# Patient Record
Sex: FEMALE | Race: WHITE | NOT HISPANIC OR LATINO | Employment: OTHER | URBAN - METROPOLITAN AREA
[De-identification: names, ages, dates, MRNs, and addresses within clinical notes are randomized per-mention and may not be internally consistent; named-entity substitution may affect disease eponyms.]

---

## 2017-10-16 RX ORDER — LISINOPRIL 40 MG/1
40 TABLET ORAL EVERY EVENING
COMMUNITY
End: 2020-05-27 | Stop reason: HOSPADM

## 2017-10-16 RX ORDER — GLIPIZIDE 5 MG/1
5 TABLET ORAL EVERY EVENING
COMMUNITY
End: 2020-09-10 | Stop reason: HOSPADM

## 2017-10-16 RX ORDER — PRAVASTATIN SODIUM 20 MG
20 TABLET ORAL EVERY EVENING
COMMUNITY
End: 2020-09-10 | Stop reason: HOSPADM

## 2017-10-16 NOTE — PRE-PROCEDURE INSTRUCTIONS
Pre-Surgery Instructions:   Medication Instructions    glipiZIDE (GLUCOTROL) 5 mg tablet Instructed patient per Anesthesia Guidelines   lisinopril (ZESTRIL) 40 mg tablet Instructed patient per Anesthesia Guidelines   metFORMIN (GLUCOPHAGE) 500 mg tablet Instructed patient per Anesthesia Guidelines   pravastatin (PRAVACHOL) 20 mg tablet Instructed patient per Anesthesia Guidelines  Pre op instructions reviewed with pt via phone  No meds a m of surgery  Pt to take FBS a m of surgery and bring in results   daughter Ashley Schuster, to provide contact # upon arrival  My Surgical Experience    The following information was developed to assist you to prepare for your operation  What do I need to do before coming to the hospital?   Arrange for a responsible person to drive you to and from the hospital    Arrange care for your children at home  Children are not allowed in the recovery areas of the hospital   Plan to wear clothing that is easy to put on and take off  If you are having shoulder surgery, wear a shirt that buttons or zippers in the front  Bathing  o Shower the evening before and the morning of your surgery with an antibacterial soap  Please refer to the Pre Op Showering Instructions for Surgery Patients Sheet   o Remove nail polish and all body piercing jewelry  o Do not shave any body part for at least 24 hours before surgery-this includes face, arms, legs and upper body  Food  o Nothing to eat or drink after midnight the night before your surgery   This includes candy and chewing gum  o Exception: If your surgery is after 12:00pm (noon), you may have clear liquids such as 7-Up®, ginger ale, apple or cranberry juice, Jell-O®, water, or clear broth until 8:00 am  o Do not drink milk or juice with pulp on the morning before surgery  o Do not drink alcohol 24 hours before surgery  Medicine  o Follow instructions you received from your surgeon about which medicines you may take on the day of surgery  o If instructed to take medicine on the morning of surgery, take pills with just a small sip of water  Call your prescribing doctor for specific information on what to do if you take insulin    What should I bring to the hospital?    Bring:  Lu Chaves or a walker, if you have them, for foot or knee surgery   A list of the daily medicines, vitamins, minerals, herbals and nutritional supplements you take  Include the dosages of medicines and the time you take them each day   Glasses, dentures or hearing aids   Minimal clothing; you will be wearing hospital sleepwear   Photo ID; required to verify your identity   If you have a Living Will or Power of , bring a copy of the documents   If you have an ostomy, bring an extra pouch and any supplies you use    Do not bring   Medicines or inhalers   Money, valuables or jewelry    What other information should I know about the day of surgery?  Notify your surgeons if you develop a cold, sore throat, cough, fever, rash or any other illness   Report to the Ambulatory Surgical/Same Day Surgery Unit   You will be instructed to stop at Registration only if you have not been pre-registered   Inform your  fi they do not stay that they will be asked by the staff to leave a phone number where they can be reached   Be available to be reached before surgery  In the event the operating room schedule changes, you may be asked to come in earlier or later than expected    *It is important to tell your doctor and others involved in your health care if you are taking or have been taking any non-prescription drugs, vitamins, minerals, herbals or other nutritional supplements   Any of these may interact with some food or medicines and cause a reaction

## 2017-10-23 ENCOUNTER — ANESTHESIA (OUTPATIENT)
Dept: PERIOP | Facility: AMBULARY SURGERY CENTER | Age: 72
End: 2017-10-23
Payer: MEDICARE

## 2017-10-23 ENCOUNTER — ANESTHESIA EVENT (OUTPATIENT)
Dept: PERIOP | Facility: AMBULARY SURGERY CENTER | Age: 72
End: 2017-10-23
Payer: MEDICARE

## 2017-10-23 ENCOUNTER — HOSPITAL ENCOUNTER (OUTPATIENT)
Facility: AMBULARY SURGERY CENTER | Age: 72
Setting detail: OUTPATIENT SURGERY
Discharge: HOME | End: 2017-10-23
Attending: OPHTHALMOLOGY | Admitting: OPHTHALMOLOGY
Payer: MEDICARE

## 2017-10-23 VITALS
OXYGEN SATURATION: 98 % | HEIGHT: 66 IN | DIASTOLIC BLOOD PRESSURE: 71 MMHG | TEMPERATURE: 97.8 F | HEART RATE: 81 BPM | WEIGHT: 180 LBS | SYSTOLIC BLOOD PRESSURE: 175 MMHG | RESPIRATION RATE: 20 BRPM | BODY MASS INDEX: 28.93 KG/M2

## 2017-10-23 LAB — GLUCOSE SERPL-MCNC: 240 MG/DL (ref 65–140)

## 2017-10-23 PROCEDURE — 82948 REAGENT STRIP/BLOOD GLUCOSE: CPT

## 2017-10-23 PROCEDURE — V2632 POST CHMBR INTRAOCULAR LENS: HCPCS | Performed by: OPHTHALMOLOGY

## 2017-10-23 DEVICE — IOL SN60WF 23.0: Type: IMPLANTABLE DEVICE | Site: EYE | Status: FUNCTIONAL

## 2017-10-23 RX ORDER — LIDOCAINE HYDROCHLORIDE 10 MG/ML
INJECTION, SOLUTION EPIDURAL; INFILTRATION; INTRACAUDAL; PERINEURAL AS NEEDED
Status: DISCONTINUED | OUTPATIENT
Start: 2017-10-23 | End: 2017-10-23 | Stop reason: HOSPADM

## 2017-10-23 RX ORDER — TETRACAINE HYDROCHLORIDE 5 MG/ML
SOLUTION OPHTHALMIC AS NEEDED
Status: DISCONTINUED | OUTPATIENT
Start: 2017-10-23 | End: 2017-10-23 | Stop reason: HOSPADM

## 2017-10-23 RX ORDER — CYCLOPENTOLATE HYDROCHLORIDE 10 MG/ML
1 SOLUTION/ DROPS OPHTHALMIC
Status: COMPLETED | OUTPATIENT
Start: 2017-10-23 | End: 2017-10-23

## 2017-10-23 RX ORDER — PHENYLEPHRINE HCL 2.5 %
1 DROPS OPHTHALMIC (EYE)
Status: COMPLETED | OUTPATIENT
Start: 2017-10-23 | End: 2017-10-23

## 2017-10-23 RX ORDER — LIDOCAINE HYDROCHLORIDE 20 MG/ML
1 JELLY TOPICAL
Status: COMPLETED | OUTPATIENT
Start: 2017-10-23 | End: 2017-10-23

## 2017-10-23 RX ORDER — GATIFLOXACIN 5 MG/ML
SOLUTION/ DROPS OPHTHALMIC AS NEEDED
Status: DISCONTINUED | OUTPATIENT
Start: 2017-10-23 | End: 2017-10-23 | Stop reason: HOSPADM

## 2017-10-23 RX ORDER — MIDAZOLAM HYDROCHLORIDE 1 MG/ML
INJECTION INTRAMUSCULAR; INTRAVENOUS AS NEEDED
Status: DISCONTINUED | OUTPATIENT
Start: 2017-10-23 | End: 2017-10-23 | Stop reason: SURG

## 2017-10-23 RX ORDER — TETRACAINE HYDROCHLORIDE 5 MG/ML
1 SOLUTION OPHTHALMIC ONCE
Status: COMPLETED | OUTPATIENT
Start: 2017-10-23 | End: 2017-10-23

## 2017-10-23 RX ORDER — GATIFLOXACIN 5 MG/ML
1 SOLUTION/ DROPS OPHTHALMIC 2 TIMES DAILY
Qty: 3 ML | Refills: 0
Start: 2017-10-23 | End: 2017-12-11 | Stop reason: HOSPADM

## 2017-10-23 RX ORDER — KETOROLAC TROMETHAMINE 5 MG/ML
1 SOLUTION OPHTHALMIC 4 TIMES DAILY
Qty: 5 ML | Refills: 0
Start: 2017-10-23 | End: 2017-12-11 | Stop reason: HOSPADM

## 2017-10-23 RX ORDER — KETOROLAC TROMETHAMINE 5 MG/ML
1 SOLUTION OPHTHALMIC
Status: COMPLETED | OUTPATIENT
Start: 2017-10-23 | End: 2017-10-23

## 2017-10-23 RX ADMIN — MIDAZOLAM HYDROCHLORIDE 1 MG: 1 INJECTION, SOLUTION INTRAMUSCULAR; INTRAVENOUS at 07:52

## 2017-10-23 RX ADMIN — MIDAZOLAM HYDROCHLORIDE 1 MG: 1 INJECTION, SOLUTION INTRAMUSCULAR; INTRAVENOUS at 07:54

## 2017-10-23 RX ADMIN — CYCLOPENTOLATE HYDROCHLORIDE 1 DROP: 10 SOLUTION/ DROPS OPHTHALMIC at 07:31

## 2017-10-23 RX ADMIN — KETOROLAC TROMETHAMINE 1 DROP: 5 SOLUTION OPHTHALMIC at 06:51

## 2017-10-23 RX ADMIN — CYCLOPENTOLATE HYDROCHLORIDE 1 DROP: 10 SOLUTION/ DROPS OPHTHALMIC at 06:51

## 2017-10-23 RX ADMIN — PHENYLEPHRINE HYDROCHLORIDE 1 DROP: 25 SOLUTION/ DROPS OPHTHALMIC at 06:52

## 2017-10-23 RX ADMIN — KETOROLAC TROMETHAMINE 1 DROP: 5 SOLUTION OPHTHALMIC at 07:18

## 2017-10-23 RX ADMIN — LIDOCAINE HYDROCHLORIDE 1 APPLICATION: 20 JELLY TOPICAL at 07:17

## 2017-10-23 RX ADMIN — LIDOCAINE HYDROCHLORIDE 1 APPLICATION: 20 JELLY TOPICAL at 07:32

## 2017-10-23 RX ADMIN — TETRACAINE HYDROCHLORIDE 1 DROP: 5 SOLUTION OPHTHALMIC at 06:52

## 2017-10-23 RX ADMIN — LIDOCAINE HYDROCHLORIDE 1 APPLICATION: 20 JELLY TOPICAL at 06:52

## 2017-10-23 RX ADMIN — CYCLOPENTOLATE HYDROCHLORIDE 1 DROP: 10 SOLUTION/ DROPS OPHTHALMIC at 07:02

## 2017-10-23 RX ADMIN — PHENYLEPHRINE HYDROCHLORIDE 1 DROP: 25 SOLUTION/ DROPS OPHTHALMIC at 07:03

## 2017-10-23 RX ADMIN — KETOROLAC TROMETHAMINE 1 DROP: 5 SOLUTION OPHTHALMIC at 07:03

## 2017-10-23 RX ADMIN — LIDOCAINE HYDROCHLORIDE 1 APPLICATION: 20 JELLY TOPICAL at 07:03

## 2017-10-23 RX ADMIN — KETOROLAC TROMETHAMINE 1 DROP: 5 SOLUTION OPHTHALMIC at 07:31

## 2017-10-23 RX ADMIN — PHENYLEPHRINE HYDROCHLORIDE 1 DROP: 25 SOLUTION/ DROPS OPHTHALMIC at 07:17

## 2017-10-23 RX ADMIN — CYCLOPENTOLATE HYDROCHLORIDE 1 DROP: 10 SOLUTION/ DROPS OPHTHALMIC at 07:17

## 2017-10-23 RX ADMIN — PHENYLEPHRINE HYDROCHLORIDE 1 DROP: 25 SOLUTION/ DROPS OPHTHALMIC at 07:32

## 2017-10-23 NOTE — DISCHARGE INSTRUCTIONS
Dr Pablo Luu Cataract Instructions    Activity:     1  No Driving until instructed   2  Keep shield on until seen tomorrow except when administering drops   3  No heavy lifting   4  No water in eye     Diet:     1  Resume normal diet    Normal Symptoms:     1  Mild Headache   2  Scratchy or picky feeling around eye    Call the office if:     1  You have any questions or concerns   2  If eye pain is not relieved by extra strength tylenol    Office phone number:  [phone]      Next appointment:     1  See Dr Pablo Luu at his office tomorrow as scheduled   __1100am________________________________________________________   2  Bring blue eye kit with you and eyedrops to the office    A new set of comprehensive instructions will be given and reviewed with you during your office visit tomorrow

## 2017-10-23 NOTE — ANESTHESIA PREPROCEDURE EVALUATION
Review of Systems/Medical History      No history of anesthetic complications     Cardiovascular  Hyperlipidemia, Hypertension , No angina , No RUIZ,    Pulmonary  Negative pulmonary ROS ,        GI/Hepatic            Endo/Other  Diabetes ,      GYN       Hematology   Musculoskeletal       Neurology   Psychology           Physical Exam    Airway    Mallampati score: III  TM Distance: >3 FB  Neck ROM: full     Dental   upper dentures and lower dentures,     Cardiovascular      Pulmonary  Breath sounds clear to auscultation,     Other Findings        Anesthesia Plan  ASA Score- 3       Anesthesia Type- IV sedation with anesthesia with ASA Monitors  Additional Monitors:   Airway Plan:           Induction- intravenous  Informed Consent- Anesthetic plan and risks discussed with patient

## 2017-10-23 NOTE — OP NOTE
OPERATIVE REPORT    PATIENT NAME: Cammie Portillo    :  1945  MRN: [id number]  Pt Location: [facility] OR ROOM 01    Surgery Date: 10/23/2017    Surgeon(s) and Role:     * Neftali Moy MD - Primary    Posterior subcapsular polar age-related cataract of right eye [H25 041]    Post-Op Diagnosis Codes:     * Posterior subcapsular polar age-related cataract of right eye [H25 041]    Procedure(s):  EXTRACTION EXTRACAPSULAR CATARACT PHACO INTRAOCULAR LENS (IOL)    Anesthesia Type:   IV Sedation with Anesthesia    Operative Indications:  Posterior subcapsular polar age-related cataract of right eye [H25 041]  Decreased vision to 20/100  With problems reading  Pt requested cataract sx the right eye    Procedure and Technique:    Procedure Details     The patient was brought in the OR in stable condition and placed on the operative table  The left eye was prepped and draped in the usual sterile manner  Attention was directed to the left eye where a lid speculum was placed  A 2 4 mm clear corneal incision was made temperally  1/2 cc of 1% MPF Lidocaine was irrigated into the anterior chamber followed by viscoat  The side port incision was placed superiorly  The capsularrhexis was made and the nucleus was hydrodissected with BSS  The nucleus was then removed with the phaco handpiece followed by removal of the cortical material with the I/A handpiece  The capsular bag was then filled with Provisc  The IOL was folded and placed in to the capsular bag and centered well  The remaining Provisc was removed from the eye with the I/A  The wounds were hydrated with BSS and found to be water tight  The lid speculum was removed and 2 drops of Gatifloxicin were placed over the cornea  A protective eye shield was taped over the eye and the patient went to PACU in stable condition  I will see the patient in the office tomorrow and the expected post op period is a few weeks         Complications: None        Disposition: PACU   Condition: Stable    SIGNATURE: Shannan Azevedo MD  DATE: October 23, 2017  TIME: 8:15 AM

## 2017-12-05 NOTE — PRE-PROCEDURE INSTRUCTIONS
Pre-Surgery Instructions:   Medication Instructions    gatifloxacin (ZYMAXID) 0 5 % Instructed patient per Anesthesia Guidelines   glipiZIDE (GLUCOTROL) 5 mg tablet Instructed patient per Anesthesia Guidelines   ketorolac (ACULAR) 0 5 % ophthalmic solution Instructed patient per Anesthesia Guidelines   lisinopril (ZESTRIL) 40 mg tablet Instructed patient per Anesthesia Guidelines   metFORMIN (GLUCOPHAGE) 500 mg tablet Instructed patient per Anesthesia Guidelines   pravastatin (PRAVACHOL) 20 mg tablet Instructed patient per Anesthesia Guidelines  Pre op instructions reviewed with pt via phone  No meds jessi gong of surgery  FBS on arrival as pt does not own a glucometer,  carlo Russell, to provide cell # upon arrival  My Surgical Experience    The following information was developed to assist you to prepare for your operation  What do I need to do before coming to the hospital?   Arrange for a responsible person to drive you to and from the hospital    Arrange care for your children at home  Children are not allowed in the recovery areas of the hospital   Plan to wear clothing that is easy to put on and take off  If you are having shoulder surgery, wear a shirt that buttons or zippers in the front  Bathing  o Shower the evening before and the morning of your surgery with an antibacterial soap  Please refer to the Pre Op Showering Instructions for Surgery Patients Sheet   o Remove nail polish and all body piercing jewelry  o Do not shave any body part for at least 24 hours before surgery-this includes face, arms, legs and upper body  Food  o Nothing to eat or drink after midnight the night before your surgery   This includes candy and chewing gum  o Exception: If your surgery is after 12:00pm (noon), you may have clear liquids such as 7-Up®, ginger ale, apple or cranberry juice, Jell-O®, water, or clear broth until 8:00 am  o Do not drink milk or juice with pulp on the morning before surgery  o Do not drink alcohol 24 hours before surgery  Medicine  o Follow instructions you received from your surgeon about which medicines you may take on the day of surgery  o If instructed to take medicine on the morning of surgery, take pills with just a small sip of water  Call your prescribing doctor for specific information on what to do if you take insulin    What should I bring to the hospital?    Bring:  Cho Tolleson or a walker, if you have them, for foot or knee surgery   A list of the daily medicines, vitamins, minerals, herbals and nutritional supplements you take  Include the dosages of medicines and the time you take them each day   Glasses, dentures or hearing aids   Minimal clothing; you will be wearing hospital sleepwear   Photo ID; required to verify your identity   If you have a Living Will or Power of , bring a copy of the documents   If you have an ostomy, bring an extra pouch and any supplies you use    Do not bring   Medicines or inhalers   Money, valuables or jewelry    What other information should I know about the day of surgery?  Notify your surgeons if you develop a cold, sore throat, cough, fever, rash or any other illness   Report to the Ambulatory Surgical/Same Day Surgery Unit   You will be instructed to stop at Registration only if you have not been pre-registered   Inform your  fi they do not stay that they will be asked by the staff to leave a phone number where they can be reached   Be available to be reached before surgery  In the event the operating room schedule changes, you may be asked to come in earlier or later than expected    *It is important to tell your doctor and others involved in your health care if you are taking or have been taking any non-prescription drugs, vitamins, minerals, herbals or other nutritional supplements   Any of these may interact with some food or medicines and cause a reaction

## 2017-12-11 ENCOUNTER — HOSPITAL ENCOUNTER (OUTPATIENT)
Facility: AMBULARY SURGERY CENTER | Age: 72
Setting detail: OUTPATIENT SURGERY
Discharge: HOME | End: 2017-12-11
Attending: OPHTHALMOLOGY | Admitting: OPHTHALMOLOGY
Payer: MEDICARE

## 2017-12-11 ENCOUNTER — ANESTHESIA (OUTPATIENT)
Dept: PERIOP | Facility: AMBULARY SURGERY CENTER | Age: 72
End: 2017-12-11
Payer: MEDICARE

## 2017-12-11 ENCOUNTER — ANESTHESIA EVENT (OUTPATIENT)
Dept: PERIOP | Facility: AMBULARY SURGERY CENTER | Age: 72
End: 2017-12-11
Payer: MEDICARE

## 2017-12-11 VITALS
RESPIRATION RATE: 18 BRPM | DIASTOLIC BLOOD PRESSURE: 79 MMHG | OXYGEN SATURATION: 99 % | TEMPERATURE: 98.2 F | HEART RATE: 80 BPM | SYSTOLIC BLOOD PRESSURE: 164 MMHG

## 2017-12-11 LAB — GLUCOSE SERPL-MCNC: 193 MG/DL (ref 65–140)

## 2017-12-11 PROCEDURE — 82948 REAGENT STRIP/BLOOD GLUCOSE: CPT

## 2017-12-11 PROCEDURE — V2632 POST CHMBR INTRAOCULAR LENS: HCPCS | Performed by: OPHTHALMOLOGY

## 2017-12-11 DEVICE — IOL SN60WF 22.5: Type: IMPLANTABLE DEVICE | Site: EYE | Status: FUNCTIONAL

## 2017-12-11 RX ORDER — PHENYLEPHRINE HCL 2.5 %
1 DROPS OPHTHALMIC (EYE)
Status: COMPLETED | OUTPATIENT
Start: 2017-12-11 | End: 2017-12-11

## 2017-12-11 RX ORDER — MIDAZOLAM HYDROCHLORIDE 1 MG/ML
INJECTION INTRAMUSCULAR; INTRAVENOUS AS NEEDED
Status: DISCONTINUED | OUTPATIENT
Start: 2017-12-11 | End: 2017-12-11 | Stop reason: SURG

## 2017-12-11 RX ORDER — GATIFLOXACIN 5 MG/ML
1 SOLUTION/ DROPS OPHTHALMIC 2 TIMES DAILY
Qty: 3 ML | Refills: 0
Start: 2017-12-11 | End: 2018-09-05

## 2017-12-11 RX ORDER — LIDOCAINE HYDROCHLORIDE 10 MG/ML
INJECTION, SOLUTION EPIDURAL; INFILTRATION; INTRACAUDAL; PERINEURAL AS NEEDED
Status: DISCONTINUED | OUTPATIENT
Start: 2017-12-11 | End: 2017-12-11 | Stop reason: HOSPADM

## 2017-12-11 RX ORDER — LIDOCAINE HYDROCHLORIDE 20 MG/ML
1 JELLY TOPICAL
Status: COMPLETED | OUTPATIENT
Start: 2017-12-11 | End: 2017-12-11

## 2017-12-11 RX ORDER — GATIFLOXACIN 5 MG/ML
SOLUTION/ DROPS OPHTHALMIC AS NEEDED
Status: DISCONTINUED | OUTPATIENT
Start: 2017-12-11 | End: 2017-12-11 | Stop reason: HOSPADM

## 2017-12-11 RX ORDER — TETRACAINE HYDROCHLORIDE 5 MG/ML
1 SOLUTION OPHTHALMIC ONCE
Status: COMPLETED | OUTPATIENT
Start: 2017-12-11 | End: 2017-12-11

## 2017-12-11 RX ORDER — KETOROLAC TROMETHAMINE 5 MG/ML
1 SOLUTION OPHTHALMIC 4 TIMES DAILY
Qty: 5 ML | Refills: 0
Start: 2017-12-11 | End: 2018-09-05

## 2017-12-11 RX ORDER — TETRACAINE HYDROCHLORIDE 5 MG/ML
SOLUTION OPHTHALMIC AS NEEDED
Status: DISCONTINUED | OUTPATIENT
Start: 2017-12-11 | End: 2017-12-11 | Stop reason: HOSPADM

## 2017-12-11 RX ORDER — KETOROLAC TROMETHAMINE 5 MG/ML
1 SOLUTION OPHTHALMIC
Status: COMPLETED | OUTPATIENT
Start: 2017-12-11 | End: 2017-12-11

## 2017-12-11 RX ORDER — CYCLOPENTOLATE HYDROCHLORIDE 10 MG/ML
1 SOLUTION/ DROPS OPHTHALMIC
Status: COMPLETED | OUTPATIENT
Start: 2017-12-11 | End: 2017-12-11

## 2017-12-11 RX ADMIN — TETRACAINE HYDROCHLORIDE 1 DROP: 5 SOLUTION OPHTHALMIC at 06:25

## 2017-12-11 RX ADMIN — KETOROLAC TROMETHAMINE 1 DROP: 5 SOLUTION OPHTHALMIC at 06:40

## 2017-12-11 RX ADMIN — CYCLOPENTOLATE HYDROCHLORIDE 1 DROP: 10 SOLUTION/ DROPS OPHTHALMIC at 06:40

## 2017-12-11 RX ADMIN — LIDOCAINE HYDROCHLORIDE 1 APPLICATION: 20 JELLY TOPICAL at 07:03

## 2017-12-11 RX ADMIN — LIDOCAINE HYDROCHLORIDE 1 APPLICATION: 20 JELLY TOPICAL at 06:40

## 2017-12-11 RX ADMIN — MIDAZOLAM HYDROCHLORIDE 0.5 MG: 1 INJECTION, SOLUTION INTRAMUSCULAR; INTRAVENOUS at 07:09

## 2017-12-11 RX ADMIN — LIDOCAINE HYDROCHLORIDE 1 APPLICATION: 20 JELLY TOPICAL at 06:25

## 2017-12-11 RX ADMIN — KETOROLAC TROMETHAMINE 1 DROP: 5 SOLUTION OPHTHALMIC at 06:52

## 2017-12-11 RX ADMIN — CYCLOPENTOLATE HYDROCHLORIDE 1 DROP: 10 SOLUTION/ DROPS OPHTHALMIC at 06:52

## 2017-12-11 RX ADMIN — PHENYLEPHRINE HYDROCHLORIDE 1 DROP: 25 SOLUTION/ DROPS OPHTHALMIC at 06:52

## 2017-12-11 RX ADMIN — KETOROLAC TROMETHAMINE 1 DROP: 5 SOLUTION OPHTHALMIC at 07:03

## 2017-12-11 RX ADMIN — PHENYLEPHRINE HYDROCHLORIDE 1 DROP: 25 SOLUTION/ DROPS OPHTHALMIC at 07:03

## 2017-12-11 RX ADMIN — PHENYLEPHRINE HYDROCHLORIDE 1 DROP: 25 SOLUTION/ DROPS OPHTHALMIC at 06:40

## 2017-12-11 RX ADMIN — MIDAZOLAM HYDROCHLORIDE 0.5 MG: 1 INJECTION, SOLUTION INTRAMUSCULAR; INTRAVENOUS at 07:11

## 2017-12-11 RX ADMIN — CYCLOPENTOLATE HYDROCHLORIDE 1 DROP: 10 SOLUTION/ DROPS OPHTHALMIC at 07:02

## 2017-12-11 RX ADMIN — LIDOCAINE HYDROCHLORIDE 1 APPLICATION: 20 JELLY TOPICAL at 06:54

## 2017-12-11 RX ADMIN — CYCLOPENTOLATE HYDROCHLORIDE 1 DROP: 10 SOLUTION/ DROPS OPHTHALMIC at 06:25

## 2017-12-11 RX ADMIN — PHENYLEPHRINE HYDROCHLORIDE 1 DROP: 25 SOLUTION/ DROPS OPHTHALMIC at 06:25

## 2017-12-11 RX ADMIN — MIDAZOLAM HYDROCHLORIDE 1 MG: 1 INJECTION, SOLUTION INTRAMUSCULAR; INTRAVENOUS at 07:08

## 2017-12-11 RX ADMIN — KETOROLAC TROMETHAMINE 1 DROP: 5 SOLUTION OPHTHALMIC at 06:25

## 2017-12-11 NOTE — OP NOTE
OPERATIVE REPORT    PATIENT NAME: Lavone Councilman    :  1945  MRN: [id number]  Pt Location: [facility] OR ROOM 01    Surgery Date: 2017    Surgeon(s) and Role:     * Jamal Mcdaniel MD - Primary    Age-related nuclear cataract of left eye [H25 12]    Post-Op Diagnosis Codes:     * Age-related nuclear cataract of left eye [H25 12]    Procedure(s):  EXTRACTION EXTRACAPSULAR CATARACT PHACO INTRAOCULAR LENS (IOL)    Anesthesia Type:   IV Sedation with Anesthesia    Operative Indications:  Age-related nuclear cataract of left eye [H25 12]  Decreased vision to 20/70  With problems reading  Pt requested cataract sx the left eye    Procedure and Technique:    Procedure Details     The patient was brought in the OR in stable condition and placed on the operative table  The left eye was prepped and draped in the usual sterile manner  Attention was directed to the left eye where a lid speculum was placed  A 2 4 mm clear corneal incision was made temperally  1/2 cc of 1% MPF Lidocaine was irrigated into the anterior chamber followed by viscoat  The side port incision was placed superiorly  The capsularrhexis was made and the nucleus was hydrodissected with BSS  The nucleus was then removed with the phaco handpiece followed by removal of the cortical material with the I/A handpiece  The capsular bag was then filled with Provisc  The IOL was folded and placed in to the capsular bag and centered well  The remaining Provisc was removed from the eye with the I/A  The wounds were hydrated with BSS and found to be water tight  The lid speculum was removed and 2 drops of Gatifloxicin were placed over the cornea  A protective eye shield was taped over the eye and the patient went to PACU in stable condition  I will see the patient in the office tomorrow and the expected post op period is a few weeks         Complications: None        Disposition: PACU   Condition: Stable    SIGNATURE: Jamal Mcdaniel MD  DATE:  2017  TIME: 7:23 AM

## 2017-12-11 NOTE — ANESTHESIA PREPROCEDURE EVALUATION
Review of Systems/Medical History  Patient summary reviewed  Chart reviewed      Cardiovascular  Hyperlipidemia, Hypertension ,    Pulmonary       GI/Hepatic            Endo/Other  Diabetes type 2 ,      GYN       Hematology   Musculoskeletal       Neurology   Psychology           Physical Exam    Airway    Mallampati score: II  TM Distance: >3 FB  Neck ROM: full     Dental       Cardiovascular  Rhythm: regular, Rate: normal,     Pulmonary  Breath sounds clear to auscultation,     Other Findings        Anesthesia Plan  ASA Score- 2       Anesthesia Type- IV sedation with anesthesia with ASA Monitors  Additional Monitors:   Airway Plan:           Induction- intravenous  Informed Consent- Anesthetic plan and risks discussed with patient

## 2017-12-11 NOTE — ANESTHESIA POSTPROCEDURE EVALUATION
Post-Op Assessment Note      CV Status:  Stable    Mental Status:  Alert and awake    Hydration Status:  Euvolemic    PONV Controlled:  Controlled    Airway Patency:  Patent    Post Op Vitals Reviewed: Yes          Staff: Anesthesiologist           /79 (12/11/17 0726)    Temp 98 2 °F (36 8 °C) (12/11/17 0726)    Pulse 80 (12/11/17 0726)   Resp 18 (12/11/17 0726)    SpO2 99 % (12/11/17 0726)

## 2017-12-11 NOTE — DISCHARGE INSTRUCTIONS
Dr Uri Ott Cataract Instructions    Activity:     1  No Driving until instructed   2  Keep shield on until seen tomorrow except when administering drops   3  No heavy lifting   4  No water in eye     Diet:     1  Resume normal diet    Normal Symptoms:     1  Mild Headache   2  Scratchy or picky feeling around eye    Call the office if:     1  You have any questions or concerns   2  If eye pain is not relieved by extra strength tylenol    Office phone number:  [phone]      Next appointment:     1  See Dr Uri Ott at his office tomorrow as scheduled   ___930am_______________________________________________________   2  Bring blue eye kit with you and eyedrops to the office    A new set of comprehensive instructions will be given and reviewed with you during your office visit tomorrow

## 2018-08-31 ENCOUNTER — APPOINTMENT (EMERGENCY)
Dept: CT IMAGING | Facility: HOSPITAL | Age: 73
End: 2018-08-31
Payer: MEDICARE

## 2018-08-31 ENCOUNTER — APPOINTMENT (EMERGENCY)
Dept: RADIOLOGY | Facility: HOSPITAL | Age: 73
End: 2018-08-31
Payer: MEDICARE

## 2018-08-31 ENCOUNTER — HOSPITAL ENCOUNTER (EMERGENCY)
Facility: HOSPITAL | Age: 73
Discharge: HOME | End: 2018-08-31
Attending: EMERGENCY MEDICINE | Admitting: EMERGENCY MEDICINE
Payer: MEDICARE

## 2018-08-31 VITALS
RESPIRATION RATE: 18 BRPM | DIASTOLIC BLOOD PRESSURE: 74 MMHG | TEMPERATURE: 97.8 F | HEART RATE: 100 BPM | OXYGEN SATURATION: 100 % | SYSTOLIC BLOOD PRESSURE: 160 MMHG

## 2018-08-31 DIAGNOSIS — S42.301A CLOSED RIGHT HUMERAL FRACTURE: Primary | ICD-10-CM

## 2018-08-31 DIAGNOSIS — S00.83XA FOREHEAD CONTUSION: ICD-10-CM

## 2018-08-31 PROCEDURE — 70450 CT HEAD/BRAIN W/O DYE: CPT

## 2018-08-31 PROCEDURE — 73030 X-RAY EXAM OF SHOULDER: CPT

## 2018-08-31 PROCEDURE — 73060 X-RAY EXAM OF HUMERUS: CPT

## 2018-08-31 PROCEDURE — 99284 EMERGENCY DEPT VISIT MOD MDM: CPT

## 2018-08-31 RX ORDER — OXYCODONE HYDROCHLORIDE 5 MG/1
5 TABLET ORAL ONCE
Status: COMPLETED | OUTPATIENT
Start: 2018-08-31 | End: 2018-08-31

## 2018-08-31 RX ORDER — OXYCODONE AND ACETAMINOPHEN 7.5; 325 MG/1; MG/1
1 TABLET ORAL EVERY 6 HOURS PRN
Qty: 30 TABLET | Refills: 0 | Status: SHIPPED | OUTPATIENT
Start: 2018-08-31 | End: 2018-09-13 | Stop reason: HOSPADM

## 2018-08-31 RX ADMIN — OXYCODONE HYDROCHLORIDE 5 MG: 5 TABLET ORAL at 19:08

## 2018-08-31 RX ADMIN — OXYCODONE HYDROCHLORIDE 5 MG: 5 TABLET ORAL at 20:41

## 2018-09-01 NOTE — DISCHARGE INSTRUCTIONS
You sling during the day  Apply ice to the arm for 15 min intervals over the next few days to minimize discomfort and swelling  You may take oxycodone/acetaminophen 7 5 mg/325 mg every 6 hr for discomfort  Contact the orthopedics office on Tuesday to arrange for an appointment later in the week for further evaluation/treatment  Return to the emergency department if your pain is not adequately controlled or if you developed numbness, tingling or weakness of the lower portion of the arm or for any other worsening that concerns you  Arm Fracture in Adults   WHAT YOU NEED TO KNOW:   An arm fracture is a crack or break in one or more of the bones in your arm  An arm fracture may be caused by a fall onto an outstretched hand  It may also be caused by trauma from a car accident or a sports injury  Osteoporosis (brittle bones) can increase your risk for a fracture  DISCHARGE INSTRUCTIONS:   Seek care immediately if:   · The pain in your injured arm does not get better or gets worse, even after you rest and take medicine  · Your injured arm, hand, or fingers feel numb  · Your arm is swollen, red, and feels warm  · Your skin over the arm fracture is swollen, cold, or pale  · You cannot move your arm, hand, or fingers  Contact your healthcare provider if:   · You have a fever  · Your brace or splint becomes wet, damaged, comes off  · You have questions or concerns about your injury, treatment, or care  Medicines:   · NSAIDs , such as ibuprofen, help decrease swelling and pain  This medicine is available with or without a doctor's order  NSAIDs can cause stomach bleeding or kidney problems in certain people  If you take blood thinner medicine, always ask your healthcare provider if NSAIDs are safe for you  Always read the medicine label and follow directions  · Acetaminophen  decreases pain  It is available without a doctor's order  Ask how much to take and how often to take it   Follow directions  Acetaminophen can cause liver damage if not taken correctly  · Prescription pain medicine  may be given  Ask how to take this medicine safely  · Take your medicine as directed  Contact your healthcare provider if you think your medicine is not helping or if you have side effects  Tell him or her if you are allergic to any medicine  Keep a list of the medicines, vitamins, and herbs you take  Include the amounts, and when and why you take them  Bring the list or the pill bottles to follow-up visits  Carry your medicine list with you in case of an emergency  Follow up with your healthcare provider within 1 week: You may need to see a bone specialist within 3 to 4 days if you need surgery or further treatment for your arm fracture  Write down your questions so you remember to ask them during your visits  Rest:  You should rest your arm as much as possible  Ask your healthcare provider when you can put pressure or weight on your arm  Also ask when you can return to sports or vigorous exercises  Ice:  Apply ice on your arm for 15 to 20 minutes every hour or as directed  Use an ice pack, or put crushed ice in a plastic bag  Cover it with a towel  Ice helps prevent tissue damage and decreases swelling and pain  Elevate:  Elevate your arm above the level of your heart as often as you can  This will help decrease swelling and pain  Prop your arm on pillows or blankets to keep it elevated comfortably  Care for your cast or splint:  Ask your healthcare provider when it is okay to bathe  Do not get your cast or splint wet  Before you take a bath or shower, cover your cast or splint with a plastic bag  Tape the bag to your skin to help keep water out  Hold your arm away from the water in case the bag leaks  · Check the skin around your cast or splint each day for any redness or open skin  · Do not use a sharp or pointed object to scratch your skin under the cast or splint    Physical therapy:  A physical therapist teaches you exercises to help improve movement and strength, and to decrease pain  © 2017 [employer] Information is for End User's use only and may not be sold, redistributed or otherwise used for commercial purposes  All illustrations and images included in CareNotes® are the copyrighted property of A [employer]  or [employer]aneeth [employer]  The above information is an  only  It is not intended as medical advice for individual conditions or treatments  Talk to your doctor, nurse or pharmacist before following any medical regimen to see if it is safe and effective for you

## 2018-09-01 NOTE — ED NOTES
Discharge instruction give to patient and family  Follow up care reviewed and medication/prescriptions reviewed  Patient able to ambulate out with family at side        Sadia Watts RN  08/31/18 [id number]

## 2018-09-05 ENCOUNTER — OFFICE VISIT (OUTPATIENT)
Dept: OBGYN CLINIC | Facility: CLINIC | Age: 73
End: 2018-09-05
Payer: MEDICARE

## 2018-09-05 ENCOUNTER — LAB (OUTPATIENT)
Dept: LAB | Facility: CLINIC | Age: 73
End: 2018-09-05
Payer: MEDICARE

## 2018-09-05 VITALS
HEART RATE: 111 BPM | DIASTOLIC BLOOD PRESSURE: 63 MMHG | BODY MASS INDEX: 26.21 KG/M2 | SYSTOLIC BLOOD PRESSURE: 153 MMHG | WEIGHT: 167 LBS | HEIGHT: 67 IN

## 2018-09-05 DIAGNOSIS — S42.291A CLOSED 4-PART FRACTURE OF PROXIMAL END OF RIGHT HUMERUS, INITIAL ENCOUNTER: ICD-10-CM

## 2018-09-05 DIAGNOSIS — S42.291A CLOSED 4-PART FRACTURE OF PROXIMAL END OF RIGHT HUMERUS, INITIAL ENCOUNTER: Primary | ICD-10-CM

## 2018-09-05 LAB
ABO GROUP BLD: NORMAL
ANION GAP SERPL CALCULATED.3IONS-SCNC: 6 MMOL/L (ref 4–13)
APTT PPP: 32 SECONDS (ref 24–36)
ATRIAL RATE: 94 BPM
BACTERIA UR QL AUTO: ABNORMAL /HPF
BILIRUB UR QL STRIP: NEGATIVE
BLD GP AB SCN SERPL QL: NEGATIVE
BUN SERPL-MCNC: 25 MG/DL (ref 5–25)
CALCIUM SERPL-MCNC: 9.6 MG/DL (ref 8.3–10.1)
CHLORIDE SERPL-SCNC: 99 MMOL/L (ref 100–108)
CLARITY UR: CLEAR
CO2 SERPL-SCNC: 30 MMOL/L (ref 21–32)
COLOR UR: YELLOW
CREAT SERPL-MCNC: 1.33 MG/DL (ref 0.6–1.3)
ERYTHROCYTE [DISTWIDTH] IN BLOOD BY AUTOMATED COUNT: 12.8 % (ref 11.6–15.1)
EST. AVERAGE GLUCOSE BLD GHB EST-MCNC: 237 MG/DL
GFR SERPL CREATININE-BSD FRML MDRD: 39 ML/MIN/1.73SQ M
GLUCOSE SERPL-MCNC: 500 MG/DL (ref 65–140)
GLUCOSE UR STRIP-MCNC: ABNORMAL MG/DL
HBA1C MFR BLD: 9.9 % (ref 4.2–6.3)
HCT VFR BLD AUTO: 30.9 % (ref 34.8–46.1)
HGB BLD-MCNC: 9.9 G/DL (ref 11.5–15.4)
HGB UR QL STRIP.AUTO: ABNORMAL
INR PPP: 1.1 (ref 0.86–1.17)
KETONES UR STRIP-MCNC: NEGATIVE MG/DL
LEUKOCYTE ESTERASE UR QL STRIP: ABNORMAL
MCH RBC QN AUTO: 29.2 PG (ref 26.8–34.3)
MCHC RBC AUTO-ENTMCNC: 32 G/DL (ref 31.4–37.4)
MCV RBC AUTO: 91 FL (ref 82–98)
NITRITE UR QL STRIP: NEGATIVE
NON-SQ EPI CELLS URNS QL MICRO: ABNORMAL /HPF
P AXIS: 103 DEGREES
PH UR STRIP.AUTO: 5.5 [PH] (ref 4.5–8)
PLATELET # BLD AUTO: 312 THOUSANDS/UL (ref 149–390)
PMV BLD AUTO: 11.8 FL (ref 8.9–12.7)
POTASSIUM SERPL-SCNC: 5.6 MMOL/L (ref 3.5–5.3)
PR INTERVAL: 142 MS
PROT UR STRIP-MCNC: ABNORMAL MG/DL
PROTHROMBIN TIME: 13.9 SECONDS (ref 11.8–14.2)
QRS AXIS: -4 DEGREES
QRSD INTERVAL: 70 MS
QT INTERVAL: 336 MS
QTC INTERVAL: 420 MS
RBC # BLD AUTO: 3.39 MILLION/UL (ref 3.81–5.12)
RBC #/AREA URNS AUTO: ABNORMAL /HPF
RH BLD: POSITIVE
SODIUM SERPL-SCNC: 135 MMOL/L (ref 136–145)
SP GR UR STRIP.AUTO: 1.01 (ref 1–1.03)
SPECIMEN EXPIRATION DATE: NORMAL
T WAVE AXIS: 34 DEGREES
UROBILINOGEN UR QL STRIP.AUTO: 0.2 E.U./DL
VENTRICULAR RATE: 94 BPM
WBC # BLD AUTO: 9.24 THOUSAND/UL (ref 4.31–10.16)
WBC #/AREA URNS AUTO: ABNORMAL /HPF

## 2018-09-05 PROCEDURE — 86900 BLOOD TYPING SEROLOGIC ABO: CPT

## 2018-09-05 PROCEDURE — 85610 PROTHROMBIN TIME: CPT

## 2018-09-05 PROCEDURE — 93010 ELECTROCARDIOGRAM REPORT: CPT | Performed by: INTERNAL MEDICINE

## 2018-09-05 PROCEDURE — 93005 ELECTROCARDIOGRAM TRACING: CPT

## 2018-09-05 PROCEDURE — 81001 URINALYSIS AUTO W/SCOPE: CPT | Performed by: ORTHOPAEDIC SURGERY

## 2018-09-05 PROCEDURE — 86901 BLOOD TYPING SEROLOGIC RH(D): CPT

## 2018-09-05 PROCEDURE — 86850 RBC ANTIBODY SCREEN: CPT

## 2018-09-05 PROCEDURE — 80048 BASIC METABOLIC PNL TOTAL CA: CPT

## 2018-09-05 PROCEDURE — 85027 COMPLETE CBC AUTOMATED: CPT

## 2018-09-05 PROCEDURE — 99204 OFFICE O/P NEW MOD 45 MIN: CPT | Performed by: ORTHOPAEDIC SURGERY

## 2018-09-05 PROCEDURE — 36415 COLL VENOUS BLD VENIPUNCTURE: CPT

## 2018-09-05 PROCEDURE — 85730 THROMBOPLASTIN TIME PARTIAL: CPT

## 2018-09-05 PROCEDURE — 87086 URINE CULTURE/COLONY COUNT: CPT | Performed by: ORTHOPAEDIC SURGERY

## 2018-09-05 PROCEDURE — 86920 COMPATIBILITY TEST SPIN: CPT

## 2018-09-05 PROCEDURE — 83036 HEMOGLOBIN GLYCOSYLATED A1C: CPT

## 2018-09-05 RX ORDER — CHLORHEXIDINE GLUCONATE 4 G/100ML
SOLUTION TOPICAL DAILY PRN
Status: CANCELLED | OUTPATIENT
Start: 2018-09-05

## 2018-09-05 RX ORDER — ACETAMINOPHEN 325 MG/1
650 TABLET ORAL EVERY 6 HOURS PRN
COMMUNITY
End: 2020-05-27 | Stop reason: HOSPADM

## 2018-09-05 NOTE — PRE-PROCEDURE INSTRUCTIONS
Pre-Surgery Instructions:   Medication Instructions    glipiZIDE (GLUCOTROL) 5 mg tablet Patient was instructed by Physician and understands   lisinopril (ZESTRIL) 40 mg tablet Instructed patient per Anesthesia Guidelines   metFORMIN (GLUCOPHAGE) 500 mg tablet Patient was instructed by Physician and understands   pravastatin (PRAVACHOL) 20 mg tablet Instructed patient per Anesthesia Guidelines  Pre op and bathing instructions reviewed   Pt will get hibiclens

## 2018-09-05 NOTE — PROGRESS NOTES
Assessment/Plan:  1  Closed 4-part fracture of proximal end of right humerus, initial encounter  Case request operating room: ARTHROPLASTY SHOULDER REVERSE    Basic metabolic panel    APTT    CBC and Platelet    Type and screen    UA w Reflex to Microscopic w Reflex to Culture    Protime-INR    HEMOGLOBIN A1C W/ EAG ESTIMATION    Ambulatory referral to Internal Medicine    EKG 12 lead    Case request operating room: ARTHROPLASTY SHOULDER REVERSE       Scribe Attestation    I,:   Kristopher Tilley am acting as a scribe while in the presence of the attending physician :        I,:   Aline Woodall MD personally performed the services described in this documentation    as scribed in my presence :            Plan:  Discussed surgical intervention with patient  NURA CARO  Preoperative clearance   Informed consent obtained  Risk and benefits explained and all questions answered   Schedule for Right Shoulder Reverse total shoulder arthroplasty  Follow up post operative  Subjective:   Vivian Serrano is a 75 y o  female who presents in the office with right shoulder and arm pain after a fall x 6 days  She complains of diffuse and lateral shoulder pain that radiates into her arm  Pain is worsened with use of arm, relieving pain factor is use of sling and no movement of arm  Pain also relieved by rest  Denies any elbow and wrist pain, denies numbness and tingling  Review of Systems   Constitutional: Negative  HENT: Negative  Eyes: Negative  Respiratory: Negative  Cardiovascular: Negative  Gastrointestinal: Negative  Endocrine: Negative  Genitourinary: Negative  Musculoskeletal: Positive for arthralgias  Skin: Negative  Allergic/Immunologic: Negative  Neurological: Negative  Hematological: Negative  Psychiatric/Behavioral: Negative            Past Medical History:   Diagnosis Date    Diabetes mellitus ([facility] [address] )     type 2    Hyperlipidemia     Hypertension        Past Surgical History: Procedure Laterality Date    BREAST SURGERY Left     lumpectomy benign    CATARACT EXTRACTION W/ INTRAOCULAR LENS IMPLANT Left 12/11/2017    Procedure: EXTRACTION EXTRACAPSULAR CATARACT PHACO INTRAOCULAR LENS (IOL); Surgeon: Tahira Dickey MD;  Location: [facility] MAIN OR;  Service: Ophthalmology     [address] Right 10/23/2017    Procedure: EXTRACTION EXTRACAPSULAR CATARACT PHACO INTRAOCULAR LENS (IOL); Surgeon: Tahira Dickey MD;  Location: [facility] MAIN OR;  Service: Ophthalmology       Family History   Problem Relation Age of Onset    Diabetes Mother     Stroke Father     Arthritis Brother     Diabetes Daughter     No Known Problems Brother        Social History     Occupational History    Not on file       Social History Main Topics    Smoking status: Never Smoker    Smokeless tobacco: Never Used    Alcohol use Yes      Comment: socially    Drug use: No    Sexual activity: Not on file         Current Outpatient Prescriptions:     gatifloxacin (ZYMAXID) 0 5 %, Administer 1 drop into the left eye 2 (two) times a day, Disp: 3 mL, Rfl: 0    glipiZIDE (GLUCOTROL) 5 mg tablet, Take 5 mg by mouth every evening, Disp: , Rfl:     ketorolac (ACULAR) 0 5 % ophthalmic solution, Administer 1 drop into the left eye 4 (four) times a day, Disp: 5 mL, Rfl: 0    lisinopril (ZESTRIL) 40 mg tablet, Take 40 mg by mouth every evening, Disp: , Rfl:     metFORMIN (GLUCOPHAGE) 500 mg tablet, Take 500 mg by mouth every evening, Disp: , Rfl:     oxyCODONE-acetaminophen (PERCOCET) 7 5-325 MG per tablet, Take 1 tablet by mouth every 6 (six) hours as needed for moderate pain or severe pain Max Daily Amount: 4 tablets, Disp: 30 tablet, Rfl: 0    pravastatin (PRAVACHOL) 20 mg tablet, Take 20 mg by mouth every evening, Disp: , Rfl:     No Known Allergies    Objective:  Vitals:    09/05/18 1053   BP: 153/63   Pulse: (!) 111       Right Shoulder Exam     Other   Erythema: absent  Sensation: normal (intact)  Pulse: present    Comments:  Ecchymosis present of right upper upper arm extending into right hand and fingers  Edema present right upper arm extending into right hand and fingers  Pain with rom , decreased rom due to severity of fracture and displacement  Normal elbow and wrist rom  Motor intact , ain, pin, median , radial , ulner  Sensation intact to light touch axillary median, radial,  ulner distrabutions            Physical Exam   Constitutional: She is oriented to person, place, and time  She appears well-developed and well-nourished  HENT:   Head: Normocephalic and atraumatic  Eyes: No scleral icterus  Neck: Neck supple  Cardiovascular: Normal heart sounds and intact distal pulses  Pulmonary/Chest: Effort normal and breath sounds normal    Abdominal: She exhibits no distension  Neurological: She is alert and oriented to person, place, and time  Skin: Skin is warm and dry  Psychiatric: She has a normal mood and affect  I have personally reviewed pertinent films in PACS and my interpretation is as follows:  X-rays right shoulder 8/31/18:  Displaced comminuted humeral head and neck fracture of the right shoulder

## 2018-09-06 ENCOUNTER — ANESTHESIA EVENT (OUTPATIENT)
Dept: PERIOP | Facility: HOSPITAL | Age: 73
DRG: 483 | End: 2018-09-06
Payer: MEDICARE

## 2018-09-06 LAB — BACTERIA UR CULT: NORMAL

## 2018-09-06 NOTE — ED PROVIDER NOTES
History  Chief Complaint   Patient presents with    Shoulder Injury     Pt reports slipping and falling onto right shoulder at [address] today  78-year-old female presents to the emergency department for evaluation following fall  She explains that at [phone] her foot became stuck and she fell down onto her right side  She had immediate discomfort in the right shoulder though was able to get herself and drive herself home using her left arm  She did not experience loss of consciousness  She does not specifically recall impacting her head though she and those around her are now aware that she has significant bruising to the right forehead region  As time progressed the right shoulder/upper arm became more uncomfortable prompting her to seek medical care  She has not been able to use the arm  She is right-hand dominant  She does not notice any paresthesias or weakness in the right hand/wrist   She notes that she is able to move the elbow well  She denies any prior history right upper arm injury  Medical history significant for hypertension, diabetes and elevated cholesterol  She is not on any anticoagulant medications  She has not taken anything for discomfort  Prior to Admission Medications   Prescriptions Last Dose Informant Patient Reported?  Taking?   glipiZIDE (GLUCOTROL) 5 mg tablet Unknown at Unknown time  Yes No   Sig: Take 5 mg by mouth every evening   lisinopril (ZESTRIL) 40 mg tablet Unknown at Unknown time  Yes No   Sig: Take 40 mg by mouth every evening   metFORMIN (GLUCOPHAGE) 500 mg tablet Unknown at Unknown time  Yes No   Sig: Take 500 mg by mouth every evening   pravastatin (PRAVACHOL) 20 mg tablet Unknown at Unknown time  Yes No   Sig: Take 20 mg by mouth every evening      Facility-Administered Medications: None       Past Medical History:   Diagnosis Date    Diabetes mellitus ([facility] [address] )     type 2    Hyperlipidemia     Hypertension        Past Surgical History:   Procedure Laterality Date    BREAST SURGERY Left     lumpectomy benign    CATARACT EXTRACTION Bilateral 2017    CATARACT EXTRACTION W/ INTRAOCULAR LENS IMPLANT Left 12/11/2017    Procedure: EXTRACTION EXTRACAPSULAR CATARACT PHACO INTRAOCULAR LENS (IOL); Surgeon: Dereck Abdi MD;  Location: [facility] OR;  Service: Ophthalmology     [address] Right 10/23/2017    Procedure: EXTRACTION EXTRACAPSULAR CATARACT PHACO INTRAOCULAR LENS (IOL); Surgeon: Dereck Abdi MD;  Location: [facility] OR;  Service: Ophthalmology       Family History   Problem Relation Age of Onset    Diabetes Mother     Stroke Father     Arthritis Brother     Diabetes Daughter     No Known Problems Brother      I have reviewed and agree with the history as documented  Social History   Substance Use Topics    Smoking status: Never Smoker    Smokeless tobacco: Never Used    Alcohol use No      Comment: quit 8/17        Review of Systems   Constitutional: Negative for fever  Eyes: Negative for visual disturbance  Gastrointestinal: Negative for nausea and vomiting  Neurological: Negative for light-headedness  All other systems reviewed and are negative  Physical Exam  Physical Exam   Constitutional: She is oriented to person, place, and time  She appears well-developed  HENT:   Head: Normocephalic  Deep ecchymosis with moderate swelling over the right supraorbital region  Eyes: Conjunctivae and EOM are normal  Pupils are equal, round, and reactive to light  Neck: Normal range of motion  Neck supple  Cardiovascular: Normal rate and regular rhythm  Pulmonary/Chest: Effort normal and breath sounds normal    Abdominal: Soft  There is no tenderness  Musculoskeletal:   Patient with full range of motion of the left upper extremity and bilateral lower extremities  She is able to range the right elbow, wrist and hand well  She is unable to range the right shoulder    Patient does have moderate to severe swelling of the right shoulder/upper arm and developing deep ecchymosis anteriorly  Neurological: She is alert and oriented to person, place, and time  Clear speech  No facial asymmetry/ deficit appreciated  Pupils briskly reactive to light  EOMI  No nystagmus  Strong eye closure & symmetric brow raise  Ability to insufflate cheeks, protrude tongue midline & range this laterally  Symmetric/ intact sensation in the upper, mid & lower portions of the face  5/5 strength on head turn,L bicep & tricep movement against resistancel  5/5 strength on b/l hand , pincer grasp, finger abduction, dorsi & volar flexion, hip flexion, dorsi & plantar flexion  Symmetric grossly intact sensation in the UE & LE  Steady gait  No dysmetria with use of the left upper extremity   Skin: Skin is warm and dry  Psychiatric: She has a normal mood and affect  Her behavior is normal    Nursing note and vitals reviewed  Vital Signs  ED Triage Vitals   Temperature Pulse Respirations Blood Pressure SpO2   08/31/18 1731 08/31/18 1729 08/31/18 1729 08/31/18 1729 08/31/18 1729   97 8 °F (36 6 °C) 100 18 160/74 100 %      Temp Source Heart Rate Source Patient Position - Orthostatic VS BP Location FiO2 (%)   08/31/18 1731 08/31/18 1729 08/31/18 1729 08/31/18 1729 --   Oral Monitor Sitting Left arm       Pain Score       08/31/18 1729       Worst Possible Pain           Vitals:    08/31/18 1729   BP: 160/74   Pulse: 100   Patient Position - Orthostatic VS: Sitting       Visual Acuity      ED Medications  Medications   oxyCODONE (ROXICODONE) IR tablet 5 mg (5 mg Oral Given 8/31/18 1908)   oxyCODONE (ROXICODONE) IR tablet 5 mg (5 mg Oral Given 8/31/18 2041)       Diagnostic Studies  Results Reviewed     None                 XR humerus RIGHT   Final Result by Santo Borjas MD (08/31 2017)      Markedly displaced acute comminuted fracture of the proximal right humerus              Workstation performed: [id number] XR shoulder 2+ views RIGHT   Final Result by Charli Almonte MD (08/31 2016)      Markedly displaced acute comminuted fracture of the proximal right humerus  Workstation performed: [id number]         CT head without contrast   Final Result by Jose Rollins MD (08/31 1918)         1  No acute intracranial hemorrhage, mass effect or extra-axial collection  2   Right frontal orbital scalp hematoma  No acute calvarial fracture  Workstation performed: [id number]                    Procedures  Procedures       Phone Contacts  ED Phone Contact    ED Course  ED Course as of Sep 05 2338   Fri Aug 31, 2018   1856 CT head being ordered given development of ecchymosis and swelling  Call is being placed to Orthopedics to review study results and discussed disposition  Patient did take a g of acetaminophen prior to coming to the emergency department  Offered IV verses oral analgesic here  She preferred to try oral analgesic  Oxycodone has been ordered  [address] [employer] text message sent to Dr Lyle Gallardo re: pt  care    2004 I did reach Dr Hinds  She will  review the images and recontact me  MDM  Number of Diagnoses or Management Options  Closed right humeral fracture:   Diagnosis management comments: Images reviewed by Dr Lyle Gallardo  Fracture is closed  Patient is neurovascularly intact distal to the site  She will be discharged with a sling and is to follow up with orthopedics  Precautions provided regarding needs to return-uncontrolled pain, new numbness, weakness or paresthesias  Patient and family demonstrate understanding of this plan  They are aware surgical correction will be required and that they will have the opportunity to discuss all concerns regarding this at the office consultation      CritCare Time    Disposition  Final diagnoses:   Closed right humeral fracture   Forehead contusion     Time reflects when diagnosis was documented in both MDM as applicable and the Disposition within this note     Time User Action Codes Description Comment    8/31/2018  8:25 PM Arcadio FAIR Add [S42 301A] Closed right humeral fracture     9/5/2018 11:38 PM Arcadio FAIR Add [S00 83XA] Forehead contusion       ED Disposition     ED Disposition Condition Comment    Discharge  Cammie Portillo discharge to home/self care      Condition at discharge: Good        Follow-up Information     Follow up With Specialties Details Why Contact Info Additional [facility] [place] [facility] Call On Tuesday September 4th to schedule a follow-up appointment for that week [address] [facility] [id number]  [facility] [place] [facility] Emergency Medicine  As needed, If symptoms worsen [address] [address]  [phone] [facility], [address], [place], [place], [address]          Discharge Medication List as of 8/31/2018  8:43 PM      START taking these medications    Details   oxyCODONE-acetaminophen (PERCOCET) 7 5-325 MG per tablet Take 1 tablet by mouth every 6 (six) hours as needed for moderate pain or severe pain Max Daily Amount: 4 tablets, Starting Fri 8/31/2018, Print         CONTINUE these medications which have NOT CHANGED    Details   glipiZIDE (GLUCOTROL) 5 mg tablet Take 5 mg by mouth every evening, Historical Med      lisinopril (ZESTRIL) 40 mg tablet Take 40 mg by mouth every evening, Historical Med      metFORMIN (GLUCOPHAGE) 500 mg tablet Take 500 mg by mouth every evening, Historical Med      pravastatin (PRAVACHOL) 20 mg tablet Take 20 mg by mouth every evening, Historical Med      gatifloxacin (ZYMAXID) 0 5 % Administer 1 drop into the left eye 2 (two) times a day, Starting Mon 12/11/2017, No Print      ketorolac (ACULAR) 0 5 % ophthalmic solution Administer 1 drop into the left eye 4 (four) times a day, Starting Mon 12/11/2017, No Print           No discharge procedures on file      ED Provider  Electronically Signed by           Miguel Grayson MD  09/05/18 6184

## 2018-09-10 ENCOUNTER — HOSPITAL ENCOUNTER (INPATIENT)
Facility: HOSPITAL | Age: 73
LOS: 3 days | Discharge: HOME HEALTH | DRG: 483 | End: 2018-09-13
Attending: ORTHOPAEDIC SURGERY | Admitting: ORTHOPAEDIC SURGERY
Payer: MEDICARE

## 2018-09-10 ENCOUNTER — ANESTHESIA (OUTPATIENT)
Dept: PERIOP | Facility: HOSPITAL | Age: 73
DRG: 483 | End: 2018-09-10
Payer: MEDICARE

## 2018-09-10 ENCOUNTER — APPOINTMENT (INPATIENT)
Dept: RADIOLOGY | Facility: HOSPITAL | Age: 73
DRG: 483 | End: 2018-09-10
Payer: MEDICARE

## 2018-09-10 DIAGNOSIS — E11.65 UNCONTROLLED TYPE 2 DIABETES MELLITUS WITH CHRONIC KIDNEY DISEASE, UNSPECIFIED CKD STAGE, UNSPECIFIED WHETHER LONG TERM INSULIN USE: ICD-10-CM

## 2018-09-10 DIAGNOSIS — S42.291D CLOSED 4-PART FRACTURE OF PROXIMAL END OF RIGHT HUMERUS WITH ROUTINE HEALING, SUBSEQUENT ENCOUNTER: Primary | ICD-10-CM

## 2018-09-10 DIAGNOSIS — E11.22 UNCONTROLLED TYPE 2 DIABETES MELLITUS WITH CHRONIC KIDNEY DISEASE, UNSPECIFIED CKD STAGE, UNSPECIFIED WHETHER LONG TERM INSULIN USE: ICD-10-CM

## 2018-09-10 DIAGNOSIS — N18.9 CHRONIC KIDNEY DISEASE, UNSPECIFIED CKD STAGE: ICD-10-CM

## 2018-09-10 PROBLEM — E78.5 HYPERLIPIDEMIA: Status: ACTIVE | Noted: 2018-09-10

## 2018-09-10 PROBLEM — N18.30 CHRONIC KIDNEY DISEASE, STAGE 3 (HCC): Status: ACTIVE | Noted: 2018-09-10

## 2018-09-10 PROBLEM — I10 HYPERTENSION: Status: ACTIVE | Noted: 2018-09-10

## 2018-09-10 LAB
ANION GAP SERPL CALCULATED.3IONS-SCNC: 7 MMOL/L (ref 4–13)
BASOPHILS # BLD AUTO: 0.05 THOUSANDS/ΜL (ref 0–0.1)
BASOPHILS NFR BLD AUTO: 0 % (ref 0–1)
BUN SERPL-MCNC: 22 MG/DL (ref 5–25)
CALCIUM SERPL-MCNC: 8.5 MG/DL (ref 8.3–10.1)
CHLORIDE SERPL-SCNC: 103 MMOL/L (ref 100–108)
CO2 SERPL-SCNC: 27 MMOL/L (ref 21–32)
CREAT SERPL-MCNC: 0.99 MG/DL (ref 0.6–1.3)
EOSINOPHIL # BLD AUTO: 0.04 THOUSAND/ΜL (ref 0–0.61)
EOSINOPHIL NFR BLD AUTO: 0 % (ref 0–6)
ERYTHROCYTE [DISTWIDTH] IN BLOOD BY AUTOMATED COUNT: 13.3 % (ref 11.6–15.1)
GFR SERPL CREATININE-BSD FRML MDRD: 56 ML/MIN/1.73SQ M
GLUCOSE SERPL-MCNC: 231 MG/DL (ref 65–140)
GLUCOSE SERPL-MCNC: 268 MG/DL (ref 65–140)
GLUCOSE SERPL-MCNC: 270 MG/DL (ref 65–140)
GLUCOSE SERPL-MCNC: 296 MG/DL (ref 65–140)
GLUCOSE SERPL-MCNC: 304 MG/DL (ref 65–140)
HCT VFR BLD AUTO: 29.5 % (ref 34.8–46.1)
HGB BLD-MCNC: 9.2 G/DL (ref 11.5–15.4)
IMM GRANULOCYTES # BLD AUTO: 0.07 THOUSAND/UL (ref 0–0.2)
IMM GRANULOCYTES NFR BLD AUTO: 1 % (ref 0–2)
LYMPHOCYTES # BLD AUTO: 1.14 THOUSANDS/ΜL (ref 0.6–4.47)
LYMPHOCYTES NFR BLD AUTO: 8 % (ref 14–44)
MCH RBC QN AUTO: 30 PG (ref 26.8–34.3)
MCHC RBC AUTO-ENTMCNC: 31.2 G/DL (ref 31.4–37.4)
MCV RBC AUTO: 96 FL (ref 82–98)
MONOCYTES # BLD AUTO: 0.77 THOUSAND/ΜL (ref 0.17–1.22)
MONOCYTES NFR BLD AUTO: 6 % (ref 4–12)
NEUTROPHILS # BLD AUTO: 11.56 THOUSANDS/ΜL (ref 1.85–7.62)
NEUTS SEG NFR BLD AUTO: 85 % (ref 43–75)
NRBC BLD AUTO-RTO: 0 /100 WBCS
PLATELET # BLD AUTO: 282 THOUSANDS/UL (ref 149–390)
PMV BLD AUTO: 11.4 FL (ref 8.9–12.7)
POTASSIUM SERPL-SCNC: 4.4 MMOL/L (ref 3.5–5.3)
RBC # BLD AUTO: 3.07 MILLION/UL (ref 3.81–5.12)
SODIUM SERPL-SCNC: 137 MMOL/L (ref 136–145)
WBC # BLD AUTO: 13.63 THOUSAND/UL (ref 4.31–10.16)

## 2018-09-10 PROCEDURE — C1713 ANCHOR/SCREW BN/BN,TIS/BN: HCPCS | Performed by: ORTHOPAEDIC SURGERY

## 2018-09-10 PROCEDURE — C1776 JOINT DEVICE (IMPLANTABLE): HCPCS | Performed by: ORTHOPAEDIC SURGERY

## 2018-09-10 PROCEDURE — 83036 HEMOGLOBIN GLYCOSYLATED A1C: CPT | Performed by: INTERNAL MEDICINE

## 2018-09-10 PROCEDURE — 99221 1ST HOSP IP/OBS SF/LOW 40: CPT | Performed by: INTERNAL MEDICINE

## 2018-09-10 PROCEDURE — 85025 COMPLETE CBC W/AUTO DIFF WBC: CPT | Performed by: INTERNAL MEDICINE

## 2018-09-10 PROCEDURE — 23472 RECONSTRUCT SHOULDER JOINT: CPT | Performed by: PHYSICIAN ASSISTANT

## 2018-09-10 PROCEDURE — 23472 RECONSTRUCT SHOULDER JOINT: CPT | Performed by: ORTHOPAEDIC SURGERY

## 2018-09-10 PROCEDURE — 0RRJ00Z REPLACEMENT OF RIGHT SHOULDER JOINT WITH REVERSE BALL AND SOCKET SYNTHETIC SUBSTITUTE, OPEN APPROACH: ICD-10-PCS | Performed by: ORTHOPAEDIC SURGERY

## 2018-09-10 PROCEDURE — 80048 BASIC METABOLIC PNL TOTAL CA: CPT | Performed by: INTERNAL MEDICINE

## 2018-09-10 PROCEDURE — 73020 X-RAY EXAM OF SHOULDER: CPT

## 2018-09-10 PROCEDURE — 82948 REAGENT STRIP/BLOOD GLUCOSE: CPT

## 2018-09-10 DEVICE — IMPLANTABLE DEVICE: Type: IMPLANTABLE DEVICE | Site: SHOULDER | Status: FUNCTIONAL

## 2018-09-10 DEVICE — INVERSE/REVERSE SCREW SYSTEM, 4.5-33
Type: IMPLANTABLE DEVICE | Site: SHOULDER | Status: FUNCTIONAL
Brand: INVERSE/REVERSE

## 2018-09-10 DEVICE — SMARTSET HIGH PERFORMANCE MV MEDIUM VISCOSITY BONE CEMENT 40G
Type: IMPLANTABLE DEVICE | Site: SHOULDER | Status: FUNCTIONAL
Brand: SMARTSET

## 2018-09-10 DEVICE — INVERSE/REVERSE SCREW SYSTEM, 4.5-30
Type: IMPLANTABLE DEVICE | Site: SHOULDER | Status: FUNCTIONAL
Brand: INVERSE/REVERSE

## 2018-09-10 DEVICE — BASEPLATE GLENOID TM REVERSE 15MM SHOULDER: Type: IMPLANTABLE DEVICE | Site: SHOULDER | Status: FUNCTIONAL

## 2018-09-10 DEVICE — GLENOSHPERE 36MM TM REVERSE: Type: IMPLANTABLE DEVICE | Site: SHOULDER | Status: FUNCTIONAL

## 2018-09-10 RX ORDER — GLIPIZIDE 5 MG/1
5 TABLET ORAL EVERY EVENING
Status: DISCONTINUED | OUTPATIENT
Start: 2018-09-10 | End: 2018-09-11

## 2018-09-10 RX ORDER — OXYCODONE HYDROCHLORIDE 5 MG/1
5 TABLET ORAL EVERY 4 HOURS PRN
Status: DISCONTINUED | OUTPATIENT
Start: 2018-09-10 | End: 2018-09-13 | Stop reason: HOSPADM

## 2018-09-10 RX ORDER — ACETAMINOPHEN 325 MG/1
650 TABLET ORAL EVERY 4 HOURS PRN
Status: DISCONTINUED | OUTPATIENT
Start: 2018-09-10 | End: 2018-09-13 | Stop reason: HOSPADM

## 2018-09-10 RX ORDER — LISINOPRIL 20 MG/1
40 TABLET ORAL EVERY EVENING
Status: DISCONTINUED | OUTPATIENT
Start: 2018-09-10 | End: 2018-09-10

## 2018-09-10 RX ORDER — DOCUSATE SODIUM 100 MG/1
100 CAPSULE, LIQUID FILLED ORAL 2 TIMES DAILY
Status: DISCONTINUED | OUTPATIENT
Start: 2018-09-10 | End: 2018-09-13 | Stop reason: HOSPADM

## 2018-09-10 RX ORDER — MAGNESIUM HYDROXIDE 1200 MG/15ML
LIQUID ORAL AS NEEDED
Status: DISCONTINUED | OUTPATIENT
Start: 2018-09-10 | End: 2018-09-10 | Stop reason: HOSPADM

## 2018-09-10 RX ORDER — GLYCOPYRROLATE 0.2 MG/ML
INJECTION INTRAMUSCULAR; INTRAVENOUS AS NEEDED
Status: DISCONTINUED | OUTPATIENT
Start: 2018-09-10 | End: 2018-09-10 | Stop reason: SURG

## 2018-09-10 RX ORDER — FENTANYL CITRATE 50 UG/ML
INJECTION, SOLUTION INTRAMUSCULAR; INTRAVENOUS AS NEEDED
Status: DISCONTINUED | OUTPATIENT
Start: 2018-09-10 | End: 2018-09-10 | Stop reason: SURG

## 2018-09-10 RX ORDER — SODIUM CHLORIDE, SODIUM LACTATE, POTASSIUM CHLORIDE, CALCIUM CHLORIDE 600; 310; 30; 20 MG/100ML; MG/100ML; MG/100ML; MG/100ML
100 INJECTION, SOLUTION INTRAVENOUS CONTINUOUS
Status: DISCONTINUED | OUTPATIENT
Start: 2018-09-10 | End: 2018-09-11

## 2018-09-10 RX ORDER — PRAVASTATIN SODIUM 20 MG
20 TABLET ORAL EVERY EVENING
Status: DISCONTINUED | OUTPATIENT
Start: 2018-09-10 | End: 2018-09-13 | Stop reason: HOSPADM

## 2018-09-10 RX ORDER — LIDOCAINE HYDROCHLORIDE 10 MG/ML
0.5 INJECTION, SOLUTION EPIDURAL; INFILTRATION; INTRACAUDAL; PERINEURAL ONCE AS NEEDED
Status: DISCONTINUED | OUTPATIENT
Start: 2018-09-10 | End: 2018-09-10 | Stop reason: HOSPADM

## 2018-09-10 RX ORDER — METOCLOPRAMIDE HYDROCHLORIDE 5 MG/ML
INJECTION INTRAMUSCULAR; INTRAVENOUS AS NEEDED
Status: DISCONTINUED | OUTPATIENT
Start: 2018-09-10 | End: 2018-09-10 | Stop reason: SURG

## 2018-09-10 RX ORDER — ROCURONIUM BROMIDE 10 MG/ML
INJECTION, SOLUTION INTRAVENOUS AS NEEDED
Status: DISCONTINUED | OUTPATIENT
Start: 2018-09-10 | End: 2018-09-10 | Stop reason: SURG

## 2018-09-10 RX ORDER — SODIUM CHLORIDE 9 MG/ML
INJECTION, SOLUTION INTRAVENOUS CONTINUOUS PRN
Status: DISCONTINUED | OUTPATIENT
Start: 2018-09-10 | End: 2018-09-10 | Stop reason: SURG

## 2018-09-10 RX ORDER — CHLORHEXIDINE GLUCONATE 4 G/100ML
SOLUTION TOPICAL DAILY PRN
Status: DISCONTINUED | OUTPATIENT
Start: 2018-09-10 | End: 2018-09-10 | Stop reason: HOSPADM

## 2018-09-10 RX ORDER — OXYCODONE HYDROCHLORIDE 10 MG/1
10 TABLET ORAL EVERY 4 HOURS PRN
Status: DISCONTINUED | OUTPATIENT
Start: 2018-09-10 | End: 2018-09-13 | Stop reason: HOSPADM

## 2018-09-10 RX ORDER — ONDANSETRON 2 MG/ML
INJECTION INTRAMUSCULAR; INTRAVENOUS AS NEEDED
Status: DISCONTINUED | OUTPATIENT
Start: 2018-09-10 | End: 2018-09-10 | Stop reason: SURG

## 2018-09-10 RX ORDER — ONDANSETRON 2 MG/ML
4 INJECTION INTRAMUSCULAR; INTRAVENOUS EVERY 6 HOURS PRN
Status: DISCONTINUED | OUTPATIENT
Start: 2018-09-10 | End: 2018-09-13 | Stop reason: HOSPADM

## 2018-09-10 RX ORDER — ONDANSETRON 2 MG/ML
4 INJECTION INTRAMUSCULAR; INTRAVENOUS ONCE AS NEEDED
Status: DISCONTINUED | OUTPATIENT
Start: 2018-09-10 | End: 2018-09-10 | Stop reason: HOSPADM

## 2018-09-10 RX ORDER — LIDOCAINE HYDROCHLORIDE 10 MG/ML
INJECTION, SOLUTION INFILTRATION; PERINEURAL AS NEEDED
Status: DISCONTINUED | OUTPATIENT
Start: 2018-09-10 | End: 2018-09-10 | Stop reason: SURG

## 2018-09-10 RX ORDER — PROPOFOL 10 MG/ML
INJECTION, EMULSION INTRAVENOUS AS NEEDED
Status: DISCONTINUED | OUTPATIENT
Start: 2018-09-10 | End: 2018-09-10 | Stop reason: SURG

## 2018-09-10 RX ORDER — MORPHINE SULFATE 2 MG/ML
2 INJECTION, SOLUTION INTRAMUSCULAR; INTRAVENOUS EVERY 4 HOURS PRN
Status: DISCONTINUED | OUTPATIENT
Start: 2018-09-10 | End: 2018-09-13 | Stop reason: HOSPADM

## 2018-09-10 RX ORDER — SODIUM CHLORIDE, SODIUM LACTATE, POTASSIUM CHLORIDE, CALCIUM CHLORIDE 600; 310; 30; 20 MG/100ML; MG/100ML; MG/100ML; MG/100ML
50 INJECTION, SOLUTION INTRAVENOUS CONTINUOUS
Status: DISCONTINUED | OUTPATIENT
Start: 2018-09-10 | End: 2018-09-10

## 2018-09-10 RX ORDER — FENTANYL CITRATE/PF 50 MCG/ML
25 SYRINGE (ML) INJECTION
Status: DISCONTINUED | OUTPATIENT
Start: 2018-09-10 | End: 2018-09-10 | Stop reason: HOSPADM

## 2018-09-10 RX ORDER — EPHEDRINE SULFATE 50 MG/ML
INJECTION, SOLUTION INTRAVENOUS AS NEEDED
Status: DISCONTINUED | OUTPATIENT
Start: 2018-09-10 | End: 2018-09-10 | Stop reason: SURG

## 2018-09-10 RX ADMIN — LISINOPRIL 40 MG: 20 TABLET ORAL at 17:10

## 2018-09-10 RX ADMIN — LIDOCAINE HYDROCHLORIDE 50 MG: 10 INJECTION, SOLUTION INFILTRATION; PERINEURAL at 13:27

## 2018-09-10 RX ADMIN — INSULIN LISPRO 4 UNITS: 100 INJECTION, SOLUTION INTRAVENOUS; SUBCUTANEOUS at 17:11

## 2018-09-10 RX ADMIN — ONDANSETRON 4 MG: 2 INJECTION INTRAMUSCULAR; INTRAVENOUS at 15:22

## 2018-09-10 RX ADMIN — FENTANYL CITRATE 100 MCG: 50 INJECTION INTRAMUSCULAR; INTRAVENOUS at 13:27

## 2018-09-10 RX ADMIN — INSULIN HUMAN 5 UNITS: 100 INJECTION, SOLUTION PARENTERAL at 16:08

## 2018-09-10 RX ADMIN — GLIPIZIDE 5 MG: 5 TABLET ORAL at 17:10

## 2018-09-10 RX ADMIN — EPHEDRINE SULFATE 5 MG: 50 INJECTION, SOLUTION INTRAMUSCULAR; INTRAVENOUS; SUBCUTANEOUS at 14:12

## 2018-09-10 RX ADMIN — DOCUSATE SODIUM 100 MG: 100 CAPSULE, LIQUID FILLED ORAL at 17:10

## 2018-09-10 RX ADMIN — PRAVASTATIN SODIUM 20 MG: 20 TABLET ORAL at 17:10

## 2018-09-10 RX ADMIN — SODIUM CHLORIDE: 0.9 INJECTION, SOLUTION INTRAVENOUS at 13:20

## 2018-09-10 RX ADMIN — NEOSTIGMINE METHYLSULFATE 3 MG: 1 INJECTION, SOLUTION INTRAMUSCULAR; INTRAVENOUS; SUBCUTANEOUS at 15:27

## 2018-09-10 RX ADMIN — SODIUM CHLORIDE, SODIUM LACTATE, POTASSIUM CHLORIDE, AND CALCIUM CHLORIDE 100 ML/HR: .6; .31; .03; .02 INJECTION, SOLUTION INTRAVENOUS at 16:46

## 2018-09-10 RX ADMIN — ROCURONIUM BROMIDE 50 MG: 10 INJECTION INTRAVENOUS at 13:27

## 2018-09-10 RX ADMIN — INSULIN LISPRO 3 UNITS: 100 INJECTION, SOLUTION INTRAVENOUS; SUBCUTANEOUS at 22:30

## 2018-09-10 RX ADMIN — METFORMIN HYDROCHLORIDE 500 MG: 500 TABLET, FILM COATED ORAL at 17:10

## 2018-09-10 RX ADMIN — CEFAZOLIN SODIUM 1000 MG: 1 SOLUTION INTRAVENOUS at 21:31

## 2018-09-10 RX ADMIN — SODIUM CHLORIDE: 0.9 INJECTION, SOLUTION INTRAVENOUS at 12:26

## 2018-09-10 RX ADMIN — PROPOFOL 150 MG: 10 INJECTION, EMULSION INTRAVENOUS at 13:27

## 2018-09-10 RX ADMIN — METOCLOPRAMIDE HYDROCHLORIDE 10 MG: 5 INJECTION INTRAMUSCULAR; INTRAVENOUS at 13:48

## 2018-09-10 RX ADMIN — PROPOFOL 50 MG: 10 INJECTION, EMULSION INTRAVENOUS at 13:29

## 2018-09-10 RX ADMIN — GLYCOPYRROLATE 0.4 MG: 0.2 INJECTION, SOLUTION INTRAMUSCULAR; INTRAVENOUS at 15:27

## 2018-09-10 RX ADMIN — PHENYLEPHRINE HYDROCHLORIDE 50 MCG/MIN: 10 INJECTION INTRAVENOUS at 14:44

## 2018-09-10 RX ADMIN — CEFAZOLIN SODIUM 2000 MG: 2 SOLUTION INTRAVENOUS at 13:35

## 2018-09-10 NOTE — OP NOTE
OPERATIVE REPORT  PATIENT NAME: Nessa Mayers    :  1945  MRN: [id number]  Pt Location: AN OR ROOM 01    SURGERY DATE: 9/10/2018    Surgeon(s) and Role:     * Rolly Singh MD - Primary     * Sebastian Hobbs PA-C - Assisting    Pre-Op Diagnosis Codes:     * Closed 4-part fracture of proximal end of right humerus [S42 291A]    Post-Op Diagnosis Codes:     * Closed 4-part fracture of proximal end of right humerus [S42 291A]    Procedure(s) (LRB):  RIGHT REVERSE TOTAL SHOULDER ARTHROPLASTY   [ADRI TRABECULAR METAL REVERSE SHOULDER SYSTEM]    Specimen(s):  * No specimens in log *    Estimated Blood Loss:   200 mL    Drains:  None    Anesthesia Type:   General w/ Interscalene Block    Complications:   None    Procedure and Technique:  The patient was identified in the preoperative holding area, and the surgical site was marked by the surgeon  Regional anesthesia was administered by the anesthesiologist in the holding area  The patient was transported to the operating room and placed in the supine position on the OR table  General anesthesia was administered, and the patient was intubated  The right upper extremity was prepped and draped in the usual sterile fashion  Prophylactic antibiotics were given within 1 hour of incision  Following a surgical timeout, a 10 cm incision was made anteriorly for a deltopectoral approach  Deep dissection was carried out bluntly to the deltopectoral interval  The cephalic vein was identified and retracted medially  The subacromial and subdeltoid spaces were bluntly dissected  The clavipectoral fascia was incised along its lateral border exposing the fracture site  The inferior capsule was released while protecting the axillary nerve  The glenoid labrum was excised, and the glenohumeral ligaments were released circumferentially from the glenoid rim  The guide pin for the glenoid base plate was inserted, and the glenoid was reamed   The central peg hole was reamed and the guidepin was removed  The glenoid baseplate with 15 mm post length was malleted in place  Screws were placed inferiorly and superiorly in the baseplate, and the locking caps were applied  The 36 mm glenosphere was then inserted and impacted with a mallet  The glenosphere was confirmed to be secure manually  Attention was turned to the humeral component  The humerus was reamed manually to a size 12  The trial humeral component with 130 mm stem length was inserted, and a trial liner was inserted  The shoulder was reduced and demonstrated optimum soft tissue tension and stability with a 6 mm trial liner  The trial components were removed, and the shoulder was irrigated with sterile saline solution using the pulse   The Soniya Trabecular Metal humeral stem 10 mm diameter with 130 mm stem length was cemented in place and maintained in 10° retroversion until the cement hardened  The 6 mm trial liner was again placed and confirmed to be satisfactory  The trial component was removed, and apolyethylene liner was then inserted  Final assessment of soft tissue tension and stability remained satisfactory  The shoulder was irrigated extensively with sterile saline solution using the pulse   The greater and lesser tuberosities were repaired to the prosthesis using #2 FiberWire suture  The deltopectoral interval was reapposed with 0 Vicryl suture  The subcuticular layer was closed with simple buried 2-0 Vicryl suture, and the skin was closed with staples  The wound was dressed with a sterile Mepilex dressing  Anesthesia was reversed, and the patient was extubated  The assistance of an advance practitioner was necessary for sterile draping, retraction of soft tissues, protection of critical neurovascular structures, and positioning of the operative extremity during the procedure  A qualified resident physician was not available      Patient Disposition:  PACU  and extubated and stable    SIGNATURE: Rolly Singh, MD  DATE: September 10, 2018  TIME: 3:31 PM

## 2018-09-10 NOTE — PLAN OF CARE
MUSCULOSKELETAL - ADULT     Maintain or return mobility to safest level of function Progressing     Maintain proper alignment of affected body part Progressing        PAIN - ADULT     Verbalizes/displays adequate comfort level or baseline comfort level Progressing        Potential for Falls     Patient will remain free of falls Progressing        SKIN/TISSUE INTEGRITY - ADULT     Skin integrity remains intact Progressing     Incision(s), wounds(s) or drain site(s) healing without S/S of infection Progressing     Oral mucous membranes remain intact Progressing

## 2018-09-10 NOTE — ANESTHESIA PREPROCEDURE EVALUATION
Review of Systems/Medical History  Patient summary reviewed  Chart reviewed  No history of anesthetic complications     Cardiovascular  Hyperlipidemia, Hypertension controlled,    Pulmonary  Negative pulmonary ROS        GI/Hepatic  Negative GI/hepatic ROS          Negative  ROS        Endo/Other  Diabetes poorly controlled type 2 ,      GYN  Negative gynecology ROS          Hematology  Negative hematology ROS      Musculoskeletal    Comment: Right proximal humerus fracture      Neurology  Negative neurology ROS      Psychology   Negative psychology ROS              Physical Exam    Airway    Mallampati score: II  TM Distance: >3 FB  Neck ROM: full     Dental   No notable dental hx     Cardiovascular  Rhythm: regular, Rate: normal, Cardiovascular exam normal    Pulmonary  Pulmonary exam normal Breath sounds clear to auscultation,     Other Findings        Anesthesia Plan  ASA Score- 2     Anesthesia Type- general and regional with ASA Monitors  Additional Monitors:   Airway Plan: ETT  Comment: Right interscalene block  Plan Factors-    Induction- intravenous  Postoperative Plan- Plan for postoperative opioid use  Informed Consent- Anesthetic plan and risks discussed with patient  I personally reviewed this patient with the CRNA  Discussed and agreed on the Anesthesia Plan with the CRNA  Shalini North Recent labs personally reviewed:  Lab Results   Component Value Date    WBC 9 24 09/05/2018    HGB 9 9 (L) 09/05/2018     09/05/2018     Lab Results   Component Value Date     (L) 09/05/2018    K 5 6 (H) 09/05/2018    BUN 25 09/05/2018    CREATININE 1 33 (H) 09/05/2018     Lab Results   Component Value Date    PTT 32 09/05/2018      Lab Results   Component Value Date    INR 1 10 09/05/2018       Blood type B    Lab Results   Component Value Date    HGBA1C 9 9 (H) 09/05/2018       I, Hannah Lozoya MD, have personally seen and evaluated the patient prior to anesthetic care    I have reviewed the pre-anesthetic record, and other medical records if appropriate to the anesthetic care  If a CRNA is involved in the case, I have reviewed the CRNA assessment, if present, and agree  Risks/benefits and alternatives discussed with patient including possible PONV, sore throat, and possibility of rare anesthetic and surgical emergencies

## 2018-09-10 NOTE — ASSESSMENT & PLAN NOTE
Creatinine elevated at 1 3 on 09/05 with no baseline available  Check BMP today  Hold ACE-inhibitor for now  Avoid nephrotoxin medication  Gentle IV hydration

## 2018-09-10 NOTE — ANESTHESIA POSTPROCEDURE EVALUATION
Post-Op Assessment Note      CV Status:  Stable    Mental Status:  Alert    Hydration Status:  Stable    PONV Controlled:  None    Airway Patency:  Patent        Staff: CRNA           /58 (09/10/18 1542)    Temp 98 8 °F (37 1 °C) (09/10/18 1542)    Pulse 82 (09/10/18 1542)   Resp 17 (09/10/18 1542)    SpO2 100 % (09/10/18 1542)

## 2018-09-10 NOTE — H&P (VIEW-ONLY)
Assessment/Plan:  1  Closed 4-part fracture of proximal end of right humerus, initial encounter  Case request operating room: ARTHROPLASTY SHOULDER REVERSE    Basic metabolic panel    APTT    CBC and Platelet    Type and screen    UA w Reflex to Microscopic w Reflex to Culture    Protime-INR    HEMOGLOBIN A1C W/ EAG ESTIMATION    Ambulatory referral to Internal Medicine    EKG 12 lead    Case request operating room: ARTHROPLASTY SHOULDER REVERSE       Scribe Attestation    I,:   Marylu Juan am acting as a scribe while in the presence of the attending physician :        I,:   Arturo Michaels MD personally performed the services described in this documentation    as scribed in my presence :            Plan:  Discussed surgical intervention with patient  NURA CARO  Preoperative clearance   Informed consent obtained  Risk and benefits explained and all questions answered   Schedule for Right Shoulder Reverse total shoulder arthroplasty  Follow up post operative  Subjective:   Kasia Jean is a 75 y o  female who presents in the office with right shoulder and arm pain after a fall x 6 days  She complains of diffuse and lateral shoulder pain that radiates into her arm  Pain is worsened with use of arm, relieving pain factor is use of sling and no movement of arm  Pain also relieved by rest  Denies any elbow and wrist pain, denies numbness and tingling  Review of Systems   Constitutional: Negative  HENT: Negative  Eyes: Negative  Respiratory: Negative  Cardiovascular: Negative  Gastrointestinal: Negative  Endocrine: Negative  Genitourinary: Negative  Musculoskeletal: Positive for arthralgias  Skin: Negative  Allergic/Immunologic: Negative  Neurological: Negative  Hematological: Negative  Psychiatric/Behavioral: Negative            Past Medical History:   Diagnosis Date    Diabetes mellitus ([facility] [address] )     type 2    Hyperlipidemia     Hypertension        Past Surgical History: Procedure Laterality Date    BREAST SURGERY Left     lumpectomy benign    CATARACT EXTRACTION W/ INTRAOCULAR LENS IMPLANT Left 12/11/2017    Procedure: EXTRACTION EXTRACAPSULAR CATARACT PHACO INTRAOCULAR LENS (IOL); Surgeon: Mali Lynch MD;  Location: [facility] MAIN OR;  Service: Ophthalmology     [address] Right 10/23/2017    Procedure: EXTRACTION EXTRACAPSULAR CATARACT PHACO INTRAOCULAR LENS (IOL); Surgeon: Mali Lynch MD;  Location: [facility] MAIN OR;  Service: Ophthalmology       Family History   Problem Relation Age of Onset    Diabetes Mother     Stroke Father     Arthritis Brother     Diabetes Daughter     No Known Problems Brother        Social History     Occupational History    Not on file       Social History Main Topics    Smoking status: Never Smoker    Smokeless tobacco: Never Used    Alcohol use Yes      Comment: socially    Drug use: No    Sexual activity: Not on file         Current Outpatient Prescriptions:     gatifloxacin (ZYMAXID) 0 5 %, Administer 1 drop into the left eye 2 (two) times a day, Disp: 3 mL, Rfl: 0    glipiZIDE (GLUCOTROL) 5 mg tablet, Take 5 mg by mouth every evening, Disp: , Rfl:     ketorolac (ACULAR) 0 5 % ophthalmic solution, Administer 1 drop into the left eye 4 (four) times a day, Disp: 5 mL, Rfl: 0    lisinopril (ZESTRIL) 40 mg tablet, Take 40 mg by mouth every evening, Disp: , Rfl:     metFORMIN (GLUCOPHAGE) 500 mg tablet, Take 500 mg by mouth every evening, Disp: , Rfl:     oxyCODONE-acetaminophen (PERCOCET) 7 5-325 MG per tablet, Take 1 tablet by mouth every 6 (six) hours as needed for moderate pain or severe pain Max Daily Amount: 4 tablets, Disp: 30 tablet, Rfl: 0    pravastatin (PRAVACHOL) 20 mg tablet, Take 20 mg by mouth every evening, Disp: , Rfl:     No Known Allergies    Objective:  Vitals:    09/05/18 1053   BP: 153/63   Pulse: (!) 111       Right Shoulder Exam     Other   Erythema: absent  Sensation: normal (intact)  Pulse: present    Comments:  Ecchymosis present of right upper upper arm extending into right hand and fingers  Edema present right upper arm extending into right hand and fingers  Pain with rom , decreased rom due to severity of fracture and displacement  Normal elbow and wrist rom  Motor intact , ain, pin, median , radial , ulner  Sensation intact to light touch axillary median, radial,  ulner distrabutions            Physical Exam   Constitutional: She is oriented to person, place, and time  She appears well-developed and well-nourished  HENT:   Head: Normocephalic and atraumatic  Eyes: No scleral icterus  Neck: Neck supple  Cardiovascular: Normal heart sounds and intact distal pulses  Pulmonary/Chest: Effort normal and breath sounds normal    Abdominal: She exhibits no distension  Neurological: She is alert and oriented to person, place, and time  Skin: Skin is warm and dry  Psychiatric: She has a normal mood and affect  I have personally reviewed pertinent films in PACS and my interpretation is as follows:  X-rays right shoulder 8/31/18:  Displaced comminuted humeral head and neck fracture of the right shoulder

## 2018-09-10 NOTE — ASSESSMENT & PLAN NOTE
Lab Results   Component Value Date    HGBA1C 9 9 (H) 09/05/2018       Recent Labs      09/10/18   1224  09/10/18   1551  09/10/18   1701   POCGLU  270*  296*  304*       Blood Sugar Average: Last 72 hrs:  (P) 290   Patient has elevated hemoglobin A1c and uncontrolled blood sugars  Does not check her blood sugar at home and had been taking metformin sporadically  Has not seen on an endocrinologist or primary care physician recently  Continue with serial blood glucose monitoring and cover with sliding scale insulin coverage and insulin with meals

## 2018-09-10 NOTE — ASSESSMENT & PLAN NOTE
Status post right reverse total shoulder arthroplasty for closed fracture of proximal end of right humerus  Postoperative care per Orthopedics

## 2018-09-10 NOTE — ANESTHESIA PROCEDURE NOTES
Peripheral Block    Patient location during procedure: holding area  Start time: 9/10/2018 12:41 PM  Reason for block: at surgeon's request and post-op pain management  Staffing  Anesthesiologist: Ana LANGLEY [address]  Performed: anesthesiologist   Preanesthetic Checklist  Completed: patient identified, site marked, surgical consent, pre-op evaluation, timeout performed, IV checked, risks and benefits discussed and monitors and equipment checked  Peripheral Block  Patient position: supine  Prep: ChloraPrep  Patient monitoring: continuous pulse ox and frequent blood pressure checks  Block type: interscalene  Laterality: right  Injection technique: single-shot  Procedures: ultrasound guided  Ultrasound permanent image saved  Local infiltration: ropivacaine  Infiltration strength: 0 5 %  Dose: 30 mL  Needle  Needle type: Stimuplex   Needle localization: ultrasound guidance  Test dose: negative  Assessment  Injection assessment: incremental injection, local visualized surrounding nerve on ultrasound, negative aspiration for CSF, negative aspiration for heme and no paresthesia on injection  Paresthesia pain: none  Heart rate change: no  Slow fractionated injection: yes  Post-procedure:  site cleaned  patient tolerated the procedure well with no immediate complications

## 2018-09-10 NOTE — TREATMENT PLAN
Paged regarding consult for CKD  She is s/p right shoulder arthroplasty  Would hold lisinopril  Allow -150s in light of CKD history and intraoperative hypotension with SBP down to 80s today  Have discontinued ACEI in anticipation of potential RORY post op and to avoid further hypotension  Needs labs this evening as well as tomorrow am   Will see in official consultation in the morning

## 2018-09-10 NOTE — CONSULTS
Consult- Juicetim Lopezromeo 1945, 75 y o  female MRN: [id number]    Unit/Bed#: -01 Encounter: [id number]    Primary Care Provider: No primary care provider on file  Date and time admitted to hospital: 9/10/2018 11:42 AM      Inpatient consult to Internal Medicine  Consult performed by: Fabiana Rangel ordered by: Stefany Man          Type 2 diabetes mellitus with hyperglycemia [facility])   Assessment & Plan    Lab Results   Component Value Date    HGBA1C 9 9 (H) 09/05/2018       Recent Labs      09/10/18   1224  09/10/18   1551  09/10/18   1701   POCGLU  270*  296*  304*       Blood Sugar Average: Last 72 hrs:  (P) 290   Patient has elevated hemoglobin A1c and uncontrolled blood sugars  Does not check her blood sugar at home and had been taking metformin sporadically  Has not seen on an endocrinologist or primary care physician recently  Continue with serial blood glucose monitoring and cover with sliding scale insulin coverage and insulin with meals  Chronic kidney disease, stage 3   Assessment & Plan    Creatinine elevated at 1 3 on 09/05 with no baseline available  Check BMP today  Hold ACE-inhibitor for now  Avoid nephrotoxin medication  Gentle IV hydration  Hyperlipidemia   Assessment & Plan    Continue with statin therapy        Hypertension   Assessment & Plan    Blood pressure well controlled  Continue with current outpatient medications  * Closed 4-part fracture of proximal end of right humerus   Assessment & Plan    Status post right reverse total shoulder arthroplasty for closed fracture of proximal end of right humerus  Postoperative care per Orthopedics  VTE Prophylaxis: Heparin  / sequential compression device     Recommendations for Discharge:  · To follow    Counseling / Coordination of Care Time: 30 minutes  Greater than 50% of total time spent on patient counseling and coordination of care  Collaboration of Care:  Were Recommendations Directly Discussed with Primary Treatment Team? - No     History of Present Illness:    Stephany Garcia is a 75 y o  female who is originally admitted to the orthopedic service due to fall and right humeral fracture  We are consulted for medical management of  Diabetes  Patient has been a type 2 diabetic for many years and takes metformin at home  She however does not follow up with the primary care physician usually gets her prescriptions filled at urgent care  She also has history of hypertension and hyperlipidemia  She had blood work done few days back and was found to have elevated creatinine and uncontrolled blood sugar  Today postoperatively her blood sugar levels were elevated at 300  Patient denies any chest pain shortness of breath lightheadedness or dizziness  Denies any urinary complaints  Denies any fever or chills  Review of Systems:    Review of Systems   Constitutional: Positive for activity change  HENT: Negative  Eyes: Negative  Respiratory: Negative  Cardiovascular: Negative  Gastrointestinal: Negative  Endocrine: Negative  Genitourinary: Negative  Musculoskeletal: Negative  Skin: Negative  Hematological: Negative  Psychiatric/Behavioral: Negative  Past Medical and Surgical History:     Past Medical History:   Diagnosis Date    Diabetes mellitus ([address] )     type 2    Hyperlipidemia     Hypertension        Past Surgical History:   Procedure Laterality Date    BREAST SURGERY Left     lumpectomy benign    CATARACT EXTRACTION Bilateral 2017    CATARACT EXTRACTION W/ INTRAOCULAR LENS IMPLANT Left 12/11/2017    Procedure: EXTRACTION EXTRACAPSULAR CATARACT PHACO INTRAOCULAR LENS (IOL); Surgeon: Lupe Coleman MD;  Location: [facility] OR;  Service: Ophthalmology    [place] [address] [place] [address] Right 10/23/2017    Procedure: EXTRACTION EXTRACAPSULAR CATARACT PHACO INTRAOCULAR LENS (IOL);   Surgeon: Lupe Coleman MD;  Location: [facility] OR; Service: Ophthalmology       Meds/Allergies:    all medications and allergies reviewed    Allergies: No Known Allergies    Social History:     Marital Status: Single    Substance Use History:   History   Alcohol Use No     Comment: quit 8/17     History   Smoking Status    Never Smoker   Smokeless Tobacco    Never Used     History   Drug Use No       Family History:    non-contributory    Physical Exam:     Vitals:   Blood Pressure: 143/64 (09/10/18 1715)  Pulse: 91 (09/10/18 1715)  Temperature: 98 °F (36 7 °C) (09/10/18 1715)  Temp Source: Oral (09/10/18 1715)  Respirations: 18 (09/10/18 1715)  Height: 5' 7" (170 2 cm) (09/10/18 1208)  Weight - Scale: 75 8 kg (167 lb) (09/10/18 1208)  SpO2: 100 % (09/10/18 1715)    Physical Exam   Constitutional: She is oriented to person, place, and time  No distress  HENT:   Head: Normocephalic and atraumatic  Right precious orbital ecchymosis   Eyes: Conjunctivae are normal  Pupils are equal, round, and reactive to light  Neck: Normal range of motion  Neck supple  Cardiovascular: Normal rate and regular rhythm  Pulmonary/Chest: Effort normal and breath sounds normal    Abdominal: Soft  Bowel sounds are normal    Musculoskeletal: She exhibits no edema  Right shoulder dressing in place   Neurological: She is alert and oriented to person, place, and time  Skin: Skin is warm and dry  She is not diaphoretic  Psychiatric: She has a normal mood and affect  Additional Data:     Lab Results: I have personally reviewed pertinent reports          Results from last 7 days  Lab Units 09/10/18  1741   WBC Thousand/uL 13 63*   HEMOGLOBIN g/dL 9 2*   HEMATOCRIT % 29 5*   PLATELETS Thousands/uL 282   NEUTROS PCT % 85*   LYMPHS PCT % 8*   MONOS PCT % 6   EOS PCT % 0       Results from last 7 days  Lab Units 09/10/18  1741   SODIUM mmol/L 137   POTASSIUM mmol/L 4 4   CHLORIDE mmol/L 103   CO2 mmol/L 27   BUN mg/dL 22   CREATININE mg/dL 0 99   CALCIUM mg/dL 8 5       Results from last 7 days  Lab Units 09/05/18  1203   INR  1 10         Lab Results   Component Value Date/Time    HGBA1C 9 9 (H) 09/05/2018 12:03 PM       Results from last 7 days  Lab Units 09/10/18  1701 09/10/18  1551 09/10/18  1224   POC GLUCOSE mg/dl 304* 296* 270*       Imaging: I have personally reviewed pertinent reports  XR shoulder 1 vw right    (Results Pending)       EKG, Pathology, and Other Studies Reviewed on Admission:   · EKG: REviewed    ** Please Note: This note has been constructed using a voice recognition system   **

## 2018-09-11 LAB
ANION GAP SERPL CALCULATED.3IONS-SCNC: 11 MMOL/L (ref 4–13)
BACTERIA UR QL AUTO: ABNORMAL /HPF
BILIRUB UR QL STRIP: NEGATIVE
BUN SERPL-MCNC: 18 MG/DL (ref 5–25)
CALCIUM SERPL-MCNC: 8.4 MG/DL (ref 8.3–10.1)
CHLORIDE SERPL-SCNC: 106 MMOL/L (ref 100–108)
CLARITY UR: CLEAR
CO2 SERPL-SCNC: 24 MMOL/L (ref 21–32)
COLOR UR: YELLOW
CREAT SERPL-MCNC: 0.93 MG/DL (ref 0.6–1.3)
CREAT UR-MCNC: 167 MG/DL
ERYTHROCYTE [DISTWIDTH] IN BLOOD BY AUTOMATED COUNT: 13.6 % (ref 11.6–15.1)
EST. AVERAGE GLUCOSE BLD GHB EST-MCNC: 232 MG/DL
GFR SERPL CREATININE-BSD FRML MDRD: 61 ML/MIN/1.73SQ M
GLUCOSE SERPL-MCNC: 173 MG/DL (ref 65–140)
GLUCOSE SERPL-MCNC: 194 MG/DL (ref 65–140)
GLUCOSE SERPL-MCNC: 205 MG/DL (ref 65–140)
GLUCOSE SERPL-MCNC: 223 MG/DL (ref 65–140)
GLUCOSE SERPL-MCNC: 267 MG/DL (ref 65–140)
GLUCOSE UR STRIP-MCNC: ABNORMAL MG/DL
HBA1C MFR BLD: 9.7 % (ref 4.2–6.3)
HCT VFR BLD AUTO: 25.7 % (ref 34.8–46.1)
HGB BLD-MCNC: 8.1 G/DL (ref 11.5–15.4)
HGB UR QL STRIP.AUTO: ABNORMAL
KETONES UR STRIP-MCNC: NEGATIVE MG/DL
LEUKOCYTE ESTERASE UR QL STRIP: ABNORMAL
MCH RBC QN AUTO: 29.3 PG (ref 26.8–34.3)
MCHC RBC AUTO-ENTMCNC: 31.5 G/DL (ref 31.4–37.4)
MCV RBC AUTO: 93 FL (ref 82–98)
NITRITE UR QL STRIP: NEGATIVE
NON-SQ EPI CELLS URNS QL MICRO: ABNORMAL /HPF
OTHER STN SPEC: ABNORMAL
PH UR STRIP.AUTO: 5.5 [PH] (ref 4.5–8)
PLATELET # BLD AUTO: 280 THOUSANDS/UL (ref 149–390)
PMV BLD AUTO: 11.2 FL (ref 8.9–12.7)
POTASSIUM SERPL-SCNC: 4.5 MMOL/L (ref 3.5–5.3)
PROT UR STRIP-MCNC: ABNORMAL MG/DL
PROT UR-MCNC: 74 MG/DL
PROT/CREAT UR: 0.44 MG/G{CREAT} (ref 0–0.1)
RBC # BLD AUTO: 2.76 MILLION/UL (ref 3.81–5.12)
RBC #/AREA URNS AUTO: ABNORMAL /HPF
SODIUM SERPL-SCNC: 141 MMOL/L (ref 136–145)
SP GR UR STRIP.AUTO: >=1.03 (ref 1–1.03)
UROBILINOGEN UR QL STRIP.AUTO: 0.2 E.U./DL
WBC # BLD AUTO: 10.51 THOUSAND/UL (ref 4.31–10.16)
WBC #/AREA URNS AUTO: ABNORMAL /HPF

## 2018-09-11 PROCEDURE — 99024 POSTOP FOLLOW-UP VISIT: CPT | Performed by: ORTHOPAEDIC SURGERY

## 2018-09-11 PROCEDURE — 84156 ASSAY OF PROTEIN URINE: CPT | Performed by: NURSE PRACTITIONER

## 2018-09-11 PROCEDURE — G8979 MOBILITY GOAL STATUS: HCPCS

## 2018-09-11 PROCEDURE — 82570 ASSAY OF URINE CREATININE: CPT | Performed by: NURSE PRACTITIONER

## 2018-09-11 PROCEDURE — 80048 BASIC METABOLIC PNL TOTAL CA: CPT | Performed by: ORTHOPAEDIC SURGERY

## 2018-09-11 PROCEDURE — 99222 1ST HOSP IP/OBS MODERATE 55: CPT | Performed by: INTERNAL MEDICINE

## 2018-09-11 PROCEDURE — G8987 SELF CARE CURRENT STATUS: HCPCS

## 2018-09-11 PROCEDURE — 81001 URINALYSIS AUTO W/SCOPE: CPT | Performed by: NURSE PRACTITIONER

## 2018-09-11 PROCEDURE — G8978 MOBILITY CURRENT STATUS: HCPCS

## 2018-09-11 PROCEDURE — 97163 PT EVAL HIGH COMPLEX 45 MIN: CPT

## 2018-09-11 PROCEDURE — 97110 THERAPEUTIC EXERCISES: CPT

## 2018-09-11 PROCEDURE — G8988 SELF CARE GOAL STATUS: HCPCS

## 2018-09-11 PROCEDURE — 85027 COMPLETE CBC AUTOMATED: CPT | Performed by: ORTHOPAEDIC SURGERY

## 2018-09-11 PROCEDURE — 97167 OT EVAL HIGH COMPLEX 60 MIN: CPT

## 2018-09-11 PROCEDURE — 99232 SBSQ HOSP IP/OBS MODERATE 35: CPT | Performed by: PHYSICIAN ASSISTANT

## 2018-09-11 PROCEDURE — 82948 REAGENT STRIP/BLOOD GLUCOSE: CPT

## 2018-09-11 RX ORDER — INSULIN GLARGINE 100 [IU]/ML
8 INJECTION, SOLUTION SUBCUTANEOUS
Status: DISCONTINUED | OUTPATIENT
Start: 2018-09-11 | End: 2018-09-13 | Stop reason: HOSPADM

## 2018-09-11 RX ADMIN — INSULIN LISPRO 5 UNITS: 100 INJECTION, SOLUTION INTRAVENOUS; SUBCUTANEOUS at 13:27

## 2018-09-11 RX ADMIN — INSULIN LISPRO 2 UNITS: 100 INJECTION, SOLUTION INTRAVENOUS; SUBCUTANEOUS at 17:13

## 2018-09-11 RX ADMIN — OXYCODONE HYDROCHLORIDE 5 MG: 5 TABLET ORAL at 10:13

## 2018-09-11 RX ADMIN — SODIUM CHLORIDE, SODIUM LACTATE, POTASSIUM CHLORIDE, AND CALCIUM CHLORIDE 100 ML/HR: .6; .31; .03; .02 INJECTION, SOLUTION INTRAVENOUS at 02:30

## 2018-09-11 RX ADMIN — DOCUSATE SODIUM 100 MG: 100 CAPSULE, LIQUID FILLED ORAL at 10:12

## 2018-09-11 RX ADMIN — OXYCODONE HYDROCHLORIDE 10 MG: 10 TABLET ORAL at 19:58

## 2018-09-11 RX ADMIN — INSULIN LISPRO 3 UNITS: 100 INJECTION, SOLUTION INTRAVENOUS; SUBCUTANEOUS at 13:27

## 2018-09-11 RX ADMIN — INSULIN LISPRO 1 UNITS: 100 INJECTION, SOLUTION INTRAVENOUS; SUBCUTANEOUS at 22:30

## 2018-09-11 RX ADMIN — DOCUSATE SODIUM 100 MG: 100 CAPSULE, LIQUID FILLED ORAL at 17:13

## 2018-09-11 RX ADMIN — INSULIN GLARGINE 8 UNITS: 100 INJECTION, SOLUTION SUBCUTANEOUS at 22:57

## 2018-09-11 RX ADMIN — MORPHINE SULFATE 2 MG: 2 INJECTION, SOLUTION INTRAMUSCULAR; INTRAVENOUS at 03:11

## 2018-09-11 RX ADMIN — OXYCODONE HYDROCHLORIDE 10 MG: 10 TABLET ORAL at 00:01

## 2018-09-11 RX ADMIN — INSULIN LISPRO 5 UNITS: 100 INJECTION, SOLUTION INTRAVENOUS; SUBCUTANEOUS at 17:13

## 2018-09-11 RX ADMIN — INSULIN LISPRO 2 UNITS: 100 INJECTION, SOLUTION INTRAVENOUS; SUBCUTANEOUS at 10:07

## 2018-09-11 RX ADMIN — PRAVASTATIN SODIUM 20 MG: 20 TABLET ORAL at 17:13

## 2018-09-11 RX ADMIN — CEFAZOLIN SODIUM 1000 MG: 1 SOLUTION INTRAVENOUS at 05:37

## 2018-09-11 NOTE — PLAN OF CARE
Problem: OCCUPATIONAL THERAPY ADULT  Goal: Performs self-care activities at highest level of function for planned discharge setting  See evaluation for individualized goals  Treatment Interventions: ADL retraining, Functional transfer training, UE strengthening/ROM, Patient/family training, Equipment evaluation/education, Compensatory technique education, Continued evaluation, Activityengagement  Equipment Recommended: Tub seat with back, Other (comment) (has commode)       See flowsheet documentation for full assessment, interventions and recommendations  Limitation: Decreased ADL status, Decreased UE ROM, Decreased UE strength, Decreased endurance, Decreased fine motor control, Decreased self-care trans, Decreased high-level ADLs     Assessment: Pt is a 65yo female admitted to [facility] on 9/10/2018  Pt presents w/ closed 4 part fracture of proximal end of right humerus and signficant PMH impacting her occupational performance including DM, HTN, breast sx  Pt presents s/p R reverse total shoulder and NWB R UE in sling  Pt reports living w/ daughter and 2 great grandsons in 3rd floor elevator access apt PTA  Pt reports I w/ ADL/ IADL PTA and no AD for functional mobility  Pt reports having w/c, walker (daughter using), and cane at home  Upon evaluation, pt required min A to complete bed mobility, functional transfers, bed to chair, and functional mobility  Pt demonstrated limited recall recent events  Pt required max A to complete UBD  Pt able to [employer] w/ S after set- up  Pt presents w/ decreased activity tolerance, decreased standing tolerance, decreased standing balance, decreased dynamic sitting balance, increased pain, decreased memory / recall, limited insight into deficits, decresaed R UE ROM / strength impacting her I w/ dressing, bathing, oral hygiene, functional transfers, functional mobility, food prep / clean up, clothing mgmt, and activity engagement   Pt would benefit from OT while in acute care to address deficits  From an OT perspective, pt would benefit from post acute rehab pending progress in acute care to max I w/ ADL performance   Will continue to follow     OT Discharge Recommendation: Other (Comment) (to post acute rehab)  OT - OK to Discharge:  (to post acute rehab at this time)

## 2018-09-11 NOTE — CONSULTS
Inpatient Consultation - Nephrology   Homar Chamberlain 75 y o  female MRN: [id number]  Unit/Bed#: -01 Encounter: [id number]    ASSESSMENT and PLAN:  1  Elevated creatinine:  Questionable CKD in the setting of longstanding, poorly controlled diabetes mellitus, hypertension  · Recent outpatient labs show a creatinine of 1 33  · On admission creatinine 0 99  Patient underwent surgery-right shoulder arthroplasty  She received 1 dose of lisinopril 40 mg  Today creatinine 0 93  · Will need to trend at steady state to determine CKD  · Urinalysis:  Trace protein, specific gravity 1 010, trace blood, 0-1 RBCs, 10-20 WBCs, occasional bacteria  · Plan:  Discontinue IV fluids, check BMP in the a m , hold lisinopril today  2  Right humeral fracture:  Status post day 1 right shoulder arthroplasty  3  Hypertension:  Blood pressure is acceptable at this time  4  Diabetes mellitus:  Poorly controlled- Hemoglobin A1c 9 7  Placed on carbohydrate controlled diet  5  Anemia:  Hemoglobin slightly lower postoperatively    SUMMARY OF RECOMMENDATIONS:  · Check BMP in the a m  · Discontinue IV fluids  · Avoid nephrotoxic agents  · Hold lisinopril today  · Check urine protein creatinine ratio  · Follow-up with primary care strongly encouraged    HISTORY OF PRESENT ILLNESS:  Requesting Physician: Renato Lizama MD  Reason for Consult: CKD    Homar Chamberlain is a 75 y o  female with a past medical history of type 2 diabetes for many years on metformin, hypertension and hyperlipidemia who was admitted to [facility] by Orthopedic surgery for treatment due to recent fall and right humeral fracture  Recent outpatient labs on 09/05/2018 revealed a creatinine of 1 33 and a potassium of 5 6  At that time blood sugar was 500  On admission creatinine 0 99 and the day following admission creatinine is 0 93  A renal consultation is requested today for assistance in the management of CKD  Patient was seen and examined    She is fairly comfortable at rest  She denies any recent change in health  She was injured by falling, chasing after her great grandson  She denies blood in urine, and she denies foam in urine  She has a daughter who was a diabetic and is on dialysis  She does not know the reason for her daughter's renal failure  She rarely checks her blood sugar  She goes to [facility] care for her prescriptions for medications  She has not had any blood work for comparison to recent findings  PAST MEDICAL HISTORY:  Past Medical History:   Diagnosis Date    Diabetes mellitus ([address] )     type 2    Hyperlipidemia     Hypertension        PAST SURGICAL HISTORY:  Past Surgical History:   Procedure Laterality Date    BREAST SURGERY Left     lumpectomy benign    CATARACT EXTRACTION Bilateral 2017    CATARACT EXTRACTION W/ INTRAOCULAR LENS IMPLANT Left 12/11/2017    Procedure: EXTRACTION EXTRACAPSULAR CATARACT PHACO INTRAOCULAR LENS (IOL); Surgeon: Conchita Norris MD;  Location: [facility] MAIN OR;  Service: Ophthalmology    ND [address] [place] [address] Right 10/23/2017    Procedure: EXTRACTION EXTRACAPSULAR CATARACT PHACO INTRAOCULAR LENS (IOL);   Surgeon: Conchita Norris MD;  Location: [facility] MAIN OR;  Service: Ophthalmology    ND ELENI SHOULDER ARTHRPLSTY HUMERAL&GLENOID COMPNT Right 9/10/2018    Procedure: RIGHT REVERSE TOTAL SHOULDER ARTHROPLASTY;  Surgeon: Renato Lizama MD;  Location:  Main OR;  Service: Orthopedics       ALLERGIES:  No Known Allergies    SOCIAL HISTORY:  History   Alcohol Use No     Comment: quit 8/17     History   Drug Use No     History   Smoking Status    Never Smoker   Smokeless Tobacco    Never Used       FAMILY HISTORY:  Family History   Problem Relation Age of Onset    Diabetes Mother     Stroke Father     Arthritis Brother     Diabetes Daughter     No Known Problems Brother        MEDICATIONS:    Current Facility-Administered Medications:     acetaminophen (TYLENOL) tablet 650 mg, 650 mg, Oral, Q4H PRN, Renato Lizama MD    docusate sodium (COLACE) capsule 100 mg, 100 mg, Oral, BID, Rafia Ariza MD, 100 mg at 09/10/18 1710    glipiZIDE (GLUCOTROL) tablet 5 mg, 5 mg, Oral, QPM, Rafia Ariza MD, 5 mg at 09/10/18 1710    insulin lispro (HumaLOG) 100 units/mL subcutaneous injection 1-6 Units, 1-6 Units, Subcutaneous, TID AC, 4 Units at 09/10/18 1711 **AND** Fingerstick Glucose (POCT), , , TID AC, Rafia Ariza MD    insulin lispro (HumaLOG) 100 units/mL subcutaneous injection 1-6 Units, 1-6 Units, Subcutaneous, HS, Rafia Ariza MD, 3 Units at 09/10/18 2230    lactated ringers infusion, 100 mL/hr, Intravenous, Continuous, Rafia Ariza MD, Last Rate: 100 mL/hr at 09/11/18 0230, 100 mL/hr at 09/11/18 0230    morphine injection 2 mg, 2 mg, Intravenous, Q4H PRN, Rafia Ariza MD, 2 mg at 09/11/18 0311    ondansetron (ZOFRAN) injection 4 mg, 4 mg, Intravenous, Q6H PRN, Rafia Ariza MD    oxyCODONE (ROXICODONE) immediate release tablet 10 mg, 10 mg, Oral, Q4H PRN, Rafia Ariza MD, 10 mg at 09/11/18 0001    oxyCODONE (ROXICODONE) IR tablet 5 mg, 5 mg, Oral, Q4H PRN, Rafia Ariza MD    pravastatin (PRAVACHOL) tablet 20 mg, 20 mg, Oral, QPM, Rafia Ariza MD, 20 mg at 09/10/18 1710    REVIEW OF SYSTEMS:  Constitutional: Negative for fatigue, anorexia, fever, chills, diaphoresis  Eyes: Negative for visual disturbance  Respiratory: Negative for cough, shortness of breath and wheezing  Cardiovascular: Negative for chest pain, palpitations and leg swelling  Gastrointestinal: Negative for abdominal pain, constipation, diarrhea, nausea and vomiting  Genitourinary: No dysuria, hematuria  Endocrine: Negative for polyuria  Musculoskeletal:  Shoulder pain, right arm pain due to recent injury/surgery  Skin: Negative for rash  Neurological: Negative for focal weakness, headaches, dizziness  Hematological: Negative for easy bruising or bleeding  Psychiatric/Behavioral: Negative for confusion and sleep disturbance     All the systems were reviewed and were negative except as documented on the HPI  PHYSICAL EXAM:  Current Weight: Weight - Scale: 75 8 kg (167 lb)  First Weight: Weight - Scale: 75 8 kg (167 lb)  Vitals:    09/10/18 1645 09/10/18 1715 09/10/18 2217 09/11/18 0700   BP: 161/70 143/64 140/63 142/63   BP Location: Left arm Left arm Left arm Left arm   Pulse: 91 91 102 89   Resp: 18 18 18 16   Temp: 98 1 °F (36 7 °C) 98 °F (36 7 °C) 98 7 °F (37 1 °C) 98 6 °F (37 °C)   TempSrc: Oral Oral Oral Oral   SpO2: 100% 100% 97% 90%   Weight:       Height:           Intake/Output Summary (Last 24 hours) at 09/11/18 0758  Last data filed at 09/10/18 1543   Gross per 24 hour   Intake             1700 ml   Output              200 ml   Net             1500 ml     Physical Exam   Constitutional: She is oriented to person, place, and time  She appears well-developed and well-nourished  No distress  HENT:   Head: Normocephalic  Mouth/Throat: Oropharynx is clear and moist    Right periorbital ecchymosis   Eyes: Conjunctivae are normal  Right eye exhibits no discharge  Left eye exhibits no discharge  No scleral icterus  Neck: Neck supple  No JVD present  Cardiovascular: Normal rate and regular rhythm  Exam reveals no gallop and no friction rub  No murmur heard  Pulmonary/Chest: Effort normal and breath sounds normal  No respiratory distress  She has no wheezes  She has no rales  Abdominal: Soft  Bowel sounds are normal  She exhibits no distension and no mass  There is no tenderness  There is no rebound and no guarding  Musculoskeletal: She exhibits no edema  Neurological: She is alert and oriented to person, place, and time  Skin: Skin is warm and dry  No rash noted  She is not diaphoretic  No erythema  No pallor  Psychiatric: She has a normal mood and affect   Her behavior is normal  Judgment and thought content normal        Invasive Devices:      Lab Results:     Results from last 7 days  Lab Units 09/11/18  5384 09/10/18  1741 09/05/18  1203   WBC Thousand/uL 10 51* 13 63* 9 24   HEMOGLOBIN g/dL 8 1* 9 2* 9 9*   HEMATOCRIT % 25 7* 29 5* 30 9*   PLATELETS Thousands/uL 280 282 312   SODIUM mmol/L 141 137 135*   POTASSIUM mmol/L 4 5 4 4 5 6*   CHLORIDE mmol/L 106 103 99*   CO2 mmol/L 24 27 30   BUN mg/dL 18 22 25   CREATININE mg/dL 0 93 0 99 1 33*   CALCIUM mg/dL 8 4 8 5 9 6     Other Studies:

## 2018-09-11 NOTE — PLAN OF CARE
Problem: DISCHARGE PLANNING - CARE MANAGEMENT  Goal: Discharge to post-acute care or home with appropriate resources  INTERVENTIONS:  - Conduct assessment to determine patient/family and health care team treatment goals, and need for post-acute services based on payer coverage, community resources, and patient preferences, and barriers to discharge  - Address psychosocial, clinical, and financial barriers to discharge as identified in assessment in conjunction with the patient/family and health care team  - Arrange appropriate level of post-acute services according to patients   needs and preference and payer coverage in collaboration with the physician and health care team  - Communicate with and update the patient/family, physician, and health care team regarding progress on the discharge plan  - Arrange appropriate transportation to post-acute venues  Outcome: Progressing  LOS 1  Patient is not a readmission or a bundle  CM spoke with patient at bedside  Patient lives in a 3rd floor apartment with her daughter and 2 great-grandchildren in [place]  Patient's apartment has an elevator  Patient is independent at home with no assistive devices  Patient denies history of VNA and inpatient rehab  Patient uses [address]  Patient states she has prescription insurance and has been able to afford her medications  Patient denies alcohol/drug abuse history and mental illness history  Patient does not have an advance directive/living will  Patient declines information at this time  Patient's emergency contact is her son Raquel De Paz  His number is   Patient is retired  Patient currently drives, but stated she's not supposed to be driving  Her son Royann Canavan will transport her home at time of discharge  CM discussed with patient re:DCP  CM will f/u with patient  Patient falling asleep during conversation  CM contact information given to patient at bedside  CM name and role reviewed  Discharge Checklist reviewed and CM will continue to monitor for progress toward discharge goals in nursing and provider rounds  CM reviewed discharge planning process including the following: identifying caregivers at home, preference for d/c planning needs, [facility] Meds to Bed program, availability of treatment team to discuss questions or concerns patient and/or family may have regarding diagnosis, plan of care, old or new medications and discharge planning   CM will continue to follow for care coordination and update assessment as necessary

## 2018-09-11 NOTE — DISCHARGE INSTRUCTIONS
Care after your surgery:  · Ice and elevate the surgical site 3-4 times a day for 15 minutes   · Keep the bandages dry at all times  Remove 3 days after surgery and resume showering if the incision is dry  · Apply a clean bandage daily after showering until the staples are removed  · Non weight bearing right upper extremity in sling at all times  · Do not drive until cleared by the surgeon  Call the office at [phone] if you have any of the following:  · Excessive redness or drainage at the surgical site  · Fever above 101 5° F   · Pain unrelieved by medication  · Any numbness, tingling, or excessive swelling of the operative extremity  Follow-Up Appointment:  · 10-14 days after surgery with Dr Callejas Sep

## 2018-09-11 NOTE — PHYSICAL THERAPY NOTE
PHYSICAL THERAPY EVALUATION  NAME:  Vinita Chahal  DATE: 09/11/18    AGE:   75 y o  Mrn:   [id number]  ADMIT DX:  Closed 4-part fracture of proximal end of right humerus, initial encounter Ricardo [place]    Past Medical History:   Diagnosis Date    Diabetes mellitus [address] [address] )     type 2    Hyperlipidemia     Hypertension      Length Of Stay: 1  Performed at least 2 patient identifiers during session: Name and Birthday    PHYSICAL THERAPY EVALUATION :    09/11/18 1051   Note Type   Note type Eval only   Pain Assessment   Pain Assessment 0-10   Pain Score 7   Pain Type Surgical pain   Pain Location Shoulder   Pain Orientation Right   Effect of Pain on Daily Activities limits speed and indep of mobility, limits AROM   Patient's Stated Pain Goal No pain   Hospital Pain Intervention(s) Medication (See MAR); Repositioned; Ambulation/increased activity; Emotional support  (RN Medicated pt prior to therpay)   Home Living   Type of [address]  (3rd floor; no [facility] building )   Home Equipment (none used prior to admit; daughter has WC and cane)   Prior Function   Level of [place] Independent with ADLs and functional mobility   Lives With Daughter  (8 and  y/o)   ADL Assistance Independent   IADLs ("I'm not supposed to drive" but I do)   Falls in the last 6 months 1 to 4   Restrictions/Precautions   Weight Bearing Precautions Per Order Yes   RUE Weight Bearing Per Order NWB   Braces or Orthoses Sling   Other Precautions Bed Alarm; Chair Alarm; Fall Risk;Pain;WBS   General   Family/Caregiver Present No   Cognition   Overall Cognitive Status Impaired   Arousal/Participation Cooperative   Orientation Level Oriented to person;Oriented to situation   Memory Decreased recall of recent events  (Pt was not able to report what happeneds around fall events)   Following Commands Follows one step commands with increased time or repetition   RUE Assessment   RUE Assessment X   RUE Overall AROM   R Shoulder Flexion limited   R Shoulder ABduction limited   R Shoulder ADduction limited   R Elbow Flexion WFL   R Elbow Extension limited   R Elbow Supination WFL   R Elbow Pronation limited   R Wrist Flexion limited   R Wrist Extension limited   R Mass Grasp limited   R Finger Flexion limited   R Finger Extension limited   R Finger ABduction limited   RUE Strength   RUE Overall Strength Deficits;Due to precautions   LUE Strength   LUE Overall Strength Within Functional Limits - able to perform ADL tasks with strength   Strength RLE   R Hip Flexion 4/5   R Knee Extension 4/5   R Ankle Dorsiflexion 4/5   Strength LLE   L Hip Flexion 4/5   L Knee Extension 4/5   L Ankle Dorsiflexion 4/5   Coordination   Movements are Fluid and Coordinated 1   Sensation (vision and hearing)   Light Touch   RLE Light Touch Grossly intact   LLE Light Touch Grossly intact   Bed Mobility   Supine to Sit 4  Minimal assistance   Additional items Increased time required;Assist x 1;LE management;HOB elevated   Sit to Supine 4  Minimal assistance   Additional items Assist x 1;Verbal cues; Increased time required;HOB elevated   Transfers   Sit to Stand 4  Minimal assistance   Additional items Assist x 1; Increased time required;Verbal cues   Stand to Sit 5  Supervision   Additional items Assist x 1; Increased time required;Verbal cues   Ambulation/Elevation   Gait pattern Inconsistent gordo;Poor UE support; Shuffling  (reaching for UE support)   Gait Assistance 4  Minimal assist   Additional items Assist x 1;Verbal cues   Assistive Device None  (for first trial 2'; cane for 100'gait trial)   Distance 2'+100'   Stair Management Assistance Not tested   Balance   Static Sitting Good   Static Standing Fair -   Ambulatory Poor +   Endurance Deficit   Endurance Deficit Yes   Endurance Deficit Description limited amb distance   Activity Tolerance   Activity Tolerance Patient limited by pain; Patient limited by fatigue   Medical Staff Made Aware spoke to Orjennifer Anderson from case management, and Aicha MARVIN   Nurse Made Aware spoke to Chapincito ESCOBAR RN   Assessment   Prognosis Fair   Problem List Decreased strength;Decreased range of motion;Decreased endurance; Impaired balance   Barriers to Discharge Decreased caregiver support; Inaccessible home environment  (daughter uses a walker, young children in school)   Goals   Patient Goals to be able to get home, and get out to the grocery store  STG Expiration Date 09/18/18   Treatment Day 1   Plan   Treatment/Interventions Functional transfer training;LE strengthening/ROM; Therapeutic exercise;Cognitive reorientation;Patient/family training;Equipment eval/education; Endurance training;Bed mobility;Gait training;Spoke to nursing;Spoke to case management;OT;Spoke to advanced practitioner   PT Frequency 5x/wk   Recommendation   Recommendation Post acute IP rehab;Home with family support  (depending on mobility progression &additional A upon DC)   Equipment Recommended (trial cane)   Barthel Index   Feeding 5   Bathing 0   Grooming Score 0   Dressing Score 5   Bladder Score 10   Bowels Score 10   Toilet Use Score 5   Transfers (Bed/Chair) Score 10   Mobility (Level Surface) Score 0   Stairs Score 0   Barthel Index Score 45   Pt requires PT /OT co-eval due to signficant assistance with mobility and cognitive-behavioral impairments  (Please find full objective findings from PT assessment regarding body systems outlined above)  Assessment: Pt is a 75 y o  female seen for PT evaluation s/p admit to [facility] on 9/10/2018 w/ Closed 4-part fracture of proximal end of right humerus  Pt had RIGHT REVERSE TOTAL SHOULDER ARTHROPLASTY on 9/10/2018, is NWB in sling  Order placed for PT  Prior to admission, pt was independent w/ all functional mobility w/ out device, lived in one floor environment and lived with daughter (who uses walker and goes to HD [id number]), and grandchildren 8 and 10 y/o  Sherre Soulier  Upon evaluation: Pt requires min assistance for bed mobilty, intermittent min assistance for transfers and min assistance for ambulation with out device and with cane     Pt's clinical presentation is currently unstable/unpredictable given the functional mobility deficits above, especially weakness, decreased ROM, decreased endurance, gait deviations, pain and decreased functional mobility tolerance, coupled with fall risks including hx of falls, impaired balance, decreased safety awareness and limited UE WB, and combined with medical complications of hypertension , abnormal H&H, abnormal WBCs and multiple readmissions  Pt to benefit from continued skilled PT tx while in hospital and upon DC to address deficits as defined above and maximize level of functional mobility  From PT/mobility standpoint, recommendation at time of d/c would be inpatient rehab vs  Home PT and with cane pending progress in order to maximize pt's functional independence and consistency w/ mobility in order to facilitate return to PLOF  Recommend trial with cane next 1-2 sessions, ther ex next 1-2 sessions, OT consult and case management consult  Goals: Pt will: Perform bed mobility tasks to Supervision to increase Indep in prior living environment and demonstrate compliance with WB limitations  Perform transfers to modified I to increase Indep in prior living environment and demonstrate compliance with WB limitations  Perform ambulation with least restrictive device for >100' to  Supervision  to decrease risk for falls and increase Indep in prior living environment and outside environment  Perform standing tolerance for 5 min with unilateral UE Support improve ability to perform upright tasks at home; indep with UE therex to increase ROM/Strength RUE  The following objective measures were performed on IE: Barthel Index 45/100  Comorbidities affecting pt's physical performance at time of assessment include: DM, HTN and breast lumpectomy L   Personal factors affecting pt at time of IE include: unable to perform caregiver support/tasks, limited home support, inability to perform IADLs, inability to perform ADLs, recent fall(s) and hx of non compliance with driving  PHYSICAL THERAPY TREATMENT NOTE  Time In: 1120  Time Out:11:31  Total Time: 11 min  S:  Pt agreeable to participate in therex   O:    Exercises    Side/Reps/sets   RUE pendulums 15 reps x4 directions in standing   RUE elbow flexion/extension 15 reps AAROM in standing   RUE wrist flexion/extension 15 reps AAROM in sitting   RUE elbow pronation/supination 15 reps AAROM in sitting   RUE finger rom 15 reps AAROM in sitting   A:  Pt requires 25-50% physical assistance to perform exercises, and requires 25% verbal instruction or tactile feedback to perform exercises with proper form and technique     P:  Recommend addition of therapeutic exercises to current functional mobility program      Marshall Zamudio, PT, DPT

## 2018-09-11 NOTE — CASE MANAGEMENT
Initial Clinical Review    Age/Sex: 75 y o  female    Surgery Date: 9/10/2018     Procedure: RIGHT REVERSE TOTAL SHOULDER ARTHROPLASTY   [ADRI TRABECULAR METAL REVERSE SHOULDER SYSTEM]    Anesthesia: General w/ Interscalene Block    Admission Orders: Date/Time/Statement: 9/10/18 @ 1312     Orders Placed This Encounter   Procedures    Inpatient Admission     Standing Status:   Standing     Number of Occurrences:   1     Order Specific Question:   Admitting Physician     Answer:   Linda Joe [[id number]]     Order Specific Question:   Level of Care     Answer:   Med Surg [[id number]]     Order Specific Question:   Estimated length of stay     Answer:   Inpatient Only Surgery       Vital Signs: /63 (BP Location: Left arm)   Pulse 89   Temp 98 6 °F (37 °C) (Oral)   Resp 16   Ht 5' 7" (1 702 m)   Wt 75 8 kg (167 lb 1 7 oz)   SpO2 90%   BMI 26 17 kg/m²     Diet:        Diet Orders            Start     Ordered    09/11/18 0946  Diet Nir/CHO Controlled; Consistent Carbohydrate Diet Level 2 (5 carb servings/75 grams CHO/meal)  Diet effective now     Question Answer Comment   Diet Type Nir/CHO Controlled    Nir/CHO Controlled Consistent Carbohydrate Diet Level 2 (5 carb servings/75 grams CHO/meal)    RD to adjust diet per protocol? Yes        09/11/18 0945    09/10/18 1935  Room Service  Once     Question:  Type of Service  Answer:  Room Service - Appropriate with Assistance    09/10/18 1934          Mobility: non weight bearing RUE  PT/OT    DVT Prophylaxis: scds    Pain Control: morphine 2 mg iv - used x 1  Oxycodone 10 mg - used x 1;  Oxycodone 5 mg - used x 1  Pain Medications             acetaminophen (TYLENOL) 325 mg tablet Take 650 mg by mouth every 6 (six) hours as needed for mild pain    oxyCODONE-acetaminophen (PERCOCET) 7 5-325 MG per tablet Take 1 tablet by mouth every 6 (six) hours as needed for moderate pain or severe pain Max Daily Amount: 4 tablets          OTHER ORDERS:  lantus sq at bedtime      Po medication:  Pravastatin  Colace  Fingerstick glucose qid with coverage  Neuro vascular checks q 4h  Consult nephrology; internal medicine

## 2018-09-11 NOTE — SOCIAL WORK
CM spoke with patient's son Ramo Sanabriah  Patient's son updated on plan of care  PT/OT evaluated patient at bedside  [facility] is concerned how his mother will be able to function at home  CM will continue to f/u with patient and family

## 2018-09-11 NOTE — OCCUPATIONAL THERAPY NOTE
[address] Evaluation     Patient Name: Gardenia Young  KIOPQ'T Date: 9/11/2018  Problem List  Patient Active Problem List   Diagnosis    Closed 4-part fracture of proximal end of right humerus    Type 2 diabetes mellitus with hyperglycemia ([address] )    Hypertension    Hyperlipidemia    Chronic kidney disease, stage 3     Past Medical History  Past Medical History:   Diagnosis Date    Diabetes mellitus ([address] )     type 2    Hyperlipidemia     Hypertension      Past Surgical History  Past Surgical History:   Procedure Laterality Date    BREAST SURGERY Left     lumpectomy benign    CATARACT EXTRACTION Bilateral 2017    CATARACT EXTRACTION W/ INTRAOCULAR LENS IMPLANT Left 12/11/2017    Procedure: EXTRACTION EXTRACAPSULAR CATARACT PHACO INTRAOCULAR LENS (IOL); Surgeon: Jason Anderson MD;  Location: [facility] MAIN OR;  Service: Ophthalmology     [address] Right 10/23/2017    Procedure: EXTRACTION EXTRACAPSULAR CATARACT PHACO INTRAOCULAR LENS (IOL); Surgeon: Jason Anderson MD;  Location: [facility] MAIN OR;  Service: Ophthalmology    ME ELENI SHOULDER ARTHRPLSTY HUMERAL&GLENOID COMPNT Right 9/10/2018    Procedure: RIGHT REVERSE TOTAL SHOULDER ARTHROPLASTY;  Surgeon: Natasha Webb MD;  Location: [facility] Main OR;  Service: Orthopedics         09/11/18 1111   Note Type   Note type Eval only   Restrictions/Precautions   Weight Bearing Precautions Per Order Yes   RUE Weight Bearing Per Order NWB   Braces or Orthoses Sling   Other Precautions Bed Alarm; Chair Alarm; Fall Risk;Pain   Pain Assessment   Pain Assessment 0-10   Pain Score 7   Pain Type Surgical pain;Acute pain   Pain Location Arm; Shoulder   Pain Orientation Right   Effect of Pain on Daily Activities limits activity tolerance during ADL performance   Patient's Stated Pain Goal No pain   Hospital Pain Intervention(s) Repositioned; Ambulation/increased activity; Emotional support  (RNJulieta medicated pt prior to OT eval)   Response to Interventions tolerated   Home Living   Type of Home Apartment; Other (Comment)  (3rd floor elevator access)   Home Layout One level;Elevator  ([address] building)   Bathroom Shower/Tub Tub/shower unit   Bathroom Toilet Standard   Bathroom Equipment Commode   Bathroom Accessibility Accessible   Home Equipment Walker;Cane;Wheelchair-manual;Other (Comment)  (pt's daughter uses AD)   Additional Comments Pt reports living w/ her daughter and 5,5 yo great grandsons in 2rd floor elevator access apt   Prior Function   Level of [place] Independent with ADLs and functional mobility   Lives With Daughter; Other (Comment)  (10,4 yo great grandsons)   Receives Help From Other (Comment)  (son and daughter have been A since fall)   ADL Assistance Independent   IADLs Independent  ("I am not supposed to drive but I do")   Falls in the last 6 months 1 to 4  (pt does not recall this fall)   Vocational Retired   Comments Pt reports I w/ ADL PTA  Pt reports I w/ IADL, and added that she does drive but that she is not supposed to  Pt reports sleeping in recliner chair  Pt reports that she was walking in [place] after 9yo great grandson and next thing she knew she was on the ground   Lifestyle   Autonomy Pt reports I w/ ADL/ IADL PTA and no AD for functional mobility   Reciprocal Relationships Pt reports supportive / involved family  Pt added that her son and daughter (as able) has been assisting w/ ADL since fall   Service to Others Pt reports retired and worked in [address] Pt reports enjoying spending time w/ family  Subjective   Subjective "My shoulder hurts"   ADL   Eating Assistance 5  Supervision/Setup  (seated at EOB to manage beverage)   Eating Deficit Setup;Supervision/safety   Grooming Assistance 4  Minimal Assistance   Grooming Deficit Setup;Supervision/safety; Increased time to complete;Verbal cueing   UB Bathing Assistance Unable to assess   LB Bathing Assistance Unable to assess   UB Dressing Assistance 2  Maximal Assistance   UB Dressing Deficit Setup; Fasteners; Thread RUE;Verbal cueing;Supervision/safety; Increased time to complete;Pull around back   LB Dressing Assistance 2  Maximal Assistance   LB Dressing Deficit Don/doff R sock; Don/doff L sock; Setup;Verbal cueing; Increased time to complete;Supervision/safety   Toileting Assistance  Unable to assess  (pt reports just went to bathroom)   Bed Mobility   Supine to Sit 4  Minimal assistance   Additional items Assist x 1; Increased time required;Verbal cues;HOB elevated; Bedrails   Sit to Supine Unable to assess   Additional Comments Pt OOB engaging in functional mobility w/ PT, Mirtha post eval   Transfers   Sit to Stand 4  Minimal assistance   Additional items Assist x 1; Armrests; Increased time required;Verbal cues   Stand to Sit 4  Minimal assistance  (progressed to S w/ additional trials)   Additional items Assist x 1; Increased time required;Armrests; Verbal cues   Stand pivot 4  Minimal assistance  (min HHA bed to chair)   Additional items Assist x 1; Increased time required;Verbal cues   Functional Mobility   Functional Mobility 4  Minimal assistance   Additional Comments min HHA vs SPC; increased time and unsteady   Additional items SPC   Balance   Static Sitting Fair +   Static Standing Fair -   Ambulatory Poor +   Activity Tolerance   Activity Tolerance Patient limited by pain; Patient limited by fatigue   Medical Staff Made Aware spoke to PT, [facility] and Amanda WARREN   Nurse Made Aware spoke to RN, [facility]   RUE Assessment   RUE Assessment X  (AROM grasp WFL; NT shoudler due to precautions)   RUE Overall AROM   R Mass Grasp limited end range flex/ext   RUE Strength   RUE Overall Strength Deficits;Due to pain;Due to precautions   Edema   RUE Edema Non-pitting   LUE Assessment   LUE Assessment X  (limited end range shoulder; able to complete ADL)   LUE Strength   LUE Overall Strength Within Functional Limits - able to perform ADL tasks with strength   Hand Function   Gross Motor Coordination Impaired  (due to R UE precautions, deficits)   Fine Motor Coordination Impaired  (R hand dominance, due to R UE precautions, deficits)   Sensation   Light Touch Not tested   Sharp/Dull Not tested   Vision - Complex Assessment   Acuity Able to read clock/calendar on wall without difficulty   Cognition   Overall Cognitive Status Impaired   Arousal/Participation Lethargic;Arousable; Cooperative   Attention Attends with cues to redirect   Orientation Level Oriented to person;Oriented to place;Oriented to situation;Disoriented to time   Memory Decreased recall of recent events  (unable to recall events/ details of fall)   Following Commands Follows one step commands with increased time or repetition   Comments Identified pt by full name and birthdate  Pt able to participate in conversation  Limited recall details recent events  Assessment   Limitation Decreased ADL status; Decreased UE ROM; Decreased UE strength;Decreased endurance;Decreased fine motor control;Decreased self-care trans;Decreased high-level ADLs   Assessment Pt is a 65yo female [facility] on 9/10/2018  Pt presents w/ closed 4 part fracture of proximal end of right humerus and signficant PMH impacting her occupational performance including DM, HTN, breast sx  Pt presents s/p R reverse total shoulder and NWB R UE in sling  Pt reports living w/ daughter and 2 great grandsons in 3rd floor elevator access apt PTA  Pt reports I w/ ADL/ IADL PTA and no AD for functional mobility  Pt reports having w/c, walker (daughter using), and cane at home  Upon evaluation, pt required min A to complete bed mobility, functional transfers, bed to chair, and functional mobility  Pt demonstrated limited recall recent events  Pt required max A to complete UBD  Pt able to [employer] w/ S after set- up   Pt presents w/ decreased activity tolerance, decreased standing tolerance, decreased standing balance, decreased dynamic sitting balance, increased pain, decreased memory / recall, limited insight into deficits, decresaed R UE ROM / strength impacting her I w/ dressing, bathing, oral hygiene, functional transfers, functional mobility, food prep / clean up, clothing mgmt, and activity engagement  Pt would benefit from OT while in acute care to address deficits  From an OT perspective, pt would benefit from post acute rehab pending progress in acute care to max I w/ ADL performance  Will continue to follow   Goals   Patient Goals Pt stated that she wants to be able to get home and get to the grocery store   Plan   Treatment Interventions ADL retraining;Functional transfer training;UE strengthening/ROM; Patient/family training;Equipment evaluation/education; Compensatory technique education;Continued evaluation; Activityengagement   Goal Expiration Date 09/18/18   OT Frequency 3-5x/wk   Recommendation   OT Discharge Recommendation Other (Comment)  (to post acute rehab)   Equipment Recommended Tub seat with back; Other (comment)  (has commode)   OT - OK to Discharge (to post acute rehab at this time)   Barthel Index   Feeding 5   Bathing 0   Grooming Score 0   Dressing Score 5   Bladder Score 10   Bowels Score 10   Toilet Use Score 5   Transfers (Bed/Chair) Score 10   Mobility (Level Surface) Score 0   Stairs Score 0   Barthel Index Score 45   Modified [place] Scale   Modified [place] Scale 4   Pt goals to be met in 7 days:  1  Pt will complete bed mobility supine <> sit w/ mod I to max I w/ ADL performance  2  Pt will complete functional transfers using least restrictive device to bed, chair, and toilet w/ mod I  3  Pt will complete functional tub transfer using DME as needed w/ S using least restrictive device as needed  4  Pt will complete UBD w/ min A x1 to max I w/ ADL performance  5  Pt will demonstrate good attention and verbalize understanding of safety precautions and activity restrictions to max I w/ ADL performance  6   Pt will consistently demonstrate understanding of safety precautions during ADL performance  7  Pt will demonstrate good attention and verbalize understanding of R UE AROM at elbow and hand to max I w/ ADL performance  8   Pt will demonstrate good attention and participation in continued evaluation to assess for DME needs to max I w/ ADL performance    Jeni Mendez, OTR/L

## 2018-09-11 NOTE — PLAN OF CARE
Problem: PHYSICAL THERAPY ADULT  Goal: Performs mobility at highest level of function for planned discharge setting  See evaluation for individualized goals  Treatment/Interventions: Functional transfer training, LE strengthening/ROM, Therapeutic exercise, Cognitive reorientation, Patient/family training, Equipment eval/education, Endurance training, Bed mobility, Gait training, Spoke to nursing, Spoke to case management, OT, Spoke to advanced practitioner  Equipment Recommended:  (trial cane)       See flowsheet documentation for full assessment, interventions and recommendations  Prognosis: Fair  Problem List: Decreased strength, Decreased range of motion, Decreased endurance, Impaired balance  Assessment: Pt is a 75 y o  female seen for PT evaluation s/p admit to [facility] on 9/10/2018 w/ Closed 4-part fracture of proximal end of right humerus  Pt had RIGHT REVERSE TOTAL SHOULDER ARTHROPLASTY on 9/10/2018, is NWB in sling  Order placed for PT  Prior to admission, pt was independent w/ all functional mobility w/ out device, lived in one floor environment and lived with daughter (who uses walker and goes to HD [id number]), and grandchildren 8 and 8 y/o  Олег Nunez Upon evaluation: Pt requires min assistance for bed mobilty, intermittent min assistance for transfers and min assistance for ambulation with out device and with cane     Pt's clinical presentation is currently unstable/unpredictable given the functional mobility deficits above, especially weakness, decreased ROM, decreased endurance, gait deviations, pain and decreased functional mobility tolerance, coupled with fall risks including hx of falls, impaired balance, decreased safety awareness and limited UE WB, and combined with medical complications of hypertension , abnormal H&H, abnormal WBCs and multiple readmissions    Pt to benefit from continued skilled PT tx while in hospital and upon DC to address deficits as defined above and maximize level of functional mobility  From PT/mobility standpoint, recommendation at time of d/c would be inpatient rehab vs  Home PT and with cane pending progress in order to maximize pt's functional independence and consistency w/ mobility in order to facilitate return to PLOF  Recommend trial with cane next 1-2 sessions, ther ex next 1-2 sessions, OT consult and case management consult  Barriers to Discharge: Decreased caregiver support, Inaccessible home environment (daughter uses a walker, young children in school)     Recommendation: Post acute IP rehab, Home with family support (depending on mobility progression &additional A upon DC)          See flowsheet documentation for full assessment

## 2018-09-11 NOTE — PLAN OF CARE
Problem: PHYSICAL THERAPY ADULT  Goal: Performs mobility at highest level of function for planned discharge setting  See evaluation for individualized goals  Treatment/Interventions: Functional transfer training, LE strengthening/ROM, Therapeutic exercise, Cognitive reorientation, Patient/family training, Equipment eval/education, Endurance training, Bed mobility, Gait training, Spoke to nursing, Spoke to case management, OT, Spoke to advanced practitioner  Equipment Recommended:  (trial cane)       See flowsheet documentation for full assessment, interventions and recommendations  Outcome: Progressing  Prognosis: Fair  Problem List: Decreased strength, Decreased range of motion, Decreased endurance, Impaired balance  Assessment: Pt requires 25-50% physical assistance to perform exercises, and requires 25% verbal instruction or tactile feedback to perform exercises with proper form and technique  Barriers to Discharge: Decreased caregiver support, Inaccessible home environment (daughter uses a walker, young children in school)     Recommendation: Post acute IP rehab, Home with family support (depending on mobility progression &additional A upon DC)          See flowsheet documentation for full assessment

## 2018-09-11 NOTE — ASSESSMENT & PLAN NOTE
Lab Results   Component Value Date    HGBA1C 9 7 (H) 09/10/2018       Recent Labs      09/10/18   1701  09/10/18   2219  09/11/18   0716  09/11/18   1102   POCGLU  304*  231*  205*  267*       Blood Sugar Average: Last 72 hrs:  (P) 151 [id number]   Patient has elevated hemoglobin A1c and uncontrolled blood sugars  Does not check her blood sugar at home and had been taking metformin sporadically  Has not seen on an endocrinologist or primary care physician recently  Continue with serial blood glucose monitoring and cover with sliding scale insulin coverage and insulin with meals    Hold oral meds while inpatient  Will add lantus and mealtime insulin while in hospital  Restart oral meds at discharge  Discussed importance of compliance

## 2018-09-11 NOTE — PROGRESS NOTES
Orthopedics   Evelio Velazquez 75 y o  female MRN: [id number]  Unit/Bed#: -01      Subjective:  75 y  o female post operative day 1 right reverse total shoulder arthroplasty following fall and closed part right proximal humerus fracture  Patient reports she has had some pain but has been controlled with pain medications  Denies any numbness or tingling in the right upper extremity    Denies fevers or chills    Labs:    0  Lab Value Date/Time   HCT 25 7 (L) 09/11/2018 0403   HCT 29 5 (L) 09/10/2018 1741   HCT 30 9 (L) 09/05/2018 1203   HGB 8 1 (L) 09/11/2018 0403   HGB 9 2 (L) 09/10/2018 1741   HGB 9 9 (L) 09/05/2018 1203   INR 1 10 09/05/2018 1203   WBC 10 51 (H) 09/11/2018 0403   WBC 13 63 (H) 09/10/2018 1741   WBC 9 24 09/05/2018 1203       Meds:    Current Facility-Administered Medications:     acetaminophen (TYLENOL) tablet 650 mg, 650 mg, Oral, Q4H PRN, Kimberlee Franz MD    docusate sodium (COLACE) capsule 100 mg, 100 mg, Oral, BID, Kimberlee Franz MD, 100 mg at 09/10/18 1710    glipiZIDE (GLUCOTROL) tablet 5 mg, 5 mg, Oral, QPM, Kimberlee Franz MD, 5 mg at 09/10/18 1710    insulin lispro (HumaLOG) 100 units/mL subcutaneous injection 1-6 Units, 1-6 Units, Subcutaneous, TID AC, 4 Units at 09/10/18 1711 **AND** Fingerstick Glucose (POCT), , , TID AC, Kimberlee Franz MD    insulin lispro (HumaLOG) 100 units/mL subcutaneous injection 1-6 Units, 1-6 Units, Subcutaneous, HS, Kimberlee Franz MD, 3 Units at 09/10/18 2230    lactated ringers infusion, 100 mL/hr, Intravenous, Continuous, Kimberlee Franz MD, Last Rate: 100 mL/hr at 09/11/18 0230, 100 mL/hr at 09/11/18 0230    morphine injection 2 mg, 2 mg, Intravenous, Q4H PRN, Kimberlee Franz MD, 2 mg at 09/11/18 0311    ondansetron (ZOFRAN) injection 4 mg, 4 mg, Intravenous, Q6H PRN, Kimberlee Franz MD    oxyCODONE (ROXICODONE) immediate release tablet 10 mg, 10 mg, Oral, Q4H PRN, Kimberlee Franz MD, 10 mg at 09/11/18 0001    oxyCODONE (ROXICODONE) IR tablet 5 mg, 5 mg, Oral, Q4H PRN, Kimberlee Franz MD    pravastatin (PRAVACHOL) tablet 20 mg, 20 mg, Oral, QPM, Kimberlee Franz MD, 20 mg at 09/10/18 1710    Blood Culture:   No results found for: BLOODCX    Wound Culture:   No results found for: WOUNDCULT    Ins and Outs:  I/O last 24 hours: In: 1700 [I V :1700]  Out: 200 [Blood:200]          Physical:  Vitals:    09/10/18 2217   BP: 140/63   Pulse: 102   Resp: 18   Temp: 98 7 °F (37 1 °C)   SpO2: 97%     right upper extremity  · Mepilex ressings clean dry intact - mild surrounding eccymosis no active drainage of wound  · Sensation intact to axillary, musculocutaneous, radial, ulnar, median nerves  · Motor intact to axillary, musculocutaneous, radial, ulnar, median nerves  · 2+ Radial pulse    _*_*_*_*_*_*_*_*_*_*_*_*_*_*_*_*_*_*_*_*_*_*_*_*_*_*_*_*_*_*_*_*_*_*_*_*_*_*_*_*_*    Assessment: 75 y  o female post operative day 1 right reverse total shoulder arthroplasty for the closed 4 part proximal humerus fracture   Doing well    Plan:  · Nonweight Bearing right upper extremity  · Up and out of bed  · Maintaining sling at all times  · DVT prophylaxis as ordered  · Ice and analgesics as needed  · Will continue to assess for acute blood loss anemia      Marielena Teran PA-C

## 2018-09-11 NOTE — SOCIAL WORK
LOS 1  Patient is not a readmission or a bundle  CM spoke with patient at bedside  Patient lives in a 3rd floor apartment with her daughter and 2 great-grandchildren in [place]  Patient's apartment has an elevator  Patient is independent at home with no assistive devices  Patient denies history of VNA and inpatient rehab  Patient uses [address]  Patient states she has prescription insurance and has been able to afford her medications  Patient denies alcohol/drug abuse history and mental illness history  Patient does not have an advance directive/living will  Patient declines information at this time  Patient's emergency contact is her son Amrik Sosa  His number is   Patient is retired  Patient currently drives, but stated she's not supposed to be driving  Her son Ebonie Moura will transport her home at time of discharge  CM discussed with patient re:DCP  CM will f/u with patient  Patient falling asleep during conversation  CM contact information given to patient at bedside  CM name and role reviewed  Discharge Checklist reviewed and CM will continue to monitor for progress toward discharge goals in nursing and provider rounds  CM reviewed discharge planning process including the following: identifying caregivers at home, preference for d/c planning needs, [facility] Meds to Bed program, availability of treatment team to discuss questions or concerns patient and/or family may have regarding diagnosis, plan of care, old or new medications and discharge planning   CM will continue to follow for care coordination and update assessment as necessary

## 2018-09-11 NOTE — PROGRESS NOTES
Progress Note - Orthopedics   Chula Faulkner 75 y o  female MRN: [id number]  Unit/Bed#: -01 Encounter: [id number]      Assessment & Plan:  Right reverse total shoulder arthroplasty for proximal humerus fracture 9/10/18  1  Pain control  2  Sling at all times, may remove briefly for hygiene, pendulum exercises, and elbow ROM  3  ABLA secondary to proximal humerus fracture and surgery with Hgb 8 1 today from 9 9 on 9/5/18  Possible component of chronic anemia  Currently asymptomatic  Will monitor serial CBC and vital signs for now  Discussed possible need for blood transfusion with patient  4  Discharge planning  Subjective: Patient reports ongoing pain in her right shoulder not worse than before surgery  No numbness or tingling  She was up walking around with physical therapy earlier this morning without significant difficulty  Objective:    Vitals:    09/11/18 0700   BP: 142/63   Pulse: 89   Resp: 16   Temp: 98 6 °F (37 °C)   SpO2: 90%       Physical Exam:  Right upper extremity:     Dressing is clean, dry, and intact  No excessive soft tissue swelling or tenderness to palpation  2+ radial pulse  Sensation intact to light touch axillary, muscular cutaneous, median, ulnar, and radial nerve distribution  Strength is limited by pain with no gross motor deficits  I have personally reviewed pertinent lab results    Lab Results   Component Value Date     09/11/2018    K 4 5 09/11/2018     09/11/2018    CO2 24 09/11/2018    BUN 18 09/11/2018    CREATININE 0 93 09/11/2018     Lab Results   Component Value Date    WBC 10 51 (H) 09/11/2018    HGB 8 1 (L) 09/11/2018     09/11/2018

## 2018-09-11 NOTE — PROGRESS NOTES
Progress Note - Gilbert Mini 1945, 75 y o  female MRN: [id number]    Unit/Bed#: -01 Encounter: [id number]    Primary Care Provider: No primary care provider on file  Date and time admitted to hospital: 9/10/2018 11:42 AM        * Closed 4-part fracture of proximal end of right humerus   Assessment & Plan    Status post right reverse total shoulder arthroplasty for closed fracture of proximal end of right humerus  Postoperative care per Orthopedics  Type 2 diabetes mellitus with hyperglycemia [facility])   Assessment & Plan    Lab Results   Component Value Date    HGBA1C 9 7 (H) 09/10/2018       Recent Labs      09/10/18   1701  09/10/18   2219  09/11/18   0716  09/11/18   1102   POCGLU  304*  231*  205*  267*       Blood Sugar Average: Last 72 hrs:  (P) 340 [id number]   Patient has elevated hemoglobin A1c and uncontrolled blood sugars  Does not check her blood sugar at home and had been taking metformin sporadically  Has not seen on an endocrinologist or primary care physician recently  Continue with serial blood glucose monitoring and cover with sliding scale insulin coverage and insulin with meals  Hold oral meds while inpatient  Will add lantus and mealtime insulin while in hospital  Restart oral meds at discharge  Discussed importance of compliance        Hypertension   Assessment & Plan    Blood pressure well controlled  Continue with current outpatient medications  Hyperlipidemia   Assessment & Plan    Continue with statin therapy        Chronic kidney disease, stage 3   Assessment & Plan    Nephrology following  Labs improved today          VTE Pharmacologic Prophylaxis:   Pharmacologic: Per orthopedics  Mechanical VTE Prophylaxis in Place: No    Patient Centered Rounds: I have performed bedside rounds with nursing staff today      Discussions with Specialists or Other Care Team Provider:     Education and Discussions with Family / Patient: patient    Time Spent for Care: 30 minutes  More than 50% of total time spent on counseling and coordination of care as described above  Current Length of Stay: 1 day(s)    Current Patient Status: Inpatient       Discharge Plan: not stable for discharge today    Code Status: No Order      Subjective:   Shoulder is sore, but pain is controlled  Denies nausea  Eating without difficulty    Objective:     Vitals:   Temp (24hrs), Av 2 °F (36 8 °C), Min:96 9 °F (36 1 °C), Max:98 8 °F (37 1 °C)    HR:  [] 89  Resp:  [16-20] 16  BP: (129-189)/(58-73) 142/63  SpO2:  [90 %-100 %] 90 %  Body mass index is 26 17 kg/m²  Input and Output Summary (last 24 hours): Intake/Output Summary (Last 24 hours) at 18 1200  Last data filed at 18 1013   Gross per 24 hour   Intake             1820 ml   Output              200 ml   Net             1620 ml       Physical Exam:     Physical Exam   Constitutional: She is oriented to person, place, and time  She appears well-developed and well-nourished  No distress  HENT:   Head: Normocephalic and atraumatic  Cardiovascular: Normal rate and regular rhythm  Exam reveals no friction rub  No murmur heard  Pulmonary/Chest: Effort normal and breath sounds normal  No respiratory distress  She has no wheezes  Abdominal: Soft  Bowel sounds are normal  She exhibits no distension  There is no tenderness  There is no rebound and no guarding  Musculoskeletal: She exhibits deformity (right shoulder in sling)  She exhibits no edema  Neurological: She is alert and oriented to person, place, and time  No cranial nerve deficit  Skin: Skin is warm and dry  No rash noted  Psychiatric: She has a normal mood and affect  Nursing note and vitals reviewed          Additional Data:     Labs:      Results from last 7 days  Lab Units 18  0403 09/10/18  1741   WBC Thousand/uL 10 51* 13 63*   HEMOGLOBIN g/dL 8 1* 9 2*   HEMATOCRIT % 25 7* 29 5*   PLATELETS Thousands/uL 280 282   NEUTROS PCT %  -- 85*   LYMPHS PCT %  --  8*   MONOS PCT %  --  6   EOS PCT %  --  0       Results from last 7 days  Lab Units 09/11/18  0403   SODIUM mmol/L 141   POTASSIUM mmol/L 4 5   CHLORIDE mmol/L 106   CO2 mmol/L 24   BUN mg/dL 18   CREATININE mg/dL 0 93   CALCIUM mg/dL 8 4       Results from last 7 days  Lab Units 09/05/18  1203   INR  1 10       Results from last 7 days  Lab Units 09/11/18  1102 09/11/18  0716 09/10/18  2219 09/10/18  1701 09/10/18  1551 09/10/18  1224   POC GLUCOSE mg/dl 267* 205* 231* 304* 296* 270*       Results from last 7 days  Lab Units 09/10/18  1741 09/05/18  1203   HEMOGLOBIN A1C % 9 7* 9 9*         * I Have Reviewed All Lab Data Listed Above  * Additional Pertinent Lab Tests Reviewed: All Labs Within Last 24 Hours Reviewed    Imaging:    Imaging Reports Reviewed Today Include: none  Imaging Personally Reviewed by Myself Includes:  none    Recent Cultures (last 7 days):       Results from last 7 days  Lab Units 09/05/18  1241   URINE CULTURE  20,000-29,000 cfu/ml        Last 24 Hours Medication List:     Current Facility-Administered Medications:  acetaminophen 650 mg Oral Q4H PRN Natasha Webb MD   docusate sodium 100 mg Oral BID Natasha Webb MD   glipiZIDE 5 mg Oral QPM Natasha Webb MD   insulin lispro 1-6 Units Subcutaneous TID [facility] Natasha Webb MD   insulin lispro 1-6 Units Subcutaneous HS Natasha Webb MD   morphine injection 2 mg Intravenous Q4H PRN Natasha Webb MD   ondansetron 4 mg Intravenous Q6H PRN Natasha Webb MD   oxyCODONE 10 mg Oral Q4H PRN Natasha Webb MD   oxyCODONE 5 mg Oral Q4H PRN Natasha Webb MD   pravastatin 20 mg Oral QPM Natasha Webb MD        Today, Patient Was Seen By: Marylee Berkeley, PA-C    ** Please Note: Dictation voice to text software may have been used in the creation of this document   **

## 2018-09-12 PROBLEM — D62 ACUTE BLOOD LOSS AS CAUSE OF POSTOPERATIVE ANEMIA: Status: ACTIVE | Noted: 2018-09-12

## 2018-09-12 LAB
ANION GAP SERPL CALCULATED.3IONS-SCNC: 9 MMOL/L (ref 4–13)
BUN SERPL-MCNC: 13 MG/DL (ref 5–25)
CALCIUM SERPL-MCNC: 8.2 MG/DL (ref 8.3–10.1)
CHLORIDE SERPL-SCNC: 103 MMOL/L (ref 100–108)
CO2 SERPL-SCNC: 26 MMOL/L (ref 21–32)
CREAT SERPL-MCNC: 0.86 MG/DL (ref 0.6–1.3)
ERYTHROCYTE [DISTWIDTH] IN BLOOD BY AUTOMATED COUNT: 13.3 % (ref 11.6–15.1)
GFR SERPL CREATININE-BSD FRML MDRD: 67 ML/MIN/1.73SQ M
GLUCOSE SERPL-MCNC: 164 MG/DL (ref 65–140)
GLUCOSE SERPL-MCNC: 184 MG/DL (ref 65–140)
GLUCOSE SERPL-MCNC: 191 MG/DL (ref 65–140)
GLUCOSE SERPL-MCNC: 193 MG/DL (ref 65–140)
GLUCOSE SERPL-MCNC: 233 MG/DL (ref 65–140)
GLUCOSE SERPL-MCNC: 252 MG/DL (ref 65–140)
HCT VFR BLD AUTO: 23.6 % (ref 34.8–46.1)
HGB BLD-MCNC: 7.7 G/DL (ref 11.5–15.4)
MCH RBC QN AUTO: 30.2 PG (ref 26.8–34.3)
MCHC RBC AUTO-ENTMCNC: 32.6 G/DL (ref 31.4–37.4)
MCV RBC AUTO: 93 FL (ref 82–98)
PLATELET # BLD AUTO: 271 THOUSANDS/UL (ref 149–390)
PMV BLD AUTO: 11.3 FL (ref 8.9–12.7)
POTASSIUM SERPL-SCNC: 4.5 MMOL/L (ref 3.5–5.3)
RBC # BLD AUTO: 2.55 MILLION/UL (ref 3.81–5.12)
SODIUM SERPL-SCNC: 138 MMOL/L (ref 136–145)
WBC # BLD AUTO: 10.75 THOUSAND/UL (ref 4.31–10.16)

## 2018-09-12 PROCEDURE — P9040 RBC LEUKOREDUCED IRRADIATED: HCPCS

## 2018-09-12 PROCEDURE — 97110 THERAPEUTIC EXERCISES: CPT

## 2018-09-12 PROCEDURE — 99232 SBSQ HOSP IP/OBS MODERATE 35: CPT | Performed by: PHYSICIAN ASSISTANT

## 2018-09-12 PROCEDURE — 99024 POSTOP FOLLOW-UP VISIT: CPT | Performed by: ORTHOPAEDIC SURGERY

## 2018-09-12 PROCEDURE — 85027 COMPLETE CBC AUTOMATED: CPT | Performed by: NURSE PRACTITIONER

## 2018-09-12 PROCEDURE — P9021 RED BLOOD CELLS UNIT: HCPCS

## 2018-09-12 PROCEDURE — 82948 REAGENT STRIP/BLOOD GLUCOSE: CPT

## 2018-09-12 PROCEDURE — 97116 GAIT TRAINING THERAPY: CPT

## 2018-09-12 PROCEDURE — 80048 BASIC METABOLIC PNL TOTAL CA: CPT | Performed by: NURSE PRACTITIONER

## 2018-09-12 PROCEDURE — 30233N1 TRANSFUSION OF NONAUTOLOGOUS RED BLOOD CELLS INTO PERIPHERAL VEIN, PERCUTANEOUS APPROACH: ICD-10-PCS | Performed by: ORTHOPAEDIC SURGERY

## 2018-09-12 RX ORDER — OXYCODONE HYDROCHLORIDE 5 MG/1
TABLET ORAL
Qty: 40 TABLET | Refills: 0 | Status: SHIPPED | OUTPATIENT
Start: 2018-09-12 | End: 2018-10-30

## 2018-09-12 RX ADMIN — INSULIN LISPRO 3 UNITS: 100 INJECTION, SOLUTION INTRAVENOUS; SUBCUTANEOUS at 08:40

## 2018-09-12 RX ADMIN — INSULIN LISPRO 5 UNITS: 100 INJECTION, SOLUTION INTRAVENOUS; SUBCUTANEOUS at 11:35

## 2018-09-12 RX ADMIN — INSULIN LISPRO 5 UNITS: 100 INJECTION, SOLUTION INTRAVENOUS; SUBCUTANEOUS at 18:47

## 2018-09-12 RX ADMIN — INSULIN LISPRO 2 UNITS: 100 INJECTION, SOLUTION INTRAVENOUS; SUBCUTANEOUS at 18:47

## 2018-09-12 RX ADMIN — INSULIN LISPRO 1 UNITS: 100 INJECTION, SOLUTION INTRAVENOUS; SUBCUTANEOUS at 21:31

## 2018-09-12 RX ADMIN — OXYCODONE HYDROCHLORIDE 5 MG: 5 TABLET ORAL at 16:23

## 2018-09-12 RX ADMIN — ACETAMINOPHEN 650 MG: 325 TABLET, FILM COATED ORAL at 12:10

## 2018-09-12 RX ADMIN — INSULIN LISPRO 5 UNITS: 100 INJECTION, SOLUTION INTRAVENOUS; SUBCUTANEOUS at 08:40

## 2018-09-12 RX ADMIN — DOCUSATE SODIUM 100 MG: 100 CAPSULE, LIQUID FILLED ORAL at 08:39

## 2018-09-12 RX ADMIN — DOCUSATE SODIUM 100 MG: 100 CAPSULE, LIQUID FILLED ORAL at 18:46

## 2018-09-12 RX ADMIN — PRAVASTATIN SODIUM 20 MG: 20 TABLET ORAL at 18:46

## 2018-09-12 RX ADMIN — INSULIN GLARGINE 8 UNITS: 100 INJECTION, SOLUTION SUBCUTANEOUS at 21:31

## 2018-09-12 RX ADMIN — OXYCODONE HYDROCHLORIDE 5 MG: 5 TABLET ORAL at 08:39

## 2018-09-12 RX ADMIN — INSULIN LISPRO 3 UNITS: 100 INJECTION, SOLUTION INTRAVENOUS; SUBCUTANEOUS at 11:36

## 2018-09-12 NOTE — TREATMENT PLAN
Nephrology    Renal function is stable creatinine is 0 8 today  Blood pressure is actually quite stable currently off all medications  There is nothing further to add from a renal standpoint  I will not label her chronic kidney disease as there is no evidence to suggest that as she only had 1 creatinine of 1 3 prior to surgery  Her creatinine has now been but less than 1 postop  She may have some age-related decline of GFR and possibly a mild degree of Chronic Kidney Disease given her history of diabetes and hypertension but I would not label her chronic kidney disease stage 3  She does not cry a nephrology follow-up  Nothing further to add from a renal standpoint  We will sign off  Please feel free to call for any questions or concerns  If her blood pressure starts to rise her lisinopril can be restarted no objections

## 2018-09-12 NOTE — ASSESSMENT & PLAN NOTE
Lab Results   Component Value Date    HGBA1C 9 7 (H) 09/10/2018       Recent Labs      09/11/18   1102  09/11/18   1600  09/11/18 2053  09/12/18   0743   POCGLU  267*  223*  173*  252*       Blood Sugar Average: Last 72 hrs:  (P) 246 [id number]   Patient has elevated hemoglobin A1c and uncontrolled blood sugars  Does not check her blood sugar at home and had been taking metformin sporadically  Has not seen on an endocrinologist or primary care physician recently  Continue with serial blood glucose monitoring and cover with sliding scale insulin coverage and insulin with meals    Hold oral meds while inpatient  Will add lantus and mealtime insulin while in hospital  Restart oral meds at discharge  Discussed importance of compliance

## 2018-09-12 NOTE — PROGRESS NOTES
Progress Note - Cammie Portillo 1945, 75 y o  female MRN: [id number]    Unit/Bed#: -01 Encounter: [id number]    Primary Care Provider: No primary care provider on file  Date and time admitted to hospital: 9/10/2018 11:42 AM        * Closed 4-part fracture of proximal end of right humerus   Assessment & Plan    Status post right reverse total shoulder arthroplasty for closed fracture of proximal end of right humerus  Postoperative care per Orthopedics  Type 2 diabetes mellitus with hyperglycemia [facility])   Assessment & Plan    Lab Results   Component Value Date    HGBA1C 9 7 (H) 09/10/2018       Recent Labs      09/11/18   1102  09/11/18   1600  09/11/18   2053  09/12/18   0743   POCGLU  267*  223*  173*  252*       Blood Sugar Average: Last 72 hrs:  (P) 246 [id number]   Patient has elevated hemoglobin A1c and uncontrolled blood sugars  Does not check her blood sugar at home and had been taking metformin sporadically  Has not seen on an endocrinologist or primary care physician recently  Continue with serial blood glucose monitoring and cover with sliding scale insulin coverage and insulin with meals  Hold oral meds while inpatient  Will add lantus and mealtime insulin while in hospital  Restart oral meds at discharge  Discussed importance of compliance        Hypertension   Assessment & Plan    Blood pressure well controlled  Continue with current outpatient medications  Hyperlipidemia   Assessment & Plan    Continue with statin therapy        Chronic kidney disease, stage 3   Assessment & Plan    Nephrology following  Labs improved today        Acute blood loss as cause of postoperative anemia   Assessment & Plan    · Blood transfusion ordered by orthopedics          VTE Pharmacologic Prophylaxis:   Pharmacologic: Per primary service  Mechanical VTE Prophylaxis in Place: No    Patient Centered Rounds: I have performed bedside rounds with nursing staff today      Discussions with Specialists or Other Care Team Provider: case management    Education and Discussions with Family / Patient: patient    Time Spent for Care: 30 minutes  More than 50% of total time spent on counseling and coordination of care as described above  Current Length of Stay: 2 day(s)    Current Patient Status: Inpatient     Discharge Plan: Patient medically stable for discharge from our standpoint at any time    Code Status: No Order      Subjective:   Shoulder is sore otherwise no complaints    Objective:     Vitals:   Temp (24hrs), Av 6 °F (37 °C), Min:98 2 °F (36 8 °C), Max:99 1 °F (37 3 °C)    HR:  [103-104] 103  Resp:  [18] 18  BP: (132-140)/(60-62) 132/60  SpO2:  [90 %-93 %] 90 %  Body mass index is 26 17 kg/m²  Input and Output Summary (last 24 hours): Intake/Output Summary (Last 24 hours) at 18 0919  Last data filed at 18 1300   Gross per 24 hour   Intake              240 ml   Output                0 ml   Net              240 ml       Physical Exam:     Physical Exam   Constitutional: She is oriented to person, place, and time  She appears well-developed and well-nourished  No distress  HENT:   Head: Normocephalic and atraumatic  Cardiovascular: Normal rate and regular rhythm  Exam reveals no friction rub  No murmur heard  Pulmonary/Chest: Effort normal and breath sounds normal  No respiratory distress  She has no wheezes  Abdominal: Soft  Bowel sounds are normal  She exhibits no distension  There is no tenderness  There is no rebound and no guarding  Musculoskeletal: She exhibits deformity (right shoulder in sling)  She exhibits no edema  Neurological: She is alert and oriented to person, place, and time  No cranial nerve deficit  Skin: Skin is warm and dry  No rash noted  Psychiatric: She has a normal mood and affect  Nursing note and vitals reviewed        Additional Data:     Labs:      Results from last 7 days  Lab Units 18  0426  09/10/18  1741   WBC Thousand/uL 10 75*  < > 13 63*   HEMOGLOBIN g/dL 7 7*  < > 9 2*   HEMATOCRIT % 23 6*  < > 29 5*   PLATELETS Thousands/uL 271  < > 282   NEUTROS PCT %  --   --  85*   LYMPHS PCT %  --   --  8*   MONOS PCT %  --   --  6   EOS PCT %  --   --  0   < > = values in this interval not displayed  Results from last 7 days  Lab Units 09/12/18  0426   SODIUM mmol/L 138   POTASSIUM mmol/L 4 5   CHLORIDE mmol/L 103   CO2 mmol/L 26   BUN mg/dL 13   CREATININE mg/dL 0 86   CALCIUM mg/dL 8 2*       Results from last 7 days  Lab Units 09/05/18  1203   INR  1 10       Results from last 7 days  Lab Units 09/12/18  0743 09/11/18  2053 09/11/18  1600 09/11/18  1102 09/11/18  0716 09/10/18  2219 09/10/18  1701 09/10/18  1551 09/10/18  1224   POC GLUCOSE mg/dl 252* 173* 223* 267* 205* 231* 304* 296* 270*       Results from last 7 days  Lab Units 09/10/18  1741 09/05/18  1203   HEMOGLOBIN A1C % 9 7* 9 9*         * I Have Reviewed All Lab Data Listed Above  * Additional Pertinent Lab Tests Reviewed:  All Labs Within Last 24 Hours Reviewed    Imaging:    Imaging Reports Reviewed Today Include: none  Imaging Personally Reviewed by Myself Includes:  none    Recent Cultures (last 7 days):       Results from last 7 days  Lab Units 09/05/18  1241   URINE CULTURE  20,000-29,000 cfu/ml        Last 24 Hours Medication List:     Current Facility-Administered Medications:  acetaminophen 650 mg Oral Q4H PRN Laquita Bar MD   docusate sodium 100 mg Oral BID Laquita Bar MD   insulin glargine 8 Units Subcutaneous HS Madhu Collins PA-C   insulin lispro 1-6 Units Subcutaneous TID [facility] Laquita Bar MD   insulin lispro 1-6 Units Subcutaneous HS Laquita Bar MD   insulin lispro 5 Units Subcutaneous TID With Meals Madhu Collins PA-C   morphine injection 2 mg Intravenous Q4H PRN Laquita Bar MD   ondansetron 4 mg Intravenous Q6H PRN Laquita aBr MD   oxyCODONE 10 mg Oral Q4H PRN Laquita Bar MD   oxyCODONE 5 mg Oral Q4H PRN Laquita Bar MD pravastatin 20 mg Oral QPM Scot Hale MD        Today, Patient Was Seen By: Eliz Gamboa PA-C    ** Please Note: Dictation voice to text software may have been used in the creation of this document   **

## 2018-09-12 NOTE — OCCUPATIONAL THERAPY NOTE
Occupational Therapy Cancel Note        Patient Name: Cammie Portillo  FCIIY'N Date: 9/12/2018    Chart review completed  Pt currently getting blood, and just finished working w/ PTA, Obdulia  PTA, Khalif reports fatigue and pain  Will continue to follow pt to complete tx session as appropriate and schedule allows      Marcus Hill OTR/L

## 2018-09-12 NOTE — PLAN OF CARE
DISCHARGE PLANNING - CARE MANAGEMENT     Discharge to post-acute care or home with appropriate resources Progressing        MUSCULOSKELETAL - ADULT     Maintain or return mobility to safest level of function Progressing     Maintain proper alignment of affected body part Progressing        PAIN - ADULT     Verbalizes/displays adequate comfort level or baseline comfort level Progressing        Potential for Falls     Patient will remain free of falls Progressing        SKIN/TISSUE INTEGRITY - ADULT     Skin integrity remains intact Progressing     Incision(s), wounds(s) or drain site(s) healing without S/S of infection Progressing     Oral mucous membranes remain intact Progressing

## 2018-09-12 NOTE — PLAN OF CARE
Problem: PHYSICAL THERAPY ADULT  Goal: Performs mobility at highest level of function for planned discharge setting  See evaluation for individualized goals  Treatment/Interventions: Functional transfer training, LE strengthening/ROM, Therapeutic exercise, Cognitive reorientation, Patient/family training, Equipment eval/education, Endurance training, Bed mobility, Gait training, Spoke to nursing, Spoke to case management, OT, Spoke to advanced practitioner  Equipment Recommended:  (trial cane)       See flowsheet documentation for full assessment, interventions and recommendations  Outcome: Progressing  Prognosis: Fair  Problem List: Decreased strength, Decreased range of motion, Decreased endurance, Impaired balance  Assessment: Pt was found supine in bed with nursing present  Pt needed min Ax1 for all bed transfers with extra time needed  Pt was compliant with her NWB precaution the entire session and when was questioned at end, demonstrated an understanding of her precautions  Pt was able to ambulate into the hallway with minimal unsteadiness noted with a shuffling and step to gait pattern  Pt was instructed in proper gait sequencing technique with the cane which she had a hard time understanding  Pt was able to ambulate into the hallway and after a short 30" break ambulated back to the bedside chair  Pt was able to perform 5' of static standing using her cane  All TE was performed standing as charted and sling was re adjusted for proper fit  Fatigue noted to end, with increased pain due to moving the arm around  Pt had call bell in reach, all needs met, SIDs on, and was left with nursing  Pt would benefit from continued PT in order to promote safe and functional mobility     Barriers to Discharge: Decreased caregiver support, Inaccessible home environment (daughter uses a walker, young children in school)     Recommendation: Post acute IP rehab, Home with family support (depending on mobility progression& additional A upon DC)          See flowsheet documentation for full assessment

## 2018-09-12 NOTE — PROGRESS NOTES
Orthopedics   Nessa Mayers 75 y o  female MRN: [id number]  Unit/Bed#: -01      Subjective:  Patient seen and evaluated  States right shoulder pain is well controlled  No numbness or tingling  No fevers or chills      Labs:    0  Lab Value Date/Time   HCT 23 6 (L) 09/12/2018 0426   HCT 25 7 (L) 09/11/2018 0403   HCT 29 5 (L) 09/10/2018 1741   HGB 7 7 (L) 09/12/2018 0426   HGB 8 1 (L) 09/11/2018 0403   HGB 9 2 (L) 09/10/2018 1741   INR 1 10 09/05/2018 1203   WBC 10 75 (H) 09/12/2018 0426   WBC 10 51 (H) 09/11/2018 0403   WBC 13 63 (H) 09/10/2018 1741       Meds:    Current Facility-Administered Medications:     acetaminophen (TYLENOL) tablet 650 mg, 650 mg, Oral, Q4H PRN, Rolly Singh MD    docusate sodium (COLACE) capsule 100 mg, 100 mg, Oral, BID, Rolly Singh MD, 100 mg at 09/12/18 0839    insulin glargine (LANTUS) subcutaneous injection 8 Units 0 08 mL, 8 Units, Subcutaneous, HS, Aicha Cotton PA-C, 8 Units at 09/11/18 2257    insulin lispro (HumaLOG) 100 units/mL subcutaneous injection 1-6 Units, 1-6 Units, Subcutaneous, TID AC, 3 Units at 09/12/18 0840 **AND** Fingerstick Glucose (POCT), , , TID AC, Rolly Singh MD    insulin lispro (HumaLOG) 100 units/mL subcutaneous injection 1-6 Units, 1-6 Units, Subcutaneous, HS, Rolly Singh MD, 1 Units at 09/11/18 2230    insulin lispro (HumaLOG) 100 units/mL subcutaneous injection 5 Units, 5 Units, Subcutaneous, TID With Meals, Graham Busch PA-C, 5 Units at 09/12/18 0840    morphine injection 2 mg, 2 mg, Intravenous, Q4H PRN, Rolly Singh MD, 2 mg at 09/11/18 0311    ondansetron (ZOFRAN) injection 4 mg, 4 mg, Intravenous, Q6H PRN, Rolly Singh MD    oxyCODONE (ROXICODONE) immediate release tablet 10 mg, 10 mg, Oral, Q4H PRN, Rolly Singh MD, 10 mg at 09/11/18 1958    oxyCODONE (ROXICODONE) IR tablet 5 mg, 5 mg, Oral, Q4H PRN, Rolly Singh MD, 5 mg at 09/12/18 0839    pravastatin (PRAVACHOL) tablet 20 mg, 20 mg, Oral, QPM, Rolly Singh MD, 20 mg at 09/11/18 1713    Physical exam:  Vitals:    09/12/18 0647   BP: 132/60   Pulse: 103   Resp: 18   Temp: 98 2 °F (36 8 °C)   SpO2: 90%     Right shoulder:  Dressing C/D/I  No drainage  Ecchymosis noted the arm and distally into the hand and digits  Associated swelling in the hand and digits well  Wrist and digital range of motion intact  Sensation intact axillary, median, ulnar and radial nerves  2+ radial pulse     _*_*_*_*_*_*_*_*_*_*_*_*_*_*_*_*_*_*_*_*_*_*_*_*_*_*_*_*_*_*_*_*_*_*_*_*_*_*_*_*_*    Assessment: 75 y  o female POD 2 s/p right reverse total shoulder arthroplasty for proximal humerus fracture    Plan:  · Pain Control  · NWB RUE in sling at all times  · PT/OT  May remove briefly for pendulum exercises  · ABLA, hgb 7 7 this am  Will transfuse 2 units PRBC    · Appreciate SLIM input and management of Diabetes  · D/C planning    Rocky Smith PA-C

## 2018-09-12 NOTE — PROGRESS NOTES
Called [facility]isa Sandoval [facility] regarding patient temp increase to 100 3 with blood administration  Patient denies SOB and itching  Per Zack Rey, not concerned with transfusion reaction due to patient being asymptomatic  Will continue to monitor

## 2018-09-13 VITALS
HEIGHT: 67 IN | OXYGEN SATURATION: 94 % | WEIGHT: 167.11 LBS | DIASTOLIC BLOOD PRESSURE: 66 MMHG | HEART RATE: 92 BPM | RESPIRATION RATE: 18 BRPM | BODY MASS INDEX: 26.23 KG/M2 | TEMPERATURE: 97.9 F | SYSTOLIC BLOOD PRESSURE: 153 MMHG

## 2018-09-13 PROBLEM — S00.03XA SCALP HEMATOMA: Status: ACTIVE | Noted: 2018-09-13

## 2018-09-13 LAB
ABO GROUP BLD BPU: NORMAL
ABO GROUP BLD BPU: NORMAL
ANION GAP SERPL CALCULATED.3IONS-SCNC: 8 MMOL/L (ref 4–13)
BPU ID: NORMAL
BPU ID: NORMAL
BUN SERPL-MCNC: 14 MG/DL (ref 5–25)
CALCIUM SERPL-MCNC: 8.3 MG/DL (ref 8.3–10.1)
CHLORIDE SERPL-SCNC: 99 MMOL/L (ref 100–108)
CO2 SERPL-SCNC: 26 MMOL/L (ref 21–32)
CREAT SERPL-MCNC: 0.81 MG/DL (ref 0.6–1.3)
CROSSMATCH: NORMAL
CROSSMATCH: NORMAL
ERYTHROCYTE [DISTWIDTH] IN BLOOD BY AUTOMATED COUNT: 13.3 % (ref 11.6–15.1)
GFR SERPL CREATININE-BSD FRML MDRD: 72 ML/MIN/1.73SQ M
GLUCOSE SERPL-MCNC: 206 MG/DL (ref 65–140)
GLUCOSE SERPL-MCNC: 216 MG/DL (ref 65–140)
GLUCOSE SERPL-MCNC: 239 MG/DL (ref 65–140)
HCT VFR BLD AUTO: 32.9 % (ref 34.8–46.1)
HGB BLD-MCNC: 10.8 G/DL (ref 11.5–15.4)
MCH RBC QN AUTO: 29.8 PG (ref 26.8–34.3)
MCHC RBC AUTO-ENTMCNC: 32.8 G/DL (ref 31.4–37.4)
MCV RBC AUTO: 91 FL (ref 82–98)
PLATELET # BLD AUTO: 276 THOUSANDS/UL (ref 149–390)
PMV BLD AUTO: 10.4 FL (ref 8.9–12.7)
POTASSIUM SERPL-SCNC: 4.6 MMOL/L (ref 3.5–5.3)
RBC # BLD AUTO: 3.63 MILLION/UL (ref 3.81–5.12)
SODIUM SERPL-SCNC: 133 MMOL/L (ref 136–145)
UNIT DISPENSE STATUS: NORMAL
UNIT DISPENSE STATUS: NORMAL
UNIT PRODUCT CODE: NORMAL
UNIT PRODUCT CODE: NORMAL
UNIT RH: NORMAL
UNIT RH: NORMAL
WBC # BLD AUTO: 11.46 THOUSAND/UL (ref 4.31–10.16)

## 2018-09-13 PROCEDURE — 85027 COMPLETE CBC AUTOMATED: CPT | Performed by: PHYSICIAN ASSISTANT

## 2018-09-13 PROCEDURE — 97110 THERAPEUTIC EXERCISES: CPT

## 2018-09-13 PROCEDURE — 99024 POSTOP FOLLOW-UP VISIT: CPT | Performed by: PHYSICIAN ASSISTANT

## 2018-09-13 PROCEDURE — 80048 BASIC METABOLIC PNL TOTAL CA: CPT | Performed by: PHYSICIAN ASSISTANT

## 2018-09-13 PROCEDURE — 82948 REAGENT STRIP/BLOOD GLUCOSE: CPT

## 2018-09-13 PROCEDURE — 99232 SBSQ HOSP IP/OBS MODERATE 35: CPT | Performed by: PHYSICIAN ASSISTANT

## 2018-09-13 PROCEDURE — 97535 SELF CARE MNGMENT TRAINING: CPT

## 2018-09-13 PROCEDURE — 97116 GAIT TRAINING THERAPY: CPT

## 2018-09-13 RX ADMIN — INSULIN LISPRO 3 UNITS: 100 INJECTION, SOLUTION INTRAVENOUS; SUBCUTANEOUS at 12:01

## 2018-09-13 RX ADMIN — OXYCODONE HYDROCHLORIDE 5 MG: 5 TABLET ORAL at 01:55

## 2018-09-13 RX ADMIN — INSULIN LISPRO 2 UNITS: 100 INJECTION, SOLUTION INTRAVENOUS; SUBCUTANEOUS at 08:40

## 2018-09-13 RX ADMIN — OXYCODONE HYDROCHLORIDE 5 MG: 5 TABLET ORAL at 08:42

## 2018-09-13 RX ADMIN — INSULIN LISPRO 5 UNITS: 100 INJECTION, SOLUTION INTRAVENOUS; SUBCUTANEOUS at 12:01

## 2018-09-13 RX ADMIN — INSULIN LISPRO 5 UNITS: 100 INJECTION, SOLUTION INTRAVENOUS; SUBCUTANEOUS at 08:40

## 2018-09-13 RX ADMIN — DOCUSATE SODIUM 100 MG: 100 CAPSULE, LIQUID FILLED ORAL at 08:40

## 2018-09-13 NOTE — PHYSICAL THERAPY NOTE
PHYSICAL THERAPY NOTE    Patient Name: Stephany Garcia  GMWOU'O Date: 18 1322   Pain Assessment   Pain Assessment 0-10   Pain Score 4   Pain Type Acute pain   Pain Location Arm; Shoulder   Restrictions/Precautions   Weight Bearing Precautions Per Order Yes   RUE Weight Bearing Per Order NWB   Braces or Orthoses Sling   Other Precautions Bed Alarm; Chair Alarm; Fall Risk;Pain;WBS   General   Family/Caregiver Present No   Subjective   Subjective Patient seated EOB with PCA present and is agreeable to therapy session  Patient identifers obtained from name &   Bed Mobility   Supine to Sit Unable to assess   Transfers   Sit to Stand 4  Minimal assistance   Additional items Assist x 1; Armrests; Increased time required;Verbal cues   Stand to Sit 4  Minimal assistance   Additional items Assist x 1; Armrests; Increased time required;Verbal cues   Ambulation/Elevation   Gait pattern Inconsistent gordo;Poor UE support; Shuffling; Step to   Gait Assistance 4  Minimal assist   Additional items Assist x 1;Verbal cues   Assistive Device [facility]   Distance 100' x1, 15' x2   Balance   Static Sitting Fair +   Static Standing Fair -   Ambulatory Poor +   Endurance Deficit   Endurance Deficit Yes   Endurance Deficit Description limited by pain   Activity Tolerance   Activity Tolerance Patient limited by fatigue;Patient limited by pain   Medical Staff Made Aware Spoke to Krystal France   Nurse Made Aware Spoke to Jamia Nation [place]   Eric   UE Exercise Standing;20 reps;AAROM; Right  (fingers, wrist, forearm, elbow)   Pendulum Standing;15 reps   Assessment   Prognosis Fair   Problem List Decreased strength;Decreased range of motion;Decreased endurance; Impaired balance   Assessment Patient demonstrated ability to ambulate increasedgait distancw with SPC and min a x1   Patient requires increased amounts of time and verbal instruction for cane and stepping sequence  Lateral LOB x 1 when transitioning through door way with ability to self correct  Participated in R UE AAROM exercise program with good understanding and no complaints on increase of pain  Continue to focus on R UE AAROM exercise program and gait progression with emphasis on use of SPC as appropriate  Barriers to Discharge Decreased caregiver support; Inaccessible home environment  (daughter uses a walker, young children in school)   Goals   Patient Goals to get out of hospital and go home   STG Expiration Date 09/18/18   Treatment Day 3   Plan   Treatment/Interventions Functional transfer training;LE strengthening/ROM; Therapeutic exercise;Cognitive reorientation;Patient/family training;Equipment eval/education; Endurance training;Bed mobility;Gait training;Spoke to nursing;Spoke to case management   PT Frequency 5x/wk   Recommendation   Recommendation Post acute IP rehab;Home with family support  (depending on mobility progression& additional A upon DC)   Equipment Recommended Aravind Cardenas PTA

## 2018-09-13 NOTE — ASSESSMENT & PLAN NOTE
· Status post right reverse total shoulder arthroplasty for closed fracture of proximal end of right humerus, POD 3  Postoperative care per Orthopedics  · Pain controlled on oral medications  · Right upper extremity remains with considerable swelling / bruising  Neurovascular status intact  · RUE in sling  · PT / OT consulted  Patient wishes to go home instead of rehab  [address] for RN / PT

## 2018-09-13 NOTE — PLAN OF CARE
Problem: DISCHARGE PLANNING - CARE MANAGEMENT  Goal: Discharge to post-acute care or home with appropriate resources  INTERVENTIONS:  - Conduct assessment to determine patient/family and health care team treatment goals, and need for post-acute services based on payer coverage, community resources, and patient preferences, and barriers to discharge  - Address psychosocial, clinical, and financial barriers to discharge as identified in assessment in conjunction with the patient/family and health care team  - Arrange appropriate level of post-acute services according to patients   needs and preference and payer coverage in collaboration with the physician and health care team  - Communicate with and update the patient/family, physician, and health care team regarding progress on the discharge plan  - Arrange appropriate transportation to post-acute venues   Outcome: Completed Date Met: 09/13/18  CM spoke at bedside with patient  Patient educated that PT/OT are recommending inpatient rehab  CM discussed type of care inpatient rehab would provide to patient  Patient inquired about PT at home  VNA services are also available if the decision is to go home  CM called son Sterling Sharp in the room  CM spoke with carlo Sharp and patient  Per son, it is up to the patient what she wants to do  Patient wants to go home and have [facility]  CM reviewed with patient IMM at bedside  Copy of IMM given to patient  Patient unable to sign but verbally demonstrated she understood the IMM  CM reviewed with patient the bundle program  Copy of form given to patient  All questions/concerns answered at bedside  Patient given an infolink card  Patient does not have a PCP  Per son, she goes to [address] [place] [address]  Patient educated on the importance of calling and setting up a PCP appointment  CM updated nursing and ortho with plan of care  Patient's son will transport at time of discharge

## 2018-09-13 NOTE — ASSESSMENT & PLAN NOTE
· Blood pressure well controlled with -150's  Continue with current outpatient medications  · On discharge, resume Lisinopril and will need follow up with primary care

## 2018-09-13 NOTE — PROGRESS NOTES
Orthopedics   Kasia Jean 75 y o  female MRN: [id number]  Unit/Bed#: -01      Subjective:  Pt seen this am  States right shoulder pain is well controlled  No numbness/tingling  No fever/chills      Labs:    0  Lab Value Date/Time   HCT 23 6 (L) 09/12/2018 0426   HCT 25 7 (L) 09/11/2018 0403   HCT 29 5 (L) 09/10/2018 1741   HGB 7 7 (L) 09/12/2018 0426   HGB 8 1 (L) 09/11/2018 0403   HGB 9 2 (L) 09/10/2018 1741   INR 1 10 09/05/2018 1203   WBC 10 75 (H) 09/12/2018 0426   WBC 10 51 (H) 09/11/2018 0403   WBC 13 63 (H) 09/10/2018 1741       Meds:    Current Facility-Administered Medications:     acetaminophen (TYLENOL) tablet 650 mg, 650 mg, Oral, Q4H PRN, Arturo Michaels MD, 650 mg at 09/12/18 1210    docusate sodium (COLACE) capsule 100 mg, 100 mg, Oral, BID, Arturo Michaels MD, 100 mg at 09/12/18 1846    insulin glargine (LANTUS) subcutaneous injection 8 Units 0 08 mL, 8 Units, Subcutaneous, HS, Aicha Cotton PA-C, 8 Units at 09/12/18 2131    insulin lispro (HumaLOG) 100 units/mL subcutaneous injection 1-6 Units, 1-6 Units, Subcutaneous, TID AC, 2 Units at 09/12/18 1847 **AND** Fingerstick Glucose (POCT), , , TID AC, Arturo Michaels MD    insulin lispro (HumaLOG) 100 units/mL subcutaneous injection 1-6 Units, 1-6 Units, Subcutaneous, HS, Arturo Michaels MD, 1 Units at 09/12/18 2131    insulin lispro (HumaLOG) 100 units/mL subcutaneous injection 5 Units, 5 Units, Subcutaneous, TID With Meals, Neel Duenas PA-C, 5 Units at 09/12/18 1847    morphine injection 2 mg, 2 mg, Intravenous, Q4H PRN, Arturo Michaels MD, 2 mg at 09/11/18 0311    ondansetron (ZOFRAN) injection 4 mg, 4 mg, Intravenous, Q6H PRN, Arturo Michaels MD    oxyCODONE (ROXICODONE) immediate release tablet 10 mg, 10 mg, Oral, Q4H PRN, Arturo Michaels MD, 10 mg at 09/11/18 1958    oxyCODONE (ROXICODONE) IR tablet 5 mg, 5 mg, Oral, Q4H PRN, Arturo Michaels MD, 5 mg at 09/13/18 0155    pravastatin (PRAVACHOL) tablet 20 mg, 20 mg, Oral, QPM, Arturo Michaels MD, 20 mg at 09/12/18 1846    Physical exam:  Vitals:    09/12/18 2209   BP: 142/67   Pulse: 99   Resp: 16   Temp: 98 3 °F (36 8 °C)   SpO2: 99%     Right Shoulder:  · Dressing C/D/I  · Ecchymosis noted distally along RUE  · Elbow, wrist, digital ROM intact  · Sensation intact axillary, median, ulnar, and radial nerves  · 2+ radial pulse     _*_*_*_*_*_*_*_*_*_*_*_*_*_*_*_*_*_*_*_*_*_*_*_*_*_*_*_*_*_*_*_*_*_*_*_*_*_*_*_*_*    Assessment: 75 y  o female POD 3 s/p Right Reverse Total Shoulder Arthroplasty for Proximal Humerus Fracture    Plan:  · Pain Control  · NWB RUE in sling at all times  · PT/OT  May remove sling for shoulder pendulum exercises  · ABLA, received 2 units PRBC yesterday  CBC pending this am   · D/C planning       No Cabral PA-C

## 2018-09-13 NOTE — ASSESSMENT & PLAN NOTE
Lab Results   Component Value Date    HGBA1C 9 7 (H) 09/10/2018       Recent Labs      09/12/18   1820  09/12/18   2112  09/13/18   0729  09/13/18   1116   POCGLU  193*  184*  216*  239*       Blood Sugar Average: Last 72 hrs:  (P) 230   · Discussed with patient importance of primary care follow up and adherence with oral hypoglycemic regimen  · Requested Case Management to provide information for PCP follow up  · Should be discharged home on Metformin 500 mgBID (takes daily due to diarrhea with higher frequency) and Glipizide 5 mg daily with appropriate follow up care  · Instructed for her to take metformin  BID if able  She is reluctant given GI upset but discussed taking with meals, trial for a few days to assess response    · Insurance does not appear to coverage metformin XR

## 2018-09-13 NOTE — PROGRESS NOTES
[facility]  Progress Note - Nessa Mayers 1945, 75 y o  female MRN: [id number]  Unit/Bed#: -01 Encounter: [id number]  Primary Care Provider: No primary care provider on file  Date and time admitted to hospital: 9/10/2018 11:42 AM    * Closed 4-part fracture of proximal end of right humerus   Assessment & Plan    · Status post right reverse total shoulder arthroplasty for closed fracture of proximal end of right humerus, POD 3  Postoperative care per Orthopedics  · Pain controlled on oral medications  · Right upper extremity remains with considerable swelling / bruising  Neurovascular status intact  · RUE in sling  · PT / OT consulted  Patient wishes to go home instead of rehab  [address] for RN / PT  Type 2 diabetes mellitus with hyperglycemia [facility])   Assessment & Plan    Lab Results   Component Value Date    HGBA1C 9 7 (H) 09/10/2018       Recent Labs      09/12/18   1820  09/12/18   2112  09/13/18   0729  09/13/18   1116   POCGLU  193*  184*  216*  239*       Blood Sugar Average: Last 72 hrs:  (P) 230   · Discussed with patient importance of primary care follow up and adherence with oral hypoglycemic regimen  · Requested Case Management to provide information for PCP follow up  · Should be discharged home on Metformin 500 mgBID (takes daily due to diarrhea with higher frequency) and Glipizide 5 mg daily with appropriate follow up care  · Instructed for her to take metformin  BID if able  She is reluctant given GI upset but discussed taking with meals, trial for a few days to assess response  · Insurance does not appear to coverage metformin XR          Acute blood loss as cause of postoperative anemia   Assessment & Plan    · Post-transfusion H&H 10/32  · Should have repeat CBC as an outpatient in 4 days with Home Health  Hypertension   Assessment & Plan    · Blood pressure well controlled with -150's    Continue with current outpatient medications  · On discharge, resume Lisinopril and will need follow up with primary care  Hyperlipidemia   Assessment & Plan    · Continue with statin therapy upon discharge as per home regimen  Chronic kidney disease, stage 3   Assessment & Plan    · Cleared by nephrology, CKD stage III ruled out  · BUN / Creatinine stable at 14 / 0 81  VTE Pharmacologic Prophylaxis:   Pharmacologic: Pharmacologic VTE Prophylaxis contraindicated due to Post-op anemia/ortho surgery  Mechanical VTE Prophylaxis in Place: Yes    Patient Centered Rounds: I have performed bedside rounds with nursing staff today  Discussions with Specialists or Other Care Team Provider: Bedside RN / Case Management    Education and Discussions with Family / Patient: Patient     Time Spent for Care: 20 minutes  More than 50% of total time spent on counseling and coordination of care as described above  Current Length of Stay: 3 day(s)    Current Patient Status: Inpatient       Discharge Plan: Home with Home Health as per primary service  No medical contraindication to discharge    Code Status: No Order      Subjective:   Patient has continued pain in her right upper extremity  This pain is controlled adequately by oral pain medication  Reports no BM in one week  No nausea, vomiting, abdominal distention  + flatus  Denies shortness of breath  Objective:     Vitals:   Temp (24hrs), Av 6 °F (37 °C), Min:97 9 °F (36 6 °C), Max:99 6 °F (37 6 °C)    HR:  [] 92  Resp:  [16-18] 18  BP: (124-153)/(58-67) 153/66  SpO2:  [94 %-99 %] 94 %  Body mass index is 26 17 kg/m²  Input and Output Summary (last 24 hours): Intake/Output Summary (Last 24 hours) at 18 1152  Last data filed at 18 0727   Gross per 24 hour   Intake              350 ml   Output              750 ml   Net             -400 ml       Physical Exam:     Physical Exam   Constitutional: She is oriented to person, place, and time   She appears well-developed and well-nourished  No distress  HENT:   Head: Normocephalic  Right eye ecchymoses   Eyes: No scleral icterus  Neck: Normal range of motion  Cardiovascular: Normal rate, regular rhythm, normal heart sounds and intact distal pulses  No murmur heard  Pulmonary/Chest: Effort normal and breath sounds normal  No respiratory distress  She has no wheezes  She has no rales  Abdominal: Soft  Bowel sounds are normal  She exhibits no distension  There is no tenderness  There is no rebound and no guarding    + BS  No distention  nontender   Musculoskeletal: She exhibits edema and tenderness  RUE in sling with considerable swelling through the extremity in to the hand and ecchymosis extending to right breast   Pulses and sensation intact  Neurological: She is alert and oriented to person, place, and time  Skin: Skin is warm and dry  She is not diaphoretic  Psychiatric: She has a normal mood and affect  Her behavior is normal    Nursing note and vitals reviewed  Additional Data:     Labs:      Results from last 7 days  Lab Units 09/13/18  0824  09/10/18  1741   WBC Thousand/uL 11 46*  < > 13 63*   HEMOGLOBIN g/dL 10 8*  < > 9 2*   HEMATOCRIT % 32 9*  < > 29 5*   PLATELETS Thousands/uL 276  < > 282   NEUTROS PCT %  --   --  85*   LYMPHS PCT %  --   --  8*   MONOS PCT %  --   --  6   EOS PCT %  --   --  0   < > = values in this interval not displayed      Results from last 7 days  Lab Units 09/13/18  0824   SODIUM mmol/L 133*   POTASSIUM mmol/L 4 6   CHLORIDE mmol/L 99*   CO2 mmol/L 26   BUN mg/dL 14   CREATININE mg/dL 0 81   CALCIUM mg/dL 8 3           Results from last 7 days  Lab Units 09/13/18  1116 09/13/18  0729 09/12/18  2112 09/12/18  1820 09/12/18  1700 09/12/18  1113 09/12/18  0743 09/11/18  2053 09/11/18  1600 09/11/18  1102 09/11/18  0716 09/10/18  2219   POC GLUCOSE mg/dl 239* 216* 184* 193* 164* 233* 252* 173* 223* 267* 205* 231*       Results from last 7 days  Lab Units 09/10/18  1741   HEMOGLOBIN A1C % 9 7*         * I Have Reviewed All Lab Data Listed Above  * Additional Pertinent Lab Tests Reviewed: All Labs Within Last 24 Hours Reviewed    Imaging:    Imaging Reports Reviewed Today Include: CT, xray of shoulder, right shoulder  Imaging Personally Reviewed by Myself Includes:  none    Recent Cultures (last 7 days):           Last 24 Hours Medication List:     Current Facility-Administered Medications:  acetaminophen 650 mg Oral Q4H PRN Scot Hale MD   docusate sodium 100 mg Oral BID Scot Hale MD   insulin glargine 8 Units Subcutaneous HS Eliz Gamboa PA-C   insulin lispro 1-6 Units Subcutaneous TID AC Scot Hale MD   insulin lispro 1-6 Units Subcutaneous HS Scot Hale MD   insulin lispro 5 Units Subcutaneous TID With Meals Eliz Gamboa PA-C   morphine injection 2 mg Intravenous Q4H PRN Scot Hale MD   ondansetron 4 mg Intravenous Q6H PRN Scot Hale MD   oxyCODONE 10 mg Oral Q4H PRN Scot Hale MD   oxyCODONE 5 mg Oral Q4H PRN Scot Hale MD   pravastatin 20 mg Oral QPM Scot Hale MD        Today, Patient Was Seen By: López Espinosa PA-C    ** Please Note: Dictation voice to text software may have been used in the creation of this document   **

## 2018-09-13 NOTE — SOCIAL WORK
CM spoke at bedside with patient  Patient educated that PT/OT are recommending inpatient rehab  CM discussed type of care inpatient rehab would provide to patient  Patient inquired about PT at home  VNA services are also available if the decision is to go home  CM called son Ramo Mata in the room  CM spoke with son Ramo Mata and patient  Per son, it is up to the patient what she wants to do  Patient wants to go home and have [facility]  CM reviewed with patient IMM at bedside  Copy of IMM given to patient  Patient unable to sign but verbally demonstrated she understood the IMM  CM reviewed with patient the bundle program  Copy of form given to patient  All questions/concerns answered at bedside  Patient given an infolink card  Patient does not have a PCP  Per son, she goes to [address] [place] [address]  Patient educated on the importance of calling and setting up a PCP appointment  CM updated nursing and ortho with plan of care  Patient's son will transport at time of discharge

## 2018-09-13 NOTE — PLAN OF CARE
Problem: OCCUPATIONAL THERAPY ADULT  Goal: Performs self-care activities at highest level of function for planned discharge setting  See evaluation for individualized goals  Treatment Interventions: ADL retraining, Functional transfer training, UE strengthening/ROM, Patient/family training, Equipment evaluation/education, Compensatory technique education, Continued evaluation, Activityengagement  Equipment Recommended: Tub seat with back, Other (comment) (has commode)       See flowsheet documentation for full assessment, interventions and recommendations  Outcome: Progressing  Limitation: Decreased ADL status, Decreased UE ROM, Decreased UE strength, Decreased endurance, Decreased fine motor control, Decreased self-care trans, Decreased high-level ADLs     Assessment: Pt seen for OT tx session this AM  Pt continues to require min - mod A to engage in functional mobility / transfers using cane and cues for hand placement, tech  Pt required mod A to complete personal hygiene and clothing mgmt during toileting  Pt required max A to adjust / don L UE sling while seated in chair and demonstrated limited recall of precautions, and tech to don / doff sling  Continue to recommend post acute rehab when medically stable for discharge from acute care to return to baseline level of I and max I and safety w/ ADL performance   Will continue to follow     OT Discharge Recommendation: Other (Comment) (to post acute rehab)  OT - OK to Discharge:  (to post acute rehab)

## 2018-09-13 NOTE — PLAN OF CARE
Problem: PHYSICAL THERAPY ADULT  Goal: Performs mobility at highest level of function for planned discharge setting  See evaluation for individualized goals  Treatment/Interventions: Functional transfer training, LE strengthening/ROM, Therapeutic exercise, Cognitive reorientation, Patient/family training, Equipment eval/education, Endurance training, Bed mobility, Gait training, Spoke to nursing, Spoke to case management, OT, Spoke to advanced practitioner  Equipment Recommended:  (trial cane)       See flowsheet documentation for full assessment, interventions and recommendations  Outcome: Progressing  Prognosis: Fair  Problem List: Decreased strength, Decreased range of motion, Decreased endurance, Impaired balance  Assessment: Patient demonstrated ability to ambulate increasedgait distancw with SPC and min a x1  Patient requires increased amounts of time and verbal instruction for cane and stepping sequence  Lateral LOB x 1 when transitioning through door way with ability to self correct  Participated in R UE AAROM exercise program with good understanding and no complaints on increase of pain  Continue to focus on R UE AAROM exercise program and gait progression with emphasis on use of SPC as appropriate  Barriers to Discharge: Decreased caregiver support, Inaccessible home environment (daughter uses a walker, young children in school)     Recommendation: Post acute IP rehab, Home with family support (depending on mobility progression& additional A upon DC)          See flowsheet documentation for full assessment

## 2018-09-13 NOTE — OCCUPATIONAL THERAPY NOTE
OccupationalTherapy Progress Note     Patient Name: Taryn WHITE Date: 9/13/2018  Problem List  Patient Active Problem List   Diagnosis    Closed 4-part fracture of proximal end of right humerus    Type 2 diabetes mellitus with hyperglycemia ([facility] [address] )    Hypertension    Hyperlipidemia    Chronic kidney disease, stage 3    Acute blood loss as cause of postoperative anemia         09/13/18 1037   Restrictions/Precautions   Weight Bearing Precautions Per Order Yes   RUE Weight Bearing Per Order NWB   Braces or Orthoses Sling   Other Precautions Bed Alarm; Chair Alarm; Fall Risk;Pain;Limb alert   General   Response to Previous Treatment Patient with no complaints from previous session   Pain Assessment   Pain Assessment 0-10   Pain Score 5   Pain Type Acute pain   Pain Location Arm; Shoulder   Pain Orientation Right   Effect of Pain on Daily Activities limits activity tolerance and I w/ ADL   Patient's Stated Pain Goal No pain   Hospital Pain Intervention(s) Repositioned; Ambulation/increased activity; Emotional support   Response to Interventions tolerated; resting comfortably in bed   ADL   Eating Assistance 6  Modified independent   Eating Deficit Setup   Eating Comments pt able to feed herself breakfast after set up (opening, containers, placed w/ in reach)   Grooming Assistance 4  Minimal Assistance   Grooming Deficit Steadying;Setup;Supervision/safety; Increased time to complete;Verbal cueing   Grooming Comments standing at sink to wash L hand after toileting   UB Bathing Assistance Unable to assess   UB Bathing Comments unkepmt appearance   LB Bathing Assistance Unable to assess   UB Dressing Assistance 2  Maximal Assistance   UB Dressing Deficit Supervision/safety;Verbal cueing; Increased time to complete   UB Dressing Comments required max A to adjust / don L UE sling seated in chair   Toileting Assistance  3  Moderate Assistance   Toileting Deficit Perineal hygiene;Clothing management up;Clothing management down;Supervison/safety;Verbal cueing;Setup   Toileting Comments mod A to complete toileting for personal hygiene and clothing mgmt  pt demonstrated difficulty mananging clothing and hygiene using L UE  pt reports R hand dominance   Bed Mobility   Supine to Sit Unable to assess  (pt seated OOB in chair finishing breakfast upon arrival)   Sit to Supine 3  Moderate assistance   Additional items Assist x 1;HOB elevated; Increased time required;LE management;Verbal cues; Bedrails   Additional Comments pt required total A x2 using pad to adjust position towards HOB  Transfers   Sit to Stand 4  Minimal assistance   Additional items Assist x 1;Bedrails; Increased time required;Verbal cues  (cues for hand placement, tech)   Stand to Sit 4  Minimal assistance   Additional items Assist x 1; Increased time required;Verbal cues   Toilet transfer 3  Moderate assistance  (mod A sit to stand from toilet; min A stand to sit)   Functional Mobility   Functional Mobility 4  Minimal assistance   Additional Comments min A and increased time using cane chair to / from bathroom   Additional items [facility]   Toilet Transfers   Toilet Transfer From [employer] Type To and from   Toilet Transfer to [employer] Transfer Technique Ambulating   Toilet Transfers Moderate assistance;Verbal cues  (mod sit to stand; min A stand to sit, + time)   Cognition   Overall Cognitive Status Impaired   Arousal/Participation Alert; Cooperative   Attention Attends with cues to redirect   Orientation Level Oriented to person;Oriented to place   Memory Decreased recall of recent events;Decreased recall of precautions   Following Commands Follows multistep commands with increased time or repetition   Comments Identified pt by full name and birthdate  Pt benefits from cues to reinforce hand placement during functional transfers  Limited insight into deficits     Activity Tolerance   Activity Tolerance Patient limited by fatigue;Patient limited by pain   Medical Staff Made Aware spoke to RNRukhsana and Hayley WARREN   Assessment   Assessment Pt seen for OT tx session this AM  Pt continues to require min - mod A to engage in functional mobility / transfers using cane and cues for hand placement, tech  Pt required mod A to complete personal hygiene and clothing mgmt during toileting  Pt required max A to adjust / don L UE sling while seated in chair and demonstrated limited recall of precautions, and tech to don / doff sling  Continue to recommend post acute rehab when medically stable for discharge from acute care to return to baseline level of I and max I and safety w/ ADL performance  Will continue to follow   Plan   Treatment Interventions ADL retraining;Functional transfer training;UE strengthening/ROM; Patient/family training;Equipment evaluation/education; Activityengagement; Compensatory technique education   Goal Expiration Date 09/18/18   Treatment Day 1   OT Frequency 3-5x/wk   Recommendation   OT Discharge Recommendation Other (Comment)  (to post acute rehab)   Equipment Recommended Bedside commode;Tub seat with back   OT - OK to Discharge (to post acute rehab)   Barthel Index   Feeding 5   Bathing 0   Grooming Score 0   Dressing Score 0   Bladder Score 10   Bowels Score 10   Toilet Use Score 5   Transfers (Bed/Chair) Score 10   Mobility (Level Surface) Score 0   Stairs Score 0   Barthel Index Score 40   Modified Forrest Scale   Modified [place] Scale 4   Jeni Mendez, OTR/L

## 2018-09-13 NOTE — ASSESSMENT & PLAN NOTE
· CT: Right frontal orbital scalp hematoma  No acute calvarial fracture  · Mild ecchymoes on exam  · Supportive care

## 2018-09-14 NOTE — DISCHARGE SUMMARY
Discharge Summary - Orthopedics   Nessa Mayers 75 y o  female MRN: [id number]  Unit/Bed#:     Attending Physician: Dr Neil Loza    Admitting diagnosis: Closed 4-part fracture of proximal end of right humerus, initial encounter Arther Book    Discharge diagnosis: Closed 4-part fracture of proximal end of right humerus, initial encounter Arther Book    Date of admission: 9/10/2018    Date of discharge: 9/13/2018    Procedure: Right reverse total shoulder arthroplasty for proximal humerus fracture 9/10/18    Hospital course:   76-year-old female who sustained a right proximal humerus fracture and was seen in the outpatient setting by Dr Neil Loza  Surgery was recommended consisting of a reverse total shoulder arthroplasty  She underwent a right reverse total shoulder arthroplasty by Dr Neil Loza on 9/10/18  After the procedure she was admitted to the orthopedic service  She was transferred to the floor in stable condition  On the floor she received adequate pain control, PT/OT, and medical co-management by [facility]  Postoperative day two she was noted to have a hemoglobin 7 7 and was clinically symptomatic  She was transfused with 2 units of packed red blood cells  The remainder of her hospital course was unremarkable and her hemoglobin improved  She remained hemodynamically stable throughout the remainder of her hospital course  She was discharged home in stable condition on 9/13/18  Discharge Instructions:   · Nonweightbearing right upper extremity in sling at all times  · May remove sling briefly throughout the day to perform shoulder pendulum exercises  · Keep incision clean and dry  Daily dressing changes until incision is dry  · Physical therapy  · Follow-up as scheduled, otherwise call for appt  Discharge Medications: For the complete list of discharge medications, please refer to the patient's medication reconciliation

## 2018-09-17 ENCOUNTER — TELEPHONE (OUTPATIENT)
Dept: OBGYN CLINIC | Facility: HOSPITAL | Age: 73
End: 2018-09-17

## 2018-09-17 NOTE — TELEPHONE ENCOUNTER
Patient sees Dr Alexys Echols, [facility] ([facility]) Visiting Nurses   [phone]    Jennifer Lopez is visiting the patient this afternoon & needs protocol for therapy  Patient had shoulder sx on 9/10/18  Please advise

## 2018-09-17 NOTE — TELEPHONE ENCOUNTER
I printed the protocol and gave it to na  Can you call and get a fax number to fax the protocol to the nurse for PT? Thanks!

## 2018-09-25 ENCOUNTER — OFFICE VISIT (OUTPATIENT)
Dept: OBGYN CLINIC | Facility: CLINIC | Age: 73
End: 2018-09-25

## 2018-09-25 VITALS — BODY MASS INDEX: 26.21 KG/M2 | HEIGHT: 67 IN | WEIGHT: 167 LBS

## 2018-09-25 DIAGNOSIS — Z96.611 STATUS POST REVERSE ARTHROPLASTY OF RIGHT SHOULDER: Primary | ICD-10-CM

## 2018-09-25 PROBLEM — S42.291A CLOSED 4-PART FRACTURE OF PROXIMAL END OF RIGHT HUMERUS: Status: RESOLVED | Noted: 2018-09-05 | Resolved: 2018-09-25

## 2018-09-25 PROCEDURE — 99024 POSTOP FOLLOW-UP VISIT: CPT | Performed by: ORTHOPAEDIC SURGERY

## 2018-09-25 NOTE — PROGRESS NOTES
Assessment/Plan:  1  Status post reverse arthroplasty of right shoulder         Scribe Attestation    I,:   Jasmin Ko PA-C am acting as a scribe while in the presence of the attending physician :        I,:   Regla Li MD personally performed the services described in this documentation    as scribed in my presence :            Plan:  Continue NWB RUE  PT with PROM and pendulums right shoulder 2-3xs per week, start AAROM right shoulder 10/8/18 and AROM 10/22/18  Continue elbow/wrist ROM and  exercises as tolerated  Staples removed today, patient instructed she is able to shower and get incision with steri-strips wet  Follow up in 4 weeks with x-rays right shoulder  Subjective:   Fede Overton is a 75 y o  female who presents for post-op follow-up for R reverse total shoulder arthroplasty after 4 part fracture of R proximal humerus  Pt states she's doing well, her pain is well controlled using OTC Tylenol  She reports she hasn't done PROM, or pendulum swings of her R shoulder since leaving the hospital  She states home PT usually massages her RUE to reduce swelling during their home visits  Patient denies numbness and tingling in RUE  Denies fever, chills  Review of Systems   Constitutional: Negative  HENT: Negative  Eyes: Negative  Respiratory: Negative  Cardiovascular: Negative  Gastrointestinal: Negative  Endocrine: Negative  Genitourinary: Negative  Musculoskeletal: Positive for arthralgias  Skin: Negative  Allergic/Immunologic: Negative  Neurological: Negative  Hematological: Negative  Psychiatric/Behavioral: Negative            Past Medical History:   Diagnosis Date    Diabetes mellitus ([address] )     type 2    Hyperlipidemia     Hypertension        Past Surgical History:   Procedure Laterality Date    BREAST SURGERY Left     lumpectomy benign    CATARACT EXTRACTION Bilateral 2017    CATARACT EXTRACTION W/ INTRAOCULAR LENS IMPLANT Left 12/11/2017 Procedure: EXTRACTION EXTRACAPSULAR CATARACT PHACO INTRAOCULAR LENS (IOL); Surgeon: Dereck Abdi MD;  Location: [facility];  Service: Ophthalmology     [address] Right 10/23/2017    Procedure: EXTRACTION EXTRACAPSULAR CATARACT PHACO INTRAOCULAR LENS (IOL); Surgeon: Dereck Abdi MD;  Location: [facility] OR;  Service: Ophthalmology    NH ELENI SHOULDER ARTHRPLSTY HUMERAL&GLENOID COMPNT Right 9/10/2018    Procedure: RIGHT REVERSE TOTAL SHOULDER ARTHROPLASTY;  Surgeon: Nicholas Lucas MD;  [facility];  Service: Orthopedics       Family History   Problem Relation Age of Onset    Diabetes Mother     Stroke Father     Arthritis Brother     Diabetes Daughter     No Known Problems Brother        Social History     Occupational History    Not on file  Social History Main Topics    Smoking status: Never Smoker    Smokeless tobacco: Never Used    Alcohol use No      Comment: quit 8/17    Drug use: No    Sexual activity: Not on file         Current Outpatient Prescriptions:     acetaminophen (TYLENOL) 325 mg tablet, Take 650 mg by mouth every 6 (six) hours as needed for mild pain, Disp: , Rfl:     glipiZIDE (GLUCOTROL) 5 mg tablet, Take 5 mg by mouth every evening, Disp: , Rfl:     lisinopril (ZESTRIL) 40 mg tablet, Take 40 mg by mouth every evening, Disp: , Rfl:     metFORMIN (GLUCOPHAGE) 500 mg tablet, Take 1 tablet (500 mg total) by mouth 2 (two) times a day with meals, Disp: , Rfl: 0    oxyCODONE (ROXICODONE) 5 mg immediate release tablet, 1-2 tabs po q4 hrs prn pain, Disp: 40 tablet, Rfl: 0    pravastatin (PRAVACHOL) 20 mg tablet, Take 20 mg by mouth every evening, Disp: , Rfl:     No Known Allergies    Objective:  Vitals:       Right Shoulder Exam     Other   Erythema: absent  Sensation: normal (intact)  Pulse: present    Comments:  Incision is clean, dry, intact with steri-strips applied     Ecchymosis present of right upper upper arm extending into right hand and fingers  Edema present right upper arm extending into right hand and fingers  Decreased ROM in right shoulder  Decreased ROM in elbow due to stiffness  Unable to straighten elbow  Motor intact , ain, pin, median , radial , ulner  Sensation intact to light touch axillary median, radial,  ulner distrabutions            Physical Exam   Constitutional: She is oriented to person, place, and time  She appears well-developed and well-nourished  HENT:   Head: Normocephalic and atraumatic  Eyes: No scleral icterus  Neck: Neck supple  Cardiovascular: Normal heart sounds and intact distal pulses  Pulmonary/Chest: Effort normal and breath sounds normal    Abdominal: She exhibits no distension  Neurological: She is alert and oriented to person, place, and time  Skin: Skin is warm and dry  Psychiatric: She has a normal mood and affect

## 2018-09-28 ENCOUNTER — TELEPHONE (OUTPATIENT)
Dept: OBGYN CLINIC | Facility: CLINIC | Age: 73
End: 2018-09-28

## 2018-10-04 ENCOUNTER — TELEPHONE (OUTPATIENT)
Dept: OBGYN CLINIC | Facility: CLINIC | Age: 73
End: 2018-10-04

## 2018-10-16 ENCOUNTER — TELEPHONE (OUTPATIENT)
Dept: OBGYN CLINIC | Facility: HOSPITAL | Age: 73
End: 2018-10-16

## 2018-10-16 NOTE — TELEPHONE ENCOUNTER
I called and spoke to Nicky Luz and told her per Isaac Ramirez that using the Maxsorb would be fine

## 2018-10-16 NOTE — TELEPHONE ENCOUNTER
Rhoda Reyez  [phone]  Fax [phone]    Patient has developed small ulcer on coccyx   She has no family doctor and Ni Mora would like an permission  for Maxsorb 3x weekly, cover with adhesive foam dressing  Please advise for her

## 2018-10-30 ENCOUNTER — APPOINTMENT (OUTPATIENT)
Dept: RADIOLOGY | Facility: CLINIC | Age: 73
End: 2018-10-30
Payer: MEDICARE

## 2018-10-30 ENCOUNTER — OFFICE VISIT (OUTPATIENT)
Dept: OBGYN CLINIC | Facility: CLINIC | Age: 73
End: 2018-10-30

## 2018-10-30 VITALS
HEART RATE: 93 BPM | WEIGHT: 167 LBS | HEIGHT: 67 IN | DIASTOLIC BLOOD PRESSURE: 92 MMHG | SYSTOLIC BLOOD PRESSURE: 174 MMHG | BODY MASS INDEX: 26.21 KG/M2

## 2018-10-30 DIAGNOSIS — Z96.611 STATUS POST REVERSE ARTHROPLASTY OF RIGHT SHOULDER: ICD-10-CM

## 2018-10-30 DIAGNOSIS — S42.291D CLOSED 4-PART FRACTURE OF PROXIMAL END OF RIGHT HUMERUS WITH ROUTINE HEALING, SUBSEQUENT ENCOUNTER: ICD-10-CM

## 2018-10-30 DIAGNOSIS — S42.291D CLOSED 4-PART FRACTURE OF PROXIMAL END OF RIGHT HUMERUS WITH ROUTINE HEALING, SUBSEQUENT ENCOUNTER: Primary | ICD-10-CM

## 2018-10-30 PROBLEM — S42.293D: Status: ACTIVE | Noted: 2018-09-05

## 2018-10-30 PROCEDURE — 99024 POSTOP FOLLOW-UP VISIT: CPT | Performed by: ORTHOPAEDIC SURGERY

## 2018-10-30 PROCEDURE — 73030 X-RAY EXAM OF SHOULDER: CPT

## 2018-10-30 NOTE — PROGRESS NOTES
Patient Name:  Doug Zuniga  MRN:  [id number]    Assessment     1  Closed 4-part fracture of proximal end of right humerus with routine healing, subsequent encounter  XR shoulder 2+ vw right   2  Status post reverse arthroplasty of right shoulder         Plan     1  Start outpatient physical therapy  2  Wean from sling as tolerated  3  May gradually resume normal activity  Avoid painful activity and heavy lifting  Return in about 6 weeks (around 12/11/2018)  Subjective     Post-op evaluation after right reverse total shoulder arthroplasty on 9/10/18 for a proximal humerus fracture  Patient reports ongoing mild pain in her shoulder  No numbness or tingling  She wears a sling which helps somewhat  Recently, she has started weaning from the sling  She has been doing home physical therapy  Objective     BP (!) 174/92   Pulse 93   Ht 5' 7" (1 702 m)   Wt 75 8 kg (167 lb)   BMI 26 16 kg/m²     Right shoulder:  Incision is healed without erythema  Minimal tenderness to palpation anteriorly  Passive range of motion is forward flexion 80°, external rotation-abduction 20°, and internal rotation-abduction 5°  No gross instability  Sensation intact throughout the right upper extremity  2+ radial pulse  Data Review     I have personally reviewed pertinent films in PACS, and my interpretation follows  X-rays right shoulder 10/30/18:  Reverse total shoulder arthroplasty in satisfactory alignment with healing proximal humerus fracture

## 2018-11-06 ENCOUNTER — EVALUATION (OUTPATIENT)
Dept: PHYSICAL THERAPY | Facility: REHABILITATION | Age: 73
End: 2018-11-06
Payer: MEDICARE

## 2018-11-06 DIAGNOSIS — S42.291D CLOSED 4-PART FRACTURE OF PROXIMAL END OF RIGHT HUMERUS WITH ROUTINE HEALING, SUBSEQUENT ENCOUNTER: ICD-10-CM

## 2018-11-06 DIAGNOSIS — Z96.611 STATUS POST REVERSE ARTHROPLASTY OF RIGHT SHOULDER: Primary | ICD-10-CM

## 2018-11-06 PROCEDURE — 97110 THERAPEUTIC EXERCISES: CPT | Performed by: PHYSICAL THERAPIST

## 2018-11-06 PROCEDURE — G8991 OTHER PT/OT GOAL STATUS: HCPCS | Performed by: PHYSICAL THERAPIST

## 2018-11-06 PROCEDURE — 97162 PT EVAL MOD COMPLEX 30 MIN: CPT | Performed by: PHYSICAL THERAPIST

## 2018-11-06 PROCEDURE — G8990 OTHER PT/OT CURRENT STATUS: HCPCS | Performed by: PHYSICAL THERAPIST

## 2018-11-06 NOTE — PROGRESS NOTES
PT Evaluation     Today's date: 2018  Patient name: Roopa Baker  : 1945  MRN: [id number]  Referring provider: Ivana Nicole MD  Dx:   Encounter Diagnosis     ICD-10-CM    1  Closed 4-part fracture of proximal end of right humerus with routine healing, subsequent encounter S42 291D Ambulatory referral to Physical Therapy   2  Status post reverse arthroplasty of right shoulder Z96 [id number] Ambulatory referral to Physical Therapy       Start Time: 1003  Stop Time: 1052  Total time in clinic (min): 49 minutes    Assessment  Impairments: abnormal or restricted ROM, abnormal movement, activity intolerance, impaired physical strength, lacks appropriate home exercise program and pain with function    Symptom irritability: moderate  Assessment details: Patient is a 75year old female presenting to physical therapy 8 weeks s/p right shoulder reverse total shoulder arthroplasty secondary to traumatic fracture of right humerus on 2018  Patient is currently presenting with decreased right shoulder PROM in all planes, inability to perform AROM, and decreased right shoulder strength  These deficits are limitign her ability to reach her right arm overhead, wash hear, don/doff jacket independently, cook, and shower without pain in right shoulder  Patient will benefit from physical therapy in order to address the deficits contributing to functional limitations and facilitate return to prior level of functioning  Patient was provided a home exercise program and demonstrated an understanding of exercises  Patient was advised to stop performing home exercise program if symptoms increase or new complaints developed  Verbal understanding demonstrated regarding home exercise program instructions  Patient was educated on and agreeable to plan of care       Understanding of Dx/Px/POC: good   Prognosis: fair    Goals  Short term goals:  1) Patient will demonstrate decrease pain by 50% in right shoulder in 4 weeks  2) Patient will demonstrate improved right shoulder PROM by >15 degrees in all planes in 4 weeks  3) Patient will demonstrate ability to perform right shoulder active elevation >90 degrees  in 4 weeks  Long term goals:  1) Patient will demonstrate ability to cook with right shoulder in 8 weeks  2) Patient will demonstrate ability to wash left side of body with right UE in shower 8 weeks  3) Patient will demonstrate ability to reach hair without right UE in 8 weeks  4) Patient will demonstrate ability to don/doff jacket independently 8 weeks  5) Patient will demonstrate independence with HEP 8 weeks        Plan  Patient would benefit from: skilled physical therapy  Referral necessary: No  Planned modality interventions: cryotherapy, electrical stimulation/Russian stimulation and thermotherapy: hydrocollator packs  Planned therapy interventions: home exercise program, graded motor, graded exercise, graded activity, functional ROM exercises, flexibility, massage, manual therapy, patient education, strengthening, stretching, therapeutic activities, therapeutic exercise and therapeutic training  Frequency: 2x week  Duration in weeks: 4  Treatment plan discussed with: family        Subjective Evaluation    History of Present Illness  Date of surgery: 9/10/2018  Mechanism of injury: Patient reports that she fell at [employer] on 8/31/2018 after catching her foot on something  She states that she fell on right side and had pain immediately  She reports that after driving home she was taken to the emergency room at [address] [place] [address]  Xrays were taken which revealed a fracture of right humerus She states that then she was referred to Dr Maureen Rushing  Surgical intervention was recommended  She underwent a right shoulder reverse total shoulder arthroplasty on 9/10/2018 at [address] [place] [address] by Dr Maureen Rushing  Patient states that surgery was followed by an acute care stay without complication   She was discharged home therapy where she participated in elbow ROM and massage for effusion  Currently she denies numbness or tingling into UE  Patient reports that she has not injured the right shoulder prior to this onset  Patient reports that her goals for physical therapy are to able to return cooking, reaching across body to wash herself, reaching overhead  Pain  Current pain ratin  At best pain ratin  At worst pain ratin  Location: P1) Right superior shoulder- intermittent, varying, deep "achy"    Social Support  Lives in: apartment (third floor- takes elevator )  Lives with: Lives daughter and two great children     Employment status: not working (Retired)  Exercise history: Patient reports that she has not been able to complete cooking  Patient states that she has been able to modify cleaning activities in order to complete with her left hand  Patient reports that she does not have any hobbies or recreational activities             Objective     Static Posture     Comments  Increased upper thoracic kyphosis, rounded shoulders, transition zone C5     Observations     Additional Observation Details  Right anterior shoulder incision- closed, minimal scabbing- no drainage    Active Range of Motion   Left Shoulder   Flexion: 132 degrees   Extension: 56 degrees   Abduction: 151 degrees   External rotation BTH: T4   Internal rotation BTB: T10     Right Elbow   Extension: -10 degrees     Passive Range of Motion   Left Shoulder   Flexion: 151 degrees   Abduction: 148 degrees   External rotation 45°: 61 degrees   Internal rotation 45°: 42 degrees     Right Shoulder   Flexion: 45 degrees   Abduction: 51 (scaption) degrees   External rotation 0°: 11 degrees   Internal rotation 0°: 18 degrees     Strength/Myotome Testing     Left Shoulder     Planes of Motion   Flexion: 4   Abduction: 4+   External rotation at 0°: 4   Internal rotation at 0°: 4+       Flowsheet Rows      Most Recent Value   PT/OT G-Codes   Current Score  35   Projected Score 61   FOTO information reviewed  Yes   Assessment Type  Evaluation   G code set  Other PT/OT Primary   Other PT Primary Current Status ()  CL   Other PT Primary Goal Status ()  CJ          Precautions: 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 (protocol in chart), HTN, DM type II    Daily Treatment Diary     Manual  11/6            Right shoulder PROM TK 8 min                                                                    Exercise Diary  11/6            Pendulums 20 CW/CCW            Table slides flexion/scaption 15x ec            Supine ER with cane (Towel under elbow) 15 x 3 sec                                                                                                                                                                                                                                             Modalities              Cold Pack PRN

## 2018-11-13 ENCOUNTER — OFFICE VISIT (OUTPATIENT)
Dept: PHYSICAL THERAPY | Facility: REHABILITATION | Age: 73
End: 2018-11-13
Payer: MEDICARE

## 2018-11-13 DIAGNOSIS — S42.291D CLOSED 4-PART FRACTURE OF PROXIMAL END OF RIGHT HUMERUS WITH ROUTINE HEALING, SUBSEQUENT ENCOUNTER: Primary | ICD-10-CM

## 2018-11-13 DIAGNOSIS — Z96.611 STATUS POST REVERSE ARTHROPLASTY OF RIGHT SHOULDER: ICD-10-CM

## 2018-11-13 PROCEDURE — 97140 MANUAL THERAPY 1/> REGIONS: CPT | Performed by: PHYSICAL THERAPIST

## 2018-11-13 PROCEDURE — 97110 THERAPEUTIC EXERCISES: CPT | Performed by: PHYSICAL THERAPIST

## 2018-11-13 NOTE — PROGRESS NOTES
Daily Note     Today's date: 2018  Patient name: Alok Alegre  : 1945  MRN: [id number]  Referring provider: Riley Eddy MD  Dx:   Encounter Diagnosis     ICD-10-CM    1  Closed 4-part fracture of proximal end of right humerus with routine healing, subsequent encounter S42 291D    2  Status post reverse arthroplasty of right shoulder [id number] [id number]        Start Time: 930  Stop Time: 1020  Total time in clinic (min): 50 minutes     Subjective: Damien Molina reports "arm is sore" upon arrival to therapy today  she verbalizes compliance with HEP, denying any pain or problems at this time  She verbalizes compliance with sling when in community, verbalizing she does not wear sling at home  Objective: See treatment diary below  Precautions: 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 (protocol in chart), HTN, DM type II    Daily Treatment Diary     Manual             Right shoulder PROM TK 8 min 15' total           Shoulder distraction  Done            Inferior mobs  Done                                          Exercise Diary             Pendulums 20 CW/CCW 20 cw/ccw           Table slides flexion/scaption 15x ec :05x20 ea           Supine ER with cane (Towel under elbow) 15 x 3 sec :[id number]           Scapular retraction  2x10           Elbow extension bolster  1# :30x3            Pulleys   NV                                                                                                                                                                                                     Modalities              Cold Pack PRN                                             Assessment: Tolerated treatment fair  Patient demonstrated fatigue post treatment, exhibited good technique with therapeutic exercises and would benefit from continued PT  Patient demonstrating significant tightness all planes with manuals performed   Patient reporting relief with distraction and oscillation, improvements in ROM following mobilizations today  Minimal tightness in R elbow extension, prolonged elbow extension performed as part of flowsheet, patient instructed to perform at home  Plan: Continue per plan of care  Progress treatment as tolerated

## 2018-11-15 ENCOUNTER — APPOINTMENT (OUTPATIENT)
Dept: PHYSICAL THERAPY | Facility: REHABILITATION | Age: 73
End: 2018-11-15
Payer: MEDICARE

## 2018-11-20 ENCOUNTER — OFFICE VISIT (OUTPATIENT)
Dept: PHYSICAL THERAPY | Facility: REHABILITATION | Age: 73
End: 2018-11-20
Payer: MEDICARE

## 2018-11-20 DIAGNOSIS — Z96.611 STATUS POST REVERSE ARTHROPLASTY OF RIGHT SHOULDER: ICD-10-CM

## 2018-11-20 DIAGNOSIS — S42.291D CLOSED 4-PART FRACTURE OF PROXIMAL END OF RIGHT HUMERUS WITH ROUTINE HEALING, SUBSEQUENT ENCOUNTER: Primary | ICD-10-CM

## 2018-11-20 PROCEDURE — 97110 THERAPEUTIC EXERCISES: CPT | Performed by: PHYSICAL THERAPIST

## 2018-11-20 PROCEDURE — 97140 MANUAL THERAPY 1/> REGIONS: CPT | Performed by: PHYSICAL THERAPIST

## 2018-11-20 NOTE — PROGRESS NOTES
Daily Note     Today's date: 2018  Patient name: Roopa Baker  : 1945  MRN: [id number]  Referring provider: Ivana Nicole MD  Dx:   Encounter Diagnosis     ICD-10-CM    1  Closed 4-part fracture of proximal end of right humerus with routine healing, subsequent encounter S42 291D    2  Status post reverse arthroplasty of right shoulder [id number] [id number]        Start Time: 1015  Stop Time: 1100  Total time in clinic (min): 45 minutes    Subjective: Zi Joseph reports that her shoulder feels sore and stiff, notes wearing her sling in public, "makes me feel better with other people "       Objective: See treatment diary below      Assessment: Tolerated treatment well  Patient demonstrated fatigue post treatment, exhibited good technique with therapeutic exercises and would benefit from continued PT  Significant restrictions as well as swelling noted around pt's incision sight  Improved range of motion after end of manuals  Plan: Continue per plan of care          Precautions: 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 (protocol in chart), HTN, DM type II    Daily Treatment Diary     Manual            Right shoulder PROM TK 8 min 15' total 15'          Shoulder distraction  Done  done          Inferior mobs  Done  done          STM R shoulder   done                           Exercise Diary            Pendulums 20 CW/CCW 20 cw/ccw np          Table slides flexion/scaption 15x ec :05x20 ea 20x5"          Supine ER with cane (Towel under elbow) 15 x 3 sec :[id number] 20x5"          Scapular retraction  2x10 20x5"          Elbow extension bolster  1# :30x3  np          Pulleys   NV 2' flx x2          Bicep curls   2# 3x10                       Low row   3x10 PTB                                                                                                                                                             Modalities              Cold Pack PRN

## 2018-11-27 ENCOUNTER — APPOINTMENT (OUTPATIENT)
Dept: PHYSICAL THERAPY | Facility: REHABILITATION | Age: 73
End: 2018-11-27
Payer: MEDICARE

## 2018-11-29 ENCOUNTER — APPOINTMENT (OUTPATIENT)
Dept: PHYSICAL THERAPY | Facility: REHABILITATION | Age: 73
End: 2018-11-29
Payer: MEDICARE

## 2018-12-04 ENCOUNTER — OFFICE VISIT (OUTPATIENT)
Dept: PHYSICAL THERAPY | Facility: REHABILITATION | Age: 73
End: 2018-12-04
Payer: MEDICARE

## 2018-12-04 DIAGNOSIS — S42.291D CLOSED 4-PART FRACTURE OF PROXIMAL END OF RIGHT HUMERUS WITH ROUTINE HEALING, SUBSEQUENT ENCOUNTER: Primary | ICD-10-CM

## 2018-12-04 DIAGNOSIS — Z96.611 STATUS POST REVERSE ARTHROPLASTY OF RIGHT SHOULDER: ICD-10-CM

## 2018-12-04 PROCEDURE — 97140 MANUAL THERAPY 1/> REGIONS: CPT | Performed by: PHYSICAL THERAPIST

## 2018-12-04 PROCEDURE — 97110 THERAPEUTIC EXERCISES: CPT | Performed by: PHYSICAL THERAPIST

## 2018-12-04 NOTE — PROGRESS NOTES
PT Re-Evaluation     Today's date: 2018  Patient name: Mary Morley  : 1945  MRN: [id number]  Referring provider: Susi Kaur MD  Dx:   Encounter Diagnosis     ICD-10-CM    1  Closed 4-part fracture of proximal end of right humerus with routine healing, subsequent encounter S42 291D    2  Status post reverse arthroplasty of right shoulder Z96 611        Start Time: 1100  Stop Time: 1205  Total time in clinic (min): 65 minutes    Assessment  Assessment details: Mary Morley is a 75y o  year old female who presents to IE with:   Closed 4-part fracture of proximal end of right humerus with routine healing, subsequent encounter  (primary encounter diagnosis)  Status post reverse arthroplasty of right shoulder    Jorge L Carroll has made the following improvements since beginning PT: decreased pain and increased ROM   Jorge L Carroll continues to present with the impairments as listed above  Patient would continue to benefit from skilled PT to address these deficits and to maximize function       Impairments: abnormal or restricted ROM, abnormal movement, activity intolerance, impaired physical strength, lacks appropriate home exercise program and pain with function    Symptom irritability: moderateUnderstanding of Dx/Px/POC: good   Prognosis: fair    Goals  Short term goals:  1) Patient will demonstrate decrease pain by 50% in right shoulder in 4 weeks- Progressing  2) Patient will demonstrate improved right shoulder PROM by >15 degrees in all planes in 4 weeks- Progressing  3) Patient will demonstrate ability to perform right shoulder active elevation >90 degrees  in 4 weeks- Progressing   Long term goals:  1) Patient will demonstrate ability to cook with right shoulder in 8 weeks- Progressing   2) Patient will demonstrate ability to wash left side of body with right UE in shower 8 weeks  3) Patient will demonstrate ability to reach hair without right UE in 8 weeks- Progressing  4) Patient will demonstrate ability to don/doff jacket independently 8 weeks- Progressing   5) Patient will demonstrate independence with HEP 8 weeks        Plan  Patient would benefit from: skilled physical therapy  Referral necessary: No  Planned modality interventions: cryotherapy, electrical stimulation/Russian stimulation and thermotherapy: hydrocollator packs  Planned therapy interventions: home exercise program, graded motor, graded exercise, graded activity, functional ROM exercises, flexibility, massage, manual therapy, patient education, strengthening, stretching, therapeutic activities, therapeutic exercise and therapeutic training  Frequency: 2x week  Duration in weeks: 4  Treatment plan discussed with: family        Subjective Evaluation    History of Present Illness  Date of surgery: 9/10/2018  Mechanism of injury: Jefferson Goodman has been seen for total of 4 visits for OP PT following R reverse TSA  Patient's global rating of change is " A little bit better (2) " Patient only seen 4 visits since IE on 18 due to patient being sick and issues with transportation  Patient reports "just achy and sore" in regards to R shoulder  Patient continues to demonstrate significant tightness in R UE at this time  Patient would continue to benefit from PT to address ROM, strength, and ADL's  Pain  Current pain ratin  At best pain ratin  At worst pain ratin  Location: P1) Right superior shoulder- intermittent, varying, deep "achy"    Social Support  Lives in: apartment (third floor- takes elevator )  Lives with: Lives daughter and two great children     Employment status: not working (Retired)  Exercise history: Patient reports that she has not been able to complete cooking  Patient states that she has been able to modify cleaning activities in order to complete with her left hand  Patient reports that she does not have any hobbies or recreational activities             Objective     Static Posture     Comments  Increased upper thoracic kyphosis, rounded shoulders, transition zone C5     Observations     Additional Observation Details  Right anterior shoulder incision- closed, minimal scabbing- no drainage    Active Range of Motion   Left Shoulder   Flexion: 132 degrees   Extension: 56 degrees   Abduction: 151 degrees   External rotation BTH: T4   Internal rotation BTB: T10     Right Elbow   Extension: -10 degrees     Passive Range of Motion   Left Shoulder   Flexion: 151 degrees   Abduction: 148 degrees   External rotation 45°: 61 degrees   Internal rotation 45°: 42 degrees     Right Shoulder   Flexion: 80 degrees   Abduction: 80 (scaption) degrees   External rotation 0°: 20 degrees   Internal rotation 0°: 40 degrees     Strength/Myotome Testing     Left Shoulder     Planes of Motion   Flexion: 4   Abduction: 4+   External rotation at 0°: 4   Internal rotation at 0°: 4+     Additional Strength Details  No formal MMT performed at this time secondary to inability to perform AROM secondary to ROM and R shoulder tightness           Precautions: 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 (protocol in chart), HTN, DM type II    Daily Treatment Diary     Manual  11/6 11/13 11/20 12/4          Right shoulder PROM TK 8 min 15' total 15' 20'         Shoulder distraction  Done  done Done          Inferior mobs  Done  done Done          STM R shoulder   done Done                           Exercise Diary  11/6 11/13 11/20 12/4         Pendulums 20 CW/CCW 20 cw/ccw np          Table slides flexion/scaption 15x ec :05x20 ea 20x5" 20x5"          Supine ER with cane (Towel under elbow) 15 x 3 sec :[id number] 20x5" 20x5"          Scapular retraction  2x10 20x5" 20x5"          Elbow extension bolster  1# :30x3  np          Pulleys   NV 2' flx x2 3'x 2         Bicep curls   2# 3x10 2# 3x10                      Low row   3x10 PTB 3x10 PTB         ER wall stretch    :10x10 Modalities              Cold Pack PRN

## 2018-12-06 ENCOUNTER — OFFICE VISIT (OUTPATIENT)
Dept: PHYSICAL THERAPY | Facility: REHABILITATION | Age: 73
End: 2018-12-06
Payer: MEDICARE

## 2018-12-06 DIAGNOSIS — Z96.611 STATUS POST REVERSE ARTHROPLASTY OF RIGHT SHOULDER: ICD-10-CM

## 2018-12-06 DIAGNOSIS — S42.291D CLOSED 4-PART FRACTURE OF PROXIMAL END OF RIGHT HUMERUS WITH ROUTINE HEALING, SUBSEQUENT ENCOUNTER: Primary | ICD-10-CM

## 2018-12-06 PROCEDURE — 97140 MANUAL THERAPY 1/> REGIONS: CPT | Performed by: PHYSICAL THERAPIST

## 2018-12-06 PROCEDURE — 97110 THERAPEUTIC EXERCISES: CPT | Performed by: PHYSICAL THERAPIST

## 2018-12-06 NOTE — PROGRESS NOTES
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 (protocol in chart), HTN, DM type II    Daily Treatment Diary     Manual          Right shoulder PROM TK 8 min 15' total 15' 20' 20'        Shoulder distraction  Done  done Done  Done         Inferior mobs  Done  done Done  Done         STM/ TrP R UE   done Done  Done         Mike taping elbow extension     Done             Exercise Diary          Pendulums 20 CW/CCW 20 cw/ccw np          Table slides flexion/scaption 15x ec :05x20 ea 20x5" 20x5"  20x5"        Supine ER with cane (Towel under elbow) 15 x 3 sec :[id number] 20x5" 20x5"  20x5"        Scapular retraction  2x10 20x5" 20x5"  20x5"         Elbow extension bolster  1# :30x3  np          Pulleys   NV 2' flx x2 3'x 2 3'x2        Bicep curls   2# 3x10 2# 3x10 3# 2x10                     Low row   3x10 PTB 3x10 PTB 3x10 OTB        ER wall stretch    :10x10 :10x10        TB tricep extensions     OTB 2x10                                                                                                                                 Modalities              Cold Pack PRN                                           Assessment: Tolerated treatment fair   Patient demonstrated fatigue post treatment, exhibited good technique with therapeutic exercises and would benefit from continued PT  Patient demonstrating minimal improvements with PROM today, continues to be restricted in all planes of motion at this time  Trialed Mckeon taping today for elbow extension for tactile cueing today secondary to patient persistently having R elbow in flexion rather than extension when walking  Plan: Continue per plan of care  Progress treatment as tolerated

## 2018-12-11 ENCOUNTER — OFFICE VISIT (OUTPATIENT)
Dept: OBGYN CLINIC | Facility: CLINIC | Age: 73
End: 2018-12-11
Payer: MEDICARE

## 2018-12-11 ENCOUNTER — APPOINTMENT (OUTPATIENT)
Dept: RADIOLOGY | Facility: CLINIC | Age: 73
End: 2018-12-11
Payer: MEDICARE

## 2018-12-11 VITALS
DIASTOLIC BLOOD PRESSURE: 73 MMHG | BODY MASS INDEX: 26.16 KG/M2 | SYSTOLIC BLOOD PRESSURE: 181 MMHG | HEART RATE: 88 BPM | HEIGHT: 67 IN

## 2018-12-11 DIAGNOSIS — Z96.611 STATUS POST REVERSE ARTHROPLASTY OF RIGHT SHOULDER: Primary | ICD-10-CM

## 2018-12-11 DIAGNOSIS — Z96.611 STATUS POST REVERSE ARTHROPLASTY OF RIGHT SHOULDER: ICD-10-CM

## 2018-12-11 PROCEDURE — 99213 OFFICE O/P EST LOW 20 MIN: CPT | Performed by: ORTHOPAEDIC SURGERY

## 2018-12-11 PROCEDURE — 73030 X-RAY EXAM OF SHOULDER: CPT

## 2018-12-11 NOTE — PROGRESS NOTES
Patient Name:  Charly Deleon  MRN:  [id number]    Assessment     1  Status post reverse arthroplasty of right shoulder  XR shoulder 2+ vw right       Plan     Right reverse total shoulder arthroplasty for proximal humerus fracture 9/10/18  1  Continue physical therapy with a focus on stretching  2  Gradually return to normal activities as tolerated  3  Follow-up in six weeks for repeat evaluation  No x-rays needed unless clinically indicated  Subjective     76-year-old female returns to the office today status post Right reverse total shoulder arthroplasty for proximal humerus fracture 9/10/18  Today she notes overall improvement with regards to her pain  She still notes persistent stiffness which is improving with therapy  She participates in therapy 2 times per week  She notes weakness  She denies numbness and tingling  No fevers or chills  She states she is able to perform all activities of daily living at home without difficulty  Objective     BP (!) 181/73   Pulse 88   Ht 5' 7" (1 702 m)   BMI 26 16 kg/m²     Right shoulder:   No gross deformity  Skin intact  Well-healed incision  No tenderness to palpation proximal humerus  Passive range of motion includes 90° of forward flexion, 40° of external rotation-abduction, 10° of internal rotation-abduction  Sensation intact axillary, median, ulnar and radial nerves  2+ radial pulse  Appropriate mood and affect    Data Review     I have personally reviewed pertinent films in PACS, and my interpretation follows  X-rays performed today of the right shoulder reveals a stable intact prosthesis in satisfactory alignment        Scribe Attestation    I,:   Brynn Michaels PA-C am acting as a scribe while in the presence of the attending physician :        I,:   Les Quevedo MD personally performed the services described in this documentation    as scribed in my presence :

## 2018-12-13 ENCOUNTER — APPOINTMENT (OUTPATIENT)
Dept: PHYSICAL THERAPY | Facility: REHABILITATION | Age: 73
End: 2018-12-13
Payer: MEDICARE

## 2018-12-18 ENCOUNTER — OFFICE VISIT (OUTPATIENT)
Dept: PHYSICAL THERAPY | Facility: REHABILITATION | Age: 73
End: 2018-12-18
Payer: MEDICARE

## 2018-12-18 DIAGNOSIS — S42.291D CLOSED 4-PART FRACTURE OF PROXIMAL END OF RIGHT HUMERUS WITH ROUTINE HEALING, SUBSEQUENT ENCOUNTER: Primary | ICD-10-CM

## 2018-12-18 DIAGNOSIS — Z96.611 STATUS POST REVERSE ARTHROPLASTY OF RIGHT SHOULDER: ICD-10-CM

## 2018-12-18 PROCEDURE — 97140 MANUAL THERAPY 1/> REGIONS: CPT | Performed by: PHYSICAL THERAPIST

## 2018-12-18 PROCEDURE — 97110 THERAPEUTIC EXERCISES: CPT | Performed by: PHYSICAL THERAPIST

## 2018-12-18 PROCEDURE — G8991 OTHER PT/OT GOAL STATUS: HCPCS | Performed by: PHYSICAL THERAPIST

## 2018-12-18 PROCEDURE — G8990 OTHER PT/OT CURRENT STATUS: HCPCS | Performed by: PHYSICAL THERAPIST

## 2018-12-18 NOTE — PROGRESS NOTES
Daily Note     Today's date: 2018  Patient name: Gely Reynaga  : 1945  MRN: [id number]  Referring provider: Sonja Dudley MD  Dx:   Encounter Diagnosis     ICD-10-CM    1  Closed 4-part fracture of proximal end of right humerus with routine healing, subsequent encounter S42 291D    2  Status post reverse arthroplasty of right shoulder Z96 611        Start Time: 1100  Stop Time: 1152  Total time in clinic (min): 52 minutes    Subjective: Mable Davis reports moderate pain in R UE upon arrival to therapy today   She reports "been trying to use my arm more at home " patient had follow-up with MD 1 week ago, with him saying "to keep coming to therapy "       Objective: See treatment diary below  Precautions: 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 (protocol in chart), HTN, DM type II    Daily Treatment Diary     Manual         Right shoulder PROM TK 8 min 15' total 15' 20' 20' 15'        Shoulder distraction  Done  done Done  Done  Done        Inferior mobs  Done  done Done  Done         STM/ TrP R UE   done Done  Done         Mike taping elbow extension     Done             Exercise Diary         Pendulums 20 CW/CCW 20 cw/ccw np          Table slides flexion/scaption 15x ec :05x20 ea 20x5" 20x5"  20x5" 20x5"        Supine ER with cane (Towel under elbow) 15 x 3 sec :[id number] 20x5" 20x5"  20x5" 20x5"        Scapular retraction  2x10 20x5" 20x5"  20x5"  20x5"        Elbow extension bolster  1# :30x3  np          Pulleys   NV 2' flx x2 3'x 2 3'x2 3'x2       Bicep curls   2# 3x10 2# 3x10 3# 2x10 3# 3x10                    Low row   3x10 PTB 3x10 PTB 3x10 OTB 3x10 OTB       ER wall stretch    :10x10 :10x10 :10x10       TB tricep extensions     OTB 2x10 OTB 3x10                                                                                                                                Modalities              Cold Pack PRN Assessment: Tolerated treatment fair  Patient demonstrated fatigue post treatment, exhibited good technique with therapeutic exercises and would benefit from continued PT  Patient demonstrating minimal improvements in ROM with manuals performed today  Evident improvements observed with pulleys for ROM  Patient requesting to cancel appointment later in the week secondary to "just so busy with the holidays " PT educating patient on importance of consistency with rehab, and instructing patient to continue with HEP until next visit on 12/27/18  Plan: Continue per plan of care  Progress treatment as tolerated

## 2018-12-20 ENCOUNTER — APPOINTMENT (OUTPATIENT)
Dept: PHYSICAL THERAPY | Facility: REHABILITATION | Age: 73
End: 2018-12-20
Payer: MEDICARE

## 2018-12-27 ENCOUNTER — APPOINTMENT (OUTPATIENT)
Dept: PHYSICAL THERAPY | Facility: REHABILITATION | Age: 73
End: 2018-12-27
Payer: MEDICARE

## 2019-01-03 ENCOUNTER — OFFICE VISIT (OUTPATIENT)
Dept: PHYSICAL THERAPY | Facility: REHABILITATION | Age: 74
End: 2019-01-03
Payer: MEDICARE

## 2019-01-03 DIAGNOSIS — Z96.611 STATUS POST REVERSE ARTHROPLASTY OF RIGHT SHOULDER: ICD-10-CM

## 2019-01-03 DIAGNOSIS — S42.291D CLOSED 4-PART FRACTURE OF PROXIMAL END OF RIGHT HUMERUS WITH ROUTINE HEALING, SUBSEQUENT ENCOUNTER: Primary | ICD-10-CM

## 2019-01-03 PROCEDURE — 97110 THERAPEUTIC EXERCISES: CPT | Performed by: PHYSICAL THERAPIST

## 2019-01-03 PROCEDURE — 97140 MANUAL THERAPY 1/> REGIONS: CPT | Performed by: PHYSICAL THERAPIST

## 2019-01-03 NOTE — PROGRESS NOTES
Daily Note     Today's date: 1/3/2019  Patient name: Neftaly Mohan  : 1945  MRN: [id number]  Referring provider: Maine Mukherjee MD  Dx:   Encounter Diagnosis     ICD-10-CM    1  Closed 4-part fracture of proximal end of right humerus with routine healing, subsequent encounter S42 291D    2  Status post reverse arthroplasty of right shoulder Z96 611        Start Time: 0900  Stop Time: 0945  Total time in clinic (min): 45 minutes    Subjective: Julissa Maurice reports moderate soreness in R shoulder upon arrival to therapy today  She reports "just don't feel myself," denying any constitutional symptoms, indicating grief over death of granddaughter         Objective: See treatment diary below  Precautions: 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 (protocol in chart), HTN, DM type II    Daily Treatment Diary     Manual  11/6 11/13 11/20 12/4  12/6 12/18 1/3      Right shoulder PROM TK 8 min 15' total 15' 20' 20' 15'  15' total      Shoulder distraction  Done  done Done  Done  Done  Done       Inferior mobs  Done  done Done  Done         STM/ TrP R UE   done Done  Done         Mike taping elbow extension     Done             Exercise Diary  11/6 11/13 11/20 12/4 12/6 12/18 1/3      Pendulums 20 CW/CCW 20 cw/ccw np          Table slides flexion/scaption 15x ec :05x20 ea 20x5" 20x5"  20x5" 20x5"  20x5"       Supine ER with cane (Towel under elbow) 15 x 3 sec :[id number] 20x5" 20x5"  20x5" 20x5"  20x5" &  flex      Scapular retraction  2x10 20x5" 20x5"  20x5"  20x5"        Elbow extension bolster  1# :30x3  np          Pulleys   NV 2' flx x2 3'x 2 3'x2 3'x2 3'x2      Bicep curls   2# 3x10 2# 3x10 3# 2x10 3# 3x10 3# 3x10                   Low row   3x10 PTB 3x10 PTB 3x10 OTB 3x10 OTB 3x10 OTB      ER wall stretch    :10x10 :10x10 :10x10 :10x10      TB tricep extensions     OTB 2x10 OTB 3x10 Modalities              Cold Pack PRN                                           Assessment: Tolerated treatment fair  Patient demonstrated fatigue post treatment and would benefit from continued PT  Patient continues to demonstrate significant tightness in R shoulder at this time, improvements following manuals though still tight  Exercises limited today secondary to patient seeming distracted and repeatedly noting "don't feel like myself "     Plan: Continue per plan of care  Progress note during next visit  Progress treatment as tolerated

## 2019-01-08 ENCOUNTER — APPOINTMENT (OUTPATIENT)
Dept: PHYSICAL THERAPY | Facility: REHABILITATION | Age: 74
End: 2019-01-08
Payer: MEDICARE

## 2019-01-10 ENCOUNTER — APPOINTMENT (OUTPATIENT)
Dept: PHYSICAL THERAPY | Facility: REHABILITATION | Age: 74
End: 2019-01-10
Payer: MEDICARE

## 2019-01-15 ENCOUNTER — OFFICE VISIT (OUTPATIENT)
Dept: PHYSICAL THERAPY | Facility: REHABILITATION | Age: 74
End: 2019-01-15
Payer: MEDICARE

## 2019-01-15 DIAGNOSIS — S42.291D CLOSED 4-PART FRACTURE OF PROXIMAL END OF RIGHT HUMERUS WITH ROUTINE HEALING, SUBSEQUENT ENCOUNTER: Primary | ICD-10-CM

## 2019-01-15 DIAGNOSIS — Z96.611 STATUS POST REVERSE ARTHROPLASTY OF RIGHT SHOULDER: ICD-10-CM

## 2019-01-15 PROCEDURE — 97140 MANUAL THERAPY 1/> REGIONS: CPT | Performed by: PHYSICAL THERAPIST

## 2019-01-15 PROCEDURE — 97110 THERAPEUTIC EXERCISES: CPT | Performed by: PHYSICAL THERAPIST

## 2019-01-15 NOTE — PROGRESS NOTES
PT Re-Evaluation     Today's date: 1/15/2019  Patient name: John Villarreal  : 1945  MRN: [id number]  Referring provider: Tracee Hernandez MD  Dx:   Encounter Diagnosis     ICD-10-CM    1  Closed 4-part fracture of proximal end of right humerus with routine healing, subsequent encounter S42 291D    2  Status post reverse arthroplasty of right shoulder Z96 611        Start Time: 1400  Stop Time: 1500  Total time in clinic (min): 60 minutes    Assessment  Assessment details: John Villarreal is a 75y o  year old female who presents to IE with:   Closed 4-part fracture of proximal end of right humerus with routine healing, subsequent encounter  (primary encounter diagnosis)  Status post reverse arthroplasty of right shoulder    Liam Malik has made the following improvements since beginning PT: decreased pain and increased ROM   Liam Malik continues to present with the impairments as listed above  Patient would continue to benefit from skilled PT to address these deficits and to maximize function       Impairments: abnormal or restricted ROM, abnormal movement, activity intolerance, impaired physical strength, lacks appropriate home exercise program and pain with function    Symptom irritability: moderateBarriers to therapy: Patient relies on rides from daughter, diabetes, chronic kidney disease  Understanding of Dx/Px/POC: good   Prognosis: fair    Goals  Short term goals:  1) Patient will demonstrate decrease pain by 50% in right shoulder in 4 weeks- Progressing  2) Patient will demonstrate improved right shoulder PROM by >15 degrees in all planes in 4 weeks- Progressing  3) Patient will demonstrate ability to perform right shoulder active elevation >90 degrees  in 4 weeks- Progressing   Long term goals:  1) Patient will demonstrate ability to cook with right shoulder in 8 weeks- Progressing   2) Patient will demonstrate ability to wash left side of body with right UE in shower 8 weeks  3) Patient will demonstrate ability to reach hair without right UE in 8 weeks- Progressing  4) Patient will demonstrate ability to don/doff jacket independently 8 weeks- Progressing   5) Patient will demonstrate independence with HEP 8 weeks- Progressing         Plan  Patient would benefit from: skilled physical therapy  Referral necessary: No  Planned modality interventions: cryotherapy, electrical stimulation/Russian stimulation and thermotherapy: hydrocollator packs  Planned therapy interventions: home exercise program, graded motor, graded exercise, graded activity, functional ROM exercises, flexibility, massage, manual therapy, patient education, strengthening, stretching, therapeutic activities, therapeutic exercise and therapeutic training  Frequency: 2x week  Duration in visits: 10  Duration in weeks: 5  Plan of Care beginning date: 1/15/2019  Plan of Care expiration date: 2019  Treatment plan discussed with: family        Subjective Evaluation    History of Present Illness  Date of surgery: 9/10/2018  Mechanism of injury: Vicky Dennis has been seen for total of 8 visits for OP PT following R reverse TSA  Vicky Dennis rates Global Rating of Change as Somewhat better (3) since beginning OP PT   Patient inconsistent with attendance for PT, Patient only seen 8 visits since IE on 18 due to patient being sick and issues with transportation  Patient reporting "frustrated, I just want it to be better" with PT stressing importance of regular attendance and consistency with PT  Patient continues to demonstrate difficulty with reaching, lifting, donning and doffing clothing  Patient continues to demonstrate significant tightness in R UE at this time  Patient would continue to benefit from PT to address ROM, strength, and ADL's     Pain  Current pain ratin  At best pain ratin  At worst pain ratin  Location: P1) Right superior shoulder- intermittent, varying, deep "achy"    Social Support  Lives in: apartment (third floor- takes elevator )  Lives with: Lives daughter and two great children     Employment status: not working (Retired)  Exercise history: Patient reports that she has not been able to complete cooking  Patient states that she has been able to modify cleaning activities in order to complete with her left hand  Patient reports that she does not have any hobbies or recreational activities             Objective     Static Posture     Comments  Increased upper thoracic kyphosis, rounded shoulders, transition zone C5     Observations     Additional Observation Details  Right anterior shoulder incision- closed, minimal scabbing- no drainage    Active Range of Motion   Left Shoulder   Flexion: 132 degrees   Extension: 56 degrees   Abduction: 151 degrees   External rotation BTH: T4   Internal rotation BTB: T10     Right Shoulder   Flexion: 60 degrees   Abduction: 60 degrees     Right Elbow   Extension: -10 degrees     Passive Range of Motion   Left Shoulder   Flexion: 151 degrees   Abduction: 148 degrees   External rotation 45°: 61 degrees   Internal rotation 45°: 42 degrees     Right Shoulder   Flexion: 110 degrees   Abduction: 90 (scaption) degrees   External rotation 45°: 45 degrees   Internal rotation 45°: 45 degrees     Strength/Myotome Testing     Left Shoulder     Planes of Motion   Flexion: 4   Abduction: 4+   External rotation at 0°: 4   Internal rotation at 0°: 4+     Right Shoulder     Planes of Motion   Flexion: 2   Abduction: 2   External rotation at 0°: 2   Internal rotation at 0°: 2+     Additional Strength Details  No formal MMT performed at this time secondary to inability to perform AROM secondary to ROM and R shoulder tightness           Precautions: 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 (protocol in chart), HTN, DM type II    Daily Treatment Diary     Manual  11/6 11/13 11/20 12/4  12/6 12/18 1/3 1/15     Right shoulder PROM TK 8 min 15' total 15' 20' 20' 15'  15' total 15' total      Shoulder distraction  Done  done Done  Done Done  Done  Done      Inferior mobs  Done  done Done  Done         STM/ TrP R UE   done Done  Done         Mike taping elbow extension     Done             Exercise Diary  11/6 11/13 11/20 12/4 12/6 12/18 1/3 1/15     Pendulums 20 CW/CCW 20 cw/ccw np          Table slides flexion/scaption 15x ec :05x20 ea 20x5" 20x5"  20x5" 20x5"  20x5"  20x5''     Supine ER with cane (Towel under elbow) 15 x 3 sec :[id number] 20x5" 20x5"  20x5" 20x5"  20x5" &  flex 20x5'' & flex     Scapular retraction  2x10 20x5" 20x5"  20x5"  20x5"   20x5''     Elbow extension bolster  1# :30x3  np          Pulleys   NV 2' flx x2 3'x 2 3'x2 3'x2 3'x2 3'x2     Bicep curls   2# 3x10 2# 3x10 3# 2x10 3# 3x10 3# 3x10                   Low row   3x10 PTB 3x10 PTB 3x10 OTB 3x10 OTB 3x10 OTB 3x10 OTB     ER wall stretch    :10x10 :10x10 :10x10 :10x10 :10x10     TB tricep extensions     OTB 2x10 OTB 3x10                                                                                                                                Modalities              Cold Pack PRN

## 2019-01-17 ENCOUNTER — OFFICE VISIT (OUTPATIENT)
Dept: PHYSICAL THERAPY | Facility: REHABILITATION | Age: 74
End: 2019-01-17
Payer: MEDICARE

## 2019-01-17 DIAGNOSIS — Z96.611 STATUS POST REVERSE ARTHROPLASTY OF RIGHT SHOULDER: ICD-10-CM

## 2019-01-17 DIAGNOSIS — S42.291D CLOSED 4-PART FRACTURE OF PROXIMAL END OF RIGHT HUMERUS WITH ROUTINE HEALING, SUBSEQUENT ENCOUNTER: Primary | ICD-10-CM

## 2019-01-17 PROCEDURE — 97110 THERAPEUTIC EXERCISES: CPT | Performed by: PHYSICAL THERAPIST

## 2019-01-17 PROCEDURE — 97140 MANUAL THERAPY 1/> REGIONS: CPT | Performed by: PHYSICAL THERAPIST

## 2019-01-17 NOTE — PROGRESS NOTES
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 (protocol in chart), HTN, DM type II    Daily Treatment Diary     Manual  11/6 11/13 11/20 12/4  12/6 12/18 1/3 1/15 1/17    Right shoulder PROM TK 8 min 15' total 15' 20' 20' 15'  15' total 15' total  15' total     Shoulder distraction  Done  done Done  Done  Done  Done  Done  Done     Inferior mobs  Done  done Done  Done         STM/ TrP R UE   done Done  Done     Done     Mike taping elbow extension     Done             Exercise Diary  11/6 11/13 11/20 12/4 12/6 12/18 1/3 1/15 1/17    Pendulums 20 CW/CCW 20 cw/ccw np          Table slides flexion/scaption 15x ec :05x20 ea 20x5" 20x5"  20x5" 20x5"  20x5"  20x5'' 20x5"     Supine ER with cane (Towel under elbow) 15 x 3 sec :[id number] 20x5" 20x5"  20x5" 20x5"  20x5" &  flex 20x5'' & flex 20x5" & flex    Scapular retraction  2x10 20x5" 20x5"  20x5"  20x5"   20x5'' 30x5"     Elbow extension bolster  1# :30x3  np          Pulleys   NV 2' flx x2 3'x 2 3'x2 3'x2 3'x2 3'x2 3'x2    Bicep curls   2# 3x10 2# 3x10 3# 2x10 3# 3x10 3# 3x10                   Low row   3x10 PTB 3x10 PTB 3x10 OTB 3x10 OTB 3x10 OTB 3x10 OTB 3x10 OTB    ER wall stretch    :10x10 :10x10 :10x10 :10x 10 :10x10 :10x10    TB tricep extensions     OTB 2x10 OTB 3x10       Isometrics flex/ext/abd         :05x10 ea Assessment: Tolerated treatment fair  Patient demonstrated fatigue post treatment, exhibited good technique with therapeutic exercises and would benefit from continued PT  Patient still demonstrating tightness in R UE at this time  Trialed isometrics today with fair tolerance, patient reporting soreness following therapy today  Tightness in pec palpated, improved following manuals  Plan: Continue per plan of care  Progress treatment as tolerated

## 2019-01-22 ENCOUNTER — OFFICE VISIT (OUTPATIENT)
Dept: PHYSICAL THERAPY | Facility: REHABILITATION | Age: 74
End: 2019-01-22
Payer: MEDICARE

## 2019-01-22 DIAGNOSIS — S42.291D CLOSED 4-PART FRACTURE OF PROXIMAL END OF RIGHT HUMERUS WITH ROUTINE HEALING, SUBSEQUENT ENCOUNTER: Primary | ICD-10-CM

## 2019-01-22 DIAGNOSIS — Z96.611 STATUS POST REVERSE ARTHROPLASTY OF RIGHT SHOULDER: ICD-10-CM

## 2019-01-22 PROCEDURE — 97140 MANUAL THERAPY 1/> REGIONS: CPT | Performed by: PHYSICAL THERAPIST

## 2019-01-22 PROCEDURE — 97110 THERAPEUTIC EXERCISES: CPT | Performed by: PHYSICAL THERAPIST

## 2019-01-22 NOTE — PROGRESS NOTES
Daily Note     Today's date: 2019  Patient name: Veronica Maza  : 1945  MRN: [id number]  Referring provider: Pablo Chauhan MD  Dx:   Encounter Diagnosis     ICD-10-CM    1  Closed 4-part fracture of proximal end of right humerus with routine healing, subsequent encounter S42 291D    2  Status post reverse arthroplasty of right shoulder Z96 611        Start Time: 1430  Stop Time: 1533  Total time in clinic (min): 63 minutes    Subjective: Oliva Herndon reports elevated pain levels in R shoulder upon arrival to therapy today  She reports increased soreness following previous therapy session as well         Objective: See treatment diary below  Precautions: 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 (protocol in chart), HTN, DM type II    Daily Treatment Diary     Manual  11/6 11/13 11/20 12/4  12/6 12/18 1/3 1/15 1/17 1/22   Right shoulder PROM TK 8 min 15' total 15' 20' 20' 15'  15' total 15' total  15' total  15' total    Shoulder distraction  Done  done Done  Done  Done  Done  Done  Done  Done    Inferior mobs  Done  done Done  Done         STM/ TrP R UE   done Done  Done     Done  Done    Mike taping elbow extension     Done             Exercise Diary  11/6 11/13 11/20 12/4 12/6 12/18 1/3 1/15 1/17 1/22   Pendulums 20 CW/CCW 20 cw/ccw np          Table slides flexion/scaption 15x ec :05x20 ea 20x5" 20x5"  20x5" 20x5"  20x5"  20x5'' 20x5"  20x5'   Supine ER with cane (Towel under elbow) 15 x 3 sec :[id number] 20x5" 20x5"  20x5" 20x5"  20x5" &  flex 20x5'' & flex 20x5" & flex 20x5"    Scapular retraction  2x10 20x5" 20x5"  20x5"  20x5"   20x5'' 30x5"  30x5"    Elbow extension bolster  1# :30x3  np          Pulleys   NV 2' flx x2 3'x 2 3'x2 3'x2 3'x2 3'x2 3'x2 3'x2   Bicep curls   2# 3x10 2# 3x10 3# 2x10 3# 3x10 3# 3x10      TB rows          OTB 3x10   Low row   3x10 PTB 3x10 PTB 3x10 OTB 3x10 OTB 3x10 OTB 3x10 OTB 3x10 OTB    ER wall stretch    :10x10 :10x10 :10x10 :10x 10 :10x10 :10x10    TB tricep extensions     OTB 2x10 OTB 3x10       Isometrics flex/ext/abd         :05x10 ea :05x10 ea                                                                                 MHP                       8'        Assessment: Tolerated treatment fair  Patient demonstrated fatigue post treatment, exhibited good technique with therapeutic exercises and would benefit from continued PT  No new exercises added secondary to increased soreness reported by patient  PT emphasizing to patient importance of consistency for PT sessions and HEP with verbalized understanding  Patient continues to demonstrate tightness in all planes of motion  Plan: Continue per plan of care  Progress treatment as tolerated

## 2019-01-24 ENCOUNTER — OFFICE VISIT (OUTPATIENT)
Dept: PHYSICAL THERAPY | Facility: REHABILITATION | Age: 74
End: 2019-01-24
Payer: MEDICARE

## 2019-01-24 DIAGNOSIS — S42.291D CLOSED 4-PART FRACTURE OF PROXIMAL END OF RIGHT HUMERUS WITH ROUTINE HEALING, SUBSEQUENT ENCOUNTER: ICD-10-CM

## 2019-01-24 DIAGNOSIS — Z96.611 STATUS POST REVERSE ARTHROPLASTY OF RIGHT SHOULDER: Primary | ICD-10-CM

## 2019-01-24 PROCEDURE — 97110 THERAPEUTIC EXERCISES: CPT | Performed by: PHYSICAL THERAPIST

## 2019-01-24 PROCEDURE — 97530 THERAPEUTIC ACTIVITIES: CPT | Performed by: PHYSICAL THERAPIST

## 2019-01-24 PROCEDURE — 97140 MANUAL THERAPY 1/> REGIONS: CPT | Performed by: PHYSICAL THERAPIST

## 2019-01-24 NOTE — PROGRESS NOTES
Daily Note     Today's date: 2019  Patient name: Tamika Torres  : 1945  MRN: [id number]  Referring provider: Danilo Morton MD  Dx:   Encounter Diagnosis     ICD-10-CM    1  Status post reverse arthroplasty of right shoulder Z96 611    2  Closed 4-part fracture of proximal end of right humerus with routine healing, subsequent encounter S42 291D        Start Time: 1400  Stop Time: 1505  Total time in clinic (min): 65 minutes    Subjective: [employer] reports that her shoulder feels stiff  Objective: See treatment diary below      Assessment: Tolerated treatment well  Patient demonstrated fatigue post treatment and would benefit from continued PT  Required cuing throughout for proper form during stretches and strengthening, 75% carry over  Plan: Continue per plan of care          Precautions: 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 (protocol in chart), HTN, DM type II    Daily Treatment Diary     Manual  1/24 11/13 11/20 12/4  12/6 12/18 1/3 1/15 1/17 1/22   Right shoulder PROM 15' 15' total 15' 20' 20' 15'  15' total 15' total  15' total  15' total    Shoulder distraction  Done  done Done  Done  Done  Done  Done  Done  Done    Inferior mobs  Done  done Done  Done         STM/ TrP R UE   done Done  Done     Done  Done    Lateral Distraction done            Mike taping elbow extension     Done             Exercise Diary  1/24 11/13 11/20 12/4 12/6 12/18 1/3 1/15 1/17 1/22   Pendulums  20 cw/ccw np          Table slides flexion/scaption* 20x5" :05x20 ea 20x5" 20x5"  20x5" 20x5"  20x5"  20x5'' 20x5"  20x5'   Supine ER with cane (Towel under elbow)* 20x5" :[id number] 20x5" 20x5"  20x5" 20x5"  20x5" &  flex 20x5'' & flex 20x5" & flex 20x5"    Scapular retraction  2x10 20x5" 20x5"  20x5"  20x5"   20x5'' 30x5"  30x5"    Elbow extension bolster  1# :30x3  np          Pulleys  3' x2 NV 2' flx x2 3'x 2 3'x2 3'x2 3'x2 3'x2 3'x2 3'x2   Bicep curls 3# 3x10  2# 3x10 2# 3x10 3# 2x10 3# 3x10 3# 3x10 TB rows          OTB 3x10   Low row* 3x10 OTB  3x10 PTB 3x10 PTB 3x10 OTB 3x10 OTB 3x10 OTB 3x10 OTB 3x10 OTB    ER wall stretch 10x10"   :10x10 :10x10 :10x10 :10x 10 :10x10 :10x10    TB tricep extensions     OTB 2x10 OTB 3x10       Isometrics flex/ext/abd abd only 10x5"        :05x10 ea :05x10 ea                                                                                 MHP                       8'    *=on HEP

## 2019-01-29 ENCOUNTER — APPOINTMENT (OUTPATIENT)
Dept: PHYSICAL THERAPY | Facility: REHABILITATION | Age: 74
End: 2019-01-29
Payer: MEDICARE

## 2019-02-05 ENCOUNTER — OFFICE VISIT (OUTPATIENT)
Dept: OBGYN CLINIC | Facility: CLINIC | Age: 74
End: 2019-02-05
Payer: MEDICARE

## 2019-02-05 VITALS
WEIGHT: 164 LBS | DIASTOLIC BLOOD PRESSURE: 78 MMHG | HEART RATE: 90 BPM | SYSTOLIC BLOOD PRESSURE: 152 MMHG | BODY MASS INDEX: 25.74 KG/M2 | HEIGHT: 67 IN

## 2019-02-05 DIAGNOSIS — Z96.611 STATUS POST REVERSE ARTHROPLASTY OF RIGHT SHOULDER: Primary | ICD-10-CM

## 2019-02-05 PROCEDURE — 99213 OFFICE O/P EST LOW 20 MIN: CPT | Performed by: ORTHOPAEDIC SURGERY

## 2019-02-05 NOTE — PROGRESS NOTES
Patient Name:  Romi Mendoza  MRN:  [id number]    Assessment & Plan     Right reverse total shoulder arthroplasty for proximal humerus fracture 9/10/18  1  Continue home exercises for stretching  2  Activities as tolerated  3  Follow-up in 2-3 months for repeat evaluation  Subjective     59-year-old female returns to the office today status post Right reverse total shoulder arthroplasty for proximal humerus fracture 9/10/18  Today she denies any pain  She does note persistent stiffness which is improving  She has been performing home exercises  She has been unable to attend physical therapy due to family issues  She denies any numbness and tingling  No fevers or chills  She states she is able to perform most activities of daily living without difficulty  She takes Tylenol on occasion  General ROS:  Negative for fever or chills  Neurological ROS:  Negative for numbness or tingling  Objective     /78   Pulse 90   Ht 5' 7" (1 702 m)   Wt 74 4 kg (164 lb)   BMI 25 69 kg/m²      Right shoulder:  No gross deformity  No tenderness to palpation  Passive range of motion includes 90° of forward flexion, 50° of external rotation-abduction, 0° of internal rotation-abduction  Negative empty can test   Negative speed's test   Sensation intact axillary, median, ulnar and radial nerves  2+ radial pulse  Appropriate mood and affect      Scribe Attestation    I,:   Ludmila Marrero PA-C am acting as a scribe while in the presence of the attending physician :        I,:   Lucio Munson MD personally performed the services described in this documentation    as scribed in my presence :

## 2019-02-27 NOTE — PROGRESS NOTES
PT Discharge    Today's date: 2019  Patient name: Fleming Cogan  : 1945  MRN: [id number]  Referring provider: Estefania Egan MD  Dx:   Encounter Diagnosis     ICD-10-CM    1  Status post reverse arthroplasty of right shoulder Z96 611    2  Closed 4-part fracture of proximal end of right humerus with routine healing, subsequent encounter S42 291D        Start Time: 1400  Stop Time: 1505  Total time in clinic (min): 65 minutes    Assessment  Assessment details: Aarti Reed seen for OP PT for 11 visits with most recent visit on 19  she has not returned to PT at this time  Patient given HEP throughout OP PT and is encouraged to contact PT with any questions or concerns in the future  No updated measurements taken, measurements in objective portion of discharge summary from RE performed on 1/15/19  Subjective Evaluation    History of Present Illness  Date of surgery: 9/10/2018  Mechanism of injury: Aarti Reed seen for OP PT for 11 visits with most recent visit on 19  she has not returned to PT at this time  Patient given HEP throughout OP PT and is encouraged to contact PT with any questions or concerns in the future  No updated measurements taken, measurements in objective portion of discharge summary from RE performed on 1/15/19  Patient self-discharged secondary to issues with transportation  Patient inconsistent with OP PT throughout course of care, poor compliance with attending PT sessions and HEP  Patient seen total of 11 visits   Pain  Current pain ratin  At best pain ratin  At worst pain ratin  Location: P1) Right superior shoulder- intermittent, varying, deep "achy"    Social Support  Lives in: apartment (third floor- takes elevator )  Lives with: Lives daughter and two great children     Employment status: not working (Retired)  Exercise history: Patient reports that she has not been able to complete cooking   Patient states that she has been able to modify cleaning activities in order to complete with her left hand  Patient reports that she does not have any hobbies or recreational activities             Objective     Static Posture     Comments  Increased upper thoracic kyphosis, rounded shoulders, transition zone C5     Observations     Additional Observation Details  Right anterior shoulder incision- closed, minimal scabbing- no drainage    Active Range of Motion   Left Shoulder   Flexion: 132 degrees   Extension: 56 degrees   Abduction: 151 degrees   External rotation BTH: T4   Internal rotation BTB: T10     Right Shoulder   Flexion: 60 degrees   Abduction: 60 degrees     Right Elbow   Extension: -10 degrees     Passive Range of Motion   Left Shoulder   Flexion: 151 degrees   Abduction: 148 degrees   External rotation 45°: 61 degrees   Internal rotation 45°: 42 degrees     Right Shoulder   Flexion: 110 degrees   Abduction: 90 (scaption) degrees   External rotation 45°: 45 degrees   Internal rotation 45°: 45 degrees     Strength/Myotome Testing     Left Shoulder     Planes of Motion   Flexion: 4   Abduction: 4+   External rotation at 0°: 4   Internal rotation at 0°: 4+     Right Shoulder     Planes of Motion   Flexion: 2   Abduction: 2   External rotation at 0°: 2   Internal rotation at 0°: 2+     Additional Strength Details  No formal MMT performed at this time secondary to inability to perform AROM secondary to ROM and R shoulder tightness

## 2019-06-04 ENCOUNTER — OFFICE VISIT (OUTPATIENT)
Dept: VASCULAR SURGERY | Facility: CLINIC | Age: 74
End: 2019-06-04
Payer: MEDICARE

## 2019-06-04 VITALS
TEMPERATURE: 98.2 F | BODY MASS INDEX: 25.27 KG/M2 | WEIGHT: 161 LBS | SYSTOLIC BLOOD PRESSURE: 146 MMHG | DIASTOLIC BLOOD PRESSURE: 86 MMHG | HEIGHT: 67 IN | HEART RATE: 76 BPM

## 2019-06-04 DIAGNOSIS — I87.2 CHRONIC VENOUS INSUFFICIENCY: ICD-10-CM

## 2019-06-04 DIAGNOSIS — I83.893 SYMPTOMATIC VARICOSE VEINS OF BOTH LOWER EXTREMITIES: Primary | ICD-10-CM

## 2019-06-04 PROCEDURE — 99203 OFFICE O/P NEW LOW 30 MIN: CPT | Performed by: NURSE PRACTITIONER

## 2020-01-01 ENCOUNTER — PATIENT OUTREACH (OUTPATIENT)
Dept: CASE MANAGEMENT | Facility: OTHER | Age: 75
End: 2020-01-01

## 2020-01-01 ENCOUNTER — EPISODE CHANGES (OUTPATIENT)
Dept: CASE MANAGEMENT | Facility: OTHER | Age: 75
End: 2020-01-01

## 2020-01-01 ENCOUNTER — OFFICE VISIT (OUTPATIENT)
Dept: WOUND CARE | Facility: HOSPITAL | Age: 75
End: 2020-01-01
Payer: MEDICARE

## 2020-01-01 ENCOUNTER — OFFICE VISIT (OUTPATIENT)
Dept: CARDIOLOGY CLINIC | Facility: CLINIC | Age: 75
End: 2020-01-01
Payer: MEDICARE

## 2020-01-01 ENCOUNTER — TELEPHONE (OUTPATIENT)
Dept: WOUND CARE | Facility: HOSPITAL | Age: 75
End: 2020-01-01

## 2020-01-01 ENCOUNTER — TELEMEDICINE (OUTPATIENT)
Dept: NEPHROLOGY | Facility: HOSPITAL | Age: 75
End: 2020-01-01
Payer: MEDICARE

## 2020-01-01 VITALS
TEMPERATURE: 98.2 F | DIASTOLIC BLOOD PRESSURE: 70 MMHG | RESPIRATION RATE: 18 BRPM | SYSTOLIC BLOOD PRESSURE: 118 MMHG | HEART RATE: 70 BPM

## 2020-01-01 VITALS
DIASTOLIC BLOOD PRESSURE: 62 MMHG | RESPIRATION RATE: 16 BRPM | TEMPERATURE: 97.1 F | SYSTOLIC BLOOD PRESSURE: 120 MMHG | HEART RATE: 80 BPM

## 2020-01-01 VITALS
SYSTOLIC BLOOD PRESSURE: 124 MMHG | TEMPERATURE: 98.6 F | DIASTOLIC BLOOD PRESSURE: 72 MMHG | RESPIRATION RATE: 18 BRPM | HEART RATE: 68 BPM

## 2020-01-01 VITALS
SYSTOLIC BLOOD PRESSURE: 138 MMHG | RESPIRATION RATE: 16 BRPM | TEMPERATURE: 97.1 F | DIASTOLIC BLOOD PRESSURE: 60 MMHG | HEART RATE: 72 BPM

## 2020-01-01 VITALS
HEART RATE: 80 BPM | DIASTOLIC BLOOD PRESSURE: 64 MMHG | SYSTOLIC BLOOD PRESSURE: 142 MMHG | RESPIRATION RATE: 18 BRPM | TEMPERATURE: 97.6 F

## 2020-01-01 VITALS
TEMPERATURE: 97.3 F | HEART RATE: 72 BPM | SYSTOLIC BLOOD PRESSURE: 132 MMHG | RESPIRATION RATE: 18 BRPM | DIASTOLIC BLOOD PRESSURE: 78 MMHG

## 2020-01-01 VITALS
RESPIRATION RATE: 18 BRPM | HEART RATE: 72 BPM | TEMPERATURE: 97.7 F | SYSTOLIC BLOOD PRESSURE: 110 MMHG | DIASTOLIC BLOOD PRESSURE: 58 MMHG

## 2020-01-01 VITALS
HEART RATE: 80 BPM | SYSTOLIC BLOOD PRESSURE: 130 MMHG | RESPIRATION RATE: 18 BRPM | TEMPERATURE: 98.1 F | DIASTOLIC BLOOD PRESSURE: 82 MMHG

## 2020-01-01 VITALS
DIASTOLIC BLOOD PRESSURE: 64 MMHG | SYSTOLIC BLOOD PRESSURE: 116 MMHG | TEMPERATURE: 97.6 F | RESPIRATION RATE: 18 BRPM | HEART RATE: 69 BPM

## 2020-01-01 VITALS — HEART RATE: 76 BPM | DIASTOLIC BLOOD PRESSURE: 62 MMHG | TEMPERATURE: 97.3 F | SYSTOLIC BLOOD PRESSURE: 108 MMHG

## 2020-01-01 DIAGNOSIS — E11.65 TYPE 2 DIABETES MELLITUS WITH HYPERGLYCEMIA, WITH LONG-TERM CURRENT USE OF INSULIN (HCC): ICD-10-CM

## 2020-01-01 DIAGNOSIS — I50.32 CHRONIC DIASTOLIC HEART FAILURE (HCC): Primary | ICD-10-CM

## 2020-01-01 DIAGNOSIS — R21 RASH: ICD-10-CM

## 2020-01-01 DIAGNOSIS — E55.9 VITAMIN D DEFICIENCY: ICD-10-CM

## 2020-01-01 DIAGNOSIS — Z79.4 TYPE 2 DIABETES MELLITUS WITH HYPERGLYCEMIA, WITH LONG-TERM CURRENT USE OF INSULIN (HCC): ICD-10-CM

## 2020-01-01 DIAGNOSIS — L89.154 PRESSURE ULCER OF SACRAL REGION, STAGE 4 (HCC): Primary | ICD-10-CM

## 2020-01-01 DIAGNOSIS — I50.32 CHRONIC DIASTOLIC HEART FAILURE (HCC): ICD-10-CM

## 2020-01-01 DIAGNOSIS — D64.9 ANEMIA: ICD-10-CM

## 2020-01-01 DIAGNOSIS — I87.2 CHRONIC VENOUS INSUFFICIENCY: ICD-10-CM

## 2020-01-01 DIAGNOSIS — N18.30 ACUTE RENAL FAILURE SUPERIMPOSED ON STAGE 3 CHRONIC KIDNEY DISEASE (HCC): Primary | ICD-10-CM

## 2020-01-01 DIAGNOSIS — I65.22 STENOSIS OF LEFT CAROTID ARTERY: ICD-10-CM

## 2020-01-01 DIAGNOSIS — I10 ESSENTIAL HYPERTENSION: ICD-10-CM

## 2020-01-01 DIAGNOSIS — E78.5 HYPERLIPIDEMIA, UNSPECIFIED HYPERLIPIDEMIA TYPE: ICD-10-CM

## 2020-01-01 DIAGNOSIS — I48.0 PAROXYSMAL ATRIAL FIBRILLATION (HCC): ICD-10-CM

## 2020-01-01 DIAGNOSIS — N17.9 ACUTE RENAL FAILURE SUPERIMPOSED ON STAGE 3 CHRONIC KIDNEY DISEASE (HCC): Primary | ICD-10-CM

## 2020-01-01 DIAGNOSIS — I10 HYPERTENSION, UNSPECIFIED TYPE: ICD-10-CM

## 2020-01-01 DIAGNOSIS — N18.30 ACUTE RENAL FAILURE SUPERIMPOSED ON STAGE 3 CHRONIC KIDNEY DISEASE, UNSPECIFIED ACUTE RENAL FAILURE TYPE, UNSPECIFIED WHETHER STAGE 3A OR 3B CKD (HCC): ICD-10-CM

## 2020-01-01 DIAGNOSIS — N17.9 ACUTE RENAL FAILURE SUPERIMPOSED ON STAGE 3 CHRONIC KIDNEY DISEASE, UNSPECIFIED ACUTE RENAL FAILURE TYPE, UNSPECIFIED WHETHER STAGE 3A OR 3B CKD (HCC): ICD-10-CM

## 2020-01-01 PROCEDURE — 11045 DBRDMT SUBQ TISS EACH ADDL: CPT | Performed by: FAMILY MEDICINE

## 2020-01-01 PROCEDURE — 11042 DBRDMT SUBQ TIS 1ST 20SQCM/<: CPT | Performed by: SURGERY

## 2020-01-01 PROCEDURE — 99213 OFFICE O/P EST LOW 20 MIN: CPT | Performed by: FAMILY MEDICINE

## 2020-01-01 PROCEDURE — 11047 DBRDMT BONE EACH ADDL: CPT | Performed by: FAMILY MEDICINE

## 2020-01-01 PROCEDURE — 11042 DBRDMT SUBQ TIS 1ST 20SQCM/<: CPT | Performed by: FAMILY MEDICINE

## 2020-01-01 PROCEDURE — 11045 DBRDMT SUBQ TISS EACH ADDL: CPT | Performed by: SURGERY

## 2020-01-01 PROCEDURE — 11044 DBRDMT BONE 1ST 20 SQ CM/<: CPT | Performed by: FAMILY MEDICINE

## 2020-01-01 PROCEDURE — 99202 OFFICE O/P NEW SF 15 MIN: CPT | Performed by: FAMILY MEDICINE

## 2020-01-01 PROCEDURE — 99214 OFFICE O/P EST MOD 30 MIN: CPT | Performed by: INTERNAL MEDICINE

## 2020-01-01 PROCEDURE — 99214 OFFICE O/P EST MOD 30 MIN: CPT | Performed by: NURSE PRACTITIONER

## 2020-01-01 PROCEDURE — 99212 OFFICE O/P EST SF 10 MIN: CPT | Performed by: FAMILY MEDICINE

## 2020-01-01 PROCEDURE — 99213 OFFICE O/P EST LOW 20 MIN: CPT | Performed by: SURGERY

## 2020-01-01 RX ORDER — LIDOCAINE HYDROCHLORIDE 40 MG/ML
5 SOLUTION TOPICAL ONCE
Status: COMPLETED | OUTPATIENT
Start: 2020-01-01 | End: 2020-01-01

## 2020-01-01 RX ORDER — OXYCODONE HYDROCHLORIDE 5 MG/1
5 CAPSULE ORAL EVERY 4 HOURS PRN
COMMUNITY
End: 2021-01-01 | Stop reason: HOSPADM

## 2020-01-01 RX ORDER — BISACODYL 10 MG
10 SUPPOSITORY, RECTAL RECTAL AS NEEDED
COMMUNITY

## 2020-01-01 RX ORDER — LEVOFLOXACIN 500 MG/1
500 TABLET, FILM COATED ORAL EVERY 24 HOURS
Status: ON HOLD | COMMUNITY
End: 2021-01-01 | Stop reason: ALTCHOICE

## 2020-01-01 RX ORDER — LIDOCAINE HYDROCHLORIDE 40 MG/ML
1 SOLUTION TOPICAL ONCE
Status: COMPLETED | OUTPATIENT
Start: 2020-01-01 | End: 2020-01-01

## 2020-01-01 RX ORDER — MINERAL OIL 100 G/100G
1 OIL RECTAL ONCE
COMMUNITY
End: 2021-01-01 | Stop reason: HOSPADM

## 2020-01-01 RX ADMIN — LIDOCAINE HYDROCHLORIDE 1 ML: 40 SOLUTION TOPICAL at 09:27

## 2020-01-01 RX ADMIN — LIDOCAINE HYDROCHLORIDE 5 ML: 40 SOLUTION TOPICAL at 11:13

## 2020-01-01 RX ADMIN — LIDOCAINE HYDROCHLORIDE 5 ML: 40 SOLUTION TOPICAL at 10:38

## 2020-01-01 RX ADMIN — LIDOCAINE HYDROCHLORIDE 1 ML: 40 SOLUTION TOPICAL at 11:09

## 2020-01-01 RX ADMIN — LIDOCAINE HYDROCHLORIDE 1 ML: 40 SOLUTION TOPICAL at 08:49

## 2020-05-18 ENCOUNTER — TELEMEDICINE (OUTPATIENT)
Dept: FAMILY MEDICINE CLINIC | Facility: CLINIC | Age: 75
End: 2020-05-18
Payer: MEDICARE

## 2020-05-18 VITALS — HEIGHT: 67 IN | WEIGHT: 180 LBS | BODY MASS INDEX: 28.25 KG/M2

## 2020-05-18 DIAGNOSIS — M62.838 MUSCLE SPASM OF RIGHT LEG: Primary | ICD-10-CM

## 2020-05-18 DIAGNOSIS — M79.604 LEG PAIN, RIGHT: ICD-10-CM

## 2020-05-18 PROCEDURE — 99443 PR PHYS/QHP TELEPHONE EVALUATION 21-30 MIN: CPT | Performed by: NURSE PRACTITIONER

## 2020-05-18 RX ORDER — IBUPROFEN 600 MG/1
600 TABLET ORAL EVERY 6 HOURS PRN
Qty: 30 TABLET | Refills: 0 | Status: SHIPPED | OUTPATIENT
Start: 2020-05-18 | End: 2020-05-27 | Stop reason: HOSPADM

## 2020-05-18 RX ORDER — CYCLOBENZAPRINE HCL 5 MG
5 TABLET ORAL 3 TIMES DAILY PRN
Qty: 5 TABLET | Refills: 0 | Status: SHIPPED | OUTPATIENT
Start: 2020-05-18 | End: 2020-05-27 | Stop reason: HOSPADM

## 2020-05-20 ENCOUNTER — APPOINTMENT (EMERGENCY)
Dept: RADIOLOGY | Facility: HOSPITAL | Age: 75
DRG: 480 | End: 2020-05-20
Payer: MEDICARE

## 2020-05-20 ENCOUNTER — HOSPITAL ENCOUNTER (INPATIENT)
Facility: HOSPITAL | Age: 75
LOS: 7 days | Discharge: NURSING, REHAB OR LONG-TERM CARE | DRG: 480 | End: 2020-05-27
Attending: EMERGENCY MEDICINE | Admitting: HOSPITALIST
Payer: MEDICARE

## 2020-05-20 DIAGNOSIS — S72.001A CLOSED FRACTURE OF RIGHT HIP, INITIAL ENCOUNTER (HCC): ICD-10-CM

## 2020-05-20 DIAGNOSIS — N28.9 RENAL INSUFFICIENCY: ICD-10-CM

## 2020-05-20 DIAGNOSIS — R73.9 HYPERGLYCEMIA: ICD-10-CM
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
## 2020-05-20 DIAGNOSIS — I10 HYPERTENSION: ICD-10-CM

## 2020-05-20 PROBLEM — S72.141A CLOSED COMMINUTED INTERTROCHANTERIC FRACTURE OF PROXIMAL FEMUR, RIGHT, INITIAL ENCOUNTER (HCC): Status: ACTIVE | Noted: 2020-05-20

## 2020-05-20 LAB
ANION GAP SERPL CALCULATED.3IONS-SCNC: 8 MMOL/L (ref 4–13)
APTT PPP: 30 SECONDS (ref 23–37)
ATRIAL RATE: 340 BPM
BASOPHILS # BLD AUTO: 0.07 THOUSANDS/ΜL (ref 0–0.1)
BASOPHILS NFR BLD AUTO: 1 % (ref 0–1)
BUN SERPL-MCNC: 31 MG/DL (ref 5–25)
CALCIUM SERPL-MCNC: 9 MG/DL (ref 8.3–10.1)
CHLORIDE SERPL-SCNC: 101 MMOL/L (ref 100–108)
CO2 SERPL-SCNC: 28 MMOL/L (ref 21–32)
CREAT SERPL-MCNC: 1.28 MG/DL (ref 0.6–1.3)
EOSINOPHIL # BLD AUTO: 0.1 THOUSAND/ΜL (ref 0–0.61)
EOSINOPHIL NFR BLD AUTO: 1 % (ref 0–6)
ERYTHROCYTE [DISTWIDTH] IN BLOOD BY AUTOMATED COUNT: 12.2 % (ref 11.6–15.1)
EST. AVERAGE GLUCOSE BLD GHB EST-MCNC: 217 MG/DL
GFR SERPL CREATININE-BSD FRML MDRD: 41 ML/MIN/1.73SQ M
GLUCOSE SERPL-MCNC: 278 MG/DL (ref 65–140)
GLUCOSE SERPL-MCNC: 289 MG/DL (ref 65–140)
HBA1C MFR BLD: 9.2 %
HCT VFR BLD AUTO: 37.1 % (ref 34.8–46.1)
HGB BLD-MCNC: 11.8 G/DL (ref 11.5–15.4)
IMM GRANULOCYTES # BLD AUTO: 0.12 THOUSAND/UL (ref 0–0.2)
IMM GRANULOCYTES NFR BLD AUTO: 1 % (ref 0–2)
INR PPP: 1.07 (ref 0.84–1.19)
LYMPHOCYTES # BLD AUTO: 1.08 THOUSANDS/ΜL (ref 0.6–4.47)
LYMPHOCYTES NFR BLD AUTO: 8 % (ref 14–44)
MCH RBC QN AUTO: 29.5 PG (ref 26.8–34.3)
MCHC RBC AUTO-ENTMCNC: 31.8 G/DL (ref 31.4–37.4)
MCV RBC AUTO: 93 FL (ref 82–98)
MONOCYTES # BLD AUTO: 0.66 THOUSAND/ΜL (ref 0.17–1.22)
MONOCYTES NFR BLD AUTO: 5 % (ref 4–12)
NEUTROPHILS # BLD AUTO: 12.11 THOUSANDS/ΜL (ref 1.85–7.62)
NEUTS SEG NFR BLD AUTO: 84 % (ref 43–75)
NRBC BLD AUTO-RTO: 0 /100 WBCS
PLATELET # BLD AUTO: 212 THOUSANDS/UL (ref 149–390)
PMV BLD AUTO: 11 FL (ref 8.9–12.7)
POTASSIUM SERPL-SCNC: 4.2 MMOL/L (ref 3.5–5.3)
PROTHROMBIN TIME: 13.3 SECONDS (ref 11.6–14.5)
QRS AXIS: 44 DEGREES
QRSD INTERVAL: 80 MS
QT INTERVAL: 328 MS
QTC INTERVAL: 401 MS
RBC # BLD AUTO: 4 MILLION/UL (ref 3.81–5.12)
SARS-COV-2 RNA RESP QL NAA+PROBE: NEGATIVE
SODIUM SERPL-SCNC: 137 MMOL/L (ref 136–145)
T WAVE AXIS: 59 DEGREES
VENTRICULAR RATE: 90 BPM
WBC # BLD AUTO: 14.14 THOUSAND/UL (ref 4.31–10.16)

## 2020-05-20 PROCEDURE — 72170 X-RAY EXAM OF PELVIS: CPT

## 2020-05-20 PROCEDURE — 71045 X-RAY EXAM CHEST 1 VIEW: CPT

## 2020-05-20 PROCEDURE — 85025 COMPLETE CBC W/AUTO DIFF WBC: CPT | Performed by: EMERGENCY MEDICINE

## 2020-05-20 PROCEDURE — 36415 COLL VENOUS BLD VENIPUNCTURE: CPT | Performed by: EMERGENCY MEDICINE

## 2020-05-20 PROCEDURE — 85730 THROMBOPLASTIN TIME PARTIAL: CPT | Performed by: EMERGENCY MEDICINE

## 2020-05-20 PROCEDURE — 99285 EMERGENCY DEPT VISIT HI MDM: CPT | Performed by: EMERGENCY MEDICINE

## 2020-05-20 PROCEDURE — 93010 ELECTROCARDIOGRAM REPORT: CPT | Performed by: INTERNAL MEDICINE

## 2020-05-20 PROCEDURE — 80048 BASIC METABOLIC PNL TOTAL CA: CPT | Performed by: EMERGENCY MEDICINE

## 2020-05-20 PROCEDURE — 93005 ELECTROCARDIOGRAM TRACING: CPT

## 2020-05-20 PROCEDURE — 96374 THER/PROPH/DIAG INJ IV PUSH: CPT

## 2020-05-20 PROCEDURE — 82948 REAGENT STRIP/BLOOD GLUCOSE: CPT

## 2020-05-20 PROCEDURE — 99223 1ST HOSP IP/OBS HIGH 75: CPT | Performed by: HOSPITALIST

## 2020-05-20 PROCEDURE — 72100 X-RAY EXAM L-S SPINE 2/3 VWS: CPT

## 2020-05-20 PROCEDURE — 85610 PROTHROMBIN TIME: CPT | Performed by: EMERGENCY MEDICINE

## 2020-05-20 PROCEDURE — 87635 SARS-COV-2 COVID-19 AMP PRB: CPT | Performed by: EMERGENCY MEDICINE

## 2020-05-20 PROCEDURE — 83036 HEMOGLOBIN GLYCOSYLATED A1C: CPT | Performed by: PHYSICIAN ASSISTANT

## 2020-05-20 PROCEDURE — 99285 EMERGENCY DEPT VISIT HI MDM: CPT

## 2020-05-20 PROCEDURE — 73552 X-RAY EXAM OF FEMUR 2/>: CPT

## 2020-05-20 RX ORDER — LIDOCAINE 50 MG/G
1 PATCH TOPICAL DAILY
Status: COMPLETED | OUTPATIENT
Start: 2020-05-21 | End: 2020-05-21

## 2020-05-20 RX ORDER — SENNOSIDES 8.6 MG
1 TABLET ORAL DAILY
Status: DISCONTINUED | OUTPATIENT
Start: 2020-05-21 | End: 2020-05-27

## 2020-05-20 RX ORDER — CALCIUM CARBONATE 200(500)MG
1000 TABLET,CHEWABLE ORAL DAILY PRN
Status: DISCONTINUED | OUTPATIENT
Start: 2020-05-20 | End: 2020-05-27 | Stop reason: HOSPADM

## 2020-05-20 RX ORDER — ACETAMINOPHEN 325 MG/1
975 TABLET ORAL EVERY 8 HOURS SCHEDULED
Status: DISCONTINUED | OUTPATIENT
Start: 2020-05-20 | End: 2020-05-27 | Stop reason: HOSPADM

## 2020-05-20 RX ORDER — DOCUSATE SODIUM 100 MG/1
100 CAPSULE, LIQUID FILLED ORAL 2 TIMES DAILY
Status: DISCONTINUED | OUTPATIENT
Start: 2020-05-20 | End: 2020-05-27 | Stop reason: HOSPADM

## 2020-05-20 RX ORDER — SODIUM CHLORIDE, SODIUM LACTATE, POTASSIUM CHLORIDE, CALCIUM CHLORIDE 600; 310; 30; 20 MG/100ML; MG/100ML; MG/100ML; MG/100ML
50 INJECTION, SOLUTION INTRAVENOUS CONTINUOUS
Status: DISCONTINUED | OUTPATIENT
Start: 2020-05-20 | End: 2020-05-24

## 2020-05-20 RX ORDER — PRAVASTATIN SODIUM 20 MG
20 TABLET ORAL EVERY EVENING
Status: DISCONTINUED | OUTPATIENT
Start: 2020-05-20 | End: 2020-05-27 | Stop reason: HOSPADM

## 2020-05-20 RX ORDER — HYDROMORPHONE HCL/PF 1 MG/ML
0.2 SYRINGE (ML) INJECTION EVERY 4 HOURS PRN
Status: DISCONTINUED | OUTPATIENT
Start: 2020-05-20 | End: 2020-05-27 | Stop reason: HOSPADM

## 2020-05-20 RX ORDER — HYDROMORPHONE HCL/PF 1 MG/ML
0.5 SYRINGE (ML) INJECTION ONCE
Status: COMPLETED | OUTPATIENT
Start: 2020-05-20 | End: 2020-05-20

## 2020-05-20 RX ORDER — OXYCODONE HYDROCHLORIDE 5 MG/1
2.5 TABLET ORAL EVERY 4 HOURS PRN
Status: DISCONTINUED | OUTPATIENT
Start: 2020-05-20 | End: 2020-05-23

## 2020-05-20 RX ORDER — OXYCODONE HYDROCHLORIDE 5 MG/1
5 TABLET ORAL EVERY 4 HOURS PRN
Status: DISCONTINUED | OUTPATIENT
Start: 2020-05-20 | End: 2020-05-23

## 2020-05-20 RX ORDER — ONDANSETRON 2 MG/ML
4 INJECTION INTRAMUSCULAR; INTRAVENOUS EVERY 6 HOURS PRN
Status: DISCONTINUED | OUTPATIENT
Start: 2020-05-20 | End: 2020-05-27 | Stop reason: HOSPADM

## 2020-05-20 RX ORDER — LISINOPRIL 20 MG/1
40 TABLET ORAL EVERY EVENING
Status: DISCONTINUED | OUTPATIENT
Start: 2020-05-20 | End: 2020-05-22

## 2020-05-20 RX ORDER — OXYCODONE HYDROCHLORIDE AND ACETAMINOPHEN 5; 325 MG/1; MG/1
1 TABLET ORAL ONCE
Status: COMPLETED | OUTPATIENT
Start: 2020-05-20 | End: 2020-05-20

## 2020-05-20 RX ADMIN — OXYCODONE HYDROCHLORIDE AND ACETAMINOPHEN 1 TABLET: 5; 325 TABLET ORAL at 12:15

## 2020-05-20 RX ADMIN — PRAVASTATIN SODIUM 20 MG: 20 TABLET ORAL at 17:35

## 2020-05-20 RX ADMIN — SODIUM CHLORIDE, SODIUM LACTATE, POTASSIUM CHLORIDE, AND CALCIUM CHLORIDE 75 ML/HR: .6; .31; .03; .02 INJECTION, SOLUTION INTRAVENOUS at 17:31

## 2020-05-20 RX ADMIN — DOCUSATE SODIUM 100 MG: 100 CAPSULE, LIQUID FILLED ORAL at 17:35

## 2020-05-20 RX ADMIN — ACETAMINOPHEN 975 MG: 325 TABLET, FILM COATED ORAL at 17:36

## 2020-05-20 RX ADMIN — LISINOPRIL 40 MG: 20 TABLET ORAL at 17:35

## 2020-05-20 RX ADMIN — INSULIN LISPRO 4 UNITS: 100 INJECTION, SOLUTION INTRAVENOUS; SUBCUTANEOUS at 20:02

## 2020-05-20 RX ADMIN — HYDROMORPHONE HYDROCHLORIDE 0.5 MG: 1 INJECTION, SOLUTION INTRAMUSCULAR; INTRAVENOUS; SUBCUTANEOUS at 13:52

## 2020-05-20 RX ADMIN — OXYCODONE HYDROCHLORIDE 5 MG: 5 TABLET ORAL at 21:11

## 2020-05-21 ENCOUNTER — APPOINTMENT (INPATIENT)
Dept: RADIOLOGY | Facility: HOSPITAL | Age: 75
DRG: 480 | End: 2020-05-21
Payer: MEDICARE

## 2020-05-21 ENCOUNTER — ANESTHESIA EVENT (INPATIENT)
Dept: PERIOP | Facility: HOSPITAL | Age: 75
DRG: 480 | End: 2020-05-21
Payer: MEDICARE

## 2020-05-21 ENCOUNTER — ANESTHESIA (INPATIENT)
Dept: PERIOP | Facility: HOSPITAL | Age: 75
DRG: 480 | End: 2020-05-21
Payer: MEDICARE

## 2020-05-21 LAB
ABO GROUP BLD: NORMAL
ANION GAP SERPL CALCULATED.3IONS-SCNC: 7 MMOL/L (ref 4–13)
BLD GP AB SCN SERPL QL: NEGATIVE
BUN SERPL-MCNC: 27 MG/DL (ref 5–25)
CALCIUM SERPL-MCNC: 8.6 MG/DL (ref 8.3–10.1)
CHLORIDE SERPL-SCNC: 104 MMOL/L (ref 100–108)
CO2 SERPL-SCNC: 28 MMOL/L (ref 21–32)
CREAT SERPL-MCNC: 1.2 MG/DL (ref 0.6–1.3)
ERYTHROCYTE [DISTWIDTH] IN BLOOD BY AUTOMATED COUNT: 12.5 % (ref 11.6–15.1)
GFR SERPL CREATININE-BSD FRML MDRD: 44 ML/MIN/1.73SQ M
GLUCOSE SERPL-MCNC: 158 MG/DL (ref 65–140)
GLUCOSE SERPL-MCNC: 200 MG/DL (ref 65–140)
GLUCOSE SERPL-MCNC: 200 MG/DL (ref 65–140)
GLUCOSE SERPL-MCNC: 210 MG/DL (ref 65–140)
GLUCOSE SERPL-MCNC: 230 MG/DL (ref 65–140)
GLUCOSE SERPL-MCNC: 264 MG/DL (ref 65–140)
GLUCOSE SERPL-MCNC: 320 MG/DL (ref 65–140)
HCT VFR BLD AUTO: 31.9 % (ref 34.8–46.1)
HGB BLD-MCNC: 10.1 G/DL (ref 11.5–15.4)
MCH RBC QN AUTO: 29.2 PG (ref 26.8–34.3)
MCHC RBC AUTO-ENTMCNC: 31.7 G/DL (ref 31.4–37.4)
MCV RBC AUTO: 92 FL (ref 82–98)
PLATELET # BLD AUTO: 204 THOUSANDS/UL (ref 149–390)
PMV BLD AUTO: 11.3 FL (ref 8.9–12.7)
POTASSIUM SERPL-SCNC: 4.3 MMOL/L (ref 3.5–5.3)
RBC # BLD AUTO: 3.46 MILLION/UL (ref 3.81–5.12)
RH BLD: POSITIVE
SODIUM SERPL-SCNC: 139 MMOL/L (ref 136–145)
SPECIMEN EXPIRATION DATE: NORMAL
WBC # BLD AUTO: 9.7 THOUSAND/UL (ref 4.31–10.16)

## 2020-05-21 PROCEDURE — 73502 X-RAY EXAM HIP UNI 2-3 VIEWS: CPT

## 2020-05-21 PROCEDURE — 27245 TREAT THIGH FRACTURE: CPT | Performed by: ORTHOPAEDIC SURGERY

## 2020-05-21 PROCEDURE — 99223 1ST HOSP IP/OBS HIGH 75: CPT | Performed by: ORTHOPAEDIC SURGERY

## 2020-05-21 PROCEDURE — C1769 GUIDE WIRE: HCPCS | Performed by: ORTHOPAEDIC SURGERY

## 2020-05-21 PROCEDURE — 99232 SBSQ HOSP IP/OBS MODERATE 35: CPT | Performed by: PHYSICIAN ASSISTANT

## 2020-05-21 PROCEDURE — 80048 BASIC METABOLIC PNL TOTAL CA: CPT | Performed by: PHYSICIAN ASSISTANT

## 2020-05-21 PROCEDURE — C1713 ANCHOR/SCREW BN/BN,TIS/BN: HCPCS | Performed by: ORTHOPAEDIC SURGERY

## 2020-05-21 PROCEDURE — 85027 COMPLETE CBC AUTOMATED: CPT | Performed by: PHYSICIAN ASSISTANT

## 2020-05-21 PROCEDURE — 86923 COMPATIBILITY TEST ELECTRIC: CPT

## 2020-05-21 PROCEDURE — G9197 ORDER FOR CEPH: HCPCS | Performed by: ORTHOPAEDIC SURGERY

## 2020-05-21 PROCEDURE — 86900 BLOOD TYPING SEROLOGIC ABO: CPT | Performed by: PHYSICIAN ASSISTANT

## 2020-05-21 PROCEDURE — 82948 REAGENT STRIP/BLOOD GLUCOSE: CPT

## 2020-05-21 PROCEDURE — 86850 RBC ANTIBODY SCREEN: CPT | Performed by: PHYSICIAN ASSISTANT

## 2020-05-21 PROCEDURE — 86901 BLOOD TYPING SEROLOGIC RH(D): CPT | Performed by: PHYSICIAN ASSISTANT

## 2020-05-21 PROCEDURE — 0QS636Z REPOSITION RIGHT UPPER FEMUR WITH INTRAMEDULLARY INTERNAL FIXATION DEVICE, PERCUTANEOUS APPROACH: ICD-10-PCS | Performed by: ORTHOPAEDIC SURGERY

## 2020-05-21 DEVICE — 11.0MM TI HELICAL BLADE 95MM-STERILE: Type: IMPLANTABLE DEVICE | Site: HIP | Status: FUNCTIONAL

## 2020-05-21 DEVICE — 5.0MM TI LOCKING SCREW W/T25 STARDRIVE 38MM F/IM NAIL-STER: Type: IMPLANTABLE DEVICE | Site: HIP | Status: FUNCTIONAL

## 2020-05-21 DEVICE — 11MM/130 DEG TI CANN TROCH FIXATION NAIL 170MM-STERILE: Type: IMPLANTABLE DEVICE | Site: TROCHANTER | Status: FUNCTIONAL

## 2020-05-21 RX ORDER — SUCCINYLCHOLINE/SOD CL,ISO/PF 100 MG/5ML
SYRINGE (ML) INTRAVENOUS AS NEEDED
Status: DISCONTINUED | OUTPATIENT
Start: 2020-05-21 | End: 2020-05-21 | Stop reason: SURG

## 2020-05-21 RX ORDER — DEXAMETHASONE SODIUM PHOSPHATE 10 MG/ML
INJECTION, SOLUTION INTRAMUSCULAR; INTRAVENOUS AS NEEDED
Status: DISCONTINUED | OUTPATIENT
Start: 2020-05-21 | End: 2020-05-21

## 2020-05-21 RX ORDER — FENTANYL CITRATE 50 UG/ML
INJECTION, SOLUTION INTRAMUSCULAR; INTRAVENOUS AS NEEDED
Status: DISCONTINUED | OUTPATIENT
Start: 2020-05-21 | End: 2020-05-21 | Stop reason: SURG

## 2020-05-21 RX ORDER — CEFAZOLIN SODIUM 1 G/50ML
1000 SOLUTION INTRAVENOUS
Status: COMPLETED | OUTPATIENT
Start: 2020-05-22 | End: 2020-05-21

## 2020-05-21 RX ORDER — FENTANYL CITRATE/PF 50 MCG/ML
50 SYRINGE (ML) INJECTION
Status: DISCONTINUED | OUTPATIENT
Start: 2020-05-21 | End: 2020-05-21 | Stop reason: HOSPADM

## 2020-05-21 RX ORDER — CEFAZOLIN SODIUM 1 G/50ML
1000 SOLUTION INTRAVENOUS EVERY 8 HOURS
Status: COMPLETED | OUTPATIENT
Start: 2020-05-21 | End: 2020-05-22

## 2020-05-21 RX ORDER — HYDROMORPHONE HCL/PF 1 MG/ML
0.2 SYRINGE (ML) INJECTION
Status: DISCONTINUED | OUTPATIENT
Start: 2020-05-21 | End: 2020-05-21 | Stop reason: HOSPADM

## 2020-05-21 RX ORDER — PROPOFOL 10 MG/ML
INJECTION, EMULSION INTRAVENOUS AS NEEDED
Status: DISCONTINUED | OUTPATIENT
Start: 2020-05-21 | End: 2020-05-21 | Stop reason: SURG

## 2020-05-21 RX ORDER — LIDOCAINE HYDROCHLORIDE 10 MG/ML
INJECTION, SOLUTION EPIDURAL; INFILTRATION; INTRACAUDAL; PERINEURAL AS NEEDED
Status: DISCONTINUED | OUTPATIENT
Start: 2020-05-21 | End: 2020-05-21 | Stop reason: SURG

## 2020-05-21 RX ORDER — MAGNESIUM HYDROXIDE 1200 MG/15ML
LIQUID ORAL AS NEEDED
Status: DISCONTINUED | OUTPATIENT
Start: 2020-05-21 | End: 2020-05-21 | Stop reason: HOSPADM

## 2020-05-21 RX ORDER — ONDANSETRON 2 MG/ML
4 INJECTION INTRAMUSCULAR; INTRAVENOUS ONCE AS NEEDED
Status: DISCONTINUED | OUTPATIENT
Start: 2020-05-21 | End: 2020-05-21 | Stop reason: HOSPADM

## 2020-05-21 RX ORDER — DEXAMETHASONE SODIUM PHOSPHATE 10 MG/ML
INJECTION, SOLUTION INTRAMUSCULAR; INTRAVENOUS AS NEEDED
Status: DISCONTINUED | OUTPATIENT
Start: 2020-05-21 | End: 2020-05-21 | Stop reason: SURG

## 2020-05-21 RX ORDER — ONDANSETRON 2 MG/ML
INJECTION INTRAMUSCULAR; INTRAVENOUS AS NEEDED
Status: DISCONTINUED | OUTPATIENT
Start: 2020-05-21 | End: 2020-05-21 | Stop reason: SURG

## 2020-05-21 RX ADMIN — PROPOFOL 50 MG: 10 INJECTION, EMULSION INTRAVENOUS at 15:11

## 2020-05-21 RX ADMIN — ACETAMINOPHEN 975 MG: 325 TABLET, FILM COATED ORAL at 00:59

## 2020-05-21 RX ADMIN — FENTANYL CITRATE 50 MCG: 50 INJECTION INTRAMUSCULAR; INTRAVENOUS at 15:11

## 2020-05-21 RX ADMIN — PRAVASTATIN SODIUM 20 MG: 20 TABLET ORAL at 17:55

## 2020-05-21 RX ADMIN — LIDOCAINE HYDROCHLORIDE 50 MG: 10 INJECTION, SOLUTION EPIDURAL; INFILTRATION; INTRACAUDAL; PERINEURAL at 14:54

## 2020-05-21 RX ADMIN — SODIUM CHLORIDE, SODIUM LACTATE, POTASSIUM CHLORIDE, AND CALCIUM CHLORIDE: .6; .31; .03; .02 INJECTION, SOLUTION INTRAVENOUS at 15:32

## 2020-05-21 RX ADMIN — LISINOPRIL 40 MG: 20 TABLET ORAL at 17:55

## 2020-05-21 RX ADMIN — ONDANSETRON 4 MG: 2 INJECTION INTRAMUSCULAR; INTRAVENOUS at 15:00

## 2020-05-21 RX ADMIN — FENTANYL CITRATE 50 MCG: 50 INJECTION INTRAMUSCULAR; INTRAVENOUS at 16:10

## 2020-05-21 RX ADMIN — FENTANYL CITRATE 50 MCG: 50 INJECTION INTRAMUSCULAR; INTRAVENOUS at 14:54

## 2020-05-21 RX ADMIN — SODIUM CHLORIDE, SODIUM LACTATE, POTASSIUM CHLORIDE, AND CALCIUM CHLORIDE 75 ML/HR: .6; .31; .03; .02 INJECTION, SOLUTION INTRAVENOUS at 06:30

## 2020-05-21 RX ADMIN — INSULIN LISPRO 2 UNITS: 100 INJECTION, SOLUTION INTRAVENOUS; SUBCUTANEOUS at 06:26

## 2020-05-21 RX ADMIN — PHENYLEPHRINE HYDROCHLORIDE 200 MCG: 10 INJECTION INTRAVENOUS at 15:11

## 2020-05-21 RX ADMIN — ACETAMINOPHEN 975 MG: 325 TABLET, FILM COATED ORAL at 08:35

## 2020-05-21 RX ADMIN — INSULIN LISPRO 1 UNITS: 100 INJECTION, SOLUTION INTRAVENOUS; SUBCUTANEOUS at 12:07

## 2020-05-21 RX ADMIN — Medication 100 MG: at 14:54

## 2020-05-21 RX ADMIN — ACETAMINOPHEN 975 MG: 325 TABLET, FILM COATED ORAL at 17:55

## 2020-05-21 RX ADMIN — CEFAZOLIN SODIUM 1000 MG: 1 SOLUTION INTRAVENOUS at 14:57

## 2020-05-21 RX ADMIN — FENTANYL CITRATE 50 MCG: 50 INJECTION INTRAMUSCULAR; INTRAVENOUS at 16:05

## 2020-05-21 RX ADMIN — DEXAMETHASONE SODIUM PHOSPHATE 4 MG: 10 INJECTION, SOLUTION INTRAMUSCULAR; INTRAVENOUS at 15:00

## 2020-05-21 RX ADMIN — SODIUM CHLORIDE, SODIUM LACTATE, POTASSIUM CHLORIDE, AND CALCIUM CHLORIDE 75 ML/HR: .6; .31; .03; .02 INJECTION, SOLUTION INTRAVENOUS at 22:41

## 2020-05-21 RX ADMIN — INSULIN LISPRO 3 UNITS: 100 INJECTION, SOLUTION INTRAVENOUS; SUBCUTANEOUS at 17:55

## 2020-05-21 RX ADMIN — INSULIN LISPRO 3 UNITS: 100 INJECTION, SOLUTION INTRAVENOUS; SUBCUTANEOUS at 00:59

## 2020-05-21 RX ADMIN — PROPOFOL 150 MG: 10 INJECTION, EMULSION INTRAVENOUS at 14:54

## 2020-05-21 RX ADMIN — LIDOCAINE 1 PATCH: 50 PATCH TOPICAL at 08:35

## 2020-05-22 ENCOUNTER — APPOINTMENT (INPATIENT)
Dept: RADIOLOGY | Facility: HOSPITAL | Age: 75
DRG: 480 | End: 2020-05-22
Payer: MEDICARE

## 2020-05-22 PROBLEM — N17.9 ACUTE RENAL FAILURE SUPERIMPOSED ON STAGE 3 CHRONIC KIDNEY DISEASE (HCC): Status: ACTIVE | Noted: 2018-09-10

## 2020-05-22 PROBLEM — R65.10 SIRS (SYSTEMIC INFLAMMATORY RESPONSE SYNDROME) (HCC): Status: ACTIVE | Noted: 2020-05-22

## 2020-05-22 PROBLEM — J96.01 ACUTE RESPIRATORY FAILURE WITH HYPOXIA (HCC): Status: ACTIVE | Noted: 2020-05-22

## 2020-05-22 LAB
ANION GAP SERPL CALCULATED.3IONS-SCNC: 6 MMOL/L (ref 4–13)
BUN SERPL-MCNC: 29 MG/DL (ref 5–25)
CALCIUM SERPL-MCNC: 8.1 MG/DL (ref 8.3–10.1)
CHLORIDE SERPL-SCNC: 100 MMOL/L (ref 100–108)
CO2 SERPL-SCNC: 28 MMOL/L (ref 21–32)
CREAT SERPL-MCNC: 1.67 MG/DL (ref 0.6–1.3)
ERYTHROCYTE [DISTWIDTH] IN BLOOD BY AUTOMATED COUNT: 12.4 % (ref 11.6–15.1)
GFR SERPL CREATININE-BSD FRML MDRD: 30 ML/MIN/1.73SQ M
GLUCOSE SERPL-MCNC: 246 MG/DL (ref 65–140)
GLUCOSE SERPL-MCNC: 247 MG/DL (ref 65–140)
GLUCOSE SERPL-MCNC: 262 MG/DL (ref 65–140)
GLUCOSE SERPL-MCNC: 270 MG/DL (ref 65–140)
GLUCOSE SERPL-MCNC: 292 MG/DL (ref 65–140)
GLUCOSE SERPL-MCNC: 311 MG/DL (ref 65–140)
GLUCOSE SERPL-MCNC: 313 MG/DL (ref 65–140)
HCT VFR BLD AUTO: 26.8 % (ref 34.8–46.1)
HGB BLD-MCNC: 8.4 G/DL (ref 11.5–15.4)
MCH RBC QN AUTO: 29.7 PG (ref 26.8–34.3)
MCHC RBC AUTO-ENTMCNC: 31.3 G/DL (ref 31.4–37.4)
MCV RBC AUTO: 95 FL (ref 82–98)
PLATELET # BLD AUTO: 220 THOUSANDS/UL (ref 149–390)
PMV BLD AUTO: 11.4 FL (ref 8.9–12.7)
POTASSIUM SERPL-SCNC: 4.6 MMOL/L (ref 3.5–5.3)
RBC # BLD AUTO: 2.83 MILLION/UL (ref 3.81–5.12)
SODIUM SERPL-SCNC: 134 MMOL/L (ref 136–145)
WBC # BLD AUTO: 13.12 THOUSAND/UL (ref 4.31–10.16)

## 2020-05-22 PROCEDURE — 99232 SBSQ HOSP IP/OBS MODERATE 35: CPT | Performed by: PHYSICIAN ASSISTANT

## 2020-05-22 PROCEDURE — 85027 COMPLETE CBC AUTOMATED: CPT | Performed by: PHYSICIAN ASSISTANT

## 2020-05-22 PROCEDURE — 80048 BASIC METABOLIC PNL TOTAL CA: CPT | Performed by: PHYSICIAN ASSISTANT

## 2020-05-22 PROCEDURE — 73110 X-RAY EXAM OF WRIST: CPT

## 2020-05-22 PROCEDURE — 97110 THERAPEUTIC EXERCISES: CPT

## 2020-05-22 PROCEDURE — 97163 PT EVAL HIGH COMPLEX 45 MIN: CPT

## 2020-05-22 PROCEDURE — 97760 ORTHOTIC MGMT&TRAING 1ST ENC: CPT

## 2020-05-22 PROCEDURE — 25600 CLTX DST RDL FX/EPHYS SEP WO: CPT | Performed by: ORTHOPAEDIC SURGERY

## 2020-05-22 PROCEDURE — 2W3CX1Z IMMOBILIZATION OF RIGHT LOWER ARM USING SPLINT: ICD-10-PCS | Performed by: ORTHOPAEDIC SURGERY

## 2020-05-22 PROCEDURE — 99231 SBSQ HOSP IP/OBS SF/LOW 25: CPT | Performed by: ORTHOPAEDIC SURGERY

## 2020-05-22 PROCEDURE — 97168 OT RE-EVAL EST PLAN CARE: CPT

## 2020-05-22 PROCEDURE — 82948 REAGENT STRIP/BLOOD GLUCOSE: CPT

## 2020-05-22 PROCEDURE — 97535 SELF CARE MNGMENT TRAINING: CPT

## 2020-05-22 PROCEDURE — 97167 OT EVAL HIGH COMPLEX 60 MIN: CPT

## 2020-05-22 PROCEDURE — 71045 X-RAY EXAM CHEST 1 VIEW: CPT

## 2020-05-22 PROCEDURE — 97164 PT RE-EVAL EST PLAN CARE: CPT

## 2020-05-22 RX ADMIN — CEFAZOLIN SODIUM 1000 MG: 1 SOLUTION INTRAVENOUS at 06:13

## 2020-05-22 RX ADMIN — OXYCODONE HYDROCHLORIDE 5 MG: 5 TABLET ORAL at 11:15

## 2020-05-22 RX ADMIN — ACETAMINOPHEN 975 MG: 325 TABLET, FILM COATED ORAL at 00:30

## 2020-05-22 RX ADMIN — ACETAMINOPHEN 975 MG: 325 TABLET, FILM COATED ORAL at 08:40

## 2020-05-22 RX ADMIN — PRAVASTATIN SODIUM 20 MG: 20 TABLET ORAL at 18:36

## 2020-05-22 RX ADMIN — HYDROMORPHONE HYDROCHLORIDE 0.2 MG: 1 INJECTION, SOLUTION INTRAMUSCULAR; INTRAVENOUS; SUBCUTANEOUS at 18:37

## 2020-05-22 RX ADMIN — OXYCODONE HYDROCHLORIDE 5 MG: 5 TABLET ORAL at 15:33

## 2020-05-22 RX ADMIN — DOCUSATE SODIUM 100 MG: 100 CAPSULE, LIQUID FILLED ORAL at 08:40

## 2020-05-22 RX ADMIN — STANDARDIZED SENNA CONCENTRATE 8.6 MG: 8.6 TABLET ORAL at 08:40

## 2020-05-22 RX ADMIN — SODIUM CHLORIDE, SODIUM LACTATE, POTASSIUM CHLORIDE, AND CALCIUM CHLORIDE 75 ML/HR: .6; .31; .03; .02 INJECTION, SOLUTION INTRAVENOUS at 13:49

## 2020-05-22 RX ADMIN — ENOXAPARIN SODIUM 40 MG: 40 INJECTION SUBCUTANEOUS at 08:40

## 2020-05-22 RX ADMIN — DOCUSATE SODIUM 100 MG: 100 CAPSULE, LIQUID FILLED ORAL at 18:36

## 2020-05-22 RX ADMIN — CEFAZOLIN SODIUM 1000 MG: 1 SOLUTION INTRAVENOUS at 00:25

## 2020-05-22 RX ADMIN — OXYCODONE HYDROCHLORIDE 5 MG: 5 TABLET ORAL at 19:54

## 2020-05-22 RX ADMIN — ACETAMINOPHEN 975 MG: 325 TABLET, FILM COATED ORAL at 15:32

## 2020-05-23 PROBLEM — J96.01 ACUTE RESPIRATORY FAILURE WITH HYPOXIA (HCC): Status: RESOLVED | Noted: 2020-05-22 | Resolved: 2020-05-23

## 2020-05-23 PROBLEM — R65.10 SIRS (SYSTEMIC INFLAMMATORY RESPONSE SYNDROME) (HCC): Status: RESOLVED | Noted: 2020-05-22 | Resolved: 2020-05-23

## 2020-05-23 LAB
ANION GAP SERPL CALCULATED.3IONS-SCNC: 8 MMOL/L (ref 4–13)
BASOPHILS # BLD AUTO: 0.05 THOUSANDS/ΜL (ref 0–0.1)
BASOPHILS NFR BLD AUTO: 1 % (ref 0–1)
BUN SERPL-MCNC: 36 MG/DL (ref 5–25)
CALCIUM SERPL-MCNC: 8.1 MG/DL (ref 8.3–10.1)
CHLORIDE SERPL-SCNC: 100 MMOL/L (ref 100–108)
CO2 SERPL-SCNC: 26 MMOL/L (ref 21–32)
CREAT SERPL-MCNC: 1.73 MG/DL (ref 0.6–1.3)
EOSINOPHIL # BLD AUTO: 0.24 THOUSAND/ΜL (ref 0–0.61)
EOSINOPHIL NFR BLD AUTO: 3 % (ref 0–6)
ERYTHROCYTE [DISTWIDTH] IN BLOOD BY AUTOMATED COUNT: 12.6 % (ref 11.6–15.1)
GFR SERPL CREATININE-BSD FRML MDRD: 28 ML/MIN/1.73SQ M
GLUCOSE SERPL-MCNC: 221 MG/DL (ref 65–140)
GLUCOSE SERPL-MCNC: 273 MG/DL (ref 65–140)
GLUCOSE SERPL-MCNC: 278 MG/DL (ref 65–140)
GLUCOSE SERPL-MCNC: 286 MG/DL (ref 65–140)
GLUCOSE SERPL-MCNC: 329 MG/DL (ref 65–140)
HCT VFR BLD AUTO: 23.6 % (ref 34.8–46.1)
HGB BLD-MCNC: 7.4 G/DL (ref 11.5–15.4)
IMM GRANULOCYTES # BLD AUTO: 0.08 THOUSAND/UL (ref 0–0.2)
IMM GRANULOCYTES NFR BLD AUTO: 1 % (ref 0–2)
LYMPHOCYTES # BLD AUTO: 1.61 THOUSANDS/ΜL (ref 0.6–4.47)
LYMPHOCYTES NFR BLD AUTO: 18 % (ref 14–44)
MCH RBC QN AUTO: 29.5 PG (ref 26.8–34.3)
MCHC RBC AUTO-ENTMCNC: 31.4 G/DL (ref 31.4–37.4)
MCV RBC AUTO: 94 FL (ref 82–98)
MONOCYTES # BLD AUTO: 0.72 THOUSAND/ΜL (ref 0.17–1.22)
MONOCYTES NFR BLD AUTO: 8 % (ref 4–12)
NEUTROPHILS # BLD AUTO: 6.37 THOUSANDS/ΜL (ref 1.85–7.62)
NEUTS SEG NFR BLD AUTO: 69 % (ref 43–75)
NRBC BLD AUTO-RTO: 0 /100 WBCS
PLATELET # BLD AUTO: 188 THOUSANDS/UL (ref 149–390)
PMV BLD AUTO: 11.6 FL (ref 8.9–12.7)
POTASSIUM SERPL-SCNC: 4.5 MMOL/L (ref 3.5–5.3)
RBC # BLD AUTO: 2.51 MILLION/UL (ref 3.81–5.12)
SODIUM SERPL-SCNC: 134 MMOL/L (ref 136–145)
WBC # BLD AUTO: 9.07 THOUSAND/UL (ref 4.31–10.16)

## 2020-05-23 PROCEDURE — 85025 COMPLETE CBC W/AUTO DIFF WBC: CPT | Performed by: PHYSICIAN ASSISTANT

## 2020-05-23 PROCEDURE — 99024 POSTOP FOLLOW-UP VISIT: CPT | Performed by: PHYSICIAN ASSISTANT

## 2020-05-23 PROCEDURE — P9016 RBC LEUKOCYTES REDUCED: HCPCS

## 2020-05-23 PROCEDURE — 82948 REAGENT STRIP/BLOOD GLUCOSE: CPT

## 2020-05-23 PROCEDURE — 80048 BASIC METABOLIC PNL TOTAL CA: CPT | Performed by: PHYSICIAN ASSISTANT

## 2020-05-23 PROCEDURE — 99232 SBSQ HOSP IP/OBS MODERATE 35: CPT | Performed by: PHYSICIAN ASSISTANT

## 2020-05-23 RX ORDER — OXYCODONE HYDROCHLORIDE 10 MG/1
10 TABLET ORAL EVERY 4 HOURS PRN
Status: DISCONTINUED | OUTPATIENT
Start: 2020-05-23 | End: 2020-05-27 | Stop reason: HOSPADM

## 2020-05-23 RX ORDER — OXYCODONE HYDROCHLORIDE 5 MG/1
5 TABLET ORAL EVERY 4 HOURS PRN
Status: DISCONTINUED | OUTPATIENT
Start: 2020-05-23 | End: 2020-05-27 | Stop reason: HOSPADM

## 2020-05-23 RX ADMIN — OXYCODONE HYDROCHLORIDE 5 MG: 5 TABLET ORAL at 07:56

## 2020-05-23 RX ADMIN — STANDARDIZED SENNA CONCENTRATE 8.6 MG: 8.6 TABLET ORAL at 08:00

## 2020-05-23 RX ADMIN — PRAVASTATIN SODIUM 20 MG: 20 TABLET ORAL at 17:30

## 2020-05-23 RX ADMIN — HYDROMORPHONE HYDROCHLORIDE 0.2 MG: 1 INJECTION, SOLUTION INTRAMUSCULAR; INTRAVENOUS; SUBCUTANEOUS at 20:38

## 2020-05-23 RX ADMIN — SODIUM CHLORIDE, SODIUM LACTATE, POTASSIUM CHLORIDE, AND CALCIUM CHLORIDE 50 ML/HR: .6; .31; .03; .02 INJECTION, SOLUTION INTRAVENOUS at 17:30

## 2020-05-23 RX ADMIN — OXYCODONE HYDROCHLORIDE 10 MG: 10 TABLET ORAL at 23:19

## 2020-05-23 RX ADMIN — DOCUSATE SODIUM 100 MG: 100 CAPSULE, LIQUID FILLED ORAL at 17:30

## 2020-05-23 RX ADMIN — ACETAMINOPHEN 975 MG: 325 TABLET, FILM COATED ORAL at 10:17

## 2020-05-23 RX ADMIN — OXYCODONE HYDROCHLORIDE 10 MG: 10 TABLET ORAL at 17:39

## 2020-05-23 RX ADMIN — OXYCODONE HYDROCHLORIDE 5 MG: 5 TABLET ORAL at 00:23

## 2020-05-23 RX ADMIN — ACETAMINOPHEN 975 MG: 325 TABLET, FILM COATED ORAL at 17:30

## 2020-05-23 RX ADMIN — DOCUSATE SODIUM 100 MG: 100 CAPSULE, LIQUID FILLED ORAL at 08:00

## 2020-05-23 RX ADMIN — ENOXAPARIN SODIUM 40 MG: 40 INJECTION SUBCUTANEOUS at 08:00

## 2020-05-24 ENCOUNTER — APPOINTMENT (INPATIENT)
Dept: RADIOLOGY | Facility: HOSPITAL | Age: 75
DRG: 480 | End: 2020-05-24
Payer: MEDICARE

## 2020-05-24 LAB
ABO GROUP BLD BPU: NORMAL
ANION GAP SERPL CALCULATED.3IONS-SCNC: 7 MMOL/L (ref 4–13)
BPU ID: NORMAL
BUN SERPL-MCNC: 35 MG/DL (ref 5–25)
CALCIUM SERPL-MCNC: 8.3 MG/DL (ref 8.3–10.1)
CHLORIDE SERPL-SCNC: 101 MMOL/L (ref 100–108)
CO2 SERPL-SCNC: 27 MMOL/L (ref 21–32)
CREAT SERPL-MCNC: 1.57 MG/DL (ref 0.6–1.3)
CROSSMATCH: NORMAL
ERYTHROCYTE [DISTWIDTH] IN BLOOD BY AUTOMATED COUNT: 12.9 % (ref 11.6–15.1)
GFR SERPL CREATININE-BSD FRML MDRD: 32 ML/MIN/1.73SQ M
GLUCOSE SERPL-MCNC: 160 MG/DL (ref 65–140)
GLUCOSE SERPL-MCNC: 174 MG/DL (ref 65–140)
GLUCOSE SERPL-MCNC: 196 MG/DL (ref 65–140)
GLUCOSE SERPL-MCNC: 223 MG/DL (ref 65–140)
GLUCOSE SERPL-MCNC: 228 MG/DL (ref 65–140)
HCT VFR BLD AUTO: 26.9 % (ref 34.8–46.1)
HGB BLD-MCNC: 8.4 G/DL (ref 11.5–15.4)
MCH RBC QN AUTO: 29.5 PG (ref 26.8–34.3)
MCHC RBC AUTO-ENTMCNC: 31.2 G/DL (ref 31.4–37.4)
MCV RBC AUTO: 94 FL (ref 82–98)
PLATELET # BLD AUTO: 235 THOUSANDS/UL (ref 149–390)
PMV BLD AUTO: 11.3 FL (ref 8.9–12.7)
POTASSIUM SERPL-SCNC: 4.8 MMOL/L (ref 3.5–5.3)
RBC # BLD AUTO: 2.85 MILLION/UL (ref 3.81–5.12)
SODIUM SERPL-SCNC: 135 MMOL/L (ref 136–145)
UNIT DISPENSE STATUS: NORMAL
UNIT PRODUCT CODE: NORMAL
UNIT RH: NORMAL
WBC # BLD AUTO: 10.58 THOUSAND/UL (ref 4.31–10.16)

## 2020-05-24 PROCEDURE — 30233N1 TRANSFUSION OF NONAUTOLOGOUS RED BLOOD CELLS INTO PERIPHERAL VEIN, PERCUTANEOUS APPROACH: ICD-10-PCS | Performed by: INTERNAL MEDICINE

## 2020-05-24 PROCEDURE — 99024 POSTOP FOLLOW-UP VISIT: CPT | Performed by: PHYSICIAN ASSISTANT

## 2020-05-24 PROCEDURE — 99232 SBSQ HOSP IP/OBS MODERATE 35: CPT | Performed by: PHYSICIAN ASSISTANT

## 2020-05-24 PROCEDURE — 85027 COMPLETE CBC AUTOMATED: CPT | Performed by: PHYSICIAN ASSISTANT

## 2020-05-24 PROCEDURE — 80048 BASIC METABOLIC PNL TOTAL CA: CPT | Performed by: PHYSICIAN ASSISTANT

## 2020-05-24 PROCEDURE — 71045 X-RAY EXAM CHEST 1 VIEW: CPT

## 2020-05-24 PROCEDURE — 82948 REAGENT STRIP/BLOOD GLUCOSE: CPT

## 2020-05-24 RX ADMIN — OXYCODONE HYDROCHLORIDE 10 MG: 10 TABLET ORAL at 04:47

## 2020-05-24 RX ADMIN — ENOXAPARIN SODIUM 40 MG: 40 INJECTION SUBCUTANEOUS at 09:02

## 2020-05-24 RX ADMIN — OXYCODONE HYDROCHLORIDE 5 MG: 5 TABLET ORAL at 12:20

## 2020-05-24 RX ADMIN — ACETAMINOPHEN 975 MG: 325 TABLET, FILM COATED ORAL at 17:01

## 2020-05-24 RX ADMIN — ACETAMINOPHEN 975 MG: 325 TABLET, FILM COATED ORAL at 09:03

## 2020-05-24 RX ADMIN — DOCUSATE SODIUM 100 MG: 100 CAPSULE, LIQUID FILLED ORAL at 17:01

## 2020-05-24 RX ADMIN — OXYCODONE HYDROCHLORIDE 5 MG: 5 TABLET ORAL at 17:04

## 2020-05-24 RX ADMIN — DOCUSATE SODIUM 100 MG: 100 CAPSULE, LIQUID FILLED ORAL at 09:04

## 2020-05-24 RX ADMIN — PRAVASTATIN SODIUM 20 MG: 20 TABLET ORAL at 17:01

## 2020-05-25 LAB
ANION GAP SERPL CALCULATED.3IONS-SCNC: 7 MMOL/L (ref 4–13)
BUN SERPL-MCNC: 30 MG/DL (ref 5–25)
CALCIUM SERPL-MCNC: 7.9 MG/DL (ref 8.3–10.1)
CHLORIDE SERPL-SCNC: 102 MMOL/L (ref 100–108)
CO2 SERPL-SCNC: 28 MMOL/L (ref 21–32)
CREAT SERPL-MCNC: 1.36 MG/DL (ref 0.6–1.3)
ERYTHROCYTE [DISTWIDTH] IN BLOOD BY AUTOMATED COUNT: 12.8 % (ref 11.6–15.1)
GFR SERPL CREATININE-BSD FRML MDRD: 38 ML/MIN/1.73SQ M
GLUCOSE SERPL-MCNC: 194 MG/DL (ref 65–140)
GLUCOSE SERPL-MCNC: 195 MG/DL (ref 65–140)
GLUCOSE SERPL-MCNC: 205 MG/DL (ref 65–140)
GLUCOSE SERPL-MCNC: 209 MG/DL (ref 65–140)
GLUCOSE SERPL-MCNC: 210 MG/DL (ref 65–140)
HCT VFR BLD AUTO: 25.2 % (ref 34.8–46.1)
HGB BLD-MCNC: 7.9 G/DL (ref 11.5–15.4)
MCH RBC QN AUTO: 29.8 PG (ref 26.8–34.3)
MCHC RBC AUTO-ENTMCNC: 31.3 G/DL (ref 31.4–37.4)
MCV RBC AUTO: 95 FL (ref 82–98)
PLATELET # BLD AUTO: 213 THOUSANDS/UL (ref 149–390)
PMV BLD AUTO: 10.6 FL (ref 8.9–12.7)
POTASSIUM SERPL-SCNC: 4.4 MMOL/L (ref 3.5–5.3)
RBC # BLD AUTO: 2.65 MILLION/UL (ref 3.81–5.12)
SODIUM SERPL-SCNC: 137 MMOL/L (ref 136–145)
WBC # BLD AUTO: 8.2 THOUSAND/UL (ref 4.31–10.16)

## 2020-05-25 PROCEDURE — 80048 BASIC METABOLIC PNL TOTAL CA: CPT | Performed by: PHYSICIAN ASSISTANT

## 2020-05-25 PROCEDURE — 99232 SBSQ HOSP IP/OBS MODERATE 35: CPT | Performed by: PHYSICIAN ASSISTANT

## 2020-05-25 PROCEDURE — 99024 POSTOP FOLLOW-UP VISIT: CPT | Performed by: PHYSICIAN ASSISTANT

## 2020-05-25 PROCEDURE — 82948 REAGENT STRIP/BLOOD GLUCOSE: CPT

## 2020-05-25 PROCEDURE — 97530 THERAPEUTIC ACTIVITIES: CPT

## 2020-05-25 PROCEDURE — 97110 THERAPEUTIC EXERCISES: CPT

## 2020-05-25 PROCEDURE — 85027 COMPLETE CBC AUTOMATED: CPT | Performed by: PHYSICIAN ASSISTANT

## 2020-05-25 PROCEDURE — 97116 GAIT TRAINING THERAPY: CPT

## 2020-05-25 RX ADMIN — DOCUSATE SODIUM 100 MG: 100 CAPSULE, LIQUID FILLED ORAL at 17:03

## 2020-05-25 RX ADMIN — STANDARDIZED SENNA CONCENTRATE 8.6 MG: 8.6 TABLET ORAL at 08:21

## 2020-05-25 RX ADMIN — OXYCODONE HYDROCHLORIDE 10 MG: 10 TABLET ORAL at 08:21

## 2020-05-25 RX ADMIN — DOCUSATE SODIUM 100 MG: 100 CAPSULE, LIQUID FILLED ORAL at 08:21

## 2020-05-25 RX ADMIN — PRAVASTATIN SODIUM 20 MG: 20 TABLET ORAL at 17:03

## 2020-05-25 RX ADMIN — ENOXAPARIN SODIUM 40 MG: 40 INJECTION SUBCUTANEOUS at 08:20

## 2020-05-25 RX ADMIN — OXYCODONE HYDROCHLORIDE 10 MG: 10 TABLET ORAL at 21:28

## 2020-05-25 RX ADMIN — IRON SUCROSE 300 MG: 20 INJECTION, SOLUTION INTRAVENOUS at 14:57

## 2020-05-25 RX ADMIN — ACETAMINOPHEN 975 MG: 325 TABLET, FILM COATED ORAL at 17:03

## 2020-05-25 RX ADMIN — ACETAMINOPHEN 975 MG: 325 TABLET, FILM COATED ORAL at 08:21

## 2020-05-25 RX ADMIN — OXYCODONE HYDROCHLORIDE 10 MG: 10 TABLET ORAL at 12:57

## 2020-05-25 RX ADMIN — ACETAMINOPHEN 975 MG: 325 TABLET, FILM COATED ORAL at 01:26

## 2020-05-26 PROBLEM — K59.00 CONSTIPATION: Status: ACTIVE | Noted: 2020-05-26

## 2020-05-26 LAB
GLUCOSE SERPL-MCNC: 165 MG/DL (ref 65–140)
GLUCOSE SERPL-MCNC: 186 MG/DL (ref 65–140)
GLUCOSE SERPL-MCNC: 226 MG/DL (ref 65–140)
GLUCOSE SERPL-MCNC: 255 MG/DL (ref 65–140)
HCT VFR BLD AUTO: 27.5 % (ref 34.8–46.1)
HGB BLD-MCNC: 8.6 G/DL (ref 11.5–15.4)
SARS-COV-2 RNA RESP QL NAA+PROBE: NEGATIVE

## 2020-05-26 PROCEDURE — 87635 SARS-COV-2 COVID-19 AMP PRB: CPT | Performed by: PHYSICIAN ASSISTANT

## 2020-05-26 PROCEDURE — 99232 SBSQ HOSP IP/OBS MODERATE 35: CPT | Performed by: PHYSICIAN ASSISTANT

## 2020-05-26 PROCEDURE — 97530 THERAPEUTIC ACTIVITIES: CPT

## 2020-05-26 PROCEDURE — 85018 HEMOGLOBIN: CPT | Performed by: PHYSICIAN ASSISTANT

## 2020-05-26 PROCEDURE — 82948 REAGENT STRIP/BLOOD GLUCOSE: CPT

## 2020-05-26 PROCEDURE — 97110 THERAPEUTIC EXERCISES: CPT

## 2020-05-26 PROCEDURE — 85014 HEMATOCRIT: CPT | Performed by: PHYSICIAN ASSISTANT

## 2020-05-26 PROCEDURE — 99024 POSTOP FOLLOW-UP VISIT: CPT | Performed by: PHYSICIAN ASSISTANT

## 2020-05-26 RX ORDER — POLYETHYLENE GLYCOL 3350 17 G/17G
17 POWDER, FOR SOLUTION ORAL DAILY
Status: DISCONTINUED | OUTPATIENT
Start: 2020-05-26 | End: 2020-05-27 | Stop reason: HOSPADM

## 2020-05-26 RX ADMIN — ENOXAPARIN SODIUM 40 MG: 40 INJECTION SUBCUTANEOUS at 08:46

## 2020-05-26 RX ADMIN — POLYETHYLENE GLYCOL 3350 17 G: 17 POWDER, FOR SOLUTION ORAL at 12:25

## 2020-05-26 RX ADMIN — ACETAMINOPHEN 975 MG: 325 TABLET, FILM COATED ORAL at 17:40

## 2020-05-26 RX ADMIN — ACETAMINOPHEN 975 MG: 325 TABLET, FILM COATED ORAL at 02:15

## 2020-05-26 RX ADMIN — STANDARDIZED SENNA CONCENTRATE 8.6 MG: 8.6 TABLET ORAL at 08:46

## 2020-05-26 RX ADMIN — ACETAMINOPHEN 975 MG: 325 TABLET, FILM COATED ORAL at 08:46

## 2020-05-26 RX ADMIN — DOCUSATE SODIUM 100 MG: 100 CAPSULE, LIQUID FILLED ORAL at 17:40

## 2020-05-26 RX ADMIN — OXYCODONE HYDROCHLORIDE 10 MG: 10 TABLET ORAL at 06:40

## 2020-05-26 RX ADMIN — PRAVASTATIN SODIUM 20 MG: 20 TABLET ORAL at 17:40

## 2020-05-26 RX ADMIN — DOCUSATE SODIUM 100 MG: 100 CAPSULE, LIQUID FILLED ORAL at 08:46

## 2020-05-27 VITALS
SYSTOLIC BLOOD PRESSURE: 168 MMHG | OXYGEN SATURATION: 94 % | WEIGHT: 170 LBS | TEMPERATURE: 98.2 F | DIASTOLIC BLOOD PRESSURE: 74 MMHG | HEIGHT: 66 IN | HEART RATE: 90 BPM | BODY MASS INDEX: 27.32 KG/M2 | RESPIRATION RATE: 18 BRPM

## 2020-05-27 PROBLEM — S52.501A DISTAL RADIUS FRACTURE, RIGHT: Status: ACTIVE | Noted: 2020-05-27

## 2020-05-27 LAB
ANION GAP SERPL CALCULATED.3IONS-SCNC: 7 MMOL/L (ref 4–13)
BUN SERPL-MCNC: 28 MG/DL (ref 5–25)
CALCIUM SERPL-MCNC: 8.3 MG/DL (ref 8.3–10.1)
CHLORIDE SERPL-SCNC: 103 MMOL/L (ref 100–108)
CO2 SERPL-SCNC: 29 MMOL/L (ref 21–32)
CREAT SERPL-MCNC: 1.23 MG/DL (ref 0.6–1.3)
ERYTHROCYTE [DISTWIDTH] IN BLOOD BY AUTOMATED COUNT: 13.6 % (ref 11.6–15.1)
GFR SERPL CREATININE-BSD FRML MDRD: 43 ML/MIN/1.73SQ M
GLUCOSE SERPL-MCNC: 190 MG/DL (ref 65–140)
GLUCOSE SERPL-MCNC: 194 MG/DL (ref 65–140)
GLUCOSE SERPL-MCNC: 337 MG/DL (ref 65–140)
HCT VFR BLD AUTO: 27.9 % (ref 34.8–46.1)
HGB BLD-MCNC: 8.7 G/DL (ref 11.5–15.4)
MCH RBC QN AUTO: 30.2 PG (ref 26.8–34.3)
MCHC RBC AUTO-ENTMCNC: 31.2 G/DL (ref 31.4–37.4)
MCV RBC AUTO: 97 FL (ref 82–98)
PLATELET # BLD AUTO: 273 THOUSANDS/UL (ref 149–390)
PMV BLD AUTO: 10.8 FL (ref 8.9–12.7)
POTASSIUM SERPL-SCNC: 4.4 MMOL/L (ref 3.5–5.3)
RBC # BLD AUTO: 2.88 MILLION/UL (ref 3.81–5.12)
SODIUM SERPL-SCNC: 139 MMOL/L (ref 136–145)
WBC # BLD AUTO: 8.85 THOUSAND/UL (ref 4.31–10.16)

## 2020-05-27 PROCEDURE — 99239 HOSP IP/OBS DSCHRG MGMT >30: CPT | Performed by: PHYSICIAN ASSISTANT

## 2020-05-27 PROCEDURE — 85027 COMPLETE CBC AUTOMATED: CPT | Performed by: PHYSICIAN ASSISTANT

## 2020-05-27 PROCEDURE — 82948 REAGENT STRIP/BLOOD GLUCOSE: CPT

## 2020-05-27 PROCEDURE — 99024 POSTOP FOLLOW-UP VISIT: CPT | Performed by: PHYSICIAN ASSISTANT

## 2020-05-27 PROCEDURE — 80048 BASIC METABOLIC PNL TOTAL CA: CPT | Performed by: PHYSICIAN ASSISTANT

## 2020-05-27 RX ORDER — SODIUM PHOSPHATE, DIBASIC AND SODIUM PHOSPHATE, MONOBASIC 7; 19 G/133ML; G/133ML
1 ENEMA RECTAL ONCE
Status: COMPLETED | OUTPATIENT
Start: 2020-05-27 | End: 2020-05-27

## 2020-05-27 RX ORDER — POLYETHYLENE GLYCOL 3350 17 G/17G
17 POWDER, FOR SOLUTION ORAL DAILY
Qty: 14 EACH | Refills: 0 | Status: SHIPPED | OUTPATIENT
Start: 2020-05-27

## 2020-05-27 RX ORDER — SENNOSIDES 8.6 MG
2 TABLET ORAL 2 TIMES DAILY
Status: DISCONTINUED | OUTPATIENT
Start: 2020-05-27 | End: 2020-05-27 | Stop reason: HOSPADM

## 2020-05-27 RX ORDER — ACETAMINOPHEN 325 MG/1
975 TABLET ORAL EVERY 8 HOURS SCHEDULED
Qty: 30 TABLET | Refills: 0
Start: 2020-05-27 | End: 2020-09-10 | Stop reason: HOSPADM

## 2020-05-27 RX ORDER — AMLODIPINE BESYLATE 5 MG/1
5 TABLET ORAL DAILY
Refills: 0
Start: 2020-05-27 | End: 2020-09-10 | Stop reason: HOSPADM

## 2020-05-27 RX ORDER — OXYCODONE HYDROCHLORIDE 5 MG/1
5 TABLET ORAL EVERY 4 HOURS PRN
Qty: 30 TABLET | Refills: 0
Start: 2020-05-27 | End: 2020-06-06

## 2020-05-27 RX ORDER — SENNA AND DOCUSATE SODIUM 50; 8.6 MG/1; MG/1
2 TABLET, FILM COATED ORAL 2 TIMES DAILY
Qty: 7 TABLET | Refills: 0
Start: 2020-05-27 | End: 2020-09-10 | Stop reason: HOSPADM

## 2020-05-27 RX ORDER — OXYCODONE HYDROCHLORIDE 10 MG/1
10 TABLET ORAL EVERY 4 HOURS PRN
Qty: 20 TABLET | Refills: 0 | Status: SHIPPED | OUTPATIENT
Start: 2020-05-27 | End: 2020-06-06

## 2020-05-27 RX ADMIN — ENOXAPARIN SODIUM 40 MG: 40 INJECTION SUBCUTANEOUS at 09:09

## 2020-05-27 RX ADMIN — ACETAMINOPHEN 975 MG: 325 TABLET, FILM COATED ORAL at 01:10

## 2020-05-27 RX ADMIN — SENNOSIDES 17.2 MG: 8.6 TABLET, FILM COATED ORAL at 09:09

## 2020-05-27 RX ADMIN — POLYETHYLENE GLYCOL 3350 17 G: 17 POWDER, FOR SOLUTION ORAL at 09:09

## 2020-05-27 RX ADMIN — OXYCODONE HYDROCHLORIDE 10 MG: 10 TABLET ORAL at 12:17

## 2020-05-27 RX ADMIN — DOCUSATE SODIUM 100 MG: 100 CAPSULE, LIQUID FILLED ORAL at 09:10

## 2020-05-27 RX ADMIN — ACETAMINOPHEN 975 MG: 325 TABLET, FILM COATED ORAL at 09:10

## 2020-05-27 RX ADMIN — SODIUM PHOSPHATE, DIBASIC AND SODIUM PHOSPHATE, MONOBASIC 1 ENEMA: 7; 19 ENEMA RECTAL at 09:10

## 2020-05-29 ENCOUNTER — TELEPHONE (OUTPATIENT)
Dept: FAMILY MEDICINE CLINIC | Facility: CLINIC | Age: 75
End: 2020-05-29

## 2020-05-29 ENCOUNTER — TRANSITIONAL CARE MANAGEMENT (OUTPATIENT)
Dept: FAMILY MEDICINE CLINIC | Facility: CLINIC | Age: 75
End: 2020-05-29

## 2020-06-01 ENCOUNTER — NURSING HOME VISIT (OUTPATIENT)
Dept: FAMILY MEDICINE CLINIC | Facility: CLINIC | Age: 75
End: 2020-06-01
Payer: MEDICARE

## 2020-06-01 VITALS
RESPIRATION RATE: 18 BRPM | DIASTOLIC BLOOD PRESSURE: 89 MMHG | HEART RATE: 91 BPM | SYSTOLIC BLOOD PRESSURE: 151 MMHG | WEIGHT: 178.4 LBS | TEMPERATURE: 97.8 F | BODY MASS INDEX: 28.79 KG/M2 | OXYGEN SATURATION: 98 %

## 2020-06-01 DIAGNOSIS — D62 ACUTE BLOOD LOSS AS CAUSE OF POSTOPERATIVE ANEMIA: ICD-10-CM

## 2020-06-01 DIAGNOSIS — I10 ESSENTIAL HYPERTENSION: ICD-10-CM

## 2020-06-01 DIAGNOSIS — E11.65 TYPE 2 DIABETES MELLITUS WITH HYPERGLYCEMIA, WITHOUT LONG-TERM CURRENT USE OF INSULIN (HCC): Primary | ICD-10-CM

## 2020-06-01 DIAGNOSIS — S52.501D CLOSED FRACTURE OF DISTAL END OF RIGHT RADIUS WITH ROUTINE HEALING, UNSPECIFIED FRACTURE MORPHOLOGY, SUBSEQUENT ENCOUNTER: ICD-10-CM

## 2020-06-01 DIAGNOSIS — J96.01 ACUTE RESPIRATORY FAILURE WITH HYPOXIA (HCC): ICD-10-CM

## 2020-06-01 DIAGNOSIS — E78.5 HYPERLIPIDEMIA, UNSPECIFIED HYPERLIPIDEMIA TYPE: ICD-10-CM

## 2020-06-01 DIAGNOSIS — N18.3 ACUTE RENAL FAILURE SUPERIMPOSED ON STAGE 3 CHRONIC KIDNEY DISEASE, UNSPECIFIED ACUTE RENAL FAILURE TYPE: ICD-10-CM

## 2020-06-01 DIAGNOSIS — N17.9 ACUTE RENAL FAILURE SUPERIMPOSED ON STAGE 3 CHRONIC KIDNEY DISEASE, UNSPECIFIED ACUTE RENAL FAILURE TYPE: ICD-10-CM

## 2020-06-01 DIAGNOSIS — S72.141A CLOSED COMMINUTED INTERTROCHANTERIC FRACTURE OF PROXIMAL FEMUR, RIGHT, INITIAL ENCOUNTER (HCC): ICD-10-CM

## 2020-06-01 PROCEDURE — 99304 1ST NF CARE SF/LOW MDM 25: CPT | Performed by: INTERNAL MEDICINE

## 2020-06-04 ENCOUNTER — NURSING HOME VISIT (OUTPATIENT)
Dept: FAMILY MEDICINE CLINIC | Facility: CLINIC | Age: 75
End: 2020-06-04
Payer: MEDICARE

## 2020-06-04 DIAGNOSIS — E78.5 HYPERLIPIDEMIA, UNSPECIFIED HYPERLIPIDEMIA TYPE: ICD-10-CM

## 2020-06-04 DIAGNOSIS — S52.501D CLOSED FRACTURE OF DISTAL END OF RIGHT RADIUS WITH ROUTINE HEALING, UNSPECIFIED FRACTURE MORPHOLOGY, SUBSEQUENT ENCOUNTER: ICD-10-CM

## 2020-06-04 DIAGNOSIS — I10 ESSENTIAL HYPERTENSION: ICD-10-CM

## 2020-06-04 DIAGNOSIS — E11.65 TYPE 2 DIABETES MELLITUS WITH HYPERGLYCEMIA, WITHOUT LONG-TERM CURRENT USE OF INSULIN (HCC): Primary | ICD-10-CM

## 2020-06-04 DIAGNOSIS — D62 ACUTE BLOOD LOSS AS CAUSE OF POSTOPERATIVE ANEMIA: ICD-10-CM

## 2020-06-04 PROCEDURE — 99308 SBSQ NF CARE LOW MDM 20: CPT | Performed by: NURSE PRACTITIONER

## 2020-06-08 ENCOUNTER — NURSING HOME VISIT (OUTPATIENT)
Dept: FAMILY MEDICINE CLINIC | Facility: CLINIC | Age: 75
End: 2020-06-08
Payer: MEDICARE

## 2020-06-08 DIAGNOSIS — E11.65 TYPE 2 DIABETES MELLITUS WITH HYPERGLYCEMIA, WITHOUT LONG-TERM CURRENT USE OF INSULIN (HCC): Primary | ICD-10-CM

## 2020-06-08 DIAGNOSIS — D62 ACUTE BLOOD LOSS AS CAUSE OF POSTOPERATIVE ANEMIA: ICD-10-CM

## 2020-06-08 DIAGNOSIS — N17.9 ACUTE RENAL FAILURE SUPERIMPOSED ON STAGE 3 CHRONIC KIDNEY DISEASE, UNSPECIFIED ACUTE RENAL FAILURE TYPE: ICD-10-CM

## 2020-06-08 DIAGNOSIS — I10 ESSENTIAL HYPERTENSION: ICD-10-CM

## 2020-06-08 DIAGNOSIS — N18.3 ACUTE RENAL FAILURE SUPERIMPOSED ON STAGE 3 CHRONIC KIDNEY DISEASE, UNSPECIFIED ACUTE RENAL FAILURE TYPE: ICD-10-CM

## 2020-06-08 DIAGNOSIS — J96.01 ACUTE RESPIRATORY FAILURE WITH HYPOXIA (HCC): ICD-10-CM

## 2020-06-08 DIAGNOSIS — S52.501D CLOSED FRACTURE OF DISTAL END OF RIGHT RADIUS WITH ROUTINE HEALING, UNSPECIFIED FRACTURE MORPHOLOGY, SUBSEQUENT ENCOUNTER: ICD-10-CM

## 2020-06-08 DIAGNOSIS — S72.141A CLOSED COMMINUTED INTERTROCHANTERIC FRACTURE OF PROXIMAL FEMUR, RIGHT, INITIAL ENCOUNTER (HCC): ICD-10-CM

## 2020-06-08 DIAGNOSIS — E78.5 HYPERLIPIDEMIA, UNSPECIFIED HYPERLIPIDEMIA TYPE: ICD-10-CM

## 2020-06-08 PROBLEM — R19.7 DIARRHEA: Status: ACTIVE | Noted: 2020-06-08

## 2020-06-08 PROCEDURE — 99309 SBSQ NF CARE MODERATE MDM 30: CPT | Performed by: INTERNAL MEDICINE

## 2020-06-10 ENCOUNTER — OFFICE VISIT (OUTPATIENT)
Dept: OBGYN CLINIC | Facility: CLINIC | Age: 75
End: 2020-06-10

## 2020-06-10 ENCOUNTER — APPOINTMENT (OUTPATIENT)
Dept: RADIOLOGY | Facility: AMBULARY SURGERY CENTER | Age: 75
End: 2020-06-10
Attending: ORTHOPAEDIC SURGERY
Payer: COMMERCIAL

## 2020-06-10 VITALS — HEIGHT: 66 IN | BODY MASS INDEX: 28.79 KG/M2

## 2020-06-10 DIAGNOSIS — S72.001A CLOSED FRACTURE OF RIGHT HIP, INITIAL ENCOUNTER (HCC): ICD-10-CM

## 2020-06-10 DIAGNOSIS — S72.001A CLOSED FRACTURE OF RIGHT HIP, INITIAL ENCOUNTER (HCC): Primary | ICD-10-CM

## 2020-06-10 PROCEDURE — 73502 X-RAY EXAM HIP UNI 2-3 VIEWS: CPT

## 2020-06-10 PROCEDURE — 99024 POSTOP FOLLOW-UP VISIT: CPT | Performed by: ORTHOPAEDIC SURGERY

## 2020-06-11 VITALS — DIASTOLIC BLOOD PRESSURE: 75 MMHG | SYSTOLIC BLOOD PRESSURE: 142 MMHG | TEMPERATURE: 97.2 F | HEART RATE: 97 BPM

## 2020-06-16 ENCOUNTER — NURSING HOME VISIT (OUTPATIENT)
Dept: FAMILY MEDICINE CLINIC | Facility: CLINIC | Age: 75
End: 2020-06-16
Payer: MEDICARE

## 2020-06-16 VITALS
WEIGHT: 170.2 LBS | DIASTOLIC BLOOD PRESSURE: 70 MMHG | HEART RATE: 80 BPM | SYSTOLIC BLOOD PRESSURE: 148 MMHG | TEMPERATURE: 100.4 F | BODY MASS INDEX: 27.47 KG/M2 | OXYGEN SATURATION: 98 %

## 2020-06-16 DIAGNOSIS — D62 ACUTE BLOOD LOSS AS CAUSE OF POSTOPERATIVE ANEMIA: ICD-10-CM

## 2020-06-16 DIAGNOSIS — B02.9 HERPES ZOSTER WITHOUT COMPLICATION: ICD-10-CM

## 2020-06-16 DIAGNOSIS — I10 ESSENTIAL HYPERTENSION: ICD-10-CM

## 2020-06-16 DIAGNOSIS — S72.141A CLOSED COMMINUTED INTERTROCHANTERIC FRACTURE OF PROXIMAL FEMUR, RIGHT, INITIAL ENCOUNTER (HCC): ICD-10-CM

## 2020-06-16 DIAGNOSIS — R50.9 LOW GRADE FEVER: ICD-10-CM

## 2020-06-16 DIAGNOSIS — N17.9 ACUTE RENAL FAILURE SUPERIMPOSED ON STAGE 3 CHRONIC KIDNEY DISEASE, UNSPECIFIED ACUTE RENAL FAILURE TYPE: ICD-10-CM

## 2020-06-16 DIAGNOSIS — N18.3 ACUTE RENAL FAILURE SUPERIMPOSED ON STAGE 3 CHRONIC KIDNEY DISEASE, UNSPECIFIED ACUTE RENAL FAILURE TYPE: ICD-10-CM

## 2020-06-16 DIAGNOSIS — J96.01 ACUTE RESPIRATORY FAILURE WITH HYPOXIA (HCC): Primary | ICD-10-CM

## 2020-06-16 DIAGNOSIS — E78.5 HYPERLIPIDEMIA, UNSPECIFIED HYPERLIPIDEMIA TYPE: ICD-10-CM

## 2020-06-16 PROCEDURE — 99309 SBSQ NF CARE MODERATE MDM 30: CPT | Performed by: INTERNAL MEDICINE

## 2020-06-17 PROBLEM — R50.9 LOW GRADE FEVER: Status: ACTIVE | Noted: 2020-06-17

## 2020-06-22 ENCOUNTER — NURSING HOME VISIT (OUTPATIENT)
Dept: FAMILY MEDICINE CLINIC | Facility: CLINIC | Age: 75
End: 2020-06-22
Payer: MEDICARE

## 2020-06-22 VITALS
RESPIRATION RATE: 18 BRPM | BODY MASS INDEX: 26.76 KG/M2 | HEART RATE: 89 BPM | TEMPERATURE: 98.6 F | DIASTOLIC BLOOD PRESSURE: 73 MMHG | SYSTOLIC BLOOD PRESSURE: 128 MMHG | OXYGEN SATURATION: 98 % | WEIGHT: 165.8 LBS

## 2020-06-22 DIAGNOSIS — E11.65 TYPE 2 DIABETES MELLITUS WITH HYPERGLYCEMIA, WITHOUT LONG-TERM CURRENT USE OF INSULIN (HCC): ICD-10-CM

## 2020-06-22 DIAGNOSIS — B02.9 HERPES ZOSTER WITHOUT COMPLICATION: ICD-10-CM

## 2020-06-22 DIAGNOSIS — J96.01 ACUTE RESPIRATORY FAILURE WITH HYPOXIA (HCC): ICD-10-CM

## 2020-06-22 DIAGNOSIS — N17.9 ACUTE RENAL FAILURE SUPERIMPOSED ON STAGE 3 CHRONIC KIDNEY DISEASE, UNSPECIFIED ACUTE RENAL FAILURE TYPE: ICD-10-CM

## 2020-06-22 DIAGNOSIS — N18.3 ACUTE RENAL FAILURE SUPERIMPOSED ON STAGE 3 CHRONIC KIDNEY DISEASE, UNSPECIFIED ACUTE RENAL FAILURE TYPE: ICD-10-CM

## 2020-06-22 DIAGNOSIS — E78.5 HYPERLIPIDEMIA, UNSPECIFIED HYPERLIPIDEMIA TYPE: ICD-10-CM

## 2020-06-22 DIAGNOSIS — D62 ACUTE BLOOD LOSS AS CAUSE OF POSTOPERATIVE ANEMIA: Primary | ICD-10-CM

## 2020-06-22 DIAGNOSIS — I10 ESSENTIAL HYPERTENSION: ICD-10-CM

## 2020-06-22 DIAGNOSIS — S72.141A CLOSED COMMINUTED INTERTROCHANTERIC FRACTURE OF PROXIMAL FEMUR, RIGHT, INITIAL ENCOUNTER (HCC): ICD-10-CM

## 2020-06-22 PROCEDURE — 99309 SBSQ NF CARE MODERATE MDM 30: CPT | Performed by: NURSE PRACTITIONER

## 2020-06-26 ENCOUNTER — NURSING HOME VISIT (OUTPATIENT)
Dept: FAMILY MEDICINE CLINIC | Facility: CLINIC | Age: 75
End: 2020-06-26
Payer: MEDICARE

## 2020-06-26 VITALS
WEIGHT: 165.6 LBS | RESPIRATION RATE: 18 BRPM | TEMPERATURE: 97.7 F | HEART RATE: 99 BPM | SYSTOLIC BLOOD PRESSURE: 144 MMHG | BODY MASS INDEX: 26.73 KG/M2 | OXYGEN SATURATION: 98 % | DIASTOLIC BLOOD PRESSURE: 67 MMHG

## 2020-06-26 DIAGNOSIS — B02.9 HERPES ZOSTER WITHOUT COMPLICATION: Primary | ICD-10-CM

## 2020-06-26 DIAGNOSIS — N18.3 ACUTE RENAL FAILURE SUPERIMPOSED ON STAGE 3 CHRONIC KIDNEY DISEASE, UNSPECIFIED ACUTE RENAL FAILURE TYPE: ICD-10-CM

## 2020-06-26 DIAGNOSIS — N17.9 ACUTE RENAL FAILURE SUPERIMPOSED ON STAGE 3 CHRONIC KIDNEY DISEASE, UNSPECIFIED ACUTE RENAL FAILURE TYPE: ICD-10-CM

## 2020-06-26 DIAGNOSIS — S72.141A CLOSED COMMINUTED INTERTROCHANTERIC FRACTURE OF PROXIMAL FEMUR, RIGHT, INITIAL ENCOUNTER (HCC): ICD-10-CM

## 2020-06-26 DIAGNOSIS — I10 ESSENTIAL HYPERTENSION: ICD-10-CM

## 2020-06-26 DIAGNOSIS — E78.5 HYPERLIPIDEMIA, UNSPECIFIED HYPERLIPIDEMIA TYPE: ICD-10-CM

## 2020-06-26 DIAGNOSIS — E11.65 TYPE 2 DIABETES MELLITUS WITH HYPERGLYCEMIA, WITHOUT LONG-TERM CURRENT USE OF INSULIN (HCC): ICD-10-CM

## 2020-06-26 PROCEDURE — 99308 SBSQ NF CARE LOW MDM 20: CPT | Performed by: INTERNAL MEDICINE

## 2020-07-09 ENCOUNTER — TELEPHONE (OUTPATIENT)
Dept: OTHER | Facility: OTHER | Age: 75
End: 2020-07-09

## 2020-07-09 NOTE — TELEPHONE ENCOUNTER
Patient was given a prescription cream for the shingles on her hands from Dr Darrell Mak when she was in the nursing home  She was sent home without it and would like to know if there is a way to get it to her  She cannot drive and she has no one to go pick it up for her but she is in a lot of pain       Callback # [phone]

## 2020-07-13 ENCOUNTER — TELEPHONE (OUTPATIENT)
Dept: OBGYN CLINIC | Facility: MEDICAL CENTER | Age: 75
End: 2020-07-13

## 2020-07-13 NOTE — TELEPHONE ENCOUNTER
Apologies  It is in the hand written consult, along with RLE WBS   Confirmed with physical therapist  Thank you

## 2020-07-13 NOTE — TELEPHONE ENCOUNTER
Patient sees Dr Teresa Ochoa [place] physical therapist at [facility] is calling in requesting any information on this patient to weight bearing status for her wrist   She is with her now, if she can receive a call back relating this      CB # [phone]

## 2020-07-14 ENCOUNTER — TELEPHONE (OUTPATIENT)
Dept: OBGYN CLINIC | Facility: HOSPITAL | Age: 75
End: 2020-07-14

## 2020-07-14 NOTE — TELEPHONE ENCOUNTER
PO dR tripp- sx 05 21  Re: rescheduling PO appt  # [phone]      Patient called stating she does not have transport for today's PO appt  Patient was rescheduled for 07 21       Patient declined any appt for this week due to not having transport

## 2020-07-20 ENCOUNTER — OFFICE VISIT (OUTPATIENT)
Dept: FAMILY MEDICINE CLINIC | Facility: CLINIC | Age: 75
End: 2020-07-20
Payer: MEDICARE

## 2020-07-20 VITALS
HEIGHT: 66 IN | WEIGHT: 160 LBS | SYSTOLIC BLOOD PRESSURE: 110 MMHG | DIASTOLIC BLOOD PRESSURE: 70 MMHG | TEMPERATURE: 98.4 F | BODY MASS INDEX: 25.71 KG/M2 | RESPIRATION RATE: 14 BRPM | OXYGEN SATURATION: 97 % | HEART RATE: 74 BPM

## 2020-07-20 DIAGNOSIS — Z86.39 HISTORY OF VITAMIN D DEFICIENCY: ICD-10-CM

## 2020-07-20 DIAGNOSIS — L89.154 DECUBITUS ULCER OF SACRAL REGION, STAGE 4 (HCC): ICD-10-CM

## 2020-07-20 DIAGNOSIS — Z12.11 COLON CANCER SCREENING: ICD-10-CM

## 2020-07-20 DIAGNOSIS — Z23 NEED FOR VACCINATION AGAINST STREPTOCOCCUS PNEUMONIAE USING PNEUMOCOCCAL CONJUGATE VACCINE 13: ICD-10-CM

## 2020-07-20 DIAGNOSIS — Z23 NEED FOR TDAP VACCINATION: ICD-10-CM

## 2020-07-20 DIAGNOSIS — L03.113 CELLULITIS OF RIGHT ELBOW: ICD-10-CM

## 2020-07-20 DIAGNOSIS — R60.0 BILATERAL LOWER EXTREMITY EDEMA: Primary | ICD-10-CM

## 2020-07-20 DIAGNOSIS — E11.65 TYPE 2 DIABETES MELLITUS WITH HYPERGLYCEMIA, WITHOUT LONG-TERM CURRENT USE OF INSULIN (HCC): ICD-10-CM

## 2020-07-20 PROCEDURE — G0009 ADMIN PNEUMOCOCCAL VACCINE: HCPCS | Performed by: NURSE PRACTITIONER

## 2020-07-20 PROCEDURE — 1160F RVW MEDS BY RX/DR IN RCRD: CPT | Performed by: NURSE PRACTITIONER

## 2020-07-20 PROCEDURE — 90715 TDAP VACCINE 7 YRS/> IM: CPT | Performed by: NURSE PRACTITIONER

## 2020-07-20 PROCEDURE — 3074F SYST BP LT 130 MM HG: CPT | Performed by: NURSE PRACTITIONER

## 2020-07-20 PROCEDURE — 90471 IMMUNIZATION ADMIN: CPT | Performed by: NURSE PRACTITIONER

## 2020-07-20 PROCEDURE — 90670 PCV13 VACCINE IM: CPT | Performed by: NURSE PRACTITIONER

## 2020-07-20 PROCEDURE — 3008F BODY MASS INDEX DOCD: CPT | Performed by: NURSE PRACTITIONER

## 2020-07-20 PROCEDURE — 99203 OFFICE O/P NEW LOW 30 MIN: CPT | Performed by: NURSE PRACTITIONER

## 2020-07-20 PROCEDURE — 3046F HEMOGLOBIN A1C LEVEL >9.0%: CPT | Performed by: NURSE PRACTITIONER

## 2020-07-20 PROCEDURE — 1036F TOBACCO NON-USER: CPT | Performed by: NURSE PRACTITIONER

## 2020-07-20 PROCEDURE — 3078F DIAST BP <80 MM HG: CPT | Performed by: NURSE PRACTITIONER

## 2020-07-20 PROCEDURE — 1111F DSCHRG MED/CURRENT MED MERGE: CPT | Performed by: NURSE PRACTITIONER

## 2020-07-20 PROCEDURE — 4040F PNEUMOC VAC/ADMIN/RCVD: CPT | Performed by: NURSE PRACTITIONER

## 2020-07-20 PROCEDURE — 3066F NEPHROPATHY DOC TX: CPT | Performed by: NURSE PRACTITIONER

## 2020-07-20 RX ORDER — FUROSEMIDE 20 MG/1
20 TABLET ORAL DAILY
Qty: 5 TABLET | Refills: 0 | Status: SHIPPED | OUTPATIENT
Start: 2020-07-20 | End: 2020-09-10 | Stop reason: HOSPADM

## 2020-07-20 RX ORDER — GABAPENTIN 100 MG/1
100 CAPSULE ORAL 3 TIMES DAILY
COMMUNITY

## 2020-07-20 RX ORDER — AMOXICILLIN AND CLAVULANATE POTASSIUM 875; 125 MG/1; MG/1
1 TABLET, FILM COATED ORAL EVERY 12 HOURS SCHEDULED
Qty: 20 TABLET | Refills: 0 | Status: SHIPPED | OUTPATIENT
Start: 2020-07-20 | End: 2020-07-30

## 2020-07-20 RX ORDER — FERROUS SULFATE 325(65) MG
325 TABLET ORAL
COMMUNITY
Start: 2020-07-07

## 2020-07-20 NOTE — PATIENT INSTRUCTIONS
Wound Infection   WHAT YOU NEED TO KNOW:   What is a wound infection? A wound infection occurs when bacteria enters a break in the skin  The infection may involve just the skin, or affect deeper tissues or organs close to the wound  What increases my risk for a wound infection? Anything that decreases your body's ability to heal wounds may put you at risk for a wound infection  This includes any of the following:  · Age older than 72    · Smoking or being overweight    · Medical conditions that weaken the immune system such as diabetes, HIV, or cancer    · Medicines that cause a weak immune system such as steroids    · Radiation, chemotherapy, or poor nutrition    · Foreign objects in the wound such as glass or metal    · Decreased blood flow to the wound caused by high blood pressure, or blocked or narrowed blood vessels  What are the signs and symptoms of a wound infection? Your symptoms may start a few days after you get the wound, or may not occur for a month or two after the wound happens:  · Fever    · Warm, red, painful, or swollen skin near the wound    · Blood or pus coming from the wound     · A foul odor coming from the wound  How is a wound infection diagnosed? Your healthcare provider will ask about your medical history and examine you  He will ask how and when you were wounded  You may have any of the following tests:  · Blood tests  may be done to check for infection  · X-ray or CT  may be done to look for infection in deep tissues or a foreign object in your wound  You may be given contrast liquid to help the pictures show up better  Tell the healthcare provider if you have ever had an allergic reaction to contrast liquid  · A wound culture  is a sample of fluid or tissue that taken from the wound  It is sent to a lab and tested for the germ that is causing the infection  How is a wound infection treated?   Treatment will depend on how severe the wound is, its location, and whether other areas are affected  It may also depend on your health and the length of time you have had the wound  Ask your healthcare provider about these and other treatments you may need:  · Medicine  will be given to treat the infection and decrease pain and swelling  · Wound care  may be done to clean your wound and help it heal  A wound vacuum may also be placed over your wound to help it heal      · Hyperbaric oxygen therapy  (HBO) may be used to get more oxygen to your tissues to help them heal  The pressurized oxygen is given as you sit in a pressure chamber  · Surgery  may be needed to clean the wound or remove infected or dead tissue  Surgery may also be needed to remove a foreign object  How can I help my wound heal?   · Care for your wound as directed  Keep your wound clean and dry  You may need to cover your wound when you bathe so it does not get wet  Clean your wound as directed with soap and water or wound   Put on new, clean bandages as directed  Change your bandages when they get wet or dirty  · Eat a variety of healthy foods  Examples include fruits, vegetables, whole-grain breads, low-fat dairy products, beans, lean meats, and fish  Healthy foods may help you heal faster  You may also need to take vitamins and minerals  Ask if you need to be on a special diet  · Manage other health conditions  Follow your healthcare provider's directions to manage health conditions that can cause slow wound healing  Examples include high blood pressure and diabetes  · Do not smoke  Nicotine and other chemicals in cigarettes and cigars can cause slow wound healing  Ask your healthcare provider for information if you currently smoke and need help to quit  E-cigarettes or smokeless tobacco still contain nicotine  Talk to your healthcare provider before you use these products  When should I seek immediate care? · You feel short of breath       · Your heart is beating faster than usual      · You feel confused  · Blood soaks through your bandages  · Your wound comes apart or feels like it is ripping  · You have severe pain  · You see red streaks coming from the infected area  When should I contact my healthcare provider? · You have a fever or chills  · You have more pain, redness, or swelling near your wound  · Your symptoms do not improve  · The skin around your wound feels numb  · You have questions or concerns about your condition or care  CARE AGREEMENT:   You have the right to help plan your care  Learn about your health condition and how it may be treated  Discuss treatment options with your caregivers to decide what care you want to receive  You always have the right to refuse treatment  The above information is an  only  It is not intended as medical advice for individual conditions or treatments  Talk to your doctor, nurse or pharmacist before following any medical regimen to see if it is safe and effective for you  © 2017 [facility] Information is for End User's use only and may not be sold, redistributed or otherwise used for commercial purposes  All illustrations and images included in CareNotes® are the copyrighted property of [employer]  or Praneeth Marsh  Chronic Wound Care   WHAT YOU NEED TO KNOW:   What is a chronic wound? A wound is an injury that causes a break in the skin  There may also be damage to nearby tissues  Chronic wounds are wounds that do not heal completely in 6 weeks  Examples of wounds that can become chronic are deep ulcers (open sores), large burns, and infected cuts  What leads to a chronic wound? Conditions that slow or stop the healing process may lead to a chronic wound  These may include any of the following:  · Poor blood supply or low oxygen  can be caused by low blood pressure, or blocked or narrowed blood vessels  This is more likely if you smoke or have heart or blood vessel disease  Blood, heart, kidney, and lung disease can also decrease the oxygen supply to tissues  · An infection  occurs when bacteria get into your wound  Objects in the wound, such as glass or metal, may bring bacteria into your wound  Dead tissue in your wound may give bacteria a place to grow  Diseases such as diabetes can also increase your risk for infection  · A weak immune system  may lead to a chronic wound  Radiation treatments, poor nutrition, and certain medicines, such as steroids, weaken the immune system  Diseases, such as cancer and diabetes, can weaken your immune system and make it hard to fight an infection  · Tissue swelling  may happen with traumatic injuries  It can also happen with conditions that cause decreased blood flow to the area, such as heart failure or blood vessel problems  The swelling increases pressure that decreases blood flow to the area  What are the signs and symptoms of a chronic wound? · Milky, yellow, green, or brown pus in the wound     · Bleeding, swelling, or pain in the affected area    · Trouble moving the affected area    · Wound has become larger or deeper    · Dark or black skin around the wound that is warm to the touch    · Fever  How is a chronic wound diagnosed? Your healthcare provider will ask about your injury  He will ask about your health, the medicines you take, and any past surgeries  He will examine the injury and the area around it  He will check to see how deep the wound is and look for signs of infection  You may need any of the following:  · Blood tests  are done to see if you have an infection and to find its cause  · A wound culture  is a test of fluid or tissue used to find the cause of your infection  · An x-ray  is a picture of your bones and tissues in the wound area  You may need to have an x-ray if the wound is near a joint or bone   Healthcare providers look for broken bones, or foreign objects such as glass or metal   How is a chronic wound treated? Your treatment depends on where your wound is located and how severe it is  If a medical problem such as diabetes is delaying wound healing, it is important to treat this problem  Healthcare providers may change your treatment over time if your wound still does not heal  You may need any of the following:  · NSAIDs  help decrease swelling and pain or fever  This medicine is available with or without a doctor's order  NSAIDs can cause stomach bleeding or kidney problems in certain people  If you take blood thinner medicine, always ask your healthcare provider if NSAIDs are safe for you  Always read the medicine label and follow directions  · Acetaminophen  decreases pain and fever  It is available without a doctor's order  Ask how much to take and how often to take it  Follow directions  Acetaminophen can cause liver damage if not taken correctly  · Antibiotics  may be given to prevent or treat an infection caused by bacteria  · Wound care:      ¨ Cleansing  is done by flushing the wound with sterile water  Healthcare providers may use a large syringe with a needle or catheter (tube) tip  They may also use a liquid that kills germs  ¨ Debridement  is done to remove anything from the wound that can delay healing and lead to infection  This includes dead tissue, and objects such as small rocks and dirt  Your healthcare provider may cut out the damaged areas in or around the wound  He may also drain the wound to clean out pus  Moist bandages may be placed inside the wound, or bandages that contain enzymes may be used  Hydrotherapy (whirlpool treatment) uses water to clean wounds  It may be used to clean and debride burn wounds  ¨ Wound dressings  are used to protect the wound from further injury and infection  They also maintain moisture in the wound area to promote and speed healing  An elastic bandage may be wrapped around the wound area to put light pressure on it   The light pressure helps to decrease swelling in tissues around the wound area  Dressings may be in the form of bandages, gauze, films, gels, or foams  They may contain substances to help you heal faster  Skin taken from another part of your body may be used to close a large wound  Healthcare providers may instead use artificial skin that contains cells needed to repair damaged tissues  ¨ Negative pressure wound therapy (NPWT)  may also be done  This therapy is also called wound vacuum, or wound vac therapy  A vacuum device uses suction to remove fluid and waste from your wound and pull the edges closer together  NPWT may also increase blood flow and new tissue growth in the wound  · Hyperbaric oxygen therapy (HBO)  is used to get more oxygen into your body  The oxygen is given under pressure inside of a tube-like chamber called a hyperbaric or pressure chamber  You may need to have this therapy more than once  How can I care for my wound? · Do not stop using the bandage on your wound unless your healthcare provider says it is okay  Keep the bandage clean and dry  · Clean the wound as often as directed by your healthcare provider  · Wash your hands before and after you take care of your wound  · Your wound may need to be packed with gauze each time you change the bandages  Write down how many pieces of gauze are placed inside your wound  Be sure the same number comes out each time you replace the packing  What can I do to help my wound heal?   · Eat healthy foods and drink liquids as directed  Healthy foods give your body the nutrients it needs to heal your wound  Liquids prevent dehydration that can decrease the blood supply to your wound  Healthy foods include fruits, vegetables, grains (breads and cereals), dairy, and protein foods  Protein foods include meat, fish, nuts, and soy products   Protein, calories, vitamin C, and zinc help wounds heal  Ask your healthcare provider for more information about the foods you should eat to improve healing  · Do not smoke  If you smoke, it is never too late to quit  Smoking delays wound healing  Smoking also increases your risk for infection after surgery  Ask your healthcare provider for information if you need help quitting  · Prevent pressure damage  If you have a chronic wound, you may be at risk for pressure damage to your wound and other places on your body  Pressure sores can develop when blood flow to an area is blocked  For example, you sit or lie in the same position without moving and put pressure on your legs  ¨ Prevent pressure sores by changing your position every 15 minutes while you are sitting  Prop your legs on pillows to lift your heels while you are lying down  ¨ Check your skin daily for signs of pressure sores  Common signs are swelling, open sores, blisters, a rash, or changes in color or temperature  Tell your healthcare provider if you have any of these signs  When should I contact my healthcare provider? · You have a fever  · You have increased or new pain, swelling, redness, or bleeding in or around your wound  · You have pus or a foul odor coming from your wound  · Your skin itches or has a rash  · You have questions or concerns about your condition or care  When should I seek immediate care or call 911? · You have muscle or joint pain, body aches, or sweating, with a fever  · You have a headache with diarrhea, nausea or vomiting, or a sore throat  · You are confused, or feel dizzy or faint when you stand up  · You have trouble breathing or sudden chest pain  · You see blood in the NPWT tubing or container, or on your bandages  CARE AGREEMENT:   You have the right to help plan your care  Learn about your health condition and how it may be treated  Discuss treatment options with your caregivers to decide what care you want to receive  You always have the right to refuse treatment   The above information is an  only  It is not intended as medical advice for individual conditions or treatments  Talk to your doctor, nurse or pharmacist before following any medical regimen to see if it is safe and effective for you  © 2016 [address] is for End User's use only and may not be sold, redistributed or otherwise used for commercial purposes  All illustrations and images included in CareNotes® are the copyrighted property of A [employer] Inc  or Praneeth Marsh  [facility]   WHAT YOU NEED TO KNOW:   What is edema? Edema is swelling throughout your body  Edema is usually a sign that you are retaining fluid  The swelling may be caused by heart failure or kidney, thyroid, or liver disease  It may also be caused by medicines such as antidepressants, blood pressure medicines, or hormones  Sudden swelling around the lips or face may be a sign of a severe allergic reaction  Swelling of an arm or leg may be caused by blockage of your veins  What other signs and symptoms may occur with edema? · Discomfort or tenderness in the swollen areas    · Tight and shiny skin over the swollen areas    · Weight gain  How is edema diagnosed? Your healthcare provider will ask about your symptoms and any other symptoms you have  He may also ask about any medical conditions you have  Your healthcare provider will examine your skin over the swollen areas  He may gently push on the swollen area for a short time to see if this leaves a dimple  He may also order tests to find the cause of your edema  How is edema treated and managed? Treatment for edema depends on the cause  Depending on your medical condition, you may be given medicine to help get rid of extra body fluid  Your healthcare provider may suggest that you do any of the following to help manage edema:  · Elevate  your arms or legs as directed  Raise them above the level of your heart as often as you can   This will help decrease swelling and pain  Prop them on pillows or blankets to keep them elevated comfortably  · Wear pressure stockings as directed  The stockings are tight and put pressure on your legs  This helps to keep fluid from collecting in your legs or ankles  · Limit your salt intake  Salt causes your body to hold water  Ask about any other changes to your diet  · Stay active  Do not stand or sit for long periods of time  Ask your healthcare provider about the best exercise plan for you  · Keep your skin moist  using lotion, cream, or ointment  Ask your healthcare provider what to use and how often to use it  When should I contact my healthcare provider? · The swollen area feels cold and is pale or blue in color  · The swollen area feels warm, painful, and is red in color  · You have increased swelling or swelling in other parts of your body  · You have questions or concerns about your condition or care  When should I seek immediate care? · You have shortness of breath at rest, especially when you lie down  · You cough up pink, foamy sputum  · You have chest pain  · Your heartbeat is fast or uneven  CARE AGREEMENT:   You have the right to help plan your care  Learn about your health condition and how it may be treated  Discuss treatment options with your caregivers to decide what care you want to receive  You always have the right to refuse treatment  The above information is an  only  It is not intended as medical advice for individual conditions or treatments  Talk to your doctor, nurse or pharmacist before following any medical regimen to see if it is safe and effective for you  © 2017 [address] Information is for End User's use only and may not be sold, redistributed or otherwise used for commercial purposes  All illustrations and images included in CareNotes® are the copyrighted property of [employer]  or Praneeth Salma    Place skin ulcer patient instructions here  How to Prevent Pressure Ulcers   WHAT YOU NEED TO KNOW:   A pressure ulcer is an injury to the skin or tissue over a bony area  A pressure ulcer is also called a pressure sore, bedsore, or decubitus ulcer  Pressure ulcers can form over any bony area but are most common on the back, buttocks, hips, and heels  DISCHARGE INSTRUCTIONS:   Contact your healthcare provider if:   · You have a fever  · You see red or purple skin over a bony area  · You see a blister or open sore over a bony area  · Your skin feels warm, spongy, or tight  · You have new pain, or pain that is getting worse  · You have questions or concerns about your condition or care  Help prevent a pressure ulcer:  Ask your healthcare provider for more information on any of the following:  · Check your skin several times each day  Check for red skin, or other color changes, over bony areas  Use a mirror if you have trouble seeing certain areas, or ask another person to look  · Change wet bedding and clothes right away  The wet material may rub against your skin and cause damage  · Change your position often  Change your position every 2 hours if you are in a bed all day  Change your position every hour if you are in a wheelchair all day  Set an alarm to help remind you when it is time to turn  Keep a written turning schedule to help you remember to turn  If you are helping a person move in bed, lift him  Do not slide him  Keep the head of the bed as low as possible  This may help prevent damage to the skin from sliding down in bed  · Protect the skin over bony areas  Use pillows or foam wedges to keep bony areas from touching, and to relieve pressure  For example, put a pillow or foam wedge between your knees to keep them from pressing on one another  Place a pillow or foam wedge under you to keep your hip raised when you lie on your side  Do not rest directly on your hipbone   Put a foam pad or pillow under your legs from calf to ankle when you lie on your back  The pad or pillow should raise your heels so that they are not touching the bed  · Use medical equipment and pads  A draw sheet or large pad under you may help others move you up in bed  An overhead trapeze can help you change positions in bed  Mattresses and overlays made to provide more cushioning may help decrease the risk of pressure ulcers  Examples include a foam mattress pad and air or water mattresses  Ask about equipment that may be right for you and how you use it  · Keep your skin clean, dry, and moisturized  Use mild soap and warm water to clean your skin  Do not rub or scrub when you wash  Do not use products that contain alcohol, because they can dry out your skin  Gently pat your skin dry  Do not rub your skin with a towel  Apply lotion or a moisturizer on your skin often  · Eat a variety of healthy foods  Healthy foods include fruits, vegetables, whole-grain breads, and fish  Foods that are high in protein may help your pressure ulcer heal  This includes lean meats, beans, milk, yogurt, and cheese  Nutrition shakes may also give you extra calories and protein if you have trouble eating or are underweight  Follow up with your healthcare provider as directed:  Write down your questions so you remember to ask them during your visits  © 2017 [address] Information is for End User's use only and may not be sold, redistributed or otherwise used for commercial purposes  All illustrations and images included in CareNotes® are the copyrighted property of A D A M [employer] Inc  or Praneeth Marsh  The above information is an  only  It is not intended as medical advice for individual conditions or treatments  Talk to your doctor, nurse or pharmacist before following any medical regimen to see if it is safe and effective for you    Cellulitis   WHAT YOU NEED TO KNOW:   Cellulitis is a skin infection caused by bacteria  Cellulitis may go away on its own or you may need treatment  Your healthcare provider may draw a [place] around the outside edges of your cellulitis  If your cellulitis spreads, your healthcare provider will see it outside of the [place]  WHILE YOU ARE HERE:   Informed consent  is a legal document that explains the tests, treatments, or procedures that you may need  Informed consent means you understand what will be done and can make decisions about what you want  You give your permission when you sign the consent form  You can have someone sign this form for you if you are not able to sign it  You have the right to understand your medical care in words you know  Before you sign the consent form, understand the risks and benefits of what will be done  Make sure all your questions are answered  An IV  is a small tube placed in your vein that is used to give you medicine or liquids  Vital signs:  Caregivers will check your blood pressure, heart rate, breathing rate, and temperature  They will also ask about your pain  These vital signs give caregivers information about your current health  Antibiotics  treat the bacterial infection  Tests:   · Blood tests  may show if your infection is getting better or worse  · An x-ray, ultrasound, CT, or MRI  may show if the infection has spread  You may be given contrast liquid to help the infection show up better in the pictures  Tell the healthcare provider if you have ever had an allergic reaction to contrast liquid  Do not enter the MRI room with anything metal  Metal can cause serious injury  Tell the healthcare provider if you have any metal in or on your body  Treatment:   · Abscess drainage  may be needed to help clean out the infection  · Debridement  is a procedure used to cut away damaged, dead, or infected tissue to help the wound heal   RISKS:   If your cellulitis is severe, you may be hospitalized   Your skin may swell and separate from the tissue and bone beneath it  You may have severe swelling in your arms or legs  Your lymph system may become damaged and increase your risk for more cellulitis infections  Your skin and nearby tissues may die and start to peel  Your infection may spread to your bone, blood, kidneys, and heart  This can be life-threatening  CARE AGREEMENT:   You have the right to help plan your care  Learn about your health condition and how it may be treated  Discuss treatment options with your caregivers to decide what care you want to receive  You always have the right to refuse treatment  © 2017 [facility] [facility] Information is for End User's use only and may not be sold, redistributed or otherwise used for commercial purposes  All illustrations and images included in CareNotes® are the copyrighted property of [employer]  or Praneeth Marsh  The above information is an  only  It is not intended as medical advice for individual conditions or treatments  Talk to your doctor, nurse or pharmacist before following any medical regimen to see if it is safe and effective for you

## 2020-07-20 NOTE — ASSESSMENT & PLAN NOTE
Patient started on antibiotics at this time and referred to Wound Management for further evaluation and treatment  Wound cleansed with peroxide dried and a non adherent dressing applied to the elbow

## 2020-07-20 NOTE — PROGRESS NOTES
Right Foot/Ankle   Right Foot Inspection  Skin Exam: skin normal, skin intact, dry skin, callus, abnormal color and callus no warmth, no erythema, no maceration, no pre-ulcer and no ulcer                          Toe Exam: ROM and strength within normal limits and swelling        Left Foot/Ankle  Left Foot Inspection  Skin Exam: skin normal, skin intact, dry skin, abnormal color and callusno warmth, no erythema, no maceration, no pre-ulcer and no ulcer                         Toe Exam: ROM and strength within normal limits and swelling

## 2020-07-20 NOTE — PROGRESS NOTES
BMI Counseling: Body mass index is 25 82 kg/m²  The BMI is above normal  Nutrition recommendations include decreasing portion sizes, encouraging healthy choices of fruits and vegetables, decreasing fast food intake, consuming healthier snacks, limiting drinks that contain sugar, moderation in carbohydrate intake, increasing intake of lean protein, reducing intake of saturated and trans fat and reducing intake of cholesterol  Exercise recommendations include vigorous physical activity 75 minutes/week, exercising 3-5 times per week, obtaining a gym membership and strength training exercises  No pharmacotherapy was ordered  Depression Screening and Follow-up Plan: Clincally patient does not have depression  No treatment is required  Falls Plan of Care: balance, strength, and gait training instructions were provided and referral to physical therapy  not completed because patient not ambulatory, bed ridden, immobile, confined to chair, or wheelchair bound  Recommended assistive device to help with gait and balance  Assessed feet and footwear  Vitamin D supplementation was recommended  Assessed visual acuity  Cognitive screening performed  Home safety education provided  Assessment/Plan:         Problem List Items Addressed This Visit        Endocrine    Type 2 diabetes mellitus with hyperglycemia ([facility] [address] )    Relevant Orders    Diabetic foot exam    Lipid panel    UA w Reflex to Microscopic w Reflex to Culture -Lab Collect    HEMOGLOBIN A1C W/ EAG ESTIMATION       Musculoskeletal and Integument    Decubitus ulcer of sacral region, stage 4 (HCC)     Half dollar size stage IV decubitus noted to sacrum  Area cleansed with saline  Patient started on antibiotics referred to wound management at this time for possible debridement and further wound management consultation           Relevant Medications    amoxicillin-clavulanate (Augmentin) 875-125 mg per tablet    Other Relevant Orders    Ambulatory referral to Wound Care    CBC and differential       Other    Colon cancer screening     Patient ordered for a gastroenterology consult for colonoscopy  Relevant Orders    Ambulatory referral for colonoscopy    Need for vaccination against Streptococcus pneumoniae using pneumococcal conjugate vaccine 13    Relevant Orders    PNEUMOCOCCAL CONJUGATE VACCINE 13-VALENT GREATER THAN 6 MONTHS (Completed)    Cellulitis of right elbow     Patient started on antibiotics at this time and referred to Wound Management for further evaluation and treatment  Wound cleansed with peroxide dried and a non adherent dressing applied to the elbow  Relevant Medications    amoxicillin-clavulanate (Augmentin) 875-125 mg per tablet    Bilateral lower extremity edema - Primary     Patient instructed to elevate her legs, start on Lasix 20 mg p o  Daily x5 days  She has also been instructed to decrease her sodium intake as well as with monitor fluid intake  Relevant Medications    furosemide (LASIX) 20 mg tablet    Other Relevant Orders    Comprehensive metabolic panel    History of vitamin D deficiency    Relevant Orders    Vitamin D 25 hydroxy            Subjective:      Patient ID: Gely Reynaga is a 67 y o  female  Patient is a 25-year-old female present for evaluation of right elbow pain secondary to cellulitis  She requires follow-up for injury to her right hand and right hip s/p fall  Her son also reports that she has a sacral decubitus  The following portions of the patient's history were reviewed and updated as appropriate:   She has a past medical history of Acute renal failure (ARF) ([address] ), Acute respiratory failure with hypoxia ([address] ), Chronic kidney disease, Diabetes mellitus ([address] ), Hyperlipidemia, and Hypertension  ,  does not have any pertinent problems on file  ,   has a past surgical history that includes Breast surgery (Left); pr xcapsl ctrc rmvl insj io lens prosth w/o ecp (Right, 10/23/2017);  Cataract extraction w/ intraocular lens implant (Left, 12/11/2017); Cataract extraction (Bilateral, 2017); pr say shoulder arthrplsty humeral&glenoid compnt (Right, 9/10/2018); and pr open rx femur fx+intramed sergio (Right, 5/21/2020)  ,  family history includes Arthritis in her brother; Diabetes in her daughter and mother; No Known Problems in her brother; Stroke in her father; Varicose Veins in her mother  ,   reports that she has never smoked  She has never used smokeless tobacco  She reports that she does not drink alcohol or use drugs  ,  has No Known Allergies     Current Outpatient Medications   Medication Sig Dispense Refill    amLODIPine (NORVASC) 5 mg tablet Take 1 tablet (5 mg total) by mouth daily  0    enoxaparin (LOVENOX) 40 mg/0 4 mL Inject 0 4 mL (40 mg total) under the skin daily 28 Syringe 0    glipiZIDE (GLUCOTROL) 5 mg tablet Take 5 mg by mouth every evening      metFORMIN (GLUCOPHAGE) 500 mg tablet Take 1 tablet (500 mg total) by mouth 2 (two) times a day with meals  0    pravastatin (PRAVACHOL) 20 mg tablet Take 20 mg by mouth every evening      acetaminophen (TYLENOL) 325 mg tablet Take 3 tablets (975 mg total) by mouth every 8 (eight) hours (Patient not taking: Reported on 7/20/2020) 30 tablet 0    amoxicillin-clavulanate (Augmentin) 875-125 mg per tablet Take 1 tablet by mouth every 12 (twelve) hours for 10 days 20 tablet 0    ferrous sulfate 325 (65 Fe) mg tablet TAKE 1 TABLET BY MOUTH ONCE A DAY FOR SUPPLEMENT      furosemide (LASIX) 20 mg tablet Take 1 tablet (20 mg total) by mouth daily for 5 days 5 tablet 0    gabapentin (NEURONTIN) 100 mg capsule Take 100 mg by mouth 3 (three) times a day      polyethylene glycol (MIRALAX) 17 g packet Take 17 g by mouth daily (Patient not taking: Reported on 7/20/2020) 14 each 0    senna-docusate sodium (SENOKOT-S) 8 6-50 mg per tablet Take 2 tablets by mouth 2 (two) times a day (Patient not taking: Reported on 7/20/2020) 7 tablet 0     No current facility-administered medications for this visit  Review of Systems   Constitutional: Positive for activity change  Negative for appetite change, chills, diaphoresis, fatigue, fever and unexpected weight change  HENT: Negative for congestion, dental problem, drooling, ear discharge, ear pain, facial swelling, hearing loss, mouth sores, nosebleeds, postnasal drip, rhinorrhea, sinus pain, sneezing and sore throat  Eyes: Negative for pain, discharge, redness and visual disturbance  Respiratory: Negative for cough, choking, chest tightness, shortness of breath and wheezing  Cardiovascular: Positive for leg swelling  Negative for chest pain and palpitations  Patient reports swelling of the feet bilaterally when she sits up for long periods of time in her wheelchair  Gastrointestinal: Positive for rectal pain  Negative for abdominal pain, anal bleeding, blood in stool, constipation, diarrhea, nausea and vomiting  Genitourinary: Positive for dysuria and frequency  Negative for decreased urine volume, difficulty urinating, enuresis, hematuria and urgency  Musculoskeletal: Negative for arthralgias  Right elbow pain   Skin: Negative  Allergic/Immunologic: Negative for environmental allergies and food allergies  Neurological: Negative for dizziness, tremors, weakness, light-headedness and numbness  Psychiatric/Behavioral: The patient is nervous/anxious  Objective:  Vitals:    07/20/20 1536   BP: 110/70   BP Location: Left arm   Patient Position: Sitting   Cuff Size: Standard   Pulse: 74   Resp: 14   Temp: 98 4 °F (36 9 °C)   TempSrc: Oral   SpO2: 97%   Weight: 72 6 kg (160 lb)   Height: 5' 6" (1 676 m)     Body mass index is 25 82 kg/m²  Physical Exam   Constitutional: She is oriented to person, place, and time  She appears well-developed and well-nourished  HENT:   Head: Normocephalic and atraumatic  Eyes: Pupils are equal, round, and reactive to light     Neck: Normal range of motion  Cardiovascular: Normal rate and regular rhythm  No murmur heard  Pulmonary/Chest: Effort normal and breath sounds normal    Abdominal: Soft  Bowel sounds are normal    Musculoskeletal: Normal range of motion  She exhibits edema  Generalized lower extremity weaknesss 4/5  Plus two pitting edema to lower extremities  Neurological: She is alert and oriented to person, place, and time  Skin: Skin is warm  Capillary refill takes less than 2 seconds  There is erythema  Right elbow 14cm x 7cm redness, swelling and tenderness with palpation  Scab to right elbow 1 5cm x 4 5cm  Wound cleansed with peroxide and dried  Nonadherent dressing with paper tape applied to wound site  Psychiatric: She has a normal mood and affect  Nursing note and vitals reviewed  Stage IV sacral decubitus half dollar in size noted with 0 5 cm depth block in as scar tissue in the center with yellow purulent drainage noted progressively to the epidermis  Mild foul smell noted  Wound cleansed with normal saline  Bandage applied to the site  No fever no temperature no chills noted

## 2020-07-20 NOTE — ASSESSMENT & PLAN NOTE
Patient instructed to elevate her legs, start on Lasix 20 mg p o  Daily x5 days  She has also been instructed to decrease her sodium intake as well as with monitor fluid intake

## 2020-07-20 NOTE — ASSESSMENT & PLAN NOTE
Half dollar size stage IV decubitus noted to sacrum  Area cleansed with saline  Patient started on antibiotics referred to wound management at this time for possible debridement and further wound management consultation

## 2020-07-22 ENCOUNTER — TELEPHONE (OUTPATIENT)
Dept: FAMILY MEDICINE CLINIC | Facility: CLINIC | Age: 75
End: 2020-07-22

## 2020-07-22 NOTE — TELEPHONE ENCOUNTER
Called [facility] wound care to schedule appt for pt left 2 messages  Theyu finally called back and got pt scheduled on 07/29    [facility]

## 2020-07-29 ENCOUNTER — APPOINTMENT (OUTPATIENT)
Dept: WOUND CARE | Facility: HOSPITAL | Age: 75
End: 2020-07-29
Payer: MEDICARE

## 2020-07-29 PROCEDURE — 99213 OFFICE O/P EST LOW 20 MIN: CPT

## 2020-07-29 PROCEDURE — 97597 DBRDMT OPN WND 1ST 20 CM/<: CPT

## 2020-07-29 PROCEDURE — 11045 DBRDMT SUBQ TISS EACH ADDL: CPT

## 2020-07-29 PROCEDURE — 11042 DBRDMT SUBQ TIS 1ST 20SQCM/<: CPT

## 2020-08-05 ENCOUNTER — APPOINTMENT (OUTPATIENT)
Dept: WOUND CARE | Facility: HOSPITAL | Age: 75
End: 2020-08-05
Payer: MEDICARE

## 2020-08-05 PROCEDURE — 11042 DBRDMT SUBQ TIS 1ST 20SQCM/<: CPT

## 2020-08-05 PROCEDURE — 97597 DBRDMT OPN WND 1ST 20 CM/<: CPT

## 2020-08-19 ENCOUNTER — HOSPITAL ENCOUNTER (OUTPATIENT)
Dept: RADIOLOGY | Facility: HOSPITAL | Age: 75
Discharge: HOME | End: 2020-08-19
Attending: PREVENTIVE MEDICINE
Payer: MEDICARE

## 2020-08-19 ENCOUNTER — OFFICE VISIT (OUTPATIENT)
Dept: WOUND CARE | Facility: HOSPITAL | Age: 75
End: 2020-08-19
Payer: MEDICARE

## 2020-08-19 ENCOUNTER — TRANSCRIBE ORDERS (OUTPATIENT)
Dept: RADIOLOGY | Facility: HOSPITAL | Age: 75
End: 2020-08-19

## 2020-08-19 VITALS
HEIGHT: 66 IN | DIASTOLIC BLOOD PRESSURE: 86 MMHG | TEMPERATURE: 97.6 F | SYSTOLIC BLOOD PRESSURE: 154 MMHG | BODY MASS INDEX: 24.11 KG/M2 | HEART RATE: 74 BPM | WEIGHT: 150 LBS | RESPIRATION RATE: 18 BRPM

## 2020-08-19 DIAGNOSIS — L89.154 PRESSURE ULCER OF SACRAL REGION, STAGE 4 (HCC): ICD-10-CM

## 2020-08-19 DIAGNOSIS — IMO0002 DIABETES MELLITUS TYPE 2 WITH COMPLICATIONS, UNCONTROLLED: ICD-10-CM

## 2020-08-19 DIAGNOSIS — L89.154 PRESSURE ULCER OF SACRAL REGION, STAGE 4 (HCC): Primary | ICD-10-CM

## 2020-08-19 DIAGNOSIS — R26.9 GAIT ABNORMALITY: ICD-10-CM

## 2020-08-19 PROCEDURE — 11045 DBRDMT SUBQ TISS EACH ADDL: CPT | Performed by: PREVENTIVE MEDICINE

## 2020-08-19 PROCEDURE — 72220 X-RAY EXAM SACRUM TAILBONE: CPT

## 2020-08-19 PROCEDURE — 11042 DBRDMT SUBQ TIS 1ST 20SQCM/<: CPT | Performed by: PREVENTIVE MEDICINE

## 2020-08-19 PROCEDURE — 99212 OFFICE O/P EST SF 10 MIN: CPT | Performed by: PREVENTIVE MEDICINE

## 2020-08-19 RX ORDER — LIDOCAINE HYDROCHLORIDE 40 MG/ML
5 SOLUTION TOPICAL ONCE
Status: COMPLETED | OUTPATIENT
Start: 2020-08-19 | End: 2020-08-19

## 2020-08-19 RX ADMIN — LIDOCAINE HYDROCHLORIDE 5 ML: 40 SOLUTION TOPICAL at 09:43

## 2020-08-19 NOTE — PATIENT INSTRUCTIONS
Orders Placed This Encounter   Procedures    Wound cleansing and dressings     Wash your hands with soap and water  Remove old dressing, discard into plastic bag and place in trash  Cleanse the wound with normal saline prior to applying a clean dressing  Do not use tissue or cotton balls  Do not scrub the wound  Pat dry using gauze  Shower yes   Soak with Dakins solution for 5 minutes  Apply mesalt to the sacrum wound  Cover with gauze and ABD   Secure with tape   Change dressing every other day        X-ray of sacrum ordered today     Standing Status:   Future     Standing Expiration Date:   8/19/2021    Wound off loading     Off-loading Instructions:    Keep weight and pressure off wound at all times  Turn and reposition frequently  Continue Physical Therapy for mobility     Standing Status:   Future     Standing Expiration Date:   8/19/2021    Wound home care     Continue home care for wound care 3 times a week     Standing Status:   Future     Standing Expiration Date:   8/19/2021     Pressure Ulcer   WHAT YOU NEED TO KNOW:   A pressure ulcer is an injury to the skin or tissue over a bony area  A pressure ulcer is also called a pressure sore, bedsore, or decubitus ulcer  Pressure ulcers can form over any bony area but are most common on the back, buttocks, hips, and heels  DISCHARGE INSTRUCTIONS:   Contact your healthcare provider if:   · You have a fever  · You have green or yellow drainage or a bad smell coming from your pressure ulcer  · An area of your skin is very red and firm  · You have new pain, or pain that is getting worse  · You have questions or concerns about your condition or care  Medicines:   · Medicines  may be given to decrease pain or to help treat or prevent a bacterial infection  Ask how to take these medicines safely  · Take your medicine as directed  Contact your healthcare provider if you think your medicine is not helping or if you have side effects   Tell him or her if you are allergic to any medicine  Keep a list of the medicines, vitamins, and herbs you take  Include the amounts, and when and why you take them  Bring the list or the pill bottles to follow-up visits  Carry your medicine list with you in case of an emergency  Change your position often:  Change your position every 2 hours if you are in a bed all day  Change your position every hour if you are in a wheelchair all day  Set an alarm to help remind you when it is time to turn  Keep a written turning schedule to help you remember to turn  Care for your skin:   · Clean and cover your wound  You may need to keep a bandage over your wound to protect the skin from more damage  You may also need to clean your wound with saline  Ask your healthcare provider for more information about when and how to change your bandages  · Keep your skin clean, dry, and moisturized  Use mild soap and warm water to clean your skin  Do not rub or scrub when you wash  Do not use products that contain alcohol, because they can dry out your skin  Gently pat your skin dry  Do not rub your skin with a towel  Apply lotion or a moisturizer on your skin often  · Protect the skin over bony areas  Use pillows or foam wedges to keep bony areas from touching, and to relieve pressure  For example, put a pillow or foam wedge between your knees to keep them from pressing on one another  Place a pillow or foam wedge under you to keep your hip raised when you lie on your side  Do not rest directly on your hipbone  Put a foam pad or pillow under your legs from calf to ankle when you lie on your back  The pad or pillow should raise your heels so that they are not touching the bed  · Use medical equipment and pads  A draw sheet or large pad under you may help others move you up in bed  An overhead trapeze can help you change positions in bed   Mattresses and overlays made to provide more cushioning may help decrease the risk of pressure ulcers  Examples include a foam mattress pad and air or water mattresses  Ask about equipment that may be right for you and how you use it  Eat a variety of healthy foods:  Healthy foods include fruits, vegetables, whole-grain breads, and fish  Foods that are high in protein may help your pressure ulcer heal  This includes lean meats, beans, milk, yogurt, and cheese  Nutrition shakes may also give you extra calories and protein if you have trouble eating or are underweight  Do not smoke:  Nicotine can damage your skin and slow wound healing  Do not use e-cigarettes or smokeless tobacco in place of cigarettes or to help you quit  They still contain nicotine  Ask your healthcare provider for information if you currently smoke and need help to quit  Follow up with your healthcare provider as directed: You may need to return often for wound checks  Write down your questions so you remember to ask them during your visits  © 2017 [employer]  Information is for End User's use only and may not be sold, redistributed or otherwise used for commercial purposes  All illustrations and images included in CareNotes® are the copyrighted property of [employer]  or [employer]  The above information is an  only  It is not intended as medical advice for individual conditions or treatments  Talk to your doctor, nurse or pharmacist before following any medical regimen to see if it is safe and effective for you  Orders Placed This Encounter   Procedures    Wound cleansing and dressings     Wash your hands with soap and water  Remove old dressing, discard into plastic bag and place in trash  Cleanse the wound with normal saline prior to applying a clean dressing  Do not use tissue or cotton balls  Do not scrub the wound  Pat dry using gauze  Shower yes   Soak with Dakins solution for 5 minutes  Apply mesalt to the sacrum wound    Cover with gauze and ABD   Secure with tape   Change dressing every other day        X-ray of sacrum ordered today     Standing Status:   Future     Standing Expiration Date:   8/19/2021    Wound off loading     Off-loading Instructions:    Keep weight and pressure off wound at all times  Turn and reposition frequently  Continue Physical Therapy for mobility     Standing Status:   Future     Standing Expiration Date:   8/19/2021    Wound home care     Continue home care for wound care 3 times a week     Standing Status:   Future     Standing Expiration Date:   8/19/2021    Debridement     This order was created via procedure documentation    XR sacrum and coccyx     Nonhealing stage 4 sacral ulcer  R/o osteomyelitis     Standing Status:   Future     Number of Occurrences:   1     Standing Expiration Date:   8/19/2024     Scheduling Instructions:      Bring along any outside films relating to this procedure

## 2020-08-19 NOTE — PROGRESS NOTES
Patient ID: Charly Deleon is a 67 y o  female Date of Birth 1945     Chief Complaint  Chief Complaint   Patient presents with    Follow Up Wound Care Visit     to elbow and sacrum,  dressing intact       Allergies  Patient has no known allergies  Assessment:    No problem-specific Assessment & Plan notes found for this encounter  Diagnoses and all orders for this visit:    Pressure ulcer of sacral region, stage 4 (HCC)  -     Wound cleansing and dressings; Future  -     Wound off loading; Future  -     Wound home care; Future  -     lidocaine (XYLOCAINE) 4 % topical solution 5 mL  -     XR sacrum and coccyx; Future    Diabetes mellitus type 2 with complications, uncontrolled (HCC)  -     lidocaine (XYLOCAINE) 4 % topical solution 5 mL    Gait abnormality    Other orders  -     Debridement              Debridement   Consent obtained? written  Consent given by: patient  Risks discussed? procedural risks discussed  Time out called at 8/19/2020 9:59 AM  Immediately prior to the procedure a time out was called  Performed by: physician  Debridement type: surgical  Level of debridement: subcutaneous tissue  Pain control: lidocaine 4%  Pre-debridement measurements  Length (cm): 5 5  Width (cm): 4  Depth (cm): 2 3  Surface Area (cm^2): 22  Volume (cm^3): 50 6    Post-debridement measurements  Length (cm): 5 5  Width (cm): 4  Depth (cm): 2 5  Percent debrided: 100%  Surface Area (cm^2): 22  Area debrided (cm^2): 22  Volume (cm^3): 55  Tissue and other material debrided: subcutaneous tissue  Devitalized tissue debrided: necrotic debris and slough  Instrument(s) utilized: forceps and blade  Bleeding: medium  Hemostasis obtained with: pressure  Procedural pain (0-10): 3  Post-procedural pain: 0   Response to treatment: procedure was tolerated well          Plan:   elbow is healed  Sacral ulcer probes to bone and increased in size  Check xray to r/o osteo  Needs to offload and increase protein intake   Needs to keep BG's controlled  Pressure Ulcer 08/19/20 Sacrum (Active)       Subjective:        F/u pressure ulcer R elbow and sacrum  Still limited mobility  In recliner most of the day  Poor diet, still high in sugar and low protein  No fever  Does have some pain sacral area      The following portions of the patient's history were reviewed and updated as appropriate:   She  has a past medical history of Acute renal failure (ARF) ([address] ), Acute respiratory failure with hypoxia ([address] ), Chronic kidney disease, Diabetes mellitus ([address] ), Hyperlipidemia, and Hypertension    She   Patient Active Problem List    Diagnosis Date Noted    Colon cancer screening 07/20/2020    Need for vaccination against Streptococcus pneumoniae using pneumococcal conjugate vaccine 13 07/20/2020    Decubitus ulcer of sacral region, stage 4 ([address] ) 07/20/2020    Cellulitis of right elbow 07/20/2020    Bilateral lower extremity edema 07/20/2020    History of vitamin D deficiency 07/20/2020    Low grade fever 06/17/2020    Herpes zoster without complication 18/33/3072    Diarrhea 06/08/2020    Distal radius fracture, right 05/27/2020    Constipation 05/26/2020    Acute respiratory failure with hypoxia ([address] ) 05/22/2020    Closed comminuted intertrochanteric fracture of proximal femur, right, initial encounter ([facility] ) 05/20/2020    Symptomatic varicose veins of both lower extremities 06/04/2019    Chronic venous insufficiency 06/04/2019    Status post reverse arthroplasty of right shoulder 09/25/2018    Scalp hematoma 09/13/2018    Acute blood loss as cause of postoperative anemia 09/12/2018    Type 2 diabetes mellitus with hyperglycemia ([address] ) 09/10/2018    Hypertension 09/10/2018    Hyperlipidemia 09/10/2018    Acute renal failure superimposed on stage 3 chronic kidney disease ([address] ) 09/10/2018    Closed 4-part fracture of proximal humerus with routine healing 09/05/2018     She  has a past surgical history that includes Breast surgery (Left); pr xcapsl ctrc rmvl insj io lens prosth w/o ecp (Right, 10/23/2017); Cataract extraction w/ intraocular lens implant (Left, 12/11/2017); Cataract extraction (Bilateral, 2017); pr say shoulder arthrplsty humeral&glenoid compnt (Right, 9/10/2018); and pr open rx femur fx+intramed sergio (Right, 5/21/2020)  Her family history includes Arthritis in her brother; Diabetes in her daughter and mother; No Known Problems in her brother; Stroke in her father; Varicose Veins in her mother  She  reports that she has never smoked  She has never used smokeless tobacco  She reports that she does not drink alcohol or use drugs  Current Outpatient Medications   Medication Sig Dispense Refill    acetaminophen (TYLENOL) 325 mg tablet Take 3 tablets (975 mg total) by mouth every 8 (eight) hours 30 tablet 0    amLODIPine (NORVASC) 5 mg tablet Take 1 tablet (5 mg total) by mouth daily  0    enoxaparin (LOVENOX) 40 mg/0 4 mL Inject 0 4 mL (40 mg total) under the skin daily 28 Syringe 0    ferrous sulfate 325 (65 Fe) mg tablet TAKE 1 TABLET BY MOUTH ONCE A DAY FOR SUPPLEMENT      gabapentin (NEURONTIN) 100 mg capsule Take 100 mg by mouth 3 (three) times a day      glipiZIDE (GLUCOTROL) 5 mg tablet Take 5 mg by mouth every evening      metFORMIN (GLUCOPHAGE) 500 mg tablet Take 1 tablet (500 mg total) by mouth 2 (two) times a day with meals  0    polyethylene glycol (MIRALAX) 17 g packet Take 17 g by mouth daily 14 each 0    pravastatin (PRAVACHOL) 20 mg tablet Take 20 mg by mouth every evening      senna-docusate sodium (SENOKOT-S) 8 6-50 mg per tablet Take 2 tablets by mouth 2 (two) times a day 7 tablet 0    furosemide (LASIX) 20 mg tablet Take 1 tablet (20 mg total) by mouth daily for 5 days 5 tablet 0     No current facility-administered medications for this visit       Review of Systems   Constitutional: Positive for appetite change  Respiratory: Negative  Cardiovascular: Negative      Skin: Positive for wound  Allergic/Immunologic: Negative  Psychiatric/Behavioral: Negative  Objective:       Pressure Ulcer 08/19/20 Sacrum (Active)   Pressure Injury Stage Unstageable 08/19/20 0919   Wound Description Beefy red;Granulation tissue;Slough (Bone exposed) 08/19/20 0919   Cristal-wound Assessment Excoriated 08/19/20 0919   Wound Length (cm) 5 5 cm 08/19/20 0919   Wound Width (cm) 4 cm 08/19/20 0919   Wound Depth (cm) 2 3 cm 08/19/20 0919   Wound Surface Area (cm^2) 22 cm^2 08/19/20 0919   Calculated Wound Volume (cm^3) 50 6 cm^3 08/19/20 0919   Drainage Amount Large 08/19/20 0919   Drainage Description Serosanguineous 08/19/20 0919   Wound Image Images linked 08/19/20 0919       /86 (BP Location: Left arm, Patient Position: Sitting)   Pulse 74   Temp 97 6 °F (36 4 °C) (Tympanic)   Resp 18   Ht 5' 6" (1 676 m)   Wt 68 kg (150 lb)   BMI 24 21 kg/m²     Physical Exam  Constitutional:       General: She is not in acute distress  Appearance: She is ill-appearing  Cardiovascular:      Rate and Rhythm: Normal rate  Pulmonary:      Effort: Pulmonary effort is normal    Skin:     Capillary Refill: Capillary refill takes less than 2 seconds  Neurological:      General: No focal deficit present  Mental Status: She is alert and oriented to person, place, and time  Psychiatric:         Mood and Affect: Mood normal          Behavior: Behavior normal          Thought Content: Thought content normal          Judgment: Judgment normal            Wound Instructions:  Orders Placed This Encounter   Procedures    Wound cleansing and dressings     Wash your hands with soap and water  Remove old dressing, discard into plastic bag and place in trash  Cleanse the wound with normal saline prior to applying a clean dressing  Do not use tissue or cotton balls  Do not scrub the wound  Pat dry using gauze  Shower yes   Soak with Dakins solution for 5 minutes  Apply mesalt to the sacrum wound    Cover with gauze and ABD   Secure with tape   Change dressing every other day        X-ray of sacrum ordered today     Standing Status:   Future     Standing Expiration Date:   8/19/2021    Wound off loading     Off-loading Instructions:    Keep weight and pressure off wound at all times  Turn and reposition frequently  Continue Physical Therapy for mobility     Standing Status:   Future     Standing Expiration Date:   8/19/2021    Wound home care     Continue home care for wound care 3 times a week     Standing Status:   Future     Standing Expiration Date:   8/19/2021    Debridement     This order was created via procedure documentation    XR sacrum and coccyx     Nonhealing stage 4 sacral ulcer  R/o osteomyelitis     Standing Status:   Future     Standing Expiration Date:   8/19/2024     Scheduling Instructions:      Bring along any outside films relating to this procedure

## 2020-08-25 ENCOUNTER — APPOINTMENT (EMERGENCY)
Dept: CT IMAGING | Facility: HOSPITAL | Age: 75
End: 2020-08-25
Payer: MEDICARE

## 2020-08-25 ENCOUNTER — HOSPITAL ENCOUNTER (INPATIENT)
Facility: HOSPITAL | Age: 75
LOS: 16 days | Discharge: NURSING, REHAB OR LONG-TERM CARE | DRG: 853 | End: 2020-09-10
Attending: INTERNAL MEDICINE | Admitting: EMERGENCY MEDICINE
Payer: MEDICARE

## 2020-08-25 ENCOUNTER — HOSPITAL ENCOUNTER (EMERGENCY)
Facility: HOSPITAL | Age: 75
End: 2020-08-25
Attending: EMERGENCY MEDICINE | Admitting: EMERGENCY MEDICINE
Payer: MEDICARE

## 2020-08-25 VITALS
SYSTOLIC BLOOD PRESSURE: 98 MMHG | TEMPERATURE: 98.4 F | DIASTOLIC BLOOD PRESSURE: 50 MMHG | WEIGHT: 161.38 LBS | OXYGEN SATURATION: 94 % | HEART RATE: 98 BPM | RESPIRATION RATE: 16 BRPM | BODY MASS INDEX: 26.05 KG/M2

## 2020-08-25 DIAGNOSIS — I48.0 PAROXYSMAL ATRIAL FIBRILLATION (HCC): ICD-10-CM

## 2020-08-25 DIAGNOSIS — L89.154 DECUBITUS ULCER OF SACRAL REGION, STAGE 4 (HCC): ICD-10-CM

## 2020-08-25 DIAGNOSIS — R29.90 STROKE-LIKE SYMPTOMS: ICD-10-CM

## 2020-08-25 DIAGNOSIS — I63.9 CEREBROVASCULAR ACCIDENT (CVA), UNSPECIFIED MECHANISM (HCC): ICD-10-CM

## 2020-08-25 DIAGNOSIS — B95.2 BACTEREMIA DUE TO ENTEROCOCCUS: ICD-10-CM

## 2020-08-25 DIAGNOSIS — I10 ESSENTIAL HYPERTENSION: ICD-10-CM

## 2020-08-25 DIAGNOSIS — R65.21: Primary | ICD-10-CM

## 2020-08-25 DIAGNOSIS — E11.65 TYPE 2 DIABETES MELLITUS WITH HYPERGLYCEMIA, WITHOUT LONG-TERM CURRENT USE OF INSULIN (HCC): ICD-10-CM

## 2020-08-25 DIAGNOSIS — I48.91 ATRIAL FIBRILLATION (HCC): Primary | ICD-10-CM

## 2020-08-25 DIAGNOSIS — I65.29 CAROTID STENOSIS: ICD-10-CM

## 2020-08-25 DIAGNOSIS — R78.81 BACTEREMIA DUE TO ENTEROCOCCUS: ICD-10-CM

## 2020-08-25 DIAGNOSIS — M86.9 OSTEOMYELITIS (HCC): ICD-10-CM

## 2020-08-25 DIAGNOSIS — A41.89: Primary | ICD-10-CM

## 2020-08-25 DIAGNOSIS — E78.5 HYPERLIPIDEMIA, UNSPECIFIED HYPERLIPIDEMIA TYPE: ICD-10-CM

## 2020-08-25 DIAGNOSIS — I63.532 CEREBROVASCULAR ACCIDENT (CVA) DUE TO STENOSIS OF LEFT POSTERIOR CEREBRAL ARTERY (HCC): ICD-10-CM

## 2020-08-25 PROBLEM — E87.1 HYPONATREMIA: Status: ACTIVE | Noted: 2020-08-25

## 2020-08-25 PROBLEM — N17.9 AKI (ACUTE KIDNEY INJURY) (HCC): Status: ACTIVE | Noted: 2020-08-25

## 2020-08-25 PROBLEM — L89.159 PRESSURE INJURY OF SKIN OF SACRAL REGION: Status: ACTIVE | Noted: 2020-07-20

## 2020-08-25 LAB
ANION GAP SERPL CALCULATED.3IONS-SCNC: 11 MMOL/L (ref 4–13)
APTT PPP: 33 SECONDS (ref 23–37)
ATRIAL RATE: 106 BPM
BUN SERPL-MCNC: 36 MG/DL (ref 5–25)
CALCIUM SERPL-MCNC: 8.2 MG/DL (ref 8.3–10.1)
CHLORIDE SERPL-SCNC: 95 MMOL/L (ref 100–108)
CO2 SERPL-SCNC: 23 MMOL/L (ref 21–32)
CREAT SERPL-MCNC: 2.59 MG/DL (ref 0.6–1.3)
ERYTHROCYTE [DISTWIDTH] IN BLOOD BY AUTOMATED COUNT: 14.2 % (ref 11.6–15.1)
GFR SERPL CREATININE-BSD FRML MDRD: 17 ML/MIN/1.73SQ M
GLUCOSE SERPL-MCNC: 234 MG/DL (ref 65–140)
GLUCOSE SERPL-MCNC: 321 MG/DL (ref 65–140)
GLUCOSE SERPL-MCNC: 352 MG/DL (ref 65–140)
HCT VFR BLD AUTO: 32.4 % (ref 34.8–46.1)
HGB BLD-MCNC: 10.3 G/DL (ref 11.5–15.4)
INR PPP: 1.23 (ref 0.84–1.19)
MCH RBC QN AUTO: 28.6 PG (ref 26.8–34.3)
MCHC RBC AUTO-ENTMCNC: 31.8 G/DL (ref 31.4–37.4)
MCV RBC AUTO: 90 FL (ref 82–98)
P AXIS: 78 DEGREES
PLATELET # BLD AUTO: 309 THOUSANDS/UL (ref 149–390)
PMV BLD AUTO: 11 FL (ref 8.9–12.7)
POTASSIUM SERPL-SCNC: 4.1 MMOL/L (ref 3.5–5.3)
PR INTERVAL: 134 MS
PROTHROMBIN TIME: 15.6 SECONDS (ref 11.6–14.5)
QRS AXIS: 3 DEGREES
QRSD INTERVAL: 72 MS
QT INTERVAL: 304 MS
QTC INTERVAL: 403 MS
RBC # BLD AUTO: 3.6 MILLION/UL (ref 3.81–5.12)
SARS-COV-2 RNA RESP QL NAA+PROBE: NEGATIVE
SODIUM SERPL-SCNC: 129 MMOL/L (ref 136–145)
T WAVE AXIS: 20 DEGREES
TROPONIN I SERPL-MCNC: <0.02 NG/ML
VENTRICULAR RATE: 106 BPM
WBC # BLD AUTO: 16.27 THOUSAND/UL (ref 4.31–10.16)

## 2020-08-25 PROCEDURE — 84484 ASSAY OF TROPONIN QUANT: CPT | Performed by: EMERGENCY MEDICINE

## 2020-08-25 PROCEDURE — 99283 EMERGENCY DEPT VISIT LOW MDM: CPT | Performed by: EMERGENCY MEDICINE

## 2020-08-25 PROCEDURE — 99291 CRITICAL CARE FIRST HOUR: CPT

## 2020-08-25 PROCEDURE — 87635 SARS-COV-2 COVID-19 AMP PRB: CPT | Performed by: EMERGENCY MEDICINE

## 2020-08-25 PROCEDURE — 36415 COLL VENOUS BLD VENIPUNCTURE: CPT | Performed by: EMERGENCY MEDICINE

## 2020-08-25 PROCEDURE — 85730 THROMBOPLASTIN TIME PARTIAL: CPT | Performed by: EMERGENCY MEDICINE

## 2020-08-25 PROCEDURE — 85610 PROTHROMBIN TIME: CPT | Performed by: EMERGENCY MEDICINE

## 2020-08-25 PROCEDURE — 85027 COMPLETE CBC AUTOMATED: CPT | Performed by: EMERGENCY MEDICINE

## 2020-08-25 PROCEDURE — 99223 1ST HOSP IP/OBS HIGH 75: CPT | Performed by: INTERNAL MEDICINE

## 2020-08-25 PROCEDURE — 82948 REAGENT STRIP/BLOOD GLUCOSE: CPT

## 2020-08-25 PROCEDURE — 93010 ELECTROCARDIOGRAM REPORT: CPT | Performed by: INTERNAL MEDICINE

## 2020-08-25 PROCEDURE — 70496 CT ANGIOGRAPHY HEAD: CPT

## 2020-08-25 PROCEDURE — 96360 HYDRATION IV INFUSION INIT: CPT

## 2020-08-25 PROCEDURE — 70498 CT ANGIOGRAPHY NECK: CPT

## 2020-08-25 PROCEDURE — 80048 BASIC METABOLIC PNL TOTAL CA: CPT | Performed by: EMERGENCY MEDICINE

## 2020-08-25 PROCEDURE — 93005 ELECTROCARDIOGRAM TRACING: CPT

## 2020-08-25 PROCEDURE — 99291 CRITICAL CARE FIRST HOUR: CPT | Performed by: EMERGENCY MEDICINE

## 2020-08-25 PROCEDURE — G1004 CDSM NDSC: HCPCS

## 2020-08-25 PROCEDURE — 96365 THER/PROPH/DIAG IV INF INIT: CPT

## 2020-08-25 PROCEDURE — 99285 EMERGENCY DEPT VISIT HI MDM: CPT | Performed by: PSYCHIATRY & NEUROLOGY

## 2020-08-25 RX ORDER — SODIUM CHLORIDE 9 MG/ML
75 INJECTION, SOLUTION INTRAVENOUS ONCE
Status: COMPLETED | OUTPATIENT
Start: 2020-08-25 | End: 2020-08-25

## 2020-08-25 RX ORDER — SODIUM CHLORIDE 9 MG/ML
75 INJECTION, SOLUTION INTRAVENOUS ONCE
Status: DISCONTINUED | OUTPATIENT
Start: 2020-08-25 | End: 2020-08-25

## 2020-08-25 RX ORDER — MIDAZOLAM HYDROCHLORIDE 1 MG/ML
1 INJECTION INTRAMUSCULAR; INTRAVENOUS ONCE
Status: COMPLETED | OUTPATIENT
Start: 2020-08-25 | End: 2020-08-25

## 2020-08-25 RX ORDER — ATORVASTATIN CALCIUM 40 MG/1
40 TABLET, FILM COATED ORAL EVERY EVENING
Status: DISCONTINUED | OUTPATIENT
Start: 2020-08-25 | End: 2020-08-27

## 2020-08-25 RX ORDER — GABAPENTIN 100 MG/1
100 CAPSULE ORAL 3 TIMES DAILY
Status: DISCONTINUED | OUTPATIENT
Start: 2020-08-25 | End: 2020-09-10 | Stop reason: HOSPADM

## 2020-08-25 RX ORDER — HYDRALAZINE HYDROCHLORIDE 20 MG/ML
10 INJECTION INTRAMUSCULAR; INTRAVENOUS EVERY 4 HOURS PRN
Status: DISCONTINUED | OUTPATIENT
Start: 2020-08-25 | End: 2020-09-10 | Stop reason: HOSPADM

## 2020-08-25 RX ORDER — AMOXICILLIN 250 MG
2 CAPSULE ORAL 2 TIMES DAILY
Status: DISCONTINUED | OUTPATIENT
Start: 2020-08-26 | End: 2020-08-27

## 2020-08-25 RX ORDER — POLYETHYLENE GLYCOL 3350 17 G/17G
17 POWDER, FOR SOLUTION ORAL DAILY
Status: DISCONTINUED | OUTPATIENT
Start: 2020-08-26 | End: 2020-08-27

## 2020-08-25 RX ORDER — ALBUMIN, HUMAN INJ 5% 5 %
12.5 SOLUTION INTRAVENOUS ONCE
Status: COMPLETED | OUTPATIENT
Start: 2020-08-25 | End: 2020-08-25

## 2020-08-25 RX ORDER — SODIUM CHLORIDE 9 MG/ML
100 INJECTION, SOLUTION INTRAVENOUS CONTINUOUS
Status: DISCONTINUED | OUTPATIENT
Start: 2020-08-25 | End: 2020-08-26

## 2020-08-25 RX ADMIN — ALTEPLASE 59.3 MG: KIT at 13:49

## 2020-08-25 RX ADMIN — ALBUMIN (HUMAN) 12.5 G: 12.5 INJECTION, SOLUTION INTRAVENOUS at 17:13

## 2020-08-25 RX ADMIN — IOHEXOL 90 ML: 350 INJECTION, SOLUTION INTRAVENOUS at 13:35

## 2020-08-25 RX ADMIN — HYDRALAZINE HYDROCHLORIDE 10 MG: 20 INJECTION INTRAMUSCULAR; INTRAVENOUS at 23:13

## 2020-08-25 RX ADMIN — SODIUM CHLORIDE 75 ML/HR: 0.9 INJECTION, SOLUTION INTRAVENOUS at 14:15

## 2020-08-25 RX ADMIN — SODIUM CHLORIDE 100 ML/HR: 0.9 INJECTION, SOLUTION INTRAVENOUS at 20:58

## 2020-08-25 NOTE — ED NOTES
Family asking about dentures; [facility] ED contacted but they report that they did not pull any dentures out or have seen any  They believe patient arrived without them   Family updated      Paola Hinton RN  08/25/20 1640

## 2020-08-25 NOTE — CONSULTS
Consultation - Stroke   Roopa Baker 67 y o  female MRN: [id number]  Unit/Bed#: ED 09 Encounter: [id number]      Assessment/Plan   Roopa Baker is a 67 y o  female with HTN, HLD, DM type 2, CKD, recent right hip surgery s/p fall (May 2020) who presents [facility] ED on 08/25/2020 as a stroke alert with aphasia who presented to [facility] ED on 08/25/2020 with aphasia, initial NIHSS of 6  CT head unremarkable for acute intracranial abnormalities  CTA head and neck revealed severe left PCA stenosis  1  Acute left hemispheric infarct, s/p IV tPA  Etiology likely cardioembolic in the setting of new onset AFIb s/p cardioversion  Unclear if severe left PCA stenosis is causing patients symptomology  Other considerations included distal left MCA infarct not evident on CT head imaging  Plan to transfer to [facility] for CT perfusion  2  New onset Afib, s/p cardioversion prior to presentation to the ED  Would not recommend AP/AC at this time as patient received IV tPA on 08/25/2020    TPA Decision: TPA given this admission  After a discussion of risks and benefits reviewing inclusion and exclusion criteria the decision was made to proceed with thrombolytic therapy  Verbal consent was obtained from  patient and her son acting as a surrogate decision maker      Plan:   -Transfer for CT perfusion   -Keep blood pressures elevated; Strict BP control <180/105  -Keep flat in supine position  -S/p IV tPA at 1347 on 08/25/2020  -Plan for repeat CTH 24h post tPA  -Hold aspirin, anticoagulation, and DVT ppx 24h post tPA  -Recommend MRI brain when able   -Recommend Echo  -Recommend: TSH, Hemoglobin A1c, Fasting lipid panel  -Okay to start on statin therapy   -Telemetry  -PT/OT/ST  -Frequent neuro checks  -Stat CTH for acute change  -Continue to monitor notify with changes    Imaging/Labs:   -CT head 08/25/2020:  · No acute intracranial abnormalities noted; stable mild microangiopathic changes noted  -CTA head and neck 08/25/2020:  · Approximately 65-70% stenosis of the left internal carotid artery bulb  · Mild intracranial atherosclerotic disease including mild focal narrowing of the right supraclinoid internal carotid artery   · Posterior cerebral artery disease is noted, mild on the right and severe on the left  · M1 segments and middle cerebral artery branches appear patent     -Hyponatremic on presentation with a sodium of 129  -Creatine elevated on presentation 2 59  -BUN elevated on presentation 36  -Hyperglycemic on presentation with a serum glucose of 321  -Leukocytosis noted on presentation with a WBC of 16 27  -Labs WNL:  Troponin    History of Present Illness     Reason for Consult / Principal Problem: Aphasia   Hx and PE limited by: Patient is aphasic   Patient last known well: Reportedly 1 hour prior to arrival to ED (approximately 12 PM on 08/25/2020)  Stroke alert called: [phone]   Neurology time of arrival: 1309  HPI: Tawana Lam is a 67 y o   female with HTN, HLD, DM type 2, CKD, recent right hip surgery s/p fall (May 2020) who presents [facility] ED on 08/25/2020 as a stroke alert with aphasia  Per discussion ED staff and EMS, patient was in new onset Afib with RVR, and was also noted to be hypotensive at the time  Patient was cardioverted and soon after became aphasia  Patient presented to [facility] ED on 08/25/2020 hypotensive with a BP of 96/51  CT head was unremarkable for acute intracranial abnormalities  CTA head and neck revealed severe left PCA stenosis  Initial NIHSS of 6 for confusion, aphasia, dysarthria, RUE drift, and BLE drift  Decision was made to administer IV tPA given patient's significant deficits  Case discussed with and reviewed by both attending neurologist and neurosurgery  Neurosurgery felt given the patient low NIHSS score, they would not intervene at this time  Decision was made to transfer for CT perfusion and closer monitoring   Upon reassessment (30 minutes after initial exam, 15 minutes after IV tPA administration) patient showed improvement in aphasia and dysarthria  Repeat NIHSS was 3 for mild aphasia and BLE drift  Case discussed with ICU at [facility], Dr Torrie Owen, who was accepting transferred    Inpatient consult to Neurology  Consult performed by: Padmini Lux PA-C  Consult ordered by: Nicholas Meza DO        Review of Systems  12 point ROS limited to acuity of condition  Historical Information   Past Medical History:   Diagnosis Date    Acute [facility] failure ([facility]F) ([facility] )     Acute respiratory failure with hypoxia ([facility] )     Chronic kidney disease     Diabetes mellitus ([facility] )     type 2    Hyperlipidemia     Hypertension      Past Surgical History:   Procedure Laterality Date    BREAST SURGERY Left     lumpectomy benign    CATARACT EXTRACTION Bilateral 2017    CATARACT EXTRACTION W/ INTRAOCULAR LENS IMPLANT Left 12/11/2017    Procedure: EXTRACTION EXTRACAPSULAR CATARACT PHACO INTRAOCULAR LENS (IOL); Surgeon: Kristin Elmore MD;  Location: [facility] MAIN OR;  Service: Ophthalmology    MN OPEN RX FEMUR FX+INTRAMED RAYMOND Right 5/21/2020    Procedure: INSERTION OF SHORT TROCHANTERIC FEMORAL NAIL;  Surgeon: Neno Fitch MD;  [facility] Main [facility];  Service: Orthopedics    [address] HUMERAL&GLENOID COMPNT Right 9/10/2018    Procedure: RIGHT REVERSE TOTAL SHOULDER ARTHROPLASTY;  Surgeon: Neno Fitch MD;  [facility];  Service: Orthopedics    MN XCAPSL CTRC RMVL INSJ IO LENS PROSTH W/O ECP Right 10/23/2017    Procedure: EXTRACTION EXTRACAPSULAR CATARACT PHACO INTRAOCULAR LENS (IOL);   Surgeon: Kristin Elmore MD;  Location: [facility] MAIN OR;  Service: Ophthalmology     Social History   Social History     Substance and Sexual Activity   Alcohol Use No    Comment: quit 8/17     Social History     Substance and Sexual Activity   Drug Use No     E-Cigarette/Vaping    E-Cigarette Use Never User      E-Cigarette/Vaping Substances    Nicotine No     THC No  CBD No     Flavoring No     Other No     Unknown No      Social History     Tobacco Use   Smoking Status Never Smoker   Smokeless Tobacco Never Used     Family History:   Family History   Problem Relation Age of Onset    Diabetes Mother     Varicose Veins Mother     Stroke Father     Arthritis Brother     Diabetes Daughter     No Known Problems Brother        Review of previous medical records was completed  Meds/Allergies   all current active meds have been reviewed, current meds:   Current Facility-Administered Medications   Medication Dose Route Frequency    alteplase (ACTIVASE) infusion 59 3 mg  0 81 mg/kg Intravenous Once   , PTA meds:   Prior to Admission Medications   Prescriptions Last Dose Informant Patient Reported?  Taking?   acetaminophen (TYLENOL) 325 mg tablet   No No   Sig: Take 3 tablets (975 mg total) by mouth every 8 (eight) hours   amLODIPine (NORVASC) 5 mg tablet   No No   Sig: Take 1 tablet (5 mg total) by mouth daily   enoxaparin (LOVENOX) 40 mg/0 4 mL   No No   Sig: Inject 0 4 mL (40 mg total) under the skin daily   ferrous sulfate 325 (65 Fe) mg tablet   Yes No   Sig: TAKE 1 TABLET BY MOUTH ONCE A DAY FOR SUPPLEMENT   furosemide (LASIX) 20 mg tablet   No No   Sig: Take 1 tablet (20 mg total) by mouth daily for 5 days   gabapentin (NEURONTIN) 100 mg capsule   Yes No   Sig: Take 100 mg by mouth 3 (three) times a day   glipiZIDE (GLUCOTROL) 5 mg tablet   Yes No   Sig: Take 5 mg by mouth every evening   metFORMIN (GLUCOPHAGE) 500 mg tablet   No No   Sig: Take 1 tablet (500 mg total) by mouth 2 (two) times a day with meals   polyethylene glycol (MIRALAX) 17 g packet   No No   Sig: Take 17 g by mouth daily   pravastatin (PRAVACHOL) 20 mg tablet   Yes No   Sig: Take 20 mg by mouth every evening   senna-docusate sodium (SENOKOT-S) 8 6-50 mg per tablet   No No   Sig: Take 2 tablets by mouth 2 (two) times a day      Facility-Administered Medications: None    and     No Known Allergies    Objective   Vitals:Blood pressure (!) 91/44, pulse 103, temperature 98 4 °F (36 9 °C), temperature source Oral, resp  rate 16, weight 73 2 kg (161 lb 6 oz), SpO2 97 %  ,Body mass index is 26 05 kg/m²  No intake or output data in the 24 hours ending 08/25/20 1350    Invasive Devices: Invasive Devices     Peripheral Intravenous Line            Peripheral IV 08/25/20 Left Antecubital less than 1 day    Peripheral IV 08/25/20 Left Forearm less than 1 day                Physical Exam  Vitals signs and nursing note reviewed  Constitutional:       General: She is not in acute distress  Appearance: Normal appearance  She is normal weight  She is not ill-appearing, toxic-appearing or diaphoretic  HENT:      Head: Normocephalic and atraumatic  Eyes:      General: No scleral icterus  Right eye: No discharge  Left eye: No discharge  Extraocular Movements: Extraocular movements intact  Conjunctiva/sclera: Conjunctivae normal    Neck:      Musculoskeletal: Normal range of motion and neck supple  Cardiovascular:      Comments: Tachycardia, normal rhythm on telemetry   Pulmonary:      Effort: Pulmonary effort is normal  No respiratory distress  Skin:     General: Skin is warm and dry  Coloration: Skin is not jaundiced or pale  Findings: No bruising, erythema, lesion or rash  Neurological:      Mental Status: She is alert  Psychiatric:         Mood and Affect: Mood normal        Neurologic Exam     Mental Status   Initial assessment:   Patient is alert, oriented to person  Incorrectly states she is in [place]  Able to follow some commands such as close eyes, difficulty following other commands such as hold up 2 fingers  States all objects on the NIHSS booklet are "table"  Mild dysarthria noted, mild-moderate aphasia  Tracks examiner and maintains eye contact     Reassessment:  Patient is alert, oriented to person   Correctly states she is in the hospital  Able to follow simple commands such as close eyes and hold up 2 fingers  difficulty with complex multistep commands  No dysarthria noted, mild aphasia  Cranial Nerves     CN VIII   Hearing: intact  Primary gaze noted, no gaze preference or deviation  No significant visual acuity deficits noted   No gross facial asymmetry noted      Motor Exam   Muscle bulk: normal  Overall muscle tone: normalInitial assessment:  Drift noted in right upper extremity and bilateral lower extremities, does not hit the bed    Reassessment:  No drift in right upper extremity  Right upper extremity  and triceps strength 4/5, otherwise 5/5 throughout  Drift noted in bilateral lower extremities  Right hip flexion strength 4/5  Left hip flexion strength 4+/5     Sensory Exam   Difficult to assess sensory exam due to aphasia     Gait, Coordination, and Reflexes     Tremor   Resting tremor: absent    Reflexes   Right plantar: equivocal  Left plantar: equivocal  Difficulty with coordination testing due to aphasia  Bilateral upper extremity biceps and brachioradialis reflexes 2+  Left patellar reflex diminished  Right patellar reflex 2+  Bilateral Achilles reflexes diminished  No involuntary movements noted     NIHSS:  1a Level of Consciousness: 0 = Alert   1b  LOC Questions: 1 = Answers one correctly   1c  LOC Commands: 0 = Obeys both correctly   2  Best Gaze: 0 = Normal   3  Visual: 0 = No visual field loss   4  Facial Palsy: 0=Normal symmetric movement   5a  Motor Right Arm: 1=Drift, limb holds 90 (or 45) degrees but drifts down before full 10 seconds: does not hit bed   5b  Motor Left Arm: 0=No drift, limb holds 90 (or 45) degrees for full 10 seconds   6a  Motor Right Le=Drift, limb holds 90 (or 45) degrees but drifts down before full 10 seconds: does not hit bed   6b  Motor Left Le=Drift, limb holds 90 (or 45) degrees but drifts down before full 10 seconds: does not hit bed   7  Limb Ataxia:  0=Absent   8   Sensory: 0=Normal; no sensory loss   9  Best Language:  1=Mild to moderate aphasia; some obvious loss of fluency or facility of comprehension without significant limitation on ideas expressed or form of expression  10  Dysarthria: 1=Mild to moderate, patient slurs at least some words and at worst, can be understood with some difficulty   11  Extinction and Inattention (formerly Neglect): 0=No abnormality   Total Score: 6     Time NIHSS was completed: 1340    Modified Forrest Score:  Unable to determine currently, will gather additional data    Lab Results: I have personally reviewed pertinent reports    Recent Results (from the past 24 hour(s))   Basic metabolic panel    Collection Time: 08/25/20  1:15 PM   Result Value Ref Range    Sodium 129 (L) 136 - 145 mmol/L    Potassium 4 1 3 5 - 5 3 mmol/L    Chloride 95 (L) 100 - 108 mmol/L    CO2 23 21 - 32 mmol/L    ANION GAP 11 4 - 13 mmol/L    BUN 36 (H) 5 - 25 mg/dL    Creatinine 2 59 (H) 0 60 - 1 30 mg/dL    Glucose 321 (H) 65 - 140 mg/dL    Calcium 8 2 (L) 8 3 - 10 1 mg/dL    eGFR 17 ml/min/1 73sq m   CBC and Platelet    Collection Time: 08/25/20  1:15 PM   Result Value Ref Range    WBC 16 27 (H) 4 31 - 10 16 Thousand/uL    RBC 3 60 (L) 3 81 - 5 12 Million/uL    Hemoglobin 10 3 (L) 11 5 - 15 4 g/dL    Hematocrit 32 4 (L) 34 8 - 46 1 %    MCV 90 82 - 98 fL    MCH 28 6 26 8 - 34 3 pg    MCHC 31 8 31 4 - 37 4 g/dL    RDW 14 2 11 6 - 15 1 %    Platelets 732 273 - 956 Thousands/uL    MPV 11 0 8 9 - 12 7 fL   Protime-INR    Collection Time: 08/25/20  1:15 PM   Result Value Ref Range    Protime 15 6 (H) 11 6 - 14 5 seconds    INR 1 23 (H) 0 84 - 1 19   APTT    Collection Time: 08/25/20  1:15 PM   Result Value Ref Range    PTT 33 23 - 37 seconds   Troponin I    Collection Time: 08/25/20  1:15 PM   Result Value Ref Range    Troponin I <0 02 <=0 04 ng/mL   Fingerstick Glucose (POCT)    Collection Time: 08/25/20  1:15 PM   Result Value Ref Range    POC Glucose 352 (H) 65 - 140 mg/dl   ]  Imaging Studies: I have personally reviewed pertinent reports and I have personally reviewed pertinent films in PACS  EKG, Pathology, and Other Studies: I have personally reviewed pertinent reports  VTE Prophylaxis: Reason for no pharmacologic prophylaxis s/p IV tPA    Counseling / Coordination of Care  Total time spent today 60 minutes critical care time  Greater than 50% of total time was spent with the patient and/or family counseling and/or coordination of care  A description of the counseling/coordination of care:  Patient was seen and evaluated  Discussed with attending  Chart reviewed thoroughly including laboratory and imaging studies    Plan of care discussed with patient and primary team

## 2020-08-25 NOTE — PROGRESS NOTES
Next steps in in transfer as a private evaluation for critical care admission  Patient was reported to have called EMS secondary to weakness and not feeling well  Patient was noted to have new onset atrial fibrillation with RVR with hypotension in route  She was emergently cardioverted by EMS and then noted to have a right facial droop  Initial exam in the emergency department with right upper and lower extremity weakness as well as aphasia  Patient arrived to [facility] for which stroke alert was initiated  Initial NIH was 6  NIH then improved to 3  Patient had a CT and CTA which showed left PCA occlusion  Patient's neurologic symptoms were improving  Neurology discussed with endovascular for which no intervention is to be performed at this time  Patient evaluated in the emergency department at [facility] after transfer and noted to have right upper extremity weakness with decreased range of motion as she had history of shoulder surgery and recent zoster outbreak  She was also noted to have right lower extremity weakness compared to the left foot recently had hip surgery 3 months ago for which she has not undergone rehabilitation  Patient is able to answer questions appropriately  She is not having significant difficulty with understanding conversation, occasionally has word-finding difficulty  Unable to fully assess visual field as patient's does not participate in exam appropriately  Laboratory data preliminary reviewed patient does have hyponatremia and acute kidney injury  Patient's son at bedside reports that she is not eating or drinking nor she ambulatory  He states she has been progressively more weak  Plan to admit to medicine service  Critical care medicine will be available if patient has clinical decline

## 2020-08-25 NOTE — ED PROVIDER NOTES
History  Chief Complaint   Patient presents with    Weakness - Generalized     Pt c/o generalized weakness x several days  New onset afib  Pt hypotensive PTA  Pt was cardioverted PTA  Pt now confused  Patient is a 74-year-old female with a history of diabetes, hypertension and hyperlipidemia who presents with tachycardia and hypotension  EMS was called to patient's residence for generalized weakness  EMS found her to be in rapid AFib with heart rates up to 180  She also had hypotension which worsened during transport  Her blood pressure went as low as 66/33  They called for a command physician and they agreed with cardioversion for unstable atrial fibrillation  She was given 2 mg of Versed followed by synchronized cardioversion at 50 joules  She did convert to normal sinus rhythm and her blood pressure did improve  However EMS noted onset of slurred speech after the cardioversion  Patient presents with dysarthria and aphasia  She is able to move all other extremities and has no complaints at this time  She is not on anticoagulation  She had a right hip surgery in May of 2020  History is otherwise limited due to aphasia  History provided by:  Patient  History limited by: aphasia  Neurologic Problem   Location:  Speech difficulty  Duration:  1 hour  Timing:  Constant  Progression:  Unchanged  Chronicity:  New  Associated symptoms: no cough and no shortness of breath        Prior to Admission Medications   Prescriptions Last Dose Informant Patient Reported?  Taking?   acetaminophen (TYLENOL) 325 mg tablet   No No   Sig: Take 3 tablets (975 mg total) by mouth every 8 (eight) hours   amLODIPine (NORVASC) 5 mg tablet   No No   Sig: Take 1 tablet (5 mg total) by mouth daily   enoxaparin (LOVENOX) 40 mg/0 4 mL   No No   Sig: Inject 0 4 mL (40 mg total) under the skin daily   ferrous sulfate 325 (65 Fe) mg tablet   Yes No   Sig: TAKE 1 TABLET BY MOUTH ONCE A DAY FOR SUPPLEMENT   furosemide (LASIX) 20 mg tablet   No No   Sig: Take 1 tablet (20 mg total) by mouth daily for 5 days   gabapentin (NEURONTIN) 100 mg capsule   Yes No   Sig: Take 100 mg by mouth 3 (three) times a day   glipiZIDE (GLUCOTROL) 5 mg tablet   Yes No   Sig: Take 5 mg by mouth every evening   metFORMIN (GLUCOPHAGE) 500 mg tablet   No No   Sig: Take 1 tablet (500 mg total) by mouth 2 (two) times a day with meals   polyethylene glycol (MIRALAX) 17 g packet   No No   Sig: Take 17 g by mouth daily   pravastatin (PRAVACHOL) 20 mg tablet   Yes No   Sig: Take 20 mg by mouth every evening   senna-docusate sodium (SENOKOT-S) 8 6-50 mg per tablet   No No   Sig: Take 2 tablets by mouth 2 (two) times a day      Facility-Administered Medications: None       Past Medical History:   Diagnosis Date    Acute renal failure (ARF) (HCC)     Acute respiratory failure with hypoxia (HCC)     Chronic kidney disease     Diabetes mellitus ([address] )     type 2    Hyperlipidemia     Hypertension        Past Surgical History:   Procedure Laterality Date    BREAST SURGERY Left     lumpectomy benign    CATARACT EXTRACTION Bilateral 2017    CATARACT EXTRACTION W/ INTRAOCULAR LENS IMPLANT Left 12/11/2017    Procedure: EXTRACTION EXTRACAPSULAR CATARACT PHACO INTRAOCULAR LENS (IOL); Surgeon: Navya Mccauley MD;  Location: [facility] MAIN OR;  Service: Ophthalmology    ID OPEN RX FEMUR FX+INTRAMED RAYMOND Right 5/21/2020    Procedure: INSERTION OF SHORT TROCHANTERIC FEMORAL NAIL;  Surgeon: Cara Long MD;  [facility] Main [facility];  Service: Orthopedics    [address] HUMERAL&GLENOID COMPNT Right 9/10/2018    Procedure: RIGHT REVERSE TOTAL SHOULDER ARTHROPLASTY;  Surgeon: Cara Long MD;  [facility];  Service: Orthopedics    ID XCAPSL CTRC RMVL INSJ IO LENS PROSTH W/O ECP Right 10/23/2017    Procedure: EXTRACTION EXTRACAPSULAR CATARACT PHACO INTRAOCULAR LENS (IOL);   Surgeon: Navya Mccauley MD;  Location: [facility] MAIN OR;  Service: Ophthalmology       Family History   Problem Relation Age of Onset    Diabetes Mother     Varicose Veins Mother     Stroke Father     Arthritis Brother     Diabetes Daughter     No Known Problems Brother      I have reviewed and agree with the history as documented  E-Cigarette/Vaping    E-Cigarette Use Never User      E-Cigarette/Vaping Substances    Nicotine No     THC No     CBD No     Flavoring No     Other No     Unknown No      Social History     Tobacco Use    Smoking status: Never Smoker    Smokeless tobacco: Never Used   Substance Use Topics    Alcohol use: No     Comment: quit 8/17    Drug use: No       Review of Systems   Unable to perform ROS: Other (Aphasia)   Respiratory: Negative for cough, chest tightness and shortness of breath  Physical Exam  Physical Exam  Vitals signs and nursing note reviewed  Constitutional:       General: She is not in acute distress  Appearance: She is well-developed  HENT:      Head: Normocephalic and atraumatic  Eyes:      Pupils: Pupils are equal, round, and reactive to light  Neck:      Musculoskeletal: Normal range of motion and neck supple  Cardiovascular:      Rate and Rhythm: Regular rhythm  Tachycardia present  Pulses: Normal pulses  Heart sounds: Normal heart sounds  Pulmonary:      Effort: Pulmonary effort is normal  No respiratory distress  Breath sounds: Normal breath sounds  Abdominal:      General: There is no distension  Palpations: Abdomen is soft  Abdomen is not rigid  Tenderness: There is no abdominal tenderness  There is no guarding or rebound  Musculoskeletal: Normal range of motion  General: No tenderness  Lymphadenopathy:      Cervical: No cervical adenopathy  Skin:     General: Skin is warm and dry  Capillary Refill: Capillary refill takes less than 2 seconds  Neurological:      Mental Status: She is alert  Cranial Nerves: No cranial nerve deficit  Sensory: No sensory deficit  Motor: Motor function is intact  Comments: Patient has dysarthria and expressive aphasia, which limits neurologic exam   However she has grossly normal motor function and no clear visual field deficits  Cerebellar exam is also unremarkable  Vital Signs  ED Triage Vitals   Temperature Pulse Respirations Blood Pressure SpO2   08/25/20 1345 08/25/20 1306 08/25/20 1306 08/25/20 1306 08/25/20 1306   98 4 °F (36 9 °C) 104 16 96/51 98 %      Temp Source Heart Rate Source Patient Position - Orthostatic VS BP Location FiO2 (%)   08/25/20 1345 08/25/20 1306 08/25/20 1306 08/25/20 1306 --   Oral Monitor Lying Right arm       Pain Score       --                  Vitals:    08/25/20 1520 08/25/20 1520 08/25/20 1524 08/25/20 1529   BP: 98/50 98/50     Pulse: 90  98 98   Patient Position - Orthostatic VS: Lying            Visual Acuity      ED Medications  Medications   midazolam (FOR EMS ONLY) (VERSED) 2 mg/2 mL injection 2 mg (0 mg Does not apply Given to EMS 8/25/20 1320)   iohexol (OMNIPAQUE) 350 MG/ML injection (MULTI-DOSE) 90 mL (90 mL Intravenous Given 8/25/20 1335)   alteplase (ACTIVASE) bolus 6 6 mg (6 6 mg Intravenous Given 8/25/20 1347)     Followed by   alteplase (ACTIVASE) infusion 59 3 mg (0 mg Intravenous Stopped 8/25/20 1450)     Followed by   sodium chloride 0 9 % infusion (75 mL/hr Intravenous New Bag 8/25/20 1415)       Diagnostic Studies  Results Reviewed     Procedure Component Value Units Date/Time    Novel Coronavirus [facility] [[id number]]  (Normal) Collected:  08/25/20 1404    Lab Status:  Final result Specimen:  Nares from Nose Updated:  08/25/20 1504     SARS-CoV-2 Negative    Narrative: The specimen collection materials, transport medium, and/or testing methodology utilized in the production of these test results have been proven to be reliable in a limited validation with an abbreviated program under the Emergency Utilization Authorization provided by the FDA    Testing reported as "Presumptive positive" will be confirmed with secondary testing with a reference laboratory to ensure result accuracy  Clinical caution and judgement should be used with the interpretation of these results with consideration of the clinical impression and other laboratory testing  Testing reported as "Positive" or "Negative" has been proven to be accurate according to standard laboratory validation requirements  All testing is performed with control materials showing appropriate reactivity at standard intervals        Troponin I [[id number]]  (Normal) Collected:  08/25/20 1315    Lab Status:  Final result Specimen:  Blood from Arm, Left Updated:  08/25/20 1353     Troponin I <0 02 ng/mL     Basic metabolic panel [[id number]]  (Abnormal) Collected:  08/25/20 1315    Lab Status:  Final result Specimen:  Blood from Arm, Left Updated:  08/25/20 1346     Sodium 129 mmol/L      Potassium 4 1 mmol/L      Chloride 95 mmol/L      CO2 23 mmol/L      ANION GAP 11 mmol/L      BUN 36 mg/dL      Creatinine 2 59 mg/dL      Glucose 321 mg/dL      Calcium 8 2 mg/dL      eGFR 17 ml/min/1 73sq m     Narrative:       [place] guidelines for Chronic Kidney Disease (CKD):     Stage 1 with normal or high GFR (GFR > 90 mL/min/1 73 square meters)    Stage 2 Mild CKD (GFR = 60-89 mL/min/1 73 square meters)    Stage 3A Moderate CKD (GFR = 45-59 mL/min/1 73 square meters)    Stage 3B Moderate CKD (GFR = 30-44 mL/min/1 73 square meters)    Stage 4 Severe CKD (GFR = 15-29 mL/min/1 73 square meters)    Stage 5 End Stage CKD (GFR <15 mL/min/1 73 square meters)  Note: GFR calculation is accurate only with a steady state creatinine    Protime-INR [[id number]]  (Abnormal) Collected:  08/25/20 1315    Lab Status:  Final result Specimen:  Blood from Arm, Left Updated:  08/25/20 1345     Protime 15 6 seconds      INR 1 23    APTT [[id number]]  (Normal) Collected:  08/25/20 1315    Lab Status:  Final result Specimen:  Blood from Arm, Left Updated:  08/25/20 1345     PTT 33 seconds     Fingerstick Glucose (POCT) [[id number]]  (Abnormal) Collected:  08/25/20 1315    Lab Status:  Final result Updated:  08/25/20 1343     POC Glucose 352 mg/dl     CBC and Platelet [[id number]]  (Abnormal) Collected:  08/25/20 1315    Lab Status:  Final result Specimen:  Blood from Arm, Left Updated:  08/25/20 1331     WBC 16 27 Thousand/uL      RBC 3 60 Million/uL      Hemoglobin 10 3 g/dL      Hematocrit 32 4 %      MCV 90 fL      MCH 28 6 pg      MCHC 31 8 g/dL      RDW 14 2 %      Platelets 981 Thousands/uL      MPV 11 0 fL                  CTA stroke alert (head/neck)   Final Result by Amber Portillo DO (08/25 1349)      Approximately 65-70% stenosis of the left internal carotid artery bulb  Mild intracranial atherosclerotic disease including mild focal narrowing of the right supraclinoid internal carotid artery  Posterior cerebral artery disease is noted, mild on the right and severe left  The M1 segments and middle cerebral artery branches appear patent  Findings were directly discussed with neurology attending on 8/25/2020 1:42 PM                      Workstation performed: [id number]         CT stroke alert brain   Final Result by Arturo Ordaz DO (08/25 1350)      No acute intracranial abnormality  Stable mild microangiopathic changes  Findings were directly discussed with neurology attending on 8/25/2020 1:27 PM       Workstation performed: [id number]                    Procedures  ECG 12 Lead Documentation Only    Date/Time: 8/25/2020 2:35 PM  Performed by: Filippo River DO  Authorized by: Filippo River DO     ECG reviewed by me, the ED Provider: yes    Patient location:  ED  Previous ECG:     Previous ECG:  Compared to current    Similarity:  Changes noted    Comparison to cardiac monitor: Yes    Comments:      Sinus tachycardia rate of 106 beats per minute  Normal intervals  Normal axis    Poor R-wave progression  Nonspecific ST T wave abnormalities  Occasional PACs  PACs new from previous EKG from 05/20/2020  CriticalCare Time  Performed by: Shakila Mendieta DO  Authorized by: Shakila Mendieta DO     Critical care provider statement:     Critical care start time:  8/25/2020 1:25 PM    Critical care end time:  8/25/2020 2:00 PM    Critical care time was exclusive of:  Separately billable procedures and treating other patients    Critical care was necessary to treat or prevent imminent or life-threatening deterioration of the following conditions:  CNS failure or compromise and shock    Critical care was time spent personally by me on the following activities:  Obtaining history from patient or surrogate, development of treatment plan with patient or surrogate, discussions with consultants, evaluation of patient's response to treatment, examination of patient, ordering and review of laboratory studies, ordering and review of radiographic studies, re-evaluation of patient's condition and review of old charts             ED Course  ED Course as of Aug 26 1156   Tue Aug 25, 2020   1346 Stroke alert was called as patient has expressive aphasia  I spoke with patient's son, Bettina Armstrong, who agrees with tPA indicated  He states that she had a right hip surgery in May 2020  Otherwise no recent trauma or bleeding  She is not on anticoagulation  Patient is being evaluated by Neurology and Dr Aurora Aguillon is recommending tPA  I have placed the order at neurology's request       1416 Updated son on patient's transfer                      Stroke Assessment     Row Name 08/25/20 1310             NIH Stroke Scale    Interval  Baseline      Level of Consciousness (1a )  0      LOC Questions (1b )  1      LOC Commands (1c )  1      Best Gaze (2 )  0      Visual (3 )  0      Facial Palsy (4 )  0      Motor Arm, Left (5a )  0      Motor Arm, Right (5b )  1      Motor Leg, Left (6a )  0      Motor Leg, Right (6b ) 1      Limb Ataxia (7 )  0      Sensory (8 )  0      Best Language (9 )  1      Dysarthria (10 )  1      Extinction and Inattention (11 ) (Formerly Neglect)  0      Total  6          First Filed Value   TPA Decision  TPA given this admission  After a discussion of risks and benefits reviewing inclusion and exclusion criteria the decision was made to proceed with thrombolytic therapy  Verbal consent was obtained from   TPA consent options  acting as a surrogate decision maker (comment name of person)  [Son (Giselle Schneider)]                                  MDM  Number of Diagnoses or Management Options  Atrial fibrillation [facility]): new and requires workup  Cerebrovascular accident (CVA), unspecified mechanism [facility]): new and requires workup  Diagnosis management comments: Patient presents after synchronized cardioversion for unstable atrial fibrillation  Patient was found to have a heart rate up to 180 and a blood pressure of 66/33  Given hemodynamic instability, it was necessary to cardiovert and this did result in normal sinus rhythm an increase and in blood pressure  However patient developed dysarthria and aphasia concerning for acute embolic CVA  A stroke alert was called immediately upon her arrival and my examination  Neurology examined patient at bedside and reviewed imaging  Neurology recommended tPA and I obtained verbal consent from her son  Neurology discussed case with Interventional Radiology and they decided that transfer to our [facility] was indicated  Patient's condition did improve prior to transfer  She was transported in stable condition         Amount and/or Complexity of Data Reviewed  Clinical lab tests: ordered and reviewed  Tests in the radiology section of CPT®: ordered and reviewed  Tests in the medicine section of CPT®: ordered and reviewed  Review and summarize past medical records: yes  Discuss the patient with other providers: yes  Independent visualization of images, tracings, or specimens: yes    Risk of Complications, Morbidity, and/or Mortality  Presenting problems: high  Diagnostic procedures: high  Management options: high    Patient Progress  Patient progress: stable        Disposition  Final diagnoses:   Atrial fibrillation ([facility] )   Cerebrovascular accident (CVA), unspecified mechanism ([facility] )     Time reflects when diagnosis was documented in both MDM as applicable and the Disposition within this note     Time User Action Codes Description Comment    8/25/2020  2:10 PM Deysi Woodard Add [I48 91] Atrial fibrillation ([facility] )     8/25/2020  2:10 PM Deysi Woodard Add [I63 9] Cerebrovascular accident (CVA), unspecified mechanism [facility])       ED Disposition     ED Disposition Condition Date/Time Comment    Transfer to Another Facility-In Network  Tue Aug 25, 2020  2:10 PM Marcela Beard should be transferred out to [facility]          MD Documentation      Most Recent Value   Patient Condition  The patient has been stabilized such that within reasonable medical probability, no material deterioration of the patient condition or the condition of the unborn child(mehreen) is likely to result from the transfer   Reason for Transfer  Level of Care needed not available at this facility   Benefits of Transfer  Specialized equipment and/or services available at the receiving facility (Include comment)________________________   Risks of Transfer  Potential for delay in receiving treatment, Loss of IV, Increased discomfort during transfer, Possible worsening of condition or death during transfer, Potential deterioration of medical condition   Accepting Physician  Dr Ophelia Kaplan Name, Narda Jolley MD  [facility]   Provider Certification  General risk, such as traffic hazards, adverse weather conditions, rough terrain or turbulence, possible failure of equipment (including vehicle or aircraft), or consequences of actions of persons outside the control of the transport personnel      RN Documentation      Most [address] Name, [address] [facility]      Follow-up Information    None         Discharge Medication List as of 8/25/2020  3:50 PM      CONTINUE these medications which have NOT CHANGED    Details   amLODIPine (NORVASC) 5 mg tablet Take 1 tablet (5 mg total) by mouth daily, Starting Wed 5/27/2020, No Print      enoxaparin (LOVENOX) 40 mg/0 4 mL Inject 0 4 mL (40 mg total) under the skin daily, Starting Wed 5/27/2020, No Print      ferrous sulfate 325 (65 Fe) mg tablet TAKE 1 TABLET BY MOUTH ONCE A DAY FOR SUPPLEMENT, Historical Med      gabapentin (NEURONTIN) 100 mg capsule Take 100 mg by mouth 3 (three) times a day, Historical Med      glipiZIDE (GLUCOTROL) 5 mg tablet Take 5 mg by mouth every evening, Historical Med      metFORMIN (GLUCOPHAGE) 500 mg tablet Take 1 tablet (500 mg total) by mouth 2 (two) times a day with meals, Starting Thu 9/13/2018, No Print      polyethylene glycol (MIRALAX) 17 g packet Take 17 g by mouth daily, Starting Wed 5/27/2020, Normal      pravastatin (PRAVACHOL) 20 mg tablet Take 20 mg by mouth every evening, Historical Med      senna-docusate sodium (SENOKOT-S) 8 6-50 mg per tablet Take 2 tablets by mouth 2 (two) times a day, Starting Wed 5/27/2020, No Print      acetaminophen (TYLENOL) 325 mg tablet Take 3 tablets (975 mg total) by mouth every 8 (eight) hours, Starting Wed 5/27/2020, No Print      furosemide (LASIX) 20 mg tablet Take 1 tablet (20 mg total) by mouth daily for 5 days, Starting Mon 7/20/2020, Until Sat 7/25/2020, Normal           No discharge procedures on file      PDMP Review     None          ED Provider  Electronically Signed by           Felecia Loya DO  08/26/20 5626

## 2020-08-25 NOTE — ED NOTES
TPA finished with negative complications  Pt denies any headache, blurry vision, or new symptoms        Tariq Eddy RN  08/25/20 9573

## 2020-08-25 NOTE — ED NOTES
Transportation information for patient  [facility] going to [place] [facility] at [address]  08/25/20 8330

## 2020-08-25 NOTE — EMTALA/ACUTE CARE TRANSFER
[address] [place] [address]  Dept: [phone]      EMTALA TRANSFER CONSENT    NAME Doug Zuniga                                         1945                              MRN [id number]    I have been informed of my rights regarding examination, treatment, and transfer   by Dr Yadira Wynn DO    Benefits: Specialized equipment and/or services available at the receiving facility (Include comment)________________________    Risks: Potential for delay in receiving treatment, Loss of IV, Increased discomfort during transfer, Possible worsening of condition or death during transfer, Potential deterioration of medical condition      Consent for Transfer:  I acknowledge that my medical condition has been evaluated and explained to me by the emergency department physician or other qualified medical person and/or my attending physician, who has recommended that I be transferred to the service of  Accepting Physician: Dr Shabana Theodore at [address], [address] : [facility]  The above potential benefits of such transfer, the potential risks associated with such transfer, and the probable risks of not being transferred have been explained to me, and I fully understand them  The doctor has explained that, in my case, the benefits of transfer outweigh the risks  I agree to be transferred  I authorize the performance of emergency medical procedures and treatments upon me in both transit and upon arrival at the receiving facility  Additionally, I authorize the release of any and all medical records to the receiving facility and request they be transported with me, if possible  I understand that the safest mode of transportation during a medical emergency is an ambulance and that the Hospital advocates the use of this mode of transport   Risks of traveling to the receiving facility by car, including absence of medical control, life sustaining equipment, such as oxygen, and medical personnel has been explained to me and I fully understand them  (ASIF CORRECT BOX BELOW)  [  ]  I consent to the stated transfer and to be transported by ambulance/helicopter  [  ]  I consent to the stated transfer, but refuse transportation by ambulance and accept full responsibility for my transportation by car  I understand the risks of non-ambulance transfers and I exonerate the Hospital and its staff from any deterioration in my condition that results from this refusal     X___________________________________________    DATE  20  TIME________  Signature of patient or legally responsible individual signing on patient behalf           RELATIONSHIP TO PATIENT_________________________          Provider Certification    NAME Amrik Barreto                                         1945                              MRN [id number]    A medical screening exam was performed on the above named patient  Based on the examination:    Condition Necessitating Transfer The primary encounter diagnosis was Atrial fibrillation ([address] )  A diagnosis of Cerebrovascular accident (CVA), unspecified mechanism ([address] ) was also pertinent to this visit      Patient Condition: The patient has been stabilized such that within reasonable medical probability, no material deterioration of the patient condition or the condition of the unborn child(mehreen) is likely to result from the transfer    Reason for Transfer: Level of Care needed not available at this facility    Transfer Requirements: [address]   · Space available and qualified personnel available for treatment as acknowledged by    · Agreed to accept transfer and to provide appropriate medical treatment as acknowledged by       Dr Hershal Baumgarten  · Appropriate medical records of the examination and treatment of the patient are provided at the time of transfer   [address] _______  · Transfer will be performed by qualified personnel from    and appropriate transfer equipment as required, including the use of necessary and appropriate life support measures  Provider Certification: I have examined the patient and explained the following risks and benefits of being transferred/refusing transfer to the patient/family:  General risk, such as traffic hazards, adverse weather conditions, rough terrain or turbulence, possible failure of equipment (including vehicle or aircraft), or consequences of actions of persons outside the control of the transport personnel      Based on these reasonable risks and benefits to the patient and/or the unborn child(mehreen), and based upon the information available at the time of the patients examination, I certify that the medical benefits reasonably to be expected from the provision of appropriate medical treatments at another medical facility outweigh the increasing risks, if any, to the individuals medical condition, and in the case of labor to the unborn child, from effecting the transfer      X____________________________________________ DATE 08/25/20        TIME_______      ORIGINAL - SEND TO MEDICAL RECORDS   COPY - SEND WITH PATIENT DURING TRANSFER

## 2020-08-26 ENCOUNTER — APPOINTMENT (INPATIENT)
Dept: RADIOLOGY | Facility: HOSPITAL | Age: 75
DRG: 853 | End: 2020-08-26
Payer: MEDICARE

## 2020-08-26 ENCOUNTER — ANESTHESIA EVENT (INPATIENT)
Dept: PERIOP | Facility: HOSPITAL | Age: 75
DRG: 853 | End: 2020-08-26
Payer: MEDICARE

## 2020-08-26 ENCOUNTER — APPOINTMENT (INPATIENT)
Dept: NON INVASIVE DIAGNOSTICS | Facility: HOSPITAL | Age: 75
DRG: 853 | End: 2020-08-26
Payer: MEDICARE

## 2020-08-26 ENCOUNTER — ANESTHESIA (INPATIENT)
Dept: PERIOP | Facility: HOSPITAL | Age: 75
DRG: 853 | End: 2020-08-26
Payer: MEDICARE

## 2020-08-26 PROBLEM — I48.91 ATRIAL FIBRILLATION (HCC): Status: ACTIVE | Noted: 2020-08-26

## 2020-08-26 LAB
ABO GROUP BLD: NORMAL
ALBUMIN SERPL BCP-MCNC: 2.1 G/DL (ref 3.5–5)
ALP SERPL-CCNC: 232 U/L (ref 46–116)
ALT SERPL W P-5'-P-CCNC: 11 U/L (ref 12–78)
ANION GAP SERPL CALCULATED.3IONS-SCNC: 11 MMOL/L (ref 4–13)
APTT PPP: 34 SECONDS (ref 23–37)
AST SERPL W P-5'-P-CCNC: 27 U/L (ref 5–45)
BACTERIA UR QL AUTO: ABNORMAL /HPF
BASOPHILS # BLD MANUAL: 0 THOUSAND/UL (ref 0–0.1)
BASOPHILS NFR MAR MANUAL: 0 % (ref 0–1)
BILIRUB SERPL-MCNC: 0.82 MG/DL (ref 0.2–1)
BILIRUB UR QL STRIP: NEGATIVE
BLD GP AB SCN SERPL QL: NEGATIVE
BUN SERPL-MCNC: 34 MG/DL (ref 5–25)
CALCIUM SERPL-MCNC: 7.4 MG/DL (ref 8.3–10.1)
CHLORIDE SERPL-SCNC: 106 MMOL/L (ref 100–108)
CHOLEST SERPL-MCNC: 77 MG/DL (ref 50–200)
CLARITY UR: ABNORMAL
CO2 SERPL-SCNC: 21 MMOL/L (ref 21–32)
COLOR UR: YELLOW
CREAT SERPL-MCNC: 2.32 MG/DL (ref 0.6–1.3)
EOSINOPHIL # BLD MANUAL: 0 THOUSAND/UL (ref 0–0.4)
EOSINOPHIL NFR BLD MANUAL: 0 % (ref 0–6)
ERYTHROCYTE [DISTWIDTH] IN BLOOD BY AUTOMATED COUNT: 14.5 % (ref 11.6–15.1)
EST. AVERAGE GLUCOSE BLD GHB EST-MCNC: 237 MG/DL
GFR SERPL CREATININE-BSD FRML MDRD: 20 ML/MIN/1.73SQ M
GLUCOSE SERPL-MCNC: 119 MG/DL (ref 65–140)
GLUCOSE SERPL-MCNC: 153 MG/DL (ref 65–140)
GLUCOSE SERPL-MCNC: 165 MG/DL (ref 65–140)
GLUCOSE SERPL-MCNC: 184 MG/DL (ref 65–140)
GLUCOSE SERPL-MCNC: 186 MG/DL (ref 65–140)
GLUCOSE SERPL-MCNC: 224 MG/DL (ref 65–140)
GLUCOSE UR STRIP-MCNC: NEGATIVE MG/DL
HBA1C MFR BLD: 9.9 %
HCT VFR BLD AUTO: 24.6 % (ref 34.8–46.1)
HDLC SERPL-MCNC: 10 MG/DL
HGB BLD-MCNC: 7.9 G/DL (ref 11.5–15.4)
HGB UR QL STRIP.AUTO: ABNORMAL
HYALINE CASTS #/AREA URNS LPF: ABNORMAL /LPF
INR PPP: 1.38 (ref 0.84–1.19)
KETONES UR STRIP-MCNC: ABNORMAL MG/DL
LACTATE SERPL-SCNC: 1.6 MMOL/L (ref 0.5–2)
LDLC SERPL CALC-MCNC: 23 MG/DL (ref 0–100)
LEUKOCYTE ESTERASE UR QL STRIP: ABNORMAL
LYMPHOCYTES # BLD AUTO: 0.14 THOUSAND/UL (ref 0.6–4.47)
LYMPHOCYTES # BLD AUTO: 1 % (ref 14–44)
MAGNESIUM SERPL-MCNC: 1.2 MG/DL (ref 1.6–2.6)
MCH RBC QN AUTO: 28.6 PG (ref 26.8–34.3)
MCHC RBC AUTO-ENTMCNC: 32.1 G/DL (ref 31.4–37.4)
MCV RBC AUTO: 89 FL (ref 82–98)
MONOCYTES # BLD AUTO: 0 THOUSAND/UL (ref 0–1.22)
MONOCYTES NFR BLD: 0 % (ref 4–12)
NEUTROPHILS # BLD MANUAL: 13.1 THOUSAND/UL (ref 1.85–7.62)
NEUTS BAND NFR BLD MANUAL: 1 % (ref 0–8)
NEUTS SEG NFR BLD AUTO: 96 % (ref 43–75)
NITRITE UR QL STRIP: NEGATIVE
NON-SQ EPI CELLS URNS QL MICRO: ABNORMAL /HPF
NRBC BLD AUTO-RTO: 0 /100 WBCS
PH UR STRIP.AUTO: 5 [PH]
PHOSPHATE SERPL-MCNC: 2.8 MG/DL (ref 2.3–4.1)
PLATELET # BLD AUTO: 225 THOUSANDS/UL (ref 149–390)
PLATELET BLD QL SMEAR: ADEQUATE
PMV BLD AUTO: 11.2 FL (ref 8.9–12.7)
POTASSIUM SERPL-SCNC: 3.4 MMOL/L (ref 3.5–5.3)
PROCALCITONIN SERPL-MCNC: 6.35 NG/ML
PROT SERPL-MCNC: 5.4 G/DL (ref 6.4–8.2)
PROT UR STRIP-MCNC: ABNORMAL MG/DL
PROTHROMBIN TIME: 17 SECONDS (ref 11.6–14.5)
RBC # BLD AUTO: 2.76 MILLION/UL (ref 3.81–5.12)
RBC #/AREA URNS AUTO: ABNORMAL /HPF
RH BLD: POSITIVE
SODIUM SERPL-SCNC: 138 MMOL/L (ref 136–145)
SP GR UR STRIP.AUTO: 1.03 (ref 1–1.03)
SPECIMEN EXPIRATION DATE: NORMAL
TRIGL SERPL-MCNC: 220 MG/DL
UROBILINOGEN UR QL STRIP.AUTO: 0.2 E.U./DL
VARIANT LYMPHS # BLD AUTO: 2 %
WBC # BLD AUTO: 13.5 THOUSAND/UL (ref 4.31–10.16)
WBC #/AREA URNS AUTO: ABNORMAL /HPF

## 2020-08-26 PROCEDURE — 87086 URINE CULTURE/COLONY COUNT: CPT | Performed by: EMERGENCY MEDICINE

## 2020-08-26 PROCEDURE — 36415 COLL VENOUS BLD VENIPUNCTURE: CPT | Performed by: INTERNAL MEDICINE

## 2020-08-26 PROCEDURE — 87181 SC STD AGAR DILUTION PER AGT: CPT | Performed by: EMERGENCY MEDICINE

## 2020-08-26 PROCEDURE — 87077 CULTURE AEROBIC IDENTIFY: CPT | Performed by: SURGERY

## 2020-08-26 PROCEDURE — 99223 1ST HOSP IP/OBS HIGH 75: CPT

## 2020-08-26 PROCEDURE — 87040 BLOOD CULTURE FOR BACTERIA: CPT | Performed by: EMERGENCY MEDICINE

## 2020-08-26 PROCEDURE — 87077 CULTURE AEROBIC IDENTIFY: CPT | Performed by: EMERGENCY MEDICINE

## 2020-08-26 PROCEDURE — 99222 1ST HOSP IP/OBS MODERATE 55: CPT | Performed by: SURGERY

## 2020-08-26 PROCEDURE — NC001 PR NO CHARGE: Performed by: SURGERY

## 2020-08-26 PROCEDURE — 99291 CRITICAL CARE FIRST HOUR: CPT | Performed by: EMERGENCY MEDICINE

## 2020-08-26 PROCEDURE — 87186 SC STD MICRODIL/AGAR DIL: CPT | Performed by: INTERNAL MEDICINE

## 2020-08-26 PROCEDURE — 83036 HEMOGLOBIN GLYCOSYLATED A1C: CPT | Performed by: INTERNAL MEDICINE

## 2020-08-26 PROCEDURE — 70551 MRI BRAIN STEM W/O DYE: CPT

## 2020-08-26 PROCEDURE — 86850 RBC ANTIBODY SCREEN: CPT | Performed by: FAMILY MEDICINE

## 2020-08-26 PROCEDURE — 87070 CULTURE OTHR SPECIMN AEROBIC: CPT | Performed by: INTERNAL MEDICINE

## 2020-08-26 PROCEDURE — 83605 ASSAY OF LACTIC ACID: CPT | Performed by: EMERGENCY MEDICINE

## 2020-08-26 PROCEDURE — 85610 PROTHROMBIN TIME: CPT | Performed by: INTERNAL MEDICINE

## 2020-08-26 PROCEDURE — 97605 NEG PRS WND THER DME<=50SQCM: CPT | Performed by: SURGERY

## 2020-08-26 PROCEDURE — 99232 SBSQ HOSP IP/OBS MODERATE 35: CPT | Performed by: PSYCHIATRY & NEUROLOGY

## 2020-08-26 PROCEDURE — 82948 REAGENT STRIP/BLOOD GLUCOSE: CPT

## 2020-08-26 PROCEDURE — 87185 SC STD ENZYME DETCJ PER NZM: CPT | Performed by: SURGERY

## 2020-08-26 PROCEDURE — 86920 COMPATIBILITY TEST SPIN: CPT

## 2020-08-26 PROCEDURE — 87186 SC STD MICRODIL/AGAR DIL: CPT | Performed by: EMERGENCY MEDICINE

## 2020-08-26 PROCEDURE — 87186 SC STD MICRODIL/AGAR DIL: CPT | Performed by: SURGERY

## 2020-08-26 PROCEDURE — 87205 SMEAR GRAM STAIN: CPT | Performed by: INTERNAL MEDICINE

## 2020-08-26 PROCEDURE — 80061 LIPID PANEL: CPT | Performed by: INTERNAL MEDICINE

## 2020-08-26 PROCEDURE — 84100 ASSAY OF PHOSPHORUS: CPT | Performed by: INTERNAL MEDICINE

## 2020-08-26 PROCEDURE — 86900 BLOOD TYPING SEROLOGIC ABO: CPT | Performed by: FAMILY MEDICINE

## 2020-08-26 PROCEDURE — 87076 CULTURE ANAEROBE IDENT EACH: CPT | Performed by: INTERNAL MEDICINE

## 2020-08-26 PROCEDURE — 86901 BLOOD TYPING SEROLOGIC RH(D): CPT | Performed by: FAMILY MEDICINE

## 2020-08-26 PROCEDURE — 11046 DBRDMT MUSC&/FSCA EA ADDL: CPT | Performed by: SURGERY

## 2020-08-26 PROCEDURE — 87076 CULTURE ANAEROBE IDENT EACH: CPT | Performed by: EMERGENCY MEDICINE

## 2020-08-26 PROCEDURE — 83735 ASSAY OF MAGNESIUM: CPT | Performed by: INTERNAL MEDICINE

## 2020-08-26 PROCEDURE — 87070 CULTURE OTHR SPECIMN AEROBIC: CPT | Performed by: SURGERY

## 2020-08-26 PROCEDURE — 11043 DBRDMT MUSC&/FSCA 1ST 20/<: CPT | Performed by: SURGERY

## 2020-08-26 PROCEDURE — 87075 CULTR BACTERIA EXCEPT BLOOD: CPT | Performed by: SURGERY

## 2020-08-26 PROCEDURE — 87185 SC STD ENZYME DETCJ PER NZM: CPT | Performed by: INTERNAL MEDICINE

## 2020-08-26 PROCEDURE — 74176 CT ABD & PELVIS W/O CONTRAST: CPT

## 2020-08-26 PROCEDURE — 87077 CULTURE AEROBIC IDENTIFY: CPT | Performed by: INTERNAL MEDICINE

## 2020-08-26 PROCEDURE — 85027 COMPLETE CBC AUTOMATED: CPT | Performed by: INTERNAL MEDICINE

## 2020-08-26 PROCEDURE — 87076 CULTURE ANAEROBE IDENT EACH: CPT | Performed by: SURGERY

## 2020-08-26 PROCEDURE — 85730 THROMBOPLASTIN TIME PARTIAL: CPT | Performed by: INTERNAL MEDICINE

## 2020-08-26 PROCEDURE — 87205 SMEAR GRAM STAIN: CPT | Performed by: SURGERY

## 2020-08-26 PROCEDURE — 80053 COMPREHEN METABOLIC PANEL: CPT | Performed by: INTERNAL MEDICINE

## 2020-08-26 PROCEDURE — 81001 URINALYSIS AUTO W/SCOPE: CPT | Performed by: EMERGENCY MEDICINE

## 2020-08-26 PROCEDURE — 84145 PROCALCITONIN (PCT): CPT | Performed by: EMERGENCY MEDICINE

## 2020-08-26 PROCEDURE — 85007 BL SMEAR W/DIFF WBC COUNT: CPT | Performed by: INTERNAL MEDICINE

## 2020-08-26 PROCEDURE — G1004 CDSM NDSC: HCPCS

## 2020-08-26 PROCEDURE — 87075 CULTR BACTERIA EXCEPT BLOOD: CPT | Performed by: INTERNAL MEDICINE

## 2020-08-26 PROCEDURE — 0JB70ZZ EXCISION OF BACK SUBCUTANEOUS TISSUE AND FASCIA, OPEN APPROACH: ICD-10-PCS | Performed by: SURGERY

## 2020-08-26 PROCEDURE — 87147 CULTURE TYPE IMMUNOLOGIC: CPT | Performed by: EMERGENCY MEDICINE

## 2020-08-26 PROCEDURE — 70450 CT HEAD/BRAIN W/O DYE: CPT

## 2020-08-26 PROCEDURE — 71250 CT THORAX DX C-: CPT

## 2020-08-26 PROCEDURE — 87185 SC STD ENZYME DETCJ PER NZM: CPT | Performed by: EMERGENCY MEDICINE

## 2020-08-26 RX ORDER — VANCOMYCIN HYDROCHLORIDE 1 G/200ML
15 INJECTION, SOLUTION INTRAVENOUS DAILY PRN
Status: DISCONTINUED | OUTPATIENT
Start: 2020-08-27 | End: 2020-08-28

## 2020-08-26 RX ORDER — SODIUM CHLORIDE, SODIUM GLUCONATE, SODIUM ACETATE, POTASSIUM CHLORIDE, MAGNESIUM CHLORIDE, SODIUM PHOSPHATE, DIBASIC, AND POTASSIUM PHOSPHATE .53; .5; .37; .037; .03; .012; .00082 G/100ML; G/100ML; G/100ML; G/100ML; G/100ML; G/100ML; G/100ML
75 INJECTION, SOLUTION INTRAVENOUS CONTINUOUS
Status: DISCONTINUED | OUTPATIENT
Start: 2020-08-26 | End: 2020-08-27

## 2020-08-26 RX ORDER — SODIUM CHLORIDE, SODIUM GLUCONATE, SODIUM ACETATE, POTASSIUM CHLORIDE, MAGNESIUM CHLORIDE, SODIUM PHOSPHATE, DIBASIC, AND POTASSIUM PHOSPHATE .53; .5; .37; .037; .03; .012; .00082 G/100ML; G/100ML; G/100ML; G/100ML; G/100ML; G/100ML; G/100ML
250 INJECTION, SOLUTION INTRAVENOUS ONCE
Status: DISCONTINUED | OUTPATIENT
Start: 2020-08-26 | End: 2020-08-26

## 2020-08-26 RX ORDER — HYDROMORPHONE HCL/PF 1 MG/ML
0.5 SYRINGE (ML) INJECTION
Status: DISCONTINUED | OUTPATIENT
Start: 2020-08-26 | End: 2020-08-26 | Stop reason: HOSPADM

## 2020-08-26 RX ORDER — MAGNESIUM HYDROXIDE 1200 MG/15ML
LIQUID ORAL AS NEEDED
Status: DISCONTINUED | OUTPATIENT
Start: 2020-08-26 | End: 2020-08-26 | Stop reason: HOSPADM

## 2020-08-26 RX ORDER — FENTANYL CITRATE 50 UG/ML
INJECTION, SOLUTION INTRAMUSCULAR; INTRAVENOUS AS NEEDED
Status: DISCONTINUED | OUTPATIENT
Start: 2020-08-26 | End: 2020-08-26

## 2020-08-26 RX ORDER — PROPOFOL 10 MG/ML
INJECTION, EMULSION INTRAVENOUS AS NEEDED
Status: DISCONTINUED | OUTPATIENT
Start: 2020-08-26 | End: 2020-08-26

## 2020-08-26 RX ORDER — SODIUM CHLORIDE, SODIUM GLUCONATE, SODIUM ACETATE, POTASSIUM CHLORIDE, MAGNESIUM CHLORIDE, SODIUM PHOSPHATE, DIBASIC, AND POTASSIUM PHOSPHATE .53; .5; .37; .037; .03; .012; .00082 G/100ML; G/100ML; G/100ML; G/100ML; G/100ML; G/100ML; G/100ML
1000 INJECTION, SOLUTION INTRAVENOUS ONCE
Status: COMPLETED | OUTPATIENT
Start: 2020-08-26 | End: 2020-08-26

## 2020-08-26 RX ORDER — MAGNESIUM SULFATE HEPTAHYDRATE 40 MG/ML
2 INJECTION, SOLUTION INTRAVENOUS ONCE
Status: COMPLETED | OUTPATIENT
Start: 2020-08-26 | End: 2020-08-26

## 2020-08-26 RX ORDER — ROCURONIUM BROMIDE 10 MG/ML
INJECTION, SOLUTION INTRAVENOUS AS NEEDED
Status: DISCONTINUED | OUTPATIENT
Start: 2020-08-26 | End: 2020-08-26

## 2020-08-26 RX ORDER — SODIUM CHLORIDE, SODIUM LACTATE, POTASSIUM CHLORIDE, CALCIUM CHLORIDE 600; 310; 30; 20 MG/100ML; MG/100ML; MG/100ML; MG/100ML
INJECTION, SOLUTION INTRAVENOUS CONTINUOUS PRN
Status: DISCONTINUED | OUTPATIENT
Start: 2020-08-26 | End: 2020-08-26

## 2020-08-26 RX ORDER — ONDANSETRON 2 MG/ML
4 INJECTION INTRAMUSCULAR; INTRAVENOUS ONCE AS NEEDED
Status: DISCONTINUED | OUTPATIENT
Start: 2020-08-26 | End: 2020-08-26 | Stop reason: HOSPADM

## 2020-08-26 RX ORDER — ALBUTEROL SULFATE 2.5 MG/3ML
2.5 SOLUTION RESPIRATORY (INHALATION) ONCE AS NEEDED
Status: DISCONTINUED | OUTPATIENT
Start: 2020-08-26 | End: 2020-08-26 | Stop reason: HOSPADM

## 2020-08-26 RX ORDER — ONDANSETRON 2 MG/ML
INJECTION INTRAMUSCULAR; INTRAVENOUS AS NEEDED
Status: DISCONTINUED | OUTPATIENT
Start: 2020-08-26 | End: 2020-08-26

## 2020-08-26 RX ORDER — POTASSIUM CHLORIDE 14.9 MG/ML
20 INJECTION INTRAVENOUS ONCE
Status: COMPLETED | OUTPATIENT
Start: 2020-08-26 | End: 2020-08-26

## 2020-08-26 RX ORDER — CHLORHEXIDINE GLUCONATE 0.12 MG/ML
15 RINSE ORAL EVERY 12 HOURS SCHEDULED
Status: DISCONTINUED | OUTPATIENT
Start: 2020-08-26 | End: 2020-08-28

## 2020-08-26 RX ORDER — ALBUMIN, HUMAN INJ 5% 5 %
12.5 SOLUTION INTRAVENOUS ONCE
Status: DISCONTINUED | OUTPATIENT
Start: 2020-08-26 | End: 2020-08-26

## 2020-08-26 RX ORDER — FENTANYL CITRATE/PF 50 MCG/ML
50 SYRINGE (ML) INJECTION
Status: DISCONTINUED | OUTPATIENT
Start: 2020-08-26 | End: 2020-08-26 | Stop reason: HOSPADM

## 2020-08-26 RX ORDER — DEXAMETHASONE SODIUM PHOSPHATE 10 MG/ML
INJECTION, SOLUTION INTRAMUSCULAR; INTRAVENOUS AS NEEDED
Status: DISCONTINUED | OUTPATIENT
Start: 2020-08-26 | End: 2020-08-26

## 2020-08-26 RX ORDER — ALBUMIN, HUMAN INJ 5% 5 %
25 SOLUTION INTRAVENOUS ONCE
Status: COMPLETED | OUTPATIENT
Start: 2020-08-26 | End: 2020-08-26

## 2020-08-26 RX ADMIN — INSULIN LISPRO 1 UNITS: 100 INJECTION, SOLUTION INTRAVENOUS; SUBCUTANEOUS at 19:07

## 2020-08-26 RX ADMIN — PROPOFOL 100 MG: 10 INJECTION, EMULSION INTRAVENOUS at 13:29

## 2020-08-26 RX ADMIN — CEFEPIME HYDROCHLORIDE 1000 MG: 1 INJECTION, POWDER, FOR SOLUTION INTRAMUSCULAR; INTRAVENOUS at 05:57

## 2020-08-26 RX ADMIN — PHENYLEPHRINE HYDROCHLORIDE 100 MCG: 10 INJECTION INTRAVENOUS at 14:20

## 2020-08-26 RX ADMIN — PHENYLEPHRINE HYDROCHLORIDE 400 MCG: 10 INJECTION INTRAVENOUS at 13:36

## 2020-08-26 RX ADMIN — FENTANYL CITRATE 100 MCG: 50 INJECTION, SOLUTION INTRAMUSCULAR; INTRAVENOUS at 13:29

## 2020-08-26 RX ADMIN — PHENYLEPHRINE HYDROCHLORIDE 200 MCG: 10 INJECTION INTRAVENOUS at 14:12

## 2020-08-26 RX ADMIN — VANCOMYCIN HYDROCHLORIDE 750 MG: 750 INJECTION, SOLUTION INTRAVENOUS at 10:50

## 2020-08-26 RX ADMIN — NOREPINEPHRINE BITARTRATE 5 MCG/MIN: 1 INJECTION INTRAVENOUS at 05:48

## 2020-08-26 RX ADMIN — LIDOCAINE HYDROCHLORIDE 100 MG: 20 INJECTION, SOLUTION INTRAVENOUS at 13:29

## 2020-08-26 RX ADMIN — SODIUM CHLORIDE, SODIUM LACTATE, POTASSIUM CHLORIDE, AND CALCIUM CHLORIDE: .6; .31; .03; .02 INJECTION, SOLUTION INTRAVENOUS at 14:28

## 2020-08-26 RX ADMIN — ONDANSETRON 4 MG: 2 INJECTION INTRAMUSCULAR; INTRAVENOUS at 14:25

## 2020-08-26 RX ADMIN — SODIUM CHLORIDE: 0.9 INJECTION, SOLUTION INTRAVENOUS at 13:22

## 2020-08-26 RX ADMIN — VANCOMYCIN HYDROCHLORIDE 750 MG: 750 INJECTION, SOLUTION INTRAVENOUS at 08:12

## 2020-08-26 RX ADMIN — INSULIN LISPRO 4 UNITS: 100 INJECTION, SOLUTION INTRAVENOUS; SUBCUTANEOUS at 01:11

## 2020-08-26 RX ADMIN — PHENYLEPHRINE HYDROCHLORIDE 400 MCG: 10 INJECTION INTRAVENOUS at 13:47

## 2020-08-26 RX ADMIN — CHLORHEXIDINE GLUCONATE 0.12% ORAL RINSE 15 ML: 1.2 LIQUID ORAL at 21:32

## 2020-08-26 RX ADMIN — SODIUM CHLORIDE, SODIUM GLUCONATE, SODIUM ACETATE, POTASSIUM CHLORIDE, MAGNESIUM CHLORIDE, SODIUM PHOSPHATE, DIBASIC, AND POTASSIUM PHOSPHATE 1000 ML: .53; .5; .37; .037; .03; .012; .00082 INJECTION, SOLUTION INTRAVENOUS at 06:51

## 2020-08-26 RX ADMIN — ALBUMIN (HUMAN) 25 G: 12.5 INJECTION, SOLUTION INTRAVENOUS at 03:35

## 2020-08-26 RX ADMIN — SUGAMMADEX 300 MG: 100 INJECTION, SOLUTION INTRAVENOUS at 14:29

## 2020-08-26 RX ADMIN — INSULIN LISPRO 4 UNITS: 100 INJECTION, SOLUTION INTRAVENOUS; SUBCUTANEOUS at 15:15

## 2020-08-26 RX ADMIN — POTASSIUM CHLORIDE 20 MEQ: 14.9 INJECTION, SOLUTION INTRAVENOUS at 08:17

## 2020-08-26 RX ADMIN — ROCURONIUM BROMIDE 70 MG: 10 INJECTION, SOLUTION INTRAVENOUS at 13:29

## 2020-08-26 RX ADMIN — SODIUM CHLORIDE, SODIUM GLUCONATE, SODIUM ACETATE, POTASSIUM CHLORIDE, MAGNESIUM CHLORIDE, SODIUM PHOSPHATE, DIBASIC, AND POTASSIUM PHOSPHATE 75 ML/HR: .53; .5; .37; .037; .03; .012; .00082 INJECTION, SOLUTION INTRAVENOUS at 18:54

## 2020-08-26 RX ADMIN — INSULIN LISPRO 1 UNITS: 100 INJECTION, SOLUTION INTRAVENOUS; SUBCUTANEOUS at 23:01

## 2020-08-26 RX ADMIN — SODIUM CHLORIDE 500 ML: 0.9 INJECTION, SOLUTION INTRAVENOUS at 03:36

## 2020-08-26 RX ADMIN — PHENYLEPHRINE HYDROCHLORIDE 400 MCG: 10 INJECTION INTRAVENOUS at 13:29

## 2020-08-26 RX ADMIN — MAGNESIUM SULFATE HEPTAHYDRATE 2 G: 40 INJECTION, SOLUTION INTRAVENOUS at 08:16

## 2020-08-26 RX ADMIN — CHLORHEXIDINE GLUCONATE 0.12% ORAL RINSE 15 ML: 1.2 LIQUID ORAL at 08:17

## 2020-08-26 RX ADMIN — DEXAMETHASONE SODIUM PHOSPHATE 10 MG: 10 INJECTION, SOLUTION INTRAMUSCULAR; INTRAVENOUS at 14:25

## 2020-08-26 NOTE — SPEECH THERAPY NOTE
Speech-Language Pathology Bedside Swallow Evaluation      Patient Name: Gely Reynaga    BFTLR'Q Date: 8/26/2020     Problem List  Principal Problem:    CVA (cerebral vascular accident) [facility])  Active Problems:    Type 2 diabetes mellitus with hyperglycemia ([facility] )    Hypertension    Hyperlipidemia    Decubitus ulcer of sacral region, stage 4 ([facility] )    RORY (acute kidney injury) ([facility] )    Hyponatremia      Past Medical History  Past Medical History:   Diagnosis Date    Acute renal failure (ARF) ([facility] )     Acute respiratory failure with hypoxia ([facility] )     Chronic kidney disease     Diabetes mellitus ([facility] )     type 2    Hyperlipidemia     Hypertension          Current Medical Status  Gely Reynaga is a 67 y o  female history of diabetes, hypertension, hyperlipidemia who initially presented on 08/25 with tachycardia, hypotension, EMS called to patient's residence for generalized weakness and patient found to be in AFib with RVR, rates up to 180, initial blood pressure 66/33, cardioverted in the field for unstable atrial fibrillation  Following cardioversion patient with new onset slurred speech, remained dysarthric and aphasic  Patient otherwise with nonfocal neuro examination when evaluated at [address] [place] [address] ED  Patient stroke alerted, tPA administered at 1:00 p m  Initial NIH score 6 which improved to 3, patient transferred to [facility] for critical care admission however evaluated and initially deemed appropriate for step-down level 2  Patient noted to have large purulent sacral decubitus ulcer  Following return to ED from MRI at 2:55 a m  Patient less responsive, hypotensive to 74/38, bolused with 500 cc normal saline and 500 cc 5% albumin without significant improvement, admitted to critical care service for further management      SLP orders for Swallowing and Cognitive/Communication Evaluations received and planned for this am   I spoke with RN ~9:30 who suggested I defer evaluation to later day (pt with medical issues)  This pm, pt in OR for debridement of necrotic sacral ulcer  Current Precautions:  Fall and Delirium    Allergies:  No known food allergies    Past medical history:  Please see H&P for details    Special Studies:  CT of head of this am: No evidence of acute intracranial hemorrhage  No significant interval change  If clinically appropriate, MRI with diffusion-weighted imaging could be performed for further evaluation, if there are no contraindications  Social/Education/Vocational Hx:  Pt reported to live w/ daughter and 15and 5year old great grandchildren in 3rd floor elevator access apt (reported during hospital stay in May 2020  Son also reported to be local and supportive      Swallow Information: On regular diet at baseline, now NPO for OR    Summary: Pt in OR at this time at time of documentation     Plan: Recommend Speech/Language and Swallowing Evaluation in am

## 2020-08-26 NOTE — H&P
History and Physical - Critical Care  Lizeth Benavides 67 y o  female MRN: [id number]  Unit/Bed#: ED 03 Encounter: [id number]     Reason for Admission / Chief Complaint: AMS, hypotension     History of Present Illness:  Lizeth Benavides is a 67 y o  female history of diabetes, hypertension, hyperlipidemia who initially presented on 08/25 with tachycardia, hypotension, EMS called to patient's residence for generalized weakness and patient found to be in AFib with RVR, rates up to 180, initial blood pressure 66/33, cardioverted in the field for unstable atrial fibrillation  Following cardioversion patient with new onset slurred speech, remained dysarthric and aphasic  Patient otherwise with nonfocal neuro examination when evaluated at [facility] ED  Patient stroke alerted, tPA administered at 1:00 p m  Initial NIH score 6 which improved to 3, patient transferred to [facility] for critical care admission however evaluated and initially deemed appropriate for step-down level 2  Patient noted to have large purulent sacral decubitus ulcer  Following return to ED from MRI at 2:55 a m  Patient less responsive, hypotensive to 74/38, bolused with 500 cc normal saline and 500 cc 5% albumin without significant improvement, admitted to critical care service for further management  History obtained from chart review  Past Medical History:  Past Medical History:   Diagnosis Date    Acute renal failure (ARF) ([address] )     Acute respiratory failure with hypoxia ([address] )     Chronic kidney disease     Diabetes mellitus ([address] )     type 2    Hyperlipidemia     Hypertension         Past Surgical History:  Past Surgical History:   Procedure Laterality Date    BREAST SURGERY Left     lumpectomy benign    CATARACT EXTRACTION Bilateral 2017    CATARACT EXTRACTION W/ INTRAOCULAR LENS IMPLANT Left 12/11/2017    Procedure: EXTRACTION EXTRACAPSULAR CATARACT PHACO INTRAOCULAR LENS (IOL);   Surgeon: Lisandro Butler MD; [facility];  Service: Ophthalmology    KS OPEN RX FEMUR FX+INTRAMED RAYMOND Right 5/21/2020    Procedure: INSERTION OF SHORT TROCHANTERIC FEMORAL NAIL;  Surgeon: Saintclair Erie, MD;  Location: [facility] Main OR;  Service: Orthopedics    [address] HUMERAL&GLENOID COMPNT Right 9/10/2018    Procedure: RIGHT REVERSE TOTAL SHOULDER ARTHROPLASTY;  Surgeon: Saintclair Erie, MD;  Location: [facility] Main OR;  Service: Orthopedics    KS XCAPSL CTRC RMVL INSJ IO LENS PROSTH W/O ECP Right 10/23/2017    Procedure: EXTRACTION EXTRACAPSULAR CATARACT PHACO INTRAOCULAR LENS (IOL);   Surgeon: Lucero Haney MD;  Location: [facility] MAIN OR;  Service: Ophthalmology        Past Family History:  Family History   Problem Relation Age of Onset    Diabetes Mother     Varicose Veins Mother     Stroke Father     Arthritis Brother     Diabetes Daughter     No Known Problems Brother         Social History:  E-Cigarette/Vaping    E-Cigarette Use Never User      E-Cigarette/Vaping Substances    Nicotine No     THC No     CBD No     Flavoring No     Other No     Unknown No      Social History     Tobacco Use   Smoking Status Never Smoker   Smokeless Tobacco Never Used     Social History     Substance and Sexual Activity   Alcohol Use No    Comment: quit 8/17     Social History     Substance and Sexual Activity   Drug Use No     Marital Status: Single  Exercise History: N/A     Medications:  Current Facility-Administered Medications   Medication Dose Route Frequency    atorvastatin (LIPITOR) tablet 40 mg  40 mg Oral QPM    cefepime (MAXIPIME) 1,000 mg in dextrose 5 % 50 mL IVPB  1,000 mg Intravenous Once    gabapentin (NEURONTIN) capsule 100 mg  100 mg Oral TID    hydrALAZINE (APRESOLINE) injection 10 mg  10 mg Intravenous Q4H PRN    insulin lispro (HumaLOG) 100 units/mL subcutaneous injection 1-6 Units  1-6 Units Subcutaneous Q6H [facility]    insulin lispro (HumaLOG) 100 units/mL subcutaneous injection 2-12 Units  2-12 Units Subcutaneous HS    norepinephrine (LEVOPHED) 4 mg (STANDARD CONCENTRATION) IV in sodium chloride 0 9% 250 mL  1-30 mcg/min Intravenous Titrated    polyethylene glycol (MIRALAX) packet 17 g  17 g Oral Daily    senna-docusate sodium (SENOKOT S) 8 6-50 mg per tablet 2 tablet  2 tablet Oral BID    sodium chloride 0 9 % infusion  100 mL/hr Intravenous Continuous    vancomycin (VANCOCIN) IVPB (premix) 1,000 mg 200 mL  15 mg/kg Intravenous Q12H     Home medications:  Prior to Admission medications    Medication Sig Start Date End Date Taking?  Authorizing Provider   amLODIPine (NORVASC) 5 mg tablet Take 1 tablet (5 mg total) by mouth daily 5/27/20  Yes Denise Causey PA-C   enoxaparin (LOVENOX) 40 mg/0 4 mL Inject 0 4 mL (40 mg total) under the skin daily 5/27/20  Yes Denise Causey PA-C   ferrous sulfate 325 (65 Fe) mg tablet TAKE 1 TABLET BY MOUTH ONCE A DAY FOR SUPPLEMENT 7/7/20  Yes Historical Provider, MD   gabapentin (NEURONTIN) 100 mg capsule Take 100 mg by mouth 3 (three) times a day   Yes Historical Provider, MD   glipiZIDE (GLUCOTROL) 5 mg tablet Take 5 mg by mouth every evening   Yes Historical Provider, MD   metFORMIN (GLUCOPHAGE) 500 mg tablet Take 1 tablet (500 mg total) by mouth 2 (two) times a day with meals 9/13/18  Yes Peyman Scott PA-C   polyethylene glycol (MIRALAX) 17 g packet Take 17 g by mouth daily 5/27/20  Yes Denise Causey PA-C   pravastatin (PRAVACHOL) 20 mg tablet Take 20 mg by mouth every evening   Yes Historical Provider, MD   senna-docusate sodium (SENOKOT-S) 8 6-50 mg per tablet Take 2 tablets by mouth 2 (two) times a day 5/27/20  Yes Denise Causey PA-C   acetaminophen (TYLENOL) 325 mg tablet Take 3 tablets (975 mg total) by mouth every 8 (eight) hours 5/27/20   Denise Causey PA-C   furosemide (LASIX) 20 mg tablet Take 1 tablet (20 mg total) by mouth daily for 5 days 7/20/20 7/25/20  Lum Piggs, CRNP     Allergies:  No Known Allergies     ROS:   Review of Systems Unable to perform ROS: Mental status change        Vitals:  Vitals:    20 0430 20 0445 20 0500 20 0515   BP: (!) 81/44 (!) 75/38 (!) 76/39 (!) 70/47   Pulse: (!) 110 (!) 108 (!) 112 (!) 108   Resp: 16 16 16 16   Temp:   (!) 100 9 °F (38 3 °C)    TempSrc:       SpO2: 93% 93% 96% 98%   Weight:       Height:         Temperature:   Temp (24hrs), Av 2 °F (37 3 °C), Min:98 3 °F (36 8 °C), Max:100 9 °F (38 3 °C)    Current: Temperature: (!) 100 9 °F (38 3 °C)     Weights:   IBW: 59 3 kg  Body mass index is 26 05 kg/m²  Hemodynamic Monitoring:  PAP:       Non-Invasive/Invasive Ventilation Settings:  Respiratory    Lab Data (Last 4 hours)    None         O2/Vent Data (Last 4 hours)    None              No results found for: PHART, TNL7LLY, PO2ART, LWD8NTE, C0KGCEYA, BEART, SOURCE  SpO2: SpO2: 98 %     Physical Exam:  Physical Exam  Vitals signs reviewed  Constitutional:       General: She is not in acute distress  Appearance: She is well-developed  She is not diaphoretic  HENT:      Head: Normocephalic and atraumatic  Right Ear: External ear normal       Left Ear: External ear normal       Nose: Nose normal       Mouth/Throat:      Pharynx: No oropharyngeal exudate  Eyes:      Pupils: Pupils are equal, round, and reactive to light  Neck:      Musculoskeletal: Normal range of motion  Cardiovascular:      Rate and Rhythm: Normal rate  Rhythm irregularly irregular  Heart sounds: Normal heart sounds  No murmur  No friction rub  No gallop  Comments: Afib rate of 110  Pulmonary:      Effort: Pulmonary effort is normal  No respiratory distress  Breath sounds: Normal breath sounds  No wheezing  Chest:      Chest wall: No tenderness  Abdominal:      General: Bowel sounds are normal  There is no distension  Palpations: Abdomen is soft  There is no mass  Tenderness: There is no abdominal tenderness  There is no guarding        Comments: Obese, non-tender, non-distended    Genitourinary:     Comments: Large tracking sacral decub ulcer with richelle purulence   Musculoskeletal: Normal range of motion  General: No deformity  Lymphadenopathy:      Cervical: No cervical adenopathy  Skin:     General: Skin is cool and dry  Capillary Refill: Capillary refill takes more than 3 seconds  Neurological:      Mental Status: She is alert and oriented to person, place, and time  Comments: Right sided facial droop, right hemineglect in RUE, sleepy but arousable, does not follow commands, responding inappropriately           Labs:  Results from last 7 days   Lab Units 08/26/20  0429 08/25/20  1315   WBC Thousand/uL 13 50* 16 27*   HEMOGLOBIN g/dL 7 9* 10 3*   HEMATOCRIT % 24 6* 32 4*   PLATELETS Thousands/uL 225 309   MONO PCT % 0*  --       Results from last 7 days   Lab Units 08/26/20  0429 08/25/20  1315   SODIUM mmol/L 138 129*   POTASSIUM mmol/L 3 4* 4 1   CHLORIDE mmol/L 106 95*   CO2 mmol/L 21 23   BUN mg/dL 34* 36*   CREATININE mg/dL 2 32* 2 59*   CALCIUM mg/dL 7 4* 8 2*   ALK PHOS U/L 232*  --    ALT U/L 11*  --    AST U/L 27  --      Results from last 7 days   Lab Units 08/26/20  0429   MAGNESIUM mg/dL 1 2*     Results from last 7 days   Lab Units 08/26/20  0429   PHOSPHORUS mg/dL 2 8      Results from last 7 days   Lab Units 08/26/20  0431 08/25/20  1315   INR  1 38* 1 23*   PTT seconds 34 33         0   Lab Value Date/Time    TROPONINI <0 02 08/25/2020 1315        Imaging: Ct Stroke Alert Brain    Result Date: 8/25/2020  Impression: No acute intracranial abnormality  Stable mild microangiopathic changes  Findings were directly discussed with neurology attending on 8/25/2020 1:27 PM  Workstation performed: [id number]     Cta Stroke Alert (head/neck)    Result Date: 8/25/2020  Impression: Approximately 65-70% stenosis of the left internal carotid artery bulb   Mild intracranial atherosclerotic disease including mild focal narrowing of the right supraclinoid internal carotid artery  Posterior cerebral artery disease is noted, mild on the right and severe left  The M1 segments and middle cerebral artery branches appear patent  Findings were directly discussed with neurology attending on 8/25/2020 1:42 PM  Workstation performed: [id number]    I have personally reviewed pertinent reports  EKG: afib rate of 110 This was personally reviewed by myself  Micro:  Lab Results   Component Value Date    URINECX 20,000-29,000 cfu/ml  09/05/2018       Assessment: 75 F hypotensive, altered following stroke-like symptoms, tPA administration, large sacral decubitus ulcer noted        Plan:                  Neuro:   Acute encephalopathy-patient had initially had improvement of right sided weakness and facial droop, now more prominent, may refect hypoperfusion in setting of hypotension/shock vs true new insult, possibility of post tPA intracranial hemorrhage, will recheck CT head, fluid resuscitate and start pressors, continue to evaluate mentation    Stroke-like symptoms-CT/CTA unremarkable, received tPA on 8/25 at 1300, f/u 24 hour CT, stroke pathway with Q1 neuro checks, atorvastatin, aspirin, f/u MRI performed in ED    Sedation-patient currently not requiring sedation    Analgesia-patient not requiring analgesia, hold sedating medications in setting of encephalopathy    Delirium precautions-CAM-ICU, maintain sleep/wake                 CV:   Hypotension-concern for septic shock, will resuscitate with 30 cc/kg IVF bolus, start levophed    Atrial fibrillation-current rate 110, believe likely just compensatory in setting of shock, hold AC in setting of recent tPA administration                  Lung:   Hypoxia-requiring 2L NC in ED, may represent fluid overload  COVID19 swab negative   F/u CT chest                  GI:   No active issues                 FEN:   Fluid-30 cc/kg fluid bolus, maintenance at 125 isolyte  Electrolytes-trend lytes and replete as necessary  Nutrition-NPO given depressed mental status                 :   Luis pus from trujillo, concern for fistula from sacral decub, f/u CT, may require urology consult                 ID:   F/u blood cultures, procalcitonin, UA, likely requires wound cultures from sacral decubitus ulcer, concern that this is source for septic shock, surgical consultation for debridement, broad spectrum abx with vanc/cefepime                 Heme:   No active issues  Hold AC given recent tPA  SCD for DVT ppx                 Endo:   NIDDM-SSI at algorithm 3, escalate as necessary                 Msk/Skin:   Sacral decubitus ulcer-f/u CT, surgical consultation as above                 Disposition: ICU     VTE Pharmacologic Prophylaxis: Reason for no pharmacologic prophylaxis recent tPA  VTE Mechanical Prophylaxis: sequential compression device     Invasive lines and devices: Invasive Devices     Peripheral Intravenous Line            Peripheral IV 08/25/20 Left Forearm 1 day    Peripheral IV 08/25/20 Left Antecubital less than 1 day    Peripheral IV 08/26/20 Right Antecubital less than 1 day    Peripheral IV 08/26/20 Right Wrist less than 1 day          Drain            Urethral Catheter Temperature probe 16 Fr  less than 1 day                 Code Status: Level 1 - Full Code  POA:    POLST:       Given critical illness, patient length of stay will require greater than two midnights  Counseling / Coordination of Care       Portions of the record may have been created with voice recognition software  Occasional wrong word or "sound a like" substitutions may have occurred due to the inherent limitations of voice recognition software  Read the chart carefully and recognize, using context, where substitutions have occurred          Kit Degroot MD

## 2020-08-26 NOTE — PROGRESS NOTES
Progress Note - Neurology   Cole Montemayor 67 y o  female MRN: [id number]  Unit/Bed#: ICU 09 Encounter: [id number]      Assessment/Plan   Assessment:  50-year-old female with hypertension, dyslipidemia, DM2, CKD, recent right hip surgery in May 2020, presented as a stroke alert at [facility] on 08/25/2020  Aphasia, dysarthria, right-sided weakness:  -patient is status post IV tPA  -MRI brain demonstrated no acute changes  Did demonstrate mild chronic small vessel cerebrovascular ischemic disease and age-related atrophy  -CTA head/neck demonstrated left PCA stenosis and 65-70% stenosis of the left ICA bulb  -follow-up CT brain this morning demonstrated no acute changes and specifically no hemorrhage   -patient continues to have symbolic language difficulty with object naming naming difficulty and inability to repeat accurately  Still slightly dysarthric and with right upper extremity greater than left upper extremity weakness, and bilateral lower extremity weakness  -persistent neurologic symptoms likely related to hypoperfusion in the setting of septic shock and multiple metabolic derangements  Patient requires pressors to maintain adequate blood pressures, with blood pressure documented as low as 71/32 overnight  Septic shock:  -patient has sacral ulcer with purulent discharge as possible source   -as noted above, significantly hypotensive requiring pressors  -on vancomycin and cefepime   -management as per Critical Care  New onset AFib with RVR:  -patient emergently cardioverted by EMS as heart rate was up to 180 and blood pressure down to 66/33   -patient appears to be antiplatelet/anticoagulant naive prior to this hospitalization  DM2:  -A1c 9 9  Plan:  1  Continued supportive care/treatment of sepsis as per Critical Care  2  Stat CT brain for acute change in neurologic status  3  Will defer to Critical Care regarding antiplatelet/anticoagulation    Would recommend at least antiplatelet given the left ICA and PCA stenosis  Continue statin  4  Follow-up with vascular surgery regarding left ICA stenosis  Discussed with Dr Dexter Bamberger  Subjective:   Patient states she is thirsty, but offers no other complaints  Disoriented with unclear baseline  Patient states she has no history of stroke or TIA but appears to be an unreliable historian at this time  ROS:  Unable to assess as patient is an unreliable historian  Vitals: Blood pressure 134/60, pulse 92, temperature 99 3 °F (37 4 °C), resp  rate 13, height 5' 6" (1 676 m), weight 66 5 kg (146 lb 9 7 oz), SpO2 98 %  ,Body mass index is 23 66 kg/m²  Physical Exam:   General:  Patient appears to be in no acute distress  HEENT:  Head normocephalic  Eyes anicteric  Heart:  Rhythm regular  Lungs:  Normal effort  Nonlabored breathing  Skin:  Multiple small pinpoint areas of redness over right chest   Neurologic:  Patient is sleeping upon approach but easily alerts to voice  Oriented to person but states she is at home, that it is January 2010  Unable to state the current president  Difficulty with naming, with paraphasic errors  Unable to repeat accurately  In general conversation, however, appears to have fluent language, though slightly dysarthric  PERRLA  Evidence of past cataract surgeries bilaterally  EOMs intact  Face appears symmetric  Slow, but accurate with finger-to-nose maneuvers bilaterally  Right upper extremity 3+/5 throughout  Left upper extremity 4+/5 throughout  Did not flex either hip flexor but able to dorsiflex and plantar flex both lower extremities, 4-/5  Did not appreciate light touch in lower extremities  Did appreciate light touch symmetrically bilateral face and upper extremities  Reflexes 2+ bilateral upper extremities, trace right knee, 2+ left knee, absent bilateral ankles  Toe responses were downgoing bilaterally      Lab, Imaging and other studies:   CBC:   Results from last 7 days   Lab Units 08/26/20  0429 08/25/20  1315   WBC Thousand/uL 13 50* 16 27*   RBC Million/uL 2 76* 3 60*   HEMOGLOBIN g/dL 7 9* 10 3*   HEMATOCRIT % 24 6* 32 4*   MCV fL 89 90   PLATELETS Thousands/uL 225 309   , BMP/CMP:   Results from last 7 days   Lab Units 08/26/20  0429 08/25/20  1315   SODIUM mmol/L 138 129*   POTASSIUM mmol/L 3 4* 4 1   CHLORIDE mmol/L 106 95*   CO2 mmol/L 21 23   BUN mg/dL 34* 36*   CREATININE mg/dL 2 32* 2 59*   CALCIUM mg/dL 7 4* 8 2*   AST U/L 27  --    ALT U/L 11*  --    ALK PHOS U/L 232*  --    EGFR ml/min/1 73sq m 20 17   , HgBA1C:   Results from last 7 days   Lab Units 08/26/20  0429   HEMOGLOBIN A1C % 9 9*   , Lipid Profile:   Results from last 7 days   Lab Units 08/26/20  0429   HDL mg/dL 10*   LDL CALC mg/dL 23   TRIGLYCERIDES mg/dL 220*     VTE Prophylaxis: Sequential compression device (Venodyne)     Counseling / Coordination of Care  Total Critical Care time spent 25 minutes excluding procedures, teaching and family updates

## 2020-08-26 NOTE — PLAN OF CARE
Problem: Nutrition/Hydration-ADULT  Goal: Nutrient/Hydration intake appropriate for improving, restoring or maintaining nutritional needs  Description: Monitor and assess patient's nutrition/hydration status for malnutrition  Collaborate with interdisciplinary team and initiate plan and interventions as ordered  Monitor patient's weight and dietary intake as ordered or per policy  Utilize nutrition screening tool and intervene as necessary  Determine patient's food preferences and provide high-protein, high-caloric foods as appropriate       INTERVENTIONS:  - Monitor oral intake, urinary output, labs, and treatment plans  - Assess nutrition and hydration status and recommend course of action  - Evaluate amount of meals eaten  - Assist patient with eating if necessary   - Allow adequate time for meals  - Recommend/ encourage appropriate diets, oral nutritional supplements, and vitamin/mineral supplements  - Order, calculate, and assess calorie counts as needed  - Recommend, monitor, and adjust tube feedings and TPN/PPN based on assessed needs  - Assess need for intravenous fluids  - Provide specific nutrition/hydration education as appropriate  - Include patient/family/caregiver in decisions related to nutrition  Outcome: NPO

## 2020-08-26 NOTE — ASSESSMENT & PLAN NOTE
- Chronic stage IV sacral pressure wound, now necrotic and infected appearing with exposed sacral bone at wound base consistent with underlying osteomyelitis  - Recent outpatient sacral coccygeal x-rays also demonstrate evidence for osteomyelitis  - Suspect this necrotic sacral wound is source of her sepsis  Await blood culture results and urine culture results  Plan to obtain additional wound culture in the OR   - Recommend continuing broad-spectrum IV antibiotics per medical Critical Care service  - Recommended operative intervention for debridement, washout and dressing application today  Patient and her son are agreeable  - Postoperatively, will need continued local wound care  - Analgesia as needed per primary service  - Recommend frequent position changes, every 2 hour repositioning per Nursing   - Recommend adequate nutrition and an inpatient nutrition consultation   - Anticipate likely need for eventual plastic surgery consultation for bone coverage  Prior to plastic surgery intervention, patient needs clean wound base, adequate nutrition, and improve mobility  No need for plastic surgery involvement at this time

## 2020-08-26 NOTE — ASSESSMENT & PLAN NOTE
Patient noted to have a KI on admission; serum creatinine 2 59 (baseline-0 80-1 20)  Family reports poor oral intake recently; consider pre renal etiology  Gentle IV S overnight; re-evaluate on morning labs and consult Nephrology as needed

## 2020-08-26 NOTE — ASSESSMENT & PLAN NOTE
- Concern for acute CVA on admission due to slurred speech and right-sided neglect per medical records with improved symptoms today  - No evidence for acute ischemic stroke or hemorrhagic stroke noted on multiple CT scans and or MRI  - Continue medical management per primary service

## 2020-08-26 NOTE — H&P
H&P- Veronica Maza 1945, 67 y o  female MRN: [id number]    Unit/Bed#: ED 03 Encounter: [id number]    Primary Care Provider: TJ Reddy   Date and time admitted to hospital: 8/25/2020  4:06 PM        Hyponatremia  Assessment & Plan  Hyponatremia on admission labs; sodium noted to be 129  Likely secondary to poor p o  intake as noted by family historian  Will challenge with gentle hydration over; monitor morning labs    RORY (acute kidney injury) [facility])  Assessment & Plan  Patient noted to have a KI on admission; serum creatinine 2 59 (baseline-0 80-1 20)  Family reports poor oral intake recently; consider pre renal etiology  Gentle IV S overnight; re-evaluate on morning labs and consult Nephrology as needed    Pressure injury of skin of sacral region  Assessment & Plan  Wound care consult  Patient reports ulcer developed status post recent hip fracture    Hyperlipidemia  Assessment & Plan  Previously on pravastatin; will start high-intensity statin with Lipitor 40    Hypertension  Assessment & Plan  Permissive hypertension on admission; status post tPA; maintain SBP < 185; DBP < 105  Restart antihypertensive medications as per Neurology    Type 2 diabetes mellitus with hyperglycemia ([facility] [address] )  Assessment & Plan    Lab Results   Component Value Date    HGBA1C 9 2 (H) 05/20/2020     SSI  Blood Glucose checks TIDWM and QHS (Q6H for NPO patients)  Hold oral medications  Blood Glucose goal while inpatient is 140-180  Reduce basal insulin by 25-50% while inpatient    * CVA (cerebral vascular accident) [facility])  Assessment & Plan  - pt presents with to [facility] with right-sided facial droop and right-sided weakness after being transported by EMS for [facility] and noted to have atrial fibrillation enroute  - CT head in ED shows posterior cerebral artery disease is noted, mild on the right and severe left     - NIH - score on admission; - 6; now approx - 3  - permissive hypertension; maintain SBP less than 185; DBP less than 105  - hold ASA at this time high-dose statin (Lipitor 40mg qd)  - check MRI brain vs MRI/MRA head/neck  - CTA shows bilateral stenosis of carotids; less than 75%  - check lipid panel  - consider Echo with bubble  -bedside swallow; formal consult to speech for definitive swallow study tomorrow      VTE Prophylaxis: Pharmacologic VTE Prophylaxis contraindicated due to Recent tPA use  / sequential compression device   Code Status:  Full  POLST: POLST form is not discussed and not completed at this time  Discussion with family: Care plan discussed with patient who voiced understanding and agrees with recommendations  Anticipated Length of Stay:  Patient will be admitted on an inpatient basis with an anticipated length of stay of  greater than 2 midnights  Justification for Hospital Stay:  CVA    Total Time for Visit, including Counseling / Coordination of Care: 45 minutes  Greater than 50% of this total time spent on direct patient counseling and coordination of care  Chief Complaint:   Dysarthria and weakness    History of Present Illness:    Amrik Barreto is a 67 y o  female with past medical history of type 2 diabetes, hypertension, hyperlipidemia, CKD 3, and pressure ulcer status post hip fracture is transferred from [address] [place] [address] to [address] [place] [address] for treatment and evaluation of CVA  Patient was being transported by EMS for hypotension and weakness when she was noted to be in AFib with RVR and profoundly hypotensive  Will personnel requested provider assistance and performed field cardioversion with patient resuming normal sinus rhythm, however subsequently noted to have right-sided dysarthria and aphasia  Stroke alert called at transferring facility; patient given tPA with NIH score transitioning from 6 to 3 on admission to [facility] ED    Patient was initially transferred for critical care; however after assessment, stable for step-down level 2 with Neurology consult  Patient denies any acute symptoms; other than pain in her decubitus ulcer site  Denies any visual changes, headaches, chest pain, palpitations, nausea/vomiting, speech changes, fever/chills, diarrhea/constipation  Only reports fatigue and sacral pain  Will be admitted for further workup and treatment of CVA  Review of Systems:    Review of Systems   Constitutional: Positive for activity change  Negative for appetite change, chills and fever  HENT: Negative for congestion, ear discharge, mouth sores and trouble swallowing  Eyes: Negative for photophobia, discharge and itching  Respiratory: Negative for apnea, cough, choking and shortness of breath  Cardiovascular: Negative for chest pain, palpitations and leg swelling  Gastrointestinal: Negative for abdominal distention, constipation, diarrhea, nausea and vomiting  Endocrine: Negative  Genitourinary: Negative for difficulty urinating, dysuria, flank pain and hematuria  Musculoskeletal: Positive for arthralgias, back pain and gait problem  Negative for myalgias  Neurological: Negative for dizziness, syncope, facial asymmetry, weakness, light-headedness and headaches  Psychiatric/Behavioral: Negative for agitation, behavioral problems, confusion and suicidal ideas  The patient is not nervous/anxious  Past Medical and Surgical History:     Past Medical History:   Diagnosis Date    Acute renal failure (ARF) ([address] )     Acute respiratory failure with hypoxia ([address] )     Chronic kidney disease     Diabetes mellitus ([address] )     type 2    Hyperlipidemia     Hypertension        Past Surgical History:   Procedure Laterality Date    BREAST SURGERY Left     lumpectomy benign    CATARACT EXTRACTION Bilateral 2017    CATARACT EXTRACTION W/ INTRAOCULAR LENS IMPLANT Left 12/11/2017    Procedure: EXTRACTION EXTRACAPSULAR CATARACT PHACO INTRAOCULAR LENS (IOL);   Surgeon: Blaze Harris MD;  Location: [facility] MAIN OR;  Service: Ophthalmology    CT OPEN RX FEMUR FX+INTRAMED RAYMOND Right 5/21/2020    Procedure: INSERTION OF SHORT TROCHANTERIC FEMORAL NAIL;  Surgeon: Apolinar Escobar MD;  [facility] Main [facility];  Service: Orthopedics    CT ELENI SHOULDER [address] HUMERAL&GLENOID COMPNT Right 9/10/2018    Procedure: RIGHT REVERSE TOTAL SHOULDER ARTHROPLASTY;  Surgeon: Apolinar Escobar MD;  Location: [facility] Main [facility];  Service: Orthopedics    CT XCAPSL CTRC RMVL INSJ IO LENS PROSTH W/O ECP Right 10/23/2017    Procedure: EXTRACTION EXTRACAPSULAR CATARACT PHACO INTRAOCULAR LENS (IOL); Surgeon: Jorge Scott MD;  [facility] MAIN OR;  Service: Ophthalmology       Meds/Allergies:    Prior to Admission medications    Medication Sig Start Date End Date Taking?  Authorizing Provider   amLODIPine (NORVASC) 5 mg tablet Take 1 tablet (5 mg total) by mouth daily 5/27/20  Yes Denise Causey PA-C   enoxaparin (LOVENOX) 40 mg/0 4 mL Inject 0 4 mL (40 mg total) under the skin daily 5/27/20  Yes Denise Causey PA-C   ferrous sulfate 325 (65 Fe) mg tablet TAKE 1 TABLET BY MOUTH ONCE A DAY FOR SUPPLEMENT 7/7/20  Yes Historical Provider, MD   gabapentin (NEURONTIN) 100 mg capsule Take 100 mg by mouth 3 (three) times a day   Yes Historical Provider, MD   glipiZIDE (GLUCOTROL) 5 mg tablet Take 5 mg by mouth every evening   Yes Historical Provider, MD   metFORMIN (GLUCOPHAGE) 500 mg tablet Take 1 tablet (500 mg total) by mouth 2 (two) times a day with meals 9/13/18  Yes Venice Carmichael PA-C   polyethylene glycol (MIRALAX) 17 g packet Take 17 g by mouth daily 5/27/20  Yes Denise Causey PA-C   pravastatin (PRAVACHOL) 20 mg tablet Take 20 mg by mouth every evening   Yes Historical Provider, MD   senna-docusate sodium (SENOKOT-S) 8 6-50 mg per tablet Take 2 tablets by mouth 2 (two) times a day 5/27/20  Yes Denise Causey PA-C   acetaminophen (TYLENOL) 325 mg tablet Take 3 tablets (975 mg total) by mouth every 8 (eight) hours 5/27/20   Denise Causey PA-C   furosemide (LASIX) 20 mg tablet Take 1 tablet (20 mg total) by mouth daily for 5 days 7/20/20 7/25/20  TJ Silva     I have reviewed home medications with patient personally  Allergies: No Known Allergies    Social History:     Marital Status: Single   Occupation:   Patient Pre-hospital Living Situation:  Lives at home with family  Patient Pre-hospital Level of Mobility:  Limited secondary to hip fracture  Patient Pre-hospital Diet Restrictions:  Diabetic  Substance Use History:   Social History     Substance and Sexual Activity   Alcohol Use No    Comment: quit 8/17     Social History     Tobacco Use   Smoking Status Never Smoker   Smokeless Tobacco Never Used     Social History     Substance and Sexual Activity   Drug Use No       Family History:    Family History   Problem Relation Age of Onset    Diabetes Mother     Varicose Veins Mother     Stroke Father     Arthritis Brother     Diabetes Daughter     No Known Problems Brother        Physical Exam:     Vitals:   Blood Pressure: 96/51 (08/25/20 1930)  Pulse: 82 (08/25/20 1930)  Temperature: 98 3 °F (36 8 °C) (08/25/20 1611)  Temp Source: Oral (08/25/20 1611)  Respirations: 16 (08/25/20 1930)  Height: 5' 6" (167 6 cm) (08/25/20 1611)  Weight - Scale: 73 2 kg (161 lb 6 oz) (08/25/20 1611)  SpO2: 97 % (08/25/20 1930)    Physical Exam  Vitals signs and nursing note reviewed  Constitutional:       Appearance: She is obese  HENT:      Head: Normocephalic and atraumatic  Right Ear: External ear normal       Left Ear: External ear normal       Nose: Nose normal       Mouth/Throat:      Mouth: Mucous membranes are moist    Eyes:      Extraocular Movements: Extraocular movements intact  Conjunctiva/sclera: Conjunctivae normal       Pupils: Pupils are equal, round, and reactive to light  Neck:      Musculoskeletal: Normal range of motion and neck supple  Cardiovascular:      Rate and Rhythm: Normal rate and regular rhythm        Heart sounds: Normal heart sounds  Pulmonary:      Effort: Pulmonary effort is normal       Breath sounds: Normal breath sounds  Abdominal:      Palpations: Abdomen is soft  Musculoskeletal: Normal range of motion  Skin:     Comments: Decubitus ulcer, rash on right shoulder, abrasions on right elbow   Neurological:      Mental Status: She is alert  Motor: Weakness present  Gait: Gait abnormal       Comments: CN 2 through 12 grossly intact; 4/5  strength on right; 5/5  strength on left hand; 5/5 bilateral foot extension; 4/5 bilateral leg extension   Psychiatric:         Mood and Affect: Mood normal          Behavior: Behavior normal          Judgment: Judgment normal            Additional Data:     Lab Results: I have personally reviewed pertinent reports  Results from last 7 days   Lab Units 08/25/20  1315   WBC Thousand/uL 16 27*   HEMOGLOBIN g/dL 10 3*   HEMATOCRIT % 32 4*   PLATELETS Thousands/uL 309     Results from last 7 days   Lab Units 08/25/20  1315   SODIUM mmol/L 129*   POTASSIUM mmol/L 4 1   CHLORIDE mmol/L 95*   CO2 mmol/L 23   BUN mg/dL 36*   CREATININE mg/dL 2 59*   ANION GAP mmol/L 11   CALCIUM mg/dL 8 2*   GLUCOSE RANDOM mg/dL 321*     Results from last 7 days   Lab Units 08/25/20  1315   INR  1 23*     Results from last 7 days   Lab Units 08/25/20  1315   POC GLUCOSE mg/dl 352*               Imaging: I have personally reviewed pertinent reports  No orders to display       EKG, Pathology, and Other Studies Reviewed on Admission:   EKG: Sinus tachycardia with Premature supraventricular complexes  Septal infarct , age undetermined  Nonspecific ST and T wave abnormality  Abnormal ECG  When compared with ECG of 20-MAY-2020 15:00,  Nonspecific T wave abnormality now evident in Inferior leads  · Confirmed by Ananda Iqbal ([id number]) on 8/25/2020 2:29:44 PM    [employer] / [facility] Records Reviewed: Yes     ** Please Note: This note has been constructed using a voice recognition system  **

## 2020-08-26 NOTE — ANESTHESIA POSTPROCEDURE EVALUATION
Post-Op Assessment Note    CV Status:  Stable  Pain Score: 0    Pain management: adequate     Mental Status:  Alert   Hydration Status:  Stable   PONV Controlled:  None   Airway Patency:  Patent      Post Op Vitals Reviewed: Yes      Staff: Anesthesiologist, CRNA         No complications documented      /65 (08/26/20 1456)    Temp 98 °F (36 7 °C) (08/26/20 1456)    Pulse 99 (08/26/20 1456)   Resp 15 (08/26/20 1456)    SpO2 99 % (08/26/20 1456)

## 2020-08-26 NOTE — ASSESSMENT & PLAN NOTE
Permissive hypertension on admission; status post tPA; maintain SBP < 185; DBP < 105  Restart antihypertensive medications as per Neurology

## 2020-08-26 NOTE — OCCUPATIONAL THERAPY NOTE
OT CANCEL NOTE    OT orders received  Chart reviewed  Spoke w/ RN who reported pt is not medically appropriate for skilled OT services @ this time  Will hold initial OT evaluation  Will continue to follow pt on caseload and see pt when medically stable and as clinically appropriate      Jaswant Mojica MS, OTR/L

## 2020-08-26 NOTE — ASSESSMENT & PLAN NOTE
Lab Results   Component Value Date    HGBA1C 9 2 (H) 05/20/2020     SSI  Blood Glucose checks TIDWM and QHS (Q6H for NPO patients)  Hold oral medications  Blood Glucose goal while inpatient is 140-180  Reduce basal insulin by 25-50% while inpatient

## 2020-08-26 NOTE — ED NOTES
Changed pts wound dressing  Dressing saturated with wound discharge  Pts started to slur speech and mumble  Pt also thought she was at home when previously told nurse she was in hospital  SLIM doc paged at this time        Isaiah Malloy RN  08/26/20 0592

## 2020-08-26 NOTE — ANESTHESIA PROCEDURE NOTES
Arterial Line Insertion  Performed by: Marvel Armenta CRNA  Authorized by: Tal Nichole DO   Consent given by: patient  Required items: required blood products, implants, devices, and special equipment available  Indications: hemodynamic monitoring  Orientation:  Right  Location: radial artery  Procedure Details:  Needle gauge: 20  Seldinger technique: Seldinger technique used  Number of attempts: 1    Post-procedure:  Post-procedure: chlorhexidine patch applied  Waveform: good waveform  Post-procedure CNS: normal  Patient tolerance: Patient tolerated the procedure well with no immediate complications

## 2020-08-26 NOTE — ASSESSMENT & PLAN NOTE
- pt presents with to [facility] with right-sided facial droop and right-sided weakness after being transported by EMS for [facility] and noted to have atrial fibrillation enroute  - CT head in ED shows posterior cerebral artery disease is noted, mild on the right and severe left     - NIH - score on admission; - 6; now approx - 3  - permissive hypertension; maintain SBP less than 185; DBP less than 105  - hold ASA at this time high-dose statin (Lipitor 40mg qd)  - check MRI brain vs MRI/MRA head/neck  - CTA shows bilateral stenosis of carotids; less than 75%  - check lipid panel  - consider Echo with bubble  -bedside swallow; formal consult to speech for definitive swallow study tomorrow

## 2020-08-26 NOTE — PROGRESS NOTES
Vancomycin IV Pharmacy-to-Dose Consultation    Alok Alegre is a 67 y o  female who is currently receiving Vancomycin IV with management by the Pharmacy Consult service  Relevant clinical data and objective / subjective history reviewed  Vancomycin Assessment:  Indication: Decubitus ulcer of sacral region, stage 4, concern for osteomyelitis  Status: critically ill, leukocytosis, tmax 100 9  Micro:   8/26 Urine culture: in process  8/26 Blood cultures x 2: in process  8/25 SARS-COV-2: negative  Procalcitonin: 6 35  Renal Function: RORY, Scr 2 32 (down from 2 59, baseline 1 2)  Potential Nephrotoxicity Factors (select ALL that apply):  Medications: vasopressors, IV contrast  Patient-Factors: elderly, hypotension, renal dysfunction  Days of Therapy: 1  Current Dose: 1500 mg IV x 1 (750 mg (8/26 at 0812) + 750 mg (8/26 at 1050))  Goal Trough: 15-20 (appropriate for most indications)   Goal AUC(24h): 400-600  Last Level: n/a    Vancomycin Plan:  New Dosing: change to 1000 mg (15 mg/kg) IV daily PRN when random level is less than 20   Next Level: Random 8/27 at 0400  Renal Function Monitoring: Daily BMP and [address] will continue to follow closely for s/sx of nephrotoxicity, infusion reactions and appropriateness of therapy  BMP and CBC will be ordered per protocol  We will continue to follow the patients culture results and clinical progress daily       Elizabeth Nation, PharmD, [address] Clinical Pharmacist  [phone]

## 2020-08-26 NOTE — PROGRESS NOTES
Consent obtained from son to proceed with debridement  Anaesthesia unable to reach son  Will proceed with case as we need to obtain source control to effectively treat her sepsis

## 2020-08-26 NOTE — ANESTHESIA PREPROCEDURE EVALUATION
Procedure:  EXCISIONAL DEBRIDEMENT OF SACRAL PRESSURE WOUND, WASHOUT; POSSIBLE vac DRESSING PLACEMENT (N/A Coccyx)    Relevant Problems   CARDIO   (+) Atrial fibrillation (HCC) (Developed CVA symptoms after being [facility] was in AFib with RVR an hypotensive)   (+) Hyperlipidemia   (+) Hypertension      /RENAL   (+) RORY (acute kidney injury) ([facility] [address] )   (+) Acute renal failure superimposed on stage 3 chronic kidney disease (HCC)      HEMATOLOGY   (+) Acute blood loss as cause of postoperative anemia      NEURO/PSYCH   (+) CVA (cerebral vascular accident) ([facility] [address] ) (PCA stroke, right side weakness at baseline)   (+) History of vitamin D deficiency      PULMONARY   (+) Acute respiratory failure with hypoxia (HCC)        Physical Exam    Airway    Mallampati score: II  TM Distance: >3 FB  Neck ROM: full     Dental   upper dentures and lower dentures,     Cardiovascular  Rhythm: irregular, Rate: normal,     Pulmonary  Breath sounds clear to auscultation,     Other Findings        Anesthesia Plan  ASA Score- 4 Emergent    Anesthesia Type- general with ASA Monitors  Additional Monitors: arterial line  Airway Plan: ETT  Comment: Unable to get in touch woth patient's son but left a message  I called the patient's daughter, however she was not home  Will keep MAP > 70mmHg  Plan Factors-Exercise tolerance (METS): <4 METS  Chart reviewed  EKG reviewed  Imaging results reviewed  Existing labs reviewed  Patient summary reviewed  Induction- intravenous  Postoperative Plan- Plan for postoperative opioid use  Planned trial extubation    Informed Consent- Plan/risks discussed with: Emergency surgery  I personally reviewed this patient with the CRNA  Discussed and agreed on the Anesthesia Plan with the CRNA  Coty Solis

## 2020-08-26 NOTE — PLAN OF CARE
Problem: Potential for Falls  Goal: Patient will remain free of falls  Description: INTERVENTIONS:  - Assess patient frequently for physical needs  -  Identify cognitive and physical deficits and behaviors that affect risk of falls    -  [place] fall precautions as indicated by assessment   - Educate patient/family on patient safety including physical limitations  - Instruct patient to call for assistance with activity based on assessment  - Modify environment to reduce risk of injury  - Consider OT/PT consult to assist with strengthening/mobility  Outcome: Progressing     Problem: Prexisting or High Potential for Compromised Skin Integrity  Goal: Skin integrity is maintained or improved  Description: INTERVENTIONS:  - Identify patients at risk for skin breakdown  - Assess and monitor skin integrity  - Assess and monitor nutrition and hydration status  - Monitor labs   - Assess for incontinence   - Turn and reposition patient  - Assist with mobility/ambulation  - Relieve pressure over bony prominences  - Avoid friction and shearing  - Provide appropriate hygiene as needed including keeping skin clean and dry  - Evaluate need for skin moisturizer/barrier cream  - Collaborate with interdisciplinary team   - Patient/family teaching  - Consider wound care consult   Outcome: Progressing     Problem: PAIN - ADULT  Goal: Verbalizes/displays adequate comfort level or baseline comfort level  Description: Interventions:  - Encourage patient to monitor pain and request assistance  - Assess pain using appropriate pain scale  - Administer analgesics based on type and severity of pain and evaluate response  - Implement non-pharmacological measures as appropriate and evaluate response  - Consider cultural and social influences on pain and pain management  - Notify physician/advanced practitioner if interventions unsuccessful or patient reports new pain  Outcome: Progressing     Problem: INFECTION - ADULT  Goal: Absence or prevention of progression during hospitalization  Description: INTERVENTIONS:  - Assess and monitor for signs and symptoms of infection  - Monitor lab/diagnostic results  - Monitor all insertion sites, i e  indwelling lines, tubes, and drains  - Monitor endotracheal if appropriate and nasal secretions for changes in amount and color  - [place] appropriate cooling/warming therapies per order  - Administer medications as ordered  - Instruct and encourage patient and family to use good hand hygiene technique  - Identify and instruct in appropriate isolation precautions for identified infection/condition  Outcome: Progressing  Goal: Absence of fever/infection during neutropenic period  Description: INTERVENTIONS:  - Monitor WBC    Outcome: Progressing     Problem: SAFETY ADULT  Goal: Patient will remain free of falls  Description: INTERVENTIONS:  - Assess patient frequently for physical needs  -  Identify cognitive and physical deficits and behaviors that affect risk of falls    -  [place] fall precautions as indicated by assessment   - Educate patient/family on patient safety including physical limitations  - Instruct patient to call for assistance with activity based on assessment  - Modify environment to reduce risk of injury  - Consider OT/PT consult to assist with strengthening/mobility  Outcome: Progressing  Goal: Maintain or return to baseline ADL function  Description: INTERVENTIONS:  -  Assess patient's ability to carry out ADLs; assess patient's baseline for ADL function and identify physical deficits which impact ability to perform ADLs (bathing, care of mouth/teeth, toileting, grooming, dressing, etc )  - Assess/evaluate cause of self-care deficits   - Assess range of motion  - Assess patient's mobility; develop plan if impaired  - Assess patient's need for assistive devices and provide as appropriate  - Encourage maximum independence but intervene and supervise when necessary  - Involve family in performance of ADLs  - Assess for home care needs following discharge   - Consider OT consult to assist with ADL evaluation and planning for discharge  - Provide patient education as appropriate  Outcome: Progressing  Goal: Maintain or return mobility status to optimal level  Description: INTERVENTIONS:  - Assess patient's baseline mobility status (ambulation, transfers, stairs, etc )    - Identify cognitive and physical deficits and behaviors that affect mobility  - Identify mobility aids required to assist with transfers and/or ambulation (gait belt, sit-to-stand, lift, walker, cane, etc )  - [place] fall precautions as indicated by assessment  - Record patient progress and toleration of activity level on Mobility SBAR; progress patient to next Phase/Stage  - Instruct patient to call for assistance with activity based on assessment  - Consider rehabilitation consult to assist with strengthening/weightbearing, etc   Outcome: Progressing     Problem: DISCHARGE PLANNING  Goal: Discharge to home or other facility with appropriate resources  Description: INTERVENTIONS:  - Identify barriers to discharge w/patient and caregiver  - Arrange for needed discharge resources and transportation as appropriate  - Identify discharge learning needs (meds, wound care, etc )  - Arrange for interpretive services to assist at discharge as needed  - Refer to Case Management Department for coordinating discharge planning if the patient needs post-hospital services based on physician/advanced practitioner order or complex needs related to functional status, cognitive ability, or social support system  Outcome: Progressing     Problem: Knowledge Deficit  Goal: Patient/family/caregiver demonstrates understanding of disease process, treatment plan, medications, and discharge instructions  Description: Complete learning assessment and assess knowledge base    Interventions:  - Provide teaching at level of understanding  - Provide teaching via preferred learning methods  Outcome: Progressing     Problem: Neurological Deficit  Goal: Neurological status is stable or improving  Description: Interventions:  - Monitor and assess patient's level of consciousness, motor function, sensory function, and level of assistance needed for ADLs  - Monitor and report changes from baseline  Collaborate with interdisciplinary team to initiate plan and implement interventions as ordered  - Provide and maintain a safe environment  - Consider seizure precautions  - Consider fall precautions  - Consider aspiration precautions  - Consider bleeding precautions  Outcome: Progressing     Problem: Activity Intolerance/Impaired Mobility  Goal: Mobility/activity is maintained at optimum level for patient  Description: Interventions:  - Assess and monitor patient  barriers to mobility and need for assistive/adaptive devices  - Assess patient's emotional response to limitations  - Collaborate with interdisciplinary team and initiate plans and interventions as ordered  - Encourage independent activity per ability   - Maintain proper body alignment  - Perform active/passive rom as tolerated/ordered  - Plan activities to conserve energy   - Turn patient as appropriate  Outcome: Progressing     Problem: Communication Impairment  Goal: Ability to express needs and understand communication  Description: Assess patient's communication skills and ability to understand information  Patient will demonstrate use of effective communication techniques, alternative methods of communication and understanding even if not able to speak  - Encourage communication and provide alternate methods of communication as needed  - Collaborate with case management/ for discharge needs  - Include patient/family/caregiver in decisions related to communication    Outcome: Progressing     Problem: Communication Impairment  Goal: Ability to express needs and understand communication  Description: Assess patient's communication skills and ability to understand information  Patient will demonstrate use of effective communication techniques, alternative methods of communication and understanding even if not able to speak  - Encourage communication and provide alternate methods of communication as needed  - Collaborate with case management/ for discharge needs  - Include patient/family/caregiver in decisions related to communication  Outcome: Progressing     Problem: Potential for Aspiration  Goal: Non-ventilated patient's risk of aspiration is minimized  Description: Assess and monitor vital signs, respiratory status, and labs (WBC)  Monitor for signs of aspiration (tachypnea, cough, rales, wheezing, cyanosis, fever)  - Assess and monitor patient's ability to swallow  - Place patient up in chair to eat if possible  - HOB up at 90 degrees to eat if unable to get patient up into chair   - Supervise patient during oral intake  - Instruct patient/ family to take small bites  - Instruct patient/ family to take small single sips when taking liquids  - Follow patient-specific strategies generated by speech pathologist   Outcome: Progressing  Goal: Ventilated patient's risk of aspiration is minimized  Description: Assess and monitor vital signs, respiratory status, airway cuff pressure, and labs (WBC)  Monitor for signs of aspiration (tachypnea, cough, rales, wheezing, cyanosis, fever)  - Elevate head of bed 30 degrees if patient has tube feeding   - Monitor tube feeding  Outcome: Progressing     Problem: Nutrition  Goal: Nutrition/Hydration status is improving  Description: Monitor and assess patient's nutrition/hydration status for malnutrition (ex- brittle hair, bruises, dry skin, pale skin and conjunctiva, muscle wasting, smooth red tongue, and disorientation)  Collaborate with interdisciplinary team and initiate plan and interventions as ordered  Monitor patient's weight and dietary intake as ordered or per policy  Utilize nutrition screening tool and intervene per policy  Determine patient's food preferences and provide high-protein, high-caloric foods as appropriate  - Assist patient with eating   - Allow adequate time for meals   - Encourage patient to take dietary supplement as ordered  - Collaborate with clinical nutritionist   - Include patient/family/caregiver in decisions related to nutrition  Outcome: Progressing     Problem: Nutrition/Hydration-ADULT  Goal: Nutrient/Hydration intake appropriate for improving, restoring or maintaining nutritional needs  Description: Monitor and assess patient's nutrition/hydration status for malnutrition  Collaborate with interdisciplinary team and initiate plan and interventions as ordered  Monitor patient's weight and dietary intake as ordered or per policy  Utilize nutrition screening tool and intervene as necessary  Determine patient's food preferences and provide high-protein, high-caloric foods as appropriate       INTERVENTIONS:  - Monitor oral intake, urinary output, labs, and treatment plans  - Assess nutrition and hydration status and recommend course of action  - Evaluate amount of meals eaten  - Assist patient with eating if necessary   - Allow adequate time for meals  - Recommend/ encourage appropriate diets, oral nutritional supplements, and vitamin/mineral supplements  - Order, calculate, and assess calorie counts as needed  - Recommend, monitor, and adjust tube feedings and TPN/PPN based on assessed needs  - Assess need for intravenous fluids  - Provide specific nutrition/hydration education as appropriate  - Include patient/family/caregiver in decisions related to nutrition  Outcome: Progressing     Problem: NEUROSENSORY - ADULT  Goal: Achieves stable or improved neurological status  Description: INTERVENTIONS  - Monitor and report changes in neurological status  - Monitor vital signs such as temperature, blood pressure, glucose, and any other labs ordered   - Initiate measures to prevent increased intracranial pressure  - Monitor for seizure activity and implement precautions if appropriate      Outcome: Progressing  Goal: Achieves maximal functionality and self care  Description: INTERVENTIONS  - Monitor swallowing and airway patency with patient fatigue and changes in neurological status  - Encourage and assist patient to increase activity and self care     - Encourage visually impaired, hearing impaired and aphasic patients to use assistive/communication devices  Outcome: Progressing     Problem: CARDIOVASCULAR - ADULT  Goal: Maintains optimal cardiac output and hemodynamic stability  Description: INTERVENTIONS:  - Monitor I/O, vital signs and rhythm  - Monitor for S/S and trends of decreased cardiac output  - Administer and titrate ordered vasoactive medications to optimize hemodynamic stability  - Assess quality of pulses, skin color and temperature  - Assess for signs of decreased coronary artery perfusion  - Instruct patient to report change in severity of symptoms  Outcome: Progressing     Problem: RESPIRATORY - ADULT  Goal: Achieves optimal ventilation and oxygenation  Description: INTERVENTIONS:  - Assess for changes in respiratory status  - Assess for changes in mentation and behavior  - Position to facilitate oxygenation and minimize respiratory effort  - Oxygen administered by appropriate delivery if ordered  - Initiate smoking cessation education as indicated  - Encourage broncho-pulmonary hygiene including cough, deep breathe, Incentive Spirometry  - Assess the need for suctioning and aspirate as needed  - Assess and instruct to report SOB or any respiratory difficulty  - Respiratory Therapy support as indicated  Outcome: Progressing     Problem: GASTROINTESTINAL - ADULT  Goal: Minimal or absence of nausea and/or vomiting  Description: INTERVENTIONS:  - Administer IV fluids if ordered to ensure adequate hydration  - Maintain NPO status until nausea and vomiting are resolved  - Nasogastric tube if ordered  - Administer ordered antiemetic medications as needed  - Provide nonpharmacologic comfort measures as appropriate  - Advance diet as tolerated, if ordered  - Consider nutrition services referral to assist patient with adequate nutrition and appropriate food choices  Outcome: Progressing  Goal: Maintains adequate nutritional intake  Description: INTERVENTIONS:  - Monitor percentage of each meal consumed  - Identify factors contributing to decreased intake, treat as appropriate  - Assist with meals as needed  - Monitor I&O, weight, and lab values if indicated  - Obtain nutrition services referral as needed  Outcome: Progressing     Problem: GENITOURINARY - ADULT  Goal: Maintains or returns to baseline urinary function  Description: INTERVENTIONS:  - Assess urinary function  - Encourage oral fluids to ensure adequate hydration if ordered  - Administer IV fluids as ordered to ensure adequate hydration  - Administer ordered medications as needed  - Offer frequent toileting  - Follow urinary retention protocol if ordered  Outcome: Progressing     Problem: METABOLIC, FLUID AND ELECTROLYTES - ADULT  Goal: Electrolytes maintained within normal limits  Description: INTERVENTIONS:  - Monitor labs and assess patient for signs and symptoms of electrolyte imbalances  - Administer electrolyte replacement as ordered  - Monitor response to electrolyte replacements, including repeat lab results as appropriate  - Instruct patient on fluid and nutrition as appropriate  Outcome: Progressing     Problem: SKIN/TISSUE INTEGRITY - ADULT  Goal: Skin integrity remains intact  Description: INTERVENTIONS  - Identify patients at risk for skin breakdown  - Assess and monitor skin integrity  - Assess and monitor nutrition and hydration status  - Monitor labs (i e  albumin)  - Assess for incontinence   - Turn and reposition patient  - Assist with mobility/ambulation  - Relieve pressure over bony prominences  - Avoid friction and shearing  - Provide appropriate hygiene as needed including keeping skin clean and dry  - Evaluate need for skin moisturizer/barrier cream  - Collaborate with interdisciplinary team (i e  Nutrition, Rehabilitation, etc )   - Patient/family teaching  Outcome: Progressing     Problem: HEMATOLOGIC - ADULT  Goal: Maintains hematologic stability  Description: INTERVENTIONS  - Assess for signs and symptoms of bleeding or hemorrhage  - Monitor labs  - Administer supportive blood products/factors as ordered and appropriate  Outcome: Progressing     Problem: MUSCULOSKELETAL - ADULT  Goal: Maintain or return mobility to safest level of function  Description: INTERVENTIONS:  - Assess patient's ability to carry out ADLs; assess patient's baseline for ADL function and identify physical deficits which impact ability to perform ADLs (bathing, care of mouth/teeth, toileting, grooming, dressing, etc )  - Assess/evaluate cause of self-care deficits   - Assess range of motion  - Assess patient's mobility  - Assess patient's need for assistive devices and provide as appropriate  - Encourage maximum independence but intervene and supervise when necessary  - Involve family in performance of ADLs  - Assess for home care needs following discharge   - Consider OT consult to assist with ADL evaluation and planning for discharge  - Provide patient education as appropriate  Outcome: Progressing     Problem: COPING  Goal: Pt/Family able to verbalize concerns and demonstrate effective coping strategies  Description: INTERVENTIONS:  - Assist patient/family to identify coping skills, available support systems and cultural and spiritual values  - Provide emotional support, including active listening and acknowledgement of concerns of patient and caregivers  - Reduce environmental stimuli, as able  - Provide patient education  - Assess for spiritual pain/suffering and initiate spiritual care, including notification of Pastoral Care or анна based community as needed  - Assess effectiveness of coping strategies  Outcome: Progressing  Goal: Will report anxiety at manageable levels  Description: INTERVENTIONS:  - Administer medication as ordered  - Teach and encourage coping skills  - Provide emotional support  - Assess patient/family for anxiety and ability to cope  Outcome: Progressing

## 2020-08-26 NOTE — PROGRESS NOTES
Attending note- Brandi [age] y o  female MRN: [id number]  Unit/Bed#: ED 03 Encounter: [id number]      Patient transferred yesterday secondary to having acute stroke symptoms after emergent cardioversion for atrial fibrillation with RVR and hypotension  Patient received tPA  She did have a left PCA occlusion which was reviewed by endovascular and decision was made to continue with medical management  Patient was transferred to [facility] as she may require emergent endovascular intervention overnight if neurologic exam declines  Patient presented with right-sided weakness as well as aphasia  Patient was initially evaluated in the emergency department and NIH was calculated to be 2, patient did not require critical care admission at that time and was admitted to medicine service  Overnight patient became hypotensive while getting MRI  She appeared to be meeting criteria for sepsis  Antibiotics initiated, IV fluids administered and patient received empiric antibiotics  Likely etiology related to sacral decubitus ulcer        Vitals:    08/26/20 0900 08/26/20 1000 08/26/20 1100 08/26/20 1200   BP: (!) 80/39 110/57 134/60 144/75   BP Location:       Pulse: 96 104 92 98   Resp: (!) 10 19 13 19   Temp: 99 3 °F (37 4 °C) 99 °F (37 2 °C) 99 3 °F (37 4 °C) 99 3 °F (37 4 °C)   TempSrc:       SpO2: 99% 98% 98% 99%   Weight:       Height:         GEN: NAD, awake and alert  HEENT: EOMI, PERRLA, MMM  CV: tachycardia, no  Murmur  Resp:  CTA, no R/R/W  GI: soft,NT/ND  : urinary catheter in place  Neuro: KATHLEEN, limited rom rle unable to lift off bed, rue drift limited rom, 5/5 lue/lle  Skin: pale, large sacral decub visible bone, candidal groin rash, right shoulder zoster rash healing    Septic shock  Hypotension multifactorial - infection and hypovolemic  Left PCA stroke s/p tpa  New onset afib s/p pre-hospital cardioversion with development acute stroke symptoms  Left carotid stensis  Large sacral decub stage 4  RORY on CKD  Hyponatremia- improved  Dehydration  Metabolic acidosis  Anemia  DM2 hba1c 9 9- glycemic control with insulin protocol  HLD- statin  HTN history  Severe protein calorie malnutrition  Hypomagnesemia- replete and recheck  Hypokalemia- replete and replace    NE 3mcg  Cefepime   Vancomycin    IO +2 6    MRI completed no acute stroke identified  CT 24hrs no acute changes  Type and cross with 2 units on hold for OR    Surgery consult    Update:  Continue with neurologic checks q 1 hour  Patient to go to OR for debridement of sacral decubitus as well as culture of wound for antibiotic regimen planning  Pending finding related to possible osteomyelitis patient will likely require Infectious Disease consultation  Patient's recent stroke status post tPA with findings of right-sided weakness as well as worsening aphasia with lower blood pressure communicated to anesthesia  Regarding patient's neurologic exam, patient did have a history of right shoulder surgery with decreased range of motion as well as right hip surgery for which she had not had rehabilitation due to pandemic  Patient's weakness may be secondary to orthopedic injuries cannot completely relate weakness to stroke  MRI completed in did not identify any acute stroke  Continue to monitor neurologic exam     Maintain on vasopressors to achieve mean arterial pressure greater than 65  Reassess patient's status postoperative intervention        Critical care time 37 minutes

## 2020-08-26 NOTE — ASSESSMENT & PLAN NOTE
Lab Results   Component Value Date    HGBA1C 9 9 (H) 08/26/2020       Recent Labs     08/25/20  1315 08/25/20  2305 08/26/20  0308   POCGLU 352* 234* 153*       Blood Sugar Average: Last 72 hrs:  (P) 193 5     - Continue current insulin regimen per primary service  - Blood sugar control improved over the last 24 hours  Suspect hyperglycemia at least in part due to her underlying infection, but does have poor glycemic control at baseline with a hemoglobin A1c of 9 9 this admission increased from a hemoglobin A1c of 9 2 in May of 2020

## 2020-08-26 NOTE — OP NOTE
OPERATIVE REPORT  PATIENT NAME: Neftaly Mohan    :  1945  MRN: [id number]  Pt Location: BE OR ROOM 06    SURGERY DATE: 2020    Surgeon(s) and Role:     * Darinel Lucio MD - Primary     * Teo Toledo MD - Assisting    Preop Diagnosis:  Decubitus ulcer of sacral region, stage 4 ([address] ) [L89 154]    Post-Op Diagnosis Codes:     * Decubitus ulcer of sacral region, stage 4 ([address] ) [L89 154]    Procedure(s) (LRB):  EXCISIONAL DEBRIDEMENT OF SACRAL PRESSURE WOUND, WASHOUT; (N/A)    Specimen(s):  ID Type Source Tests Collected by Time Destination   A :  Wound Sacrum ANAEROBIC CULTURE AND GRAM STAIN, WOUND CULTURE Teo Toledo MD 2020 1353        Estimated Blood Loss:   Minimal    Drains:  Urethral Catheter Temperature probe 16 Fr  (Active)   Reasons to continue Urinary Catheter  Accurate I&O assessment in critically ill patients (48 hr  max) 20 0800   Site Assessment Clean;Skin intact 20 1200   Collection Container Standard drainage bag 20 1200   Securement Method Securing device (Describe) 20 1200   Output (mL) 15 mL 20 1200   Number of days: 0       Anesthesia Type:   General    Operative Indications:  Decubitus ulcer of sacral region, stage 4 ([address] ) [L89 154]    Operative Findings:  · Stage IV sacral decubitus ulcer  Necrotic skin/soft tissue  No purulence appreciated  Visible sacrum at base of wound  · Packed with radiolabeled Kerlix soaked in Betadine  · Post debridement measurements 5 5x5 0x1 5cm  Post-debridement image:            Complications:   None    Procedure and Technique:  After informed consent was obtained by the patient's son via telephone, she was brought to the operating room where she was identified verbally in the hospital supplied armband   General anesthesia was induced and she was intubated on her hospital stretcher and transferred to the operating room table in the prone position, taking care to adequately pad all pressure points, minimize disturbance of her endotracheal tube, and minimize stress to her joints during positioning throughout the case  The sacral region was prepped and draped in usual sterile fashion with Betadine  A brief time-out was performed confirming the correct patient, procedure, site and with all parties in agreement we proceeded  The wound was inspected visually, demonstrating obvious necrotic tissue with significant malodor and no spontaneous purulent drainage appreciated  Wound cultures were probed into a deep track and sent off for analysis  A # 15 blade was used to incise the necrotic margins of the periwound back to healthy bleeding tissue  Microvascular bleeding from the wound edge was controlled with electrocautery  A combination of sharp excisional debridement with scalpel/ Metzenbaum scissors was used to debride the base of the wound back to expose bone (sacrum) and healthy bleeding tissue  The wound was copiously irrigated with the Pulsavac and sterile saline and inspected for hemostasis which was achieved using a combination of electrocautery, direct pressure, and suture ligature using 2 0 Vicryl  The wound was packed with a Betadine-soaked, radial labeled Kerlix gauze  In an effort to protect the skin of the periwound, DuoDerm dressing was placed around the surrounding tissue and covered with 4 x 4 gauze and heavy drainage pad  The patient was returned to her hospital bed in the supine position, general anesthesia was reversed  The patient was extubated and returned to PACU in stable condition  At the conclusion of the case, all sponge, needle, instrument counts were correct and RF wanding was negative  Dr James Chowdhury was present for the entire procedure and supervised all critical portions of the case      Patient Disposition:  PACU  and extubated and stable    SIGNATURE: Sheng Doran MD  DATE: August 26, 2020  TIME: 2:56 PM

## 2020-08-26 NOTE — ASSESSMENT & PLAN NOTE
Hyponatremia on admission labs; sodium noted to be 129  Likely secondary to poor p o  intake as noted by family historian  Will challenge with gentle hydration over; monitor morning labs

## 2020-08-26 NOTE — QUICK NOTE
Contacted by nursing regarding hypotension and increased confusion in patient shortly after returning from MRI at 0255H  Per nursing, patient somewhat less responsive than before and was now confused as to location, believing she was at home  Evaluated patient very shortly after notification, arousable to voice however requiring frequent redirection, when asked of location she initially against to list address but after some time corrects appropriately to location  She was noted to be hypotensive to SBP 74/38   500 cc bolus of normal saline and total 500 cc 5% albumin were provided with only transient improvement in blood pressure  Arranged for repeat CMP/CBC to be drawn  Case subsequently discussed with ICU attending on-call, will place patient on peripheral Marko for now, and patient is to be transferred to ICU level of care given persistent hypotension particularly in setting of recent tPA administration  ADDENDUM: further discussed with ICU team, concern for septic shock possibly in setting of infected unstageable sacral wound    To start cefepime and vancomycin, and start Levophed instead of Marko; CT A/P ordered by ICU team and further cares to be assumed by their team       Attempted to contact son/daughter via telephone to update but unable to reach either, [facility]

## 2020-08-26 NOTE — CONSULTS
Consult- Cole Montemayor 1945, 67 y o  female MRN: [id number]    Unit/Bed#: ICU 09 Encounter: [id number]    Primary Care Provider: TJ Clement   Date and time admitted to hospital: 8/25/2020  4:06 PM      Inpatient consult to Acute Care Surgery  Consult performed by: Andrea Christensen PA-C  Consult ordered by: Benigno Lopez MD          Decubitus ulcer of sacral region, stage 4 [facility])  Assessment & Plan  - Chronic stage IV sacral pressure wound, now necrotic and infected appearing with exposed sacral bone at wound base consistent with underlying osteomyelitis  - Recent outpatient sacral coccygeal x-rays also demonstrate evidence for osteomyelitis  - Suspect this necrotic sacral wound is source of her sepsis  Await blood culture results and urine culture results  Plan to obtain additional wound culture in the OR   - Recommend continuing broad-spectrum IV antibiotics per medical Critical Care service  - Recommended operative intervention for debridement, washout and dressing application today  Patient and her son are agreeable  - Postoperatively, will need continued local wound care  - Analgesia as needed per primary service  - Recommend frequent position changes, every 2 hour repositioning per Nursing   - Recommend adequate nutrition and an inpatient nutrition consultation   - Anticipate likely need for eventual plastic surgery consultation for bone coverage  Prior to plastic surgery intervention, patient needs clean wound base, adequate nutrition, and improve mobility  No need for plastic surgery involvement at this time  * CVA (cerebral vascular accident) [facility])  Assessment & Plan  - Concern for acute CVA on admission due to slurred speech and right-sided neglect per medical records with improved symptoms today  - No evidence for acute ischemic stroke or hemorrhagic stroke noted on multiple CT scans and or MRI  - Continue medical management per primary service      RORY (acute kidney injury) [facility])  Assessment & Plan  - Acute kidney injury, present on admission  Likely related to dehydration and sepsis  - Continue management and resuscitation per primary service  Atrial fibrillation [facility])  Assessment & Plan  - Atrial fibrillation with recent history of RVR as of yesterday requiring cardioversion   - Medical management per primary service  - Recommend holding off on anticoagulation pending operative intervention  Type 2 diabetes mellitus with hyperglycemia [facility])  Assessment & Plan  Lab Results   Component Value Date    HGBA1C 9 9 (H) 08/26/2020       Recent Labs     08/25/20  1315 08/25/20  2305 08/26/20  0308   POCGLU 352* 234* 153*       Blood Sugar Average: Last 72 hrs:  (P) 193 5     - Continue current insulin regimen per primary service  - Blood sugar control improved over the last 24 hours  Suspect hyperglycemia at least in part due to her underlying infection, but does have poor glycemic control at baseline with a hemoglobin A1c of 9 9 this admission increased from a hemoglobin A1c of 9 2 in May of 2020  Acute Care Surgery  Consultation      History of Present Illness   HPI:  Charly Deleon is a 67 y o  female who presented yesterday with weakness  Prehospital, she was found to be in atrial fibrillation with RVR and hypotension requiring cardioversion  Per medical records, the patient developed new onset slurred speech and developed some right-sided weakness following cardioversion  Upon arrival to the hospital, she was made a stroke alert and tPA was administered with noted improvement in her symptoms  The patient was initially managed at [facility] before being transferred to [facility]  In addition to the above-noted issues, the patient has a chronic stage IV sacral pressure wound that was noted to be newly necrotic this admission  Currently, she offered no complaints and denies having pain    The nursing staff reported no significant issues this morning  The nursing staff did note that she had some residual right-sided weakness, but that it was minimal   The nursing staff also reported some confusion  Review of Systems   Unable to perform ROS: Mental status change       Historical Information   Past Medical History:   Diagnosis Date    [facility] failure [facility]F) [facility]     Acute respiratory failure with hypoxia [facility] )     Chronic kidney disease     Diabetes mellitus ([facility] )     type 2    Hyperlipidemia     Hypertension      Past Surgical History:   Procedure Laterality Date    BREAST SURGERY Left     lumpectomy benign    CATARACT EXTRACTION Bilateral 2017    CATARACT EXTRACTION W/ INTRAOCULAR LENS IMPLANT Left 12/11/2017    Procedure: EXTRACTION EXTRACAPSULAR CATARACT PHACO INTRAOCULAR LENS (IOL); Surgeon: Blaze Harris MD;  Location: [facility] MAIN OR;  Service: Ophthalmology    VT OPEN RX FEMUR FX+INTRAMED RAYMOND Right 5/21/2020    Procedure: INSERTION OF SHORT TROCHANTERIC FEMORAL NAIL;  Surgeon: Angelica Armstrong MD;  [facility] Main [facility];  Service: Orthopedics    [address] HUMERAL&GLENOID COMPNT Right 9/10/2018    Procedure: RIGHT REVERSE TOTAL SHOULDER ARTHROPLASTY;  Surgeon: Angelica Armstrong MD;  [facility];  Service: Orthopedics    VT XCAPSL CTRC RMVL INSJ IO LENS PROSTH W/O ECP Right 10/23/2017    Procedure: EXTRACTION EXTRACAPSULAR CATARACT PHACO INTRAOCULAR LENS (IOL);   Surgeon: Blaze Harris MD;  Location: [facility] MAIN OR;  Service: Ophthalmology     Social History   Social History     Substance and Sexual Activity   Alcohol Use No    Comment: quit 8/17     Social History     Substance and Sexual Activity   Drug Use No     Social History     Tobacco Use   Smoking Status Never Smoker   Smokeless Tobacco Never Used     Family History   Problem Relation Age of Onset    Diabetes Mother     Varicose Veins Mother     Stroke Father     Arthritis Brother     Diabetes Daughter     No Known Problems Brother        Meds/Allergies   all current active meds have been reviewed and current meds:   Current Facility-Administered Medications   Medication Dose Route Frequency    atorvastatin (LIPITOR) tablet 40 mg  40 mg Oral QPM    chlorhexidine (PERIDEX) 0 12 % oral rinse 15 mL  15 mL Swish & Spit Q12H [facility]    gabapentin (NEURONTIN) capsule 100 mg  100 mg Oral TID    hydrALAZINE (APRESOLINE) injection 10 mg  10 mg Intravenous Q4H PRN    insulin lispro (HumaLOG) 100 units/mL subcutaneous injection 1-6 Units  1-6 Units Subcutaneous Q6H [facility]    insulin lispro (HumaLOG) 100 units/mL subcutaneous injection 2-12 Units  2-12 Units Subcutaneous HS    multi-electrolyte (ISOLYTE-S PH 7 4) bolus 250 mL  250 mL Intravenous Once    norepinephrine (LEVOPHED) 4 mg (STANDARD CONCENTRATION) IV in sodium chloride 0 9% 250 mL  1-30 mcg/min Intravenous Titrated    polyethylene glycol (MIRALAX) packet 17 g  17 g Oral Daily    senna-docusate sodium (SENOKOT S) 8 6-50 mg per tablet 2 tablet  2 tablet Oral BID    [START ON 8/27/2020] vancomycin (VANCOCIN) IVPB (premix) 1,000 mg 200 mL  15 mg/kg Intravenous Daily PRN    vancomycin (VANCOCIN) IVPB (premix) 750 mg 150 mL  10 mg/kg Intravenous Once     No Known Allergies    Objective   First Vitals:   Blood Pressure: 99/52 (08/25/20 1611)  Pulse: 96 (08/25/20 1611)  Temperature: 98 3 °F (36 8 °C) (08/25/20 1611)  Temp Source: Oral (08/25/20 1611)  Respirations: 16 (08/25/20 1611)  Height: 5' 6" (167 6 cm) (08/25/20 1611)  Weight - Scale: 73 2 kg (161 lb 6 oz) (08/25/20 1611)  SpO2: 97 % (08/25/20 1611)    Current Vitals:   Blood Pressure: 134/60 (08/26/20 1100)  Pulse: 92 (08/26/20 1100)  Temperature: 99 3 °F (37 4 °C) (08/26/20 1100)  Temp Source: Bladder (08/26/20 0626)  Respirations: 13 (08/26/20 1100)  Height: 5' 6" (167 6 cm) (08/26/20 0626)  Weight - Scale: 66 5 kg (146 lb 9 7 oz) (08/26/20 0626)  SpO2: 98 % (08/26/20 1100)      Intake/Output Summary (Last 24 hours) at 8/26/2020 1131  Last data filed at 8/26/2020 1000  Gross per 24 hour   Intake 3223 88 ml   Output 605 ml   Net 2618 88 ml       Invasive Devices     Peripheral Intravenous Line            Peripheral IV 08/25/20 Left Antecubital less than 1 day    Peripheral IV 08/26/20 Right Antecubital less than 1 day    Peripheral IV 08/26/20 Right Wrist less than 1 day          Drain            Urethral Catheter Temperature probe 16 Fr  less than 1 day                Physical Exam  Vitals signs and nursing note reviewed  Constitutional:       General: She is awake  She is not in acute distress  Appearance: She is ill-appearing  She is not diaphoretic  Interventions: She is not intubated  HENT:      Head: Normocephalic and atraumatic  Right Ear: External ear normal       Left Ear: External ear normal       Nose: Nose normal       Mouth/Throat:      Mouth: Mucous membranes are moist       Pharynx: Oropharynx is clear  Neck:      Musculoskeletal: Neck supple  Cardiovascular:      Rate and Rhythm: Normal rate  Rhythm irregularly irregular  Pulses: Normal pulses  Radial pulses are 2+ on the right side and 2+ on the left side  Dorsalis pedis pulses are 2+ on the right side and 2+ on the left side  Heart sounds: No murmur  No friction rub  No gallop  Pulmonary:      Effort: Pulmonary effort is normal  No tachypnea, bradypnea, accessory muscle usage or respiratory distress  She is not intubated  Breath sounds: Normal breath sounds  No stridor  No decreased breath sounds, wheezing, rhonchi or rales  Abdominal:      General: Bowel sounds are normal  There is no distension  Palpations: Abdomen is soft  Tenderness: There is no abdominal tenderness  There is no guarding or rebound  Genitourinary:     Comments: Urinary catheter in place  Musculoskeletal:      Right lower leg: No edema  Left lower leg: No edema  Skin:     General: Skin is warm and dry        Coloration: Skin is not jaundiced or pale  Findings: Erythema (Circumferential erythema surrounding the sacral wound) and rash (Rash in the bilateral inguinal folds, groin and perineum) present  Neurological:      Mental Status: She is alert  Motor: Weakness (Minimal right upper extremity weakness) present  Psychiatric:         Mood and Affect: Mood normal          Behavior: Behavior is cooperative  Lab Results:   I have personally reviewed pertinent lab results  , CBC:   Lab Results   Component Value Date    WBC 13 50 (H) 08/26/2020    HGB 7 9 (L) 08/26/2020    HCT 24 6 (L) 08/26/2020    MCV 89 08/26/2020     08/26/2020    MCH 28 6 08/26/2020    MCHC 32 1 08/26/2020    RDW 14 5 08/26/2020    MPV 11 2 08/26/2020    NRBC 0 08/26/2020   , CMP:   Lab Results   Component Value Date    SODIUM 138 08/26/2020    K 3 4 (L) 08/26/2020     08/26/2020    CO2 21 08/26/2020    BUN 34 (H) 08/26/2020    CREATININE 2 32 (H) 08/26/2020    CALCIUM 7 4 (L) 08/26/2020    AST 27 08/26/2020    ALT 11 (L) 08/26/2020    ALKPHOS 232 (H) 08/26/2020    EGFR 20 08/26/2020   , Coagulation:   Lab Results   Component Value Date    INR 1 38 (H) 08/26/2020   , Urinalysis:   Lab Results   Component Value Date    COLORU Yellow 08/26/2020    CLARITYU Turbid 08/26/2020    SPECGRAV 1 027 08/26/2020    PHUR 5 0 08/26/2020    LEUKOCYTESUR Large (A) 08/26/2020    NITRITE Negative 08/26/2020    GLUCOSEU Negative 08/26/2020    KETONESU Trace (A) 08/26/2020    BILIRUBINUR Negative 08/26/2020    BLOODU Small (A) 08/26/2020     Imaging: I have personally reviewed pertinent reports  EKG, Pathology, and Other Studies: I have personally reviewed pertinent reports  and I have personally reviewed pertinent films in PACS    Counseling / Coordination of Care  Total floor / unit time spent today 40 minutes  Greater than 50% of total time was spent with the patient and / or family counseling and / or coordination of care      Dixie Kathleen Cece Perez PA-C  8/26/2020 11:31 AM

## 2020-08-26 NOTE — WOUND OSTOMY CARE
Consult Note - Wound   Kevon Johnson 67 y o  female MRN: [id number]  Unit/Bed#: OR POOL Encounter: [id number]      History and Present Illness:  Patient is seen for wound assessment  Patient examined in bed with primary nurse present  Patient is continent and has an urinary catheter in place  Patient had been seen outpatient for her sacral wound  Patient is to go to OR for debridment of sacral wound  Surgery is managing sacral wound  Assessment Findings:   1  MASD bilateral breasts, pannus, and groin    2  Stage 4 pressure injury on sacrum    Heels intact    See flowsheets for details          Skin care plans:  1-Hydraguard to heels BID and PRN  2-Elevate heels to offload pressure  3-Ehob cushion in chair when out of bed  4-Moisturize skin daily with skin nourishing cream   5-Turn/reposition q2h or when medically stable for pressure re-distribution on skin  6-Cleanse under breasts and pannus with CHG  Dust with nystatin powder BID  7-Cleanse groin with soap and water  Dust with nystatin powder BID  8-Low air loss mattress       Call or tigertext with any questions  Wound Care will continue to follow    Pressure Ulcer 08/19/20 Sacrum (Active)       Pressure Ulcer 08/26/20 Sacrum Mid unstageable wound with exposed bone and necrotic tissue with surrounding nonblanchable erythema (Active)   Pressure Injury Stage U 08/26/20 0800   Wound Description Black;Edema; Non-blanchable erythema;Slough;Fragile 08/26/20 1200   Cristal-wound Assessment Erythema;Fragile; Swelling 08/26/20 1200   Shape round 08/26/20 0800   Drainage Amount Moderate 08/26/20 1200   Drainage Description [employer]; Foul smelling;Serosanguineous 08/26/20 1200   Treatment Offload 08/26/20 0800   Wound Image   08/26/20 1233       Wound 08/26/20 Abdomen Anterior;Mid;Lower (Active)   Wound Image   08/26/20 1233   Wound Description Fragile 08/26/20 1233   Cristal-wound Assessment Erythema; Excoriated 08/26/20 1233   Drainage Amount Scant 08/26/20 1200       Wound 08/26/20 Breast Lateral;Lower;Right (Active)   Wound Image   08/26/20 1233   Wound Description Edema;Fragile;Drainage 08/26/20 1233   Cristal-wound Assessment Erythema; Excoriated 08/26/20 1233   Drainage Amount Scant 08/26/20 1233   Drainage Description Serous 08/26/20 1233       Wound 08/26/20 Breast Lateral;Left;Lower (Active)   Wound Image   08/26/20 1233   Wound Description Clean;Drainage;Fragile 08/26/20 1233   Cristal-wound Assessment Clean;Erythema 08/26/20 1233   Drainage Amount Scant 08/26/20 1233   Drainage Description Serous 08/26/20 1233   Non-staged Wound Description Partial thickness 08/26/20 1233

## 2020-08-26 NOTE — CONSULTS
PHYSICAL MEDICINE AND REHABILITATION CONSULT NOTE  Veronica Maza 67 y o  female MRN: [id number]  Unit/Bed#: ICU 09 Encounter: [id number]    Requested by (Physician/Service): Kevin Fair MD  Reason for Consultation:  Assessment of rehabilitation needs    Assessment:  Rehabilitation Diagnosis:    Debility     Recommendations:  Rehabilitation Plan:   Continue PT/OT while on acute care   The patient may require sub-acute inpatient rehabilitation however will continue to follow functional and medically progress  She currently requires likely VAC placement this week  Medical Co-morbidities Plan:  · Septic shock   · Gram positive bacteremia  · Acute encephalopathy   · Stage IV sacral decubitus pressure ulcer s/p debridement and washout  · Diabetes Mellitus type 2  · Anemia   · Atrial fibrillation   · Chronic constipation   · DVT ppx:  SCD    Thank you for this consultation  Do not hesitate to contact service with further questions  TJ Conde  PM&R    History of Present Illness:  Veronica Maza is a 67 y o  female with a PMH of diabetes, HTN, HLD, right femur fracture 5/2020 s/p IMN who presented to the [facility] on [facility]/25[facility]20 with tachycardia, hypotension and generalized weakness  She was found to be in afib with RVR and was cardioverted in the field  She was a stroke alert at [facility] and was given tPA  She was also noted to have a large purulent sacral ulcer  She became hypotensive and less responsive in the ER  She was admitted to critical care  MRI showed mild chronic microvascular ischemic disease but no acute abnormality  There was concern for septic shock due to chronic stage IV sacral pressure wound  Recent sacral coccygeal x-rays as no outpt demonstrated evidence for osteomyelitis  Surgery was consulted and are recommending debridement and washout which she underwent on 8/26/20    She ill need repeat debridement, washout with likely VAC placement on 8/28/20  Blood cultures were positive for gram positive cocci in pairs and chains  She is currently on vacomycin and cefepime as well as flagyl  Follow up CT of the head showed no acute intracranial abnormalities  PM&R are consulted for rehabilitation recommendations  The patient was seen in the ICU  She denies any pain, SOB, chest pain, numbness or tingling  She does not remember fracturing her femur in May of 2020  Review of Systems: 10 point ROS negative except for what is noted in HPI    Function:  Prior level of function and living situation: The patient reports that she was independent prior to admission  She lives with her daughter and 2 great grandchildren who are 5 and 15 in a 3rd floor apartment with elevator access  Per patient her daughter does work and she is alone at times during the day  Current level of function:  Physical Therapy:  Maximal assist for bed mobility, moderate assist x 2 for transfers  Occupational Therapy:  Supervision for eating, minimal assist for grooming, moderate assist for UB bathing/dressing, maximal assist for LB bathing/dressing or toileting  Physical Exam:  /60   Pulse 92   Temp 99 3 °F (37 4 °C)   Resp 13   Ht 5' 6" (1 676 m)   Wt 66 5 kg (146 lb 9 7 oz)   SpO2 98%   BMI 23 66 kg/m²        Intake/Output Summary (Last 24 hours) at 8/26/2020 1232  Last data filed at 8/26/2020 1000  Gross per 24 hour   Intake 3223 88 ml   Output 605 ml   Net 2618 88 ml       Body mass index is 23 66 kg/m²  Physical Exam  HENT:      Head: Normocephalic and atraumatic  Ears:      Comments: [place]  Eyes:      Extraocular Movements: Extraocular movements intact  Cardiovascular:      Rate and Rhythm: Normal rate  Pulmonary:      Effort: Pulmonary effort is normal    Musculoskeletal:      Comments: RLE: HF 3+/5 otherwise 3/5 throughout, patient states RLE weakness is chronic    Skin:     Coloration: Skin is pale     Neurological: Mental Status: She is alert and oriented to person, place, and time  Psychiatric:         Mood and Affect: Mood normal         Social History:    Social History     Socioeconomic History    Marital status: Single     Spouse name: None    Number of children: 2    Years of education: None    Highest education level: None   Occupational History    None   Social Needs    Financial resource strain: Not hard at all   Alfred-Russ insecurity     Worry: Never true     Inability: Never true    Transportation needs     Medical: No     Non-medical: No   Tobacco Use    Smoking status: Never Smoker    Smokeless tobacco: Never Used   Substance and Sexual Activity    Alcohol use: No     Comment: quit     Drug use: No    Sexual activity: Not Currently   Lifestyle    Physical activity     Days per week: 3 days     Minutes per session: 20 min    Stress:  Only a little   Relationships    Social connections     Talks on phone: Twice a week     Gets together: Once a week     Attends Gnosticism service: Never     Active member of club or organization: No     Attends meetings of clubs or organizations: Never     Relationship status: Never     Intimate partner violence     Fear of current or ex partner: No     Emotionally abused: No     Physically abused: No     Forced sexual activity: No   Other Topics Concern    None   Social History Narrative    · Most recent tobacco use screenin2019      · Do you currently or have you served in [employer]:   No      · Were you activated, into active duty, as a member of the [employer] or as a Reservist:   No     As per [employer]         Family History:    Family History   Problem Relation Age of Onset    Diabetes Mother     Varicose Veins Mother     Stroke Father     Arthritis Brother     Diabetes Daughter     No Known Problems Brother          Medications:     Current Facility-Administered Medications:     atorvastatin (LIPITOR) tablet 40 mg, 40 mg, Oral, QPM, Stacy Zamudio MD, Stopped at 08/25/20 2309    cefepime (MAXIPIME) 1,000 mg in dextrose 5 % 50 mL IVPB, 1,000 mg, Intravenous, Q12H, Erin Gustafson DO    chlorhexidine (PERIDEX) 0 12 % oral rinse 15 mL, 15 mL, Swish & Rajinder, Q12H [facility], Hemant Nina MD, 15 mL at 08/26/20 2289    gabapentin (NEURONTIN) capsule 100 mg, 100 mg, Oral, TID, Stacy Zamudio MD, 100 mg at 08/26/20 0817    hydrALAZINE (APRESOLINE) injection 10 mg, 10 mg, Intravenous, Q4H PRN, Stacy Zamudio MD, 10 mg at 08/25/20 2313    insulin lispro (HumaLOG) 100 units/mL subcutaneous injection 1-6 Units, 1-6 Units, Subcutaneous, Q6H [facility], Stopped at 08/26/20 0112 **AND** Fingerstick Glucose (POCT), , , Q6H, Stacy Zamudio MD    insulin lispro (HumaLOG) 100 units/mL subcutaneous injection 2-12 Units, 2-12 Units, Subcutaneous, HS, Stacy Zamudio MD, 4 Units at 08/26/20 0111    metroNIDAZOLE (FLAGYL) IVPB (premix) 500 mg 100 mL, 500 mg, Intravenous, Q8H, David Amin MD    multi-electrolyte (ISOLYTE-S PH 7 4) bolus 250 mL, 250 mL, Intravenous, Once, Hemant Nina MD    norepinephrine (LEVOPHED) 4 mg (STANDARD CONCENTRATION) IV in sodium chloride 0 9% 250 mL, 1-30 mcg/min, Intravenous, Titrated, Skylar Lion DO, Last Rate: 11 3 mL/hr at 08/26/20 1202, 3 mcg/min at 08/26/20 1202    polyethylene glycol (MIRALAX) packet 17 g, 17 g, Oral, Daily, Stacy Zamudio MD, 17 g at 08/26/20 0817    senna-docusate sodium (SENOKOT S) 8 6-50 mg per tablet 2 tablet, 2 tablet, Oral, BID, Stacy Zamudio MD, 2 tablet at 08/26/20 0817    [START ON 8/27/2020] vancomycin (VANCOCIN) IVPB (premix) 1,000 mg 200 mL, 15 mg/kg, Intravenous, Daily PRN, Arvin Gomez MD    Past Medical History:     Past Medical History:   Diagnosis Date    [facility] failure (ARF) ([facility] )     Acute respiratory failure with hypoxia ([facility] )     Chronic kidney disease     Diabetes mellitus ([facility] )     type 2    Hyperlipidemia     Hypertension         Past Surgical History:     Past Surgical History:   Procedure Laterality Date    BREAST SURGERY Left     lumpectomy benign    CATARACT EXTRACTION Bilateral 2017    CATARACT EXTRACTION W/ INTRAOCULAR LENS IMPLANT Left 12/11/2017    Procedure: EXTRACTION EXTRACAPSULAR CATARACT PHACO INTRAOCULAR LENS (IOL); Surgeon: Leila Carpio MD;  Location: [facility] MAIN OR;  Service: Ophthalmology    IA OPEN RX FEMUR FX+INTRAMED RAYMOND Right 5/21/2020    Procedure: INSERTION OF SHORT TROCHANTERIC FEMORAL NAIL;  Surgeon: Jie Ortiz MD;  [facility];  Service: Orthopedics    [address] HUMERAL&GLENOID COMPNT Right 9/10/2018    Procedure: RIGHT REVERSE TOTAL SHOULDER ARTHROPLASTY;  Surgeon: Jie Ortiz MD;  [facility];  Service: Orthopedics    IA XCAPSL CTRC RMVL INSJ IO LENS PROSTH W/O ECP Right 10/23/2017    Procedure: EXTRACTION EXTRACAPSULAR CATARACT PHACO INTRAOCULAR LENS (IOL); Surgeon: Leila Carpio MD;  Location: [facility] MAIN OR;  Service: Ophthalmology         Allergies:     No Known Allergies        LABORATORY RESULTS:      Lab Results   Component Value Date    HGB 7 9 (L) 08/26/2020    HCT 24 6 (L) 08/26/2020    WBC 13 50 (H) 08/26/2020     Lab Results   Component Value Date    BUN 34 (H) 08/26/2020    K 3 4 (L) 08/26/2020     08/26/2020    CREATININE 2 32 (H) 08/26/2020     Lab Results   Component Value Date    PROTIME 17 0 (H) 08/26/2020    INR 1 38 (H) 08/26/2020        DIAGNOSTIC STUDIES: Reviewed  Ct Head Without Contrast    Result Date: 8/26/2020  Impression: No evidence of acute intracranial hemorrhage  No significant interval change  If clinically appropriate, MRI with diffusion-weighted imaging could be performed for further evaluation, if there are no contraindications   Workstation performed: [id number]     Mri Brain Wo Contrast    Result Date: 8/26/2020  Impression: Mild chronic microvascular ischemic disease No acute ischemic disease Age-related atrophy Consistent with CT/CTA  Workstation performed: [id number]     Ct Stroke Alert Brain    Result Date: 8/25/2020  Impression: No acute intracranial abnormality  Stable mild microangiopathic changes  Findings were directly discussed with neurology attending on 8/25/2020 1:27 PM  Workstation performed: [id number]     Cta Stroke Alert (head/neck)    Result Date: 8/25/2020  Impression: Approximately 65-70% stenosis of the left internal carotid artery bulb  Mild intracranial atherosclerotic disease including mild focal narrowing of the right supraclinoid internal carotid artery  Posterior cerebral artery disease is noted, mild on the right and severe left  The M1 segments and middle cerebral artery branches appear patent   Findings were directly discussed with neurology attending on 8/25/2020 1:42 PM  Workstation performed: [id number]

## 2020-08-26 NOTE — ASSESSMENT & PLAN NOTE
- Acute kidney injury, present on admission  Likely related to dehydration and sepsis  - Continue management and resuscitation per primary service

## 2020-08-26 NOTE — ED NOTES
Unstagable pressure ulcer on buttocks  Justina Corbett discharge coming from wound        Dmitriy Kendrick RN  08/26/20 3087

## 2020-08-26 NOTE — ED NOTES
Spoke with [facility] attending about pts decline in mental status        Reymundo Mcpherson RN  08/26/20 2872

## 2020-08-27 ENCOUNTER — ANESTHESIA EVENT (INPATIENT)
Dept: PERIOP | Facility: HOSPITAL | Age: 75
DRG: 853 | End: 2020-08-27
Payer: MEDICARE

## 2020-08-27 ENCOUNTER — APPOINTMENT (INPATIENT)
Dept: NON INVASIVE DIAGNOSTICS | Facility: HOSPITAL | Age: 75
DRG: 853 | End: 2020-08-27
Payer: MEDICARE

## 2020-08-27 PROBLEM — R65.21 SEPTIC SHOCK DUE TO GRAM-POSITIVE BACTERIA (HCC): Status: ACTIVE | Noted: 2020-08-27

## 2020-08-27 PROBLEM — A41.89 SEPTIC SHOCK DUE TO GRAM-POSITIVE BACTERIA (HCC): Status: ACTIVE | Noted: 2020-08-27

## 2020-08-27 LAB
ANION GAP SERPL CALCULATED.3IONS-SCNC: 8 MMOL/L (ref 4–13)
BUN SERPL-MCNC: 35 MG/DL (ref 5–25)
CALCIUM SERPL-MCNC: 7.6 MG/DL (ref 8.3–10.1)
CHLORIDE SERPL-SCNC: 107 MMOL/L (ref 100–108)
CO2 SERPL-SCNC: 20 MMOL/L (ref 21–32)
CREAT SERPL-MCNC: 1.8 MG/DL (ref 0.6–1.3)
ERYTHROCYTE [DISTWIDTH] IN BLOOD BY AUTOMATED COUNT: 14.7 % (ref 11.6–15.1)
GFR SERPL CREATININE-BSD FRML MDRD: 27 ML/MIN/1.73SQ M
GLUCOSE SERPL-MCNC: 193 MG/DL (ref 65–140)
GLUCOSE SERPL-MCNC: 202 MG/DL (ref 65–140)
GLUCOSE SERPL-MCNC: 207 MG/DL (ref 65–140)
GLUCOSE SERPL-MCNC: 208 MG/DL (ref 65–140)
GLUCOSE SERPL-MCNC: 247 MG/DL (ref 65–140)
GLUCOSE SERPL-MCNC: 251 MG/DL (ref 65–140)
HCT VFR BLD AUTO: 25 % (ref 34.8–46.1)
HGB BLD-MCNC: 8.1 G/DL (ref 11.5–15.4)
MAGNESIUM SERPL-MCNC: 2.3 MG/DL (ref 1.6–2.6)
MCH RBC QN AUTO: 29 PG (ref 26.8–34.3)
MCHC RBC AUTO-ENTMCNC: 32.4 G/DL (ref 31.4–37.4)
MCV RBC AUTO: 90 FL (ref 82–98)
NRBC BLD AUTO-RTO: 0 /100 WBCS
PHOSPHATE SERPL-MCNC: 4 MG/DL (ref 2.3–4.1)
PLATELET # BLD AUTO: 226 THOUSANDS/UL (ref 149–390)
PMV BLD AUTO: 10.9 FL (ref 8.9–12.7)
POTASSIUM SERPL-SCNC: 4.6 MMOL/L (ref 3.5–5.3)
PROCALCITONIN SERPL-MCNC: 6.1 NG/ML
RBC # BLD AUTO: 2.79 MILLION/UL (ref 3.81–5.12)
SODIUM SERPL-SCNC: 135 MMOL/L (ref 136–145)
VANCOMYCIN SERPL-MCNC: 15.8 UG/ML
WBC # BLD AUTO: 15 THOUSAND/UL (ref 4.31–10.16)

## 2020-08-27 PROCEDURE — 84145 PROCALCITONIN (PCT): CPT | Performed by: EMERGENCY MEDICINE

## 2020-08-27 PROCEDURE — 80202 ASSAY OF VANCOMYCIN: CPT | Performed by: EMERGENCY MEDICINE

## 2020-08-27 PROCEDURE — 97163 PT EVAL HIGH COMPLEX 45 MIN: CPT

## 2020-08-27 PROCEDURE — 99223 1ST HOSP IP/OBS HIGH 75: CPT | Performed by: INTERNAL MEDICINE

## 2020-08-27 PROCEDURE — 83735 ASSAY OF MAGNESIUM: CPT | Performed by: EMERGENCY MEDICINE

## 2020-08-27 PROCEDURE — 93306 TTE W/DOPPLER COMPLETE: CPT | Performed by: INTERNAL MEDICINE

## 2020-08-27 PROCEDURE — 82948 REAGENT STRIP/BLOOD GLUCOSE: CPT

## 2020-08-27 PROCEDURE — 85027 COMPLETE CBC AUTOMATED: CPT | Performed by: EMERGENCY MEDICINE

## 2020-08-27 PROCEDURE — C9113 INJ PANTOPRAZOLE SODIUM, VIA: HCPCS | Performed by: INTERNAL MEDICINE

## 2020-08-27 PROCEDURE — 99233 SBSQ HOSP IP/OBS HIGH 50: CPT | Performed by: EMERGENCY MEDICINE

## 2020-08-27 PROCEDURE — 97167 OT EVAL HIGH COMPLEX 60 MIN: CPT

## 2020-08-27 PROCEDURE — 87081 CULTURE SCREEN ONLY: CPT | Performed by: INTERNAL MEDICINE

## 2020-08-27 PROCEDURE — 92610 EVALUATE SWALLOWING FUNCTION: CPT

## 2020-08-27 PROCEDURE — 80048 BASIC METABOLIC PNL TOTAL CA: CPT | Performed by: EMERGENCY MEDICINE

## 2020-08-27 PROCEDURE — 99232 SBSQ HOSP IP/OBS MODERATE 35: CPT | Performed by: SURGERY

## 2020-08-27 PROCEDURE — 93306 TTE W/DOPPLER COMPLETE: CPT

## 2020-08-27 PROCEDURE — NC001 PR NO CHARGE: Performed by: SURGERY

## 2020-08-27 PROCEDURE — 84100 ASSAY OF PHOSPHORUS: CPT | Performed by: EMERGENCY MEDICINE

## 2020-08-27 RX ORDER — SODIUM CHLORIDE, SODIUM GLUCONATE, SODIUM ACETATE, POTASSIUM CHLORIDE, MAGNESIUM CHLORIDE, SODIUM PHOSPHATE, DIBASIC, AND POTASSIUM PHOSPHATE .53; .5; .37; .037; .03; .012; .00082 G/100ML; G/100ML; G/100ML; G/100ML; G/100ML; G/100ML; G/100ML
500 INJECTION, SOLUTION INTRAVENOUS ONCE
Status: COMPLETED | OUTPATIENT
Start: 2020-08-27 | End: 2020-08-27

## 2020-08-27 RX ORDER — SODIUM CHLORIDE, SODIUM GLUCONATE, SODIUM ACETATE, POTASSIUM CHLORIDE, MAGNESIUM CHLORIDE, SODIUM PHOSPHATE, DIBASIC, AND POTASSIUM PHOSPHATE .53; .5; .37; .037; .03; .012; .00082 G/100ML; G/100ML; G/100ML; G/100ML; G/100ML; G/100ML; G/100ML
75 INJECTION, SOLUTION INTRAVENOUS CONTINUOUS
Status: DISCONTINUED | OUTPATIENT
Start: 2020-08-27 | End: 2020-08-28

## 2020-08-27 RX ORDER — DOCUSATE SODIUM 100 MG/1
100 CAPSULE, LIQUID FILLED ORAL 2 TIMES DAILY
Status: DISCONTINUED | OUTPATIENT
Start: 2020-08-27 | End: 2020-08-31

## 2020-08-27 RX ORDER — VANCOMYCIN HYDROCHLORIDE 1 G/200ML
15 INJECTION, SOLUTION INTRAVENOUS ONCE
Status: COMPLETED | OUTPATIENT
Start: 2020-08-27 | End: 2020-08-27

## 2020-08-27 RX ORDER — PANTOPRAZOLE SODIUM 40 MG/1
40 INJECTION, POWDER, FOR SOLUTION INTRAVENOUS
Status: DISCONTINUED | OUTPATIENT
Start: 2020-08-27 | End: 2020-08-28

## 2020-08-27 RX ORDER — ATORVASTATIN CALCIUM 40 MG/1
40 TABLET, FILM COATED ORAL
Status: DISCONTINUED | OUTPATIENT
Start: 2020-08-27 | End: 2020-09-10 | Stop reason: HOSPADM

## 2020-08-27 RX ORDER — VANCOMYCIN HYDROCHLORIDE 1 G/200ML
15 INJECTION, SOLUTION INTRAVENOUS EVERY 24 HOURS
Status: DISCONTINUED | OUTPATIENT
Start: 2020-08-27 | End: 2020-08-27

## 2020-08-27 RX ORDER — ASPIRIN 81 MG/1
81 TABLET ORAL DAILY
Status: DISCONTINUED | OUTPATIENT
Start: 2020-08-27 | End: 2020-08-27

## 2020-08-27 RX ORDER — NYSTATIN 100000 [USP'U]/G
POWDER TOPICAL 2 TIMES DAILY
Status: DISCONTINUED | OUTPATIENT
Start: 2020-08-27 | End: 2020-09-10 | Stop reason: HOSPADM

## 2020-08-27 RX ORDER — ASPIRIN 81 MG/1
81 TABLET, CHEWABLE ORAL DAILY
Status: DISCONTINUED | OUTPATIENT
Start: 2020-08-27 | End: 2020-08-30

## 2020-08-27 RX ADMIN — CEFEPIME HYDROCHLORIDE 1000 MG: 1 INJECTION, POWDER, FOR SOLUTION INTRAMUSCULAR; INTRAVENOUS at 05:44

## 2020-08-27 RX ADMIN — INSULIN LISPRO 3 UNITS: 100 INJECTION, SOLUTION INTRAVENOUS; SUBCUTANEOUS at 23:37

## 2020-08-27 RX ADMIN — SODIUM CHLORIDE, SODIUM GLUCONATE, SODIUM ACETATE, POTASSIUM CHLORIDE, MAGNESIUM CHLORIDE, SODIUM PHOSPHATE, DIBASIC, AND POTASSIUM PHOSPHATE 500 ML: .53; .5; .37; .037; .03; .012; .00082 INJECTION, SOLUTION INTRAVENOUS at 01:02

## 2020-08-27 RX ADMIN — ASPIRIN 81 MG CHEWABLE TABLET 81 MG: 81 TABLET CHEWABLE at 15:48

## 2020-08-27 RX ADMIN — NYSTATIN: 100000 POWDER TOPICAL at 18:02

## 2020-08-27 RX ADMIN — INSULIN LISPRO 2 UNITS: 100 INJECTION, SOLUTION INTRAVENOUS; SUBCUTANEOUS at 18:02

## 2020-08-27 RX ADMIN — INSULIN LISPRO 2 UNITS: 100 INJECTION, SOLUTION INTRAVENOUS; SUBCUTANEOUS at 12:49

## 2020-08-27 RX ADMIN — SODIUM CHLORIDE, SODIUM GLUCONATE, SODIUM ACETATE, POTASSIUM CHLORIDE, MAGNESIUM CHLORIDE, SODIUM PHOSPHATE, DIBASIC, AND POTASSIUM PHOSPHATE 75 ML/HR: .53; .5; .37; .037; .03; .012; .00082 INJECTION, SOLUTION INTRAVENOUS at 10:42

## 2020-08-27 RX ADMIN — CHLORHEXIDINE GLUCONATE 0.12% ORAL RINSE 15 ML: 1.2 LIQUID ORAL at 10:40

## 2020-08-27 RX ADMIN — GABAPENTIN 100 MG: 100 CAPSULE ORAL at 21:57

## 2020-08-27 RX ADMIN — METRONIDAZOLE 500 MG: 500 INJECTION, SOLUTION INTRAVENOUS at 15:48

## 2020-08-27 RX ADMIN — SODIUM CHLORIDE, SODIUM LACTATE, POTASSIUM CHLORIDE, AND CALCIUM CHLORIDE 500 ML: .6; .31; .03; .02 INJECTION, SOLUTION INTRAVENOUS at 07:19

## 2020-08-27 RX ADMIN — ATORVASTATIN CALCIUM 40 MG: 40 TABLET, FILM COATED ORAL at 15:48

## 2020-08-27 RX ADMIN — INSULIN LISPRO 2 UNITS: 100 INJECTION, SOLUTION INTRAVENOUS; SUBCUTANEOUS at 05:52

## 2020-08-27 RX ADMIN — GABAPENTIN 100 MG: 100 CAPSULE ORAL at 15:48

## 2020-08-27 RX ADMIN — INSULIN LISPRO 6 UNITS: 100 INJECTION, SOLUTION INTRAVENOUS; SUBCUTANEOUS at 21:57

## 2020-08-27 RX ADMIN — SODIUM CHLORIDE, SODIUM GLUCONATE, SODIUM ACETATE, POTASSIUM CHLORIDE, MAGNESIUM CHLORIDE, SODIUM PHOSPHATE, DIBASIC, AND POTASSIUM PHOSPHATE 75 ML/HR: .53; .5; .37; .037; .03; .012; .00082 INJECTION, SOLUTION INTRAVENOUS at 03:10

## 2020-08-27 RX ADMIN — VANCOMYCIN HYDROCHLORIDE 1000 MG: 1 INJECTION, SOLUTION INTRAVENOUS at 10:32

## 2020-08-27 RX ADMIN — CHLORHEXIDINE GLUCONATE 0.12% ORAL RINSE 15 ML: 1.2 LIQUID ORAL at 21:57

## 2020-08-27 RX ADMIN — NYSTATIN: 100000 POWDER TOPICAL at 10:42

## 2020-08-27 RX ADMIN — PANTOPRAZOLE SODIUM 40 MG: 40 INJECTION, POWDER, FOR SOLUTION INTRAVENOUS at 10:39

## 2020-08-27 RX ADMIN — CEFEPIME HYDROCHLORIDE 1000 MG: 1 INJECTION, POWDER, FOR SOLUTION INTRAMUSCULAR; INTRAVENOUS at 17:23

## 2020-08-27 RX ADMIN — METRONIDAZOLE 500 MG: 500 INJECTION, SOLUTION INTRAVENOUS at 23:36

## 2020-08-27 NOTE — PLAN OF CARE
Problem: PHYSICAL THERAPY ADULT  Goal: Performs mobility at highest level of function for planned discharge setting  See evaluation for individualized goals  Description: Treatment/Interventions: Functional transfer training, LE strengthening/ROM, Therapeutic exercise, Endurance training, Gait training, Bed mobility, Equipment eval/education          See flowsheet documentation for full assessment, interventions and recommendations  Note: Prognosis: Fair  Problem List: Decreased strength, Decreased endurance, Impaired balance, Decreased mobility, Decreased cognition, Decreased safety awareness  Assessment: Pt is a 68 yo female admitted to [place] [id number] on 8/26/2030 s/p stroke alert on 8/25 at [facility] given  Pt had surgery on 8/26 for excisional debridement of sacral pressure wound and washout  Dx: severe sepsis, infected stage IV sacral ulcer, atrial fibrillation, RORY on CKD  Two patient identifiers were used to confirm  Pt lives with her daughter and her kids in a 1 story apartment with elevator access  Pt required A for ADL's but was I for mobility  Per patient but patient is a poor historian   Will need clarification from CM  Pt's impairments include reduced mobility, limited endurance, high risk of falling, confusion, reduced activity, sacral sore  These impairments limit the ability of the patient to perform mobility without increased assistance, return to PLOF and participate in everyday life activities  Pt would benefit from continued skilled therapy while in the hospital to improve overall mobility and work towards a safe d/c  Recommend discharge to rehab  At the end of the session the patient was left in supine position with call bell and phone within reach  Wedges were placed to off load sacrum while supine in bed  Per old PT notes patient is WBAT on R LE and R UE           PT Discharge Recommendation: Post-Acute Rehabilitation Services     PT - OK to Discharge: Yes    See flowsheet documentation for full assessment

## 2020-08-27 NOTE — PLAN OF CARE
Problem: OCCUPATIONAL THERAPY ADULT  Goal: Performs self-care activities at highest level of function for planned discharge setting  See evaluation for individualized goals  Description: Treatment Interventions: ADL retraining, Functional transfer training, UE strengthening/ROM, Endurance training, Cognitive reorientation, Patient/family training, Equipment evaluation/education, Compensatory technique education, Continued evaluation, Activityengagement, Energy conservation          See flowsheet documentation for full assessment, interventions and recommendations  Note: Limitation: Decreased ADL status, Decreased UE strength, Decreased UE ROM, Decreased Safe judgement during ADL, Decreased cognition, Decreased endurance, Visual deficit, Decreased self-care trans, Decreased high-level ADLs  Prognosis: Fair  Assessment: Pt is a 68 y/o female seen for OT eval s/p adm to hospitals w/ generalized weakness, Afib and hypotension  Pt cardioaverted in field then after developed new onset of slurred speech, dysarthria and aphasia  Pt is s/p tPA and [facility]  Pt is dx'd w/ L PCA stroke and large sacral decubitus ulcer  Pt is s/p "EXCISIONAL DEBRIDEMENT OF SACRAL PRESSURE WOUND, WASHOUT; (N/A)" performed on 8/26  Pt  has a past medical history of Acute renal failure (ARF) ([facility] ), Acute respiratory failure with hypoxia ([facility] ), Chronic kidney disease, Diabetes mellitus ([facility] ), Hyperlipidemia, and Hypertension  Pt with active OT orders and up with assistance  orders  Pt reports living w/ dght and 2 great grandsons in apt w/ elevator access  Pt required assist w/ ADLS and IADLS, does not drive, & required use of DME PTA including BSC (pt is an overall poor historian)  Pt is currently demonstrating the following occupational deficits: Mod A UB ADLS, Max A LB ADLS, Max A x2 bed mobility, Min A EOB sitting and Mod A x2 STS transfers, Max A x1 for functional mobility w/ HHA, +VC for initiation/sequencing   These deficits that are impacting pt's baseline areas of occupation are a result of the following impairments: pain, endurance, activity tolerance, functional mobility, forward functional reach, balance, trunk control, functional standing tolerance, unsupportive home environment, decreased I w/ ADLS/IADLS, strength, ROM, visual deficits, cognitive impairments, decreased safety awareness, decreased insight into deficits and coordination deficits  The following Occupational Performance Areas to address include: eating, grooming, bathing/shower, toilet hygiene, dressing, medication management, health maintenance, functional mobility, clothing management and household maintenance  Pt scored overall 30/100 on the Barthel Index  Based on the aforementioned OT evaluation, functional performance deficits, and assessments, pt has been identified as a high complexity evaluation  Recommend STR upon D/C   Pt to continue to benefit from acute immediate OT services to address the following goals 3-5x/week to  w/in 10-14 days:     OT Discharge Recommendation: Post-Acute Rehabilitation Services  OT - OK to Discharge: Yes(when medically stable)     Tye Ragsdale MS, OTR/L

## 2020-08-27 NOTE — CONSULTS
Consultation - Infectious Disease   Barrett Arrieta 67 y o  female MRN: [id number]  Unit/Bed#: ICU 09 Encounter: [id number]      IMPRESSION & RECOMMENDATIONS:     1  Septic shock with gram positive bacteremia likely 2/2 infected stage 4 sacral pressure ulcer with suspected osteomyelitis  · Presented with stage 4 sacral pressure ulcer with purulent appearance  · Micro results:  · COVID19 PCR negative  · BCx: Preliminary 2/2 sets positive for gram + cocci in pairs and chains  · UCx: preliminary positive for gram negative enteric rods  · WCx: polymicrobial with 3+ gram + cocci in pairs, 2+ gram + rods, 1+ gram - rods  · Pending: anaerobic BCx and WCx, MRSA screen  · Shock has resolved --> off pressers, on Isolyte 75 ml/h  · On empiric AB coverage with cefepime 1 gm IV Q12H and vancomycin IV  · WBC count trend: 16 - 13 5 - 15 (today)  No fever for the past 24 hours  · Hx of DM with HbA1c of 9 9 on 8/26/20  · S/p surgical debridement on 8/26/20  Necrotic tissue was debrided and tissue sent for culture  Wound was described as malodorous but without purulent discharge  Wound pictures before and after debridement were reviewed  Plan:  · Updated blood culture result shows enterococcus  Most likely pathogens include E  Faecalis and E  Feacium originating from GI tract  · Continue empiric coverage with cefepime and vancomycin  Adjust as appropriate based on final culture result and specificities  Add Flagyl for anaerobic coverage until cultures return negative  2  Stage 4 sacral pressure ulcer with suspected osteomyelitis  · Present on admission  Most likely source for shock and gram + bacteremia  · S/p debridement on 8/26  Plan:  · See management above  · Local wound management per surgery  Plan for repeat OR tomorrow 8/28 for further debridement and possible wound VAC placement       3  CVA  · Developed right facial droop, right sided weakness, aphasia and dysarthria after cardioversion en route to the hospital for hypotension 2/2 new onset Afib with RVR  · Initial NIHSS on presentation to ED was 6 which improved to 3 after tPA  · Had mental status change overnight on 8/26/20 most likely in setting of hypotension with cerebrohypoperfusion in setting of septic shock  · Imaging:  · MRI brain demonstrated no acute changes  Did demonstrate mild chronic small vessel cerebrovascular ischemic disease and age-related atrophy  · CTA head/neck demonstrated left PCA stenosis and 65-70% stenosis of the left ICA bulb  · Follow-up CT brain after acute mental status change demonstrated no acute changes and specifically no hemorrhage  Plan:  · Management per primary team  Neurology on board  4  New onset atrial fibrillation  · S/p cardioversion for hypotension  · Currently in sinus rhythm  Plan:  · Management per primary team     5  RORY - improving  · BL 1 2 - 1 7  · Presented with creatinine of 2 59  Improved with fluid resuscitation to 1 80 this morning  · Most likely etiology is prerenal in setting of hypotension and dehydration 2/2 poor oral intake  Plan:  · Management as per primary team        Have discussed the above management plan in detail with the primary service    Extensive review of the medical records in epic including review of the notes, radiographs, and laboratory results     HISTORY OF PRESENT ILLNESS:  Reason for Consult: Septic shock with gram positive bacteremia 2/2 infected sacral pressure ulcer with suspected osteomyelitis  HPI: Son Kessler is a 67y o  year old female with past medical history significant for diabetes mellitus, CKD, HTN, HLD, and recent hip fracture  In May 2020 with s/p IM nailing who presented to the ED by EMS on 8/25/20 for chief complaint of weakness  On arrival EMS found patient to have Afib with RVR and secondary hypotension   Cardioversion was performed en route to the hospital  After cardioversion patient developed right facial droop, right sided weakness, aphasia and dysarthria  Patient received tPA for suspected embolic stroke  NIHSS subsequently improved from 6 to 3 with improvement in motor strength and aphasia and resolution of dysarthria  Patient was initially admitted to the medical floor but developed acute mental status change over night on 8/26 and was found to be in septic shock most likely 2/2 infected stage 4 sacral pressure ulcer and was subsequently transferred to the ICU  Patient underwent surgical wound debridement on 8/26/20  Patient initially required pressure support, but blood pressure is currently stable off pressors  ID service is consulted for positive blood cultures for gram positive cocci in pairs and chains  Empiric antibiotic coverage was started with cefepime and vancomycin  REVIEW OF SYSTEMS:  A complete review of systems is negative other than that noted in the HPI  PAST MEDICAL HISTORY:  Past Medical History:   Diagnosis Date    [facility]F) [facility]     Acute respiratory failure with hypoxia (HCC)     Chronic kidney disease     Diabetes mellitus ([facility] [facility] )     type 2    Hyperlipidemia     Hypertension      Past Surgical History:   Procedure Laterality Date    BREAST SURGERY Left     lumpectomy benign    CATARACT EXTRACTION Bilateral 2017    CATARACT EXTRACTION W/ INTRAOCULAR LENS IMPLANT Left 12/11/2017    Procedure: EXTRACTION EXTRACAPSULAR CATARACT PHACO INTRAOCULAR LENS (IOL);   Surgeon: Dinesh Suazo MD;  Location: [facility] MAIN OR;  Service: Ophthalmology    [place] OPEN RX FEMUR FX+INTRAMED RAYMOND Right 5/21/2020    Procedure: INSERTION OF SHORT TROCHANTERIC FEMORAL NAIL;  Surgeon: Caroline Moses MD;  [facility] Main [facility];  Service: Orthopedics    [address] HUMERAL&GLENOID COMPNT Right 9/10/2018    Procedure: RIGHT REVERSE TOTAL SHOULDER ARTHROPLASTY;  Surgeon: Caroline Moses MD;  [facility];  Service: Orthopedics    ID XCAPSL CTRC RMVL INSJ IO LENS PROSTH W/O ECP Right 10/23/2017 Procedure: EXTRACTION EXTRACAPSULAR CATARACT PHACO INTRAOCULAR LENS (IOL); Surgeon: Dinesh Suazo MD;  [facility] MAIN OR;  Service: Ophthalmology       FAMILY HISTORY:  Non-contributory    SOCIAL HISTORY:  Social History   Social History     Substance and Sexual Activity   Alcohol Use No    Comment: quit      Social History     Substance and Sexual Activity   Drug Use No     Social History     Tobacco Use   Smoking Status Never Smoker   Smokeless Tobacco Never Used       ALLERGIES:  No Known Allergies    MEDICATIONS:  All current active medications have been reviewed  PHYSICAL EXAM:  Temp:  [96 1 °F (35 6 °C)-99 3 °F (37 4 °C)] 97 9 °F (36 6 °C)  HR:  [] 92  Resp:  [8-] 17  BP: ()/(41-75) 91/41  SpO2:  [92 %-100 %] 100 %  Temp (24hrs), Av 1 °F (36 2 °C), Min:96 1 °F (35 6 °C), Max:99 3 °F (37 4 °C)  Current: Temperature: 97 9 °F (36 6 °C)     General: Sleeping, but easily arousable  Appears comfortable, no acute distress  HEENT: Unremarkable  Oropharynx without erythema, ulcers, or exudates  No lymphadenopathy  CV: RRR, nl S1S2, no murmurs/rubs/gallops appreciated  Resp: clear to auscultation b/l  Abdomen: soft, bowel sounds heard, nontender  Skin: Sacral ulcer with clean dry dressing in place, no surrounding erythema  Pictures from before and after debridement reviewed  Mild redness and scab over right elbow without signs of infection  Per review of picture in chart it has improved compared to before  Intertrigo under both breasts (R>L) and in both groins          Intake/Output Summary (Last 24 hours) at 2020 1118  Last data filed at 2020 1041  Gross per 24 hour   Intake 2967 ml   Output 375 ml   Net 2592 ml       General Appearance:  Appearing well, nontoxic, and in no distress   Head:  Normocephalic, without obvious abnormality, atraumatic   Eyes:  Conjunctiva pink and sclera anicteric, both eyes   Nose: Nares normal, mucosa normal, no drainage   Throat: Oropharynx moist without lesions   Neck: Supple, symmetrical, no adenopathy, no tenderness/mass/nodules   Back:   Symmetric, no curvature, ROM normal, no CVA tenderness   Lungs:   Clear to auscultation bilaterally, respirations unlabored   Chest Wall:  No tenderness or deformity   Heart:  RRR; no murmur, rub or gallop   Abdomen:   Soft, non-tender, non-distended, positive bowel sounds    Extremities: No cyanosis, clubbing or edema   Skin: No rashes or lesions  No draining wounds noted  Lymph nodes: Cervical, supraclavicular nodes normal   Neurologic: Alert and oriented times 3, extremity strength 5/5 and symmetric       LABS, IMAGING, & OTHER STUDIES:  Lab Results:  I have personally reviewed pertinent labs    Results from last 7 days   Lab Units 08/27/20  0454 08/26/20  0429 08/25/20  1315   WBC Thousand/uL 15 00* 13 50* 16 27*   HEMOGLOBIN g/dL 8 1* 7 9* 10 3*   PLATELETS Thousands/uL 226 225 309     Results from last 7 days   Lab Units 08/27/20  0454 08/26/20  0429 08/25/20  1315   SODIUM mmol/L 135* 138 129*   POTASSIUM mmol/L 4 6 3 4* 4 1   CHLORIDE mmol/L 107 106 95*   CO2 mmol/L 20* 21 23   BUN mg/dL 35* 34* 36*   CREATININE mg/dL 1 80* 2 32* 2 59*   EGFR ml/min/1 73sq m 27 20 17   CALCIUM mg/dL 7 6* 7 4* 8 2*   AST U/L  --  27  --    ALT U/L  --  11*  --    ALK PHOS U/L  --  232*  --      Results from last 7 days   Lab Units 08/26/20  1353 08/26/20  1225 08/26/20  0554 08/26/20  0536   GRAM STAIN RESULT  No polys seen*  2+ Gram positive cocci in pairs* 1+ Polys*  3+ Gram positive cocci in pairs*  2+ Gram positive rods*  1+ Gram negative rods*  --  Gram positive cocci in pairs and chains*  Gram positive cocci in pairs and chains*   URINE CULTURE   --   --  50,000-59,000 cfu/ml Gram Negative Jethro Enteric Like*  --      Results from last 7 days   Lab Units 08/27/20  0454 08/26/20  0536   PROCALCITONIN ng/ml 6 10* 6 35*                   Imaging Studies:   I have personally reviewed pertinent imaging study reports and images in PACS  Other Studies:   I have personally reviewed pertinent reports

## 2020-08-27 NOTE — PHYSICAL THERAPY NOTE
Physical Therapy Evaluation Note     Patient Name: Son Kessler    Today's Date: 8/27/2020     Problem List  Principal Problem:    CVA (cerebral vascular accident) [facility])  Active Problems:    Type 2 diabetes mellitus with hyperglycemia ([facility] [address] )    Hypertension    Hyperlipidemia    Decubitus ulcer of sacral region, stage 4 ([facility] [address] )    RORY (acute kidney injury) ([facility] [address] )    Hyponatremia    Atrial fibrillation ([facility] [address] )    Septic shock due to gram-positive bacteria [facility])       Past Medical History  Past Medical History:   Diagnosis Date    Acute renal failure (ARF) ([facility] [facility] )     Acute respiratory failure with hypoxia (HCC)     Chronic kidney disease     Diabetes mellitus ([facility] [address] [facility] )     type 2    Hyperlipidemia     Hypertension         Past Surgical History  Past Surgical History:   Procedure Laterality Date    BREAST SURGERY Left     lumpectomy benign    CATARACT EXTRACTION Bilateral 2017    CATARACT EXTRACTION W/ INTRAOCULAR LENS IMPLANT Left 12/11/2017    Procedure: EXTRACTION EXTRACAPSULAR CATARACT PHACO INTRAOCULAR LENS (IOL); Surgeon: Leila Carpio MD;  Location: [facility] MAIN OR;  Service: Ophthalmology    [place] OPEN RX FEMUR FX+INTRAMED RAYMOND Right 5/21/2020    Procedure: INSERTION OF SHORT TROCHANTERIC FEMORAL NAIL;  Surgeon: Jie Ortiz MD;  [facility];  Service: Orthopedics    [address] HUMERAL&GLENOID COMPNT Right 9/10/2018    Procedure: RIGHT REVERSE TOTAL SHOULDER ARTHROPLASTY;  Surgeon: Jie Ortiz MD;  [facility];  Service: Orthopedics    [place] XCAPSL CTRC RMVL INSJ IO LENS PROSTH W/O ECP Right 10/23/2017    Procedure: EXTRACTION EXTRACAPSULAR CATARACT PHACO INTRAOCULAR LENS (IOL);   Surgeon: Leila Carpio MD;  Location: [facility] MAIN OR;  Service: Ophthalmology    WOUND DEBRIDEMENT N/A 8/26/2020    Procedure: EXCISIONAL DEBRIDEMENT OF SACRAL PRESSURE WOUND, WASHOUT;;  Surgeon: Francois Castro MD;  [facility] MAIN [facility];  Service: General      08/27/20 4138   Note Type   Note type Eval only   Pain Assessment   Pain Assessment Tool Pain Assessment not indicated - pt denies pain   Pain Score No Pain   Home Living   Type of [address]   Additional Comments Pt lives in an apartment 3rd floor with her daughter and 2 great grandsons  Pt requires A for ADL's and mobility  Pt is I for mobility  Pt is retired  Pt has elevator access into apartment  Will need clarification from CM due to the patient being a poor historian  Prior Function   Level of [place] Needs assistance with ADLs and functional mobility   Lives With Daughter;Family   Receives Help From Family   ADL Assistance Needs assistance   IADLs Needs assistance   Falls in the last 6 months 1 to 4   Vocational Retired   Comments pt is questionable historian   Restrictions/Precautions   [place] Bearing Precautions Per Order Yes   RUE Weight Bearing Per Order WBAT  (per old PT notes )   RLE Weight Bearing Per Order WBAT  (per old PT notes )   Other Precautions Cognitive; Chair Alarm; Bed Alarm;Multiple lines;Telemetry; Fall Risk;Pain   General   Family/Caregiver Present No   Cognition   Overall Cognitive Status Impaired   Arousal/Participation Alert   Attention Attends with cues to redirect   Orientation Level Oriented to person;Disoriented to place; Disoriented to time;Disoriented to situation   RLE Assessment   RLE Assessment X   Strength RLE   R Hip Flexion 2+/5   R Knee Extension 3/5   R Ankle Dorsiflexion 3/5   LLE Assessment   LLE Assessment WNL   Coordination   Sensation WFL   Light Touch   RLE Light Touch Grossly intact   LLE Light Touch Grossly intact   Bed Mobility   Supine to Sit 2  Maximal assistance   Additional items Assist x 2   Sit to Supine 2  Maximal assistance   Additional items Assist x 2   Transfers   Sit to Stand 3  Moderate assistance   Additional items Assist x 2   Stand to Sit 3  Moderate assistance   Additional items Assist x 2 Ambulation/Elevation   Gait pattern Excessively slow; Step to; Foward flexed; Shuffling   Gait Assistance 2  Maximal assist   Additional items Assist x 2   Assistive Device Other (Comment)  (HHA)   Distance 1 ft towards [facility]   Balance   Static Sitting Poor +   Dynamic Sitting Poor   Static Standing Poor   Dynamic Standing Poor -   Ambulatory Poor -   Endurance Deficit   Endurance Deficit Yes   Activity Tolerance   Activity Tolerance Patient limited by fatigue   Medical Staff Made Aware OT   Nurse Made Aware nurse approved therapy session   Assessment   Prognosis Fair   Problem List Decreased strength;Decreased endurance; Impaired balance;Decreased mobility; Decreased cognition;Decreased safety awareness   Assessment Pt is a 68 yo female admitted to [place] [id number] on 8/26/2030 s/p stroke alert on 8/25 at [facility]  TPA given  Pt had surgery on 8/26 for excisional debridement of sacral pressure wound and washout  Dx: severe sepsis, infected stage IV sacral ulcer, atrial fibrillation, RORY on CKD  Two patient identifiers were used to confirm  Pt lives with her daughter and her kids in a 1 story apartment with elevator access  Pt required A for ADL's but was I for mobility  Per patient but patient is a poor historian   Will need clarification from CM  Pt's impairments include reduced mobility, limited endurance, high risk of falling, confusion, reduced activity, sacral sore  These impairments limit the ability of the patient to perform mobility without increased assistance, return to PLOF and participate in everyday life activities  Pt would benefit from continued skilled therapy while in the hospital to improve overall mobility and work towards a safe d/c  Recommend discharge to rehab  At the end of the session the patient was left in supine position with call bell and phone within reach  Wedges were placed to off load sacrum while supine in bed  Per old PT notes patient is WBAT on R LE and R UE      Goals   STG Expiration Date 09/10/20   Short Term Goal #1 STG 1: Pt will perform transfers at a min A level to return to baseline of function  STG 2: Pt will ambulate 150ft with RW at a min A level to reduce the level of assistance needed upon d/c home  STG 3: Pt will perform bed mobility at a min A to safety return to PLOF  PT Treatment Day 0   Plan   Treatment/Interventions Functional transfer training;LE strengthening/ROM; Therapeutic exercise; Endurance training;Gait training;Bed mobility; Equipment eval/education   PT Frequency Other (Comment)  (3-5xwk)   Recommendation   PT Discharge Recommendation Post-Acute Rehabilitation Services   PT - OK to Discharge Yes   Additional Comments if to rehab   Modified Forrest Scale   Modified Forrest Scale 4   Barthel Index   Feeding 10   Bathing 0   Grooming Score 0   Dressing Score 5   Bladder Score 0   Bowels Score 5   Toilet Use Score 5   Transfers (Bed/Chair) Score 5   Mobility (Level Surface) Score 0   Stairs Score 0   Barthel Index Score 30   Cleo Chaparro, Pt, DPT

## 2020-08-27 NOTE — CASE MANAGEMENT
Pt confused  Son, Emeli Hatch, at bedside  CM introduced self/role with dcp  Pt resides with her daughter, Ary Garcia, and Debra's two grandsons (15 & 9) in a 3rd floor apt with elevator access  Pt independent with ADLs and ambulation PTA  Pt owns a RW  Does not drive  Emeli Hatch or Ary Garcia provide transportation  Current with [facility]  Hx of STR @ [facility]  No hx of MH or drug/alcohol abuse  Pharmacy: [facility]  No POA or LW  CM discussed anticipate need for STR  Ky agreeable to this  CM to provide list     VM received from [address] with [facility]  As per Stefan Walsh their agency is recommending SNF at d/c - do not feel home is a safe d/c plan  CM reviewed d/c planning process including the following: identifying help at home, patient preference for d/c planning needs, Discharge Lounge, [facility] Meds to Bed program, availability of treatment team to discuss questions or concerns patient and/or family may have regarding understanding medications and recognizing signs and symptoms once discharged  CM also encouraged patient to follow up with all recommended appointments after discharge  Patient advised of importance for patient and family to participate in managing patients medical well being

## 2020-08-27 NOTE — OCCUPATIONAL THERAPY NOTE
Occupational Therapy Evaluation     Patient Name: Marc Aguirre  Today's Date: 8/27/2020  Problem List  Principal Problem:    CVA (cerebral vascular accident) [facility])  Active Problems:    Type 2 diabetes mellitus with hyperglycemia ([facility] [address] )    Hypertension    Hyperlipidemia    Decubitus ulcer of sacral region, stage 4 ([facility] [address] )    RORY (acute kidney injury) ([facility] [address] )    Hyponatremia    Atrial fibrillation ([facility] [address] )    Past Medical History  Past Medical History:   Diagnosis Date    Acute renal failure (ARF) ([facility] [address] )     Acute respiratory failure with hypoxia (HCC)     Chronic kidney disease     Diabetes mellitus ([facility] [address] )     type 2    Hyperlipidemia     Hypertension      Past Surgical History  Past Surgical History:   Procedure Laterality Date    BREAST SURGERY Left     lumpectomy benign    CATARACT EXTRACTION Bilateral 2017    CATARACT EXTRACTION W/ INTRAOCULAR LENS IMPLANT Left 12/11/2017    Procedure: EXTRACTION EXTRACAPSULAR CATARACT PHACO INTRAOCULAR LENS (IOL); Surgeon: Thais Salinas MD;  Location: [facility] MAIN OR;  Service: Ophthalmology    GA OPEN RX FEMUR FX+INTRAMED RAYMOND Right 5/21/2020    Procedure: INSERTION OF SHORT TROCHANTERIC FEMORAL NAIL;  Surgeon: Felecia Briceño MD;  [facility] Main [facility];  Service: Orthopedics    [address] HUMERAL&GLENOID COMPNT Right 9/10/2018    Procedure: RIGHT REVERSE TOTAL SHOULDER ARTHROPLASTY;  Surgeon: Felecia Briceño MD;  [facility];  Service: Orthopedics    GA XCAPSL CTRC RMVL INSJ IO LENS PROSTH W/O ECP Right 10/23/2017    Procedure: EXTRACTION EXTRACAPSULAR CATARACT PHACO INTRAOCULAR LENS (IOL);   Surgeon: Thais Salinas MD;  Location: [facility] MAIN OR;  Service: Ophthalmology    WOUND DEBRIDEMENT N/A 8/26/2020    Procedure: EXCISIONAL DEBRIDEMENT OF SACRAL PRESSURE WOUND, WASHOUT;;  Surgeon: Samuel Jean Baptiste MD;  Location: [facility] MAIN [facility];  Service: General             08/27/20 0923   Note Type   Note type Eval/Treat   Restrictions/Precautions   Weight Bearing Precautions Per Order Yes   RLE Weight Bearing Per Order WBAT  (from prev  admission (hx recent hip fracture))   LLE Weight Bearing Per Order WBAT  (from recent admission (recent wrist fracture))   Other Precautions Cognitive; Bed Alarm;Multiple lines;Telemetry; Fall Risk;Pain;Visual impairment  (pressure ulcer on buttocks)   Pain Assessment   Pain Assessment Tool Pain Assessment not indicated - pt denies pain   Pain Score No Pain   Home Living   Type of Home Apartment   Home Layout One level;Performs ADLs on one level; Able to live on main level with bedroom/bathroom; Elevator   Bathroom Shower/Tub Walk-in shower  (but sponge bathes on toilet only)   [address] bars in shower;Commode   Home Equipment   (pt doesn't remember using any DME PTA)   Additional Comments Pt is a questionable historian; reports living in 3rd floor apt w/ elevator access, all 1 floor inside   Prior Function   Level of [place] Needs assistance with IADLs; Needs assistance with ADLs and functional mobility   Lives With Daughter; Other (Comment)  (2 great grandsons)   Receives Help From Family   ADL Assistance Needs assistance   IADLs Needs assistance   Falls in the last 6 months 1 to 4   Vocational Retired   Comments Pt is a questionable historian; reports sometimes needs assist w/ ADLS and IADLS (pt reports she does help clean/cook)   Pt is I w/ transfers and functional mobility PTA   Lifestyle   Autonomy Assist ADLS/IADLS, I w/ transfers and functional mobility PTA   Reciprocal Relationships Pt lives w/ dght and 2 great grandsons   Service to Others Pt is retired   Intrinsic Gratification Enjoys TV   Psychosocial   Psychosocial (WDL) WDL   Subjective   Subjective "I feel like sh*t"   ADL   Eating Assistance 5  Supervision/Setup   Grooming Assistance 4  Minimal Assistance   UB Bathing Assistance 3  Moderate [address] 2  Maximal [address] 3  Moderate Assistance   LB Dressing Assistance 2  Maximal Assistance   Toileting Assistance  2  Maximal Assistance   Functional Assistance 2  Maximal Assistance   Functional Deficit Steadying;Verbal cueing;Supervision/safety; Increased time to complete  (AX2)   Bed Mobility   Supine to Sit 2  Maximal assistance   Additional items Assist x 2;HOB elevated; Increased time required;Verbal cues;LE management   Sit to Supine 2  Maximal assistance   Additional items Assist x 2; Increased time required;Verbal cues;LE management   Additional Comments Pt went from supine<>sit w/ Max A x2 for UB support and LE management, HOB elevated for assist  Pt sat EOB w/ Min A for sitting balance/trunk control   Transfers   Sit to Stand 3  Moderate assistance   Additional items Assist x 2; Increased time required;Verbal cues   Stand to Sit 3  Moderate assistance   Additional items Assist x 2; Increased time required;Verbal cues   Additional Comments Pt performed STS transfer from EOB w/ Mod A x2 for moderate force production into standing  HHA used  +VC for safety  Functional Mobility   Functional Mobility 2  Maximal assistance   Additional Comments Pt attempted 1 lateral side steps @ EOB w/ Max Ax2  HHA used  Pt had difficulty advancing B/L LE's   VC needed for safety, initiation and sequencing of tasks   Additional items Hand hold assistance   Balance   Static Sitting Poor +   Dynamic Sitting Poor   Static Standing Poor   Dynamic Standing Poor   Ambulatory Poor   Activity Tolerance   Activity Tolerance Patient limited by fatigue   Medical Staff Made Aware PT   Nurse Made Aware yes   RUE Assessment   RUE Assessment X  (decreased AROM of shoulder; distal WFL)   LUE Assessment   LUE Assessment WFL  (grossly 4/5)   Hand Function   Gross Motor Coordination Impaired  (impaired shoulder flexion RUE)   Fine Motor Coordination Functional   Sensation   Light Touch No apparent deficits   Vision-Basic Assessment   Current Vision Wears glasses all the time Cognition   Overall Cognitive Status Impaired   Arousal/Participation Responsive; Cooperative   Attention Attends with cues to redirect   Orientation Level Oriented to person;Disoriented to place; Disoriented to time;Disoriented to situation   Memory Decreased recall of precautions;Decreased recall of recent events;Decreased short term memory   Following Commands Follows one step commands with increased time or repetition   Comments Pt is pleasant and cooperative; has decreased insight into deficits and safety awareness  Is lethargic and needs re-direction to task   Assessment   Limitation Decreased ADL status; Decreased UE strength;Decreased UE ROM; Decreased Safe judgement during ADL;Decreased cognition;Decreased endurance;Visual deficit; Decreased self-care trans;Decreased high-level ADLs   Prognosis Fair   Assessment Pt is a 68 y/o female seen for OT eval s/p adm to [facility] w/ generalized weakness, Afib and hypotension  Pt cardioaverted in field then after developed new onset of slurred speech, dysarthria and aphasia  Pt is s/p tPA and [facility]  Pt is dx'd w/ L PCA stroke and large sacral decubitus ulcer  Pt is s/p "EXCISIONAL DEBRIDEMENT OF SACRAL PRESSURE WOUND, WASHOUT; (N/A)" performed on 8/26  Pt  has a past medical history of Acute renal failure (ARF) ([facility] [address] ), Acute respiratory failure with hypoxia ([address] ), Chronic kidney disease, Diabetes mellitus ([facility] [address] ), Hyperlipidemia, and Hypertension  Pt with active OT orders and up with assistance  orders  Pt reports living w/ dght and 2 great grandsons in apt w/ elevator access  Pt required assist w/ ADLS and IADLS, does not drive, & required use of DME PTA including BSC (pt is an overall poor historian)  Pt is currently demonstrating the following occupational deficits: Mod A UB ADLS, Max A LB ADLS, Max A x2 bed mobility, Min A EOB sitting and Mod A x2 STS transfers, Max A x1 for functional mobility w/ HHA, +VC for initiation/sequencing   These deficits that are impacting pt's baseline areas of occupation are a result of the following impairments: pain, endurance, activity tolerance, functional mobility, forward functional reach, balance, trunk control, functional standing tolerance, unsupportive home environment, decreased I w/ ADLS/IADLS, strength, ROM, visual deficits, cognitive impairments, decreased safety awareness, decreased insight into deficits and coordination deficits  The following Occupational Performance Areas to address include: eating, grooming, bathing/shower, toilet hygiene, dressing, medication management, health maintenance, functional mobility, clothing management and household maintenance  Pt scored overall 30/100 on the Barthel Index  Based on the aforementioned OT evaluation, functional performance deficits, and assessments, pt has been identified as a high complexity evaluation  Recommend STR upon D/C  Pt to continue to benefit from acute immediate OT services to address the following goals 3-5x/week to  w/in 10-14 days:   Goals   Patient Goals to get home to her family   LTG Time Frame 10-   Long Term Goal #1 see below listed goals   Plan   Treatment Interventions ADL retraining;Functional transfer training;UE strengthening/ROM; Endurance training;Cognitive reorientation;Patient/family training;Equipment evaluation/education; Compensatory technique education;Continued evaluation; Activityengagement; Energy conservation   Goal Expiration Date 09/10/20   OT Frequency 3-5x/wk   Recommendation   OT Discharge Recommendation Post-Acute Rehabilitation Services   OT - OK to Discharge Yes  (when medically stable)   Barthel Index   Feeding 10   Bathing 0   Grooming Score 0   Dressing Score 5   Bladder Score 0   Bowels Score 5   Toilet Use Score 5   Transfers (Bed/Chair) Score 5   Mobility (Level Surface) Score 0   Stairs Score 0   Barthel Index Score 30        GOALS    1) Pt will complete rolling left/right in bed with Min assist, as prerequisite for further engagement in [facility]   2) Pt will complete supine to sit transfer with Min A using B/L UEs to initiate bed mobility   3) Pt will tolerate sitting at EOB 20 minutes with S assist and stable vital signs, as prerequisite for further engagement in ADLS   4) Pt will complete grooming task with S assist and increased time to increase independence in functional tasks  5) Pt will increase B/L UE ROM 1/2 MMT to increase functional UB use during ADLS   6) Pt will complete UB ADLS with S and use of AD/DME as needed to increase independence in functional tasks  7) Pt will complete LB ADLS with Min A and use of AD/DME as needed to increase independence in functional tasks  8) Pt will complete sit to stand transfer / sit pivot transfer / stand pivot transfer with Min assist on/off all ADL surfaces   9) Pt will follow 100% simple one step verbal commands and be A/Ox4 consistently t/o use of external environmental cues to increase awareness for functional tasks  10) Pt will demonstrate 100% carryover of E C  techniques t/o fx'l I/ADL/ leisure tasks w/o cues s/p skilled education  11) Pt will improve functional mobility to Mod A w/ use of DME as needed to increase engagement in meaningful activities    Sy Gonzalez MS, OTR/L

## 2020-08-27 NOTE — PROGRESS NOTES
Daily Progress Note - Critical Care   Phong Bill 67 y o  female MRN: [id number]  Unit/Bed#: ICU 09 Encounter: [id number]        ----------------------------------------------------------------------------------------  HPI/24hr events:   Patient has been admitted to [facility] in the last 24 hours for close monitoring is status post tPA administration on August 25th 2020 after patient found having aphasia, dysarthria and right-sided weakness  CT head negative  CT angiography neck and brain showed left internal carotid artery stenosis 65-70%  Brain MRI showed no acute intracranial abnormalities  Follow-up CT head status post 24 hours tPA showed no acute intracranial abnormalities  Patient also has diagnosis of septic shock requiring pressors, currently off pressors  Blood pressure still remained in the soft side  Source of infection decubitus pressure ulcer stage IV in the sacral region  Blood culture positive for gram-positive cocci in pair and chains  Currently cover with vancomycin and cefepime  Although patient remains confused her mentation has improved and there is no evidence of focal neurological deficits on today's exam     ---------------------------------------------------------------------------------------  SUBJECTIVE  Patient has been assessed at bedside, she was arousable and cooperative  Patient was oriented in place and person  Patient was not oriented in time  Able to follow commands  Denies any chest pain or shortness of breath  Denied neurological deficit  Denies back pain  As per overnight nursing staff patient has remained confused  No fever recorded overnight  During examination patient was coughing, it sounds like a wet cough  Review of Systems   Constitutional: Negative for chills and fever  HENT: Negative for ear pain, rhinorrhea and sore throat  Eyes: Negative for photophobia, pain and visual disturbance  Respiratory: Positive for cough   Negative for apnea, chest tightness and shortness of breath  Cardiovascular: Negative for chest pain, palpitations and leg swelling  Gastrointestinal: Negative for abdominal distention, abdominal pain, diarrhea, nausea and vomiting  Genitourinary: Negative for difficulty urinating, dysuria and flank pain  Musculoskeletal: Positive for arthralgias (Right hip, right shoulder)  Negative for back pain  Skin: Positive for rash (Intertriginous area) and wound  Neurological: Negative for dizziness, tremors, facial asymmetry, speech difficulty, weakness, light-headedness, numbness and headaches  Psychiatric/Behavioral: Positive for confusion  Negative for agitation  The patient is not nervous/anxious         ---------------------------------------------------------------------------------------  Assessment and Plan:    Neuro:    Right-sided weakness, aphasia, dysarthria episode  -Status post tPA on August 25, 2020  -Stroke workup including CT head, CT 8 neck and brain, MRI of the brain showed no -acute intracranial abnormalities other than left internal carotid stenosis 65-70%  -unclear whether these episodes of neurological deficits could be secondary to cerebral hypoperfusion in the setting sepsis, atrial fibrillation and left internal carotid stenosis  -on assessment today patient was still confused, other than mild weakness in the right side there was no evidence of neurological focal deficits    (Mild weakness in the right side could be musculoskeletal in nature, history of right hip fracture and right shoulder surgery)    Plan  -continue clinical monitoring  -neurology on board, recommendations not to pursue further neurologic workup  -patient may need vascular surgery evaluation as an outpatient for left internal carotid artery stenosis  -will discuss date starting patient on antiplatelets    Acute Encephalopathy  -In the settings of septic shock and Gram Positive Bacteremia  -Oriented only to person and place not in time  -Stroke work up negative for acute intracranial abnormality   -No focal neuro deficits  -No significant electrolyte imbalance    Plan  -Continue treatment Treatment for Septic shock/Bacteremia  -Monitor mental status  -Monitor electrolytes daily  CV:   Paroxysmal atrial fibrillation  -Patient had an episode of paroxysmal atrial fibrillation witnessed by EMS, it could be secondary to ongoing sepsis  -status cardioversion on August 25, 2020  -electrocardiography records and continue monitor records showed no evidence of further atrial fibrillation, EKG showed atrial tachycardia with PACs  -[id number] score-6 point  Patient may benefit from anticoagulation  Plan  -will discuss with to start patient on anticoagulation or antiplatelets      Pulm:  Wet cough  -patient has been complaining of with cough  -has remained afebrile  -there is concern from aspiration in the setting of altered mentation  -on physical exam there was rhonchi some bilateral lung bases that could be secondary to poor mobilization    Plan  -consult speech therapy for swallowing evaluation  -physical therapy to mobilize patient noted bed  -incentive spirometry      GI:   Chronic constipation    Plan  -once patient is safe to resume p o  will resume MiraLax and Senokot      :   Acute on chronic kidney disease  -RORY/on CKD likely prerenal in the setting of kidney hypoperfusion due to septic shock  -creatinine baseline around 1 6, on admission 2 6, today's creatinine has improved to 1 8    Plan  -continue IV hydration with Isolyte 75 cc/hour  -avoid nephrotoxin medication  -Trend renal function a m   Labs      F/E/N:   -continue Isolyte at 75 cc/hour  -no significant electrolyte derangement:  Na 135, K 4 6, Mag 2 3, phos 4  -NPO, will assess swallowing function today    Heme/Onc:     Normochromic anemia  -hemoglobin 8 1 grams/deciliter this morning, MCV 90  -could be secondary to chronic kidney disease  -hemoglobin has remained stable since admission, no evidence blood loss    Plan  -continue monitoring hemoglobin/hematocrit on a m  Labs  -patient may need anemia workup as an outpatient        Endo:   Diabetes mellitus type 2  -out patient antihyperglycemic medication glipizide and metformin  -hemoglobin A1c 9 point night on August 26, 2020  fasting glucose this morning 193    Plan  -patient currently on sliding scale insulin coverage  -pending swallowing evaluation, patient may need further antihyperglycemic adjustment wants speech therapist provide recommendation for diet        ID:     Septic shock with gram-positive bacteremia  -patient required pressors after adequate IV fluid resuscitation at 30 milliliter/kilos as per section protocol  -currently off of pressors, blood pressure remained in the soft side with systolic in the 04T and map around 60  -WBC count 15, has remained afebrile  -preliminary 2/2 sets of blood culture positive for g positive cocci in pairs and chains  -source of infection likely stage IV decubitus pressure ulcer in the sacral region    Plan  -continue hemodynamic monitoring  -Given  500 mL of lactated Ringer bolus this morning  -continue Isolyte 75 cc/hour  -patient is currently on vancomycin and cefepime, antibiotics can be deescalated once final results of blood culture available      MSK/Skin:     Decubitus pressure ulcer the sacral region stage IV  -status post surgical debridement on August 26, 2020  - wound culture growing polymicrobial:  G positive cocci in pair, gram negative rods gram positive rods    Plan  -general surgery on board, planning today patient back to the OR on Friday August 28, 2020 for debridement and possible wound VAC placement  -continue antibiotic management with vancomycin and cefepime for now, once final culture available we may narrow antibiotic therapy  -continue local wound care      Disposition: Continue Critical Care   Code Status: Level 1 - Full Code  ---------------------------------------------------------------------------------------  ICU CORE MEASURES    Prophylaxis   VTE Pharmacologic Prophylaxis:   VTE Mechanical Prophylaxis: sequential compression device  Stress Ulcer Prophylaxis:  Pantoprazole 40 mg daily    ABCDE Protocol (if indicated)  Plan to perform spontaneous awakening trial today? Not applicable  Plan to perform spontaneous breathing trial today? Not applicable  Obvious barriers to extubation? Not applicable  CAM-ICU:     Invasive Devices Review  Invasive Devices     Peripheral Intravenous Line            Peripheral IV 20 Left Antecubital 1 day    Peripheral IV 20 Right Antecubital 1 day    Peripheral IV 20 Right Wrist 1 day          Arterial Line            Arterial Line 20 Right Radial less than 1 day          Drain            Urethral Catheter Temperature probe 16 Fr  1 day              Can any invasive devices be discontinued today? Not applicable  ---------------------------------------------------------------------------------------  OBJECTIVE    Vitals   Vitals:    20 0525 20 0540 20 0554 20 0600   BP:       BP Location:       Pulse:   96    Resp:   18    Temp: 97 5 °F (36 4 °C) 97 5 °F (36 4 °C) 97 5 °F (36 4 °C)    TempSrc:   Bladder    SpO2:   95%    Weight:    76 1 kg (167 lb 12 3 oz)   Height:         Temp (24hrs), Av 2 °F (36 2 °C), Min:96 1 °F (35 6 °C), Max:99 3 °F (37 4 °C)  Current: Temperature: 97 5 °F (36 4 °C)  HR: 57  BP:  93/37  RR: 22  SpO2: 94    Respiratory:  SpO2: SpO2: 95 %  Nasal Cannula O2 Flow Rate (L/min): 1 L/min    Invasive/non-invasive ventilation settings   Respiratory    Lab Data (Last 4 hours)    None         O2/Vent Data (Last 4 hours)    None                Physical Exam  HENT:      Head: Normocephalic and atraumatic  Eyes:      Extraocular Movements: Extraocular movements intact  Pupils: Pupils are equal, round, and reactive to light     Neck: Musculoskeletal: Normal range of motion and neck supple  No neck rigidity or muscular tenderness  Cardiovascular:      Rate and Rhythm: Normal rate and regular rhythm  Pulses: Normal pulses  Heart sounds: No murmur  No friction rub  No gallop  Pulmonary:      Effort: Pulmonary effort is normal  No respiratory distress  Breath sounds: Rhonchi (Audible rhonchis on bilateral lung bases) present  No wheezing or rales  Abdominal:      General: Bowel sounds are normal  There is no distension  Palpations: Abdomen is soft  Tenderness: There is no abdominal tenderness  There is no guarding  Musculoskeletal:         General: Tenderness (Tenderness to palpation on the right shoulder and right hip) present  No swelling or deformity  Right lower leg: No edema  Left lower leg: No edema  Comments: Decreased passive range of movement of right hip and right shoulder   Skin:     General: Skin is warm  Capillary Refill: Capillary refill takes less than 2 seconds  Findings: Rash (Intertriginous areas) present  Neurological:      Mental Status: She is alert  She is confused  GCS: GCS eye subscore is 4  GCS verbal subscore is 4  GCS motor subscore is 6  Cranial Nerves: No dysarthria or facial asymmetry  Sensory: Sensation is intact  Motor: Weakness (4/5 on the right upper and lower extremities  ) present  No tremor or atrophy        Coordination: Finger-Nose-Finger Test normal          Laboratory and Diagnostics:  Results from last 7 days   Lab Units 08/27/20 0454 08/26/20 0429 08/25/20  1315   WBC Thousand/uL 15 00* 13 50* 16 27*   HEMOGLOBIN g/dL 8 1* 7 9* 10 3*   HEMATOCRIT % 25 0* 24 6* 32 4*   PLATELETS Thousands/uL 226 225 309   BANDS PCT %  --  1  --    MONO PCT %  --  0*  --      Results from last 7 days   Lab Units 08/27/20 0454 08/26/20 0429 08/25/20  1315   SODIUM mmol/L 135* 138 129*   POTASSIUM mmol/L 4 6 3 4* 4 1   CHLORIDE mmol/L 107 106 95*   CO2 mmol/L 20* 21 23   ANION GAP mmol/L 8 11 11   BUN mg/dL 35* 34* 36*   CREATININE mg/dL 1 80* 2 32* 2 59*   CALCIUM mg/dL 7 6* 7 4* 8 2*   GLUCOSE RANDOM mg/dL 193* 119 321*   ALT U/L  --  11*  --    AST U/L  --  27  --    ALK PHOS U/L  --  232*  --    ALBUMIN g/dL  --  2 1*  --    TOTAL BILIRUBIN mg/dL  --  0 82  --      Results from last 7 days   Lab Units 08/27/20  0454 08/26/20  0429   MAGNESIUM mg/dL 2 3 1 2*   PHOSPHORUS mg/dL 4 0 2 8      Results from last 7 days   Lab Units 08/26/20  0431 08/25/20  1315   INR  1 38* 1 23*   PTT seconds 34 33      Results from last 7 days   Lab Units 08/25/20  1315   TROPONIN I ng/mL <0 02     Results from last 7 days   Lab Units 08/26/20  0536   LACTIC ACID mmol/L 1 6     ABG:    VBG:    Results from last 7 days   Lab Units 08/27/20  0454 08/26/20  0536   PROCALCITONIN ng/ml 6 10* 6 35*       Micro  Results from last 7 days   Lab Units 08/26/20  1353 08/26/20  1225 08/26/20  0536   GRAM STAIN RESULT  No polys seen*  2+ Gram positive cocci in pairs* 1+ Polys*  3+ Gram positive cocci in pairs*  2+ Gram positive rods*  1+ Gram negative rods* Gram positive cocci in pairs and chains*  Gram positive cocci in pairs and chains*       EKG:   Imaging:  I have personally reviewed pertinent reports  Intake and Output  I/O       08/25 0701 - 08/26 0700 08/26 0701 - 08/27 0700 08/27 0701 - 08/28 0700    I V  (mL/kg) 958 6 (14 4) 3332 3 (43 8)     IV Piggyback 1050 350     Total Intake(mL/kg) 2008 6 (30 2) 3682 3 (48 4)     Urine (mL/kg/hr) 550 430 (0 2)     Total Output 550 430     Net +1458 6 +3252 3                UOP: 430 ml/hr in the last 24 hours    Height and Weights   Height: 5' 6" (167 6 cm)  IBW: 59 3 kg  Body mass index is 27 08 kg/m²    Weight (last 2 days)     Date/Time   Weight    08/27/20 0600   76 1 (167 77)    08/26/20 0626   66 5 (146 61)    08/25/20 1611   73 2 (161 38)                Nutrition       Diet Orders   (From admission, onward) Start     Ordered    08/26/20 0548  Diet NPO  Diet effective now     Question Answer Comment   Diet Type NPO    RD to adjust diet per protocol? Yes        08/26/20 0548                    Active Medications  Scheduled Meds:  Current Facility-Administered Medications   Medication Dose Route Frequency Provider Last Rate    atorvastatin  40 mg Oral QPM Lashae Martin MD      cefepime  1,000 mg Intravenous Q12H Lashae Martin MD Stopped (08/27/20 0601)    chlorhexidine  15 mL Swish & Spit Q12H [address] Lashae Martin MD      gabapentin  100 mg Oral TID Arnulfo Martin MD      hydrALAZINE  10 mg Intravenous Q4H PRN Lashae Martin MD      insulin lispro  1-6 Units Subcutaneous Q6H [address] Lashae Martin MD      insulin lispro  2-12 Units Subcutaneous HS Lashae Martin MD      lactated ringers  500 mL Intravenous Once Marysol Mullen  mL (08/27/20 0719)    multi-electrolyte  75 mL/hr Intravenous Continuous Lashae Martin MD 75 mL/hr (08/27/20 0310)    norepinephrine  1-30 mcg/min Intravenous Titrated Lashae Martin MD Stopped (08/26/20 2000)    polyethylene glycol  17 g Oral Daily Lashae Martin MD      senna-docusate sodium  2 tablet Oral BID Lashae Martin MD      vancomycin  15 mg/kg Intravenous Daily PRN Lashae Martin MD       Continuous Infusions:  multi-electrolyte, 75 mL/hr, Last Rate: 75 mL/hr (08/27/20 0310)  norepinephrine, 1-30 mcg/min, Last Rate: Stopped (08/26/20 2000)      PRN Meds:   hydrALAZINE, 10 mg, Q4H PRN  vancomycin, 15 mg/kg, Daily PRN        Allergies   No Known Allergies  ---------------------------------------------------------------------------------------  Advance Directive and Living Will:      Power of :    POLST:    ---------------------------------------------------------------------------------------  Care Time Delivered:   Spent 45 minutes including records reviewed, physical exam, documentation and recommendations      Marysol Mullen, MD      Portions of the record may have been created with voice recognition software  Occasional wrong word or "sound a like" substitutions may have occurred due to the inherent limitations of voice recognition software    Read the chart carefully and recognize, using context, where substitutions have occurred

## 2020-08-27 NOTE — PLAN OF CARE
Problem: Potential for Falls  Goal: Patient will remain free of falls  Description: INTERVENTIONS:  - Assess patient frequently for physical needs  -  Identify cognitive and physical deficits and behaviors that affect risk of falls    -  [place] fall precautions as indicated by assessment   - Educate patient/family on patient safety including physical limitations  - Instruct patient to call for assistance with activity based on assessment  - Modify environment to reduce risk of injury  - Consider OT/PT consult to assist with strengthening/mobility  Outcome: Progressing     Problem: Prexisting or High Potential for Compromised Skin Integrity  Goal: Skin integrity is maintained or improved  Description: INTERVENTIONS:  - Identify patients at risk for skin breakdown  - Assess and monitor skin integrity  - Assess and monitor nutrition and hydration status  - Monitor labs   - Assess for incontinence   - Turn and reposition patient  - Assist with mobility/ambulation  - Relieve pressure over bony prominences  - Avoid friction and shearing  - Provide appropriate hygiene as needed including keeping skin clean and dry  - Evaluate need for skin moisturizer/barrier cream  - Collaborate with interdisciplinary team   - Patient/family teaching  - Consider wound care consult   Outcome: Progressing     Problem: PAIN - ADULT  Goal: Verbalizes/displays adequate comfort level or baseline comfort level  Description: Interventions:  - Encourage patient to monitor pain and request assistance  - Assess pain using appropriate pain scale  - Administer analgesics based on type and severity of pain and evaluate response  - Implement non-pharmacological measures as appropriate and evaluate response  - Consider cultural and social influences on pain and pain management  - Notify physician/advanced practitioner if interventions unsuccessful or patient reports new pain  Outcome: Progressing     Problem: INFECTION - ADULT  Goal: Absence or prevention of progression during hospitalization  Description: INTERVENTIONS:  - Assess and monitor for signs and symptoms of infection  - Monitor lab/diagnostic results  - Monitor all insertion sites, i e  indwelling lines, tubes, and drains  - Monitor endotracheal if appropriate and nasal secretions for changes in amount and color  - [place] appropriate cooling/warming therapies per order  - Administer medications as ordered  - Instruct and encourage patient and family to use good hand hygiene technique  - Identify and instruct in appropriate isolation precautions for identified infection/condition  Outcome: Progressing     Problem: SAFETY ADULT  Goal: Patient will remain free of falls  Description: INTERVENTIONS:  - Assess patient frequently for physical needs  -  Identify cognitive and physical deficits and behaviors that affect risk of falls    -  [place] fall precautions as indicated by assessment   - Educate patient/family on patient safety including physical limitations  - Instruct patient to call for assistance with activity based on assessment  - Modify environment to reduce risk of injury  - Consider OT/PT consult to assist with strengthening/mobility  Outcome: Progressing  Goal: Maintain or return mobility status to optimal level  Description: INTERVENTIONS:  - Assess patient's baseline mobility status (ambulation, transfers, stairs, etc )    - Identify cognitive and physical deficits and behaviors that affect mobility  - Identify mobility aids required to assist with transfers and/or ambulation (gait belt, sit-to-stand, lift, walker, cane, etc )  - [place] fall precautions as indicated by assessment  - Record patient progress and toleration of activity level on Mobility SBAR; progress patient to next Phase/Stage  - Instruct patient to call for assistance with activity based on assessment  - Consider rehabilitation consult to assist with strengthening/weightbearing, etc   Outcome: Progressing Problem: Knowledge Deficit  Goal: Patient/family/caregiver demonstrates understanding of disease process, treatment plan, medications, and discharge instructions  Description: Complete learning assessment and assess knowledge base  Interventions:  - Provide teaching at level of understanding  - Provide teaching via preferred learning methods  Outcome: Progressing     Problem: Neurological Deficit  Goal: Neurological status is stable or improving  Description: Interventions:  - Monitor and assess patient's level of consciousness, motor function, sensory function, and level of assistance needed for ADLs  - Monitor and report changes from baseline  Collaborate with interdisciplinary team to initiate plan and implement interventions as ordered  - Provide and maintain a safe environment  - Consider seizure precautions  - Consider fall precautions  - Consider aspiration precautions  - Consider bleeding precautions  Outcome: Progressing     Problem: Communication Impairment  Goal: Ability to express needs and understand communication  Description: Assess patient's communication skills and ability to understand information  Patient will demonstrate use of effective communication techniques, alternative methods of communication and understanding even if not able to speak  - Encourage communication and provide alternate methods of communication as needed  - Collaborate with case management/ for discharge needs  - Include patient/family/caregiver in decisions related to communication  Outcome: Progressing     Problem: Potential for Aspiration  Goal: Non-ventilated patient's risk of aspiration is minimized  Description: Assess and monitor vital signs, respiratory status, and labs (WBC)  Monitor for signs of aspiration (tachypnea, cough, rales, wheezing, cyanosis, fever)  - Assess and monitor patient's ability to swallow  - Place patient up in chair to eat if possible    - HOB up at 80 degrees to eat if unable to get patient up into chair   - Supervise patient during oral intake  - Instruct patient/ family to take small bites  - Instruct patient/ family to take small single sips when taking liquids    - Follow patient-specific strategies generated by speech pathologist   Outcome: Progressing     Problem: NEUROSENSORY - ADULT  Goal: Achieves stable or improved neurological status  Description: INTERVENTIONS  - Monitor and report changes in neurological status  - Monitor vital signs such as temperature, blood pressure, glucose, and any other labs ordered   - Initiate measures to prevent increased intracranial pressure  - Monitor for seizure activity and implement precautions if appropriate      Outcome: Progressing     Problem: CARDIOVASCULAR - ADULT  Goal: Maintains optimal cardiac output and hemodynamic stability  Description: INTERVENTIONS:  - Monitor I/O, vital signs and rhythm  - Monitor for S/S and trends of decreased cardiac output  - Administer and titrate ordered vasoactive medications to optimize hemodynamic stability  - Assess quality of pulses, skin color and temperature  - Assess for signs of decreased coronary artery perfusion  - Instruct patient to report change in severity of symptoms  Outcome: Progressing     Problem: RESPIRATORY - ADULT  Goal: Achieves optimal ventilation and oxygenation  Description: INTERVENTIONS:  - Assess for changes in respiratory status  - Assess for changes in mentation and behavior  - Position to facilitate oxygenation and minimize respiratory effort  - Oxygen administered by appropriate delivery if ordered  - Initiate smoking cessation education as indicated  - Encourage broncho-pulmonary hygiene including cough, deep breathe, Incentive Spirometry  - Assess the need for suctioning and aspirate as needed  - Assess and instruct to report SOB or any respiratory difficulty  - Respiratory Therapy support as indicated  Outcome: Progressing     Problem: GASTROINTESTINAL - ADULT  Goal: Minimal or absence of nausea and/or vomiting  Description: INTERVENTIONS:  - Administer IV fluids if ordered to ensure adequate hydration  - Maintain NPO status until nausea and vomiting are resolved  - Nasogastric tube if ordered  - Administer ordered antiemetic medications as needed  - Provide nonpharmacologic comfort measures as appropriate  - Advance diet as tolerated, if ordered  - Consider nutrition services referral to assist patient with adequate nutrition and appropriate food choices  Outcome: Progressing     Problem: GENITOURINARY - ADULT  Goal: Maintains or returns to baseline urinary function  Description: INTERVENTIONS:  - Assess urinary function  - Encourage oral fluids to ensure adequate hydration if ordered  - Administer IV fluids as ordered to ensure adequate hydration  - Administer ordered medications as needed  - Offer frequent toileting  - Follow urinary retention protocol if ordered  Outcome: Progressing     Problem: METABOLIC, FLUID AND ELECTROLYTES - ADULT  Goal: Electrolytes maintained within normal limits  Description: INTERVENTIONS:  - Monitor labs and assess patient for signs and symptoms of electrolyte imbalances  - Administer electrolyte replacement as ordered  - Monitor response to electrolyte replacements, including repeat lab results as appropriate  - Instruct patient on fluid and nutrition as appropriate  Outcome: Progressing     Problem: SKIN/TISSUE INTEGRITY - ADULT  Goal: Skin integrity remains intact  Description: INTERVENTIONS  - Identify patients at risk for skin breakdown  - Assess and monitor skin integrity  - Assess and monitor nutrition and hydration status  - Monitor labs (i e  albumin)  - Assess for incontinence   - Turn and reposition patient  - Assist with mobility/ambulation  - Relieve pressure over bony prominences  - Avoid friction and shearing  - Provide appropriate hygiene as needed including keeping skin clean and dry  - Evaluate need for skin moisturizer/barrier cream  - Collaborate with interdisciplinary team (i e  Nutrition, Rehabilitation, etc )   - Patient/family teaching  Outcome: Progressing     Problem: HEMATOLOGIC - ADULT  Goal: Maintains hematologic stability  Description: INTERVENTIONS  - Assess for signs and symptoms of bleeding or hemorrhage  - Monitor labs  - Administer supportive blood products/factors as ordered and appropriate  Outcome: Progressing     Problem: MUSCULOSKELETAL - ADULT  Goal: Maintain or return mobility to safest level of function  Description: INTERVENTIONS:  - Assess patient's ability to carry out ADLs; assess patient's baseline for ADL function and identify physical deficits which impact ability to perform ADLs (bathing, care of mouth/teeth, toileting, grooming, dressing, etc )  - Assess/evaluate cause of self-care deficits   - Assess range of motion  - Assess patient's mobility  - Assess patient's need for assistive devices and provide as appropriate  - Encourage maximum independence but intervene and supervise when necessary  - Involve family in performance of ADLs  - Assess for home care needs following discharge   - Consider OT consult to assist with ADL evaluation and planning for discharge  - Provide patient education as appropriate  Outcome: Progressing     Problem: COPING  Goal: Pt/Family able to verbalize concerns and demonstrate effective coping strategies  Description: INTERVENTIONS:  - Assist patient/family to identify coping skills, available support systems and cultural and spiritual values  - Provide emotional support, including active listening and acknowledgement of concerns of patient and caregivers  - Reduce environmental stimuli, as able  - Provide patient education  - Assess for spiritual pain/suffering and initiate spiritual care, including notification of Pastoral Care or анна based community as needed  - Assess effectiveness of coping strategies  Outcome: Progressing  Goal: Will report anxiety at manageable levels  Description: INTERVENTIONS:  - Administer medication as ordered  - Teach and encourage coping skills  - Provide emotional support  - Assess patient/family for anxiety and ability to cope  Outcome: Progressing     Problem: Nutrition  Goal: Nutrition/Hydration status is improving  Description: Monitor and assess patient's nutrition/hydration status for malnutrition (ex- brittle hair, bruises, dry skin, pale skin and conjunctiva, muscle wasting, smooth red tongue, and disorientation)  Collaborate with interdisciplinary team and initiate plan and interventions as ordered  Monitor patient's weight and dietary intake as ordered or per policy  Utilize nutrition screening tool and intervene per policy  Determine patient's food preferences and provide high-protein, high-caloric foods as appropriate  - Assist patient with eating   - Allow adequate time for meals   - Encourage patient to take dietary supplement as ordered  - Collaborate with clinical nutritionist   - Include patient/family/caregiver in decisions related to nutrition    Outcome: Not Progressing  Note: PT remains NPO, speech eval in the AM

## 2020-08-27 NOTE — PROGRESS NOTES
Vancomycin IV Pharmacy-to-Dose Consultation    Mary Morley is a 67 y o  female who is currently receiving Vancomycin IV with management by the Pharmacy Consult service  Relevant clinical data and objective / subjective history reviewed  Vancomycin Assessment:  Indication: Bacteremia, decubitus ulcer of sacral region stage 4, concern for osteomyelitis  Status: critically ill, leukocytosis, ID consulted  Micro:   8/27 MRSA culture: in process  8/26 Anaerobic culture: in process  8/26 Wound culture: 3+ GNR; 2+ GPC pairs  8/26 Wound culture: 2+ GNR; 3+ GPC pairs, 2+ GPR, 1+ GNR  8/26 Anaerobic culture: in process  8/26 Urine culture: 50,000-59,000 cfu/mL Escherichia coli  8/26 Blood cultures x 2: 2/2 GPC in cris and chains, 1 identified as Enterococcus species  8/25 SARS-COV-2: negative  Procalcitonin: 6 35>6 1  Renal Function: RORY improving, Scr 1 80 (peaked at 2 59, baseline 1 2); UOP poor 0 2 mL/kg/hr  Potential Nephrotoxicity Factors (select ALL that apply):  Medications: none present  Patient-Factors: elderly, renal dysfunction  Days of Therapy: 2  Current Dose: Vancomycin 1500 mg IV x 1 (750 mg (8/26 at 0812) + 750 mg (8/26 at 1050))  Goal Trough: 15-20 (appropriate for most indications)   Goal AUC(24h): 400-600  Last Level: 15 8 (8/27 at 0454) collected ~20 hours after load    Vancomycin Plan:  Dosing: continue 1000 mg (15 mg/kg) IV daily PRN when random level is less than 20 (next dose: given 8/27 at 1032)  Next Level: Random 8/28 at 0400  Renal Function Monitoring: Daily BMP and [address] will continue to follow closely for s/sx of nephrotoxicity, infusion reactions and appropriateness of therapy  BMP and CBC will be ordered per protocol  We will continue to follow the patients culture results and clinical progress daily       Caty Benjamin, PharmD, [address] Clinical Pharmacist  [phone]

## 2020-08-27 NOTE — SPEECH THERAPY NOTE
Speech-Language Pathology Bedside Swallow Evaluation      Patient Name: Catherine Barreto    VNZLE'B Date: 8/27/2020     Problem List  Principal Problem:    CVA (cerebral vascular accident) [facility])  Active Problems:    Type 2 diabetes mellitus with hyperglycemia ([facility] )    Hypertension    Hyperlipidemia    Decubitus ulcer of sacral region, stage 4 ([facility] )    RORY (acute kidney injury) ([facility] )    Hyponatremia    Atrial fibrillation [facility])      Past Medical History  Past Medical History:   Diagnosis Date    Acute renal failure (ARF) ([facility] )     Acute respiratory failure with hypoxia (HCC)     Chronic kidney disease     Diabetes mellitus ([facility] )     type 2    Hyperlipidemia     Hypertension        Past Surgical History  Past Surgical History:   Procedure Laterality Date    BREAST SURGERY Left     lumpectomy benign    CATARACT EXTRACTION Bilateral 2017    CATARACT EXTRACTION W/ INTRAOCULAR LENS IMPLANT Left 12/11/2017    Procedure: EXTRACTION EXTRACAPSULAR CATARACT PHACO INTRAOCULAR LENS (IOL); Surgeon: Navya Mccauley MD;  Location: [facility] MAIN OR;  Service: Ophthalmology    [place] OPEN RX FEMUR FX+INTRAMED RAYMOND Right 5/21/2020    Procedure: INSERTION OF SHORT TROCHANTERIC FEMORAL NAIL;  Surgeon: Cara Long MD;  [facility] Main [facility];  Service: Orthopedics    [address] HUMERAL&GLENOID COMPNT Right 9/10/2018    Procedure: RIGHT REVERSE TOTAL SHOULDER ARTHROPLASTY;  Surgeon: Cara Long MD;  [facility];  Service: Orthopedics    [place] XCAPSL CTRC RMVL INSJ IO LENS PROSTH W/O ECP Right 10/23/2017    Procedure: EXTRACTION EXTRACAPSULAR CATARACT PHACO INTRAOCULAR LENS (IOL);   Surgeon: Navya Mccauley MD;  Location: [facility] MAIN OR;  Service: Ophthalmology    WOUND DEBRIDEMENT N/A 8/26/2020    Procedure: EXCISIONAL DEBRIDEMENT OF SACRAL PRESSURE WOUND, WASHOUT;;  Surgeon: Ivis Carballo MD;  [facility] MAIN [facility];  Service: General       Summary   Pt presented with functional appearing oral and pharyngeal stage swallowing skills with puree, soft well cut food and thin liquid  Recommended Diet: soft/level 3 diet and thin liquids   Recommended Form of Meds: as desired and tolerated   Aspiration precautions and swallowing strategies: only feed when fully alert and slow rate of feeding        Current Medical Status  Lynn Saldaña a 67 y  o  female history of diabetes, hypertension, hyperlipidemia who initially presented on 08/25 with tachycardia, hypotension, EMS called to patient's residence for generalized weakness and patient found to be in AFib with RVR, rates up to 180, initial blood pressure 66/33, cardioverted in the field for unstable atrial fibrillation   Following cardioversion patient with new onset slurred speech, remained dysarthric and aphasic   Patient otherwise with nonfocal neuro examination when evaluated at [address] [place] [address] ED   Patient stroke alerted, tPA administered at 1:00 p m  Initial NIH score 6 which improved to 3, patient transferred to [facility] for critical care admission however evaluated and initially deemed appropriate for step-down level 2  Patient noted to have large purulent sacral decubitus ulcer   Following return to ED from MRI at 2:55 am, pt less responsive, hypotensive to 74/38, bolused with 500 cc normal saline and 500 cc 5% albumin without significant improvement, admitted to critical care service for further management  DX:   1  Septic shock with gram positive bacteremia likely 2/2 infected stage 4 sacral pressure ulcer with suspected osteomyelitis  2  Stage 4 sacral pressure ulcer with suspected osteomyelitis  3  CVA  · Developed right facial droop, right sided weakness, aphasia and dysarthria after cardioversion en route to the hospital for hypotension 2/2 new onset Afib with RVR  · Initial NIHSS on presentation to ED was 6 which improved to 3 after tPA     · Had mental status change overnight on 8/26/20 most likely in setting of hypotension with cerebrohypoperfusion in setting of septic shock  · Imaging:  ? MRI brain demonstrated no acute changes     SLP orders for Swallowing and Cognitive/Communication Evaluations received  Swallowing Evaluation completed bedside this pm       Current Precautions:  Fall and Delirium     Allergies:  No known food allergies     Past medical history:  Please see H&P for details     Special Studies:  CT of head of 8/26 No evidence of acute intracranial hemorrhage   No significant interval change   If clinically appropriate  MRI: Mild chronic microvascular ischemic disease  No acute ischemic disease  Age-related atrophy  CT of chest/abd/pelvis:    -Soft tissue ulcer overlying the inferior sacrum and coccyx with associated gas production about the right sacrospinous ligament extending into the soft tissues in the perirectal region in keeping with necrotizing fasciitis   -Small bilateral pleural effusions   -Persistent renal cortical contrast enhancement in keeping with ATN      Social/Education/Vocational Hx:  Pt reported to live w/ daughter and 15and 5year old great grandchildren in 3rd floor elevator access apt (reported during hospital stay in May 2020)  Son also reported to be local and supportive  Swallow Information   Current Diet: NPO   Baseline Diet: regular diet and thin liquids with pt selecting and preparing soft food      Baseline Assessment   Behavior/Cognition: alert  Speech/Language Status: able to follow commands and able to engage in v simple conversation but with s/s impaired memory  Patient Positioning: upright in bed  Pain Status/Interventions/Response to Interventions:  No report of or nonverbal indications of pain         Swallow Mechanism Exam  Facial: now face is grossly symmetric  Labial: WFL  Lingual: WFL  Velum: symmetrical  Mandible: adequate ROM  Dentition: edentulous and dentures not available  Vocal quality:clear/adequate   Respiratory Status: on RA       Consistencies Assessed and Performance   Consistencies/Materials administered included pure, toast and thin liquid    Oral Stage: mild  Mastication was appropriately prolonged but adequate with the materials administered today  Bolus formation and transfer were functional with no significant oral residue noted  No overt s/s reduced oral control  Pharyngeal Stage: WFL suspected  Swallow Mechanics:  Swallowing initiation appeared prompt  Laryngeal rise was palpated and judged to be within functional limits  No coughing, throat clearing, change in vocal quality or respiratory status noted today  Esophageal Concerns: no reported s/s    Summary and Recommendations (see above)    Results Reviewed with: patient and RN     Treatment Recommended: f/u re: swallowing x1 to ensure safe intake of least restrictive diet  Can also complete cognitive/language evaluation as indicated (plan to speak with family re: baseline status)

## 2020-08-27 NOTE — PROGRESS NOTES
Post- OP Note - General Surgery   Marcela Beard 67 y o  female MRN: [id number]  Unit/Bed#: ICU 09 Encounter: [id number]    Assessment:  67 F w/ CVA s/p excisional debridement of sacral pressure wound and washout    No pressor requirements    Plan:  Primary per Critical Care team  OR Friday for repeat debridement, washout, possible vac placement      Subjective/Objective     Subjective:   NAEO  Per nursing: ABD not saturated or in need of dressing change  MAPs dip low when patient sleeps (58) but return to normal when patient awakened  Temperature was dipping, bear hugger applied  Q2 position switches  Objective:    Blood pressure (!) 91/41, pulse 78, temperature (!) 96 1 °F (35 6 °C), resp  rate (!) 9, height 5' 6" (1 676 m), weight 66 5 kg (146 lb 9 7 oz), SpO2 99 %  ,Body mass index is 23 66 kg/m²  Intake/Output Summary (Last 24 hours) at 8/27/2020 0418  Last data filed at 8/27/2020 0201  Gross per 24 hour   Intake 4460 88 ml   Output 925 ml   Net 3535 88 ml       Invasive Devices     Peripheral Intravenous Line            Peripheral IV 08/25/20 Left Antecubital 1 day    Peripheral IV 08/26/20 Right Antecubital less than 1 day    Peripheral IV 08/26/20 Right Wrist less than 1 day          Arterial Line            Arterial Line 08/26/20 Right Radial less than 1 day          Drain            Urethral Catheter Temperature probe 16 Fr  less than 1 day                Physical Exam:   Gen:   NAD  HEENT: MMM  CV: RRR  Lungs: Normal respiratory effort 2L  Abd: s, nt, nd  Buttocks: ABD intact, gauze with serosanguinous drainage  Skin: warm/ dry  Neuro:  AxO x3      Results from last 7 days   Lab Units 08/26/20  0429 08/25/20  1315   WBC Thousand/uL 13 50* 16 27*   HEMOGLOBIN g/dL 7 9* 10 3*   HEMATOCRIT % 24 6* 32 4*   PLATELETS Thousands/uL 225 309     Results from last 7 days   Lab Units 08/26/20  0429 08/25/20  1315   POTASSIUM mmol/L 3 4* 4 1   CHLORIDE mmol/L 106 95*   CO2 mmol/L 21 23   BUN mg/dL 34* 36* CREATININE mg/dL 2 32* 2 59*   CALCIUM mg/dL 7 4* 8 2*     Results from last 7 days   Lab Units 08/26/20  0431 08/25/20  1315   INR  1 38* 1 23*   PTT seconds 34 33

## 2020-08-27 NOTE — PROGRESS NOTES
Progress Note - General Surgery   Neftaly Mohan 67 y o  female MRN: [id number]  Unit/Bed#: ICU 09 Encounter: [id number]    Assessment:  67 F w/ CVA s/p excisional debridement of sacral pressure wound and washout (8/26)    Plan:  · Diet as tolerated, NPO pMN  · Repeat debridement/washout w/ likely VAC placement tomorrow (8/28)  · Currently on Vanc/Cefepime - would continue anaerobic coverage w/ Flagyl  Wound cultures pending, trend WBC/monitor fever curve  · Primary per Critical Care team    Subjective/Objective     Subjective:   No acute events overnight  Off pressors  Patient doing well this AM  Dressing intact  Pain well controlled  Objective:    Blood pressure (!) 91/41, pulse 78, temperature 97 5 °F (36 4 °C), temperature source Bladder, resp  rate (!) 9, height 5' 6" (1 676 m), weight 66 5 kg (146 lb 9 7 oz), SpO2 99 %  ,Body mass index is 23 66 kg/m²  Intake/Output Summary (Last 24 hours) at 8/27/2020 0615  Last data filed at 8/27/2020 0544  Gross per 24 hour   Intake 4460 88 ml   Output 980 ml   Net 3480 88 ml       Invasive Devices     Peripheral Intravenous Line            Peripheral IV 08/25/20 Left Antecubital 1 day    Peripheral IV 08/26/20 Right Antecubital 1 day    Peripheral IV 08/26/20 Right Wrist 1 day          Arterial Line            Arterial Line 08/26/20 Right Radial less than 1 day          Drain            Urethral Catheter Temperature probe 16 Fr  1 day                Physical Exam:   GEN: NAD  HEENT: MMM  CV: warm/well perfused  Lung: normal effort  Ab: Soft, NT/ND  Skin: Sacral packing in place  No surrounding erythema  Overlying dressing c/d/i  Neuro:  A+Ox3, answering questions appropriately and following commands      Results from last 7 days   Lab Units 08/27/20  0454 08/26/20  0429 08/25/20  1315   WBC Thousand/uL 15 00* 13 50* 16 27*   HEMOGLOBIN g/dL 8 1* 7 9* 10 3*   HEMATOCRIT % 25 0* 24 6* 32 4*   PLATELETS Thousands/uL 226 225 309     Results from last 7 days   Lab Units 08/27/20  0454 08/26/20  0429 08/25/20  1315   POTASSIUM mmol/L 4 6 3 4* 4 1   CHLORIDE mmol/L 107 106 95*   CO2 mmol/L 20* 21 23   BUN mg/dL 35* 34* 36*   CREATININE mg/dL 1 80* 2 32* 2 59*   CALCIUM mg/dL 7 6* 7 4* 8 2*     Results from last 7 days   Lab Units 08/26/20  0431 08/25/20  1315   INR  1 38* 1 23*   PTT seconds 34 33

## 2020-08-28 ENCOUNTER — ANESTHESIA (INPATIENT)
Dept: PERIOP | Facility: HOSPITAL | Age: 75
DRG: 853 | End: 2020-08-28
Payer: MEDICARE

## 2020-08-28 PROBLEM — I48.0 PAROXYSMAL ATRIAL FIBRILLATION (HCC): Status: ACTIVE | Noted: 2020-08-26

## 2020-08-28 PROBLEM — B95.2 BACTEREMIA DUE TO ENTEROCOCCUS: Status: ACTIVE | Noted: 2020-08-28

## 2020-08-28 PROBLEM — D64.9 ANEMIA: Status: ACTIVE | Noted: 2020-08-28

## 2020-08-28 PROBLEM — R78.81 BACTEREMIA DUE TO ENTEROCOCCUS: Status: ACTIVE | Noted: 2020-08-28

## 2020-08-28 LAB
ABO GROUP BLD BPU: NORMAL
ABO GROUP BLD BPU: NORMAL
ANION GAP SERPL CALCULATED.3IONS-SCNC: 8 MMOL/L (ref 4–13)
BACTERIA SPEC ANAEROBE CULT: ABNORMAL
BACTERIA UR CULT: ABNORMAL
BACTERIA UR CULT: ABNORMAL
BASOPHILS # BLD AUTO: 0.02 THOUSANDS/ΜL (ref 0–0.1)
BASOPHILS NFR BLD AUTO: 0 % (ref 0–1)
BPU ID: NORMAL
BPU ID: NORMAL
BUN SERPL-MCNC: 37 MG/DL (ref 5–25)
CALCIUM SERPL-MCNC: 7.8 MG/DL (ref 8.3–10.1)
CHLORIDE SERPL-SCNC: 108 MMOL/L (ref 100–108)
CO2 SERPL-SCNC: 22 MMOL/L (ref 21–32)
CREAT SERPL-MCNC: 1.96 MG/DL (ref 0.6–1.3)
CROSSMATCH: NORMAL
CROSSMATCH: NORMAL
EOSINOPHIL # BLD AUTO: 0.01 THOUSAND/ΜL (ref 0–0.61)
EOSINOPHIL NFR BLD AUTO: 0 % (ref 0–6)
ERYTHROCYTE [DISTWIDTH] IN BLOOD BY AUTOMATED COUNT: 15 % (ref 11.6–15.1)
GFR SERPL CREATININE-BSD FRML MDRD: 24 ML/MIN/1.73SQ M
GLUCOSE SERPL-MCNC: 160 MG/DL (ref 65–140)
GLUCOSE SERPL-MCNC: 163 MG/DL (ref 65–140)
GLUCOSE SERPL-MCNC: 170 MG/DL (ref 65–140)
GLUCOSE SERPL-MCNC: 199 MG/DL (ref 65–140)
GLUCOSE SERPL-MCNC: 261 MG/DL (ref 65–140)
HCT VFR BLD AUTO: 24.4 % (ref 34.8–46.1)
HGB BLD-MCNC: 7.6 G/DL (ref 11.5–15.4)
IMM GRANULOCYTES # BLD AUTO: 0.25 THOUSAND/UL (ref 0–0.2)
IMM GRANULOCYTES NFR BLD AUTO: 2 % (ref 0–2)
LYMPHOCYTES # BLD AUTO: 1.04 THOUSANDS/ΜL (ref 0.6–4.47)
LYMPHOCYTES NFR BLD AUTO: 7 % (ref 14–44)
MAGNESIUM SERPL-MCNC: 2.3 MG/DL (ref 1.6–2.6)
MCH RBC QN AUTO: 28.5 PG (ref 26.8–34.3)
MCHC RBC AUTO-ENTMCNC: 31.1 G/DL (ref 31.4–37.4)
MCV RBC AUTO: 91 FL (ref 82–98)
MONOCYTES # BLD AUTO: 0.41 THOUSAND/ΜL (ref 0.17–1.22)
MONOCYTES NFR BLD AUTO: 3 % (ref 4–12)
MRSA NOSE QL CULT: NORMAL
NEUTROPHILS # BLD AUTO: 12.44 THOUSANDS/ΜL (ref 1.85–7.62)
NEUTS SEG NFR BLD AUTO: 88 % (ref 43–75)
NRBC BLD AUTO-RTO: 0 /100 WBCS
PHOSPHATE SERPL-MCNC: 3.1 MG/DL (ref 2.3–4.1)
PLATELET # BLD AUTO: 208 THOUSANDS/UL (ref 149–390)
PMV BLD AUTO: 11.5 FL (ref 8.9–12.7)
POTASSIUM SERPL-SCNC: 4.3 MMOL/L (ref 3.5–5.3)
RBC # BLD AUTO: 2.67 MILLION/UL (ref 3.81–5.12)
SODIUM SERPL-SCNC: 138 MMOL/L (ref 136–145)
UNIT DISPENSE STATUS: NORMAL
UNIT DISPENSE STATUS: NORMAL
UNIT PRODUCT CODE: NORMAL
UNIT PRODUCT CODE: NORMAL
UNIT RH: NORMAL
UNIT RH: NORMAL
VANCOMYCIN SERPL-MCNC: 23.5 UG/ML
WBC # BLD AUTO: 14.17 THOUSAND/UL (ref 4.31–10.16)

## 2020-08-28 PROCEDURE — 82948 REAGENT STRIP/BLOOD GLUCOSE: CPT

## 2020-08-28 PROCEDURE — 87040 BLOOD CULTURE FOR BACTERIA: CPT | Performed by: PHYSICIAN ASSISTANT

## 2020-08-28 PROCEDURE — P9016 RBC LEUKOCYTES REDUCED: HCPCS

## 2020-08-28 PROCEDURE — 99233 SBSQ HOSP IP/OBS HIGH 50: CPT | Performed by: INTERNAL MEDICINE

## 2020-08-28 PROCEDURE — 84100 ASSAY OF PHOSPHORUS: CPT | Performed by: FAMILY MEDICINE

## 2020-08-28 PROCEDURE — 83735 ASSAY OF MAGNESIUM: CPT | Performed by: FAMILY MEDICINE

## 2020-08-28 PROCEDURE — 11046 DBRDMT MUSC&/FSCA EA ADDL: CPT | Performed by: SURGERY

## 2020-08-28 PROCEDURE — 0QB10ZZ EXCISION OF SACRUM, OPEN APPROACH: ICD-10-PCS | Performed by: SURGERY

## 2020-08-28 PROCEDURE — NC001 PR NO CHARGE: Performed by: SURGERY

## 2020-08-28 PROCEDURE — 80202 ASSAY OF VANCOMYCIN: CPT | Performed by: INTERNAL MEDICINE

## 2020-08-28 PROCEDURE — 80048 BASIC METABOLIC PNL TOTAL CA: CPT | Performed by: INTERNAL MEDICINE

## 2020-08-28 PROCEDURE — NC001 PR NO CHARGE: Performed by: NURSE PRACTITIONER

## 2020-08-28 PROCEDURE — 99232 SBSQ HOSP IP/OBS MODERATE 35: CPT | Performed by: SURGERY

## 2020-08-28 PROCEDURE — 11043 DBRDMT MUSC&/FSCA 1ST 20/<: CPT | Performed by: SURGERY

## 2020-08-28 PROCEDURE — 87040 BLOOD CULTURE FOR BACTERIA: CPT | Performed by: INTERNAL MEDICINE

## 2020-08-28 PROCEDURE — 85025 COMPLETE CBC W/AUTO DIFF WBC: CPT | Performed by: INTERNAL MEDICINE

## 2020-08-28 RX ORDER — SODIUM CHLORIDE 9 MG/ML
75 INJECTION, SOLUTION INTRAVENOUS CONTINUOUS
Status: DISCONTINUED | OUTPATIENT
Start: 2020-08-28 | End: 2020-08-28

## 2020-08-28 RX ORDER — FENTANYL CITRATE/PF 50 MCG/ML
25 SYRINGE (ML) INJECTION
Status: DISCONTINUED | OUTPATIENT
Start: 2020-08-28 | End: 2020-08-28 | Stop reason: HOSPADM

## 2020-08-28 RX ORDER — HEPARIN SODIUM 5000 [USP'U]/ML
5000 INJECTION, SOLUTION INTRAVENOUS; SUBCUTANEOUS EVERY 8 HOURS SCHEDULED
Status: DISCONTINUED | OUTPATIENT
Start: 2020-08-28 | End: 2020-08-30

## 2020-08-28 RX ORDER — SODIUM CHLORIDE, SODIUM GLUCONATE, SODIUM ACETATE, POTASSIUM CHLORIDE, MAGNESIUM CHLORIDE, SODIUM PHOSPHATE, DIBASIC, AND POTASSIUM PHOSPHATE .53; .5; .37; .037; .03; .012; .00082 G/100ML; G/100ML; G/100ML; G/100ML; G/100ML; G/100ML; G/100ML
500 INJECTION, SOLUTION INTRAVENOUS ONCE
Status: COMPLETED | OUTPATIENT
Start: 2020-08-28 | End: 2020-08-28

## 2020-08-28 RX ORDER — ALBUTEROL SULFATE 2.5 MG/3ML
2.5 SOLUTION RESPIRATORY (INHALATION) ONCE AS NEEDED
Status: DISCONTINUED | OUTPATIENT
Start: 2020-08-28 | End: 2020-08-28 | Stop reason: HOSPADM

## 2020-08-28 RX ORDER — FUROSEMIDE 10 MG/ML
40 INJECTION INTRAMUSCULAR; INTRAVENOUS ONCE
Status: COMPLETED | OUTPATIENT
Start: 2020-08-28 | End: 2020-08-28

## 2020-08-28 RX ORDER — INSULIN GLARGINE 100 [IU]/ML
10 INJECTION, SOLUTION SUBCUTANEOUS
Status: DISCONTINUED | OUTPATIENT
Start: 2020-08-28 | End: 2020-08-30

## 2020-08-28 RX ORDER — ONDANSETRON 2 MG/ML
4 INJECTION INTRAMUSCULAR; INTRAVENOUS ONCE AS NEEDED
Status: DISCONTINUED | OUTPATIENT
Start: 2020-08-28 | End: 2020-08-28 | Stop reason: HOSPADM

## 2020-08-28 RX ORDER — MAGNESIUM HYDROXIDE 1200 MG/15ML
LIQUID ORAL AS NEEDED
Status: DISCONTINUED | OUTPATIENT
Start: 2020-08-28 | End: 2020-08-28 | Stop reason: HOSPADM

## 2020-08-28 RX ORDER — ONDANSETRON 2 MG/ML
INJECTION INTRAMUSCULAR; INTRAVENOUS AS NEEDED
Status: DISCONTINUED | OUTPATIENT
Start: 2020-08-28 | End: 2020-08-28

## 2020-08-28 RX ORDER — EPHEDRINE SULFATE 50 MG/ML
INJECTION INTRAVENOUS AS NEEDED
Status: DISCONTINUED | OUTPATIENT
Start: 2020-08-28 | End: 2020-08-28

## 2020-08-28 RX ORDER — ALBUMIN, HUMAN INJ 5% 5 %
12.5 SOLUTION INTRAVENOUS ONCE AS NEEDED
Status: DISCONTINUED | OUTPATIENT
Start: 2020-08-28 | End: 2020-08-28 | Stop reason: HOSPADM

## 2020-08-28 RX ORDER — LIDOCAINE HYDROCHLORIDE 10 MG/ML
INJECTION, SOLUTION EPIDURAL; INFILTRATION; INTRACAUDAL; PERINEURAL AS NEEDED
Status: DISCONTINUED | OUTPATIENT
Start: 2020-08-28 | End: 2020-08-28

## 2020-08-28 RX ORDER — SODIUM CHLORIDE, SODIUM GLUCONATE, SODIUM ACETATE, POTASSIUM CHLORIDE, MAGNESIUM CHLORIDE, SODIUM PHOSPHATE, DIBASIC, AND POTASSIUM PHOSPHATE .53; .5; .37; .037; .03; .012; .00082 G/100ML; G/100ML; G/100ML; G/100ML; G/100ML; G/100ML; G/100ML
INJECTION, SOLUTION INTRAVENOUS AS NEEDED
Status: DISCONTINUED | OUTPATIENT
Start: 2020-08-28 | End: 2020-08-28

## 2020-08-28 RX ORDER — INSULIN GLARGINE 100 [IU]/ML
10 INJECTION, SOLUTION SUBCUTANEOUS
Status: DISCONTINUED | OUTPATIENT
Start: 2020-08-28 | End: 2020-08-28

## 2020-08-28 RX ORDER — ROCURONIUM BROMIDE 10 MG/ML
INJECTION, SOLUTION INTRAVENOUS AS NEEDED
Status: DISCONTINUED | OUTPATIENT
Start: 2020-08-28 | End: 2020-08-28

## 2020-08-28 RX ORDER — FENTANYL CITRATE 50 UG/ML
INJECTION, SOLUTION INTRAMUSCULAR; INTRAVENOUS AS NEEDED
Status: DISCONTINUED | OUTPATIENT
Start: 2020-08-28 | End: 2020-08-28

## 2020-08-28 RX ORDER — SODIUM CHLORIDE 9 MG/ML
INJECTION, SOLUTION INTRAVENOUS CONTINUOUS PRN
Status: DISCONTINUED | OUTPATIENT
Start: 2020-08-28 | End: 2020-08-28

## 2020-08-28 RX ORDER — DEXAMETHASONE SODIUM PHOSPHATE 10 MG/ML
INJECTION, SOLUTION INTRAMUSCULAR; INTRAVENOUS AS NEEDED
Status: DISCONTINUED | OUTPATIENT
Start: 2020-08-28 | End: 2020-08-28

## 2020-08-28 RX ORDER — PROPOFOL 10 MG/ML
INJECTION, EMULSION INTRAVENOUS AS NEEDED
Status: DISCONTINUED | OUTPATIENT
Start: 2020-08-28 | End: 2020-08-28

## 2020-08-28 RX ADMIN — INSULIN GLARGINE 10 UNITS: 100 INJECTION, SOLUTION SUBCUTANEOUS at 20:43

## 2020-08-28 RX ADMIN — SODIUM CHLORIDE 75 ML/HR: 0.9 INJECTION, SOLUTION INTRAVENOUS at 12:02

## 2020-08-28 RX ADMIN — LIDOCAINE HYDROCHLORIDE 50 MG: 10 INJECTION, SOLUTION EPIDURAL; INFILTRATION; INTRACAUDAL; PERINEURAL at 09:03

## 2020-08-28 RX ADMIN — SODIUM CHLORIDE, SODIUM GLUCONATE, SODIUM ACETATE, POTASSIUM CHLORIDE, MAGNESIUM CHLORIDE, SODIUM PHOSPHATE, DIBASIC, AND POTASSIUM PHOSPHATE 75 ML/HR: .53; .5; .37; .037; .03; .012; .00082 INJECTION, SOLUTION INTRAVENOUS at 10:42

## 2020-08-28 RX ADMIN — INSULIN LISPRO 4 UNITS: 100 INJECTION, SOLUTION INTRAVENOUS; SUBCUTANEOUS at 13:25

## 2020-08-28 RX ADMIN — DEXAMETHASONE SODIUM PHOSPHATE 5 MG: 10 INJECTION, SOLUTION INTRAMUSCULAR; INTRAVENOUS at 09:36

## 2020-08-28 RX ADMIN — FENTANYL CITRATE 100 MCG: 50 INJECTION, SOLUTION INTRAMUSCULAR; INTRAVENOUS at 09:03

## 2020-08-28 RX ADMIN — PROPOFOL 100 MG: 10 INJECTION, EMULSION INTRAVENOUS at 09:03

## 2020-08-28 RX ADMIN — INSULIN LISPRO 4 UNITS: 100 INJECTION, SOLUTION INTRAVENOUS; SUBCUTANEOUS at 05:43

## 2020-08-28 RX ADMIN — SODIUM CHLORIDE, SODIUM GLUCONATE, SODIUM ACETATE, POTASSIUM CHLORIDE, MAGNESIUM CHLORIDE, SODIUM PHOSPHATE, DIBASIC, AND POTASSIUM PHOSPHATE 500 ML: .53; .5; .37; .037; .03; .012; .00082 INJECTION, SOLUTION INTRAVENOUS at 03:51

## 2020-08-28 RX ADMIN — HEPARIN SODIUM 5000 UNITS: 5000 INJECTION INTRAVENOUS; SUBCUTANEOUS at 21:37

## 2020-08-28 RX ADMIN — DOCUSATE SODIUM 100 MG: 100 CAPSULE, LIQUID FILLED ORAL at 17:40

## 2020-08-28 RX ADMIN — ATORVASTATIN CALCIUM 40 MG: 40 TABLET, FILM COATED ORAL at 17:39

## 2020-08-28 RX ADMIN — GABAPENTIN 100 MG: 100 CAPSULE ORAL at 20:43

## 2020-08-28 RX ADMIN — FUROSEMIDE 40 MG: 10 INJECTION, SOLUTION INTRAMUSCULAR; INTRAVENOUS at 14:44

## 2020-08-28 RX ADMIN — ALBUMIN (HUMAN) 12.5 G: 12.5 INJECTION, SOLUTION INTRAVENOUS at 10:20

## 2020-08-28 RX ADMIN — SODIUM CHLORIDE: 9 INJECTION, SOLUTION INTRAVENOUS at 09:00

## 2020-08-28 RX ADMIN — EPHEDRINE SULFATE 10 MG: 50 INJECTION, SOLUTION INTRAVENOUS at 09:23

## 2020-08-28 RX ADMIN — GABAPENTIN 100 MG: 100 CAPSULE ORAL at 17:39

## 2020-08-28 RX ADMIN — CEFEPIME HYDROCHLORIDE 1000 MG: 1 INJECTION, POWDER, FOR SOLUTION INTRAMUSCULAR; INTRAVENOUS at 05:43

## 2020-08-28 RX ADMIN — METRONIDAZOLE 500 MG: 500 INJECTION, SOLUTION INTRAVENOUS at 06:35

## 2020-08-28 RX ADMIN — SUGAMMADEX 100 MG: 100 INJECTION, SOLUTION INTRAVENOUS at 09:53

## 2020-08-28 RX ADMIN — PIPERACILLIN AND TAZOBACTAM 2.25 G: 2; .25 INJECTION, POWDER, FOR SOLUTION INTRAVENOUS at 15:47

## 2020-08-28 RX ADMIN — SODIUM CHLORIDE, SODIUM GLUCONATE, SODIUM ACETATE, POTASSIUM CHLORIDE, MAGNESIUM CHLORIDE, SODIUM PHOSPHATE, DIBASIC, AND POTASSIUM PHOSPHATE 400 ML: .53; .5; .37; .037; .03; .012; .00082 INJECTION, SOLUTION INTRAVENOUS at 10:10

## 2020-08-28 RX ADMIN — SODIUM CHLORIDE 8 MCG/MIN: 0.9 INJECTION, SOLUTION INTRAVENOUS at 09:22

## 2020-08-28 RX ADMIN — HEPARIN SODIUM 5000 UNITS: 5000 INJECTION INTRAVENOUS; SUBCUTANEOUS at 14:44

## 2020-08-28 RX ADMIN — ROCURONIUM BROMIDE 40 MG: 10 INJECTION, SOLUTION INTRAVENOUS at 09:03

## 2020-08-28 RX ADMIN — EPHEDRINE SULFATE 5 MG: 50 INJECTION, SOLUTION INTRAVENOUS at 09:13

## 2020-08-28 RX ADMIN — PIPERACILLIN AND TAZOBACTAM 2.25 G: 2; .25 INJECTION, POWDER, FOR SOLUTION INTRAVENOUS at 20:42

## 2020-08-28 RX ADMIN — INSULIN LISPRO 12 UNITS: 100 INJECTION, SOLUTION INTRAVENOUS; SUBCUTANEOUS at 17:44

## 2020-08-28 RX ADMIN — ONDANSETRON 4 MG: 2 INJECTION INTRAMUSCULAR; INTRAVENOUS at 09:36

## 2020-08-28 NOTE — ASSESSMENT & PLAN NOTE
· S/p cardioversion prior to admission  · No additional episodes noted  · If has additional episodes will require anticoagulation

## 2020-08-28 NOTE — ASSESSMENT & PLAN NOTE
· To OR today with general surgery for debridement/washout and possible vac placement  · Discuss need for diverting colostomy to allow for wound healing   · Concern for possible osteomyelitis - will need long term antibiotic therapy   · ID following, appreciate recommendations

## 2020-08-28 NOTE — OCCUPATIONAL THERAPY NOTE
Occupational Therapy Cancellation Note    Pt chart reviewed - Per EMR, Pt currently off of unit @ OR for "BUTTOCKS DEBRIDEMENT WOUND AND DRESSING CHANGE ([address]) & BUTTOCKS APPLICATION VAC DRESSING"  Will continue to follow for OT eval post-procedure with updated WBS and activity orders as medically available/ able      Anjana Washington, OTS

## 2020-08-28 NOTE — ASSESSMENT & PLAN NOTE
Lab Results   Component Value Date    HGBA1C 9 9 (H) 08/26/2020       Recent Labs     08/27/20  1149 08/27/20  1730 08/27/20  2116 08/27/20  2334   POCGLU 208* 207* 251* 247*       Blood Sugar Average: Last 72 hrs:  (P) 102 [id number]     · Increase SSI to algorithm 5 with q6h accuchecks  · May require insulin infusion if remains hyperglycemic

## 2020-08-28 NOTE — ANESTHESIA POSTPROCEDURE EVALUATION
Post-Op Assessment Note    CV Status:  Stable  Pain Score: 0    Pain management: adequate     Mental Status:  Awake and somnolent   Hydration Status:  Stable   PONV Controlled:  None   Airway Patency:  Patent      Post Op Vitals Reviewed: Yes      Staff: CRNA   Comments: Patient in PACU, answering questions appropriately  No c/o pain or nausea  Airway patent, VSS  No complications documented      BP (!) 113/43 (08/28/20 1007)    Temp (!) 97 °F (36 1 °C) (08/28/20 1007)    Pulse 98 (08/28/20 1007)   Resp   13   SpO2   97% on FM

## 2020-08-28 NOTE — ASSESSMENT & PLAN NOTE
· Admission Cr 2 59  · Improved with IVFs  · Continue isolyte at 75 ml/hr while NPO  · Strict I/Os   · Trend renal function   · Maintain trujillo

## 2020-08-28 NOTE — PROGRESS NOTES
Post- OP Note - General Surgery   Lizeth Benavides 67 y o  female MRN: [id number]  Unit/Bed#: [facility] 515-01 Encounter: [id number]    Assessment:  67 F w/ CVA w/ Stage IV sacral ulcer  8/26 excisional debridement of sacral pressure wound and washout   8/28 Debridement tand washout of sacral decubitus ulcer    VSS, Afebrile  UOP: 0 3 cc/kg/hr    Plan:  VAC not placed- Will do daily dressing changes w/ kerlix   Cefepime/Flagyl  Primary per critical care team    Subjective/Objective     Subjective:   Patient alert and oriented  No complaints of pain or nausea  No chest pains or shortness of breath  Dressing has not needed to be changed since OR  Objective:    Blood pressure 98/57, pulse 92, temperature (!) 97 3 °F (36 3 °C), temperature source Oral, resp  rate (!) 10, height 5' 6" (1 676 m), weight 79 2 kg (174 lb 9 7 oz), SpO2 100 %  ,Body mass index is 28 18 kg/m²  Intake/Output Summary (Last 24 hours) at 8/28/2020 1237  Last data filed at 8/28/2020 1101  Gross per 24 hour   Intake 2970 ml   Output 580 ml   Net 2390 ml       Invasive Devices     Peripheral Intravenous Line            Peripheral IV 08/26/20 Right Wrist 2 days          Arterial Line            Arterial Line 08/26/20 Right Radial 1 day          Drain            Urethral Catheter Temperature probe 16 Fr  2 days                Physical Exam:   Gen:  NAD  HEENT: mucous membranes moist  CV: well perfused  Lungs: Normal respiratory effort on 2L  Abd: soft, nt/nd, incisions  Buttocks: Kerlix with ABD over sacral ulcer  Clean, dry, intact  No drainage noted  Skin: warm/ dry  Extremities: no peripheral edema, no cyanosis  Neuro:  AxO x3        Results from last 7 days   Lab Units 08/28/20  0508 08/27/20  0454 08/26/20  0429   WBC Thousand/uL 14 17* 15 00* 13 50*   HEMOGLOBIN g/dL 7 6* 8 1* 7 9*   HEMATOCRIT % 24 4* 25 0* 24 6*   PLATELETS Thousands/uL 208 226 225     Results from last 7 days   Lab Units 08/28/20  0508 08/27/20  0454 08/26/20  0429 POTASSIUM mmol/L 4 3 4 6 3 4*   CHLORIDE mmol/L 108 107 106   CO2 mmol/L 22 20* 21   BUN mg/dL 37* 35* 34*   CREATININE mg/dL 1 96* 1 80* 2 32*   CALCIUM mg/dL 7 8* 7 6* 7 4*     Results from last 7 days   Lab Units 08/26/20  0431 08/25/20  1315   INR  1 38* 1 23*   PTT seconds 34 33

## 2020-08-28 NOTE — ASSESSMENT & PLAN NOTE
Lab Results   Component Value Date    HGBA1C 9 9 (H) 08/26/2020       Recent Labs     08/27/20  2334 08/28/20  0508 08/28/20  1022 08/28/20  1136   POCGLU 247* 160* 170* 199*       Blood Sugar Average: Last 72 hrs:  (P) 199 [id number]     · Increase SSI to algorithm 5 with q6h accuchecks  · May require insulin infusion if remains hyperglycemic

## 2020-08-28 NOTE — PHYSICAL THERAPY NOTE
Physical Therapy Cancellation Note        Pt chart reviewed  Pt currently off floor at OR for "BUTTOCKS DEBRIDEMENT WOUND AND DRESSING CHANGE [facility]) & BUTTOCKS APPLICATION VAC DRESSING"  PT to continue to follow and see pt post procedure as appropriate and able      Orie Gowers, PT, DPT

## 2020-08-28 NOTE — PROGRESS NOTES
Progress Note - Alok Alegre 1945, 67 y o  female MRN: [id number]    Unit/Bed#: [facility] 515-01 Encounter: [id number]    Primary Care Provider: TJ Aguilera   Date and time admitted to hospital: 8/25/2020  4:06 PM        * Septic shock due to gram-positive bacteria (HCC)  Assessment & Plan  · Goal MAP > 65  · Remains off vasopressors    Decubitus ulcer of sacral region, stage 4 (HCC)  Assessment & Plan  · To OR today with general surgery for debridement/washout and possible vac placement  · Discuss need for diverting colostomy to allow for wound healing   · Concern for possible osteomyelitis - will need long term antibiotic therapy     CVA (cerebral vascular accident) [facility])  Assessment & Plan  · S/p tPA  · Neurology following  · Symptoms seem more related to cerebral hypoperfusion in the setting of hypotension given no acute findings on MRI  · Continue ASA/atorvastatin given ICA stenosis  · PT/OT    RORY (acute kidney injury) ([facility] [address] )  Assessment & Plan  · Admission Cr 2 59  · Improved with IVFs  · Continue isolyte at 75 ml/hr while NPO  · Strict I/Os   · Trend renal function   · Maintain trujillo    Bacteremia due to Enterococcus  Assessment & Plan  · Currently on vancomycin/cefepime/flagyl  · ID following and managing antibiotics  · Awaiting sensitivities, hopeful de-escalation of antibiotics today  · Repeat blood cultures today    Paroxysmal atrial fibrillation (HCC)  Assessment & Plan  · S/p cardioversion prior to admission  · No additional episodes noted  · If has additional episodes will require anticoagulation     Type 2 diabetes mellitus with hyperglycemia [facility])  Assessment & Plan  Lab Results   Component Value Date    HGBA1C 9 9 (H) 08/26/2020       Recent Labs     08/27/20  1149 08/27/20  1730 08/27/20  2116 08/27/20  2334   POCGLU 208* 207* 251* 247*       Blood Sugar Average: Last 72 hrs:  (P) 283 [id number]     · Increase SSI to algorithm 5 with q6h accuchecks  · May require insulin infusion if remains hyperglycemic     Anemia  Assessment & Plan  · No active signs of bleeding  · Continue to trend      ----------------------------------------------------------------------------------------  HPI/24hr events: Continues to have low urine output and low blood pressure  Received isolyte 500 ml without significant improvement  Disposition: Return to SD level 1 post-op, consider downgrading further if blood pressure improved  Code Status: Level 1 - Full Code  ---------------------------------------------------------------------------------------  SUBJECTIVE  "I;m good"    Review of Systems   Respiratory: Negative for cough and shortness of breath  Cardiovascular: Negative for chest pain and palpitations  Gastrointestinal: Negative for abdominal pain and nausea  Neurological: Negative for light-headedness and headaches  Review of systems was reviewed and negative unless stated above in HPI/24-hour events   ---------------------------------------------------------------------------------------  OBJECTIVE    Vitals   Vitals:    20 2200 20 0100 20 0319   BP:   (!) 92/46    BP Location:       Pulse: 102 96 100 94   Resp: (!) 29 22 12 17   Temp: 98 3 °F (36 8 °C)   99 °F (37 2 °C)   TempSrc: Oral      SpO2: 97% 94% 95% 95%   Weight:       Height:         Temp (24hrs), Av 9 °F (36 6 °C), Min:97 2 °F (36 2 °C), Max:99 °F (37 2 °C)  Current: Temperature: 99 °F (37 2 °C)  Arterial Line BP: 106/42  Arterial Line MAP (mmHg): 64 mmHg    Respiratory:  SpO2: SpO2: 95 %, SpO2 Device: Room air    Physical Exam  Constitutional:       General: She is not in acute distress  Cardiovascular:      Rate and Rhythm: Normal rate and regular rhythm  Pulses:           Radial pulses are 2+ on the right side and 2+ on the left side  Dorsalis pedis pulses are 2+ on the right side and 2+ on the left side  Heart sounds: No murmur  No friction rub     Pulmonary: Effort: No respiratory distress  Breath sounds: No wheezing, rhonchi or rales  Comments: Decreased breath sounds at bases bilaterally  Abdominal:      General: Bowel sounds are decreased  There is no distension  Palpations: Abdomen is soft  Tenderness: There is no abdominal tenderness  Genitourinary:     Comments: + trujillo  Musculoskeletal:      Right lower le+ Pitting Edema present  Left lower le+ Pitting Edema present  Skin:     General: Skin is warm and dry  Capillary Refill: Capillary refill takes less than 2 seconds  Neurological:      Mental Status: She is alert        Comments: Strength 4/5 RUE  Strength 5/5 left side and RLE  Oriented to person, place, situation but confused to time          Laboratory and Diagnostics:  Results from last 7 days   Lab Units 20  0454 20  0429 20  1315   WBC Thousand/uL 15 00* 13 50* 16 27*   HEMOGLOBIN g/dL 8 1* 7 9* 10 3*   HEMATOCRIT % 25 0* 24 6* 32 4*   PLATELETS Thousands/uL 226 225 309   BANDS PCT %  --  1  --    MONO PCT %  --  0*  --      Results from last 7 days   Lab Units 20  0454 20  0429 20  1315   SODIUM mmol/L 135* 138 129*   POTASSIUM mmol/L 4 6 3 4* 4 1   CHLORIDE mmol/L 107 106 95*   CO2 mmol/L 20* 21 23   ANION GAP mmol/L 8 11 11   BUN mg/dL 35* 34* 36*   CREATININE mg/dL 1 80* 2 32* 2 59*   CALCIUM mg/dL 7 6* 7 4* 8 2*   GLUCOSE RANDOM mg/dL 193* 119 321*   ALT U/L  --  11*  --    AST U/L  --  27  --    ALK PHOS U/L  --  232*  --    ALBUMIN g/dL  --  2 1*  --    TOTAL BILIRUBIN mg/dL  --  0 82  --      Results from last 7 days   Lab Units 20  0454 20  0429   MAGNESIUM mg/dL 2 3 1 2*   PHOSPHORUS mg/dL 4 0 2 8        Micro  Results from last 7 days   Lab Units 20  1353 20  1225 20  0554 20  0536   BLOOD CULTURE   --   --   --  Enterococcus faecalis*   GRAM STAIN RESULT  No polys seen*  2+ Gram positive cocci in pairs* 1+ Polys*  3+ Gram positive cocci in pairs*  2+ Gram positive rods*  1+ Gram negative rods*  --  Gram positive cocci in pairs and chains*  Gram positive cocci in pairs and chains*   URINE CULTURE   --   --  50,000-59,000 cfu/ml Escherichia coli*  --    WOUND CULTURE  3+ Growth of Gram Negative Jethro*  3+ Growth of Gram Negative Jethro* 2+ Growth of Gram Negative Jethro*  --   --        Imaging: I have personally reviewed pertinent reports  and I have personally reviewed pertinent films in PACS    Intake and Output  I/O       08/26 0701 - 08/27 0700 08/27 0701 - 08/28 0700    P  O   120    I V  (mL/kg) 3332 3 (43 8) 1597 5 (21)    IV Piggyback 350 950    Total Intake(mL/kg) 3682 3 (48 4) 2667 5 (35 1)    Urine (mL/kg/hr) 430 (0 2) 500 (0 3)    Total Output 430 500    Net +3252 3 +2167 5                Height and Weights   Height: 5' 6" (167 6 cm)  IBW: 59 3 kg  Body mass index is 27 08 kg/m²  Weight (last 2 days)     Date/Time   Weight    08/27/20 0600   76 1 (167 77)    08/26/20 0626   66 5 (146 61)                Nutrition       Diet Orders   (From admission, onward)             Start     Ordered    08/28/20 0001  Diet NPO  Diet effective midnight     Question Answer Comment   Diet Type NPO    RD to adjust diet per protocol?  Yes        08/27/20 1451                  Active Medications  Scheduled Meds:  Current Facility-Administered Medications   Medication Dose Route Frequency Provider Last Rate    aspirin  81 mg Oral Daily Ministerio Villagran MD      atorvastatin  40 mg Oral Daily With David Leyva MD      cefepime  1,000 mg Intravenous Q12H Brigitte Martin MD Stopped (08/27/20 1801)    docusate sodium  100 mg Oral BID Ministerio Villagran MD      gabapentin  100 mg Oral TID Brigitte Martin MD      hydrALAZINE  10 mg Intravenous Q4H PRN Brigitte Martin MD      insulin lispro  4-20 Units Subcutaneous Q6H [facility] Alex Montiel PA-C      metroNIDAZOLE  500 mg Intravenous Q8H [facility] Anais Conti [facility] Stopped (08/28/20 0006)    multi-electrolyte  75 mL/hr Intravenous Continuous Annamarie Rebeca, DO 75 mL/hr (08/27/20 1042)    norepinephrine  1-30 mcg/min Intravenous Titrated Lashae Martin MD Stopped (08/26/20 2000)    nystatin   Topical BID Ricardo Mosley MD      vancomycin  15 mg/kg Intravenous Daily PRN Lashae Martin MD       Continuous Infusions:  multi-electrolyte, 75 mL/hr, Last Rate: 75 mL/hr (08/27/20 1042)  norepinephrine, 1-30 mcg/min, Last Rate: Stopped (08/26/20 2000)      PRN Meds:   hydrALAZINE, 10 mg, Q4H PRN  vancomycin, 15 mg/kg, Daily PRN        Invasive Devices Review  Invasive Devices     Peripheral Intravenous Line            Peripheral IV 08/25/20 Left Antecubital 2 days    Peripheral IV 08/26/20 Right Wrist 1 day          Arterial Line            Arterial Line 08/26/20 Right Radial 1 day          Drain            Urethral Catheter Temperature probe 16 Fr  1 day                Rationale for remaining devices: Jimenez catheter given stage IV sacral wound  D/C arterial line  ---------------------------------------------------------------------------------------  Advance Directive and Living Will:      Power of :    POLST:    ---------------------------------------------------------------------------------------  Care Time Delivered:   No Critical Care time spent       Analy Mcneil PA-C      Portions of the record may have been created with voice recognition software  Occasional wrong word or "sound a like" substitutions may have occurred due to the inherent limitations of voice recognition software    Read the chart carefully and recognize, using context, where substitutions have occurred

## 2020-08-28 NOTE — ANESTHESIA PREPROCEDURE EVALUATION
Procedure:  BUTTOCKS DEBRIDEMENT WOUND AND DRESSING CHANGE ([address]) (N/A Buttocks)  BUTTOCKS APPLICATION VAC DRESSING (N/A Buttocks)    Relevant Problems   CARDIO  Septioc shock secondary to gram positive bacteremia likely due to Stage IV sacral decub   (+) Hyperlipidemia   (+) Hypertension   (+) Paroxysmal atrial fibrillation (HCC) (Developed CVA symptoms after [facility] was in AFib with RVR an hypotensive)      /RENAL   (+) RORY (acute kidney injury) ([address] )   (+) Acute renal failure superimposed on stage 3 chronic kidney disease (HCC) (Cr= 1 96)      HEMATOLOGY   (+) Acute blood loss as cause of postoperative anemia   (+) Anemia      NEURO/PSYCH   (+) CVA (cerebral vascular accident) ([address] ) (PCA stroke, right side weakness at baseline)   (+) History of vitamin D deficiency      PULMONARY   (+) Acute respiratory failure with hypoxia ([facility] [address] )      TTE SUMMARY 8/2020     LEFT VENTRICLE:  Systolic function was normal  Ejection fraction was estimated to be 65 %  There were no regional wall motion abnormalities  Wall thickness was mildly increased  The changes were consistent with concentric remodeling (increased wall thickness with normal wall mass)      RIGHT VENTRICLE:  The size was normal   Systolic function was normal      LEFT ATRIUM:  The atrium was mildly to moderately dilated      MITRAL VALVE:  There was mild regurgitation      TRICUSPID VALVE:  There was mild regurgitation  Estimated peak PA pressure was 45 mmHg  The findings suggest mild pulmonary hypertension  Physical Exam    Airway    Mallampati score: II  TM Distance: >3 FB  Neck ROM: full     Dental   upper dentures and lower dentures,     Cardiovascular  Rhythm: irregular, Rate: normal,     Pulmonary  Breath sounds clear to auscultation,     Other Findings        Anesthesia Plan  ASA Score- 4     Anesthesia Type- general with ASA Monitors  Additional Monitors: arterial line      Airway Plan: ETT     Comment: Unable to get in touch woth patient's son but left a message  I called the patient's daughter, however she was not home  Will keep MAP > 70mmHg  Plan Factors-Exercise tolerance (METS): <4 METS  Chart reviewed  EKG reviewed  Imaging results reviewed  Existing labs reviewed  Patient summary reviewed  Induction- intravenous  Postoperative Plan- Plan for postoperative opioid use  Planned trial extubation    Informed Consent-   I personally reviewed this patient with the CRNA  Discussed and agreed on the Anesthesia Plan with the CRNA  Dara Soulier

## 2020-08-28 NOTE — PROGRESS NOTES
Urine output noted to be low at 20ml since 6 am  PT was in the OR from 8 until 11am  CC medicine aware  Will be up to assess patient

## 2020-08-28 NOTE — ASSESSMENT & PLAN NOTE
· Currently on vancomycin/cefepime/flagyl  · ID following and managing antibiotics  · Awaiting sensitivities, hopeful de-escalation of antibiotics today  · Repeat blood cultures today

## 2020-08-28 NOTE — PROGRESS NOTES
Progress Note - Infectious Disease   Neftaly Mohan 67 y o  female MRN: [id number]  Unit/Bed#: [facility] 515-01 Encounter: [id number]      Impression/Plan:  1  Septic shock with gram positive bacteremia likely 2/2 infected stage 4 sacral pressure ulcer with suspected osteomyelitis and concern for necrotizing fasciitis  · Presented with stage 4 sacral pressure ulcer with purulent appearance  · Micro results:  ? COVID19 PCR negative  ? BCx:   ? Set 1: Enterococcus faecalis  ? Set 2: Alpha Hemolytic Streptococcus, coagulase negative Staphylococcus  ? UCx: preliminary positive for E  Coli and lactobacillus species  ? WCx: polymicrobial with E  Coli, Proteus mirabilis, streptococcus, and enterococcus species  ? Pending: anaerobic BCx and WCx, MRSA screen  · Imaging:  ? CT abdomen/pelvis 8/26: Soft tissue ulcer overlying the inferior sacrum and coccyx with associated gas production about the right sacrospinous ligament extending into the soft tissues in the perirectal region in keeping with necrotizing fasciitis  · Shock has resolved --> off pressers, on Isolyte 75 ml/h  · On empiric AB coverage with cefepime 1 gm IV Q12H and vancomycin IV  · WBC count trend: 16 - 13 5 - 15 - 14 (today)  No fever for the past 48 hours  · Hx of DM with HbA1c of 9 9 on 8/26/20  · S/p surgical debridement on 8/26/20  Necrotic tissue was debrided and tissue sent for culture  Wound was described as malodorous but without purulent discharge  Wound pictures before and after debridement were reviewed  Plan:  · Can switch to Zosyn 3 375 gm IV Q6H; DC vancomycin, flagyl and cefepime  · Shock has resolved and BP has been stable off pressors  Scheduled per primary team to downgrade out of ICU today      2  Stage 4 sacral pressure ulcer with suspected osteomyelitis  · Present on admission  Most likely source for shock and gram + bacteremia  · S/p debridement on 8/26  Plan:  · See management above  · Local wound management per surgery  Underwent 2nd OR today  for further debridement  Wound VAC was not placed  · Hb down trending and is scheduled to receive pRBC transfusion after OR per primary team       3  CVA  · Developed right facial droop, right sided weakness, aphasia and dysarthria after cardioversion en route to the hospital for hypotension 2/2 new onset Afib with RVR  · Initial NIHSS on presentation to ED was 6 which improved to 3 after tPA  · Had mental status change overnight on 20 most likely in setting of hypotension with cerebrohypoperfusion in setting of septic shock  · Imaging:  ? MRI brain demonstrated no acute changes   Did demonstrate mild chronic small vessel cerebrovascular ischemic disease and age-related atrophy  ? CTA head/neck demonstrated left PCA stenosis and 65-70% stenosis of the left ICA bulb  ? Follow-up CT brain after acute mental status change demonstrated no acute changes and specifically no hemorrhage  Plan:  · Management per primary team  Neurology on board       4  New onset atrial fibrillation  · S/p cardioversion for hypotension  · Currently in sinus rhythm  Plan:  · Management per primary team      5  RORY - improving  · BL 1 2 - 1 7  · Presented with creatinine of 2 59  Improved with fluid resuscitation  Trend; 2 59 - 1 80 - 1 96    · Most likely etiology is prerenal in setting of hypotension and dehydration 2/2 poor oral intake  Plan:  · Management as per primary team      Antibiotics:  · Cefepime 1 gm IV Q12H  · Vancomycin IV, dosing per pharmacy  · Metronidazole 500 mg IV Q8H    Subjective:  Patient has no fever, chills, sweats; no nausea, vomiting, diarrhea; no cough, shortness of breath; no pain  No new symptoms      Objective:  Vitals:  Temp:  [96 1 °F (35 6 °C)-99 °F (37 2 °C)] 96 1 °F (35 6 °C)  HR:  [] 95  Resp:  [10-30] 12  BP: ()/(43-57) 118/45  SpO2:  [93 %-100 %] 100 %  Temp (24hrs), Av 9 °F (36 6 °C), Min:96 1 °F (35 6 °C), Max:99 °F (37 2 °C)  Current: Temperature: (!) 96 1 °F (35 6 °C)    Physical Exam:   General Appearance:  Alert, interactive, nontoxic, no acute distress  Throat: Oropharynx moist without lesions  Lungs:   Clear to auscultation bilaterally; no wheezes, rhonchi or rales; respirations unlabored   Heart:  RRR; no murmur, rub or gallop   Abdomen:   Soft, non-tender, non-distended, positive bowel sounds  Extremities: No clubbing, cyanosis or edema   Skin: No new rashes or lesions  Sacral ulcer with clean dry dressing in place  Mild erythema and scab over right elbow without signs of infection  Intertrigo under both breasts and in both groins unchanged or mildly improved  Labs, Imaging, & Other studies:   All pertinent labs and imaging studies were personally reviewed  Results from last 7 days   Lab Units 08/28/20  0508 08/27/20  0454 08/26/20  0429   WBC Thousand/uL 14 17* 15 00* 13 50*   HEMOGLOBIN g/dL 7 6* 8 1* 7 9*   PLATELETS Thousands/uL 208 226 225     Results from last 7 days   Lab Units 08/28/20  0508 08/27/20  0454 08/26/20  0429   SODIUM mmol/L 138 135* 138   POTASSIUM mmol/L 4 3 4 6 3 4*   CHLORIDE mmol/L 108 107 106   CO2 mmol/L 22 20* 21   BUN mg/dL 37* 35* 34*   CREATININE mg/dL 1 96* 1 80* 2 32*   EGFR ml/min/1 73sq m 24 27 20   CALCIUM mg/dL 7 8* 7 6* 7 4*   AST U/L  --   --  27   ALT U/L  --   --  11*   ALK PHOS U/L  --   --  232*     Results from last 7 days   Lab Units 08/28/20  0542 08/28/20  0536 08/26/20  1353 08/26/20  1225 08/26/20  0554 08/26/20  0536   BLOOD CULTURE  Received in Microbiology Lab  Culture in Progress  Received in Microbiology Lab  Culture in Progress    --   --   --  Enterococcus faecalis*  Alpha Hemolytic Streptococcus NOT Enterococcus*  Staphylococcus coagulase negative*   GRAM STAIN RESULT   --   --  No polys seen*  2+ Gram positive cocci in pairs* 1+ Polys*  3+ Gram positive cocci in pairs*  2+ Gram positive rods*  1+ Gram negative rods*  --  Gram positive cocci in pairs and chains* Gram positive cocci in pairs and chains*   URINE CULTURE   --   --   --   --  50,000-59,000 cfu/ml Escherichia coli*  --    WOUND CULTURE   --   --  3+ Growth of Escherichia coli*  3+ Growth of Proteus mirabilis*  3+ Growth of Streptococcus species*  3+ Growth of  2+ Growth of Escherichia coli*  2+ Growth of Proteus mirabilis*  3+ Growth of Enterococcus species*  3+ Growth of   --   --      Results from last 7 days   Lab Units 08/27/20  0454 08/26/20  0536   PROCALCITONIN ng/ml 6 10* 6 35*

## 2020-08-28 NOTE — PROGRESS NOTES
Transfer Note - ICU/Stepdown Transfer to [facility]/MS tele   Doug Zuniga 67 y o  female MRN: [id number]  [address] [facility]   Unit/Bed#: [facility] 515-81 Encounter: [id number]    Code Status: Level 1 - Full Code    Reason for ICU/Stepdown admission: septic shock, CVA s/p TPA    Active problems:     * Septic shock due to gram-positive bacteria ([place] [address] )  Assessment & Plan  · Goal MAP > 65  · Remains off vasopressors  · Shock resolved    Decubitus ulcer of sacral region, stage 4 ([place] [address] )  Assessment & Plan  · To OR today with general surgery for debridement/washout and possible vac placement  · Discuss need for diverting colostomy to allow for wound healing   · Concern for possible osteomyelitis - will need long term antibiotic therapy   · ID following, appreciate recommendations    CVA (cerebral vascular accident) ([place] [address] )  Assessment & Plan  · S/p tPA  · Neurology following  · Symptoms seem more related to cerebral hypoperfusion in the setting of hypotension given no acute findings on MRI  · Continue ASA/atorvastatin given ICA stenosis  · PT/OT    RORY (acute kidney injury) ([place] [address] )  Assessment & Plan  · Admission Cr 2 59  · Improved with IVFs  · Urine output decreased overnight, patient given 1u PRBC and 40mg IV lasix  · Keep trujillo and monitor response  · Strict I/Os   · Trend renal function     Bacteremia due to Enterococcus  Assessment & Plan  · Currently on vancomycin/cefepime/flagyl  · ID following and managing antibiotics  · Awaiting sensitivities, hopeful de-escalation of antibiotics today  · Repeat blood cultures today    Paroxysmal atrial fibrillation (HCC)  Assessment & Plan  · S/p cardioversion prior to admission  · No additional episodes noted  · If has additional episodes will require anticoagulation     Type 2 diabetes mellitus with hyperglycemia [facility])  Assessment & Plan  Lab Results   Component Value Date    HGBA1C 9 9 (H) 08/26/2020       Recent Labs     08/27/20  7013 08/28/20  9874 08/28/20  1022 08/28/20  1136   POCGLU 247* 160* 170* 199*       Blood Sugar Average: Last 72 hrs:  (P) 199 [id number]     · Increase SSI to algorithm 5 with q6h accuchecks  · May require insulin infusion if remains hyperglycemic     Anemia  Assessment & Plan  · No active signs of bleeding  · Continue to trend        Consultants:   · ID  · General Surgery    History of Present Illness/Summary of clinical course:     HPI per Larry Lima MD " Antonoi Morse is a 67 y o  female history of diabetes, hypertension, hyperlipidemia who initially presented on 08/25 with tachycardia, hypotension, EMS called to patient's residence for generalized weakness and patient found to be in AFib with RVR, rates up to 180, initial blood pressure 66/33, cardioverted in the field for unstable atrial fibrillation  Following cardioversion patient with new onset slurred speech, remained dysarthric and aphasic  Patient otherwise with nonfocal neuro examination when evaluated at [address] [place] [address] ED  Patient stroke alerted, tPA administered at 1:00 p m  Initial NIH score 6 which improved to 3, patient transferred to [facility] for critical care admission however evaluated and initially deemed appropriate for step-down level 2  Patient noted to have large purulent sacral decubitus ulcer  Following return to ED from MRI at 2:55 a m  Patient less responsive, hypotensive to 74/38, bolused with 500 cc normal saline and 500 cc 5% albumin without significant improvement, admitted to critical care service for further management "    Patient admitted to the ICU  Initially required levophed  Blood cultures growing strep constellatus, enterococcus  Wound cultures growing multiple bacteria  ID consulted for antibiotic management  General surgery consulted for surgical debridement of sacral wound  To the OR for on 8/26 and again today for excisional debridement and washout  Hemodynamically stable post operatively   Given 1u PRBC and 40mg IV lasix for low urine output with improvement  Patient awake and alert, denies current complaint  Please refer to today's progress note for further clinical details  Recent or scheduled procedures:   8/26: OR for surgical debridement of sacral decub  8/28: repeat debridement    Outstanding/pending diagnostics: repeat blood cultures       Mobilization Plan: PT/OT    Nutrition Plan: cardiac diet    Discharge Plan: Patient should be ready for discharge to home vs rehab when medically cleared         Spoke with Dr Ryan Escoto regarding transfer @ 1600  Patient accepted to their service      TJ Pak

## 2020-08-28 NOTE — OP NOTE
OPERATIVE REPORT  PATIENT NAME: Son Kessler    :  1945  MRN: [id number]  Pt Location: BE OR ROOM 09    SURGERY DATE: 2020    Surgeon(s) and Role:     * Twin Mccann MD - Primary     * Babak Novoa PA-C - Assisting    Preop Diagnosis:  Decubitus ulcer of sacral region, stage 4 ([address] ) [L89 154]    Post-Op Diagnosis Codes:     * Decubitus ulcer of sacral region, stage 4 ([address] ) [L89 154]    Procedure(s) (LRB):  BUTTOCKS DEBRIDEMENT WOUND AND DRESSING CHANGE ([address] OUT) (N/A)    Specimen(s):  * No specimens in log *    Estimated Blood Loss:   Minimal    Drains:  Urethral Catheter Temperature probe 16 Fr  (Active)   Reasons to continue Urinary Catheter  Accurate I&O assessment in critically ill patients (48 hr  max) 20 0000   Site Assessment Skin intact 20 1007   Collection Container Standard drainage bag 20 1007   Securement Method Securing device (Describe) 20 1007   Output (mL) 45 mL 20 0600   Number of days: 2       Anesthesia Type:   General    Operative Indications:  Decubitus ulcer of sacral region, stage 4 (HCC) [L89 154]  Stage 4 sacral decubitus ulcer    Operative Findings:  Stage 4 decubitus ulcer with exposed Coccyx    Complications:   None    Procedure and Technique:  Patient was identified by armband and verbal communication and brought back to the operating room  He was intubated by general anesthesia  All elements of timeout were completed  Pt was placed in left lateral decubitus position  He had richelle necrosis of the deep tissues of his sacrum  The decubitus ulcer was then excisionally debrided with the bovie and scissors down to muscle and bone(sacrum and coccyx)  Debridement continued in a circumferential manner until only viable bleeding tissue remained  The area was then copiously irrigated with warm normal saline and direct pressure and bovie cautery was used for hemostasis  The final dimensions of the wound were 5 6 x 5 3 x 1 7 cm   The wound was then packed with one Kerlix sterile gauze    I was present for the entire procedure    Patient Disposition:  PACU     SIGNATURE: Megan Carreon MD  DATE: August 28, 2020  TIME: 11:55 AM

## 2020-08-28 NOTE — ASSESSMENT & PLAN NOTE
· Admission Cr 2 59  · Improved with IVFs  · Urine output decreased overnight, patient given 1u PRBC and 40mg IV lasix  · Keep trujillo and monitor response  · Strict I/Os   · Trend renal function

## 2020-08-28 NOTE — ASSESSMENT & PLAN NOTE
· S/p tPA  · Neurology following  · Symptoms seem more related to cerebral hypoperfusion in the setting of hypotension given no acute findings on MRI  · Continue ASA/atorvastatin given ICA stenosis  · PT/OT

## 2020-08-28 NOTE — PROGRESS NOTES
Progress Note - General Surgery   Antonio Morse 67 y o  female MRN: [id number]  Unit/Bed#: OR POOL Encounter: [id number]    Assessment:  67 F w/ CVA s/p excisional debridement of sacral pressure wound and washout (8/26)    Plan:  · NPO  · Repeat debridement/washout w/ likely VAC placement   · Currently on Vanc/Cefepime/Flagyl - narrow pending sensitivities  · Primary per Critical Care team    Subjective/Objective     Subjective: Doing well this AM  Some soft BPs overnight treated with 500cc bolus  Objective:    Blood pressure (!) 86/46, pulse 90, temperature 97 9 °F (36 6 °C), resp  rate 14, height 5' 6" (1 676 m), weight 79 2 kg (174 lb 9 7 oz), SpO2 94 %  ,Body mass index is 28 18 kg/m²  Intake/Output Summary (Last 24 hours) at 8/28/2020 0912  Last data filed at 8/28/2020 0800  Gross per 24 hour   Intake 3317 5 ml   Output 555 ml   Net 2762 5 ml       Invasive Devices     Peripheral Intravenous Line            Peripheral IV 08/25/20 Left Antecubital 2 days    Peripheral IV 08/26/20 Right Wrist 2 days          Arterial Line            Arterial Line 08/26/20 Right Radial 1 day          Drain            Urethral Catheter Temperature probe 16 Fr  2 days                Physical Exam:   GEN: NAD  HEENT: MMM  CV: warm/well perfused  Lung: normal effort  Ab: Soft, NT/ND  Neuro:  A+Ox3, motor and sensation grossly intact      Results from last 7 days   Lab Units 08/28/20  0508 08/27/20  0454 08/26/20  0429   WBC Thousand/uL 14 17* 15 00* 13 50*   HEMOGLOBIN g/dL 7 6* 8 1* 7 9*   HEMATOCRIT % 24 4* 25 0* 24 6*   PLATELETS Thousands/uL 208 226 225     Results from last 7 days   Lab Units 08/28/20  0508 08/27/20  0454 08/26/20  0429   POTASSIUM mmol/L 4 3 4 6 3 4*   CHLORIDE mmol/L 108 107 106   CO2 mmol/L 22 20* 21   BUN mg/dL 37* 35* 34*   CREATININE mg/dL 1 96* 1 80* 2 32*   CALCIUM mg/dL 7 8* 7 6* 7 4*     Results from last 7 days   Lab Units 08/26/20  0431 08/25/20  1315   INR  1 38* 1 23*   PTT seconds 34 33

## 2020-08-28 NOTE — ASSESSMENT & PLAN NOTE
· To OR today with general surgery for debridement/washout and possible vac placement  · Discuss need for diverting colostomy to allow for wound healing   · Concern for possible osteomyelitis - will need long term antibiotic therapy

## 2020-08-28 NOTE — CONSULTS
Vancomycin IV Pharmacy-to-Dose Consultation    Vancomycin has been discontinued  Pharmacy will sign off  Please contact or re-consult with questions      Lashon Tomlin, PharmD, BCIDP

## 2020-08-29 PROBLEM — E87.1 HYPONATREMIA: Status: RESOLVED | Noted: 2020-08-25 | Resolved: 2020-08-29

## 2020-08-29 PROBLEM — R29.90 STROKE-LIKE SYMPTOMS: Status: ACTIVE | Noted: 2020-08-25

## 2020-08-29 LAB
ABO GROUP BLD BPU: NORMAL
ANION GAP SERPL CALCULATED.3IONS-SCNC: 9 MMOL/L (ref 4–13)
BACTERIA BLD CULT: ABNORMAL
BACTERIA BLD CULT: ABNORMAL
BACTERIA WND AEROBE CULT: ABNORMAL
BPU ID: NORMAL
BUN SERPL-MCNC: 47 MG/DL (ref 5–25)
CALCIUM SERPL-MCNC: 8 MG/DL (ref 8.3–10.1)
CHLORIDE SERPL-SCNC: 108 MMOL/L (ref 100–108)
CO2 SERPL-SCNC: 20 MMOL/L (ref 21–32)
CREAT SERPL-MCNC: 2.07 MG/DL (ref 0.6–1.3)
CROSSMATCH: NORMAL
ERYTHROCYTE [DISTWIDTH] IN BLOOD BY AUTOMATED COUNT: 15.3 % (ref 11.6–15.1)
GFR SERPL CREATININE-BSD FRML MDRD: 23 ML/MIN/1.73SQ M
GLUCOSE SERPL-MCNC: 215 MG/DL (ref 65–140)
GLUCOSE SERPL-MCNC: 215 MG/DL (ref 65–140)
GLUCOSE SERPL-MCNC: 222 MG/DL (ref 65–140)
GLUCOSE SERPL-MCNC: 238 MG/DL (ref 65–140)
GLUCOSE SERPL-MCNC: 250 MG/DL (ref 65–140)
GLUCOSE SERPL-MCNC: 274 MG/DL (ref 65–140)
GLUCOSE SERPL-MCNC: 292 MG/DL (ref 65–140)
GRAM STN SPEC: ABNORMAL
HCT VFR BLD AUTO: 26.8 % (ref 34.8–46.1)
HGB BLD-MCNC: 8.5 G/DL (ref 11.5–15.4)
MAGNESIUM SERPL-MCNC: 2.5 MG/DL (ref 1.6–2.6)
MCH RBC QN AUTO: 29 PG (ref 26.8–34.3)
MCHC RBC AUTO-ENTMCNC: 31.7 G/DL (ref 31.4–37.4)
MCV RBC AUTO: 92 FL (ref 82–98)
PLATELET # BLD AUTO: 210 THOUSANDS/UL (ref 149–390)
PMV BLD AUTO: 11.7 FL (ref 8.9–12.7)
POTASSIUM SERPL-SCNC: 4.7 MMOL/L (ref 3.5–5.3)
RBC # BLD AUTO: 2.93 MILLION/UL (ref 3.81–5.12)
SODIUM SERPL-SCNC: 137 MMOL/L (ref 136–145)
UNIT DISPENSE STATUS: NORMAL
UNIT PRODUCT CODE: NORMAL
UNIT RH: NORMAL
WBC # BLD AUTO: 11.56 THOUSAND/UL (ref 4.31–10.16)

## 2020-08-29 PROCEDURE — 82948 REAGENT STRIP/BLOOD GLUCOSE: CPT

## 2020-08-29 PROCEDURE — 83735 ASSAY OF MAGNESIUM: CPT | Performed by: NURSE PRACTITIONER

## 2020-08-29 PROCEDURE — 85027 COMPLETE CBC AUTOMATED: CPT | Performed by: NURSE PRACTITIONER

## 2020-08-29 PROCEDURE — 99232 SBSQ HOSP IP/OBS MODERATE 35: CPT | Performed by: SURGERY

## 2020-08-29 PROCEDURE — 99232 SBSQ HOSP IP/OBS MODERATE 35: CPT | Performed by: GENERAL PRACTICE

## 2020-08-29 PROCEDURE — 80048 BASIC METABOLIC PNL TOTAL CA: CPT | Performed by: NURSE PRACTITIONER

## 2020-08-29 RX ORDER — ACETAMINOPHEN 325 MG/1
650 TABLET ORAL EVERY 6 HOURS PRN
Status: DISCONTINUED | OUTPATIENT
Start: 2020-08-29 | End: 2020-09-10 | Stop reason: HOSPADM

## 2020-08-29 RX ORDER — SENNOSIDES 8.6 MG
1 TABLET ORAL
Status: DISCONTINUED | OUTPATIENT
Start: 2020-08-29 | End: 2020-08-31

## 2020-08-29 RX ADMIN — ATORVASTATIN CALCIUM 40 MG: 40 TABLET, FILM COATED ORAL at 17:02

## 2020-08-29 RX ADMIN — GABAPENTIN 100 MG: 100 CAPSULE ORAL at 08:48

## 2020-08-29 RX ADMIN — PIPERACILLIN AND TAZOBACTAM 2.25 G: 2; .25 INJECTION, POWDER, FOR SOLUTION INTRAVENOUS at 02:58

## 2020-08-29 RX ADMIN — DOCUSATE SODIUM 100 MG: 100 CAPSULE, LIQUID FILLED ORAL at 17:02

## 2020-08-29 RX ADMIN — INSULIN LISPRO 2 UNITS: 100 INJECTION, SOLUTION INTRAVENOUS; SUBCUTANEOUS at 17:04

## 2020-08-29 RX ADMIN — HEPARIN SODIUM 5000 UNITS: 5000 INJECTION INTRAVENOUS; SUBCUTANEOUS at 14:25

## 2020-08-29 RX ADMIN — NYSTATIN: 100000 POWDER TOPICAL at 17:03

## 2020-08-29 RX ADMIN — GABAPENTIN 100 MG: 100 CAPSULE ORAL at 17:02

## 2020-08-29 RX ADMIN — GABAPENTIN 100 MG: 100 CAPSULE ORAL at 21:27

## 2020-08-29 RX ADMIN — HEPARIN SODIUM 5000 UNITS: 5000 INJECTION INTRAVENOUS; SUBCUTANEOUS at 21:27

## 2020-08-29 RX ADMIN — ASPIRIN 81 MG CHEWABLE TABLET 81 MG: 81 TABLET CHEWABLE at 08:48

## 2020-08-29 RX ADMIN — HEPARIN SODIUM 5000 UNITS: 5000 INJECTION INTRAVENOUS; SUBCUTANEOUS at 05:30

## 2020-08-29 RX ADMIN — DOCUSATE SODIUM 100 MG: 100 CAPSULE, LIQUID FILLED ORAL at 08:48

## 2020-08-29 RX ADMIN — INSULIN LISPRO 12 UNITS: 100 INJECTION, SOLUTION INTRAVENOUS; SUBCUTANEOUS at 01:05

## 2020-08-29 RX ADMIN — NYSTATIN: 100000 POWDER TOPICAL at 08:54

## 2020-08-29 RX ADMIN — PIPERACILLIN AND TAZOBACTAM 2.25 G: 2; .25 INJECTION, POWDER, FOR SOLUTION INTRAVENOUS at 08:53

## 2020-08-29 RX ADMIN — INSULIN GLARGINE 10 UNITS: 100 INJECTION, SOLUTION SUBCUTANEOUS at 21:30

## 2020-08-29 RX ADMIN — INSULIN LISPRO 2 UNITS: 100 INJECTION, SOLUTION INTRAVENOUS; SUBCUTANEOUS at 21:30

## 2020-08-29 RX ADMIN — SENNOSIDES 8.6 MG: 8.6 TABLET, FILM COATED ORAL at 21:30

## 2020-08-29 RX ADMIN — PIPERACILLIN AND TAZOBACTAM 2.25 G: 2; .25 INJECTION, POWDER, FOR SOLUTION INTRAVENOUS at 20:40

## 2020-08-29 RX ADMIN — PIPERACILLIN AND TAZOBACTAM 2.25 G: 2; .25 INJECTION, POWDER, FOR SOLUTION INTRAVENOUS at 14:25

## 2020-08-29 NOTE — QUICK NOTE
Dressing change performed today with wet to dry kerlix w/ saline  Covered with an ABD  Patient tolerated procedure well  POD 1 from second washout and debridement on Friday 8/29

## 2020-08-29 NOTE — ASSESSMENT & PLAN NOTE
Estimated Creatinine Clearance: 24 8 mL/min (A) (by C-G formula based on SCr of 2 07 mg/dL (H))    POA  Patient noted to have RORY on admission; serum creatinine 2 59 (baseline 1 2-1 3)  2/2 shock  Cr appears stable

## 2020-08-29 NOTE — ASSESSMENT & PLAN NOTE
Previously on pravastatin; will start high-intensity statin with Lipitor 40  Can d/c on home statin as LDL 23

## 2020-08-29 NOTE — PROGRESS NOTES
Progress Note - General Surgery   Tawana Lam 67 y o  female MRN: [id number]  Unit/Bed#: [facility] 504-01 Encounter: [id number]    Assessment:  67 F w/ CVA w/ Stage IV sacral ulcer  8/26 excisional debridement of sacral pressure wound and washout   8/28 Debridement tand washout of sacral decubitus ulcer    VSS, Afebrile  UOP: 510 cc    Plan:  Primary per medicine  CCD2  Daily dressing change with kerlix    Subjective/Objective     Subjective:   No acute events overnight  Patient resting comfortably  Per nursing no dressing changes needed  Objective:    Blood pressure 118/62, pulse 92, temperature 97 7 °F (36 5 °C), resp  rate 18, height 5' 6" (1 676 m), weight 79 2 kg (174 lb 9 7 oz), SpO2 95 %  ,Body mass index is 28 18 kg/m²  Intake/Output Summary (Last 24 hours) at 8/29/2020 0502  Last data filed at 8/28/2020 2100  Gross per 24 hour   Intake 1885 75 ml   Output 430 ml   Net 1455 75 ml       Invasive Devices     Peripheral Intravenous Line            Peripheral IV 08/28/20 Distal;Dorsal (posterior); Right Forearm less than 1 day          Drain            Urethral Catheter Temperature probe 16 Fr  2 days                         Physical Exam:   Gen:  NAD  HEENT: mucous membranes moist  CV: well perfused  Lungs: Normal respiratory effort on 2L  Abd: soft, nt/nd, incisions  Buttocks: Kerlix with ABD over sacral ulcer  Clean, dry, intact  No drainage noted  Skin: warm/ dry  Extremities: no peripheral edema, no cyanosis  Neuro:  AxO x3           Results from last 7 days   Lab Units 08/28/20  0508 08/27/20  0454 08/26/20  0429   WBC Thousand/uL 14 17* 15 00* 13 50*   HEMOGLOBIN g/dL 7 6* 8 1* 7 9*   HEMATOCRIT % 24 4* 25 0* 24 6*   PLATELETS Thousands/uL 208 226 225     Results from last 7 days   Lab Units 08/28/20  0508 08/27/20  0454 08/26/20  0429   POTASSIUM mmol/L 4 3 4 6 3 4*   CHLORIDE mmol/L 108 107 106   CO2 mmol/L 22 20* 21   BUN mg/dL 37* 35* 34*   CREATININE mg/dL 1 96* 1 80* 2 32*   CALCIUM mg/dL 7 8* 7  6* 7 4*     Results from last 7 days   Lab Units 08/26/20  0431 08/25/20  1315   INR  1 38* 1 23*   PTT seconds 34 33

## 2020-08-29 NOTE — ASSESSMENT & PLAN NOTE
- pt presents with to [facility] with right-sided facial droop and right-sided weakness after being transported by EMS for [facility] and noted to have atrial fibrillation enroute  - CT head in ED shows posterior cerebral artery disease is noted, mild on the right and severe left     - NIH - score on admission; - 6  -c/w ASA and statin  - MRI brain no acute findings  - CTA shows bilateral stenosis of carotids; less than 75%  LDL 23  Echo shows mild pulm htn  Neuro signed off  C/w tele - pt having runs of ATach and if pt goes into Afib will need [facility]

## 2020-08-29 NOTE — PLAN OF CARE
Problem: Potential for Falls  Goal: Patient will remain free of falls  Description: INTERVENTIONS:  - Assess patient frequently for physical needs  -  Identify cognitive and physical deficits and behaviors that affect risk of falls    -  [place] fall precautions as indicated by assessment   - Educate patient/family on patient safety including physical limitations  - Instruct patient to call for assistance with activity based on assessment  - Modify environment to reduce risk of injury  - Consider OT/PT consult to assist with strengthening/mobility  Outcome: Progressing     Problem: Prexisting or High Potential for Compromised Skin Integrity  Goal: Skin integrity is maintained or improved  Description: INTERVENTIONS:  - Identify patients at risk for skin breakdown  - Assess and monitor skin integrity  - Assess and monitor nutrition and hydration status  - Monitor labs   - Assess for incontinence   - Turn and reposition patient  - Assist with mobility/ambulation  - Relieve pressure over bony prominences  - Avoid friction and shearing  - Provide appropriate hygiene as needed including keeping skin clean and dry  - Evaluate need for skin moisturizer/barrier cream  - Collaborate with interdisciplinary team   - Patient/family teaching  - Consider wound care consult   Outcome: Progressing     Problem: PAIN - ADULT  Goal: Verbalizes/displays adequate comfort level or baseline comfort level  Description: Interventions:  - Encourage patient to monitor pain and request assistance  - Assess pain using appropriate pain scale  - Administer analgesics based on type and severity of pain and evaluate response  - Implement non-pharmacological measures as appropriate and evaluate response  - Consider cultural and social influences on pain and pain management  - Notify physician/advanced practitioner if interventions unsuccessful or patient reports new pain  Outcome: Progressing     Problem: INFECTION - ADULT  Goal: Absence or prevention of progression during hospitalization  Description: INTERVENTIONS:  - Assess and monitor for signs and symptoms of infection  - Monitor lab/diagnostic results  - Monitor all insertion sites, i e  indwelling lines, tubes, and drains  - Monitor endotracheal if appropriate and nasal secretions for changes in amount and color  - [place] appropriate cooling/warming therapies per order  - Administer medications as ordered  - Instruct and encourage patient and family to use good hand hygiene technique  - Identify and instruct in appropriate isolation precautions for identified infection/condition  Outcome: Progressing  Goal: Absence of fever/infection during neutropenic period  Description: INTERVENTIONS:  - Monitor WBC    Outcome: Progressing     Problem: SAFETY ADULT  Goal: Patient will remain free of falls  Description: INTERVENTIONS:  - Assess patient frequently for physical needs  -  Identify cognitive and physical deficits and behaviors that affect risk of falls    -  [place] fall precautions as indicated by assessment   - Educate patient/family on patient safety including physical limitations  - Instruct patient to call for assistance with activity based on assessment  - Modify environment to reduce risk of injury  - Consider OT/PT consult to assist with strengthening/mobility  Outcome: Progressing  Goal: Maintain or return to baseline ADL function  Description: INTERVENTIONS:  -  Assess patient's ability to carry out ADLs; assess patient's baseline for ADL function and identify physical deficits which impact ability to perform ADLs (bathing, care of mouth/teeth, toileting, grooming, dressing, etc )  - Assess/evaluate cause of self-care deficits   - Assess range of motion  - Assess patient's mobility; develop plan if impaired  - Assess patient's need for assistive devices and provide as appropriate  - Encourage maximum independence but intervene and supervise when necessary  - Involve family in performance of ADLs  - Assess for home care needs following discharge   - Consider OT consult to assist with ADL evaluation and planning for discharge  - Provide patient education as appropriate  Outcome: Progressing  Goal: Maintain or return mobility status to optimal level  Description: INTERVENTIONS:  - Assess patient's baseline mobility status (ambulation, transfers, stairs, etc )    - Identify cognitive and physical deficits and behaviors that affect mobility  - Identify mobility aids required to assist with transfers and/or ambulation (gait belt, sit-to-stand, lift, walker, cane, etc )  - [place] fall precautions as indicated by assessment  - Record patient progress and toleration of activity level on Mobility SBAR; progress patient to next Phase/Stage  - Instruct patient to call for assistance with activity based on assessment  - Consider rehabilitation consult to assist with strengthening/weightbearing, etc   Outcome: Progressing     Problem: DISCHARGE PLANNING  Goal: Discharge to home or other facility with appropriate resources  Description: INTERVENTIONS:  - Identify barriers to discharge w/patient and caregiver  - Arrange for needed discharge resources and transportation as appropriate  - Identify discharge learning needs (meds, wound care, etc )  - Arrange for interpretive services to assist at discharge as needed  - Refer to Case Management Department for coordinating discharge planning if the patient needs post-hospital services based on physician/advanced practitioner order or complex needs related to functional status, cognitive ability, or social support system  Outcome: Progressing     Problem: Knowledge Deficit  Goal: Patient/family/caregiver demonstrates understanding of disease process, treatment plan, medications, and discharge instructions  Description: Complete learning assessment and assess knowledge base    Interventions:  - Provide teaching at level of understanding  - Provide teaching via preferred learning methods  Outcome: Progressing     Problem: Neurological Deficit  Goal: Neurological status is stable or improving  Description: Interventions:  - Monitor and assess patient's level of consciousness, motor function, sensory function, and level of assistance needed for ADLs  - Monitor and report changes from baseline  Collaborate with interdisciplinary team to initiate plan and implement interventions as ordered  - Provide and maintain a safe environment  - Consider seizure precautions  - Consider fall precautions  - Consider aspiration precautions  - Consider bleeding precautions  Outcome: Progressing     Problem: Activity Intolerance/Impaired Mobility  Goal: Mobility/activity is maintained at optimum level for patient  Description: Interventions:  - Assess and monitor patient  barriers to mobility and need for assistive/adaptive devices  - Assess patient's emotional response to limitations  - Collaborate with interdisciplinary team and initiate plans and interventions as ordered  - Encourage independent activity per ability   - Maintain proper body alignment  - Perform active/passive rom as tolerated/ordered  - Plan activities to conserve energy   - Turn patient as appropriate  Outcome: Progressing     Problem: Communication Impairment  Goal: Ability to express needs and understand communication  Description: Assess patient's communication skills and ability to understand information  Patient will demonstrate use of effective communication techniques, alternative methods of communication and understanding even if not able to speak  - Encourage communication and provide alternate methods of communication as needed  - Collaborate with case management/ for discharge needs  - Include patient/family/caregiver in decisions related to communication    Outcome: Progressing     Problem: Potential for Aspiration  Goal: Non-ventilated patient's risk of aspiration is minimized  Description: Assess and monitor vital signs, respiratory status, and labs (WBC)  Monitor for signs of aspiration (tachypnea, cough, rales, wheezing, cyanosis, fever)  - Assess and monitor patient's ability to swallow  - Place patient up in chair to eat if possible  - HOB up at 90 degrees to eat if unable to get patient up into chair   - Supervise patient during oral intake  - Instruct patient/ family to take small bites  - Instruct patient/ family to take small single sips when taking liquids  - Follow patient-specific strategies generated by speech pathologist   Outcome: Progressing  Goal: Ventilated patient's risk of aspiration is minimized  Description: Assess and monitor vital signs, respiratory status, airway cuff pressure, and labs (WBC)  Monitor for signs of aspiration (tachypnea, cough, rales, wheezing, cyanosis, fever)  - Elevate head of bed 30 degrees if patient has tube feeding   - Monitor tube feeding  Outcome: Progressing     Problem: Nutrition  Goal: Nutrition/Hydration status is improving  Description: Monitor and assess patient's nutrition/hydration status for malnutrition (ex- brittle hair, bruises, dry skin, pale skin and conjunctiva, muscle wasting, smooth red tongue, and disorientation)  Collaborate with interdisciplinary team and initiate plan and interventions as ordered  Monitor patient's weight and dietary intake as ordered or per policy  Utilize nutrition screening tool and intervene per policy  Determine patient's food preferences and provide high-protein, high-caloric foods as appropriate  - Assist patient with eating   - Allow adequate time for meals   - Encourage patient to take dietary supplement as ordered  - Collaborate with clinical nutritionist   - Include patient/family/caregiver in decisions related to nutrition    Outcome: Progressing     Problem: Nutrition/Hydration-ADULT  Goal: Nutrient/Hydration intake appropriate for improving, restoring or maintaining nutritional needs  Description: Monitor and assess patient's nutrition/hydration status for malnutrition  Collaborate with interdisciplinary team and initiate plan and interventions as ordered  Monitor patient's weight and dietary intake as ordered or per policy  Utilize nutrition screening tool and intervene as necessary  Determine patient's food preferences and provide high-protein, high-caloric foods as appropriate  INTERVENTIONS:  - Monitor oral intake, urinary output, labs, and treatment plans  - Assess nutrition and hydration status and recommend course of action  - Evaluate amount of meals eaten  - Assist patient with eating if necessary   - Allow adequate time for meals  - Recommend/ encourage appropriate diets, oral nutritional supplements, and vitamin/mineral supplements  - Order, calculate, and assess calorie counts as needed  - Recommend, monitor, and adjust tube feedings and TPN/PPN based on assessed needs  - Assess need for intravenous fluids  - Provide specific nutrition/hydration education as appropriate  - Include patient/family/caregiver in decisions related to nutrition  Outcome: Progressing     Problem: NEUROSENSORY - ADULT  Goal: Achieves stable or improved neurological status  Description: INTERVENTIONS  - Monitor and report changes in neurological status  - Monitor vital signs such as temperature, blood pressure, glucose, and any other labs ordered   - Initiate measures to prevent increased intracranial pressure  - Monitor for seizure activity and implement precautions if appropriate      Outcome: Progressing  Goal: Achieves maximal functionality and self care  Description: INTERVENTIONS  - Monitor swallowing and airway patency with patient fatigue and changes in neurological status  - Encourage and assist patient to increase activity and self care     - Encourage visually impaired, hearing impaired and aphasic patients to use assistive/communication devices  Outcome: Progressing     Problem: CARDIOVASCULAR - ADULT  Goal: Maintains optimal cardiac output and hemodynamic stability  Description: INTERVENTIONS:  - Monitor I/O, vital signs and rhythm  - Monitor for S/S and trends of decreased cardiac output  - Administer and titrate ordered vasoactive medications to optimize hemodynamic stability  - Assess quality of pulses, skin color and temperature  - Assess for signs of decreased coronary artery perfusion  - Instruct patient to report change in severity of symptoms  Outcome: Progressing     Problem: RESPIRATORY - ADULT  Goal: Achieves optimal ventilation and oxygenation  Description: INTERVENTIONS:  - Assess for changes in respiratory status  - Assess for changes in mentation and behavior  - Position to facilitate oxygenation and minimize respiratory effort  - Oxygen administered by appropriate delivery if ordered  - Initiate smoking cessation education as indicated  - Encourage broncho-pulmonary hygiene including cough, deep breathe, Incentive Spirometry  - Assess the need for suctioning and aspirate as needed  - Assess and instruct to report SOB or any respiratory difficulty  - Respiratory Therapy support as indicated  Outcome: Progressing     Problem: GASTROINTESTINAL - ADULT  Goal: Minimal or absence of nausea and/or vomiting  Description: INTERVENTIONS:  - Administer IV fluids if ordered to ensure adequate hydration  - Maintain NPO status until nausea and vomiting are resolved  - Nasogastric tube if ordered  - Administer ordered antiemetic medications as needed  - Provide nonpharmacologic comfort measures as appropriate  - Advance diet as tolerated, if ordered  - Consider nutrition services referral to assist patient with adequate nutrition and appropriate food choices  Outcome: Progressing  Goal: Maintains adequate nutritional intake  Description: INTERVENTIONS:  - Monitor percentage of each meal consumed  - Identify factors contributing to decreased intake, treat as appropriate  - Assist with meals as needed  - Monitor I&O, weight, and lab values if indicated  - Obtain nutrition services referral as needed  Outcome: Progressing     Problem: GENITOURINARY - ADULT  Goal: Maintains or returns to baseline urinary function  Description: INTERVENTIONS:  - Assess urinary function  - Encourage oral fluids to ensure adequate hydration if ordered  - Administer IV fluids as ordered to ensure adequate hydration  - Administer ordered medications as needed  - Offer frequent toileting  - Follow urinary retention protocol if ordered  Outcome: Progressing     Problem: METABOLIC, FLUID AND ELECTROLYTES - ADULT  Goal: Electrolytes maintained within normal limits  Description: INTERVENTIONS:  - Monitor labs and assess patient for signs and symptoms of electrolyte imbalances  - Administer electrolyte replacement as ordered  - Monitor response to electrolyte replacements, including repeat lab results as appropriate  - Instruct patient on fluid and nutrition as appropriate  Outcome: Progressing     Problem: SKIN/TISSUE INTEGRITY - ADULT  Goal: Skin integrity remains intact  Description: INTERVENTIONS  - Identify patients at risk for skin breakdown  - Assess and monitor skin integrity  - Assess and monitor nutrition and hydration status  - Monitor labs (i e  albumin)  - Assess for incontinence   - Turn and reposition patient  - Assist with mobility/ambulation  - Relieve pressure over bony prominences  - Avoid friction and shearing  - Provide appropriate hygiene as needed including keeping skin clean and dry  - Evaluate need for skin moisturizer/barrier cream  - Collaborate with interdisciplinary team (i e  Nutrition, Rehabilitation, etc )   - Patient/family teaching  Outcome: Progressing     Problem: HEMATOLOGIC - ADULT  Goal: Maintains hematologic stability  Description: INTERVENTIONS  - Assess for signs and symptoms of bleeding or hemorrhage  - Monitor labs  - Administer supportive blood products/factors as ordered and appropriate  Outcome: Progressing     Problem: MUSCULOSKELETAL - ADULT  Goal: Maintain or return mobility to safest level of function  Description: INTERVENTIONS:  - Assess patient's ability to carry out ADLs; assess patient's baseline for ADL function and identify physical deficits which impact ability to perform ADLs (bathing, care of mouth/teeth, toileting, grooming, dressing, etc )  - Assess/evaluate cause of self-care deficits   - Assess range of motion  - Assess patient's mobility  - Assess patient's need for assistive devices and provide as appropriate  - Encourage maximum independence but intervene and supervise when necessary  - Involve family in performance of ADLs  - Assess for home care needs following discharge   - Consider OT consult to assist with ADL evaluation and planning for discharge  - Provide patient education as appropriate  Outcome: Progressing     Problem: COPING  Goal: Pt/Family able to verbalize concerns and demonstrate effective coping strategies  Description: INTERVENTIONS:  - Assist patient/family to identify coping skills, available support systems and cultural and spiritual values  - Provide emotional support, including active listening and acknowledgement of concerns of patient and caregivers  - Reduce environmental stimuli, as able  - Provide patient education  - Assess for spiritual pain/suffering and initiate spiritual care, including notification of Pastoral Care or [place] based community as needed  - Assess effectiveness of coping strategies  Outcome: Progressing  Goal: Will report anxiety at manageable levels  Description: INTERVENTIONS:  - Administer medication as ordered  - Teach and encourage coping skills  - Provide emotional support  - Assess patient/family for anxiety and ability to cope  Outcome: Progressing

## 2020-08-29 NOTE — ASSESSMENT & PLAN NOTE
Lab Results   Component Value Date    HGBA1C 9 9 (H) 08/26/2020     SSI  Blood Glucose checks TIDWM and QHS (Q6H for NPO patients)  Hold oral medications  Lantus started inpt  PT should take insulin at d/c

## 2020-08-29 NOTE — PROGRESS NOTES
Progress Note - Marlene Snare 1945, 67 y o  female MRN: [id number]    Unit/Bed#: [facility] 504-01 Encounter: [id number]    Primary Care Provider: TJ Green   Date and time admitted to hospital: 8/25/2020  4:06 PM        * Septic shock due to gram-positive bacteria [facility])  Assessment & Plan  Pt now off pressors  ID appreciated  C/w Zosyn  B Cx from 8/28 neg  2/2 infected stage 4 DQ ulcer    Anemia  Assessment & Plan  S/p 1U PRBC in ICU  Appears stable at this time    Bacteremia due to Enterococcus  Assessment & Plan  B Cx pos for Enterococcus and Strep Constellatus  Zosyn  IS appreciated    Paroxysmal atrial fibrillation ([facility] [address] )  Assessment & Plan  S/p DCCV in route  Possibly 2/2 shock  Keep on tele in setting of CVA    RORY (acute kidney injury) ([facility] [address] )  Assessment & Plan  Estimated Creatinine Clearance: 24 8 mL/min (A) (by C-G formula based on SCr of 2 07 mg/dL (H))  POA  Patient noted to have RORY on admission; serum creatinine 2 59 (baseline 1 2-1 3)  2/2 shock  Cr appears stable    Stroke-like symptoms  Assessment & Plan  - pt presents with to [facility] with right-sided facial droop and right-sided weakness after being transported by EMS for [facility] and noted to have atrial fibrillation enroute  - CT head in ED shows posterior cerebral artery disease is noted, mild on the right and severe left     - NIH - score on admission; - 6  -c/w ASA and statin  - MRI brain no acute findings  - CTA shows bilateral stenosis of carotids; less than 75%  LDL 23  Echo shows mild pulm htn  Neuro signed off  C/w tele - pt having runs of ATach and if pt goes into Afib will need AC    Decubitus ulcer of sacral region, stage 4 [facility])  Assessment & Plan  POA  Wound care and piper surg appreciated  Patient reports ulcer developed status post recent hip fracture  8/26 excisional debridement of sacral pressure wound and washout   8/28 Debridement tand washout of sacral decubitus ulcer  C/w ronnie    Hyperlipidemia  Assessment & Plan  Previously on pravastatin; will start high-intensity statin with Lipitor 40  Can d/c on home statin as LDL 23    Hypertension  Assessment & Plan  BP stable off home meds  Pt needed pressors on admission    Type 2 diabetes mellitus with hyperglycemia (HCC)  Assessment & Plan    Lab Results   Component Value Date    HGBA1C 9 9 (H) 2020     SSI  Blood Glucose checks TIDWM and QHS (Q6H for NPO patients)  Hold oral medications  Lantus started inpt  PT should take insulin at d/c    VTE Pharmacologic Prophylaxis:   Pharmacologic: Heparin  Mechanical VTE Prophylaxis in Place: Yes    Patient Centered Rounds: I have performed bedside rounds with nursing staff today  Discussions with Specialists or Other Care Team Provider: n    Education and Discussions with Family / Patient: D/w pt  Pt declined call to family    Time Spent for Care: 30 minutes  More than 50% of total time spent on counseling and coordination of care as described above  Current Length of Stay: 4 day(s)    Current Patient Status: Inpatient   Certification Statement: The patient will continue to require additional inpatient hospital stay due to need for IV Zosyn    Discharge Plan: Rehab when cleared from ID standpoint    Code Status: Level 1 - Full Code      Subjective:   No acute complaints    Objective:     Vitals:   Temp (24hrs), Av 6 °F (36 4 °C), Min:96 4 °F (35 8 °C), Max:98 8 °F (37 1 °C)    Temp:  [96 4 °F (35 8 °C)-98 8 °F (37 1 °C)] 98 8 °F (37 1 °C)  HR:  [] 90  Resp:  [11-20] 14  BP: (113-118)/(55-69) 113/66  SpO2:  [88 %-99 %] 98 %  Body mass index is 28 79 kg/m²  Input and Output Summary (last 24 hours): Intake/Output Summary (Last 24 hours) at 2020 1415  Last data filed at 2020 1343  Gross per 24 hour   Intake 450 ml   Output 960 ml   Net -510 ml       Physical Exam:     Physical Exam  HENT:      Head: Normocephalic and atraumatic        Nose: Nose normal  Mouth/Throat:      Mouth: Mucous membranes are moist    Eyes:      Extraocular Movements: Extraocular movements intact  Conjunctiva/sclera: Conjunctivae normal    Neck:      Musculoskeletal: Normal range of motion and neck supple  Cardiovascular:      Rate and Rhythm: Normal rate and regular rhythm  Pulmonary:      Effort: Pulmonary effort is normal       Breath sounds: Normal breath sounds  No wheezing or rales  Abdominal:      General: Bowel sounds are normal  There is no distension  Palpations: Abdomen is soft  Tenderness: There is no abdominal tenderness  Musculoskeletal: Normal range of motion  Right lower leg: No edema  Left lower leg: No edema  Skin:     General: Skin is warm and dry  Neurological:      Mental Status: She is alert and oriented to person, place, and time  Mental status is at baseline  Additional Data:     Labs:    Results from last 7 days   Lab Units 08/29/20  0511 08/28/20  0508   WBC Thousand/uL 11 56* 14 17*   HEMOGLOBIN g/dL 8 5* 7 6*   HEMATOCRIT % 26 8* 24 4*   PLATELETS Thousands/uL 210 208   NEUTROS PCT %  --  88*   LYMPHS PCT %  --  7*   MONOS PCT %  --  3*   EOS PCT %  --  0     Results from last 7 days   Lab Units 08/29/20  0511  08/26/20  0429   POTASSIUM mmol/L 4 7   < > 3 4*   CHLORIDE mmol/L 108   < > 106   CO2 mmol/L 20*   < > 21   BUN mg/dL 47*   < > 34*   CREATININE mg/dL 2 07*   < > 2 32*   CALCIUM mg/dL 8 0*   < > 7 4*   ALK PHOS U/L  --   --  232*   ALT U/L  --   --  11*   AST U/L  --   --  27    < > = values in this interval not displayed  Results from last 7 days   Lab Units 08/26/20  0431   INR  1 38*       * I Have Reviewed All Lab Data Listed Above  * Additional Pertinent Lab Tests Reviewed:  [address] Admission Reviewed        Recent Cultures (last 7 days):     Results from last 7 days   Lab Units 08/28/20  0542 08/28/20  0536 08/26/20  1353 08/26/20  1225 08/26/20  0554 08/26/20  0536   BLOOD CULTURE  No Growth at 24 hrs  No Growth at 24 hrs   --   --   --  Enterococcus faecalis*  Streptococcus constellatus*  Streptococcus constellatus*  Staphylococcus coagulase negative*   GRAM STAIN RESULT   --   --  No polys seen*  2+ Gram positive cocci in pairs* 1+ Polys*  3+ Gram positive cocci in pairs*  2+ Gram positive rods*  1+ Gram negative rods*  --  Gram positive cocci in pairs and chains*  Gram positive cocci in pairs and chains*   URINE CULTURE   --   --   --   --  50,000-59,000 cfu/ml Escherichia coli*  >100,000 cfu/ml Lactobacillus species*  --    WOUND CULTURE   --   --  3+ Growth of Escherichia coli*  3+ Growth of Proteus mirabilis*  3+ Growth of Enterococcus faecalis*  3+ Growth of  2+ Growth of Escherichia coli*  2+ Growth of Proteus mirabilis*  3+ Growth of Enterococcus species*  3+ Growth of   --   --        Last 24 Hours Medication List:   Current Facility-Administered Medications   Medication Dose Route Frequency Provider Last Rate    acetaminophen  650 mg Oral Q6H PRN Gabby Cr DO      aspirin  81 mg Oral Daily Elmo Maradiaga PA-C      atorvastatin  40 mg Oral Daily With BEATRIZ Rubi      docusate sodium  100 mg Oral BID Chirag Dc PA-C      gabapentin  100 mg Oral TID Chirag Dc PA-C      heparin (porcine)  5,000 Units Subcutaneous [facility] Elmo Maradiaga PA-C      hydrALAZINE  10 mg Intravenous Q4H PRN Chirag Dc PA-C      insulin glargine  10 Units Subcutaneous HS Hemant Villalba DO      insulin lispro  1-6 Units Subcutaneous 4x Daily (AC & HS) Omkar Trujillo PA-C      nystatin   Topical BID Chirag Dc PA-C      piperacillin-tazobactam  2 25 g Intravenous Q6H TJ Cox Stopped (08/29/20 0930)    senna  1 tablet Oral HS Gabby Cr DO          Today, Patient Was Seen By: Gabby Cr DO    ** Please Note: Dictation voice to text software may have been used in the creation of this document   **

## 2020-08-29 NOTE — ASSESSMENT & PLAN NOTE
POA  Wound care and piper surg appreciated  Patient reports ulcer developed status post recent hip fracture  8/26 excisional debridement of sacral pressure wound and washout   8/28 Debridement tand washout of sacral decubitus ulcer  C/w trujillo

## 2020-08-30 PROBLEM — I65.22 STENOSIS OF LEFT CAROTID ARTERY: Status: ACTIVE | Noted: 2020-08-30

## 2020-08-30 LAB
ANION GAP SERPL CALCULATED.3IONS-SCNC: 8 MMOL/L (ref 4–13)
APTT PPP: 169 SECONDS (ref 23–37)
APTT PPP: 34 SECONDS (ref 23–37)
BUN SERPL-MCNC: 49 MG/DL (ref 5–25)
CALCIUM SERPL-MCNC: 8.5 MG/DL (ref 8.3–10.1)
CHLORIDE SERPL-SCNC: 109 MMOL/L (ref 100–108)
CO2 SERPL-SCNC: 22 MMOL/L (ref 21–32)
CREAT SERPL-MCNC: 1.89 MG/DL (ref 0.6–1.3)
ERYTHROCYTE [DISTWIDTH] IN BLOOD BY AUTOMATED COUNT: 15.2 % (ref 11.6–15.1)
GFR SERPL CREATININE-BSD FRML MDRD: 25 ML/MIN/1.73SQ M
GLUCOSE SERPL-MCNC: 121 MG/DL (ref 65–140)
GLUCOSE SERPL-MCNC: 129 MG/DL (ref 65–140)
GLUCOSE SERPL-MCNC: 177 MG/DL (ref 65–140)
GLUCOSE SERPL-MCNC: 178 MG/DL (ref 65–140)
GLUCOSE SERPL-MCNC: 181 MG/DL (ref 65–140)
HCT VFR BLD AUTO: 30.6 % (ref 34.8–46.1)
HGB BLD-MCNC: 9.4 G/DL (ref 11.5–15.4)
INR PPP: 1.11 (ref 0.84–1.19)
MCH RBC QN AUTO: 29 PG (ref 26.8–34.3)
MCHC RBC AUTO-ENTMCNC: 30.7 G/DL (ref 31.4–37.4)
MCV RBC AUTO: 94 FL (ref 82–98)
PLATELET # BLD AUTO: 215 THOUSANDS/UL (ref 149–390)
PMV BLD AUTO: 11.5 FL (ref 8.9–12.7)
POTASSIUM SERPL-SCNC: 4.3 MMOL/L (ref 3.5–5.3)
PROTHROMBIN TIME: 14.3 SECONDS (ref 11.6–14.5)
RBC # BLD AUTO: 3.24 MILLION/UL (ref 3.81–5.12)
SODIUM SERPL-SCNC: 139 MMOL/L (ref 136–145)
WBC # BLD AUTO: 12.51 THOUSAND/UL (ref 4.31–10.16)

## 2020-08-30 PROCEDURE — 93005 ELECTROCARDIOGRAM TRACING: CPT

## 2020-08-30 PROCEDURE — 99233 SBSQ HOSP IP/OBS HIGH 50: CPT | Performed by: GENERAL PRACTICE

## 2020-08-30 PROCEDURE — 85610 PROTHROMBIN TIME: CPT | Performed by: GENERAL PRACTICE

## 2020-08-30 PROCEDURE — 85730 THROMBOPLASTIN TIME PARTIAL: CPT | Performed by: GENERAL PRACTICE

## 2020-08-30 PROCEDURE — 80048 BASIC METABOLIC PNL TOTAL CA: CPT | Performed by: GENERAL PRACTICE

## 2020-08-30 PROCEDURE — 82948 REAGENT STRIP/BLOOD GLUCOSE: CPT

## 2020-08-30 PROCEDURE — 99222 1ST HOSP IP/OBS MODERATE 55: CPT | Performed by: INTERNAL MEDICINE

## 2020-08-30 PROCEDURE — 99223 1ST HOSP IP/OBS HIGH 75: CPT | Performed by: SURGERY

## 2020-08-30 PROCEDURE — 85027 COMPLETE CBC AUTOMATED: CPT | Performed by: GENERAL PRACTICE

## 2020-08-30 RX ORDER — ASPIRIN 81 MG/1
81 TABLET ORAL DAILY
Status: DISCONTINUED | OUTPATIENT
Start: 2020-08-31 | End: 2020-09-10 | Stop reason: HOSPADM

## 2020-08-30 RX ORDER — HEPARIN SODIUM 10000 [USP'U]/100ML
3-20 INJECTION, SOLUTION INTRAVENOUS
Status: DISCONTINUED | OUTPATIENT
Start: 2020-08-30 | End: 2020-09-03

## 2020-08-30 RX ORDER — METOPROLOL TARTRATE 50 MG/1
50 TABLET, FILM COATED ORAL EVERY 12 HOURS SCHEDULED
Status: DISCONTINUED | OUTPATIENT
Start: 2020-08-30 | End: 2020-09-02

## 2020-08-30 RX ORDER — INSULIN GLARGINE 100 [IU]/ML
12 INJECTION, SOLUTION SUBCUTANEOUS
Status: DISCONTINUED | OUTPATIENT
Start: 2020-08-30 | End: 2020-09-02

## 2020-08-30 RX ORDER — BUMETANIDE 0.25 MG/ML
1 INJECTION, SOLUTION INTRAMUSCULAR; INTRAVENOUS ONCE
Status: COMPLETED | OUTPATIENT
Start: 2020-08-30 | End: 2020-08-30

## 2020-08-30 RX ADMIN — PIPERACILLIN AND TAZOBACTAM 2.25 G: 2; .25 INJECTION, POWDER, FOR SOLUTION INTRAVENOUS at 21:35

## 2020-08-30 RX ADMIN — GABAPENTIN 100 MG: 100 CAPSULE ORAL at 08:39

## 2020-08-30 RX ADMIN — NYSTATIN: 100000 POWDER TOPICAL at 17:21

## 2020-08-30 RX ADMIN — PIPERACILLIN AND TAZOBACTAM 2.25 G: 2; .25 INJECTION, POWDER, FOR SOLUTION INTRAVENOUS at 08:45

## 2020-08-30 RX ADMIN — INSULIN GLARGINE 12 UNITS: 100 INJECTION, SOLUTION SUBCUTANEOUS at 21:34

## 2020-08-30 RX ADMIN — INSULIN LISPRO 1 UNITS: 100 INJECTION, SOLUTION INTRAVENOUS; SUBCUTANEOUS at 21:35

## 2020-08-30 RX ADMIN — METOPROLOL TARTRATE 50 MG: 50 TABLET, FILM COATED ORAL at 21:34

## 2020-08-30 RX ADMIN — GABAPENTIN 100 MG: 100 CAPSULE ORAL at 17:20

## 2020-08-30 RX ADMIN — PIPERACILLIN AND TAZOBACTAM 2.25 G: 2; .25 INJECTION, POWDER, FOR SOLUTION INTRAVENOUS at 01:54

## 2020-08-30 RX ADMIN — HEPARIN SODIUM 12 UNITS/KG/HR: 10000 INJECTION, SOLUTION INTRAVENOUS at 11:28

## 2020-08-30 RX ADMIN — METOPROLOL TARTRATE 25 MG: 25 TABLET, FILM COATED ORAL at 11:25

## 2020-08-30 RX ADMIN — BUMETANIDE 1 MG: 0.25 INJECTION INTRAMUSCULAR; INTRAVENOUS at 14:16

## 2020-08-30 RX ADMIN — ATORVASTATIN CALCIUM 40 MG: 40 TABLET, FILM COATED ORAL at 17:20

## 2020-08-30 RX ADMIN — NYSTATIN: 100000 POWDER TOPICAL at 08:43

## 2020-08-30 RX ADMIN — HEPARIN SODIUM 5000 UNITS: 5000 INJECTION INTRAVENOUS; SUBCUTANEOUS at 05:12

## 2020-08-30 RX ADMIN — DOCUSATE SODIUM 100 MG: 100 CAPSULE, LIQUID FILLED ORAL at 08:39

## 2020-08-30 RX ADMIN — INSULIN LISPRO 1 UNITS: 100 INJECTION, SOLUTION INTRAVENOUS; SUBCUTANEOUS at 08:43

## 2020-08-30 RX ADMIN — PIPERACILLIN AND TAZOBACTAM 2.25 G: 2; .25 INJECTION, POWDER, FOR SOLUTION INTRAVENOUS at 14:16

## 2020-08-30 RX ADMIN — ASPIRIN 81 MG CHEWABLE TABLET 81 MG: 81 TABLET CHEWABLE at 08:39

## 2020-08-30 RX ADMIN — GABAPENTIN 100 MG: 100 CAPSULE ORAL at 21:34

## 2020-08-30 NOTE — PLAN OF CARE
Problem: Potential for Falls  Goal: Patient will remain free of falls  Description: INTERVENTIONS:  - Assess patient frequently for physical needs  -  Identify cognitive and physical deficits and behaviors that affect risk of falls    -  [place] fall precautions as indicated by assessment   - Educate patient/family on patient safety including physical limitations  - Instruct patient to call for assistance with activity based on assessment  - Modify environment to reduce risk of injury  - Consider OT/PT consult to assist with strengthening/mobility  Outcome: Not Progressing     Problem: Prexisting or High Potential for Compromised Skin Integrity  Goal: Skin integrity is maintained or improved  Description: INTERVENTIONS:  - Identify patients at risk for skin breakdown  - Assess and monitor skin integrity  - Assess and monitor nutrition and hydration status  - Monitor labs   - Assess for incontinence   - Turn and reposition patient  - Assist with mobility/ambulation  - Relieve pressure over bony prominences  - Avoid friction and shearing  - Provide appropriate hygiene as needed including keeping skin clean and dry  - Evaluate need for skin moisturizer/barrier cream  - Collaborate with interdisciplinary team   - Patient/family teaching  - Consider wound care consult   Outcome: Not Progressing     Problem: PAIN - ADULT  Goal: Verbalizes/displays adequate comfort level or baseline comfort level  Description: Interventions:  - Encourage patient to monitor pain and request assistance  - Assess pain using appropriate pain scale  - Administer analgesics based on type and severity of pain and evaluate response  - Implement non-pharmacological measures as appropriate and evaluate response  - Consider cultural and social influences on pain and pain management  - Notify physician/advanced practitioner if interventions unsuccessful or patient reports new pain  Outcome: Not Progressing     Problem: INFECTION - ADULT  Goal: Absence or prevention of progression during hospitalization  Description: INTERVENTIONS:  - Assess and monitor for signs and symptoms of infection  - Monitor lab/diagnostic results  - Monitor all insertion sites, i e  indwelling lines, tubes, and drains  - Monitor endotracheal if appropriate and nasal secretions for changes in amount and color  - [place] appropriate cooling/warming therapies per order  - Administer medications as ordered  - Instruct and encourage patient and family to use good hand hygiene technique  - Identify and instruct in appropriate isolation precautions for identified infection/condition  Outcome: Not Progressing  Goal: Absence of fever/infection during neutropenic period  Description: INTERVENTIONS:  - Monitor WBC    Outcome: Not Progressing     Problem: SAFETY ADULT  Goal: Patient will remain free of falls  Description: INTERVENTIONS:  - Assess patient frequently for physical needs  -  Identify cognitive and physical deficits and behaviors that affect risk of falls    -  [place] fall precautions as indicated by assessment   - Educate patient/family on patient safety including physical limitations  - Instruct patient to call for assistance with activity based on assessment  - Modify environment to reduce risk of injury  - Consider OT/PT consult to assist with strengthening/mobility  Outcome: Not Progressing  Goal: Maintain or return to baseline ADL function  Description: INTERVENTIONS:  -  Assess patient's ability to carry out ADLs; assess patient's baseline for ADL function and identify physical deficits which impact ability to perform ADLs (bathing, care of mouth/teeth, toileting, grooming, dressing, etc )  - Assess/evaluate cause of self-care deficits   - Assess range of motion  - Assess patient's mobility; develop plan if impaired  - Assess patient's need for assistive devices and provide as appropriate  - Encourage maximum independence but intervene and supervise when necessary  - Involve family in performance of ADLs  - Assess for home care needs following discharge   - Consider OT consult to assist with ADL evaluation and planning for discharge  - Provide patient education as appropriate  Outcome: Not Progressing  Goal: Maintain or return mobility status to optimal level  Description: INTERVENTIONS:  - Assess patient's baseline mobility status (ambulation, transfers, stairs, etc )    - Identify cognitive and physical deficits and behaviors that affect mobility  - Identify mobility aids required to assist with transfers and/or ambulation (gait belt, sit-to-stand, lift, walker, cane, etc )  - [place] fall precautions as indicated by assessment  - Record patient progress and toleration of activity level on Mobility SBAR; progress patient to next Phase/Stage  - Instruct patient to call for assistance with activity based on assessment  - Consider rehabilitation consult to assist with strengthening/weightbearing, etc   Outcome: Not Progressing     Problem: DISCHARGE PLANNING  Goal: Discharge to home or other facility with appropriate resources  Description: INTERVENTIONS:  - Identify barriers to discharge w/patient and caregiver  - Arrange for needed discharge resources and transportation as appropriate  - Identify discharge learning needs (meds, wound care, etc )  - Arrange for interpretive services to assist at discharge as needed  - Refer to Case Management Department for coordinating discharge planning if the patient needs post-hospital services based on physician/advanced practitioner order or complex needs related to functional status, cognitive ability, or social support system  Outcome: Not Progressing     Problem: Knowledge Deficit  Goal: Patient/family/caregiver demonstrates understanding of disease process, treatment plan, medications, and discharge instructions  Description: Complete learning assessment and assess knowledge base    Interventions:  - Provide teaching at level of understanding  - Provide teaching via preferred learning methods  Outcome: Not Progressing     Problem: Neurological Deficit  Goal: Neurological status is stable or improving  Description: Interventions:  - Monitor and assess patient's level of consciousness, motor function, sensory function, and level of assistance needed for ADLs  - Monitor and report changes from baseline  Collaborate with interdisciplinary team to initiate plan and implement interventions as ordered  - Provide and maintain a safe environment  - Consider seizure precautions  - Consider fall precautions  - Consider aspiration precautions  - Consider bleeding precautions  Outcome: Not Progressing     Problem: Activity Intolerance/Impaired Mobility  Goal: Mobility/activity is maintained at optimum level for patient  Description: Interventions:  - Assess and monitor patient  barriers to mobility and need for assistive/adaptive devices  - Assess patient's emotional response to limitations  - Collaborate with interdisciplinary team and initiate plans and interventions as ordered  - Encourage independent activity per ability   - Maintain proper body alignment  - Perform active/passive rom as tolerated/ordered  - Plan activities to conserve energy   - Turn patient as appropriate  Outcome: Not Progressing     Problem: Communication Impairment  Goal: Ability to express needs and understand communication  Description: Assess patient's communication skills and ability to understand information  Patient will demonstrate use of effective communication techniques, alternative methods of communication and understanding even if not able to speak  - Encourage communication and provide alternate methods of communication as needed  - Collaborate with case management/ for discharge needs  - Include patient/family/caregiver in decisions related to communication    Outcome: Not Progressing     Problem: Potential for Aspiration  Goal: Non-ventilated patient's risk of aspiration is minimized  Description: Assess and monitor vital signs, respiratory status, and labs (WBC)  Monitor for signs of aspiration (tachypnea, cough, rales, wheezing, cyanosis, fever)  - Assess and monitor patient's ability to swallow  - Place patient up in chair to eat if possible  - HOB up at 90 degrees to eat if unable to get patient up into chair   - Supervise patient during oral intake  - Instruct patient/ family to take small bites  - Instruct patient/ family to take small single sips when taking liquids  - Follow patient-specific strategies generated by speech pathologist   Outcome: Not Progressing  Goal: Ventilated patient's risk of aspiration is minimized  Description: Assess and monitor vital signs, respiratory status, airway cuff pressure, and labs (WBC)  Monitor for signs of aspiration (tachypnea, cough, rales, wheezing, cyanosis, fever)  - Elevate head of bed 30 degrees if patient has tube feeding   - Monitor tube feeding  Outcome: Not Progressing     Problem: Nutrition  Goal: Nutrition/Hydration status is improving  Description: Monitor and assess patient's nutrition/hydration status for malnutrition (ex- brittle hair, bruises, dry skin, pale skin and conjunctiva, muscle wasting, smooth red tongue, and disorientation)  Collaborate with interdisciplinary team and initiate plan and interventions as ordered  Monitor patient's weight and dietary intake as ordered or per policy  Utilize nutrition screening tool and intervene per policy  Determine patient's food preferences and provide high-protein, high-caloric foods as appropriate  - Assist patient with eating   - Allow adequate time for meals   - Encourage patient to take dietary supplement as ordered  - Collaborate with clinical nutritionist   - Include patient/family/caregiver in decisions related to nutrition    Outcome: Not Progressing     Problem: Nutrition/Hydration-ADULT  Goal: Nutrient/Hydration intake appropriate for improving, restoring or maintaining nutritional needs  Description: Monitor and assess patient's nutrition/hydration status for malnutrition  Collaborate with interdisciplinary team and initiate plan and interventions as ordered  Monitor patient's weight and dietary intake as ordered or per policy  Utilize nutrition screening tool and intervene as necessary  Determine patient's food preferences and provide high-protein, high-caloric foods as appropriate  INTERVENTIONS:  - Monitor oral intake, urinary output, labs, and treatment plans  - Assess nutrition and hydration status and recommend course of action  - Evaluate amount of meals eaten  - Assist patient with eating if necessary   - Allow adequate time for meals  - Recommend/ encourage appropriate diets, oral nutritional supplements, and vitamin/mineral supplements  - Order, calculate, and assess calorie counts as needed  - Recommend, monitor, and adjust tube feedings and TPN/PPN based on assessed needs  - Assess need for intravenous fluids  - Provide specific nutrition/hydration education as appropriate  - Include patient/family/caregiver in decisions related to nutrition  Outcome: Not Progressing     Problem: NEUROSENSORY - ADULT  Goal: Achieves stable or improved neurological status  Description: INTERVENTIONS  - Monitor and report changes in neurological status  - Monitor vital signs such as temperature, blood pressure, glucose, and any other labs ordered   - Initiate measures to prevent increased intracranial pressure  - Monitor for seizure activity and implement precautions if appropriate      Outcome: Not Progressing  Goal: Achieves maximal functionality and self care  Description: INTERVENTIONS  - Monitor swallowing and airway patency with patient fatigue and changes in neurological status  - Encourage and assist patient to increase activity and self care     - Encourage visually impaired, hearing impaired and aphasic patients to use assistive/communication devices  Outcome: Not Progressing     Problem: CARDIOVASCULAR - ADULT  Goal: Maintains optimal cardiac output and hemodynamic stability  Description: INTERVENTIONS:  - Monitor I/O, vital signs and rhythm  - Monitor for S/S and trends of decreased cardiac output  - Administer and titrate ordered vasoactive medications to optimize hemodynamic stability  - Assess quality of pulses, skin color and temperature  - Assess for signs of decreased coronary artery perfusion  - Instruct patient to report change in severity of symptoms  Outcome: Not Progressing     Problem: RESPIRATORY - ADULT  Goal: Achieves optimal ventilation and oxygenation  Description: INTERVENTIONS:  - Assess for changes in respiratory status  - Assess for changes in mentation and behavior  - Position to facilitate oxygenation and minimize respiratory effort  - Oxygen administered by appropriate delivery if ordered  - Initiate smoking cessation education as indicated  - Encourage broncho-pulmonary hygiene including cough, deep breathe, Incentive Spirometry  - Assess the need for suctioning and aspirate as needed  - Assess and instruct to report SOB or any respiratory difficulty  - Respiratory Therapy support as indicated  Outcome: Not Progressing     Problem: GASTROINTESTINAL - ADULT  Goal: Minimal or absence of nausea and/or vomiting  Description: INTERVENTIONS:  - Administer IV fluids if ordered to ensure adequate hydration  - Maintain NPO status until nausea and vomiting are resolved  - Nasogastric tube if ordered  - Administer ordered antiemetic medications as needed  - Provide nonpharmacologic comfort measures as appropriate  - Advance diet as tolerated, if ordered  - Consider nutrition services referral to assist patient with adequate nutrition and appropriate food choices  Outcome: Not Progressing  Goal: Maintains adequate nutritional intake  Description: INTERVENTIONS:  - Monitor percentage of each meal consumed  - Identify factors contributing to decreased intake, treat as appropriate  - Assist with meals as needed  - Monitor I&O, weight, and lab values if indicated  - Obtain nutrition services referral as needed  Outcome: Not Progressing     Problem: GENITOURINARY - ADULT  Goal: Maintains or returns to baseline urinary function  Description: INTERVENTIONS:  - Assess urinary function  - Encourage oral fluids to ensure adequate hydration if ordered  - Administer IV fluids as ordered to ensure adequate hydration  - Administer ordered medications as needed  - Offer frequent toileting  - Follow urinary retention protocol if ordered  Outcome: Not Progressing     Problem: METABOLIC, FLUID AND ELECTROLYTES - ADULT  Goal: Electrolytes maintained within normal limits  Description: INTERVENTIONS:  - Monitor labs and assess patient for signs and symptoms of electrolyte imbalances  - Administer electrolyte replacement as ordered  - Monitor response to electrolyte replacements, including repeat lab results as appropriate  - Instruct patient on fluid and nutrition as appropriate  Outcome: Not Progressing     Problem: SKIN/TISSUE INTEGRITY - ADULT  Goal: Skin integrity remains intact  Description: INTERVENTIONS  - Identify patients at risk for skin breakdown  - Assess and monitor skin integrity  - Assess and monitor nutrition and hydration status  - Monitor labs (i e  albumin)  - Assess for incontinence   - Turn and reposition patient  - Assist with mobility/ambulation  - Relieve pressure over bony prominences  - Avoid friction and shearing  - Provide appropriate hygiene as needed including keeping skin clean and dry  - Evaluate need for skin moisturizer/barrier cream  - Collaborate with interdisciplinary team (i e  Nutrition, Rehabilitation, etc )   - Patient/family teaching  Outcome: Not Progressing     Problem: HEMATOLOGIC - ADULT  Goal: Maintains hematologic stability  Description: INTERVENTIONS  - Assess for signs and symptoms of bleeding or hemorrhage  - Monitor labs  - Administer supportive blood products/factors as ordered and appropriate  Outcome: Not Progressing     Problem: MUSCULOSKELETAL - ADULT  Goal: Maintain or return mobility to safest level of function  Description: INTERVENTIONS:  - Assess patient's ability to carry out ADLs; assess patient's baseline for ADL function and identify physical deficits which impact ability to perform ADLs (bathing, care of mouth/teeth, toileting, grooming, dressing, etc )  - Assess/evaluate cause of self-care deficits   - Assess range of motion  - Assess patient's mobility  - Assess patient's need for assistive devices and provide as appropriate  - Encourage maximum independence but intervene and supervise when necessary  - Involve family in performance of ADLs  - Assess for home care needs following discharge   - Consider OT consult to assist with ADL evaluation and planning for discharge  - Provide patient education as appropriate  Outcome: Not Progressing     Problem: COPING  Goal: Pt/Family able to verbalize concerns and demonstrate effective coping strategies  Description: INTERVENTIONS:  - Assist patient/family to identify coping skills, available support systems and cultural and spiritual values  - Provide emotional support, including active listening and acknowledgement of concerns of patient and caregivers  - Reduce environmental stimuli, as able  - Provide patient education  - Assess for spiritual pain/suffering and initiate spiritual care, including notification of Pastoral Care or [place] based community as needed  - Assess effectiveness of coping strategies  Outcome: Not Progressing  Goal: Will report anxiety at manageable levels  Description: INTERVENTIONS:  - Administer medication as ordered  - Teach and encourage coping skills  - Provide emotional support  - Assess patient/family for anxiety and ability to cope  Outcome: Not Progressing

## 2020-08-30 NOTE — ASSESSMENT & PLAN NOTE
S/p DCCV in route to [facility] pt in Afib w/ moderate VR  Cardio appreciated  Heparin gtt  Lopressor for rate control  F/u AM TSH

## 2020-08-30 NOTE — ASSESSMENT & PLAN NOTE
Vascular appreciated  ASA and statin  F/u carotid dopplers - based on results vascular will decide on intervention IP vs OP

## 2020-08-30 NOTE — CONSULTS
Consultation - Cardiology Team One  Jack Cotto 67 y o  female MRN: [id number]  Unit/Bed#: [facility] 504-01 Encounter: [id number]    Inpatient consult to Cardiology  Consult performed by: TJ Tavarez  Consult ordered by: Zaida Jimenez DO      Physician Requesting Consult: Zaida Jimenez DO  Reason for Consult / Principal Problem:     Assessment/ Plan    1  Paroxysmal atrial fibrillation   New diagnosis this admission   Echocardiogram 2020 showed EF 65%, no regional wall motion abnormalities, LA mildly to moderately dilated, mild MR, mild TR and mild pulmonary hypertension   Check TSH in am  Will titrate metoprolol to 50 mg PO BID   Continue heparin gtt for now, will need [address] prior to discharge     2  Stroke like symptoms s/p tPA  MRI of brain showed no acute changes  Neurology signed off     3  Carotid stenosis   CTA of head and neck showed approximately 65-70% stenosis of the left internal carotid artery bulb  Followed by vascular surgery  On aspirin and statin     4  Septic shock with gram positive bacteremia likely 2/2 infected stage IV sacral pressure ulcer with suspected osteomyelitis   On IV antibiotics, followed by ID     5  Volume overload in the setting of acute diastolic heart failure and iatrogenic volume overload-  Will give bumex 1 mg IV x 1 dose now   BMP in am   Monitor I/Os  Daily weights   Start 2 gram Na diet and 1800 ml fluid restriction     6  RORY- creatinine improvin 89 today   Will monitor with diuresis   Baseline 1 2-1 7    History of Present Illness   HPI: Jack Cotto is a 67y o  year old female who has a history of type II diabetes, hypertension, hyperlipidemia, CKD stage III and hip fracture with pressure ulcer  She does not follow with a cardiologist              She presented to [facility] 2020 with complaint of weakness via EMS   Patient was found to be in atrial fibrillation with RVR and hypotensive by EMS on arrival  She was cardioverted in route to hospital  After cardioversion, patient developed R sided facial droop, right sided weakness, aphasia and dysarthria  Out of hospital stroke alert was called and patient received tPA on arrival to ED  MRI of brain showed no acute changes During admission patient developed acute mental status change over night 8/26/2020 and was transferred to ICU  She was found to have septic shock with gram positive bacteremia likely in the setting of stage IV sacral ulcer with suspected OM  Cardiology consulted for paroxysmal atrial fibrillation  Patient reports no history of known atrial fibrillation  She has no complaint of palpitations, chest pain or SOB  She is resting comfortable in bed  Telemetry shows atrial fibrillation HR 100s  Echocardiogram 8/27/2020 showed EF 65%, no regional wall motion abnormalities, LA mildly to moderately dilated, mild MR, mild TR and mild pulmonary hypertension  EKG reviewed personally:  Atrial fibrillation  Ventricular rate 106 bpm  QRSD 72 ms  QT interval 304 ms  QTc interval 403 ms     Telemetry reviewed personally:   Atrial fibrillation HR 100s    Review of Systems   Constitution: Positive for malaise/fatigue  Negative for chills and fever  Cardiovascular: Positive for leg swelling  Negative for chest pain, orthopnea and palpitations  Respiratory: Negative for cough, shortness of breath, sputum production and wheezing  Musculoskeletal: Negative for falls  Gastrointestinal: Negative for bloating, nausea and vomiting  Neurological: Negative for dizziness and light-headedness  Psychiatric/Behavioral: Negative for altered mental status  All other systems reviewed and are negative      Historical Information   Past Medical History:   Diagnosis Date    [facility] failure (ARF) [facility]     Acute respiratory failure with hypoxia (HCC)     Chronic kidney disease     Diabetes mellitus ([facility] [facility] )     type 2    Hyperlipidemia     Hypertension      Past Surgical History:   Procedure Laterality Date  BREAST SURGERY Left     lumpectomy benign    CATARACT EXTRACTION Bilateral 2017    CATARACT EXTRACTION W/ INTRAOCULAR LENS IMPLANT Left 12/11/2017    Procedure: EXTRACTION EXTRACAPSULAR CATARACT PHACO INTRAOCULAR LENS (IOL); Surgeon: Ajith Abreu MD;  Location: [facility] MAIN OR;  Service: Ophthalmology    VA OPEN RX FEMUR FX+INTRAMED RAYMOND Right 5/21/2020    Procedure: INSERTION OF SHORT TROCHANTERIC FEMORAL NAIL;  Surgeon: Diamond Vela MD;  Location: [facility] Main OR;  Service: Orthopedics    [address] HUMERAL&GLENOID COMPNT Right 9/10/2018    Procedure: RIGHT REVERSE TOTAL SHOULDER ARTHROPLASTY;  Surgeon: Diamond Vela MD;  Location: [facility] Main OR;  Service: Orthopedics    VA XCAPSL CTRC RMVL INSJ IO LENS PROSTH W/O ECP Right 10/23/2017    Procedure: EXTRACTION EXTRACAPSULAR CATARACT PHACO INTRAOCULAR LENS (IOL);   Surgeon: Ajith Abreu MD;  Location: [facility] MAIN OR;  Service: Ophthalmology    WOUND DEBRIDEMENT N/A 8/26/2020    Procedure: EXCISIONAL DEBRIDEMENT OF SACRAL PRESSURE WOUND, WASHOUT;;  Surgeon: Scar Mak MD;  Location: [facility] MAIN OR;  Service: General     Social History     Substance and Sexual Activity   Alcohol Use No    Comment: quit 8/17     Social History     Substance and Sexual Activity   Drug Use No     Social History     Tobacco Use   Smoking Status Never Smoker   Smokeless Tobacco Never Used     Family History:   Family History   Problem Relation Age of Onset    Diabetes Mother     Varicose Veins Mother     Stroke Father     Arthritis Brother     Diabetes Daughter     No Known Problems Brother        Meds/Allergies   all current active meds have been reviewed and current meds:   Current Facility-Administered Medications   Medication Dose Route Frequency    acetaminophen (TYLENOL) tablet 650 mg  650 mg Oral Q6H PRN    [START ON 8/31/2020] aspirin (ECOTRIN LOW STRENGTH) EC tablet 81 mg  81 mg Oral Daily    atorvastatin (LIPITOR) tablet 40 mg  40 mg Oral Daily With Dinner    docusate sodium (COLACE) capsule 100 mg  100 mg Oral BID    gabapentin (NEURONTIN) capsule 100 mg  100 mg Oral TID    heparin (porcine) 25,000 units in 0 45% NaCl 250 mL infusion (premix)  3-20 Units/kg/hr (Order-Specific) Intravenous Titrated    hydrALAZINE (APRESOLINE) injection 10 mg  10 mg Intravenous Q4H PRN    insulin glargine (LANTUS) subcutaneous injection 12 Units 0 12 mL  12 Units Subcutaneous HS    insulin lispro (HumaLOG) 100 units/mL subcutaneous injection 1-6 Units  1-6 Units Subcutaneous 4x Daily (AC & HS)    metoprolol tartrate (LOPRESSOR) tablet 25 mg  25 mg Oral Q12H MARILYN    nystatin (MYCOSTATIN) powder   Topical BID    piperacillin-tazobactam (ZOSYN) 2 25 g in sodium chloride 0 9 % 50 mL IVPB  2 25 g Intravenous Q6H    senna (SENOKOT) tablet 8 6 mg  1 tablet Oral HS     heparin (porcine), 3-20 Units/kg/hr (Order-Specific), Last Rate: 12 Units/kg/hr (08/30/20 1128)        No Known Allergies    Objective   Vitals: Blood pressure 137/91, pulse (!) 109, temperature (!) 97 3 °F (36 3 °C), resp  rate 16, height 5' 6" (1 676 m), weight 84 2 kg (185 lb 10 oz), SpO2 99 %  ,     Body mass index is 29 96 kg/m²  ,     Systolic (74XUD), ZOD:850 , Min:123 , UIU:207     Diastolic (20XFG), IAB:23, Min:77, Max:91        Intake/Output Summary (Last 24 hours) at 8/30/2020 1140  Last data filed at 8/30/2020 0848  Gross per 24 hour   Intake 1383 33 ml   Output 1525 ml   Net -141 67 ml     Weight (last 2 days)     Date/Time   Weight    08/30/20 0533   84 2 (185 63)    08/29/20 0531   80 9 (178 35)    08/28/20 0600   79 2 (174 6)            Invasive Devices     Peripheral Intravenous Line            Peripheral IV 08/28/20 Distal;Dorsal (posterior); Right Forearm 1 day          Drain            Urethral Catheter Temperature probe 16 Fr  4 days              Physical Exam  Constitutional:       Appearance: She is obese  HENT:      Head: Normocephalic        Mouth/Throat:      Mouth: Mucous membranes are moist    Neck:      Musculoskeletal: Neck supple  Comments: + JVD   Cardiovascular:      Rate and Rhythm: Tachycardia present  Rhythm irregular  Pulses: Normal pulses  Heart sounds: No murmur  Pulmonary:      Effort: Pulmonary effort is normal  No respiratory distress  Breath sounds: Normal breath sounds  Abdominal:      General: Bowel sounds are normal  There is distension  Palpations: Abdomen is soft  Musculoskeletal:         General: Swelling present  Comments: Bilateral LE + 1-2 edema   R sided weakness    Skin:     General: Skin is warm  Coloration: Skin is not jaundiced  Neurological:      General: No focal deficit present  Mental Status: She is alert and oriented to person, place, and time     Psychiatric:         Mood and Affect: Mood normal            LABORATORY RESULTS:  Results from last 7 days   Lab Units 08/25/20  1315   TROPONIN I ng/mL <0 02     CBC with diff:   Results from last 7 days   Lab Units 08/30/20  0513 08/29/20  0511 08/28/20  0508 08/27/20  0454 08/26/20  0429 08/25/20  1315   WBC Thousand/uL 12 51* 11 56* 14 17* 15 00* 13 50* 16 27*   HEMOGLOBIN g/dL 9 4* 8 5* 7 6* 8 1* 7 9* 10 3*   HEMATOCRIT % 30 6* 26 8* 24 4* 25 0* 24 6* 32 4*   MCV fL 94 92 91 90 89 90   PLATELETS Thousands/uL 215 210 208 226 225 309   MCH pg 29 0 29 0 28 5 29 0 28 6 28 6   MCHC g/dL 30 7* 31 7 31 1* 32 4 32 1 31 8   RDW % 15 2* 15 3* 15 0 14 7 14 5 14 2   MPV fL 11 5 11 7 11 5 10 9 11 2 11 0   NRBC AUTO /100 WBCs  --   --  0 0 0  --        CMP:  Results from last 7 days   Lab Units 08/30/20  0513 08/29/20  0511 08/28/20  0508 08/27/20  0454 08/26/20  0429 08/25/20  1315   POTASSIUM mmol/L 4 3 4 7 4 3 4 6 3 4* 4 1   CHLORIDE mmol/L 109* 108 108 107 106 95*   CO2 mmol/L 22 20* 22 20* 21 23   BUN mg/dL 49* 47* 37* 35* 34* 36*   CREATININE mg/dL 1 89* 2 07* 1 96* 1 80* 2 32* 2 59*   CALCIUM mg/dL 8 5 8 0* 7 8* 7 6* 7 4* 8 2*   AST U/L  --   --   --   --  27  --    ALT U/L --   --   --   --  11*  --    ALK PHOS U/L  --   --   --   --  232*  --    EGFR ml/min/1 73sq m 25 23 24 27 20 17       BMP:  Results from last 7 days   Lab Units 20  0513 20  0511 20  0508 20  0454 20  0429 20  1315   POTASSIUM mmol/L 4 3 4 7 4 3 4 6 3 4* 4 1   CHLORIDE mmol/L 109* 108 108 107 106 95*   CO2 mmol/L 22 20* 22 20* 21 23   BUN mg/dL 49* 47* 37* 35* 34* 36*   CREATININE mg/dL 1 89* 2 07* 1 96* 1 80* 2 32* 2 59*   CALCIUM mg/dL 8 5 8 0* 7 8* 7 6* 7 4* 8 2*          No results found for: NTBNP         Results from last 7 days   Lab Units 20  0511 20  0508 20  0454 20  0429   MAGNESIUM mg/dL 2 5 2 3 2 3 1 2*          Results from last 7 days   Lab Units 20  0429   HEMOGLOBIN A1C % 9 9*              Results from last 7 days   Lab Units 20  0431 20  1315   INR  1 38* 1 23*     Lipid Profile:   No results found for: CHOL  Lab Results   Component Value Date    HDL 10 (L) 2020     Lab Results   Component Value Date    LDLCALC 23 2020     Lab Results   Component Value Date    TRIG 220 (H) 2020         Cardiac testing:   Results for orders placed during the hospital encounter of 20   Echo complete with contrast if indicated    Narrative [facility] [address]  [facility] [address] [facility]  [phone]    Transthoracic Echocardiogram  2D, M-mode, Doppler, and Color Doppler    Study date:  27-Aug-2020    Patient: Pilar Schultz  MR number: [id number]  Account number: [de-identified]  : 1945  Age: 67 years  Gender: Female  Status: Inpatient  Location: Bedside  Height: 66 in  Weight: 145 6 lb  BP: 104/ 59 mmHg    Indications: Atrial fibrillation and CVA  Diagnoses: I48 0 - Atrial fibrillation, I63 9 - Cerebral infarction, unspecified    Sonographer:  Bradley Moreno RDCS  Referring Physician:  Jessica Gore MD  Group:  [id number]  Cardiology Fellow:  Janey Hendricks Ivette Wagoner MD  Interpreting Physician:  Nikky Barba MD    SUMMARY    LEFT VENTRICLE:  Systolic function was normal  Ejection fraction was estimated to be 65 %  There were no regional wall motion abnormalities  Wall thickness was mildly increased  The changes were consistent with concentric remodeling (increased wall thickness with normal wall mass)  RIGHT VENTRICLE:  The size was normal   Systolic function was normal     LEFT ATRIUM:  The atrium was mildly to moderately dilated  MITRAL VALVE:  There was mild regurgitation  TRICUSPID VALVE:  There was mild regurgitation  Estimated peak PA pressure was 45 mmHg  The findings suggest mild pulmonary hypertension  HISTORY: PRIOR HISTORY: Hypertension  RORY  Hyperlipidemia  CKD3  PROCEDURE: The procedure was performed at the bedside  This was a routine study  The transthoracic approach was used  The study included complete 2D imaging, M-mode, complete spectral Doppler, and color Doppler  The heart rate was 98 bpm,  at the start of the study  Image quality was adequate  LEFT VENTRICLE: Size was normal  Systolic function was normal  Ejection fraction was estimated to be 65 %  There were no regional wall motion abnormalities  Wall thickness was mildly increased  The changes were consistent with concentric  remodeling (increased wall thickness with normal wall mass)  DOPPLER: Transmitral flow pattern: atrial fibrillation  Left ventricular diastolic function parameters were abnormal     RIGHT VENTRICLE: The size was normal  Systolic function was normal  Wall thickness was normal     LEFT ATRIUM: The atrium was mildly to moderately dilated  RIGHT ATRIUM: Size was normal     MITRAL VALVE: There was minimal calcification, with mild chordal involvement  There was normal leaflet separation  DOPPLER: The transmitral velocity was within the normal range  There was no evidence for stenosis  There was mild  regurgitation      AORTIC VALVE: The valve was trileaflet  Leaflets exhibited normal thickness and normal cuspal separation  DOPPLER: Transaortic velocity was within the normal range  There was no evidence for stenosis  There was no significant  regurgitation  TRICUSPID VALVE: The valve structure was normal  There was normal leaflet separation  DOPPLER: The transtricuspid velocity was within the normal range  There was no evidence for stenosis  There was mild regurgitation  Estimated peak PA  pressure was 45 mmHg  The findings suggest mild pulmonary hypertension  PULMONIC VALVE: DOPPLER: The transpulmonic velocity was within the normal range  There was mild regurgitation  PERICARDIUM: There was no pericardial effusion  AORTA: The root exhibited normal size  SYSTEMIC VEINS: IVC: The inferior vena cava was normal in size and course  Respirophasic changes were normal     SYSTEM MEASUREMENT TABLES    2D  %FS: 34 49 %  Ao Diam: 2 93 cm  EDV(Teich): 98 78 ml  EF(Teich): 63 62 %  ESV(Teich): 35 94 ml  IVSd: 1 01 cm  LA Area: 26 09 cm2  LA Diam: 4 43 cm  LVEDV MOD A4C: 58 4 ml  LVEF MOD A4C: 57 72 %  LVESV MOD A4C: 24 69 ml  LVIDd: 4 63 cm  LVIDs: 3 03 cm  LVLd A4C: 6 86 cm  LVLs A4C: 5 44 cm  LVPWd: 1 05 cm  RA Area: 11 9 cm2  RVIDd: 2 79 cm  SV MOD A4C: 33 71 ml  SV(Teich): 62 84 ml    CW  TR Vmax: 3 08 m/s  TR maxP 06 mmHg    MM  TAPSE: 1 88 cm    PW  E': 0 05 m/s  E/E': 17 99  MV A Roberto: 0 34 m/s  MV Dec [place]: 6 55 m/s2  MV DecT: 130 89 ms  MV E Roberto: 0 86 m/s  MV E/A Ratio: 2 5  MV PHT: 37 96 ms  MVA By PHT: 5 8 cm2    [employer] Accredited Echocardiography Laboratory    Prepared and electronically signed by    Kat Carreon MD  Signed 27-Aug-2020 09:53:07       Imaging:   Ct Chest Abdomen Pelvis Wo Contrast    Result Date: 2020  Narrative: CT CHEST, ABDOMEN AND PELVIS WITHOUT IV CONTRAST INDICATION:   hypotension, hypoxia, large sacral decubitus ulcer  COMPARISON:  None   TECHNIQUE: CT examination of the chest, abdomen and pelvis was performed without intravenous contrast   Axial, sagittal, and coronal 2D reformatted images were created from the source data and submitted for interpretation  Radiation dose length product (DLP) for this visit:  876 mGy-cm   This examination, like all CT scans performed in the [facility], was performed utilizing techniques to minimize radiation dose exposure, including the use of iterative reconstruction and automated exposure control  Enteric contrast was administered  FINDINGS: CHEST LUNGS:  Lungs are clear  There is no tracheal or endobronchial lesion  PLEURA:  Small bilateral pleural effusions are present  HEART/GREAT VESSELS:  Unremarkable for patient's age  MEDIASTINUM AND TYLOR:  Unremarkable  CHEST WALL AND LOWER NECK:   Unremarkable  ABDOMEN LIVER/BILIARY TREE:  Unremarkable  GALLBLADDER:  No calcified gallstones  No pericholecystic inflammatory change  SPLEEN:  Unremarkable  PANCREAS:  Unremarkable  ADRENAL GLANDS:  Unremarkable  KIDNEYS/URETERS:  Residual renal cortical enhancement is seen suggesting ATN  STOMACH AND BOWEL:  Unremarkable  APPENDIX:  No findings to suggest appendicitis  ABDOMINOPELVIC CAVITY:  No ascites  No pneumoperitoneum  No lymphadenopathy  VESSELS:  Unremarkable for patient's age  PELVIS REPRODUCTIVE ORGANS:  Unremarkable for patient's age  URINARY BLADDER:  Jimenez catheter is present  ABDOMINAL WALL/INGUINAL REGIONS:  Decubitus ulcer overlies the inferior sacrum and coccyx with associated soft tissue gas present within the right sacrospinous ligament in keeping with necrotizing fasciitis  This gas also extends into the perirectal soft tissues  OSSEOUS STRUCTURES:  No acute fracture or destructive osseous lesion  Impression: Soft tissue ulcer overlying the inferior sacrum and coccyx with associated gas production about the right sacrospinous ligament extending into the soft tissues in the perirectal region in keeping with necrotizing fasciitis  Small bilateral pleural effusions  Persistent renal cortical contrast enhancement in keeping with ATN  I personally discussed this study with Cornelio Pierce on 8/26/2020 at 2:13 PM  Workstation performed: [id number]     Xr Sacrum And Coccyx    Result Date: 8/19/2020  Narrative: SACRUM AND COCCYX INDICATION:   L89 154: Pressure ulcer of sacral region, stage 4  COMPARISON:  None VIEWS:  XR SACRUM AND COCCYX  FINDINGS: Posterior soft tissue ulcer extending to distal sacrum with loss of dorsal cortex and distal sacral lucency  Body habitus, technique and osteopenia limit evaluation  There is no evidence of an acute fracture  Sacral arcuate lines are maintained  Degenerative right SI joint joint  Right hip ORIF noted  Pubic symphysis is maintained  Degenerative lower lumbar spine  Impression: Distal sacral osteomyelitis suspected  The study was marked in [facility] for immediate notification  Workstation performed: [id number]     Ct Head Without Contrast    Result Date: 8/26/2020  Narrative: CT BRAIN - WITHOUT CONTRAST INDICATION:   worsening mental status, right sided facial droop post tPA  The patient tested negative for COVID-19 on May 20, 2020, May 26, 2020 and again on August 25, 2020  COMPARISON:  Noncontrast CT of the brain dated August 25, 2020 at 1:19 PM  TECHNIQUE:  CT examination of the brain was performed  In addition to axial images, sagittal and coronal 2D reformatted images were created and submitted for interpretation  Radiation dose length product (DLP) for this visit:  907 mGy-cm   This examination, like all CT scans performed in the [facility], was performed utilizing techniques to minimize radiation dose exposure, including the use of iterative reconstruction and automated exposure control  IMAGE QUALITY:  Diagnostic   FINDINGS: PARENCHYMA: Decreased attenuation is noted in periventricular and subcortical white matter demonstrating an appearance that is statistically most likely to represent multiple moderate microangiopathic change; this appearance is similar when compared to most recent prior examination  No definite CT signs of acute infarction; MRI is more sensitive  No intracranial mass, mass effect or midline shift  No acute parenchymal hemorrhage  VENTRICLES AND EXTRA-AXIAL SPACES:  Ventricles and extra-axial CSF spaces are mildly prominent commensurate with the degree of age-appropriate volume loss, similar to the prior study  No hydrocephalus  No acute extra-axial hemorrhage  VISUALIZED ORBITS AND PARANASAL SINUSES:  Unremarkable  CALVARIUM AND EXTRACRANIAL SOFT TISSUES:  Normal      Impression: No evidence of acute intracranial hemorrhage  No significant interval change  If clinically appropriate, MRI with diffusion-weighted imaging could be performed for further evaluation, if there are no contraindications  Workstation performed: [id number]     Mri Brain Wo Contrast    Result Date: 8/26/2020  Narrative: MRI BRAIN WITHOUT CONTRAST INDICATION: cva  COMPARISON:   8/25/2020 CT/CTA TECHNIQUE:  Sagittal T1, axial T2, axial FLAIR, axial T1, axial [place] and axial diffusion imaging  IMAGE QUALITY:  Diagnostic  Mild degree of patient motion degrades the exam FINDINGS: BRAIN PARENCHYMA: Mild  chronic microvascular ischemic changes are present within the subcortical and deep white matter of the frontal and parietal lobes bilaterally  Consistent with CT/CTA  No acute ischemic disease is identified  Age-appropriate cerebral atrophy is present  There is no discrete mass, mass effect or midline shift  No hemorrhage  Brainstem and cerebellum demonstrate normal signal   Diffusion imaging is unremarkable  VENTRICLES: The ventricles are normal in size and contour  SELLA AND PITUITARY GLAND:  Normal  ORBITS:  Normal  PARANASAL SINUSES:  The paranasal sinuses are clear  VASCULATURE:  Evaluation of the major intracranial vasculature demonstrates appropriate flow voids   CALVARIUM AND SKULL BASE: Normal  EXTRACRANIAL SOFT TISSUES: Normal      Impression: Mild chronic microvascular ischemic disease No acute ischemic disease Age-related atrophy Consistent with CT/CTA  Workstation performed: [id number]     Ct Stroke Alert Brain    Result Date: 8/25/2020  Narrative: CT BRAIN - STROKE ALERT PROTOCOL INDICATION:   Neuro deficit, acute, stroke suspected  COMPARISON:  8/31/2018  TECHNIQUE:  CT examination of the brain was performed  In addition to axial images, coronal reformatted images were created and submitted for interpretation  Radiation dose length product (DLP) for this visit:  867 mGy-cm   This examination, like all CT scans performed in the [facility], was performed utilizing techniques to minimize radiation dose exposure, including the use of iterative reconstruction and automated exposure control  IMAGE QUALITY:  Diagnostic  FINDINGS:  PARENCHYMA:  Decreased attenuation is noted in the supratentorial white matter demonstrating an appearance most consistent with mild microangiopathic change  No intracranial mass, mass effect or midline shift  No acute intracranial hemorrhage  No CT signs of acute infarction  No change  Probable small old lacunar infarct within the anterior left thalamus  Mild vascular calcification of the intracranial vasculature  VENTRICLES AND EXTRA-AXIAL SPACES:  Normal for patient's age  VISUALIZED ORBITS AND PARANASAL SINUSES:  Unremarkable  CALVARIUM AND EXTRACRANIAL SOFT TISSUES:   Normal      Impression: No acute intracranial abnormality  Stable mild microangiopathic changes  Findings were directly discussed with neurology attending on 8/25/2020 1:27 PM  Workstation performed: [id number]     Cta Stroke Alert (head/neck)    Result Date: 8/25/2020  Narrative: CTA NECK AND BRAIN WITH CONTRAST INDICATION: Neuro deficit, acute, stroke suspected   Dysarthria  COMPARISON:   None   TECHNIQUE:   Post contrast imaging was performed after administration of iodinated contrast through the neck and brain  Post contrast axial 0 625 mm images timed to opacify the arterial system  3D rendering was performed on an independent workstation  MIP reconstructions performed  Coronal reconstructions were performed of the noncontrast portion of the brain  Radiation dose length product (DLP) for this visit:  849 mGy-cm   This examination, like all CT scans performed in the [facility], was performed utilizing techniques to minimize radiation dose exposure, including the use of iterative reconstruction and automated exposure control  IV Contrast:  90 mL of iohexol (OMNIPAQUE)  IMAGE QUALITY:   Diagnostic FINDINGS: CERVICAL VASCULATURE AORTIC ARCH AND GREAT VESSELS:  Normal aortic arch and great vessel origins  Normal visualized subclavian vessels  RIGHT VERTEBRAL ARTERY CERVICAL SEGMENT:  Normal origin  The vessel is normal in caliber throughout the neck  LEFT VERTEBRAL ARTERY CERVICAL SEGMENT:  Normal origin  The vessel is normal in caliber throughout the neck  RIGHT EXTRACRANIAL CAROTID SEGMENT:  Mild atherosclerotic disease of the right common carotid artery and its bifurcation extending into the proximal cervical internal carotid artery without focal stenosis or irregularity LEFT EXTRACRANIAL CAROTID SEGMENT:  Calcified and noncalcified atherosclerotic disease of the distal common carotid artery with approximately 40% stenosis  Atherosclerotic calcification extends into the proximal cervical internal carotid artery where there is smooth stenosis  The vessel measures approximately 1 5 mm at the point of maximal stenosis  Comparing this to the mid cervical internal carotid artery which measures approximately 4 5 mm, this corresponds to a stenosis of approximately 65-70%  NASCET criteria was used to determine the degree of internal carotid artery diameter stenosis   INTRACRANIAL VASCULATURE INTERNAL CAROTID ARTERIES:  Mild atherosclerotic disease of the intracranial internal carotid arteries  There is mild focal stenosis identified involving the supraclinoid internal carotid artery on the right  ANTERIOR CIRCULATION:  Hypoplastic right A1 segment  Normal left A1 segment  Normal anterior communicating artery  The A2 and A3 segments are normal  MIDDLE CEREBRAL ARTERY CIRCULATION:  M1 segment and middle cerebral artery branches demonstrate normal enhancement bilaterally  DISTAL VERTEBRAL ARTERIES:  Normal distal right vertebral artery and posterior inferior cerebellar artery origin  The left V4 segment demonstrates moderate stenosis at the level of the foramen magnum, proximal to the posterior inferior cerebellar artery  origin  BASILAR ARTERY:  Basilar artery is normal in caliber  Normal superior cerebellar arteries  POSTERIOR CEREBRAL ARTERIES: The right P1 segment and posterior cerebral artery is patent  The more distal posterior cerebral artery within the paramesencephalic cistern demonstrates moderate atherosclerotic disease and narrowing  The left posterior cerebral artery demonstrates patency but severe stenosis throughout its course  Normal posterior communicating arteries  DURAL VENOUS SINUSES:  Normal  NON VASCULAR ANATOMY BONY STRUCTURES:  Osteomalacia of the maxilla and mandible  Mild mid cervical degenerative change, C5-6 and C6-7  SOFT TISSUES OF THE NECK:  Unremarkable  VISUALIZED CHEST:  No clinically significant abnormality identified in the visualized portion of the thorax  Impression: Approximately 65-70% stenosis of the left internal carotid artery bulb  Mild intracranial atherosclerotic disease including mild focal narrowing of the right supraclinoid internal carotid artery  Posterior cerebral artery disease is noted, mild on the right and severe left  The M1 segments and middle cerebral artery branches appear patent   Findings were directly discussed with neurology attending on 8/25/2020 1:42 PM  Workstation performed: [id number]     Thank you for allowing us to participate in this patient's care  Counseling / Coordination of Care  Total floor / unit time spent today 45 minutes  Greater than 50% of total time was spent with the patient and / or family counseling and / or coordination of care  A description of the counseling / coordination of care: Review of history, current assessment, development of a plan  Code Status: Level 1 - Full Code    ** Please Note: Dragon 360 Dictation voice to text software may have been used in the creation of this document   **

## 2020-08-30 NOTE — CONSULTS
Consultation - Vascular Surgery   Hattie Lester 67 y o  female MRN: [id number]  Unit/Bed#: [facility] 504-01 Encounter: [id number]    Assessment/Plan     Assessment:  Pt is a 75y o  F presented with new onset afib and stroke s/p TPA for right sided weakness, found to have 65-70% L JUSTINA    Plan: Will order carotid duplex U/S, pending results will discuss inpatient L CEA when medically optimized  ASA/lipitor  Heparin gtt with transition to oral anticoagulation for afib  Primary per SLIM    History of Present Illness     HPI:  Hattie Lester is a 67 y o  female who presents with pmh HTN, HLD, DM, CKD, hx L lumpectomy, s/p sacral ulcer detriment by general surgery who presented with dysphagia, right facial droop, right sided weakness and underwent TPA administration, she was in septic shock from GP bacteremia form enterococcus  She also required cardioversion for afib with RVR  She has had some improvement in her symptoms over the hospital course, but still remains weak on her right side  8/25 CTA H/N: L 65-70% JUSTINA  Inpatient consult to Vascular Surgery     Date/Time 8/30/2020 11:38 AM     Performed by  Cait Guillen MD     Authorized by Bard Travis DO              Review of Systems   Constitutional: Negative for activity change and appetite change  HENT: Negative  Eyes: Negative  Respiratory: Negative for chest tightness and shortness of breath  Cardiovascular: Negative for chest pain and leg swelling  Gastrointestinal: Negative  Endocrine: Negative  Genitourinary: Negative  Musculoskeletal: Negative  Skin: Negative  Neurological: Positive for weakness  Negative for facial asymmetry  Hematological: Negative  Psychiatric/Behavioral: Negative          Historical Information   Past Medical History:   Diagnosis Date    [facility]     Acute respiratory failure with hypoxia (HCC)     Chronic kidney disease     Diabetes mellitus ([facility] )     type 2    Hyperlipidemia  Hypertension      Past Surgical History:   Procedure Laterality Date    BREAST SURGERY Left     lumpectomy benign    CATARACT EXTRACTION Bilateral 2017    CATARACT EXTRACTION W/ INTRAOCULAR LENS IMPLANT Left 12/11/2017    Procedure: EXTRACTION EXTRACAPSULAR CATARACT PHACO INTRAOCULAR LENS (IOL); Surgeon: Ajith Abreu MD;  Location: [facility] MAIN OR;  Service: Ophthalmology    OH OPEN RX FEMUR FX+INTRAMED RAYMOND Right 5/21/2020    Procedure: INSERTION OF SHORT TROCHANTERIC FEMORAL NAIL;  Surgeon: Diamond Vela MD;  [facility] Main [facility];  Service: Orthopedics    [address] HUMERAL&GLENOID COMPNT Right 9/10/2018    Procedure: RIGHT REVERSE TOTAL SHOULDER ARTHROPLASTY;  Surgeon: Diamond Vela MD;  [facility] Main [facility];  Service: Orthopedics    OH XCAPSL CTRC RMVL INSJ IO LENS PROSTH W/O ECP Right 10/23/2017    Procedure: EXTRACTION EXTRACAPSULAR CATARACT PHACO INTRAOCULAR LENS (IOL);   Surgeon: Ajith Abreu MD;  Location: [facility] MAIN OR;  Service: Ophthalmology    WOUND DEBRIDEMENT N/A 8/26/2020    Procedure: EXCISIONAL DEBRIDEMENT OF SACRAL PRESSURE WOUND, WASHOUT;;  Surgeon: Scar Mak MD;  Location: [facility] MAIN OR;  Service: General     Social History   Social History     Substance and Sexual Activity   Alcohol Use No    Comment: quit 8/17     Social History     Substance and Sexual Activity   Drug Use No     E-Cigarette/Vaping    E-Cigarette Use Never User      E-Cigarette/Vaping Substances    Nicotine No     THC No     CBD No     Flavoring No     Other No     Unknown No      Social History     Tobacco Use   Smoking Status Never Smoker   Smokeless Tobacco Never Used     Family History: non-contributory    Meds/Allergies   all current active meds have been reviewed  No Known Allergies    Objective   First Vitals:   Blood Pressure: 99/52 (08/25/20 1611)  Pulse: 96 (08/25/20 1611)  Temperature: 98 3 °F (36 8 °C) (08/25/20 1611)  Temp Source: Oral (08/25/20 1611)  Respirations: 16 (08/25/20 1611)  Height: 5' 6" (167 6 cm) (08/25/20 1611)  Weight - Scale: 73 2 kg (161 lb 6 oz) (08/25/20 1611)  SpO2: 97 % (08/25/20 1611)    Current Vitals:   Blood Pressure: 137/91 (08/30/20 1031)  Pulse: (!) 109 (08/30/20 1031)  Temperature: (!) 97 3 °F (36 3 °C) (08/30/20 1031)  Temp Source: Oral (08/30/20 0300)  Respirations: 16 (08/30/20 1031)  Height: 5' 6" (167 6 cm) (08/26/20 0626)  Weight - Scale: 84 2 kg (185 lb 10 oz) (08/30/20 0533)  SpO2: 99 % (08/30/20 1031)      Intake/Output Summary (Last 24 hours) at 8/30/2020 1138  Last data filed at 8/30/2020 0848  Gross per 24 hour   Intake 1383 33 ml   Output 1525 ml   Net -141 67 ml       Invasive Devices     Peripheral Intravenous Line            Peripheral IV 08/28/20 Distal;Dorsal (posterior); Right Forearm 1 day          Drain            Urethral Catheter Temperature probe 16 Fr  4 days                Physical Exam  Constitutional:       Appearance: Normal appearance  HENT:      Head: Normocephalic and atraumatic  Nose: Nose normal       Mouth/Throat:      Mouth: Mucous membranes are dry  Eyes:      Pupils: Pupils are equal, round, and reactive to light  Neck:      Musculoskeletal: Normal range of motion  Cardiovascular:      Rate and Rhythm: Normal rate  Rhythm irregular  Pulmonary:      Effort: Pulmonary effort is normal  No respiratory distress  Abdominal:      General: Abdomen is flat  There is no distension  Palpations: Abdomen is soft  Musculoskeletal: Normal range of motion  Right lower leg: Edema present  Left lower leg: Edema present  Comments: Right upper and lower extremity weakness   Skin:     General: Skin is warm and dry  Capillary Refill: Capillary refill takes less than 2 seconds  Neurological:      Mental Status: She is alert and oriented to person, place, and time     Psychiatric:         Mood and Affect: Mood normal          Behavior: Behavior normal          Lab Results:   I have personally reviewed pertinent lab results  , CBC:   Lab Results   Component Value Date    WBC 12 51 (H) 08/30/2020    HGB 9 4 (L) 08/30/2020    HCT 30 6 (L) 08/30/2020    MCV 94 08/30/2020     08/30/2020    MCH 29 0 08/30/2020    MCHC 30 7 (L) 08/30/2020    RDW 15 2 (H) 08/30/2020    MPV 11 5 08/30/2020   , CMP:   Lab Results   Component Value Date    SODIUM 139 08/30/2020    K 4 3 08/30/2020     (H) 08/30/2020    CO2 22 08/30/2020    BUN 49 (H) 08/30/2020    CREATININE 1 89 (H) 08/30/2020    CALCIUM 8 5 08/30/2020    EGFR 25 08/30/2020     Imaging: I have personally reviewed pertinent reports  EKG, Pathology, and Other Studies: I have personally reviewed pertinent reports

## 2020-08-30 NOTE — ASSESSMENT & PLAN NOTE
- pt presents with to [facility] with right-sided facial droop and right-sided weakness after being transported by EMS for [facility] and noted to have atrial fibrillation enroute  - CT head in ED shows posterior cerebral artery disease is noted, mild on the right and severe left  - NIH - score on admission; - 6  -c/w ASA and statin  - MRI brain no acute findings  - CTA shows bilateral stenosis of carotids; less than 75%  LDL 23  Echo shows mild pulm htn  Neuro signed off  Pt now in Afib - will place on heparin gtt    Neuro recs BASA and AC in setting of Afib and carotid stenosis

## 2020-08-30 NOTE — PROGRESS NOTES
Progress Note - Shearon Book 1945, 67 y o  female MRN: [id number]    Unit/Bed#: [facility] 504-01 Encounter: [id number]    Primary Care Provider: TJ Hardin   Date and time admitted to hospital: 8/25/2020  4:06 PM        * Septic shock due to gram-positive bacteria [facility])  Assessment & Plan  Pt now off pressors  ID appreciated  C/w Zosyn  B Cx from 8/28 neg  2/2 infected stage 4 DQ ulcer  Defer to ID when PICC can be placed as pt needs heparin gtt    Stenosis of left carotid artery  Assessment & Plan  Vascular appreciated  ASA and statin  F/u carotid dopplers - based on results vascular will decide on intervention IP vs OP    Anemia  Assessment & Plan  S/p 1U PRBC in ICU  Appears stable at this time    Bacteremia due to Enterococcus  Assessment & Plan  B Cx pos for Enterococcus and Strep Constellatus  Zosyn  ID appreciated    Paroxysmal atrial fibrillation (HCC)  Assessment & Plan  S/p DCCV in route to [facility] pt in Afib w/ moderate VR  Cardio appreciated  Heparin gtt  Lopressor for rate control  F/u AM TSH    RORY (acute kidney injury) ([facility] )  Assessment & Plan  Estimated Creatinine Clearance: 27 7 mL/min (A) (by C-G formula based on SCr of 1 89 mg/dL (H))  POA  Patient noted to have RORY on admission; serum creatinine 2 59 (baseline 1 2-1 3)  2/2 shock  Watch Cr w/ diuresis    Stroke-like symptoms  Assessment & Plan  - pt presents with to [facility] with right-sided facial droop and right-sided weakness after being transported by EMS for [facility] and noted to have atrial fibrillation enroute  - CT head in ED shows posterior cerebral artery disease is noted, mild on the right and severe left  - NIH - score on admission; - 6  -c/w ASA and statin  - MRI brain no acute findings  - CTA shows bilateral stenosis of carotids; less than 75%  LDL 23  Echo shows mild pulm htn  Neuro signed off  Pt now in Afib - will place on heparin gtt    Neuro recs BASA and AC in setting of Afib and carotid stenosis    Decubitus ulcer of sacral region, stage 4 [facility])  Assessment & Plan  POA  Wound care and piper surg appreciated  Patient reports ulcer developed status post recent hip fracture   excisional debridement of sacral pressure wound and washout    Debridement and washout of sacral decubitus ulcer  C/w trujillo  ID recs divesrsion colostomy to help w/ healing    Hyperlipidemia  Assessment & Plan  Previously on pravastatin; will start high-intensity statin with Lipitor 40 in setting of carotid stenosis  LDL 23    Hypertension  Assessment & Plan  BP stable off home meds  Pt needed pressors on admission  Now on BB for Afib    Type 2 diabetes mellitus with hyperglycemia ([facility] )  Assessment & Plan    Lab Results   Component Value Date    HGBA1C 9 9 (H) 2020     SSI  Blood Glucose checks TIDWM and QHS (Q6H for NPO patients)  Hold oral medications  Today increase Lantus to 12U qhs  PT should take insulin at d/c    VTE Pharmacologic Prophylaxis:   Pharmacologic: Heparin Drip  Mechanical VTE Prophylaxis in Place: Yes    Patient Centered Rounds: I have performed bedside rounds with nursing staff today  Discussions with Specialists or Other Care Team Provider: vascular and cardio and neuro    Education and Discussions with Family / Patient: pt and son    Time Spent for Care: 30 minutes  More than 50% of total time spent on counseling and coordination of care as described above  Current Length of Stay: 5 day(s)    Current Patient Status: Inpatient   Certification Statement: The patient will continue to require additional inpatient hospital stay due to above issues    Discharge Plan: pending above issues    Code Status: Level 1 - Full Code      Subjective:   No acute complaints  Noted to be in Afib      Objective:     Vitals:   Temp (24hrs), Av 5 °F (36 4 °C), Min:97 3 °F (36 3 °C), Max:97 8 °F (36 6 °C)    Temp:  [97 3 °F (36 3 °C)-97 8 °F (36 6 °C)] 97 3 °F (36 3 °C)  HR:  [] 105  Resp:  [16-17] 16  BP: (123-141)/(83-91) 137/91  SpO2:  [97 %-99 %] 99 %  Body mass index is 29 96 kg/m²  Input and Output Summary (last 24 hours): Intake/Output Summary (Last 24 hours) at 8/30/2020 1508  Last data filed at 8/30/2020 1201  Gross per 24 hour   Intake 1983 33 ml   Output 1275 ml   Net 708 33 ml       Physical Exam:     Physical Exam  HENT:      Head: Normocephalic and atraumatic  Nose: Nose normal       Mouth/Throat:      Mouth: Mucous membranes are moist    Eyes:      Extraocular Movements: Extraocular movements intact  Conjunctiva/sclera: Conjunctivae normal    Neck:      Musculoskeletal: Normal range of motion and neck supple  Cardiovascular:      Rate and Rhythm: Normal rate and regular rhythm  Pulmonary:      Effort: Pulmonary effort is normal       Breath sounds: Normal breath sounds  No wheezing or rales  Abdominal:      General: Bowel sounds are normal  There is no distension  Palpations: Abdomen is soft  Tenderness: There is no abdominal tenderness  Musculoskeletal: Normal range of motion  Right lower leg: No edema  Left lower leg: No edema  Skin:     General: Skin is warm and dry  Neurological:      Mental Status: She is alert and oriented to person, place, and time  Mental status is at baseline  Additional Data:     Labs:    Results from last 7 days   Lab Units 08/30/20  0513  08/28/20  0508   WBC Thousand/uL 12 51*   < > 14 17*   HEMOGLOBIN g/dL 9 4*   < > 7 6*   HEMATOCRIT % 30 6*   < > 24 4*   PLATELETS Thousands/uL 215   < > 208   NEUTROS PCT %  --   --  88*   LYMPHS PCT %  --   --  7*   MONOS PCT %  --   --  3*   EOS PCT %  --   --  0    < > = values in this interval not displayed       Results from last 7 days   Lab Units 08/30/20  0513  08/26/20  0429   POTASSIUM mmol/L 4 3   < > 3 4*   CHLORIDE mmol/L 109*   < > 106   CO2 mmol/L 22   < > 21   BUN mg/dL 49*   < > 34*   CREATININE mg/dL 1 89*   < > 2 32*   CALCIUM mg/dL 8 5   < > 7 4*   ALK PHOS U/L  --   --  232*   ALT U/L  --   --  11*   AST U/L  --   --  27    < > = values in this interval not displayed  Results from last 7 days   Lab Units 08/30/20  1201   INR  1 11       * I Have Reviewed All Lab Data Listed Above  * Additional Pertinent Lab Tests Reviewed: Romana Arias Admission Reviewed        Recent Cultures (last 7 days):     Results from last 7 days   Lab Units 08/28/20  0542 08/28/20  0536 08/26/20  1353 08/26/20  1225 08/26/20  0554 08/26/20  0536   BLOOD CULTURE  No Growth at 48 hrs   No Growth at 48 hrs   --   --   --  Enterococcus faecalis*  Streptococcus constellatus*  Streptococcus constellatus*  Staphylococcus coagulase negative*   GRAM STAIN RESULT   --   --  No polys seen*  2+ Gram positive cocci in pairs* 1+ Polys*  3+ Gram positive cocci in pairs*  2+ Gram positive rods*  1+ Gram negative rods*  --  Gram positive cocci in pairs and chains*  Gram positive cocci in pairs and chains*   URINE CULTURE   --   --   --   --  50,000-59,000 cfu/ml Escherichia coli*  >100,000 cfu/ml Lactobacillus species*  --    WOUND CULTURE   --   --  3+ Growth of Escherichia coli*  3+ Growth of Proteus mirabilis*  3+ Growth of Enterococcus faecalis*  3+ Growth of  2+ Growth of Escherichia coli*  2+ Growth of Proteus mirabilis*  3+ Growth of Enterococcus species*  3+ Growth of   --   --        Last 24 Hours Medication List:   Current Facility-Administered Medications   Medication Dose Route Frequency Provider Last Rate    acetaminophen  650 mg Oral Q6H PRN Meghna Evans DO      [START ON 8/31/2020] aspirin  81 mg Oral Daily Meghna Evans DO      atorvastatin  40 mg Oral Daily With BEATRIZ Rubi      docusate sodium  100 mg Oral BID Mesfin Jovel PA-C      gabapentin  100 mg Oral TID Mesfin Jovel PA-C      heparin (porcine)  3-20 Units/kg/hr (Order-Specific) Intravenous Titrated Meghna Evans DO 16 Units/kg/hr (08/30/20 1413)    hydrALAZINE  10 mg Intravenous Q4H PRN Winsome Rose PA-C      insulin glargine  12 Units Subcutaneous HS Cara Myers DO      insulin lispro  1-6 Units Subcutaneous 4x Daily (AC & HS) Geovanni Dawkins PA-C      metoprolol tartrate  25 mg Oral Once TJ Castellano      metoprolol tartrate  50 mg Oral Q12H [address] Nicko Anisha, TJ      nystatin   Topical BID Winsome Rose PA-C      piperacillin-tazobactam  2 25 g Intravenous Q6H TJ Claros 2 25 g (08/30/20 1416)    senna  1 tablet Oral HS Cara Myers DO          Today, Patient Was Seen By: Cara Myers DO    ** Please Note: Dictation voice to text software may have been used in the creation of this document   **

## 2020-08-30 NOTE — ASSESSMENT & PLAN NOTE
Pt now off pressors  ID appreciated  C/w Keanu VIZCARRA Cx from 8/28 neg  2/2 infected stage 4 DQ ulcer  Defer to ID when PICC can be placed as pt needs heparin gtt

## 2020-08-30 NOTE — ASSESSMENT & PLAN NOTE
Estimated Creatinine Clearance: 27 7 mL/min (A) (by C-G formula based on SCr of 1 89 mg/dL (H))    POA  Patient noted to have RORY on admission; serum creatinine 2 59 (baseline 1 2-1 3)  2/2 shock  Watch Cr w/ diuresis

## 2020-08-30 NOTE — ASSESSMENT & PLAN NOTE
POA  Wound care and piper surg appreciated  Patient reports ulcer developed status post recent hip fracture  8/26 excisional debridement of sacral pressure wound and washout   8/28 Debridement and washout of sacral decubitus ulcer  C/w trujillo  ID recs divesrsion colostomy to help w/ healing

## 2020-08-30 NOTE — ASSESSMENT & PLAN NOTE
Previously on pravastatin; will start high-intensity statin with Lipitor 40 in setting of carotid stenosis  LDL 23

## 2020-08-30 NOTE — ASSESSMENT & PLAN NOTE
Lab Results   Component Value Date    HGBA1C 9 9 (H) 08/26/2020     SSI  Blood Glucose checks TIDWM and QHS (Q6H for NPO patients)  Hold oral medications  Today increase Lantus to 12U qhs  PT should take insulin at d/c

## 2020-08-31 ENCOUNTER — APPOINTMENT (INPATIENT)
Dept: NON INVASIVE DIAGNOSTICS | Facility: HOSPITAL | Age: 75
DRG: 853 | End: 2020-08-31
Payer: MEDICARE

## 2020-08-31 PROBLEM — I50.32 CHRONIC DIASTOLIC HEART FAILURE (HCC): Status: ACTIVE | Noted: 2020-08-31

## 2020-08-31 PROBLEM — E87.70 VOLUME OVERLOAD: Status: ACTIVE | Noted: 2020-08-31

## 2020-08-31 LAB
ANION GAP SERPL CALCULATED.3IONS-SCNC: 4 MMOL/L (ref 4–13)
APTT PPP: 193 SECONDS (ref 23–37)
APTT PPP: 52 SECONDS (ref 23–37)
APTT PPP: 98 SECONDS (ref 23–37)
ATRIAL RATE: 125 BPM
BUN SERPL-MCNC: 49 MG/DL (ref 5–25)
CALCIUM SERPL-MCNC: 7.9 MG/DL (ref 8.3–10.1)
CHLORIDE SERPL-SCNC: 111 MMOL/L (ref 100–108)
CO2 SERPL-SCNC: 26 MMOL/L (ref 21–32)
CREAT SERPL-MCNC: 1.48 MG/DL (ref 0.6–1.3)
ERYTHROCYTE [DISTWIDTH] IN BLOOD BY AUTOMATED COUNT: 15 % (ref 11.6–15.1)
GFR SERPL CREATININE-BSD FRML MDRD: 34 ML/MIN/1.73SQ M
GLUCOSE SERPL-MCNC: 118 MG/DL (ref 65–140)
GLUCOSE SERPL-MCNC: 144 MG/DL (ref 65–140)
GLUCOSE SERPL-MCNC: 169 MG/DL (ref 65–140)
GLUCOSE SERPL-MCNC: 180 MG/DL (ref 65–140)
GLUCOSE SERPL-MCNC: 214 MG/DL (ref 65–140)
GLUCOSE SERPL-MCNC: 96 MG/DL (ref 65–140)
HCT VFR BLD AUTO: 28.6 % (ref 34.8–46.1)
HGB BLD-MCNC: 8.9 G/DL (ref 11.5–15.4)
MCH RBC QN AUTO: 28.8 PG (ref 26.8–34.3)
MCHC RBC AUTO-ENTMCNC: 31.1 G/DL (ref 31.4–37.4)
MCV RBC AUTO: 93 FL (ref 82–98)
PLATELET # BLD AUTO: 188 THOUSANDS/UL (ref 149–390)
PMV BLD AUTO: 11.2 FL (ref 8.9–12.7)
POTASSIUM SERPL-SCNC: 4.1 MMOL/L (ref 3.5–5.3)
QRS AXIS: 38 DEGREES
QRSD INTERVAL: 80 MS
QT INTERVAL: 340 MS
QTC INTERVAL: 413 MS
RBC # BLD AUTO: 3.09 MILLION/UL (ref 3.81–5.12)
SODIUM SERPL-SCNC: 141 MMOL/L (ref 136–145)
T WAVE AXIS: 28 DEGREES
TSH SERPL DL<=0.05 MIU/L-ACNC: 1.38 UIU/ML (ref 0.36–3.74)
VENTRICULAR RATE: 89 BPM
WBC # BLD AUTO: 14.03 THOUSAND/UL (ref 4.31–10.16)

## 2020-08-31 PROCEDURE — 93880 EXTRACRANIAL BILAT STUDY: CPT | Performed by: SURGERY

## 2020-08-31 PROCEDURE — 82948 REAGENT STRIP/BLOOD GLUCOSE: CPT

## 2020-08-31 PROCEDURE — 93880 EXTRACRANIAL BILAT STUDY: CPT

## 2020-08-31 PROCEDURE — 99232 SBSQ HOSP IP/OBS MODERATE 35: CPT | Performed by: INTERNAL MEDICINE

## 2020-08-31 PROCEDURE — 80048 BASIC METABOLIC PNL TOTAL CA: CPT | Performed by: GENERAL PRACTICE

## 2020-08-31 PROCEDURE — 92526 ORAL FUNCTION THERAPY: CPT

## 2020-08-31 PROCEDURE — 84443 ASSAY THYROID STIM HORMONE: CPT | Performed by: NURSE PRACTITIONER

## 2020-08-31 PROCEDURE — 99232 SBSQ HOSP IP/OBS MODERATE 35: CPT | Performed by: SURGERY

## 2020-08-31 PROCEDURE — 85730 THROMBOPLASTIN TIME PARTIAL: CPT | Performed by: GENERAL PRACTICE

## 2020-08-31 PROCEDURE — 99231 SBSQ HOSP IP/OBS SF/LOW 25: CPT | Performed by: SURGERY

## 2020-08-31 PROCEDURE — 93010 ELECTROCARDIOGRAM REPORT: CPT | Performed by: INTERNAL MEDICINE

## 2020-08-31 PROCEDURE — 85027 COMPLETE CBC AUTOMATED: CPT | Performed by: GENERAL PRACTICE

## 2020-08-31 PROCEDURE — 99232 SBSQ HOSP IP/OBS MODERATE 35: CPT | Performed by: GENERAL PRACTICE

## 2020-08-31 RX ADMIN — GABAPENTIN 100 MG: 100 CAPSULE ORAL at 20:16

## 2020-08-31 RX ADMIN — INSULIN LISPRO 2 UNITS: 100 INJECTION, SOLUTION INTRAVENOUS; SUBCUTANEOUS at 22:04

## 2020-08-31 RX ADMIN — HEPARIN SODIUM 10 UNITS/KG/HR: 10000 INJECTION, SOLUTION INTRAVENOUS at 11:38

## 2020-08-31 RX ADMIN — METOPROLOL TARTRATE 50 MG: 50 TABLET, FILM COATED ORAL at 20:16

## 2020-08-31 RX ADMIN — METOPROLOL TARTRATE 50 MG: 50 TABLET, FILM COATED ORAL at 08:00

## 2020-08-31 RX ADMIN — PIPERACILLIN AND TAZOBACTAM 2.25 G: 2; .25 INJECTION, POWDER, FOR SOLUTION INTRAVENOUS at 14:14

## 2020-08-31 RX ADMIN — PIPERACILLIN AND TAZOBACTAM 2.25 G: 2; .25 INJECTION, POWDER, FOR SOLUTION INTRAVENOUS at 19:52

## 2020-08-31 RX ADMIN — INSULIN LISPRO 1 UNITS: 100 INJECTION, SOLUTION INTRAVENOUS; SUBCUTANEOUS at 17:03

## 2020-08-31 RX ADMIN — NYSTATIN: 100000 POWDER TOPICAL at 17:03

## 2020-08-31 RX ADMIN — PIPERACILLIN AND TAZOBACTAM 2.25 G: 2; .25 INJECTION, POWDER, FOR SOLUTION INTRAVENOUS at 02:41

## 2020-08-31 RX ADMIN — INSULIN GLARGINE 12 UNITS: 100 INJECTION, SOLUTION SUBCUTANEOUS at 22:04

## 2020-08-31 RX ADMIN — GABAPENTIN 100 MG: 100 CAPSULE ORAL at 17:03

## 2020-08-31 RX ADMIN — HEPARIN SODIUM 12 UNITS/KG/HR: 10000 INJECTION, SOLUTION INTRAVENOUS at 14:11

## 2020-08-31 RX ADMIN — ASPIRIN 81 MG: 81 TABLET, COATED ORAL at 08:01

## 2020-08-31 RX ADMIN — ATORVASTATIN CALCIUM 40 MG: 40 TABLET, FILM COATED ORAL at 17:03

## 2020-08-31 RX ADMIN — PIPERACILLIN AND TAZOBACTAM 2.25 G: 2; .25 INJECTION, POWDER, FOR SOLUTION INTRAVENOUS at 07:58

## 2020-08-31 RX ADMIN — NYSTATIN: 100000 POWDER TOPICAL at 08:03

## 2020-08-31 RX ADMIN — GABAPENTIN 100 MG: 100 CAPSULE ORAL at 08:00

## 2020-08-31 NOTE — ASSESSMENT & PLAN NOTE
Vascular appreciated  ASA and statin  Carotid dopplers completed - based on results vascular will decide on intervention IP vs OP

## 2020-08-31 NOTE — PROGRESS NOTES
Progress Note - Shearon Book 1945, 67 y o  female MRN: [id number]    Unit/Bed#: [facility] 504-01 Encounter: [id number]    Primary Care Provider: TJ Hardin   Date and time admitted to hospital: 8/25/2020  4:06 PM        * Septic shock due to gram-positive bacteria [facility])  Assessment & Plan  Pt now off pressors  ID appreciated  C/w Zosyn  B Cx from 8/28 neg  2/2 infected stage 4 DQ ulcer  Cleared from ID standpoint for PICC as B Cx neg for 72h  At this time have good IV access    Chronic diastolic heart failure [facility])  Assessment & Plan  8/30 IV Bumex given as pt overloaded from resuscitation and PRBC with excellent UOP    Stenosis of left carotid artery  Assessment & Plan  Vascular appreciated  ASA and statin  Carotid dopplers completed - based on results vascular will decide on intervention IP vs OP    Anemia  Assessment & Plan  S/p 1U PRBC in ICU  Appears stable at this time    Bacteremia due to Enterococcus  Assessment & Plan  B Cx pos for Enterococcus and Strep Constellatus  Zosyn  ID appreciated    Paroxysmal atrial fibrillation (HCC)  Assessment & Plan  S/p DCCV in route to [facility]  8/30 pt in Afib w/ moderate VR  Cardio appreciated  Heparin gtt  Lopressor for rate control  TSH WNL    RORY (acute kidney injury) ([facility] [address] )  Assessment & Plan  Estimated Creatinine Clearance: 34 8 mL/min (A) (by C-G formula based on SCr of 1 48 mg/dL (H))  POA  Patient noted to have RORY on admission; serum creatinine 2 59 (baseline 1 2-1 3)  2/2 shock  Appears to be resolving    Stroke-like symptoms  Assessment & Plan  - pt presents with to [facility] with right-sided facial droop and right-sided weakness after being transported by EMS for [facility] and noted to have atrial fibrillation enroute  - CT head in ED shows posterior cerebral artery disease is noted, mild on the right and severe left     - NIH - score on admission; - 6  -c/w ASA and statin  - MRI brain no acute findings  - CTA shows bilateral stenosis of carotids; less than 75%  LDL 23  Echo shows mild pulm htn  Neuro signed off  Pt now in Afib - will place on heparin gtt  Neuro recs BASA and AC in setting of Afib and carotid stenosis    Decubitus ulcer of sacral region, stage 4 [facility])  Assessment & Plan  POA  Wound care and piper surg appreciated  Patient reports ulcer developed status post recent hip fracture  8/26 excisional debridement of sacral pressure wound and washout   8/28 Debridement and washout of sacral decubitus ulcer  C/w trujillo  ID recs divesrsion colostomy to help w/ healing  Hyperlipidemia  Assessment & Plan  Previously on pravastatin; will start high-intensity statin with Lipitor 40 in setting of carotid stenosis  LDL 23    Hypertension  Assessment & Plan  BP stable off home meds  Pt needed pressors on admission  Now on BB for Afib    Type 2 diabetes mellitus with hyperglycemia (HCC)  Assessment & Plan    Lab Results   Component Value Date    HGBA1C 9 9 (H) 08/26/2020     SSI  Blood Glucose checks TIDWM and QHS (Q6H for NPO patients)  Hold oral medications  C/w Lantus 12U qhs  PT should take insulin at d/c    VTE Pharmacologic Prophylaxis:   Pharmacologic: Heparin Drip  Mechanical VTE Prophylaxis in Place: Yes    Patient Centered Rounds: I have performed bedside rounds with nursing staff today  Discussions with Specialists or Other Care Team Provider: gen surg    Education and Discussions with Family / Patient: Pt   Left VM w/ son    Time Spent for Care: 30 minutes  More than 50% of total time spent on counseling and coordination of care as described above      Current Length of Stay: 6 day(s)    Current Patient Status: Inpatient   Certification Statement: The patient will continue to require additional inpatient hospital stay due to need for IV Zosyn and definitive plan from gen surg and ID    Discharge Plan: will need plans from gen surg, ID, and vascular    Code Status: Level 1 - Full Code      Subjective:   No acute complaints    Objective:     Vitals:   Temp (24hrs), Av 9 °F (36 6 °C), Min:97 6 °F (36 4 °C), Max:98 4 °F (36 9 °C)    Temp:  [97 6 °F (36 4 °C)-98 4 °F (36 9 °C)] 97 9 °F (36 6 °C)  HR:  [] 83  Resp:  [15-18] 16  BP: (108-145)/(57-82) 145/62  SpO2:  [97 %-100 %] 99 %  Body mass index is 29 kg/m²  Input and Output Summary (last 24 hours): Intake/Output Summary (Last 24 hours) at 2020 1600  Last data filed at 2020 1414  Gross per 24 hour   Intake 780 ml   Output 3325 ml   Net -2545 ml       Physical Exam:     Physical Exam  HENT:      Head: Normocephalic and atraumatic  Nose: Nose normal       Mouth/Throat:      Mouth: Mucous membranes are moist    Eyes:      Extraocular Movements: Extraocular movements intact  Conjunctiva/sclera: Conjunctivae normal    Neck:      Musculoskeletal: Normal range of motion and neck supple  Cardiovascular:      Rate and Rhythm: Normal rate and regular rhythm  Pulmonary:      Effort: Pulmonary effort is normal       Breath sounds: Normal breath sounds  No wheezing or rales  Abdominal:      General: Bowel sounds are normal  There is no distension  Palpations: Abdomen is soft  Tenderness: There is no abdominal tenderness  Musculoskeletal: Normal range of motion  Right lower leg: No edema  Left lower leg: No edema  Skin:     General: Skin is warm and dry  Neurological:      Mental Status: She is alert and oriented to person, place, and time  Mental status is at baseline  Additional Data:     Labs:    Results from last 7 days   Lab Units 20  0508  20  0508   WBC Thousand/uL 14 03*   < > 14 17*   HEMOGLOBIN g/dL 8 9*   < > 7 6*   HEMATOCRIT % 28 6*   < > 24 4*   PLATELETS Thousands/uL 188   < > 208   NEUTROS PCT %  --   --  88*   LYMPHS PCT %  --   --  7*   MONOS PCT %  --   --  3*   EOS PCT %  --   --  0    < > = values in this interval not displayed       Results from last 7 days   Lab Units 08/31/20  0508  08/26/20  0429   POTASSIUM mmol/L 4 1   < > 3 4*   CHLORIDE mmol/L 111*   < > 106   CO2 mmol/L 26   < > 21   BUN mg/dL 49*   < > 34*   CREATININE mg/dL 1 48*   < > 2 32*   CALCIUM mg/dL 7 9*   < > 7 4*   ALK PHOS U/L  --   --  232*   ALT U/L  --   --  11*   AST U/L  --   --  27    < > = values in this interval not displayed  Results from last 7 days   Lab Units 08/30/20  1201   INR  1 11       * I Have Reviewed All Lab Data Listed Above  * Additional Pertinent Lab Tests Reviewed: [address] Admission Reviewed        Recent Cultures (last 7 days):     Results from last 7 days   Lab Units 08/28/20  0542 08/28/20  0536 08/26/20  1353 08/26/20  1225 08/26/20  0554 08/26/20  0536   BLOOD CULTURE  No Growth at 72 hrs   No Growth at 72 hrs   --   --   --  Streptococcus constellatus*  Staphylococcus coagulase negative*  Bacteroides fragilis*  Enterococcus faecalis*  Streptococcus constellatus*   GRAM STAIN RESULT   --   --  No polys seen*  2+ Gram positive cocci in pairs* 1+ Polys*  3+ Gram positive cocci in pairs*  2+ Gram positive rods*  1+ Gram negative rods*  --  Gram positive cocci in pairs and chains*  Gram negative rods*  Gram positive cocci in pairs and chains*   URINE CULTURE   --   --   --   --  50,000-59,000 cfu/ml Escherichia coli*  >100,000 cfu/ml Lactobacillus species*  --    WOUND CULTURE   --   --  3+ Growth of Escherichia coli*  3+ Growth of Proteus mirabilis*  3+ Growth of Enterococcus faecalis*  3+ Growth of  2+ Growth of Escherichia coli*  2+ Growth of Proteus mirabilis*  3+ Growth of Enterococcus species*  3+ Growth of   --   --        Last 24 Hours Medication List:   Current Facility-Administered Medications   Medication Dose Route Frequency Provider Last Rate    acetaminophen  650 mg Oral Q6H PRN Cara Myers DO      aspirin  81 mg Oral Daily Cara Myers DO      atorvastatin  40 mg Oral Daily With BEATRIZ Rubi      gabapentin 100 mg Oral TID Andrea Christensen PA-C      heparin (porcine)  3-20 Units/kg/hr (Order-Specific) Intravenous Titrated Estephania Garcia DO 12 Units/kg/hr (08/31/20 1411)    hydrALAZINE  10 mg Intravenous Q4H PRN Andrea Christensen PA-C      insulin glargine  12 Units Subcutaneous HS Estephania Garcia DO      insulin lispro  1-6 Units Subcutaneous 4x Daily (AC & HS) Windy Johnston PA-C      metoprolol tartrate  25 mg Oral Once Amauri LobTJ      metoprolol tartrate  50 mg Oral Q12H [address] Amauri LobJESSICANP      nystatin   Topical BID Andrea Christensen PA-C      piperacillin-tazobactam  2 25 g Intravenous Q6H TJ Khan 2 25 g (08/31/20 1414)        Today, Patient Was Seen By: Estephania Garcia DO    ** Please Note: Dictation voice to text software may have been used in the creation of this document   **

## 2020-08-31 NOTE — ASSESSMENT & PLAN NOTE
Lab Results   Component Value Date    HGBA1C 9 9 (H) 08/26/2020     SSI  Blood Glucose checks TIDWM and QHS (Q6H for NPO patients)  Hold oral medications  C/w Lantus 12U qhs  PT should take insulin at d/c

## 2020-08-31 NOTE — PROGRESS NOTES
Progress Note - Cardiology   Roopa Baker 67 y o  female MRN: [id number]  Unit/Bed#: [facility] 504-01 Encounter: [id number]    Assessment and Plan:   67 y o  female with past medical history of diabetes, HTN, and hyperlipidemia who originally presented on 8/25 due to generalized weakness  She was found to be in unstable atrial fibrillation and was cardioverted by EMS  She then developed dysarthria and aphasia and received tPA for possible stroke  Followup MRI was negative for any acute ischemic changes  CTA showed L ICA 65-70% stenosis  Upon admission she was also found to be septic secondary to enterococcus bacteremia likely secondary to sacral decubitus ulcer  Patient has received multiple debridements of sacral wound and is on IV antiobiotics  Most recent blood cultures on 8/28 were negative  Patient was in sinus rhythm for a few days but has been in atrial fibrillation since 8/29  She has not had any chest pain or palpitations  She has had no lightheadedness or shortness of breath  1  Paroxysmal Atrial Fibrillation   -New diagnosis of atrial fibrillation this admission   -Echo on 8/27 showed no regional wall abnormalities, mild pulmonary hypertension, mildly to moderately dilated left atrium, mild MR, and mild TR  -TSH normal at 1 38  -Currently on metoprolol tartrate 50 mg BID  -HR range  today   Plan:   -Continue metoprolol tartrate 50 mg BID   -Goal HR    -Continue heparin drip for anticoagulation, will need to transition to oral anticoagulation prior to discharge       2  Volume Overload  -Patient had signs of volume overload secondary to extensive fluid resuscitation since admission   -Patient received 1 dose 1mg bumex yesterday and responded well with increased urine output  -Patient still has some RLE pitting edema   -Patient had RORY on admission, Cr improved to 1 48 today (baseline 1 2)   Plan:   -Given continued lower extremity edema and volume overload, repeat dose of bumex for further diuresis    3  Stroke like symptoms s/p tPA   -Patient developed stroke like symptoms after cardioversion for unstable a  fib  -MRI showed no acute ischemic disease   -Patient has some residual R sided weakness and L eyelid droop  -Neurology was following and recommends no further intervention at this time    4  Carotid Stenosis  -CTA showed L ICA 65-70% stenosis   -Carotid ultrasound showed >70% stenosis of the ICA with irregular and heterogeneous plaque    -Vascular surgery will follow as outpatient for possible carotid intervention      Subjective/Objective     Subjective: Patient is feeling well this morning  She is alert and oriented x3  She reports no chest pain or palpitations  She has no shortness of breath or lightheadedness  Objective:     Vitals: /62   Pulse 83   Temp 97 9 °F (36 6 °C) (Oral)   Resp 16   Ht 5' 6" (1 676 m)   Wt 81 5 kg (179 lb 10 8 oz)   SpO2 99%   BMI 29 00 kg/m²   Vitals:    08/30/20 0533 08/31/20 0552   Weight: 84 2 kg (185 lb 10 oz) 81 5 kg (179 lb 10 8 oz)     Orthostatic Blood Pressures      Most Recent Value   Blood Pressure  145/62 filed at 08/31/2020 1500   Patient Position - Orthostatic VS  Lying filed at 08/30/2020 2300            Intake/Output Summary (Last 24 hours) at 8/31/2020 1620  Last data filed at 8/31/2020 1414  Gross per 24 hour   Intake 780 ml   Output 3325 ml   Net -2545 ml       Invasive Devices     Peripheral Intravenous Line            Peripheral IV 08/28/20 Distal;Dorsal (posterior); Right Forearm 3 days    Peripheral IV 08/30/20 Distal;Left;Upper;Ventral (anterior) Arm 1 day          Drain            Urethral Catheter Temperature probe 16 Fr  5 days                Review of Systems:   Physical Exam:Physical Exam  Constitutional:       General: She is not in acute distress  Appearance: She is ill-appearing  HENT:      Head: Normocephalic and atraumatic        Mouth/Throat:      Mouth: Mucous membranes are moist       Pharynx: Oropharynx is clear  No oropharyngeal exudate  Eyes:      Extraocular Movements: Extraocular movements intact  Pupils: Pupils are equal, round, and reactive to light  Cardiovascular:      Rate and Rhythm: Normal rate  Rhythm irregular  Pulses: Normal pulses  Pulmonary:      Effort: Pulmonary effort is normal  No respiratory distress  Breath sounds: Normal breath sounds  Abdominal:      Palpations: Abdomen is soft  Tenderness: There is no abdominal tenderness  Musculoskeletal:      Right lower leg: Edema present  Comments: R sided weakness   Skin:     General: Skin is warm and dry  Neurological:      Mental Status: She is alert and oriented to person, place, and time  Comments: L eyelid droop   Psychiatric:         Mood and Affect: Mood normal          Behavior: Behavior normal          Lab Results:   CBC with diff:   Results from last 7 days   Lab Units 08/31/20  0508   WBC Thousand/uL 14 03*   RBC Million/uL 3 09*   HEMOGLOBIN g/dL 8 9*   HEMATOCRIT % 28 6*   MCV fL 93   MCH pg 28 8   MCHC g/dL 31 1*   RDW % 15 0   MPV fL 11 2   PLATELETS Thousands/uL 188     CMP:   Results from last 7 days   Lab Units 08/31/20  0508  08/26/20  0429   SODIUM mmol/L 141   < > 138   POTASSIUM mmol/L 4 1   < > 3 4*   CHLORIDE mmol/L 111*   < > 106   CO2 mmol/L 26   < > 21   BUN mg/dL 49*   < > 34*   CREATININE mg/dL 1 48*   < > 2 32*   CALCIUM mg/dL 7 9*   < > 7 4*   AST U/L  --   --  27   ALT U/L  --   --  11*   ALK PHOS U/L  --   --  232*   EGFR ml/min/1 73sq m 34   < > 20    < > = values in this interval not displayed  Coags:   Results from last 7 days   Lab Units 08/31/20  1334  08/30/20  1201   PTT seconds 52*   < > 34   INR   --   --  1 11    < > = values in this interval not displayed  TSH:   Results from last 7 days   Lab Units 08/31/20  0508   TSH 3RD GENERATON uIU/mL 1 380     Imaging: I have personally reviewed pertinent reports      EKG: Most recent EKG showed atrial fibrillation  VTE Pharmacologic Prophylaxis: Heparin  VTE Mechanical Prophylaxis: sequential compression device    Mary Paniagua MS4

## 2020-08-31 NOTE — ASSESSMENT & PLAN NOTE
- pt presents with to [facility] with right-sided facial droop and right-sided weakness after being transported by EMS for [facility] and noted to have atrial fibrillation enroute  - CT head in ED shows posterior cerebral artery disease is noted, mild on the right and severe left  - NIH - score on admission; - 6  -c/w ASA and statin  - MRI brain no acute findings  - CTA shows bilateral stenosis of carotids; less than 75%  LDL 23  Echo shows mild pulm htn  Neuro signed off  Pt now in Afib - will place on heparin gtt    Neuro recs BASA and AC in setting of Afib and carotid stenosis

## 2020-08-31 NOTE — ASSESSMENT & PLAN NOTE
Pt now off pressors  ID appreciated  C/w Zosyn  B Cx from 8/28 neg  2/2 infected stage 4 DQ ulcer  Cleared from ID standpoint for PICC as B Cx neg for 72h    At this time have good IV access

## 2020-08-31 NOTE — PROGRESS NOTES
Progress Note - Vascular Surgery   Lizette Alvarado 67 y o  female MRN: [id number]  Unit/Bed#: [facility] 504-01 Encounter: [id number]    Assessment:  Pt is a 75y o  F presented with new onset afib and stroke s/p TPA for right sided weakness, found to have 65-70% L JUSTINA    Plan:  F/u carotid duplex to determine vascular surgery plan  Heparin gtt  Cardiology following  Primary per SLIM    Subjective/Objective   Chief Complaint:     Subjective: DIVINA  Continues with right sided weakness  Objective:     Blood pressure 108/71, pulse 66, temperature 98 4 °F (36 9 °C), temperature source Oral, resp  rate 18, height 5' 6" (1 676 m), weight 84 2 kg (185 lb 10 oz), SpO2 98 %  ,Body mass index is 29 96 kg/m²  Intake/Output Summary (Last 24 hours) at 8/31/2020 0414  Last data filed at 8/30/2020 1741  Gross per 24 hour   Intake 1440 ml   Output 2100 ml   Net -660 ml       Invasive Devices     Peripheral Intravenous Line            Peripheral IV 08/28/20 Distal;Dorsal (posterior); Right Forearm 2 days    Peripheral IV 08/30/20 Distal;Left;Upper;Ventral (anterior) Arm less than 1 day          Drain            Urethral Catheter Temperature probe 16 Fr  4 days                Physical Exam: NAD  Atraumatic/normocephalic  AAOX3  Normal mood and affect  Normal respiratory effort  Soft, non tender, ND  Skin warm/dry  Cap refil <2 sec  Right sided upper and lower extremity weakness    Lab, Imaging and other studies:  I have personally reviewed pertinent lab results    , CBC:   Lab Results   Component Value Date    WBC 12 51 (H) 08/30/2020    HGB 9 4 (L) 08/30/2020    HCT 30 6 (L) 08/30/2020    MCV 94 08/30/2020     08/30/2020    MCH 29 0 08/30/2020    MCHC 30 7 (L) 08/30/2020    RDW 15 2 (H) 08/30/2020    MPV 11 5 08/30/2020   , CMP:   Lab Results   Component Value Date    SODIUM 139 08/30/2020    K 4 3 08/30/2020     (H) 08/30/2020    CO2 22 08/30/2020    BUN 49 (H) 08/30/2020    CREATININE 1 89 (H) 08/30/2020    CALCIUM 8 5 08/30/2020    EGFR 25 08/30/2020     VTE Pharmacologic Prophylaxis: Heparin  VTE Mechanical Prophylaxis: sequential compression device

## 2020-08-31 NOTE — PROGRESS NOTES
Daily dressing change performed  WTD with kerlix and saline and ABD  Patient tolerated well with no complications or pain  Continue daily dressing changes

## 2020-08-31 NOTE — ASSESSMENT & PLAN NOTE
S/p DCCV in route to [facility]  8/30 pt in Afib w/ moderate VR  Cardio appreciated  Heparin gtt  Lopressor for rate control  TSH WNL

## 2020-08-31 NOTE — SPEECH THERAPY NOTE
SLP Progress Note    Patient Name: Antonio Morse  Today's Date: 8/31/2020     Problem List  Principal Problem:    Septic shock due to gram-positive bacteria ([facility] [address] )  Active Problems:    Type 2 diabetes mellitus with hyperglycemia ([facility] [address] )    Hypertension    Hyperlipidemia    Decubitus ulcer of sacral region, stage 4 (HCC)    Stroke-like symptoms    RORY (acute kidney injury) ([facility] [address] )    Paroxysmal atrial fibrillation (HCC)    Bacteremia due to Enterococcus    Anemia    Stenosis of left carotid artery      Subjective:  "I have trouble remembering my address" (came up with it after 10 min delay)  Objective:  Pt seen for f/u to evaluation to ensure safe and effective oral intake (MRI r/o CVA, r/o decline in MS)  Pt now has her upper/lower dentures available  She was assessed with with chicken, soup with noodles, jello, thin liquid  Oral processing appeared adequate  Swallow initiation time appeared prompt  Laryngeal rise was good by palpation  No coughing, throat clearing, change on vocal quality, BS or RR noted c intake today  Assessment:  Pt presented c no significant s/s oral or pharyngeal dysphagia with items administered today  No additional f/u appears necessary at this time  Plan/Recommendations:  OK to d/c the restriction of Level 3 dysphagia (OK for regular textured food, continue CCD2, 2gm NA and fluid restriction)  No additional recommendations or f/u indicated at this time

## 2020-08-31 NOTE — ASSESSMENT & PLAN NOTE
Estimated Creatinine Clearance: 34 8 mL/min (A) (by C-G formula based on SCr of 1 48 mg/dL (H))    POA  Patient noted to have RORY on admission; serum creatinine 2 59 (baseline 1 2-1 3)  2/2 shock  Appears to be resolving

## 2020-09-01 LAB
ANION GAP SERPL CALCULATED.3IONS-SCNC: 5 MMOL/L (ref 4–13)
APTT PPP: 36 SECONDS (ref 23–37)
APTT PPP: 71 SECONDS (ref 23–37)
APTT PPP: 82 SECONDS (ref 23–37)
BACTERIA BLD CULT: ABNORMAL
BUN SERPL-MCNC: 47 MG/DL (ref 5–25)
CA-I BLD-SCNC: 1.12 MMOL/L (ref 1.12–1.32)
CALCIUM SERPL-MCNC: 8.1 MG/DL (ref 8.3–10.1)
CHLORIDE SERPL-SCNC: 109 MMOL/L (ref 100–108)
CO2 SERPL-SCNC: 27 MMOL/L (ref 21–32)
CREAT SERPL-MCNC: 1.16 MG/DL (ref 0.6–1.3)
GFR SERPL CREATININE-BSD FRML MDRD: 46 ML/MIN/1.73SQ M
GLUCOSE SERPL-MCNC: 167 MG/DL (ref 65–140)
GLUCOSE SERPL-MCNC: 171 MG/DL (ref 65–140)
GLUCOSE SERPL-MCNC: 195 MG/DL (ref 65–140)
GLUCOSE SERPL-MCNC: 200 MG/DL (ref 65–140)
GLUCOSE SERPL-MCNC: 200 MG/DL (ref 65–140)
GLUCOSE SERPL-MCNC: 286 MG/DL (ref 65–140)
GRAM STN SPEC: ABNORMAL
GRAM STN SPEC: ABNORMAL
POTASSIUM SERPL-SCNC: 4.2 MMOL/L (ref 3.5–5.3)
SODIUM SERPL-SCNC: 141 MMOL/L (ref 136–145)

## 2020-09-01 PROCEDURE — 85730 THROMBOPLASTIN TIME PARTIAL: CPT | Performed by: HOSPITALIST

## 2020-09-01 PROCEDURE — NC001 PR NO CHARGE: Performed by: INTERNAL MEDICINE

## 2020-09-01 PROCEDURE — 85730 THROMBOPLASTIN TIME PARTIAL: CPT | Performed by: GENERAL PRACTICE

## 2020-09-01 PROCEDURE — 82948 REAGENT STRIP/BLOOD GLUCOSE: CPT

## 2020-09-01 PROCEDURE — 82330 ASSAY OF CALCIUM: CPT | Performed by: GENERAL PRACTICE

## 2020-09-01 PROCEDURE — 80048 BASIC METABOLIC PNL TOTAL CA: CPT | Performed by: GENERAL PRACTICE

## 2020-09-01 PROCEDURE — 97530 THERAPEUTIC ACTIVITIES: CPT

## 2020-09-01 PROCEDURE — 99232 SBSQ HOSP IP/OBS MODERATE 35: CPT | Performed by: HOSPITALIST

## 2020-09-01 PROCEDURE — 99232 SBSQ HOSP IP/OBS MODERATE 35: CPT | Performed by: INTERNAL MEDICINE

## 2020-09-01 PROCEDURE — 97535 SELF CARE MNGMENT TRAINING: CPT

## 2020-09-01 RX ORDER — WARFARIN SODIUM 5 MG/1
5 TABLET ORAL
Status: DISCONTINUED | OUTPATIENT
Start: 2020-09-01 | End: 2020-09-03

## 2020-09-01 RX ORDER — BUMETANIDE 0.25 MG/ML
1 INJECTION, SOLUTION INTRAMUSCULAR; INTRAVENOUS ONCE
Status: DISCONTINUED | OUTPATIENT
Start: 2020-09-01 | End: 2020-09-01

## 2020-09-01 RX ORDER — FUROSEMIDE 10 MG/ML
40 INJECTION INTRAMUSCULAR; INTRAVENOUS ONCE
Status: COMPLETED | OUTPATIENT
Start: 2020-09-01 | End: 2020-09-01

## 2020-09-01 RX ORDER — OXYCODONE HYDROCHLORIDE 5 MG/1
5 TABLET ORAL ONCE
Status: COMPLETED | OUTPATIENT
Start: 2020-09-01 | End: 2020-09-01

## 2020-09-01 RX ADMIN — PIPERACILLIN AND TAZOBACTAM 2.25 G: 2; .25 INJECTION, POWDER, FOR SOLUTION INTRAVENOUS at 07:59

## 2020-09-01 RX ADMIN — WARFARIN SODIUM 5 MG: 5 TABLET ORAL at 17:15

## 2020-09-01 RX ADMIN — PIPERACILLIN AND TAZOBACTAM 2.25 G: 2; .25 INJECTION, POWDER, FOR SOLUTION INTRAVENOUS at 14:35

## 2020-09-01 RX ADMIN — INSULIN LISPRO 1 UNITS: 100 INJECTION, SOLUTION INTRAVENOUS; SUBCUTANEOUS at 08:03

## 2020-09-01 RX ADMIN — INSULIN GLARGINE 12 UNITS: 100 INJECTION, SOLUTION SUBCUTANEOUS at 21:17

## 2020-09-01 RX ADMIN — FUROSEMIDE 40 MG: 10 INJECTION, SOLUTION INTRAMUSCULAR; INTRAVENOUS at 16:01

## 2020-09-01 RX ADMIN — GABAPENTIN 100 MG: 100 CAPSULE ORAL at 16:01

## 2020-09-01 RX ADMIN — NYSTATIN: 100000 POWDER TOPICAL at 08:06

## 2020-09-01 RX ADMIN — METOPROLOL TARTRATE 50 MG: 50 TABLET, FILM COATED ORAL at 21:12

## 2020-09-01 RX ADMIN — PIPERACILLIN AND TAZOBACTAM 2.25 G: 2; .25 INJECTION, POWDER, FOR SOLUTION INTRAVENOUS at 01:36

## 2020-09-01 RX ADMIN — HEPARIN SODIUM 8 UNITS/KG/HR: 10000 INJECTION, SOLUTION INTRAVENOUS at 13:58

## 2020-09-01 RX ADMIN — ATORVASTATIN CALCIUM 40 MG: 40 TABLET, FILM COATED ORAL at 16:01

## 2020-09-01 RX ADMIN — GABAPENTIN 100 MG: 100 CAPSULE ORAL at 21:11

## 2020-09-01 RX ADMIN — PIPERACILLIN AND TAZOBACTAM 2.25 G: 2; .25 INJECTION, POWDER, FOR SOLUTION INTRAVENOUS at 21:00

## 2020-09-01 RX ADMIN — GABAPENTIN 100 MG: 100 CAPSULE ORAL at 08:06

## 2020-09-01 RX ADMIN — INSULIN LISPRO 2 UNITS: 100 INJECTION, SOLUTION INTRAVENOUS; SUBCUTANEOUS at 16:13

## 2020-09-01 RX ADMIN — ASPIRIN 81 MG: 81 TABLET, COATED ORAL at 08:06

## 2020-09-01 RX ADMIN — OXYCODONE HYDROCHLORIDE 5 MG: 5 TABLET ORAL at 13:58

## 2020-09-01 RX ADMIN — INSULIN LISPRO 2 UNITS: 100 INJECTION, SOLUTION INTRAVENOUS; SUBCUTANEOUS at 12:47

## 2020-09-01 RX ADMIN — METOPROLOL TARTRATE 50 MG: 50 TABLET, FILM COATED ORAL at 08:06

## 2020-09-01 RX ADMIN — INSULIN LISPRO 4 UNITS: 100 INJECTION, SOLUTION INTRAVENOUS; SUBCUTANEOUS at 21:17

## 2020-09-01 RX ADMIN — NYSTATIN: 100000 POWDER TOPICAL at 17:16

## 2020-09-01 NOTE — ASSESSMENT & PLAN NOTE
8/30 IV Bumex given as pt overloaded from resuscitation and PRBC with excellent UOP  reepat Bumex today per cardio

## 2020-09-01 NOTE — ASSESSMENT & PLAN NOTE
Estimated Creatinine Clearance: 44 4 mL/min (by C-G formula based on SCr of 1 16 mg/dL)    POA  Patient noted to have RORY on admission; serum creatinine 2 59 (baseline 1 2-1 3)  2/2 shock  Appears to be resolved

## 2020-09-01 NOTE — ASSESSMENT & PLAN NOTE
- pt presents with to [facility] with right-sided facial droop and right-sided weakness after being transported by EMS for [facility] and noted to have atrial fibrillation enroute  - CT head in ED shows posterior cerebral artery disease is noted, mild on the right and severe left  - NIH - score on admission; - 6  -c/w ASA and statin  - MRI brain no acute findings  - CTA shows bilateral stenosis of carotids; less than 75%  LDL 23  Echo shows mild pulm htn  Neuro signed off  Pt now in Afib - placed on heparin gtt    Neuro recs BASA and AC in setting of Afib and carotid stenosis

## 2020-09-01 NOTE — OCCUPATIONAL THERAPY NOTE
Occupational Therapy Treatment Note      Marc Aguirre    9/1/2020    Principal Problem:    Septic shock due to gram-positive bacteria ([facility] [facility] )  Active Problems:    Type 2 diabetes mellitus with hyperglycemia (HCC)    Hypertension    Hyperlipidemia    Decubitus ulcer of sacral region, stage 4 (HCC)    Stroke-like symptoms    RORY (acute kidney injury) ([facility] )    Paroxysmal atrial fibrillation (HCC)    Bacteremia due to Enterococcus    Anemia    Stenosis of left carotid artery    Chronic diastolic heart failure ([facility] )      Past Medical History:   Diagnosis Date    Acute renal failure (ARF) ([facility] )     Acute respiratory failure with hypoxia (HCC)     Chronic kidney disease     Diabetes mellitus ([facility] [facility] )     type 2    Hyperlipidemia     Hypertension        Past Surgical History:   Procedure Laterality Date    BREAST SURGERY Left     lumpectomy benign    CATARACT EXTRACTION Bilateral 2017    CATARACT EXTRACTION W/ INTRAOCULAR LENS IMPLANT Left 12/11/2017    Procedure: EXTRACTION EXTRACAPSULAR CATARACT PHACO INTRAOCULAR LENS (IOL); Surgeon: Thais Slainas MD;  Location: [facility] MAIN OR;  Service: Ophthalmology    MS OPEN RX FEMUR FX+INTRAMED RAMYOND Right 5/21/2020    Procedure: INSERTION OF SHORT TROCHANTERIC FEMORAL NAIL;  Surgeon: Felecia Briceño MD;  Location: [facility] Main OR;  Service: Orthopedics    [address] HUMERAL&GLENOID COMPNT Right 9/10/2018    Procedure: RIGHT REVERSE TOTAL SHOULDER ARTHROPLASTY;  Surgeon: Felecia Briceño MD;  [facility]: [facility] Main OR;  Service: Orthopedics    MS XCAPSL CTRC RMVL INSJ IO LENS PROSTH W/O ECP Right 10/23/2017    Procedure: EXTRACTION EXTRACAPSULAR CATARACT PHACO INTRAOCULAR LENS (IOL);   Surgeon: Thais Salinas MD;  Location: [facility] MAIN OR;  Service: Ophthalmology    WOUND DEBRIDEMENT N/A 8/26/2020    Procedure: EXCISIONAL DEBRIDEMENT OF SACRAL PRESSURE WOUND, WASHOUT;;  Surgeon: Samuel Jean Baptiste MD;  Location:  MAIN OR;  Service: General    WOUND DEBRIDEMENT N/A 8/28/2020 Procedure: BUTTOCKS DEBRIDEMENT WOUND AND DRESSING CHANGE [facility] OUT); Surgeon: Trung Escalona MD;  Location: [facility] MAIN OR;  Service: General        09/01/20 1202   Restrictions/Precautions   Weight Bearing Precautions Per Order Yes   RUE Weight Bearing Per Order WBAT   LUE Weight Bearing Per Order WBAT   RLE Weight Bearing Per Order WBAT   LLE Weight Bearing Per Order WBAT   Other Precautions Cognitive; Chair Alarm; Bed Alarm;Pain; Fall Risk;Multiple lines;Telemetry;O2   Lifestyle   Autonomy Assist ADLS/IADLS, I w/ transfers and functional mobility PTA   Reciprocal Relationships Pt lives w/ dght and 2 great grandsons   Service to Others Pt is retired   Intrinsic Gratification Enjoys TV   Pain Assessment   Pain Assessment Tool Pain Assessment not indicated - pt denies pain   Pain Score No Pain   ADL   LB Dressing Assistance 1  Total Assistance   LB Dressing Deficit Setup;Verbal cueing;Supervision/safety; Increased time to complete; Don/doff R sock; Don/doff L sock   LB Dressing Comments Donning B/L socks while supine in bed  Toileting Assistance  1  Total Assistance   Toileting Deficit Setup;Steadying;Supervison/safety;Verbal cueing; Increased time to complete;Use of bedpan/urinal setup; Clothing management up;Clothing management down;Perineal hygiene   Toileting Comments Upon STS at EOB, Pt grossly incontinent of bowel while she was in semi-stance position  Pt unaware that she was having BM  OT placed bed pan on EOB as Pt sat down to continue BM  Pt again participated in STS as therapist proivded total A for hygiene and CM  Pt required Max Ax2 HHA to maintain static stance posture during toileting hygiene  Functional Standing Tolerance   Time ~3-4 min 1st standing trial; 2 min 2nd standing trial   Activity Toileting/BM at EOB    Bed Mobility   Supine to Sit 2  Maximal assistance   Additional items Assist x 2;HOB elevated; Increased time required;Verbal cues;LE management; Bedrails   Sit to Supine Unable to assess Additional Comments Pt laying supine in bed upon OT arrival  Pt seated OOB in chair w/ chair alarm activated and all needs in reach s/p OT session  Pt w/ EHOB cushion placed on recliner chair  Transfers   Sit to Stand 2  Maximal assistance   Additional items Assist x 2; Increased time required;Verbal cues   Stand to Sit 2  Maximal assistance   Additional items Assist x 2; Increased time required;Verbal cues   Sit pivot 2  Maximal assistance   Additional items Assist x 3;Verbal cues; Increased time required  (EOB>drop arm recliner chair)   Additional Comments Pt participated in x2 standing trials for toileting  Pt unable to achieve full upright posture in stance despite VC/TC  Pt transfers w/ HHAx2 during STS w/ B/L knee blocking  Pt participated in sit pivot EOB>drop arm recliner w/ HHAx2 anterio-laterally and w/ A of 3rd posteriorly  Pt required A of 4th for line management and safety  Pt required VC and TC for all safety, sequencing, hand placement  Cognition   Overall Cognitive Status Impaired   Arousal/Participation Responsive; Alert; Cooperative   Attention Attends with cues to redirect   Orientation Level Oriented to person;Oriented to place;Oriented to time;Disoriented to situation  ("I don't know why I'm here")   Memory Decreased short term memory;Decreased recall of recent events;Decreased recall of precautions   Following Commands Follows one step commands with increased time or repetition   Comments Pt is pleasant and cooperative to participate in therapy this day  Pt t/o session stated "I don't know what I'm doing" despite therapists educating Pt multiple times  Pt required VC and TC for all safety, sequencing, and redirection to task  Pt required increased time for processing and frequent repeat of commands for increased follow through  Pt expressed anxiety w/ transfers and movement, stating multiple times "don't let me fall "   Activity Tolerance   Activity Tolerance Patient limited by pain; Patient limited by fatigue;Treatment limited secondary to medical complications (Comment)  (cog deficits)   Medical Staff Made Aware Mirela Waller; PT Conception [place]; SPT ORLÉANS; RN cleared Pt for OT session   Assessment   Assessment Patient participated in Skilled OT session this date with interventions consisting of ADL re training with the use of correct body mechnaics, Energy Conservation techniques, deep breathing technique, safety awareness and fall prevention techniques, increase dynamic sit/ stand balance during functional activity , increase postural control, increase trunk control and increase OOB/ sitting tolerance   Patient agreeable to OT treatment session, upon arrival patient was found supine in bed  Pt participated in bed mobility, sitting EOB tolerance, STS, standing tolerance, sit pivot transfers, LB dressing, and toileting  Please refer to chart for functional levels  Patient requiring frequent re direction, verbal cues for safety, verbal cues for correct technique, verbal cues for pacing thru activity steps, cognitive assistance to anticipate next step, one step directives and frequent rest periods  Patient continues to be functioning below baseline level, occupational performance remains limited secondary to factors listed above and increased risk for falls and injury  From OT standpoint, recommendation at time of d/c would be Short Term Rehab  Patient to benefit from continued Occupational Therapy treatment while in the hospital to address deficits as defined above and maximize level of functional independence with ADLs and functional mobility  Plan   Treatment Interventions ADL retraining;Functional transfer training;UE strengthening/ROM; Endurance training;Cognitive reorientation;Patient/family training;Equipment evaluation/education; Fine motor coordination activities; Compensatory technique education; Activityengagement; Energy conservation   Goal Expiration Date 09/10/20   OT Treatment Day 1   OT Frequency 3-5x/wk   Recommendation   OT Discharge Recommendation Post-Acute Rehabilitation Services   OT - OK to Discharge Yes  (when medically cleared)   Barthel Index   Feeding 5   Bathing 0   Grooming Score 0   Dressing Score 0   Bladder Score 0   Bowels Score 0   Toilet Use Score 0   Transfers (Bed/Chair) Score 5   Mobility (Level Surface) Score 0   Stairs Score 0   Barthel Index Score 10   Modified [place] Scale   Modified [place] Scale 4           Jose Ramon Raphael MS, OTR/L

## 2020-09-01 NOTE — QUICK NOTE
VAC change      VAC location: sacrum    Patient was positively identified via hospital bracelet, with RN present  A time-out was performed to confirm patient, procedure, location, and all parties were in agreement  One krillex was removed from the wound  Drains: N/A  Unit Type     V A C ulta       Black foam- # applied     2   White foam- # applied     0   Cycle     Continuous       Target Pressure (mmHg)     125       Dressing Status     Clean;Dry; Intact           Wound description and size: 7 x 5 x 5 cm

## 2020-09-01 NOTE — ASSESSMENT & PLAN NOTE
POA  Wound care and piper surg appreciated  Patient reports ulcer developed status post recent hip fracture  8/26 excisional debridement of sacral pressure wound and washout   8/28 Debridement and washout of sacral decubitus ulcer  C/w trujillo  ID recs divesrsion colostomy to help w/ healing    D/w surgery - colostomy is not indicated as wound is distant from rectum and has not been but getting contaminated by stool  Local wound care by general surgery

## 2020-09-01 NOTE — PHYSICAL THERAPY NOTE
PHYSICAL THERAPY TREATMENT NOTE    Patient Name: Romi Mcclendonfucci  MTBLX'M Date: 9/1/2020 09/01/20 1203   Pain Assessment   Pain Assessment Tool 0-10   Pain Score No Pain   Restrictions/Precautions   Weight Bearing Precautions Per Order Yes   RLE Weight Bearing Per Order WBAT   LLE Weight Bearing Per Order WBAT   Other Precautions Cognitive; Chair Alarm; Bed Alarm;O2;Multiple lines; Fall Risk;Telemetry  (2L O2 NC)   General   Chart Reviewed Yes   Additional Pertinent History Pt s/p washout and debridment of buttock on both 8/28 and 8/29   Response to Previous Treatment Patient with no complaints from previous session  Family/Caregiver Present No   Cognition   Overall Cognitive Status Impaired   Arousal/Participation Alert; Cooperative   Attention Attends with cues to redirect   Orientation Level Oriented to person;Oriented to time;Oriented to place; Disoriented to situation   Memory Decreased recall of precautions;Decreased recall of recent events;Decreased short term memory   Following Commands Follows one step commands with increased time or repetition   Comments Pt pleasant and willing to work w/ therapy, but presents as confused  Pt w/ decreased insight into deficits, requires cues for safety  Pt expressed anxiety related to mobility and falls, PT provided encouragement and education on importance of mobility  Subjective   Subjective "I'm nervous I'm going to fall if I get seated up"   Bed Mobility   Supine to Sit 2  Maximal assistance   Additional items Bedrails;HOB elevated; Increased time required;LE management;Verbal cues; Assist x 2   Sit to Supine Unable to assess   Additional Comments Pt supine in bed upon initial PT arrival  Pt left sitting up in chair w/ chair alrm on and all needs w/in reach  Transfers   Sit to Stand 2  Maximal assistance   Additional items Assist x 2; Increased time required;Verbal cues   Stand to Sit 2  Maximal assistance   Additional items Assist x 2; Increased time required;Verbal cues   Sit pivot 2  Maximal assistance   Additional items Assist x 3; Increased time required;Verbal cues  (additional 4th assist for lines and safety )   Additional Comments Pt completed 1st STS (~3min) and 2nd STS (~2min) at Max-A x2, able to clear bottom but was unable to acheive full upright posture  B/L HHA used for both STSs and sit pivot transfer  Balance   Static Sitting Poor +   Dynamic Sitting Poor +   Static Standing Poor   Dynamic Standing Poor -   Ambulatory Poor -   Endurance Deficit   Endurance Deficit Yes   Endurance Deficit Description weakness   Activity Tolerance   Activity Tolerance Patient limited by fatigue   Medical Staff Made Aware PT Almaz De Guzman RN   Nurse Made Aware RN cleared pt for PT treatment session   Assessment   Prognosis Fair   Problem List Decreased strength;Decreased range of motion;Decreased endurance; Impaired balance;Decreased mobility; Decreased cognition;Decreased safety awareness;Decreased skin integrity   Assessment Pt seen by PT on 09/01/20 for PT treatment session w/ a focus on functional transfers  Pt making fair progress towards goals this session  Pt completed multiple STS transfers and a sit pivot transfer but required Max-A x3 in order to do so 2/2 decreased strength, balance and postural control  Pt w/ difficulty maintaining prolonged upright stance 2/2 decreased muscular endurance  Pt left sitting up in chair w/ chair alarm on and all needs in reach at end of therapy session  Pt would benefit from skilled PT in order to address impairments and functional limitations  PT to follow pt 3-5x /week, and would recommend rehab pending medical clearance  Barriers to Discharge Inaccessible home environment;Decreased caregiver support   Goals   Patient Goals To get stronger and heal    STG Expiration Date 09/10/20   Plan   Treatment/Interventions Functional transfer training;LE strengthening/ROM; Therapeutic exercise; Endurance training;Cognitive reorientation;Patient/family training;Equipment eval/education;Gait training;Bed mobility;OT;Spoke to case management   Progress Slow progress, decreased activity tolerance   PT Frequency Other (Comment)  (3-5x /week)   Recommendation   PT Discharge Recommendation Post-Acute Rehabilitation Services   PT - OK to Discharge Yes  (pending medical clearance)         Precious Landeros, SPT

## 2020-09-01 NOTE — PROGRESS NOTES
Progress Note - Jack Cotto 1945, 67 y o  female MRN: [id number]    Unit/Bed#: [facility] 504-01 Encounter: [id number]    Primary Care Provider: TJ Garcia   Date and time admitted to hospital: 8/25/2020  4:06 PM        Chronic diastolic heart failure [facility])  Assessment & Plan  8/30 IV Bumex given as pt overloaded from resuscitation and PRBC with excellent UOP  reepat Bumex today per cardio    Stenosis of left carotid artery  Assessment & Plan  Vascular appreciated  ASA and statin  Carotid dopplers completed - LICA > 07%, VASQUEZ < 15%  Vascular recommends OP f/u sicne the etiology for her symptoms on presentation was most likely cardio embolic    Anemia  Assessment & Plan  S/p 1U PRBC in ICU  Appears stable at this time    Bacteremia due to Enterococcus  Assessment & Plan  B Cx pos for Enterococcus and Strep Constellatus  Zosyn x 14 days  ID appreciated    Paroxysmal atrial fibrillation ([facility] [address] )  Assessment & Plan  S/p DCCV [facility]  8/30 pt in Afib w/ moderate VR  Cardio appreciated  Heparin gtt - will need to switch to coumadin, start 5 mg today  NOACs costly  Lopressor for rate control  TSH WNL    RORY (acute kidney injury) ([facility] [address] )  Assessment & Plan  Estimated Creatinine Clearance: 44 4 mL/min (by C-G formula based on SCr of 1 16 mg/dL)  POA  Patient noted to have RORY on admission; serum creatinine 2 59 (baseline 1 2-1 3)  2/2 shock  Appears to be resolved    Stroke-like symptoms  Assessment & Plan  - pt presents with to [facility] with right-sided facial droop and right-sided weakness after being transported by EMS for [facility] and noted to have atrial fibrillation enroute  - CT head in ED shows posterior cerebral artery disease is noted, mild on the right and severe left     - NIH - score on admission; - 6  -c/w ASA and statin  - MRI brain no acute findings  - CTA shows bilateral stenosis of carotids; less than 75%  LDL 23  Echo shows mild pulm htn  Neuro signed off  Pt now in Afib - placed on heparin gtt  Neuro recs BASA and AC in setting of Afib and carotid stenosis    Decubitus ulcer of sacral region, stage 4 [facility])  Assessment & Plan  POA  Wound care and piper surg appreciated  Patient reports ulcer developed status post recent hip fracture  8/26 excisional debridement of sacral pressure wound and washout   8/28 Debridement and washout of sacral decubitus ulcer  C/w trujillo  ID recs divesrsion colostomy to help w/ healing    D/w surgery - colostomy is not indicated as wound is distant from rectum and has not been but getting contaminated by stool  Local wound care by general surgery    Hypertension  Assessment & Plan  Pt needed pressors on admission  Now on BB for Afib    Type 2 diabetes mellitus with hyperglycemia ([facility] [address] )  Assessment & Plan    Lab Results   Component Value Date    HGBA1C 9 9 (H) 08/26/2020     SSI  Blood Glucose checks TIDWM and QHS (Q6H for NPO patients)  Hold oral medications  C/w Lantus 12U qhs  PT should take insulin at d/c    * Septic shock due to gram-positive bacteria ([address] )  Assessment & Plan  Pt now off pressors  ID appreciated  C/w Zosyn for total 14 days  B Cx from 8/28 neg  2/2 infected stage 4 DQ ulcer  Cleared from ID standpoint for PICC as B Cx neg for4 days      [facility] Internal Medicine Progress Note  Patient: Veronica Maza 67 y o  female   MRN: [id number]  PCP: TJ Reddy  Unit/Bed#: [facility] 504-01 Encounter: [id number]  Date Of Visit: 09/01/20    Assessment:    Principal Problem:    Septic shock due to gram-positive bacteria ([address] )  Active Problems:    Type 2 diabetes mellitus with hyperglycemia ([address] )    Hypertension    Hyperlipidemia    Decubitus ulcer of sacral region, stage 4 (HCC)    Stroke-like symptoms    RORY (acute kidney injury) ([address] )    Paroxysmal atrial fibrillation ([address] )    Bacteremia due to Enterococcus    Anemia    Stenosis of left carotid artery    Chronic diastolic heart failure ([address] )      Plan:       VTE Pharmacologic Prophylaxis: Pharmacologic: Heparin Drip  Mechanical VTE Prophylaxis in Place: Yes    Patient Centered Rounds: I have performed bedside rounds with nursing staff today  Discussions with Specialists or Other Care Team Provider:     Education and Discussions with Family / Patient: patient, left VM for son    Time Spent for Care: 30 minutes  More than 50% of total time spent on counseling and coordination of care as described above  Current Length of Stay: 7 day(s)    Current Patient Status: Inpatient   Certification Statement: The patient will continue to require additional inpatient hospital stay due to not ready    Discharge Plan / Estimated Discharge Date: rehab    Code Status: Level 1 - Full Code      Subjective:   Feels ok, appetite is low    Objective:     Vitals:   Temp (24hrs), Av 9 °F (36 6 °C), Min:97 4 °F (36 3 °C), Max:98 6 °F (37 °C)    Temp:  [97 4 °F (36 3 °C)-98 6 °F (37 °C)] 97 9 °F (36 6 °C)  HR:  [] 91  Resp:  [16] 16  BP: (114-135)/(57-70) 135/70  SpO2:  [94 %-100 %] 98 %  Body mass index is 29 kg/m²  Input and Output Summary (last 24 hours): Intake/Output Summary (Last 24 hours) at 2020 1536  Last data filed at 2020 1317  Gross per 24 hour   Intake 512 4 ml   Output 976 ml   Net -463 6 ml       Physical Exam:     Physical Exam  Vitals signs reviewed  Eyes:      Conjunctiva/sclera: Conjunctivae normal    Cardiovascular:      Rate and Rhythm: Normal rate and regular rhythm  Heart sounds: Normal heart sounds  No murmur  No gallop  Pulmonary:      Effort: Pulmonary effort is normal  No respiratory distress  Breath sounds: Normal breath sounds  No wheezing  Abdominal:      General: There is no distension  Palpations: Abdomen is soft  Tenderness: There is no abdominal tenderness  Neurological:      Mental Status: She is alert           Additional Data:     Labs:    Results from last 7 days   Lab Units 20  0508  20  0508   WBC Thousand/uL 14 03*   < > 14 17*   HEMOGLOBIN g/dL 8 9*   < > 7 6*   HEMATOCRIT % 28 6*   < > 24 4*   PLATELETS Thousands/uL 188   < > 208   NEUTROS PCT %  --   --  88*   LYMPHS PCT %  --   --  7*   MONOS PCT %  --   --  3*   EOS PCT %  --   --  0    < > = values in this interval not displayed  Results from last 7 days   Lab Units 09/01/20  0504  08/26/20  0429   POTASSIUM mmol/L 4 2   < > 3 4*   CHLORIDE mmol/L 109*   < > 106   CO2 mmol/L 27   < > 21   BUN mg/dL 47*   < > 34*   CREATININE mg/dL 1 16   < > 2 32*   CALCIUM mg/dL 8 1*   < > 7 4*   ALK PHOS U/L  --   --  232*   ALT U/L  --   --  11*   AST U/L  --   --  27    < > = values in this interval not displayed  Results from last 7 days   Lab Units 08/30/20  1201   INR  1 11       * I Have Reviewed All Lab Data Listed Above  * Additional Pertinent Lab Tests Reviewed: All Labs Within Last 24 Hours Reviewed    Imaging:    Imaging Reports Reviewed Today Include:   Imaging Personally Reviewed by Myself Includes:      Recent Cultures (last 7 days):     Results from last 7 days   Lab Units 08/28/20  0542 08/28/20  0536 08/26/20  1353 08/26/20  1225 08/26/20  0554 08/26/20  0536   BLOOD CULTURE  No Growth After 4 Days  No Growth After 4 Days    --   --   --  Streptococcus constellatus*  Staphylococcus coagulase negative*  Bacteroides fragilis*  Enterococcus faecalis*  Streptococcus constellatus*   GRAM STAIN RESULT   --   --  No polys seen*  2+ Gram positive cocci in pairs* 1+ Polys*  3+ Gram positive cocci in pairs*  2+ Gram positive rods*  1+ Gram negative rods*  --  Gram positive cocci in pairs and chains*  Gram negative rods*  Gram positive cocci in pairs and chains*   URINE CULTURE   --   --   --   --  50,000-59,000 cfu/ml Escherichia coli*  >100,000 cfu/ml Lactobacillus species*  --    WOUND CULTURE   --   --  3+ Growth of Escherichia coli*  3+ Growth of Proteus mirabilis*  3+ Growth of Enterococcus faecalis*  3+ Growth of  2+ Growth of Escherichia coli* 2+ Growth of Proteus mirabilis*  3+ Growth of Enterococcus species*  3+ Growth of   --   --        Last 24 Hours Medication List:   Current Facility-Administered Medications   Medication Dose Route Frequency Provider Last Rate    acetaminophen  650 mg Oral Q6H PRN Humberto Nipper, DO      aspirin  81 mg Oral Daily Humberto Nipper, DO      atorvastatin  40 mg Oral Daily With BEATRIZ Rubi      furosemide  40 mg Intravenous Once Liyah Hong MD      gabapentin  100 mg Oral TID Gino Amaya PA-C      heparin (porcine)  3-20 Units/kg/hr (Order-Specific) Intravenous Titrated Humberto Nipper, DO 8 Units/kg/hr (09/01/20 1358)    hydrALAZINE  10 mg Intravenous Q4H PRN Gino Amaya PA-C      insulin glargine  12 Units Subcutaneous HS Humberto Nipper, DO      insulin lispro  1-6 Units Subcutaneous 4x Daily (AC & HS) Omar Arias PA-C      metoprolol tartrate  25 mg Oral Once Renny Pellet, CRNP      metoprolol tartrate  50 mg Oral Q12H [address] Renny Pellet, CRNP      nystatin   Topical BID Gino Amaya PA-C      piperacillin-tazobactam  2 25 g Intravenous Q6H TJ Abdi 2 25 g (09/01/20 1435)    warfarin  5 mg Oral Daily (warfarin) Ebonie Flowers MD          Today, Patient Was Seen By: Ebonie Flowers MD    ** Please Note: This note has been constructed using a voice recognition system   **

## 2020-09-01 NOTE — ASSESSMENT & PLAN NOTE
Pt now off pressors  ID appreciated  C/w Zosyn for total 14 days  B Cx from 8/28 neg  2/2 infected stage 4 DQ ulcer  Cleared from ID standpoint for PICC as B Cx neg for4 days

## 2020-09-01 NOTE — CASE MANAGEMENT
[address] to check cost of Eliquis and cost is $[phone]  Informed SLIM and patient will be started on Coumadin

## 2020-09-01 NOTE — PLAN OF CARE
Problem: PHYSICAL THERAPY ADULT  Goal: Performs mobility at highest level of function for planned discharge setting  See evaluation for individualized goals  Description: Treatment/Interventions: Functional transfer training, LE strengthening/ROM, Therapeutic exercise, Endurance training, Gait training, Bed mobility, Equipment eval/education          See flowsheet documentation for full assessment, interventions and recommendations  Outcome: Progressing  Note: Prognosis: Fair  Problem List: Decreased strength, Decreased range of motion, Decreased endurance, Impaired balance, Decreased mobility, Decreased cognition, Decreased safety awareness, Decreased skin integrity  Assessment: Pt seen by PT on 09/01/20 for PT treatment session w/ a focus on functional transfers  Pt making fair progress towards goals this session  Pt completed multiple STS transfers and a sit pivot transfer but required Max-A x3 in order to do so 2/2 decreased strength, balance and postural control  Pt w/ difficulty maintaining prolonged upright stance 2/2 decreased muscular endurance  Pt left sitting up in chair w/ chair alarm on and all needs in reach at end of therapy session  Pt would benefit from skilled PT in order to address impairments and functional limitations  PT to follow pt 3-5x /week, and would recommend rehab pending medical clearance  PT Discharge Recommendation: Post-Acute Rehabilitation Services     PT - OK to Discharge: Yes(pending medical clearance)    See flowsheet documentation for full assessment

## 2020-09-01 NOTE — PROGRESS NOTES
Progress Note - Cardiology   Son Kessler 67 y o  female MRN: [id number]  Unit/Bed#: [facility] 504-01 Encounter: [id number]    Assessment/Plan:  67 y o  female with past medical history of diabetes, HTN, and hyperlipidemia who originally presented on 8/25 due to generalized weakness  She was found to be in unstable atrial fibrillation and was cardioverted by EMS  She then developed dysarthria and aphasia and received tPA for possible stroke  Followup MRI was negative for any acute ischemic changes  Carotid US showed >70% stenosis of the L ICA  Upon admission she was also found to be septic secondary to enterococcus bacteremia likely secondary to sacral decubitus ulcer  Patient has received multiple debridements of sacral wound and is on IV antiobiotics  Most recent blood cultures on 8/28 were negative  Patient was in sinus rhythm for a few days but has been in atrial fibrillation since 8/29  She has not had any chest pain or palpitations  She has had no lightheadedness or shortness of breath  HR has been improved, but she still has episodes of tachycardia into the 130s  1  Paroxysmal Atrial Fibrillation   -New diagnosis this admission   -Echo on 8/27 showed no regional wall abnormalities, mild pulmonary hypertension, mildly to moderately dilated left atrium, mild MR, and mild TR  -TSH WNL   -Today is day 2 of metoprolol tartrate 50 mg BID   -Patient had HR into the 130s overnight, but overall HR is improved over past 24 hours  -Patient has not chest pain or other cardiac symptoms   Plan:   -Continue metoprolol tartrate 50 mg BID   -Goal HR , continue telemetry monitoring  -Continue heparin for anticoagulation, will need to transition to oral anticoagulation before discharge     2   Volume Overload  -Patient had signs of volume overload secondary to extensive fluid resuscitation since admission   -Patient received 1 dose Bumex 8/30 and responded well with increased urine output   -Patient had decreased urine output yesterday but also had decreased PO fluid intake   -Patient continues to have lower extremity edema   -Patient Cr is improved to 1 16 which is at baseline   Plan:   -Given continue lower extremity edema and decreased urine output, will discuss with attending repeating dose of Bumex for further diuresis      3  Stroke-like Symptoms s/p tPA  -Patient developed stroke like symptoms after cardioversion for unstable a  fib  -MRI showed no acute ischemic disease   -Patient has some residual R sided weakness   -Neurology was following and recommends no further intervention at this time    4  Carotid Stenosis   -CTA showed L ICA 65-70% stenosis   -Carotid ultrasound showed >70% stenosis of the L ICA with irregular and heterogenous plaque   -Vascular Surgery will follow as outpatient for possible carotid intervention   Plan:   -Continue aspirin, statin, and anticoagulation     Subjective/Objective     Subjective: Patient is feeling well this morning  She has no chest pain or shortness of breath  Per nursing, she had HR into the 130s last night but has otherwise been stable  Patient reports no palpitations  She does report decreased appetite and PO fluid intake  Objective:    Vitals: /57   Pulse 90   Temp 98 °F (36 7 °C) (Oral)   Resp 16   Ht 5' 6" (1 676 m)   Wt 81 5 kg (179 lb 10 8 oz)   SpO2 96%   BMI 29 00 kg/m²   Vitals:    08/30/20 0533 08/31/20 0552   Weight: 84 2 kg (185 lb 10 oz) 81 5 kg (179 lb 10 8 oz)     Orthostatic Blood Pressures      Most Recent Value   Blood Pressure  114/57 filed at 08/31/2020 2312   Patient Position - Orthostatic VS  Lying filed at 08/31/2020 1900            Intake/Output Summary (Last 24 hours) at 9/1/2020 0741  Last data filed at 9/1/2020 7081  Gross per 24 hour   Intake 432 4 ml   Output 1250 ml   Net -817 6 ml       Invasive Devices     Peripheral Intravenous Line            Peripheral IV 08/28/20 Distal;Dorsal (posterior); Right Forearm 3 days    Peripheral IV 08/30/20 Distal;Left;Upper;Ventral (anterior) Arm 1 day          Drain            Urethral Catheter Temperature probe 16 Fr  6 days                  Physical Exam  Constitutional:       General: She is not in acute distress  Appearance: She is ill-appearing  HENT:      Head: Normocephalic and atraumatic  Mouth/Throat:      Mouth: Mucous membranes are moist       Pharynx: Oropharynx is clear  Eyes:      Extraocular Movements: Extraocular movements intact  Pupils: Pupils are equal, round, and reactive to light  Cardiovascular:      Rate and Rhythm: Normal rate  Rhythm irregular  Pulmonary:      Effort: Pulmonary effort is normal       Breath sounds: Normal breath sounds  Abdominal:      General: Bowel sounds are normal  There is no distension  Tenderness: There is no abdominal tenderness  Musculoskeletal:      Comments: Mild R sided weakness   Skin:     General: Skin is warm and dry  Neurological:      Mental Status: She is alert and oriented to person, place, and time  Comments: L eyelid droop   Psychiatric:         Mood and Affect: Mood normal          Behavior: Behavior normal           Lab Results:   CMP:   Results from last 7 days   Lab Units 09/01/20  0504  08/26/20  0429   SODIUM mmol/L 141   < > 138   POTASSIUM mmol/L 4 2   < > 3 4*   CHLORIDE mmol/L 109*   < > 106   CO2 mmol/L 27   < > 21   BUN mg/dL 47*   < > 34*   CREATININE mg/dL 1 16   < > 2 32*   CALCIUM mg/dL 8 1*   < > 7 4*   AST U/L  --   --  27   ALT U/L  --   --  11*   ALK PHOS U/L  --   --  232*   EGFR ml/min/1 73sq m 46   < > 20    < > = values in this interval not displayed  Coags:   Results from last 7 days   Lab Units 09/01/20  0504  08/30/20  1201   PTT seconds 82*   < > 34   INR   --   --  1 11    < > = values in this interval not displayed  TSH:   Results from last 7 days   Lab Units 08/31/20  0508   TSH 3RD GENERATON uIU/mL 1 380     Imaging: I have personally reviewed pertinent reports  VTE Pharmacologic Prophylaxis: Heparin  VTE Mechanical Prophylaxis: sequential compression device      Sruthi Mcgrath, MS-4

## 2020-09-01 NOTE — PLAN OF CARE
Problem: OCCUPATIONAL THERAPY ADULT  Goal: Performs self-care activities at highest level of function for planned discharge setting  See evaluation for individualized goals  Description: Treatment Interventions: ADL retraining, Functional transfer training, UE strengthening/ROM, Endurance training, Cognitive reorientation, Patient/family training, Equipment evaluation/education, Fine motor coordination activities, Compensatory technique education, Activityengagement, Energy conservation          See flowsheet documentation for full assessment, interventions and recommendations  Outcome: Progressing  Note: Limitation: Decreased ADL status, Decreased UE strength, Decreased UE ROM, Decreased Safe judgement during ADL, Decreased cognition, Decreased endurance, Visual deficit, Decreased self-care trans, Decreased high-level ADLs  Prognosis: Fair  Assessment: (P) Patient participated in Skilled OT session this date with interventions consisting of ADL re training with the use of correct body mechnaics, Energy Conservation techniques, deep breathing technique, safety awareness and fall prevention techniques, increase dynamic sit/ stand balance during functional activity , increase postural control, increase trunk control and increase OOB/ sitting tolerance   Patient agreeable to OT treatment session, upon arrival patient was found supine in bed  Pt participated in bed mobility, sitting EOB tolerance, STS, standing tolerance, sit pivot transfers, LB dressing, and toileting  Please refer to chart for functional levels  Patient requiring frequent re direction, verbal cues for safety, verbal cues for correct technique, verbal cues for pacing thru activity steps, cognitive assistance to anticipate next step, one step directives and frequent rest periods   Patient continues to be functioning below baseline level, occupational performance remains limited secondary to factors listed above and increased risk for falls and injury  From OT standpoint, recommendation at time of d/c would be Short Term Rehab  Patient to benefit from continued Occupational Therapy treatment while in the hospital to address deficits as defined above and maximize level of functional independence with ADLs and functional mobility        OT Discharge Recommendation: Post-Acute Rehabilitation Services  OT - OK to Discharge: Yes(when medically cleared)

## 2020-09-01 NOTE — ASSESSMENT & PLAN NOTE
Vascular appreciated  ASA and statin  Carotid dopplers completed - LICA > 10%, VASQUEZ < 62%  Vascular recommends OP f/u sicne the etiology for her symptoms on presentation was most likely cardio embolic

## 2020-09-01 NOTE — PROGRESS NOTES
Progress Note - Infectious Disease   Bang Parry 67 y o  female MRN: [id number]  Unit/Bed#: [facility] 504-01 Encounter: [id number]      Impression/Plan:  1  Septic shock with gram positive bacteremia likely 2/2 infected stage 4 sacral pressure ulcer with suspected osteomyelitis and concern for necrotizing fasciitis  · Presented with stage 4 sacral pressure ulcer with purulent appearance    · Micro results:  ? COVID19 PCR negative  ? BCx 8/26:   ? Set 1: Enterococcus faecalis, strep constellatus  ? Set 2: Strep constellatus    ? BCx 8/28: Negative 48h    ? UCx: E  Coli (asymptomatic)  ? WCx: polymicrobial with E  Coli, Proteus mirabilis, enterococcus faecalis     ? Anaerobic WCx: 3+ Bacteroides fragilis  ? MRSA screen negative  · Imaging:  ? CT abdomen/pelvis 8/26: Soft tissue ulcer overlying the inferior sacrum and coccyx with associated gas production about the right sacrospinous ligament extending into the soft tissues in the perirectal region in keeping with necrotizing fasciitis  ? TTE negative for vegetations  · Shock has resolved --> off pressers, out of ICU  · WBC count trend: 16 - 13 5 - 15 - 14 - 11 - 12 - 14 (today)  No fever >72 hours    · Hx of DM with HbA1c of 9 9 on 8/26/20  · S/p surgical debridement on 8/26/20  Necrotic tissue was debrided and tissue sent for culture  Wound was described as malodorous but without purulent discharge  Wound pictures before and after debridement were reviewed    Plan:  · Continue Zosyn 2 25 gm IV Q6H, renally dosed, for a total treatment duration of 14 days for bacteremia  · Recommend diversion colostomy to help with wound healing  · Will require PICC placement for long duration of IV antibiotics for osteomyelitis and gram + bacteremia and currently on heparin drip for anticoagulation for Afib  No objection to PICC line placement now that repeat blood cultures have been negative for 72 hours  · Continue to monitor fever curve and WBC count trend   WBC has been uptrending past two days, but no other signs of worsening infection at this time       2  Stage 4 sacral pressure ulcer with suspected osteomyelitis  · Present on admission  Most likely source for shock and gram + bacteremia  · S/p debridement on 8/26  Plan:  · See management above  · Local wound management per surgery  Underwent 2nd OR today 8/28 for further debridement  · Recommend diversion colostomy to help with wound healing        3  CVA  · Developed right facial droop, right sided weakness, aphasia and dysarthria after cardioversion en route to the hospital for hypotension 2/2 new onset Afib with RVR  · Initial NIHSS on presentation to ED was 6 which improved to 3 after tPA  · Had mental status change overnight on 8/26/20 most likely in setting of hypotension with cerebrohypoperfusion in setting of septic shock     · Imaging:  ? MRI brain demonstrated no acute changes   Did demonstrate mild chronic small vessel cerebrovascular ischemic disease and age-related atrophy  ? CTA head/neck demonstrated left PCA stenosis and 65-70% stenosis of the left ICA bulb  ? Follow-up CT brain after acute mental status change demonstrated no acute changes and specifically no hemorrhage  Plan:  · Management per primary team  Neurology on board       4  New onset atrial fibrillation  · S/p cardioversion for hypotension  · Converted back to Afib after a few days of SR  Started on heparin drip by primary team    Plan:  · Management per primary team  Cardiology on board       5  RORY - improving  · BL 1 2 - 1 7  · Presented with creatinine of 2 59  Improved with fluid resuscitation  Trend; 2 59 - 1 80 - 1 96 - 2 07 - 1 89 - 1 48    · Most likely etiology is prerenal in setting of hypotension and dehydration 2/2 poor oral intake  Plan:  · Management as per primary team       6  Asymptomatic E  Coli bacteruria  · Likely asymptomatic but anyway on coverage with Zosyn for above       Antibiotics:  Zosyn 2 25 gm IV Q6H    Subjective:  Patient has no fever, chills, sweats; no nausea, vomiting, diarrhea; no cough, shortness of breath; no pain  No new symptoms  Objective:  Vitals:  Temp:  [97 6 °F (36 4 °C)-98 6 °F (37 °C)] 98 6 °F (37 °C)  HR:  [] 114  Resp:  [16-18] 16  BP: (114-145)/(57-67) 120/67  SpO2:  [94 %-100 %] 100 %  Temp (24hrs), Av °F (36 7 °C), Min:97 6 °F (36 4 °C), Max:98 6 °F (37 °C)  Current: Temperature: 98 6 °F (37 °C)    Physical Exam:   General Appearance:  Alert, interactive, nontoxic, no acute distress  Throat: Oropharynx moist without lesions  Lungs:   Clear to auscultation bilaterally; no wheezes, rhonchi or rales; respirations unlabored   Heart:  RRR; no murmur, rub or gallop   Abdomen:   Soft, non-tender, non-distended, positive bowel sounds  Extremities: No clubbing, cyanosis or edema   Skin: No new rashes or lesions  No draining wounds noted  Clean dry dressing in place over sacral ulcer, no signs of infection surrounding the dressing  Areas with intertrigo are healing  Labs, Imaging, & Other studies:   All pertinent labs and imaging studies were personally reviewed  Results from last 7 days   Lab Units 20  0508 20  0513 20  0511   WBC Thousand/uL 14 03* 12 51* 11 56*   HEMOGLOBIN g/dL 8 9* 9 4* 8 5*   PLATELETS Thousands/uL 188 215 210     Results from last 7 days   Lab Units 20  0504 20  0508 20  0513  20  0429   SODIUM mmol/L 141 141 139   < > 138   POTASSIUM mmol/L 4 2 4 1 4 3   < > 3 4*   CHLORIDE mmol/L 109* 111* 109*   < > 106   CO2 mmol/L 27 26 22   < > 21   BUN mg/dL 47* 49* 49*   < > 34*   CREATININE mg/dL 1 16 1 48* 1 89*   < > 2 32*   EGFR ml/min/1 73sq m 46 34 25   < > 20   CALCIUM mg/dL 8 1* 7 9* 8 5   < > 7 4*   AST U/L  --   --   --   --  27   ALT U/L  --   --   --   --  11*   ALK PHOS U/L  --   --   --   --  232*    < > = values in this interval not displayed       Results from last 7 days   Lab Units 08/28/20  0542 08/28/20  0536 08/27/20  1214 08/26/20  1353 08/26/20  1225 08/26/20  0554 08/26/20  0536   BLOOD CULTURE  No Growth at 72 hrs   No Growth at 72 hrs   --   --   --   --  Streptococcus constellatus*  Staphylococcus coagulase negative*  Bacteroides fragilis*  Enterococcus faecalis*  Streptococcus constellatus*   GRAM STAIN RESULT   --   --   --  No polys seen*  2+ Gram positive cocci in pairs* 1+ Polys*  3+ Gram positive cocci in pairs*  2+ Gram positive rods*  1+ Gram negative rods*  --  Gram positive cocci in pairs and chains*  Gram negative rods*  Gram positive cocci in pairs and chains*   URINE CULTURE   --   --   --   --   --  50,000-59,000 cfu/ml Escherichia coli*  >100,000 cfu/ml Lactobacillus species*  --    WOUND CULTURE   --   --   --  3+ Growth of Escherichia coli*  3+ Growth of Proteus mirabilis*  3+ Growth of Enterococcus faecalis*  3+ Growth of  2+ Growth of Escherichia coli*  2+ Growth of Proteus mirabilis*  3+ Growth of Enterococcus species*  3+ Growth of   --   --    MRSA CULTURE ONLY   --   --  No Methicillin Resistant Staphlyococcus aureus (MRSA) isolated  --   --   --   --      Results from last 7 days   Lab Units 08/27/20  0454 08/26/20  0536   PROCALCITONIN ng/ml 6 10* 6 35*

## 2020-09-01 NOTE — ASSESSMENT & PLAN NOTE
S/p DCCV in route to [facility]  8/30 pt in Afib w/ moderate VR  Cardio appreciated  Heparin gtt - will need to switch to coumadin, start 5 mg today   NOACs costly  Lopressor for rate control  TSH WNL

## 2020-09-02 LAB
ANION GAP SERPL CALCULATED.3IONS-SCNC: 5 MMOL/L (ref 4–13)
APTT PPP: 89 SECONDS (ref 23–37)
BACTERIA BLD CULT: NORMAL
BACTERIA BLD CULT: NORMAL
BASOPHILS # BLD MANUAL: 0.12 THOUSAND/UL (ref 0–0.1)
BASOPHILS NFR MAR MANUAL: 1 % (ref 0–1)
BUN SERPL-MCNC: 47 MG/DL (ref 5–25)
CALCIUM SERPL-MCNC: 8.1 MG/DL (ref 8.3–10.1)
CHLORIDE SERPL-SCNC: 107 MMOL/L (ref 100–108)
CO2 SERPL-SCNC: 29 MMOL/L (ref 21–32)
CREAT SERPL-MCNC: 1.01 MG/DL (ref 0.6–1.3)
EOSINOPHIL # BLD MANUAL: 0.12 THOUSAND/UL (ref 0–0.4)
EOSINOPHIL NFR BLD MANUAL: 1 % (ref 0–6)
ERYTHROCYTE [DISTWIDTH] IN BLOOD BY AUTOMATED COUNT: 14.6 % (ref 11.6–15.1)
GFR SERPL CREATININE-BSD FRML MDRD: 54 ML/MIN/1.73SQ M
GLUCOSE SERPL-MCNC: 207 MG/DL (ref 65–140)
GLUCOSE SERPL-MCNC: 215 MG/DL (ref 65–140)
GLUCOSE SERPL-MCNC: 216 MG/DL (ref 65–140)
GLUCOSE SERPL-MCNC: 231 MG/DL (ref 65–140)
GLUCOSE SERPL-MCNC: 261 MG/DL (ref 65–140)
HCT VFR BLD AUTO: 29.9 % (ref 34.8–46.1)
HGB BLD-MCNC: 9.2 G/DL (ref 11.5–15.4)
INR PPP: 1.4 (ref 0.84–1.19)
LYMPHOCYTES # BLD AUTO: 1.33 THOUSAND/UL (ref 0.6–4.47)
LYMPHOCYTES # BLD AUTO: 11 % (ref 14–44)
MCH RBC QN AUTO: 29.2 PG (ref 26.8–34.3)
MCHC RBC AUTO-ENTMCNC: 30.8 G/DL (ref 31.4–37.4)
MCV RBC AUTO: 95 FL (ref 82–98)
METAMYELOCYTES NFR BLD MANUAL: 1 % (ref 0–1)
MONOCYTES # BLD AUTO: 0.24 THOUSAND/UL (ref 0–1.22)
MONOCYTES NFR BLD: 2 % (ref 4–12)
NEUTROPHILS # BLD MANUAL: 10.16 THOUSAND/UL (ref 1.85–7.62)
NEUTS BAND NFR BLD MANUAL: 1 % (ref 0–8)
NEUTS SEG NFR BLD AUTO: 83 % (ref 43–75)
NRBC BLD AUTO-RTO: 0 /100 WBCS
PLATELET # BLD AUTO: 202 THOUSANDS/UL (ref 149–390)
PLATELET BLD QL SMEAR: ADEQUATE
PMV BLD AUTO: 11.3 FL (ref 8.9–12.7)
POTASSIUM SERPL-SCNC: 4.1 MMOL/L (ref 3.5–5.3)
PROTHROMBIN TIME: 17.1 SECONDS (ref 11.6–14.5)
RBC # BLD AUTO: 3.15 MILLION/UL (ref 3.81–5.12)
RBC MORPH BLD: NORMAL
SODIUM SERPL-SCNC: 141 MMOL/L (ref 136–145)
WBC # BLD AUTO: 12.09 THOUSAND/UL (ref 4.31–10.16)

## 2020-09-02 PROCEDURE — 82948 REAGENT STRIP/BLOOD GLUCOSE: CPT

## 2020-09-02 PROCEDURE — 85007 BL SMEAR W/DIFF WBC COUNT: CPT | Performed by: HOSPITALIST

## 2020-09-02 PROCEDURE — 85610 PROTHROMBIN TIME: CPT | Performed by: HOSPITALIST

## 2020-09-02 PROCEDURE — 99232 SBSQ HOSP IP/OBS MODERATE 35: CPT | Performed by: HOSPITALIST

## 2020-09-02 PROCEDURE — 99232 SBSQ HOSP IP/OBS MODERATE 35: CPT | Performed by: INTERNAL MEDICINE

## 2020-09-02 PROCEDURE — 99231 SBSQ HOSP IP/OBS SF/LOW 25: CPT | Performed by: INTERNAL MEDICINE

## 2020-09-02 PROCEDURE — 85730 THROMBOPLASTIN TIME PARTIAL: CPT | Performed by: HOSPITALIST

## 2020-09-02 PROCEDURE — 85027 COMPLETE CBC AUTOMATED: CPT | Performed by: HOSPITALIST

## 2020-09-02 PROCEDURE — 80048 BASIC METABOLIC PNL TOTAL CA: CPT | Performed by: HOSPITALIST

## 2020-09-02 RX ORDER — INSULIN GLARGINE 100 [IU]/ML
15 INJECTION, SOLUTION SUBCUTANEOUS
Status: DISCONTINUED | OUTPATIENT
Start: 2020-09-02 | End: 2020-09-02

## 2020-09-02 RX ORDER — TORSEMIDE 20 MG/1
20 TABLET ORAL DAILY
Status: DISCONTINUED | OUTPATIENT
Start: 2020-09-03 | End: 2020-09-10 | Stop reason: HOSPADM

## 2020-09-02 RX ORDER — INSULIN GLARGINE 100 [IU]/ML
14 INJECTION, SOLUTION SUBCUTANEOUS
Status: DISCONTINUED | OUTPATIENT
Start: 2020-09-02 | End: 2020-09-10 | Stop reason: HOSPADM

## 2020-09-02 RX ADMIN — INSULIN LISPRO 3 UNITS: 100 INJECTION, SOLUTION INTRAVENOUS; SUBCUTANEOUS at 10:52

## 2020-09-02 RX ADMIN — INSULIN GLARGINE 14 UNITS: 100 INJECTION, SOLUTION SUBCUTANEOUS at 21:53

## 2020-09-02 RX ADMIN — METOPROLOL TARTRATE 75 MG: 50 TABLET, FILM COATED ORAL at 21:52

## 2020-09-02 RX ADMIN — METOPROLOL TARTRATE 50 MG: 50 TABLET, FILM COATED ORAL at 08:09

## 2020-09-02 RX ADMIN — ASPIRIN 81 MG: 81 TABLET, COATED ORAL at 08:10

## 2020-09-02 RX ADMIN — INSULIN LISPRO 3 UNITS: 100 INJECTION, SOLUTION INTRAVENOUS; SUBCUTANEOUS at 17:04

## 2020-09-02 RX ADMIN — PIPERACILLIN AND TAZOBACTAM 2.25 G: 2; .25 INJECTION, POWDER, FOR SOLUTION INTRAVENOUS at 08:17

## 2020-09-02 RX ADMIN — WARFARIN SODIUM 5 MG: 5 TABLET ORAL at 16:55

## 2020-09-02 RX ADMIN — INSULIN LISPRO 3 UNITS: 100 INJECTION, SOLUTION INTRAVENOUS; SUBCUTANEOUS at 17:03

## 2020-09-02 RX ADMIN — GABAPENTIN 100 MG: 100 CAPSULE ORAL at 21:53

## 2020-09-02 RX ADMIN — PIPERACILLIN AND TAZOBACTAM 2.25 G: 2; .25 INJECTION, POWDER, FOR SOLUTION INTRAVENOUS at 02:39

## 2020-09-02 RX ADMIN — PIPERACILLIN AND TAZOBACTAM 2.25 G: 2; .25 INJECTION, POWDER, FOR SOLUTION INTRAVENOUS at 13:34

## 2020-09-02 RX ADMIN — GABAPENTIN 100 MG: 100 CAPSULE ORAL at 08:09

## 2020-09-02 RX ADMIN — ATORVASTATIN CALCIUM 40 MG: 40 TABLET, FILM COATED ORAL at 16:55

## 2020-09-02 RX ADMIN — INSULIN LISPRO 3 UNITS: 100 INJECTION, SOLUTION INTRAVENOUS; SUBCUTANEOUS at 11:34

## 2020-09-02 RX ADMIN — NYSTATIN: 100000 POWDER TOPICAL at 17:03

## 2020-09-02 RX ADMIN — INSULIN LISPRO 2 UNITS: 100 INJECTION, SOLUTION INTRAVENOUS; SUBCUTANEOUS at 21:53

## 2020-09-02 RX ADMIN — HEPARIN SODIUM 10 UNITS/KG/HR: 10000 INJECTION, SOLUTION INTRAVENOUS at 11:34

## 2020-09-02 RX ADMIN — PIPERACILLIN AND TAZOBACTAM 2.25 G: 2; .25 INJECTION, POWDER, FOR SOLUTION INTRAVENOUS at 20:47

## 2020-09-02 RX ADMIN — GABAPENTIN 100 MG: 100 CAPSULE ORAL at 16:55

## 2020-09-02 RX ADMIN — INSULIN LISPRO 2 UNITS: 100 INJECTION, SOLUTION INTRAVENOUS; SUBCUTANEOUS at 08:08

## 2020-09-02 NOTE — ASSESSMENT & PLAN NOTE
8/30 IV Bumex given as pt overloaded from resuscitation and PRBC with excellent UOP  Repeat IV lasix per cardio  Now switched to PO torsemide 20 mg from tomorrow (was on Lasix 20 mg at home)

## 2020-09-02 NOTE — ASSESSMENT & PLAN NOTE
Estimated Creatinine Clearance: 51 5 mL/min (by C-G formula based on SCr of 1 01 mg/dL)    POA  Patient noted to have RORY on admission; serum creatinine 2 59 (baseline 1 2-1 3)  2/2 shock  Appears to be resolved

## 2020-09-02 NOTE — ASSESSMENT & PLAN NOTE
Pt now off pressors  ID appreciated  C/w Zosyn for total 14 days, D - 7  B Cx from 8/28 neg  2/2 infected stage 4 DQ ulcer  Cleared from ID standpoint for PICC as B Cx neg for 5 days

## 2020-09-02 NOTE — PROGRESS NOTES
Progress Note - Infectious Disease   Marlene Snare 67 y o  female MRN: [id number]  Unit/Bed#: [facility] 504-01 Encounter: [id number]      Impression/Plan:  1  Septic shock with gram positive bacteremia likely 2/2 infected stage 4 sacral pressure ulcer with suspected osteomyelitis and concern for necrotizing fasciitis  · Presented with stage 4 sacral pressure ulcer with purulent appearance    · Micro results:  ? COVID19 PCR negative  ? BCx 8/26:   ? Set 1: Enterococcus faecalis, strep constellatus  ? Set 2: Strep constellatus    ? BCx 8/28: Negative 48h    ? UCx: E  Coli (asymptomatic)  ? WCx: polymicrobial with E  Coli, Proteus mirabilis, enterococcus faecalis     ? Anaerobic WCx: 3+ Bacteroides fragilis  ? MRSA screen negative  · Imaging:  ? CT abdomen/pelvis 8/26: Soft tissue ulcer overlying the inferior sacrum and coccyx with associated gas production about the right sacrospinous ligament extending into the soft tissues in the perirectal region in keeping with necrotizing fasciitis  ? TTE negative for vegetations  · Shock has resolved --> off pressers, out of ICU  · WBC count trend: 16 - 13 5 - 15 - 14 - 11 - 12 - 14 (today)  No fever since 8/25    · Hx of DM with HbA1c of 9 9 on 8/26/20  · S/p surgical debridement on 8/26/20  Necrotic tissue was debrided and tissue sent for culture  Wound was described as malodorous but without purulent discharge  Wound pictures before and after debridement were reviewed    Plan:  · Continue Zosyn 2 25 gm IV Q6H for 6 more days through September 8, for a total treatment duration of 14 days for bacteremia  · Recommend diversion colostomy to help with wound healing  · Will require PICC placement for long duration of IV antibiotics for osteomyelitis and gram + bacteremia and currently on heparin drip for anticoagulation for Afib  No objection to PICC line placement now that repeat blood cultures have been negative for 72 hours      · Continue to monitor fever curve and WBC count trend  WBC has been uptrending past two days, but no other signs of worsening infection at this time       2  Stage 4 sacral pressure ulcer with suspected osteomyelitis  · Present on admission  Most likely source for shock and gram + bacteremia  · S/p debridement on 8/26  Plan:  · See management above  · Local wound management per surgery  Underwent 2nd OR today 8/28 for further debridement  · To help with wound healing would benefit from optimization of wound off loading, nutrition, blood sugar control and diversion colostomy to help with wound healing        3  CVA  · Developed right facial droop, right sided weakness, aphasia and dysarthria after cardioversion en route to the hospital for hypotension 2/2 new onset Afib with RVR  · Initial NIHSS on presentation to ED was 6 which improved to 3 after tPA  · Had mental status change overnight on 8/26/20 most likely in setting of hypotension with cerebrohypoperfusion in setting of septic shock     · Imaging:  ? MRI brain demonstrated no acute changes   Did demonstrate mild chronic small vessel cerebrovascular ischemic disease and age-related atrophy  ? CTA head/neck demonstrated left PCA stenosis and 65-70% stenosis of the left ICA bulb  ? Follow-up CT brain after acute mental status change demonstrated no acute changes and specifically no hemorrhage  Plan:  · Management per primary team  Neurology on board       4  New onset atrial fibrillation  · S/p cardioversion for hypotension  · Converted back to Afib after a few days of SR  Started on heparin drip by primary team    Plan:  · Management per primary team  Cardiology on board       5  RORY - improving  · BL 1 2 - 1 7  · Presented with creatinine of 2 59  Improved with fluid resuscitation  Trend; 2 59 - 1 80 - 1 96 - 2 07 - 1 89 - 1 48    · Most likely etiology is prerenal in setting of hypotension and dehydration 2/2 poor oral intake  Plan:  · Management as per primary team       6   Asymptomatic E  Coli bacteruria  · Likely asymptomatic but anyway on coverage with Zosyn for above  Antibiotics:  Zosyn 2 25 gm IV Q6H (day #6; day #8 of total effective antibiotic treatment)    Subjective:  Patient has no fever, chills, sweats; no nausea, vomiting, diarrhea; no cough, shortness of breath; no pain  No new symptoms  Objective:  Vitals:  Temp:  [97 3 °F (36 3 °C)-98 1 °F (36 7 °C)] 97 8 °F (36 6 °C)  HR:  [] 97  Resp:  [16-18] 17  BP: (117-141)/(58-92) 131/60  SpO2:  [96 %-98 %] 97 %  Temp (24hrs), Av 7 °F (36 5 °C), Min:97 3 °F (36 3 °C), Max:98 1 °F (36 7 °C)  Current: Temperature: 97 8 °F (36 6 °C)    Physical Exam:   General Appearance:  Alert, interactive, nontoxic, no acute distress  Throat: Oropharynx moist without lesions  Lungs:   Clear to auscultation bilaterally; no wheezes, rhonchi or rales; respirations unlabored   Heart:  RRR; no murmur, rub or gallop   Abdomen:   Soft, non-tender, non-distended, positive bowel sounds  Extremities: No clubbing, cyanosis or edema   Skin: No new rashes or lesions  No draining wounds noted  Clean dry dressing in place over sacral ulcer, no signs of infection surrounding the dressing  Areas with intertrigo are healing          Labs, Imaging, & Other studies:   All pertinent labs and imaging studies were personally reviewed  Results from last 7 days   Lab Units 20  0518 20  0508 20  0513   WBC Thousand/uL 12 09* 14 03* 12 51*   HEMOGLOBIN g/dL 9 2* 8 9* 9 4*   PLATELETS Thousands/uL 202 188 215     Results from last 7 days   Lab Units 20  0518 20  0504 20  0508   SODIUM mmol/L 141 141 141   POTASSIUM mmol/L 4 1 4 2 4 1   CHLORIDE mmol/L 107 109* 111*   CO2 mmol/L 29 27 26   BUN mg/dL 47* 47* 49*   CREATININE mg/dL 1 01 1 16 1 48*   EGFR ml/min/1 73sq m 54 46 34   CALCIUM mg/dL 8 1* 8 1* 7 9*     Results from last 7 days   Lab Units 20  0542 20  0536 20  1214 20  1353 20  1225   BLOOD CULTURE  No Growth After 4 Days  No Growth After 4 Days    --   --   --    GRAM STAIN RESULT   --   --   --  No polys seen*  2+ Gram positive cocci in pairs* 1+ Polys*  3+ Gram positive cocci in pairs*  2+ Gram positive rods*  1+ Gram negative rods*   WOUND CULTURE   --   --   --  3+ Growth of Escherichia coli*  3+ Growth of Proteus mirabilis*  3+ Growth of Enterococcus faecalis*  3+ Growth of  2+ Growth of Escherichia coli*  2+ Growth of Proteus mirabilis*  3+ Growth of Enterococcus species*  3+ Growth of    MRSA CULTURE ONLY   --   --  No Methicillin Resistant Staphlyococcus aureus (MRSA) isolated  --   --      Results from last 7 days   Lab Units 08/27/20  0454   PROCALCITONIN ng/ml 6 10*

## 2020-09-02 NOTE — ASSESSMENT & PLAN NOTE
POA  Wound care and piper surg appreciated  Patient reports ulcer developed status post recent hip fracture  8/26 excisional debridement of sacral pressure wound and washout   8/28 Debridement and washout of sacral decubitus ulcer  C/w trujillo  ID recs divesrsion colostomy to help w/ healing    D/w surgery - colostomy is not indicated as wound is distant from rectum and has not been but getting contaminated by stool  Wound VAC placed on 9/1

## 2020-09-02 NOTE — ASSESSMENT & PLAN NOTE
S/p DCCV in route to [facility]  8/30 pt in Afib w/ moderate VR  Cardio appreciated  Heparin gtt - will need to switch to coumadin, started 5 mg on 09/01   NOACs costly  Lopressor for rate control  TSH WNL

## 2020-09-02 NOTE — CASE MANAGEMENT
Met with patient to discuss recommendation for snf rehab and she asks CM to contact her son, Wong Erwin, who is on way to [place]  He agrees to need for rehab and does not want referrals to [facility]  A post acute care recommendation was made by your care team for STR  Discussed Freedom of Choice with both patient and POA  List of facilities given to both patient and POA via in person  both patient and POA aware the list is custom filtered for them by zip code location and that [facility]'s post acute providers are designated  Left snf list in room for son to review and he will provide CM with choices

## 2020-09-02 NOTE — PROGRESS NOTES
Progress Note - Cardiology   Barrett Arrieta 67 y o  female MRN: [id number]  Unit/Bed#: [facility] 504-01 Encounter: [id number]    Assessment/Plan:  67 y o  female with past medical history of diabetes, HTN, and hyperlipidemia who originally presented on 8/25 due to generalized weakness  She was found to be in unstable atrial fibrillation and was cardioverted by EMS  She then developed dysarthria and aphasia and received tPA for possible stroke  Followup MRI was negative for any acute ischemic changes  Carotid US showed >70% stenosis of the L ICA   Upon admission she was also found to be septic secondary to enterococcus bacteremia likely secondary to sacral decubitus ulcer  Patient has received multiple debridements of sacral wound and is on IV antiobiotics  Most recent blood cultures on 8/28 were negative  Patient was in sinus rhythm for a few days but has been in atrial fibrillation since 8/29  She has not had any chest pain or palpitations  She has had no lightheadedness or shortness of breath  HR has been improved with an average rate of 94      1  Paroxysmal Atrial Fibrillation   -New diagnosis this admission   -Echo on 8/27 showed no regional wall abnormalities, mild pulmonary hypertension, mildly to moderately dilated left atrium, mild MR, and mild TR  -TSH WNL   -Average HR 94 over the past 24 hours   -Patient transitioned to oral warfarin yesterday  -Patient has not had any cardiac symptoms   Plan:   -Continue metoprolol tartrate 50 mg BID   -Goal HR , continue telemetry monitoring   -Continue oral warfarin for anticoagulation     2  Volume Overload  -Patient has signs of volume overload secondary to extensive fluid resuscitation since admission   -Patient received 1 dose IV lasix yesterday and responded well with increased urine output   -Patient continues to have mild lower extremity edema   Plan:   -Would recommend starting Torsemide 20 mg PO daily for continued diuresis     3   Stroke-like Symptoms s/p tPA -Patient developed stroke like symptoms after cardioversion for unstable a  fib  -MRI showed no acute ischemic disease   -Patient has some residual R sided weakness   -Neurology was following and recommends no further intervention at this time     4  Carotid Stenosis   -Carotid US showed >70% stenosis of the L ICA   -Vascular surgery will followup as outpatient   Plan:   -Continue aspirin, statin, and anticoagulation with warfarin     Subjective/Objective     Subjective: Patient is feeling well this morning  She has no chest pain, palpitations, or shortness of breath  She still reports decreased appetite, but is otherwise feeling well  Average HR has been 94 over the past 24 hours  Objective:    Vitals: /60   Pulse 97   Temp 97 8 °F (36 6 °C) (Oral)   Resp 17   Ht 5' 6" (1 676 m)   Wt 83 1 kg (183 lb 3 2 oz)   SpO2 97%   BMI 29 57 kg/m²   Vitals:    08/31/20 0552 09/02/20 0600   Weight: 81 5 kg (179 lb 10 8 oz) 83 1 kg (183 lb 3 2 oz)     Orthostatic Blood Pressures      Most Recent Value   Blood Pressure  131/60 filed at 09/02/2020 0700   Patient Position - Orthostatic VS  Lying filed at 09/01/2020 2300            Intake/Output Summary (Last 24 hours) at 9/2/2020 3508  Last data filed at 9/2/2020 9375  Gross per 24 hour   Intake 730 11 ml   Output 2375 ml   Net -1644 89 ml       Invasive Devices     Peripheral Intravenous Line            Peripheral IV 08/30/20 Distal;Left;Upper;Ventral (anterior) Arm 2 days    Peripheral IV 09/01/20 Left;Ventral (anterior) Forearm less than 1 day          Drain            Urethral Catheter Temperature probe 16 Fr  7 days    Negative Pressure Wound Therapy (V A C ) Sacrum less than 1 day                  Physical Exam  Constitutional:       Appearance: She is not ill-appearing  HENT:      Head: Normocephalic and atraumatic  Mouth/Throat:      Mouth: Mucous membranes are moist       Pharynx: Oropharynx is clear     Eyes:      Extraocular Movements: Extraocular movements intact  Cardiovascular:      Rate and Rhythm: Normal rate  Rhythm irregular  Heart sounds: No murmur  Pulmonary:      Effort: Pulmonary effort is normal       Breath sounds: Normal breath sounds  Abdominal:      Palpations: Abdomen is soft  Tenderness: There is no abdominal tenderness  Musculoskeletal:      Right lower leg: Edema present  Left lower leg: Edema present  Comments: Mild R sided weakness   Skin:     General: Skin is warm and dry  Neurological:      General: No focal deficit present  Mental Status: She is alert and oriented to person, place, and time  Lab Results:   CBC with diff:   Results from last 7 days   Lab Units 09/02/20  0518   WBC Thousand/uL 12 09*   RBC Million/uL 3 15*   HEMOGLOBIN g/dL 9 2*   HEMATOCRIT % 29 9*   MCV fL 95   MCH pg 29 2   MCHC g/dL 30 8*   RDW % 14 6   MPV fL 11 3   PLATELETS Thousands/uL 202     CMP:   Results from last 7 days   Lab Units 09/02/20  0518   SODIUM mmol/L 141   POTASSIUM mmol/L 4 1   CHLORIDE mmol/L 107   CO2 mmol/L 29   BUN mg/dL 47*   CREATININE mg/dL 1 01   CALCIUM mg/dL 8 1*   EGFR ml/min/1 73sq m 54     Coags:   Results from last 7 days   Lab Units 09/02/20  0518   PTT seconds 89*   INR  1 40*     TSH:   Results from last 7 days   Lab Units 08/31/20  0508   TSH 3RD GENERATON uIU/mL 1 380     Imaging: I have personally reviewed pertinent reports      EKG: Atrial Fibrillation       VTE Pharmacologic Prophylaxis: Warfarin (Coumadin)  VTE Mechanical Prophylaxis: sequential compression device      Magalis Lopez, MS-4

## 2020-09-02 NOTE — ASSESSMENT & PLAN NOTE
B Cx pos for Enterococcus and Strep Constellatus  Zosyn x 14 days  ID appreciated  PICC consent obtained and PICC team consulted for placement

## 2020-09-02 NOTE — ASSESSMENT & PLAN NOTE
- pt presents with to [facility] with right-sided facial droop and right-sided weakness after being transported by EMS for [facility] and noted to have atrial fibrillation enroute  - CT head in ED shows posterior cerebral artery disease is noted, mild on the right and severe left  - NIH - score on admission; - 6  -c/w ASA and statin  - MRI brain no acute findings  - CTA shows bilateral stenosis of carotids; less than 75%  LDL 23  Echo shows mild pulm htn  Neuro signed off  Pt now in Afib - placed on heparin gtt    Neuro recs BASA and AC in setting of Afib and carotid stenosis

## 2020-09-02 NOTE — PROGRESS NOTES
Progress Note - Amrik Barreto 1945, 67 y o  female MRN: [id number]    Unit/Bed#: [facility] 504-01 Encounter: [id number]    Primary Care Provider: TJ Woods   Date and time admitted to hospital: 8/25/2020  4:06 PM        Chronic diastolic heart failure [facility])  Assessment & Plan  8/30 IV Bumex given as pt overloaded from resuscitation and PRBC with excellent UOP  Repeat IV lasix per cardio  Now switched to PO torsemide 20 mg from tomorrow (was on Lasix 20 mg at home)    Stenosis of left carotid artery  Assessment & Plan  Vascular appreciated  ASA and statin  Carotid dopplers completed - LICA > 58%, VASQUEZ < 24%  Vascular recommends OP f/u sicne the etiology for her symptoms on presentation was most likely cardio embolic    Anemia  Assessment & Plan  S/p 1U PRBC in ICU  Appears stable at this time    Bacteremia due to Enterococcus  Assessment & Plan  B Cx pos for Enterococcus and Strep Constellatus  Zosyn x 14 days  ID appreciated  PICC consent obtained and PICC team consulted for placement    Paroxysmal atrial fibrillation ([facility] [address] )  Assessment & Plan  S/p DCCV in route to [facility]  8/30 pt in Afib w/ moderate VR  Cardio appreciated  Heparin gtt - will need to switch to coumadin, started 5 mg on 09/01  NOACs costly  Lopressor for rate control  TSH WNL    RORY (acute kidney injury) ([facility] [address] )  Assessment & Plan  Estimated Creatinine Clearance: 51 5 mL/min (by C-G formula based on SCr of 1 01 mg/dL)  POA  Patient noted to have RORY on admission; serum creatinine 2 59 (baseline 1 2-1 3)  2/2 shock  Appears to be resolved    Stroke-like symptoms  Assessment & Plan  - pt presents with to [facility] with right-sided facial droop and right-sided weakness after being transported by EMS for [facility] and noted to have atrial fibrillation enroute  - CT head in ED shows posterior cerebral artery disease is noted, mild on the right and severe left     - NIH - score on admission; - 6  -c/w ASA and statin  - MRI brain no acute findings  - CTA shows bilateral stenosis of carotids; less than 75%  LDL 23  Echo shows mild pulm htn  Neuro signed off  Pt now in Afib - placed on heparin gtt  Neuro recs BASA and AC in setting of Afib and carotid stenosis    Decubitus ulcer of sacral region, stage 4 [facility])  Assessment & Plan  POA  Wound care and piper surg appreciated  Patient reports ulcer developed status post recent hip fracture  8/26 excisional debridement of sacral pressure wound and washout   8/28 Debridement and washout of sacral decubitus ulcer  C/w trujillo  ID recs divesrsion colostomy to help w/ healing    D/w surgery - colostomy is not indicated as wound is distant from rectum and has not been but getting contaminated by stool  Wound VAC placed on 9/1    Hypertension  Assessment & Plan  Pt needed pressors on admission  Now on BB for Afib    Type 2 diabetes mellitus with hyperglycemia (HCC)  Assessment & Plan    Lab Results   Component Value Date    HGBA1C 9 9 (H) 08/26/2020     SSI  Blood Glucose checks TIDWM and QHS (Q6H for NPO patients)  Hold oral medications  Increase Lantus to 14 units, add Humalog 3 units with meals  PT should take insulin at d/c    * Septic shock due to gram-positive bacteria (HCC)  Assessment & Plan  Pt now off pressors  ID appreciated  C/w Zosyn for total 14 days, D - 7  B Cx from 8/28 neg  2/2 infected stage 4 DQ ulcer  Cleared from ID standpoint for PICC as B Cx neg for 5 days        [facility] Internal Medicine Progress Note  Patient: Alok Alegre 67 y o  female   MRN: [id number]  PCP: TJ Aguilera  Unit/Bed#: [facility] 504-01 Encounter: [id number]  Date Of Visit: 09/02/20    Assessment:    Principal Problem:    Septic shock due to gram-positive bacteria ([facility] [address] )  Active Problems:    Type 2 diabetes mellitus with hyperglycemia ([facility] [address] )    Hypertension    Hyperlipidemia    Decubitus ulcer of sacral region, stage 4 (HCC)    Stroke-like symptoms    RORY (acute kidney injury) ([facility] [address] )    Paroxysmal atrial fibrillation ([facility] )    Bacteremia due to Enterococcus    Anemia    Stenosis of left carotid artery    Chronic diastolic heart failure ([facility] )      Plan:         VTE Pharmacologic Prophylaxis:   Pharmacologic: Heparin Drip  Mechanical VTE Prophylaxis in Place: Yes    Patient Centered Rounds: I have performed bedside rounds with nursing staff today  Discussions with Specialists or Other Care Team Provider:     Education and Discussions with Family / Patient:  Patient, son    Time Spent for Care: 30 minutes  More than 50% of total time spent on counseling and coordination of care as described above  Current Length of Stay: 8 day(s)    Current Patient Status: Inpatient   Certification Statement: The patient will continue to require additional inpatient hospital stay due to INR > 2, PICC line    Discharge Plan / Estimated Discharge Date: rehab    Code Status: Level 1 - Full Code      Subjective:   Feels ok, tired, no CP or SOB    Objective:     Vitals:   Temp (24hrs), Av °F (36 7 °C), Min:97 3 °F (36 3 °C), Max:98 9 °F (37 2 °C)    Temp:  [97 3 °F (36 3 °C)-98 9 °F (37 2 °C)] 98 9 °F (37 2 °C)  HR:  [] 89  Resp:  [16-18] 18  BP: (117-141)/(58-92) 133/61  SpO2:  [96 %-98 %] 97 %  Body mass index is 29 57 kg/m²  Input and Output Summary (last 24 hours): Intake/Output Summary (Last 24 hours) at 2020 1603  Last data filed at 2020 1426  Gross per 24 hour   Intake 707 52 ml   Output 2400 ml   Net -1692 48 ml       Physical Exam:     Physical Exam  Vitals signs reviewed  HENT:      Head: Normocephalic and atraumatic  Eyes:      Conjunctiva/sclera: Conjunctivae normal    Cardiovascular:      Rate and Rhythm: Normal rate  Rhythm irregular  Heart sounds: No murmur  Pulmonary:      Effort: Pulmonary effort is normal  No respiratory distress  Breath sounds: Normal breath sounds  No wheezing  Abdominal:      General: There is no distension  Palpations: Abdomen is soft  Tenderness:  There is no abdominal tenderness  Neurological:      Mental Status: She is alert  Comments: Gets confused to the year           Additional Data:     Labs:    Results from last 7 days   Lab Units 09/02/20  0518  08/28/20  0508   WBC Thousand/uL 12 09*   < > 14 17*   HEMOGLOBIN g/dL 9 2*   < > 7 6*   HEMATOCRIT % 29 9*   < > 24 4*   PLATELETS Thousands/uL 202   < > 208   NEUTROS PCT %  --   --  88*   LYMPHS PCT %  --   --  7*   LYMPHO PCT % 11*  --   --    MONOS PCT %  --   --  3*   MONO PCT % 2*  --   --    EOS PCT % 1  --  0    < > = values in this interval not displayed  Results from last 7 days   Lab Units 09/02/20  0518   POTASSIUM mmol/L 4 1   CHLORIDE mmol/L 107   CO2 mmol/L 29   BUN mg/dL 47*   CREATININE mg/dL 1 01   CALCIUM mg/dL 8 1*     Results from last 7 days   Lab Units 09/02/20  0518   INR  1 40*       * I Have Reviewed All Lab Data Listed Above  * Additional Pertinent Lab Tests Reviewed: All Labs Within Last 24 Hours Reviewed    Imaging:    Imaging Reports Reviewed Today Include:   Imaging Personally Reviewed by Myself Includes:     Recent Cultures (last 7 days):     Results from last 7 days   Lab Units 08/28/20  0542 08/28/20  0536   BLOOD CULTURE  No Growth After 5 Days  No Growth After 5 Days         Last 24 Hours Medication List:   Current Facility-Administered Medications   Medication Dose Route Frequency Provider Last Rate    acetaminophen  650 mg Oral Q6H PRN Eligha Muniz, DO      aspirin  81 mg Oral Daily Eligha Muniz, DO      atorvastatin  40 mg Oral Daily With BEATRIZ Rubi      gabapentin  100 mg Oral TID Selina Tilley PA-C      heparin (porcine)  3-20 Units/kg/hr (Order-Specific) Intravenous Titrated Eligha Muniz, DO 10 Units/kg/hr (09/02/20 1134)    hydrALAZINE  10 mg Intravenous Q4H PRN Selina Tilley PA-C      insulin glargine  14 Units Subcutaneous HS Delana Aase, MD      insulin lispro  1-6 Units Subcutaneous 4x Daily (AC & HS) Phyllis Odonnell PA-C  insulin lispro  3 Units Subcutaneous TID With Meals Robert Pena MD      metoprolol tartrate  25 mg Oral Once Dewey TJ Winchetser      metoprolol tartrate  75 mg Oral Q12H [address] Jeferson Cleaning MD      nystatin   Topical BID Mesfin Jovel PA-C      piperacillin-tazobactam  2 25 g Intravenous Q6H TJ Laws 2 25 g (09/02/20 1334)    [START ON 9/3/2020] torsemide  20 mg Oral Daily Jeferson Cleaning MD      warfarin  5 mg Oral Daily (warfarin) Robert Pena MD          Today, Patient Was Seen By: Robert Pena MD    ** Please Note: This note has been constructed using a voice recognition system   **

## 2020-09-02 NOTE — ASSESSMENT & PLAN NOTE
Vascular appreciated  ASA and statin  Carotid dopplers completed - LICA > 41%, VASQUEZ < 44%  Vascular recommends OP f/u sicne the etiology for her symptoms on presentation was most likely cardio embolic

## 2020-09-02 NOTE — ASSESSMENT & PLAN NOTE
Lab Results   Component Value Date    HGBA1C 9 9 (H) 08/26/2020     SSI  Blood Glucose checks TIDWM and QHS (Q6H for NPO patients)  Hold oral medications  Increase Lantus to 14 units, add Humalog 3 units with meals  PT should take insulin at d/c

## 2020-09-02 NOTE — CASE MANAGEMENT
Met with patient and her son, Dequan Lynn, to review snf list  Son provides choice of country [place] and [place] referral was made  He will discuss with family tonight and provide more choices tomorrow  Son, Dequan Lynn, lives in [place], [place] and may consider snf in [place]  Son signed letter stating his request for snf in [place] so that patient can be closer to him

## 2020-09-03 ENCOUNTER — APPOINTMENT (INPATIENT)
Dept: RADIOLOGY | Facility: HOSPITAL | Age: 75
DRG: 853 | End: 2020-09-03
Attending: HOSPITALIST
Payer: MEDICARE

## 2020-09-03 LAB
ANION GAP SERPL CALCULATED.3IONS-SCNC: 4 MMOL/L (ref 4–13)
APTT PPP: 72 SECONDS (ref 23–37)
BUN SERPL-MCNC: 45 MG/DL (ref 5–25)
CALCIUM SERPL-MCNC: 7.8 MG/DL (ref 8.3–10.1)
CHLORIDE SERPL-SCNC: 108 MMOL/L (ref 100–108)
CO2 SERPL-SCNC: 29 MMOL/L (ref 21–32)
CREAT SERPL-MCNC: 1.06 MG/DL (ref 0.6–1.3)
GFR SERPL CREATININE-BSD FRML MDRD: 51 ML/MIN/1.73SQ M
GLUCOSE SERPL-MCNC: 164 MG/DL (ref 65–140)
GLUCOSE SERPL-MCNC: 176 MG/DL (ref 65–140)
GLUCOSE SERPL-MCNC: 245 MG/DL (ref 65–140)
GLUCOSE SERPL-MCNC: 258 MG/DL (ref 65–140)
GLUCOSE SERPL-MCNC: 282 MG/DL (ref 65–140)
INR PPP: 2.28 (ref 0.84–1.19)
POTASSIUM SERPL-SCNC: 4.2 MMOL/L (ref 3.5–5.3)
PROTHROMBIN TIME: 25 SECONDS (ref 11.6–14.5)
SODIUM SERPL-SCNC: 141 MMOL/L (ref 136–145)

## 2020-09-03 PROCEDURE — 02HV33Z INSERTION OF INFUSION DEVICE INTO SUPERIOR VENA CAVA, PERCUTANEOUS APPROACH: ICD-10-PCS | Performed by: HOSPITALIST

## 2020-09-03 PROCEDURE — 99232 SBSQ HOSP IP/OBS MODERATE 35: CPT | Performed by: HOSPITALIST

## 2020-09-03 PROCEDURE — C1751 CATH, INF, PER/CENT/MIDLINE: HCPCS

## 2020-09-03 PROCEDURE — 36569 INSJ PICC 5 YR+ W/O IMAGING: CPT

## 2020-09-03 PROCEDURE — 80048 BASIC METABOLIC PNL TOTAL CA: CPT | Performed by: HOSPITALIST

## 2020-09-03 PROCEDURE — 99232 SBSQ HOSP IP/OBS MODERATE 35: CPT | Performed by: INTERNAL MEDICINE

## 2020-09-03 PROCEDURE — 85610 PROTHROMBIN TIME: CPT | Performed by: HOSPITALIST

## 2020-09-03 PROCEDURE — 99233 SBSQ HOSP IP/OBS HIGH 50: CPT | Performed by: INTERNAL MEDICINE

## 2020-09-03 PROCEDURE — 71045 X-RAY EXAM CHEST 1 VIEW: CPT

## 2020-09-03 PROCEDURE — 85730 THROMBOPLASTIN TIME PARTIAL: CPT | Performed by: HOSPITALIST

## 2020-09-03 PROCEDURE — 82948 REAGENT STRIP/BLOOD GLUCOSE: CPT

## 2020-09-03 RX ORDER — WARFARIN SODIUM 1 MG/1
2 TABLET ORAL
Status: DISCONTINUED | OUTPATIENT
Start: 2020-09-03 | End: 2020-09-04

## 2020-09-03 RX ORDER — WARFARIN SODIUM 2.5 MG/1
2.5 TABLET ORAL
Status: DISCONTINUED | OUTPATIENT
Start: 2020-09-03 | End: 2020-09-03

## 2020-09-03 RX ADMIN — METOPROLOL TARTRATE 75 MG: 50 TABLET, FILM COATED ORAL at 10:00

## 2020-09-03 RX ADMIN — PIPERACILLIN AND TAZOBACTAM 2.25 G: 2; .25 INJECTION, POWDER, FOR SOLUTION INTRAVENOUS at 03:00

## 2020-09-03 RX ADMIN — ATORVASTATIN CALCIUM 40 MG: 40 TABLET, FILM COATED ORAL at 18:02

## 2020-09-03 RX ADMIN — INSULIN LISPRO 6 UNITS: 100 INJECTION, SOLUTION INTRAVENOUS; SUBCUTANEOUS at 11:49

## 2020-09-03 RX ADMIN — INSULIN LISPRO 3 UNITS: 100 INJECTION, SOLUTION INTRAVENOUS; SUBCUTANEOUS at 08:09

## 2020-09-03 RX ADMIN — INSULIN LISPRO 3 UNITS: 100 INJECTION, SOLUTION INTRAVENOUS; SUBCUTANEOUS at 18:03

## 2020-09-03 RX ADMIN — PIPERACILLIN AND TAZOBACTAM 2.25 G: 2; .25 INJECTION, POWDER, FOR SOLUTION INTRAVENOUS at 08:10

## 2020-09-03 RX ADMIN — INSULIN GLARGINE 14 UNITS: 100 INJECTION, SOLUTION SUBCUTANEOUS at 22:03

## 2020-09-03 RX ADMIN — ASPIRIN 81 MG: 81 TABLET, COATED ORAL at 10:00

## 2020-09-03 RX ADMIN — INSULIN LISPRO 1 UNITS: 100 INJECTION, SOLUTION INTRAVENOUS; SUBCUTANEOUS at 08:09

## 2020-09-03 RX ADMIN — GABAPENTIN 100 MG: 100 CAPSULE ORAL at 10:00

## 2020-09-03 RX ADMIN — GABAPENTIN 100 MG: 100 CAPSULE ORAL at 22:02

## 2020-09-03 RX ADMIN — NYSTATIN 1 APPLICATION: 100000 POWDER TOPICAL at 18:03

## 2020-09-03 RX ADMIN — INSULIN LISPRO 4 UNITS: 100 INJECTION, SOLUTION INTRAVENOUS; SUBCUTANEOUS at 22:03

## 2020-09-03 RX ADMIN — INSULIN LISPRO 3 UNITS: 100 INJECTION, SOLUTION INTRAVENOUS; SUBCUTANEOUS at 11:49

## 2020-09-03 RX ADMIN — WARFARIN SODIUM 2 MG: 1 TABLET ORAL at 18:03

## 2020-09-03 RX ADMIN — PIPERACILLIN AND TAZOBACTAM 2.25 G: 2; .25 INJECTION, POWDER, FOR SOLUTION INTRAVENOUS at 14:05

## 2020-09-03 RX ADMIN — NYSTATIN 1 APPLICATION: 100000 POWDER TOPICAL at 10:01

## 2020-09-03 RX ADMIN — PIPERACILLIN AND TAZOBACTAM 2.25 G: 2; .25 INJECTION, POWDER, FOR SOLUTION INTRAVENOUS at 20:20

## 2020-09-03 RX ADMIN — TORSEMIDE 20 MG: 20 TABLET ORAL at 10:00

## 2020-09-03 RX ADMIN — METOPROLOL TARTRATE 75 MG: 50 TABLET, FILM COATED ORAL at 22:01

## 2020-09-03 RX ADMIN — GABAPENTIN 100 MG: 100 CAPSULE ORAL at 18:03

## 2020-09-03 NOTE — PROGRESS NOTES
Progress Note - Cardiology   Brittney Horton 67 y o  female MRN: [id number]  Unit/Bed#: [facility] 504-01 Encounter: [id number]    Assessment/Plan:  67 y o  female with past medical history of diabetes, HTN, and hyperlipidemia who originally presented on 8/25 due to generalized weakness  She was found to be in unstable atrial fibrillation and was cardioverted by EMS  She then developed dysarthria and aphasia and received tPA for possible stroke  Followup MRI was negative for any acute ischemic changes  Carotid US showed >70% stenosis of the L ICA    Upon admission she was also found to be septic secondary to enterococcus bacteremia likely secondary to sacral decubitus ulcer  Patient has received multiple debridements of sacral wound and is on IV antiobiotics  Patient was in sinus rhythm for a few days but has been in atrial fibrillation since 8/29  She continues to be in a  Fib  HR has been improving and she has less tachycardia  Average HR has been 96 over the past 24 hours  She is asymptomatic and has no complaints at this time  1  Paroxysmal Atrial Fibrillation   -New diagnosis this admission   -Echo on 8/27 showed no regional wall abnormalities, mild pulmonary hypertension, mildly to moderately dilated left atrium, mild MR, and mild TR  -TSH WNL  -Average HR 96 over the past 24 hours, but vital sign records show less tachycardia since increasing dose of metoprolol to 75 mg last night  -Patient has been asymptomatic   -Oral anticoagulation started 9/1, INR 2 28  Plan:   -Increased metoprolol tartrate to 75 mg BID yesterday, monitor rate to evaluate response to increased dose   -Goal average HR , continue telemetry monitoring   -Continue oral warfarin for anticoagulation, monitor INR    2   Volume Overload  -Patient had signs of volume overload secondary to extensive fluid resuscitation since admission   -Patient has received Bumex and IV lasix for diuresis with good response   -Patient continues to have some trace lower extremity edema and is net positive for this admission   Plan:   -Start Torsemide 20 mg PO daily for continued diuresis, this is her home dose of diuretics    3  Stroke-like Symptoms s/p tPA   -Patient developed stroke like symptoms after cardioversion for unstable a  fib  -MRI showed no acute ischemic disease   -Patient has some residual R sided weakness   -Neurology was following and recommends no further intervention at this time    4  Carotid Stenosis   -Carotid US showed >70% stenosis of the L ICA   -Vascular surgery will followup as outpatient   Plan:   -Continue aspirin, statin, and anticoagulation with warfarin     Subjective/Objective     Subjective: Patient is feeling well this morning  She has had a better appetite and increased PO intake  Her HR is slightly improved and she is less tachycardic  She has no chest pain or shortness of breath  She has no palpitations  She has no other complaints at this time       Objective:    Vitals: /72 (BP Location: Right arm)   Pulse 88   Temp 98 2 °F (36 8 °C) (Axillary)   Resp 17   Ht 5' 6" (1 676 m)   Wt 82 5 kg (181 lb 14 1 oz)   SpO2 99%   BMI 29 36 kg/m²   Vitals:    09/02/20 0600 09/03/20 0547   Weight: 83 1 kg (183 lb 3 2 oz) 82 5 kg (181 lb 14 1 oz)     Orthostatic Blood Pressures      Most Recent Value   Blood Pressure  129/72 filed at 09/03/2020 7386   Patient Position - Orthostatic VS  Lying filed at 09/03/2020 4642            Intake/Output Summary (Last 24 hours) at 9/3/2020 0914  Last data filed at 9/3/2020 7202  Gross per 24 hour   Intake 550 08 ml   Output 925 ml   Net -374 92 ml       Invasive Devices     Peripheral Intravenous Line            Peripheral IV 09/01/20 Left;Ventral (anterior) Forearm 1 day    Peripheral IV 09/03/20 Right;Ventral (anterior) Forearm less than 1 day          Drain            Urethral Catheter Temperature probe 16 Fr  8 days    Negative Pressure Wound Therapy (V A C ) Sacrum 1 day                  Physical Exam  Constitutional:       General: She is not in acute distress  Appearance: She is obese  HENT:      Head: Normocephalic and atraumatic  Mouth/Throat:      Mouth: Mucous membranes are moist       Pharynx: Oropharynx is clear  Eyes:      Extraocular Movements: Extraocular movements intact  Pupils: Pupils are equal, round, and reactive to light  Cardiovascular:      Rate and Rhythm: Normal rate  Rhythm irregular  Pulses: Normal pulses  Heart sounds: No murmur  Pulmonary:      Effort: Pulmonary effort is normal       Breath sounds: Normal breath sounds  Abdominal:      Palpations: Abdomen is soft  Tenderness: There is no abdominal tenderness  Musculoskeletal:      Comments: Trace bilateral lower extremity edema    Skin:     General: Skin is warm and dry  Neurological:      Mental Status: She is alert and oriented to person, place, and time  Comments: Slight R sided weakness          Lab Results:   CBC with diff:   Results from last 7 days   Lab Units 09/02/20  0518   WBC Thousand/uL 12 09*   RBC Million/uL 3 15*   HEMOGLOBIN g/dL 9 2*   HEMATOCRIT % 29 9*   MCV fL 95   MCH pg 29 2   MCHC g/dL 30 8*   RDW % 14 6   MPV fL 11 3   PLATELETS Thousands/uL 202     CMP:   Results from last 7 days   Lab Units 09/03/20  0511   SODIUM mmol/L 141   POTASSIUM mmol/L 4 2   CHLORIDE mmol/L 108   CO2 mmol/L 29   BUN mg/dL 45*   CREATININE mg/dL 1 06   CALCIUM mg/dL 7 8*   EGFR ml/min/1 73sq m 51     Coags:   Results from last 7 days   Lab Units 09/03/20  0511   PTT seconds 72*   INR  2 28*     TSH:   Results from last 7 days   Lab Units 08/31/20  0508   TSH 3RD GENERATON uIU/mL 1 380     Imaging: I have personally reviewed pertinent reports      EKG: Atrial Fibrillation       VTE Pharmacologic Prophylaxis: Warfarin (Coumadin)  VTE Mechanical Prophylaxis: sequential compression device      Tom Garcia, MS-4

## 2020-09-03 NOTE — PROGRESS NOTES
Progress Note - Dori Rosenbaum 1945, 67 y o  female MRN: [id number]    Unit/Bed#: [facility] 504-01 Encounter: [id number]    Primary Care Provider: TJ Durham   Date and time admitted to hospital: 8/25/2020  4:06 PM        Chronic diastolic heart failure [facility])  Assessment & Plan  8/30 IV Bumex & IV lasix given as pt overloaded from resuscitation and PRBC with excellent UOP  Now switched to PO torsemide 20 mg from today (was on Lasix 20 mg at home)    Stenosis of left carotid artery  Assessment & Plan  Vascular appreciated  ASA and statin  Carotid dopplers completed - LICA > 30%, VASQUEZ < 92%  Vascular recommends OP f/u sicne the etiology for her symptoms on presentation was most likely cardio embolic    Anemia  Assessment & Plan  S/p 1U PRBC in ICU  Appears stable at this time    Bacteremia due to Enterococcus  Assessment & Plan  B Cx pos for Enterococcus and Strep Constellatus  Zosyn x 14 days  ID appreciated  PICC consent obtained and PICC placed    Paroxysmal atrial fibrillation ([facility] [address] )  Assessment & Plan  S/p DCCV in route to [facility]  8/30 pt in Afib w/ moderate VR  Cardio appreciated  Heparin gtt - switched to coumadin  INR 2 2  stop drip, decrease coumadin to 2 mg  Lopressor for rate control - increased to 75 BID  TSH WNL    RORY (acute kidney injury) ([facility] [address] )  Assessment & Plan  Estimated Creatinine Clearance: 48 9 mL/min (by C-G formula based on SCr of 1 06 mg/dL)  POA  Patient noted to have RORY on admission; serum creatinine 2 59 (baseline 1 2-1 3)  2/2 shock  Appears to be resolved    Stroke-like symptoms  Assessment & Plan  - pt presents with to [facility] with right-sided facial droop and right-sided weakness after being transported by EMS for [facility] and noted to have atrial fibrillation enroute  - CT head in ED shows posterior cerebral artery disease is noted, mild on the right and severe left     - NIH - score on admission; - 6  -c/w ASA and statin  - MRI brain no acute findings  - CTA shows bilateral stenosis of carotids; less than 75%  LDL 23  Echo shows mild pulm htn  Neuro signed off  Pt now in Afib - placed on heparin gtt  Neuro recs BASA and AC in setting of Afib and carotid stenosis    Decubitus ulcer of sacral region, stage 4 [facility])  Assessment & Plan  POA  Wound care and piper surg appreciated  Patient reports ulcer developed status post recent hip fracture  8/26 excisional debridement of sacral pressure wound and washout   8/28 Debridement and washout of sacral decubitus ulcer  C/w trujillo  ID recs divesrsion colostomy to help w/ healing    D/w surgery - colostomy is not indicated as wound is distant from rectum and has not been but getting contaminated by stool  Wound VAC placed on 9/1    Hypertension  Assessment & Plan  Pt needed pressors on admission  Now on BB for Afib    Type 2 diabetes mellitus with hyperglycemia (HCC)  Assessment & Plan    Lab Results   Component Value Date    HGBA1C 9 9 (H) 08/26/2020     SSI  Blood Glucose checks TIDWM and QHS (Q6H for NPO patients)  Hold oral medications  Increase Lantus to 14 units, increase Humalog 5 units with meals  PT should take insulin at d/c    * Septic shock due to gram-positive bacteria (HCC)  Assessment & Plan  Pt now off pressors  ID appreciated  C/w Zosyn for total 14 days, D - 7  B Cx from 8/28 neg  2/2 infected stage 4 DQ ulcer  Cleared from ID standpoint for PICC as B Cx neg for 5 days        [facility] Internal Medicine Progress Note  Patient: Romi Mendoza 67 y o  female   MRN: [id number]  PCP: TJ Escobar  Unit/Bed#: [facility] 504-01 Encounter: [id number]  Date Of Visit: 09/03/20    Assessment:    Principal Problem:    Septic shock due to gram-positive bacteria ([facility] [address] )  Active Problems:    Type 2 diabetes mellitus with hyperglycemia ([facility] [address] )    Hypertension    Hyperlipidemia    Decubitus ulcer of sacral region, stage 4 (HCC)    Stroke-like symptoms    RORY (acute kidney injury) ([facility] [address] )    Paroxysmal atrial fibrillation (HCC)    Bacteremia due to Enterococcus    Anemia    Stenosis of left carotid artery    Chronic diastolic heart failure (HCC)      Plan:         VTE Pharmacologic Prophylaxis:   Pharmacologic: Warfarin (Coumadin)  Mechanical VTE Prophylaxis in Place: Yes    Patient Centered Rounds: I have performed bedside rounds with nursing staff today  Discussions with Specialists or Other Care Team Provider:     Education and Discussions with Family / Patient: patient, brother    Time Spent for Care: 30 minutes  More than 50% of total time spent on counseling and coordination of care as described above  Current Length of Stay: 9 day(s)    Current Patient Status: Inpatient   Certification Statement: The patient will continue to require additional inpatient hospital stay due to monitor with PO diuretic    Discharge Plan / Estimated Discharge Date:     Code Status: Level 1 - Full Code      Subjective:   Feels ok, denies CP or SOB or abdo pain    Objective:     Vitals:   Temp (24hrs), Av 5 °F (36 9 °C), Min:98 2 °F (36 8 °C), Max:98 7 °F (37 1 °C)    Temp:  [98 2 °F (36 8 °C)-98 7 °F (37 1 °C)] 98 4 °F (36 9 °C)  HR:  [82-95] 95  Resp:  [17-18] 18  BP: (125-136)/(58-77) 128/58  SpO2:  [92 %-100 %] 98 %  Body mass index is 29 36 kg/m²  Input and Output Summary (last 24 hours): Intake/Output Summary (Last 24 hours) at 9/3/2020 1739  Last data filed at 9/3/2020 1435  Gross per 24 hour   Intake 883 87 ml   Output 1975 ml   Net -1091 13 ml       Physical Exam:     Physical Exam  Vitals signs reviewed  HENT:      Head: Normocephalic and atraumatic  Cardiovascular:      Rate and Rhythm: Normal rate and regular rhythm  Heart sounds: Normal heart sounds  No murmur  No gallop  Pulmonary:      Effort: Pulmonary effort is normal  No respiratory distress  Breath sounds: Normal breath sounds  No wheezing  Abdominal:      General: There is no distension  Palpations: Abdomen is soft  Tenderness:  There is no abdominal tenderness  Neurological:      Mental Status: She is alert  Additional Data:     Labs:    Results from last 7 days   Lab Units 09/02/20  0518  08/28/20  0508   WBC Thousand/uL 12 09*   < > 14 17*   HEMOGLOBIN g/dL 9 2*   < > 7 6*   HEMATOCRIT % 29 9*   < > 24 4*   PLATELETS Thousands/uL 202   < > 208   NEUTROS PCT %  --   --  88*   LYMPHS PCT %  --   --  7*   LYMPHO PCT % 11*  --   --    MONOS PCT %  --   --  3*   MONO PCT % 2*  --   --    EOS PCT % 1  --  0    < > = values in this interval not displayed  Results from last 7 days   Lab Units 09/03/20  0511   POTASSIUM mmol/L 4 2   CHLORIDE mmol/L 108   CO2 mmol/L 29   BUN mg/dL 45*   CREATININE mg/dL 1 06   CALCIUM mg/dL 7 8*     Results from last 7 days   Lab Units 09/03/20  0511   INR  2 28*       * I Have Reviewed All Lab Data Listed Above  * Additional Pertinent Lab Tests Reviewed: All Labs Within Last 24 Hours Reviewed    Imaging:    Imaging Reports Reviewed Today Include:   Imaging Personally Reviewed by Myself Includes:      Recent Cultures (last 7 days):     Results from last 7 days   Lab Units 08/28/20  0542 08/28/20  0536   BLOOD CULTURE  No Growth After 5 Days  No Growth After 5 Days         Last 24 Hours Medication List:   Current Facility-Administered Medications   Medication Dose Route Frequency Provider Last Rate    acetaminophen  650 mg Oral Q6H PRN Estephania Hint, DO      aspirin  81 mg Oral Daily Estephania Garcia DO      atorvastatin  40 mg Oral Daily With MirBEATRIZ zuñiga      gabapentin  100 mg Oral TID Andrea Christensen PA-C      hydrALAZINE  10 mg Intravenous Q4H PRN Andrea Christensen PA-C      insulin glargine  14 Units Subcutaneous HS Abdi Reinoso MD      insulin lispro  1-6 Units Subcutaneous 4x Daily (AC & HS) Windy Johnston PA-C      [START ON 9/4/2020] insulin lispro  5 Units Subcutaneous TID With Meals Abdi Reinoso MD      metoprolol tartrate  25 mg Oral Once TJ Medina      metoprolol tartrate 75 mg Oral Q12H [address] Christen Polanco MD      nystatin   Topical BID Pati Fisher PA-C      piperacillin-tazobactam  2 25 g Intravenous Q6H TJ Benson Stopped (09/03/20 8525)    torsemide  20 mg Oral Daily Christen Polanco MD      warfarin  2 mg Oral Daily (warfarin) Robin Baltazar MD          Today, Patient Was Seen By: Robin Baltazar MD    ** Please Note: This note has been constructed using a voice recognition system   **

## 2020-09-03 NOTE — CASE MANAGEMENT
Danni Liu is reviewing referral but questions if patient has secondary insurance since it appears she used some of her Medicare time at prior snf stay  Called son, Azul Meraz, who is unsure if patient has coinsurance to [facility] but will check patient's wallet tonight for insurance cards  Explained need for him to review the list for dual eligible facilities that accept both [place] and [place]  Asked son if blanket referrals may be made to all local facilities accepting [place] but he declines  He will call CM with snf choices tomorrow

## 2020-09-03 NOTE — ASSESSMENT & PLAN NOTE
- pt presents with to [facility] with right-sided facial droop and right-sided weakness after being transported by EMS for [facility] and noted to have atrial fibrillation enroute  - CT head in ED shows posterior cerebral artery disease is noted, mild on the right and severe left  - NIH - score on admission; - 6  -c/w ASA and statin  - MRI brain no acute findings  - CTA shows bilateral stenosis of carotids; less than 75%  LDL 23  Echo shows mild pulm htn  Neuro signed off  Pt now in Afib - placed on heparin gtt    Neuro recs BASA and AC in setting of Afib and carotid stenosis

## 2020-09-03 NOTE — ASSESSMENT & PLAN NOTE
Vascular appreciated  ASA and statin  Carotid dopplers completed - LICA > 49%, VASQUEZ < 22%  Vascular recommends OP f/u sicne the etiology for her symptoms on presentation was most likely cardio embolic

## 2020-09-03 NOTE — PLAN OF CARE
Problem: Potential for Falls  Goal: Patient will remain free of falls  Description: INTERVENTIONS:  - Assess patient frequently for physical needs  -  Identify cognitive and physical deficits and behaviors that affect risk of falls    -  [place] fall precautions as indicated by assessment   - Educate patient/family on patient safety including physical limitations  - Instruct patient to call for assistance with activity based on assessment  - Modify environment to reduce risk of injury  - Consider OT/PT consult to assist with strengthening/mobility  Outcome: Progressing     Problem: Prexisting or High Potential for Compromised Skin Integrity  Goal: Skin integrity is maintained or improved  Description: INTERVENTIONS:  - Identify patients at risk for skin breakdown  - Assess and monitor skin integrity  - Assess and monitor nutrition and hydration status  - Monitor labs   - Assess for incontinence   - Turn and reposition patient  - Assist with mobility/ambulation  - Relieve pressure over bony prominences  - Avoid friction and shearing  - Provide appropriate hygiene as needed including keeping skin clean and dry  - Evaluate need for skin moisturizer/barrier cream  - Collaborate with interdisciplinary team   - Patient/family teaching  - Consider wound care consult   Outcome: Progressing     Problem: PAIN - ADULT  Goal: Verbalizes/displays adequate comfort level or baseline comfort level  Description: Interventions:  - Encourage patient to monitor pain and request assistance  - Assess pain using appropriate pain scale  - Administer analgesics based on type and severity of pain and evaluate response  - Implement non-pharmacological measures as appropriate and evaluate response  - Consider cultural and social influences on pain and pain management  - Notify physician/advanced practitioner if interventions unsuccessful or patient reports new pain  Outcome: Progressing     Problem: INFECTION - ADULT  Goal: Absence or prevention of progression during hospitalization  Description: INTERVENTIONS:  - Assess and monitor for signs and symptoms of infection  - Monitor lab/diagnostic results  - Monitor all insertion sites, i e  indwelling lines, tubes, and drains  - Monitor endotracheal if appropriate and nasal secretions for changes in amount and color  - [place] appropriate cooling/warming therapies per order  - Administer medications as ordered  - Instruct and encourage patient and family to use good hand hygiene technique  - Identify and instruct in appropriate isolation precautions for identified infection/condition  Outcome: Progressing  Goal: Absence of fever/infection during neutropenic period  Description: INTERVENTIONS:  - Monitor WBC    Outcome: Progressing     Problem: SAFETY ADULT  Goal: Patient will remain free of falls  Description: INTERVENTIONS:  - Assess patient frequently for physical needs  -  Identify cognitive and physical deficits and behaviors that affect risk of falls    -  [place] fall precautions as indicated by assessment   - Educate patient/family on patient safety including physical limitations  - Instruct patient to call for assistance with activity based on assessment  - Modify environment to reduce risk of injury  - Consider OT/PT consult to assist with strengthening/mobility  Outcome: Progressing  Goal: Maintain or return to baseline ADL function  Description: INTERVENTIONS:  -  Assess patient's ability to carry out ADLs; assess patient's baseline for ADL function and identify physical deficits which impact ability to perform ADLs (bathing, care of mouth/teeth, toileting, grooming, dressing, etc )  - Assess/evaluate cause of self-care deficits   - Assess range of motion  - Assess patient's mobility; develop plan if impaired  - Assess patient's need for assistive devices and provide as appropriate  - Encourage maximum independence but intervene and supervise when necessary  - Involve family in performance of ADLs  - Assess for home care needs following discharge   - Consider OT consult to assist with ADL evaluation and planning for discharge  - Provide patient education as appropriate  Outcome: Progressing  Goal: Maintain or return mobility status to optimal level  Description: INTERVENTIONS:  - Assess patient's baseline mobility status (ambulation, transfers, stairs, etc )    - Identify cognitive and physical deficits and behaviors that affect mobility  - Identify mobility aids required to assist with transfers and/or ambulation (gait belt, sit-to-stand, lift, walker, cane, etc )  - [place] fall precautions as indicated by assessment  - Record patient progress and toleration of activity level on Mobility SBAR; progress patient to next Phase/Stage  - Instruct patient to call for assistance with activity based on assessment  - Consider rehabilitation consult to assist with strengthening/weightbearing, etc   Outcome: Progressing     Problem: DISCHARGE PLANNING  Goal: Discharge to home or other facility with appropriate resources  Description: INTERVENTIONS:  - Identify barriers to discharge w/patient and caregiver  - Arrange for needed discharge resources and transportation as appropriate  - Identify discharge learning needs (meds, wound care, etc )  - Arrange for interpretive services to assist at discharge as needed  - Refer to Case Management Department for coordinating discharge planning if the patient needs post-hospital services based on physician/advanced practitioner order or complex needs related to functional status, cognitive ability, or social support system  Outcome: Progressing     Problem: Knowledge Deficit  Goal: Patient/family/caregiver demonstrates understanding of disease process, treatment plan, medications, and discharge instructions  Description: Complete learning assessment and assess knowledge base    Interventions:  - Provide teaching at level of understanding  - Provide teaching via preferred learning methods  Outcome: Progressing     Problem: Neurological Deficit  Goal: Neurological status is stable or improving  Description: Interventions:  - Monitor and assess patient's level of consciousness, motor function, sensory function, and level of assistance needed for ADLs  - Monitor and report changes from baseline  Collaborate with interdisciplinary team to initiate plan and implement interventions as ordered  - Provide and maintain a safe environment  - Consider seizure precautions  - Consider fall precautions  - Consider aspiration precautions  - Consider bleeding precautions  Outcome: Progressing     Problem: Activity Intolerance/Impaired Mobility  Goal: Mobility/activity is maintained at optimum level for patient  Description: Interventions:  - Assess and monitor patient  barriers to mobility and need for assistive/adaptive devices  - Assess patient's emotional response to limitations  - Collaborate with interdisciplinary team and initiate plans and interventions as ordered  - Encourage independent activity per ability   - Maintain proper body alignment  - Perform active/passive rom as tolerated/ordered  - Plan activities to conserve energy   - Turn patient as appropriate  Outcome: Progressing     Problem: Communication Impairment  Goal: Ability to express needs and understand communication  Description: Assess patient's communication skills and ability to understand information  Patient will demonstrate use of effective communication techniques, alternative methods of communication and understanding even if not able to speak  - Encourage communication and provide alternate methods of communication as needed  - Collaborate with case management/ for discharge needs  - Include patient/family/caregiver in decisions related to communication    Outcome: Progressing     Problem: Potential for Aspiration  Goal: Non-ventilated patient's risk of aspiration is minimized  Description: Assess and monitor vital signs, respiratory status, and labs (WBC)  Monitor for signs of aspiration (tachypnea, cough, rales, wheezing, cyanosis, fever)  - Assess and monitor patient's ability to swallow  - Place patient up in chair to eat if possible  - HOB up at 90 degrees to eat if unable to get patient up into chair   - Supervise patient during oral intake  - Instruct patient/ family to take small bites  - Instruct patient/ family to take small single sips when taking liquids  - Follow patient-specific strategies generated by speech pathologist   Outcome: Progressing  Goal: Ventilated patient's risk of aspiration is minimized  Description: Assess and monitor vital signs, respiratory status, airway cuff pressure, and labs (WBC)  Monitor for signs of aspiration (tachypnea, cough, rales, wheezing, cyanosis, fever)  - Elevate head of bed 30 degrees if patient has tube feeding   - Monitor tube feeding  Outcome: Progressing     Problem: Nutrition  Goal: Nutrition/Hydration status is improving  Description: Monitor and assess patient's nutrition/hydration status for malnutrition (ex- brittle hair, bruises, dry skin, pale skin and conjunctiva, muscle wasting, smooth red tongue, and disorientation)  Collaborate with interdisciplinary team and initiate plan and interventions as ordered  Monitor patient's weight and dietary intake as ordered or per policy  Utilize nutrition screening tool and intervene per policy  Determine patient's food preferences and provide high-protein, high-caloric foods as appropriate  - Assist patient with eating   - Allow adequate time for meals   - Encourage patient to take dietary supplement as ordered  - Collaborate with clinical nutritionist   - Include patient/family/caregiver in decisions related to nutrition    Outcome: Progressing     Problem: Nutrition/Hydration-ADULT  Goal: Nutrient/Hydration intake appropriate for improving, restoring or maintaining nutritional needs  Description: Monitor and assess patient's nutrition/hydration status for malnutrition  Collaborate with interdisciplinary team and initiate plan and interventions as ordered  Monitor patient's weight and dietary intake as ordered or per policy  Utilize nutrition screening tool and intervene as necessary  Determine patient's food preferences and provide high-protein, high-caloric foods as appropriate  INTERVENTIONS:  - Monitor oral intake, urinary output, labs, and treatment plans  - Assess nutrition and hydration status and recommend course of action  - Evaluate amount of meals eaten  - Assist patient with eating if necessary   - Allow adequate time for meals  - Recommend/ encourage appropriate diets, oral nutritional supplements, and vitamin/mineral supplements  - Order, calculate, and assess calorie counts as needed  - Recommend, monitor, and adjust tube feedings and TPN/PPN based on assessed needs  - Assess need for intravenous fluids  - Provide specific nutrition/hydration education as appropriate  - Include patient/family/caregiver in decisions related to nutrition  Outcome: Progressing     Problem: NEUROSENSORY - ADULT  Goal: Achieves stable or improved neurological status  Description: INTERVENTIONS  - Monitor and report changes in neurological status  - Monitor vital signs such as temperature, blood pressure, glucose, and any other labs ordered   - Initiate measures to prevent increased intracranial pressure  - Monitor for seizure activity and implement precautions if appropriate      Outcome: Progressing  Goal: Achieves maximal functionality and self care  Description: INTERVENTIONS  - Monitor swallowing and airway patency with patient fatigue and changes in neurological status  - Encourage and assist patient to increase activity and self care     - Encourage visually impaired, hearing impaired and aphasic patients to use assistive/communication devices  Outcome: Progressing     Problem: CARDIOVASCULAR - ADULT  Goal: Maintains optimal cardiac output and hemodynamic stability  Description: INTERVENTIONS:  - Monitor I/O, vital signs and rhythm  - Monitor for S/S and trends of decreased cardiac output  - Administer and titrate ordered vasoactive medications to optimize hemodynamic stability  - Assess quality of pulses, skin color and temperature  - Assess for signs of decreased coronary artery perfusion  - Instruct patient to report change in severity of symptoms  Outcome: Progressing     Problem: RESPIRATORY - ADULT  Goal: Achieves optimal ventilation and oxygenation  Description: INTERVENTIONS:  - Assess for changes in respiratory status  - Assess for changes in mentation and behavior  - Position to facilitate oxygenation and minimize respiratory effort  - Oxygen administered by appropriate delivery if ordered  - Initiate smoking cessation education as indicated  - Encourage broncho-pulmonary hygiene including cough, deep breathe, Incentive Spirometry  - Assess the need for suctioning and aspirate as needed  - Assess and instruct to report SOB or any respiratory difficulty  - Respiratory Therapy support as indicated  Outcome: Progressing     Problem: GASTROINTESTINAL - ADULT  Goal: Minimal or absence of nausea and/or vomiting  Description: INTERVENTIONS:  - Administer IV fluids if ordered to ensure adequate hydration  - Maintain NPO status until nausea and vomiting are resolved  - Nasogastric tube if ordered  - Administer ordered antiemetic medications as needed  - Provide nonpharmacologic comfort measures as appropriate  - Advance diet as tolerated, if ordered  - Consider nutrition services referral to assist patient with adequate nutrition and appropriate food choices  Outcome: Progressing  Goal: Maintains adequate nutritional intake  Description: INTERVENTIONS:  - Monitor percentage of each meal consumed  - Identify factors contributing to decreased intake, treat as appropriate  - Assist with meals as needed  - Monitor I&O, weight, and lab values if indicated  - Obtain nutrition services referral as needed  Outcome: Progressing     Problem: GENITOURINARY - ADULT  Goal: Maintains or returns to baseline urinary function  Description: INTERVENTIONS:  - Assess urinary function  - Encourage oral fluids to ensure adequate hydration if ordered  - Administer IV fluids as ordered to ensure adequate hydration  - Administer ordered medications as needed  - Offer frequent toileting  - Follow urinary retention protocol if ordered  Outcome: Progressing     Problem: METABOLIC, FLUID AND ELECTROLYTES - ADULT  Goal: Electrolytes maintained within normal limits  Description: INTERVENTIONS:  - Monitor labs and assess patient for signs and symptoms of electrolyte imbalances  - Administer electrolyte replacement as ordered  - Monitor response to electrolyte replacements, including repeat lab results as appropriate  - Instruct patient on fluid and nutrition as appropriate  Outcome: Progressing     Problem: SKIN/TISSUE INTEGRITY - ADULT  Goal: Skin integrity remains intact  Description: INTERVENTIONS  - Identify patients at risk for skin breakdown  - Assess and monitor skin integrity  - Assess and monitor nutrition and hydration status  - Monitor labs (i e  albumin)  - Assess for incontinence   - Turn and reposition patient  - Assist with mobility/ambulation  - Relieve pressure over bony prominences  - Avoid friction and shearing  - Provide appropriate hygiene as needed including keeping skin clean and dry  - Evaluate need for skin moisturizer/barrier cream  - Collaborate with interdisciplinary team (i e  Nutrition, Rehabilitation, etc )   - Patient/family teaching  Outcome: Progressing     Problem: HEMATOLOGIC - ADULT  Goal: Maintains hematologic stability  Description: INTERVENTIONS  - Assess for signs and symptoms of bleeding or hemorrhage  - Monitor labs  - Administer supportive blood products/factors as ordered and appropriate  Outcome: Progressing     Problem: MUSCULOSKELETAL - ADULT  Goal: Maintain or return mobility to safest level of function  Description: INTERVENTIONS:  - Assess patient's ability to carry out ADLs; assess patient's baseline for ADL function and identify physical deficits which impact ability to perform ADLs (bathing, care of mouth/teeth, toileting, grooming, dressing, etc )  - Assess/evaluate cause of self-care deficits   - Assess range of motion  - Assess patient's mobility  - Assess patient's need for assistive devices and provide as appropriate  - Encourage maximum independence but intervene and supervise when necessary  - Involve family in performance of ADLs  - Assess for home care needs following discharge   - Consider OT consult to assist with ADL evaluation and planning for discharge  - Provide patient education as appropriate  Outcome: Progressing     Problem: COPING  Goal: Pt/Family able to verbalize concerns and demonstrate effective coping strategies  Description: INTERVENTIONS:  - Assist patient/family to identify coping skills, available support systems and cultural and spiritual values  - Provide emotional support, including active listening and acknowledgement of concerns of patient and caregivers  - Reduce environmental stimuli, as able  - Provide patient education  - Assess for spiritual pain/suffering and initiate spiritual care, including notification of Pastoral Care or анна based community as needed  - Assess effectiveness of coping strategies  Outcome: Progressing  Goal: Will report anxiety at manageable levels  Description: INTERVENTIONS:  - Administer medication as ordered  - Teach and encourage coping skills  - Provide emotional support  - Assess patient/family for anxiety and ability to cope  Outcome: Progressing

## 2020-09-03 NOTE — QUICK NOTE
QUICK NOTE - Deterioration Index  Marlene Gallardo 67 y o  female MRN: [id number]  Unit/Bed#: [facility] 504-01 Encounter: [id number]    Date Paged: 20  Time Paged: 0550  Room #: 130  Arrival Time: 0610  Deterioration index score at time of page: 42  %  Need to escalate level of care: no     PROBLEMS resulting in high DI score:    Age 67- not modifiable   Supplemental O2 by NC   GCS 14   A-fib    PLAN:     Score triggered for inappropriate pulse ox charting, when updated DI score 42%    Please call [phone] with any questions  Vitals:   Vitals:    20 2152 20 2333 20 0250 20 0547   BP: 136/64 129/67 125/77    BP Location:  Left arm Right arm    Pulse: 82 87 87    Resp:  17 18    Temp:  98 7 °F (37 1 °C) 98 4 °F (36 9 °C)    TempSrc:  Oral Axillary    SpO2:  92% 98%    Weight:    82 5 kg (181 lb 14 1 oz)   Height:           Respiratory:  SpO2: SpO2: 98 %, SpO2 Activity: SpO2 Activity: At Rest, SpO2 Device: O2 Device: Nasal cannula  Nasal Cannula O2 Flow Rate (L/min): 2 L/min    Temperature: Temp (24hrs), Av 5 °F (36 9 °C), Min:97 8 °F (36 6 °C), Max:98 9 °F (37 2 °C)  Current: Temperature: 98 4 °F (36 9 °C)    Labs:   Results from last 7 days   Lab Units 20  0508 20  0513  20  0508   WBC Thousand/uL 12 09* 14 03* 12 51*   < > 14 17*   HEMOGLOBIN g/dL 9 2* 8 9* 9 4*   < > 7 6*   HEMATOCRIT % 29 9* 28 6* 30 6*   < > 24 4*   PLATELETS Thousands/uL 202 188 215   < > 208   NEUTROS PCT %  --   --   --   --  88*   MONOS PCT %  --   --   --   --  3*   MONO PCT % 2*  --   --   --   --     < > = values in this interval not displayed       Results from last 7 days   Lab Units 20  0504 20  0508   SODIUM mmol/L 141 141 141   POTASSIUM mmol/L 4 1 4 2 4 1   CHLORIDE mmol/L 107 109* 111*   CO2 mmol/L 29 27 26   BUN mg/dL 47* 47* 49*   CREATININE mg/dL 1 01 1 16 1 48*   CALCIUM mg/dL 8 1* 8 1* 7 9*     Results from last 7 days   Lab Units 08/29/20  0511 08/28/20  0508   MAGNESIUM mg/dL 2 5 2 3                   Code Status: Level 1 - Full Code

## 2020-09-03 NOTE — ASSESSMENT & PLAN NOTE
8/30 IV Bumex & IV lasix given as pt overloaded from resuscitation and PRBC with excellent UOP  Now switched to PO torsemide 20 mg from today (was on Lasix 20 mg at home)

## 2020-09-03 NOTE — ASSESSMENT & PLAN NOTE
S/p DCCV in route to [facility]  8/30 pt in Afib w/ moderate VR  Cardio appreciated  Heparin gtt - switched to coumadin  INR 2 2  stop drip, decrease coumadin to 2 mg  Lopressor for rate control - increased to 75 BID  TSH WNL

## 2020-09-03 NOTE — PROGRESS NOTES
Progress Note - Infectious Disease   Lizette Alvarado 67 y o  female MRN: [id number]  Unit/Bed#: [facility] 504-01 Encounter: [id number]      Impression/Plan:  1  Septic shock with gram positive bacteremia likely 2/2 infected stage 4 sacral pressure ulcer with suspected osteomyelitis and concern for necrotizing fasciitis  · Presented with stage 4 sacral pressure ulcer with purulent appearance    · Micro results:  ? COVID19 PCR negative  ? BCx 8/26:   ? Set 1: Enterococcus faecalis, strep constellatus  ? Set 2: Strep constellatus    ? BCx 8/28: Negative 48h    ? UCx: E  Coli (asymptomatic)  ? WCx: polymicrobial with E  Coli, Proteus mirabilis, enterococcus faecalis     ? Anaerobic WCx: 3+ Bacteroides fragilis  ? MRSA screen negative  · Imaging:  ? CT abdomen/pelvis 8/26: Soft tissue ulcer overlying the inferior sacrum and coccyx with associated gas production about the right sacrospinous ligament extending into the soft tissues in the perirectal region in keeping with necrotizing fasciitis  ? TTE negative for vegetations  · Shock has resolved --> off pressers, out of ICU  · WBC count trend: 16 - 13 5 - 15 - 14 - 11 - 12 - 14 (today)  No fever since 8/25    · Hx of DM with HbA1c of 9 9 on 8/26/20  · S/p surgical debridement on 8/26/20  Necrotic tissue was debrided and tissue sent for culture  Wound was described as malodorous but without purulent discharge  Wound pictures before and after debridement were reviewed    Plan:  · Continue Zosyn 2 25 gm IV Q6H for 5 more days through September 8, for a total treatment duration of 14 days for bacteremia  · As per surgery recommendation diversion colostomy to help with wound healing not indicated due to location away from rectum making contamination of the wound with stools less likely  Patient currently has wound VAC system in place  · Underwent PICC placement on 9/3   · Continue to monitor fever curve and WBC count trend  WBC down trending on 9/2 at 12 from 14 on 8/31       2  Stage 4 sacral pressure ulcer with suspected osteomyelitis  · Present on admission  Most likely source for shock and gram + bacteremia  · S/p debridement on 8/26  Plan:  · See management above  · Local wound management per surgery  Underwent 2nd OR today 8/28 for further debridement  · To help with wound healing would benefit from optimization of wound off loading, nutrition, and blood sugar control to help with wound healing  Would likely benefit from wound flap placement in the future to cover exposed bone       3  CVA  · Developed right facial droop, right sided weakness, aphasia and dysarthria after cardioversion en route to the hospital for hypotension 2/2 new onset Afib with RVR  · Initial NIHSS on presentation to ED was 6 which improved to 3 after tPA  · Had mental status change overnight on 8/26/20 most likely in setting of hypotension with cerebrohypoperfusion in setting of septic shock     · Imaging:  ? MRI brain demonstrated no acute changes   Did demonstrate mild chronic small vessel cerebrovascular ischemic disease and age-related atrophy  ? CTA head/neck demonstrated left PCA stenosis and 65-70% stenosis of the left ICA bulb  ? Follow-up CT brain after acute mental status change demonstrated no acute changes and specifically no hemorrhage  Plan:  · Management per primary team  Neurology on board       4  New onset atrial fibrillation  · S/p cardioversion for hypotension  · Converted back to Afib after a few days of SR  Started on heparin drip by primary team    Plan:  · Management per primary team  Cardiology on board       5  RORY - improving  · BL 1 2 - 1 7  · Presented with creatinine of 2 59  Improved with fluid resuscitation  Trend; 2 59 - 1 80 - 1 96 - 2 07 - 1 89 - 1 48    · Most likely etiology is prerenal in setting of hypotension and dehydration 2/2 poor oral intake  Plan:  · Management as per primary team       6  Asymptomatic E   Coli bacteruria  · Likely asymptomatic but anyway on coverage with Zosyn for above  Antibiotics:  Zosyn 2 25 gm IV Q6H (day #6; day #8 of total effective antibiotic treatment)    Subjective:  Patient has no fever, chills, sweats; no nausea, vomiting, diarrhea; no cough, shortness of breath; no pain  No new symptoms  Objective:  Vitals:  Temp:  [98 2 °F (36 8 °C)-98 9 °F (37 2 °C)] 98 2 °F (36 8 °C)  HR:  [82-89] 88  Resp:  [17-18] 17  BP: (125-138)/(60-77) 129/72  SpO2:  [92 %-100 %] 99 %  Temp (24hrs), Av 6 °F (37 °C), Min:98 2 °F (36 8 °C), Max:98 9 °F (37 2 °C)  Current: Temperature: 98 2 °F (36 8 °C)    Physical Exam:   General Appearance:  Alert, interactive, nontoxic, no acute distress  Throat: Oropharynx moist without lesions  Lungs:   Clear to auscultation bilaterally; no wheezes, rhonchi or rales; respirations unlabored   Heart:  RRR; no murmur, rub or gallop   Abdomen:   Soft, non-tender, non-distended, positive bowel sounds  Extremities: No clubbing, cyanosis or edema   Skin: No new rashes or lesions  No draining wounds noted  Clean dry dressing in place over sacral ulcer, no signs of infection surrounding the dressing  Areas with intertrigo improved  Labs, Imaging, & Other studies:   All pertinent labs and imaging studies were personally reviewed  Results from last 7 days   Lab Units 20  0518 20  0508 20  0513   WBC Thousand/uL 12 09* 14 03* 12 51*   HEMOGLOBIN g/dL 9 2* 8 9* 9 4*   PLATELETS Thousands/uL 202 188 215     Results from last 7 days   Lab Units 20  0511 20  0518 20  0504   SODIUM mmol/L 141 141 141   POTASSIUM mmol/L 4 2 4 1 4 2   CHLORIDE mmol/L 108 107 109*   CO2 mmol/L 29 29 27   BUN mg/dL 45* 47* 47*   CREATININE mg/dL 1 06 1 01 1 16   EGFR ml/min/1 73sq m 51 54 46   CALCIUM mg/dL 7 8* 8 1* 8 1*     Results from last 7 days   Lab Units 20  0542 20  0536 20  1214   BLOOD CULTURE  No Growth After 5 Days  No Growth After 5 Days    --    MRSA CULTURE ONLY --   --  No Methicillin Resistant Staphlyococcus aureus (MRSA) isolated

## 2020-09-03 NOTE — ASSESSMENT & PLAN NOTE
Lab Results   Component Value Date    HGBA1C 9 9 (H) 08/26/2020     SSI  Blood Glucose checks TIDWM and QHS (Q6H for NPO patients)  Hold oral medications  Increase Lantus to 14 units, increase Humalog 5 units with meals  PT should take insulin at d/c

## 2020-09-03 NOTE — ASSESSMENT & PLAN NOTE
B Cx pos for Enterococcus and Strep Constellatus  Zosyn x 14 days  ID appreciated  PICC consent obtained and PICC placed

## 2020-09-03 NOTE — ASSESSMENT & PLAN NOTE
Estimated Creatinine Clearance: 48 9 mL/min (by C-G formula based on SCr of 1 06 mg/dL)    POA  Patient noted to have RORY on admission; serum creatinine 2 59 (baseline 1 2-1 3)  2/2 shock  Appears to be resolved

## 2020-09-03 NOTE — PROCEDURES
Insert PICC line    Date/Time: 9/3/2020 9:14 AM  Performed by: Shan Guadalupe RN  Authorized by: Christine Mortensen MD     Patient location:  Bedside  Other Assisting Provider: Yes (comment) Sage JOE)    Consent:     Consent obtained:  Written (Edvinsimone Espinoza obtained consent )    Consent given by: pts son     Procedural risks discussed: by MD Quinn protocol:     Procedure explained and questions answered to patient or proxy's satisfaction: yes      Relevant documents present and verified: yes      Test results available and properly labeled: yes      Radiology Images displayed and confirmed  If images not available, report reviewed: yes      Required blood products, implants, devices, and special equipment available: yes      Site/side marked: yes      Immediately prior to procedure, a time out was called: yes      Patient identity confirmed:  Verbally with patient and arm band  Pre-procedure details:     Hand hygiene: Hand hygiene performed prior to insertion      Sterile barrier technique: All elements of maximal sterile technique followed      Skin preparation:  ChloraPrep    Skin preparation agent: Skin preparation agent completely dried prior to procedure    Indications:     PICC line indications: long term antibiotics    Sedation:     Sedation type: Other (comment)  Anesthesia (see MAR for exact dosages):      Anesthesia method:  Local infiltration    Local anesthetic:  Lidocaine 1% w/o epi  Procedure details:     Location:  Cephalic    Vessel type: vein      Laterality:  Left    Site selection rationale:  Right sided weakness, Basilic vein too small for a 5 Fr PICC    Approach: percutaneous technique used      Patient position:  Flat    Procedural supplies:  Double lumen    Catheter size:  5 Fr    Landmarks identified: yes      Ultrasound guidance: yes      Sterile ultrasound techniques: Sterile gel and sterile probe covers were used      Number of attempts:  1    Successful placement: yes      Vessel of catheter tip end:  Chest Xray needed to confirm placement    Total catheter length (cm):  44    Catheter out on skin (cm):  2    Max flow rate:  999ml/hr     Arm circumference:  29  Post-procedure details:     Post-procedure:  Dressing applied and securement device placed (surgi foam and ace wrap applied )    Assessment:  Blood return through all ports and placement verification pending x-ray result    Post-procedure complications: none      Patient tolerance of procedure:   Tolerated well, no immediate complications  Comments:      Lot # [id number] exp 8-31-21

## 2020-09-04 LAB
ANION GAP SERPL CALCULATED.3IONS-SCNC: 2 MMOL/L (ref 4–13)
BUN SERPL-MCNC: 42 MG/DL (ref 5–25)
CALCIUM SERPL-MCNC: 8.2 MG/DL (ref 8.3–10.1)
CHLORIDE SERPL-SCNC: 107 MMOL/L (ref 100–108)
CO2 SERPL-SCNC: 32 MMOL/L (ref 21–32)
CREAT SERPL-MCNC: 0.97 MG/DL (ref 0.6–1.3)
GFR SERPL CREATININE-BSD FRML MDRD: 57 ML/MIN/1.73SQ M
GLUCOSE SERPL-MCNC: 171 MG/DL (ref 65–140)
GLUCOSE SERPL-MCNC: 172 MG/DL (ref 65–140)
GLUCOSE SERPL-MCNC: 216 MG/DL (ref 65–140)
GLUCOSE SERPL-MCNC: 221 MG/DL (ref 65–140)
GLUCOSE SERPL-MCNC: 280 MG/DL (ref 65–140)
INR PPP: 3.09 (ref 0.84–1.19)
POTASSIUM SERPL-SCNC: 4 MMOL/L (ref 3.5–5.3)
PROTHROMBIN TIME: 31.6 SECONDS (ref 11.6–14.5)
SODIUM SERPL-SCNC: 141 MMOL/L (ref 136–145)

## 2020-09-04 PROCEDURE — 82948 REAGENT STRIP/BLOOD GLUCOSE: CPT

## 2020-09-04 PROCEDURE — 99233 SBSQ HOSP IP/OBS HIGH 50: CPT | Performed by: INTERNAL MEDICINE

## 2020-09-04 PROCEDURE — 99232 SBSQ HOSP IP/OBS MODERATE 35: CPT | Performed by: HOSPITALIST

## 2020-09-04 PROCEDURE — 97110 THERAPEUTIC EXERCISES: CPT

## 2020-09-04 PROCEDURE — 85610 PROTHROMBIN TIME: CPT | Performed by: HOSPITALIST

## 2020-09-04 PROCEDURE — 80048 BASIC METABOLIC PNL TOTAL CA: CPT | Performed by: HOSPITALIST

## 2020-09-04 PROCEDURE — 97530 THERAPEUTIC ACTIVITIES: CPT

## 2020-09-04 PROCEDURE — 99232 SBSQ HOSP IP/OBS MODERATE 35: CPT | Performed by: INTERNAL MEDICINE

## 2020-09-04 RX ADMIN — INSULIN GLARGINE 14 UNITS: 100 INJECTION, SOLUTION SUBCUTANEOUS at 21:55

## 2020-09-04 RX ADMIN — PIPERACILLIN AND TAZOBACTAM 2.25 G: 2; .25 INJECTION, POWDER, FOR SOLUTION INTRAVENOUS at 01:59

## 2020-09-04 RX ADMIN — INSULIN LISPRO 1 UNITS: 100 INJECTION, SOLUTION INTRAVENOUS; SUBCUTANEOUS at 08:45

## 2020-09-04 RX ADMIN — ATORVASTATIN CALCIUM 40 MG: 40 TABLET, FILM COATED ORAL at 17:41

## 2020-09-04 RX ADMIN — METOPROLOL TARTRATE 75 MG: 50 TABLET, FILM COATED ORAL at 08:44

## 2020-09-04 RX ADMIN — PIPERACILLIN AND TAZOBACTAM 2.25 G: 2; .25 INJECTION, POWDER, FOR SOLUTION INTRAVENOUS at 13:39

## 2020-09-04 RX ADMIN — PIPERACILLIN AND TAZOBACTAM 2.25 G: 2; .25 INJECTION, POWDER, FOR SOLUTION INTRAVENOUS at 08:55

## 2020-09-04 RX ADMIN — NYSTATIN 1 APPLICATION: 100000 POWDER TOPICAL at 08:47

## 2020-09-04 RX ADMIN — NYSTATIN 1 APPLICATION: 100000 POWDER TOPICAL at 17:41

## 2020-09-04 RX ADMIN — TORSEMIDE 20 MG: 20 TABLET ORAL at 08:44

## 2020-09-04 RX ADMIN — INSULIN LISPRO 2 UNITS: 100 INJECTION, SOLUTION INTRAVENOUS; SUBCUTANEOUS at 12:13

## 2020-09-04 RX ADMIN — INSULIN LISPRO 2 UNITS: 100 INJECTION, SOLUTION INTRAVENOUS; SUBCUTANEOUS at 17:42

## 2020-09-04 RX ADMIN — GABAPENTIN 100 MG: 100 CAPSULE ORAL at 08:43

## 2020-09-04 RX ADMIN — ASPIRIN 81 MG: 81 TABLET, COATED ORAL at 08:43

## 2020-09-04 RX ADMIN — INSULIN LISPRO 4 UNITS: 100 INJECTION, SOLUTION INTRAVENOUS; SUBCUTANEOUS at 21:57

## 2020-09-04 RX ADMIN — GABAPENTIN 100 MG: 100 CAPSULE ORAL at 17:41

## 2020-09-04 RX ADMIN — PIPERACILLIN AND TAZOBACTAM 2.25 G: 2; .25 INJECTION, POWDER, FOR SOLUTION INTRAVENOUS at 20:10

## 2020-09-04 RX ADMIN — METOPROLOL TARTRATE 75 MG: 50 TABLET, FILM COATED ORAL at 21:56

## 2020-09-04 RX ADMIN — GABAPENTIN 100 MG: 100 CAPSULE ORAL at 21:55

## 2020-09-04 NOTE — PROGRESS NOTES
Progress Note - Cardiology   Kevon Johnson 67 y o  female MRN: [id number]  Unit/Bed#: [facility] 504-01 Encounter: [id number]    Assessment/Plan:  67 y o  female with past medical history of diabetes, HTN, and hyperlipidemia who originally presented on 8/25 due to generalized weakness  She was found to be in unstable atrial fibrillation and was cardioverted by EMS  She then developed dysarthria and aphasia and received tPA for possible stroke  Followup MRI was negative for any acute ischemic changes  Carotid US showed >70% stenosis of the L ICA    Upon admission she was also found to be septic secondary to enterococcus bacteremia likely secondary to sacral decubitus ulcer  Patient has received multiple debridements of sacral wound and is on IV antiobiotics  Patient was in sinus rhythm for a few days but has been in atrial fibrillation since 8/29  She continues to be in a  Fib  HR has been improving and she has less tachycardia  Average HR has been 86 over the past 24 hours  She has had good urine output with PO torsemide  She is asymptomatic and has no complaints at this time  1  Atrial Fibrillation   -New diagnosis this admission   -Echo on 8/27 showed no regional wall abnormalities, mild pulmonary hypertension, mildly to moderately dilated left atrium, mild MR, and mild TR  -TSH WNL   -Increased Metoprolol to 75 mg BID   -Average HR 86 over the past 24 hours, improved from previous  -Patient continues to be asymptomatic   -Warfarin decreased to 2 mg daily, INR 3 09   Plan:   -Continue metoprolol tartrate 75 mg BID for rate control   -Goal average HR , continue to monitor   -Continue oral warfarin for anticoagulation, monitor INR    2   Volume Overload  -Patient had signs of volume overload secondary to extensive fluid resuscitation since admission   -Patient was started on Torsemide 20 mg PO daily with good response in urine output   -Patient continues to have some trace lower extremity edema and is net positive for this admission   Plan:   -Continue Torsemide 20 mg PO daily for continued diuresis  3  Stroke-like Symptoms s/p tPA   -Patient developed stroke like symptoms after cardioversion for unstable a  fib  -MRI showed no acute ischemic disease   -Patient has some residual R sided weakness   -Neurology was following and recommends no further intervention at this time     4  Carotid Stenosis   -Carotid US showed >70% stenosis of the L ICA   -Vascular surgery will followup as outpatient   Plan:   -Continue aspirin, statin, and anticoagulation with warfarin       Subjective/Objective     Subjective:   Patient is feeling well today  She has no chest pain, shortness of breath, or palpitations  She has no lightheadedness  She has decreased appetite  She is otherwise asymptomatic  Objective:    Vitals: /63   Pulse 72   Temp 98 °F (36 7 °C)   Resp 18   Ht 5' 6" (1 676 m)   Wt 83 kg (182 lb 15 7 oz)   SpO2 93%   BMI 29 53 kg/m²   Vitals:    09/03/20 0547 09/04/20 0600   Weight: 82 5 kg (181 lb 14 1 oz) 83 kg (182 lb 15 7 oz)     Orthostatic Blood Pressures      Most Recent Value   Blood Pressure  116/63 filed at 09/04/2020 9480   Patient Position - Orthostatic VS  Lying filed at 09/03/2020 1900            Intake/Output Summary (Last 24 hours) at 9/4/2020 0910  Last data filed at 9/4/2020 7219  Gross per 24 hour   Intake 897 87 ml   Output 3245 ml   Net -2347 13 ml       Invasive Devices     Peripherally Inserted Central Catheter Line            PICC Line 11/58/73 Left Cephalic less than 1 day          Peripheral Intravenous Line            Peripheral IV 09/01/20 Left;Ventral (anterior) Forearm 2 days    Peripheral IV 09/03/20 Right;Ventral (anterior) Forearm 1 day          Drain            Urethral Catheter Temperature probe 16 Fr  9 days    Negative Pressure Wound Therapy (V A C ) Sacrum 2 days                  Physical Exam  Constitutional:       Appearance: She is obese  She is ill-appearing     HENT: Head: Normocephalic and atraumatic  Mouth/Throat:      Mouth: Mucous membranes are moist       Pharynx: Oropharynx is clear  Eyes:      Conjunctiva/sclera: Conjunctivae normal       Pupils: Pupils are equal, round, and reactive to light  Cardiovascular:      Rate and Rhythm: Tachycardia present  Rhythm irregular  Heart sounds: No murmur  Pulmonary:      Effort: Pulmonary effort is normal       Breath sounds: Normal breath sounds  Abdominal:      Palpations: Abdomen is soft  Tenderness: There is no abdominal tenderness  Musculoskeletal:      Right lower leg: Edema present  Left lower leg: Edema present  Comments: Mild bilateral edema   Skin:     General: Skin is warm and dry  Neurological:      General: No focal deficit present  Mental Status: She is alert and oriented to person, place, and time  Comments: Mild residual R sided weakness          Lab Results:   CBC with diff:   Results from last 7 days   Lab Units 09/02/20  0518   WBC Thousand/uL 12 09*   RBC Million/uL 3 15*   HEMOGLOBIN g/dL 9 2*   HEMATOCRIT % 29 9*   MCV fL 95   MCH pg 29 2   MCHC g/dL 30 8*   RDW % 14 6   MPV fL 11 3   PLATELETS Thousands/uL 202     CMP:   Results from last 7 days   Lab Units 09/04/20  0559   SODIUM mmol/L 141   POTASSIUM mmol/L 4 0   CHLORIDE mmol/L 107   CO2 mmol/L 32   BUN mg/dL 42*   CREATININE mg/dL 0 97   CALCIUM mg/dL 8 2*   EGFR ml/min/1 73sq m 57     Coags:   Results from last 7 days   Lab Units 09/04/20  0559 09/03/20  0511   PTT seconds  --  72*   INR  3 09* 2 28*     Imaging: I have personally reviewed pertinent reports      EKG: Atrial Fibrillation       VTE Pharmacologic Prophylaxis: Warfarin (Coumadin)  VTE Mechanical Prophylaxis: sequential compression device      Aguilar Hennessy, MS-4

## 2020-09-04 NOTE — QUICK NOTE
General Surgery  Gely Reynaga 67 y o  female MRN: [id number]  Unit/Bed#: [facility] 504-01 Encounter: [id number]    V  A C   Procedure Note    Date: 9/4/20  Time: 1300    Location of wound: Sacrum    Sponges removed:  2 Black Sponges  0 White Sponges    Dimensions of wound: 6 cm x 5 cm x 2 5 cm    Description of wound:           Sponges placed:  2 Black Sponges  0 White Sponges    VAC settings:  125 mmHg  Continuous    Pt tolerated procedure well  VAC sticker placed to wound dressing, per protocol              Chitra Cheng MD  9/4/2020

## 2020-09-04 NOTE — PROGRESS NOTES
Progress Note - Infectious Disease   Jack Cotto 67 y o  female MRN: [id number]  Unit/Bed#: [facility] 504-01 Encounter: [id number]      Impression/Plan:  1  Septic shock with gram positive bacteremia likely 2/2 infected stage 4 sacral pressure ulcer with suspected osteomyelitis and concern for necrotizing fasciitis  · Presented with stage 4 sacral pressure ulcer with purulent appearance    · Micro results:  ? COVID19 PCR negative  ? BCx 8/26:   ? Set 1: Enterococcus faecalis, strep constellatus  ? Set 2: Strep constellatus    ? BCx 8/28: Negative 48h    ? UCx: E  Coli (asymptomatic)  ? WCx: polymicrobial with E  Coli, Proteus mirabilis, enterococcus faecalis     ? Anaerobic WCx: 3+ Bacteroides fragilis  ? MRSA screen negative  · Imaging:  ? CT abdomen/pelvis 8/26: Soft tissue ulcer overlying the inferior sacrum and coccyx with associated gas production about the right sacrospinous ligament extending into the soft tissues in the perirectal region in keeping with necrotizing fasciitis  ? TTE negative for vegetations  · Shock has resolved --> off pressers, out of ICU  · WBC count trend: 16 - 13 5 - 15 - 14 - 11 - 12 - 14 - 12  No fever since 8/25    · Hx of DM with HbA1c of 9 9 on 8/26/20  · S/p surgical debridement on 8/26/20  Necrotic tissue was debrided and tissue sent for culture  Wound was described as malodorous but without purulent discharge  Wound pictures before and after debridement were reviewed    Plan:  · Continue Zosyn 2 25 gm IV Q6H for 5 more days through September 8, for a total treatment duration of 14 days for bacteremia through 9/9  · As per surgery recommendation diversion colostomy to help with wound healing not indicated due to location away from rectum making contamination of the wound with stools less likely  Patient currently has wound VAC system in place  · Underwent PICC placement on 9/3   · Continue to monitor fever curve and WBC count trend  WBC down trending on 9/2 at 12 from 14 on 8/31     2  Stage 4 sacral pressure ulcer with suspected osteomyelitis  · Present on admission  Most likely source for shock and gram + bacteremia  · S/p debridement on 8/26  Plan:  · See management above  · Local wound management per surgery  Underwent 2nd OR today 8/28 for further debridement  · To help with wound healing would benefit from optimization of wound off loading, nutrition, and blood sugar control to help with wound healing  Would likely benefit from wound flap placement in the future to cover exposed bone       3  CVA  · Developed right facial droop, right sided weakness, aphasia and dysarthria after cardioversion en route to the hospital for hypotension 2/2 new onset Afib with RVR  · Initial NIHSS on presentation to ED was 6 which improved to 3 after tPA  · Had mental status change overnight on 8/26/20 most likely in setting of hypotension with cerebrohypoperfusion in setting of septic shock     · Imaging:  ? MRI brain demonstrated no acute changes   Did demonstrate mild chronic small vessel cerebrovascular ischemic disease and age-related atrophy  ? CTA head/neck demonstrated left PCA stenosis and 65-70% stenosis of the left ICA bulb  ? Follow-up CT brain after acute mental status change demonstrated no acute changes and specifically no hemorrhage  Plan:  · Management per primary team  Neurology on board       4  New onset atrial fibrillation  · S/p cardioversion for hypotension  · Converted back to Afib after a few days of SR  Started on heparin drip by primary team    Plan:  · Management per primary team  Cardiology on board       5  RORY - improving  · BL 1 2 - 1 7  · Presented with creatinine of 2 59  Improved with fluid resuscitation  Trend; 2 59 - 1 80 - 1 96 - 2 07 - 1 89 - 1 48    · Most likely etiology is prerenal in setting of hypotension and dehydration 2/2 poor oral intake  Plan:  · Management as per primary team       6  Asymptomatic E   Coli bacteruria  · Likely asymptomatic but anyway on coverage with Zosyn for above  Antibiotics:  Zosyn 2 25 gm IV Q6H (day #7; day #9 of total effective antibiotic treatment)    Subjective:  Patient has no fever, chills, sweats; no nausea, vomiting, diarrhea; no cough, shortness of breath; no pain  No new symptoms  Objective:  Vitals:  Temp:  [98 °F (36 7 °C)-98 4 °F (36 9 °C)] 98 °F (36 7 °C)  HR:  [72-98] 72  Resp:  [18] 18  BP: (116-136)/(58-95) 116/63  SpO2:  [91 %-98 %] 93 %  Temp (24hrs), Av 2 °F (36 8 °C), Min:98 °F (36 7 °C), Max:98 4 °F (36 9 °C)  Current: Temperature: 98 °F (36 7 °C)    Physical Exam:   General Appearance:  Alert, interactive, nontoxic, no acute distress  Throat: Oropharynx moist without lesions  Lungs:   Clear to auscultation bilaterally; no wheezes, rhonchi or rales; respirations unlabored   Heart:  RRR; no murmur, rub or gallop   Abdomen:   Soft, non-tender, non-distended, positive bowel sounds  Extremities: No clubbing, cyanosis or edema   Skin: No new rashes or lesions  No draining wounds noted  Clean dry dressing in place over sacral ulcer, no signs of infection surrounding the dressing  Areas with intertrigo improved          Labs, Imaging, & Other studies:   All pertinent labs and imaging studies were personally reviewed  Results from last 7 days   Lab Units 20  0518 20  0508 20  0513   WBC Thousand/uL 12 09* 14 03* 12 51*   HEMOGLOBIN g/dL 9 2* 8 9* 9 4*   PLATELETS Thousands/uL 202 188 215     Results from last 7 days   Lab Units 20  0559 20  0511 20  0518   SODIUM mmol/L 141 141 141   POTASSIUM mmol/L 4 0 4 2 4 1   CHLORIDE mmol/L 107 108 107   CO2 mmol/L 32 29 29   BUN mg/dL 42* 45* 47*   CREATININE mg/dL 0 97 1 06 1 01   EGFR ml/min/1 73sq m 57 51 54   CALCIUM mg/dL 8 2* 7 8* 8 1*

## 2020-09-04 NOTE — ASSESSMENT & PLAN NOTE
Estimated Creatinine Clearance: 53 6 mL/min (by C-G formula based on SCr of 0 97 mg/dL)    POA  Patient noted to have RORY on admission; serum creatinine 2 59 (baseline 1 2-1 3)  2/2 shock  Appears to be resolved

## 2020-09-04 NOTE — PLAN OF CARE
Problem: Potential for Falls  Goal: Patient will remain free of falls  Description: INTERVENTIONS:  - Assess patient frequently for physical needs  -  Identify cognitive and physical deficits and behaviors that affect risk of falls    -  [place] fall precautions as indicated by assessment   - Educate patient/family on patient safety including physical limitations  - Instruct patient to call for assistance with activity based on assessment  - Modify environment to reduce risk of injury  - Consider OT/PT consult to assist with strengthening/mobility  Outcome: Progressing     Problem: Prexisting or High Potential for Compromised Skin Integrity  Goal: Skin integrity is maintained or improved  Description: INTERVENTIONS:  - Identify patients at risk for skin breakdown  - Assess and monitor skin integrity  - Assess and monitor nutrition and hydration status  - Monitor labs   - Assess for incontinence   - Turn and reposition patient  - Assist with mobility/ambulation  - Relieve pressure over bony prominences  - Avoid friction and shearing  - Provide appropriate hygiene as needed including keeping skin clean and dry  - Evaluate need for skin moisturizer/barrier cream  - Collaborate with interdisciplinary team   - Patient/family teaching  - Consider wound care consult   Outcome: Progressing     Problem: PAIN - ADULT  Goal: Verbalizes/displays adequate comfort level or baseline comfort level  Description: Interventions:  - Encourage patient to monitor pain and request assistance  - Assess pain using appropriate pain scale  - Administer analgesics based on type and severity of pain and evaluate response  - Implement non-pharmacological measures as appropriate and evaluate response  - Consider cultural and social influences on pain and pain management  - Notify physician/advanced practitioner if interventions unsuccessful or patient reports new pain  Outcome: Progressing     Problem: INFECTION - ADULT  Goal: Absence or prevention of progression during hospitalization  Description: INTERVENTIONS:  - Assess and monitor for signs and symptoms of infection  - Monitor lab/diagnostic results  - Monitor all insertion sites, i e  indwelling lines, tubes, and drains  - Monitor endotracheal if appropriate and nasal secretions for changes in amount and color  - [place] appropriate cooling/warming therapies per order  - Administer medications as ordered  - Instruct and encourage patient and family to use good hand hygiene technique  - Identify and instruct in appropriate isolation precautions for identified infection/condition  Outcome: Progressing  Goal: Absence of fever/infection during neutropenic period  Description: INTERVENTIONS:  - Monitor WBC    Outcome: Progressing     Problem: SAFETY ADULT  Goal: Patient will remain free of falls  Description: INTERVENTIONS:  - Assess patient frequently for physical needs  -  Identify cognitive and physical deficits and behaviors that affect risk of falls    -  [place] fall precautions as indicated by assessment   - Educate patient/family on patient safety including physical limitations  - Instruct patient to call for assistance with activity based on assessment  - Modify environment to reduce risk of injury  - Consider OT/PT consult to assist with strengthening/mobility  Outcome: Progressing  Goal: Maintain or return to baseline ADL function  Description: INTERVENTIONS:  -  Assess patient's ability to carry out ADLs; assess patient's baseline for ADL function and identify physical deficits which impact ability to perform ADLs (bathing, care of mouth/teeth, toileting, grooming, dressing, etc )  - Assess/evaluate cause of self-care deficits   - Assess range of motion  - Assess patient's mobility; develop plan if impaired  - Assess patient's need for assistive devices and provide as appropriate  - Encourage maximum independence but intervene and supervise when necessary  - Involve family in performance of ADLs  - Assess for home care needs following discharge   - Consider OT consult to assist with ADL evaluation and planning for discharge  - Provide patient education as appropriate  Outcome: Progressing  Goal: Maintain or return mobility status to optimal level  Description: INTERVENTIONS:  - Assess patient's baseline mobility status (ambulation, transfers, stairs, etc )    - Identify cognitive and physical deficits and behaviors that affect mobility  - Identify mobility aids required to assist with transfers and/or ambulation (gait belt, sit-to-stand, lift, walker, cane, etc )  - [place] fall precautions as indicated by assessment  - Record patient progress and toleration of activity level on Mobility SBAR; progress patient to next Phase/Stage  - Instruct patient to call for assistance with activity based on assessment  - Consider rehabilitation consult to assist with strengthening/weightbearing, etc   Outcome: Progressing     Problem: DISCHARGE PLANNING  Goal: Discharge to home or other facility with appropriate resources  Description: INTERVENTIONS:  - Identify barriers to discharge w/patient and caregiver  - Arrange for needed discharge resources and transportation as appropriate  - Identify discharge learning needs (meds, wound care, etc )  - Arrange for interpretive services to assist at discharge as needed  - Refer to Case Management Department for coordinating discharge planning if the patient needs post-hospital services based on physician/advanced practitioner order or complex needs related to functional status, cognitive ability, or social support system  Outcome: Progressing     Problem: Knowledge Deficit  Goal: Patient/family/caregiver demonstrates understanding of disease process, treatment plan, medications, and discharge instructions  Description: Complete learning assessment and assess knowledge base    Interventions:  - Provide teaching at level of understanding  - Provide teaching via preferred learning methods  Outcome: Progressing     Problem: Neurological Deficit  Goal: Neurological status is stable or improving  Description: Interventions:  - Monitor and assess patient's level of consciousness, motor function, sensory function, and level of assistance needed for ADLs  - Monitor and report changes from baseline  Collaborate with interdisciplinary team to initiate plan and implement interventions as ordered  - Provide and maintain a safe environment  - Consider seizure precautions  - Consider fall precautions  - Consider aspiration precautions  - Consider bleeding precautions  Outcome: Progressing     Problem: Activity Intolerance/Impaired Mobility  Goal: Mobility/activity is maintained at optimum level for patient  Description: Interventions:  - Assess and monitor patient  barriers to mobility and need for assistive/adaptive devices  - Assess patient's emotional response to limitations  - Collaborate with interdisciplinary team and initiate plans and interventions as ordered  - Encourage independent activity per ability   - Maintain proper body alignment  - Perform active/passive rom as tolerated/ordered  - Plan activities to conserve energy   - Turn patient as appropriate  Outcome: Progressing     Problem: Communication Impairment  Goal: Ability to express needs and understand communication  Description: Assess patient's communication skills and ability to understand information  Patient will demonstrate use of effective communication techniques, alternative methods of communication and understanding even if not able to speak  - Encourage communication and provide alternate methods of communication as needed  - Collaborate with case management/ for discharge needs  - Include patient/family/caregiver in decisions related to communication    Outcome: Progressing     Problem: Potential for Aspiration  Goal: Non-ventilated patient's risk of aspiration is minimized  Description: Assess and monitor vital signs, respiratory status, and labs (WBC)  Monitor for signs of aspiration (tachypnea, cough, rales, wheezing, cyanosis, fever)  - Assess and monitor patient's ability to swallow  - Place patient up in chair to eat if possible  - HOB up at 90 degrees to eat if unable to get patient up into chair   - Supervise patient during oral intake  - Instruct patient/ family to take small bites  - Instruct patient/ family to take small single sips when taking liquids  - Follow patient-specific strategies generated by speech pathologist   Outcome: Progressing  Goal: Ventilated patient's risk of aspiration is minimized  Description: Assess and monitor vital signs, respiratory status, airway cuff pressure, and labs (WBC)  Monitor for signs of aspiration (tachypnea, cough, rales, wheezing, cyanosis, fever)  - Elevate head of bed 30 degrees if patient has tube feeding   - Monitor tube feeding  Outcome: Progressing     Problem: Nutrition  Goal: Nutrition/Hydration status is improving  Description: Monitor and assess patient's nutrition/hydration status for malnutrition (ex- brittle hair, bruises, dry skin, pale skin and conjunctiva, muscle wasting, smooth red tongue, and disorientation)  Collaborate with interdisciplinary team and initiate plan and interventions as ordered  Monitor patient's weight and dietary intake as ordered or per policy  Utilize nutrition screening tool and intervene per policy  Determine patient's food preferences and provide high-protein, high-caloric foods as appropriate  - Assist patient with eating   - Allow adequate time for meals   - Encourage patient to take dietary supplement as ordered  - Collaborate with clinical nutritionist   - Include patient/family/caregiver in decisions related to nutrition    Outcome: Progressing     Problem: Nutrition/Hydration-ADULT  Goal: Nutrient/Hydration intake appropriate for improving, restoring or maintaining nutritional needs  Description: Monitor and assess patient's nutrition/hydration status for malnutrition  Collaborate with interdisciplinary team and initiate plan and interventions as ordered  Monitor patient's weight and dietary intake as ordered or per policy  Utilize nutrition screening tool and intervene as necessary  Determine patient's food preferences and provide high-protein, high-caloric foods as appropriate  INTERVENTIONS:  - Monitor oral intake, urinary output, labs, and treatment plans  - Assess nutrition and hydration status and recommend course of action  - Evaluate amount of meals eaten  - Assist patient with eating if necessary   - Allow adequate time for meals  - Recommend/ encourage appropriate diets, oral nutritional supplements, and vitamin/mineral supplements  - Order, calculate, and assess calorie counts as needed  - Recommend, monitor, and adjust tube feedings and TPN/PPN based on assessed needs  - Assess need for intravenous fluids  - Provide specific nutrition/hydration education as appropriate  - Include patient/family/caregiver in decisions related to nutrition  Outcome: Progressing     Problem: NEUROSENSORY - ADULT  Goal: Achieves stable or improved neurological status  Description: INTERVENTIONS  - Monitor and report changes in neurological status  - Monitor vital signs such as temperature, blood pressure, glucose, and any other labs ordered   - Initiate measures to prevent increased intracranial pressure  - Monitor for seizure activity and implement precautions if appropriate      Outcome: Progressing  Goal: Achieves maximal functionality and self care  Description: INTERVENTIONS  - Monitor swallowing and airway patency with patient fatigue and changes in neurological status  - Encourage and assist patient to increase activity and self care     - Encourage visually impaired, hearing impaired and aphasic patients to use assistive/communication devices  Outcome: Progressing     Problem: CARDIOVASCULAR - ADULT  Goal: Maintains optimal cardiac output and hemodynamic stability  Description: INTERVENTIONS:  - Monitor I/O, vital signs and rhythm  - Monitor for S/S and trends of decreased cardiac output  - Administer and titrate ordered vasoactive medications to optimize hemodynamic stability  - Assess quality of pulses, skin color and temperature  - Assess for signs of decreased coronary artery perfusion  - Instruct patient to report change in severity of symptoms  Outcome: Progressing     Problem: RESPIRATORY - ADULT  Goal: Achieves optimal ventilation and oxygenation  Description: INTERVENTIONS:  - Assess for changes in respiratory status  - Assess for changes in mentation and behavior  - Position to facilitate oxygenation and minimize respiratory effort  - Oxygen administered by appropriate delivery if ordered  - Initiate smoking cessation education as indicated  - Encourage broncho-pulmonary hygiene including cough, deep breathe, Incentive Spirometry  - Assess the need for suctioning and aspirate as needed  - Assess and instruct to report SOB or any respiratory difficulty  - Respiratory Therapy support as indicated  Outcome: Progressing     Problem: GASTROINTESTINAL - ADULT  Goal: Minimal or absence of nausea and/or vomiting  Description: INTERVENTIONS:  - Administer IV fluids if ordered to ensure adequate hydration  - Maintain NPO status until nausea and vomiting are resolved  - Nasogastric tube if ordered  - Administer ordered antiemetic medications as needed  - Provide nonpharmacologic comfort measures as appropriate  - Advance diet as tolerated, if ordered  - Consider nutrition services referral to assist patient with adequate nutrition and appropriate food choices  Outcome: Progressing  Goal: Maintains adequate nutritional intake  Description: INTERVENTIONS:  - Monitor percentage of each meal consumed  - Identify factors contributing to decreased intake, treat as appropriate  - Assist with meals as needed  - Monitor I&O, weight, and lab values if indicated  - Obtain nutrition services referral as needed  Outcome: Progressing     Problem: GENITOURINARY - ADULT  Goal: Maintains or returns to baseline urinary function  Description: INTERVENTIONS:  - Assess urinary function  - Encourage oral fluids to ensure adequate hydration if ordered  - Administer IV fluids as ordered to ensure adequate hydration  - Administer ordered medications as needed  - Offer frequent toileting  - Follow urinary retention protocol if ordered  Outcome: Progressing     Problem: METABOLIC, FLUID AND ELECTROLYTES - ADULT  Goal: Electrolytes maintained within normal limits  Description: INTERVENTIONS:  - Monitor labs and assess patient for signs and symptoms of electrolyte imbalances  - Administer electrolyte replacement as ordered  - Monitor response to electrolyte replacements, including repeat lab results as appropriate  - Instruct patient on fluid and nutrition as appropriate  Outcome: Progressing     Problem: SKIN/TISSUE INTEGRITY - ADULT  Goal: Skin integrity remains intact  Description: INTERVENTIONS  - Identify patients at risk for skin breakdown  - Assess and monitor skin integrity  - Assess and monitor nutrition and hydration status  - Monitor labs (i e  albumin)  - Assess for incontinence   - Turn and reposition patient  - Assist with mobility/ambulation  - Relieve pressure over bony prominences  - Avoid friction and shearing  - Provide appropriate hygiene as needed including keeping skin clean and dry  - Evaluate need for skin moisturizer/barrier cream  - Collaborate with interdisciplinary team (i e  Nutrition, Rehabilitation, etc )   - Patient/family teaching  Outcome: Progressing     Problem: HEMATOLOGIC - ADULT  Goal: Maintains hematologic stability  Description: INTERVENTIONS  - Assess for signs and symptoms of bleeding or hemorrhage  - Monitor labs  - Administer supportive blood products/factors as ordered and appropriate  Outcome: Progressing     Problem: MUSCULOSKELETAL - ADULT  Goal: Maintain or return mobility to safest level of function  Description: INTERVENTIONS:  - Assess patient's ability to carry out ADLs; assess patient's baseline for ADL function and identify physical deficits which impact ability to perform ADLs (bathing, care of mouth/teeth, toileting, grooming, dressing, etc )  - Assess/evaluate cause of self-care deficits   - Assess range of motion  - Assess patient's mobility  - Assess patient's need for assistive devices and provide as appropriate  - Encourage maximum independence but intervene and supervise when necessary  - Involve family in performance of ADLs  - Assess for home care needs following discharge   - Consider OT consult to assist with ADL evaluation and planning for discharge  - Provide patient education as appropriate  Outcome: Progressing     Problem: COPING  Goal: Pt/Family able to verbalize concerns and demonstrate effective coping strategies  Description: INTERVENTIONS:  - Assist patient/family to identify coping skills, available support systems and cultural and spiritual values  - Provide emotional support, including active listening and acknowledgement of concerns of patient and caregivers  - Reduce environmental stimuli, as able  - Provide patient education  - Assess for spiritual pain/suffering and initiate spiritual care, including notification of Pastoral Care or [place] based community as needed  - Assess effectiveness of coping strategies  Outcome: Progressing  Goal: Will report anxiety at manageable levels  Description: INTERVENTIONS:  - Administer medication as ordered  - Teach and encourage coping skills  - Provide emotional support  - Assess patient/family for anxiety and ability to cope  Outcome: Progressing

## 2020-09-04 NOTE — PLAN OF CARE
Problem: PHYSICAL THERAPY ADULT  Goal: Performs mobility at highest level of function for planned discharge setting  See evaluation for individualized goals  Description: Treatment/Interventions: Functional transfer training, LE strengthening/ROM, Therapeutic exercise, Endurance training, Gait training, Bed mobility, Equipment eval/education          See flowsheet documentation for full assessment, interventions and recommendations  Outcome: Progressing  Note: Prognosis: Fair  Problem List: Decreased strength, Decreased range of motion, Decreased endurance, Impaired balance, Decreased mobility, Decreased cognition, Decreased coordination, Decreased safety awareness, Pain, Decreased skin integrity  Assessment: Pt seen by PT on 09/04/20 for PT treatment session with a focus on functional transfers and LE therex  Pt making fair progress toward goals this session  Pt completed a sit-pivot transfer, as well as x3 STS transfers  Pt noted to have improved stance through LE, and reduced buckling in WB 2/2 improved LE strength  Despite improvements Pt still has reduced strength levels and required Max-A x2 in order to complete transfers  Pt also participated in LE therex this session, requiring moderate VC and TC from therapist and prolonged rest breaks between sets 2/2 reduced muscular endurance  Pt left sitting in chair with chair alarm on and all needs in reach at end of therapy session  Pt would benefit from skilled PT in order to address impairments and functional limitations  PT to continue to follow pt 3-5x /week, and would recommend rehab pending medical clearance  Barriers to Discharge: Inaccessible home environment, Decreased caregiver support     PT Discharge Recommendation: [id number]     PT - OK to Discharge: Yes(pending medical clearance)    See flowsheet documentation for full assessment

## 2020-09-04 NOTE — PHYSICAL THERAPY NOTE
PHYSICAL THERAPY TREATMENT NOTE    Patient Name: Angel DUNBAR Date: 9/4/2020 09/04/20 1018   Pain Assessment   Pain Assessment Tool FLACC   Pain Rating: FLACC (Rest) - Face 1   Pain Rating: FLACC (Rest) - Legs 0   Pain Rating: FLACC (Rest) - Activity 0   Pain Rating: FLACC (Rest) - Cry 0   Pain Rating: FLACC (Rest) - Consolability 0   Score: FLACC (Rest) 1   Pain Rating: FLACC (Activity) - Face 1   Pain Rating: FLACC (Activity) - Legs 0   Pain Rating: FLACC (Activity) - Activity 0   Pain Rating: FLACC (Activity) - Cry 0   Pain Rating: FLACC (Activity) - Consolability 0   Score: FLACC (Activity) 1   Restrictions/Precautions   Weight Bearing Precautions Per Order Yes   RUE Weight Bearing Per Order WBAT   LUE Weight Bearing Per Order WBAT   RLE Weight Bearing Per Order WBAT   LLE Weight Bearing Per Order WBAT   Other Precautions Cognitive; Chair Alarm; Bed Alarm;Multiple lines;Telemetry; Fall Risk  (Wound Vac)   General   Chart Reviewed Yes   Response to Previous Treatment Patient with no complaints from previous session  Family/Caregiver Present No   Cognition   Overall Cognitive Status Impaired   Arousal/Participation Alert; Cooperative   Attention Attends with cues to redirect   Orientation Level Oriented to person;Oriented to place;Oriented to time;Disoriented to situation   Memory Decreased short term memory;Decreased recall of recent events;Decreased recall of precautions   Following Commands Follows one step commands with increased time or repetition   Comments Pt drowsy and confused, but willing to work with therapy  PT provided education on purpose of therapy and session  Pt w/ decreased insight into deficits, requires cues for safety  Pt expressed fear related to mobility and falls  PT provided encouragement and reassurance  Subjective   Subjective I dont know why I'm here, but my bottom hurts     Bed Mobility   Supine to Sit 2  Maximal assistance   Additional items Assist x 2;HOB elevated; Bedrails; Increased time required;Verbal cues;LE management   Sit to Supine Unable to assess   Additional Comments Pt supine in bed upon initial PT arrival  Pt left sitting up in chair w/ chair alarm on and all needs in reach s/p PT session  Transfers   Sit to Stand 2  Maximal assistance   Additional items Assist x 2; Increased time required;Verbal cues  (b/l HHA)   Stand to Sit 2  Maximal assistance   Additional items Assist x 2; Increased time required;Verbal cues  (b/l HHA)   Additional Comments Pt required VC and TC for hand placement and safety  First two STS transfers completed at a Max-A x2 ~30secs  Third STS transfer completed at a Max-A x2 ~15secs  No AD used during session  Balance   Static Sitting Fair   Dynamic Sitting Fair   Static Standing Poor +   Dynamic Standing Poor   Endurance Deficit   Endurance Deficit Yes   Endurance Deficit Description fatigue, weakness   Activity Tolerance   Activity Tolerance Patient limited by fatigue   Medical Staff Made Aware PT Zara Fernandez RN   Nurse Made Aware RN cleared pt for PT session   Exercises   Quad Sets AROM; Bilateral;Sitting;10 reps  (2 sets)   Heelslides AAROM; Sitting;Bilateral;10 reps  (2 sets)   Ankle Pumps 10 reps; Sitting;Bilateral;AROM  (2 sets)   Assessment   Prognosis Fair   Problem List Decreased strength;Decreased range of motion;Decreased endurance; Impaired balance;Decreased mobility; Decreased cognition;Decreased coordination;Decreased safety awareness;Pain;Decreased skin integrity   Assessment Pt seen by PT on 09/04/20 for PT treatment session with a focus on functional transfers and LE therex  Pt making fair progress toward goals this session  Pt completed a sit-pivot transfer, as well as x3 STS transfers  Pt noted to have improved stance through LE, and reduced buckling in WB 2/2 improved LE strength  Despite improvements Pt still has reduced strength levels and required Max-A x2 in order to complete transfers   Pt also participated in LE therex this session, requiring moderate VC and TC from therapist and prolonged rest breaks between sets 2/2 reduced muscular endurance  Pt left sitting in chair with chair alarm on and all needs in reach at end of therapy session  Pt would benefit from skilled PT in order to address impairments and functional limitations  PT to continue to follow pt 3-5x /week, and would recommend rehab pending medical clearance  Goals   Patient Goals To heal wound on bottom and keep strengthening   STG Expiration Date 09/10/20   Plan   Treatment/Interventions Functional transfer training;LE strengthening/ROM; Therapeutic exercise; Endurance training;Cognitive reorientation;Patient/family training;Equipment eval/education; Bed mobility;Spoke to nursing;OT   Progress Slow progress, decreased activity tolerance   PT Frequency Other (Comment)  (3-5x /week)   Recommendation   PT Discharge Recommendation Post-Acute Rehabilitation Services   PT - OK to Discharge Yes  (pending medical clearance)     Precious Landeros, SPT

## 2020-09-04 NOTE — ASSESSMENT & PLAN NOTE
Vascular appreciated  ASA and statin  Carotid dopplers completed - LICA > 42%, VASQUEZ < 65%  Vascular recommends OP f/u sicne the etiology for her symptoms on presentation was most likely cardio embolic

## 2020-09-04 NOTE — ASSESSMENT & PLAN NOTE
S/p DCCV in route to [facility]  8/30 pt in Afib w/ moderate VR  Cardio appreciated  Heparin gtt - switched to coumadin  INR 2 2  stop drip, decrease coumadin to 2 mg  Lopressor for rate control - increased to 75 BID  TSH WNL

## 2020-09-04 NOTE — CASE MANAGEMENT
Call from son who reports no secondary insurance  [facility] referrals made to new snf choices of [facility]

## 2020-09-04 NOTE — ASSESSMENT & PLAN NOTE
- pt presents with to [facility] with right-sided facial droop and right-sided weakness after being transported by EMS for [facility] and noted to have atrial fibrillation enroute  - CT head in ED shows posterior cerebral artery disease is noted, mild on the right and severe left  - NIH - score on admission; - 6  -c/w ASA and statin  - MRI brain no acute findings  - CTA shows bilateral stenosis of carotids; less than 75%  LDL 23  Echo shows mild pulm htn  Neuro signed off  Pt now in Afib - placed on heparin gtt    Neuro recs BASA and AC in setting of Afib and carotid stenosis

## 2020-09-05 LAB
ANION GAP SERPL CALCULATED.3IONS-SCNC: 3 MMOL/L (ref 4–13)
BASOPHILS # BLD AUTO: 0.08 THOUSANDS/ΜL (ref 0–0.1)
BASOPHILS NFR BLD AUTO: 1 % (ref 0–1)
BUN SERPL-MCNC: 44 MG/DL (ref 5–25)
CALCIUM SERPL-MCNC: 7.8 MG/DL (ref 8.3–10.1)
CHLORIDE SERPL-SCNC: 106 MMOL/L (ref 100–108)
CO2 SERPL-SCNC: 32 MMOL/L (ref 21–32)
CREAT SERPL-MCNC: 1.01 MG/DL (ref 0.6–1.3)
EOSINOPHIL # BLD AUTO: 0.11 THOUSAND/ΜL (ref 0–0.61)
EOSINOPHIL NFR BLD AUTO: 1 % (ref 0–6)
ERYTHROCYTE [DISTWIDTH] IN BLOOD BY AUTOMATED COUNT: 14.9 % (ref 11.6–15.1)
GFR SERPL CREATININE-BSD FRML MDRD: 54 ML/MIN/1.73SQ M
GLUCOSE SERPL-MCNC: 151 MG/DL (ref 65–140)
GLUCOSE SERPL-MCNC: 158 MG/DL (ref 65–140)
GLUCOSE SERPL-MCNC: 171 MG/DL (ref 65–140)
GLUCOSE SERPL-MCNC: 194 MG/DL (ref 65–140)
GLUCOSE SERPL-MCNC: 205 MG/DL (ref 65–140)
HCT VFR BLD AUTO: 25.7 % (ref 34.8–46.1)
HGB BLD-MCNC: 7.8 G/DL (ref 11.5–15.4)
IMM GRANULOCYTES # BLD AUTO: 0.11 THOUSAND/UL (ref 0–0.2)
IMM GRANULOCYTES NFR BLD AUTO: 1 % (ref 0–2)
INR PPP: 3.03 (ref 0.84–1.19)
LYMPHOCYTES # BLD AUTO: 1.66 THOUSANDS/ΜL (ref 0.6–4.47)
LYMPHOCYTES NFR BLD AUTO: 17 % (ref 14–44)
MCH RBC QN AUTO: 28.4 PG (ref 26.8–34.3)
MCHC RBC AUTO-ENTMCNC: 30.4 G/DL (ref 31.4–37.4)
MCV RBC AUTO: 94 FL (ref 82–98)
MONOCYTES # BLD AUTO: 0.71 THOUSAND/ΜL (ref 0.17–1.22)
MONOCYTES NFR BLD AUTO: 7 % (ref 4–12)
NEUTROPHILS # BLD AUTO: 6.91 THOUSANDS/ΜL (ref 1.85–7.62)
NEUTS SEG NFR BLD AUTO: 73 % (ref 43–75)
NRBC BLD AUTO-RTO: 0 /100 WBCS
PLATELET # BLD AUTO: 182 THOUSANDS/UL (ref 149–390)
PMV BLD AUTO: 11.4 FL (ref 8.9–12.7)
POTASSIUM SERPL-SCNC: 3.9 MMOL/L (ref 3.5–5.3)
PROTHROMBIN TIME: 31.1 SECONDS (ref 11.6–14.5)
RBC # BLD AUTO: 2.75 MILLION/UL (ref 3.81–5.12)
SODIUM SERPL-SCNC: 141 MMOL/L (ref 136–145)
WBC # BLD AUTO: 9.58 THOUSAND/UL (ref 4.31–10.16)

## 2020-09-05 PROCEDURE — 99233 SBSQ HOSP IP/OBS HIGH 50: CPT | Performed by: INTERNAL MEDICINE

## 2020-09-05 PROCEDURE — 80048 BASIC METABOLIC PNL TOTAL CA: CPT | Performed by: HOSPITALIST

## 2020-09-05 PROCEDURE — 82948 REAGENT STRIP/BLOOD GLUCOSE: CPT

## 2020-09-05 PROCEDURE — 85025 COMPLETE CBC W/AUTO DIFF WBC: CPT | Performed by: HOSPITALIST

## 2020-09-05 PROCEDURE — 85610 PROTHROMBIN TIME: CPT | Performed by: HOSPITALIST

## 2020-09-05 PROCEDURE — 99232 SBSQ HOSP IP/OBS MODERATE 35: CPT | Performed by: HOSPITALIST

## 2020-09-05 RX ORDER — WARFARIN SODIUM 2.5 MG/1
2.5 TABLET ORAL
Status: COMPLETED | OUTPATIENT
Start: 2020-09-05 | End: 2020-09-05

## 2020-09-05 RX ADMIN — TORSEMIDE 20 MG: 20 TABLET ORAL at 08:42

## 2020-09-05 RX ADMIN — GABAPENTIN 100 MG: 100 CAPSULE ORAL at 08:42

## 2020-09-05 RX ADMIN — INSULIN LISPRO 1 UNITS: 100 INJECTION, SOLUTION INTRAVENOUS; SUBCUTANEOUS at 21:30

## 2020-09-05 RX ADMIN — NYSTATIN: 100000 POWDER TOPICAL at 08:43

## 2020-09-05 RX ADMIN — PIPERACILLIN AND TAZOBACTAM 2.25 G: 2; .25 INJECTION, POWDER, FOR SOLUTION INTRAVENOUS at 19:07

## 2020-09-05 RX ADMIN — WARFARIN SODIUM 2.5 MG: 2.5 TABLET ORAL at 17:14

## 2020-09-05 RX ADMIN — METOPROLOL TARTRATE 75 MG: 50 TABLET, FILM COATED ORAL at 08:41

## 2020-09-05 RX ADMIN — PIPERACILLIN AND TAZOBACTAM 2.25 G: 2; .25 INJECTION, POWDER, FOR SOLUTION INTRAVENOUS at 14:04

## 2020-09-05 RX ADMIN — INSULIN LISPRO 1 UNITS: 100 INJECTION, SOLUTION INTRAVENOUS; SUBCUTANEOUS at 08:41

## 2020-09-05 RX ADMIN — ATORVASTATIN CALCIUM 40 MG: 40 TABLET, FILM COATED ORAL at 16:46

## 2020-09-05 RX ADMIN — INSULIN LISPRO 2 UNITS: 100 INJECTION, SOLUTION INTRAVENOUS; SUBCUTANEOUS at 16:47

## 2020-09-05 RX ADMIN — PIPERACILLIN AND TAZOBACTAM 2.25 G: 2; .25 INJECTION, POWDER, FOR SOLUTION INTRAVENOUS at 02:02

## 2020-09-05 RX ADMIN — ASPIRIN 81 MG: 81 TABLET, COATED ORAL at 08:41

## 2020-09-05 RX ADMIN — NYSTATIN: 100000 POWDER TOPICAL at 17:14

## 2020-09-05 RX ADMIN — PIPERACILLIN AND TAZOBACTAM 2.25 G: 2; .25 INJECTION, POWDER, FOR SOLUTION INTRAVENOUS at 08:43

## 2020-09-05 RX ADMIN — GABAPENTIN 100 MG: 100 CAPSULE ORAL at 16:46

## 2020-09-05 RX ADMIN — GABAPENTIN 100 MG: 100 CAPSULE ORAL at 21:33

## 2020-09-05 RX ADMIN — INSULIN LISPRO 2 UNITS: 100 INJECTION, SOLUTION INTRAVENOUS; SUBCUTANEOUS at 13:03

## 2020-09-05 RX ADMIN — INSULIN GLARGINE 14 UNITS: 100 INJECTION, SOLUTION SUBCUTANEOUS at 21:31

## 2020-09-05 NOTE — PROGRESS NOTES
Progress Note - Infectious Disease   Romi Mendoza 67 y o  female MRN: [id number]  Unit/Bed#: [facility] 504-01 Encounter: [id number]      Impression/Recommendations:  1  Septic shock, secondary to infected sacral ulcer and secondary bacteremia  Patient is clinically much improved  She is now hemodynamically stable  Temperature is down  WBC has normalized  Antibiotic plan as in below  Monitor temperature/WBC  Monitor hemodynamics  2  Polymicrobial bacteremia, with alpha strep, Enterococcus and Bacteroides in blood cultures  Source is most likely sacral ulcer  Patient is clinically much improved  Bacteremia has cleared  2D echo without vegetation  Continue IV Zosyn  Treat x 14 days total, through      3  Chronic sacral osteomyelitis  At present, long-term IV antibiotic is futile, as long as bone is exposed   Down the line, if patient is able to off load, she may be a candidate for flap closure   At the time of flap closure, long-term IV antibiotic may be indicated  No antibiotic needed for this for now  Consider long-term IV antibiotic at time of flap closure, if patient is a candidate  4  Large sacral ulcer, decubitus in etiology  Patient will need offloading to help ulcer healed  Diverting colostomy would be helpful in preventing stool soiling of sacral ulcer  Local ulcer care  Recommend frequent offloading  Discussed with patient in detail regarding the above plan  Antibiotics:  Zosyn  Antibiotic # 10     Subjective:  Patient feels well  Sacral pain mild uncontrolled  Temperature stays down  No chills  She is tolerating antibiotic well  No nausea, vomiting or diarrhea      Objective:  Vitals:  Temp:  [97 8 °F (36 6 °C)-99 2 °F (37 3 °C)] 99 2 °F (37 3 °C)  HR:  [] 80  Resp:  [17-18] 18  BP: (112-122)/(57-71) 112/61  SpO2:  [91 %-96 %] 96 %  Temp (24hrs), Av 6 °F (37 °C), Min:97 8 °F (36 6 °C), Max:99 2 °F (37 3 °C)  Current: Temperature: 99 2 °F (37 3 °C)    Physical Exam:     General: Awake, alert, cooperative, no distress  Neck:  Supple  No mass  No lymphadenopathy  Lungs: Expansion symmetric, no rales, no wheezing, respirations unlabored  Heart:  Regular rate and rhythm, S1 and S2 normal, no murmur  Abdomen: Soft, nondistended, non-tender, bowel sounds active all four quadrants, no masses, no organomegaly  Extremities: Stable mild leg edema  No erythema/warmth  No ulcer  Nontender to palpation  Sacrum: Wound with dressing in place  No erythema/warmth beyond dressing  Stable mild tenderness  Skin:  No rash  Neuro: Moves all extremities  Invasive Devices     Peripherally Inserted Central Catheter Line            PICC Line 05/69/41 Left Cephalic 2 days          Peripheral Intravenous Line            Peripheral IV 09/03/20 Right;Ventral (anterior) Forearm 2 days          Drain            Urethral Catheter Temperature probe 16 Fr  10 days    Negative Pressure Wound Therapy (V A C ) Sacrum 4 days                Labs studies:   I have personally reviewed pertinent labs  Results from last 7 days   Lab Units 09/05/20  0544 09/04/20  0559 09/03/20  0511   POTASSIUM mmol/L 3 9 4 0 4 2   CHLORIDE mmol/L 106 107 108   CO2 mmol/L 32 32 29   BUN mg/dL 44* 42* 45*   CREATININE mg/dL 1 01 0 97 1 06   EGFR ml/min/1 73sq m 54 57 51   CALCIUM mg/dL 7 8* 8 2* 7 8*     Results from last 7 days   Lab Units 09/05/20  0940 09/02/20  0518 08/31/20  0508   WBC Thousand/uL 9 58 12 09* 14 03*   HEMOGLOBIN g/dL 7 8* 9 2* 8 9*   PLATELETS Thousands/uL 182 202 188           Imaging Studies:   I have personally reviewed pertinent imaging study reports and images in PACS  EKG, Pathology, and Other Studies:   I have personally reviewed pertinent reports

## 2020-09-05 NOTE — PLAN OF CARE
Problem: Potential for Falls  Goal: Patient will remain free of falls  Description: INTERVENTIONS:  - Assess patient frequently for physical needs  -  Identify cognitive and physical deficits and behaviors that affect risk of falls    -  [place] fall precautions as indicated by assessment   - Educate patient/family on patient safety including physical limitations  - Instruct patient to call for assistance with activity based on assessment  - Modify environment to reduce risk of injury  - Consider OT/PT consult to assist with strengthening/mobility  Outcome: Progressing     Problem: Prexisting or High Potential for Compromised Skin Integrity  Goal: Skin integrity is maintained or improved  Description: INTERVENTIONS:  - Identify patients at risk for skin breakdown  - Assess and monitor skin integrity  - Assess and monitor nutrition and hydration status  - Monitor labs   - Assess for incontinence   - Turn and reposition patient  - Assist with mobility/ambulation  - Relieve pressure over bony prominences  - Avoid friction and shearing  - Provide appropriate hygiene as needed including keeping skin clean and dry  - Evaluate need for skin moisturizer/barrier cream  - Collaborate with interdisciplinary team   - Patient/family teaching  - Consider wound care consult   Outcome: Progressing     Problem: PAIN - ADULT  Goal: Verbalizes/displays adequate comfort level or baseline comfort level  Description: Interventions:  - Encourage patient to monitor pain and request assistance  - Assess pain using appropriate pain scale  - Administer analgesics based on type and severity of pain and evaluate response  - Implement non-pharmacological measures as appropriate and evaluate response  - Consider cultural and social influences on pain and pain management  - Notify physician/advanced practitioner if interventions unsuccessful or patient reports new pain  Outcome: Progressing     Problem: INFECTION - ADULT  Goal: Absence or prevention of progression during hospitalization  Description: INTERVENTIONS:  - Assess and monitor for signs and symptoms of infection  - Monitor lab/diagnostic results  - Monitor all insertion sites, i e  indwelling lines, tubes, and drains  - Monitor endotracheal if appropriate and nasal secretions for changes in amount and color  - [place] appropriate cooling/warming therapies per order  - Administer medications as ordered  - Instruct and encourage patient and family to use good hand hygiene technique  - Identify and instruct in appropriate isolation precautions for identified infection/condition  Outcome: Progressing  Goal: Absence of fever/infection during neutropenic period  Description: INTERVENTIONS:  - Monitor WBC    Outcome: Progressing     Problem: SAFETY ADULT  Goal: Patient will remain free of falls  Description: INTERVENTIONS:  - Assess patient frequently for physical needs  -  Identify cognitive and physical deficits and behaviors that affect risk of falls    -  [place] fall precautions as indicated by assessment   - Educate patient/family on patient safety including physical limitations  - Instruct patient to call for assistance with activity based on assessment  - Modify environment to reduce risk of injury  - Consider OT/PT consult to assist with strengthening/mobility  Outcome: Progressing  Goal: Maintain or return to baseline ADL function  Description: INTERVENTIONS:  -  Assess patient's ability to carry out ADLs; assess patient's baseline for ADL function and identify physical deficits which impact ability to perform ADLs (bathing, care of mouth/teeth, toileting, grooming, dressing, etc )  - Assess/evaluate cause of self-care deficits   - Assess range of motion  - Assess patient's mobility; develop plan if impaired  - Assess patient's need for assistive devices and provide as appropriate  - Encourage maximum independence but intervene and supervise when necessary  - Involve family in performance of ADLs  - Assess for home care needs following discharge   - Consider OT consult to assist with ADL evaluation and planning for discharge  - Provide patient education as appropriate  Outcome: Progressing  Goal: Maintain or return mobility status to optimal level  Description: INTERVENTIONS:  - Assess patient's baseline mobility status (ambulation, transfers, stairs, etc )    - Identify cognitive and physical deficits and behaviors that affect mobility  - Identify mobility aids required to assist with transfers and/or ambulation (gait belt, sit-to-stand, lift, walker, cane, etc )  - [place] fall precautions as indicated by assessment  - Record patient progress and toleration of activity level on Mobility SBAR; progress patient to next Phase/Stage  - Instruct patient to call for assistance with activity based on assessment  - Consider rehabilitation consult to assist with strengthening/weightbearing, etc   Outcome: Progressing     Problem: DISCHARGE PLANNING  Goal: Discharge to home or other facility with appropriate resources  Description: INTERVENTIONS:  - Identify barriers to discharge w/patient and caregiver  - Arrange for needed discharge resources and transportation as appropriate  - Identify discharge learning needs (meds, wound care, etc )  - Arrange for interpretive services to assist at discharge as needed  - Refer to Case Management Department for coordinating discharge planning if the patient needs post-hospital services based on physician/advanced practitioner order or complex needs related to functional status, cognitive ability, or social support system  Outcome: Progressing     Problem: Knowledge Deficit  Goal: Patient/family/caregiver demonstrates understanding of disease process, treatment plan, medications, and discharge instructions  Description: Complete learning assessment and assess knowledge base    Interventions:  - Provide teaching at level of understanding  - Provide teaching via preferred learning methods  Outcome: Progressing     Problem: Neurological Deficit  Goal: Neurological status is stable or improving  Description: Interventions:  - Monitor and assess patient's level of consciousness, motor function, sensory function, and level of assistance needed for ADLs  - Monitor and report changes from baseline  Collaborate with interdisciplinary team to initiate plan and implement interventions as ordered  - Provide and maintain a safe environment  - Consider seizure precautions  - Consider fall precautions  - Consider aspiration precautions  - Consider bleeding precautions  Outcome: Progressing     Problem: Activity Intolerance/Impaired Mobility  Goal: Mobility/activity is maintained at optimum level for patient  Description: Interventions:  - Assess and monitor patient  barriers to mobility and need for assistive/adaptive devices  - Assess patient's emotional response to limitations  - Collaborate with interdisciplinary team and initiate plans and interventions as ordered  - Encourage independent activity per ability   - Maintain proper body alignment  - Perform active/passive rom as tolerated/ordered  - Plan activities to conserve energy   - Turn patient as appropriate  Outcome: Progressing     Problem: Communication Impairment  Goal: Ability to express needs and understand communication  Description: Assess patient's communication skills and ability to understand information  Patient will demonstrate use of effective communication techniques, alternative methods of communication and understanding even if not able to speak  - Encourage communication and provide alternate methods of communication as needed  - Collaborate with case management/ for discharge needs  - Include patient/family/caregiver in decisions related to communication    Outcome: Progressing     Problem: Potential for Aspiration  Goal: Non-ventilated patient's risk of aspiration is minimized  Description: Assess and monitor vital signs, respiratory status, and labs (WBC)  Monitor for signs of aspiration (tachypnea, cough, rales, wheezing, cyanosis, fever)  - Assess and monitor patient's ability to swallow  - Place patient up in chair to eat if possible  - HOB up at 90 degrees to eat if unable to get patient up into chair   - Supervise patient during oral intake  - Instruct patient/ family to take small bites  - Instruct patient/ family to take small single sips when taking liquids  - Follow patient-specific strategies generated by speech pathologist   Outcome: Progressing  Goal: Ventilated patient's risk of aspiration is minimized  Description: Assess and monitor vital signs, respiratory status, airway cuff pressure, and labs (WBC)  Monitor for signs of aspiration (tachypnea, cough, rales, wheezing, cyanosis, fever)  - Elevate head of bed 30 degrees if patient has tube feeding   - Monitor tube feeding  Outcome: Progressing     Problem: Nutrition  Goal: Nutrition/Hydration status is improving  Description: Monitor and assess patient's nutrition/hydration status for malnutrition (ex- brittle hair, bruises, dry skin, pale skin and conjunctiva, muscle wasting, smooth red tongue, and disorientation)  Collaborate with interdisciplinary team and initiate plan and interventions as ordered  Monitor patient's weight and dietary intake as ordered or per policy  Utilize nutrition screening tool and intervene per policy  Determine patient's food preferences and provide high-protein, high-caloric foods as appropriate  - Assist patient with eating   - Allow adequate time for meals   - Encourage patient to take dietary supplement as ordered  - Collaborate with clinical nutritionist   - Include patient/family/caregiver in decisions related to nutrition    Outcome: Progressing     Problem: Nutrition/Hydration-ADULT  Goal: Nutrient/Hydration intake appropriate for improving, restoring or maintaining nutritional needs  Description: Monitor and assess patient's nutrition/hydration status for malnutrition  Collaborate with interdisciplinary team and initiate plan and interventions as ordered  Monitor patient's weight and dietary intake as ordered or per policy  Utilize nutrition screening tool and intervene as necessary  Determine patient's food preferences and provide high-protein, high-caloric foods as appropriate  INTERVENTIONS:  - Monitor oral intake, urinary output, labs, and treatment plans  - Assess nutrition and hydration status and recommend course of action  - Evaluate amount of meals eaten  - Assist patient with eating if necessary   - Allow adequate time for meals  - Recommend/ encourage appropriate diets, oral nutritional supplements, and vitamin/mineral supplements  - Order, calculate, and assess calorie counts as needed  - Recommend, monitor, and adjust tube feedings and TPN/PPN based on assessed needs  - Assess need for intravenous fluids  - Provide specific nutrition/hydration education as appropriate  - Include patient/family/caregiver in decisions related to nutrition  Outcome: Progressing     Problem: NEUROSENSORY - ADULT  Goal: Achieves stable or improved neurological status  Description: INTERVENTIONS  - Monitor and report changes in neurological status  - Monitor vital signs such as temperature, blood pressure, glucose, and any other labs ordered   - Initiate measures to prevent increased intracranial pressure  - Monitor for seizure activity and implement precautions if appropriate      Outcome: Progressing  Goal: Achieves maximal functionality and self care  Description: INTERVENTIONS  - Monitor swallowing and airway patency with patient fatigue and changes in neurological status  - Encourage and assist patient to increase activity and self care     - Encourage visually impaired, hearing impaired and aphasic patients to use assistive/communication devices  Outcome: Progressing     Problem: CARDIOVASCULAR - ADULT  Goal: Maintains optimal cardiac output and hemodynamic stability  Description: INTERVENTIONS:  - Monitor I/O, vital signs and rhythm  - Monitor for S/S and trends of decreased cardiac output  - Administer and titrate ordered vasoactive medications to optimize hemodynamic stability  - Assess quality of pulses, skin color and temperature  - Assess for signs of decreased coronary artery perfusion  - Instruct patient to report change in severity of symptoms  Outcome: Progressing     Problem: RESPIRATORY - ADULT  Goal: Achieves optimal ventilation and oxygenation  Description: INTERVENTIONS:  - Assess for changes in respiratory status  - Assess for changes in mentation and behavior  - Position to facilitate oxygenation and minimize respiratory effort  - Oxygen administered by appropriate delivery if ordered  - Initiate smoking cessation education as indicated  - Encourage broncho-pulmonary hygiene including cough, deep breathe, Incentive Spirometry  - Assess the need for suctioning and aspirate as needed  - Assess and instruct to report SOB or any respiratory difficulty  - Respiratory Therapy support as indicated  Outcome: Progressing     Problem: GASTROINTESTINAL - ADULT  Goal: Minimal or absence of nausea and/or vomiting  Description: INTERVENTIONS:  - Administer IV fluids if ordered to ensure adequate hydration  - Maintain NPO status until nausea and vomiting are resolved  - Nasogastric tube if ordered  - Administer ordered antiemetic medications as needed  - Provide nonpharmacologic comfort measures as appropriate  - Advance diet as tolerated, if ordered  - Consider nutrition services referral to assist patient with adequate nutrition and appropriate food choices  Outcome: Progressing  Goal: Maintains adequate nutritional intake  Description: INTERVENTIONS:  - Monitor percentage of each meal consumed  - Identify factors contributing to decreased intake, treat as appropriate  - Assist with meals as needed  - Monitor I&O, weight, and lab values if indicated  - Obtain nutrition services referral as needed  Outcome: Progressing     Problem: GENITOURINARY - ADULT  Goal: Maintains or returns to baseline urinary function  Description: INTERVENTIONS:  - Assess urinary function  - Encourage oral fluids to ensure adequate hydration if ordered  - Administer IV fluids as ordered to ensure adequate hydration  - Administer ordered medications as needed  - Offer frequent toileting  - Follow urinary retention protocol if ordered  Outcome: Progressing     Problem: METABOLIC, FLUID AND ELECTROLYTES - ADULT  Goal: Electrolytes maintained within normal limits  Description: INTERVENTIONS:  - Monitor labs and assess patient for signs and symptoms of electrolyte imbalances  - Administer electrolyte replacement as ordered  - Monitor response to electrolyte replacements, including repeat lab results as appropriate  - Instruct patient on fluid and nutrition as appropriate  Outcome: Progressing     Problem: SKIN/TISSUE INTEGRITY - ADULT  Goal: Skin integrity remains intact  Description: INTERVENTIONS  - Identify patients at risk for skin breakdown  - Assess and monitor skin integrity  - Assess and monitor nutrition and hydration status  - Monitor labs (i e  albumin)  - Assess for incontinence   - Turn and reposition patient  - Assist with mobility/ambulation  - Relieve pressure over bony prominences  - Avoid friction and shearing  - Provide appropriate hygiene as needed including keeping skin clean and dry  - Evaluate need for skin moisturizer/barrier cream  - Collaborate with interdisciplinary team (i e  Nutrition, Rehabilitation, etc )   - Patient/family teaching  Outcome: Progressing     Problem: HEMATOLOGIC - ADULT  Goal: Maintains hematologic stability  Description: INTERVENTIONS  - Assess for signs and symptoms of bleeding or hemorrhage  - Monitor labs  - Administer supportive blood products/factors as ordered and appropriate  Outcome: Progressing     Problem: MUSCULOSKELETAL - ADULT  Goal: Maintain or return mobility to safest level of function  Description: INTERVENTIONS:  - Assess patient's ability to carry out ADLs; assess patient's baseline for ADL function and identify physical deficits which impact ability to perform ADLs (bathing, care of mouth/teeth, toileting, grooming, dressing, etc )  - Assess/evaluate cause of self-care deficits   - Assess range of motion  - Assess patient's mobility  - Assess patient's need for assistive devices and provide as appropriate  - Encourage maximum independence but intervene and supervise when necessary  - Involve family in performance of ADLs  - Assess for home care needs following discharge   - Consider OT consult to assist with ADL evaluation and planning for discharge  - Provide patient education as appropriate  Outcome: Progressing     Problem: COPING  Goal: Pt/Family able to verbalize concerns and demonstrate effective coping strategies  Description: INTERVENTIONS:  - Assist patient/family to identify coping skills, available support systems and cultural and spiritual values  - Provide emotional support, including active listening and acknowledgement of concerns of patient and caregivers  - Reduce environmental stimuli, as able  - Provide patient education  - Assess for spiritual pain/suffering and initiate spiritual care, including notification of Pastoral Care or [place] based community as needed  - Assess effectiveness of coping strategies  Outcome: Progressing  Goal: Will report anxiety at manageable levels  Description: INTERVENTIONS:  - Administer medication as ordered  - Teach and encourage coping skills  - Provide emotional support  - Assess patient/family for anxiety and ability to cope  Outcome: Progressing

## 2020-09-05 NOTE — PROGRESS NOTES
Progress Note - Jes Fey 1945, 67 y o  female MRN: [id number]    Unit/Bed#: [facility] 504-01 Encounter: [id number]    Primary Care Provider: TJ Arriola   Date and time admitted to hospital: 8/25/2020  4:06 PM        Chronic diastolic heart failure [facility])  Assessment & Plan  8/30 IV Bumex & IV lasix given as pt overloaded from resuscitation and PRBC with excellent UOP  Now switched to PO torsemide 20 mg (was on Lasix 20 mg at home)    Stenosis of left carotid artery  Assessment & Plan  Vascular appreciated  ASA and statin  Carotid dopplers completed - LICA > 52%, VASQUEZ < 65%  Vascular recommends OP f/u sicne the etiology for her symptoms on presentation was most likely cardio embolic    Anemia  Assessment & Plan  S/p 1U PRBC in ICU  Appears stable at this time    Bacteremia due to Enterococcus  Assessment & Plan  B Cx pos for Enterococcus and Strep Constellatus  Zosyn x 14 days  ID appreciated  PICC consent obtained and PICC placed    Paroxysmal atrial fibrillation ([facility] [address] )  Assessment & Plan  S/p DCCV in route to [facility]  8/30 pt in Afib w/ moderate VR  Cardio appreciated  Heparin gtt - switched to coumadin  INR 3 0 today, held coumadin yesterday, give 2 5 mg today  Lopressor for rate control - increased to 75 BID  TSH WNL    RORY (acute kidney injury) ([facility] [address] )  Assessment & Plan  Estimated Creatinine Clearance: 51 4 mL/min (by C-G formula based on SCr of 1 01 mg/dL)  POA  Patient noted to have RORY on admission; serum creatinine 2 59 (baseline 1 2-1 3)  2/2 shock  Appears to be resolved    Stroke-like symptoms  Assessment & Plan  - pt presents with to [facility] with right-sided facial droop and right-sided weakness after being transported by EMS for [facility] and noted to have atrial fibrillation enroute  - CT head in ED shows posterior cerebral artery disease is noted, mild on the right and severe left     - NIH - score on admission; - 6  -c/w ASA and statin  - MRI brain no acute findings  - CTA shows bilateral stenosis of carotids; less than 75%  LDL 23  Echo shows mild pulm htn  Neuro signed off  Pt now in Afib  Neuro recs BASA and AC in setting of Afib and carotid stenosis    Decubitus ulcer of sacral region, stage 4 [facility])  Assessment & Plan  POA  Wound care and piper surg appreciated  Patient reports ulcer developed status post recent hip fracture  8/26 excisional debridement of sacral pressure wound and washout   8/28 Debridement and washout of sacral decubitus ulcer  C/w trujillo  ID recs divesrsion colostomy to help w/ healing    D/w surgery - colostomy is not indicated as wound is distant from rectum and has not been but getting contaminated by stool  Wound VAC placed on 9/1    Hypertension  Assessment & Plan  Pt needed pressors on admission  Now on BB for Afib    Type 2 diabetes mellitus with hyperglycemia (HCC)  Assessment & Plan    Lab Results   Component Value Date    HGBA1C 9 9 (H) 08/26/2020     SSI  Blood Glucose checks TIDWM and QHS  Hold oral medications  continue Lantus 14 units, Humalog 5 units with meals  PT should take insulin at d/c    * Septic shock due to gram-positive bacteria ([facility] [address] )  Assessment & Plan  Pt now off pressors  ID appreciated  C/w Zosyn for total 14 days, through 9/9  B Cx from 8/28 neg  2/2 infected stage 4 DQ ulcer  Cleared from ID standpoint for PICC as B Cx neg for 5 days        [facility] Internal Medicine Progress Note  Patient: Amrik Barreto 67 y o  female   MRN: [id number]  PCP: TJ Woods  Unit/Bed#: [facility] 504-01 Encounter: [id number]  Date Of Visit: 09/05/20    Assessment:    Principal Problem:    Septic shock due to gram-positive bacteria ([address] )  Active Problems:    Type 2 diabetes mellitus with hyperglycemia ([address] )    Hypertension    Hyperlipidemia    Decubitus ulcer of sacral region, stage 4 (HCC)    Stroke-like symptoms    RORY (acute kidney injury) ([address] )    Paroxysmal atrial fibrillation ([address] )    Bacteremia due to Enterococcus    Anemia    Stenosis of left carotid artery    Chronic diastolic heart failure (HCC)      Plan:       VTE Pharmacologic Prophylaxis:   Pharmacologic: Warfarin (Coumadin)  Mechanical VTE Prophylaxis in Place: Yes    Patient Centered Rounds: I have performed bedside rounds with nursing staff today  Discussions with Specialists or Other Care Team Provider:     Education and Discussions with Family / Patient: patient, left VM for son    Time Spent for Care: 30 minutes  More than 50% of total time spent on counseling and coordination of care as described above  Current Length of Stay: 11 day(s)    Current Patient Status: Inpatient   Certification Statement: The patient will continue to require additional inpatient hospital stay due to pending placement    Discharge Plan / Estimated Discharge Date:     Code Status: Level 1 - Full Code      Subjective:   Feels ok, no major complains    Objective:     Vitals:   Temp (24hrs), Av 6 °F (37 °C), Min:97 8 °F (36 6 °C), Max:99 2 °F (37 3 °C)    Temp:  [97 8 °F (36 6 °C)-99 2 °F (37 3 °C)] 99 2 °F (37 3 °C)  HR:  [] 80  Resp:  [17-18] 18  BP: (112-122)/(57-71) 112/61  SpO2:  [91 %-96 %] 96 %  Body mass index is 29 44 kg/m²  Input and Output Summary (last 24 hours): Intake/Output Summary (Last 24 hours) at 2020 1514  Last data filed at 2020 1425  Gross per 24 hour   Intake 1140 ml   Output 2045 ml   Net -905 ml       Physical Exam:     Physical Exam  Vitals signs reviewed  HENT:      Head: Normocephalic and atraumatic  Cardiovascular:      Rate and Rhythm: Normal rate and regular rhythm  Heart sounds: Normal heart sounds  No murmur  No gallop  Pulmonary:      Effort: Pulmonary effort is normal  No respiratory distress  Breath sounds: Normal breath sounds  No wheezing  Abdominal:      General: There is no distension  Palpations: Abdomen is soft  Tenderness: There is no abdominal tenderness  Neurological:      Mental Status: She is alert  Additional Data:     Labs:    Results from last 7 days   Lab Units 09/05/20  0940   WBC Thousand/uL 9 58   HEMOGLOBIN g/dL 7 8*   HEMATOCRIT % 25 7*   PLATELETS Thousands/uL 182   NEUTROS PCT % 73   LYMPHS PCT % 17   MONOS PCT % 7   EOS PCT % 1     Results from last 7 days   Lab Units 09/05/20  0544   POTASSIUM mmol/L 3 9   CHLORIDE mmol/L 106   CO2 mmol/L 32   BUN mg/dL 44*   CREATININE mg/dL 1 01   CALCIUM mg/dL 7 8*     Results from last 7 days   Lab Units 09/05/20  0544   INR  3 03*       * I Have Reviewed All Lab Data Listed Above  * Additional Pertinent Lab Tests Reviewed:  All Labs Within Last 24 Hours Reviewed    Imaging:    Imaging Reports Reviewed Today Include:   Imaging Personally Reviewed by Myself Includes:      Recent Cultures (last 7 days):           Last 24 Hours Medication List:   Current Facility-Administered Medications   Medication Dose Route Frequency Provider Last Rate    acetaminophen  650 mg Oral Q6H PRN Jassi Chavez DO      aspirin  81 mg Oral Daily Jassi Chavez DO      atorvastatin  40 mg Oral Daily With MirantBEATRIZ      gabapentin  100 mg Oral TID Israel Kc PA-C      hydrALAZINE  10 mg Intravenous Q4H PRN Israel Kc PA-C      insulin glargine  14 Units Subcutaneous HS Kartik Qureshi MD      insulin lispro  1-6 Units Subcutaneous 4x Daily (AC & HS) Abril Troncoso PA-C      insulin lispro  7 Units Subcutaneous TID With Meals Kartik Qureshi MD      metoprolol tartrate  25 mg Oral Once TJ Templeton      metoprolol tartrate  75 mg Oral Q12H [address] Saeid Robb MD      nystatin   Topical BID Israel Kc PA-C      piperacillin-tazobactam  2 25 g Intravenous Q6H Burleigh Claude, CRNP 2 25 g (09/05/20 1404)    torsemide  20 mg Oral Daily Saeid Robb MD      warfarin  2 5 mg Oral Once (warfarin) Kartik Qureshi MD          Today, Patient Was Seen By: Kartik Qureshi MD    ** Please Note: This note has been constructed using a voice recognition system   **

## 2020-09-05 NOTE — ASSESSMENT & PLAN NOTE
8/30 IV Bumex & IV lasix given as pt overloaded from resuscitation and PRBC with excellent UOP  Now switched to PO torsemide 20 mg (was on Lasix 20 mg at home)

## 2020-09-05 NOTE — ASSESSMENT & PLAN NOTE
Estimated Creatinine Clearance: 51 4 mL/min (by C-G formula based on SCr of 1 01 mg/dL)    POA  Patient noted to have RORY on admission; serum creatinine 2 59 (baseline 1 2-1 3)  2/2 shock  Appears to be resolved

## 2020-09-05 NOTE — ASSESSMENT & PLAN NOTE
Pt now off pressors  ID appreciated  C/w Zosyn for total 14 days, through 9/9  B Cx from 8/28 neg  2/2 infected stage 4 DQ ulcer  Cleared from ID standpoint for PICC as B Cx neg for 5 days

## 2020-09-05 NOTE — ASSESSMENT & PLAN NOTE
- pt presents with to [facility] with right-sided facial droop and right-sided weakness after being transported by EMS for [facility] and noted to have atrial fibrillation enroute  - CT head in ED shows posterior cerebral artery disease is noted, mild on the right and severe left  - NIH - score on admission; - 6  -c/w ASA and statin  - MRI brain no acute findings  - CTA shows bilateral stenosis of carotids; less than 75%  LDL 23  Echo shows mild pulm htn  Neuro signed off  Pt now in Afib    Neuro recs BASA and AC in setting of Afib and carotid stenosis

## 2020-09-05 NOTE — ASSESSMENT & PLAN NOTE
S/p DCCV in route to [facility]  8/30 pt in Afib w/ moderate VR  Cardio appreciated  Heparin gtt - switched to coumadin  INR 3 0 today, held coumadin yesterday, give 2 5 mg today  Lopressor for rate control - increased to 75 BID  TSH WNL

## 2020-09-05 NOTE — ASSESSMENT & PLAN NOTE
Lab Results   Component Value Date    HGBA1C 9 9 (H) 08/26/2020     SSI  Blood Glucose checks TIDWM and QHS  Hold oral medications  continue Lantus 14 units, Humalog 5 units with meals  PT should take insulin at d/c

## 2020-09-05 NOTE — ASSESSMENT & PLAN NOTE
Vascular appreciated  ASA and statin  Carotid dopplers completed - LICA > 44%, VASQUEZ < 67%  Vascular recommends OP f/u sicne the etiology for her symptoms on presentation was most likely cardio embolic

## 2020-09-06 LAB
GLUCOSE SERPL-MCNC: 171 MG/DL (ref 65–140)
GLUCOSE SERPL-MCNC: 175 MG/DL (ref 65–140)
GLUCOSE SERPL-MCNC: 177 MG/DL (ref 65–140)
GLUCOSE SERPL-MCNC: 183 MG/DL (ref 65–140)
INR PPP: 3.26 (ref 0.84–1.19)
PROTHROMBIN TIME: 33 SECONDS (ref 11.6–14.5)

## 2020-09-06 PROCEDURE — 85610 PROTHROMBIN TIME: CPT | Performed by: HOSPITALIST

## 2020-09-06 PROCEDURE — 99233 SBSQ HOSP IP/OBS HIGH 50: CPT | Performed by: INTERNAL MEDICINE

## 2020-09-06 PROCEDURE — 82948 REAGENT STRIP/BLOOD GLUCOSE: CPT

## 2020-09-06 PROCEDURE — 99232 SBSQ HOSP IP/OBS MODERATE 35: CPT | Performed by: HOSPITALIST

## 2020-09-06 RX ADMIN — ASPIRIN 81 MG: 81 TABLET, COATED ORAL at 08:27

## 2020-09-06 RX ADMIN — INSULIN LISPRO 1 UNITS: 100 INJECTION, SOLUTION INTRAVENOUS; SUBCUTANEOUS at 21:12

## 2020-09-06 RX ADMIN — NYSTATIN: 100000 POWDER TOPICAL at 08:29

## 2020-09-06 RX ADMIN — INSULIN LISPRO 1 UNITS: 100 INJECTION, SOLUTION INTRAVENOUS; SUBCUTANEOUS at 08:28

## 2020-09-06 RX ADMIN — TORSEMIDE 20 MG: 20 TABLET ORAL at 08:26

## 2020-09-06 RX ADMIN — INSULIN LISPRO 1 UNITS: 100 INJECTION, SOLUTION INTRAVENOUS; SUBCUTANEOUS at 17:13

## 2020-09-06 RX ADMIN — GABAPENTIN 100 MG: 100 CAPSULE ORAL at 08:26

## 2020-09-06 RX ADMIN — ACETAMINOPHEN 650 MG: 325 TABLET, FILM COATED ORAL at 17:15

## 2020-09-06 RX ADMIN — PIPERACILLIN AND TAZOBACTAM 2.25 G: 2; .25 INJECTION, POWDER, FOR SOLUTION INTRAVENOUS at 11:32

## 2020-09-06 RX ADMIN — GABAPENTIN 100 MG: 100 CAPSULE ORAL at 17:12

## 2020-09-06 RX ADMIN — PIPERACILLIN AND TAZOBACTAM 2.25 G: 2; .25 INJECTION, POWDER, FOR SOLUTION INTRAVENOUS at 22:39

## 2020-09-06 RX ADMIN — GABAPENTIN 100 MG: 100 CAPSULE ORAL at 21:09

## 2020-09-06 RX ADMIN — NYSTATIN: 100000 POWDER TOPICAL at 17:20

## 2020-09-06 RX ADMIN — PIPERACILLIN AND TAZOBACTAM 2.25 G: 2; .25 INJECTION, POWDER, FOR SOLUTION INTRAVENOUS at 17:13

## 2020-09-06 RX ADMIN — INSULIN LISPRO 1 UNITS: 100 INJECTION, SOLUTION INTRAVENOUS; SUBCUTANEOUS at 11:33

## 2020-09-06 RX ADMIN — PIPERACILLIN AND TAZOBACTAM 2.25 G: 2; .25 INJECTION, POWDER, FOR SOLUTION INTRAVENOUS at 05:46

## 2020-09-06 RX ADMIN — METOPROLOL TARTRATE 75 MG: 50 TABLET, FILM COATED ORAL at 21:06

## 2020-09-06 RX ADMIN — INSULIN GLARGINE 14 UNITS: 100 INJECTION, SOLUTION SUBCUTANEOUS at 21:09

## 2020-09-06 RX ADMIN — METOPROLOL TARTRATE 75 MG: 50 TABLET, FILM COATED ORAL at 08:26

## 2020-09-06 RX ADMIN — ATORVASTATIN CALCIUM 40 MG: 40 TABLET, FILM COATED ORAL at 17:12

## 2020-09-06 NOTE — ASSESSMENT & PLAN NOTE
- pt presents with to [facility] with right-sided facial droop and right-sided weakness after being transported by EMS for [facility] and noted to have atrial fibrillation enroute  - CT head in ED shows posterior cerebral artery disease is noted, mild on the right and severe left  - NIH - score on admission; - 6  -c/w ASA and statin  - MRI brain no acute findings  - CTA shows bilateral stenosis of carotids; less than 75%  LDL 23  Echo shows mild pulm htn  Neuro signed off  Pt now in Afib    Neuro recs BASA and AC in setting of Afib and carotid stenosis

## 2020-09-06 NOTE — ASSESSMENT & PLAN NOTE
S/p DCCV in route to [facility]  8/30 pt in Afib w/ moderate VR  Cardio appreciated  Heparin gtt - switched to coumadin  INR 3 0 today, held coumadin yesterday, gave 2 5 mg yesterday   Hold today  Lopressor for rate control - increased to 75 BID  TSH WNL

## 2020-09-06 NOTE — PROGRESS NOTES
Progress Note - Infectious Disease   John Villarreal 67 y o  female MRN: [id number]  Unit/Bed#: [facility] 504-01 Encounter: [id number]      Impression/Recommendations:  1  Septic shock, secondary to infected sacral ulcer and secondary bacteremia  Patient is clinically much improved  She is now hemodynamically stable  Temperature is down  WBC has normalized  Antibiotic plan as in below  Monitor temperature/WBC  Monitor hemodynamics      2  Polymicrobial bacteremia, with alpha strep, Enterococcus and Bacteroides in blood cultures  Source is most likely sacral ulcer  Patient is clinically much improved  Bacteremia has cleared  2D echo without vegetation  Continue IV Zosyn  Treat x 14 days total, through       3  Chronic sacral osteomyelitis  At present, long-term IV antibiotic is futile, as long as bone is exposed   Down the line, if patient is able to off load, she may be a candidate for flap closure   At the time of flap closure, long-term IV antibiotic may be indicated  No antibiotic needed for this for now  Consider long-term IV antibiotic at time of flap closure, if patient is a candidate      4  Large sacral ulcer, decubitus in etiology  Patient will need offloading to help ulcer healed  Diverting colostomy would be helpful in preventing stool soiling of sacral ulcer  Local ulcer care  Recommend frequent offloading      Discussed with patient in detail regarding the above plan  Discharge plan per primary service  Okay for discharge from ID viewpoint      Antibiotics:  Zosyn  Antibiotic # 11      Subjective:  Patient feels well  Sacral pain mild and controlled  Temperature stays down  No chills  She is tolerating antibiotic well  No nausea, vomiting or diarrhea      Objective:  Vitals:  Temp:  [98 9 °F (37 2 °C)-99 7 °F (37 6 °C)] 98 9 °F (37 2 °C)  HR:  [] 89  Resp:  [17-18] 17  BP: (100-135)/(61-69) 135/67  SpO2:  [96 %-97 %] 97 %  Temp (24hrs), Av 3 °F (37 4 °C), Min:98 9 °F (37 2 °C), Max:99 7 °F (37 6 °C)  Current: Temperature: 98 9 °F (37 2 °C)    Physical Exam:     General: Awake, alert, cooperative, no distress  Neck:  Supple  No mass  No lymphadenopathy  Lungs: Expansion symmetric, no rales, no wheezing, respirations unlabored  Heart:  Regular rate and rhythm, S1 and S2 normal, no murmur  Abdomen: Soft, nondistended, non-tender, bowel sounds active all four quadrants, no masses, no organomegaly  Extremities: Trace leg edema  No erythema/warmth  No ulcer  Nontender to palpation  Skin:  No rash  Neuro: Moves all extremities  Invasive Devices     Peripherally Inserted Central Catheter Line            PICC Line 32/91/23 Left Cephalic 3 days          Peripheral Intravenous Line            Peripheral IV 09/03/20 Right;Ventral (anterior) Forearm 3 days          Drain            Urethral Catheter Temperature probe 16 Fr  11 days    Negative Pressure Wound Therapy (V A C ) Sacrum 4 days                Labs studies:   I have personally reviewed pertinent labs  Results from last 7 days   Lab Units 09/05/20  0544 09/04/20  0559 09/03/20  0511   POTASSIUM mmol/L 3 9 4 0 4 2   CHLORIDE mmol/L 106 107 108   CO2 mmol/L 32 32 29   BUN mg/dL 44* 42* 45*   CREATININE mg/dL 1 01 0 97 1 06   EGFR ml/min/1 73sq m 54 57 51   CALCIUM mg/dL 7 8* 8 2* 7 8*     Results from last 7 days   Lab Units 09/05/20  0940 09/02/20  0518 08/31/20  0508   WBC Thousand/uL 9 58 12 09* 14 03*   HEMOGLOBIN g/dL 7 8* 9 2* 8 9*   PLATELETS Thousands/uL 182 202 188           Imaging Studies:   I have personally reviewed pertinent imaging study reports and images in PACS  EKG, Pathology, and Other Studies:   I have personally reviewed pertinent reports

## 2020-09-06 NOTE — ASSESSMENT & PLAN NOTE
Vascular appreciated  ASA and statin  Carotid dopplers completed - LICA > 30%, VASQUEZ < 81%  Vascular recommends OP f/u sicne the etiology for her symptoms on presentation was most likely cardio embolic

## 2020-09-06 NOTE — PROGRESS NOTES
Progress Note - Kevon Johnson 1945, 67 y o  female MRN: [id number]    Unit/Bed#: [facility] 504-01 Encounter: [id number]    Primary Care Provider: TJ Pierce   Date and time admitted to hospital: 8/25/2020  4:06 PM        Stenosis of left carotid artery  Assessment & Plan  Vascular appreciated  ASA and statin  Carotid dopplers completed - LICA > 58%, VASQUEZ < 30%  Vascular recommends OP f/u sicne the etiology for her symptoms on presentation was most likely cardio embolic    Anemia  Assessment & Plan  S/p 1U PRBC in ICU  Appears stable at this time    Bacteremia due to Enterococcus  Assessment & Plan  B Cx pos for Enterococcus and Strep Constellatus  Zosyn x 14 days  ID appreciated  PICC consent obtained and PICC placed    Paroxysmal atrial fibrillation ([facility] [address] )  Assessment & Plan  S/p DCCV in route to [facility]  8/30 pt in Afib w/ moderate VR  Cardio appreciated  Heparin gtt - switched to coumadin  INR 3 0 today, held coumadin yesterday, gave 2 5 mg yesterday  Hold today  Lopressor for rate control - increased to 75 BID  TSH WNL    RORY (acute kidney injury) ([facility] [address] )  Assessment & Plan  Estimated Creatinine Clearance: 51 5 mL/min (by C-G formula based on SCr of 1 01 mg/dL)  POA  Patient noted to have RORY on admission; serum creatinine 2 59 (baseline 1 2-1 3)  2/2 shock  Appears to be resolved    Stroke-like symptoms  Assessment & Plan  - pt presents with to [facility] with right-sided facial droop and right-sided weakness after [facility] and noted to have atrial fibrillation enroute  - CT head in ED shows posterior cerebral artery disease is noted, mild on the right and severe left  - NIH - score on admission; - 6  -c/w ASA and statin  - MRI brain no acute findings  - CTA shows bilateral stenosis of carotids; less than 75%  LDL 23  Echo shows mild pulm htn  Neuro signed off  Pt now in Afib    Neuro recs BASA and AC in setting of Afib and carotid stenosis    Decubitus ulcer of sacral region, stage 4 [facility])  Assessment & Plan  POA  Wound care and piper surg appreciated  Patient reports ulcer developed status post recent hip fracture  8/26 excisional debridement of sacral pressure wound and washout   8/28 Debridement and washout of sacral decubitus ulcer  C/w trujillo  ID recs divesrsion colostomy to help w/ healing    D/w surgery - colostomy is not indicated as wound is distant from rectum and has not been but getting contaminated by stool  Wound VAC placed on 9/1    Hyperlipidemia  Assessment & Plan  Previously on pravastatin; will start high-intensity statin with Lipitor 40 in setting of carotid stenosis  LDL 23    Hypertension  Assessment & Plan  Pt needed pressors on admission  Now on BB for Afib    Type 2 diabetes mellitus with hyperglycemia (HCC)  Assessment & Plan    Lab Results   Component Value Date    HGBA1C 9 9 (H) 08/26/2020     SSI  Blood Glucose checks TIDWM and QHS  Hold oral medications  continue Lantus 14 units, Humalog 5 units with meals  PT should take insulin at d/c    * Septic shock due to gram-positive bacteria ([facility] [address] )  Assessment & Plan  Pt now off pressors  ID appreciated  C/w Zosyn for total 14 days, through 9/9  B Cx from 8/28 neg  2/2 infected stage 4 DQ ulcer  Cleared from ID standpoint for PICC as B Cx neg for 5 days        [facility] Internal Medicine Progress Note  Patient: Berta Gill 67 y o  female   MRN: [id number]  PCP: TJ Whatley  Unit/Bed#: [facility] 504-01 Encounter: [id number]  Date Of Visit: 09/06/20    Assessment:    Principal Problem:    Septic shock due to gram-positive bacteria ([facility] [address] )  Active Problems:    Type 2 diabetes mellitus with hyperglycemia ([facility] [address] )    Hypertension    Hyperlipidemia    Decubitus ulcer of sacral region, stage 4 (HCC)    Stroke-like symptoms    RORY (acute kidney injury) ([facility] [address] )    Paroxysmal atrial fibrillation (HCC)    Bacteremia due to Enterococcus    Anemia    Stenosis of left carotid artery    Chronic diastolic heart failure [facility])      Plan:       VTE Pharmacologic Prophylaxis:   Pharmacologic: Warfarin (Coumadin)  Mechanical VTE Prophylaxis in Place: Yes    Patient Centered Rounds: I have performed bedside rounds with nursing staff today  Discussions with Specialists or Other Care Team Provider:     Education and Discussions with Family / Patient: patient    Time Spent for Care: 30 minutes  More than 50% of total time spent on counseling and coordination of care as described above  Current Length of Stay: 12 day(s)    Current Patient Status: Inpatient   Certification Statement: The patient will continue to require additional inpatient hospital stay due to rehab placement    Discharge Plan / Estimated Discharge Date:     Code Status: Level 1 - Full Code      Subjective:   Feels ok, no major complains    Objective:     Vitals:   Temp (24hrs), Av 3 °F (37 4 °C), Min:98 9 °F (37 2 °C), Max:99 7 °F (37 6 °C)    Temp:  [98 9 °F (37 2 °C)-99 7 °F (37 6 °C)] 98 9 °F (37 2 °C)  HR:  [] 89  Resp:  [17-18] 17  BP: (100-135)/(67-69) 135/67  SpO2:  [96 %-97 %] 97 %  Body mass index is 29 53 kg/m²  Input and Output Summary (last 24 hours): Intake/Output Summary (Last 24 hours) at 2020 1454  Last data filed at 2020 1230  Gross per 24 hour   Intake 580 ml   Output 1950 ml   Net -1370 ml       Physical Exam:     Physical Exam  Vitals signs reviewed  HENT:      Head: Normocephalic and atraumatic  Eyes:      Conjunctiva/sclera: Conjunctivae normal    Cardiovascular:      Rate and Rhythm: Normal rate and regular rhythm  Heart sounds: Normal heart sounds  No murmur  No gallop  Pulmonary:      Effort: Pulmonary effort is normal  No respiratory distress  Breath sounds: Normal breath sounds  No wheezing  Abdominal:      General: There is no distension  Palpations: Abdomen is soft  Tenderness: There is no abdominal tenderness     Neurological:      Mental Status: She is alert and oriented to person, place, and time  Additional Data:     Labs:    Results from last 7 days   Lab Units 09/05/20  0940   WBC Thousand/uL 9 58   HEMOGLOBIN g/dL 7 8*   HEMATOCRIT % 25 7*   PLATELETS Thousands/uL 182   NEUTROS PCT % 73   LYMPHS PCT % 17   MONOS PCT % 7   EOS PCT % 1     Results from last 7 days   Lab Units 09/05/20  0544   POTASSIUM mmol/L 3 9   CHLORIDE mmol/L 106   CO2 mmol/L 32   BUN mg/dL 44*   CREATININE mg/dL 1 01   CALCIUM mg/dL 7 8*     Results from last 7 days   Lab Units 09/06/20  0940   INR  3 26*       * I Have Reviewed All Lab Data Listed Above  * Additional Pertinent Lab Tests Reviewed: All Labs Within Last 24 Hours Reviewed    Imaging:    Imaging Reports Reviewed Today Include:   Imaging Personally Reviewed by Myself Includes:      Recent Cultures (last 7 days):           Last 24 Hours Medication List:   Current Facility-Administered Medications   Medication Dose Route Frequency Provider Last Rate    acetaminophen  650 mg Oral Q6H PRN Helen Cowing, DO      aspirin  81 mg Oral Daily Helen Cowing, DO      atorvastatin  40 mg Oral Daily With MirBEATRIZ zuñiga      gabapentin  100 mg Oral TID Michael Kenney PA-C      hydrALAZINE  10 mg Intravenous Q4H PRN Michael Kenney PA-C      insulin glargine  14 Units Subcutaneous HS Kyra Rae MD      insulin lispro  1-6 Units Subcutaneous 4x Daily (AC & HS) Kristopher Davenport PA-C      insulin lispro  7 Units Subcutaneous TID With Meals Kyra Rae MD      metoprolol tartrate  25 mg Oral Once Brenad Signs, CRNP      metoprolol tartrate  75 mg Oral Q12H [address] Sonia Castellon MD      nystatin   Topical BID Michael Kenney PA-C      piperacillin-tazobactam  2 25 g Intravenous Q6H TJ Kirkpatrick 2 25 g (09/06/20 1132)    torsemide  20 mg Oral Daily Sonia Castellon MD          Today, Patient Was Seen By: Kyra Rae MD    ** Please Note: This note has been constructed using a voice recognition system  **

## 2020-09-06 NOTE — PLAN OF CARE
Problem: Activity Intolerance/Impaired Mobility  Goal: Mobility/activity is maintained at optimum level for patient  Description: Interventions:  - Assess and monitor patient  barriers to mobility and need for assistive/adaptive devices  - Assess patient's emotional response to limitations  - Collaborate with interdisciplinary team and initiate plans and interventions as ordered  - Encourage independent activity per ability   - Maintain proper body alignment  - Perform active/passive rom as tolerated/ordered  - Plan activities to conserve energy   - Turn patient as appropriate  Outcome: Progressing     Problem: Communication Impairment  Goal: Ability to express needs and understand communication  Description: Assess patient's communication skills and ability to understand information  Patient will demonstrate use of effective communication techniques, alternative methods of communication and understanding even if not able to speak  - Encourage communication and provide alternate methods of communication as needed  - Collaborate with case management/ for discharge needs  - Include patient/family/caregiver in decisions related to communication    Outcome: Progressing

## 2020-09-07 LAB
BASOPHILS # BLD AUTO: 0.1 THOUSANDS/ΜL (ref 0–0.1)
BASOPHILS NFR BLD AUTO: 1 % (ref 0–1)
EOSINOPHIL # BLD AUTO: 0.18 THOUSAND/ΜL (ref 0–0.61)
EOSINOPHIL NFR BLD AUTO: 1 % (ref 0–6)
ERYTHROCYTE [DISTWIDTH] IN BLOOD BY AUTOMATED COUNT: 15.3 % (ref 11.6–15.1)
GLUCOSE SERPL-MCNC: 109 MG/DL (ref 65–140)
GLUCOSE SERPL-MCNC: 143 MG/DL (ref 65–140)
GLUCOSE SERPL-MCNC: 145 MG/DL (ref 65–140)
GLUCOSE SERPL-MCNC: 168 MG/DL (ref 65–140)
HCT VFR BLD AUTO: 27.6 % (ref 34.8–46.1)
HGB BLD-MCNC: 8.2 G/DL (ref 11.5–15.4)
IMM GRANULOCYTES # BLD AUTO: 0.11 THOUSAND/UL (ref 0–0.2)
IMM GRANULOCYTES NFR BLD AUTO: 1 % (ref 0–2)
INR PPP: 3.34 (ref 0.84–1.19)
LYMPHOCYTES # BLD AUTO: 2.48 THOUSANDS/ΜL (ref 0.6–4.47)
LYMPHOCYTES NFR BLD AUTO: 14 % (ref 14–44)
MCH RBC QN AUTO: 28.7 PG (ref 26.8–34.3)
MCHC RBC AUTO-ENTMCNC: 29.7 G/DL (ref 31.4–37.4)
MCV RBC AUTO: 97 FL (ref 82–98)
MONOCYTES # BLD AUTO: 0.93 THOUSAND/ΜL (ref 0.17–1.22)
MONOCYTES NFR BLD AUTO: 5 % (ref 4–12)
NEUTROPHILS # BLD AUTO: 13.54 THOUSANDS/ΜL (ref 1.85–7.62)
NEUTS SEG NFR BLD AUTO: 78 % (ref 43–75)
NRBC BLD AUTO-RTO: 0 /100 WBCS
PLATELET # BLD AUTO: 236 THOUSANDS/UL (ref 149–390)
PMV BLD AUTO: 11.5 FL (ref 8.9–12.7)
PROCALCITONIN SERPL-MCNC: 0.09 NG/ML
PROTHROMBIN TIME: 33.6 SECONDS (ref 11.6–14.5)
RBC # BLD AUTO: 2.86 MILLION/UL (ref 3.81–5.12)
WBC # BLD AUTO: 17.34 THOUSAND/UL (ref 4.31–10.16)

## 2020-09-07 PROCEDURE — 82948 REAGENT STRIP/BLOOD GLUCOSE: CPT

## 2020-09-07 PROCEDURE — 99232 SBSQ HOSP IP/OBS MODERATE 35: CPT | Performed by: HOSPITALIST

## 2020-09-07 PROCEDURE — 85025 COMPLETE CBC W/AUTO DIFF WBC: CPT | Performed by: HOSPITALIST

## 2020-09-07 PROCEDURE — 99233 SBSQ HOSP IP/OBS HIGH 50: CPT | Performed by: INTERNAL MEDICINE

## 2020-09-07 PROCEDURE — 84145 PROCALCITONIN (PCT): CPT | Performed by: HOSPITALIST

## 2020-09-07 PROCEDURE — 85610 PROTHROMBIN TIME: CPT | Performed by: HOSPITALIST

## 2020-09-07 RX ADMIN — PIPERACILLIN AND TAZOBACTAM 2.25 G: 2; .25 INJECTION, POWDER, FOR SOLUTION INTRAVENOUS at 22:31

## 2020-09-07 RX ADMIN — NYSTATIN: 100000 POWDER TOPICAL at 08:58

## 2020-09-07 RX ADMIN — NYSTATIN: 100000 POWDER TOPICAL at 16:19

## 2020-09-07 RX ADMIN — INSULIN LISPRO 1 UNITS: 100 INJECTION, SOLUTION INTRAVENOUS; SUBCUTANEOUS at 11:30

## 2020-09-07 RX ADMIN — INSULIN GLARGINE 14 UNITS: 100 INJECTION, SOLUTION SUBCUTANEOUS at 21:05

## 2020-09-07 RX ADMIN — PIPERACILLIN AND TAZOBACTAM 2.25 G: 2; .25 INJECTION, POWDER, FOR SOLUTION INTRAVENOUS at 16:30

## 2020-09-07 RX ADMIN — ATORVASTATIN CALCIUM 40 MG: 40 TABLET, FILM COATED ORAL at 16:18

## 2020-09-07 RX ADMIN — ASPIRIN 81 MG: 81 TABLET, COATED ORAL at 08:56

## 2020-09-07 RX ADMIN — TORSEMIDE 20 MG: 20 TABLET ORAL at 08:56

## 2020-09-07 RX ADMIN — PIPERACILLIN AND TAZOBACTAM 2.25 G: 2; .25 INJECTION, POWDER, FOR SOLUTION INTRAVENOUS at 11:31

## 2020-09-07 RX ADMIN — GABAPENTIN 100 MG: 100 CAPSULE ORAL at 16:18

## 2020-09-07 RX ADMIN — PIPERACILLIN AND TAZOBACTAM 2.25 G: 2; .25 INJECTION, POWDER, FOR SOLUTION INTRAVENOUS at 04:41

## 2020-09-07 RX ADMIN — METOPROLOL TARTRATE 75 MG: 50 TABLET, FILM COATED ORAL at 08:56

## 2020-09-07 RX ADMIN — GABAPENTIN 100 MG: 100 CAPSULE ORAL at 08:56

## 2020-09-07 RX ADMIN — GABAPENTIN 100 MG: 100 CAPSULE ORAL at 20:54

## 2020-09-07 RX ADMIN — METOPROLOL TARTRATE 75 MG: 50 TABLET, FILM COATED ORAL at 20:54

## 2020-09-07 NOTE — ASSESSMENT & PLAN NOTE
Vascular appreciated  ASA and statin  Carotid dopplers completed - LICA > 51%, VASQUEZ < 60%  Vascular recommends OP f/u sicne the etiology for her symptoms on presentation was most likely cardio embolic

## 2020-09-07 NOTE — ASSESSMENT & PLAN NOTE
- pt presents with to [facility] with right-sided facial droop and right-sided weakness after being transported by EMS for [facility] and noted to have atrial fibrillation enroute  - CT head in ED shows posterior cerebral artery disease is noted, mild on the right and severe left  - NIH - score on admission; - 6  -c/w ASA and statin  - MRI brain no acute findings  - CTA shows bilateral stenosis of carotids; less than 75%  LDL 23  Echo shows mild pulm htn  Neuro signed off  Pt now in Afib    Neuro recs BASA and AC in setting of Afib and carotid stenosis

## 2020-09-07 NOTE — PROGRESS NOTES
Progress Note - Infectious Disease   Flori Brian 67 y o  female MRN: [id number]  Unit/Bed#: [facility] 504-01 Encounter: [id number]      Impression/Plan:    1  Septic shock, POA:   secondary to infected sacral ulcer and secondary bacteremia   Patient is clinically much improved, shock resolved  WBC now increased despite initial improvement  Patient offers no complaints today, remains afebrile and hemodynamically stable  Antibiotic plan as in below  Repeat CBC with differential in a m  Monitor hemodynamics and trend temperature curve closely  If fever recurs, or WBC count continues to rise, repeat blood culture     2  Polymicrobial bacteremia:  Strep consider lettuce, Enterococcus faecalis and Bacteroides in blood cultures   Source is most likely sacral ulcer   Patient is clinically much improved   Bacteremia has cleared   2D echo without vegetation  Continue IV Zosyn  Treat x 14 days total, through 9/9      3  Chronic sacral osteomyelitis:  No role for long-term IV antibiotics in setting of exposed bone   Down the line, if patient is able to off load, she may be a candidate for flap closure   At the time of flap closure, long-term IV antibiotic may be indicated  No antibiotic needed for this indication at this time  Consider long-term IV antibiotic at time of flap closure, if patient is a candidate      4  Large sacral decubitus ulcer:   Patient will need offloading to help ulcer healed   Diverting colostomy would be helpful in preventing stool soiling of sacral ulcer  Local wound Care  Recommend frequent offloading  5  Leukocytosis:  WBC has initially decreased, but now up to 17,000  Patient afebrile, procalcitonin has normalized, offers no complaints, remains hemodynamically stable  Case discussed with nursing staff, 1 formed bowel movement today  No evidence of diarrhea  - continue antibiotics as above  - repeat CBC with differential in a m   - repeat procalcitonin in a m      Discussed with patient in detail regarding the above plan  Discharge plan per primary service         Antibiotics:  Zosyn  Antibiotic # 14   POD 12    Subjective:  Patient has no fever, chills, sweats; no nausea, vomiting, diarrhea; no cough, shortness of breath; no pain  No new symptoms  Objective:  Vitals:  Temp:  [98 9 °F (37 2 °C)-99 5 °F (37 5 °C)] 98 9 °F (37 2 °C)  HR:  [] 103  Resp:  [18-20] 18  BP: ()/(53-62) 123/53  SpO2:  [93 %-96 %] 96 %  Temp (24hrs), Av 2 °F (37 3 °C), Min:98 9 °F (37 2 °C), Max:99 5 °F (37 5 °C)  Current: Temperature: 98 9 °F (37 2 °C)    Physical Exam:   General Appearance:  Alert, interactive, nontoxic, no acute distress  Throat: Oropharynx moist without lesions  Lungs:   Clear to auscultation bilaterally; no wheezes, rhonchi or rales; respirations unlabored   Heart:  RRR; no murmur, rub or gallop   Abdomen:   Soft, non-tender, non-distended, positive bowel sounds  Extremities: No clubbing, cyanosis or edema   Skin: No new rashes or lesions    Sacral decubitus ulcer as noted above       Labs, Imaging, & Other studies:   All pertinent labs and imaging studies were personally reviewed  Results from last 7 days   Lab Units 20  0439 20  0940 20  0518   WBC Thousand/uL 17 34* 9 58 12 09*   HEMOGLOBIN g/dL 8 2* 7 8* 9 2*   PLATELETS Thousands/uL 236 182 202     Results from last 7 days   Lab Units 20  0544 20  0559 20  0511   SODIUM mmol/L 141 141 141   POTASSIUM mmol/L 3 9 4 0 4 2   CHLORIDE mmol/L 106 107 108   CO2 mmol/L 32 32 29   BUN mg/dL 44* 42* 45*   CREATININE mg/dL 1 01 0 97 1 06   EGFR ml/min/1 73sq m 54 57 51   CALCIUM mg/dL 7 8* 8 2* 7 8*         Results from last 7 days   Lab Units 20  0909   PROCALCITONIN ng/ml 0 09

## 2020-09-07 NOTE — ASSESSMENT & PLAN NOTE
Estimated Creatinine Clearance: 50 9 mL/min (by C-G formula based on SCr of 1 01 mg/dL)    POA  Patient noted to have RORY on admission; serum creatinine 2 59 (baseline 1 2-1 3)  2/2 shock  Appears to be resolved

## 2020-09-07 NOTE — QUICK NOTE
VAC change      VAC location: Sacrum    Drains: Only wound vac  Unit Type     V A C ulta       Black foam- # applied     2   White foam- # applied     0   Cycle     Continuous       Target Pressure (mmHg)     125       Dressing Status     Clean;Dry; Intact           Wound description and size: 5 x 4 x 2 5 cm    2 black foam sponges removed, verified by RN who was at bedside for entire procedure

## 2020-09-07 NOTE — PROGRESS NOTES
Progress Note - Marc Aguirre 1945, 67 y o  female MRN: [id number]    Unit/Bed#: [facility] 504-01 Encounter: [id number]    Primary Care Provider: TJ Perera   Date and time admitted to hospital: 8/25/2020  4:06 PM        Chronic diastolic heart failure [facility])  Assessment & Plan  8/30 IV Bumex & IV lasix given as pt overloaded from resuscitation and PRBC with excellent UOP  Now switched to PO torsemide 20 mg (was on Lasix 20 mg at home)    Stenosis of left carotid artery  Assessment & Plan  Vascular appreciated  ASA and statin  Carotid dopplers completed - LICA > 19%, VASQUEZ < 01%  Vascular recommends OP f/u sicne the etiology for her symptoms on presentation was most likely cardio embolic    Anemia  Assessment & Plan  S/p 1U PRBC in ICU  Appears stable at this time    Bacteremia due to Enterococcus  Assessment & Plan  B Cx pos for Enterococcus and Strep Constellatus  Zosyn x 14 days  ID appreciated  PICC consent obtained and PICC placed    Paroxysmal atrial fibrillation ([facility] [address] )  Assessment & Plan  S/p DCCV in route to [facility]  8/30 pt in Afib w/ moderate VR  Cardio appreciated  Heparin gtt - switched to coumadin  gave 2 5 mg on 9/5  INR > 3, continue to hold today  Lopressor for rate control - increased to 75 BID  TSH WNL    RORY (acute kidney injury) ([facility] [address] )  Assessment & Plan  Estimated Creatinine Clearance: 50 9 mL/min (by C-G formula based on SCr of 1 01 mg/dL)  POA  Patient noted to have RORY on admission; serum creatinine 2 59 (baseline 1 2-1 3)  2/2 shock  Appears to be resolved    Stroke-like symptoms  Assessment & Plan  - pt presents with to [facility] with right-sided facial droop and right-sided weakness after being transported by EMS for [facility] and noted to have atrial fibrillation enroute  - CT head in ED shows posterior cerebral artery disease is noted, mild on the right and severe left     - NIH - score on admission; - 6  -c/w ASA and statin  - MRI brain no acute findings  - CTA shows bilateral stenosis of carotids; less than 75%  LDL 23  Echo shows mild pulm htn  Neuro signed off  Pt now in Afib  Neuro recs BASA and AC in setting of Afib and carotid stenosis    Decubitus ulcer of sacral region, stage 4 [facility])  Assessment & Plan  POA  Wound care and piper surg appreciated  Patient reports ulcer developed status post recent hip fracture  8/26 excisional debridement of sacral pressure wound and washout   8/28 Debridement and washout of sacral decubitus ulcer  C/w trujillo  ID recs divesrsion colostomy to help w/ healing  D/w surgery - colostomy is not indicated as wound is distant from rectum and has not been but getting contaminated by stool  Wound VAC placed on 9/1    Hypertension  Assessment & Plan  Pt needed pressors on admission  Now on BB for Afib    Type 2 diabetes mellitus with hyperglycemia (HCC)  Assessment & Plan    Lab Results   Component Value Date    HGBA1C 9 9 (H) 08/26/2020     SSI  Blood Glucose checks TIDWM and QHS  Hold oral medications  continue Lantus 14 units, Humalog 5 units with meals  PT should take insulin at d/c    * Septic shock due to gram-positive bacteria ([facility] [address] )  Assessment & Plan  Pt now off pressors  ID appreciated  C/w Zosyn for total 14 days, through 9/9  B Cx from 8/28 neg  2/2 infected stage 4 DQ ulcer  Cleared from ID standpoint for PICC as B Cx neg for 5 days  WBC up today - no clinical changes   Will monitor CBC        [facility] Internal Medicine Progress Note  Patient: Marcela Beard 67 y o  female   MRN: [id number]  PCP: TJ Wyatt  Unit/Bed#: [facility] 504-01 Encounter: [id number]  Date Of Visit: 09/07/20    Assessment:    Principal Problem:    Septic shock due to gram-positive bacteria ([address] )  Active Problems:    Type 2 diabetes mellitus with hyperglycemia ([address] )    Hypertension    Hyperlipidemia    Decubitus ulcer of sacral region, stage 4 (HCC)    Stroke-like symptoms    RORY (acute kidney injury) ([address] )    Paroxysmal atrial fibrillation ([address] ) Bacteremia due to Enterococcus    Anemia    Stenosis of left carotid artery    Chronic diastolic heart failure (HCC)      Plan:       VTE Pharmacologic Prophylaxis:   Pharmacologic: Warfarin (Coumadin)  Mechanical VTE Prophylaxis in Place: Yes    Patient Centered Rounds: I have performed bedside rounds with nursing staff today  Discussions with Specialists or Other Care Team Provider: ID    Education and Discussions with Family / Patient: patient    Time Spent for Care: 30 minutes  More than 50% of total time spent on counseling and coordination of care as described above  Current Length of Stay: 13 day(s)    Current Patient Status: Inpatient   Certification Statement: The patient will continue to require additional inpatient hospital stay due to Pending placement, CBC in am    Discharge Plan / Estimated Discharge Date: if WBC better tomorrow she is stable for DC    Code Status: Level 1 - Full Code      Subjective:   Feels ok, no major complains    Objective:     Vitals:   Temp (24hrs), Av 2 °F (37 3 °C), Min:98 9 °F (37 2 °C), Max:99 5 °F (37 5 °C)    Temp:  [98 9 °F (37 2 °C)-99 5 °F (37 5 °C)] 98 9 °F (37 2 °C)  HR:  [] 103  Resp:  [18-20] 18  BP: ()/(53-62) 123/53  SpO2:  [93 %-96 %] 96 %  Body mass index is 28 88 kg/m²  Input and Output Summary (last 24 hours): Intake/Output Summary (Last 24 hours) at 2020 1410  Last data filed at 2020 1246  Gross per 24 hour   Intake 1060 ml   Output 2600 ml   Net -1540 ml       Physical Exam:     Physical Exam  Vitals signs reviewed  HENT:      Head: Normocephalic and atraumatic  Cardiovascular:      Rate and Rhythm: Normal rate and regular rhythm  Heart sounds: Normal heart sounds  No murmur  No gallop  Pulmonary:      Effort: Pulmonary effort is normal  No respiratory distress  Breath sounds: Normal breath sounds  No wheezing  Abdominal:      General: There is no distension  Palpations: Abdomen is soft  Tenderness: There is no abdominal tenderness  Neurological:      Mental Status: She is alert and oriented to person, place, and time  Additional Data:     Labs:    Results from last 7 days   Lab Units 09/07/20  0439   WBC Thousand/uL 17 34*   HEMOGLOBIN g/dL 8 2*   HEMATOCRIT % 27 6*   PLATELETS Thousands/uL 236   NEUTROS PCT % 78*   LYMPHS PCT % 14   MONOS PCT % 5   EOS PCT % 1     Results from last 7 days   Lab Units 09/05/20  0544   POTASSIUM mmol/L 3 9   CHLORIDE mmol/L 106   CO2 mmol/L 32   BUN mg/dL 44*   CREATININE mg/dL 1 01   CALCIUM mg/dL 7 8*     Results from last 7 days   Lab Units 09/07/20  0439   INR  3 34*       * I Have Reviewed All Lab Data Listed Above  * Additional Pertinent Lab Tests Reviewed:  All Labs Within Last 24 Hours Reviewed    Imaging:    Imaging Reports Reviewed Today Include:   Imaging Personally Reviewed by Myself Includes:      Recent Cultures (last 7 days):           Last 24 Hours Medication List:   Current Facility-Administered Medications   Medication Dose Route Frequency Provider Last Rate    acetaminophen  650 mg Oral Q6H PRN Luisana Pena DO      aspirin  81 mg Oral Daily Luisana Pena DO      atorvastatin  40 mg Oral Daily With [employer]BEATRIZ      gabapentin  100 mg Oral TID Zoila Pour, PA-HERBIE      hydrALAZINE  10 mg Intravenous Q4H PRN Zoila PourBEATRIZ      insulin glargine  14 Units Subcutaneous HS Kayla Cr MD      insulin lispro  1-6 Units Subcutaneous 4x Daily (AC & HS) Kermit DriversBEATRIZ      insulin lispro  7 Units Subcutaneous TID With Meals Kayla Cr MD      metoprolol tartrate  25 mg Oral Once TJ Castellanos      metoprolol tartrate  75 mg Oral Q12H [facility] Nat Perkins MD      nystatin   Topical BID Zoila PourBEATRIZ      piperacillin-tazobactam  2 25 g Intravenous Q6H TJ Davila 2 25 g (09/07/20 1131)    torsemide  20 mg Oral Daily Nat Perkins MD          Today, Patient Was Seen By: Eva Wesley MD    ** Please Note: This note has been constructed using a voice recognition system   **

## 2020-09-07 NOTE — PLAN OF CARE
Problem: Potential for Falls  Goal: Patient will remain free of falls  Description: INTERVENTIONS:  - Assess patient frequently for physical needs  -  Identify cognitive and physical deficits and behaviors that affect risk of falls    -  Center fall precautions as indicated by assessment   - Educate patient/family on patient safety including physical limitations  - Instruct patient to call for assistance with activity based on assessment  - Modify environment to reduce risk of injury  - Consider OT/PT consult to assist with strengthening/mobility  Outcome: Progressing     Problem: Prexisting or High Potential for Compromised Skin Integrity  Goal: Skin integrity is maintained or improved  Description: INTERVENTIONS:  - Identify patients at risk for skin breakdown  - Assess and monitor skin integrity  - Assess and monitor nutrition and hydration status  - Monitor labs   - Assess for incontinence   - Turn and reposition patient  - Assist with mobility/ambulation  - Relieve pressure over bony prominences  - Avoid friction and shearing  - Provide appropriate hygiene as needed including keeping skin clean and dry  - Evaluate need for skin moisturizer/barrier cream  - Collaborate with interdisciplinary team   - Patient/family teaching  - Consider wound care consult   Outcome: Progressing     Problem: PAIN - ADULT  Goal: Verbalizes/displays adequate comfort level or baseline comfort level  Description: Interventions:  - Encourage patient to monitor pain and request assistance  - Assess pain using appropriate pain scale  - Administer analgesics based on type and severity of pain and evaluate response  - Implement non-pharmacological measures as appropriate and evaluate response  - Consider cultural and social influences on pain and pain management  - Notify physician/advanced practitioner if interventions unsuccessful or patient reports new pain  Outcome: Progressing     Problem: INFECTION - ADULT  Goal: Absence or prevention of progression during hospitalization  Description: INTERVENTIONS:  - Assess and monitor for signs and symptoms of infection  - Monitor lab/diagnostic results  - Monitor all insertion sites, i e  indwelling lines, tubes, and drains  - Monitor endotracheal if appropriate and nasal secretions for changes in amount and color  - [place] appropriate cooling/warming therapies per order  - Administer medications as ordered  - Instruct and encourage patient and family to use good hand hygiene technique  - Identify and instruct in appropriate isolation precautions for identified infection/condition  Outcome: Progressing  Goal: Absence of fever/infection during neutropenic period  Description: INTERVENTIONS:  - Monitor WBC    Outcome: Progressing     Problem: SAFETY ADULT  Goal: Patient will remain free of falls  Description: INTERVENTIONS:  - Assess patient frequently for physical needs  -  Identify cognitive and physical deficits and behaviors that affect risk of falls    -  [place] fall precautions as indicated by assessment   - Educate patient/family on patient safety including physical limitations  - Instruct patient to call for assistance with activity based on assessment  - Modify environment to reduce risk of injury  - Consider OT/PT consult to assist with strengthening/mobility  Outcome: Progressing  Goal: Maintain or return to baseline ADL function  Description: INTERVENTIONS:  -  Assess patient's ability to carry out ADLs; assess patient's baseline for ADL function and identify physical deficits which impact ability to perform ADLs (bathing, care of mouth/teeth, toileting, grooming, dressing, etc )  - Assess/evaluate cause of self-care deficits   - Assess range of motion  - Assess patient's mobility; develop plan if impaired  - Assess patient's need for assistive devices and provide as appropriate  - Encourage maximum independence but intervene and supervise when necessary  - Involve family in performance of ADLs  - Assess for home care needs following discharge   - Consider OT consult to assist with ADL evaluation and planning for discharge  - Provide patient education as appropriate  Outcome: Progressing  Goal: Maintain or return mobility status to optimal level  Description: INTERVENTIONS:  - Assess patient's baseline mobility status (ambulation, transfers, stairs, etc )    - Identify cognitive and physical deficits and behaviors that affect mobility  - Identify mobility aids required to assist with transfers and/or ambulation (gait belt, sit-to-stand, lift, walker, cane, etc )  - [place] fall precautions as indicated by assessment  - Record patient progress and toleration of activity level on Mobility SBAR; progress patient to next Phase/Stage  - Instruct patient to call for assistance with activity based on assessment  - Consider rehabilitation consult to assist with strengthening/weightbearing, etc   Outcome: Progressing     Problem: DISCHARGE PLANNING  Goal: Discharge to home or other facility with appropriate resources  Description: INTERVENTIONS:  - Identify barriers to discharge w/patient and caregiver  - Arrange for needed discharge resources and transportation as appropriate  - Identify discharge learning needs (meds, wound care, etc )  - Arrange for interpretive services to assist at discharge as needed  - Refer to Case Management Department for coordinating discharge planning if the patient needs post-hospital services based on physician/advanced practitioner order or complex needs related to functional status, cognitive ability, or social support system  Outcome: Progressing     Problem: Knowledge Deficit  Goal: Patient/family/caregiver demonstrates understanding of disease process, treatment plan, medications, and discharge instructions  Description: Complete learning assessment and assess knowledge base    Interventions:  - Provide teaching at level of understanding  - Provide teaching via preferred learning methods  Outcome: Progressing     Problem: Neurological Deficit  Goal: Neurological status is stable or improving  Description: Interventions:  - Monitor and assess patient's level of consciousness, motor function, sensory function, and level of assistance needed for ADLs  - Monitor and report changes from baseline  Collaborate with interdisciplinary team to initiate plan and implement interventions as ordered  - Provide and maintain a safe environment  - Consider seizure precautions  - Consider fall precautions  - Consider aspiration precautions  - Consider bleeding precautions  Outcome: Progressing     Problem: Activity Intolerance/Impaired Mobility  Goal: Mobility/activity is maintained at optimum level for patient  Description: Interventions:  - Assess and monitor patient  barriers to mobility and need for assistive/adaptive devices  - Assess patient's emotional response to limitations  - Collaborate with interdisciplinary team and initiate plans and interventions as ordered  - Encourage independent activity per ability   - Maintain proper body alignment  - Perform active/passive rom as tolerated/ordered  - Plan activities to conserve energy   - Turn patient as appropriate  Outcome: Progressing     Problem: Communication Impairment  Goal: Ability to express needs and understand communication  Description: Assess patient's communication skills and ability to understand information  Patient will demonstrate use of effective communication techniques, alternative methods of communication and understanding even if not able to speak  - Encourage communication and provide alternate methods of communication as needed  - Collaborate with case management/ for discharge needs  - Include patient/family/caregiver in decisions related to communication    Outcome: Progressing     Problem: Potential for Aspiration  Goal: Non-ventilated patient's risk of aspiration is minimized  Description: Assess and monitor vital signs, respiratory status, and labs (WBC)  Monitor for signs of aspiration (tachypnea, cough, rales, wheezing, cyanosis, fever)  - Assess and monitor patient's ability to swallow  - Place patient up in chair to eat if possible  - HOB up at 90 degrees to eat if unable to get patient up into chair   - Supervise patient during oral intake  - Instruct patient/ family to take small bites  - Instruct patient/ family to take small single sips when taking liquids  - Follow patient-specific strategies generated by speech pathologist   Outcome: Progressing  Goal: Ventilated patient's risk of aspiration is minimized  Description: Assess and monitor vital signs, respiratory status, airway cuff pressure, and labs (WBC)  Monitor for signs of aspiration (tachypnea, cough, rales, wheezing, cyanosis, fever)  - Elevate head of bed 30 degrees if patient has tube feeding   - Monitor tube feeding  Outcome: Progressing     Problem: Nutrition  Goal: Nutrition/Hydration status is improving  Description: Monitor and assess patient's nutrition/hydration status for malnutrition (ex- brittle hair, bruises, dry skin, pale skin and conjunctiva, muscle wasting, smooth red tongue, and disorientation)  Collaborate with interdisciplinary team and initiate plan and interventions as ordered  Monitor patient's weight and dietary intake as ordered or per policy  Utilize nutrition screening tool and intervene per policy  Determine patient's food preferences and provide high-protein, high-caloric foods as appropriate  - Assist patient with eating   - Allow adequate time for meals   - Encourage patient to take dietary supplement as ordered  - Collaborate with clinical nutritionist   - Include patient/family/caregiver in decisions related to nutrition    Outcome: Progressing     Problem: Nutrition/Hydration-ADULT  Goal: Nutrient/Hydration intake appropriate for improving, restoring or maintaining nutritional needs  Description: Monitor and assess patient's nutrition/hydration status for malnutrition  Collaborate with interdisciplinary team and initiate plan and interventions as ordered  Monitor patient's weight and dietary intake as ordered or per policy  Utilize nutrition screening tool and intervene as necessary  Determine patient's food preferences and provide high-protein, high-caloric foods as appropriate  INTERVENTIONS:  - Monitor oral intake, urinary output, labs, and treatment plans  - Assess nutrition and hydration status and recommend course of action  - Evaluate amount of meals eaten  - Assist patient with eating if necessary   - Allow adequate time for meals  - Recommend/ encourage appropriate diets, oral nutritional supplements, and vitamin/mineral supplements  - Order, calculate, and assess calorie counts as needed  - Recommend, monitor, and adjust tube feedings and TPN/PPN based on assessed needs  - Assess need for intravenous fluids  - Provide specific nutrition/hydration education as appropriate  - Include patient/family/caregiver in decisions related to nutrition  Outcome: Progressing     Problem: NEUROSENSORY - ADULT  Goal: Achieves stable or improved neurological status  Description: INTERVENTIONS  - Monitor and report changes in neurological status  - Monitor vital signs such as temperature, blood pressure, glucose, and any other labs ordered   - Initiate measures to prevent increased intracranial pressure  - Monitor for seizure activity and implement precautions if appropriate      Outcome: Progressing  Goal: Achieves maximal functionality and self care  Description: INTERVENTIONS  - Monitor swallowing and airway patency with patient fatigue and changes in neurological status  - Encourage and assist patient to increase activity and self care     - Encourage visually impaired, hearing impaired and aphasic patients to use assistive/communication devices  Outcome: Progressing     Problem: CARDIOVASCULAR - ADULT  Goal: Maintains optimal cardiac output and hemodynamic stability  Description: INTERVENTIONS:  - Monitor I/O, vital signs and rhythm  - Monitor for S/S and trends of decreased cardiac output  - Administer and titrate ordered vasoactive medications to optimize hemodynamic stability  - Assess quality of pulses, skin color and temperature  - Assess for signs of decreased coronary artery perfusion  - Instruct patient to report change in severity of symptoms  Outcome: Progressing     Problem: RESPIRATORY - ADULT  Goal: Achieves optimal ventilation and oxygenation  Description: INTERVENTIONS:  - Assess for changes in respiratory status  - Assess for changes in mentation and behavior  - Position to facilitate oxygenation and minimize respiratory effort  - Oxygen administered by appropriate delivery if ordered  - Initiate smoking cessation education as indicated  - Encourage broncho-pulmonary hygiene including cough, deep breathe, Incentive Spirometry  - Assess the need for suctioning and aspirate as needed  - Assess and instruct to report SOB or any respiratory difficulty  - Respiratory Therapy support as indicated  Outcome: Progressing     Problem: GASTROINTESTINAL - ADULT  Goal: Minimal or absence of nausea and/or vomiting  Description: INTERVENTIONS:  - Administer IV fluids if ordered to ensure adequate hydration  - Maintain NPO status until nausea and vomiting are resolved  - Nasogastric tube if ordered  - Administer ordered antiemetic medications as needed  - Provide nonpharmacologic comfort measures as appropriate  - Advance diet as tolerated, if ordered  - Consider nutrition services referral to assist patient with adequate nutrition and appropriate food choices  Outcome: Progressing  Goal: Maintains adequate nutritional intake  Description: INTERVENTIONS:  - Monitor percentage of each meal consumed  - Identify factors contributing to decreased intake, treat as appropriate  - Assist with meals as needed  - Monitor I&O, weight, and lab values if indicated  - Obtain nutrition services referral as needed  Outcome: Progressing     Problem: GENITOURINARY - ADULT  Goal: Maintains or returns to baseline urinary function  Description: INTERVENTIONS:  - Assess urinary function  - Encourage oral fluids to ensure adequate hydration if ordered  - Administer IV fluids as ordered to ensure adequate hydration  - Administer ordered medications as needed  - Offer frequent toileting  - Follow urinary retention protocol if ordered  Outcome: Progressing     Problem: METABOLIC, FLUID AND ELECTROLYTES - ADULT  Goal: Electrolytes maintained within normal limits  Description: INTERVENTIONS:  - Monitor labs and assess patient for signs and symptoms of electrolyte imbalances  - Administer electrolyte replacement as ordered  - Monitor response to electrolyte replacements, including repeat lab results as appropriate  - Instruct patient on fluid and nutrition as appropriate  Outcome: Progressing     Problem: SKIN/TISSUE INTEGRITY - ADULT  Goal: Skin integrity remains intact  Description: INTERVENTIONS  - Identify patients at risk for skin breakdown  - Assess and monitor skin integrity  - Assess and monitor nutrition and hydration status  - Monitor labs (i e  albumin)  - Assess for incontinence   - Turn and reposition patient  - Assist with mobility/ambulation  - Relieve pressure over bony prominences  - Avoid friction and shearing  - Provide appropriate hygiene as needed including keeping skin clean and dry  - Evaluate need for skin moisturizer/barrier cream  - Collaborate with interdisciplinary team (i e  Nutrition, Rehabilitation, etc )   - Patient/family teaching  Outcome: Progressing     Problem: HEMATOLOGIC - ADULT  Goal: Maintains hematologic stability  Description: INTERVENTIONS  - Assess for signs and symptoms of bleeding or hemorrhage  - Monitor labs  - Administer supportive blood products/factors as ordered and appropriate  Outcome: Progressing     Problem: MUSCULOSKELETAL - ADULT  Goal: Maintain or return mobility to safest level of function  Description: INTERVENTIONS:  - Assess patient's ability to carry out ADLs; assess patient's baseline for ADL function and identify physical deficits which impact ability to perform ADLs (bathing, care of mouth/teeth, toileting, grooming, dressing, etc )  - Assess/evaluate cause of self-care deficits   - Assess range of motion  - Assess patient's mobility  - Assess patient's need for assistive devices and provide as appropriate  - Encourage maximum independence but intervene and supervise when necessary  - Involve family in performance of ADLs  - Assess for home care needs following discharge   - Consider OT consult to assist with ADL evaluation and planning for discharge  - Provide patient education as appropriate  Outcome: Progressing     Problem: COPING  Goal: Pt/Family able to verbalize concerns and demonstrate effective coping strategies  Description: INTERVENTIONS:  - Assist patient/family to identify coping skills, available support systems and cultural and spiritual values  - Provide emotional support, including active listening and acknowledgement of concerns of patient and caregivers  - Reduce environmental stimuli, as able  - Provide patient education  - Assess for spiritual pain/suffering and initiate spiritual care, including notification of Pastoral Care or [place] based community as needed  - Assess effectiveness of coping strategies  Outcome: Progressing  Goal: Will report anxiety at manageable levels  Description: INTERVENTIONS:  - Administer medication as ordered  - Teach and encourage coping skills  - Provide emotional support  - Assess patient/family for anxiety and ability to cope  Outcome: Progressing

## 2020-09-07 NOTE — ASSESSMENT & PLAN NOTE
S/p DCCV in route to [facility]  8/30 pt in Afib w/ moderate VR  Cardio appreciated  Heparin gtt - switched to coumadin  gave 2 5 mg on 9/5   INR > 3, continue to hold today  Lopressor for rate control - increased to 75 BID  TSH WNL

## 2020-09-07 NOTE — ASSESSMENT & PLAN NOTE
Pt now off pressors  ID appreciated  C/w Zosyn for total 14 days, through 9/9  B Cx from 8/28 neg  2/2 infected stage 4 DQ ulcer  Cleared from ID standpoint for PICC as B Cx neg for 5 days  WBC up today - no clinical changes   Will monitor CBC

## 2020-09-08 LAB
BASOPHILS # BLD AUTO: 0.14 THOUSANDS/ΜL (ref 0–0.1)
BASOPHILS NFR BLD AUTO: 1 % (ref 0–1)
EOSINOPHIL # BLD AUTO: 0.14 THOUSAND/ΜL (ref 0–0.61)
EOSINOPHIL NFR BLD AUTO: 1 % (ref 0–6)
ERYTHROCYTE [DISTWIDTH] IN BLOOD BY AUTOMATED COUNT: 15.6 % (ref 11.6–15.1)
GLUCOSE SERPL-MCNC: 137 MG/DL (ref 65–140)
GLUCOSE SERPL-MCNC: 147 MG/DL (ref 65–140)
GLUCOSE SERPL-MCNC: 161 MG/DL (ref 65–140)
GLUCOSE SERPL-MCNC: 79 MG/DL (ref 65–140)
HCT VFR BLD AUTO: 25.1 % (ref 34.8–46.1)
HGB BLD-MCNC: 7.8 G/DL (ref 11.5–15.4)
IMM GRANULOCYTES # BLD AUTO: 0.08 THOUSAND/UL (ref 0–0.2)
IMM GRANULOCYTES NFR BLD AUTO: 1 % (ref 0–2)
INR PPP: 3.35 (ref 0.84–1.19)
LYMPHOCYTES # BLD AUTO: 2.66 THOUSANDS/ΜL (ref 0.6–4.47)
LYMPHOCYTES NFR BLD AUTO: 19 % (ref 14–44)
MCH RBC QN AUTO: 29.7 PG (ref 26.8–34.3)
MCHC RBC AUTO-ENTMCNC: 31.1 G/DL (ref 31.4–37.4)
MCV RBC AUTO: 95 FL (ref 82–98)
MONOCYTES # BLD AUTO: 0.77 THOUSAND/ΜL (ref 0.17–1.22)
MONOCYTES NFR BLD AUTO: 6 % (ref 4–12)
NEUTROPHILS # BLD AUTO: 10.03 THOUSANDS/ΜL (ref 1.85–7.62)
NEUTS SEG NFR BLD AUTO: 72 % (ref 43–75)
NRBC BLD AUTO-RTO: 0 /100 WBCS
PLATELET # BLD AUTO: 235 THOUSANDS/UL (ref 149–390)
PMV BLD AUTO: 11.6 FL (ref 8.9–12.7)
PROCALCITONIN SERPL-MCNC: 0.07 NG/ML
PROTHROMBIN TIME: 33.7 SECONDS (ref 11.6–14.5)
RBC # BLD AUTO: 2.63 MILLION/UL (ref 3.81–5.12)
WBC # BLD AUTO: 13.82 THOUSAND/UL (ref 4.31–10.16)

## 2020-09-08 PROCEDURE — 82948 REAGENT STRIP/BLOOD GLUCOSE: CPT

## 2020-09-08 PROCEDURE — 84145 PROCALCITONIN (PCT): CPT | Performed by: HOSPITALIST

## 2020-09-08 PROCEDURE — 85610 PROTHROMBIN TIME: CPT | Performed by: HOSPITALIST

## 2020-09-08 PROCEDURE — 85025 COMPLETE CBC W/AUTO DIFF WBC: CPT | Performed by: HOSPITALIST

## 2020-09-08 PROCEDURE — 99232 SBSQ HOSP IP/OBS MODERATE 35: CPT | Performed by: INTERNAL MEDICINE

## 2020-09-08 RX ADMIN — GABAPENTIN 100 MG: 100 CAPSULE ORAL at 20:21

## 2020-09-08 RX ADMIN — METOPROLOL TARTRATE 75 MG: 50 TABLET, FILM COATED ORAL at 08:44

## 2020-09-08 RX ADMIN — ATORVASTATIN CALCIUM 40 MG: 40 TABLET, FILM COATED ORAL at 17:19

## 2020-09-08 RX ADMIN — ASPIRIN 81 MG: 81 TABLET, COATED ORAL at 08:44

## 2020-09-08 RX ADMIN — INSULIN LISPRO 1 UNITS: 100 INJECTION, SOLUTION INTRAVENOUS; SUBCUTANEOUS at 22:17

## 2020-09-08 RX ADMIN — GABAPENTIN 100 MG: 100 CAPSULE ORAL at 08:44

## 2020-09-08 RX ADMIN — PIPERACILLIN AND TAZOBACTAM 2.25 G: 2; .25 INJECTION, POWDER, FOR SOLUTION INTRAVENOUS at 04:36

## 2020-09-08 RX ADMIN — PIPERACILLIN AND TAZOBACTAM 2.25 G: 2; .25 INJECTION, POWDER, FOR SOLUTION INTRAVENOUS at 23:18

## 2020-09-08 RX ADMIN — TORSEMIDE 20 MG: 20 TABLET ORAL at 08:44

## 2020-09-08 RX ADMIN — PIPERACILLIN AND TAZOBACTAM 2.25 G: 2; .25 INJECTION, POWDER, FOR SOLUTION INTRAVENOUS at 11:18

## 2020-09-08 RX ADMIN — INSULIN GLARGINE 14 UNITS: 100 INJECTION, SOLUTION SUBCUTANEOUS at 22:17

## 2020-09-08 RX ADMIN — GABAPENTIN 100 MG: 100 CAPSULE ORAL at 17:19

## 2020-09-08 RX ADMIN — PIPERACILLIN AND TAZOBACTAM 2.25 G: 2; .25 INJECTION, POWDER, FOR SOLUTION INTRAVENOUS at 17:19

## 2020-09-08 RX ADMIN — NYSTATIN: 100000 POWDER TOPICAL at 17:24

## 2020-09-08 RX ADMIN — NYSTATIN: 100000 POWDER TOPICAL at 08:45

## 2020-09-08 RX ADMIN — METOPROLOL TARTRATE 75 MG: 50 TABLET, FILM COATED ORAL at 20:20

## 2020-09-08 NOTE — PLAN OF CARE
Problem: Potential for Falls  Goal: Patient will remain free of falls  Description: INTERVENTIONS:  - Assess patient frequently for physical needs  -  Identify cognitive and physical deficits and behaviors that affect risk of falls    -  [place] fall precautions as indicated by assessment   - Educate patient/family on patient safety including physical limitations  - Instruct patient to call for assistance with activity based on assessment  - Modify environment to reduce risk of injury  - Consider OT/PT consult to assist with strengthening/mobility  Outcome: Progressing     Problem: Prexisting or High Potential for Compromised Skin Integrity  Goal: Skin integrity is maintained or improved  Description: INTERVENTIONS:  - Identify patients at risk for skin breakdown  - Assess and monitor skin integrity  - Assess and monitor nutrition and hydration status  - Monitor labs   - Assess for incontinence   - Turn and reposition patient  - Assist with mobility/ambulation  - Relieve pressure over bony prominences  - Avoid friction and shearing  - Provide appropriate hygiene as needed including keeping skin clean and dry  - Evaluate need for skin moisturizer/barrier cream  - Collaborate with interdisciplinary team   - Patient/family teaching  - Consider wound care consult   Outcome: Progressing     Problem: PAIN - ADULT  Goal: Verbalizes/displays adequate comfort level or baseline comfort level  Description: Interventions:  - Encourage patient to monitor pain and request assistance  - Assess pain using appropriate pain scale  - Administer analgesics based on type and severity of pain and evaluate response  - Implement non-pharmacological measures as appropriate and evaluate response  - Consider cultural and social influences on pain and pain management  - Notify physician/advanced practitioner if interventions unsuccessful or patient reports new pain  Outcome: Progressing     Problem: INFECTION - ADULT  Goal: Absence or prevention of progression during hospitalization  Description: INTERVENTIONS:  - Assess and monitor for signs and symptoms of infection  - Monitor lab/diagnostic results  - Monitor all insertion sites, i e  indwelling lines, tubes, and drains  - Monitor endotracheal if appropriate and nasal secretions for changes in amount and color  - [place] appropriate cooling/warming therapies per order  - Administer medications as ordered  - Instruct and encourage patient and family to use good hand hygiene technique  - Identify and instruct in appropriate isolation precautions for identified infection/condition  Outcome: Progressing  Goal: Absence of fever/infection during neutropenic period  Description: INTERVENTIONS:  - Monitor WBC    Outcome: Progressing     Problem: SAFETY ADULT  Goal: Patient will remain free of falls  Description: INTERVENTIONS:  - Assess patient frequently for physical needs  -  Identify cognitive and physical deficits and behaviors that affect risk of falls    -  [place] fall precautions as indicated by assessment   - Educate patient/family on patient safety including physical limitations  - Instruct patient to call for assistance with activity based on assessment  - Modify environment to reduce risk of injury  - Consider OT/PT consult to assist with strengthening/mobility  Outcome: Progressing  Goal: Maintain or return to baseline ADL function  Description: INTERVENTIONS:  -  Assess patient's ability to carry out ADLs; assess patient's baseline for ADL function and identify physical deficits which impact ability to perform ADLs (bathing, care of mouth/teeth, toileting, grooming, dressing, etc )  - Assess/evaluate cause of self-care deficits   - Assess range of motion  - Assess patient's mobility; develop plan if impaired  - Assess patient's need for assistive devices and provide as appropriate  - Encourage maximum independence but intervene and supervise when necessary  - Involve family in performance of ADLs  - Assess for home care needs following discharge   - Consider OT consult to assist with ADL evaluation and planning for discharge  - Provide patient education as appropriate  Outcome: Progressing  Goal: Maintain or return mobility status to optimal level  Description: INTERVENTIONS:  - Assess patient's baseline mobility status (ambulation, transfers, stairs, etc )    - Identify cognitive and physical deficits and behaviors that affect mobility  - Identify mobility aids required to assist with transfers and/or ambulation (gait belt, sit-to-stand, lift, walker, cane, etc )  - [place] fall precautions as indicated by assessment  - Record patient progress and toleration of activity level on Mobility SBAR; progress patient to next Phase/Stage  - Instruct patient to call for assistance with activity based on assessment  - Consider rehabilitation consult to assist with strengthening/weightbearing, etc   Outcome: Progressing     Problem: DISCHARGE PLANNING  Goal: Discharge to home or other facility with appropriate resources  Description: INTERVENTIONS:  - Identify barriers to discharge w/patient and caregiver  - Arrange for needed discharge resources and transportation as appropriate  - Identify discharge learning needs (meds, wound care, etc )  - Arrange for interpretive services to assist at discharge as needed  - Refer to Case Management Department for coordinating discharge planning if the patient needs post-hospital services based on physician/advanced practitioner order or complex needs related to functional status, cognitive ability, or social support system  Outcome: Progressing     Problem: Knowledge Deficit  Goal: Patient/family/caregiver demonstrates understanding of disease process, treatment plan, medications, and discharge instructions  Description: Complete learning assessment and assess knowledge base    Interventions:  - Provide teaching at level of understanding  - Provide teaching via preferred learning methods  Outcome: Progressing     Problem: Neurological Deficit  Goal: Neurological status is stable or improving  Description: Interventions:  - Monitor and assess patient's level of consciousness, motor function, sensory function, and level of assistance needed for ADLs  - Monitor and report changes from baseline  Collaborate with interdisciplinary team to initiate plan and implement interventions as ordered  - Provide and maintain a safe environment  - Consider seizure precautions  - Consider fall precautions  - Consider aspiration precautions  - Consider bleeding precautions  Outcome: Progressing     Problem: Activity Intolerance/Impaired Mobility  Goal: Mobility/activity is maintained at optimum level for patient  Description: Interventions:  - Assess and monitor patient  barriers to mobility and need for assistive/adaptive devices  - Assess patient's emotional response to limitations  - Collaborate with interdisciplinary team and initiate plans and interventions as ordered  - Encourage independent activity per ability   - Maintain proper body alignment  - Perform active/passive rom as tolerated/ordered  - Plan activities to conserve energy   - Turn patient as appropriate  Outcome: Progressing     Problem: Communication Impairment  Goal: Ability to express needs and understand communication  Description: Assess patient's communication skills and ability to understand information  Patient will demonstrate use of effective communication techniques, alternative methods of communication and understanding even if not able to speak  - Encourage communication and provide alternate methods of communication as needed  - Collaborate with case management/ for discharge needs  - Include patient/family/caregiver in decisions related to communication    Outcome: Progressing     Problem: Potential for Aspiration  Goal: Non-ventilated patient's risk of aspiration is minimized  Description: Assess and monitor vital signs, respiratory status, and labs (WBC)  Monitor for signs of aspiration (tachypnea, cough, rales, wheezing, cyanosis, fever)  - Assess and monitor patient's ability to swallow  - Place patient up in chair to eat if possible  - HOB up at 90 degrees to eat if unable to get patient up into chair   - Supervise patient during oral intake  - Instruct patient/ family to take small bites  - Instruct patient/ family to take small single sips when taking liquids  - Follow patient-specific strategies generated by speech pathologist   Outcome: Progressing  Goal: Ventilated patient's risk of aspiration is minimized  Description: Assess and monitor vital signs, respiratory status, airway cuff pressure, and labs (WBC)  Monitor for signs of aspiration (tachypnea, cough, rales, wheezing, cyanosis, fever)  - Elevate head of bed 30 degrees if patient has tube feeding   - Monitor tube feeding  Outcome: Progressing     Problem: Nutrition  Goal: Nutrition/Hydration status is improving  Description: Monitor and assess patient's nutrition/hydration status for malnutrition (ex- brittle hair, bruises, dry skin, pale skin and conjunctiva, muscle wasting, smooth red tongue, and disorientation)  Collaborate with interdisciplinary team and initiate plan and interventions as ordered  Monitor patient's weight and dietary intake as ordered or per policy  Utilize nutrition screening tool and intervene per policy  Determine patient's food preferences and provide high-protein, high-caloric foods as appropriate  - Assist patient with eating   - Allow adequate time for meals   - Encourage patient to take dietary supplement as ordered  - Collaborate with clinical nutritionist   - Include patient/family/caregiver in decisions related to nutrition    Outcome: Progressing     Problem: Nutrition/Hydration-ADULT  Goal: Nutrient/Hydration intake appropriate for improving, restoring or maintaining nutritional needs  Description: Monitor and assess patient's nutrition/hydration status for malnutrition  Collaborate with interdisciplinary team and initiate plan and interventions as ordered  Monitor patient's weight and dietary intake as ordered or per policy  Utilize nutrition screening tool and intervene as necessary  Determine patient's food preferences and provide high-protein, high-caloric foods as appropriate  INTERVENTIONS:  - Monitor oral intake, urinary output, labs, and treatment plans  - Assess nutrition and hydration status and recommend course of action  - Evaluate amount of meals eaten  - Assist patient with eating if necessary   - Allow adequate time for meals  - Recommend/ encourage appropriate diets, oral nutritional supplements, and vitamin/mineral supplements  - Order, calculate, and assess calorie counts as needed  - Recommend, monitor, and adjust tube feedings and TPN/PPN based on assessed needs  - Assess need for intravenous fluids  - Provide specific nutrition/hydration education as appropriate  - Include patient/family/caregiver in decisions related to nutrition  Outcome: Progressing     Problem: NEUROSENSORY - ADULT  Goal: Achieves stable or improved neurological status  Description: INTERVENTIONS  - Monitor and report changes in neurological status  - Monitor vital signs such as temperature, blood pressure, glucose, and any other labs ordered   - Initiate measures to prevent increased intracranial pressure  - Monitor for seizure activity and implement precautions if appropriate      Outcome: Progressing  Goal: Achieves maximal functionality and self care  Description: INTERVENTIONS  - Monitor swallowing and airway patency with patient fatigue and changes in neurological status  - Encourage and assist patient to increase activity and self care     - Encourage visually impaired, hearing impaired and aphasic patients to use assistive/communication devices  Outcome: Progressing     Problem: CARDIOVASCULAR - ADULT  Goal: Maintains optimal cardiac output and hemodynamic stability  Description: INTERVENTIONS:  - Monitor I/O, vital signs and rhythm  - Monitor for S/S and trends of decreased cardiac output  - Administer and titrate ordered vasoactive medications to optimize hemodynamic stability  - Assess quality of pulses, skin color and temperature  - Assess for signs of decreased coronary artery perfusion  - Instruct patient to report change in severity of symptoms  Outcome: Progressing     Problem: RESPIRATORY - ADULT  Goal: Achieves optimal ventilation and oxygenation  Description: INTERVENTIONS:  - Assess for changes in respiratory status  - Assess for changes in mentation and behavior  - Position to facilitate oxygenation and minimize respiratory effort  - Oxygen administered by appropriate delivery if ordered  - Initiate smoking cessation education as indicated  - Encourage broncho-pulmonary hygiene including cough, deep breathe, Incentive Spirometry  - Assess the need for suctioning and aspirate as needed  - Assess and instruct to report SOB or any respiratory difficulty  - Respiratory Therapy support as indicated  Outcome: Progressing     Problem: GASTROINTESTINAL - ADULT  Goal: Minimal or absence of nausea and/or vomiting  Description: INTERVENTIONS:  - Administer IV fluids if ordered to ensure adequate hydration  - Maintain NPO status until nausea and vomiting are resolved  - Nasogastric tube if ordered  - Administer ordered antiemetic medications as needed  - Provide nonpharmacologic comfort measures as appropriate  - Advance diet as tolerated, if ordered  - Consider nutrition services referral to assist patient with adequate nutrition and appropriate food choices  Outcome: Progressing  Goal: Maintains adequate nutritional intake  Description: INTERVENTIONS:  - Monitor percentage of each meal consumed  - Identify factors contributing to decreased intake, treat as appropriate  - Assist with meals as needed  - Monitor I&O, weight, and lab values if indicated  - Obtain nutrition services referral as needed  Outcome: Progressing     Problem: GENITOURINARY - ADULT  Goal: Maintains or returns to baseline urinary function  Description: INTERVENTIONS:  - Assess urinary function  - Encourage oral fluids to ensure adequate hydration if ordered  - Administer IV fluids as ordered to ensure adequate hydration  - Administer ordered medications as needed  - Offer frequent toileting  - Follow urinary retention protocol if ordered  Outcome: Progressing     Problem: METABOLIC, FLUID AND ELECTROLYTES - ADULT  Goal: Electrolytes maintained within normal limits  Description: INTERVENTIONS:  - Monitor labs and assess patient for signs and symptoms of electrolyte imbalances  - Administer electrolyte replacement as ordered  - Monitor response to electrolyte replacements, including repeat lab results as appropriate  - Instruct patient on fluid and nutrition as appropriate  Outcome: Progressing     Problem: SKIN/TISSUE INTEGRITY - ADULT  Goal: Skin integrity remains intact  Description: INTERVENTIONS  - Identify patients at risk for skin breakdown  - Assess and monitor skin integrity  - Assess and monitor nutrition and hydration status  - Monitor labs (i e  albumin)  - Assess for incontinence   - Turn and reposition patient  - Assist with mobility/ambulation  - Relieve pressure over bony prominences  - Avoid friction and shearing  - Provide appropriate hygiene as needed including keeping skin clean and dry  - Evaluate need for skin moisturizer/barrier cream  - Collaborate with interdisciplinary team (i e  Nutrition, Rehabilitation, etc )   - Patient/family teaching  Outcome: Progressing     Problem: HEMATOLOGIC - ADULT  Goal: Maintains hematologic stability  Description: INTERVENTIONS  - Assess for signs and symptoms of bleeding or hemorrhage  - Monitor labs  - Administer supportive blood products/factors as ordered and appropriate  Outcome: Progressing     Problem: MUSCULOSKELETAL - ADULT  Goal: Maintain or return mobility to safest level of function  Description: INTERVENTIONS:  - Assess patient's ability to carry out ADLs; assess patient's baseline for ADL function and identify physical deficits which impact ability to perform ADLs (bathing, care of mouth/teeth, toileting, grooming, dressing, etc )  - Assess/evaluate cause of self-care deficits   - Assess range of motion  - Assess patient's mobility  - Assess patient's need for assistive devices and provide as appropriate  - Encourage maximum independence but intervene and supervise when necessary  - Involve family in performance of ADLs  - Assess for home care needs following discharge   - Consider OT consult to assist with ADL evaluation and planning for discharge  - Provide patient education as appropriate  Outcome: Progressing     Problem: COPING  Goal: Pt/Family able to verbalize concerns and demonstrate effective coping strategies  Description: INTERVENTIONS:  - Assist patient/family to identify coping skills, available support systems and cultural and spiritual values  - Provide emotional support, including active listening and acknowledgement of concerns of patient and caregivers  - Reduce environmental stimuli, as able  - Provide patient education  - Assess for spiritual pain/suffering and initiate spiritual care, including notification of Pastoral Care or анна based community as needed  - Assess effectiveness of coping strategies  Outcome: Progressing  Goal: Will report anxiety at manageable levels  Description: INTERVENTIONS:  - Administer medication as ordered  - Teach and encourage coping skills  - Provide emotional support  - Assess patient/family for anxiety and ability to cope  Outcome: Progressing

## 2020-09-08 NOTE — ASSESSMENT & PLAN NOTE
Vascular appreciated  ASA and statin  Carotid dopplers completed - LICA > 39%, VASQUEZ < 69%  Vascular recommends OP f/u sicne the etiology for her symptoms on presentation was most likely cardio embolic

## 2020-09-08 NOTE — ASSESSMENT & PLAN NOTE
POA  Wound care and piper surg appreciated  Patient reports ulcer developed status post recent hip fracture  8/26 excisional debridement of sacral pressure wound and washout   8/28 Debridement and washout of sacral decubitus ulcer  C/w trujillo  ID recs divesrsion colostomy to help w/ healing  D/w surgery - colostomy is not indicated as wound is distant from rectum and has not been getting contaminated by stool  Wound VAC placed on 9/1 9/8-followed by ID; will complete antibiotic therapy after dosing on 09/09; white count went down stay substantially 13 82 overnight  Patient remains stable and may require antibiotics if candidate for flap closure of current sacral wound

## 2020-09-08 NOTE — WOUND OSTOMY CARE
Progress Note - Wound   Roopa Baker 67 y o  female MRN: [id number]  Unit/Bed#: [facility] 504-01 Encounter: [id number]        Assessment:   Patient seen this morning for continued skin and wound care  She is awake, alert in bed with no complaints  Negative pressure therapy with intact seal, therapy is placed to her sacral wound  Patient agreeable for wound care nurse to re-assess one more time her skin to breast and abdominal fold area  Findings  1-Bilateral breast with intact skin, candidal intertrigo is fully resolved  2-Bilateral abdominal fold and groin area with intact skin  Candidiasis is now fully resolved  Continue with following Skin care plans:   1-Hydraguard to heels BID and PRN   2-Elevate heels to offload pressure  3-Ehob cushion in chair when out of bed  4-Moisturize skin daily with skin nourishing cream    5-Turn/reposition q2h or when medically stable for pressure re-distribution on skin  6-Cleanse under breasts and pannus with CHG  Dust with nystatin powder BID  7-Cleanse groin with soap and water  Dust with nystatin powder BID  8-Low air loss mattress      Vitals: Blood pressure 104/53, pulse 100, temperature 98 °F (36 7 °C), temperature source Oral, resp  rate 18, height 5' 6" (1 676 m), weight 81 6 kg (179 lb 14 3 oz), SpO2 95 %  ,Body mass index is 29 04 kg/m²  Our skin care recommendations remains as nursing orders, wound care is signing off, please call ext [phone] or [phone] with questions or concerns          Hosea Lopez RN, BSN, [facility]

## 2020-09-08 NOTE — PROGRESS NOTES
Progress Note - Infectious Disease   Mary Morley 67 y o  female MRN: [id number]  Unit/Bed#: [facility] 504-01 Encounter: [id number]      Impression/Plan:    1  Septic shock, POA:   secondary to infected sacral ulcer and secondary bacteremia   Patient is clinically much improved, shock resolved  WBC now down trending   Patient offers no complaints today, remains afebrile and hemodynamically stable  Antibiotic plan as in below  Monitor hemodynamics and trend temperature curve closely     2  Polymicrobial bacteremia:  Strep consider lettuce, Enterococcus faecalis and Bacteroides in blood cultures   Source is most likely sacral ulcer   Patient is clinically much improved   Bacteremia has cleared   2D echo without vegetation  Continue IV Zosyn  Treat x 14 days total, through 9/9/2020     3  Chronic sacral osteomyelitis:  No role for long-term IV antibiotics in setting of exposed bone   Down the line, if patient is able to off load, she may be a candidate for flap closure   At the time of flap closure, long-term IV antibiotic may be indicated  No antibiotic needed for this indication at this time  Consider long-term IV antibiotic at time of flap closure, if patient is a candidate      4  Large sacral decubitus ulcer:   Patient will need offloading to help ulcer healed   Diverting colostomy would be helpful in preventing stool soiling of sacral ulcer  Local wound Care; vac dressing change as per surgery team  Recommend frequent offloading         5   Leukocytosis:  WBC now decreasing, today WBC count of 13,000;  Patient afebrile, procalcitonin has normalized, she offers no complaints, remains hemodynamically stable    No evidence of diarrhea  - continue antibiotics as above  - repeat CBC with differential in a m      Discussed with patient in detail regarding the above plan           Antibiotics:  Zosyn  Antibiotic day 15  POD 13    Subjective:  Patient has no fever, chills, sweats; no nausea, vomiting, diarrhea; no cough, shortness of breath; no pain  No new symptoms  Objective:  Vitals:  Temp:  [98 3 °F (36 8 °C)-99 1 °F (37 3 °C)] 98 4 °F (36 9 °C)  HR:  [] 91  Resp:  [17-18] 18  BP: ()/(53-68) 125/58  SpO2:  [90 %-92 %] 90 %  Temp (24hrs), Av 6 °F (37 °C), Min:98 3 °F (36 8 °C), Max:99 1 °F (37 3 °C)  Current: Temperature: 98 4 °F (36 9 °C)    Physical Exam:   General Appearance:  Alert, interactive, nontoxic, no acute distress  Throat: Oropharynx moist without lesions  Lungs:   Clear to auscultation bilaterally; no wheezes, rhonchi or rales; respirations unlabored   Heart:  RRR; no murmur, rub or gallop   Abdomen:   Soft, non-tender, non-distended, positive bowel sounds  Extremities: No clubbing, cyanosis or edema   Skin: No new rashes or lesions    Wound VAC in place over sacral ulcer       Labs, Imaging, & Other studies:   All pertinent labs and imaging studies were personally reviewed  Results from last 7 days   Lab Units 20  0438 20  0439 20  0940   WBC Thousand/uL 13 82* 17 34* 9 58   HEMOGLOBIN g/dL 7 8* 8 2* 7 8*   PLATELETS Thousands/uL 235 236 182     Results from last 7 days   Lab Units 20  0544 20  0559 20  0511   SODIUM mmol/L 141 141 141   POTASSIUM mmol/L 3 9 4 0 4 2   CHLORIDE mmol/L 106 107 108   CO2 mmol/L 32 32 29   BUN mg/dL 44* 42* 45*   CREATININE mg/dL 1 01 0 97 1 06   EGFR ml/min/1 73sq m 54 57 51   CALCIUM mg/dL 7 8* 8 2* 7 8*         Results from last 7 days   Lab Units 20  0438 20  0909   PROCALCITONIN ng/ml 0 07 0 09

## 2020-09-08 NOTE — ASSESSMENT & PLAN NOTE
Pt now off pressors  ID appreciated  C/w Zosyn for total 14 days, through 9/9  B Cx from 8/28 neg  2/2 infected stage 4 DQ ulcer  Cleared from ID standpoint for PICC as B Cx neg for 5 days  WBC up today - no clinical changes  Will monitor CBC    9/8-white count trended down overnight-13 82; patient afebrile with elevated heart rate; however this may be from underlying AFib  Currently on antibiotics that will be completed after tomorrow's dosing  Monitor morning labs

## 2020-09-08 NOTE — ASSESSMENT & PLAN NOTE
- pt presents with to [facility] with right-sided facial droop and right-sided weakness after being transported by EMS for [facility] and noted to have atrial fibrillation enroute  - CT head in ED shows posterior cerebral artery disease is noted, mild on the right and severe left  - NIH - score on admission; - 6  -c/w ASA and statin  - MRI brain no acute findings  - CTA shows bilateral stenosis of carotids; less than 75%  LDL 23  Echo shows mild pulm htn  Neuro signed off  Pt now in Afib    Neuro recs BASA and AC in setting of Afib and carotid stenosis

## 2020-09-08 NOTE — ASSESSMENT & PLAN NOTE
S/p DCCV in route to [facility]  8/30 pt in Afib w/ moderate VR  Cardio appreciated  Heparin gtt - switched to coumadin  gave 2 5 mg on 9/5  INR > 3, continue to hold today  Lopressor for rate control - increased to 75 BID  TSH WNL    9/8-INR 3 35 on morning labs; continue to hold warfarin and retest INR tomorrow

## 2020-09-08 NOTE — PROGRESS NOTES
Progress Note - Brittney Horton 1945, 67 y o  female MRN: [id number]    Unit/Bed#: [facility] 504-01 Encounter: [id number]    Primary Care Provider: TJ Hairston   Date and time admitted to hospital: 8/25/2020  4:06 PM        Stenosis of left carotid artery  Assessment & Plan  Vascular appreciated  ASA and statin  Carotid dopplers completed - LICA > 61%, VASQUEZ < 78%  Vascular recommends OP f/u sicne the etiology for her symptoms on presentation was most likely cardio embolic    Anemia  Assessment & Plan  S/p 1U PRBC in ICU  Appears stable at this time    Bacteremia due to Enterococcus  Assessment & Plan  B Cx pos for Enterococcus and Strep Constellatus  Zosyn x 14 days  ID appreciated  PICC consent obtained and PICC placed    Paroxysmal atrial fibrillation ([facility] [address] )  Assessment & Plan  S/p DCCV in route to [facility]  8/30 pt in Afib w/ moderate VR  Cardio appreciated  Heparin gtt - switched to coumadin  gave 2 5 mg on 9/5  INR > 3, continue to hold today  Lopressor for rate control - increased to 75 BID  TSH WNL    9/8-INR 3 35 on morning labs; continue to hold warfarin and retest INR tomorrow  RORY (acute kidney injury) ([facility] [address] )  Assessment & Plan  Estimated Creatinine Clearance: 51 mL/min (by C-G formula based on SCr of 1 01 mg/dL)  POA  Patient noted to have RORY on admission; serum creatinine 2 59 (baseline 1 2-1 3)  2/2 shock  Appears to be resolved    Stroke-like symptoms  Assessment & Plan  - pt presents with to [facility] with right-sided facial droop and right-sided weakness after being transported by EMS for [facility] and noted to have atrial fibrillation enroute  - CT head in ED shows posterior cerebral artery disease is noted, mild on the right and severe left  - NIH - score on admission; - 6  -c/w ASA and statin  - MRI brain no acute findings  - CTA shows bilateral stenosis of carotids; less than 75%  LDL 23  Echo shows mild pulm htn  Neuro signed off  Pt now in Afib    Neuro recs BASA and AC in setting of Afib and carotid stenosis    Decubitus ulcer of sacral region, stage 4 [facility])  Assessment & Plan  POA  Wound care and piper surg appreciated  Patient reports ulcer developed status post recent hip fracture  8/26 excisional debridement of sacral pressure wound and washout   8/28 Debridement and washout of sacral decubitus ulcer  C/w trujillo  ID recs divesrsion colostomy to help w/ healing  D/w surgery - colostomy is not indicated as wound is distant from rectum and has not been getting contaminated by stool  Wound VAC placed on 9/1 9/8-followed by ID; will complete antibiotic therapy after dosing on 09/09; white count went down stay substantially 13 82 overnight  Patient remains stable and may require antibiotics if candidate for flap closure of current sacral wound  Hyperlipidemia  Assessment & Plan  Previously on pravastatin; will start high-intensity statin with Lipitor 40 in setting of carotid stenosis  LDL 23    Hypertension  Assessment & Plan  Pt needed pressors on admission  Now on BB for Afib    Type 2 diabetes mellitus with hyperglycemia (HCC)  Assessment & Plan    Lab Results   Component Value Date    HGBA1C 9 9 (H) 08/26/2020     SSI  Blood Glucose checks TIDWM and QHS  Hold oral medications  continue Lantus 14 units, Humalog 5 units with meals  PT should take insulin at d/c    * Septic shock due to gram-positive bacteria ([facility] [address] )  Assessment & Plan  Pt now off pressors  ID appreciated  C/w Zosyn for total 14 days, through 9/9  B Cx from 8/28 neg  2/2 infected stage 4 DQ ulcer  Cleared from ID standpoint for PICC as B Cx neg for 5 days  WBC up today - no clinical changes  Will monitor CBC    9/8-white count trended down overnight-13 82; patient afebrile with elevated heart rate; however this may be from underlying AFib  Currently on antibiotics that will be completed after tomorrow's dosing  Monitor morning labs        VTE Pharmacologic Prophylaxis:   Pharmacologic: Warfarin (Coumadin)  Mechanical VTE Prophylaxis in Place: Yes    Patient Centered Rounds: I have performed bedside rounds with nursing staff today  Discussions with Specialists or Other Care Team Provider:     Education and Discussions with Family / Patient: Care plan discussed with patient who voiced understanding and agrees with recommendations  Time Spent for Care: 30 minutes  More than 50% of total time spent on counseling and coordination of care as described above  Current Length of Stay: 14 day(s)    Current Patient Status: Inpatient   Certification Statement: The patient will continue to require additional inpatient hospital stay due to Awaiting placement facility    Discharge Plan: To be determined    Code Status: Level 1 - Full Code      Subjective:   Patient seen examined bedside, no acute distress or discomfort noted  Patient reports that she feels well; chronic Jimenez and wound VAC in respective places  Patient with white count of 17 34 day before yesterday; this morning 13 82  Will complete antibiotics after last dose on   Hemoglobin 7 8; patient asymptomatic in this appears to be pretty close to her baseline  Followed by infectious disease and currently on Zosyn 3 tomorrow  Have discussed with case management, patient needs accepting facility  Once antibiotics completed for bacteremia; no further antibiotics required unless patient able to undergo flap closure of her bone exposed sacral ulcer  Vital signs stable otherwise and pain well controlled  Objective:     Vitals:   Temp (24hrs), Av 2 °F (36 8 °C), Min:98 °F (36 7 °C), Max:98 4 °F (36 9 °C)    Temp:  [98 °F (36 7 °C)-98 4 °F (36 9 °C)] 98 °F (36 7 °C)  HR:  [] 100  Resp:  [17-18] 18  BP: (104-125)/(53-68) 104/53  SpO2:  [90 %-95 %] 95 %  Body mass index is 29 04 kg/m²  Input and Output Summary (last 24 hours):        Intake/Output Summary (Last 24 hours) at 2020 2574  Last data filed at 2020 1201  Gross per 24 hour   Intake 670 ml   Output 375 ml   Net 295 ml       Physical Exam:     Physical Exam  Vitals signs and nursing note reviewed  Constitutional:       Appearance: She is obese  HENT:      Head: Normocephalic and atraumatic  Right Ear: External ear normal       Left Ear: External ear normal       Nose: Nose normal       Mouth/Throat:      Mouth: Mucous membranes are moist    Eyes:      Extraocular Movements: Extraocular movements intact  Conjunctiva/sclera: Conjunctivae normal       Pupils: Pupils are equal, round, and reactive to light  Neck:      Musculoskeletal: Normal range of motion and neck supple  Cardiovascular:      Rate and Rhythm: Tachycardia present  Rhythm irregular  Heart sounds: Normal heart sounds  Pulmonary:      Effort: Pulmonary effort is normal       Breath sounds: Normal breath sounds  Abdominal:      Palpations: Abdomen is soft  Genitourinary:     Comments: Chronic Jimenez  Skin:     Comments: Stage IV sacral ulcer   Neurological:      Mental Status: She is alert and oriented to person, place, and time  Psychiatric:         Mood and Affect: Mood normal          Behavior: Behavior normal          Judgment: Judgment normal            Additional Data:     Labs:    Results from last 7 days   Lab Units 09/08/20  0438  09/02/20  0518   WBC Thousand/uL 13 82*   < > 12 09*   HEMOGLOBIN g/dL 7 8*   < > 9 2*   HEMATOCRIT % 25 1*   < > 29 9*   PLATELETS Thousands/uL 235   < > 202   BANDS PCT %  --   --  1   NEUTROS PCT % 72   < >  --    LYMPHS PCT % 19   < >  --    LYMPHO PCT %  --   --  11*   MONOS PCT % 6   < >  --    MONO PCT %  --   --  2*   EOS PCT % 1   < > 1    < > = values in this interval not displayed       Results from last 7 days   Lab Units 09/05/20  0544   SODIUM mmol/L 141   POTASSIUM mmol/L 3 9   CHLORIDE mmol/L 106   CO2 mmol/L 32   BUN mg/dL 44*   CREATININE mg/dL 1 01   ANION GAP mmol/L 3*   CALCIUM mg/dL 7 8*   GLUCOSE RANDOM mg/dL 158*     Results from last 7 days   Lab Units 09/08/20  0438   INR  3 35*     Results from last 7 days   Lab Units 09/08/20  1622 09/08/20  1126 09/08/20  0625 09/07/20  2108 09/07/20  1603 09/07/20  1039 09/07/20  0617 09/06/20  2105 09/06/20  1632 09/06/20  1028 09/06/20  0619 09/05/20  2057   POC GLUCOSE mg/dl 147* 137 79 143* 145* 168* 109 177* 175* 183* 171* 171*         Results from last 7 days   Lab Units 09/08/20  0438 09/07/20  0909   PROCALCITONIN ng/ml 0 07 0 09           * I Have Reviewed All Lab Data Listed Above  * Additional Pertinent Lab Tests Reviewed: All Labs Within Last 24 Hours Reviewed    Imaging:    Imaging Reports Reviewed Today Include:   Imaging Personally Reviewed by Myself Includes:      Recent Cultures (last 7 days):           Last 24 Hours Medication List:   Current Facility-Administered Medications   Medication Dose Route Frequency Provider Last Rate    acetaminophen  650 mg Oral Q6H PRN Gia , DO      aspirin  81 mg Oral Daily Gia , DO      atorvastatin  40 mg Oral Daily With [employer]RIZ      gabapentin  100 mg Oral TID Nishi King PA-C      hydrALAZINE  10 mg Intravenous Q4H PRN Nishi King PA-C      insulin glargine  14 Units Subcutaneous HS Hemant Azevedo MD      insulin lispro  1-6 Units Subcutaneous 4x Daily (AC & HS) Cele Chavira PA-C      insulin lispro  7 Units Subcutaneous TID With Meals Hemant Azevedo MD      metoprolol tartrate  25 mg Oral Once TJ Charles      metoprolol tartrate  75 mg Oral Q12H [address] Mali Gibbs MD      nystatin   Topical BID Nishi King PA-C      piperacillin-tazobactam  2 25 g Intravenous Q6H TJ Sinclair 2 25 g (09/08/20 1719)    torsemide  20 mg Oral Daily Mali Gibbs MD          Today, Patient Was Seen By: [place] Espinosa MD    ** Please Note: Dictation voice to text software may have been used in the creation of this document   **

## 2020-09-08 NOTE — ASSESSMENT & PLAN NOTE
Estimated Creatinine Clearance: 51 mL/min (by C-G formula based on SCr of 1 01 mg/dL)    POA  Patient noted to have RORY on admission; serum creatinine 2 59 (baseline 1 2-1 3)  2/2 shock  Appears to be resolved

## 2020-09-09 LAB
ANION GAP SERPL CALCULATED.3IONS-SCNC: 5 MMOL/L (ref 4–13)
BASOPHILS # BLD AUTO: 0.11 THOUSANDS/ΜL (ref 0–0.1)
BASOPHILS NFR BLD AUTO: 1 % (ref 0–1)
BUN SERPL-MCNC: 50 MG/DL (ref 5–25)
CALCIUM SERPL-MCNC: 7.7 MG/DL (ref 8.3–10.1)
CHLORIDE SERPL-SCNC: 103 MMOL/L (ref 100–108)
CO2 SERPL-SCNC: 33 MMOL/L (ref 21–32)
CREAT SERPL-MCNC: 1.03 MG/DL (ref 0.6–1.3)
EOSINOPHIL # BLD AUTO: 0.14 THOUSAND/ΜL (ref 0–0.61)
EOSINOPHIL NFR BLD AUTO: 1 % (ref 0–6)
ERYTHROCYTE [DISTWIDTH] IN BLOOD BY AUTOMATED COUNT: 15.5 % (ref 11.6–15.1)
FERRITIN SERPL-MCNC: 270 NG/ML (ref 8–388)
GFR SERPL CREATININE-BSD FRML MDRD: 53 ML/MIN/1.73SQ M
GLUCOSE SERPL-MCNC: 111 MG/DL (ref 65–140)
GLUCOSE SERPL-MCNC: 117 MG/DL (ref 65–140)
GLUCOSE SERPL-MCNC: 122 MG/DL (ref 65–140)
GLUCOSE SERPL-MCNC: 182 MG/DL (ref 65–140)
GLUCOSE SERPL-MCNC: 190 MG/DL (ref 65–140)
HCT VFR BLD AUTO: 25.1 % (ref 34.8–46.1)
HEMOCCULT STL QL: NEGATIVE
HGB BLD-MCNC: 7.5 G/DL (ref 11.5–15.4)
HGB RETIC QN AUTO: 30.9 PG (ref 30–38.3)
IMM GRANULOCYTES # BLD AUTO: 0.05 THOUSAND/UL (ref 0–0.2)
IMM GRANULOCYTES NFR BLD AUTO: 0 % (ref 0–2)
IMM RETICS NFR: 21.1 % (ref 0–14)
INR PPP: 2.57 (ref 0.84–1.19)
IRON SATN MFR SERPL: 15 %
IRON SERPL-MCNC: 17 UG/DL (ref 50–170)
LYMPHOCYTES # BLD AUTO: 2.41 THOUSANDS/ΜL (ref 0.6–4.47)
LYMPHOCYTES NFR BLD AUTO: 21 % (ref 14–44)
MCH RBC QN AUTO: 28.7 PG (ref 26.8–34.3)
MCHC RBC AUTO-ENTMCNC: 29.9 G/DL (ref 31.4–37.4)
MCV RBC AUTO: 96 FL (ref 82–98)
MONOCYTES # BLD AUTO: 0.72 THOUSAND/ΜL (ref 0.17–1.22)
MONOCYTES NFR BLD AUTO: 6 % (ref 4–12)
NEUTROPHILS # BLD AUTO: 7.86 THOUSANDS/ΜL (ref 1.85–7.62)
NEUTS SEG NFR BLD AUTO: 71 % (ref 43–75)
NRBC BLD AUTO-RTO: 0 /100 WBCS
PLATELET # BLD AUTO: 237 THOUSANDS/UL (ref 149–390)
PMV BLD AUTO: 11.4 FL (ref 8.9–12.7)
POTASSIUM SERPL-SCNC: 3.5 MMOL/L (ref 3.5–5.3)
PROTHROMBIN TIME: 27.4 SECONDS (ref 11.6–14.5)
RBC # BLD AUTO: 2.61 MILLION/UL (ref 3.81–5.12)
RETICS # AUTO: ABNORMAL 10*3/UL (ref 14097–95744)
RETICS # CALC: 4.29 % (ref 0.37–1.87)
SARS-COV-2 RNA RESP QL NAA+PROBE: NEGATIVE
SODIUM SERPL-SCNC: 141 MMOL/L (ref 136–145)
TIBC SERPL-MCNC: 112 UG/DL (ref 250–450)
WBC # BLD AUTO: 11.29 THOUSAND/UL (ref 4.31–10.16)

## 2020-09-09 PROCEDURE — 85610 PROTHROMBIN TIME: CPT | Performed by: HOSPITALIST

## 2020-09-09 PROCEDURE — 80048 BASIC METABOLIC PNL TOTAL CA: CPT | Performed by: INTERNAL MEDICINE

## 2020-09-09 PROCEDURE — 82948 REAGENT STRIP/BLOOD GLUCOSE: CPT

## 2020-09-09 PROCEDURE — 83550 IRON BINDING TEST: CPT | Performed by: INTERNAL MEDICINE

## 2020-09-09 PROCEDURE — 99232 SBSQ HOSP IP/OBS MODERATE 35: CPT | Performed by: INTERNAL MEDICINE

## 2020-09-09 PROCEDURE — 82272 OCCULT BLD FECES 1-3 TESTS: CPT | Performed by: INTERNAL MEDICINE

## 2020-09-09 PROCEDURE — 82728 ASSAY OF FERRITIN: CPT | Performed by: INTERNAL MEDICINE

## 2020-09-09 PROCEDURE — 85025 COMPLETE CBC W/AUTO DIFF WBC: CPT | Performed by: INTERNAL MEDICINE

## 2020-09-09 PROCEDURE — 85046 RETICYTE/HGB CONCENTRATE: CPT | Performed by: INTERNAL MEDICINE

## 2020-09-09 PROCEDURE — 83540 ASSAY OF IRON: CPT | Performed by: INTERNAL MEDICINE

## 2020-09-09 PROCEDURE — U0003 INFECTIOUS AGENT DETECTION BY NUCLEIC ACID (DNA OR RNA); SEVERE ACUTE RESPIRATORY SYNDROME CORONAVIRUS 2 (SARS-COV-2) (CORONAVIRUS DISEASE [COVID-19]), AMPLIFIED PROBE TECHNIQUE, MAKING USE OF HIGH THROUGHPUT TECHNOLOGIES AS DESCRIBED BY CMS-2020-01-R: HCPCS | Performed by: INTERNAL MEDICINE

## 2020-09-09 RX ORDER — WARFARIN SODIUM 1 MG/1
2 TABLET ORAL
Status: DISCONTINUED | OUTPATIENT
Start: 2020-09-09 | End: 2020-09-10 | Stop reason: HOSPADM

## 2020-09-09 RX ADMIN — WARFARIN SODIUM 2 MG: 1 TABLET ORAL at 17:19

## 2020-09-09 RX ADMIN — INSULIN LISPRO 1 UNITS: 100 INJECTION, SOLUTION INTRAVENOUS; SUBCUTANEOUS at 22:02

## 2020-09-09 RX ADMIN — METOPROLOL TARTRATE 75 MG: 50 TABLET, FILM COATED ORAL at 08:28

## 2020-09-09 RX ADMIN — INSULIN GLARGINE 14 UNITS: 100 INJECTION, SOLUTION SUBCUTANEOUS at 22:02

## 2020-09-09 RX ADMIN — ASPIRIN 81 MG: 81 TABLET, COATED ORAL at 08:28

## 2020-09-09 RX ADMIN — PIPERACILLIN AND TAZOBACTAM 2.25 G: 2; .25 INJECTION, POWDER, FOR SOLUTION INTRAVENOUS at 17:23

## 2020-09-09 RX ADMIN — PIPERACILLIN AND TAZOBACTAM 2.25 G: 2; .25 INJECTION, POWDER, FOR SOLUTION INTRAVENOUS at 04:56

## 2020-09-09 RX ADMIN — TORSEMIDE 20 MG: 20 TABLET ORAL at 08:28

## 2020-09-09 RX ADMIN — GABAPENTIN 100 MG: 100 CAPSULE ORAL at 08:28

## 2020-09-09 RX ADMIN — GABAPENTIN 100 MG: 100 CAPSULE ORAL at 22:02

## 2020-09-09 RX ADMIN — PIPERACILLIN AND TAZOBACTAM 2.25 G: 2; .25 INJECTION, POWDER, FOR SOLUTION INTRAVENOUS at 23:00

## 2020-09-09 RX ADMIN — ALTEPLASE 2 MG: 2.2 INJECTION, POWDER, LYOPHILIZED, FOR SOLUTION INTRAVENOUS at 05:35

## 2020-09-09 RX ADMIN — NYSTATIN: 100000 POWDER TOPICAL at 08:30

## 2020-09-09 RX ADMIN — GABAPENTIN 100 MG: 100 CAPSULE ORAL at 17:19

## 2020-09-09 RX ADMIN — METOPROLOL TARTRATE 75 MG: 50 TABLET, FILM COATED ORAL at 22:02

## 2020-09-09 RX ADMIN — ATORVASTATIN CALCIUM 40 MG: 40 TABLET, FILM COATED ORAL at 17:19

## 2020-09-09 RX ADMIN — PIPERACILLIN AND TAZOBACTAM 2.25 G: 2; .25 INJECTION, POWDER, FOR SOLUTION INTRAVENOUS at 11:38

## 2020-09-09 RX ADMIN — NYSTATIN: 100000 POWDER TOPICAL at 17:20

## 2020-09-09 RX ADMIN — INSULIN LISPRO 2 UNITS: 100 INJECTION, SOLUTION INTRAVENOUS; SUBCUTANEOUS at 17:20

## 2020-09-09 NOTE — ASSESSMENT & PLAN NOTE
S/p 1U PRBC in ICU  Appears stable at this time    9/9; hemoglobin appears to be trending down from 8 0 2-7 0 8-7 5; iron studies ordered as well as FOBT  -patient noted to have wound VAC which may be contributing to overall decline in hemoglobin, monitor and transfuse as needed

## 2020-09-09 NOTE — PROGRESS NOTES
Progress Note - Son Kessler 1945, 67 y o  female MRN: [id number]    Unit/Bed#: [facility] 504-01 Encounter: [id number]    Primary Care Provider: TJ Carreon   Date and time admitted to hospital: 8/25/2020  4:06 PM        Chronic diastolic heart failure [facility])  Assessment & Plan  8/30 IV Bumex & IV lasix given as pt overloaded from resuscitation and PRBC with excellent UOP  Now switched to PO torsemide 20 mg (was on Lasix 20 mg at home)    Stenosis of left carotid artery  Assessment & Plan  Vascular appreciated  ASA and statin  Carotid dopplers completed - LICA > 88%, VASQUEZ < 24%  Vascular recommends OP f/u sicne the etiology for her symptoms on presentation was most likely cardio embolic    Anemia  Assessment & Plan  S/p 1U PRBC in ICU  Appears stable at this time    9/9; hemoglobin appears to be trending down from 8 0 2-7 0 8-7 5; iron studies ordered as well as FOBT  -patient noted to have wound VAC which may be contributing to overall decline in hemoglobin, monitor and transfuse as needed  Bacteremia due to Enterococcus  Assessment & Plan  B Cx pos for Enterococcus and Strep Constellatus  Zosyn x 14 days  ID appreciated  PICC consent obtained and PICC placed    Paroxysmal atrial fibrillation ([facility] [address] )  Assessment & Plan  S/p DCCV in route to [facility]  8/30 pt in Afib w/ moderate VR  Cardio appreciated  Heparin gtt - switched to coumadin  gave 2 5 mg on 9/5  INR > 3, continue to hold today  Lopressor for rate control - increased to 75 BID  TSH WNL    9/8-INR 3 35 on morning labs; continue to hold warfarin and retest INR tomorrow  9/9-INR -2 57; will restart warfarin 2 mg; monitor on daily labs    RORY (acute kidney injury) ([facility] [address] )  Assessment & Plan  Estimated Creatinine Clearance: 49 7 mL/min (by C-G formula based on SCr of 1 03 mg/dL)    POA  Patient noted to have RORY on admission; serum creatinine 2 59 (baseline 1 2-1 3)  2/2 shock  Appears to be resolved    Stroke-like symptoms  Assessment & Plan  - pt presents with to [facility] with right-sided facial droop and right-sided weakness after being transported by EMS for [facility] and noted to have atrial fibrillation enroute  - CT head in ED shows posterior cerebral artery disease is noted, mild on the right and severe left  - NIH - score on admission; - 6  -c/w ASA and statin  - MRI brain no acute findings  - CTA shows bilateral stenosis of carotids; less than 75%  LDL 23  Echo shows mild pulm htn  Neuro signed off  Pt now in Afib  Neuro recs BASA and AC in setting of Afib and carotid stenosis    ---currently on baby aspirin and anticoagulated with warfarin    Decubitus ulcer of sacral region, stage 4 (HCC)  Assessment & Plan  POA  Wound care and piper surg appreciated  Patient reports ulcer developed status post recent hip fracture  8/26 excisional debridement of sacral pressure wound and washout   8/28 Debridement and washout of sacral decubitus ulcer  C/w trujillo  ID recs divesrsion colostomy to help w/ healing  D/w surgery - colostomy is not indicated as wound is distant from rectum and has not been getting contaminated by stool  Wound VAC placed on 9/1 9/8-followed by ID; will complete antibiotic therapy after dosing on 09/09; white count went down stay substantially 13 82 overnight  Patient remains stable and may require antibiotics if candidate for flap closure of current sacral wound      Hyperlipidemia  Assessment & Plan  Previously on pravastatin; will start high-intensity statin with Lipitor 40 in setting of carotid stenosis  LDL 23    Hypertension  Assessment & Plan  Pt needed pressors on admission  Now on BB for Afib    Type 2 diabetes mellitus with hyperglycemia (HCC)  Assessment & Plan    Lab Results   Component Value Date    HGBA1C 9 9 (H) 08/26/2020     SSI  Blood Glucose checks TIDWM and QHS  Hold oral medications  continue Lantus 14 units, Humalog 5 units with meals  PT should take insulin at d/c    * Septic shock due to gram-positive bacteria [facility])  Assessment & Plan  Pt now off pressors  ID appreciated  C/w Zosyn for total 14 days, through   B Cx from  neg  2/2 infected stage 4 DQ ulcer  Cleared from ID standpoint for PICC as B Cx neg for 5 days  WBC up today - no clinical changes  Will monitor CBC    -white count trended down overnight-13 82; patient afebrile with elevated heart rate; however this may be from underlying AFib  Currently on antibiotics that will be completed after tomorrow's dosing  Monitor morning labs  -white count trending down to 11 29; will administer 3 more doses of Zosyn and patient will that have completed her 14 days of antibiotic therapy      VTE Pharmacologic Prophylaxis:   Pharmacologic: Warfarin (Coumadin)  Mechanical VTE Prophylaxis in Place: Yes    Patient Centered Rounds: I have performed bedside rounds with nursing staff today  Discussions with Specialists or Other Care Team Provider:     Education and Discussions with Family / Patient: Care plan discussed with patient who voiced understanding and agrees with recommendations  Time Spent for Care: 30 minutes  More than 50% of total time spent on counseling and coordination of care as described above  Current Length of Stay: 15 day(s)    Current Patient Status: Inpatient   Certification Statement: The patient will continue to require additional inpatient hospital stay due to IV antibiotics and awaiting placement    Discharge Plan: To be determined    Code Status: Level 1 - Full Code      Subjective:   Patient seen examined bedside, no acute distress or discomfort noted  Patient awaiting accepting facility at this time; will complete IV Zosyn tonight  INR therapeutic and will restart Coumadin at 2 mg daily  Vital signs stable      Objective:     Vitals:   Temp (24hrs), Av 2 °F (36 8 °C), Min:98 °F (36 7 °C), Max:98 3 °F (36 8 °C)    Temp:  [98 °F (36 7 °C)-98 3 °F (36 8 °C)] 98 3 °F (36 8 °C)  HR:  [] 94  Resp:  [16-18] 16  BP: (104-124)/(51-53) 107/52  SpO2:  [91 %-95 %] 91 %  Body mass index is 28 68 kg/m²  Input and Output Summary (last 24 hours): Intake/Output Summary (Last 24 hours) at 9/9/2020 1114  Last data filed at 9/9/2020 0304  Gross per 24 hour   Intake 682 ml   Output 1950 ml   Net -1268 ml       Physical Exam:     Physical Exam  Vitals signs and nursing note reviewed  Constitutional:       Appearance: She is obese  HENT:      Head: Normocephalic and atraumatic  Right Ear: External ear normal       Left Ear: External ear normal       Nose: Nose normal       Mouth/Throat:      Mouth: Mucous membranes are moist    Eyes:      Extraocular Movements: Extraocular movements intact  Conjunctiva/sclera: Conjunctivae normal       Pupils: Pupils are equal, round, and reactive to light  Neck:      Musculoskeletal: Normal range of motion and neck supple  Cardiovascular:      Rate and Rhythm: Tachycardia present  Rhythm irregular  Heart sounds: Normal heart sounds  Pulmonary:      Effort: Pulmonary effort is normal       Breath sounds: Normal breath sounds  Abdominal:      Palpations: Abdomen is soft  Genitourinary:     Comments: Chronic Jimenez  Skin:     Comments: Stage IV sacral ulcer   Neurological:      Mental Status: She is alert and oriented to person, place, and time     Psychiatric:         Mood and Affect: Mood normal          Behavior: Behavior normal          Judgment: Judgment normal            Additional Data:     Labs:    Results from last 7 days   Lab Units 09/09/20  0454   WBC Thousand/uL 11 29*   HEMOGLOBIN g/dL 7 5*   HEMATOCRIT % 25 1*   PLATELETS Thousands/uL 237   NEUTROS PCT % 71   LYMPHS PCT % 21   MONOS PCT % 6   EOS PCT % 1     Results from last 7 days   Lab Units 09/09/20  0454   SODIUM mmol/L 141   POTASSIUM mmol/L 3 5   CHLORIDE mmol/L 103   CO2 mmol/L 33*   BUN mg/dL 50*   CREATININE mg/dL 1 03   ANION GAP mmol/L 5   CALCIUM mg/dL 7 7*   GLUCOSE RANDOM mg/dL 111 Results from last 7 days   Lab Units 09/09/20  0454   INR  2 57*     Results from last 7 days   Lab Units 09/09/20  0617 09/08/20  2102 09/08/20  1622 09/08/20  1126 09/08/20  0625 09/07/20  2108 09/07/20  1603 09/07/20  1039 09/07/20  0617 09/06/20  2105 09/06/20  1632 09/06/20  1028   POC GLUCOSE mg/dl 122 161* 147* 137 79 143* 145* 168* 109 177* 175* 183*         Results from last 7 days   Lab Units 09/08/20  0438 09/07/20  0909   PROCALCITONIN ng/ml 0 07 0 09           * I Have Reviewed All Lab Data Listed Above  * Additional Pertinent Lab Tests Reviewed:  All Labs Within Last 24 Hours Reviewed    Imaging:    Imaging Reports Reviewed Today Include:   Imaging Personally Reviewed by Myself Includes:      Recent Cultures (last 7 days):           Last 24 Hours Medication List:   Current Facility-Administered Medications   Medication Dose Route Frequency Provider Last Rate    acetaminophen  650 mg Oral Q6H PRN Gia , DO      aspirin  81 mg Oral Daily Gia , DO      atorvastatin  40 mg Oral Daily With [employer]      gabapentin  100 mg Oral TID Nishi King PA-C      hydrALAZINE  10 mg Intravenous Q4H PRN Nishi King PA-C      insulin glargine  14 Units Subcutaneous HS Hemant Azevedo MD      insulin lispro  1-6 Units Subcutaneous 4x Daily (AC & HS) Cele Chavira PA-C      insulin lispro  7 Units Subcutaneous TID With Meals Hemant Azevedo MD      metoprolol tartrate  25 mg Oral Once TJ Charles      metoprolol tartrate  75 mg Oral Q12H [address] Mali Gibbs MD      nystatin   Topical BID Nishi King PA-C      piperacillin-tazobactam  2 25 g Intravenous Q6H Melvin Castro MD 2 25 g (09/09/20 0456)    torsemide  20 mg Oral Daily Mali Gibbs MD      warfarin  2 mg Oral Daily (warfarin) Melvin Castro MD          Today, Patient Was Seen By: [place] Espinosa MD    ** Please Note: Dictation voice to text software may have been used in the creation of this document   **

## 2020-09-09 NOTE — ASSESSMENT & PLAN NOTE
- pt presents with to [facility] with right-sided facial droop and right-sided weakness after being transported by EMS for [facility] and noted to have atrial fibrillation enroute  - CT head in ED shows posterior cerebral artery disease is noted, mild on the right and severe left  - NIH - score on admission; - 6  -c/w ASA and statin  - MRI brain no acute findings  - CTA shows bilateral stenosis of carotids; less than 75%  LDL 23  Echo shows mild pulm htn  Neuro signed off  Pt now in Afib    Neuro recs BASA and AC in setting of Afib and carotid stenosis    ---currently on baby aspirin and anticoagulated with warfarin

## 2020-09-09 NOTE — CASE MANAGEMENT
[facility] has no available beds  Called son yesterday to discuss need for additional snf choices  [facility] referrals were made to more snf's

## 2020-09-09 NOTE — ASSESSMENT & PLAN NOTE
Vascular appreciated  ASA and statin  Carotid dopplers completed - LICA > 67%, VASQUEZ < 88%  Vascular recommends OP f/u sicne the etiology for her symptoms on presentation was most likely cardio embolic

## 2020-09-09 NOTE — ASSESSMENT & PLAN NOTE
Estimated Creatinine Clearance: 49 7 mL/min (by C-G formula based on SCr of 1 03 mg/dL)    POA  Patient noted to have RORY on admission; serum creatinine 2 59 (baseline 1 2-1 3)  2/2 shock  Appears to be resolved

## 2020-09-09 NOTE — CASE MANAGEMENT
Discussed withDr Evy Sandra who reports patient is ready for dc to snf Thursday 9/10/20  [facility] and [facility]  Scheduled BLS transport with [facility] for [employer], 9/10 at 10am  Informed MD, RN and snf  Called son, Ben Kidd, to discuss

## 2020-09-09 NOTE — PROGRESS NOTES
Progress Note - Infectious Disease   Alok Alegre 67 y o  female MRN: [id number]  Unit/Bed#: [facility] 504-01 Encounter: [id number]      Impression/Plan:    1  Septic shock, POA:  Delene Bibles to infected sacral ulcer and secondary bacteremia   Patient is clinically much improved, shock resolved   WBC now down trending   Patient offers no complaints today, remains afebrile and hemodynamically stable  Patient will finish today 2 weeks course of postoperative antibiotic treatment  Monitor hemodynamics and trend temperature curve closely     2  Polymicrobial bacteremia:  Strep consider lettuce, Enterococcus faecalis and Bacteroides in blood cultures   Source is most likely sacral ulcer   Patient is clinically much improved   Bacteremia has cleared   TTE without vegetation  Continue IV Zosyn for today  Treat x 14 days total, through today 9/9/2020 then stop antibiotics     3  Chronic sacral osteomyelitis:  No role for long-term IV antibiotics in setting of exposed bone   Down the line, if patient is able to off load, she may be a candidate for flap closure   At the time of flap closure, long-term IV antibiotic may be indicated  No antibiotic needed for this indication at this time  Consider long-term IV antibiotic at time of flap closure, if patient is a candidate      4  Large sacral decubitus ulcer:   Patient will need offloading to help ulcer healed   Diverting colostomy would be helpful in preventing stool soiling of sacral ulcer    Local wound Care; vac dressing change as per surgery team  Recommend frequent offloading         5  Leukocytosis:  WBC continues to decrease, today WBC count of 11,000;  Patient afebrile, procalcitonin has normalized, she offers no complaints, remains hemodynamically stable    No evidence of diarrhea  antibiotic plan as mentioned above  repeat CBC with differential in a m      Discussed with patient in detail regarding the above plan    Discussed in details with primary service provider        Antibiotics:  Zosyn  Antibiotic day 16  POD 14    Subjective:  Patient has no fever, chills, sweats; no nausea, vomiting, diarrhea; no cough, shortness of breath; no pain  No new symptoms  Objective:  Vitals:  Temp:  [98 °F (36 7 °C)-98 3 °F (36 8 °C)] 98 3 °F (36 8 °C)  HR:  [] 94  Resp:  [16-18] 16  BP: (104-124)/(51-53) 107/52  SpO2:  [91 %-95 %] 91 %  Temp (24hrs), Av 2 °F (36 8 °C), Min:98 °F (36 7 °C), Max:98 3 °F (36 8 °C)  Current: Temperature: 98 3 °F (36 8 °C)    Physical Exam:   General Appearance:  Alert, interactive, nontoxic, no acute distress  Throat: Oropharynx moist without lesions  Lungs:   Clear to auscultation bilaterally; no wheezes, rhonchi or rales; respirations unlabored   Heart:  RRR; no murmur, rub or gallop   Abdomen:   Soft, non-tender, non-distended, positive bowel sounds  Extremities: No clubbing, cyanosis or edema   Skin: No new rashes or lesions    Wound VAC over sacral ulcer       Labs, Imaging, & Other studies:   All pertinent labs and imaging studies were personally reviewed  Results from last 7 days   Lab Units 20  0438 20  0439   WBC Thousand/uL 11 29* 13 82* 17 34*   HEMOGLOBIN g/dL 7 5* 7 8* 8 2*   PLATELETS Thousands/uL 237 235 236     Results from last 7 days   Lab Units 20  0454 20  0544 20  0559   SODIUM mmol/L 141 141 141   POTASSIUM mmol/L 3 5 3 9 4 0   CHLORIDE mmol/L 103 106 107   CO2 mmol/L 33* 32 32   BUN mg/dL 50* 44* 42*   CREATININE mg/dL 1 03 1 01 0 97   EGFR ml/min/1 73sq m 53 54 57   CALCIUM mg/dL 7 7* 7 8* 8 2*         Results from last 7 days   Lab Units 20  0438 20  0909   PROCALCITONIN ng/ml 0 07 0 09         Results from last 7 days   Lab Units 20  9654   FERRITIN ng/mL 270

## 2020-09-09 NOTE — ASSESSMENT & PLAN NOTE
Pt now off pressors  ID appreciated  C/w Zosyn for total 14 days, through 9/9  B Cx from 8/28 neg  2/2 infected stage 4 DQ ulcer  Cleared from ID standpoint for PICC as B Cx neg for 5 days  WBC up today - no clinical changes  Will monitor CBC    9/8-white count trended down overnight-13 82; patient afebrile with elevated heart rate; however this may be from underlying AFib  Currently on antibiotics that will be completed after tomorrow's dosing  Monitor morning labs      9/9-white count trending down to 11 29; will administer 3 more doses of Zosyn and patient will that have completed her 14 days of antibiotic therapy

## 2020-09-09 NOTE — QUICK NOTE
VAC change      VAC location: sacrum     Drains: None  Unit Type     V  A C ulta       Black foam- # applied     2   White foam- # applied     0   Cycle     Continuous       Target Pressure (mmHg)     125       Dressing Status     Clean;Dry; Intact           Wound description and size: 6 x 6 x 2 5 cm     RN present at bedside, verified that 2 black sponges were taken out and 2 were put into wound

## 2020-09-09 NOTE — ASSESSMENT & PLAN NOTE
S/p DCCV in route to [facility]  8/30 pt in Afib w/ moderate VR  Cardio appreciated  Heparin gtt - switched to coumadin  gave 2 5 mg on 9/5  INR > 3, continue to hold today  Lopressor for rate control - increased to 75 BID  TSH WNL    9/8-INR 3 35 on morning labs; continue to hold warfarin and retest INR tomorrow    9/9-INR -2 57; will restart warfarin 2 mg; monitor on daily labs

## 2020-09-09 NOTE — CASE MANAGEMENT
The patient is approved at [facility] and they will order wound VAC  They will need negative result of COVID test and can admit patient today  Called son, Ian Nichole, and reviewed above and he accepts the bed  Explined again that he will need to apply for MA since patient is into her copay days with no co insurance  He expressed understanding   Message was sent to Dr Marta Nunes to order COVID test

## 2020-09-10 ENCOUNTER — PATIENT OUTREACH (OUTPATIENT)
Dept: CASE MANAGEMENT | Facility: OTHER | Age: 75
End: 2020-09-10

## 2020-09-10 ENCOUNTER — TRANSITIONAL CARE MANAGEMENT (OUTPATIENT)
Dept: FAMILY MEDICINE CLINIC | Facility: CLINIC | Age: 75
End: 2020-09-10

## 2020-09-10 VITALS
RESPIRATION RATE: 16 BRPM | HEIGHT: 66 IN | BODY MASS INDEX: 28.59 KG/M2 | SYSTOLIC BLOOD PRESSURE: 98 MMHG | OXYGEN SATURATION: 92 % | DIASTOLIC BLOOD PRESSURE: 58 MMHG | TEMPERATURE: 97.5 F | WEIGHT: 177.91 LBS | HEART RATE: 90 BPM

## 2020-09-10 DIAGNOSIS — Z71.89 COMPLEX CARE COORDINATION: Primary | ICD-10-CM

## 2020-09-10 LAB
ANION GAP SERPL CALCULATED.3IONS-SCNC: 4 MMOL/L (ref 4–13)
BASOPHILS # BLD AUTO: 0.11 THOUSANDS/ΜL (ref 0–0.1)
BASOPHILS NFR BLD AUTO: 1 % (ref 0–1)
BUN SERPL-MCNC: 51 MG/DL (ref 5–25)
CALCIUM SERPL-MCNC: 7.8 MG/DL (ref 8.3–10.1)
CHLORIDE SERPL-SCNC: 104 MMOL/L (ref 100–108)
CO2 SERPL-SCNC: 35 MMOL/L (ref 21–32)
CREAT SERPL-MCNC: 1.03 MG/DL (ref 0.6–1.3)
EOSINOPHIL # BLD AUTO: 0.17 THOUSAND/ΜL (ref 0–0.61)
EOSINOPHIL NFR BLD AUTO: 2 % (ref 0–6)
ERYTHROCYTE [DISTWIDTH] IN BLOOD BY AUTOMATED COUNT: 15.3 % (ref 11.6–15.1)
GFR SERPL CREATININE-BSD FRML MDRD: 53 ML/MIN/1.73SQ M
GLUCOSE SERPL-MCNC: 57 MG/DL (ref 65–140)
GLUCOSE SERPL-MCNC: 83 MG/DL (ref 65–140)
HCT VFR BLD AUTO: 25.8 % (ref 34.8–46.1)
HGB BLD-MCNC: 7.6 G/DL (ref 11.5–15.4)
IMM GRANULOCYTES # BLD AUTO: 0.04 THOUSAND/UL (ref 0–0.2)
IMM GRANULOCYTES NFR BLD AUTO: 0 % (ref 0–2)
INR PPP: 1.84 (ref 0.84–1.19)
LYMPHOCYTES # BLD AUTO: 2.31 THOUSANDS/ΜL (ref 0.6–4.47)
LYMPHOCYTES NFR BLD AUTO: 22 % (ref 14–44)
MCH RBC QN AUTO: 28.4 PG (ref 26.8–34.3)
MCHC RBC AUTO-ENTMCNC: 29.5 G/DL (ref 31.4–37.4)
MCV RBC AUTO: 96 FL (ref 82–98)
MONOCYTES # BLD AUTO: 0.84 THOUSAND/ΜL (ref 0.17–1.22)
MONOCYTES NFR BLD AUTO: 8 % (ref 4–12)
NEUTROPHILS # BLD AUTO: 7.24 THOUSANDS/ΜL (ref 1.85–7.62)
NEUTS SEG NFR BLD AUTO: 67 % (ref 43–75)
NRBC BLD AUTO-RTO: 0 /100 WBCS
PLATELET # BLD AUTO: 250 THOUSANDS/UL (ref 149–390)
PMV BLD AUTO: 11.2 FL (ref 8.9–12.7)
POTASSIUM SERPL-SCNC: 3.2 MMOL/L (ref 3.5–5.3)
PROTHROMBIN TIME: 21.1 SECONDS (ref 11.6–14.5)
RBC # BLD AUTO: 2.68 MILLION/UL (ref 3.81–5.12)
SODIUM SERPL-SCNC: 143 MMOL/L (ref 136–145)
WBC # BLD AUTO: 10.71 THOUSAND/UL (ref 4.31–10.16)

## 2020-09-10 PROCEDURE — 80048 BASIC METABOLIC PNL TOTAL CA: CPT | Performed by: INTERNAL MEDICINE

## 2020-09-10 PROCEDURE — 85025 COMPLETE CBC W/AUTO DIFF WBC: CPT | Performed by: INTERNAL MEDICINE

## 2020-09-10 PROCEDURE — 85610 PROTHROMBIN TIME: CPT | Performed by: INTERNAL MEDICINE

## 2020-09-10 PROCEDURE — 99239 HOSP IP/OBS DSCHRG MGMT >30: CPT | Performed by: INTERNAL MEDICINE

## 2020-09-10 PROCEDURE — 82948 REAGENT STRIP/BLOOD GLUCOSE: CPT

## 2020-09-10 RX ORDER — INSULIN GLARGINE 100 [IU]/ML
14 INJECTION, SOLUTION SUBCUTANEOUS
Qty: 10 ML | Refills: 0
Start: 2020-09-10 | End: 2021-01-01 | Stop reason: HOSPADM

## 2020-09-10 RX ORDER — POTASSIUM CHLORIDE 20 MEQ/1
40 TABLET, EXTENDED RELEASE ORAL
Status: COMPLETED | OUTPATIENT
Start: 2020-09-10 | End: 2020-09-10

## 2020-09-10 RX ORDER — METOPROLOL TARTRATE 75 MG/1
75 TABLET, FILM COATED ORAL EVERY 12 HOURS SCHEDULED
Qty: 60 TABLET | Refills: 0
Start: 2020-09-10 | End: 2021-01-01

## 2020-09-10 RX ORDER — ATORVASTATIN CALCIUM 40 MG/1
40 TABLET, FILM COATED ORAL
Qty: 30 TABLET | Refills: 0
Start: 2020-09-10

## 2020-09-10 RX ORDER — TORSEMIDE 20 MG/1
20 TABLET ORAL DAILY
Qty: 30 TABLET | Refills: 0
Start: 2020-09-11 | End: 2021-01-01 | Stop reason: SDUPTHER

## 2020-09-10 RX ORDER — WARFARIN SODIUM 3 MG/1
TABLET ORAL
Qty: 30 TABLET | Refills: 0
Start: 2020-09-10 | End: 2020-09-20 | Stop reason: HOSPADM

## 2020-09-10 RX ORDER — ASPIRIN 81 MG/1
81 TABLET ORAL DAILY
Qty: 30 TABLET | Refills: 0
Start: 2020-09-11 | End: 2021-01-01 | Stop reason: HOSPADM

## 2020-09-10 RX ADMIN — POTASSIUM CHLORIDE 40 MEQ: 1500 TABLET, EXTENDED RELEASE ORAL at 08:44

## 2020-09-10 RX ADMIN — NYSTATIN: 100000 POWDER TOPICAL at 08:50

## 2020-09-10 RX ADMIN — GABAPENTIN 100 MG: 100 CAPSULE ORAL at 08:44

## 2020-09-10 RX ADMIN — TORSEMIDE 20 MG: 20 TABLET ORAL at 08:45

## 2020-09-10 RX ADMIN — METOPROLOL TARTRATE 75 MG: 50 TABLET, FILM COATED ORAL at 08:44

## 2020-09-10 RX ADMIN — POTASSIUM CHLORIDE 40 MEQ: 1500 TABLET, EXTENDED RELEASE ORAL at 09:34

## 2020-09-10 RX ADMIN — ASPIRIN 81 MG: 81 TABLET, COATED ORAL at 08:45

## 2020-09-10 NOTE — ASSESSMENT & PLAN NOTE
Vascular appreciated  ASA and statin  Carotid dopplers completed - LICA > 24%, VASQUEZ < 56%  Vascular recommends OP f/u sicne the etiology for her symptoms on presentation was most likely cardio embolic

## 2020-09-10 NOTE — ASSESSMENT & PLAN NOTE
S/p 1U PRBC in ICU  Appears stable at this time    9/9; hemoglobin appears to be trending down from 8 0 2-7 0 8-7 5; iron studies ordered as well as FOBT  -patient noted to have wound VAC which may be contributing to overall decline in hemoglobin, monitor and transfuse as needed  9/10-hemoglobin stable overnight; iron studies with low TIBC and iron; consistent with anemia of chronic disease  Monitor as outpatient

## 2020-09-10 NOTE — ASSESSMENT & PLAN NOTE
- pt presents with to [facility] with right-sided facial droop and right-sided weakness after being transported by EMS for [facility] and noted to have atrial fibrillation enroute  - CT head in ED shows posterior cerebral artery disease is noted, mild on the right and severe left  - NIH - score on admission; - 6  -c/w ASA and statin  - MRI brain no acute findings  - CTA shows bilateral stenosis of carotids; less than 75%  LDL 23  Echo shows mild pulm htn  Neuro signed off  Pt now in Afib    Neuro recs BASA and AC in setting of Afib and carotid stenosis    ---currently on baby aspirin and anticoagulated with warfarin

## 2020-09-10 NOTE — PROGRESS NOTES
In basket notification of patient with HRR  Chart reviewed  Patient was transferred to [facility]  Call placed to [address] [place] [address] Service Dept  I spoke with Humaira Mcfarland and asked if I can be notified of discharge from their facility  He took my contact information and stated he would call  me  This facility is not listed on [facility] reference sheet

## 2020-09-10 NOTE — DISCHARGE INSTRUCTIONS
Discharge Instructions - Acute Care Surgery  Antonio Morse 67 y o  female MRN: [id number]  Unit/Bed#: [facility] 504-01  Pain:  Continue analgesics as directed    Dressing Instructions:   Please keep clean, dry and intact until follow up     Appt Instructions: If you do not have your appointment, please call the clinic at [phone]  Otherwise followup as scheduled     Contact the office sooner if you experience any increase in pain, fevers, chills, severe nausea or vomiting, shortness of breath  Remember to take medications as directed on your discharge summary and follow up with your PCP within 1-2 weeks  Patient started on Coumadin for atrial fibrillation; INR 1 84 and will increase dose to 3 mg daily; to follow-up with primary care or facility provider to monitor anticoagulation levels  Additionally, patient started on insulin; Lantus 14 units q h s , Humalog 5 units t i d  With meals  Demadex for fluid retention, metoprolol 75 mg b i d  For rate control and blood pressure control  Patient exhibited stroke-like symptoms while in the hospital; continue aspirin and statin      PICC line removed; patient with chronic Jimenez catheter to remain in place on discharge      Has chronic sacral osteomyelitis; has completed 2 weeks of IV antibiotic therapy for septic shock secondary to Gram-negative bacteremia  Follow-up with primary care facility provider in 1-2 weeks status post discharge    Id

## 2020-09-10 NOTE — ASSESSMENT & PLAN NOTE
POA  Wound care and piper surg appreciated  Patient reports ulcer developed status post recent hip fracture  8/26 excisional debridement of sacral pressure wound and washout   8/28 Debridement and washout of sacral decubitus ulcer  C/w trujillo  ID recs divesrsion colostomy to help w/ healing  D/w surgery - colostomy is not indicated as wound is distant from rectum and has not been getting contaminated by stool  Wound VAC placed on 9/1 9/8-followed by ID; will complete antibiotic therapy after dosing on 09/09; white count went down stay substantially 13 82 overnight  Patient remains stable and may require antibiotics if candidate for flap closure of current sacral wound  9/10-patient completed antibiotic therapy; white count within normal limits  No other SIRS criteria; stable for discharge

## 2020-09-10 NOTE — ASSESSMENT & PLAN NOTE
S/p DCCV in route to [facility]  8/30 pt in Afib w/ moderate VR  Cardio appreciated  Heparin gtt - switched to coumadin  gave 2 5 mg on 9/5  INR > 3, continue to hold today  Lopressor for rate control - increased to 75 BID  TSH WNL    9/8-INR 3 35 on morning labs; continue to hold warfarin and retest INR tomorrow    9/9-INR -2 57; will restart warfarin 2 mg; monitor on daily labs  9/10-INR 1 84; will increase for for on discharge; patient to follow-up with primary care for further adjustment

## 2020-09-10 NOTE — DISCHARGE SUMMARY
Discharge- Cole Montemayor 1945, 67 y o  female MRN: [id number]    Unit/Bed#: [facility] 504-01 Encounter: [id number]    Primary Care Provider: TJ Clement   Date and time admitted to hospital: 8/25/2020  4:06 PM        Chronic diastolic heart failure [facility])  Assessment & Plan  8/30 IV Bumex & IV lasix given as pt overloaded from resuscitation and PRBC with excellent UOP  Now switched to PO torsemide 20 mg (was on Lasix 20 mg at home)    Stenosis of left carotid artery  Assessment & Plan  Vascular appreciated  ASA and statin  Carotid dopplers completed - LICA > 98%, VASQUEZ < 98%  Vascular recommends OP f/u sicne the etiology for her symptoms on presentation was most likely cardio embolic    Anemia  Assessment & Plan  S/p 1U PRBC in ICU  Appears stable at this time    9/9; hemoglobin appears to be trending down from 8 0 2-7 0 8-7 5; iron studies ordered as well as FOBT  -patient noted to have wound VAC which may be contributing to overall decline in hemoglobin, monitor and transfuse as needed  9/10-hemoglobin stable overnight; iron studies with low TIBC and iron; consistent with anemia of chronic disease  Monitor as outpatient  Bacteremia due to Enterococcus  Assessment & Plan  B Cx pos for Enterococcus and Strep Constellatus  Zosyn x 14 days  ID appreciated  PICC consent obtained and PICC placed    Paroxysmal atrial fibrillation ([facility] [address] )  Assessment & Plan  S/p DCCV in route to [facility]  8/30 pt in Afib w/ moderate VR  Cardio appreciated  Heparin gtt - switched to coumadin  gave 2 5 mg on 9/5  INR > 3, continue to hold today  Lopressor for rate control - increased to 75 BID  TSH WNL    9/8-INR 3 35 on morning labs; continue to hold warfarin and retest INR tomorrow    9/9-INR -2 57; will restart warfarin 2 mg; monitor on daily labs  9/10-INR 1 84; will increase for for on discharge; patient to follow-up with primary care for further adjustment    RORY (acute kidney injury) [facility])  Assessment & Plan  Estimated Creatinine Clearance: 49 8 mL/min (by C-G formula based on SCr of 1 03 mg/dL)  POA  Patient noted to have RORY on admission; serum creatinine 2 59 (baseline 1 2-1 3)  2/2 shock  Appears to be resolved    Stroke-like symptoms  Assessment & Plan  - pt presents with to [facility] with right-sided facial droop and right-sided weakness after being transported by EMS for [facility] and noted to have atrial fibrillation enroute  - CT head in ED shows posterior cerebral artery disease is noted, mild on the right and severe left  - NIH - score on admission; - 6  -c/w ASA and statin  - MRI brain no acute findings  - CTA shows bilateral stenosis of carotids; less than 75%  LDL 23  Echo shows mild pulm htn  Neuro signed off  Pt now in Afib  Neuro recs BASA and AC in setting of Afib and carotid stenosis    ---currently on baby aspirin and anticoagulated with warfarin    Decubitus ulcer of sacral region, stage 4 (HCC)  Assessment & Plan  POA  Wound care and piper surg appreciated  Patient reports ulcer developed status post recent hip fracture  8/26 excisional debridement of sacral pressure wound and washout   8/28 Debridement and washout of sacral decubitus ulcer  C/w trujillo  ID recs divesrsion colostomy to help w/ healing  D/w surgery - colostomy is not indicated as wound is distant from rectum and has not been getting contaminated by stool  Wound VAC placed on 9/1 9/8-followed by ID; will complete antibiotic therapy after dosing on 09/09; white count went down stay substantially 13 82 overnight  Patient remains stable and may require antibiotics if candidate for flap closure of current sacral wound  9/10-patient completed antibiotic therapy; white count within normal limits  No other SIRS criteria; stable for discharge      Hyperlipidemia  Assessment & Plan  Previously on pravastatin; will start high-intensity statin with Lipitor 40 in setting of carotid stenosis  LDL 23    Hypertension  Assessment & Plan  Pt needed pressors on admission  Now on BB for Afib    Type 2 diabetes mellitus with hyperglycemia [facility])  Assessment & Plan    Lab Results   Component Value Date    HGBA1C 9 9 (H) 08/26/2020     SSI  Blood Glucose checks TIDWM and QHS  Hold oral medications  continue Lantus 14 units, Humalog 5 units with meals  PT should take insulin at d/c    * Septic shock due to gram-positive bacteria [facility])  Assessment & Plan  Pt now off pressors  ID appreciated  C/w Zosyn for total 14 days, through 9/9  B Cx from 8/28 neg  2/2 infected stage 4 DQ ulcer  Cleared from ID standpoint for PICC as B Cx neg for 5 days  WBC up today - no clinical changes  Will monitor CBC    9/8-white count trended down overnight-13 82; patient afebrile with elevated heart rate; however this may be from underlying AFib  Currently on antibiotics that will be completed after tomorrow's dosing  Monitor morning labs  9/9-white count trending down to 11 29; will administer 3 more doses of Zosyn and patient will that have completed her 14 days of antibiotic therapy        Discharging Physician / Practitioner: Lord Marlin MD  PCP: Juan Claros, [facility]  Admission Date:   Admission Orders (From admission, onward)     Ordered        08/25/20 2047  Inpatient Admission  Once                   Discharge Date: 09/10/20    Resolved Problems  Date Reviewed: 9/8/2020          Resolved    Hyponatremia 8/29/2020     Resolved by  Timoteo Rajput DO          Consultations During Hospital Stay:  · Infectious Disease, General surgery    Procedures Performed:   · Debridement decubitus ulcer    Significant Findings / Test Results:   · G negative bacteremia with septic shock    Incidental Findings:   · Chronic sacral osteomyelitis    Test Results Pending at Discharge (will require follow up):    · None     Outpatient Tests Requested:  · CBC/CMP    Complications:  None    Reason for Admission:  Septic shock    Hospital Course:       Please see above list of diagnoses and related plan for additional information  Condition at Discharge: stable     Discharge Day Visit / Exam:     Subjective:  Patient seen examined bedside, no acute distress or discomfort noted  White count within normal limits, vital signs stable  Stable for discharge to follow on skilled nursing facility at [facility]  Patient started on Coumadin for atrial fibrillation; INR 1 84 and will increase dose to 3 mg daily; to follow-up with primary care or facility provider to monitor anticoagulation levels  Additionally, patient started on insulin; Lantus 14 units q h s , Humalog 5 units t i d  With meals  Demadex for fluid retention, metoprolol 75 mg b i d  For rate control and blood pressure control  Patient exhibited stroke-like symptoms while in the hospital; continue aspirin and statin  PICC line removed; patient with chronic Jimenez catheter to remain in place on discharge  Has chronic sacral osteomyelitis; has completed 2 weeks of IV antibiotic therapy for septic shock secondary to Gram-negative bacteremia  Follow-up with primary care facility provider in 1-2 weeks status post discharge  Id    Vitals: Blood Pressure: 98/58 (09/10/20 0718)  Pulse: 90 (09/10/20 0718)  Temperature: 97 5 °F (36 4 °C) (09/10/20 0718)  Temp Source: Oral (09/10/20 0718)  Respirations: 16 (09/10/20 0718)  Height: 5' 6" (167 6 cm) (08/26/20 0626)  Weight - Scale: 80 7 kg (177 lb 14 6 oz) (09/10/20 0439)  SpO2: 92 % (09/10/20 0729)     Exam:   Physical Exam  Vitals signs and nursing note reviewed  Constitutional:       Appearance: She is obese  HENT:      Head: Normocephalic and atraumatic  Right Ear: External ear normal       Left Ear: External ear normal       Nose: Nose normal       Mouth/Throat:      Mouth: Mucous membranes are moist    Eyes:      Extraocular Movements: Extraocular movements intact  Conjunctiva/sclera: Conjunctivae normal       Pupils: Pupils are equal, round, and reactive to light  Neck:      Musculoskeletal: Normal range of motion and neck supple  Cardiovascular:      Rate and Rhythm: Tachycardia present  Rhythm irregular  Heart sounds: Normal heart sounds  Pulmonary:      Effort: Pulmonary effort is normal       Breath sounds: Normal breath sounds  Abdominal:      Palpations: Abdomen is soft  Genitourinary:     Comments: Chronic Jimenez  Skin:     Comments: Stage IV sacral ulcer   Neurological:      Mental Status: She is alert and oriented to person, place, and time  Psychiatric:         Mood and Affect: Mood normal          Behavior: Behavior normal          Judgment: Judgment normal          Discussion with Family: Care plan discussed with patient who voiced understanding and agrees with recommendations  Discharge instructions/Information to patient and family:   See after visit summary for information provided to patient and family  Provisions for Follow-Up Care:  See after visit summary for information related to follow-up care and any pertinent home health orders  Disposition:     Other [facility] to [facility]:   · Not Applicable to this Patient - Not Applicable to this Patient    Planned Readmission:  None     Discharge Statement:  I spent 45 minutes discharging the patient  This time was spent on the day of discharge  I had direct contact with the patient on the day of discharge  Greater than 50% of the total time was spent examining patient, answering all patient questions, arranging and discussing plan of care with patient as well as directly providing post-discharge instructions  Additional time then spent on discharge activities  Discharge Medications:  See after visit summary for reconciled discharge medications provided to patient and family        ** Please Note: This note has been constructed using a voice recognition system **

## 2020-09-10 NOTE — ASSESSMENT & PLAN NOTE
Estimated Creatinine Clearance: 49 8 mL/min (by C-G formula based on SCr of 1 03 mg/dL)    POA  Patient noted to have RORY on admission; serum creatinine 2 59 (baseline 1 2-1 3)  2/2 shock  Appears to be resolved

## 2020-09-10 NOTE — PLAN OF CARE
Problem: Potential for Falls  Goal: Patient will remain free of falls  Description: INTERVENTIONS:  - Assess patient frequently for physical needs  -  Identify cognitive and physical deficits and behaviors that affect risk of falls    -  [place] fall precautions as indicated by assessment   - Educate patient/family on patient safety including physical limitations  - Instruct patient to call for assistance with activity based on assessment  - Modify environment to reduce risk of injury  - Consider OT/PT consult to assist with strengthening/mobility  Outcome: Progressing

## 2020-09-13 ENCOUNTER — HOSPITAL ENCOUNTER (INPATIENT)
Facility: HOSPITAL | Age: 75
LOS: 6 days | Discharge: NURSING, REHAB OR LONG-TERM CARE | DRG: 871 | End: 2020-09-20
Attending: EMERGENCY MEDICINE | Admitting: INTERNAL MEDICINE
Payer: MEDICARE

## 2020-09-13 DIAGNOSIS — R79.1 ELEVATED INR: ICD-10-CM

## 2020-09-13 DIAGNOSIS — N17.9 AKI (ACUTE KIDNEY INJURY) (HCC): ICD-10-CM

## 2020-09-13 DIAGNOSIS — N39.0 UTI (URINARY TRACT INFECTION): Primary | ICD-10-CM

## 2020-09-13 DIAGNOSIS — A04.72 C. DIFFICILE DIARRHEA: ICD-10-CM

## 2020-09-13 DIAGNOSIS — E16.2 HYPOGLYCEMIA: ICD-10-CM

## 2020-09-13 DIAGNOSIS — A04.72 C. DIFFICILE COLITIS: ICD-10-CM

## 2020-09-13 DIAGNOSIS — A41.9 SEPSIS (HCC): ICD-10-CM

## 2020-09-13 DIAGNOSIS — I48.0 PAROXYSMAL ATRIAL FIBRILLATION (HCC): ICD-10-CM

## 2020-09-13 DIAGNOSIS — L89.154 DECUBITUS ULCER OF SACRAL REGION, STAGE 4 (HCC): ICD-10-CM

## 2020-09-13 DIAGNOSIS — R41.82 ALTERED MENTAL STATUS: ICD-10-CM

## 2020-09-13 LAB
GLUCOSE SERPL-MCNC: 92 MG/DL (ref 65–140)
GLUCOSE SERPL-MCNC: 98 MG/DL (ref 65–140)

## 2020-09-13 PROCEDURE — 82948 REAGENT STRIP/BLOOD GLUCOSE: CPT

## 2020-09-13 PROCEDURE — 1124F ACP DISCUSS-NO DSCNMKR DOCD: CPT | Performed by: EMERGENCY MEDICINE

## 2020-09-13 PROCEDURE — 99285 EMERGENCY DEPT VISIT HI MDM: CPT

## 2020-09-13 PROCEDURE — 99291 CRITICAL CARE FIRST HOUR: CPT | Performed by: EMERGENCY MEDICINE

## 2020-09-13 PROCEDURE — 96375 TX/PRO/DX INJ NEW DRUG ADDON: CPT

## 2020-09-13 PROCEDURE — 93005 ELECTROCARDIOGRAM TRACING: CPT

## 2020-09-13 RX ORDER — NALOXONE HYDROCHLORIDE 0.4 MG/ML
0.4 INJECTION, SOLUTION INTRAMUSCULAR; INTRAVENOUS; SUBCUTANEOUS ONCE
Status: COMPLETED | OUTPATIENT
Start: 2020-09-14 | End: 2020-09-13

## 2020-09-13 RX ORDER — NALOXONE HYDROCHLORIDE 0.4 MG/ML
0.4 INJECTION, SOLUTION INTRAMUSCULAR; INTRAVENOUS; SUBCUTANEOUS ONCE
Status: COMPLETED | OUTPATIENT
Start: 2020-09-13 | End: 2020-09-13

## 2020-09-13 RX ADMIN — NALOXONE HYDROCHLORIDE 0.4 MG: 0.4 INJECTION, SOLUTION INTRAMUSCULAR; INTRAVENOUS; SUBCUTANEOUS at 23:52

## 2020-09-13 RX ADMIN — NALOXONE HYDROCHLORIDE 0.4 MG: 0.4 INJECTION, SOLUTION INTRAMUSCULAR; INTRAVENOUS; SUBCUTANEOUS at 23:41

## 2020-09-14 ENCOUNTER — PATIENT OUTREACH (OUTPATIENT)
Dept: CASE MANAGEMENT | Facility: OTHER | Age: 75
End: 2020-09-14

## 2020-09-14 ENCOUNTER — APPOINTMENT (EMERGENCY)
Dept: RADIOLOGY | Facility: HOSPITAL | Age: 75
DRG: 871 | End: 2020-09-14
Payer: MEDICARE

## 2020-09-14 ENCOUNTER — APPOINTMENT (EMERGENCY)
Dept: CT IMAGING | Facility: HOSPITAL | Age: 75
DRG: 871 | End: 2020-09-14
Payer: MEDICARE

## 2020-09-14 ENCOUNTER — APPOINTMENT (INPATIENT)
Dept: CT IMAGING | Facility: HOSPITAL | Age: 75
DRG: 871 | End: 2020-09-14
Payer: MEDICARE

## 2020-09-14 PROBLEM — R41.82 ALTERED MENTAL STATUS: Status: ACTIVE | Noted: 2020-09-14

## 2020-09-14 PROBLEM — N39.0 URINARY TRACT INFECTION: Status: ACTIVE | Noted: 2020-09-14

## 2020-09-14 PROBLEM — R79.1 SUPRATHERAPEUTIC INR: Status: ACTIVE | Noted: 2020-09-14

## 2020-09-14 LAB
ALBUMIN SERPL BCP-MCNC: 1.3 G/DL (ref 3.5–5)
ALBUMIN SERPL BCP-MCNC: 1.4 G/DL (ref 3.5–5)
ALBUMIN SERPL BCP-MCNC: 1.6 G/DL (ref 3.5–5)
ALP SERPL-CCNC: 131 U/L (ref 46–116)
ALP SERPL-CCNC: 184 U/L (ref 46–116)
ALP SERPL-CCNC: 187 U/L (ref 46–116)
ALT SERPL W P-5'-P-CCNC: 16 U/L (ref 12–78)
ALT SERPL W P-5'-P-CCNC: 31 U/L (ref 12–78)
ALT SERPL W P-5'-P-CCNC: 37 U/L (ref 12–78)
AMPHETAMINES SERPL QL SCN: NEGATIVE
ANION GAP SERPL CALCULATED.3IONS-SCNC: 1 MMOL/L (ref 4–13)
ANION GAP SERPL CALCULATED.3IONS-SCNC: 7 MMOL/L (ref 4–13)
ANION GAP SERPL CALCULATED.3IONS-SCNC: 9 MMOL/L (ref 4–13)
APTT PPP: 160 SECONDS (ref 23–37)
APTT PPP: 178 SECONDS (ref 23–37)
AST SERPL W P-5'-P-CCNC: 105 U/L (ref 5–45)
AST SERPL W P-5'-P-CCNC: 17 U/L (ref 5–45)
AST SERPL W P-5'-P-CCNC: 40 U/L (ref 5–45)
ATRIAL RATE: 131 BPM
BACTERIA UR QL AUTO: ABNORMAL /HPF
BARBITURATES UR QL: NEGATIVE
BASOPHILS # BLD AUTO: 0.03 THOUSANDS/ΜL (ref 0–0.1)
BASOPHILS # BLD AUTO: 0.05 THOUSANDS/ΜL (ref 0–0.1)
BASOPHILS # BLD MANUAL: 0.3 THOUSAND/UL (ref 0–0.1)
BASOPHILS NFR BLD AUTO: 0 % (ref 0–1)
BASOPHILS NFR BLD AUTO: 0 % (ref 0–1)
BASOPHILS NFR MAR MANUAL: 2 % (ref 0–1)
BENZODIAZ UR QL: NEGATIVE
BILIRUB SERPL-MCNC: 0.5 MG/DL (ref 0.2–1)
BILIRUB SERPL-MCNC: 0.6 MG/DL (ref 0.2–1)
BILIRUB SERPL-MCNC: 0.6 MG/DL (ref 0.2–1)
BILIRUB UR QL STRIP: NEGATIVE
BUN SERPL-MCNC: 35 MG/DL (ref 5–25)
BUN SERPL-MCNC: 36 MG/DL (ref 5–25)
BUN SERPL-MCNC: 36 MG/DL (ref 5–25)
CALCIUM SERPL-MCNC: 7.7 MG/DL (ref 8.3–10.1)
CALCIUM SERPL-MCNC: 7.8 MG/DL (ref 8.3–10.1)
CALCIUM SERPL-MCNC: 8.4 MG/DL (ref 8.3–10.1)
CHLORIDE SERPL-SCNC: 101 MMOL/L (ref 100–108)
CHLORIDE SERPL-SCNC: 101 MMOL/L (ref 100–108)
CHLORIDE SERPL-SCNC: 104 MMOL/L (ref 100–108)
CLARITY UR: ABNORMAL
CO2 SERPL-SCNC: 28 MMOL/L (ref 21–32)
CO2 SERPL-SCNC: 28 MMOL/L (ref 21–32)
CO2 SERPL-SCNC: 37 MMOL/L (ref 21–32)
COCAINE UR QL: NEGATIVE
COLOR UR: YELLOW
CREAT SERPL-MCNC: 1.14 MG/DL (ref 0.6–1.3)
CREAT SERPL-MCNC: 1.29 MG/DL (ref 0.6–1.3)
CREAT SERPL-MCNC: 1.36 MG/DL (ref 0.6–1.3)
EOSINOPHIL # BLD AUTO: 0.01 THOUSAND/ΜL (ref 0–0.61)
EOSINOPHIL # BLD AUTO: 0.02 THOUSAND/ΜL (ref 0–0.61)
EOSINOPHIL # BLD MANUAL: 0.45 THOUSAND/UL (ref 0–0.4)
EOSINOPHIL NFR BLD AUTO: 0 % (ref 0–6)
EOSINOPHIL NFR BLD AUTO: 0 % (ref 0–6)
EOSINOPHIL NFR BLD MANUAL: 3 % (ref 0–6)
ERYTHROCYTE [DISTWIDTH] IN BLOOD BY AUTOMATED COUNT: 14.8 % (ref 11.6–15.1)
ERYTHROCYTE [DISTWIDTH] IN BLOOD BY AUTOMATED COUNT: 14.9 % (ref 11.6–15.1)
ERYTHROCYTE [DISTWIDTH] IN BLOOD BY AUTOMATED COUNT: 15.1 % (ref 11.6–15.1)
GFR SERPL CREATININE-BSD FRML MDRD: 38 ML/MIN/1.73SQ M
GFR SERPL CREATININE-BSD FRML MDRD: 40 ML/MIN/1.73SQ M
GFR SERPL CREATININE-BSD FRML MDRD: 47 ML/MIN/1.73SQ M
GLUCOSE SERPL-MCNC: 101 MG/DL (ref 65–140)
GLUCOSE SERPL-MCNC: 146 MG/DL (ref 65–140)
GLUCOSE SERPL-MCNC: 181 MG/DL (ref 65–140)
GLUCOSE SERPL-MCNC: 77 MG/DL (ref 65–140)
GLUCOSE SERPL-MCNC: 90 MG/DL (ref 65–140)
GLUCOSE SERPL-MCNC: 96 MG/DL (ref 65–140)
GLUCOSE UR STRIP-MCNC: NEGATIVE MG/DL
HCT VFR BLD AUTO: 25.2 % (ref 34.8–46.1)
HCT VFR BLD AUTO: 26.4 % (ref 34.8–46.1)
HCT VFR BLD AUTO: 29.1 % (ref 34.8–46.1)
HGB BLD-MCNC: 7.8 G/DL (ref 11.5–15.4)
HGB BLD-MCNC: 7.9 G/DL (ref 11.5–15.4)
HGB BLD-MCNC: 8.8 G/DL (ref 11.5–15.4)
HGB UR QL STRIP.AUTO: ABNORMAL
IMM GRANULOCYTES # BLD AUTO: 0.05 THOUSAND/UL (ref 0–0.2)
IMM GRANULOCYTES # BLD AUTO: 0.08 THOUSAND/UL (ref 0–0.2)
IMM GRANULOCYTES NFR BLD AUTO: 1 % (ref 0–2)
IMM GRANULOCYTES NFR BLD AUTO: 1 % (ref 0–2)
INR PPP: >15 (ref 0.84–1.19)
INR PPP: >15 (ref 0.84–1.19)
KETONES UR STRIP-MCNC: NEGATIVE MG/DL
LACTATE SERPL-SCNC: 1.1 MMOL/L (ref 0.5–2)
LEUKOCYTE ESTERASE UR QL STRIP: ABNORMAL
LG PLATELETS BLD QL SMEAR: PRESENT
LYMPHOCYTES # BLD AUTO: 0.6 THOUSAND/UL (ref 0.6–4.47)
LYMPHOCYTES # BLD AUTO: 1.21 THOUSANDS/ΜL (ref 0.6–4.47)
LYMPHOCYTES # BLD AUTO: 1.66 THOUSANDS/ΜL (ref 0.6–4.47)
LYMPHOCYTES # BLD AUTO: 4 % (ref 14–44)
LYMPHOCYTES NFR BLD AUTO: 11 % (ref 14–44)
LYMPHOCYTES NFR BLD AUTO: 12 % (ref 14–44)
MAGNESIUM SERPL-MCNC: 1.6 MG/DL (ref 1.6–2.6)
MCH RBC QN AUTO: 27.8 PG (ref 26.8–34.3)
MCH RBC QN AUTO: 28.3 PG (ref 26.8–34.3)
MCH RBC QN AUTO: 28.8 PG (ref 26.8–34.3)
MCHC RBC AUTO-ENTMCNC: 29.9 G/DL (ref 31.4–37.4)
MCHC RBC AUTO-ENTMCNC: 30.2 G/DL (ref 31.4–37.4)
MCHC RBC AUTO-ENTMCNC: 31 G/DL (ref 31.4–37.4)
MCV RBC AUTO: 93 FL (ref 82–98)
MCV RBC AUTO: 93 FL (ref 82–98)
MCV RBC AUTO: 94 FL (ref 82–98)
METHADONE UR QL: NEGATIVE
MONOCYTES # BLD AUTO: 0.3 THOUSAND/UL (ref 0–1.22)
MONOCYTES # BLD AUTO: 0.53 THOUSAND/ΜL (ref 0.17–1.22)
MONOCYTES # BLD AUTO: 0.9 THOUSAND/ΜL (ref 0.17–1.22)
MONOCYTES NFR BLD AUTO: 5 % (ref 4–12)
MONOCYTES NFR BLD AUTO: 7 % (ref 4–12)
MONOCYTES NFR BLD: 2 % (ref 4–12)
MUCOUS THREADS UR QL AUTO: ABNORMAL
NEUTROPHILS # BLD AUTO: 10.99 THOUSANDS/ΜL (ref 1.85–7.62)
NEUTROPHILS # BLD AUTO: 8.89 THOUSANDS/ΜL (ref 1.85–7.62)
NEUTROPHILS # BLD MANUAL: 13.24 THOUSAND/UL (ref 1.85–7.62)
NEUTS BAND NFR BLD MANUAL: 4 % (ref 0–8)
NEUTS SEG NFR BLD AUTO: 80 % (ref 43–75)
NEUTS SEG NFR BLD AUTO: 83 % (ref 43–75)
NEUTS SEG NFR BLD AUTO: 85 % (ref 43–75)
NITRITE UR QL STRIP: NEGATIVE
NON-SQ EPI CELLS URNS QL MICRO: ABNORMAL /HPF
NRBC BLD AUTO-RTO: 0 /100 WBCS
NRBC BLD AUTO-RTO: 0 /100 WBCS
OPIATES UR QL SCN: NEGATIVE
OXYCODONE+OXYMORPHONE UR QL SCN: NEGATIVE
PCP UR QL: NEGATIVE
PH UR STRIP.AUTO: 6.5 [PH]
PHOSPHATE SERPL-MCNC: 3.7 MG/DL (ref 2.3–4.1)
PLATELET # BLD AUTO: 261 THOUSANDS/UL (ref 149–390)
PLATELET # BLD AUTO: 337 THOUSANDS/UL (ref 149–390)
PLATELET # BLD AUTO: 340 THOUSANDS/UL (ref 149–390)
PLATELET BLD QL SMEAR: ADEQUATE
PMV BLD AUTO: 10.6 FL (ref 8.9–12.7)
PMV BLD AUTO: 10.7 FL (ref 8.9–12.7)
PMV BLD AUTO: 11.2 FL (ref 8.9–12.7)
POLYCHROMASIA BLD QL SMEAR: PRESENT
POTASSIUM SERPL-SCNC: 3.6 MMOL/L (ref 3.5–5.3)
POTASSIUM SERPL-SCNC: 3.6 MMOL/L (ref 3.5–5.3)
POTASSIUM SERPL-SCNC: 3.7 MMOL/L (ref 3.5–5.3)
PROCALCITONIN SERPL-MCNC: 0.41 NG/ML
PROT SERPL-MCNC: 5.4 G/DL (ref 6.4–8.2)
PROT SERPL-MCNC: 5.4 G/DL (ref 6.4–8.2)
PROT SERPL-MCNC: 6.2 G/DL (ref 6.4–8.2)
PROT UR STRIP-MCNC: >=300 MG/DL
PROTHROMBIN TIME: >120 SECONDS (ref 11.6–14.5)
PROTHROMBIN TIME: >120 SECONDS (ref 11.6–14.5)
QRS AXIS: 38 DEGREES
QRSD INTERVAL: 76 MS
QT INTERVAL: 384 MS
QTC INTERVAL: 492 MS
RBC # BLD AUTO: 2.71 MILLION/UL (ref 3.81–5.12)
RBC # BLD AUTO: 2.84 MILLION/UL (ref 3.81–5.12)
RBC # BLD AUTO: 3.11 MILLION/UL (ref 3.81–5.12)
RBC #/AREA URNS AUTO: ABNORMAL /HPF
RBC MORPH BLD: PRESENT
SARS-COV-2 RNA RESP QL NAA+PROBE: NEGATIVE
SODIUM SERPL-SCNC: 138 MMOL/L (ref 136–145)
SODIUM SERPL-SCNC: 139 MMOL/L (ref 136–145)
SODIUM SERPL-SCNC: 139 MMOL/L (ref 136–145)
SP GR UR STRIP.AUTO: 1.01 (ref 1–1.03)
T WAVE AXIS: 40 DEGREES
THC UR QL: NEGATIVE
TOTAL CELLS COUNTED SPEC: 100
TROPONIN I SERPL-MCNC: <0.02 NG/ML
TSH SERPL DL<=0.05 MIU/L-ACNC: 0.94 UIU/ML (ref 0.36–3.74)
URINE COMMENT: ABNORMAL
UROBILINOGEN UR QL STRIP.AUTO: 0.2 E.U./DL
VENTRICULAR RATE: 99 BPM
WBC # BLD AUTO: 10.72 THOUSAND/UL (ref 4.31–10.16)
WBC # BLD AUTO: 13.7 THOUSAND/UL (ref 4.31–10.16)
WBC # BLD AUTO: 14.88 THOUSAND/UL (ref 4.31–10.16)
WBC #/AREA URNS AUTO: ABNORMAL /HPF

## 2020-09-14 PROCEDURE — 85610 PROTHROMBIN TIME: CPT | Performed by: EMERGENCY MEDICINE

## 2020-09-14 PROCEDURE — 93010 ELECTROCARDIOGRAM REPORT: CPT | Performed by: INTERNAL MEDICINE

## 2020-09-14 PROCEDURE — 83735 ASSAY OF MAGNESIUM: CPT | Performed by: NURSE PRACTITIONER

## 2020-09-14 PROCEDURE — 87086 URINE CULTURE/COLONY COUNT: CPT | Performed by: EMERGENCY MEDICINE

## 2020-09-14 PROCEDURE — 87493 C DIFF AMPLIFIED PROBE: CPT | Performed by: NURSE PRACTITIONER

## 2020-09-14 PROCEDURE — 85025 COMPLETE CBC W/AUTO DIFF WBC: CPT | Performed by: NURSE PRACTITIONER

## 2020-09-14 PROCEDURE — 85730 THROMBOPLASTIN TIME PARTIAL: CPT | Performed by: NURSE PRACTITIONER

## 2020-09-14 PROCEDURE — 85027 COMPLETE CBC AUTOMATED: CPT | Performed by: PHYSICIAN ASSISTANT

## 2020-09-14 PROCEDURE — 87635 SARS-COV-2 COVID-19 AMP PRB: CPT | Performed by: EMERGENCY MEDICINE

## 2020-09-14 PROCEDURE — 36415 COLL VENOUS BLD VENIPUNCTURE: CPT | Performed by: EMERGENCY MEDICINE

## 2020-09-14 PROCEDURE — 74177 CT ABD & PELVIS W/CONTRAST: CPT

## 2020-09-14 PROCEDURE — 87177 OVA AND PARASITES SMEARS: CPT | Performed by: NURSE PRACTITIONER

## 2020-09-14 PROCEDURE — 87040 BLOOD CULTURE FOR BACTERIA: CPT | Performed by: EMERGENCY MEDICINE

## 2020-09-14 PROCEDURE — 71045 X-RAY EXAM CHEST 1 VIEW: CPT

## 2020-09-14 PROCEDURE — 84145 PROCALCITONIN (PCT): CPT | Performed by: NURSE PRACTITIONER

## 2020-09-14 PROCEDURE — 87147 CULTURE TYPE IMMUNOLOGIC: CPT | Performed by: EMERGENCY MEDICINE

## 2020-09-14 PROCEDURE — 70450 CT HEAD/BRAIN W/O DYE: CPT

## 2020-09-14 PROCEDURE — 80307 DRUG TEST PRSMV CHEM ANLYZR: CPT | Performed by: EMERGENCY MEDICINE

## 2020-09-14 PROCEDURE — 96361 HYDRATE IV INFUSION ADD-ON: CPT

## 2020-09-14 PROCEDURE — 96365 THER/PROPH/DIAG IV INF INIT: CPT

## 2020-09-14 PROCEDURE — 82948 REAGENT STRIP/BLOOD GLUCOSE: CPT

## 2020-09-14 PROCEDURE — 84484 ASSAY OF TROPONIN QUANT: CPT | Performed by: EMERGENCY MEDICINE

## 2020-09-14 PROCEDURE — 87106 FUNGI IDENTIFICATION YEAST: CPT | Performed by: EMERGENCY MEDICINE

## 2020-09-14 PROCEDURE — 0T2BX0Z CHANGE DRAINAGE DEVICE IN BLADDER, EXTERNAL APPROACH: ICD-10-PCS | Performed by: EMERGENCY MEDICINE

## 2020-09-14 PROCEDURE — 81001 URINALYSIS AUTO W/SCOPE: CPT | Performed by: EMERGENCY MEDICINE

## 2020-09-14 PROCEDURE — 84100 ASSAY OF PHOSPHORUS: CPT | Performed by: NURSE PRACTITIONER

## 2020-09-14 PROCEDURE — 87209 SMEAR COMPLEX STAIN: CPT | Performed by: NURSE PRACTITIONER

## 2020-09-14 PROCEDURE — 85610 PROTHROMBIN TIME: CPT | Performed by: NURSE PRACTITIONER

## 2020-09-14 PROCEDURE — 80053 COMPREHEN METABOLIC PANEL: CPT | Performed by: NURSE PRACTITIONER

## 2020-09-14 PROCEDURE — 2W05X6Z CHANGE PRESSURE DRESSING ON BACK: ICD-10-PCS | Performed by: EMERGENCY MEDICINE

## 2020-09-14 PROCEDURE — G1004 CDSM NDSC: HCPCS

## 2020-09-14 PROCEDURE — 80053 COMPREHEN METABOLIC PANEL: CPT | Performed by: EMERGENCY MEDICINE

## 2020-09-14 PROCEDURE — 85025 COMPLETE CBC W/AUTO DIFF WBC: CPT | Performed by: EMERGENCY MEDICINE

## 2020-09-14 PROCEDURE — 99223 1ST HOSP IP/OBS HIGH 75: CPT | Performed by: SURGERY

## 2020-09-14 PROCEDURE — 96367 TX/PROPH/DG ADDL SEQ IV INF: CPT

## 2020-09-14 PROCEDURE — 84443 ASSAY THYROID STIM HORMONE: CPT | Performed by: EMERGENCY MEDICINE

## 2020-09-14 PROCEDURE — 80053 COMPREHEN METABOLIC PANEL: CPT | Performed by: PHYSICIAN ASSISTANT

## 2020-09-14 PROCEDURE — 83605 ASSAY OF LACTIC ACID: CPT | Performed by: EMERGENCY MEDICINE

## 2020-09-14 PROCEDURE — 85007 BL SMEAR W/DIFF WBC COUNT: CPT | Performed by: PHYSICIAN ASSISTANT

## 2020-09-14 PROCEDURE — 85730 THROMBOPLASTIN TIME PARTIAL: CPT | Performed by: EMERGENCY MEDICINE

## 2020-09-14 PROCEDURE — 99223 1ST HOSP IP/OBS HIGH 75: CPT | Performed by: INTERNAL MEDICINE

## 2020-09-14 RX ORDER — VANCOMYCIN HYDROCHLORIDE 1 G/200ML
15 INJECTION, SOLUTION INTRAVENOUS ONCE
Status: COMPLETED | OUTPATIENT
Start: 2020-09-14 | End: 2020-09-14

## 2020-09-14 RX ORDER — GABAPENTIN 100 MG/1
100 CAPSULE ORAL 3 TIMES DAILY
Status: DISCONTINUED | OUTPATIENT
Start: 2020-09-14 | End: 2020-09-20 | Stop reason: HOSPADM

## 2020-09-14 RX ORDER — ATORVASTATIN CALCIUM 40 MG/1
40 TABLET, FILM COATED ORAL
Status: DISCONTINUED | OUTPATIENT
Start: 2020-09-14 | End: 2020-09-20 | Stop reason: HOSPADM

## 2020-09-14 RX ORDER — NYSTATIN 100000 [USP'U]/G
POWDER TOPICAL 2 TIMES DAILY
Status: DISCONTINUED | OUTPATIENT
Start: 2020-09-14 | End: 2020-09-20 | Stop reason: HOSPADM

## 2020-09-14 RX ORDER — METOPROLOL TARTRATE 5 MG/5ML
2.5 INJECTION INTRAVENOUS ONCE
Status: COMPLETED | OUTPATIENT
Start: 2020-09-14 | End: 2020-09-14

## 2020-09-14 RX ORDER — MAGNESIUM SULFATE HEPTAHYDRATE 40 MG/ML
2 INJECTION, SOLUTION INTRAVENOUS ONCE
Status: COMPLETED | OUTPATIENT
Start: 2020-09-14 | End: 2020-09-14

## 2020-09-14 RX ORDER — ALBUMIN (HUMAN) 12.5 G/50ML
12.5 SOLUTION INTRAVENOUS ONCE
Status: COMPLETED | OUTPATIENT
Start: 2020-09-14 | End: 2020-09-14

## 2020-09-14 RX ORDER — CHLORHEXIDINE GLUCONATE 0.12 MG/ML
15 RINSE ORAL EVERY 12 HOURS SCHEDULED
Status: DISCONTINUED | OUTPATIENT
Start: 2020-09-14 | End: 2020-09-20 | Stop reason: HOSPADM

## 2020-09-14 RX ORDER — ASPIRIN 81 MG/1
81 TABLET ORAL DAILY
Status: DISCONTINUED | OUTPATIENT
Start: 2020-09-14 | End: 2020-09-20 | Stop reason: HOSPADM

## 2020-09-14 RX ORDER — PHYTONADIONE 5 MG/1
10 TABLET ORAL ONCE
Status: COMPLETED | OUTPATIENT
Start: 2020-09-14 | End: 2020-09-14

## 2020-09-14 RX ORDER — CEFEPIME HYDROCHLORIDE 2 G/50ML
2000 INJECTION, SOLUTION INTRAVENOUS EVERY 12 HOURS
Status: DISCONTINUED | OUTPATIENT
Start: 2020-09-14 | End: 2020-09-16

## 2020-09-14 RX ADMIN — METRONIDAZOLE 500 MG: 500 INJECTION, SOLUTION INTRAVENOUS at 16:48

## 2020-09-14 RX ADMIN — GABAPENTIN 100 MG: 100 CAPSULE ORAL at 20:06

## 2020-09-14 RX ADMIN — ASPIRIN 81 MG: 81 TABLET, COATED ORAL at 08:53

## 2020-09-14 RX ADMIN — CHLORHEXIDINE GLUCONATE 0.12% ORAL RINSE 15 ML: 1.2 LIQUID ORAL at 08:51

## 2020-09-14 RX ADMIN — VANCOMYCIN HYDROCHLORIDE 1000 MG: 1 INJECTION, SOLUTION INTRAVENOUS at 01:37

## 2020-09-14 RX ADMIN — CEFEPIME HYDROCHLORIDE 2000 MG: 2 INJECTION, SOLUTION INTRAVENOUS at 13:28

## 2020-09-14 RX ADMIN — CHLORHEXIDINE GLUCONATE 0.12% ORAL RINSE 15 ML: 1.2 LIQUID ORAL at 20:05

## 2020-09-14 RX ADMIN — METOPROLOL TARTRATE 2.5 MG: 5 INJECTION INTRAVENOUS at 11:54

## 2020-09-14 RX ADMIN — VANCOMYCIN HYDROCHLORIDE 1500 MG: 1 INJECTION, POWDER, LYOPHILIZED, FOR SOLUTION INTRAVENOUS at 20:48

## 2020-09-14 RX ADMIN — PHYTONADIONE 10 MG: 5 TABLET ORAL at 13:27

## 2020-09-14 RX ADMIN — NYSTATIN: 100000 POWDER TOPICAL at 11:45

## 2020-09-14 RX ADMIN — GABAPENTIN 100 MG: 100 CAPSULE ORAL at 16:48

## 2020-09-14 RX ADMIN — ALBUMIN (HUMAN) 12.5 G: 0.25 INJECTION, SOLUTION INTRAVENOUS at 20:05

## 2020-09-14 RX ADMIN — METOPROLOL TARTRATE 75 MG: 50 TABLET, FILM COATED ORAL at 13:58

## 2020-09-14 RX ADMIN — GABAPENTIN 100 MG: 100 CAPSULE ORAL at 08:51

## 2020-09-14 RX ADMIN — CEFEPIME HYDROCHLORIDE 2000 MG: 2 INJECTION, SOLUTION INTRAVENOUS at 01:29

## 2020-09-14 RX ADMIN — MAGNESIUM SULFATE IN WATER 2 G: 40 INJECTION, SOLUTION INTRAVENOUS at 13:27

## 2020-09-14 RX ADMIN — NYSTATIN: 100000 POWDER TOPICAL at 21:45

## 2020-09-14 RX ADMIN — METRONIDAZOLE 500 MG: 500 INJECTION, SOLUTION INTRAVENOUS at 01:37

## 2020-09-14 RX ADMIN — ATORVASTATIN CALCIUM 40 MG: 40 TABLET, FILM COATED ORAL at 16:48

## 2020-09-14 RX ADMIN — METRONIDAZOLE 500 MG: 500 INJECTION, SOLUTION INTRAVENOUS at 08:50

## 2020-09-14 RX ADMIN — IOHEXOL 100 ML: 350 INJECTION, SOLUTION INTRAVENOUS at 03:22

## 2020-09-14 RX ADMIN — SODIUM CHLORIDE 500 ML: 0.9 INJECTION, SOLUTION INTRAVENOUS at 00:41

## 2020-09-14 RX ADMIN — SODIUM CHLORIDE 1785 ML: 0.9 INJECTION, SOLUTION INTRAVENOUS at 02:08

## 2020-09-14 NOTE — PROGRESS NOTES
In-basket message received from ADT that patient was transferred from [facility] to [facility] and was admitted for sepsis  Patient had a positive urinalysis  Awaiting urine and blood cultures  This CM is reviewing documentation for lead M who is unavailable at this time  Will follow chart electronically for progress toward discharge  Updated care coordination note

## 2020-09-14 NOTE — CONSULTS
Consultation - General Surgery   Fleming Cogan 67 y o  female MRN: [id number]  Unit/Bed#: -01 SDU Encounter: [id number]    Assessment/Plan     Sacral decubitus ulcer, stage 4  -s/p multiple debridements with VAC dressing placement at [facility], discharge 9/10  -wound clean on exam with serous drainage, see pictures under media  -no indication for further debridement at this time  -CT does suggest osteo  -continue IV ABx, consider ID consult as will likely need long term ABx for osteomylitis  -continue to monitor wound  -continue VAC and dressing changes as ordered, replaced at bedside today  -monitor for stool contamination as patient is having loose BMs, consider rectal tube if continues    Severe sepsis POA indicated by leukocytosis, altered MS, and hypotension  -improving, MS has cleared and hypotension improved with fluid resuscitation  -CT abd/pelvis with sacral osteo, decub, and surrounding soft tissue changes  -patient also does have a positive UA  -loose stools with mucus,CT with diffuse rectal wall thickening and perirectal inflammatory changes, C  Diff and stool cultures pending  -continue work up, sepsis protocol  -continue supportive care, IV ABx    Altered MS  -CT negative for any intracranial abnormality  -likely related to hypoglycemia and hypoxia  -patient is much clearer this morning, answers questions appropriately  -does not remember coming to the hospital  -continue to monitor neuro exam    UTI  -patient with indwelling trujillo catheter  -previous cultures positive for E   Coli and lactobacillus  -repeat culture pending    Anticoagulation  -apparently patient on both Coumadin and Xarelto at nursing facility  -currently on hold dur to supra therapeutic INR  -monitor for bleeding    Multiple medical co-morbidities including PAF, DM2, CHF, CK3, HTN, HLD  -continue monitoring and CC management      History of Present Illness      HPI:  Fleming Cogan is a 67 y o  female who presents who was brought to the ED yesterday by EMS from her nursing facility with hypoglycemia and change in mental status  She was recently discharged from [facility] after admission for sepsis, RORY,  thought to be secondary to infected sacral decubitus wound  She did have gram positive bacteremia  Her wound cultures grew E  Coli, proteus, and Enterococcus  She was treated with surgical debridement and IV ABx  She developed new onset AFIB and was cardioverted and developed neurogenic symptoms concerning for CVA and did receive TPA on this admission  She was discharged back to her nursing facility on 9/10/2020  Apparently the patient is on Coumadin and xarelto at the nursing facility  On admission patient was found to be hypoxic and hypotensive with a glucose of 92  She was found to be septic with leukocytosis, altered MS, and hypotention  They are concerned about the patients sacral wound  She did have a positive UA on admission  She has been hypothermic  Today patient is clear  She complains of some pain at sacral wound site  She denies fevers or chills  No nausea or vomiting  She does not remember being brought to the hospital yesterday  She is having loose BMs  Consults    Review of Systems   Constitutional: Negative  Negative for appetite change, chills, fever and unexpected weight change  HENT: Negative  Eyes: Negative  Respiratory: Negative  Negative for cough, shortness of breath and wheezing  Cardiovascular: Negative  Negative for chest pain and palpitations  Gastrointestinal: Positive for diarrhea  Negative for abdominal pain, blood in stool, constipation, nausea and vomiting  Endocrine: Negative  Genitourinary: Negative  Jimenez catheter with positive UA   Musculoskeletal: Negative  Skin: Positive for wound  Negative for rash  Allergic/Immunologic: Negative  Neurological: Positive for weakness  MS changes     Hematological: Bruises/bleeds easily  Psychiatric/Behavioral: Negative  All other systems reviewed and are negative  Historical Information   Past Medical History:   Diagnosis Date    Acute renal failure (ARF) ([facility] [address] )     Acute respiratory failure with hypoxia (HCC)     Chronic kidney disease     Diabetes mellitus ([facility] [address] )     type 2    Hyperlipidemia     Hypertension      Past Surgical History:   Procedure Laterality Date    BREAST SURGERY Left     lumpectomy benign    CATARACT EXTRACTION Bilateral 2017    CATARACT EXTRACTION W/ INTRAOCULAR LENS IMPLANT Left 12/11/2017    Procedure: EXTRACTION EXTRACAPSULAR CATARACT PHACO INTRAOCULAR LENS (IOL); Surgeon: Lisandro Butler MD;  Location: [facility] MAIN OR;  Service: Ophthalmology    RI OPEN RX FEMUR FX+INTRAMED RAYMOND Right 5/21/2020    Procedure: INSERTION OF SHORT TROCHANTERIC FEMORAL NAIL;  Surgeon: Kishan Shore MD;  [facility];  Service: Orthopedics    [address] HUMERAL&GLENOID COMPNT Right 9/10/2018    Procedure: RIGHT REVERSE TOTAL SHOULDER ARTHROPLASTY;  Surgeon: Kishan Shore MD;  [facility];  Service: Orthopedics    RI XCAPSL CTRC RMVL INSJ IO LENS PROSTH W/O ECP Right 10/23/2017    Procedure: EXTRACTION EXTRACAPSULAR CATARACT PHACO INTRAOCULAR LENS (IOL); Surgeon: Lisandro Butler MD;  Location: [facility] MAIN OR;  Service: Ophthalmology    WOUND DEBRIDEMENT N/A 8/26/2020    Procedure: EXCISIONAL DEBRIDEMENT OF SACRAL PRESSURE WOUND, WASHOUT;;  Surgeon: Diallo Zamora MD;  [facility] MA[facility];  Service: General    WOUND DEBRIDEMENT N/A 8/28/2020    Procedure: BUTTOCKS DEBRIDEMENT WOUND AND DRESSING CHANGE ([address] OUT);   Surgeon: Debra Bruno MD;  Location: [facility] MAIN [facility];  Service: General     Social History   Social History     Substance and Sexual Activity   Alcohol Use No    Comment: quit 8/17     Social History     Substance and Sexual Activity   Drug Use No     E-Cigarette/Vaping    E-Cigarette Use Never User      E-Cigarette/Vaping Substances    Nicotine No     THC No     CBD No  Flavoring No     Other No     Unknown No      Social History     Tobacco Use   Smoking Status Never Smoker   Smokeless Tobacco Never Used     Family History: non-contributory    Meds/Allergies   all current active meds have been reviewed  No Known Allergies    Objective   First Vitals:   Blood Pressure: (!) 78/39 (09/13/20 2333)  Pulse: 85 (09/13/20 2333)  Temperature: (!) 95 1 °F (35 1 °C) (09/13/20 2333)  Temp Source: Temporal (09/13/20 2333)  Respirations: 16 (09/13/20 2333)  Height: 5' 6" (167 6 cm) (09/14/20 0351)  Weight - Scale: 84 kg (185 lb 3 oz) (09/13/20 2333)  SpO2: (!) 88 % (09/13/20 2333)    Current Vitals:   Blood Pressure: 108/82 (09/14/20 1100)  Pulse: (!) 124 (09/14/20 1100)  Temperature: 97 9 °F (36 6 °C) (09/14/20 1100)  Temp Source: Bladder (09/14/20 1100)  Respirations: 14 (09/14/20 1100)  Height: 5' 6" (167 6 cm) (09/14/20 0351)  Weight - Scale: 84 6 kg (186 lb 8 2 oz) (09/14/20 0351)  SpO2: 94 % (09/14/20 1100)      Intake/Output Summary (Last 24 hours) at 9/14/2020 1154  Last data filed at 9/14/2020 0600  Gross per 24 hour   Intake 2635 ml   Output 130 ml   Net 2505 ml       Invasive Devices     Peripheral Intravenous Line            Peripheral IV 09/13/20 Left Antecubital less than 1 day    Peripheral IV 09/13/20 Right Wrist less than 1 day          Drain            Negative Pressure Wound Therapy (V A C ) Sacrum 12 days    Urethral Catheter Temperature probe 14 Fr  less than 1 day                Physical Exam  Constitutional:       General: She is not in acute distress  Appearance: She is well-developed  She is not diaphoretic  HENT:      Head: Normocephalic and atraumatic  Mouth/Throat:      Pharynx: No oropharyngeal exudate  Eyes:      General: No scleral icterus  Right eye: No discharge  Left eye: No discharge  Neck:      Musculoskeletal: Normal range of motion and neck supple  Thyroid: No thyromegaly  Vascular: No JVD        Trachea: No tracheal deviation  Comments: Trachea midline  Cardiovascular:      Rate and Rhythm: Regular rhythm  Heart sounds: Normal heart sounds  No murmur  Comments: tachycardia  Pulmonary:      Effort: Pulmonary effort is normal  No respiratory distress  Breath sounds: Normal breath sounds  No wheezing  Comments: Decreased BS at bases  Abdominal:      General: Bowel sounds are normal  There is no distension  Palpations: Abdomen is soft  Tenderness: There is no abdominal tenderness  Musculoskeletal: Normal range of motion  General: No deformity  Skin:     General: Skin is warm and dry  Findings: No rash  Comments: Stage 4 sacral decubitus wound measuring approximately 8 x 5 x 5 cm  Probes to bone  Overlying slough  No purulence or necrotic tissue  Serous drainage in canister  Neurological:      Mental Status: She is alert  Comments: Oriented to person and place, does not remember events from yesterday, No focal deficits   Psychiatric:         Behavior: Behavior normal          Lab Results:   I have personally reviewed pertinent lab results    , CBC:   Lab Results   Component Value Date    WBC 10 72 (H) 09/14/2020    HGB 7 8 (L) 09/14/2020    HCT 25 2 (L) 09/14/2020    MCV 93 09/14/2020     09/14/2020    MCH 28 8 09/14/2020    MCHC 31 0 (L) 09/14/2020    RDW 14 8 09/14/2020    MPV 11 2 09/14/2020    NRBC 0 09/14/2020   , CMP:   Lab Results   Component Value Date    SODIUM 139 09/14/2020    K 3 6 09/14/2020     09/14/2020    CO2 28 09/14/2020    BUN 35 (H) 09/14/2020    CREATININE 1 14 09/14/2020    CALCIUM 7 7 (L) 09/14/2020     (H) 09/14/2020    ALT 37 09/14/2020    ALKPHOS 187 (H) 09/14/2020    EGFR 47 09/14/2020   , Coagulation:   Lab Results   Component Value Date    INR >15 00 (HH) 09/14/2020   , Urinalysis:   Lab Results   Component Value Date    COLORU Yellow 09/14/2020    CLARITYU Cloudy 09/14/2020    SPECGRAV 1 015 09/14/2020 PHUR 6 5 09/14/2020    LEUKOCYTESUR Small (A) 09/14/2020    NITRITE Negative 09/14/2020    GLUCOSEU Negative 09/14/2020    KETONESU Negative 09/14/2020    BILIRUBINUR Negative 09/14/2020    BLOODU Moderate (A) 09/14/2020   , Amylase: No results found for: AMYLASE, Lipase: No results found for: LIPASE  Imaging: I have personally reviewed pertinent reports  CT A/P:  Decubitus ulcer overlying the inferior sacrum and coccyx and soft tissue inflammatory changes  Irregularity of the sacrum which may represent underlying osteomyelitis     Diffuse rectal wall thickening and perirectal inflammatory changes which may represent proctitis or secondary from the above process      Moderate bilateral pleural effusions  EKG, Pathology, and Other Studies: I have personally reviewed pertinent reports

## 2020-09-14 NOTE — ASSESSMENT & PLAN NOTE
· Comes to ER with Jimenez from nursing home, noted by nursing staff to have thick, dark yellow, curd-like discharge in the Jimenez tubing  · Jimenez exchanged the emergency department  · UA with moderate blood, small leuks, innumerable bacteria  · Antibiotics as above  · Urine culture pending

## 2020-09-14 NOTE — H&P
H&P- [place] 1945, 67 y o  female MRN: [id number]    Unit/Bed#: -01 SDU Encounter: [id number]    Primary Care Provider: Tiffani De Oliveira DO   Date and time admitted to hospital: 9/13/2020 11:30 PM        * Altered mental status  Assessment & Plan  · Unresponsive at nursing home, now awake and oriented x3 following Narcan x2, nasal cannula O2  · CT head negative for acute intracranial abnormality  · CN grossly intact on exam   · Seen by Neurology during previous stay for stroke-like symptoms, however MRI negative for acute CVA, more likely she was symptomatic from cerebrohypoperfusion in the setting of carotid stenosis and septic shock leading to hypotension  · Scheduled neuro exams    Septic shock  Assessment & Plan  · Discharged on 09/10 after a lengthy stay with gram-positive bacteremia, septic shock 2/2 sacral decubitus ulcer   · Debridement x2 as above  · Comes to the ER with temp 95 1, blood pressure 78/39, SpO2 88% RA, AMS, SBP <90 x3, now tachycardic   · Previous wound cultures positive for E coli, Proteus, Enterococcus  · Previous urine culture positive for E coli, lactobacillus  · Previous blood cultures positive for strep, Staph, Enterococcus, bacteroids  · Lactic 1 1   · IVF crystalloids bolused (1785mL based on IBW of 59 5kg) given hypotension x3  · Remains HD stable s/p fluid admin   · CT abd/pel c/w sacral osteo, sacral decub and surrounding soft tissue changes   · Source likely combination of UTI, sacral wound infection   · Procal, pan cultures pending   · Goal to maintain MAP >65, normothermic, normoglycemic     Decubitus ulcer of sacral region, stage 4 (HCC)  Assessment & Plan  · Underwent debridement x2 and wound VAC placement with general surgery  · Wound VAC in place  · CT abdomen pelvis performed -- Decubitus ulcer overlying the inferior sacrum and coccyx and soft tissue inflammatory changes  Irregularity of the sacrum which may represent underlying osteomyelitis   Diffuse rectal wall thickening and perirectal inflammatory changes which may represent proctitis or secondary from the above process    · Contacted Dr Marti Speaker with general surgery overnight, who states there is no immediate indication for emergent debridement  · Consult placed for general surgery for wound VAC, review of surgical options    Paroxysmal atrial fibrillation (HCC)  Assessment & Plan  · New onset during her previous stay  · Metoprolol on hold given hypotension  · Currently AFib, rate controlled  · Goal to maintain AFib rate <110   · Chronically anticoagulated on Coumadin, though INR greater than 15 on arrival, will hold on further anticoagulation    Type 2 diabetes mellitus with hyperglycemia [facility])  Assessment & Plan  Lab Results   Component Value Date    HGBA1C 9 9 (H) 08/26/2020       Recent Labs     09/13/20  2334 09/13/20  2339   POCGLU 98 92       Blood Sugar Average: Last 72 hrs:  (P) 95     · Currently NPO, SSI q 6 hours for glucose 140-180    Urinary tract infection  Assessment & Plan  · Comes to ER with Jimenez from nursing home, noted by nursing staff to have thick, dark yellow, curd-like discharge in the Jimenez tubing  · Jimenez exchanged the emergency department  · UA with moderate blood, small leuks, innumerable bacteria  · Antibiotics as above  · Urine culture pending    Supratherapeutic INR  Assessment & Plan  · INR > 15 -- recheck  · Prescribed home Coumadin for chronic anticoagulation in the setting of AFib RVR, however comes from nursing home with both Xarelto and Coumadin on her medication list  · Hold on further anticoagulation  · No active bleeding    Chronic diastolic heart failure (HCC)  Assessment & Plan  Wt Readings from Last 3 Encounters:   09/13/20 84 kg (185 lb 3 oz)   09/10/20 80 7 kg (177 lb 14 6 oz)   08/25/20 73 kg (161 lb)       · Echo 08/27/2020 -- EF 65%, no acute abnormality, wall thickness mildly increased, changes consistent with concentric remodeling, grade 2 diastolic dysfunction  · Received crystalloid administration the setting of sepsis   · Strict I&O   · QD weights    Stenosis of left carotid artery  Assessment & Plan  · Carotid Dopplers -- LICA >52%, VASQUEZ <44%    CKD 3  Assessment & Plan  · Currently at her baseline creatinine, which appears to be 1 2-1 3  · Avoid nephrotoxic agents  · Jimenez present for critical I&O   · Trend renal indices     Hyperlipidemia  Assessment & Plan  · Continue home statin    Hypertension  Assessment & Plan  · Home metoprolol currently the setting of recurrent hypotension        -------------------------------------------------------------------------------------------------------------  Chief Complaint: AMS    History of Present Illness   HX and PE limited by: ANAHI  Marc Aguirre is a 67 y o  female with a past medical history of type 2 diabetes, hyper tension, hyper lipid, CKD 3, chronic diastolic heart failure, paroxysmal AFib, carotid stenosis presents to the emergency department via EMS from nursing home with a chief complaint of altered mental status  Notably, the patient had a very recent stay at [facility], where she was diagnosed with new onset AFib RVR, subsequently put up for possible CVA, and later found to be in septic shock with Gram-positive bacteremia secondary to sacral decubitus wound  Her sacral wound was surgically debrided x2, with wound VAC placement  She was discharged from [facility] on 09/10 2 nursing home  Earlier tonight at nursing home, she was noticed to have altered mental status and lethargy, her glucose was noted to be in the 30s and she was administered glucagon without improvement  On arrival to the emergency department, she was lethargic, hypotensive, hypoxic  She was administered Narcan x2 with improvement of mental status to baseline  She met septic shock parameters with hypotension x3, leukocytosis, hypothermia, tachycardia and was administered 30 mL/kg IBW crystaloid bolus    She is sleepy, but wakes to voice, and is oriented x3  She endorses no complaints, aside from sacral pain  Her Jimenez catheters exchanged the emergency department, and noted to have purulent, curd-like drainage  CT abdomen pelvis concerning for sacral osteomyelitis  She is admitted to critical care for IV fluid resuscitation, administration of IV antibiotics, warming via [employer]  History obtained from chart review and the patient   -------------------------------------------------------------------------------------------------------------  Dispo: Admit to Stepdown Level 1    Code Status: Level 1 - Full Code  --------------------------------------------------------------------------------------------------------------  Review of Systems   Unable to perform ROS: Mental status change       Review of systems was unable to be performed secondary to AMS    Physical Exam  Vitals signs and nursing note reviewed  Constitutional:       General: She is not in acute distress  Appearance: She is well-developed  She is obese  She is ill-appearing  She is not toxic-appearing or diaphoretic  Interventions: Nasal cannula in place  HENT:      Head: Normocephalic and atraumatic  Nose: Nose normal  No congestion or rhinorrhea  Mouth/Throat:      Mouth: Mucous membranes are dry  Pharynx: No oropharyngeal exudate or posterior oropharyngeal erythema  Eyes:      General: Lids are normal  No scleral icterus  Right eye: No discharge  Left eye: No discharge  Extraocular Movements: Extraocular movements intact  Pupils: Pupils are equal, round, and reactive to light  Comments: Pupils 2mm PERRLA BL   Neck:      Musculoskeletal: Normal range of motion and neck supple  No neck rigidity or muscular tenderness  Cardiovascular:      Rate and Rhythm: Normal rate  Rhythm irregularly irregular  Pulses: Normal pulses             Radial pulses are 2+ on the right side and 2+ on the left side       Heart sounds: Normal heart sounds  No murmur  No friction rub  No gallop  Pulmonary:      Effort: Pulmonary effort is normal  No accessory muscle usage or respiratory distress  Breath sounds: No stridor  Decreased breath sounds (bibasilar) present  No wheezing  Abdominal:      General: Bowel sounds are normal  There is no distension  Palpations: Abdomen is soft  There is no mass  Tenderness: There is no abdominal tenderness  Genitourinary:     Comments: Jimenez present  Musculoskeletal: Normal range of motion  General: No swelling, tenderness or deformity  Skin:     General: Skin is dry  Capillary Refill: Capillary refill takes less than 2 seconds  Coloration: Skin is pale  Comments: Wound vac to sacrum, serosanguineous drainage noted   Neurological:      General: No focal deficit present  Mental Status: She is oriented to person, place, and time and easily aroused  GCS: GCS eye subscore is 3  GCS verbal subscore is 5  GCS motor subscore is 6  Cranial Nerves: No cranial nerve deficit  Comments: Speech clear  No facial droop noted  Shoulder shrugs L 5/5, R 5/5  Hand grasps L 5/5, R 4/5  UE push/pull L 5/5, R 4/5  Plantar/dorsi flexion L 5/5, R 5/5  Leg lifts L 3/5, R 3/5   Psychiatric:         Speech: Speech normal          Behavior: Behavior is cooperative        Comments: Flat affect        --------------------------------------------------------------------------------------------------------------  Vitals:   Vitals:    09/14/20 0345 09/14/20 0350 09/14/20 0351 09/14/20 0400   BP: 118/71 129/68  126/57   Pulse: (!) 124 (!) 111  (!) 114   Resp: (!) 24 (!) 26  12   Temp: (!) 93 4 °F (34 1 °C) (!) 93 4 °F (34 1 °C)  (!) 93 6 °F (34 2 °C)   TempSrc:    Bladder   SpO2: 98% 99%  97%   Weight:   84 6 kg (186 lb 8 2 oz)    Height:   5' 6" (1 676 m)      Temp  Min: 93 4 °F (34 1 °C)  Max: 96 °F (35 6 °C)  IBW: 59 3 kg  Height: 5' 6" (167 6 cm)  Body mass index is 30 1 kg/m²  Laboratory and Diagnostics:  Results from last 7 days   Lab Units 09/14/20  0013 09/10/20  0545 09/09/20  0454 09/08/20  0438   WBC Thousand/uL 13 70* 10 71* 11 29* 13 82*   HEMOGLOBIN g/dL 8 8* 7 6* 7 5* 7 8*   HEMATOCRIT % 29 1* 25 8* 25 1* 25 1*   PLATELETS Thousands/uL 340 250 237 235   NEUTROS PCT % 80* 67 71 72   MONOS PCT % 7 8 6 6     Results from last 7 days   Lab Units 09/14/20  0013 09/10/20  0545 09/09/20  0454   SODIUM mmol/L 139 143 141   POTASSIUM mmol/L 3 6 3 2* 3 5   CHLORIDE mmol/L 101 104 103   CO2 mmol/L 37* 35* 33*   ANION GAP mmol/L 1* 4 5   BUN mg/dL 36* 51* 50*   CREATININE mg/dL 1 29 1 03 1 03   CALCIUM mg/dL 8 4 7 8* 7 7*   GLUCOSE RANDOM mg/dL 90 57* 111   ALT U/L 16  --   --    AST U/L 17  --   --    ALK PHOS U/L 131*  --   --    ALBUMIN g/dL 1 6*  --   --    TOTAL BILIRUBIN mg/dL 0 60  --   --           Results from last 7 days   Lab Units 09/14/20  0152 09/10/20  0545 09/09/20  0454 09/08/20  0438   INR  >15 00* 1 84* 2 57* 3 35*   PTT seconds 160*  --   --   --       Results from last 7 days   Lab Units 09/14/20  0014   TROPONIN I ng/mL <0 02     Results from last 7 days   Lab Units 09/14/20  0013   LACTIC ACID mmol/L 1 1     ABG:    VBG:    Results from last 7 days   Lab Units 09/08/20  0438 09/07/20  0909   PROCALCITONIN ng/ml 0 07 0 09       Micro:        EKG: AF RVR on tele   Imaging: I have personally reviewed pertinent reports          Historical Information   Past Medical History:   Diagnosis Date    Acute [facility] failure (ARF) ([facility] )     Acute respiratory failure with hypoxia (HCC)     Chronic kidney disease     Diabetes mellitus ([facility] )     type 2    Hyperlipidemia     Hypertension      Past Surgical History:   Procedure Laterality Date    BREAST SURGERY Left     lumpectomy benign    CATARACT EXTRACTION Bilateral 2017    CATARACT EXTRACTION W/ INTRAOCULAR LENS IMPLANT Left 12/11/2017    Procedure: EXTRACTION EXTRACAPSULAR CATARACT PHACO INTRAOCULAR LENS (IOL); Surgeon: Rima Castle MD;  Location: [facility] MAIN OR;  Service: Ophthalmology    NC OPEN RX FEMUR FX+INTRAMED RAYMOND Right 5/21/2020    Procedure: INSERTION OF SHORT TROCHANTERIC FEMORAL NAIL;  Surgeon: Giovanny Joe MD;  [facility];  Service: Orthopedics    [address] HUMERAL&GLENOID COMPNT Right 9/10/2018    Procedure: RIGHT REVERSE TOTAL SHOULDER ARTHROPLASTY;  Surgeon: Giovanny Joe MD;  [facility];  Service: Orthopedics    NC XCAPSL CTRC RMVL INSJ IO LENS PROSTH W/O ECP Right 10/23/2017    Procedure: EXTRACTION EXTRACAPSULAR CATARACT PHACO INTRAOCULAR LENS (IOL); Surgeon: Rima Castle MD;  Location: [facility] MAIN OR;  Service: Ophthalmology    WOUND DEBRIDEMENT N/A 8/26/2020    Procedure: EXCISIONAL DEBRIDEMENT OF SACRAL PRESSURE WOUND, WASHOUT;;  Surgeon: Estefania Duggan MD;  [facility] MA[facility];  Service: General    WOUND DEBRIDEMENT N/A 8/28/2020    Procedure: BUTTOCKS DEBRIDEMENT WOUND AND DRESSING CHANGE ([address] OUT);   Surgeon: Alex Collazo MD;  [facility] MA[facility];  Service: General     Social History   Social History     Substance and Sexual Activity   Alcohol Use No    Comment: quit 8/17     Social History     Substance and Sexual Activity   Drug Use No     Social History     Tobacco Use   Smoking Status Never Smoker   Smokeless Tobacco Never Used     Exercise History: NA  Family History:   Family History   Problem Relation Age of Onset    Diabetes Mother     Varicose Veins Mother     Stroke Father     Arthritis Brother     Diabetes Daughter     No Known Problems Brother      I have reviewed this patient's family history and commented on sigificant items within the HPI      Medications:  Current Facility-Administered Medications   Medication Dose Route Frequency    aspirin (ECOTRIN LOW STRENGTH) EC tablet 81 mg  81 mg Oral Daily    atorvastatin (LIPITOR) tablet 40 mg  40 mg Oral Daily With Dinner    cefepime (MAXIPIME) IVPB (premix) 2,000 mg 50 mL  2,000 mg Intravenous Q12H    chlorhexidine (PERIDEX) 0 12 % oral rinse 15 mL  15 mL Swish & Spit Q12H [address]    gabapentin (NEURONTIN) capsule 100 mg  100 mg Oral TID    insulin lispro (HumaLOG) 100 units/mL subcutaneous injection 1-6 Units  1-6 Units Subcutaneous Q6H [address]    metroNIDAZOLE (FLAGYL) IVPB (premix) 500 mg 100 mL  500 mg Intravenous Q8H    vancomycin (VANCOCIN) 1,500 mg in sodium chloride 0 9 % 250 mL IVPB  20 mg/kg (Adjusted) Intravenous Q24H     Home medications:  Prior to Admission Medications   Prescriptions Last Dose Informant Patient Reported?  Taking?   aspirin (ECOTRIN LOW STRENGTH) 81 mg EC tablet   No No   Sig: Take 1 tablet (81 mg total) by mouth daily   atorvastatin (LIPITOR) 40 mg tablet   No No   Sig: Take 1 tablet (40 mg total) by mouth daily with dinner   ferrous sulfate 325 (65 Fe) mg tablet   Yes No   Sig: TAKE 1 TABLET BY MOUTH ONCE A DAY FOR SUPPLEMENT   gabapentin (NEURONTIN) 100 mg capsule   Yes No   Sig: Take 100 mg by mouth 3 (three) times a day   insulin glargine (LANTUS) 100 units/mL subcutaneous injection   No No   Sig: Inject 14 Units under the skin daily at bedtime   insulin lispro (HumaLOG) 100 units/mL injection   No No   Sig: Inject 7 Units under the skin 3 (three) times a day with meals   metoprolol tartrate 75 MG TABS   No No   Sig: Take 1 tablet (75 mg total) by mouth every 12 (twelve) hours   polyethylene glycol (MIRALAX) 17 g packet   No No   Sig: Take 17 g by mouth daily   rivaroxaban (XARELTO) 20 mg tablet   No No   Sig: Take 1 tablet (20 mg total) by mouth daily with breakfast   torsemide (DEMADEX) 20 mg tablet   No No   Sig: Take 1 tablet (20 mg total) by mouth daily   warfarin (COUMADIN) 3 mg tablet   No No   Sig: Take 1 tab daily      Facility-Administered Medications: None     Allergies:  No Known Allergies    ------------------------------------------------------------------------------------------------------------  Advance Directive and Living Will:      Power of :    POLST:    ------------------------------------------------------------------------------------------------------------  Anticipated Length of Stay is > 2 midnights    Care Time Delivered:   No Critical Care time spent       TJ Robb        Portions of the record may have been created with voice recognition software  Occasional wrong word or "sound a like" substitutions may have occurred due to the inherent limitations of voice recognition software    Read the chart carefully and recognize, using context, where substitutions have occurred

## 2020-09-14 NOTE — ASSESSMENT & PLAN NOTE
· Discharged on 09/10 after a lengthy stay with gram-positive bacteremia, septic shock 2/2 sacral decubitus ulcer   · Debridement x2 as above  · Comes to the ER with temp 95 1, blood pressure 78/39, SpO2 88% RA, AMS, SBP <90 x3, now tachycardic   · Previous wound cultures positive for E coli, Proteus, Enterococcus  · Previous urine culture positive for E coli, lactobacillus  · Previous blood cultures positive for strep, Staph, Enterococcus, bacteroids  · Lactic 1 1   · IVF crystalloids bolused (1785mL based on IBW of 59 5kg) given hypotension x3  · Remains HD stable s/p fluid admin   · CT abd/pel c/w sacral osteo, sacral decub and surrounding soft tissue changes   · Source likely combination of UTI, sacral wound infection   · Procal, pan cultures pending   · Goal to maintain MAP >65, normothermic, normoglycemic

## 2020-09-14 NOTE — SEPSIS NOTE
Sepsis Note   Roopa Baker 67 y o  female MRN: [id number]  Unit/Bed#: -01 SDU Encounter: [id number]      qSOFA     [address] Name 09/14/20 0300 09/14/20 0245 09/14/20 0230 09/14/20 0215 09/14/20 0145    Altered mental status GCS < 15              Respiratory Rate > / =22  0  0  0  0  0    Systolic BP < / =144  0  0  0  0  1    Q Sofa Score  1  1  1  1  2    Row Name 09/14/20 0130 09/14/20 0115 09/14/20 0100 09/14/20 0049 09/14/20 0045    Altered mental status GCS < 15        1      Respiratory Rate > / =22  0  0  0    0    Systolic BP < / =886  0  1  1    0    Q Sofa Score  1  2  2  1  1    Row Name 09/14/20 0034 09/13/20 2347 09/13/20 2338 09/13/20 2336 09/13/20 2333    Altered mental status GCS < 15      1  1      Respiratory Rate > / =22  0  0      0    Systolic BP < / =763  0  0      1    Q Sofa Score  1  1  2  2  1        Initial Sepsis Screening     Row Name 09/14/20 0159 09/14/20 0013             Is the patient's history suggestive of a new or worsening infection? (!) Yes (Proceed)  -EJ  No  -EB       Suspected source of infection  soft tissue;wound infection  -EJ         Are two or more of the following signs & symptoms of infection both present and new to the patient? (!) Yes (Proceed)  -EJ         Indicate SIRS criteria  Leukocytosis (WBC > 77643 IJL); Altered mental status  -EJ         If the answer is yes to both questions, suspicion of sepsis is present  [de-identified]         If severe sepsis is present AND tissue hypoperfusion perists in the hour after fluid resuscitation or lactate > 4, the patient meets criteria for SEPTIC SHOCK           Are any of the following organ dysfunction criteria present within 6 hours of suspected infection and SIRS criteria that are NOT considered to be chronic conditions?   (!) Yes  -EJ         Organ dysfunction  SBP < 90 mmHg  -EJ         Date of presentation of severe sepsis  09/14/20  -EJ         Time of presentation of severe sepsis  0200  -EJ   Tissue hypoperfusion persists in the hour after crystalloid fluid administration, evidenced, by either:           Was hypotension present within one hour of the conclusion of crystalloid fluid administration?   Matt Matias       Date of presentation of septic shock           Time of presentation of septic shock             User Key  (r) = Recorded By, (t) = Taken By, (c) = Cosigned By    [address] Name Provider Type    BRUCE José MD Physician    EJ Ervin Clock, [facility] Nurse Practitioner               Default Flowsheet Data (last 720 hours)      Sepsis Reassess     Row Name 09/14/20 0357 09/14/20 0305                Repeat Volume Status and Tissue Perfusion Assessment Performed    Repeat Volume Status and Tissue Perfusion Assessment Performed  Yes  -EJ  Yes  -EB          Volume Status and Tissue Perfusion Post Fluid Resuscitation * Must Document All *    Vital Signs Reviewed (HR, RR, BP, T)  Yes  -EJ  Yes  -EB       Shock Index Reviewed  Yes  -EJ  Yes  -EB       Arterial Oxygen Saturation Reviewed (POx, SaO2 or SpO2)  Yes (comment %) 96 2L NC  -EJ         Cardio  (!) Tachycardia;Irregular rhythm AF RVR  -EJ         Pulmonary  Normal effort;Clear to auscultation  -EJ         Capillary Refill  Brisk  -EJ         Peripheral Pulses  Radial  -EJ         Peripheral Pulse  +2  -EJ         Skin           Urine output assessed  Adequate  -EJ            *OR*   Intensive Monitoring- Must Document One of the Following Four *:    Vital Signs Reviewed           * Central Venous Pressure (CVP or RAP)           * Central Venous Oxygen (SVO2, ScvO2 or Oxygen saturation via central catheter)           * Bedside Cardiovascular US in IVC diameter and % collapse           * Passive Leg Raise OR Crystalloid Challenge             User Key  (r) = Recorded By, (t) = Taken By, (c) = Cosigned By    Initials Name Provider Type    BRUCE José MD Physician    [address]  Nurse Practitioner

## 2020-09-14 NOTE — ASSESSMENT & PLAN NOTE
Lab Results   Component Value Date    HGBA1C 9 9 (H) 08/26/2020       Recent Labs     09/13/20  2334 09/13/20  2339   POCGLU 98 92       Blood Sugar Average: Last 72 hrs:  (P) 95     · Currently NPO, SSI q 6 hours for glucose 140-180

## 2020-09-14 NOTE — ASSESSMENT & PLAN NOTE
· Unresponsive at nursing home, now awake and oriented x3 following Narcan x2, nasal cannula O2  · CT head negative for acute intracranial abnormality  · CN grossly intact on exam   · Seen by Neurology during previous stay for stroke-like symptoms, however MRI negative for acute CVA, more likely she was symptomatic from cerebrohypoperfusion in the setting of carotid stenosis and septic shock leading to hypotension  · Scheduled neuro exams

## 2020-09-14 NOTE — ASSESSMENT & PLAN NOTE
· New onset during her previous stay  · Metoprolol on hold given hypotension  · Currently AFib, rate controlled  · Goal to maintain AFib rate <110   · Chronically anticoagulated on Coumadin, though INR greater than 15 on arrival, will hold on further anticoagulation

## 2020-09-14 NOTE — QUICK NOTE
Spoke with Casper from patient's nursing home, who reviews EMR while on the phone with me, and confirms that the patient was administered both Coumadin 3mg, and Xarelto last pm

## 2020-09-14 NOTE — ED NOTES
Pt is lethargic but oriented and answering questions appropriately  Pt states she is cold        Laisha Quintana, RN  09/13/20 8239

## 2020-09-14 NOTE — ASSESSMENT & PLAN NOTE
· INR > 15 -- recheck  · Prescribed home Coumadin for chronic anticoagulation in the setting of AFib RVR, however comes from nursing home with both Xarelto and Coumadin on her medication list  · Hold on further anticoagulation  · No active bleeding

## 2020-09-14 NOTE — ASSESSMENT & PLAN NOTE
· Currently at her baseline creatinine, which appears to be 1 2-1 3  · Avoid nephrotoxic agents  · Tony present for critical I&O   · Trend renal indices

## 2020-09-14 NOTE — ASSESSMENT & PLAN NOTE
· Underwent debridement x2 and wound VAC placement with general surgery  · Wound VAC in place  · CT abdomen pelvis performed -- Decubitus ulcer overlying the inferior sacrum and coccyx and soft tissue inflammatory changes  Irregularity of the sacrum which may represent underlying osteomyelitis  Diffuse rectal wall thickening and perirectal inflammatory changes which may represent proctitis or secondary from the above process    · Contacted Dr Kathe Benjamin with general surgery overnight, who states there is no immediate indication for emergent debridement  · Consult placed for general surgery for wound VAC, review of surgical options

## 2020-09-14 NOTE — SEPSIS NOTE
Sepsis Note   Amrik Barreto 67 y o  female MRN: [id number]  Unit/Bed#: ED 08 Encounter: [id number]      qSOFA     [address] Name 09/14/20 0145 09/14/20 0130 09/14/20 0115 09/14/20 0100 09/14/20 0049    Altered mental status GCS < 15          1    Respiratory Rate > / =22  0  0  0  0      Systolic BP < / =535  1  0  1  1      Q Sofa Score  2  1  2  2  1    Row Name 09/14/20 0045 09/14/20 0034 09/13/20 2347 09/13/20 2338 09/13/20 2336    Altered mental status GCS < 15        1  1    Respiratory Rate > / =22  0  0  0        Systolic BP < / =047  0  0  0        Q Sofa Score  1  1  1  2  2    Row Name 09/13/20 2333                Altered mental status GCS < 15          Respiratory Rate > / =26  0        Systolic BP < / =701  1        Q Sofa Score  1            Initial Sepsis Screening     Row Name 09/14/20 0159 09/14/20 0013             Is the patient's history suggestive of a new or worsening infection? (!) Yes (Proceed)  -EJ  No  -EB       Suspected source of infection  soft tissue;wound infection  -EJ         Are two or more of the following signs & symptoms of infection both present and new to the patient? (!) Yes (Proceed)  -EJ         Indicate SIRS criteria  Leukocytosis (WBC > 07804 IJL); Altered mental status  -EJ         If the answer is yes to both questions, suspicion of sepsis is present  [de-identified]         If severe sepsis is present AND tissue hypoperfusion perists in the hour after fluid resuscitation or lactate > 4, the patient meets criteria for SEPTIC SHOCK           Are any of the following organ dysfunction criteria present within 6 hours of suspected infection and SIRS criteria that are NOT considered to be chronic conditions?   (!) Yes  -EJ         Organ dysfunction  SBP < 90 mmHg  -EJ         Date of presentation of severe sepsis  09/14/20  -EJ         Time of presentation of severe sepsis  0200  -EJ         Tissue hypoperfusion persists in the hour after crystalloid fluid administration, evidenced, by either:  Matt Matias       Was hypotension present within one hour of the conclusion of crystalloid fluid administration?   Matt Matias       Date of presentation of septic shock           Time of presentation of septic shock             User Key  (r) = Recorded By, (t) = Taken By, (c) = Cosigned By    [address] Name Provider Type    BRUCE José MD Physician    [address]  Nurse Practitioner

## 2020-09-14 NOTE — ED PROVIDER NOTES
History  Chief Complaint   Patient presents with    Hypoglycemia - Symptomatic     pt arrives via ems from [facility] for hypoglycemia  BG was 32, staff gave pt sugar, bg increased to 91  EMS checked and BG was 93  Pt has purposeful movement pt is non verbal to ED staff  Patient presents from [facility] complaints of altered mental status  Reportedly EMS were called initially an hour ago for hypoglycemic episode however the call was canceled once the patient received supplemental glucose and her symptoms resolved, they were called again shortly after and were told that her sugar was in the 30s the gave her glucagon x 2 and it improved to the 90s however her mental status continued to be obtunded  and EMS were called again for transport  On EMS evaluation patient with eyes closed, obtunded, moving all extremities on command symmetrically  Glucose in the 90s  On initial evaluation in the emergency department patient opens her eyes to voice, moves all extremities on command symmetrically, no obvious facial droop  Her pupils are pinpoint bilaterally, she is hypoxic in mid 80s on room air, hypotensive, initial blood pressure 79/36  Her glucose is 92 on Accu-Chek  Further history was obtained from Mani, patient;s nurse in the [facility], [place]  She states that this evening patient was which she feels was her baseline though she does not know the patient well and she is per sharonda, patient was able to answer questions, was able to take her medications and swallowed them herself,  patient got her evening time medications, coumadin, atorvastatin, when the nurse came back and to give her the evening time meds (metoprolol/gabapentin) patient's mental status was decreased and she was diaphoretic  She states that she checked her sugar was in the 30s, she gave her glucagon x2 which did improve the sugar to the 90s however the mental status continued to be obtunded    Patient was also hypoxic and improved to 90s on 8 L nasal cannula  EMS were called for transfer to the hospital at that time  Of note the medication list that is provided with the patient it is dated today's date does not have Coumadin on it but has Xarelto listed, which the nurse was unable to verify  On patient's medication list at the hospital she has both Xarelto and Coumadin listed  She was recently discharged from [facility] after sepsis, RORY, altered mental status, she also initially had new onset unstable atrial fibrillation which was cardioverted in the field, following that she developed neurologic symptoms concerning for stroke and has received tPA in the hospital    She was then admitted for further care and stayed in the hospital from 08/25 - 9/10 at which time she was discharged to rehabilitation facility  Of note patient's MRI did not show any acute stroke, she did have significant carotid stenosis     She also had debridement of her sacral decubitus ulcer and wound VAC placement  Hypoglycemia - Symptomatic       Prior to Admission Medications   Prescriptions Last Dose Informant Patient Reported?  Taking?   aspirin (ECOTRIN LOW STRENGTH) 81 mg EC tablet   No No   Sig: Take 1 tablet (81 mg total) by mouth daily   atorvastatin (LIPITOR) 40 mg tablet   No No   Sig: Take 1 tablet (40 mg total) by mouth daily with dinner   ferrous sulfate 325 (65 Fe) mg tablet   Yes No   Sig: TAKE 1 TABLET BY MOUTH ONCE A DAY FOR SUPPLEMENT   gabapentin (NEURONTIN) 100 mg capsule   Yes No   Sig: Take 100 mg by mouth 3 (three) times a day   insulin glargine (LANTUS) 100 units/mL subcutaneous injection   No No   Sig: Inject 14 Units under the skin daily at bedtime   insulin lispro (HumaLOG) 100 units/mL injection   No No   Sig: Inject 7 Units under the skin 3 (three) times a day with meals   metoprolol tartrate 75 MG TABS   No No   Sig: Take 1 tablet (75 mg total) by mouth every 12 (twelve) hours   polyethylene glycol (MIRALAX) 17 g packet   No No   Sig: Take 17 g by mouth daily   rivaroxaban (XARELTO) 20 mg tablet   No No   Sig: Take 1 tablet (20 mg total) by mouth daily with breakfast   torsemide (DEMADEX) 20 mg tablet   No No   Sig: Take 1 tablet (20 mg total) by mouth daily   warfarin (COUMADIN) 3 mg tablet   No No   Sig: Take 1 tab daily      Facility-Administered Medications: None       Past Medical History:   Diagnosis Date    Acute renal failure (ARF) ([facility] [address] [facility] )     Acute respiratory failure with hypoxia (HCC)     Chronic kidney disease     [facility]     type 2    Hyperlipidemia     Hypertension        Past Surgical History:   Procedure Laterality Date    BREAST SURGERY Left     lumpectomy benign    CATARACT EXTRACTION Bilateral 2017    CATARACT EXTRACTION W/ INTRAOCULAR LENS IMPLANT Left 12/11/2017    Procedure: EXTRACTION EXTRACAPSULAR CATARACT PHACO INTRAOCULAR LENS (IOL); Surgeon: Vivian Paige MD;  Location: [facility] MAIN OR;  Service: Ophthalmology    UT OPEN RX FEMUR FX+INTRAMED RAYMOND Right 5/21/2020    Procedure: INSERTION OF SHORT TROCHANTERIC FEMORAL NAIL;  Surgeon: Ana Rosa Bunn MD;  [facility] Main OR;  Service: Orthopedics    [address] HUMERAL&GLENOID COMPNT Right 9/10/2018    Procedure: RIGHT REVERSE TOTAL SHOULDER ARTHROPLASTY;  Surgeon: Ana Rosa Bunn MD;  [facility];  Service: Orthopedics    UT XCAPSL CTRC RMVL INSJ IO LENS PROSTH W/O ECP Right 10/23/2017    Procedure: EXTRACTION EXTRACAPSULAR CATARACT PHACO INTRAOCULAR LENS (IOL); Surgeon: Vivian Paige MD;  Location: [facility] MAIN OR;  Service: Ophthalmology    WOUND DEBRIDEMENT N/A 8/26/2020    Procedure: EXCISIONAL DEBRIDEMENT OF SACRAL PRESSURE WOUND, WASHOUT;;  Surgeon: Ricarda Armstrong MD;  [facility] MAIN [facility];  Service: General    WOUND DEBRIDEMENT N/A 8/28/2020    Procedure: BUTTOCKS DEBRIDEMENT WOUND AND DRESSING CHANGE ([address] OUT);   Surgeon: Kelly Marcos MD;  [facility] MAIN [facility];  Service: General Family History   Problem Relation Age of Onset    Diabetes Mother     Varicose Veins Mother     Stroke Father     Arthritis Brother     Diabetes Daughter     No Known Problems Brother      I have reviewed and agree with the history as documented  E-Cigarette/Vaping    E-Cigarette Use Never User      E-Cigarette/Vaping Substances    Nicotine No     THC No     CBD No     Flavoring No     Other No     Unknown No      Social History     Tobacco Use    Smoking status: Never Smoker    Smokeless tobacco: Never Used   Substance Use Topics    Alcohol use: No     Comment: quit 8/17    Drug use: No       Review of Systems   Unable to perform ROS: Mental status change       Physical Exam  Physical Exam  Vitals signs and nursing note reviewed  Constitutional:       Comments: Obtunded female, slow shallow respiration, no accessory muscle use   HENT:      Head: Normocephalic and atraumatic  Nose: Nose normal       Mouth/Throat:      Mouth: Mucous membranes are moist    Eyes:      Comments: Pupils 2 mm bilaterally, symmetrical   Neck:      Musculoskeletal: Normal range of motion and neck supple  Cardiovascular:      Rate and Rhythm: Rhythm irregular  Pulses: Normal pulses  Heart sounds: Normal heart sounds  Pulmonary:      Effort: Pulmonary effort is normal       Breath sounds: Normal breath sounds  Abdominal:      General: Abdomen is flat  There is no distension  Genitourinary:     Comments: Diffuse vulvar  erythema without any lesions    Jimenez catheter present      Wound VAC over sacral region  Musculoskeletal: Normal range of motion  General: No swelling  Skin:     General: Skin is dry  Capillary Refill: Capillary refill takes less than 2 seconds  Comments: Cool skin   Neurological:      General: No focal deficit present        Comments: Initially able to follow simple commands, opens eyes to voice, after Narcan patient is speaking with clear speech, answering questions appropriately, moving all extremities         Vital Signs  ED Triage Vitals   Temperature Pulse Respirations Blood Pressure SpO2   09/13/20 2333 09/13/20 2333 09/13/20 2333 09/13/20 2333 09/13/20 2333   (!) 95 1 °F (35 1 °C) 85 16 (!) 78/39 (!) 88 %      Temp Source Heart Rate Source Patient Position - Orthostatic VS BP Location FiO2 (%)   09/13/20 2333 09/13/20 2333 09/13/20 2333 09/13/20 2333 --   Temporal Monitor Lying Right arm       Pain Score       09/14/20 0345       5           Vitals:    09/14/20 0215 09/14/20 0230 09/14/20 0245 09/14/20 0300   BP: 112/64 141/65 141/63 112/59   Pulse: 95 (!) 111 (!) 107 93   Patient Position - Orthostatic VS:             Visual Acuity  Visual Acuity      Most Recent Value   L Pupil Size (mm)  2   R Pupil Size (mm)  2   L Pupil Shape  Round   R Pupil Shape  Round          ED Medications  Medications   cefepime (MAXIPIME) IVPB (premix) 2,000 mg 50 mL (0 mg Intravenous Stopped 9/14/20 0159)   sodium chloride 0 9 % bolus 1,785 mL (1,785 mL Intravenous New Bag 9/14/20 0208)   aspirin (ECOTRIN LOW STRENGTH) EC tablet 81 mg (has no administration in time range)   atorvastatin (LIPITOR) tablet 40 mg (has no administration in time range)   gabapentin (NEURONTIN) capsule 100 mg (has no administration in time range)   chlorhexidine (PERIDEX) 0 12 % oral rinse 15 mL (has no administration in time range)   metroNIDAZOLE (FLAGYL) IVPB (premix) 500 mg 100 mL (has no administration in time range)   insulin lispro (HumaLOG) 100 units/mL subcutaneous injection 1-6 Units (has no administration in time range)   vancomycin (VANCOCIN) 1,500 mg in sodium chloride 0 9 % 250 mL IVPB (has no administration in time range)   naloxone [facility]) injection 0 4 mg (0 4 mg Intravenous Given 9/13/20 2341)   naloxone [facility]) injection 0 4 mg (0 4 mg Intravenous Given 9/13/20 2352)   sodium chloride 0 9 % bolus 500 mL (0 mL Intravenous Stopped 9/14/20 0143)   vancomycin (VANCOCIN) IVPB (premix) 1,000 mg 200 mL (0 mg/kg × 69 2 kg (Adjusted) Intravenous Stopped 9/14/20 0237)   metroNIDAZOLE (FLAGYL) IVPB (premix) 500 mg 100 mL (0 mg Intravenous Stopped 9/14/20 0207)   iohexol (OMNIPAQUE) 350 MG/ML injection (SINGLE-DOSE) 100 mL (100 mL Intravenous Given 9/14/20 0322)       Diagnostic Studies  Results Reviewed     Procedure Component Value Units Date/Time    Protime-INR [[id number]]  (Abnormal) Collected:  09/14/20 0152    Lab Status:  Final result Specimen:  Blood from Arm, Left Updated:  09/14/20 0305     Protime >120 0 seconds      INR >15 00    Narrative:       VERIFIED BY REPEAT TESTING  NO CLOTS    APTT [[id number]]  (Abnormal) Collected:  09/14/20 0152    Lab Status:  Final result Specimen:  Blood from Arm, Left Updated:  09/14/20 0305      seconds     Narrative:       VERIFIED BY REPEAT TESTING   NO CLOTS    Wound culture and Gram stain [[id number]]     Lab Status:  No result Specimen:  Wound     Procalcitonin with AM Reflex [[id number]] Collected:  09/14/20 0229    Lab Status: In process Specimen:  Blood from Arm, Left Updated:  09/14/20 0235    Novel Coronavirus (Covid-19),PCR [facility] [[id number]]  (Normal) Collected:  09/14/20 0030    Lab Status:  Final result Specimen:  Nares from Nose Updated:  09/14/20 0211     SARS-CoV-2 Negative    Narrative: The specimen collection materials, transport medium, and/or testing methodology utilized in the production of these test results have been proven to be reliable in a limited validation with an abbreviated program under the Emergency Utilization Authorization provided by the FDA  Testing reported as "Presumptive positive" will be confirmed with secondary testing with a reference laboratory to ensure result accuracy  Clinical caution and judgement should be used with the interpretation of these results with consideration of the clinical impression and other laboratory testing    Testing reported as "Positive" or "Negative" has been proven to be accurate according to standard laboratory validation requirements  All testing is performed with control materials showing appropriate reactivity at standard intervals  Comprehensive metabolic panel [[id number]]  (Abnormal) Collected:  09/14/20 0013    Lab Status:  Final result Specimen:  Blood from Arm, Left Updated:  09/14/20 0141     Sodium 139 mmol/L      Potassium 3 6 mmol/L      Chloride 101 mmol/L      CO2 37 mmol/L      ANION GAP 1 mmol/L      BUN 36 mg/dL      Creatinine 1 29 mg/dL      Glucose 90 mg/dL      Calcium 8 4 mg/dL      AST 17 U/L      ALT 16 U/L      Alkaline Phosphatase 131 U/L      Total Protein 6 2 g/dL      Albumin 1 6 g/dL      Total Bilirubin 0 60 mg/dL      eGFR 40 ml/min/1 73sq m     Narrative:       [place] guidelines for Chronic Kidney Disease (CKD):     Stage 1 with normal or high GFR (GFR > 90 mL/min/1 73 square meters)    Stage 2 Mild CKD (GFR = 60-89 mL/min/1 73 square meters)    Stage 3A Moderate CKD (GFR = 45-59 mL/min/1 73 square meters)    Stage 3B Moderate CKD (GFR = 30-44 mL/min/1 73 square meters)    Stage 4 Severe CKD (GFR = 15-29 mL/min/1 73 square meters)    Stage 5 End Stage CKD (GFR <15 mL/min/1 73 square meters)  Note: GFR calculation is accurate only with a steady state creatinine    TSH, 3rd generation with Free T4 reflex [[id number]]  (Normal) Collected:  09/14/20 0013    Lab Status:  Final result Specimen:  Blood from Arm, Left Updated:  09/14/20 0141     TSH 3RD GENERATON 0 944 uIU/mL     Narrative:       Patients undergoing fluorescein dye angiography may retain small amounts of fluorescein in the body for 48-72 hours post procedure  Samples containing fluorescein can produce falsely depressed TSH values  If the patient had this procedure,a specimen should be resubmitted post fluorescein clearance        Urine Microscopic [[id number]]  (Abnormal) Collected:  09/14/20 0059    Lab Status:  Final result Specimen:  Urine, Indwelling Jimenez Catheter Updated:  09/14/20 0141     RBC, UA 1-2 /hpf      WBC, UA 10-20 /hpf      Epithelial Cells Occasional /hpf      Bacteria, UA Innumerable /hpf      MUCUS THREADS Occasional     URINE COMMENT MICROSCOPIC PERFORMED ON UN-SPUN URINE  Urine culture [[id number]] Collected:  09/14/20 0059    Lab Status: In process Specimen:  Urine, Indwelling Jimenez Catheter Updated:  09/14/20 0140    UA w Reflex to Microscopic w Reflex to Culture [[id number]]  (Abnormal) Collected:  09/14/20 0059    Lab Status:  Final result Specimen:  Urine, Indwelling Jimenez Catheter Updated:  09/14/20 0127     Color, UA Yellow     Clarity, UA Cloudy     Specific Gravity, UA 1 015     pH, UA 6 5     Leukocytes, UA Small     Nitrite, UA Negative     Protein, UA >=300 mg/dl      Glucose, UA Negative mg/dl      Ketones, UA Negative mg/dl      Urobilinogen, UA 0 2 E U /dl      Bilirubin, UA Negative     Blood, UA Moderate    Rapid drug screen, urine [[id number]]  (Normal) Collected:  09/14/20 0031    Lab Status:  Final result Specimen:  Urine, Catheter Updated:  09/14/20 0108     Amph/Meth UR Negative     Barbiturate Ur Negative     Benzodiazepine Urine Negative     Cocaine Urine Negative     Methadone Urine Negative     Opiate Urine Negative     PCP Ur Negative     THC Urine Negative     Oxycodone Urine Negative    Narrative:       FOR MEDICAL PURPOSES ONLY  IF CONFIRMATION NEEDED PLEASE CONTACT THE LAB WITHIN 5 DAYS      Drug Screen Cutoff Levels:  AMPHETAMINE/METHAMPHETAMINES  1000 ng/mL  BARBITURATES     200 ng/mL  BENZODIAZEPINES     200 ng/mL  COCAINE      300 ng/mL  METHADONE      300 ng/mL  OPIATES      300 ng/mL  PHENCYCLIDINE     25 ng/mL  THC       50 ng/mL  OXYCODONE      100 ng/mL    Troponin I [[id number]]  (Normal) Collected:  09/14/20 0014    Lab Status:  Final result Specimen:  Blood from Arm, Left Updated:  09/14/20 0057     Troponin I <0 02 ng/mL     Lactic acid, plasma [[id number]]  (Normal) Collected:  09/14/20 0013    Lab Status:  Final result Specimen:  Blood from Arm, Left Updated:  09/14/20 0057     LACTIC ACID 1 1 mmol/L     Narrative:       Result may be elevated if tourniquet was used during collection  CBC and differential [[id number]]  (Abnormal) Collected:  09/14/20 0013    Lab Status:  Final result Specimen:  Blood from Arm, Left Updated:  09/14/20 0032     WBC 13 70 Thousand/uL      RBC 3 11 Million/uL      Hemoglobin 8 8 g/dL      Hematocrit 29 1 %      MCV 94 fL      MCH 28 3 pg      MCHC 30 2 g/dL      RDW 14 9 %      MPV 10 7 fL      Platelets 699 Thousands/uL      nRBC 0 /100 WBCs      Neutrophils Relative 80 %      Immat GRANS % 1 %      Lymphocytes Relative 12 %      Monocytes Relative 7 %      Eosinophils Relative 0 %      Basophils Relative 0 %      Neutrophils Absolute 10 99 Thousands/µL      Immature Grans Absolute 0 08 Thousand/uL      Lymphocytes Absolute 1 66 Thousands/µL      Monocytes Absolute 0 90 Thousand/µL      Eosinophils Absolute 0 02 Thousand/µL      Basophils Absolute 0 05 Thousands/µL     Blood culture [[id number]] Collected:  09/14/20 0013    Lab Status: In process Specimen:  Blood from Arm, Left Updated:  09/14/20 0031    Blood culture [[id number]] Collected:  09/14/20 0013    Lab Status: In process Specimen:  Blood from Arm, Left Updated:  09/14/20 0031    Fingerstick Glucose (POCT) [[id number]]  (Normal) Collected:  09/13/20 2339    Lab Status:  Final result Updated:  09/13/20 2340     POC Glucose 92 mg/dl     Fingerstick Glucose (POCT) [[id number]]  (Normal) Collected:  09/13/20 2334    Lab Status:  Final result Updated:  09/13/20 2339     POC Glucose 98 mg/dl                  CT abdomen pelvis w contrast   Final Result by Nidhi Villanueva DO (09/14 2758)      Decubitus ulcer overlying the inferior sacrum and coccyx and soft tissue inflammatory changes    Irregularity of the sacrum which may represent underlying osteomyelitis      Diffuse rectal wall thickening and perirectal inflammatory changes which may represent proctitis or secondary from the above process  Moderate bilateral pleural effusions  The study was marked in [facility] for immediate notification  Workstation performed: [id number]         CT head without contrast   Final Result by Sarah Irene MD (09/14 4457)      No acute intracranial abnormality  Mild to moderate chronic small vessel ischemic changes  Workstation performed: [id number]         XR chest portable   ED Interpretation by Gill Rosario MD (09/14 012)   Vascular congestion, R >L, RML infiltrate                 Procedures  CriticalCare Time  Performed by: Gill Rosario MD  Authorized by: Gill Rosario MD     Critical care provider statement:     Critical care time (minutes):  37    Critical care was necessary to treat or prevent imminent or life-threatening deterioration of the following conditions:  CNS failure or compromise, toxidrome and sepsis    Critical care was time spent personally by me on the following activities:  Blood draw for specimens, obtaining history from patient or surrogate, evaluation of patient's response to treatment, examination of patient, ordering and performing treatments and interventions, ordering and review of laboratory studies, ordering and review of radiographic studies, re-evaluation of patient's condition and review of old charts    I assumed direction of critical care for this patient from another provider in my specialty: no               ED Course  ED Course as of Sep 14 0356   Sun Sep 13, 2020   2335 Patient wakes up to voice, moves all extremities on command symmetrically      2347 Patient wakes up after receiving 0 5 mg of Narcan, able to answer questions, states that she was in a nursing home prior to this, knows that it is 2020  Blood pressure improved to 948 systolic         2349 Procedure Note: EKG  Date/Time: 09/13/20 11:49 PM   Performed by: Merlin Felix by: Lucendia Riedel MALLIKA  Indications / Diagnosis: AMS  ECG reviewed by me, the ED Provider: yes   The EKG demonstrates:  Rhythm:  afib  Intervals: normal intervals  Axis: normal axis  QRS/Blocks: normal QRS  ST Changes: No acute ST Changes, no STD/JOSEFINA  Mon Sep 14, 2020   0010 Patient's urinary catheter changed      0021 Patient's mental status continues to improve, she is able to answer questions appropriately now, states "give me a blanket" in clear voice, moving all extremities equally      0135 Spoke to daughter,  [phone] Vijay Tyson , updated on patient's current status  Patient's son, emergency contact, currently sleeping and was not available via phone  0136 Patient continues to wake up easily, no current complaints      0154 Bacteria, UA(!): Innumerable   0159 Patient's blood pressure in mid 43G systolic, she has slow respirations about 7-8 per minute while she is sleeping, saturating 98% on 2 L nasal cannula, however the patient wakes up easily to verbal stimulation, blood pressure improves to 589 systolic  Pupils current Kay about 3 mm and symmetric bilaterally  Will not give any further Narcan at this time as patient is stable      0209 Patient's labile blood pressure drops to high 80s when she is asleep, improves as soon as she wakes up, it also improved immediately upon administration of Narcan, patient has a negative UDS however that this not test for all opioid medications    Clinically the patient's presentation does not appear to be consistent with septic shock and she is currently stable medically, has no complaints, she does have history of diastolic heart failure with evidence of fluid overload on chest x-ray, patient would not benefit from 30 ml/kg fluid bolus at this time      0213 Patient does have bacteriuria in setting of chronic Jimenez catheter without any symptoms consistent with infection, did empirically receive antibiotics however low suspicion for current bacteremia      0306 No active bleeding, patient on Coumadin  Will reversal to inpatient team   INR(!!): >15 00                       Initial Sepsis Screening     Row Name 09/14/20 0159 09/14/20 0013             Is the patient's history suggestive of a new or worsening infection? (!) Yes (Proceed)  -EJ  No  -EB       Suspected source of infection  soft tissue;wound infection  -EJ         Are two or more of the following signs & symptoms of infection both present and new to the patient? (!) Yes (Proceed)  -EJ         Indicate SIRS criteria  Leukocytosis (WBC > 90137 IJL); Altered mental status  -EJ         If the answer is yes to both questions, suspicion of sepsis is present  [de-identified]         If severe sepsis is present AND tissue hypoperfusion perists in the hour after fluid resuscitation or lactate > 4, the patient meets criteria for SEPTIC SHOCK           Are any of the following organ dysfunction criteria present within 6 hours of suspected infection and SIRS criteria that are NOT considered to be chronic conditions? (!) Yes  -EJ         Organ dysfunction  SBP < 90 mmHg  -EJ         Date of presentation of severe sepsis  09/14/20  -EJ         Time of presentation of severe sepsis  0200  -EJ         Tissue hypoperfusion persists in the hour after crystalloid fluid administration, evidenced, by either:           Was hypotension present within one hour of the conclusion of crystalloid fluid administration?   Eric Au       Date of presentation of septic shock           Time of presentation of septic shock             User Key  (r) = Recorded By, (t) = Taken By, (c) = Cosigned By    [address] Name Provider Type    EB Cally Hernandez MD Physician    EJ Melba Sequeira, [address] Nurse Practitioner           Default Flowsheet Data (last 720 hours)      Sepsis Reassess     Row Name 09/14/20 0305                   Repeat Volume Status and Tissue Perfusion Assessment Performed    Repeat Volume Status and Tissue Perfusion Assessment Performed  Yes  -EB Volume Status and Tissue Perfusion Post Fluid Resuscitation * Must Document All *    Vital Signs Reviewed (HR, RR, BP, T)  Yes  -EB        Shock Index Reviewed  Yes  -EB        Arterial Oxygen Saturation Reviewed (POx, SaO2 or SpO2)          Cardio          Pulmonary          Capillary Refill          Peripheral Pulses          Skin          Urine output assessed             *OR*   Intensive Monitoring- Must Document One of the Following Four *:    Vital Signs Reviewed          * Central Venous Pressure (CVP or RAP)          * Central Venous Oxygen (SVO2, ScvO2 or Oxygen saturation via central catheter)          * Bedside Cardiovascular US in IVC diameter and % collapse          * Passive Leg Raise OR Crystalloid Challenge            User Key  (r) = Recorded By, (t) = Taken By, (c) = Cosigned By    Initials Name Provider Type    BRUCE Pena MD Physician                    MDM  Number of Diagnoses or Management Options  Diagnosis management comments: 74-year-old female with hypoglycemia, altered mental status, no reported narcotic ingestion this evening however patient did respond to Narcan, will obtain further lab work, imaging, will admit for further care      Disposition  Final diagnoses:   UTI (urinary tract infection)   Sepsis ([facility] )   Hypoglycemia   Altered mental status   Elevated INR     Time reflects when diagnosis was documented in both MDM as applicable and the Disposition within this note     Time User Action Codes Description Comment    9/14/2020  2:26 AM Jair Brunette Add [N39 0] UTI (urinary tract infection)     9/14/2020  2:26 AM Jair Brunette Add [A41 9] Sepsis ([facility] )     9/14/2020  2:47 AM Jose Manuel Mutter Add [L89 154] Decubitus ulcer of sacral region, stage 4 ([facility] )     9/14/2020  3:54 AM Jair Brunette Add [E16 2] Hypoglycemia     9/14/2020  3:54 AM Jair Brunette Add [R41 82] Altered mental status     9/14/2020  3:55 AM Jair Brunette Add [R79 1] Elevated INR       ED Disposition     ED Disposition Condition Date/Time Comment    Admit Stable Mon Sep 14, 2020  2:29 AM Case was discussed with  and the patient's admission status was agreed to be Admission Status: inpatient status to the service of Dr Jesus Fu   Follow-up Information    None         Current Discharge Medication List      CONTINUE these medications which have NOT CHANGED    Details   aspirin (ECOTRIN LOW STRENGTH) 81 mg EC tablet Take 1 tablet (81 mg total) by mouth daily  Qty: 30 tablet, Refills: 0    Associated Diagnoses: Stroke-like symptoms; Septic shock due to gram-positive bacteria (HCC)      atorvastatin (LIPITOR) 40 mg tablet Take 1 tablet (40 mg total) by mouth daily with dinner  Qty: 30 tablet, Refills: 0    Associated Diagnoses: Hyperlipidemia, unspecified hyperlipidemia type; Septic shock due to gram-positive bacteria (HCC)      ferrous sulfate 325 (65 Fe) mg tablet TAKE 1 TABLET BY MOUTH ONCE A DAY FOR SUPPLEMENT      gabapentin (NEURONTIN) 100 mg capsule Take 100 mg by mouth 3 (three) times a day      insulin glargine (LANTUS) 100 units/mL subcutaneous injection Inject 14 Units under the skin daily at bedtime  Qty: 10 mL, Refills: 0    Associated Diagnoses: Type 2 diabetes mellitus with hyperglycemia, without long-term current use of insulin ([address] ); Septic shock due to gram-positive bacteria (HCC)      insulin lispro (HumaLOG) 100 units/mL injection Inject 7 Units under the skin 3 (three) times a day with meals  Qty: 5 mL, Refills: 0    Associated Diagnoses: Type 2 diabetes mellitus with hyperglycemia, without long-term current use of insulin ([address] );  Septic shock due to gram-positive bacteria (HCC)      metoprolol tartrate 75 MG TABS Take 1 tablet (75 mg total) by mouth every 12 (twelve) hours  Qty: 60 tablet, Refills: 0    Associated Diagnoses: Essential hypertension; Septic shock due to gram-positive bacteria (HCC)      polyethylene glycol (MIRALAX) 17 g packet Take 17 g by mouth daily  Qty: 14 each, Refills: 0 Associated Diagnoses: Closed fracture of right hip, initial encounter ([facility])      rivaroxaban (XARELTO) 20 mg tablet Take 1 tablet (20 mg total) by mouth daily with breakfast  Qty: 30 tablet, Refills: 0    Associated Diagnoses: Stroke-like symptoms      torsemide (DEMADEX) 20 mg tablet Take 1 tablet (20 mg total) by mouth daily  Qty: 30 tablet, Refills: 0    Associated Diagnoses: Essential hypertension; Septic shock due to gram-positive bacteria ([facility])      warfarin (COUMADIN) 3 mg tablet Take 1 tab daily  Qty: 30 tablet, Refills: 0    Associated Diagnoses: Paroxysmal atrial fibrillation ([facility] ); Septic shock due to gram-positive bacteria ([facility] )           No discharge procedures on file      PDMP Review     None          ED Provider  Electronically Signed by           Christi Thompson MD  09/14/20 9780

## 2020-09-14 NOTE — ED NOTES
Pt returned from [address] [place] [address], [address] [place] [address]  09/14/20 1809

## 2020-09-14 NOTE — ASSESSMENT & PLAN NOTE
Wt Readings from Last 3 Encounters:   09/13/20 84 kg (185 lb 3 oz)   09/10/20 80 7 kg (177 lb 14 6 oz)   08/25/20 73 kg (161 lb)       · Echo 08/27/2020 -- EF 65%, no acute abnormality, wall thickness mildly increased, changes consistent with concentric remodeling, grade 2 diastolic dysfunction  · Received crystalloid administration the setting of sepsis   · Strict I&O   · QD weights

## 2020-09-14 NOTE — PROGRESS NOTES
Vancomycin Assessment    Fleming Cogan is a 67 y o  female who is currently receiving vancomycin 1500mg iv q 24(initial dose 15mg/kg one time at [address] [place] [address] Rd 09/14, scheduled new dose of 20mg/kg q24 to start 18 hrs post first dose at 2100 09/14)) for skin-soft tissue infection, bacteremia     Relevant clinical data and objective history reviewed:  Creatinine   Date Value Ref Range Status   09/14/2020 1 29 0 60 - 1 30 mg/dL Final     Comment:     Standardized to IDMS reference method   09/10/2020 1 03 0 60 - 1 30 mg/dL Final     Comment:     Standardized to IDMS reference method   09/09/2020 1 03 0 60 - 1 30 mg/dL Final     Comment:     Standardized to IDMS reference method     Vancomycin Rm   Date Value Ref Range Status   08/28/2020 23 5 ug/mL Final     /59   Pulse 93   Temp (!) 96 °F (35 6 °C) (Temporal)   Resp 14   Wt 84 kg (185 lb 3 oz)   SpO2 100%   BMI 29 89 kg/m²   No intake/output data recorded  Lab Results   Component Value Date/Time    BUN 36 (H) 09/14/2020 12:13 AM    WBC 13 70 (H) 09/14/2020 12:13 AM    HGB 8 8 (L) 09/14/2020 12:13 AM    HCT 29 1 (L) 09/14/2020 12:13 AM    MCV 94 09/14/2020 12:13 AM     09/14/2020 12:13 AM     Temp Readings from Last 3 Encounters:   09/14/20 (!) 96 °F (35 6 °C) (Temporal)   09/10/20 97 5 °F (36 4 °C) (Oral)   08/25/20 98 4 °F (36 9 °C) (Oral)     Vancomycin Days of Therapy: 1    Assessment/Plan  The patient is currently on vancomycin utilizing scheduled dosing based on adjusted body weight (due to obesity)  Baseline risks associated with therapy include: pre-existing renal impairment, concomitant nephrotoxic medications, advanced age, and dehydration  The patient is currently receiving 1500mg iv q 24(initial dose 15mg/kg one time at [address] [place] [address] Rd 09/14, scheduled new dose of 20mg/kg q24 to start 18 hrs post first dose at 2100 09/14)) and is clinically appropriate and dose will be continued    Pharmacy will also follow closely for s/sx of nephrotoxicity, infusion reactions, and appropriateness of therapy  BMP and CBC will be ordered per protocol  Plan for trough as patient approaches steady state, prior to the 4th  dose at approximately 09/17/2020 2030  Due to infection severity, will target a trough of 15-20 (appropriate for most indications)   Pharmacy will continue to follow the patients culture results and clinical progress daily      Amber Tobar, Pharmacist

## 2020-09-15 PROBLEM — R41.82 ALTERED MENTAL STATUS: Status: RESOLVED | Noted: 2020-09-14 | Resolved: 2020-09-15

## 2020-09-15 LAB
ANION GAP SERPL CALCULATED.3IONS-SCNC: 10 MMOL/L (ref 4–13)
APTT PPP: 67 SECONDS (ref 23–37)
BASOPHILS # BLD AUTO: 0.09 THOUSANDS/ΜL (ref 0–0.1)
BASOPHILS NFR BLD AUTO: 1 % (ref 0–1)
BUN SERPL-MCNC: 39 MG/DL (ref 5–25)
C DIFF TOX A+B STL QL IA: POSITIVE
C DIFF TOX GENS STL QL NAA+PROBE: POSITIVE
CALCIUM SERPL-MCNC: 7.9 MG/DL (ref 8.3–10.1)
CHLORIDE SERPL-SCNC: 103 MMOL/L (ref 100–108)
CO2 SERPL-SCNC: 27 MMOL/L (ref 21–32)
CREAT SERPL-MCNC: 1.56 MG/DL (ref 0.6–1.3)
EOSINOPHIL # BLD AUTO: 0.33 THOUSAND/ΜL (ref 0–0.61)
EOSINOPHIL NFR BLD AUTO: 3 % (ref 0–6)
ERYTHROCYTE [DISTWIDTH] IN BLOOD BY AUTOMATED COUNT: 14.9 % (ref 11.6–15.1)
GFR SERPL CREATININE-BSD FRML MDRD: 32 ML/MIN/1.73SQ M
GLUCOSE SERPL-MCNC: 178 MG/DL (ref 65–140)
GLUCOSE SERPL-MCNC: 182 MG/DL (ref 65–140)
GLUCOSE SERPL-MCNC: 191 MG/DL (ref 65–140)
GLUCOSE SERPL-MCNC: 194 MG/DL (ref 65–140)
GLUCOSE SERPL-MCNC: 203 MG/DL (ref 65–140)
GLUCOSE SERPL-MCNC: 234 MG/DL (ref 65–140)
HCT VFR BLD AUTO: 24 % (ref 34.8–46.1)
HGB BLD-MCNC: 7.4 G/DL (ref 11.5–15.4)
IMM GRANULOCYTES # BLD AUTO: 0.05 THOUSAND/UL (ref 0–0.2)
IMM GRANULOCYTES NFR BLD AUTO: 0 % (ref 0–2)
INR PPP: 3.36 (ref 0.84–1.19)
LYMPHOCYTES # BLD AUTO: 1.9 THOUSANDS/ΜL (ref 0.6–4.47)
LYMPHOCYTES NFR BLD AUTO: 17 % (ref 14–44)
MAGNESIUM SERPL-MCNC: 2.1 MG/DL (ref 1.6–2.6)
MCH RBC QN AUTO: 28.8 PG (ref 26.8–34.3)
MCHC RBC AUTO-ENTMCNC: 30.8 G/DL (ref 31.4–37.4)
MCV RBC AUTO: 93 FL (ref 82–98)
MONOCYTES # BLD AUTO: 0.74 THOUSAND/ΜL (ref 0.17–1.22)
MONOCYTES NFR BLD AUTO: 6 % (ref 4–12)
NEUTROPHILS # BLD AUTO: 8.42 THOUSANDS/ΜL (ref 1.85–7.62)
NEUTS SEG NFR BLD AUTO: 73 % (ref 43–75)
NRBC BLD AUTO-RTO: 0 /100 WBCS
PLATELET # BLD AUTO: 302 THOUSANDS/UL (ref 149–390)
PMV BLD AUTO: 10.9 FL (ref 8.9–12.7)
POTASSIUM SERPL-SCNC: 3.6 MMOL/L (ref 3.5–5.3)
PROCALCITONIN SERPL-MCNC: 6.48 NG/ML
PROTHROMBIN TIME: 33.8 SECONDS (ref 11.6–14.5)
RBC # BLD AUTO: 2.57 MILLION/UL (ref 3.81–5.12)
SODIUM SERPL-SCNC: 140 MMOL/L (ref 136–145)
WBC # BLD AUTO: 11.53 THOUSAND/UL (ref 4.31–10.16)

## 2020-09-15 PROCEDURE — 97163 PT EVAL HIGH COMPLEX 45 MIN: CPT

## 2020-09-15 PROCEDURE — 83735 ASSAY OF MAGNESIUM: CPT | Performed by: NURSE PRACTITIONER

## 2020-09-15 PROCEDURE — 99291 CRITICAL CARE FIRST HOUR: CPT | Performed by: PHYSICIAN ASSISTANT

## 2020-09-15 PROCEDURE — 85025 COMPLETE CBC W/AUTO DIFF WBC: CPT | Performed by: NURSE PRACTITIONER

## 2020-09-15 PROCEDURE — 85730 THROMBOPLASTIN TIME PARTIAL: CPT | Performed by: NURSE PRACTITIONER

## 2020-09-15 PROCEDURE — 80048 BASIC METABOLIC PNL TOTAL CA: CPT | Performed by: NURSE PRACTITIONER

## 2020-09-15 PROCEDURE — 97167 OT EVAL HIGH COMPLEX 60 MIN: CPT

## 2020-09-15 PROCEDURE — 84145 PROCALCITONIN (PCT): CPT | Performed by: NURSE PRACTITIONER

## 2020-09-15 PROCEDURE — 85610 PROTHROMBIN TIME: CPT | Performed by: NURSE PRACTITIONER

## 2020-09-15 PROCEDURE — 82948 REAGENT STRIP/BLOOD GLUCOSE: CPT

## 2020-09-15 RX ORDER — ALBUMIN, HUMAN INJ 5% 5 %
12.5 SOLUTION INTRAVENOUS ONCE
Status: COMPLETED | OUTPATIENT
Start: 2020-09-15 | End: 2020-09-15

## 2020-09-15 RX ORDER — METOPROLOL TARTRATE 5 MG/5ML
5 INJECTION INTRAVENOUS ONCE
Status: COMPLETED | OUTPATIENT
Start: 2020-09-15 | End: 2020-09-15

## 2020-09-15 RX ADMIN — INSULIN LISPRO 4 UNITS: 100 INJECTION, SOLUTION INTRAVENOUS; SUBCUTANEOUS at 16:25

## 2020-09-15 RX ADMIN — CEFEPIME HYDROCHLORIDE 2000 MG: 2 INJECTION, SOLUTION INTRAVENOUS at 14:07

## 2020-09-15 RX ADMIN — CEFEPIME HYDROCHLORIDE 2000 MG: 2 INJECTION, SOLUTION INTRAVENOUS at 00:27

## 2020-09-15 RX ADMIN — GABAPENTIN 100 MG: 100 CAPSULE ORAL at 10:08

## 2020-09-15 RX ADMIN — INSULIN LISPRO 2 UNITS: 100 INJECTION, SOLUTION INTRAVENOUS; SUBCUTANEOUS at 12:40

## 2020-09-15 RX ADMIN — Medication 125 MG: at 18:21

## 2020-09-15 RX ADMIN — ALBUMIN (HUMAN) 12.5 G: 12.5 INJECTION, SOLUTION INTRAVENOUS at 12:33

## 2020-09-15 RX ADMIN — NYSTATIN: 100000 POWDER TOPICAL at 10:08

## 2020-09-15 RX ADMIN — GABAPENTIN 100 MG: 100 CAPSULE ORAL at 16:23

## 2020-09-15 RX ADMIN — GABAPENTIN 100 MG: 100 CAPSULE ORAL at 20:41

## 2020-09-15 RX ADMIN — METRONIDAZOLE 500 MG: 500 INJECTION, SOLUTION INTRAVENOUS at 10:09

## 2020-09-15 RX ADMIN — METOPROLOL TARTRATE 5 MG: 5 INJECTION INTRAVENOUS at 00:30

## 2020-09-15 RX ADMIN — VANCOMYCIN HYDROCHLORIDE 1500 MG: 1 INJECTION, POWDER, LYOPHILIZED, FOR SOLUTION INTRAVENOUS at 20:41

## 2020-09-15 RX ADMIN — ASPIRIN 81 MG: 81 TABLET, COATED ORAL at 10:07

## 2020-09-15 RX ADMIN — CHLORHEXIDINE GLUCONATE 0.12% ORAL RINSE 15 ML: 1.2 LIQUID ORAL at 20:40

## 2020-09-15 RX ADMIN — INSULIN LISPRO 3 UNITS: 100 INJECTION, SOLUTION INTRAVENOUS; SUBCUTANEOUS at 21:00

## 2020-09-15 RX ADMIN — METRONIDAZOLE 500 MG: 500 INJECTION, SOLUTION INTRAVENOUS at 16:30

## 2020-09-15 RX ADMIN — METOPROLOL TARTRATE 75 MG: 50 TABLET, FILM COATED ORAL at 10:08

## 2020-09-15 RX ADMIN — INSULIN LISPRO 2 UNITS: 100 INJECTION, SOLUTION INTRAVENOUS; SUBCUTANEOUS at 07:31

## 2020-09-15 RX ADMIN — ATORVASTATIN CALCIUM 40 MG: 40 TABLET, FILM COATED ORAL at 16:23

## 2020-09-15 RX ADMIN — NYSTATIN: 100000 POWDER TOPICAL at 18:21

## 2020-09-15 RX ADMIN — METRONIDAZOLE 500 MG: 500 INJECTION, SOLUTION INTRAVENOUS at 01:42

## 2020-09-15 RX ADMIN — METOPROLOL TARTRATE 75 MG: 50 TABLET, FILM COATED ORAL at 20:40

## 2020-09-15 RX ADMIN — ALBUMIN (HUMAN) 12.5 G: 12.5 INJECTION, SOLUTION INTRAVENOUS at 01:06

## 2020-09-15 RX ADMIN — Medication 125 MG: at 12:32

## 2020-09-15 NOTE — OCCUPATIONAL THERAPY NOTE
Occupational Therapy Evaluation     Patient Name: John Villarreal  QLIBERTADWEDESMOND Date: 9/15/2020  Problem List  Active Problems:    Type 2 diabetes mellitus with hyperglycemia ([facility] )    Hypertension    Hyperlipidemia    CKD 3    Decubitus ulcer of sacral region, stage 4 (HCC)    Paroxysmal atrial fibrillation (HCC)    Septic shock    Stenosis of left carotid artery    Chronic diastolic heart failure (HCC)    Supratherapeutic INR    Urinary tract infection    Past Medical History  Past Medical History:   Diagnosis Date    Acute renal failure (ARF) ([facility] )     Acute respiratory failure with hypoxia (HCC)     Atrial fibrillation ([facility] )     CHF (congestive heart failure) ([facility] )     Chronic kidney disease     Diabetes mellitus ([facility] )     type 2    Hyperlipidemia     Hypertension     Stroke [facility])      Past Surgical History  Past Surgical History:   Procedure Laterality Date    BREAST SURGERY Left     lumpectomy benign    CATARACT EXTRACTION Bilateral 2017    CATARACT EXTRACTION W/ INTRAOCULAR LENS IMPLANT Left 12/11/2017    Procedure: EXTRACTION EXTRACAPSULAR CATARACT PHACO INTRAOCULAR LENS (IOL); Surgeon: Ajith Abreu MD;  Location: [facility] MAIN OR;  Service: Ophthalmology    SD OPEN RX FEMUR FX+INTRAMED RAYMOND Right 5/21/2020    Procedure: INSERTION OF SHORT TROCHANTERIC FEMORAL NAIL;  Surgeon: Diamond Vela MD;  [facility] Main [facility];  Service: Orthopedics    [address] HUMERAL&GLENOID COMPNT Right 9/10/2018    Procedure: RIGHT REVERSE TOTAL SHOULDER ARTHROPLASTY;  Surgeon: Diamond Vela MD;  [facility];  Service: Orthopedics    SD XCAPSL CTRC RMVL INSJ IO LENS PROSTH W/O ECP Right 10/23/2017    Procedure: EXTRACTION EXTRACAPSULAR CATARACT PHACO INTRAOCULAR LENS (IOL);   Surgeon: Ajith Abreu MD;  Location: [facility] MAIN OR;  Service: Ophthalmology    WOUND DEBRIDEMENT N/A 8/26/2020    Procedure: EXCISIONAL DEBRIDEMENT OF SACRAL PRESSURE WOUND, WASHOUT;;  Surgeon: Scar Mak MD;  [facility] MAIN [facility];  Service: General    WOUND DEBRIDEMENT N/A 8/28/2020    Procedure: BUTTOCKS DEBRIDEMENT WOUND AND DRESSING CHANGE ([address] OUT); Surgeon: Kelly Marcos MD;  [facility] MAIN [facility];  Service: General           09/15/20 0941   Note Type   Note type Eval only   Restrictions/Precautions   Weight Bearing Precautions Per Order No   Other Precautions Telemetry;O2; Contact  (+ wound vac)   Pain Assessment   Pain Assessment Tool Varma-Baker FACES   Varma-Baker FACES Pain Rating 4   Pain Location/Orientation Location: Buttocks   Effect of Pain on Daily Activities mobility, positioning   Home Living   Type of Home [facility])   Additional Comments Pt recently D/C to rehab s/p hospitalilzation  At [facility]  Pt previously lived with family in house with steps to manage  Prior Function   Comments Prior to hip/wrist fx, pt was independent with ADLs and functional mobility  However since then and s/p hospitalizations/rehab stay, pt required assist with ADLs and transfers  Psychosocial   Psychosocial (WDL) WDL   ADL   Eating Assistance 5  Supervision/Setup   Eating Deficit Setup   Grooming Assistance 4  Minimal Assistance   UB Bathing Assistance 3  Moderate Assistance   LB Bathing Assistance 2  Maximal Assistance   UB Dressing Assistance 3  Moderate Assistance   LB Dressing Assistance 2  Maximal [address]  1  Total Assistance   Toileting Deficit   (+ trujillo  + sacral wound vac )   Bed Mobility   Rolling R 2  Maximal assistance   Additional items Assist x 2;Verbal cues; Bedrails   Rolling L 2  Maximal assistance   Additional items Assist x 2;Verbal cues; Bedrails   Supine to Sit 2  Maximal assistance   Additional items Assist x 2;Verbal cues;LE management; Bedrails;HOB elevated   Sit to Supine 1  Dependent   Additional items Assist x 2;LE management;Verbal cues   Transfers   Sit to Stand Unable to assess   Balance   Static Sitting Poor +   Dynamic Sitting Poor -   Activity Tolerance   Activity Tolerance Patient tolerated treatment well   Medical Staff Made Aware PT Lilian   Nurse Made Aware AISHWARYA Pulliam   RUE Assessment   RUE Assessment WFL  (3-/5 strength t/o)   LUE Assessment   LUE Assessment WFL   Hand Function   Gross Motor Coordination Functional   Fine Motor Coordination Functional   Vision-Basic Assessment   Current Vision No visual deficits   Cognition   Overall Cognitive Status Impaired   Arousal/Participation Alert   Attention Attends with cues to redirect   Orientation Level Oriented to person;Oriented to place; Disoriented to time;Disoriented to situation   Memory Decreased recall of precautions;Decreased recall of recent events   Following Commands Follows one step commands with increased time or repetition   Assessment   Limitation Decreased ADL status; Decreased self-care trans;Decreased high-level ADLs; Decreased endurance;Decreased UE ROM; Decreased UE strength   Prognosis Good   Assessment Pt is a 67 y o  female seen for OT evaluation at [facility], admitted 9/13/2020 w/ hypoglycemia,  Altered mental status, and sepsis  OT completed extensive review of pt's medical and social history  Comorbidities affecting pt's functional performance at time of assessment include: sacral decubitus s/p wound, HTN, Acute renal failure, Chronic kidney disease, Diabetes mellitus, Hyperlipidemia, hx of hip fx s/p IMN, and hx of distal radius fx  Personal factors affecting pt at time of IE include:difficulty performing ADLS, difficulty performing IADLS  and decreased functional mobility  Pt with active OT orders and  Up with assist orders  Prior to admission, pt was undergoing rehab at [facility]  Pt required assist w/  ADLS and IADLS  Upon evaluation: Pt requires max-total A x 2 for bed mobility, sup-mod A for UB ADLs and mod-total A for LB ADLS 2* the following deficits impacting occupational performance: weakness, decreased ROM, decreased strength, decreased balance and decreased tolerance   Pt to benefit from continued skilled OT tx while in the hospital to address deficits as defined above and maximize level of functional independence w ADL's and functional mobility  Occupational Performance areas to address include: grooming, bathing/shower, toilet hygiene, dressing and functional mobility  Based on findings, pt is of high complexity  At this time, OT recommendations at time of discharge are short term rehab  Goals   Patient Goals Pt wishes to get better   Plan   Treatment Interventions ADL retraining;Functional transfer training;UE strengthening/ROM; Cognitive reorientation;Patient/family training; Compensatory technique education   Goal Expiration Date 09/25/20   OT Treatment Day 0   OT Frequency 3-5x/wk   Recommendation   OT Discharge Recommendation Post-Acute Rehabilitation Services         Pt will achieve the following goals within 10 days  *Pt will complete UB bathing and dressing with supervision  *Pt will complete LB bathing and dressing with min A      * Pt will complete toileting w/ min A w/ G hygiene/thoroughness using DME PRN    *Pt will complete bed mobility with mod A x 1, with bed flat and no side rail to prep for purposeful tasks    *Pt will perform functional transfers with on/off all surfaces with mod A x 1 using DME as needed w/ G balance/safety  *Pt will participate in UE therapeutic exercise in order to maximize strength for ADL transfers  *Pt will sit on EOB for 15 minutes with min A x 1 for increased safety with seated activity tolerance during ADL tasks  *Pt will identify 3-5 fall risks during ADL routine to ensure home safety upon discharge            MEAGHAN Gomez/L

## 2020-09-15 NOTE — PROGRESS NOTES
Vancomycin IV Pharmacy-to-Dose Consultation    Neftaly Mohan is a 67 y o  female who is currently receiving Vancomycin IV with management by the Pharmacy Consult service  Assessment/Plan:  The patient was reviewed  Renal function is stable and no signs or symptoms of nephrotoxicity and/or infusion reactions were documented in the chart  Based on todays assessment, continue current vancomycin (day # 2) dosing of 1500mg q24h, with a plan for trough to be drawn at 2030 on 9/17/20  We will continue to follow the patients culture results and clinical progress daily      Marimar Whitman, Pharmacist

## 2020-09-15 NOTE — PLAN OF CARE
Problem: OCCUPATIONAL THERAPY ADULT  Goal: Performs self-care activities at highest level of function for planned discharge setting  See evaluation for individualized goals  Description: Treatment Interventions: ADL retraining, Functional transfer training, UE strengthening/ROM, Cognitive reorientation, Patient/family training, Compensatory technique education          See flowsheet documentation for full assessment, interventions and recommendations  Note: Limitation: Decreased ADL status, Decreased self-care trans, Decreased high-level ADLs, Decreased endurance, Decreased UE ROM, Decreased UE strength  Prognosis: Good  Assessment: Pt is a 67 y o  female seen for OT evaluation at [address] [place] [address], admitted 9/13/2020 w/ hypoglycemia,  Altered mental status, and sepsis  OT completed extensive review of pt's medical and social history  Comorbidities affecting pt's functional performance at time of assessment include: sacral decubitus s/p wound, HTN, Acute renal failure, Chronic kidney disease, Diabetes mellitus, Hyperlipidemia, hx of hip fx s/p IMN, and hx of distal radius fx  Personal factors affecting pt at time of IE include:difficulty performing ADLS, difficulty performing IADLS  and decreased functional mobility  Pt with active OT orders and  Up with assist orders  Prior to admission, pt was undergoing rehab at [facility]  Pt required assist w/  ADLS and IADLS  Upon evaluation: Pt requires max-total A x 2 for bed mobility, sup-mod A for UB ADLs and mod-total A for LB ADLS 2* the following deficits impacting occupational performance: weakness, decreased ROM, decreased strength, decreased balance and decreased tolerance  Pt to benefit from continued skilled OT tx while in the hospital to address deficits as defined above and maximize level of functional independence w ADL's and functional mobility   Occupational Performance areas to address include: grooming, bathing/shower, toilet hygiene, dressing and functional mobility  Based on findings, pt is of high complexity  At this time, OT recommendations at time of discharge are short term rehab       OT Discharge Recommendation: Post-Acute Rehabilitation Services

## 2020-09-15 NOTE — ASSESSMENT & PLAN NOTE
· New onset during her previous stay  · Metoprolol on hold given hypotension  · Currently AFib, rate controlled  · Goal to maintain AFib rate <110   · Chronically anticoagulated on Coumadin, though INR greater than 15 on arrival, will hold on further anticoagulation  · Patient received Xarelto and Coumadin  · Hold Coumadin until INR is normal

## 2020-09-15 NOTE — ASSESSMENT & PLAN NOTE
Wt Readings from Last 3 Encounters:   09/14/20 84 6 kg (186 lb 8 2 oz)   09/10/20 80 7 kg (177 lb 14 6 oz)   08/25/20 73 kg (161 lb)       · Echo 08/27/2020 -- EF 65%, no acute abnormality, wall thickness mildly increased, changes consistent with concentric remodeling, grade 2 diastolic dysfunction  · Received crystalloid administration the setting of sepsis   · Strict I&O   · QD weights

## 2020-09-15 NOTE — PLAN OF CARE
Problem: PHYSICAL THERAPY ADULT  Goal: Performs mobility at highest level of function for planned discharge setting  See evaluation for individualized goals  Description: Treatment/Interventions: ADL retraining, Functional transfer training, LE strengthening/ROM, Therapeutic exercise, Endurance training, Patient/family training, Cognitive reorientation, Equipment eval/education, Bed mobility, Gait training, Spoke to nursing, Spoke to case management, Spoke to advanced practitioner          See flowsheet documentation for full assessment, interventions and recommendations  9/15/2020 1150 by Austin December, PT  Outcome: Progressing  Note: Prognosis: Good  Problem List: Decreased strength, Decreased range of motion, Decreased endurance, Impaired balance, Decreased mobility, Decreased coordination, Decreased cognition, Impaired judgement, Decreased safety awareness, Impaired vision, Decreased skin integrity, Pain(glassess not available)  Assessment: Pt presents with severely limited mobility after admission form SNF rehab with hypoglycemia, PAF and lethargy  Multiple medical issues with recent Rhip and wrist fx adn sacral osteo with debridement adn wound vac  Also + for C-diff  Can benefit form skilledTP serivces to regain safe ind functional mobility  REcommend return to in-pt rehab at d/c  will follow see goals  Barriers to Discharge: (medical status)     PT Discharge Recommendation: Post-Acute Rehabilitation Services     PT - OK to Discharge: Yes    See flowsheet documentation for full assessment       9/15/2020 1149 by Austin December, PT  Outcome: Progressing  Note: Prognosis: Good  Problem List: Decreased strength, Decreased range of motion, Decreased endurance, Impaired balance, Decreased mobility, Decreased coordination, Decreased cognition, Impaired judgement, Decreased safety awareness, Impaired vision, Decreased skin integrity, Pain(glassess not available)  Assessment: Pt presents with severely limited mobility after admission form SNF rehab with hypoglycemia, PAF and lethargy  Multiple medical issues with recent Rhip and wrist fx adn sacral osteo with debridement adn wound vac  Also + for C-diff  Can benefit form skilledTP serivces to regain safe ind functional mobility  REcommend return to in-pt rehab at d/c  will follow see goals  Barriers to Discharge: (medical status)     PT Discharge Recommendation: Post-Acute Rehabilitation Services     PT - OK to Discharge: Yes    See flowsheet documentation for full assessment

## 2020-09-15 NOTE — ASSESSMENT & PLAN NOTE
Lab Results   Component Value Date    HGBA1C 9 9 (H) 08/26/2020       Recent Labs     09/14/20  0649 09/14/20  1105 09/14/20  1642 09/14/20  2142   POCGLU 101 77 146* 182*       Blood Sugar Average: Last 72 hrs:  (P) 102 8     · Currently NPO, SSI q 6 hours for glucose 140-180

## 2020-09-15 NOTE — CONSULTS
Consultation - Infectious Disease   Phong Bill 67 y o  female MRN: [id number]  Unit/Bed#: -01 SDU Encounter: [id number]      Assessment/Plan   1  Sepsis, Stage 4 sacral decub, CDIFF    Patient had recent episode of polymicrobial sepsis from infected stage 4 decub, rx with 2 weeks of Zosyn  Patient re-admitted with sepsis most likely from CDIFF  Decub appears relatively clean, blood cultures are thus far negative  UA with some pyuria, urine culture thus far negative  - cont po vanco for CDIFF  - cont iv cefepime/vanco and flagyl until tomorrow, if blood and urine cultures are negative would d/c IV antibiotics  - follow cbc and fever curve  - cont local wound care  - CT reviewed, patient has 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, would need debridement followed by a flap for full treatment    History of Present Illness   Physician Requesting Consult: Todd Silveira DO  Reason for Consult / Principal Problem:   CDIFF    HPI: Phong Bill is a 67y o  year old female with H/O AFIB, DM, CHF, CXR  Patient with recent hospitalization at [address] [place] [address] in [place] for polymicrobial bacteremia associated with a stage 4 sacral decub  This was debrided a vac was placed and she completed a two week course of Zosyn  She went to a NH and was brought back here with hypothermia, sepsis  She was treated with broad spectrum abx and IVF and she has made significant clinical improvement  She awake, alert, eating lunch currently  She states she is tired of being sick  She has no headaches, chest pains, abd pains, n/v  She reports diarrhea  Patient found to have CDIFF here and has been started on po vancomycin  Per reports her sacrum is clean and her blood cultures remain negative  Consults    ROS: 12 systems reviewed, remainder is neg      Historical Information   Past Medical History:   Diagnosis Date    Acute renal failure (ARF) ([facility] )     Acute respiratory failure with hypoxia (HCC)     Atrial fibrillation ([facility] )  CHF (congestive heart failure) (HCC)     Chronic kidney disease     Diabetes mellitus ([address] )     type 2    Hyperlipidemia     Hypertension     Stroke [facility])      Past Surgical History:   Procedure Laterality Date    BREAST SURGERY Left     lumpectomy benign    CATARACT EXTRACTION Bilateral 2017    CATARACT EXTRACTION W/ INTRAOCULAR LENS IMPLANT Left 12/11/2017    Procedure: EXTRACTION EXTRACAPSULAR CATARACT PHACO INTRAOCULAR LENS (IOL); Surgeon: Lazaro Hale MD;  Location: [facility] MAIN OR;  Service: Ophthalmology    AL OPEN RX FEMUR FX+INTRAMED RAYMOND Right 5/21/2020    Procedure: INSERTION OF SHORT TROCHANTERIC FEMORAL NAIL;  Surgeon: Les Quevedo MD;  [facility] Main [facility];  Service: Orthopedics    [address] HUMERAL&GLENOID COMPNT Right 9/10/2018    Procedure: RIGHT REVERSE TOTAL SHOULDER ARTHROPLASTY;  Surgeon: Les Quevedo MD;  [facility];  Service: Orthopedics    AL XCAPSL CTRC RMVL INSJ IO LENS PROSTH W/O ECP Right 10/23/2017    Procedure: EXTRACTION EXTRACAPSULAR CATARACT PHACO INTRAOCULAR LENS (IOL); Surgeon: Lazaro Hale MD;  Location: [facility] MAIN OR;  Service: Ophthalmology    WOUND DEBRIDEMENT N/A 8/26/2020    Procedure: EXCISIONAL DEBRIDEMENT OF SACRAL PRESSURE WOUND, WASHOUT;;  Surgeon: Rayne Wesley MD;  [facility] MAIN OR;  Service: General    WOUND DEBRIDEMENT N/A 8/28/2020    Procedure: BUTTOCKS DEBRIDEMENT WOUND AND DRESSING CHANGE ([facility]);   Surgeon: Tiffanie Apodaca MD;  Location: [facility] MAIN OR;  Service: General     Social History   Social History     Substance and Sexual Activity   Alcohol Use No    Comment: quit 8/17     Social History     Substance and Sexual Activity   Drug Use No     Social History     Tobacco Use   Smoking Status Never Smoker   Smokeless Tobacco Never Used     Family History   Problem Relation Age of Onset    Diabetes Mother     Varicose Veins Mother     Stroke Father     Arthritis Brother     Diabetes Daughter     No Known Problems Brother        Meds/Allergies   MEDS: reviewed      Current Facility-Administered Medications:     aspirin (ECOTRIN LOW STRENGTH) EC tablet 81 mg, 81 mg, Oral, Daily, TJ Peralta, 81 mg at 09/15/20 1007    atorvastatin (LIPITOR) tablet 40 mg, 40 mg, Oral, Daily With Dinner, TJ Peralta, 40 mg at 09/15/20 1623    cefepime (MAXIPIME) IVPB (premix) 2,000 mg 50 mL, 2,000 mg, Intravenous, Q12H, TJ Peralta, Stopped at 09/15/20 1600    chlorhexidine (PERIDEX) 0 12 % oral rinse 15 mL, 15 mL, Swish & Spit, Q12H [facility], [place] TJ Arango, 15 mL at 09/14/20 2005    gabapentin (NEURONTIN) capsule 100 mg, 100 mg, Oral, TID, TJ Peralta, 100 mg at 09/15/20 1623    insulin lispro (HumaLOG) 100 units/mL subcutaneous injection 1-6 Units, 1-6 Units, Subcutaneous, HS, Thomasenia LivingTJ    insulin lispro (HumaLOG) 100 units/mL subcutaneous injection 2-12 Units, 2-12 Units, Subcutaneous, TID AC, 4 Units at 09/15/20 1625 **AND** Fingerstick Glucose (POCT), , , 4x Daily AC and at bedtime, Unionchantal Cavazos, TJ    metoprolol tartrate (LOPRESSOR) tablet 75 mg, 75 mg, Oral, Q12H [facility], Aicha Summers, CRNP, 75 mg at 09/15/20 1008    metroNIDAZOLE (FLAGYL) IVPB (premix) 500 mg 100 mL, 500 mg, Intravenous, Q8H, TJ Peralta, Stopped at 09/15/20 1800    nystatin (MYCOSTATIN) powder, , Topical, BID, Ezekiel Salazar,     vancomycin (VANCOCIN) 1,500 mg in sodium chloride 0 9 % 250 mL IVPB, 20 mg/kg (Adjusted), Intravenous, Q24H, TJ Peralta, Last Rate: 166 7 mL/hr at 09/14/20 2048, 1,500 mg at 09/14/20 2048    vancomycin (VANCOCIN) oral solution 125 mg, 125 mg, Oral, Q6H [facility], Aicha Summers, TJ, 125 mg at 09/15/20 1821    No Known Allergies      Intake/Output Summary (Last 24 hours) at 9/15/2020 1932  Last data filed at 9/15/2020 1800  Gross per 24 hour   Intake 1420 ml   Output 485 ml   Net 935 ml       PE:  NAD  VSS, Tmax: afebrile  GEN: appears well  HEENT: anicteric  NECK: supple no adenopathy  CARDIAC: rrr s1s2  LUNGS: cta bilaterally  ABDOMEN: soft nt/nd + bs  EXTREMITIES: no edema  SKIN: stage 4 sacral decub vac in place, pics reviewed on EMR, appears clean  PSYCH: nl affect  : + trujillo  Joints: full ROM without erythema or edema  NEURO: grossly non-focal    Invasive Devices:   Peripheral IV 09/13/20 Right Wrist (Active)   Site Assessment Clean;Dry; Intact 09/15/20 1600   Dressing Type Transparent 09/15/20 1600   Line Status Saline locked 09/15/20 1600   Dressing Status Clean;Dry; Intact 09/15/20 1600       Peripheral IV 09/15/20 Right Antecubital (Active)   Site Assessment Clean;Dry; Intact 09/15/20 1600   Dressing Type Transparent 09/15/20 1600   Line Status Saline locked 09/15/20 1600   Dressing Status Clean;Dry; Intact 09/15/20 0800       Negative Pressure Wound Therapy (V A C ) Sacrum (Active)   Unit Type kci 09/14/20 2000   Cycle Continuous; On 09/15/20 1600   Target Pressure (mmHg) 125 09/15/20 1600   Dressing Status Clean;Dry; Intact 09/15/20 1600   Output (mL) 80 mL 09/15/20 1800       Urethral Catheter Temperature probe 14 Fr  (Active)   Amt returned on insertion(mL) 5 mL 09/14/20 0019   Reasons to continue Urinary Catheter  Stage III/IV sacral ulcers or open perineal wounds in incontinent patients 09/15/20 0800   Goal for Removal N/A- chronic trujillo 09/15/20 0800   Site Assessment Clean;Skin intact 09/15/20 0800   Collection Container Standard drainage bag 09/15/20 0800   Securement Method Securing device (Describe) 09/15/20 0800   Output (mL) 75 mL 09/15/20 1800           Lab Results:   No results displayed because visit has over 200 results  Imaging Studies: I have personally reviewed pertinent reports  EKG, Pathology, and Other Studies: I have personally reviewed pertinent reports  Culture  Lab Results   Component Value Date    BLOODCX No Growth at 24 hrs  09/14/2020    BLOODCX No Growth at 24 hrs  09/14/2020    BLOODCX No Growth After 5 Days   08/28/2020    BLOODCX No Growth After 5 Days  08/28/2020    BLOODCX Enterococcus faecalis (A) 08/26/2020    BLOODCX Streptococcus constellatus (A) 08/26/2020    BLOODCX Streptococcus constellatus (A) 08/26/2020    BLOODCX Staphylococcus coagulase negative (A) 08/26/2020    BLOODCX Bacteroides fragilis (A) 08/26/2020     Lab Results   Component Value Date    WOUNDCULT 3+ Growth of Escherichia coli (A) 08/26/2020    WOUNDCULT 3+ Growth of Proteus mirabilis (A) 08/26/2020    WOUNDCULT 3+ Growth of Enterococcus faecalis (A) 08/26/2020    WOUNDCULT 3+ Growth of  08/26/2020    WOUNDCULT 2+ Growth of Escherichia coli (A) 08/26/2020    WOUNDCULT 2+ Growth of Proteus mirabilis (A) 08/26/2020    WOUNDCULT 3+ Growth of Enterococcus species (A) 08/26/2020    WOUNDCULT 3+ Growth of  08/26/2020     Lab Results   Component Value Date    URINECX Culture results to follow   09/14/2020    URINECX 50,000-59,000 cfu/ml Escherichia coli (A) 08/26/2020    URINECX >100,000 cfu/ml Lactobacillus species (A) 08/26/2020    URINECX 20,000-29,000 cfu/ml  09/05/2018     No results found for: SPUTUMCULTUR    Active Problems:    Type 2 diabetes mellitus with hyperglycemia ([facility] )    Hypertension    Hyperlipidemia    CKD 3    Decubitus ulcer of sacral region, stage 4 (HCC)    Paroxysmal atrial fibrillation (HCC)    Septic shock    Stenosis of left carotid artery    Chronic diastolic heart failure (HCC)    Supratherapeutic INR    Urinary tract infection

## 2020-09-15 NOTE — PHYSICAL THERAPY NOTE
PT eval     09/15/20 0959   Note Type   Note type Eval only   Pain Assessment   Pain Assessment Tool Varma-Baker FACES   Varma-Baker FACES Pain Rating 4   Pain Location/Orientation Location: Buttocks   Home Living   Type of Home SNF  (for Rehab after recent hospitalization [facility])   Prior Function   Level of [place] Independent with ADLs and functional mobility   Lives With [facility] Help From Family   ADL Assistance Independent   IADLs Needs assistance   Falls in the last 6 months 1 to 4   Vocational Retired   Comments prior to recent hops and health problems help raising great grand kids   Restrictions/Precautions   Weight Bearing Precautions Per Order No   Other Precautions Telemetry;O2;Multiple lines; Fall Risk;Pain;Contact/isolation  (c diff, sacral wound vac, ronnie, 02 2L)   General   Additional Pertinent History recent adm to [facility] then d/c to in-pt SNF rehab, was uinresponsive adn brought to [facility] with hypoglycemia and sepsis, + c diff, PMH+ R HIp and wrist fx orif of HIP, sacral wound with osteo and debridement and wound vac   DM, PAF,    Family/Caregiver Present No   Cognition   Overall Cognitive Status WFL   Arousal/Participation Alert   Orientation Level Oriented to person;Oriented to place   Memory Decreased recall of recent events   Following Commands Follows one step commands with increased time or repetition   RUE Assessment   RUE Assessment WFL  (b shlds slightly limtied elevation strength 3+/5)   LUE Assessment   LUE Assessment WFL  (strength 3+/5)   RLE Assessment   RLE Assessment WFL  (AAROM to 90/90 strength 2-/5)   LLE Assessment   LLE Assessment WFL  (AAROM wfl strength 2-/5)   Coordination   Movements are Fluid and Coordinated 0   Coordination and Movement Description stiff bradykinetic   Proprioception   RLE Proprioception Grossly intact   LLE Proprioception Grossly Intact   Bed Mobility   Rolling R 2  Maximal assistance   Additional items Assist x 2   Rolling L 2  Maximal assistance   Additional items Assist x 2   Supine to Sit 2  Maximal assistance   Additional items Assist x 2; Increased time required;LE management;Verbal cues; Bedrails   Sit to Supine 1  Dependent   Additional items Assist x 2; Increased time required;LE management; Bedrails   Transfers   Sit to Stand Unable to assess   Balance   Static Sitting Poor +  (self supports with rails R>L)   Dynamic Sitting Poor -   Endurance Deficit   Endurance Deficit Yes   Endurance Deficit Description tires adn 02 sat on room air 88% 2L02 reapplied   Activity Tolerance   Activity Tolerance Patient tolerated treatment well   Medical Staff Made Aware  [facility]   Nurse Made Aware RN Emma   Assessment   Prognosis Good   Problem List Decreased strength;Decreased range of motion;Decreased endurance; Impaired balance;Decreased mobility; Decreased coordination;Decreased cognition; Impaired judgement;Decreased safety awareness; Impaired vision;Decreased skin integrity;Pain  (glassess not available)   Assessment Pt presents with severely limited mobility after admission form SNF rehab with hypoglycemia, PAF and lethargy  Multiple medical issues with recent Rhip and wrist fx adn sacral osteo with debridement adn wound vac  Also + for C-diff  Can benefit form skilledTP serivces to regain safe ind functional mobility  REcommend return to in-pt rehab at d/c  will follow see goals   Barriers to Discharge   (medical status)   Goals   Patient Goals get better turn to R side   STG Expiration Date 09/25/20   Short Term Goal #1 1) safe bed mobility with 1 assist 2) safe transfers supine<>sit with 1 assist 3) improve sitting balance to fair+ 4) imrpove strength Bles 1/2 grade 5) trnasfers sit to stand with rw and min Ax2   Plan   Treatment/Interventions ADL retraining;Functional transfer training;LE strengthening/ROM; Therapeutic exercise; Endurance training;Patient/family training;Cognitive reorientation;Equipment eval/education; Bed mobility;Gait training;Spoke to nursing;Spoke to case management;Spoke to advanced practitioner   PT Frequency 5x/wk   Recommendation   PT Discharge Recommendation Post-Acute Rehabilitation Services   PT - OK to Discharge Yes   Additional Comments   (to rehab with medical clearance)   Modified [place] Scale   Modified [place] Scale 5   Lilian Johnson, PT

## 2020-09-15 NOTE — ASSESSMENT & PLAN NOTE
· INR > 15 -- recheck  · Prescribed home Coumadin for chronic anticoagulation in the setting of AFib RVR, however comes from nursing home with both Xarelto and Coumadin on her medication list  · Hold on further anticoagulation  · No active bleeding  · Received vitamin K p o   · Recheck INR  · Resume Coumadin when able

## 2020-09-15 NOTE — ASSESSMENT & PLAN NOTE
· Underwent debridement x2 and wound VAC placement with general surgery  · Wound VAC in place  · CT abdomen pelvis performed -- Decubitus ulcer overlying the inferior sacrum and coccyx and soft tissue inflammatory changes  Irregularity of the sacrum which may represent underlying osteomyelitis  Diffuse rectal wall thickening and perirectal inflammatory changes which may represent proctitis or secondary from the above process    · General surgery wound VAC was placed - wound does not look infected

## 2020-09-15 NOTE — CASE MANAGEMENT
LOS: 1 day  Pt is a documented bundle for sepsis  Pt is a 30 day readmission  Unplanned readmission score is 32 and yellow  Per chart, Pt has change of mental status and hypoglycemia  Met with Pt  Pt presents AA&Ox2-3  Pt reports she has been at [facility] for SNF since 9/10  Pt reports prior to SNF, she lives with her dtr and great grandchildren in 2rd floor apartment, has elevator access  Pt reports she was using [facility]  Pt reports she had [facility] in past  Pt reports she had been at [facility] in past but unable to recall facility  Pt denies hx of mental health and drug and alcohol treatment  Pt reports her son(Ky) is POA and she has living will  Pt is agreeable to return to [facility] for SNF  Pt gave permission for CM to call her son(Ky) for discharge planning  Call placed to Pt's son(Ky: [phone]), discussed role of  and discharge planning  Pt's son confirmed that Pt has been at [facility] for SNF since 9/10  Pt's son confirms he is Pt's POA and Pt was living with her dtr and great grandsons in 2rd floor apartment  Pt's son reports that Pt has RW, rollator and wheelchair at home  Pt's son confirms plan for Pt to return to [facility] upon discharge  Referral sent to [facility] via [place]  Per Cesar Serrano in admissions at [facility], Pt is STR and has wound vac  CM to follow

## 2020-09-15 NOTE — PROGRESS NOTES
Progress Note - Nerissa Ortiz 1945, 67 y o  female MRN: [id number]    Unit/Bed#: -01 SDU Encounter: [id number]    Primary Care Provider: Baldomero Aguilera DO   Date and time admitted to hospital: 9/13/2020 11:30 PM        * Altered mental statusresolved as of 9/15/2020  Assessment & Plan  · Unresponsive at nursing home, now awake and oriented x3 following Narcan x2, nasal cannula O2  · CT head negative for acute intracranial abnormality  · CN grossly intact on exam   · Seen by Neurology during previous stay for stroke-like symptoms, however MRI negative for acute CVA, more likely she was symptomatic from cerebrohypoperfusion in the setting of carotid stenosis and septic shock leading to hypotension  · Scheduled neuro exams    Septic shock  Assessment & Plan  · Discharged on 09/10 after a lengthy stay with gram-positive bacteremia, septic shock 2/2 sacral decubitus ulcer   · Debridement x2 as above  · Comes to the ER with temp 95 1, blood pressure 78/39, SpO2 88% RA, AMS, SBP <90 x3, now tachycardic   · Previous wound cultures positive for E coli, Proteus, Enterococcus  · Previous urine culture positive for E coli, lactobacillus  · Previous blood cultures positive for strep, Staph, Enterococcus, bacteroids  · Lactic 1 1   · IVF crystalloids bolused (1785mL based on IBW of 59 5kg) given hypotension x3  · Remains HD stable s/p fluid admin   · CT abd/pel c/w sacral osteo, sacral decub and surrounding soft tissue changes   · Source likely combination of UTI, sacral wound infection   · Procal, pan cultures pending   · Goal to maintain MAP >65, normothermic, normoglycemic   · Possible source might be UTI sacral wound is not a source of sepsis    Decubitus ulcer of sacral region, stage 4 (HCC)  Assessment & Plan  · Underwent debridement x2 and wound VAC placement with general surgery  · Wound VAC in place  · CT abdomen pelvis performed -- Decubitus ulcer overlying the inferior sacrum and coccyx and soft tissue inflammatory changes  Irregularity of the sacrum which may represent underlying osteomyelitis  Diffuse rectal wall thickening and perirectal inflammatory changes which may represent proctitis or secondary from the above process    · General surgery wound VAC was placed - wound does not look infected    Paroxysmal atrial fibrillation (HCC)  Assessment & Plan  · New onset during her previous stay  · Metoprolol on hold given hypotension  · Currently AFib, rate controlled  · Goal to maintain AFib rate <110   · Chronically anticoagulated on Coumadin, though INR greater than 15 on arrival, will hold on further anticoagulation  · Patient received Xarelto and Coumadin  · Hold Coumadin until INR is normal    Type 2 diabetes mellitus with hyperglycemia [facility])  Assessment & Plan  Lab Results   Component Value Date    HGBA1C 9 9 (H) 08/26/2020       Recent Labs     09/14/20  0649 09/14/20  1105 09/14/20  1642 09/14/20  2142   POCGLU 101 77 146* 182*       Blood Sugar Average: Last 72 hrs:  (P) 102 8     · Currently NPO, SSI q 6 hours for glucose 140-180    Urinary tract infection  Assessment & Plan  · Comes to ER with Jimenez from nursing home, noted by nursing staff to have thick, dark yellow, curd-like discharge in the Jimenez tubing  · Jimenez exchanged the emergency department  · UA with moderate blood, small leuks, innumerable bacteria  · Antibiotics as above  · Urine culture pending    Supratherapeutic INR  Assessment & Plan  · INR > 15 -- recheck  · Prescribed home Coumadin for chronic anticoagulation in the setting of AFib RVR, however comes from nursing home with both Xarelto and Coumadin on her medication list  · Hold on further anticoagulation  · No active bleeding  · Received vitamin K p o   · Recheck INR  · Resume Coumadin when able    Chronic diastolic heart failure [facility])  Assessment & Plan  Wt Readings from Last 3 Encounters:   09/14/20 84 6 kg (186 lb 8 2 oz)   09/10/20 80 7 kg (177 lb 14 6 oz)   08/25/20 73 kg (161 lb)       · Echo 08/27/2020 -- EF 65%, no acute abnormality, wall thickness mildly increased, changes consistent with concentric remodeling, grade 2 diastolic dysfunction  · Received crystalloid administration the setting of sepsis   · Strict I&O   · QD weights    CKD 3  Assessment & Plan  · Currently at her baseline creatinine, which appears to be 1 2-1 3  · Avoid nephrotoxic agents  · Jimenez present for critical I&O   · Trend renal indices     Hyperlipidemia  Assessment & Plan  · Continue home statin    Hypertension  Assessment & Plan  · Home metoprolol hold recurrent hypotension  · Will give IV metoprolol for rate control      ----------------------------------------------------------------------------------------  HPI/24hr events:  Patient had intermitted hypotension received albumin 5% 500 mls during the night also received 1 dose of Lopressor 5 mg IV did hold the p o  Lopressor does due to low blood pressure  Patient is alert and oriented altered mental status resolved    Disposition: Transfer to Stepdown Level 2  Code Status: Level 1 - Full Code  ---------------------------------------------------------------------------------------  SUBJECTIVE  Patient has mostly complains of sacral pain    Review of Systems   Constitutional: Negative for appetite change, diaphoresis, fatigue and fever  HENT: Negative for congestion, facial swelling, rhinorrhea, sneezing and tinnitus  Eyes: Negative for photophobia, pain and discharge  Respiratory: Negative for apnea, cough and shortness of breath  Cardiovascular: Negative for chest pain and leg swelling  Gastrointestinal: Positive for diarrhea and rectal pain  Negative for abdominal distention, abdominal pain, nausea and vomiting  Endocrine: Negative for cold intolerance, polydipsia and polyphagia  Genitourinary: Negative for enuresis, frequency and hematuria  Musculoskeletal: Positive for back pain  Negative for arthralgias, gait problem and myalgias  Skin: Negative for color change and rash  Allergic/Immunologic: Negative for environmental allergies and food allergies  Neurological: Negative for syncope, speech difficulty, light-headedness and headaches  Hematological: Negative for adenopathy  Does not bruise/bleed easily  Psychiatric/Behavioral: Negative for behavioral problems, decreased concentration and hallucinations  The patient is not hyperactive  Review of systems was reviewed and negative unless stated above in HPI/24-hour events   ---------------------------------------------------------------------------------------  OBJECTIVE    Vitals   Vitals:    20 2200 20 2300 09/15/20 0000 09/15/20 0100   BP: 112/55  107/57    BP Location:   Left arm    Pulse: (!) 116 (!) 121 (!) 118 (!) 116   Resp: 17 16 14 19   Temp: 99 9 °F (37 7 °C) 99 9 °F (37 7 °C) 99 9 °F (37 7 °C) 99 7 °F (37 6 °C)   TempSrc:  Bladder Bladder Bladder   SpO2: 95% 93% 93% 93%   Weight:       Height:         Temp (24hrs), Av 7 °F (37 1 °C), Min:95 5 °F (35 3 °C), Max:99 9 °F (37 7 °C)  Current: Temperature: 99 7 °F (37 6 °C)          Respiratory:  SpO2: SpO2: 93 %, SpO2 Activity: SpO2 Activity: At Rest, SpO2 Device: O2 Device: Nasal cannula  Nasal Cannula O2 Flow Rate (L/min): 2 L/min    Invasive/non-invasive ventilation settings   Respiratory    Lab Data (Last 4 hours)    None         O2/Vent Data (Last 4 hours)    None                Physical Exam  Vitals signs and nursing note reviewed  Constitutional:       General: She is not in acute distress  Appearance: She is overweight  She is ill-appearing  She is not diaphoretic  HENT:      Head: Normocephalic and atraumatic  Mouth/Throat:      Pharynx: No oropharyngeal exudate  Eyes:      Conjunctiva/sclera: Conjunctivae normal       Pupils: Pupils are equal, round, and reactive to light  Neck:      Musculoskeletal: Normal range of motion and neck supple  Vascular: No JVD        Trachea: No tracheal deviation  Cardiovascular:      Rate and Rhythm: Tachycardia present  Rhythm irregular  Pulses: Normal pulses  Heart sounds: Normal heart sounds  No murmur  No friction rub  No gallop  Pulmonary:      Effort: Pulmonary effort is normal  No respiratory distress  Breath sounds: Rales present  No wheezing  Chest:      Chest wall: No tenderness  Abdominal:      General: Bowel sounds are normal  There is distension  Palpations: Abdomen is soft  Tenderness: There is no abdominal tenderness  There is no guarding  Musculoskeletal: Normal range of motion  General: No tenderness or deformity  Right lower leg: Edema present  Left lower leg: Edema present  Skin:     General: Skin is warm and dry  Capillary Refill: Capillary refill takes less than 2 seconds  Coloration: Skin is pale  Findings: No erythema  Neurological:      Mental Status: She is alert and oriented to person, place, and time     Psychiatric:         Behavior: Behavior normal          Laboratory and Diagnostics:  Results from last 7 days   Lab Units 09/15/20  0455 09/14/20  1833 09/14/20  0517 09/14/20  0013 09/10/20  0545 09/09/20  0454   WBC Thousand/uL 11 53* 14 88* 10 72* 13 70* 10 71* 11 29*   HEMOGLOBIN g/dL 7 4* 7 9* 7 8* 8 8* 7 6* 7 5*   HEMATOCRIT % 24 0* 26 4* 25 2* 29 1* 25 8* 25 1*   PLATELETS Thousands/uL 302 337 261 340 250 237   NEUTROS PCT % 73  --  83* 80* 67 71   BANDS PCT %  --  4  --   --   --   --    MONOS PCT % 6  --  5 7 8 6   MONO PCT %  --  2*  --   --   --   --      Results from last 7 days   Lab Units 09/14/20 1833 09/14/20 0517 09/14/20  0013 09/10/20  0545 09/09/20  0454   SODIUM mmol/L 138 139 139 143 141   POTASSIUM mmol/L 3 7 3 6 3 6 3 2* 3 5   CHLORIDE mmol/L 101 104 101 104 103   CO2 mmol/L 28 28 37* 35* 33*   ANION GAP mmol/L 9 7 1* 4 5   BUN mg/dL 36* 35* 36* 51* 50*   CREATININE mg/dL 1 36* 1 14 1 29 1 03 1 03   CALCIUM mg/dL 7 8* 7 7* 8 4 7 8* 7 7*   GLUCOSE RANDOM mg/dL 181* 96 90 57* 111   ALT U/L 31 37 16  --   --    AST U/L 40 105* 17  --   --    ALK PHOS U/L 184* 187* 131*  --   --    ALBUMIN g/dL 1 4* 1 3* 1 6*  --   --    TOTAL BILIRUBIN mg/dL 0 50 0 60 0 60  --   --      Results from last 7 days   Lab Units 09/14/20  0517   MAGNESIUM mg/dL 1 6   PHOSPHORUS mg/dL 3 7      Results from last 7 days   Lab Units 09/14/20  0517 09/14/20  0152 09/10/20  0545 09/09/20  0454   INR  >15 00* >15 00* 1 84* 2 57*   PTT seconds 178* 160*  --   --       Results from last 7 days   Lab Units 09/14/20  0014   TROPONIN I ng/mL <0 02     Results from last 7 days   Lab Units 09/14/20  0013   LACTIC ACID mmol/L 1 1     ABG:    VBG:    Results from last 7 days   Lab Units 09/14/20  0229   PROCALCITONIN ng/ml 0 41*       Micro  Results from last 7 days   Lab Units 09/14/20  0013   BLOOD CULTURE  Received in Microbiology Lab  Culture in Progress  Received in Microbiology Lab  Culture in Progress  EKG:  AFib related  Imaging: I have personally reviewed pertinent reports  Intake and Output  I/O       09/13 0701 - 09/14 0700 09/14 0701 - 09/15 0700    P  O  0     IV Piggyback 2635 300    Total Intake(mL/kg) 2635 (31 1) 300 (3 5)    Urine (mL/kg/hr) 130 485 (0 2)    Stool 0 0    Total Output 130 485    Net +2505 -185          Unmeasured Stool Occurrence 1 x 3 x          Height and Weights   Height: 5' 6" (167 6 cm)  IBW: 59 3 kg  Body mass index is 30 1 kg/m²  Weight (last 2 days)     Date/Time   Weight    09/14/20 0351   84 6 (186 51)    09/13/20 2333   84 (185 19)                Nutrition       Diet Orders   (From admission, onward)             Start     Ordered    09/14/20 1441  Dietary nutrition supplements  Once     Question Answer Comment   Select Supplement: Yevgeniy-Fruit Punch    Frequency Breakfast, Dinner        09/14/20 1440    09/14/20 1418  Diet Nir/CHO Controlled; Consistent Carbohydrate Diet Level 2 (5 carb servings/75 grams CHO/meal);  Dysphagia 3-Dental Soft; Thin Liquid  Diet effective now     Question Answer Comment   Diet Type Nir/CHO Controlled    Nir/CHO Controlled Consistent Carbohydrate Diet Level 2 (5 carb servings/75 grams CHO/meal)    Other Restriction(s): Dysphagia 3-Dental Soft    Liquid Modifier Thin Liquid    RD to adjust diet per protocol?  No        09/14/20 1417                  Active Medications  Scheduled Meds:  Current Facility-Administered Medications   Medication Dose Route Frequency Provider Last Rate    aspirin  81 mg Oral Daily TJ Silver      atorvastatin  40 mg Oral Daily With Shyanne CharloTJ mtz      cefepime  2,000 mg Intravenous Q12H TJ Silver Stopped (09/15/20 0100)    chlorhexidine  15 mL Swish & Spit Q12H 7955 TJ Kolb      gabapentin  100 mg Oral TID TJ Silver      insulin lispro  1-6 Units Subcutaneous TID With Meals TJ Oswald      metoprolol tartrate  75 mg Oral Q12H [address] TJ Oswald      metroNIDAZOLE  500 mg Intravenous Q8H TJ Silver Stopped (09/15/20 0230)    nystatin   Topical BID Jud Shingles, DO      vancomycin  20 mg/kg (Adjusted) Intravenous Q24H TJ Silver 1,500 mg (09/14/20 2048)     Continuous Infusions:     PRN Meds:        Invasive Devices Review  Invasive Devices     Peripheral Intravenous Line            Peripheral IV 09/13/20 Right Wrist 1 day    Peripheral IV 09/15/20 Right Antecubital less than 1 day          Drain            Urethral Catheter Temperature probe 14 Fr  1 day    Negative Pressure Wound Therapy (V A C ) Sacrum less than 1 day                Rationale for remaining devices:   ---------------------------------------------------------------------------------------  Advance Directive and Living Will:      Power of :    POLST:    ---------------------------------------------------------------------------------------  Care Time Delivered:   Upon my evaluation, this patient had a high probability of imminent or life-threatening deterioration due to Altered mental status which has improved also AFib with RVR poorly rate controlled, which required my direct attention, intervention, and personal management  I have personally provided 30 minutes (0300 to 0330) of critical care time, exclusive of procedures, teaching, family meetings, and any prior time recorded by providers other than myself  Cande Badillo PA-C      Portions of the record may have been created with voice recognition software  Occasional wrong word or "sound a like" substitutions may have occurred due to the inherent limitations of voice recognition software    Read the chart carefully and recognize, using context, where substitutions have occurred

## 2020-09-15 NOTE — ASSESSMENT & PLAN NOTE
· Discharged on 09/10 after a lengthy stay with gram-positive bacteremia, septic shock 2/2 sacral decubitus ulcer   · Debridement x2 as above  · Comes to the ER with temp 95 1, blood pressure 78/39, SpO2 88% RA, AMS, SBP <90 x3, now tachycardic   · Previous wound cultures positive for E coli, Proteus, Enterococcus  · Previous urine culture positive for E coli, lactobacillus  · Previous blood cultures positive for strep, Staph, Enterococcus, bacteroids  · Lactic 1 1   · IVF crystalloids bolused (1785mL based on IBW of 59 5kg) given hypotension x3  · Remains HD stable s/p fluid admin   · CT abd/pel c/w sacral osteo, sacral decub and surrounding soft tissue changes   · Source likely combination of UTI, sacral wound infection   · Procal, pan cultures pending   · Goal to maintain MAP >65, normothermic, normoglycemic   · Possible source might be UTI sacral wound is not a source of sepsis

## 2020-09-16 PROBLEM — N39.0 URINARY TRACT INFECTION: Status: RESOLVED | Noted: 2020-09-14 | Resolved: 2020-09-16

## 2020-09-16 PROBLEM — R65.21 SEPTIC SHOCK DUE TO GRAM-POSITIVE BACTERIA (HCC): Status: RESOLVED | Noted: 2020-08-27 | Resolved: 2020-09-16

## 2020-09-16 PROBLEM — A41.89 SEPTIC SHOCK DUE TO GRAM-POSITIVE BACTERIA (HCC): Status: RESOLVED | Noted: 2020-08-27 | Resolved: 2020-09-16

## 2020-09-16 PROBLEM — A04.72 C. DIFFICILE COLITIS: Status: ACTIVE | Noted: 2020-09-16

## 2020-09-16 PROBLEM — R79.1 SUPRATHERAPEUTIC INR: Status: RESOLVED | Noted: 2020-09-14 | Resolved: 2020-09-16

## 2020-09-16 LAB
ANION GAP SERPL CALCULATED.3IONS-SCNC: 8 MMOL/L (ref 4–13)
APTT PPP: 50 SECONDS (ref 23–37)
BASOPHILS # BLD AUTO: 0.07 THOUSANDS/ΜL (ref 0–0.1)
BASOPHILS NFR BLD AUTO: 1 % (ref 0–1)
BUN SERPL-MCNC: 44 MG/DL (ref 5–25)
CALCIUM SERPL-MCNC: 7.9 MG/DL (ref 8.3–10.1)
CHLORIDE SERPL-SCNC: 103 MMOL/L (ref 100–108)
CO2 SERPL-SCNC: 26 MMOL/L (ref 21–32)
CREAT SERPL-MCNC: 2.01 MG/DL (ref 0.6–1.3)
EOSINOPHIL # BLD AUTO: 0.5 THOUSAND/ΜL (ref 0–0.61)
EOSINOPHIL NFR BLD AUTO: 5 % (ref 0–6)
ERYTHROCYTE [DISTWIDTH] IN BLOOD BY AUTOMATED COUNT: 14.7 % (ref 11.6–15.1)
GFR SERPL CREATININE-BSD FRML MDRD: 24 ML/MIN/1.73SQ M
GLUCOSE SERPL-MCNC: 139 MG/DL (ref 65–140)
GLUCOSE SERPL-MCNC: 148 MG/DL (ref 65–140)
GLUCOSE SERPL-MCNC: 183 MG/DL (ref 65–140)
GLUCOSE SERPL-MCNC: 186 MG/DL (ref 65–140)
GLUCOSE SERPL-MCNC: 190 MG/DL (ref 65–140)
HCT VFR BLD AUTO: 24.8 % (ref 34.8–46.1)
HGB BLD-MCNC: 7.7 G/DL (ref 11.5–15.4)
IMM GRANULOCYTES # BLD AUTO: 0.05 THOUSAND/UL (ref 0–0.2)
IMM GRANULOCYTES NFR BLD AUTO: 1 % (ref 0–2)
INR PPP: 1.6 (ref 0.84–1.19)
LYMPHOCYTES # BLD AUTO: 1.94 THOUSANDS/ΜL (ref 0.6–4.47)
LYMPHOCYTES NFR BLD AUTO: 18 % (ref 14–44)
MAGNESIUM SERPL-MCNC: 2 MG/DL (ref 1.6–2.6)
MCH RBC QN AUTO: 28.9 PG (ref 26.8–34.3)
MCHC RBC AUTO-ENTMCNC: 31 G/DL (ref 31.4–37.4)
MCV RBC AUTO: 93 FL (ref 82–98)
MONOCYTES # BLD AUTO: 0.75 THOUSAND/ΜL (ref 0.17–1.22)
MONOCYTES NFR BLD AUTO: 7 % (ref 4–12)
NEUTROPHILS # BLD AUTO: 7.69 THOUSANDS/ΜL (ref 1.85–7.62)
NEUTS SEG NFR BLD AUTO: 68 % (ref 43–75)
NRBC BLD AUTO-RTO: 0 /100 WBCS
PHOSPHATE SERPL-MCNC: 3.5 MG/DL (ref 2.3–4.1)
PLATELET # BLD AUTO: 329 THOUSANDS/UL (ref 149–390)
PMV BLD AUTO: 10.2 FL (ref 8.9–12.7)
POTASSIUM SERPL-SCNC: 3.4 MMOL/L (ref 3.5–5.3)
PROTHROMBIN TIME: 19 SECONDS (ref 11.6–14.5)
RBC # BLD AUTO: 2.66 MILLION/UL (ref 3.81–5.12)
SODIUM SERPL-SCNC: 137 MMOL/L (ref 136–145)
VANCOMYCIN TROUGH SERPL-MCNC: 26.9 UG/ML (ref 10–20)
WBC # BLD AUTO: 11 THOUSAND/UL (ref 4.31–10.16)

## 2020-09-16 PROCEDURE — NC001 PR NO CHARGE: Performed by: INTERNAL MEDICINE

## 2020-09-16 PROCEDURE — 82948 REAGENT STRIP/BLOOD GLUCOSE: CPT

## 2020-09-16 PROCEDURE — 80202 ASSAY OF VANCOMYCIN: CPT | Performed by: INTERNAL MEDICINE

## 2020-09-16 PROCEDURE — 84100 ASSAY OF PHOSPHORUS: CPT | Performed by: PHYSICIAN ASSISTANT

## 2020-09-16 PROCEDURE — 85610 PROTHROMBIN TIME: CPT | Performed by: PHYSICIAN ASSISTANT

## 2020-09-16 PROCEDURE — 85730 THROMBOPLASTIN TIME PARTIAL: CPT | Performed by: PHYSICIAN ASSISTANT

## 2020-09-16 PROCEDURE — 85025 COMPLETE CBC W/AUTO DIFF WBC: CPT | Performed by: PHYSICIAN ASSISTANT

## 2020-09-16 PROCEDURE — 99232 SBSQ HOSP IP/OBS MODERATE 35: CPT | Performed by: INTERNAL MEDICINE

## 2020-09-16 PROCEDURE — 87040 BLOOD CULTURE FOR BACTERIA: CPT | Performed by: PHYSICIAN ASSISTANT

## 2020-09-16 PROCEDURE — 80048 BASIC METABOLIC PNL TOTAL CA: CPT | Performed by: PHYSICIAN ASSISTANT

## 2020-09-16 PROCEDURE — 83735 ASSAY OF MAGNESIUM: CPT | Performed by: PHYSICIAN ASSISTANT

## 2020-09-16 RX ORDER — POTASSIUM CHLORIDE 20 MEQ/1
40 TABLET, EXTENDED RELEASE ORAL ONCE
Status: COMPLETED | OUTPATIENT
Start: 2020-09-16 | End: 2020-09-16

## 2020-09-16 RX ORDER — ALBUMIN, HUMAN INJ 5% 5 %
25 SOLUTION INTRAVENOUS ONCE
Status: COMPLETED | OUTPATIENT
Start: 2020-09-16 | End: 2020-09-16

## 2020-09-16 RX ORDER — INSULIN GLARGINE 100 [IU]/ML
14 INJECTION, SOLUTION SUBCUTANEOUS
Status: DISCONTINUED | OUTPATIENT
Start: 2020-09-16 | End: 2020-09-16

## 2020-09-16 RX ORDER — INSULIN GLARGINE 100 [IU]/ML
5 INJECTION, SOLUTION SUBCUTANEOUS
Status: DISCONTINUED | OUTPATIENT
Start: 2020-09-16 | End: 2020-09-20 | Stop reason: HOSPADM

## 2020-09-16 RX ORDER — POTASSIUM CHLORIDE 14.9 MG/ML
20 INJECTION INTRAVENOUS ONCE
Status: COMPLETED | OUTPATIENT
Start: 2020-09-16 | End: 2020-09-16

## 2020-09-16 RX ORDER — WARFARIN SODIUM 3 MG/1
3 TABLET ORAL
Status: DISCONTINUED | OUTPATIENT
Start: 2020-09-16 | End: 2020-09-19

## 2020-09-16 RX ORDER — MAGNESIUM SULFATE HEPTAHYDRATE 40 MG/ML
2 INJECTION, SOLUTION INTRAVENOUS ONCE
Status: COMPLETED | OUTPATIENT
Start: 2020-09-16 | End: 2020-09-16

## 2020-09-16 RX ADMIN — NYSTATIN: 100000 POWDER TOPICAL at 17:47

## 2020-09-16 RX ADMIN — GABAPENTIN 100 MG: 100 CAPSULE ORAL at 16:46

## 2020-09-16 RX ADMIN — INSULIN LISPRO 2 UNITS: 100 INJECTION, SOLUTION INTRAVENOUS; SUBCUTANEOUS at 16:47

## 2020-09-16 RX ADMIN — Medication 125 MG: at 11:28

## 2020-09-16 RX ADMIN — MAGNESIUM SULFATE IN WATER 2 G: 40 INJECTION, SOLUTION INTRAVENOUS at 07:47

## 2020-09-16 RX ADMIN — GABAPENTIN 100 MG: 100 CAPSULE ORAL at 21:33

## 2020-09-16 RX ADMIN — INSULIN GLARGINE 5 UNITS: 100 INJECTION, SOLUTION SUBCUTANEOUS at 21:33

## 2020-09-16 RX ADMIN — INSULIN LISPRO 2 UNITS: 100 INJECTION, SOLUTION INTRAVENOUS; SUBCUTANEOUS at 11:28

## 2020-09-16 RX ADMIN — CHLORHEXIDINE GLUCONATE 0.12% ORAL RINSE 15 ML: 1.2 LIQUID ORAL at 21:33

## 2020-09-16 RX ADMIN — METOPROLOL TARTRATE 75 MG: 50 TABLET, FILM COATED ORAL at 08:43

## 2020-09-16 RX ADMIN — Medication 125 MG: at 17:46

## 2020-09-16 RX ADMIN — GABAPENTIN 100 MG: 100 CAPSULE ORAL at 08:44

## 2020-09-16 RX ADMIN — Medication 125 MG: at 05:37

## 2020-09-16 RX ADMIN — METRONIDAZOLE 500 MG: 500 INJECTION, SOLUTION INTRAVENOUS at 00:30

## 2020-09-16 RX ADMIN — ATORVASTATIN CALCIUM 40 MG: 40 TABLET, FILM COATED ORAL at 16:46

## 2020-09-16 RX ADMIN — POTASSIUM CHLORIDE 20 MEQ: 200 INJECTION, SOLUTION INTRAVENOUS at 07:47

## 2020-09-16 RX ADMIN — CEFEPIME HYDROCHLORIDE 2000 MG: 2 INJECTION, SOLUTION INTRAVENOUS at 00:58

## 2020-09-16 RX ADMIN — WARFARIN SODIUM 3 MG: 3 TABLET ORAL at 17:46

## 2020-09-16 RX ADMIN — POTASSIUM CHLORIDE 40 MEQ: 20 TABLET, EXTENDED RELEASE ORAL at 07:47

## 2020-09-16 RX ADMIN — ASPIRIN 81 MG: 81 TABLET, COATED ORAL at 08:43

## 2020-09-16 RX ADMIN — INSULIN LISPRO 1 UNITS: 100 INJECTION, SOLUTION INTRAVENOUS; SUBCUTANEOUS at 21:33

## 2020-09-16 RX ADMIN — ALBUMIN (HUMAN) 25 G: 12.5 INJECTION, SOLUTION INTRAVENOUS at 11:28

## 2020-09-16 RX ADMIN — Medication 125 MG: at 00:09

## 2020-09-16 RX ADMIN — METRONIDAZOLE 500 MG: 500 INJECTION, SOLUTION INTRAVENOUS at 08:46

## 2020-09-16 RX ADMIN — NYSTATIN: 100000 POWDER TOPICAL at 08:44

## 2020-09-16 RX ADMIN — METOPROLOL TARTRATE 75 MG: 50 TABLET, FILM COATED ORAL at 21:33

## 2020-09-16 NOTE — ASSESSMENT & PLAN NOTE
· INR > 15 -- recheck  · Prescribed home Coumadin for chronic anticoagulation in the setting of AFib RVR, however comes from nursing home with both Xarelto and Coumadin on her medication list  · Hold on further anticoagulation  · No active bleeding  · Received vitamin K p o   · Recheck INR is 1 6  · Resume Coumadin today

## 2020-09-16 NOTE — ASSESSMENT & PLAN NOTE
Continue Lopressor 75 mg q 12  Hold further diuretic therapy at this time- suspect pt is volume depleted

## 2020-09-16 NOTE — PLAN OF CARE
Problem: Potential for Falls  Goal: Patient will remain free of falls  Description: INTERVENTIONS:  - Assess patient frequently for physical needs  -  Identify cognitive and physical deficits and behaviors that affect risk of falls  -  [place] fall precautions as indicated by assessment   - Educate patient/family on patient safety including physical limitations  - Instruct patient to call for assistance with activity based on assessment  - Modify environment to reduce risk of injury  - Consider OT/PT consult to assist with strengthening/mobility  Outcome: Progressing     Problem: Prexisting or High Potential for Compromised Skin Integrity  Goal: Skin integrity is maintained or improved  Description: INTERVENTIONS:  - Identify patients at risk for skin breakdown  - Assess and monitor skin integrity  - Assess and monitor nutrition and hydration status  - Monitor labs   - Assess for incontinence   - Turn and reposition patient  - Assist with mobility/ambulation  - Relieve pressure over bony prominences  - Avoid friction and shearing  - Provide appropriate hygiene as needed including keeping skin clean and dry  - Evaluate need for skin moisturizer/barrier cream  - Collaborate with interdisciplinary team   - Patient/family teaching  - Consider wound care consult   Outcome: Progressing     Problem: Nutrition/Hydration-ADULT  Goal: Nutrient/Hydration intake appropriate for improving, restoring or maintaining nutritional needs  Description: Monitor and assess patient's nutrition/hydration status for malnutrition  Collaborate with interdisciplinary team and initiate plan and interventions as ordered  Monitor patient's weight and dietary intake as ordered or per policy  Utilize nutrition screening tool and intervene as necessary  Determine patient's food preferences and provide high-protein, high-caloric foods as appropriate       INTERVENTIONS:  - Monitor oral intake, urinary output, labs, and treatment plans  - Assess nutrition and hydration status and recommend course of action  - Evaluate amount of meals eaten  - Assist patient with eating if necessary   - Allow adequate time for meals  - Recommend/ encourage appropriate diets, oral nutritional supplements, and vitamin/mineral supplements  - Order, calculate, and assess calorie counts as needed  - Recommend, monitor, and adjust tube feedings and TPN/PPN based on assessed needs  - Assess need for intravenous fluids  - Provide specific nutrition/hydration education as appropriate  - Include patient/family/caregiver in decisions related to nutrition  Outcome: Progressing     Problem: NEUROSENSORY - ADULT  Goal: Achieves stable or improved neurological status  Description: INTERVENTIONS  - Monitor and report changes in neurological status  - Monitor vital signs such as temperature, blood pressure, glucose, and any other labs ordered   - Initiate measures to prevent increased intracranial pressure  - Monitor for seizure activity and implement precautions if appropriate      Outcome: Progressing     Problem: CARDIOVASCULAR - ADULT  Goal: Maintains optimal cardiac output and hemodynamic stability  Description: INTERVENTIONS:  - Monitor I/O, vital signs and rhythm  - Monitor for S/S and trends of decreased cardiac output  - Administer and titrate ordered vasoactive medications to optimize hemodynamic stability  - Assess quality of pulses, skin color and temperature  - Assess for signs of decreased coronary artery perfusion  - Instruct patient to report change in severity of symptoms  Outcome: Progressing  Goal: Absence of cardiac dysrhythmias or at baseline rhythm  Description: INTERVENTIONS:  - Continuous cardiac monitoring, vital signs, obtain 12 lead EKG if ordered  - Administer antiarrhythmic and heart rate control medications as ordered  - Monitor electrolytes and administer replacement therapy as ordered  Outcome: Progressing     Problem: RESPIRATORY - ADULT  Goal: Achieves optimal ventilation and oxygenation  Description: INTERVENTIONS:  - Assess for changes in respiratory status  - Assess for changes in mentation and behavior  - Position to facilitate oxygenation and minimize respiratory effort  - Oxygen administered by appropriate delivery if ordered  - Initiate smoking cessation education as indicated  - Encourage broncho-pulmonary hygiene including cough, deep breathe, Incentive Spirometry  - Assess the need for suctioning and aspirate as needed  - Assess and instruct to report SOB or any respiratory difficulty  - Respiratory Therapy support as indicated  Outcome: Progressing     Problem: GENITOURINARY - ADULT  Goal: Maintains or returns to baseline urinary function  Description: INTERVENTIONS:  - Assess urinary function  - Encourage oral fluids to ensure adequate hydration if ordered  - Administer IV fluids as ordered to ensure adequate hydration  - Administer ordered medications as needed  - Offer frequent toileting  - Follow urinary retention protocol if ordered  Outcome: Progressing  Goal: Absence of urinary retention  Description: INTERVENTIONS:  - Assess patients ability to void and empty bladder  - Monitor I/O  - Bladder scan as needed  - Discuss with physician/AP medications to alleviate retention as needed  - Discuss catheterization for long term situations as appropriate  Outcome: Progressing  Goal: Urinary catheter remains patent  Description: INTERVENTIONS:  - Assess patency of urinary catheter  - If patient has a chronic trujillo, consider changing catheter if non-functioning  - Follow guidelines for intermittent irrigation of non-functioning urinary catheter  Outcome: Progressing     Problem: SKIN/TISSUE INTEGRITY - ADULT  Goal: Skin integrity remains intact  Description: INTERVENTIONS  - Identify patients at risk for skin breakdown  - Assess and monitor skin integrity  - Assess and monitor nutrition and hydration status  - Monitor labs (i e  albumin)  - Assess for incontinence   - Turn and reposition patient  - Assist with mobility/ambulation  - Relieve pressure over bony prominences  - Avoid friction and shearing  - Provide appropriate hygiene as needed including keeping skin clean and dry  - Evaluate need for skin moisturizer/barrier cream  - Collaborate with interdisciplinary team (i e  Nutrition, Rehabilitation, etc )   - Patient/family teaching  Outcome: Progressing  Goal: Incision(s), wounds(s) or drain site(s) healing without S/S of infection  Description: INTERVENTIONS  - Assess and document risk factors for skin impairment   - Assess and document dressing, incision, wound bed, drain sites and surrounding tissue  - Consider nutrition services referral as needed  - Oral mucous membranes remain intact  - Provide patient/ family education  Outcome: Progressing     Problem: MUSCULOSKELETAL - ADULT  Goal: Maintain or return mobility to safest level of function  Description: INTERVENTIONS:  - Assess patient's ability to carry out ADLs; assess patient's baseline for ADL function and identify physical deficits which impact ability to perform ADLs (bathing, care of mouth/teeth, toileting, grooming, dressing, etc )  - Assess/evaluate cause of self-care deficits   - Assess range of motion  - Assess patient's mobility  - Assess patient's need for assistive devices and provide as appropriate  - Encourage maximum independence but intervene and supervise when necessary  - Involve family in performance of ADLs  - Assess for home care needs following discharge   - Consider OT consult to assist with ADL evaluation and planning for discharge  - Provide patient education as appropriate  Outcome: Progressing     Problem: PAIN - ADULT  Goal: Verbalizes/displays adequate comfort level or baseline comfort level  Description: Interventions:  - Encourage patient to monitor pain and request assistance  - Assess pain using appropriate pain scale  - Administer analgesics based on type and severity of pain and evaluate response  - Implement non-pharmacological measures as appropriate and evaluate response  - Consider cultural and social influences on pain and pain management  - Notify physician/advanced practitioner if interventions unsuccessful or patient reports new pain  Outcome: Progressing     Problem: INFECTION - ADULT  Goal: Absence or prevention of progression during hospitalization  Description: INTERVENTIONS:  - Assess and monitor for signs and symptoms of infection  - Monitor lab/diagnostic results  - Monitor all insertion sites, i e  indwelling lines, tubes, and drains  - Monitor endotracheal if appropriate and nasal secretions for changes in amount and color  - [place] appropriate cooling/warming therapies per order  - Administer medications as ordered  - Instruct and encourage patient and family to use good hand hygiene technique  - Identify and instruct in appropriate isolation precautions for identified infection/condition  Outcome: Progressing  Goal: Absence of fever/infection during neutropenic period  Description: INTERVENTIONS:  - Monitor WBC    Outcome: Progressing     Problem: SAFETY ADULT  Goal: Patient will remain free of falls  Description: INTERVENTIONS:  - Assess patient frequently for physical needs  -  Identify cognitive and physical deficits and behaviors that affect risk of falls    -  [place] fall precautions as indicated by assessment   - Educate patient/family on patient safety including physical limitations  - Instruct patient to call for assistance with activity based on assessment  - Modify environment to reduce risk of injury  - Consider OT/PT consult to assist with strengthening/mobility  Outcome: Progressing  Goal: Maintain or return to baseline ADL function  Description: INTERVENTIONS:  -  Assess patient's ability to carry out ADLs; assess patient's baseline for ADL function and identify physical deficits which impact ability to perform ADLs (bathing, care of mouth/teeth, toileting, grooming, dressing, etc )  - Assess/evaluate cause of self-care deficits   - Assess range of motion  - Assess patient's mobility; develop plan if impaired  - Assess patient's need for assistive devices and provide as appropriate  - Encourage maximum independence but intervene and supervise when necessary  - Involve family in performance of ADLs  - Assess for home care needs following discharge   - Consider OT consult to assist with ADL evaluation and planning for discharge  - Provide patient education as appropriate  Outcome: Progressing  Goal: Maintain or return mobility status to optimal level  Description: INTERVENTIONS:  - Assess patient's baseline mobility status (ambulation, transfers, stairs, etc )    - Identify cognitive and physical deficits and behaviors that affect mobility  - Identify mobility aids required to assist with transfers and/or ambulation (gait belt, sit-to-stand, lift, walker, cane, etc )  - [place] fall precautions as indicated by assessment  - Record patient progress and toleration of activity level on Mobility SBAR; progress patient to next Phase/Stage  - Instruct patient to call for assistance with activity based on assessment  - Consider rehabilitation consult to assist with strengthening/weightbearing, etc   Outcome: Progressing     Problem: DISCHARGE PLANNING  Goal: Discharge to home or other facility with appropriate resources  Description: INTERVENTIONS:  - Identify barriers to discharge w/patient and caregiver  - Arrange for needed discharge resources and transportation as appropriate  - Identify discharge learning needs (meds, wound care, etc )  - Arrange for interpretive services to assist at discharge as needed  - Refer to Case Management Department for coordinating discharge planning if the patient needs post-hospital services based on physician/advanced practitioner order or complex needs related to functional status, cognitive ability, or social support system  Outcome: Progressing     Problem: Knowledge Deficit  Goal: Patient/family/caregiver demonstrates understanding of disease process, treatment plan, medications, and discharge instructions  Description: Complete learning assessment and assess knowledge base    Interventions:  - Provide teaching at level of understanding  - Provide teaching via preferred learning methods  Outcome: Progressing

## 2020-09-16 NOTE — PROGRESS NOTES
Progress Note - Dori Rosenbaum 1945, 67 y o  female MRN: [id number]    Unit/Bed#: -01 SDU Encounter: [id number]    Primary Care Provider: Kylee Carreno DO   Date and time admitted to hospital: 9/13/2020 11:30 PM        Decubitus ulcer of sacral region, stage 4 ([facility] )  Assessment & Plan  · Underwent debridement x2 and wound VAC placement with general surgery  · Wound VAC in place  · CT abdomen pelvis performed -- Decubitus ulcer overlying the inferior sacrum and coccyx and soft tissue inflammatory changes  Irregularity of the sacrum which may represent underlying osteomyelitis  Diffuse rectal wall thickening and perirectal inflammatory changes which may represent proctitis or secondary from the above process    · General surgery wound VAC was placed - wound does not look infected    Septic shockresolved as of 9/16/2020  Assessment & Plan  · Discharged on 09/10 after a lengthy stay with gram-positive bacteremia, septic shock 2/2 sacral decubitus ulcer   · Debridement x2 as above  · Comes to the ER with temp 95 1, blood pressure 78/39, SpO2 88% RA, AMS, SBP <90 x3, now tachycardic   · Previous wound cultures positive for E coli, Proteus, Enterococcus  · Previous urine culture positive for E coli, lactobacillus  · Previous blood cultures positive for strep, Staph, Enterococcus, bacteroids  · Lactic 1 1   · IVF crystalloids bolused (1785mL based on IBW of 59 5kg) given hypotension x3  · Remains HD stable s/p fluid admin   · CT abd/pel c/w sacral osteo, sacral decub and surrounding soft tissue changes   · Source likely combination of UTI, sacral wound infection   · Procal, pan cultures pending   · Goal to maintain MAP >65, normothermic, normoglycemic   · Possible source might be UTI sacral wound is not a source of sepsis    Paroxysmal atrial fibrillation ([facility] )  Assessment & Plan  · New onset during her previous stay  · Metoprolol on hold given hypotension  · Currently AFib, rate controlled  · Goal to maintain AFib rate <110   · Chronically anticoagulated on Coumadin, though INR greater than 15 on arrival, will hold on further anticoagulation  · Patient received Xarelto and Coumadin  · Hold Coumadin until INR is normal    Type 2 diabetes mellitus with hyperglycemia [facility])  Assessment & Plan  Lab Results   Component Value Date    HGBA1C 9 9 (H) 08/26/2020       Recent Labs     09/15/20  0729 09/15/20  1113 09/15/20  1624 09/15/20  2058   POCGLU 194* 191* 203* 234*       Blood Sugar Average: Last 72 hrs:  (P) 151 8     · Currently NPO, SSI q 6 hours for glucose 140-180    C  difficile colitis  Assessment & Plan  Patient has positive for C diff  She is on Flagyl 500 mg IV Q 8  Also vancomycin oral 125 q 6 hours    Urinary tract infection  Assessment & Plan  · Comes to ER with Jimenez from nursing home, noted by nursing staff to have thick, dark yellow, curd-like discharge in the Jimenez tubing  · Jimenez exchanged the emergency department  · UA with moderate blood, small leuks, innumerable bacteria  · Antibiotics as above  · Urine culture pending    Supratherapeutic INR  Assessment & Plan  · INR > 15 -- recheck  · Prescribed home Coumadin for chronic anticoagulation in the setting of AFib RVR, however comes from nursing home with both Xarelto and Coumadin on her medication list  · Hold on further anticoagulation  · No active bleeding  · Received vitamin K p o   · Recheck INR is 1 6  · Resume Coumadin today    Chronic diastolic heart failure (HCC)  Assessment & Plan  Wt Readings from Last 3 Encounters:   09/16/20 91 kg (200 lb 9 9 oz)   09/10/20 80 7 kg (177 lb 14 6 oz)   08/25/20 73 kg (161 lb)       · Echo 08/27/2020 -- EF 65%, no acute abnormality, wall thickness mildly increased, changes consistent with concentric remodeling, grade 2 diastolic dysfunction  · Received crystalloid administration the setting of sepsis   · Strict I&O   · QD weights    Stenosis of left carotid artery  Assessment & Plan  · Carotid Dopplers -- LICA >70%, VASQUEZ <50%    CKD 3  Assessment & Plan  · Currently at her baseline creatinine, which appears to be 1 2-1 3  · Avoid nephrotoxic agents  · Tony present for critical I&O   · Trend renal indices     Hyperlipidemia  Assessment & Plan  · Continue home statin    Hypertension  Assessment & Plan  Continue Lopressor 75 mg q 12    ----------------------------------------------------------------------------------------  HPI/24hr events:  No events overnight, will need to have Coumadin restarted today INR was 1 6  Or heparin started     Disposition: Transfer to Stepdown Level 2  Code Status: Level 1 - Full Code  ---------------------------------------------------------------------------------------  SUBJECTIVE  None     Review of Systems  Review of systems was reviewed and negative unless stated above in HPI/24-hour events   ---------------------------------------------------------------------------------------  OBJECTIVE    Vitals   Vitals:    09/15/20 2036 09/15/20 2332 20 0340 20 0400   BP: 122/59 103/55 118/65    BP Location: Left arm Left arm     Pulse: 94 82 86 89   Resp: 17 (!) 10 13 14   Temp: 99 3 °F (37 4 °C) 99 1 °F (37 3 °C) 99 1 °F (37 3 °C) 99 1 °F (37 3 °C)   TempSrc: Bladder      SpO2: 96% 95% 95% 94%   Weight:    91 kg (200 lb 9 9 oz)   Height:         Temp (24hrs), Av 9 °F (37 2 °C), Min:98 4 °F (36 9 °C), Max:99 3 °F (37 4 °C)  Current: Temperature: 99 1 °F (37 3 °C)          Respiratory:  SpO2: SpO2: 94 %, SpO2 Activity: SpO2 Activity: At Rest, SpO2 Device: O2 Device: Nasal cannula  Nasal Cannula O2 Flow Rate (L/min): 2 L/min    Invasive/non-invasive ventilation settings   Respiratory    Lab Data (Last 4 hours)    None         O2/Vent Data (Last 4 hours)    None                Physical Exam  Vitals signs and nursing note reviewed  Constitutional:       General: She is not in acute distress  Appearance: She is well-developed  She is obese  She is ill-appearing   She is not diaphoretic  HENT:      Head: Normocephalic and atraumatic  Mouth/Throat:      Pharynx: No oropharyngeal exudate  Eyes:      Conjunctiva/sclera: Conjunctivae normal       Pupils: Pupils are equal, round, and reactive to light  Neck:      Musculoskeletal: Normal range of motion and neck supple  Vascular: No JVD  Trachea: No tracheal deviation  Cardiovascular:      Rate and Rhythm: Normal rate  Rhythm irregular  Pulses: Normal pulses  Heart sounds: Normal heart sounds  No murmur  No friction rub  No gallop  Pulmonary:      Effort: Pulmonary effort is normal  No respiratory distress  Breath sounds: Normal breath sounds  No wheezing or rales  Chest:      Chest wall: No tenderness  Abdominal:      General: Bowel sounds are normal  There is no distension  Palpations: Abdomen is soft  Tenderness: There is no abdominal tenderness  There is no guarding  Musculoskeletal: Normal range of motion  General: No tenderness or deformity  Right lower leg: Edema present  Left lower leg: Edema present  Skin:     General: Skin is warm and dry  Capillary Refill: Capillary refill takes less than 2 seconds  Findings: No erythema  Comments: Sacral decubitus stage IV   Neurological:      Mental Status: She is alert  She is disoriented           Laboratory and Diagnostics:  Results from last 7 days   Lab Units 09/16/20  0344 09/15/20  0455 09/14/20  1833 09/14/20  0517 09/14/20  0013 09/10/20  0545   WBC Thousand/uL 11 00* 11 53* 14 88* 10 72* 13 70* 10 71*   HEMOGLOBIN g/dL 7 7* 7 4* 7 9* 7 8* 8 8* 7 6*   HEMATOCRIT % 24 8* 24 0* 26 4* 25 2* 29 1* 25 8*   PLATELETS Thousands/uL 329 302 337 261 340 250   NEUTROS PCT % 68 73  --  83* 80* 67   BANDS PCT %  --   --  4  --   --   --    MONOS PCT % 7 6  --  5 7 8   MONO PCT %  --   --  2*  --   --   --      Results from last 7 days   Lab Units 09/16/20  0344 09/15/20  0455 09/14/20  1833 09/14/20  0517 09/14/20  0013 09/10/20  0545   SODIUM mmol/L 137 140 138 139 139 143   POTASSIUM mmol/L 3 4* 3 6 3 7 3 6 3 6 3 2*   CHLORIDE mmol/L 103 103 101 104 101 104   CO2 mmol/L 26 27 28 28 37* 35*   ANION GAP mmol/L 8 10 9 7 1* 4   BUN mg/dL 44* 39* 36* 35* 36* 51*   CREATININE mg/dL 2 01* 1 56* 1 36* 1 14 1 29 1 03   CALCIUM mg/dL 7 9* 7 9* 7 8* 7 7* 8 4 7 8*   GLUCOSE RANDOM mg/dL 148* 178* 181* 96 90 57*   ALT U/L  --   --  31 37 16  --    AST U/L  --   --  40 105* 17  --    ALK PHOS U/L  --   --  184* 187* 131*  --    ALBUMIN g/dL  --   --  1 4* 1 3* 1 6*  --    TOTAL BILIRUBIN mg/dL  --   --  0 50 0 60 0 60  --      Results from last 7 days   Lab Units 09/16/20  0344 09/15/20  0455 09/14/20  0517   MAGNESIUM mg/dL 2 0 2 1 1 6   PHOSPHORUS mg/dL 3 5  --  3 7      Results from last 7 days   Lab Units 09/16/20  0345 09/15/20  0455 09/14/20  0517 09/14/20  0152 09/10/20  0545   INR  1 60* 3 36* >15 00* >15 00* 1 84*   PTT seconds 50* 67* 178* 160*  --       Results from last 7 days   Lab Units 09/14/20  0014   TROPONIN I ng/mL <0 02     Results from last 7 days   Lab Units 09/14/20  0013   LACTIC ACID mmol/L 1 1     ABG:    VBG:    Results from last 7 days   Lab Units 09/15/20  0455 09/14/20  0229   PROCALCITONIN ng/ml 6 48* 0 41*       Micro  Results from last 7 days   Lab Units 09/14/20  0944 09/14/20  0059 09/14/20  0013   BLOOD CULTURE   --   --  No Growth at 24 hrs  No Growth at 24 hrs  URINE CULTURE   --  Culture results to follow  --    C DIFF TOXIN B  Positive*  --   --        EKG: a -fib  Imaging: I have personally reviewed pertinent reports  Intake and Output  I/O       09/14 0701 - 09/15 0700 09/15 0701 - 09/16 0700    P  O   320    IV Piggyback 750 500    Total Intake(mL/kg) 750 (8 2) 820 (9)    Urine (mL/kg/hr) 560 (0 3) 265 (0 1)    Drains  100    Stool 0 0    Total Output 560 365    Net +190 +455          Unmeasured Stool Occurrence 5 x 3 x          Height and Weights   Height: 5' 6" (167 6 cm)  IBW: 59 3 kg  Body mass index is 32 38 kg/m²  Weight (last 2 days)     Date/Time   Weight    09/16/20 0400   91 (200 62)    09/15/20 0600   91 4 (201 5)    09/14/20 0351   84 6 (186 51)                Nutrition       Diet Orders   (From admission, onward)             Start     Ordered    09/14/20 1441  Dietary nutrition supplements  Once     Question Answer Comment   Select Supplement: Yevgeniy-Fruit Punch    Frequency Breakfast, Dinner        09/14/20 1440    09/14/20 1418  Diet Nir/CHO Controlled; Consistent Carbohydrate Diet Level 2 (5 carb servings/75 grams CHO/meal); Dysphagia 3-Dental Soft; Thin Liquid  Diet effective now     Question Answer Comment   Diet Type Nir/CHO Controlled    Nir/CHO Controlled Consistent Carbohydrate Diet Level 2 (5 carb servings/75 grams CHO/meal)    Other Restriction(s): Dysphagia 3-Dental Soft    Liquid Modifier Thin Liquid    RD to adjust diet per protocol?  No        09/14/20 1417                  Active Medications  Scheduled Meds:  Current Facility-Administered Medications   Medication Dose Route Frequency Provider Last Rate    aspirin  81 mg Oral Daily TJ Cavanaugh      atorvastatin  40 mg Oral Daily With Dinner TJ Cavanaugh      cefepime  2,000 mg Intravenous Q12H TJ Cavanaugh 2,000 mg (09/16/20 0058)    chlorhexidine  15 mL Swish & Spit Q12H 7955 TJ Kolb      gabapentin  100 mg Oral TID TJ Cavanaugh      insulin lispro  1-6 Units Subcutaneous HS TJ Busch      insulin lispro  2-12 Units Subcutaneous TID AC TJ Busch      metoprolol tartrate  75 mg Oral Q12H [facility] TJ Busch      metroNIDAZOLE  500 mg Intravenous Q8H TJ Cavanaugh 500 mg (09/16/20 0030)    nystatin   Topical BID Ludwig Turner DO      vancomycin  20 mg/kg (Adjusted) Intravenous Q24H TJ Cavanaugh 1,500 mg (09/15/20 2041)    vancomycin  125 mg Oral Q6H [facility] TJ Chao       Continuous Infusions:     PRN Meds: Invasive Devices Review  Invasive Devices     Peripheral Intravenous Line            Peripheral IV 09/13/20 Right Wrist 2 days    Peripheral IV 09/15/20 Right Antecubital 1 day          Drain            Urethral Catheter Temperature probe 14 Fr  2 days    Negative Pressure Wound Therapy (V A C ) Sacrum 1 day                Rationale for remaining devices:   ---------------------------------------------------------------------------------------  Advance Directive and Living Will:      Power of :    POLST:    ---------------------------------------------------------------------------------------  Care Time Delivered:   No Critical Care time spent       Cande Badillo PA-C      Portions of the record may have been created with voice recognition software  Occasional wrong word or "sound a like" substitutions may have occurred due to the inherent limitations of voice recognition software    Read the chart carefully and recognize, using context, where substitutions have occurred

## 2020-09-16 NOTE — PROGRESS NOTES
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, would need debridement followed by a flap for full treatment    Subjective:    No fevers, less diarrhea  No abd pains  BP stable  Getting some albumin for acute renal failure  Objective:    Lungs: decreased  Cor: rrr s1s2  Abd: soft nt/nd = BS  Decbiti: vac in place    Labs:  CBC w/diff  Recent Labs     09/16/20  0344   WBC 11 00*   HGB 7 7*   HCT 24 8*      NEUTOPHILPCT 68   LYMPHOPCT 18   MONOPCT 7   EOSPCT 5     BMP  Recent Labs     09/16/20  0344   K 3 4*      CO2 26   BUN 44*   CREATININE 2 01*   CALCIUM 7 9*     CMP  Recent Labs     09/14/20  1833  09/16/20  0344   K 3 7   < > 3 4*      < > 103   CO2 28   < > 26   BUN 36*   < > 44*   CREATININE 1 36*   < > 2 01*   CALCIUM 7 8*   < > 7 9*   ALKPHOS 184*  --   --    ALT 31  --   --    AST 40  --   --     < > = values in this interval not displayed  Kevon Lucia [facility]    Cultures:  Lab Results   Component Value Date    BLOODCX No Growth at 48 hrs  09/14/2020    BLOODCX No Growth at 48 hrs  09/14/2020    BLOODCX No Growth After 5 Days  08/28/2020    BLOODCX No Growth After 5 Days   08/28/2020    BLOODCX Enterococcus faecalis (A) 08/26/2020    BLOODCX Streptococcus constellatus (A) 08/26/2020    BLOODCX Streptococcus constellatus (A) 08/26/2020    BLOODCX Staphylococcus coagulase negative (A) 08/26/2020    BLOODCX Bacteroides fragilis (A) 08/26/2020     Lab Results   Component Value Date    WOUNDCULT 3+ Growth of Escherichia coli (A) 08/26/2020    WOUNDCULT 3+ Growth of Proteus mirabilis (A) 08/26/2020    WOUNDCULT 3+ Growth of Enterococcus faecalis (A) 08/26/2020    WOUNDCULT 3+ Growth of  08/26/2020    WOUNDCULT 2+ Growth of Escherichia coli (A) 08/26/2020    WOUNDCULT 2+ Growth of Proteus mirabilis (A) 08/26/2020    WOUNDCULT 3+ Growth of Enterococcus species (A) 08/26/2020    WOUNDCULT 3+ Growth of  08/26/2020     Lab Results   Component Value Date    URINECX Culture results to follow   09/14/2020    URINECX 50,000-59,000 cfu/ml Escherichia coli (A) 08/26/2020    URINECX >100,000 cfu/ml Lactobacillus species (A) 08/26/2020    URINECX 20,000-29,000 cfu/ml  09/05/2018     No results found for: SPUTUMCULTUR    MED: reviewed      Current Facility-Administered Medications:     aspirin (ECOTRIN LOW STRENGTH) EC tablet 81 mg, 81 mg, Oral, Daily, TJ Marrero, 81 mg at 09/16/20 0843    atorvastatin (LIPITOR) tablet 40 mg, 40 mg, Oral, Daily With Dinner, TJ Marrero, 40 mg at 09/15/20 1623    cefepime (MAXIPIME) IVPB (premix) 2,000 mg 50 mL, 2,000 mg, Intravenous, Q12H, TJ Marrero, Stopped at 09/16/20 0800    chlorhexidine (PERIDEX) 0 12 % oral rinse 15 mL, 15 mL, Swish & Spit, Q12H [facility], TJ Marrero, 15 mL at 09/15/20 2040    gabapentin (NEURONTIN) capsule 100 mg, 100 mg, Oral, TID, TJ Marrero, 100 mg at 09/16/20 0844    insulin glargine (LANTUS) subcutaneous injection 5 Units 0 05 mL, 5 Units, Subcutaneous, HS, Shalini Salas PA-C    insulin lispro (HumaLOG) 100 units/mL subcutaneous injection 1-6 Units, 1-6 Units, Subcutaneous, HS, TJ Chao, 3 Units at 09/15/20 2100    insulin lispro (HumaLOG) 100 units/mL subcutaneous injection 2-12 Units, 2-12 Units, Subcutaneous, TID AC, 2 Units at 09/16/20 1128 **AND** Fingerstick Glucose (POCT), , , 4x Daily AC and at bedtime, TJ Wells    metoprolol tartrate (LOPRESSOR) tablet 75 mg, 75 mg, Oral, Q12H [address], Aicha Summers, CRNP, 75 mg at 09/16/20 0843    metroNIDAZOLE (FLAGYL) IVPB (premix) 500 mg 100 mL, 500 mg, Intravenous, Q8H, Simba Balderrama, JESSICANP, Stopped at 09/16/20 1200    nystatin (MYCOSTATIN) powder, , Topical, BID, Aydee Ramirez,     vancomycin (VANCOCIN) 1,500 mg in sodium chloride 0 9 % 250 mL IVPB, 20 mg/kg (Adjusted), Intravenous, Q24H, TJ Ruano, Last Rate: 166 7 mL/hr at 09/15/20 2041, 1,500 mg at 09/15/20 2041    vancomycin (VANCOCIN) oral solution 125 mg, 125 mg, Oral, Q6H [address], Eve Blum, CRNP, 125 mg at 09/16/20 1128    warfarin (COUMADIN) tablet 3 mg, 3 mg, Oral, Daily (warfarin), Savanna Beavers PA-C    Active Problems:    Type 2 diabetes mellitus with hyperglycemia (HCC)    Hypertension    Hyperlipidemia    CKD 3    Decubitus ulcer of sacral region, stage 4 (HCC)    [facility]    Paroxysmal atrial fibrillation ([facility] )    Stenosis of left carotid artery    Chronic diastolic heart failure ([facility] )    C  difficile colitis      Donald Baltazar MD

## 2020-09-16 NOTE — ASSESSMENT & PLAN NOTE
· New onset during her previous stay  · Continue home metoprolol  · Restart Coumadin - INR this am 1 6  · Currently AFib, rate controlled  · Goal to maintain AFib rate <110   · Patient received Xarelto and Coumadin as outpatient - restart coumadin - No Xarelto

## 2020-09-16 NOTE — ASSESSMENT & PLAN NOTE
Patient has positive for C diff  Also vancomycin oral 125 q 6 hours  Continue antibiotics per ID recommendations  Diarrhea has slowed considerably - no abdominal tenderness at this time

## 2020-09-16 NOTE — ASSESSMENT & PLAN NOTE
Lab Results   Component Value Date    HGBA1C 9 9 (H) 08/26/2020       Recent Labs     09/15/20  1624 09/15/20  2058 09/16/20  0721 09/16/20  1057   POCGLU 203* 234* 139 183*       Blood Sugar Average: Last 72 hrs:  (P) 799 [id number]     · Pt hypoglycemic on presentation  · Continue routine blood sugar checks  · Restart home lantus at lower dose

## 2020-09-16 NOTE — ASSESSMENT & PLAN NOTE
Wt Readings from Last 3 Encounters:   09/16/20 91 kg (200 lb 9 9 oz)   09/10/20 80 7 kg (177 lb 14 6 oz)   08/25/20 73 kg (161 lb)       · Echo 08/27/2020 -- EF 65%, no acute abnormality, wall thickness mildly increased, changes consistent with concentric remodeling, grade 2 diastolic dysfunction  · Received crystalloid administration the setting of sepsis   · Strict I&O   · QD weights

## 2020-09-16 NOTE — PROGRESS NOTES
Transfer Note - ICU/Stepdown Transfer to [facility]/MS romie Alvarado 67 y o  female MRN: [id number]  [facility]   Unit/Bed#: -01 SDU Encounter: [id number]    Code Status: Level 1 - Full Code    Reason for ICU/Stepdown admission: AMS 2/2 hypoglycemia/sepsis    Active problems:     C  difficile colitis  Assessment & Plan  Patient has positive for C diff  Also vancomycin oral 125 q 6 hours  Continue antibiotics per ID recommendations  Diarrhea has slowed considerably - no abdominal tenderness at this time    Decubitus ulcer of sacral region, stage 4 ([address] )  Assessment & Plan  · Underwent debridement x2 and wound VAC placement with general surgery  · Wound VAC in place  · CT abdomen pelvis performed -- Decubitus ulcer overlying the inferior sacrum and coccyx and soft tissue inflammatory changes  Irregularity of the sacrum which may represent underlying osteomyelitis  Diffuse rectal wall thickening and perirectal inflammatory changes which may represent proctitis or secondary from the above process    · General surgery wound VAC was placed - wound does not look infected  · On previous admission - ID recommends consideration of diverting loop colostomy for wound healing    RORY (acute kidney injury) ([facility] [address] )  Assessment & Plan  Superimposed on CKD   Baseline creatinine appears to be 1 2-1 3  Creatinine rising - suspect 2/2 volume depletion  Will give albumin 5% 25g X 1 and assess UOP - will consider gentle fluids vs scheduled albumin  Will hold home torsemide for now    CKD 3  Assessment & Plan  · Currently at her baseline creatinine, which appears to be 1 2-1 3  · Avoid nephrotoxic agents  · Jimenez present for critical I&O   · Trend renal indices     Paroxysmal atrial fibrillation (HCC)  Assessment & Plan  · New onset during her previous stay  · Continue home metoprolol  · Restart Coumadin - INR this am 1 6  · Currently AFib, rate controlled  · Goal to maintain AFib rate <110   · Patient received Xarelto and Coumadin as outpatient - restart coumadin - No Xarelto      Type 2 diabetes mellitus with hyperglycemia [facility])  Assessment & Plan  Lab Results   Component Value Date    HGBA1C 9 9 (H) 08/26/2020       Recent Labs     09/15/20  1624 09/15/20  2058 09/16/20  0721 09/16/20  1057   POCGLU 203* 234* 139 183*       Blood Sugar Average: Last 72 hrs:  (P) 022 [id number]     · Pt hypoglycemic on presentation  · Continue routine blood sugar checks  · Restart home lantus at lower dose    Chronic diastolic heart failure (HCC)  Assessment & Plan  Wt Readings from Last 3 Encounters:   09/16/20 91 kg (200 lb 9 9 oz)   09/10/20 80 7 kg (177 lb 14 6 oz)   08/25/20 73 kg (161 lb)       · Echo 08/27/2020 -- EF 65%, no acute abnormality, wall thickness mildly increased, changes consistent with concentric remodeling, grade 2 diastolic dysfunction  · Received crystalloid administration the setting of sepsis   · Strict I&O   · QD weights    Stenosis of left carotid artery  Assessment & Plan  · Carotid Dopplers -- LICA >05%, VASQUEZ <18%      Hyperlipidemia  Assessment & Plan  · Continue home statin    Hypertension  Assessment & Plan  Continue Lopressor 75 mg q 12  Hold further diuretic therapy at this time- suspect pt is volume depleted      Consultants:   · ID  · Surgery      History of Present Illness/Summary of clinical course:     Dori Rosenbaum is a 67 y o  female with a past medical history of type 2 diabetes, hyper tension, hyper lipid, CKD 3, chronic diastolic heart failure, paroxysmal AFib, carotid stenosis presents to the emergency department via EMS from nursing home with a chief complaint of altered mental status  Notably, the patient had a very recent stay at [facility], where she was diagnosed with new onset AFib RVR, subsequently put up for possible CVA, and later found to be in septic shock with Gram-positive bacteremia secondary to sacral decubitus wound    Her sacral wound was surgically debrided x2, with wound VAC placement  She was discharged from [facility] on 09/10 2 nursing home  Earlier tonight at nursing home, she was noticed to have altered mental status and lethargy, her glucose was noted to be in the 30s and she was administered glucagon without improvement  On arrival to the emergency department, she was lethargic, hypotensive, hypoxic  She was administered Narcan x2 with improvement of mental status to baseline  She met septic shock parameters with hypotension x3, leukocytosis, hypothermia, tachycardia and was administered 30 mL/kg IBW crystaloid bolus  She is sleepy, but wakes to voice, and is oriented x3  She endorses no complaints, aside from sacral pain  Her Jimenez catheters exchanged the emergency department, and noted to have purulent, curd-like drainage  CT abdomen pelvis concerning for sacral osteomyelitis  She is admitted to critical care for IV fluid resuscitation, administration of IV antibiotics, warming via [employer]  Please refer to today's progress note for further clinical details  Recent or scheduled procedures: none    Outstanding/pending diagnostics: cultures pending       Mobilization Plan: PT/OT    Nutrition Plan: Constant carb 2, dental soft, thin liquids    Discharge Plan: Patient should be ready for discharge to SNF after stabilization of creatinine and antibiotics     Specific Diagnosis Plan:    Sepsis: Source (cultures): Cdiff colitis - sacral wound unlikely, Antibiotics:  Cefepime, vancomycin, flagyl, oral vancomycin  ID following  Spoke with Dr Lien Hernandez regarding transfer @ [phone] Patient accepted to their service      Marcia Manzo PA-C

## 2020-09-16 NOTE — ASSESSMENT & PLAN NOTE
· Underwent debridement x2 and wound VAC placement with general surgery  · Wound VAC in place  · CT abdomen pelvis performed -- Decubitus ulcer overlying the inferior sacrum and coccyx and soft tissue inflammatory changes  Irregularity of the sacrum which may represent underlying osteomyelitis  Diffuse rectal wall thickening and perirectal inflammatory changes which may represent proctitis or secondary from the above process    · General surgery wound VAC was placed - wound does not look infected  · On previous admission - ID recommends consideration of diverting loop colostomy for wound healing

## 2020-09-16 NOTE — ASSESSMENT & PLAN NOTE
Lab Results   Component Value Date    HGBA1C 9 9 (H) 08/26/2020       Recent Labs     09/15/20  0729 09/15/20  1113 09/15/20  1624 09/15/20  2058   POCGLU 194* 191* 203* 234*       Blood Sugar Average: Last 72 hrs:  (P) 151 8     · Currently NPO, SSI q 6 hours for glucose 140-180

## 2020-09-16 NOTE — ASSESSMENT & PLAN NOTE
Superimposed on CKD   Baseline creatinine appears to be 1 2-1 3  Creatinine rising - suspect 2/2 volume depletion  Will give albumin 5% 25g X 1 and assess UOP - will consider gentle fluids vs scheduled albumin  Will hold home torsemide for now

## 2020-09-17 ENCOUNTER — PATIENT OUTREACH (OUTPATIENT)
Dept: CASE MANAGEMENT | Facility: OTHER | Age: 75
End: 2020-09-17

## 2020-09-17 ENCOUNTER — APPOINTMENT (INPATIENT)
Dept: RADIOLOGY | Facility: HOSPITAL | Age: 75
DRG: 871 | End: 2020-09-17
Payer: MEDICARE

## 2020-09-17 PROBLEM — A40.9 SEPSIS DUE TO STREPTOCOCCUS SPECIES (HCC): Status: ACTIVE | Noted: 2020-09-17

## 2020-09-17 LAB
ABO GROUP BLD: NORMAL
ANION GAP SERPL CALCULATED.3IONS-SCNC: 8 MMOL/L (ref 4–13)
BACTERIA UR CULT: ABNORMAL
BACTERIA UR QL AUTO: ABNORMAL /HPF
BASOPHILS # BLD AUTO: 0.08 THOUSANDS/ΜL (ref 0–0.1)
BASOPHILS NFR BLD AUTO: 1 % (ref 0–1)
BILIRUB UR QL STRIP: NEGATIVE
BLD GP AB SCN SERPL QL: NEGATIVE
BUN SERPL-MCNC: 52 MG/DL (ref 5–25)
C3 SERPL-MCNC: 63 MG/DL (ref 90–180)
C4 SERPL-MCNC: 22 MG/DL (ref 10–40)
CALCIUM SERPL-MCNC: 8.2 MG/DL (ref 8.3–10.1)
CHLORIDE SERPL-SCNC: 103 MMOL/L (ref 100–108)
CLARITY UR: ABNORMAL
CO2 SERPL-SCNC: 25 MMOL/L (ref 21–32)
COARSE GRAN CASTS URNS QL MICRO: ABNORMAL /LPF
COLOR UR: YELLOW
CREAT SERPL-MCNC: 2.08 MG/DL (ref 0.6–1.3)
CREAT UR-MCNC: 94.8 MG/DL
EOSINOPHIL # BLD AUTO: 0.38 THOUSAND/ΜL (ref 0–0.61)
EOSINOPHIL NFR BLD AUTO: 4 % (ref 0–6)
ERYTHROCYTE [DISTWIDTH] IN BLOOD BY AUTOMATED COUNT: 14.7 % (ref 11.6–15.1)
GFR SERPL CREATININE-BSD FRML MDRD: 23 ML/MIN/1.73SQ M
GLUCOSE SERPL-MCNC: 147 MG/DL (ref 65–140)
GLUCOSE SERPL-MCNC: 157 MG/DL (ref 65–140)
GLUCOSE SERPL-MCNC: 167 MG/DL (ref 65–140)
GLUCOSE SERPL-MCNC: 180 MG/DL (ref 65–140)
GLUCOSE SERPL-MCNC: 184 MG/DL (ref 65–140)
GLUCOSE UR STRIP-MCNC: NEGATIVE MG/DL
HCT VFR BLD AUTO: 22.9 % (ref 34.8–46.1)
HGB BLD-MCNC: 7 G/DL (ref 11.5–15.4)
HGB UR QL STRIP.AUTO: ABNORMAL
HYALINE CASTS #/AREA URNS LPF: ABNORMAL /LPF
IMM GRANULOCYTES # BLD AUTO: 0.05 THOUSAND/UL (ref 0–0.2)
IMM GRANULOCYTES NFR BLD AUTO: 1 % (ref 0–2)
INR PPP: 2.01 (ref 0.84–1.19)
KETONES UR STRIP-MCNC: ABNORMAL MG/DL
LEUKOCYTE ESTERASE UR QL STRIP: ABNORMAL
LYMPHOCYTES # BLD AUTO: 1.86 THOUSANDS/ΜL (ref 0.6–4.47)
LYMPHOCYTES NFR BLD AUTO: 22 % (ref 14–44)
MAGNESIUM SERPL-MCNC: 2.7 MG/DL (ref 1.6–2.6)
MCH RBC QN AUTO: 28.7 PG (ref 26.8–34.3)
MCHC RBC AUTO-ENTMCNC: 30.6 G/DL (ref 31.4–37.4)
MCV RBC AUTO: 94 FL (ref 82–98)
MONOCYTES # BLD AUTO: 0.59 THOUSAND/ΜL (ref 0.17–1.22)
MONOCYTES NFR BLD AUTO: 7 % (ref 4–12)
NEUTROPHILS # BLD AUTO: 5.68 THOUSANDS/ΜL (ref 1.85–7.62)
NEUTS SEG NFR BLD AUTO: 65 % (ref 43–75)
NITRITE UR QL STRIP: NEGATIVE
NON-SQ EPI CELLS URNS QL MICRO: ABNORMAL /HPF
NRBC BLD AUTO-RTO: 0 /100 WBCS
OTHER STN SPEC: ABNORMAL
PH UR STRIP.AUTO: 5 [PH]
PLATELET # BLD AUTO: 300 THOUSANDS/UL (ref 149–390)
PMV BLD AUTO: 10.9 FL (ref 8.9–12.7)
POTASSIUM SERPL-SCNC: 4.2 MMOL/L (ref 3.5–5.3)
PROT UR STRIP-MCNC: ABNORMAL MG/DL
PROT UR-MCNC: 228 MG/DL
PROT/CREAT UR: 2.41 MG/G{CREAT} (ref 0–0.1)
PROTHROMBIN TIME: 22.7 SECONDS (ref 11.6–14.5)
RBC # BLD AUTO: 2.44 MILLION/UL (ref 3.81–5.12)
RBC #/AREA URNS AUTO: ABNORMAL /HPF
RH BLD: POSITIVE
SODIUM SERPL-SCNC: 136 MMOL/L (ref 136–145)
SP GR UR STRIP.AUTO: 1.02 (ref 1–1.03)
SPECIMEN EXPIRATION DATE: NORMAL
UROBILINOGEN UR QL STRIP.AUTO: 0.2 E.U./DL
WBC # BLD AUTO: 8.64 THOUSAND/UL (ref 4.31–10.16)
WBC #/AREA URNS AUTO: ABNORMAL /HPF

## 2020-09-17 PROCEDURE — 83735 ASSAY OF MAGNESIUM: CPT | Performed by: NURSE PRACTITIONER

## 2020-09-17 PROCEDURE — 97530 THERAPEUTIC ACTIVITIES: CPT

## 2020-09-17 PROCEDURE — 99222 1ST HOSP IP/OBS MODERATE 55: CPT | Performed by: INTERNAL MEDICINE

## 2020-09-17 PROCEDURE — 85025 COMPLETE CBC W/AUTO DIFF WBC: CPT | Performed by: NURSE PRACTITIONER

## 2020-09-17 PROCEDURE — 80048 BASIC METABOLIC PNL TOTAL CA: CPT | Performed by: NURSE PRACTITIONER

## 2020-09-17 PROCEDURE — 30233N1 TRANSFUSION OF NONAUTOLOGOUS RED BLOOD CELLS INTO PERIPHERAL VEIN, PERCUTANEOUS APPROACH: ICD-10-PCS | Performed by: INTERNAL MEDICINE

## 2020-09-17 PROCEDURE — 81001 URINALYSIS AUTO W/SCOPE: CPT | Performed by: INTERNAL MEDICINE

## 2020-09-17 PROCEDURE — 71045 X-RAY EXAM CHEST 1 VIEW: CPT

## 2020-09-17 PROCEDURE — 85610 PROTHROMBIN TIME: CPT | Performed by: PHYSICIAN ASSISTANT

## 2020-09-17 PROCEDURE — 87205 SMEAR GRAM STAIN: CPT | Performed by: INTERNAL MEDICINE

## 2020-09-17 PROCEDURE — 86160 COMPLEMENT ANTIGEN: CPT | Performed by: INTERNAL MEDICINE

## 2020-09-17 PROCEDURE — 82570 ASSAY OF URINE CREATININE: CPT | Performed by: INTERNAL MEDICINE

## 2020-09-17 PROCEDURE — 84156 ASSAY OF PROTEIN URINE: CPT | Performed by: INTERNAL MEDICINE

## 2020-09-17 PROCEDURE — 82948 REAGENT STRIP/BLOOD GLUCOSE: CPT

## 2020-09-17 PROCEDURE — P9016 RBC LEUKOCYTES REDUCED: HCPCS

## 2020-09-17 PROCEDURE — 86900 BLOOD TYPING SEROLOGIC ABO: CPT | Performed by: INTERNAL MEDICINE

## 2020-09-17 PROCEDURE — 86850 RBC ANTIBODY SCREEN: CPT | Performed by: INTERNAL MEDICINE

## 2020-09-17 PROCEDURE — 86901 BLOOD TYPING SEROLOGIC RH(D): CPT | Performed by: INTERNAL MEDICINE

## 2020-09-17 PROCEDURE — 99232 SBSQ HOSP IP/OBS MODERATE 35: CPT | Performed by: INTERNAL MEDICINE

## 2020-09-17 PROCEDURE — 86920 COMPATIBILITY TEST SPIN: CPT

## 2020-09-17 RX ORDER — FUROSEMIDE 10 MG/ML
40 INJECTION INTRAMUSCULAR; INTRAVENOUS ONCE AS NEEDED
Status: COMPLETED | OUTPATIENT
Start: 2020-09-17 | End: 2020-09-17

## 2020-09-17 RX ORDER — SODIUM CHLORIDE, SODIUM LACTATE, POTASSIUM CHLORIDE, CALCIUM CHLORIDE 600; 310; 30; 20 MG/100ML; MG/100ML; MG/100ML; MG/100ML
50 INJECTION, SOLUTION INTRAVENOUS ONCE
Status: COMPLETED | OUTPATIENT
Start: 2020-09-17 | End: 2020-09-17

## 2020-09-17 RX ADMIN — INSULIN GLARGINE 5 UNITS: 100 INJECTION, SOLUTION SUBCUTANEOUS at 21:36

## 2020-09-17 RX ADMIN — Medication 125 MG: at 05:37

## 2020-09-17 RX ADMIN — METOPROLOL TARTRATE 75 MG: 50 TABLET, FILM COATED ORAL at 08:35

## 2020-09-17 RX ADMIN — ASPIRIN 81 MG: 81 TABLET, COATED ORAL at 08:35

## 2020-09-17 RX ADMIN — GABAPENTIN 100 MG: 100 CAPSULE ORAL at 16:13

## 2020-09-17 RX ADMIN — GABAPENTIN 100 MG: 100 CAPSULE ORAL at 08:35

## 2020-09-17 RX ADMIN — INSULIN LISPRO 2 UNITS: 100 INJECTION, SOLUTION INTRAVENOUS; SUBCUTANEOUS at 11:36

## 2020-09-17 RX ADMIN — FUROSEMIDE 40 MG: 10 INJECTION, SOLUTION INTRAMUSCULAR; INTRAVENOUS at 19:16

## 2020-09-17 RX ADMIN — INSULIN LISPRO 1 UNITS: 100 INJECTION, SOLUTION INTRAVENOUS; SUBCUTANEOUS at 21:21

## 2020-09-17 RX ADMIN — CHLORHEXIDINE GLUCONATE 0.12% ORAL RINSE 15 ML: 1.2 LIQUID ORAL at 08:36

## 2020-09-17 RX ADMIN — WARFARIN SODIUM 3 MG: 3 TABLET ORAL at 17:01

## 2020-09-17 RX ADMIN — SODIUM CHLORIDE, SODIUM LACTATE, POTASSIUM CHLORIDE, AND CALCIUM CHLORIDE 50 ML/HR: .6; .31; .03; .02 INJECTION, SOLUTION INTRAVENOUS at 08:38

## 2020-09-17 RX ADMIN — Medication 125 MG: at 17:01

## 2020-09-17 RX ADMIN — NYSTATIN: 100000 POWDER TOPICAL at 17:03

## 2020-09-17 RX ADMIN — GABAPENTIN 100 MG: 100 CAPSULE ORAL at 21:24

## 2020-09-17 RX ADMIN — Medication 125 MG: at 11:38

## 2020-09-17 RX ADMIN — CHLORHEXIDINE GLUCONATE 0.12% ORAL RINSE 15 ML: 1.2 LIQUID ORAL at 21:24

## 2020-09-17 RX ADMIN — Medication 125 MG: at 00:00

## 2020-09-17 RX ADMIN — NYSTATIN: 100000 POWDER TOPICAL at 08:36

## 2020-09-17 RX ADMIN — ATORVASTATIN CALCIUM 40 MG: 40 TABLET, FILM COATED ORAL at 16:13

## 2020-09-17 RX ADMIN — INSULIN LISPRO 2 UNITS: 100 INJECTION, SOLUTION INTRAVENOUS; SUBCUTANEOUS at 16:12

## 2020-09-17 RX ADMIN — METOPROLOL TARTRATE 75 MG: 50 TABLET, FILM COATED ORAL at 21:23

## 2020-09-17 NOTE — ASSESSMENT & PLAN NOTE
Acute on CKD   Baseline creatinine appears to be 1 2-1 3, now 2 08  This is likely component of prerenal given C diff causing diarrhea versus ATN even septic picture versus medication use as patient received IV vancomycin    Plan:  Being worked up, follow-up urinalysis, urine protein to creatinine ratio, complement levels, urine eosinophil   Hold nephrotoxic drugs  I initially started on 75 cc of Ringer's lactate  Discontinue Ringer's lactate now, transfuse with blood since hemoglobin is 7     Trend I/O

## 2020-09-17 NOTE — CONSULTS
Consultation - Nephrology   Antonio Morse 67 y o  female MRN: [id number]  Unit/Bed#: -01 SDU Encounter: [id number]      [facility]    1  Acute Kidney Injury on stage III chronic kidney disease in the setting of C diff colitis and sepsis with a baseline creatinine of 1 14 on presentation now elevated with proteinuria  o Urine:  Urinalysis from September 14th shows a specific gravity 1 015 small leukocytes moderate blood 300+ protein  o Renal imaging:  CT scan from September 14th with 100 mL of Omnipaque shows unremarkable kidneys with no hydronephrosis  o CKD likely from some underlying  o Etiology:  Multifactorial could be related to contrast induced nephropathy in the setting of stage 3 chronic kidney disease at baseline with rising creatinine after contrast administration, pre renal azotemia with a transitioned to ATN? Underlying GN (post infectious), cardiomyopathy related  o Agree with gentle IV fluids for now monitor as she has bilateral pleural effusions and diastolic heart failure plan 1-2 L of oxygen and if worsening shortness of breath give 40 mg of Lasix x1  o Recheck urinalysis, urine protein to creatinine ratio, complement levels, patient has been on oral vancomycin, no other changes to her medications will check a urine eosinophil for completeness  o Repeat chest x-ray now  o Plan:  Seems to be plateauing will continue gentle IV fluid hydration for now workup as above repeat chest x-ray and make further recommendation  2  Electrolytes:  o Hypokalemia-potassium was as low as 3 4 but this seems to have improved will monitor in the setting of diarrhea which is improving  3  Acid/Base:  o No anion gap and bicarbonate stable  4  BP/HR:  o Atrial fibrillation-rate controlled and on anticoagulation  o Chronic diastolic heart failure-currently on Lopressor 75 mg every 12 hours  5   Volume Status  o Pleural effusions bilaterally  o Some bilateral edema will monitor  o If worsening respiratory status discontinue IV fluids and give 40 mg of Lasix IV  6  Anemia  o Hemoglobin trending downward now 7 from 7 7 in the setting of critical illness  o Transfuse as needed  o If transfusion given would give Lasix 40 mg IV after  7  MBD  o Will check parameters as an outpatient  8  Clinical Course/Health Maintanance/Risk Reduction  o C diff colitis on oral vancomycin      HISTORY OF PRESENT ILLNESS:  Requesting Physician: Kimberlee Beatty MD  Reason for Consult:  Acute kidney injury    Lizeth Benavides is a 67y o  year old female who was admitted to [address] [place] [address] after presenting with Lethargy and Change in Mental Status  A renal consultation is requested today for assistance in the management of acute kidney injury      Patient has a history of type 2 diabetes mellitus, hypertension, stage IV sacral decubitus ulcer presented with hypoglycemia and a change in mental status sepsis workup revealed that the patient had C diff colitis patient's baseline creatinine was around 1 and estimated GFR was between 39 and 60, she did receive a CT with contrast on presentation, her creatinine has slowly been increasing now up to 2, patient also had profuse diarrhea no episodes of hypotension, no chest pain or shortness of breath mean arterial pressures been okay her mental status has improved somewhat, she does have baseline bilateral pleural effusions    Currently remains weak, she says her diarrhea has improved,  Otherwise offers no acute complaints    PAST MEDICAL HISTORY:  Past Medical History:   Diagnosis Date    Acute renal failure (ARF) ([address] )     Acute respiratory failure with hypoxia ([address] )     Atrial fibrillation ([address] )     CHF (congestive heart failure) ([address] )     Chronic kidney disease     Diabetes mellitus ([address] )     type 2    Hyperlipidemia     Hypertension     Stroke ([facility] [address] )        PAST SURGICAL HISTORY:  Past Surgical History:   Procedure Laterality Date    BREAST SURGERY Left     lumpectomy benign    CATARACT EXTRACTION Bilateral 2017    CATARACT EXTRACTION W/ INTRAOCULAR LENS IMPLANT Left 12/11/2017    Procedure: EXTRACTION EXTRACAPSULAR CATARACT PHACO INTRAOCULAR LENS (IOL); Surgeon: Consuella Brunner, MD;  Location: [facility] MAIN OR;  Service: Ophthalmology    WI OPEN RX FEMUR FX+INTRAMED RAYMOND Right 5/21/2020    Procedure: INSERTION OF SHORT TROCHANTERIC FEMORAL NAIL;  Surgeon: Romana Kelley MD;  [facility] Main [facility];  Service: Orthopedics    [address] HUMERAL&GLENOID COMPNT Right 9/10/2018    Procedure: RIGHT REVERSE TOTAL SHOULDER ARTHROPLASTY;  Surgeon: Romana Kelley MD;  [facility] Main [facility];  Service: Orthopedics    WI XCAPSL CTRC RMVL INSJ IO LENS PROSTH W/O ECP Right 10/23/2017    Procedure: EXTRACTION EXTRACAPSULAR CATARACT PHACO INTRAOCULAR LENS (IOL); Surgeon: Consuella Brunner, MD;  Location: [facility] MAIN OR;  Service: Ophthalmology    WOUND DEBRIDEMENT N/A 8/26/2020    Procedure: EXCISIONAL DEBRIDEMENT OF SACRAL PRESSURE WOUND, WASHOUT;;  Surgeon: Benoit Cedeño MD;  [facility] MAIN OR;  Service: General    WOUND DEBRIDEMENT N/A 8/28/2020    Procedure: BUTTOCKS DEBRIDEMENT WOUND AND DRESSING CHANGE ([facility] OUT);   Surgeon: Helen Espinal MD;  Location: [facility] MAIN OR;  Service: General       ALLERGIES:  No Known Allergies    SOCIAL HISTORY:  Social History     Substance and Sexual Activity   Alcohol Use No    Comment: quit 8/17     Social History     Substance and Sexual Activity   Drug Use No     Social History     Tobacco Use   Smoking Status Never Smoker   Smokeless Tobacco Never Used       FAMILY HISTORY:  Family History   Problem Relation Age of Onset    Diabetes Mother     Varicose Veins Mother     Stroke Father     Arthritis Brother     Diabetes Daughter     No Known Problems Brother        MEDICATIONS:    Current Facility-Administered Medications:     aspirin (ECOTRIN LOW STRENGTH) EC tablet 81 mg, 81 mg, Oral, Daily, Arzella Fus, CRNP, 81 mg at 09/17/20 0835    atorvastatin (LIPITOR) tablet 40 mg, 40 mg, Oral, Daily With Silas Shahana, TJ, 40 mg at 09/16/20 1646    chlorhexidine (PERIDEX) 0 12 % oral rinse 15 mL, 15 mL, Swish & Spit, Q12H [facility], Tahmina NassarTJ, 15 mL at 09/17/20 3840    gabapentin (NEURONTIN) capsule 100 mg, 100 mg, Oral, TID, [facility], TJ, 100 mg at 09/17/20 0835    insulin glargine (LANTUS) subcutaneous injection 5 Units 0 05 mL, 5 Units, Subcutaneous, HS, Shalini Salas PA-C, 5 Units at 09/16/20 2133    insulin lispro (HumaLOG) 100 units/mL subcutaneous injection 1-6 Units, 1-6 Units, Subcutaneous, HS, TJ Carolina, 1 Units at 09/16/20 2133    insulin lispro (HumaLOG) 100 units/mL subcutaneous injection 2-12 Units, 2-12 Units, Subcutaneous, TID AC, 2 Units at 09/17/20 1136 **AND** Fingerstick Glucose (POCT), , , 4x Daily AC and at bedtime, TJ Carolina    metoprolol tartrate (LOPRESSOR) tablet 75 mg, 75 mg, Oral, Q12H [facility], Aicha PadillaTJ correa, 75 mg at 09/17/20 6395    nystatin (MYCOSTATIN) powder, , Topical, BID, Sulma Lam,     vancomycin [facility]) oral solution 125 mg, 125 mg, Oral, Q6H [facility], Aicha PadillaTJ correa, 125 mg at 09/17/20 1138    warfarin (COUMADIN) tablet 3 mg, 3 mg, Oral, Daily (warfarin), Austin Mendes PA-C, 3 mg at 09/16/20 1746    REVIEW OF SYSTEMS:  All the systems were reviewed and were negative except as documented on the HPI        PHYSICAL EXAM:  Current Weight: Weight - Scale: 86 kg (189 lb 9 5 oz)  First Weight: Weight - Scale: 84 kg (185 lb 3 oz)  Vitals:    09/17/20 0000 09/17/20 0401 09/17/20 0537 09/17/20 0814   BP: 129/60 119/68  117/58   BP Location: Left arm Left arm  Left arm   Pulse:  87  81   Resp: 16 16  20   Temp: 98 6 °F (37 °C) 98 4 °F (36 9 °C)  97 9 °F (36 6 °C)   TempSrc: Bladder Bladder  Oral   SpO2:  94%  95%   Weight:   86 kg (189 lb 9 5 oz)    Height:           Intake/Output Summary (Last 24 hours) at 9/17/2020 1152  Last data filed at 9/17/2020 0814  Gross per 24 hour   Intake 1230 ml   Output 605 ml   Net 625 ml     General: conscious, cooperative, in no acute distress  Eyes: conjunctivae pink, anicteric sclerae  ENT: lips and mucous membranes moist  Neck: supple, no JVD  Chest: clear breath sounds bilateral, no crackles, ronchus or wheezings  CVS:  No tachycardia  Abdomen: soft, non-tender, non-distended, normoactive bowel sounds  Extremities:  Positive edema bilaterally  Skin: no rash  Neuro: awake, alert, oriented  Psych:  Pleasant affect but weak in response      Invasive Devices:   Urethral Catheter Temperature probe 14 Fr  (Active)   Amt returned on insertion(mL) 5 mL 09/14/20 0019   Reasons to continue Urinary Catheter  Stage III/IV sacral ulcers or open perineal wounds in incontinent patients 09/16/20 2000   Goal for Removal N/A- chronic trujillo 09/16/20 2000   Site Assessment Clean;Skin intact; Patent 09/16/20 2000   Collection Container Standard drainage bag 09/16/20 2000   Securement Method Securing device (Describe) 09/16/20 2000   Output (mL) 60 mL 09/17/20 0814     Lab Results:   Results from last 7 days   Lab Units 09/17/20  0527 09/16/20  1457 09/16/20  1456 09/16/20  0344 09/15/20  0455 09/14/20  1833 09/14/20  0517 09/14/20  0059 09/14/20  0013   WBC Thousand/uL 8 64  --   --  11 00* 11 53* 14 88* 10 72*  --  13 70*   HEMOGLOBIN g/dL 7 0*  --   --  7 7* 7 4* 7 9* 7 8*  --  8 8*   HEMATOCRIT % 22 9*  --   --  24 8* 24 0* 26 4* 25 2*  --  29 1*   PLATELETS Thousands/uL 300  --   --  329 302 337 261  --  340   POTASSIUM mmol/L 4 2  --   --  3 4* 3 6 3 7 3 6  --  3 6   CHLORIDE mmol/L 103  --   --  103 103 101 104  --  101   CO2 mmol/L 25  --   --  26 27 28 28  --  37*   BUN mg/dL 52*  --   --  44* 39* 36* 35*  --  36*   CREATININE mg/dL 2 08*  --   --  2 01* 1 56* 1 36* 1 14  --  1 29   CALCIUM mg/dL 8 2*  --   --  7 9* 7 9* 7 8* 7 7*  --  8 4   MAGNESIUM mg/dL 2 7*  --   --  2 0 2 1  --  1 6  --   --    PHOSPHORUS mg/dL  --   --   --  3 5  --   --  3 7  --   --    ALK PHOS U/L --   --   --   --   --  184* 187*  --  131*   ALT U/L  --   --   --   --   --  31 37  --  16   AST U/L  --   --   --   --   --  40 105*  --  17   BLOOD CULTURE   --  Received in Microbiology Lab  Culture in Progress  Received in Microbiology Lab  Culture in Progress  --   --   --   --   --  Staphylococcus coagulase negative*  No Growth at 72 hrs  NITRITE UA   --   --   --   --   --   --   --  Negative  --    BLOOD UA   --   --   --   --   --   --   --  Moderate*  --    LEUKOCYTES UA   --   --   --   --   --   --   --  Small*  --        Other Studies:   Decubitus ulcer overlying the inferior sacrum and coccyx and soft tissue inflammatory changes  Irregularity of the sacrum which may represent underlying osteomyelitis   Diffuse rectal wall thickening and perirectal inflammatory changes which may represent proctitis or secondary from the above process  Moderate bilateral pleural effusions

## 2020-09-17 NOTE — ASSESSMENT & PLAN NOTE
Wt Readings from Last 3 Encounters:   09/17/20 86 kg (189 lb 9 5 oz)   09/10/20 80 7 kg (177 lb 14 6 oz)   08/25/20 73 kg (161 lb)         · Echo 08/27/2020 -- EF 65%, no acute abnormality, wall thickness mildly increased, changes consistent with concentric remodeling, grade 2 diastolic dysfunction  · She is net positive > 4000 CC      Plan  · Strict I&O   · QD weights  · Will diurese with 40 mg of Lasix

## 2020-09-17 NOTE — PLAN OF CARE
Problem: Potential for Falls  Goal: Patient will remain free of falls  Description: INTERVENTIONS:  - Assess patient frequently for physical needs  -  Identify cognitive and physical deficits and behaviors that affect risk of falls  -  [place] fall precautions as indicated by assessment   - Educate patient/family on patient safety including physical limitations  - Instruct patient to call for assistance with activity based on assessment  - Modify environment to reduce risk of injury  - Consider OT/PT consult to assist with strengthening/mobility  Outcome: Progressing     Problem: Prexisting or High Potential for Compromised Skin Integrity  Goal: Skin integrity is maintained or improved  Description: INTERVENTIONS:  - Identify patients at risk for skin breakdown  - Assess and monitor skin integrity  - Assess and monitor nutrition and hydration status  - Monitor labs   - Assess for incontinence   - Turn and reposition patient  - Assist with mobility/ambulation  - Relieve pressure over bony prominences  - Avoid friction and shearing  - Provide appropriate hygiene as needed including keeping skin clean and dry  - Evaluate need for skin moisturizer/barrier cream  - Collaborate with interdisciplinary team   - Patient/family teaching  - Consider wound care consult   Outcome: Progressing     Problem: Nutrition/Hydration-ADULT  Goal: Nutrient/Hydration intake appropriate for improving, restoring or maintaining nutritional needs  Description: Monitor and assess patient's nutrition/hydration status for malnutrition  Collaborate with interdisciplinary team and initiate plan and interventions as ordered  Monitor patient's weight and dietary intake as ordered or per policy  Utilize nutrition screening tool and intervene as necessary  Determine patient's food preferences and provide high-protein, high-caloric foods as appropriate       INTERVENTIONS:  - Monitor oral intake, urinary output, labs, and treatment plans  - Assess nutrition and hydration status and recommend course of action  - Evaluate amount of meals eaten  - Assist patient with eating if necessary   - Allow adequate time for meals  - Recommend/ encourage appropriate diets, oral nutritional supplements, and vitamin/mineral supplements  - Order, calculate, and assess calorie counts as needed  - Recommend, monitor, and adjust tube feedings and TPN/PPN based on assessed needs  - Assess need for intravenous fluids  - Provide specific nutrition/hydration education as appropriate  - Include patient/family/caregiver in decisions related to nutrition  Outcome: Progressing     Problem: NEUROSENSORY - ADULT  Goal: Achieves stable or improved neurological status  Description: INTERVENTIONS  - Monitor and report changes in neurological status  - Monitor vital signs such as temperature, blood pressure, glucose, and any other labs ordered   - Initiate measures to prevent increased intracranial pressure  - Monitor for seizure activity and implement precautions if appropriate      Outcome: Progressing     Problem: CARDIOVASCULAR - ADULT  Goal: Maintains optimal cardiac output and hemodynamic stability  Description: INTERVENTIONS:  - Monitor I/O, vital signs and rhythm  - Monitor for S/S and trends of decreased cardiac output  - Administer and titrate ordered vasoactive medications to optimize hemodynamic stability  - Assess quality of pulses, skin color and temperature  - Assess for signs of decreased coronary artery perfusion  - Instruct patient to report change in severity of symptoms  Outcome: Progressing  Goal: Absence of cardiac dysrhythmias or at baseline rhythm  Description: INTERVENTIONS:  - Continuous cardiac monitoring, vital signs, obtain 12 lead EKG if ordered  - Administer antiarrhythmic and heart rate control medications as ordered  - Monitor electrolytes and administer replacement therapy as ordered  Outcome: Progressing     Problem: RESPIRATORY - ADULT  Goal: Achieves optimal ventilation and oxygenation  Description: INTERVENTIONS:  - Assess for changes in respiratory status  - Assess for changes in mentation and behavior  - Position to facilitate oxygenation and minimize respiratory effort  - Oxygen administered by appropriate delivery if ordered  - Initiate smoking cessation education as indicated  - Encourage broncho-pulmonary hygiene including cough, deep breathe, Incentive Spirometry  - Assess the need for suctioning and aspirate as needed  - Assess and instruct to report SOB or any respiratory difficulty  - Respiratory Therapy support as indicated  Outcome: Progressing     Problem: GENITOURINARY - ADULT  Goal: Maintains or returns to baseline urinary function  Description: INTERVENTIONS:  - Assess urinary function  - Encourage oral fluids to ensure adequate hydration if ordered  - Administer IV fluids as ordered to ensure adequate hydration  - Administer ordered medications as needed  - Offer frequent toileting  - Follow urinary retention protocol if ordered  Outcome: Progressing  Goal: Absence of urinary retention  Description: INTERVENTIONS:  - Assess patients ability to void and empty bladder  - Monitor I/O  - Bladder scan as needed  - Discuss with physician/AP medications to alleviate retention as needed  - Discuss catheterization for long term situations as appropriate  Outcome: Progressing  Goal: Urinary catheter remains patent  Description: INTERVENTIONS:  - Assess patency of urinary catheter  - If patient has a chronic trujillo, consider changing catheter if non-functioning  - Follow guidelines for intermittent irrigation of non-functioning urinary catheter  Outcome: Progressing     Problem: SKIN/TISSUE INTEGRITY - ADULT  Goal: Skin integrity remains intact  Description: INTERVENTIONS  - Identify patients at risk for skin breakdown  - Assess and monitor skin integrity  - Assess and monitor nutrition and hydration status  - Monitor labs (i e  albumin)  - Assess for incontinence   - Turn and reposition patient  - Assist with mobility/ambulation  - Relieve pressure over bony prominences  - Avoid friction and shearing  - Provide appropriate hygiene as needed including keeping skin clean and dry  - Evaluate need for skin moisturizer/barrier cream  - Collaborate with interdisciplinary team (i e  Nutrition, Rehabilitation, etc )   - Patient/family teaching  Outcome: Progressing  Goal: Incision(s), wounds(s) or drain site(s) healing without S/S of infection  Description: INTERVENTIONS  - Assess and document risk factors for skin impairment   - Assess and document dressing, incision, wound bed, drain sites and surrounding tissue  - Consider nutrition services referral as needed  - Oral mucous membranes remain intact  - Provide patient/ family education  Outcome: Progressing     Problem: MUSCULOSKELETAL - ADULT  Goal: Maintain or return mobility to safest level of function  Description: INTERVENTIONS:  - Assess patient's ability to carry out ADLs; assess patient's baseline for ADL function and identify physical deficits which impact ability to perform ADLs (bathing, care of mouth/teeth, toileting, grooming, dressing, etc )  - Assess/evaluate cause of self-care deficits   - Assess range of motion  - Assess patient's mobility  - Assess patient's need for assistive devices and provide as appropriate  - Encourage maximum independence but intervene and supervise when necessary  - Involve family in performance of ADLs  - Assess for home care needs following discharge   - Consider OT consult to assist with ADL evaluation and planning for discharge  - Provide patient education as appropriate  Outcome: Progressing     Problem: PAIN - ADULT  Goal: Verbalizes/displays adequate comfort level or baseline comfort level  Description: Interventions:  - Encourage patient to monitor pain and request assistance  - Assess pain using appropriate pain scale  - Administer analgesics based on type and severity of pain and evaluate response  - Implement non-pharmacological measures as appropriate and evaluate response  - Consider cultural and social influences on pain and pain management  - Notify physician/advanced practitioner if interventions unsuccessful or patient reports new pain  Outcome: Progressing     Problem: INFECTION - ADULT  Goal: Absence or prevention of progression during hospitalization  Description: INTERVENTIONS:  - Assess and monitor for signs and symptoms of infection  - Monitor lab/diagnostic results  - Monitor all insertion sites, i e  indwelling lines, tubes, and drains  - Monitor endotracheal if appropriate and nasal secretions for changes in amount and color  - [place] appropriate cooling/warming therapies per order  - Administer medications as ordered  - Instruct and encourage patient and family to use good hand hygiene technique  - Identify and instruct in appropriate isolation precautions for identified infection/condition  Outcome: Progressing  Goal: Absence of fever/infection during neutropenic period  Description: INTERVENTIONS:  - Monitor WBC    Outcome: Progressing     Problem: SAFETY ADULT  Goal: Patient will remain free of falls  Description: INTERVENTIONS:  - Assess patient frequently for physical needs  -  Identify cognitive and physical deficits and behaviors that affect risk of falls    -  [place] fall precautions as indicated by assessment   - Educate patient/family on patient safety including physical limitations  - Instruct patient to call for assistance with activity based on assessment  - Modify environment to reduce risk of injury  - Consider OT/PT consult to assist with strengthening/mobility  Outcome: Progressing  Goal: Maintain or return to baseline ADL function  Description: INTERVENTIONS:  -  Assess patient's ability to carry out ADLs; assess patient's baseline for ADL function and identify physical deficits which impact ability to perform ADLs (bathing, care of mouth/teeth, toileting, grooming, dressing, etc )  - Assess/evaluate cause of self-care deficits   - Assess range of motion  - Assess patient's mobility; develop plan if impaired  - Assess patient's need for assistive devices and provide as appropriate  - Encourage maximum independence but intervene and supervise when necessary  - Involve family in performance of ADLs  - Assess for home care needs following discharge   - Consider OT consult to assist with ADL evaluation and planning for discharge  - Provide patient education as appropriate  Outcome: Progressing  Goal: Maintain or return mobility status to optimal level  Description: INTERVENTIONS:  - Assess patient's baseline mobility status (ambulation, transfers, stairs, etc )    - Identify cognitive and physical deficits and behaviors that affect mobility  - Identify mobility aids required to assist with transfers and/or ambulation (gait belt, sit-to-stand, lift, walker, cane, etc )  - [place] fall precautions as indicated by assessment  - Record patient progress and toleration of activity level on Mobility SBAR; progress patient to next Phase/Stage  - Instruct patient to call for assistance with activity based on assessment  - Consider rehabilitation consult to assist with strengthening/weightbearing, etc   Outcome: Progressing     Problem: DISCHARGE PLANNING  Goal: Discharge to home or other facility with appropriate resources  Description: INTERVENTIONS:  - Identify barriers to discharge w/patient and caregiver  - Arrange for needed discharge resources and transportation as appropriate  - Identify discharge learning needs (meds, wound care, etc )  - Arrange for interpretive services to assist at discharge as needed  - Refer to Case Management Department for coordinating discharge planning if the patient needs post-hospital services based on physician/advanced practitioner order or complex needs related to functional status, cognitive ability, or social support system  Outcome: Progressing     Problem: Knowledge Deficit  Goal: Patient/family/caregiver demonstrates understanding of disease process, treatment plan, medications, and discharge instructions  Description: Complete learning assessment and assess knowledge base    Interventions:  - Provide teaching at level of understanding  - Provide teaching via preferred learning methods  Outcome: Progressing

## 2020-09-17 NOTE — ASSESSMENT & PLAN NOTE
Hemoglobin this morning is 7, down from 7 7 yesterday      Plan:  Transfuse 1 unit of blood  Recheck CBC in the morning

## 2020-09-17 NOTE — PROGRESS NOTES
Patient remains hospitalized at [facility] secondary to UTI, RORY and CKD

## 2020-09-17 NOTE — PHYSICAL THERAPY NOTE
PT tx     09/17/20 1430   PT Last Visit   PT Visit Date 09/17/20   Pain Assessment   Pain Assessment Tool Pain Assessment not indicated - pt denies pain   Restrictions/Precautions   Weight Bearing Precautions Per Order No   Other Precautions Telemetry;Multiple lines;O2;Fall Risk;Pain;Bed Alarm;Cognitive; Chair Alarm;Contact/isolation   General   Chart Reviewed Yes   Additional Pertinent History continues with wound vac sacrum   Cognition   Overall Cognitive Status Impaired   Arousal/Participation Alert   Attention Attends with cues to redirect   Orientation Level Oriented to person;Oriented to place   Memory Decreased recall of precautions;Decreased recall of recent events;Decreased short term memory   Following Commands Follows one step commands with increased time or repetition   Subjective   Subjective a little dizzy with position change   Bed Mobility   Rolling R 3  Moderate assistance   Additional items Assist x 1   Rolling L 3  Moderate assistance   Additional items Assist x 1   Supine to Sit 3  Moderate assistance   Additional items Assist x 2   Sit to Supine 2  Maximal assistance   Additional items Assist x 2   Additional Comments shhet lift x 2 to [facility]   Transfers   Sit to Stand 2  Maximal assistance   Additional items Assist x 2; Increased time required;Verbal cues  (bed raised)   Stand to Sit 4  Minimal assistance   Additional items Assist x 2;Verbal cues; Increased time required;Bed elevated   Stand pivot Unable to assess   Balance   Static Sitting Fair -   Dynamic Sitting Poor +   Static Standing Poor +   Dynamic Standing Poor   Endurance Deficit   Endurance Deficit Yes   Endurance Deficit Description tires quickly   Activity Tolerance   Activity Tolerance Patient limited by fatigue   Nurse Made Aware RN Norma   Exercises   Knee AROM Long Arc Quad Sitting;10 reps;Right;Left;AROM   Ankle Pumps Supine;10 reps;AROM; Right;Left   Balance training  sit EOB with assist x 5 min   Assessment   Prognosis Good Problem List Decreased strength;Decreased range of motion;Decreased endurance; Impaired balance;Decreased mobility; Decreased coordination;Decreased cognition;Decreased safety awareness;Decreased skin integrity   Assessment Pt able to sit EOB x 5 min adn stood x 20 sec with 2 assist  Unable to wt shift to take steps  REturned to bed and repositioned with 2 asisst  continue to recommend return to rehab at d/c  VSS stable during session  Nurse present to manage lines etc  Cont' PT   Barriers to Discharge   (medical clearance)   Goals   Patient Goals get better   Plan   Treatment/Interventions ADL retraining;Functional transfer training;LE strengthening/ROM; Therapeutic exercise; Bed mobility;Gait training;Spoke to nursing   Progress Slow progress, medical status limitations   PT Frequency 2-3x/wk   Recommendation   PT Discharge Recommendation Post-Acute Rehabilitation Services   Equipment Recommended Cathaleen Kick; Wheelchair  (pressure relief cushion/mattress)   PT - OK to Discharge Yes   Additional Comments   (to STR with medical clearance)   Rosey Lopez, PT

## 2020-09-17 NOTE — PROGRESS NOTES
Vancomycin Assessment    Jack Cotto is a 67 y o  female who is currently receiving vancomycin No dose given since 9/15 2000 for bacteremia     Relevant clinical data and objective history reviewed:  Creatinine   Date Value Ref Range Status   09/16/2020 2 01 (H) 0 60 - 1 30 mg/dL Final     Comment:     Standardized to IDMS reference method   09/15/2020 1 56 (H) 0 60 - 1 30 mg/dL Final     Comment:     Standardized to IDMS reference method   09/14/2020 1 36 (H) 0 60 - 1 30 mg/dL Final     Comment:     Standardized to IDMS reference method     Vancomycin Rm   Date Value Ref Range Status   08/28/2020 23 5 ug/mL Final     /63 (BP Location: Left arm)   Pulse 93   Temp 99 °F (37 2 °C) (Bladder)   Resp 16   Ht 5' 6" (1 676 m)   Wt 91 kg (200 lb 9 9 oz)   SpO2 96%   BMI 32 38 kg/m²   I/O last 3 completed shifts: In: 2100 [P O :800; IV Piggyback:1300]  Out: 725 [Urine:625; Drains:100]  Lab Results   Component Value Date/Time    BUN 44 (H) 09/16/2020 03:44 AM    WBC 11 00 (H) 09/16/2020 03:44 AM    HGB 7 7 (L) 09/16/2020 03:44 AM    HCT 24 8 (L) 09/16/2020 03:44 AM    MCV 93 09/16/2020 03:44 AM     09/16/2020 03:44 AM     Temp Readings from Last 3 Encounters:   09/16/20 99 °F (37 2 °C) (Bladder)   09/10/20 97 5 °F (36 4 °C) (Oral)   08/25/20 98 4 °F (36 9 °C) (Oral)     Vancomycin Days of Therapy: 3    Assessment/Plan  Baseline risks associated with therapy include: pre-existing renal impairment, concomitant nephrotoxic medications, advanced age, and dehydration  The patient is receiving No dose given since 9/15 2000 with the most recent vancomycin level being supratherapeutic (26 9) based on a goal of 15-20 (appropriate for most indications) ; therefore, after clinical evaluation will be changed to Begin pulse dosing of 10mg/kg ABW when level <20   Pharmacy will continue to follow closely for s/sx of nephrotoxicity, infusion reactions, and appropriateness of therapy    BMP and CBC will be ordered per protocol  Plan for random level at approximately 2100 on 9/17/20  Pharmacy will continue to follow the patients culture results and clinical progress daily      Josh Allen, Pharmacist

## 2020-09-17 NOTE — ASSESSMENT & PLAN NOTE
Present on admission, possible sources include C diff, stage IV sacral decubitus ulcer  She had a recent episode of polymicrobial sepsis from infected stage IV decubitus ulcer  Was treated with 2 weeks of Zosyn  Blood cultures 1/2 on 09/14/2020 growing GPCs, coagulase negative staph  Review blood cultures have remained without growth  Urine cultures growing Candida glabrata, possible glabrata  As per infectious disease will not treat  She has improved on p o  Vancomycin  Plan:  Complete treatment with p o  Vancomycin for C diff    Patient is improving

## 2020-09-17 NOTE — ASSESSMENT & PLAN NOTE
Continue vancomycin oral 125 q 6 hours, plan 10 days from last broad spectrum abx  Discontinue IV vancomycin  Diarrhea has improved

## 2020-09-17 NOTE — ASSESSMENT & PLAN NOTE
Lab Results   Component Value Date    HGBA1C 9 9 (H) 08/26/2020       Recent Labs     09/16/20  2131 09/17/20  0811 09/17/20  1135 09/17/20  1609   POCGLU 186* 147* 184* 167*       Blood Sugar Average: Last 72 hrs:  (P) 100 [id number]     Continue insulin management with 5 units of Lantus HS, sliding scale as ordered

## 2020-09-17 NOTE — PLAN OF CARE
Problem: PHYSICAL THERAPY ADULT  Goal: Performs mobility at highest level of function for planned discharge setting  See evaluation for individualized goals  Description: Treatment/Interventions: ADL retraining, Functional transfer training, LE strengthening/ROM, Therapeutic exercise, Bed mobility, Gait training, Spoke to nursing  Equipment Recommended: Elise Stiles, Wheelchair(pressure relief cushion/mattress)       See flowsheet documentation for full assessment, interventions and recommendations  Outcome: Progressing  Note: Prognosis: Good  Problem List: Decreased strength, Decreased range of motion, Decreased endurance, Impaired balance, Decreased mobility, Decreased coordination, Decreased cognition, Decreased safety awareness, Decreased skin integrity  Assessment: Pt able to sit EOB x 5 min adn stood x 20 sec with 2 assist  Unable to wt shift to take steps  REturned to bed and repositioned with 2 asisst  continue to recommend return to rehab at d/c  VSS stable during session  Nurse present to manage lines etc  Cont' PT  Barriers to Discharge: (medical clearance)     PT Discharge Recommendation: Post-Acute Rehabilitation Services     PT - OK to Discharge: Yes    See flowsheet documentation for full assessment

## 2020-09-17 NOTE — PROGRESS NOTES
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, would need debridement followed by a flap for full treatment  - yeast in urine is colonizer would not rx  - ID to sign off, please call with ?'s, changes in clinical status      Subjective:  Patient without complaints  Diarrhea as appeared to have subsided  Blood cultures + yesterday for gpc in 1 bottle  It was a CoNS which I suspect is a contaminant  Objective:    Lungs: decreased  Cor: rrr s1s2  Abd: soft nt/nd + BS  Wound: vac    Labs:  CBC w/diff  Recent Labs     09/17/20  0527   WBC 8 64   HGB 7 0*   HCT 22 9*      NEUTOPHILPCT 65   LYMPHOPCT 22   MONOPCT 7   EOSPCT 4     BMP  Recent Labs     09/17/20  0527   K 4 2      CO2 25   BUN 52*   CREATININE 2 08*   CALCIUM 8 2*     CMP  Recent Labs     09/14/20  1833  09/17/20  0527   K 3 7   < > 4 2      < > 103   CO2 28   < > 25   BUN 36*   < > 52*   CREATININE 1 36*   < > 2 08*   CALCIUM 7 8*   < > 8 2*   ALKPHOS 184*  --   --    ALT 31  --   --    AST 40  --   --     < > = values in this interval not displayed  [facility]    Cultures:  Lab Results   Component Value Date    BLOODCX Received in Microbiology Lab  Culture in Progress  09/16/2020    BLOODCX Received in Microbiology Lab  Culture in Progress  09/16/2020    BLOODCX No Growth at 72 hrs  09/14/2020    BLOODCX Staphylococcus coagulase negative (A) 09/14/2020    BLOODCX No Growth After 5 Days   08/28/2020    BLOODCX No Growth After 5 Days   08/28/2020    BLOODCX Enterococcus faecalis (A) 08/26/2020    BLOODCX Streptococcus constellatus (A) 08/26/2020    BLOODCX Streptococcus constellatus (A) 08/26/2020    BLOODCX Staphylococcus coagulase negative (A) 08/26/2020    BLOODCX Bacteroides fragilis (A) 08/26/2020     Lab Results   Component Value Date    WOUNDCULT 3+ Growth of Escherichia coli (A) 08/26/2020    WOUNDCULT 3+ Growth of Proteus mirabilis (A) 08/26/2020    WOUNDCULT 3+ Growth of Enterococcus faecalis (A) 08/26/2020    WOUNDCULT 3+ Growth of  08/26/2020    WOUNDCULT 2+ Growth of Escherichia coli (A) 08/26/2020    WOUNDCULT 2+ Growth of Proteus mirabilis (A) 08/26/2020    WOUNDCULT 3+ Growth of Enterococcus species (A) 08/26/2020    WOUNDCULT 3+ Growth of  08/26/2020     Lab Results   Component Value Date    URINECX 30,000-39,000 cfu/ml Yeast (A) 09/14/2020    URINECX 50,000-59,000 cfu/ml Escherichia coli (A) 08/26/2020    URINECX >100,000 cfu/ml Lactobacillus species (A) 08/26/2020    URINECX 20,000-29,000 cfu/ml  09/05/2018     No results found for: SPUTUMCULTUR    MED: reviewed      Current Facility-Administered Medications:     aspirin (ECOTRIN LOW STRENGTH) EC tablet 81 mg, 81 mg, Oral, Daily, TJ Peralta, 81 mg at 09/17/20 0835    atorvastatin (LIPITOR) tablet 40 mg, 40 mg, Oral, Daily With Dinner, JESSICA PeraltaNP, 40 mg at 09/16/20 1646    chlorhexidine (PERIDEX) 0 12 % oral rinse 15 mL, 15 mL, Swish & Spit, Q12H [address], JESSICA PeraltaNP, 15 mL at 09/17/20 0836    furosemide (LASIX) injection 40 mg, 40 mg, Intravenous, Once PRN, Sharmon Denver, DO    gabapentin (NEURONTIN) capsule 100 mg, 100 mg, Oral, TID, JESSICA PeraltaNP, 100 mg at 09/17/20 0835    insulin glargine (LANTUS) subcutaneous injection 5 Units 0 05 mL, 5 Units, Subcutaneous, LUIZ, Shalini Salas PA-C, 5 Units at 09/16/20 2133    insulin lispro (HumaLOG) 100 units/mL subcutaneous injection 1-6 Units, 1-6 Units, Subcutaneous, Aicha DEE Tera Ortega, 1 Units at 09/16/20 2133    insulin lispro (HumaLOG) 100 units/mL subcutaneous injection 2-12 Units, 2-12 Units, Subcutaneous, TID AC, 2 Units at 09/17/20 1136 **AND** Fingerstick Glucose (POCT), , , 4x Daily AC and at bedtime, [facility], CRNP    metoprolol tartrate (LOPRESSOR) tablet 75 mg, 75 mg, Oral, Q12H [facility], Aicha Greco, TJ, 75 mg at 09/17/20 3799    nystatin (MYCOSTATIN) powder, , Topical, BID, Mortimer Catalina,     vancomycin [facility]) oral solution 125 mg, 125 mg, Oral, Q6H [facility], [facility], 125 mg at 09/17/20 1138    warfarin (COUMADIN) tablet 3 mg, 3 mg, Oral, Daily (warfarin), Jaimee Claudio PA-C, 3 mg at 09/16/20 1746    Active Problems:    Type 2 diabetes mellitus with hyperglycemia (HCC)    Hypertension    Hyperlipidemia    CKD 3    Decubitus ulcer of sacral region, stage 4 (HCC)    RORY (acute kidney injury) ([address] )    Paroxysmal atrial fibrillation ([address] )    Stenosis of left carotid artery    Chronic diastolic heart failure ([address] )    C  difficile colitis      Tremayne Vazquez MD

## 2020-09-17 NOTE — PROGRESS NOTES
Progress Note - Bang Parry 1945, 67 y o  female MRN: [id number]    Unit/Bed#: -01 SDU Encounter: [id number]    Primary Care Provider: Jurgen Jordan DO   Date and time admitted to hospital: 9/13/2020 11:30 PM        Sepsis due to Streptococcus species [facility])  Assessment & Plan  Present on admission, possible sources include C diff, stage IV sacral decubitus ulcer  She had a recent episode of polymicrobial sepsis from infected stage IV decubitus ulcer  Was treated with 2 weeks of Zosyn  Blood cultures 1/2 on 09/14/2020 growing GPCs, coagulase negative staph  Review blood cultures have remained without growth  Urine cultures growing Candida glabrata, possible glabrata  As per infectious disease will not treat  She has improved on p o  Vancomycin  Plan:  Complete treatment with p o  Vancomycin for C diff  Patient is improving    C  difficile colitis  Assessment & Plan    Continue vancomycin oral 125 q 6 hours, plan 10 days from last broad spectrum abx  Discontinue IV vancomycin  Diarrhea has improved    Chronic diastolic heart failure (HCC)  Assessment & Plan  Wt Readings from Last 3 Encounters:   09/17/20 86 kg (189 lb 9 5 oz)   09/10/20 80 7 kg (177 lb 14 6 oz)   08/25/20 73 kg (161 lb)         · Echo 08/27/2020 -- EF 65%, no acute abnormality, wall thickness mildly increased, changes consistent with concentric remodeling, grade 2 diastolic dysfunction  · She is net positive > 4000 CC      Plan  · Strict I&O   · QD weights  · Will diurese with 40 mg of Lasix     Anemia  Assessment & Plan  Hemoglobin this morning is 7, down from 7 7 yesterday      Plan:  Transfuse 1 unit of blood  Recheck CBC in the morning    Paroxysmal atrial fibrillation (HCC)  Assessment & Plan  Continue anticoagulation with Coumadin  INR therapeutic at 2 0    RORY (acute kidney injury) [facility])  Assessment & Plan  Acute on CKD   Baseline creatinine appears to be 1 2-1 3, now 2 08  This is likely component of prerenal given C diff causing diarrhea versus ATN even septic picture versus medication use as patient received IV vancomycin    Plan:  Being worked up, follow-up urinalysis, urine protein to creatinine ratio, complement levels, urine eosinophil   Hold nephrotoxic drugs  I initially started on 75 cc of Ringer's lactate  Discontinue Ringer's lactate now, transfuse with blood since hemoglobin is 7  Trend I/O    Decubitus ulcer of sacral region, stage 4 (HCC)  Assessment & Plan  · Underwent debridement x2 and wound VAC placement with general surgery  · Wound VAC in place  · CT abdomen pelvis performed -- Decubitus ulcer overlying the inferior sacrum and coccyx and soft tissue inflammatory changes   Irregularity of the sacrum which may represent underlying osteomyelitis  Diffuse rectal wall thickening and perirectal inflammatory changes which may represent proctitis or secondary from the above process  · General surgery wound VAC was placed - wound does not look infected  · On previous admission - ID recommends consideration of diverting loop colostomy for wound healing    Type 2 diabetes mellitus with hyperglycemia [facility])  Assessment & Plan  Lab Results   Component Value Date    HGBA1C 9 9 (H) 08/26/2020       Recent Labs     09/16/20  2131 09/17/20  0811 09/17/20  1135 09/17/20  1609   POCGLU 186* 147* 184* 167*       Blood Sugar Average: Last 72 hrs:  (P) 370 [id number]     Continue insulin management with 5 units of Lantus HS, sliding scale as ordered  VTE Pharmacologic Prophylaxis:   Pharmacologic: Warfarin (Coumadin)  Mechanical VTE Prophylaxis in Place: Yes    Patient Centered Rounds: I have performed bedside rounds with nursing staff today  Discussions with Specialists or Other Care Team Provider:  Infectious Disease, Nephrology    Education and Discussions with Family / Patient:  Yes    Time Spent for Care: 45 minutes    More than 50% of total time spent on counseling and coordination of care as described above     Current Length of Stay: 3 day(s)    Current Patient Status: Inpatient   Certification Statement: The patient will continue to require additional inpatient hospital stay due to Ongoing treatment for sepsis due to C diff    Discharge Plan:     Code Status: Level 1 - Full Code      Subjective:   Seen  Feels better but still slightly lethargic  Objective:     Vitals:   Temp (24hrs), Av 1 °F (36 7 °C), Min:97 3 °F (36 3 °C), Max:99 °F (37 2 °C)    Temp:  [97 3 °F (36 3 °C)-99 °F (37 2 °C)] 97 3 °F (36 3 °C)  HR:  [79-98] 94  Resp:  [16-20] 16  BP: (102-152)/(55-70) 105/61  SpO2:  [87 %-98 %] 95 %  Body mass index is 30 6 kg/m²  Input and Output Summary (last 24 hours): Intake/Output Summary (Last 24 hours) at 2020 193  Last data filed at 2020 1913  Gross per 24 hour   Intake 1447 5 ml   Output 685 ml   Net 762 5 ml       Physical Exam:     Physical Exam  Vitals signs reviewed  Constitutional:       General: She is not in acute distress  Appearance: Normal appearance  She is not ill-appearing, toxic-appearing or diaphoretic  HENT:      Head: Normocephalic and atraumatic  Eyes:      Extraocular Movements: Extraocular movements intact  Pupils: Pupils are equal, round, and reactive to light  Neck:      Musculoskeletal: Normal range of motion and neck supple  No neck rigidity or muscular tenderness  Vascular: No carotid bruit  Cardiovascular:      Rate and Rhythm: Normal rate  Rhythm irregular  Pulses: Normal pulses  Heart sounds: Murmur present  No friction rub  Pulmonary:      Effort: Pulmonary effort is normal       Breath sounds: No stridor  Rales present  No wheezing or rhonchi  Chest:      Chest wall: No tenderness  Abdominal:      General: Abdomen is flat  Bowel sounds are normal  There is distension  Palpations: Abdomen is soft  Tenderness: There is no abdominal tenderness  There is no guarding or rebound     Musculoskeletal: Normal range of motion  General: No swelling or tenderness  Right lower leg: Edema present  Left lower leg: Edema present  Skin:     General: Skin is warm and dry  Coloration: Skin is not jaundiced  Neurological:      General: No focal deficit present  Mental Status: She is alert and oriented to person, place, and time  Cranial Nerves: No cranial nerve deficit  Sensory: No sensory deficit  Additional Data:     Labs:    Results from last 7 days   Lab Units 09/17/20  0527  09/14/20  1833   WBC Thousand/uL 8 64   < > 14 88*   HEMOGLOBIN g/dL 7 0*   < > 7 9*   HEMATOCRIT % 22 9*   < > 26 4*   PLATELETS Thousands/uL 300   < > 337   BANDS PCT %  --   --  4   NEUTROS PCT % 65   < >  --    LYMPHS PCT % 22   < >  --    LYMPHO PCT %  --   --  4*   MONOS PCT % 7   < >  --    MONO PCT %  --   --  2*   EOS PCT % 4   < > 3    < > = values in this interval not displayed  Results from last 7 days   Lab Units 09/17/20  0527  09/14/20  1833   SODIUM mmol/L 136   < > 138   POTASSIUM mmol/L 4 2   < > 3 7   CHLORIDE mmol/L 103   < > 101   CO2 mmol/L 25   < > 28   BUN mg/dL 52*   < > 36*   CREATININE mg/dL 2 08*   < > 1 36*   ANION GAP mmol/L 8   < > 9   CALCIUM mg/dL 8 2*   < > 7 8*   ALBUMIN g/dL  --   --  1 4*   TOTAL BILIRUBIN mg/dL  --   --  0 50   ALK PHOS U/L  --   --  184*   ALT U/L  --   --  31   AST U/L  --   --  40   GLUCOSE RANDOM mg/dL 157*   < > 181*    < > = values in this interval not displayed       Results from last 7 days   Lab Units 09/17/20  0834   INR  2 01*     Results from last 7 days   Lab Units 09/17/20  1609 09/17/20  1135 09/17/20  0811 09/16/20  2131 09/16/20  1618 09/16/20  1057 09/16/20  0721 09/15/20  2058 09/15/20  1624 09/15/20  1113 09/15/20  0729 09/14/20  2142   POC GLUCOSE mg/dl 167* 184* 147* 186* 190* 183* 139 234* 203* 191* 194* 182*         Results from last 7 days   Lab Units 09/15/20  0455 09/14/20  0229 09/14/20  0013   LACTIC ACID mmol/L --   --  1 1   PROCALCITONIN ng/ml 6 48* 0 41*  --            * I Have Reviewed All Lab Data Listed Above  * Additional Pertinent Lab Tests Reviewed: All Labs Within Last 24 Hours Reviewed    Imaging:    Imaging Reports Reviewed Today Include:   Imaging Personally Reviewed by Myself Includes:      Recent Cultures (last 7 days):     Results from last 7 days   Lab Units 09/16/20  1457 09/16/20  1456 09/14/20  0944 09/14/20  0059 09/14/20  0013   BLOOD CULTURE  Received in Microbiology Lab  Culture in Progress  Received in Microbiology Lab  Culture in Progress  --   --  Staphylococcus coagulase negative*  No Growth at 72 hrs  GRAM STAIN RESULT   --   --   --   --  Gram positive cocci in clusters*   URINE CULTURE   --   --   --  30,000-39,000 cfu/ml Candida glabrata*  --    C DIFF TOXIN B   --   --  Positive*  --   --        Last 24 Hours Medication List:   Current Facility-Administered Medications   Medication Dose Route Frequency Provider Last Rate    aspirin  81 mg Oral Daily TJ Gutierrez      atorvastatin  40 mg Oral Daily With TJ Medeiros      chlorhexidine  15 mL Swish & Spit Q12H [address] TJ Kolb      gabapentin  100 mg Oral TID TJ Gutierrez      insulin glargine  5 Units Subcutaneous HS Shalini Salas PA-C      insulin lispro  1-6 Units Subcutaneous HS DreaTJ Waddell      insulin lispro  2-12 Units Subcutaneous TID AC DreaTJ Waddell      metoprolol tartrate  75 mg Oral Q12H [address] [place] TJ Linares      nystatin   Topical BID Deepa Cabrera DO      vancomycin  125 mg Oral Q6H [address] [place] TJ Linares      warfarin  3 mg Oral Daily (warfarin) Sven Waggoner PA-C          Today, Patient Was Seen By: Eamon Castro MD    ** Please Note: Dictation voice to text software may have been used in the creation of this document   **

## 2020-09-18 LAB
ABO GROUP BLD BPU: NORMAL
ANION GAP SERPL CALCULATED.3IONS-SCNC: 8 MMOL/L (ref 4–13)
BACTERIA BLD CULT: ABNORMAL
BASOPHILS # BLD AUTO: 0.11 THOUSANDS/ΜL (ref 0–0.1)
BASOPHILS NFR BLD AUTO: 1 % (ref 0–1)
BPU ID: NORMAL
BUN SERPL-MCNC: 61 MG/DL (ref 5–25)
CALCIUM SERPL-MCNC: 8.1 MG/DL (ref 8.3–10.1)
CHLORIDE SERPL-SCNC: 104 MMOL/L (ref 100–108)
CO2 SERPL-SCNC: 23 MMOL/L (ref 21–32)
CREAT SERPL-MCNC: 1.87 MG/DL (ref 0.6–1.3)
CROSSMATCH: NORMAL
EOSINOPHIL # BLD AUTO: 0.36 THOUSAND/ΜL (ref 0–0.61)
EOSINOPHIL NFR BLD AUTO: 4 % (ref 0–6)
EOSINOPHIL NFR URNS MANUAL: 1 %
ERYTHROCYTE [DISTWIDTH] IN BLOOD BY AUTOMATED COUNT: 15 % (ref 11.6–15.1)
GFR SERPL CREATININE-BSD FRML MDRD: 26 ML/MIN/1.73SQ M
GLUCOSE SERPL-MCNC: 144 MG/DL (ref 65–140)
GLUCOSE SERPL-MCNC: 154 MG/DL (ref 65–140)
GLUCOSE SERPL-MCNC: 158 MG/DL (ref 65–140)
GLUCOSE SERPL-MCNC: 195 MG/DL (ref 65–140)
GLUCOSE SERPL-MCNC: 200 MG/DL (ref 65–140)
GRAM STN SPEC: ABNORMAL
HCT VFR BLD AUTO: 28.5 % (ref 34.8–46.1)
HGB BLD-MCNC: 8.8 G/DL (ref 11.5–15.4)
IMM GRANULOCYTES # BLD AUTO: 0.05 THOUSAND/UL (ref 0–0.2)
IMM GRANULOCYTES NFR BLD AUTO: 1 % (ref 0–2)
INR PPP: 2.51 (ref 0.84–1.19)
LYMPHOCYTES # BLD AUTO: 2.32 THOUSANDS/ΜL (ref 0.6–4.47)
LYMPHOCYTES NFR BLD AUTO: 24 % (ref 14–44)
MCH RBC QN AUTO: 28.8 PG (ref 26.8–34.3)
MCHC RBC AUTO-ENTMCNC: 30.9 G/DL (ref 31.4–37.4)
MCV RBC AUTO: 93 FL (ref 82–98)
MONOCYTES # BLD AUTO: 0.74 THOUSAND/ΜL (ref 0.17–1.22)
MONOCYTES NFR BLD AUTO: 8 % (ref 4–12)
NEUTROPHILS # BLD AUTO: 6.11 THOUSANDS/ΜL (ref 1.85–7.62)
NEUTS SEG NFR BLD AUTO: 62 % (ref 43–75)
NRBC BLD AUTO-RTO: 0 /100 WBCS
O+P STL CONC: NORMAL
PLATELET # BLD AUTO: 301 THOUSANDS/UL (ref 149–390)
PMV BLD AUTO: 11.6 FL (ref 8.9–12.7)
POTASSIUM SERPL-SCNC: 4.4 MMOL/L (ref 3.5–5.3)
PROTHROMBIN TIME: 27 SECONDS (ref 11.6–14.5)
RBC # BLD AUTO: 3.06 MILLION/UL (ref 3.81–5.12)
SODIUM SERPL-SCNC: 135 MMOL/L (ref 136–145)
UNIT DISPENSE STATUS: NORMAL
UNIT PRODUCT CODE: NORMAL
UNIT RH: NORMAL
WBC # BLD AUTO: 9.69 THOUSAND/UL (ref 4.31–10.16)

## 2020-09-18 PROCEDURE — 85025 COMPLETE CBC W/AUTO DIFF WBC: CPT | Performed by: INTERNAL MEDICINE

## 2020-09-18 PROCEDURE — 97530 THERAPEUTIC ACTIVITIES: CPT

## 2020-09-18 PROCEDURE — 80048 BASIC METABOLIC PNL TOTAL CA: CPT | Performed by: INTERNAL MEDICINE

## 2020-09-18 PROCEDURE — 85610 PROTHROMBIN TIME: CPT | Performed by: PHYSICIAN ASSISTANT

## 2020-09-18 PROCEDURE — 99233 SBSQ HOSP IP/OBS HIGH 50: CPT | Performed by: INTERNAL MEDICINE

## 2020-09-18 PROCEDURE — 82948 REAGENT STRIP/BLOOD GLUCOSE: CPT

## 2020-09-18 PROCEDURE — 99232 SBSQ HOSP IP/OBS MODERATE 35: CPT | Performed by: INTERNAL MEDICINE

## 2020-09-18 PROCEDURE — NC001 PR NO CHARGE: Performed by: PHYSICIAN ASSISTANT

## 2020-09-18 RX ORDER — FUROSEMIDE 10 MG/ML
40 INJECTION INTRAMUSCULAR; INTRAVENOUS ONCE
Status: COMPLETED | OUTPATIENT
Start: 2020-09-18 | End: 2020-09-18

## 2020-09-18 RX ADMIN — GABAPENTIN 100 MG: 100 CAPSULE ORAL at 21:18

## 2020-09-18 RX ADMIN — Medication 125 MG: at 00:25

## 2020-09-18 RX ADMIN — Medication 125 MG: at 05:51

## 2020-09-18 RX ADMIN — INSULIN LISPRO 4 UNITS: 100 INJECTION, SOLUTION INTRAVENOUS; SUBCUTANEOUS at 18:22

## 2020-09-18 RX ADMIN — ASPIRIN 81 MG: 81 TABLET, COATED ORAL at 08:27

## 2020-09-18 RX ADMIN — CHLORHEXIDINE GLUCONATE 0.12% ORAL RINSE 15 ML: 1.2 LIQUID ORAL at 08:27

## 2020-09-18 RX ADMIN — FUROSEMIDE 40 MG: 10 INJECTION, SOLUTION INTRAMUSCULAR; INTRAVENOUS at 13:23

## 2020-09-18 RX ADMIN — GABAPENTIN 100 MG: 100 CAPSULE ORAL at 18:21

## 2020-09-18 RX ADMIN — GABAPENTIN 100 MG: 100 CAPSULE ORAL at 08:27

## 2020-09-18 RX ADMIN — WARFARIN SODIUM 3 MG: 3 TABLET ORAL at 18:21

## 2020-09-18 RX ADMIN — NYSTATIN 1 APPLICATION: 100000 POWDER TOPICAL at 08:27

## 2020-09-18 RX ADMIN — ATORVASTATIN CALCIUM 40 MG: 40 TABLET, FILM COATED ORAL at 18:20

## 2020-09-18 RX ADMIN — Medication 125 MG: at 18:21

## 2020-09-18 RX ADMIN — FUROSEMIDE 40 MG: 10 INJECTION, SOLUTION INTRAMUSCULAR; INTRAVENOUS at 08:27

## 2020-09-18 RX ADMIN — Medication 125 MG: at 11:38

## 2020-09-18 RX ADMIN — METOPROLOL TARTRATE 75 MG: 50 TABLET, FILM COATED ORAL at 21:17

## 2020-09-18 RX ADMIN — CHLORHEXIDINE GLUCONATE 0.12% ORAL RINSE 15 ML: 1.2 LIQUID ORAL at 21:18

## 2020-09-18 RX ADMIN — METOPROLOL TARTRATE 75 MG: 50 TABLET, FILM COATED ORAL at 08:27

## 2020-09-18 RX ADMIN — INSULIN LISPRO 2 UNITS: 100 INJECTION, SOLUTION INTRAVENOUS; SUBCUTANEOUS at 07:36

## 2020-09-18 NOTE — ASSESSMENT & PLAN NOTE
· Underwent debridement x2 and wound VAC placement with general surgery  · Wound VAC in place  · CT abdomen pelvis performed -- Decubitus ulcer overlying the inferior sacrum and coccyx and soft tissue inflammatory changes   Irregularity of the sacrum which may represent underlying osteomyelitis  Diffuse rectal wall thickening and perirectal inflammatory changes which may represent proctitis or secondary from the above process    · General surgery wound VAC was placed - wound does not look infected    Plan:  Continue wound dressings

## 2020-09-18 NOTE — PHYSICAL THERAPY NOTE
PHYSICAL THERAPY Treatment NOTE    Patient Name: Veronica Maza  SDUYK'CRISTINA Date: 9/18/2020 09/18/20 0940   PT Last Visit   PT Visit Date 09/18/20   Pain Assessment   Pain Assessment Tool 0-10   Pain Score Worst Possible Pain   Restrictions/Precautions   Weight Bearing Precautions Per Order No   Other Precautions Telemetry;Multiple lines; Fall Risk;O2;Cognitive; Chair Alarm; Bed Alarm   General   Chart Reviewed Yes   Additional Pertinent History Pt  is a 68 yo F who presents with sepsis and sacral wounds  Family/Caregiver Present No   Cognition   Overall Cognitive Status Impaired   Arousal/Participation Alert   Attention Attends with cues to redirect   Orientation Level Oriented X4   Memory Decreased recall of recent events   Following Commands Follows one step commands with increased time or repetition   Comments Pt  was identified with full name and birthdate   Subjective   Subjective Pt  agreeable to PT session   Bed Mobility   Supine to Sit 3  Moderate assistance   Additional items Assist x 2   Sit to Supine 2  Maximal assistance   Additional items Assist x 2   Additional Comments Pt  tolerated bed mobility with ModAx2 to sit and maxAx2 with VCs for hand placment and sequenicng as well as use of LE managment and use of HOB elevated for ease of transition  Pt  tolerated static sitting at EOB x 5 mins with intermittient minAx1 for static balance and ModAx1 for dynamic balance  Transfers   Sit to Stand 2  Maximal assistance   Additional items Assist x 2   Stand to Sit 4  Minimal assistance   Additional items Assist x 2   Additional Comments Pt  peformed sit<>stand transfers x3 with VCs for hand placement and sequencing  Pt  tolerated static stand at EOB x 30-60seconds per transtion with minAx1 to maintain balance      Balance   Static Sitting Fair -   Dynamic Sitting Poor +   Endurance Deficit   Endurance Deficit Yes   Endurance Deficit Description postural and balance degradation noted with fatigue   Activity Tolerance   Activity Tolerance Patient limited by fatigue;Patient limited by pain   Medical Staff Made Aware Spoke to Amina Fuchs   Nurse Made Aware spoke to RN   Assessment   Prognosis Good   Problem List Decreased strength;Decreased range of motion;Decreased endurance; Impaired balance;Decreased mobility; Decreased coordination;Decreased safety awareness;Decreased skin integrity;Pain   Assessment Pt  is a 66 yo F who presents with sepsis and sacral wounds  Pt  Agreeable to PT session, Pt  Identified with full name and birthdate  Pt  Demonstrated increased functional independence and increased activity tolerance as evidenced above  Pt  Tolerated increased standing time and demonstrated improved independence with functional transfers requiring decreased need for hands on assistance to tolerate standing  Pt  Is progressing towards goals, as expected and will benefit from continued skilled therapy to increase functional independence and aid patient in return to PLOF with decreased burden of care  D/C recommendation is rehab when medically appropriate  Goals   Patient Goals to get better   STG Expiration Date 09/25/20   Plan   Treatment/Interventions ADL retraining;Functional transfer training;LE strengthening/ROM; Therapeutic exercise; Bed mobility;Gait training;Spoke to nursing   Progress Slow progress, decreased activity tolerance   PT Frequency 2-3x/wk   Recommendation   PT Discharge Recommendation Post-Acute Rehabilitation Services   Equipment Recommended Charisse Bearded; Wheelchair   PT - OK to Discharge Yes   Additional Comments to rehab when medically approprate     Kacie Russ, PT, DPT 9/18/2020

## 2020-09-18 NOTE — PROGRESS NOTES
Progress Note - Phong Bill 1945, 67 y o  female MRN: [id number]    Unit/Bed#: -01 SDU Encounter: [id number]    Primary Care Provider: Stephanie Garcia DO   Date and time admitted to hospital: 9/13/2020 11:30 PM        Sepsis due to Streptococcus species [facility])  Assessment & Plan  Present on admission, possible sources include C diff, stage IV sacral decubitus ulcer  She had a recent episode of polymicrobial sepsis from infected stage IV decubitus ulcer  Was treated with 2 weeks of Zosyn  Blood cultures 1/2 on 09/14/2020 growing GPCs, coagulase negative staph  Review blood cultures have remained without growth  Urine cultures growing Candida glabrata, possible glabrata  As per infectious disease will not treat  She has improved on p o  Vancomycin  Plan:  Complete treatment with p o  Vancomycin for C diff  C  difficile colitis  Assessment & Plan    Continue vancomycin oral 125 q 6 hours, plan 10 days from last broad spectrum abx  Diarrhea has improved    Chronic diastolic heart failure (HCC)  Assessment & Plan  Wt Readings from Last 3 Encounters:   09/18/20 86 3 kg (190 lb 4 1 oz)   09/10/20 80 7 kg (177 lb 14 6 oz)   08/25/20 73 kg (161 lb)         · Echo 08/27/2020 -- EF 65%, no acute abnormality, wall thickness mildly increased, changes consistent with concentric remodeling, grade 2 diastolic dysfunction  · She is net positive > 4000 CC      Plan  · Strict I&O   · QD weights  · Will diurese with 40 mg of Lasix today    Anemia  Assessment & Plan  Hemoglobin this morning is 7, down from 7 7 yesterday      Plan:  Hemoglobin this morning is 8 8 status post 1 unit of packed red blood cell  Recheck CBC in the morning    Paroxysmal atrial fibrillation (HCC)  Assessment & Plan  Continue anticoagulation with Coumadin  INR therapeutic at 2 51    RORY (acute kidney injury) ([facility] [place] [address] )  Assessment & Plan  Acute on CKD   Baseline creatinine appears to be 1 2-1 3, now 2 08  This is likely component of prerenal given C diff causing diarrhea versus ATN even septic picture versus medication use as patient received IV vancomycin  The creatinine has improved to 1 87 mainly with 1 unit of packed red blood cells  UA: revealed urine specific gravity 1 025, trace ketones, moderate blood, moderate leukocytes, +2 protein, 10-20 RBC, innumerable WBC, innumerable bacteria, 0-1 hyaline cast, 2-3 coarse granular casts  - most recent urine protein creatinine ratio 2 41   - urine eosinophils 1   - C3 complement 63, C4 complement 22  Plan:  Avoid nephrotoxic agents  Diuresed with 40 mg of IV Lasix  Trend BMP    Decubitus ulcer of sacral region, stage 4 (HCC)  Assessment & Plan  · Underwent debridement x2 and wound VAC placement with general surgery  · Wound VAC in place  · CT abdomen pelvis performed -- Decubitus ulcer overlying the inferior sacrum and coccyx and soft tissue inflammatory changes   Irregularity of the sacrum which may represent underlying osteomyelitis  Diffuse rectal wall thickening and perirectal inflammatory changes which may represent proctitis or secondary from the above process  · General surgery wound VAC was placed - wound does not look infected    Plan:  Continue wound dressings    Type 2 diabetes mellitus with hyperglycemia [facility])  Assessment & Plan  Lab Results   Component Value Date    HGBA1C 9 9 (H) 08/26/2020       Recent Labs     09/17/20  1609 09/17/20  2120 09/18/20  0719 09/18/20  1141   POCGLU 167* 180* 154* 144*       Blood Sugar Average: Last 72 hrs:  (P) 988 [id number]     Continue insulin management with 5 units of Lantus HS, sliding scale as ordered  VTE Pharmacologic Prophylaxis:   Pharmacologic: Warfarin (Coumadin)  Mechanical VTE Prophylaxis in Place: Yes    Patient Centered Rounds: I have performed bedside rounds with nursing staff today      Discussions with Specialists or Other Care Team Provider:  Yes    Education and Discussions with Family / Patient:  Yes    Time Spent for Care: 30 minutes  More than 50% of total time spent on counseling and coordination of care as described above  Current Length of Stay: 4 day(s)    Current Patient Status: Inpatient   Certification Statement: The patient will continue to require additional inpatient hospital stay due to Ongoing treatment for Clostridium difficile infection    Discharge Plan:     Code Status: Level 1 - Full Code      Subjective:   Seen  Looks much improved this morning, less lethargic  Has no complaints    Objective:     Vitals:   Temp (24hrs), Av 7 °F (36 5 °C), Min:97 3 °F (36 3 °C), Max:98 °F (36 7 °C)    Temp:  [97 3 °F (36 3 °C)-98 °F (36 7 °C)] 97 6 °F (36 4 °C)  HR:  [] 96  Resp:  [16-20] 18  BP: (102-152)/(55-94) 122/94  SpO2:  [95 %-98 %] 97 %  Body mass index is 30 71 kg/m²  Input and Output Summary (last 24 hours): Intake/Output Summary (Last 24 hours) at 2020 1437  Last data filed at 2020 1330  Gross per 24 hour   Intake 1327 5 ml   Output 2315 ml   Net -987 5 ml       Physical Exam:     Physical Exam  Vitals signs reviewed  Constitutional:       General: She is not in acute distress  Appearance: Normal appearance  She is not ill-appearing, toxic-appearing or diaphoretic  HENT:      Head: Normocephalic and atraumatic  Eyes:      Extraocular Movements: Extraocular movements intact  Pupils: Pupils are equal, round, and reactive to light  Neck:      Musculoskeletal: Normal range of motion and neck supple  No neck rigidity or muscular tenderness  Vascular: No carotid bruit  Cardiovascular:      Rate and Rhythm: Normal rate  Rhythm irregular  Pulses: Normal pulses  Heart sounds: Normal heart sounds  No murmur  No friction rub  Pulmonary:      Effort: Pulmonary effort is normal       Breath sounds: No stridor  Rales present  No wheezing or rhonchi  Chest:      Chest wall: No tenderness  Abdominal:      General: Abdomen is flat   Bowel sounds are normal  There is no distension  Palpations: Abdomen is soft  Tenderness: There is no abdominal tenderness  There is no guarding or rebound  Musculoskeletal: Normal range of motion  General: No swelling or tenderness  Right lower leg: Edema present  Left lower leg: Edema present  Skin:     General: Skin is warm and dry  Coloration: Skin is not jaundiced  Neurological:      General: No focal deficit present  Mental Status: She is alert and oriented to person, place, and time  Cranial Nerves: No cranial nerve deficit  Sensory: No sensory deficit  Additional Data:     Labs:    Results from last 7 days   Lab Units 09/18/20 0445 09/14/20  1833   WBC Thousand/uL 9 69   < > 14 88*   HEMOGLOBIN g/dL 8 8*   < > 7 9*   HEMATOCRIT % 28 5*   < > 26 4*   PLATELETS Thousands/uL 301   < > 337   BANDS PCT %  --   --  4   NEUTROS PCT % 62   < >  --    LYMPHS PCT % 24   < >  --    LYMPHO PCT %  --   --  4*   MONOS PCT % 8   < >  --    MONO PCT %  --   --  2*   EOS PCT % 4   < > 3    < > = values in this interval not displayed  Results from last 7 days   Lab Units 09/18/20  0445 09/14/20  1833   SODIUM mmol/L 135*   < > 138   POTASSIUM mmol/L 4 4   < > 3 7   CHLORIDE mmol/L 104   < > 101   CO2 mmol/L 23   < > 28   BUN mg/dL 61*   < > 36*   CREATININE mg/dL 1 87*   < > 1 36*   ANION GAP mmol/L 8   < > 9   CALCIUM mg/dL 8 1*   < > 7 8*   ALBUMIN g/dL  --   --  1 4*   TOTAL BILIRUBIN mg/dL  --   --  0 50   ALK PHOS U/L  --   --  184*   ALT U/L  --   --  31   AST U/L  --   --  40   GLUCOSE RANDOM mg/dL 158*   < > 181*    < > = values in this interval not displayed       Results from last 7 days   Lab Units 09/18/20  0601   INR  2 51*     Results from last 7 days   Lab Units 09/18/20  1141 09/18/20  0719 09/17/20  2120 09/17/20  1609 09/17/20  1135 09/17/20  0811 09/16/20  2131 09/16/20  1618 09/16/20  1057 09/16/20  0721 09/15/20  2058 09/15/20  1624   POC GLUCOSE mg/dl 144* 154* 180* 167* 184* 147* 186* 190* 183* 139 234* 203*         Results from last 7 days   Lab Units 09/15/20  0455 09/14/20  0229 09/14/20  0013   LACTIC ACID mmol/L  --   --  1 1   PROCALCITONIN ng/ml 6 48* 0 41*  --            * I Have Reviewed All Lab Data Listed Above  * Additional Pertinent Lab Tests Reviewed: [address] [facility] Admission Reviewed    Imaging:    Imaging Reports Reviewed Today Include:   Imaging Personally Reviewed by Myself Includes:      Recent Cultures (last 7 days):     Results from last 7 days   Lab Units 09/16/20  1457 09/16/20  1456 09/14/20  0944 09/14/20  0059 09/14/20  0013   BLOOD CULTURE  No Growth at 24 hrs  No Growth at 24 hrs   --   --  No Growth After 4 Days    Staphylococcus coagulase negative*   GRAM STAIN RESULT   --   --   --   --  Gram positive cocci in clusters*   URINE CULTURE   --   --   --  30,000-39,000 cfu/ml Candida glabrata*  --    C DIFF TOXIN B   --   --  Positive*  --   --        Last 24 Hours Medication List:   Current Facility-Administered Medications   Medication Dose Route Frequency Provider Last Rate    aspirin  81 mg Oral Daily JESSICA ChanNP      atorvastatin  40 mg Oral Daily With TJ De Leon      chlorhexidine  15 mL Swish & Spit Q12H [address] TJ Kolb      gabapentin  100 mg Oral TID TJ Chan      insulin glargine  5 Units Subcutaneous HS Shalini Salas PA-C      insulin lispro  1-6 Units Subcutaneous HS Louanna Layer, CRNP      insulin lispro  2-12 Units Subcutaneous TID AC Louanna Layer, CRNP      metoprolol tartrate  75 mg Oral Q12H [address] Louanna Layer, CRNP      nystatin   Topical BID Chevis Flock, DO      vancomycin  125 mg Oral Q6H [address] Louanna Layer, CRNP      warfarin  3 mg Oral Daily (warfarin) Mar Raymond PA-C          Today, Patient Was Seen By: Noe Raygoza MD    ** Please Note: Dictation voice to text software may have been used in the creation of this document   **

## 2020-09-18 NOTE — PROGRESS NOTES
NEPHROLOGY PROGRESS NOTE   Charly Deleon 67 y o  female MRN: [id number]  Unit/Bed#: -01 SDU Encounter: [id number]      ASSESSMENT/PLAN:  Acute kidney injury on chronic kidney disease, stage III:  - suspect prerenal secondary to diuretic use with C diff colitis + underlying sepsis +/- ATN  - upon review medical records, baseline creatinine 1 14 mg/dL  - creatinine 1 29 mg/dL upon admission on 09/14   - peak creatinine thus far 2 08 mg/dL 9/17   - most recent creatinine 1 87 mg/dL  - post IVF  - post furosemide 40 mg IV today  - will provide additional furosemide 40 mg IV x 1    - UA completed on 9/14 revealed urine specific gravity 1 015, moderate blood, small leukocytes, > 300 protein, 1-2 RBC, 10-20 WBC, innumerable bacteria  - repeat UA revealed urine specific gravity 1 025, trace ketones, moderate blood, moderate leukocytes, +2 protein, 10-20 RBC, innumerable WBC, innumerable bacteria, 0-1 hyaline cast, 2-3 coarse granular casts  - most recent urine protein creatinine ratio 2 41   - urine eosinophils 1   - C3 complement 63, C4 complement 22    - avoid NSAIDs, nephrotoxic agents, IV contrast if possible  - adjust medications to appropriate GFR  - monitor volume status with strict intake/output  Daily weight  - repeat BMP in am      Electrolytes:  - mild hyponatremia with most recent sodium 135, however given hyperglycemia corrects to nearly 136   - will continue to monitor with repeat lab studies  Acid/base:  - most recent bicarbonate 23 with anion gap of 8    - will continue to monitor with repeat lab studies  Blood pressure/heart rate:  - blood pressure stabilizing, however with fluctuations prior    - outpatient regimen includes:  Metoprolol tartrate 75 mg b i d  + torsemide 20 mg daily  - currently on:  Metoprolol tartrate 75 mg b i d   - optimize hemodynamics  - maintain MAP > 65   - avoid hypotension and fluctuations of blood pressure      Volume status:  - clinically patient examines hypervolemic with bilateral lower extremity edema  - chest x-ray completed yesterday revealed pulmonary edema, CHF is similar to prior study  Anemia likely of chronic disease:  - most recent hemoglobin 8 8 grams/deciliter    - goal hemoglobin greater than 8 grams/deciliter  - will check iron panel    - recommend PRBC for hemoglobin less than 7  Mineral bone disease of chronic kidney disease:  - will monitor PTH and phosphorus as outpatient  Other:  - chronic diastolic heart failure:   - most recent EF 65% with grade 2 diastolic dysfunction  - outpatient regimen includes: Torsemide 20 mg daily  - patient received furosemide 40 mg IV this a m    - continue to monitor volume status  - DM:   - most recent hemoglobin A1c 9 9    - on insulin  - per primary  - C diff colitis:   - on oral Vanco    - per primary  SUBJECTIVE:  Patient resting in bed  She states she has no shortness of breath, chest pain, nausea, vomiting, diarrhea      OBJECTIVE:  Current Weight: Weight - Scale: 86 3 kg (190 lb 4 1 oz)  Vitals:    09/18/20 0715   BP: 150/83   Pulse: 82   Resp: 18   Temp: 97 5 °F (36 4 °C)   SpO2: 98%       Intake/Output Summary (Last 24 hours) at 9/18/2020 5868  Last data filed at 9/18/2020 3487  Gross per 24 hour   Intake 1447 5 ml   Output 1365 ml   Net 82 5 ml       General:  No acute distress  Skin:  Warm, no rash  Eyes:  Sclerae anicteric  ENT:  Moist lips mucous membranes  Neck:  Supple with trachea midline  Chest:  Diminished breath sounds bilaterally   CVS:  Regular rate regular rhythm  Abdomen:  Soft, nontender, normoactive bowel sounds  Extremities:  +2 bilateral lower extremity edema   Neuro:  Alert and oriented  Psych:  Appropriate affect      Medications:  Scheduled Meds:  Current Facility-Administered Medications   Medication Dose Route Frequency Provider Last Rate    aspirin  81 mg Oral Daily TJ Tejeda      atorvastatin  40 mg Oral Daily With [employer] TJ Adam      chlorhexidine  15 mL Swish & Spit Q12H [address], [address]      gabapentin  100 mg Oral TID TJ Guy      insulin glargine  5 Units Subcutaneous HS Justina Ash PA-C      insulin lispro  1-6 Units Subcutaneous HS TJ Keller      insulin lispro  2-12 Units Subcutaneous TID [facility] TJ Keller      metoprolol tartrate  75 mg Oral Q12H [address] TJ Keller      nystatin   Topical BID Viaharrison Silveira       vancomycin  125 mg Oral Q6H [address] Nico [place], [address]      warfarin  3 mg Oral Daily (warfarin) Justina Ash PA-C         PRN Meds:     Continuous Infusions:     Laboratory Results:  Results from last 7 days   Lab Units 09/18/20  0445 09/17/20  0527 09/16/20  0344 09/15/20  0455 09/14/20  1833 09/14/20  0517 09/14/20  0013   WBC Thousand/uL 9 69 8 64 11 00* 11 53* 14 88* 10 72* 13 70*   HEMOGLOBIN g/dL 8 8* 7 0* 7 7* 7 4* 7 9* 7 8* 8 8*   HEMATOCRIT % 28 5* 22 9* 24 8* 24 0* 26 4* 25 2* 29 1*   PLATELETS Thousands/uL 301 300 329 302 337 261 340   SODIUM mmol/L 135* 136 137 140 138 139 139   POTASSIUM mmol/L 4 4 4 2 3 4* 3 6 3 7 3 6 3 6   CHLORIDE mmol/L 104 103 103 103 101 104 101   CO2 mmol/L 23 25 26 27 28 28 37*   BUN mg/dL 61* 52* 44* 39* 36* 35* 36*   CREATININE mg/dL 1 87* 2 08* 2 01* 1 56* 1 36* 1 14 1 29   CALCIUM mg/dL 8 1* 8 2* 7 9* 7 9* 7 8* 7 7* 8 4   MAGNESIUM mg/dL  --  2 7* 2 0 2 1  --  1 6  --    PHOSPHORUS mg/dL  --   --  3 5  --   --  3 7  --

## 2020-09-18 NOTE — ASSESSMENT & PLAN NOTE
Lab Results   Component Value Date    HGBA1C 9 9 (H) 08/26/2020       Recent Labs     09/17/20  1609 09/17/20  2120 09/18/20  0719 09/18/20  1141   POCGLU 167* 180* 154* 144*       Blood Sugar Average: Last 72 hrs:  (P) 178 [id number]     Continue insulin management with 5 units of Lantus HS, sliding scale as ordered

## 2020-09-18 NOTE — ASSESSMENT & PLAN NOTE
Wt Readings from Last 3 Encounters:   09/18/20 86 3 kg (190 lb 4 1 oz)   09/10/20 80 7 kg (177 lb 14 6 oz)   08/25/20 73 kg (161 lb)         · Echo 08/27/2020 -- EF 65%, no acute abnormality, wall thickness mildly increased, changes consistent with concentric remodeling, grade 2 diastolic dysfunction  · She is net positive > 4000 CC      Plan  · Strict I&O   · QD weights  · Will diurese with 40 mg of Lasix today

## 2020-09-18 NOTE — CASE MANAGEMENT
Continue to follow  Call placed to [facility], spoke with Mandie in admissions, informed [facility]a of Pt's tentative discharge over weekend  [facility]a informed CM that they can accept Pt back and will need COVID test before discharge and to have her wound vac go back to the facility with her  [facility]IM notified of COVID test request  Call placed to Pt's son([place]: [phone]), informed Pt's son of tentative discharge for this weekend back to [facility]  Pt's son in agreement with return back to [facility]  CM to follow

## 2020-09-18 NOTE — PROGRESS NOTES
Patient transferred to [facility] with portable KCI wound vac that she came into the hospital with as well as her insulin and nystatin powder given to [facility]  Report given to Brigida Waller

## 2020-09-18 NOTE — PLAN OF CARE
Problem: PHYSICAL THERAPY ADULT  Goal: Performs mobility at highest level of function for planned discharge setting  See evaluation for individualized goals  Description: Treatment/Interventions: ADL retraining, Functional transfer training, LE strengthening/ROM, Therapeutic exercise, Bed mobility, Gait training, Spoke to nursing  Equipment Recommended: Clara Hung, Wheelchair       See flowsheet documentation for full assessment, interventions and recommendations  Outcome: Progressing  Note: Prognosis: Good  Problem List: Decreased strength, Decreased range of motion, Decreased endurance, Impaired balance, Decreased mobility, Decreased coordination, Decreased safety awareness, Decreased skin integrity, Pain  Assessment: Pt  is a 68 yo F who presents with sepsis and sacral wounds  Pt  Agreeable to PT session, Pt  Identified with full name and birthdate  Pt  Demonstrated increased functional independence and increased activity tolerance as evidenced above  Pt  Tolerated increased standing time and demonstrated improved independence with functional transfers requiring decreased need for hands on assistance to tolerate standing  Pt  Is progressing towards goals, as expected and will benefit from continued skilled therapy to increase functional independence and aid patient in return to PLOF with decreased burden of care  D/C recommendation is rehab when medically appropriate  Barriers to Discharge: (medical clearance)     PT Discharge Recommendation: Post-Acute Rehabilitation Services     PT - OK to Discharge: Yes    See flowsheet documentation for full assessment

## 2020-09-18 NOTE — ASSESSMENT & PLAN NOTE
Acute on CKD   Baseline creatinine appears to be 1 2-1 3, now 2 08  This is likely component of prerenal given C diff causing diarrhea versus ATN even septic picture versus medication use as patient received IV vancomycin  The creatinine has improved to 1 87 mainly with 1 unit of packed red blood cells  UA: revealed urine specific gravity 1 025, trace ketones, moderate blood, moderate leukocytes, +2 protein, 10-20 RBC, innumerable WBC, innumerable bacteria, 0-1 hyaline cast, 2-3 coarse granular casts  - most recent urine protein creatinine ratio 2 41   - urine eosinophils 1   - C3 complement 63, C4 complement 22  Plan:  Avoid nephrotoxic agents    Diuresed with 40 mg of IV Lasix  Trend BMP

## 2020-09-18 NOTE — ASSESSMENT & PLAN NOTE
Present on admission, possible sources include C diff, stage IV sacral decubitus ulcer  She had a recent episode of polymicrobial sepsis from infected stage IV decubitus ulcer  Was treated with 2 weeks of Zosyn  Blood cultures 1/2 on 09/14/2020 growing GPCs, coagulase negative staph  Review blood cultures have remained without growth  Urine cultures growing Candida glabrata, possible glabrata  As per infectious disease will not treat  She has improved on p o  Vancomycin  Plan:  Complete treatment with p o  Vancomycin for C diff

## 2020-09-18 NOTE — ASSESSMENT & PLAN NOTE
Hemoglobin this morning is 7, down from 7 7 yesterday      Plan:  Hemoglobin this morning is 8 8 status post 1 unit of packed red blood cell  Recheck CBC in the morning

## 2020-09-18 NOTE — PLAN OF CARE
Problem: OCCUPATIONAL THERAPY ADULT  Goal: Performs self-care activities at highest level of function for planned discharge setting  See evaluation for individualized goals  Description: Treatment Interventions: ADL retraining, Functional transfer training, UE strengthening/ROM, Cognitive reorientation, Patient/family training, Compensatory technique education          See flowsheet documentation for full assessment, interventions and recommendations  Outcome: Progressing  Note: Limitation: Decreased ADL status, Decreased self-care trans, Decreased high-level ADLs, Decreased endurance, Decreased UE ROM, Decreased UE strength  Prognosis: Good  Assessment: Pt seen this date to address bed mobility, standning tolerance and precious hygiene EOB  Grossly max A for bed mobility, tolerates standing with RW up to 3 minutes for hygiene following bowel incontinence  Will benefit from continued OT to maximize independence  Recommending short term rehab at [facility]        OT Discharge Recommendation: Post-Acute Rehabilitation Services

## 2020-09-18 NOTE — OCCUPATIONAL THERAPY NOTE
OccupationalTherapy Progress Note     Patient Name: Cesar Arriaza  Today's Date: 9/18/2020  Problem List  Active Problems:    Type 2 diabetes mellitus with hyperglycemia ([facility] [place] [facility] )    Hypertension    Hyperlipidemia    CKD 3    Decubitus ulcer of sacral region, stage 4 (HCC)    RORY (acute kidney injury) ([facility] [place] [facility] )    Paroxysmal atrial fibrillation (HCC)    Anemia    Stenosis of left carotid artery    Chronic diastolic heart failure ([facility] [place] [facility] )    C  difficile colitis    Sepsis due to Streptococcus species ([facility] )              09/18/20 0941   OT Last Visit   OT Visit Date 09/18/20   Restrictions/Precautions   Weight Bearing Precautions Per Order No   Other Precautions Telemetry;Multiple lines; Fall Risk;O2;Cognitive; Chair Alarm; Bed Alarm   Pain Assessment   Pain Assessment Tool 0-10   Pain Score Worst Possible Pain   Pain Location/Orientation Location: Buttocks   ADL   Where Assessed Edge of bed   Toileting Assistance  2  Maximal Assistance   Toileting Deficit Perineal hygiene;Steadying   Toileting Comments Pt stands from EOB, is incontinent of bowel on side of bed, stands a second time with assistance for linen change and jatinder hygiene before returning to supine  Functional Standing Tolerance   Time 3   Activity Cristal hygiene and change of bedsheets   Bed Mobility   Supine to Sit 3  Moderate assistance   Additional items Assist x 2   Sit to Supine 2  Maximal assistance   Additional items Assist x 2   Additional Comments Pivot on covidian to avoid shearing of sacral wound  Transfers   Sit to Stand 2  Maximal assistance   Additional items Assist x 2   Stand to Sit 4  Minimal assistance   Additional items Assist x 2   Additional Comments Pt side steps x 1 step to [facility], however is too fatigued to continue and sits down  Dependent to boost to [facility]      Cognition   Overall Cognitive Status Impaired   Arousal/Participation Alert   Attention Attends with cues to redirect   Orientation Level Oriented X4   Memory Decreased recall of precautions;Decreased recall of recent events;Decreased short term memory   Following Commands Follows one step commands with increased time or repetition   Activity Tolerance   Activity Tolerance Patient limited by pain   Medical Staff Made Aware RN aware   Assessment   Assessment Pt seen this date to address bed mobility, standning tolerance and precious hygiene EOB  Grossly max A for bed mobility, tolerates standing with RW up to 3 minutes for hygiene following bowel incontinence  Will benefit from continued OT to maximize independence  Recommending short term rehab at [facility]  Plan   Treatment Interventions ADL retraining;Functional transfer training;UE strengthening/ROM; Cognitive reorientation;Patient/family training; Compensatory technique education   Goal Expiration Date 09/25/20   OT Treatment Day 1   OT Frequency 3-5x/wk   Recommendation   OT Discharge Recommendation Post-Acute Rehabilitation Services       [facility], OTR/L

## 2020-09-18 NOTE — ASSESSMENT & PLAN NOTE
Continue vancomycin oral 125 q 6 hours, plan 10 days from last broad spectrum abx  Diarrhea has improved

## 2020-09-19 LAB
ANION GAP SERPL CALCULATED.3IONS-SCNC: 4 MMOL/L (ref 4–13)
BACTERIA BLD CULT: NORMAL
BUN SERPL-MCNC: 57 MG/DL (ref 5–25)
CALCIUM SERPL-MCNC: 8.2 MG/DL (ref 8.3–10.1)
CHLORIDE SERPL-SCNC: 104 MMOL/L (ref 100–108)
CO2 SERPL-SCNC: 30 MMOL/L (ref 21–32)
CREAT SERPL-MCNC: 1.84 MG/DL (ref 0.6–1.3)
GFR SERPL CREATININE-BSD FRML MDRD: 26 ML/MIN/1.73SQ M
GLUCOSE SERPL-MCNC: 150 MG/DL (ref 65–140)
GLUCOSE SERPL-MCNC: 160 MG/DL (ref 65–140)
GLUCOSE SERPL-MCNC: 185 MG/DL (ref 65–140)
GLUCOSE SERPL-MCNC: 212 MG/DL (ref 65–140)
GLUCOSE SERPL-MCNC: 213 MG/DL (ref 65–140)
HCT VFR BLD AUTO: 29.5 % (ref 34.8–46.1)
HGB BLD-MCNC: 9 G/DL (ref 11.5–15.4)
INR PPP: 3.34 (ref 0.84–1.19)
INR PPP: 3.4 (ref 0.84–1.19)
MAGNESIUM SERPL-MCNC: 2.1 MG/DL (ref 1.6–2.6)
POTASSIUM SERPL-SCNC: 4.1 MMOL/L (ref 3.5–5.3)
PROTHROMBIN TIME: 33.7 SECONDS (ref 11.6–14.5)
PROTHROMBIN TIME: 34.1 SECONDS (ref 11.6–14.5)
SARS-COV-2 RNA RESP QL NAA+PROBE: NEGATIVE
SODIUM SERPL-SCNC: 138 MMOL/L (ref 136–145)

## 2020-09-19 PROCEDURE — 99232 SBSQ HOSP IP/OBS MODERATE 35: CPT | Performed by: INTERNAL MEDICINE

## 2020-09-19 PROCEDURE — 85018 HEMOGLOBIN: CPT | Performed by: INTERNAL MEDICINE

## 2020-09-19 PROCEDURE — 83735 ASSAY OF MAGNESIUM: CPT | Performed by: PHYSICIAN ASSISTANT

## 2020-09-19 PROCEDURE — 85014 HEMATOCRIT: CPT | Performed by: INTERNAL MEDICINE

## 2020-09-19 PROCEDURE — 82948 REAGENT STRIP/BLOOD GLUCOSE: CPT

## 2020-09-19 PROCEDURE — 80048 BASIC METABOLIC PNL TOTAL CA: CPT | Performed by: PHYSICIAN ASSISTANT

## 2020-09-19 PROCEDURE — 87635 SARS-COV-2 COVID-19 AMP PRB: CPT | Performed by: PHYSICIAN ASSISTANT

## 2020-09-19 PROCEDURE — 85610 PROTHROMBIN TIME: CPT | Performed by: PHYSICIAN ASSISTANT

## 2020-09-19 RX ORDER — TORSEMIDE 20 MG/1
20 TABLET ORAL DAILY
Status: DISCONTINUED | OUTPATIENT
Start: 2020-09-19 | End: 2020-09-20

## 2020-09-19 RX ORDER — FERROUS SULFATE 325(65) MG
325 TABLET ORAL
Status: DISCONTINUED | OUTPATIENT
Start: 2020-09-20 | End: 2020-09-20 | Stop reason: HOSPADM

## 2020-09-19 RX ORDER — FUROSEMIDE 10 MG/ML
20 INJECTION INTRAMUSCULAR; INTRAVENOUS ONCE
Status: DISCONTINUED | OUTPATIENT
Start: 2020-09-19 | End: 2020-09-19

## 2020-09-19 RX ORDER — WARFARIN SODIUM 2.5 MG/1
2.5 TABLET ORAL
Status: DISCONTINUED | OUTPATIENT
Start: 2020-09-20 | End: 2020-09-20 | Stop reason: HOSPADM

## 2020-09-19 RX ADMIN — INSULIN LISPRO 2 UNITS: 100 INJECTION, SOLUTION INTRAVENOUS; SUBCUTANEOUS at 08:42

## 2020-09-19 RX ADMIN — CHLORHEXIDINE GLUCONATE 0.12% ORAL RINSE 15 ML: 1.2 LIQUID ORAL at 08:41

## 2020-09-19 RX ADMIN — Medication 125 MG: at 01:31

## 2020-09-19 RX ADMIN — INSULIN LISPRO 2 UNITS: 100 INJECTION, SOLUTION INTRAVENOUS; SUBCUTANEOUS at 13:11

## 2020-09-19 RX ADMIN — GABAPENTIN 100 MG: 100 CAPSULE ORAL at 23:16

## 2020-09-19 RX ADMIN — INSULIN LISPRO 2 UNITS: 100 INJECTION, SOLUTION INTRAVENOUS; SUBCUTANEOUS at 01:30

## 2020-09-19 RX ADMIN — INSULIN LISPRO 2 UNITS: 100 INJECTION, SOLUTION INTRAVENOUS; SUBCUTANEOUS at 23:18

## 2020-09-19 RX ADMIN — GABAPENTIN 100 MG: 100 CAPSULE ORAL at 08:41

## 2020-09-19 RX ADMIN — NYSTATIN: 100000 POWDER TOPICAL at 18:05

## 2020-09-19 RX ADMIN — NYSTATIN: 100000 POWDER TOPICAL at 08:44

## 2020-09-19 RX ADMIN — GABAPENTIN 100 MG: 100 CAPSULE ORAL at 17:52

## 2020-09-19 RX ADMIN — Medication 125 MG: at 18:05

## 2020-09-19 RX ADMIN — ASPIRIN 81 MG: 81 TABLET, COATED ORAL at 08:41

## 2020-09-19 RX ADMIN — INSULIN GLARGINE 5 UNITS: 100 INJECTION, SOLUTION SUBCUTANEOUS at 23:16

## 2020-09-19 RX ADMIN — METOPROLOL TARTRATE 75 MG: 50 TABLET, FILM COATED ORAL at 23:13

## 2020-09-19 RX ADMIN — Medication 125 MG: at 13:13

## 2020-09-19 RX ADMIN — INSULIN LISPRO 4 UNITS: 100 INJECTION, SOLUTION INTRAVENOUS; SUBCUTANEOUS at 17:52

## 2020-09-19 RX ADMIN — CHLORHEXIDINE GLUCONATE 0.12% ORAL RINSE 15 ML: 1.2 LIQUID ORAL at 23:16

## 2020-09-19 RX ADMIN — METOPROLOL TARTRATE 75 MG: 50 TABLET, FILM COATED ORAL at 08:41

## 2020-09-19 RX ADMIN — INSULIN GLARGINE 5 UNITS: 100 INJECTION, SOLUTION SUBCUTANEOUS at 01:30

## 2020-09-19 RX ADMIN — Medication 125 MG: at 05:54

## 2020-09-19 RX ADMIN — ATORVASTATIN CALCIUM 40 MG: 40 TABLET, FILM COATED ORAL at 17:51

## 2020-09-19 NOTE — ASSESSMENT & PLAN NOTE
Wt Readings from Last 3 Encounters:   09/18/20 86 3 kg (190 lb 4 1 oz)   09/10/20 80 7 kg (177 lb 14 6 oz)   08/25/20 73 kg (161 lb)         · Echo 08/27/2020 -- EF 65%, no acute abnormality, wall thickness mildly increased, changes consistent with concentric remodeling, grade 2 diastolic dysfunction  Urine output in the last 24 hours is 1595 cc     Weight is stable at 190, down 11 lb from 09/15/2020, which on admission was 185    Plan  Resume home dose of 20 mg torsemide  · Strict I&O   · QD weights

## 2020-09-19 NOTE — ASSESSMENT & PLAN NOTE
INR today supratherapeutic at 3 40  Plan:  Hold today's dose of Coumadin  Check INR number  Restart at 2 5 mg from tomorrow, INR goal is 2-3

## 2020-09-19 NOTE — ASSESSMENT & PLAN NOTE
Present on admission, possible sources include C diff, stage IV sacral decubitus ulcer  She had a recent episode of polymicrobial sepsis from infected stage IV decubitus ulcer  Was treated with 2 weeks of Zosyn  Blood cultures 1/2 on 09/14/2020 growing GPCs, coagulase negative staph  Review blood cultures have remained without growth  Urine cultures growing Candida glabrata, possible glabrata  As per infectious disease will not treat  She has improved on p o  Vancomycin  Plan:  Complete treatment with p o  Vancomycin for C diff  Overall moving in the right direction    Anticipate Okay for discharge to SNF tomorrow

## 2020-09-19 NOTE — PROGRESS NOTES
NEPHROLOGY PROGRESS NOTE    Marc Aguirre 67 y o  female MRN: [id number]  Unit/Bed#: -01 Encounter: [id number]  Reason for Consult:  Acute on chronic kidney disease  Patient is lying in bed she was asking if she is doing better  She did tell me that she is weak in her legs  Otherwise she is not sure she is having diarrhea but feels that it is improved  Otherwise stable  ASSESSMENT/PLAN:  1  Renal    Patient's acute on chronic kidney disease baseline creatinine is around 1 2 - 1 3  The patient had a creatinine of 2 earlier in the hospitalization  She has been placed back on maintenance diuretics and creatinine is 1 8  She is running above baseline likely due to effective volume changes  Today electrolytes are acceptable sodium and potassium were normal     No changes today  Monitor on current dose of diuretic  If creatinine continues to increase likely will hold diuretic to allow volume status to recall a break  2  C diff colitis  Patient is unsure but she does know that she is having less diarrhea  She is on oral vancomycin through 2020  SUBJECTIVE:  Review of Systems   Constitution: Negative for chills, decreased appetite, fever and night sweats  HENT: Negative  Eyes: Negative  Cardiovascular: Negative for chest pain, dyspnea on exertion, orthopnea and palpitations  Respiratory: Negative  Negative for cough, shortness of breath, sputum production and wheezing  Gastrointestinal: Negative for abdominal pain, nausea and vomiting  Less diarrhea but could not quantitate for me  Genitourinary: Negative for hematuria  Catheter in place  Neurological: Negative for dizziness, light-headedness and weakness  Feels weak in her legs  Psychiatric/Behavioral: Negative for altered mental status and hypervigilance  The patient is not nervous/anxious          OBJECTIVE:  Current Weight: Weight - Scale: 86 3 kg (190 lb 4 1 oz)  Vitals:Temp (24hrs), Av 7 °F (36 5 °C), Min:97 2 °F (36 2 °C), Max:98 1 °F (36 7 °C)  Current: Temperature: 97 9 °F (36 6 °C)   Blood pressure 125/92, pulse 90, temperature 97 9 °F (36 6 °C), temperature source Oral, resp  rate 16, height 5' 6" (1 676 m), weight 86 3 kg (190 lb 4 1 oz), SpO2 95 %  , Body mass index is 30 71 kg/m²        Intake/Output Summary (Last 24 hours) at 9/19/2020 1637  Last data filed at 9/19/2020 1345  Gross per 24 hour   Intake    Output 1800 ml   Net -1800 ml       Physical Exam: /92 (BP Location: Left arm)   Pulse 90   Temp 97 9 °F (36 6 °C) (Oral)   Resp 16   Ht 5' 6" (1 676 m)   Wt 86 3 kg (190 lb 4 1 oz)   SpO2 95%   BMI 30 71 kg/m²   Physical Exam    Medications:    Current Facility-Administered Medications:     aspirin (ECOTRIN LOW STRENGTH) EC tablet 81 mg, 81 mg, Oral, Daily, Kristal Carlos PA-C, 81 mg at 09/19/20 0841    atorvastatin (LIPITOR) tablet 40 mg, 40 mg, Oral, Daily With Danna Layne PA-C, 40 mg at 09/18/20 1820    chlorhexidine (PERIDEX) 0 12 % oral rinse 15 mL, 15 mL, Swish & Spit, Q12H [facility], [place] BEATRIZ Hill, 15 mL at 09/19/20 0841    [START ON 9/20/2020] ferrous sulfate tablet 325 mg, 325 mg, Oral, Daily With Breakfast, Kris Oliver MD    gabapentin (NEURONTIN) capsule 100 mg, 100 mg, Oral, TID, [place] EBATRIZ Hill, 100 mg at 09/19/20 0841    insulin glargine (LANTUS) subcutaneous injection 5 Units 0 05 mL, 5 Units, Subcutaneous, HS, Kristal Carlos PA-C, 5 Units at 09/19/20 0130    insulin lispro (HumaLOG) 100 units/mL subcutaneous injection 1-6 Units, 1-6 Units, Subcutaneous, HS, Kristal Carlos PA-C, 2 Units at 09/19/20 0130    insulin lispro (HumaLOG) 100 units/mL subcutaneous injection 2-12 Units, 2-12 Units, Subcutaneous, TID AC, 2 Units at 09/19/20 1311 **AND** Fingerstick Glucose (POCT), , , 4x Daily AC and at bedtime, Kristal Carlos PA-C    metoprolol tartrate (LOPRESSOR) tablet 75 mg, 75 mg, Oral, Q12H [facility], [place] BEATRIZ [place], 75 mg at 09/19/20 0841    nystatin (MYCOSTATIN) powder, , Topical, BID, Jacquie Hill PA-C    torsemide [facility]) tablet 20 mg, 20 mg, Oral, Daily, Chris Russell MD    vancomycin (VANCOCIN) oral solution 125 mg, 125 mg, Oral, Q6H [facility], Jacquie Hill PA-C, 125 mg at 09/19/20 1313    [START ON 9/20/2020] warfarin (COUMADIN) tablet 2 5 mg, 2 5 mg, Oral, Daily (warfarin), Chris Russell MD    Laboratory Results:  Lab Results   Component Value Date    WBC 9 69 09/18/2020    HGB 9 0 (L) 09/19/2020    HCT 29 5 (L) 09/19/2020    MCV 93 09/18/2020     09/18/2020     Lab Results   Component Value Date    SODIUM 138 09/19/2020    K 4 1 09/19/2020     09/19/2020    CO2 30 09/19/2020    BUN 57 (H) 09/19/2020    CREATININE 1 84 (H) 09/19/2020    GLUC 160 (H) 09/19/2020    CALCIUM 8 2 (L) 09/19/2020     Lab Results   Component Value Date    CALCIUM 8 2 (L) 09/19/2020    PHOS 3 5 09/16/2020     No results found for: LABPROT

## 2020-09-19 NOTE — PROGRESS NOTES
Progress Note - Son Kessler 1945, 67 y o  female MRN: [id number]    Unit/Bed#: -01 Encounter: [id number]    Primary Care Provider: Joellen Reynolds DO   Date and time admitted to hospital: 9/13/2020 11:30 PM        C  difficile colitis  Assessment & Plan    Continue vancomycin oral 125 q 6 hours, stop date is 09/27/2020   Diarrhea has improved    Chronic diastolic heart failure (HCC)  Assessment & Plan  Wt Readings from Last 3 Encounters:   09/18/20 86 3 kg (190 lb 4 1 oz)   09/10/20 80 7 kg (177 lb 14 6 oz)   08/25/20 73 kg (161 lb)         · Echo 08/27/2020 -- EF 65%, no acute abnormality, wall thickness mildly increased, changes consistent with concentric remodeling, grade 2 diastolic dysfunction  Urine output in the last 24 hours is 1595 cc  Weight is stable at 190, down 11 lb from 09/15/2020, which on admission was 185    Plan  Resume home dose of 20 mg torsemide  · Strict I&O   · QD weights      Anemia  Assessment & Plan    Continues to improve  9 this morning      Paroxysmal atrial fibrillation (HCC)  Assessment & Plan  INR today supratherapeutic at 3 40  Plan:  Hold today's dose of Coumadin  Check INR number  Restart at 2 5 mg from tomorrow, INR goal is 2-3  RORY (acute kidney injury) ([facility] )  Assessment & Plan  Acute on CKD   Baseline creatinine appears to be 1 2-1 3, now 2 08  This is likely component of prerenal given C diff causing diarrhea versus ATN even septic picture versus medication use as patient received IV vancomycin  The creatinine has improved to 1 87 mainly with 1 unit of packed red blood cells  UA: revealed urine specific gravity 1 025, trace ketones, moderate blood, moderate leukocytes, +2 protein, 10-20 RBC, innumerable WBC, innumerable bacteria, 0-1 hyaline cast, 2-3 coarse granular casts  - most recent urine protein creatinine ratio 2 41   - urine eosinophils 1   - C3 complement 63, C4 complement 22     Still with 2+ pitting pedal edema  Creatinine mildly improved from 1 87-1 84    Plan:  Trend BMP  Encourage p o  intake    Decubitus ulcer of sacral region, stage 4 (HCC)  Assessment & Plan  · Underwent debridement x2 and wound VAC placement with general surgery  · Wound VAC in place  · CT abdomen pelvis performed -- Decubitus ulcer overlying the inferior sacrum and coccyx and soft tissue inflammatory changes   Irregularity of the sacrum which may represent underlying osteomyelitis  Diffuse rectal wall thickening and perirectal inflammatory changes which may represent proctitis or secondary from the above process  · General surgery wound VAC was placed - wound does not look infected    Plan:  Continue wound dressings      * Sepsis due to Streptococcus species [facility])  Assessment & Plan  Present on admission, possible sources include C diff, stage IV sacral decubitus ulcer  She had a recent episode of polymicrobial sepsis from infected stage IV decubitus ulcer  Was treated with 2 weeks of Zosyn  Blood cultures 1/2 on 09/14/2020 growing GPCs, coagulase negative staph  Review blood cultures have remained without growth  Urine cultures growing Candida glabrata, possible glabrata  As per infectious disease will not treat  She has improved on p o  Vancomycin  Plan:  Complete treatment with p o  Vancomycin for C diff  Overall moving in the right direction  Anticipate Okay for discharge to SNF tomorrow         VTE Pharmacologic Prophylaxis:   Pharmacologic: Warfarin (Coumadin)  Mechanical VTE Prophylaxis in Place: Yes    Patient Centered Rounds: I have performed bedside rounds with nursing staff today  Discussions with Specialists or Other Care Team Provider:  No    Education and Discussions with Family / Patient:  Yes    Time Spent for Care: 35 minute  More than 50% of total time spent on counseling and coordination of care as described above      Current Length of Stay: 5 day(s)    Current Patient Status: Inpatient   Certification Statement: The patient will continue to require additional inpatient hospital stay due to Ongoing treatment for C diff colitis  Acute chronic kidney injury    Discharge Plan:     Code Status: Level 1 - Full Code      Subjective:   Seen  Overnight events noted  Feels much better this morning  Objective:     Vitals:   Temp (24hrs), Av 8 °F (36 6 °C), Min:97 2 °F (36 2 °C), Max:98 3 °F (36 8 °C)    Temp:  [97 2 °F (36 2 °C)-98 3 °F (36 8 °C)] 98 1 °F (36 7 °C)  HR:  [59-96] 72  Resp:  [16-20] 16  BP: (101-132)/(58-96) 121/81  SpO2:  [94 %-98 %] 95 %  Body mass index is 30 71 kg/m²  Input and Output Summary (last 24 hours): Intake/Output Summary (Last 24 hours) at 2020 1102  Last data filed at 2020 0144  Gross per 24 hour   Intake 120 ml   Output 1350 ml   Net -1230 ml       Physical Exam:     Physical Exam  Vitals signs reviewed  Constitutional:       General: She is not in acute distress  Appearance: Normal appearance  She is not ill-appearing, toxic-appearing or diaphoretic  HENT:      Head: Normocephalic  Eyes:      Extraocular Movements: Extraocular movements intact  Pupils: Pupils are equal, round, and reactive to light  Neck:      Musculoskeletal: Normal range of motion and neck supple  No neck rigidity or muscular tenderness  Vascular: No carotid bruit  Cardiovascular:      Rate and Rhythm: Normal rate  Rhythm irregular  Pulses: Normal pulses  Heart sounds: Normal heart sounds  No murmur  No friction rub  Pulmonary:      Effort: Pulmonary effort is normal       Breath sounds: No stridor  Rales present  No wheezing or rhonchi  Chest:      Chest wall: No tenderness  Abdominal:      General: Abdomen is flat  Bowel sounds are normal  There is no distension  Palpations: Abdomen is soft  Tenderness: There is no abdominal tenderness  There is no guarding or rebound  Musculoskeletal: Normal range of motion  General: No swelling or tenderness        Right lower leg: No edema  Left lower leg: No edema  Skin:     General: Skin is warm and dry  Coloration: Skin is not jaundiced  Neurological:      General: No focal deficit present  Mental Status: She is alert and oriented to person, place, and time  Cranial Nerves: No cranial nerve deficit  Sensory: No sensory deficit  Additional Data:     Labs:    Results from last 7 days   Lab Units 09/19/20  0440 09/18/20 0445  09/14/20  1833   WBC Thousand/uL  --  9 69   < > 14 88*   HEMOGLOBIN g/dL 9 0* 8 8*   < > 7 9*   HEMATOCRIT % 29 5* 28 5*   < > 26 4*   PLATELETS Thousands/uL  --  301   < > 337   BANDS PCT %  --   --   --  4   NEUTROS PCT %  --  62   < >  --    LYMPHS PCT %  --  24   < >  --    LYMPHO PCT %  --   --   --  4*   MONOS PCT %  --  8   < >  --    MONO PCT %  --   --   --  2*   EOS PCT %  --  4   < > 3    < > = values in this interval not displayed  Results from last 7 days   Lab Units 09/19/20 0440 09/14/20  1833   SODIUM mmol/L 138   < > 138   POTASSIUM mmol/L 4 1   < > 3 7   CHLORIDE mmol/L 104   < > 101   CO2 mmol/L 30   < > 28   BUN mg/dL 57*   < > 36*   CREATININE mg/dL 1 84*   < > 1 36*   ANION GAP mmol/L 4   < > 9   CALCIUM mg/dL 8 2*   < > 7 8*   ALBUMIN g/dL  --   --  1 4*   TOTAL BILIRUBIN mg/dL  --   --  0 50   ALK PHOS U/L  --   --  184*   ALT U/L  --   --  31   AST U/L  --   --  40   GLUCOSE RANDOM mg/dL 160*   < > 181*    < > = values in this interval not displayed       Results from last 7 days   Lab Units 09/19/20  0440   INR  3 40*  3 34*     Results from last 7 days   Lab Units 09/19/20  0807 09/18/20  2149 09/18/20  1551 09/18/20  1141 09/18/20  0719 09/17/20  2120 09/17/20  1609 09/17/20  1135 09/17/20  0811 09/16/20  2131 09/16/20  1618 09/16/20  1057   POC GLUCOSE mg/dl 150* 195* 200* 144* 154* 180* 167* 184* 147* 186* 190* 183*         Results from last 7 days   Lab Units 09/15/20  0455 09/14/20  0229 09/14/20  0013   LACTIC ACID mmol/L  --   --  1 1 PROCALCITONIN ng/ml 6 48* 0 41*  --            * I Have Reviewed All Lab Data Listed Above  * Additional Pertinent Lab Tests Reviewed: All Labs Within Last 24 Hours Reviewed    Imaging:    Imaging Reports Reviewed Today Include:   Imaging Personally Reviewed by Myself Includes:      Recent Cultures (last 7 days):     Results from last 7 days   Lab Units 09/16/20  1457 09/16/20  1456 09/14/20  0944 09/14/20  0059 09/14/20  0013   BLOOD CULTURE  No Growth at 48 hrs  No Growth at 48 hrs   --   --  No Growth After 5 Days    Staphylococcus coagulase negative*   GRAM STAIN RESULT   --   --   --   --  Gram positive cocci in clusters*   URINE CULTURE   --   --   --  30,000-39,000 cfu/ml Candida glabrata*  --    C DIFF TOXIN B   --   --  Positive*  --   --        Last 24 Hours Medication List:   Current Facility-Administered Medications   Medication Dose Route Frequency Provider Last Rate    aspirin  81 mg Oral Daily Félix [place], PA-HERBIE      atorvastatin  40 mg Oral Daily With [email or web address], BEATRIZ      chlorhexidine  15 mL Swish & Spit Q12H [place] [place]      [START ON 9/20/2020] ferrous sulfate  325 mg Oral Daily With Breakfast Jaswant Sánchez MD      gabapentin  100 mg Oral TID Isabella Cool PA-C      insulin glargine  5 Units Subcutaneous HS [facility], BEATRIZ      insulin lispro  1-6 Units Subcutaneous HS [facility], BEATRIZ      insulin lispro  2-12 Units Subcutaneous TID AC Juanis Hill PA-C      metoprolol tartrate  75 mg Oral Q12H [facility], BEATRIZ      nystatin   Topical BID [facility], BEATRIZ      torsemide  20 mg Oral Daily Jaswant Sánchez MD      vancomycin  125 mg Oral Q6H [facility] Juanis Hill PA-C      [START ON 9/20/2020] warfarin  2 5 mg Oral Daily (warfarin) Jaswant Sánchez MD          Today, Patient Was Seen By: Jaswant Sánchez MD    ** Please Note: Dictation voice to text software may have been used in the creation of this document   **

## 2020-09-19 NOTE — ASSESSMENT & PLAN NOTE
Acute on CKD   Baseline creatinine appears to be 1 2-1 3, now 2 08  This is likely component of prerenal given C diff causing diarrhea versus ATN even septic picture versus medication use as patient received IV vancomycin  The creatinine has improved to 1 87 mainly with 1 unit of packed red blood cells  UA: revealed urine specific gravity 1 025, trace ketones, moderate blood, moderate leukocytes, +2 protein, 10-20 RBC, innumerable WBC, innumerable bacteria, 0-1 hyaline cast, 2-3 coarse granular casts  - most recent urine protein creatinine ratio 2 41   - urine eosinophils 1   - C3 complement 63, C4 complement 22     Still with 2+ pitting pedal edema  Creatinine mildly improved from 1 87-1 84    Plan:  Trend BMP  Encourage p o  intake

## 2020-09-20 VITALS
TEMPERATURE: 98.2 F | HEIGHT: 66 IN | OXYGEN SATURATION: 97 % | BODY MASS INDEX: 30.68 KG/M2 | RESPIRATION RATE: 19 BRPM | SYSTOLIC BLOOD PRESSURE: 144 MMHG | DIASTOLIC BLOOD PRESSURE: 77 MMHG | HEART RATE: 91 BPM | WEIGHT: 190.92 LBS

## 2020-09-20 LAB
ANION GAP SERPL CALCULATED.3IONS-SCNC: 4 MMOL/L (ref 4–13)
BUN SERPL-MCNC: 49 MG/DL (ref 5–25)
CALCIUM SERPL-MCNC: 8 MG/DL (ref 8.3–10.1)
CHLORIDE SERPL-SCNC: 104 MMOL/L (ref 100–108)
CO2 SERPL-SCNC: 32 MMOL/L (ref 21–32)
CREAT SERPL-MCNC: 1.41 MG/DL (ref 0.6–1.3)
ERYTHROCYTE [DISTWIDTH] IN BLOOD BY AUTOMATED COUNT: 15.2 % (ref 11.6–15.1)
GFR SERPL CREATININE-BSD FRML MDRD: 36 ML/MIN/1.73SQ M
GLUCOSE SERPL-MCNC: 141 MG/DL (ref 65–140)
GLUCOSE SERPL-MCNC: 158 MG/DL (ref 65–140)
GLUCOSE SERPL-MCNC: 200 MG/DL (ref 65–140)
HCT VFR BLD AUTO: 29.4 % (ref 34.8–46.1)
HGB BLD-MCNC: 9.1 G/DL (ref 11.5–15.4)
INR PPP: 4.33 (ref 0.84–1.19)
MCH RBC QN AUTO: 29.1 PG (ref 26.8–34.3)
MCHC RBC AUTO-ENTMCNC: 31 G/DL (ref 31.4–37.4)
MCV RBC AUTO: 94 FL (ref 82–98)
PLATELET # BLD AUTO: 330 THOUSANDS/UL (ref 149–390)
PMV BLD AUTO: 10.5 FL (ref 8.9–12.7)
POTASSIUM SERPL-SCNC: 4.4 MMOL/L (ref 3.5–5.3)
PROTHROMBIN TIME: 41.1 SECONDS (ref 11.6–14.5)
RBC # BLD AUTO: 3.13 MILLION/UL (ref 3.81–5.12)
SODIUM SERPL-SCNC: 140 MMOL/L (ref 136–145)
WBC # BLD AUTO: 7.65 THOUSAND/UL (ref 4.31–10.16)

## 2020-09-20 PROCEDURE — 99239 HOSP IP/OBS DSCHRG MGMT >30: CPT | Performed by: INTERNAL MEDICINE

## 2020-09-20 PROCEDURE — 85027 COMPLETE CBC AUTOMATED: CPT | Performed by: INTERNAL MEDICINE

## 2020-09-20 PROCEDURE — 85610 PROTHROMBIN TIME: CPT | Performed by: PHYSICIAN ASSISTANT

## 2020-09-20 PROCEDURE — 82948 REAGENT STRIP/BLOOD GLUCOSE: CPT

## 2020-09-20 PROCEDURE — 99232 SBSQ HOSP IP/OBS MODERATE 35: CPT | Performed by: INTERNAL MEDICINE

## 2020-09-20 PROCEDURE — 80048 BASIC METABOLIC PNL TOTAL CA: CPT | Performed by: INTERNAL MEDICINE

## 2020-09-20 RX ORDER — WARFARIN SODIUM 2.5 MG/1
TABLET ORAL
Qty: 30 TABLET | Refills: 0 | Status: SHIPPED | OUTPATIENT
Start: 2020-09-20 | End: 2020-01-01 | Stop reason: CLARIF

## 2020-09-20 RX ORDER — VANCOMYCIN HYDROCHLORIDE 125 MG/1
125 CAPSULE ORAL 4 TIMES DAILY
Qty: 28 CAPSULE | Refills: 0 | Status: SHIPPED | OUTPATIENT
Start: 2020-09-20 | End: 2020-09-27

## 2020-09-20 RX ORDER — TORSEMIDE 20 MG/1
20 TABLET ORAL DAILY
Status: DISCONTINUED | OUTPATIENT
Start: 2020-09-21 | End: 2020-09-20 | Stop reason: HOSPADM

## 2020-09-20 RX ADMIN — NYSTATIN: 100000 POWDER TOPICAL at 09:21

## 2020-09-20 RX ADMIN — TORSEMIDE 20 MG: 20 TABLET ORAL at 09:05

## 2020-09-20 RX ADMIN — Medication 125 MG: at 06:28

## 2020-09-20 RX ADMIN — METOPROLOL TARTRATE 75 MG: 50 TABLET, FILM COATED ORAL at 09:05

## 2020-09-20 RX ADMIN — INSULIN LISPRO 4 UNITS: 100 INJECTION, SOLUTION INTRAVENOUS; SUBCUTANEOUS at 11:52

## 2020-09-20 RX ADMIN — FERROUS SULFATE TAB 325 MG (65 MG ELEMENTAL FE) 325 MG: 325 (65 FE) TAB at 09:05

## 2020-09-20 RX ADMIN — Medication 125 MG: at 00:33

## 2020-09-20 RX ADMIN — ASPIRIN 81 MG: 81 TABLET, COATED ORAL at 09:05

## 2020-09-20 RX ADMIN — Medication 125 MG: at 11:52

## 2020-09-20 RX ADMIN — CHLORHEXIDINE GLUCONATE 0.12% ORAL RINSE 15 ML: 1.2 LIQUID ORAL at 09:21

## 2020-09-20 RX ADMIN — GABAPENTIN 100 MG: 100 CAPSULE ORAL at 09:05

## 2020-09-20 NOTE — ASSESSMENT & PLAN NOTE
· Underwent debridement x2 and wound VAC placement with general surgery  · Wound VAC in place  · CT abdomen pelvis performed -- Decubitus ulcer overlying the inferior sacrum and coccyx and soft tissue inflammatory changes   Irregularity of the sacrum which may represent underlying osteomyelitis  Diffuse rectal wall thickening and perirectal inflammatory changes which may represent proctitis or secondary from the above process    · General surgery wound VAC was placed - wound does not look infected    Recommend regular wound dressings

## 2020-09-20 NOTE — INCIDENTAL FINDINGS
The following findings require follow up:  Radiographic finding   Finding: Decubitus ulcer overlying the inferior sacrum and coccyx and soft tissue inflammatory changes  Irregularity of the sacrum which may represent underlying osteomyelitis  Diffuse rectal wall thickening and perirectal inflammatory changes which may represent proctitis or secondary from the above process

## 2020-09-20 NOTE — CASE MANAGEMENT
Continue to follow  CM notified by SLIM of Pt's discharge today  Call placed to [facility], spoke with nursing supervisor, Bryn Jordan, BRIANA informed Bryn Jordan of Pt's transport time  Maria Elena informed CM to call back once transport time is setup  Call placed to [facility], spoke with Cleo Woods, [facility] ambulance arranged to [facility]  Pickup is 2:30pm  Pt's nurse(Oralia) informed of transport time  Call placed to Pt's son([place]: [phone]) informed Pt's son of discharge and transport time back to [facility]  Pt's son in agreement  Call placed to [facility], spoke with Haris Harris re: transport time for Pt  Haris Harris informed CM that he will inform nursing supervisor of transport time

## 2020-09-20 NOTE — DISCHARGE SUMMARY
Discharge- Romi Mendoza 1945, 67 y o  female MRN: [id number]    Unit/Bed#: -01 Encounter: [id number]    Primary Care Provider: Luis Flores DO   Date and time admitted to hospital: 9/13/2020 11:30 PM        C  difficile colitis  Assessment & Plan    Continue vancomycin oral 125 q 6 hours, stop date is 09/27/2020   Diarrhea resolved    Chronic diastolic heart failure (HCC)  Assessment & Plan  Wt Readings from Last 3 Encounters:   09/20/20 86 6 kg (190 lb 14 7 oz)   09/10/20 80 7 kg (177 lb 14 6 oz)   08/25/20 73 kg (161 lb)         · Echo 08/27/2020 -- EF 65%, no acute abnormality, wall thickness mildly increased, changes consistent with concentric remodeling, grade 2 diastolic dysfunction  Urine output in the last 24 hours is 1595 cc  Weight is stable at 190, down 11 lb from 09/15/2020, which on admission was 185    Plan  Continue home dose of 20 mg torsemide  · Strict I&O   · QD weights  · Trend BMP      Anemia  Assessment & Plan  Stable, hemoglobin is 9 1      Paroxysmal atrial fibrillation (HCC)  Assessment & Plan  INR today supratherapeutic at 4 33, no evidence of bleeding      Plan:  Hold Coumadin, check INR daily, restart coumadin when deemed appropriate  RORY (acute kidney injury) ([facility] [address] )  Assessment & Plan  Acute on CKD   Baseline creatinine appears to be 1 2-1 3, now 2 08  This is likely component of prerenal given C diff causing diarrhea versus ATN even septic picture versus medication use as patient received IV vancomycin  The creatinine has improved to 1 87 mainly with 1 unit of packed red blood cells  UA: revealed urine specific gravity 1 025, trace ketones, moderate blood, moderate leukocytes, +2 protein, 10-20 RBC, innumerable WBC, innumerable bacteria, 0-1 hyaline cast, 2-3 coarse granular casts  - most recent urine protein creatinine ratio 2 41   - urine eosinophils 1   - C3 complement 63, C4 complement 22     Still with 2+ pitting pedal edema      Creatinine today is 1 41, close to her baseline  Plan:  Trend BMP  Encourage p o  intake    Decubitus ulcer of sacral region, stage 4 (HCC)  Assessment & Plan  · Underwent debridement x2 and wound VAC placement with general surgery  · Wound VAC in place  · CT abdomen pelvis performed -- Decubitus ulcer overlying the inferior sacrum and coccyx and soft tissue inflammatory changes   Irregularity of the sacrum which may represent underlying osteomyelitis  Diffuse rectal wall thickening and perirectal inflammatory changes which may represent proctitis or secondary from the above process  · General surgery wound VAC was placed - wound does not look infected    Recommend regular wound dressings      Type 2 diabetes mellitus with hyperglycemia [facility])  Assessment & Plan  Lab Results   Component Value Date    HGBA1C 9 9 (H) 08/26/2020       Recent Labs     09/19/20  1613 09/19/20  2141 09/20/20  0809 09/20/20  1046   POCGLU 213* 212* 141* 200*       Blood Sugar Average: Last 72 hrs:  (P) 629 [id number]     Continue insulin management with 5 units of Lantus HS, sliding scale as ordered  * Sepsis due to Streptococcus species [facility])  Assessment & Plan  Present on admission, possible sources include C diff, stage IV sacral decubitus ulcer  She had a recent episode of polymicrobial sepsis from infected stage IV decubitus ulcer  Was treated with 2 weeks of Zosyn  Blood cultures 1/2 on 09/14/2020 growing GPCs, coagulase negative staph  Review blood cultures have remained without growth  Urine cultures growing Candida glabrata, possible glabrata  As per infectious disease will not treat  She has improved on p o  Vancomycin  Plan:  Complete treatment with p o  Vancomycin for C diff      Okay for discharge to SNF today        Discharging Physician / Practitioner: Kimberly Thompson MD  PCP: Milady Calderón DO  Admission Date:   Admission Orders (From admission, onward)     Ordered        09/14/20 0227  Inpatient Admission  Once Discharge Date: 09/20/20    Resolved Problems  Date Reviewed: 9/16/2020          Resolved    Septic shock 9/16/2020     Resolved by  Cande Badillo PA-C    Supratherapeutic INR 9/16/2020     Resolved by  Savanna Beavers PA-C    Altered mental status 9/15/2020     Resolved by  Allyn Ledbetter MD    Urinary tract infection 9/16/2020     Resolved by  Savanna Beavers PA-C          Consultations During Hospital Stay:  Infectious Disease, Nephrology, surgery  Procedures Performed:   None   Significant Findings / Test Results:   CT head without contrast:  No acute intracranial abnormality      Mild to moderate chronic small vessel ischemic changes    CT abdomen with contrast:  IMPRESSION:     Decubitus ulcer overlying the inferior sacrum and coccyx and soft tissue inflammatory changes  Irregularity of the sacrum which may represent underlying osteomyelitis     Diffuse rectal wall thickening and perirectal inflammatory changes which may represent proctitis or secondary from the above process      Moderate bilateral pleural effusions        Incidental Findings:   · None    Test Results Pending at Discharge (will require follow up): None  Outpatient Tests Requested:  INR  Complications:  None    Reason for Admission:  Altered mental status due to sepsis from C diff colitis  Hospital Course:     Tamika Torres is a 67 y o  female patient who originally presented to the hospital on 9/13/2020 due to altered mental status due to sepsis from C diff colitis  Stool studies positive for C diff toxin B and a   Urine cultures with Candida glabrata, 1 of 2 blood cultures growing Staphylococcus coagulase negative, repeat still without growth for greater than 72 hours  She was initially treated with IV antibiotics and oral antibiotics including vancomycin  IV antibiotics was discontinued as positive blood cultures with coagulase staph is a contaminant  She continued to improve    Other issues dealt with on this admission include acute kidney injury with creatinine elevation to 2 08, baseline is 1 3-1 4, creatinine this morning is 1 4  She was seen in consultation by surgery to evaluate stage IV decubitus ulcer which was deemed to not be the source of infection  INR this morning is supratherapeutic at greater than 4  Coumadin is on hold  Recommendations ongoing discharge:  1  Hold Coumadin, check INR, restart when able  May consider switching to a novel anticoagulant  2  Will continue treatment for C diff colitis with p o  vancomycin  Stop date is 09/27/2020  Please see above list of diagnoses and related plan for additional information  Condition at Discharge: stable     Discharge Day Visit / Exam:     Subjective:  Seen  No overnight events  Has no complaints to offer  Vitals: Blood Pressure: 144/77 (09/20/20 0812)  Pulse: 91 (09/20/20 0812)  Temperature: 98 2 °F (36 8 °C) (09/20/20 0812)  Temp Source: Oral (09/19/20 1531)  Respirations: 19 (09/20/20 0812)  Height: 5' 6" (167 6 cm) (09/14/20 0351)  Weight - Scale: 86 6 kg (190 lb 14 7 oz) (09/20/20 0600)  SpO2: 97 % (09/20/20 3300)  Exam:   Physical Exam  Vitals signs reviewed  Constitutional:       General: She is not in acute distress  Appearance: Normal appearance  She is not ill-appearing, toxic-appearing or diaphoretic  HENT:      Head: Normocephalic and atraumatic  Eyes:      Extraocular Movements: Extraocular movements intact  Pupils: Pupils are equal, round, and reactive to light  Neck:      Musculoskeletal: Normal range of motion and neck supple  No neck rigidity or muscular tenderness  Vascular: No carotid bruit  Cardiovascular:      Rate and Rhythm: Normal rate  Rhythm irregular  Pulses: Normal pulses  Heart sounds: Normal heart sounds  No murmur  No friction rub  Pulmonary:      Effort: Pulmonary effort is normal       Breath sounds: No stridor  No wheezing, rhonchi or rales        Comments: Mildly decreased breath sounds bilateral lower lobes  Chest:      Chest wall: No tenderness  Abdominal:      General: Abdomen is flat  Bowel sounds are normal  There is no distension  Palpations: Abdomen is soft  Tenderness: There is no abdominal tenderness  There is no guarding or rebound  Musculoskeletal: Normal range of motion  General: No swelling or tenderness  Right lower leg: Edema present  Left lower leg: Edema present  Skin:     General: Skin is warm and dry  Coloration: Skin is not jaundiced  Neurological:      General: No focal deficit present  Mental Status: She is alert and oriented to person, place, and time  Cranial Nerves: No cranial nerve deficit  Sensory: No sensory deficit  Discussion with Family:  Yes  Informed of discharge  Discharge instructions/Information to patient and family:   See after visit summary for information provided to patient and family  Provisions for Follow-Up Care:  See after visit summary for information related to follow-up care and any pertinent home health orders  Disposition:     Other [facility] at   [address] [place] [address] to [address] SN[address]:   ·  [facility] - Not Applicable to this Patient    Planned Readmission:  No     Discharge Statement:  I spent 45 minutes discharging the patient  This time was spent on the day of discharge  I had direct contact with the patient on the day of discharge  Greater than 50% of the total time was spent examining patient, answering all patient questions, arranging and discussing plan of care with patient as well as directly providing post-discharge instructions  Additional time then spent on discharge activities  Discharge Medications:  See after visit summary for reconciled discharge medications provided to patient and family        ** Please Note: This note has been constructed using a voice recognition system **

## 2020-09-20 NOTE — DISCHARGE INSTRUCTIONS
Sepsis   WHAT YOU SHOULD KNOW:   Sepsis is a serious condition that occurs when the body overreacts to an infection  It is also called systemic inflammatory response syndrome (SIRS) with infection  An infection is usually caused by bacteria that attack the body  The body's defense system normally fights off infection within the affected body part  With sepsis, the body overreacts and causes symptoms to occur throughout the body  This leads to uncontrolled and widespread inflammation and clotting in small blood vessels  Blood flow to different body parts decreases and may lead to organ failure  Sepsis requires immediate treatment  AFTER YOU LEAVE:   Follow up with your healthcare provider as directed:  Write down your questions so you remember to ask them during your visits  Prevent infection: The following are ways that you can help prevent infection, which can lead to sepsis:  · Ask about vaccines:  Vaccines can decrease your risk of getting certain infections, such as the flu or pneumonia  Ask your healthcare provider if you should get a flu or pneumonia vaccine, and when to get the vaccine  · Avoid the spread of germs:        [facility] your hands often with soap and water  Carry germ-killing gel with you  You can use the gel to clean your hands when there is no soap and water available  ¨ Do not touch your eyes, nose, or mouth unless you have washed your hands first     ¨ Always cover your mouth when you cough  Cough into a tissue or your shirtsleeve so you do not spread germs from your hands  ¨ Try to avoid people who have a cold or the flu  If you are sick, stay away from others as much as possible  Contact your healthcare provider if:   · You have a fever  · You have questions or concerns about your condition or care  Seek care immediately or call 911 if:   · You have increased swelling in your legs, feet, or abdomen  · You are short of breath or you cough up blood      · You have a fast heart rate and your chest hurts  · You feel so dizzy that you have trouble standing up  · Your lips or fingernails are blue  © 2014 [employer] is for End User's use only and may not be sold, redistributed or otherwise used for commercial purposes  All illustrations and images included in CareNotes® are the copyrighted property of A D A M [employer] Inc  or Praneeth Marsh  The above information is an  only  It is not intended as medical advice for individual conditions or treatments  Talk to your doctor, nurse or pharmacist before following any medical regimen to see if it is safe and effective for you

## 2020-09-20 NOTE — ASSESSMENT & PLAN NOTE
Wt Readings from Last 3 Encounters:   09/20/20 86 6 kg (190 lb 14 7 oz)   09/10/20 80 7 kg (177 lb 14 6 oz)   08/25/20 73 kg (161 lb)         · Echo 08/27/2020 -- EF 65%, no acute abnormality, wall thickness mildly increased, changes consistent with concentric remodeling, grade 2 diastolic dysfunction  Urine output in the last 24 hours is 1595 cc     Weight is stable at 190, down 11 lb from 09/15/2020, which on admission was 185    Plan  Continue home dose of 20 mg torsemide  · Strict I&O   · QD weights  · Trend BMP

## 2020-09-20 NOTE — ASSESSMENT & PLAN NOTE
Present on admission, possible sources include C diff, stage IV sacral decubitus ulcer  She had a recent episode of polymicrobial sepsis from infected stage IV decubitus ulcer  Was treated with 2 weeks of Zosyn  Blood cultures 1/2 on 09/14/2020 growing GPCs, coagulase negative staph  Review blood cultures have remained without growth  Urine cultures growing Candida glabrata, possible glabrata  As per infectious disease will not treat  She has improved on p o  Vancomycin  Plan:  Complete treatment with p o  Vancomycin for C diff      Okay for discharge to SNF today

## 2020-09-20 NOTE — COVID-19 HEALTH CARE FACILITY TRANSFER FORM
[facility] to [facility] Transfer - COVID-19 Assessment             Name of Patient: Hattie Lester                : 1945          Transport Date: 20       Has the patient been laboratory tested for COVID-19? []  NO  If No,Test was not indicated per  CDC Testing Criteria   May Transfer Patient   [x] YES  If Tested Results below     COVID-19 References              SARS-CoV-2   Date/Time Value Ref Range Status   2020 04:53 AM Negative Negative Final            Question is to be completed for any patient who tests positive for COVID-19        1  [x] Yes [May Transfer] [] No [May Not Transfer]          Question is to be completed for any patient who is tested for COVID-19            2    [] Yes [May Not Transfer] [x] No [May Transfer]          Signature of Physician or Health Care Professional: Nico Mayers MD 20          Form updated as of 3/24/2020

## 2020-09-20 NOTE — ASSESSMENT & PLAN NOTE
Acute on CKD   Baseline creatinine appears to be 1 2-1 3, now 2 08  This is likely component of prerenal given C diff causing diarrhea versus ATN even septic picture versus medication use as patient received IV vancomycin  The creatinine has improved to 1 87 mainly with 1 unit of packed red blood cells  UA: revealed urine specific gravity 1 025, trace ketones, moderate blood, moderate leukocytes, +2 protein, 10-20 RBC, innumerable WBC, innumerable bacteria, 0-1 hyaline cast, 2-3 coarse granular casts  - most recent urine protein creatinine ratio 2 41   - urine eosinophils 1   - C3 complement 63, C4 complement 22  Still with 2+ pitting pedal edema      Creatinine today is 1 41, close to her baseline      Plan:  Trend BMP  Encourage p o  intake

## 2020-09-20 NOTE — ASSESSMENT & PLAN NOTE
INR today supratherapeutic at 4 33, no evidence of bleeding      Plan:  Hold Coumadin, check INR daily, restart coumadin when deemed appropriate

## 2020-09-20 NOTE — DISCHARGE INSTR - AVS FIRST PAGE
Dear Tamika Torres,     It was our pleasure to care for you here at [facility], [address] [place] [address]  It is our hope that we were always able to exceed the expected standards for your care during your stay  You were hospitalized due to an diarrhea caused by an infection  You were treated with antibiotics  You have improved and are now being discharged to a nursing home for further care  You were cared for on the medical and surgical floor by Allyn Ledbetter MD with the [facility] who covers for your primary care physician (PCP), Jordan Alvares DO, while you were hospitalized  If you have any questions or concerns related to this hospitalization, you may contact us at [phone]  For follow up as well as any medication refills, we recommend that you follow up with your primary care physician  A registered nurse will reach out to you by phone within a few days after your discharge to answer any additional questions that you may have after going home  However, at this time we provide for you here, the most important instructions / recommendations at discharge:     · Notable Medication Adjustments -   · Vancomycin 125 mg capsules 4 times a day for 7 more days  Stop date is 09/27/2020  · Coumadin dose was reduced to 2 5 mg daily  · Testing Required after Discharge -   · INR  · Important follow up information -   · Primary care physician within 1 week  · Other Instructions -   ·   · Please review this entire after visit summary as additional general instructions including medication list, appointments, activity, diet, any pertinent wound care, and other additional recommendations from your care team that may be provided for you        Sincerely,     Allyn Ledbetter MD

## 2020-09-20 NOTE — PROGRESS NOTES
NEPHROLOGY PROGRESS NOTE    Almaz Mike 67 y o  female MRN: [id number]  Unit/Bed#: -01 Encounter: [id number]  Reason for Consult:  Acute renal failure    Patient is scheduled be discharged today  She no longer is having diarrhea  Just generalized weakness no abdominal pain or other specific complaints  ASSESSMENT/PLAN:  1  Renal    Patient acute renal failure due to volume depletion creatinine is back down to her baseline of around 1 4  Diuretic was held because she was having diarrhea was volume depleted  She is to be discharged today so I definitely would reimplant met her outpatient diuretic dose to avoid volume overload  Resume Demadex 20 mg daily once discharged  2  C diff colitis    Diarrhea is improved to complete course of oral vancomycin  SUBJECTIVE:  Review of Systems   Constitution: Positive for malaise/fatigue  Negative for chills, fever and night sweats  HENT: Negative  Eyes: Negative  Cardiovascular: Negative for chest pain, dyspnea on exertion, leg swelling and orthopnea  Respiratory: Negative  Negative for cough, shortness of breath, sputum production and wheezing  Gastrointestinal: Negative for abdominal pain, diarrhea, nausea and vomiting  Diarrhea has resolved according to nurse and patient  Genitourinary:        Catheter in place  Neurological: Positive for weakness  Negative for dizziness, focal weakness and light-headedness  Patient again told me her legs are weak and she can not really walk  Psychiatric/Behavioral: Negative for altered mental status, hallucinations and hypervigilance  The patient is not nervous/anxious  OBJECTIVE:  Current Weight: Weight - Scale: 86 6 kg (190 lb 14 7 oz)  Vitals:Temp (24hrs), Av 9 °F (36 6 °C), Min:97 7 °F (36 5 °C), Max:98 2 °F (36 8 °C)  Current: Temperature: 98 2 °F (36 8 °C)   Blood pressure 144/77, pulse 91, temperature 98 2 °F (36 8 °C), resp   rate 19, height 5' 6" (1 676 m), weight 86 6 kg (190 lb 14 7 oz), SpO2 97 %  , Body mass index is 30 81 kg/m²  Intake/Output Summary (Last 24 hours) at 9/20/2020 1435  Last data filed at 9/20/2020 1319  Gross per 24 hour   Intake 360 ml   Output 900 ml   Net -540 ml       Physical Exam: /77   Pulse 91   Temp 98 2 °F (36 8 °C)   Resp 19   Ht 5' 6" (1 676 m)   Wt 86 6 kg (190 lb 14 7 oz)   SpO2 97%   BMI 30 81 kg/m²   Physical Exam  Constitutional:       General: She is not in acute distress  Appearance: She is not ill-appearing or diaphoretic  HENT:      Head: Normocephalic and atraumatic  Mouth/Throat:      Mouth: Mucous membranes are dry  Eyes:      General: No scleral icterus  Extraocular Movements: Extraocular movements intact  Neck:      Musculoskeletal: Normal range of motion and neck supple  Cardiovascular:      Rate and Rhythm: Normal rate and regular rhythm  Heart sounds: No friction rub  No gallop  Pulmonary:      Effort: Pulmonary effort is normal  No respiratory distress  Breath sounds: No wheezing, rhonchi or rales  Abdominal:      General: Bowel sounds are normal  There is no distension  Palpations: Abdomen is soft  Tenderness: There is no abdominal tenderness  There is no rebound  Neurological:      Mental Status: She is alert           Medications:    Current Facility-Administered Medications:     aspirin (ECOTRIN LOW STRENGTH) EC tablet 81 mg, 81 mg, Oral, Daily, Severino Cheung PA-C, 81 mg at 09/20/20 5976    atorvastatin (LIPITOR) tablet 40 mg, 40 mg, Oral, Daily With Beatriz Ahuja PA-C, 40 mg at 09/19/20 1751    chlorhexidine (PERIDEX) 0 12 % oral rinse 15 mL, 15 mL, Swish & Spit, Q12H [facility], Devan Davis PA-C, 15 mL at 09/20/20 5255    ferrous sulfate tablet 325 mg, 325 mg, Oral, Daily With Breakfast, Lien Martin MD, 325 mg at 09/20/20 0225    gabapentin (NEURONTIN) capsule 100 mg, 100 mg, Oral, TID, Devan Hill PA-C, 100 mg at 09/20/20 3393    insulin glargine (LANTUS) subcutaneous injection 5 Units 0 05 mL, 5 Units, Subcutaneous, HS, Morena Naranjo PA-C, 5 Units at 09/19/20 2316    insulin lispro (HumaLOG) 100 units/mL subcutaneous injection 1-6 Units, 1-6 Units, Subcutaneous, HS, Morena Naranjo PA-C, 2 Units at 09/19/20 2318    insulin lispro (HumaLOG) 100 units/mL subcutaneous injection 2-12 Units, 2-12 Units, Subcutaneous, TID AC, 4 Units at 09/20/20 1152 **AND** Fingerstick Glucose (POCT), , , 4x Daily AC and at bedtime, Morena Naranjo PA-C    metoprolol tartrate (LOPRESSOR) tablet 75 mg, 75 mg, Oral, Q12H [address], Rena Hill PA-C, 75 mg at 09/20/20 8061    nystatin (MYCOSTATIN) powder, , Topical, BID, Rena Hill PA-C    torsemide [facility]) tablet 20 mg, 20 mg, Oral, Daily, Kimberly Thompson MD, 20 mg at 09/20/20 0905    vancomycin (VANCOCIN) oral solution 125 mg, 125 mg, Oral, Q6H [address], Rena Hill PA-C, 125 mg at 09/20/20 1152    warfarin (COUMADIN) tablet 2 5 mg, 2 5 mg, Oral, Daily (warfarin), Kimberly Thompson MD    Laboratory Results:  Lab Results   Component Value Date    WBC 7 65 09/20/2020    HGB 9 1 (L) 09/20/2020    HCT 29 4 (L) 09/20/2020    MCV 94 09/20/2020     09/20/2020     Lab Results   Component Value Date    SODIUM 140 09/20/2020    K 4 4 09/20/2020     09/20/2020    CO2 32 09/20/2020    BUN 49 (H) 09/20/2020    CREATININE 1 41 (H) 09/20/2020    GLUC 158 (H) 09/20/2020    CALCIUM 8 0 (L) 09/20/2020     Lab Results   Component Value Date    CALCIUM 8 0 (L) 09/20/2020    PHOS 3 5 09/16/2020     No results found for: LABPROT

## 2020-09-20 NOTE — ASSESSMENT & PLAN NOTE
Lab Results   Component Value Date    HGBA1C 9 9 (H) 08/26/2020       Recent Labs     09/19/20  1613 09/19/20  2141 09/20/20  0809 09/20/20  1046   POCGLU 213* 212* 141* 200*       Blood Sugar Average: Last 72 hrs:  (P) 176 [id number]     Continue insulin management with 5 units of Lantus HS, sliding scale as ordered

## 2020-09-21 LAB
BACTERIA BLD CULT: NORMAL
BACTERIA BLD CULT: NORMAL

## 2020-09-22 ENCOUNTER — EPISODE CHANGES (OUTPATIENT)
Dept: CASE MANAGEMENT | Facility: OTHER | Age: 75
End: 2020-09-22

## 2020-09-22 ENCOUNTER — PATIENT OUTREACH (OUTPATIENT)
Dept: CASE MANAGEMENT | Facility: OTHER | Age: 75
End: 2020-09-22

## 2020-09-22 ENCOUNTER — TELEMEDICINE (OUTPATIENT)
Dept: CARDIOLOGY CLINIC | Facility: CLINIC | Age: 75
End: 2020-09-22
Payer: MEDICARE

## 2020-09-22 VITALS
HEART RATE: 80 BPM | HEIGHT: 66 IN | OXYGEN SATURATION: 100 % | DIASTOLIC BLOOD PRESSURE: 72 MMHG | TEMPERATURE: 97.8 F | WEIGHT: 173.1 LBS | BODY MASS INDEX: 27.82 KG/M2 | SYSTOLIC BLOOD PRESSURE: 120 MMHG

## 2020-09-22 DIAGNOSIS — N18.30 CKD (CHRONIC KIDNEY DISEASE), STAGE III (HCC): ICD-10-CM

## 2020-09-22 DIAGNOSIS — I50.32 CHRONIC DIASTOLIC CONGESTIVE HEART FAILURE (HCC): ICD-10-CM

## 2020-09-22 DIAGNOSIS — I65.23 BILATERAL CAROTID ARTERY STENOSIS: ICD-10-CM

## 2020-09-22 DIAGNOSIS — E78.5 HYPERLIPIDEMIA, UNSPECIFIED HYPERLIPIDEMIA TYPE: ICD-10-CM

## 2020-09-22 DIAGNOSIS — I10 HYPERTENSION, UNSPECIFIED TYPE: ICD-10-CM

## 2020-09-22 DIAGNOSIS — I48.0 PAROXYSMAL ATRIAL FIBRILLATION (HCC): Primary | ICD-10-CM

## 2020-09-22 PROCEDURE — 99214 OFFICE O/P EST MOD 30 MIN: CPT | Performed by: NURSE PRACTITIONER

## 2020-09-22 RX ORDER — ARGININE/GLUTAMINE/CALCIUM BMB 7G-7G-1.5G
POWDER IN PACKET (EA) ORAL
COMMUNITY

## 2020-09-22 RX ORDER — OXYCODONE AND ACETAMINOPHEN 10; 325 MG/1; MG/1
1 TABLET ORAL AS NEEDED
Status: ON HOLD | COMMUNITY
End: 2021-01-01 | Stop reason: ALTCHOICE

## 2020-09-22 NOTE — PROGRESS NOTES
Call placed to [facility]  No answer or voicemail was available after 3 minutes  Will attempt again tomorrow  Of note patient had a "virtual office visit" today with Cardiology

## 2020-09-22 NOTE — PROGRESS NOTES
Virtual Brief Visit    Assessment/Plan:  1  Paroxysmal atrial fibrillation- Continue on Coumadin for stroke prevention, goal INR 2 0-3 0, Last INR 4 6, coumadin on hold,   managed by the primary care physician at [facility]  Continue metoprolol tartrate 75 mg q 12 hours  2  Chronic HFpEF LVEF 65% by TTE on 8/27/20, Romeo LE most likely due to inability to walk, According to the nurse lung sounds clear but diminished romeo bases  I will order romeo LE compression stockings to be removed at night  Continue on   Torsemide 20 mg daily, ASA 81mg daily,  Metoprolol tartrate 75 mg q 12 hours  Maintain a 2 gram daily sodium diet and 1500 ml daily fluid restriction  Check daily weights  If you gain 3 pounds in one day, 5 pounds in one week, or experience worsening shortness of breath or increasing lower leg swelling  Please call the heart failure office at [phone]     3  ICA stenosis Left >70%, Right <50% continue aspirin 81 mg daily, Lipitor 40 mg daily  4  Hypertension 120/72 per nursing at [facility],  Continue metoprolol tartrate 75 mg q 12 hours  5  Hyperlipidemia 8/26/20 TC 77, , HDL 10, LDL 23-  Continue Lipitor 40 mg daily  6  CKD III baseline creat appears 1 41  7  Sacral decubitus pressure ulcer sp debridement wound care per [facility] PCP   8  DM2 HgbA1C 9 9 on 8/26/20     Problem List Items Addressed This Visit     None                Reason for visit is   Chief Complaint   Patient presents with   [address]  f/u  Afib           Virtual Brief Visit        Encounter provider TJ Corona    Provider located at [place] [id number]  [address] [facility] [address] [place] [address]    Recent Visits  No visits were found meeting these conditions     Showing recent visits within past 7 days and meeting all other requirements     Today's Visits  Date Type Provider Dept   09/22/20 [address], [address] Showing today's visits and meeting all other requirements     Future Appointments  No visits were found meeting these conditions  Showing future appointments within next 150 days and meeting all other requirements        After connecting through telephone, the patient was identified by name and date of birth  Marlene Gallardo was informed that this is a telemedicine visit and that the visit is being conducted through telephone  My office door was closed  No one else was in the room  She acknowledged consent and understanding of privacy and security of the platform  The patient has agreed to participate and understands she can discontinue the visit at any time  Patient is aware this is a billable service  Humphrey Gallardo is a 67 y o  female presented to [address] [place] [address] on 8/25/20 with complaints of weakness  Ms Elpidio Rod was at home  And experienced generalized weakness  She called EMS  On arrival of EMS she was found to be in atrial fibrillation RVR  En route to the hospital she became hypotensive  EMS cardioverted Ms Elpidio Rod due to unstable atrial fibrillation with RVR  After the cardioversion Regina Durham developed right sided facial droop, right sided weakness, and dysarthria  Stroke alert was called  She received tPA on arrival to the ED  MRI of the brain showed no acute changes,  Mild chronic microvascular ischemia disease  She was transferred to [facility] for further treatment  Ms Marlene Gallardo was admitted to [facility] on 8/25- 9/10/20 for  Septic shock due to positive bacteremia  Ms Elpidio Rod was treated with IV Fluids  MRI of the brain did not show any acute findings  Doppler studies of the carotid arteries showed CA stenosis Left >70%, Right <50%She was treated with IV Bumex for iatrogenic volume overload  General surgery was consulted for decubitus ulcer  On 8/26/20 she underwent debridement and wash out of sacral decubitus ulcer    ID consulted for gram positive bacteria  PICC line placed  She was treated with IV Zosyn for 14 day  PICC line discontinued  Ms Doug Zuniga [facility] on 9/13 - 9/20/20 sp sepsis due to streptococcus species  She presented to the emergency room due to altered mental status  She was found to have C diff colitis  Urine cultures grew [facility]  She was treated with IV Antibiotics and transitioned to oral anitbiotics  Creat improved from 2 08 on admission to 1 4 the morning of discharge  Discharged to [facility] for rehabilitation  Today's visit was conducted via telephone  Ms Dillon Law continues at [facility] in rehabilitations  She is not a good historian  Xin Kidd denies CP, palpitations, dyspnea with rest   She  Admits to slight lightheadedness and dizziness with sitting up  She is not ambulatory  She admits to fatigue and hemant LE edema  Xin Kidd states she is eating and drinking well  [facility] [address] [facility]  Diminished breath sounds bilateral bases, left hand swelling and 1+ bilateral lower extremity edema  Coumadin on hold due to INR 4 6        HPI   Chronic HFpEF LVEF   ICA stenosis Left >70%, Right <50%  DM2 HgbA1C 9 9 on 8/26/20   Hypertension  Hyperlipidemia  CKD III  Anemia  5/2020 fall sustaining closed proximal right femur fracture  Hip fracture sp right hip IM nailing on  5/2020  Pressure ulcer  Past Medical History:   Diagnosis Date    [facility] ([facility]F) ([facility] )     Acute respiratory failure with hypoxia (HCC)     Atrial fibrillation ([facility] )     CHF (congestive heart failure) (HCC)     Chronic kidney disease     Diabetes mellitus ([facility] )     type 2    Hyperlipidemia     Hypertension     Stroke [facility])        Past Surgical History:   Procedure Laterality Date    BREAST SURGERY Left     lumpectomy benign    CATARACT EXTRACTION Bilateral 2017    CATARACT EXTRACTION W/ INTRAOCULAR LENS IMPLANT Left 12/11/2017    Procedure: EXTRACTION EXTRACAPSULAR CATARACT PHACO INTRAOCULAR LENS (IOL); Surgeon: Hawk Lam MD;  Location: [facility] MAIN OR;  Service: Ophthalmology    DE OPEN RX FEMUR FX+INTRAMED RAYMOND Right 5/21/2020    Procedure: INSERTION OF SHORT TROCHANTERIC FEMORAL NAIL;  Surgeon: Sudhakar Eagle MD;  [facility] Main OR;  Service: Orthopedics    [address] HUMERAL&GLENOID COMPNT Right 9/10/2018    Procedure: RIGHT REVERSE TOTAL SHOULDER ARTHROPLASTY;  Surgeon: Sudhakar Eagle MD;  [facility] Main [facility];  Service: Orthopedics    DE XCAPSL CTRC RMVL INSJ IO LENS PROSTH W/O ECP Right 10/23/2017    Procedure: EXTRACTION EXTRACAPSULAR CATARACT PHACO INTRAOCULAR LENS (IOL); Surgeon: Hawk Lam MD;  Location: [facility] MAIN OR;  Service: Ophthalmology    WOUND DEBRIDEMENT N/A 8/26/2020    Procedure: EXCISIONAL DEBRIDEMENT OF SACRAL PRESSURE WOUND, WASHOUT;;  Surgeon: Misael Arias MD;  Location[facility] MAIN OR;  Service: General    WOUND DEBRIDEMENT N/A 8/28/2020    Procedure: BUTTOCKS DEBRIDEMENT WOUND AND DRESSING CHANGE ([address] OUT);   Surgeon: Trung Escalona MD;  Location: [facility] MAIN OR;  Service: General       Current Outpatient Medications   Medication Sig Dispense Refill    aspirin (ECOTRIN LOW STRENGTH) 81 mg EC tablet Take 1 tablet (81 mg total) by mouth daily 30 tablet 0    atorvastatin (LIPITOR) 40 mg tablet Take 1 tablet (40 mg total) by mouth daily with dinner 30 tablet 0    ferrous sulfate 325 (65 Fe) mg tablet TAKE 1 TABLET BY MOUTH ONCE A DAY FOR SUPPLEMENT      gabapentin (NEURONTIN) 100 mg capsule Take 100 mg by mouth 3 (three) times a day      insulin glargine (LANTUS) 100 units/mL subcutaneous injection Inject 14 Units under the skin daily at bedtime 10 mL 0    insulin lispro (HumaLOG) 100 units/mL injection Inject 7 Units under the skin 3 (three) times a day with meals 5 mL 0    metoprolol tartrate 75 MG TABS Take 1 tablet (75 mg total) by mouth every 12 (twelve) hours 60 tablet 0    Nutritional Supplements (Yevgeniy) PACK Take by mouth      oxyCODONE-acetaminophen (PERCOCET)  mg per tablet Take 1 tablet by mouth as needed for moderate pain      polyethylene glycol (MIRALAX) 17 g packet Take 17 g by mouth daily 14 each 0    Specialty Vitamins Products (PROSTATE PO) Take by mouth 2 (two) times a day      torsemide (DEMADEX) 20 mg tablet Take 1 tablet (20 mg total) by mouth daily 30 tablet 0    vancomycin (VANCOCIN) 125 MG capsule Take 1 capsule (125 mg total) by mouth 4 (four) times a day for 7 days 28 capsule 0    VITAMIN D PO Take 50,000 mg by mouth 2 (two) times a week       warfarin (COUMADIN) 2 5 mg tablet Take 1 tablet daily at 6:00 p m  30 tablet 0     No current facility-administered medications for this visit  No Known Allergies    Review of Systems   Constitutional: Positive for fatigue  Musculoskeletal: Positive for gait problem  Neurological: Positive for dizziness and light-headedness  All other systems reviewed and are negative  Vitals:    09/22/20 0952   BP: 120/72   BP Location: Right arm   Patient Position: Sitting   Cuff Size: Standard   Pulse: 80   Temp: 97 8 °F (36 6 °C)   SpO2: 100%   Weight: 78 5 kg (173 lb 1 6 oz)   Height: 5' 6" (1 676 m)         I spent 20 minutes directly with the patient during this visit    VIRTUAL VISIT DISCLAIMER    Cole Yakima acknowledges that she has consented to an online visit or consultation  She understands that the online visit is based solely on information provided by her, and that, in the absence of a face-to-face physical evaluation by the physician, the diagnosis she receives is both limited and provisional in terms of accuracy and completeness  This is not intended to replace a full medical face-to-face evaluation by the physician  Cole Montemayor understands and accepts these terms

## 2020-09-22 NOTE — PATIENT INSTRUCTIONS
Maintain a 2 gram daily sodium diet and 1500 ml to 2 liters daily fluid restriction  Check daily weights  If you gain 3 pounds in one day, 5 pounds in one week, or experience worsening shortness of breath or increasing lower leg swelling  Please call the heart failure office at [phone]  Please bring a  list of your current medications and daily weights to the office visit      Romeo LE compression stockings, remove at night

## 2020-09-23 ENCOUNTER — PATIENT OUTREACH (OUTPATIENT)
Dept: CASE MANAGEMENT | Facility: OTHER | Age: 75
End: 2020-09-23

## 2020-09-23 ENCOUNTER — TELEPHONE (OUTPATIENT)
Dept: SURGERY | Facility: CLINIC | Age: 75
End: 2020-09-23

## 2020-09-23 NOTE — TELEPHONE ENCOUNTER
Dean Hall from [facility] called to set up an appointment for Bang Brinda regarding her ulcer of sacral regain, stage 4  She was informed that she will see one of our doctors at the [address] [place] [address] to make an appointment there  Dean Hall understood and was transferred to wound center

## 2020-09-23 NOTE — PROGRESS NOTES
Call placed to [facility]  Patient was transferred to [facility] on 9/20 after an inpatient stay for sepsis  She is stable per nursing staff  She is followed by wound center for stage IV sacral ulcer

## 2020-09-24 ENCOUNTER — TELEPHONE (OUTPATIENT)
Dept: NEPHROLOGY | Facility: CLINIC | Age: 75
End: 2020-09-24

## 2020-09-24 NOTE — TELEPHONE ENCOUNTER
----- Message from Gladys Quiles, [address] sent at 9/21/2020  8:34 AM EDT -----  Please schedule for post hospital follow up for RORY with repeat BMP prior to appointment  Thanks

## 2020-09-28 NOTE — TELEPHONE ENCOUNTER
Pt is currently in an assisted nursing facility  I did schedule the post hospital f/u as a telemed appt 10/7/2020

## 2020-10-12 PROBLEM — R21 RASH: Status: ACTIVE | Noted: 2020-01-01

## 2021-01-01 ENCOUNTER — PATIENT OUTREACH (OUTPATIENT)
Dept: CASE MANAGEMENT | Facility: OTHER | Age: 76
End: 2021-01-01

## 2021-01-01 ENCOUNTER — ANESTHESIA (OUTPATIENT)
Dept: INTERVENTIONAL RADIOLOGY/VASCULAR | Facility: HOSPITAL | Age: 76
End: 2021-01-01

## 2021-01-01 ENCOUNTER — APPOINTMENT (EMERGENCY)
Dept: CT IMAGING | Facility: HOSPITAL | Age: 76
DRG: 291 | End: 2021-01-01
Payer: MEDICARE

## 2021-01-01 ENCOUNTER — APPOINTMENT (EMERGENCY)
Dept: RADIOLOGY | Facility: HOSPITAL | Age: 76
DRG: 291 | End: 2021-01-01
Payer: MEDICARE

## 2021-01-01 ENCOUNTER — HOSPITAL ENCOUNTER (INPATIENT)
Facility: HOSPITAL | Age: 76
LOS: 5 days | Discharge: NURSING, REHAB OR LONG-TERM CARE | DRG: 291 | End: 2021-04-13
Attending: EMERGENCY MEDICINE | Admitting: INTERNAL MEDICINE
Payer: MEDICARE

## 2021-01-01 ENCOUNTER — TELEPHONE (OUTPATIENT)
Dept: NEPHROLOGY | Facility: CLINIC | Age: 76
End: 2021-01-01

## 2021-01-01 ENCOUNTER — OFFICE VISIT (OUTPATIENT)
Dept: CARDIOLOGY CLINIC | Facility: CLINIC | Age: 76
End: 2021-01-01
Payer: MEDICARE

## 2021-01-01 ENCOUNTER — ANESTHESIA EVENT (OUTPATIENT)
Dept: INTERVENTIONAL RADIOLOGY/VASCULAR | Facility: HOSPITAL | Age: 76
End: 2021-01-01

## 2021-01-01 ENCOUNTER — HOSPITAL ENCOUNTER (OUTPATIENT)
Dept: NON INVASIVE DIAGNOSTICS | Facility: CLINIC | Age: 76
Discharge: HOME | End: 2021-09-10
Payer: MEDICARE

## 2021-01-01 ENCOUNTER — OFFICE VISIT (OUTPATIENT)
Dept: WOUND CARE | Facility: HOSPITAL | Age: 76
End: 2021-01-01
Payer: MEDICARE

## 2021-01-01 ENCOUNTER — TELEPHONE (OUTPATIENT)
Dept: VASCULAR SURGERY | Facility: CLINIC | Age: 76
End: 2021-01-01

## 2021-01-01 ENCOUNTER — DOCUMENTATION (OUTPATIENT)
Dept: OTHER | Facility: HOSPITAL | Age: 76
End: 2021-01-01

## 2021-01-01 ENCOUNTER — APPOINTMENT (INPATIENT)
Dept: GASTROENTEROLOGY | Facility: HOSPITAL | Age: 76
DRG: 539 | End: 2021-01-01
Attending: INTERNAL MEDICINE
Payer: MEDICARE

## 2021-01-01 ENCOUNTER — APPOINTMENT (EMERGENCY)
Dept: RADIOLOGY | Facility: HOSPITAL | Age: 76
DRG: 539 | End: 2021-01-01
Payer: MEDICARE

## 2021-01-01 ENCOUNTER — OFFICE VISIT (OUTPATIENT)
Dept: UROLOGY | Facility: HOSPITAL | Age: 76
End: 2021-01-01
Payer: MEDICARE

## 2021-01-01 ENCOUNTER — APPOINTMENT (INPATIENT)
Dept: RADIOLOGY | Facility: HOSPITAL | Age: 76
DRG: 539 | End: 2021-01-01
Payer: MEDICARE

## 2021-01-01 ENCOUNTER — OFFICE VISIT (OUTPATIENT)
Dept: WOUND CARE | Facility: HOSPITAL | Age: 76
End: 2021-01-01
Payer: COMMERCIAL

## 2021-01-01 ENCOUNTER — OFFICE VISIT (OUTPATIENT)
Dept: NEPHROLOGY | Facility: HOSPITAL | Age: 76
End: 2021-01-01
Payer: MEDICARE

## 2021-01-01 ENCOUNTER — ANESTHESIA EVENT (INPATIENT)
Dept: GASTROENTEROLOGY | Facility: HOSPITAL | Age: 76
DRG: 539 | End: 2021-01-01
Payer: MEDICARE

## 2021-01-01 ENCOUNTER — APPOINTMENT (EMERGENCY)
Dept: CT IMAGING | Facility: HOSPITAL | Age: 76
DRG: 539 | End: 2021-01-01
Payer: MEDICARE

## 2021-01-01 ENCOUNTER — APPOINTMENT (INPATIENT)
Dept: NON INVASIVE DIAGNOSTICS | Facility: HOSPITAL | Age: 76
DRG: 291 | End: 2021-01-01
Payer: MEDICARE

## 2021-01-01 ENCOUNTER — TELEPHONE (OUTPATIENT)
Dept: NEUROLOGY | Facility: CLINIC | Age: 76
End: 2021-01-01

## 2021-01-01 ENCOUNTER — HOSPITAL ENCOUNTER (OUTPATIENT)
Dept: CT IMAGING | Facility: HOSPITAL | Age: 76
Discharge: HOME | End: 2021-06-29
Attending: FAMILY MEDICINE
Payer: MEDICARE

## 2021-01-01 ENCOUNTER — TELEPHONE (OUTPATIENT)
Dept: NEPHROLOGY | Facility: HOSPITAL | Age: 76
End: 2021-01-01

## 2021-01-01 ENCOUNTER — TELEPHONE (OUTPATIENT)
Dept: INTERVENTIONAL RADIOLOGY/VASCULAR | Facility: HOSPITAL | Age: 76
End: 2021-01-01

## 2021-01-01 ENCOUNTER — TELEPHONE (OUTPATIENT)
Dept: UROLOGY | Facility: AMBULATORY SURGERY CENTER | Age: 76
End: 2021-01-01

## 2021-01-01 ENCOUNTER — HOSPITAL ENCOUNTER (INPATIENT)
Facility: HOSPITAL | Age: 76
LOS: 9 days | Discharge: NURSING, REHAB OR LONG-TERM CARE | DRG: 871 | End: 2021-05-13
Attending: EMERGENCY MEDICINE | Admitting: INTERNAL MEDICINE
Payer: MEDICARE

## 2021-01-01 ENCOUNTER — PREP FOR PROCEDURE (OUTPATIENT)
Dept: INTERVENTIONAL RADIOLOGY/VASCULAR | Facility: CLINIC | Age: 76
End: 2021-01-01

## 2021-01-01 ENCOUNTER — HOSPITAL ENCOUNTER (OUTPATIENT)
Dept: NON INVASIVE DIAGNOSTICS | Facility: CLINIC | Age: 76
Discharge: HOME | End: 2021-03-29
Payer: MEDICARE

## 2021-01-01 ENCOUNTER — TELEPHONE (OUTPATIENT)
Dept: RADIOLOGY | Facility: HOSPITAL | Age: 76
End: 2021-01-01

## 2021-01-01 ENCOUNTER — APPOINTMENT (INPATIENT)
Dept: INTERVENTIONAL RADIOLOGY/VASCULAR | Facility: HOSPITAL | Age: 76
DRG: 291 | End: 2021-01-01
Payer: MEDICARE

## 2021-01-01 ENCOUNTER — HOSPITAL ENCOUNTER (INPATIENT)
Facility: HOSPITAL | Age: 76
LOS: 7 days | Discharge: NURSING, REHAB OR LONG-TERM CARE | DRG: 291 | End: 2021-09-08
Attending: EMERGENCY MEDICINE | Admitting: INTERNAL MEDICINE
Payer: MEDICARE

## 2021-01-01 ENCOUNTER — ANESTHESIA (INPATIENT)
Dept: GASTROENTEROLOGY | Facility: HOSPITAL | Age: 76
DRG: 539 | End: 2021-01-01
Payer: MEDICARE

## 2021-01-01 ENCOUNTER — DOCUMENTATION (OUTPATIENT)
Dept: NEPHROLOGY | Facility: CLINIC | Age: 76
End: 2021-01-01

## 2021-01-01 ENCOUNTER — HOSPITAL ENCOUNTER (OUTPATIENT)
Dept: INTERVENTIONAL RADIOLOGY/VASCULAR | Facility: HOSPITAL | Age: 76
Discharge: HOME | End: 2021-07-14
Attending: RADIOLOGY | Admitting: RADIOLOGY
Payer: MEDICARE

## 2021-01-01 ENCOUNTER — HOSPITAL ENCOUNTER (INPATIENT)
Facility: HOSPITAL | Age: 76
LOS: 5 days | Discharge: NURSING, REHAB OR LONG-TERM CARE | DRG: 539 | End: 2021-07-10
Attending: EMERGENCY MEDICINE | Admitting: INTERNAL MEDICINE
Payer: MEDICARE

## 2021-01-01 ENCOUNTER — OFFICE VISIT (OUTPATIENT)
Dept: VASCULAR SURGERY | Facility: CLINIC | Age: 76
End: 2021-01-01
Payer: MEDICARE

## 2021-01-01 ENCOUNTER — OFFICE VISIT (OUTPATIENT)
Dept: WOUND CARE | Facility: HOSPITAL | Age: 76
DRG: 871 | End: 2021-01-01
Payer: MEDICARE

## 2021-01-01 VITALS
RESPIRATION RATE: 17 BRPM | DIASTOLIC BLOOD PRESSURE: 74 MMHG | TEMPERATURE: 99.4 F | HEART RATE: 87 BPM | BODY MASS INDEX: 27.8 KG/M2 | HEIGHT: 66 IN | WEIGHT: 173 LBS | SYSTOLIC BLOOD PRESSURE: 126 MMHG

## 2021-01-01 VITALS
TEMPERATURE: 97.7 F | RESPIRATION RATE: 18 BRPM | HEART RATE: 72 BPM | SYSTOLIC BLOOD PRESSURE: 110 MMHG | DIASTOLIC BLOOD PRESSURE: 80 MMHG

## 2021-01-01 VITALS
DIASTOLIC BLOOD PRESSURE: 56 MMHG | SYSTOLIC BLOOD PRESSURE: 120 MMHG | HEART RATE: 88 BPM | RESPIRATION RATE: 16 BRPM | TEMPERATURE: 97.5 F

## 2021-01-01 VITALS
DIASTOLIC BLOOD PRESSURE: 75 MMHG | HEIGHT: 66 IN | SYSTOLIC BLOOD PRESSURE: 163 MMHG | WEIGHT: 125.66 LBS | BODY MASS INDEX: 20.2 KG/M2 | RESPIRATION RATE: 18 BRPM | TEMPERATURE: 98.4 F | OXYGEN SATURATION: 98 % | HEART RATE: 75 BPM

## 2021-01-01 VITALS
TEMPERATURE: 98 F | OXYGEN SATURATION: 98 % | HEART RATE: 78 BPM | RESPIRATION RATE: 22 BRPM | DIASTOLIC BLOOD PRESSURE: 60 MMHG | SYSTOLIC BLOOD PRESSURE: 130 MMHG

## 2021-01-01 VITALS
DIASTOLIC BLOOD PRESSURE: 63 MMHG | HEART RATE: 63 BPM | OXYGEN SATURATION: 94 % | SYSTOLIC BLOOD PRESSURE: 142 MMHG | RESPIRATION RATE: 18 BRPM | WEIGHT: 158.95 LBS | TEMPERATURE: 97.9 F | HEIGHT: 66 IN | BODY MASS INDEX: 25.55 KG/M2

## 2021-01-01 VITALS
RESPIRATION RATE: 18 BRPM | SYSTOLIC BLOOD PRESSURE: 122 MMHG | HEART RATE: 64 BPM | DIASTOLIC BLOOD PRESSURE: 62 MMHG | TEMPERATURE: 98 F

## 2021-01-01 VITALS
RESPIRATION RATE: 17 BRPM | HEART RATE: 72 BPM | HEIGHT: 66 IN | WEIGHT: 159.83 LBS | DIASTOLIC BLOOD PRESSURE: 51 MMHG | TEMPERATURE: 97.9 F | SYSTOLIC BLOOD PRESSURE: 138 MMHG | OXYGEN SATURATION: 96 % | BODY MASS INDEX: 25.69 KG/M2

## 2021-01-01 VITALS
TEMPERATURE: 97.2 F | RESPIRATION RATE: 16 BRPM | HEART RATE: 80 BPM | SYSTOLIC BLOOD PRESSURE: 102 MMHG | DIASTOLIC BLOOD PRESSURE: 42 MMHG

## 2021-01-01 VITALS
TEMPERATURE: 96.7 F | SYSTOLIC BLOOD PRESSURE: 142 MMHG | HEART RATE: 88 BPM | RESPIRATION RATE: 18 BRPM | DIASTOLIC BLOOD PRESSURE: 82 MMHG

## 2021-01-01 VITALS
HEART RATE: 70 BPM | SYSTOLIC BLOOD PRESSURE: 110 MMHG | DIASTOLIC BLOOD PRESSURE: 50 MMHG | RESPIRATION RATE: 18 BRPM | TEMPERATURE: 97.5 F

## 2021-01-01 VITALS
RESPIRATION RATE: 17 BRPM | WEIGHT: 148.15 LBS | HEIGHT: 66 IN | OXYGEN SATURATION: 96 % | SYSTOLIC BLOOD PRESSURE: 107 MMHG | DIASTOLIC BLOOD PRESSURE: 71 MMHG | BODY MASS INDEX: 23.81 KG/M2 | HEART RATE: 81 BPM | TEMPERATURE: 98.5 F

## 2021-01-01 VITALS — HEART RATE: 70 BPM | SYSTOLIC BLOOD PRESSURE: 110 MMHG | DIASTOLIC BLOOD PRESSURE: 60 MMHG

## 2021-01-01 VITALS
HEART RATE: 76 BPM | SYSTOLIC BLOOD PRESSURE: 104 MMHG | TEMPERATURE: 96.6 F | RESPIRATION RATE: 18 BRPM | DIASTOLIC BLOOD PRESSURE: 60 MMHG

## 2021-01-01 VITALS — SYSTOLIC BLOOD PRESSURE: 98 MMHG | HEART RATE: 94 BPM | OXYGEN SATURATION: 98 % | DIASTOLIC BLOOD PRESSURE: 56 MMHG

## 2021-01-01 VITALS
RESPIRATION RATE: 18 BRPM | HEART RATE: 72 BPM | TEMPERATURE: 96.9 F | DIASTOLIC BLOOD PRESSURE: 52 MMHG | SYSTOLIC BLOOD PRESSURE: 108 MMHG

## 2021-01-01 VITALS — RESPIRATION RATE: 12 BRPM | HEART RATE: 65 BPM | SYSTOLIC BLOOD PRESSURE: 106 MMHG | DIASTOLIC BLOOD PRESSURE: 45 MMHG

## 2021-01-01 VITALS
HEART RATE: 88 BPM | DIASTOLIC BLOOD PRESSURE: 58 MMHG | BODY MASS INDEX: 20.28 KG/M2 | OXYGEN SATURATION: 100 % | HEIGHT: 66 IN | SYSTOLIC BLOOD PRESSURE: 138 MMHG

## 2021-01-01 DIAGNOSIS — I50.9 CHF (CONGESTIVE HEART FAILURE) (HCC): ICD-10-CM

## 2021-01-01 DIAGNOSIS — L89.154 PRESSURE ULCER OF SACRAL REGION, STAGE 4 (HCC): ICD-10-CM

## 2021-01-01 DIAGNOSIS — I48.0 PAROXYSMAL ATRIAL FIBRILLATION (HCC): ICD-10-CM

## 2021-01-01 DIAGNOSIS — E78.5 HYPERLIPIDEMIA, UNSPECIFIED HYPERLIPIDEMIA TYPE: ICD-10-CM

## 2021-01-01 DIAGNOSIS — S00.83XA CONTUSION OF FOREHEAD, INITIAL ENCOUNTER: ICD-10-CM

## 2021-01-01 DIAGNOSIS — Z79.4 TYPE 2 DIABETES MELLITUS WITH HYPERGLYCEMIA, WITH LONG-TERM CURRENT USE OF INSULIN (HCC): ICD-10-CM

## 2021-01-01 DIAGNOSIS — L89.154 PRESSURE ULCER OF SACRAL REGION, STAGE 4 (HCC): Primary | ICD-10-CM

## 2021-01-01 DIAGNOSIS — N17.9 ACUTE KIDNEY INJURY SUPERIMPOSED ON CHRONIC KIDNEY DISEASE (HCC): ICD-10-CM

## 2021-01-01 DIAGNOSIS — N17.9 ACUTE RENAL FAILURE SUPERIMPOSED ON STAGE 3A CHRONIC KIDNEY DISEASE, UNSPECIFIED ACUTE RENAL FAILURE TYPE (HCC): ICD-10-CM

## 2021-01-01 DIAGNOSIS — R41.0 CONFUSION: ICD-10-CM

## 2021-01-01 DIAGNOSIS — Z23 ENCOUNTER FOR IMMUNIZATION: ICD-10-CM
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
## 2021-01-01 DIAGNOSIS — I10 ESSENTIAL HYPERTENSION: ICD-10-CM

## 2021-01-01 DIAGNOSIS — N18.9 ACUTE KIDNEY INJURY SUPERIMPOSED ON CHRONIC KIDNEY DISEASE (HCC): ICD-10-CM

## 2021-01-01 DIAGNOSIS — R50.9 FEVER, UNSPECIFIED FEVER CAUSE: ICD-10-CM

## 2021-01-01 DIAGNOSIS — I50.32 CHRONIC DIASTOLIC HEART FAILURE (HCC): ICD-10-CM

## 2021-01-01 DIAGNOSIS — E11.65 TYPE 2 DIABETES MELLITUS WITH HYPERGLYCEMIA, WITH LONG-TERM CURRENT USE OF INSULIN (HCC): ICD-10-CM

## 2021-01-01 DIAGNOSIS — B02.9 HERPES ZOSTER WITHOUT COMPLICATION: ICD-10-CM

## 2021-01-01 DIAGNOSIS — I65.23 CAROTID STENOSIS, ASYMPTOMATIC, BILATERAL: ICD-10-CM

## 2021-01-01 DIAGNOSIS — D64.9 SEVERE ANEMIA: ICD-10-CM

## 2021-01-01 DIAGNOSIS — R60.0 BILATERAL LOWER EXTREMITY EDEMA: ICD-10-CM

## 2021-01-01 DIAGNOSIS — N39.0 UTI (URINARY TRACT INFECTION): ICD-10-CM

## 2021-01-01 DIAGNOSIS — I48.0 PAROXYSMAL ATRIAL FIBRILLATION (HCC): Primary | ICD-10-CM

## 2021-01-01 DIAGNOSIS — Z86.39 HISTORY OF VITAMIN D DEFICIENCY: ICD-10-CM

## 2021-01-01 DIAGNOSIS — R77.8 LOW SERUM COMPLEMENT C3: ICD-10-CM

## 2021-01-01 DIAGNOSIS — K92.2 GI BLEEDING: Primary | ICD-10-CM

## 2021-01-01 DIAGNOSIS — D64.9 ANEMIA, UNSPECIFIED TYPE: ICD-10-CM

## 2021-01-01 DIAGNOSIS — N17.9 AKI (ACUTE KIDNEY INJURY) (HCC): ICD-10-CM

## 2021-01-01 DIAGNOSIS — I65.23 BILATERAL CAROTID ARTERY STENOSIS: ICD-10-CM

## 2021-01-01 DIAGNOSIS — N17.9 ACUTE KIDNEY INJURY (HCC): ICD-10-CM

## 2021-01-01 DIAGNOSIS — M86.9 OSTEOMYELITIS OF OTHER SITE, UNSPECIFIED TYPE (HCC): ICD-10-CM

## 2021-01-01 DIAGNOSIS — Z97.8 CHRONIC INDWELLING FOLEY CATHETER: Primary | ICD-10-CM

## 2021-01-01 DIAGNOSIS — I65.23 CAROTID STENOSIS, ASYMPTOMATIC, BILATERAL: Primary | ICD-10-CM

## 2021-01-01 DIAGNOSIS — Z97.8 CHRONIC INDWELLING FOLEY CATHETER: ICD-10-CM

## 2021-01-01 DIAGNOSIS — R80.8 OTHER PROTEINURIA: ICD-10-CM

## 2021-01-01 DIAGNOSIS — N18.31 STAGE 3A CHRONIC KIDNEY DISEASE (HCC): Primary | ICD-10-CM

## 2021-01-01 DIAGNOSIS — T14.8XXA BLEEDING FROM WOUND: Primary | ICD-10-CM

## 2021-01-01 DIAGNOSIS — R19.7 DIARRHEA, UNSPECIFIED TYPE: ICD-10-CM

## 2021-01-01 DIAGNOSIS — J96.11 CHRONIC RESPIRATORY FAILURE WITH HYPOXIA (HCC): ICD-10-CM

## 2021-01-01 DIAGNOSIS — L89.154 SACRAL DECUBITUS ULCER, STAGE IV (HCC): ICD-10-CM

## 2021-01-01 DIAGNOSIS — N17.9 AKI (ACUTE KIDNEY INJURY) (HCC): Primary | ICD-10-CM

## 2021-01-01 DIAGNOSIS — I65.22 STENOSIS OF LEFT CAROTID ARTERY: ICD-10-CM

## 2021-01-01 DIAGNOSIS — J81.0 ACUTE PULMONARY EDEMA (HCC): Primary | ICD-10-CM

## 2021-01-01 DIAGNOSIS — I50.32 CHRONIC DIASTOLIC HEART FAILURE (HCC): Primary | ICD-10-CM

## 2021-01-01 DIAGNOSIS — J96.01 ACUTE RESPIRATORY FAILURE WITH HYPOXIA (HCC): ICD-10-CM

## 2021-01-01 DIAGNOSIS — R19.5 GUAIAC POSITIVE STOOLS: ICD-10-CM

## 2021-01-01 DIAGNOSIS — R91.8 PULMONARY INFILTRATE IN RIGHT LUNG ON CXR: ICD-10-CM

## 2021-01-01 DIAGNOSIS — E11.65 TYPE 2 DIABETES MELLITUS WITH HYPERGLYCEMIA (HCC): ICD-10-CM

## 2021-01-01 DIAGNOSIS — J90 PLEURAL EFFUSION: Primary | ICD-10-CM

## 2021-01-01 DIAGNOSIS — I50.33 ACUTE ON CHRONIC DIASTOLIC (CONGESTIVE) HEART FAILURE (HCC): ICD-10-CM

## 2021-01-01 DIAGNOSIS — N18.32 STAGE 3B CHRONIC KIDNEY DISEASE (HCC): ICD-10-CM

## 2021-01-01 DIAGNOSIS — I50.32 CHRONIC DIASTOLIC CONGESTIVE HEART FAILURE (HCC): ICD-10-CM

## 2021-01-01 DIAGNOSIS — R77.8 ELEVATED TROPONIN: ICD-10-CM

## 2021-01-01 DIAGNOSIS — N18.31 ACUTE RENAL FAILURE SUPERIMPOSED ON STAGE 3A CHRONIC KIDNEY DISEASE, UNSPECIFIED ACUTE RENAL FAILURE TYPE (HCC): ICD-10-CM

## 2021-01-01 DIAGNOSIS — G93.41 ACUTE METABOLIC ENCEPHALOPATHY: ICD-10-CM

## 2021-01-01 LAB
ABO GROUP BLD BPU: NORMAL
ABO GROUP BLD: NORMAL
ALBUMIN SERPL BCP-MCNC: 1.8 G/DL (ref 3.5–5)
ALBUMIN SERPL BCP-MCNC: 1.9 G/DL (ref 3.5–5)
ALBUMIN SERPL BCP-MCNC: 1.9 G/DL (ref 3.5–5)
ALBUMIN SERPL BCP-MCNC: 2 G/DL (ref 3.5–5)
ALBUMIN SERPL BCP-MCNC: 2 G/DL (ref 3.5–5)
ALBUMIN SERPL BCP-MCNC: 2.1 G/DL (ref 3.5–5)
ALP SERPL-CCNC: 101 U/L (ref 46–116)
ALP SERPL-CCNC: 84 U/L (ref 46–116)
ALP SERPL-CCNC: 85 U/L (ref 46–116)
ALP SERPL-CCNC: 94 U/L (ref 46–116)
ALP SERPL-CCNC: 99 U/L (ref 46–116)
ALP SERPL-CCNC: 99 U/L (ref 46–116)
ALT SERPL W P-5'-P-CCNC: 12 U/L (ref 12–78)
ALT SERPL W P-5'-P-CCNC: 12 U/L (ref 12–78)
ALT SERPL W P-5'-P-CCNC: 13 U/L (ref 12–78)
ALT SERPL W P-5'-P-CCNC: 13 U/L (ref 12–78)
ALT SERPL W P-5'-P-CCNC: 25 U/L (ref 12–78)
ALT SERPL W P-5'-P-CCNC: 9 U/L (ref 12–78)
ANION GAP SERPL CALCULATED.3IONS-SCNC: 1 MMOL/L (ref 4–13)
ANION GAP SERPL CALCULATED.3IONS-SCNC: 10 MMOL/L (ref 4–13)
ANION GAP SERPL CALCULATED.3IONS-SCNC: 10 MMOL/L (ref 4–13)
ANION GAP SERPL CALCULATED.3IONS-SCNC: 2 MMOL/L (ref 4–13)
ANION GAP SERPL CALCULATED.3IONS-SCNC: 3 MMOL/L (ref 4–13)
ANION GAP SERPL CALCULATED.3IONS-SCNC: 4 MMOL/L (ref 4–13)
ANION GAP SERPL CALCULATED.3IONS-SCNC: 5 MMOL/L (ref 4–13)
ANION GAP SERPL CALCULATED.3IONS-SCNC: 6 MMOL/L (ref 4–13)
ANION GAP SERPL CALCULATED.3IONS-SCNC: 7 MMOL/L (ref 4–13)
ANION GAP SERPL CALCULATED.3IONS-SCNC: 8 MMOL/L (ref 4–13)
ANION GAP SERPL CALCULATED.3IONS-SCNC: 9 MMOL/L (ref 4–13)
APPEARANCE FLD: CLEAR
APTT PPP: 47 SECONDS (ref 23–37)
APTT PPP: 51 SECONDS (ref 23–37)
APTT PPP: 52 SECONDS (ref 23–37)
ARTERIAL PATENCY WRIST A: 13
AST SERPL W P-5'-P-CCNC: 10 U/L (ref 5–45)
AST SERPL W P-5'-P-CCNC: 11 U/L (ref 5–45)
AST SERPL W P-5'-P-CCNC: 13 U/L (ref 5–45)
AST SERPL W P-5'-P-CCNC: 13 U/L (ref 5–45)
AST SERPL W P-5'-P-CCNC: 17 U/L (ref 5–45)
AST SERPL W P-5'-P-CCNC: 24 U/L (ref 5–45)
ATRIAL RATE: 500 BPM
ATRIAL RATE: 58 BPM
ATRIAL RATE: 60 BPM
ATRIAL RATE: 82 BPM
BACTERIA BLD CULT: NORMAL
BACTERIA SPEC BFLD CULT: NO GROWTH
BACTERIA UR CULT: ABNORMAL
BACTERIA UR CULT: NORMAL
BACTERIA UR QL AUTO: ABNORMAL /HPF
BACTERIA WND AEROBE CULT: ABNORMAL
BASE EXCESS BLDA CALC-SCNC: 14 MMOL/L (ref -2–3)
BASOPHILS # BLD AUTO: 0.04 THOUSANDS/ΜL (ref 0–0.1)
BASOPHILS # BLD AUTO: 0.05 THOUSANDS/ΜL (ref 0–0.1)
BASOPHILS # BLD AUTO: 0.06 THOUSANDS/ΜL (ref 0–0.1)
BASOPHILS # BLD AUTO: 0.07 THOUSANDS/ΜL (ref 0–0.1)
BASOPHILS # BLD AUTO: 0.08 THOUSANDS/ΜL (ref 0–0.1)
BASOPHILS # BLD AUTO: 0.08 THOUSANDS/ΜL (ref 0–0.1)
BASOPHILS NFR BLD AUTO: 0 % (ref 0–1)
BASOPHILS NFR BLD AUTO: 1 % (ref 0–1)
BILIRUB SERPL-MCNC: 0.3 MG/DL (ref 0.2–1)
BILIRUB SERPL-MCNC: 0.4 MG/DL (ref 0.2–1)
BILIRUB SERPL-MCNC: 0.6 MG/DL (ref 0.2–1)
BILIRUB SERPL-MCNC: 1.1 MG/DL (ref 0.2–1)
BILIRUB UR QL STRIP: NEGATIVE
BLD GP AB SCN SERPL QL: NEGATIVE
BPU ID: NORMAL
BUN SERPL-MCNC: 102 MG/DL (ref 5–25)
BUN SERPL-MCNC: 102 MG/DL (ref 5–25)
BUN SERPL-MCNC: 108 MG/DL (ref 5–25)
BUN SERPL-MCNC: 109 MG/DL (ref 5–25)
BUN SERPL-MCNC: 112 MG/DL (ref 5–25)
BUN SERPL-MCNC: 113 MG/DL (ref 5–25)
BUN SERPL-MCNC: 20 MG/DL (ref 5–25)
BUN SERPL-MCNC: 21 MG/DL (ref 5–25)
BUN SERPL-MCNC: 22 MG/DL (ref 5–25)
BUN SERPL-MCNC: 23 MG/DL (ref 5–25)
BUN SERPL-MCNC: 25 MG/DL (ref 5–25)
BUN SERPL-MCNC: 29 MG/DL (ref 5–25)
BUN SERPL-MCNC: 33 MG/DL (ref 5–25)
BUN SERPL-MCNC: 36 MG/DL (ref 5–25)
BUN SERPL-MCNC: 45 MG/DL (ref 5–25)
BUN SERPL-MCNC: 46 MG/DL (ref 5–25)
BUN SERPL-MCNC: 52 MG/DL (ref 5–25)
BUN SERPL-MCNC: 53 MG/DL (ref 5–25)
BUN SERPL-MCNC: 55 MG/DL (ref 5–25)
BUN SERPL-MCNC: 64 MG/DL (ref 5–25)
BUN SERPL-MCNC: 65 MG/DL (ref 5–25)
BUN SERPL-MCNC: 67 MG/DL (ref 5–25)
BUN SERPL-MCNC: 70 MG/DL (ref 5–25)
BUN SERPL-MCNC: 73 MG/DL (ref 5–25)
BUN SERPL-MCNC: 74 MG/DL (ref 5–25)
BUN SERPL-MCNC: 75 MG/DL (ref 5–25)
BUN SERPL-MCNC: 76 MG/DL (ref 5–25)
BUN SERPL-MCNC: 80 MG/DL (ref 5–25)
BUN SERPL-MCNC: 82 MG/DL (ref 5–25)
BUN SERPL-MCNC: 98 MG/DL (ref 5–25)
C3 SERPL-MCNC: 98.6 MG/DL (ref 90–180)
C4 SERPL-MCNC: 34 MG/DL (ref 10–40)
CA-I BLD-SCNC: 1.31 MMOL/L (ref 1.12–1.32)
CALCIUM ALBUM COR SERPL-MCNC: 10.2 MG/DL (ref 8.3–10.1)
CALCIUM ALBUM COR SERPL-MCNC: 10.6 MG/DL (ref 8.3–10.1)
CALCIUM ALBUM COR SERPL-MCNC: 10.7 MG/DL (ref 8.3–10.1)
CALCIUM ALBUM COR SERPL-MCNC: 10.8 MG/DL (ref 8.3–10.1)
CALCIUM ALBUM COR SERPL-MCNC: 9.9 MG/DL (ref 8.3–10.1)
CALCIUM ALBUM COR SERPL-MCNC: 9.9 MG/DL (ref 8.3–10.1)
CALCIUM SERPL-MCNC: 7.8 MG/DL (ref 8.3–10.1)
CALCIUM SERPL-MCNC: 7.9 MG/DL (ref 8.3–10.1)
CALCIUM SERPL-MCNC: 7.9 MG/DL (ref 8.3–10.1)
CALCIUM SERPL-MCNC: 8.2 MG/DL (ref 8.3–10.1)
CALCIUM SERPL-MCNC: 8.3 MG/DL (ref 8.3–10.1)
CALCIUM SERPL-MCNC: 8.3 MG/DL (ref 8.3–10.1)
CALCIUM SERPL-MCNC: 8.4 MG/DL (ref 8.3–10.1)
CALCIUM SERPL-MCNC: 8.4 MG/DL (ref 8.3–10.1)
CALCIUM SERPL-MCNC: 8.5 MG/DL (ref 8.3–10.1)
CALCIUM SERPL-MCNC: 8.6 MG/DL (ref 8.3–10.1)
CALCIUM SERPL-MCNC: 8.7 MG/DL (ref 8.3–10.1)
CALCIUM SERPL-MCNC: 8.9 MG/DL (ref 8.3–10.1)
CALCIUM SERPL-MCNC: 9 MG/DL (ref 8.3–10.1)
CALCIUM SERPL-MCNC: 9 MG/DL (ref 8.3–10.1)
CALCIUM SERPL-MCNC: 9.1 MG/DL (ref 8.3–10.1)
CALCIUM SERPL-MCNC: 9.3 MG/DL (ref 8.3–10.1)
CAOX CRY URNS QL MICRO: ABNORMAL /HPF
CHLORIDE SERPL-SCNC: 100 MMOL/L (ref 100–108)
CHLORIDE SERPL-SCNC: 101 MMOL/L (ref 100–108)
CHLORIDE SERPL-SCNC: 102 MMOL/L (ref 100–108)
CHLORIDE SERPL-SCNC: 103 MMOL/L (ref 100–108)
CHLORIDE SERPL-SCNC: 104 MMOL/L (ref 100–108)
CHLORIDE SERPL-SCNC: 105 MMOL/L (ref 100–108)
CHLORIDE SERPL-SCNC: 106 MMOL/L (ref 100–108)
CHLORIDE SERPL-SCNC: 107 MMOL/L (ref 100–108)
CHLORIDE SERPL-SCNC: 108 MMOL/L (ref 100–108)
CHLORIDE SERPL-SCNC: 109 MMOL/L (ref 100–108)
CHLORIDE SERPL-SCNC: 110 MMOL/L (ref 100–108)
CHLORIDE SERPL-SCNC: 111 MMOL/L (ref 100–108)
CHLORIDE SERPL-SCNC: 111 MMOL/L (ref 100–108)
CHLORIDE SERPL-SCNC: 114 MMOL/L (ref 100–108)
CHLORIDE SERPL-SCNC: 114 MMOL/L (ref 100–108)
CHLORIDE SERPL-SCNC: 99 MMOL/L (ref 100–108)
CK SERPL-CCNC: 18 U/L (ref 26–192)
CLARITY UR: ABNORMAL
CLARITY UR: ABNORMAL
CLARITY UR: CLEAR
CO2 SERPL-SCNC: 23 MMOL/L (ref 21–32)
CO2 SERPL-SCNC: 24 MMOL/L (ref 21–32)
CO2 SERPL-SCNC: 24 MMOL/L (ref 21–32)
CO2 SERPL-SCNC: 25 MMOL/L (ref 21–32)
CO2 SERPL-SCNC: 26 MMOL/L (ref 21–32)
CO2 SERPL-SCNC: 26 MMOL/L (ref 21–32)
CO2 SERPL-SCNC: 27 MMOL/L (ref 21–32)
CO2 SERPL-SCNC: 29 MMOL/L (ref 21–32)
CO2 SERPL-SCNC: 30 MMOL/L (ref 21–32)
CO2 SERPL-SCNC: 31 MMOL/L (ref 21–32)
CO2 SERPL-SCNC: 31 MMOL/L (ref 21–32)
CO2 SERPL-SCNC: 32 MMOL/L (ref 21–32)
CO2 SERPL-SCNC: 33 MMOL/L (ref 21–32)
CO2 SERPL-SCNC: 34 MMOL/L (ref 21–32)
CO2 SERPL-SCNC: 35 MMOL/L (ref 21–32)
CO2 SERPL-SCNC: 37 MMOL/L (ref 21–32)
CO2 SERPL-SCNC: 40 MMOL/L (ref 21–32)
COLOR FLD: NORMAL
COLOR UR: YELLOW
CREAT SERPL-MCNC: 0.85 MG/DL (ref 0.6–1.3)
CREAT SERPL-MCNC: 0.89 MG/DL (ref 0.6–1.3)
CREAT SERPL-MCNC: 0.92 MG/DL (ref 0.6–1.3)
CREAT SERPL-MCNC: 0.92 MG/DL (ref 0.6–1.3)
CREAT SERPL-MCNC: 0.95 MG/DL (ref 0.6–1.3)
CREAT SERPL-MCNC: 1 MG/DL (ref 0.6–1.3)
CREAT SERPL-MCNC: 1.01 MG/DL (ref 0.6–1.3)
CREAT SERPL-MCNC: 1.02 MG/DL (ref 0.6–1.3)
CREAT SERPL-MCNC: 1.03 MG/DL (ref 0.6–1.3)
CREAT SERPL-MCNC: 1.04 MG/DL (ref 0.6–1.3)
CREAT SERPL-MCNC: 1.05 MG/DL (ref 0.6–1.3)
CREAT SERPL-MCNC: 1.06 MG/DL (ref 0.6–1.3)
CREAT SERPL-MCNC: 1.07 MG/DL (ref 0.6–1.3)
CREAT SERPL-MCNC: 1.09 MG/DL (ref 0.6–1.3)
CREAT SERPL-MCNC: 1.1 MG/DL (ref 0.6–1.3)
CREAT SERPL-MCNC: 1.11 MG/DL (ref 0.6–1.3)
CREAT SERPL-MCNC: 1.13 MG/DL (ref 0.6–1.3)
CREAT SERPL-MCNC: 1.18 MG/DL (ref 0.6–1.3)
CREAT SERPL-MCNC: 1.18 MG/DL (ref 0.6–1.3)
CREAT SERPL-MCNC: 1.19 MG/DL (ref 0.6–1.3)
CREAT SERPL-MCNC: 1.21 MG/DL (ref 0.6–1.3)
CREAT SERPL-MCNC: 1.36 MG/DL (ref 0.6–1.3)
CREAT SERPL-MCNC: 1.56 MG/DL (ref 0.6–1.3)
CREAT SERPL-MCNC: 1.59 MG/DL (ref 0.6–1.3)
CREAT SERPL-MCNC: 1.6 MG/DL (ref 0.6–1.3)
CREAT SERPL-MCNC: 1.98 MG/DL (ref 0.6–1.3)
CREAT SERPL-MCNC: 2.25 MG/DL (ref 0.6–1.3)
CREAT SERPL-MCNC: 2.36 MG/DL (ref 0.6–1.3)
CREAT SERPL-MCNC: 2.4 MG/DL (ref 0.6–1.3)
CREAT SERPL-MCNC: 2.55 MG/DL (ref 0.6–1.3)
CREAT SERPL-MCNC: 2.81 MG/DL (ref 0.6–1.3)
CREAT SERPL-MCNC: 2.93 MG/DL (ref 0.6–1.3)
CROSSMATCH: NORMAL
CRP SERPL QL: 91.4 MG/L
DS:DELIVERY SYSTEM: 1
EOSINOPHIL # BLD AUTO: 0.15 THOUSAND/ΜL (ref 0–0.61)
EOSINOPHIL # BLD AUTO: 0.18 THOUSAND/ΜL (ref 0–0.61)
EOSINOPHIL # BLD AUTO: 0.21 THOUSAND/ΜL (ref 0–0.61)
EOSINOPHIL # BLD AUTO: 0.21 THOUSAND/ΜL (ref 0–0.61)
EOSINOPHIL # BLD AUTO: 0.25 THOUSAND/ΜL (ref 0–0.61)
EOSINOPHIL # BLD AUTO: 0.25 THOUSAND/ΜL (ref 0–0.61)
EOSINOPHIL # BLD AUTO: 0.27 THOUSAND/ΜL (ref 0–0.61)
EOSINOPHIL # BLD AUTO: 0.28 THOUSAND/ΜL (ref 0–0.61)
EOSINOPHIL # BLD AUTO: 0.29 THOUSAND/ΜL (ref 0–0.61)
EOSINOPHIL # BLD AUTO: 0.31 THOUSAND/ΜL (ref 0–0.61)
EOSINOPHIL # BLD AUTO: 0.33 THOUSAND/ΜL (ref 0–0.61)
EOSINOPHIL # BLD AUTO: 0.36 THOUSAND/ΜL (ref 0–0.61)
EOSINOPHIL # BLD AUTO: 0.36 THOUSAND/ΜL (ref 0–0.61)
EOSINOPHIL # BLD AUTO: 0.37 THOUSAND/ΜL (ref 0–0.61)
EOSINOPHIL # BLD AUTO: 0.37 THOUSAND/ΜL (ref 0–0.61)
EOSINOPHIL # BLD AUTO: 0.4 THOUSAND/ΜL (ref 0–0.61)
EOSINOPHIL NFR BLD AUTO: 1 % (ref 0–6)
EOSINOPHIL NFR BLD AUTO: 2 % (ref 0–6)
EOSINOPHIL NFR BLD AUTO: 3 % (ref 0–6)
EOSINOPHIL NFR BLD AUTO: 4 % (ref 0–6)
EOSINOPHIL NFR BLD AUTO: 5 % (ref 0–6)
ERYTHROCYTE [DISTWIDTH] IN BLOOD BY AUTOMATED COUNT: 13.8 % (ref 11.6–15.1)
ERYTHROCYTE [DISTWIDTH] IN BLOOD BY AUTOMATED COUNT: 13.9 % (ref 11.6–15.1)
ERYTHROCYTE [DISTWIDTH] IN BLOOD BY AUTOMATED COUNT: 14 % (ref 11.6–15.1)
ERYTHROCYTE [DISTWIDTH] IN BLOOD BY AUTOMATED COUNT: 14.1 % (ref 11.6–15.1)
ERYTHROCYTE [DISTWIDTH] IN BLOOD BY AUTOMATED COUNT: 14.2 % (ref 11.6–15.1)
ERYTHROCYTE [DISTWIDTH] IN BLOOD BY AUTOMATED COUNT: 14.4 % (ref 11.6–15.1)
ERYTHROCYTE [DISTWIDTH] IN BLOOD BY AUTOMATED COUNT: 14.4 % (ref 11.6–15.1)
ERYTHROCYTE [DISTWIDTH] IN BLOOD BY AUTOMATED COUNT: 14.5 % (ref 11.6–15.1)
ERYTHROCYTE [DISTWIDTH] IN BLOOD BY AUTOMATED COUNT: 14.6 % (ref 11.6–15.1)
ERYTHROCYTE [DISTWIDTH] IN BLOOD BY AUTOMATED COUNT: 14.7 % (ref 11.6–15.1)
ERYTHROCYTE [DISTWIDTH] IN BLOOD BY AUTOMATED COUNT: 14.8 % (ref 11.6–15.1)
ERYTHROCYTE [DISTWIDTH] IN BLOOD BY AUTOMATED COUNT: 14.9 % (ref 11.6–15.1)
ERYTHROCYTE [DISTWIDTH] IN BLOOD BY AUTOMATED COUNT: 14.9 % (ref 11.6–15.1)
EST. AVERAGE GLUCOSE BLD GHB EST-MCNC: 126 MG/DL
EXT GLUCOSE BLD: 162
EXTERNAL ANION GAP: 9.1
EXTERNAL BUN: 100
EXTERNAL CALCIUM: 9
EXTERNAL CHLORIDE: 104
EXTERNAL CO2: 28
EXTERNAL CREATININE: 1.76
EXTERNAL EGFR: 28
EXTERNAL POTASSIUM: 5.11
EXTERNAL SODIUM: 136
FERRITIN SERPL-MCNC: 263 NG/ML (ref 8–388)
FERRITIN SERPL-MCNC: 442 NG/ML (ref 8–388)
FIO2 GAS DIL.REBREATH: 28 L
FLUAV RNA RESP QL NAA+PROBE: NEGATIVE
FLUBV RNA RESP QL NAA+PROBE: NEGATIVE
GFR SERPL CREATININE-BSD FRML MDRD: 15 ML/MIN/1.73SQ M
GFR SERPL CREATININE-BSD FRML MDRD: 16 ML/MIN/1.73SQ M
GFR SERPL CREATININE-BSD FRML MDRD: 18 ML/MIN/1.73SQ M
GFR SERPL CREATININE-BSD FRML MDRD: 19 ML/MIN/1.73SQ M
GFR SERPL CREATININE-BSD FRML MDRD: 19 ML/MIN/1.73SQ M
GFR SERPL CREATININE-BSD FRML MDRD: 21 ML/MIN/1.73SQ M
GFR SERPL CREATININE-BSD FRML MDRD: 24 ML/MIN/1.73SQ M
GFR SERPL CREATININE-BSD FRML MDRD: 31 ML/MIN/1.73SQ M
GFR SERPL CREATININE-BSD FRML MDRD: 31 ML/MIN/1.73SQ M
GFR SERPL CREATININE-BSD FRML MDRD: 32 ML/MIN/1.73SQ M
GFR SERPL CREATININE-BSD FRML MDRD: 38 ML/MIN/1.73SQ M
GFR SERPL CREATININE-BSD FRML MDRD: 44 ML/MIN/1.73SQ M
GFR SERPL CREATININE-BSD FRML MDRD: 44 ML/MIN/1.73SQ M
GFR SERPL CREATININE-BSD FRML MDRD: 45 ML/MIN/1.73SQ M
GFR SERPL CREATININE-BSD FRML MDRD: 45 ML/MIN/1.73SQ M
GFR SERPL CREATININE-BSD FRML MDRD: 47 ML/MIN/1.73SQ M
GFR SERPL CREATININE-BSD FRML MDRD: 48 ML/MIN/1.73SQ M
GFR SERPL CREATININE-BSD FRML MDRD: 49 ML/MIN/1.73SQ M
GFR SERPL CREATININE-BSD FRML MDRD: 49 ML/MIN/1.73SQ M
GFR SERPL CREATININE-BSD FRML MDRD: 51 ML/MIN/1.73SQ M
GFR SERPL CREATININE-BSD FRML MDRD: 52 ML/MIN/1.73SQ M
GFR SERPL CREATININE-BSD FRML MDRD: 53 ML/MIN/1.73SQ M
GFR SERPL CREATININE-BSD FRML MDRD: 54 ML/MIN/1.73SQ M
GFR SERPL CREATININE-BSD FRML MDRD: 54 ML/MIN/1.73SQ M
GFR SERPL CREATININE-BSD FRML MDRD: 55 ML/MIN/1.73SQ M
GFR SERPL CREATININE-BSD FRML MDRD: 58 ML/MIN/1.73SQ M
GFR SERPL CREATININE-BSD FRML MDRD: 61 ML/MIN/1.73SQ M
GFR SERPL CREATININE-BSD FRML MDRD: 61 ML/MIN/1.73SQ M
GFR SERPL CREATININE-BSD FRML MDRD: 63 ML/MIN/1.73SQ M
GFR SERPL CREATININE-BSD FRML MDRD: 67 ML/MIN/1.73SQ M
GLUCOSE FLD-MCNC: 135 MG/DL
GLUCOSE SERPL-MCNC: 100 MG/DL (ref 65–140)
GLUCOSE SERPL-MCNC: 102 MG/DL (ref 65–140)
GLUCOSE SERPL-MCNC: 103 MG/DL (ref 65–140)
GLUCOSE SERPL-MCNC: 103 MG/DL (ref 65–140)
GLUCOSE SERPL-MCNC: 104 MG/DL (ref 65–140)
GLUCOSE SERPL-MCNC: 105 MG/DL (ref 65–140)
GLUCOSE SERPL-MCNC: 106 MG/DL (ref 65–140)
GLUCOSE SERPL-MCNC: 108 MG/DL (ref 65–140)
GLUCOSE SERPL-MCNC: 108 MG/DL (ref 65–140)
GLUCOSE SERPL-MCNC: 110 MG/DL (ref 65–140)
GLUCOSE SERPL-MCNC: 111 MG/DL (ref 65–140)
GLUCOSE SERPL-MCNC: 113 MG/DL (ref 65–140)
GLUCOSE SERPL-MCNC: 114 MG/DL (ref 65–140)
GLUCOSE SERPL-MCNC: 115 MG/DL (ref 65–140)
GLUCOSE SERPL-MCNC: 116 MG/DL (ref 65–140)
GLUCOSE SERPL-MCNC: 117 MG/DL (ref 65–140)
GLUCOSE SERPL-MCNC: 119 MG/DL (ref 65–140)
GLUCOSE SERPL-MCNC: 120 MG/DL (ref 65–140)
GLUCOSE SERPL-MCNC: 121 MG/DL (ref 65–140)
GLUCOSE SERPL-MCNC: 122 MG/DL (ref 65–140)
GLUCOSE SERPL-MCNC: 127 MG/DL (ref 65–140)
GLUCOSE SERPL-MCNC: 128 MG/DL (ref 65–140)
GLUCOSE SERPL-MCNC: 129 MG/DL (ref 65–140)
GLUCOSE SERPL-MCNC: 132 MG/DL (ref 65–140)
GLUCOSE SERPL-MCNC: 132 MG/DL (ref 65–140)
GLUCOSE SERPL-MCNC: 134 MG/DL (ref 65–140)
GLUCOSE SERPL-MCNC: 135 MG/DL (ref 65–140)
GLUCOSE SERPL-MCNC: 137 MG/DL (ref 65–140)
GLUCOSE SERPL-MCNC: 137 MG/DL (ref 65–140)
GLUCOSE SERPL-MCNC: 141 MG/DL (ref 65–140)
GLUCOSE SERPL-MCNC: 142 MG/DL (ref 65–140)
GLUCOSE SERPL-MCNC: 143 MG/DL (ref 65–140)
GLUCOSE SERPL-MCNC: 144 MG/DL (ref 65–140)
GLUCOSE SERPL-MCNC: 145 MG/DL (ref 65–140)
GLUCOSE SERPL-MCNC: 146 MG/DL (ref 65–140)
GLUCOSE SERPL-MCNC: 148 MG/DL (ref 65–140)
GLUCOSE SERPL-MCNC: 148 MG/DL (ref 65–140)
GLUCOSE SERPL-MCNC: 151 MG/DL (ref 65–140)
GLUCOSE SERPL-MCNC: 152 MG/DL (ref 65–140)
GLUCOSE SERPL-MCNC: 152 MG/DL (ref 65–140)
GLUCOSE SERPL-MCNC: 153 MG/DL (ref 65–140)
GLUCOSE SERPL-MCNC: 156 MG/DL (ref 65–140)
GLUCOSE SERPL-MCNC: 157 MG/DL (ref 65–140)
GLUCOSE SERPL-MCNC: 158 MG/DL (ref 65–140)
GLUCOSE SERPL-MCNC: 162 MG/DL (ref 65–140)
GLUCOSE SERPL-MCNC: 164 MG/DL (ref 65–140)
GLUCOSE SERPL-MCNC: 165 MG/DL (ref 65–140)
GLUCOSE SERPL-MCNC: 170 MG/DL (ref 65–140)
GLUCOSE SERPL-MCNC: 172 MG/DL (ref 65–140)
GLUCOSE SERPL-MCNC: 173 MG/DL (ref 65–140)
GLUCOSE SERPL-MCNC: 178 MG/DL (ref 65–140)
GLUCOSE SERPL-MCNC: 179 MG/DL (ref 65–140)
GLUCOSE SERPL-MCNC: 183 MG/DL (ref 65–140)
GLUCOSE SERPL-MCNC: 185 MG/DL (ref 65–140)
GLUCOSE SERPL-MCNC: 188 MG/DL (ref 65–140)
GLUCOSE SERPL-MCNC: 194 MG/DL (ref 65–140)
GLUCOSE SERPL-MCNC: 194 MG/DL (ref 65–140)
GLUCOSE SERPL-MCNC: 195 MG/DL (ref 65–140)
GLUCOSE SERPL-MCNC: 198 MG/DL (ref 65–140)
GLUCOSE SERPL-MCNC: 204 MG/DL (ref 65–140)
GLUCOSE SERPL-MCNC: 204 MG/DL (ref 65–140)
GLUCOSE SERPL-MCNC: 209 MG/DL (ref 65–140)
GLUCOSE SERPL-MCNC: 209 MG/DL (ref 65–140)
GLUCOSE SERPL-MCNC: 211 MG/DL (ref 65–140)
GLUCOSE SERPL-MCNC: 213 MG/DL (ref 65–140)
GLUCOSE SERPL-MCNC: 219 MG/DL (ref 65–140)
GLUCOSE SERPL-MCNC: 220 MG/DL (ref 65–140)
GLUCOSE SERPL-MCNC: 220 MG/DL (ref 65–140)
GLUCOSE SERPL-MCNC: 225 MG/DL (ref 65–140)
GLUCOSE SERPL-MCNC: 225 MG/DL (ref 65–140)
GLUCOSE SERPL-MCNC: 226 MG/DL (ref 65–140)
GLUCOSE SERPL-MCNC: 227 MG/DL (ref 65–140)
GLUCOSE SERPL-MCNC: 230 MG/DL (ref 65–140)
GLUCOSE SERPL-MCNC: 238 MG/DL (ref 65–140)
GLUCOSE SERPL-MCNC: 244 MG/DL (ref 65–140)
GLUCOSE SERPL-MCNC: 246 MG/DL (ref 65–140)
GLUCOSE SERPL-MCNC: 248 MG/DL (ref 65–140)
GLUCOSE SERPL-MCNC: 253 MG/DL (ref 65–140)
GLUCOSE SERPL-MCNC: 258 MG/DL (ref 65–140)
GLUCOSE SERPL-MCNC: 262 MG/DL (ref 65–140)
GLUCOSE SERPL-MCNC: 262 MG/DL (ref 65–140)
GLUCOSE SERPL-MCNC: 267 MG/DL (ref 65–140)
GLUCOSE SERPL-MCNC: 279 MG/DL (ref 65–140)
GLUCOSE SERPL-MCNC: 285 MG/DL (ref 65–140)
GLUCOSE SERPL-MCNC: 286 MG/DL (ref 65–140)
GLUCOSE SERPL-MCNC: 313 MG/DL (ref 65–140)
GLUCOSE SERPL-MCNC: 314 MG/DL (ref 65–140)
GLUCOSE SERPL-MCNC: 50 MG/DL (ref 65–140)
GLUCOSE SERPL-MCNC: 50 MG/DL (ref 65–140)
GLUCOSE SERPL-MCNC: 52 MG/DL (ref 65–140)
GLUCOSE SERPL-MCNC: 53 MG/DL (ref 65–140)
GLUCOSE SERPL-MCNC: 54 MG/DL (ref 65–140)
GLUCOSE SERPL-MCNC: 56 MG/DL (ref 65–140)
GLUCOSE SERPL-MCNC: 58 MG/DL (ref 65–140)
GLUCOSE SERPL-MCNC: 60 MG/DL (ref 65–140)
GLUCOSE SERPL-MCNC: 63 MG/DL (ref 65–140)
GLUCOSE SERPL-MCNC: 64 MG/DL (ref 65–140)
GLUCOSE SERPL-MCNC: 68 MG/DL (ref 65–140)
GLUCOSE SERPL-MCNC: 69 MG/DL (ref 65–140)
GLUCOSE SERPL-MCNC: 69 MG/DL (ref 65–140)
GLUCOSE SERPL-MCNC: 70 MG/DL (ref 65–140)
GLUCOSE SERPL-MCNC: 70 MG/DL (ref 65–140)
GLUCOSE SERPL-MCNC: 73 MG/DL (ref 65–140)
GLUCOSE SERPL-MCNC: 73 MG/DL (ref 65–140)
GLUCOSE SERPL-MCNC: 74 MG/DL (ref 65–140)
GLUCOSE SERPL-MCNC: 75 MG/DL (ref 65–140)
GLUCOSE SERPL-MCNC: 77 MG/DL (ref 65–140)
GLUCOSE SERPL-MCNC: 77 MG/DL (ref 65–140)
GLUCOSE SERPL-MCNC: 78 MG/DL (ref 65–140)
GLUCOSE SERPL-MCNC: 79 MG/DL (ref 65–140)
GLUCOSE SERPL-MCNC: 81 MG/DL (ref 65–140)
GLUCOSE SERPL-MCNC: 83 MG/DL (ref 65–140)
GLUCOSE SERPL-MCNC: 85 MG/DL (ref 65–140)
GLUCOSE SERPL-MCNC: 85 MG/DL (ref 65–140)
GLUCOSE SERPL-MCNC: 86 MG/DL (ref 65–140)
GLUCOSE SERPL-MCNC: 88 MG/DL (ref 65–140)
GLUCOSE SERPL-MCNC: 89 MG/DL (ref 65–140)
GLUCOSE SERPL-MCNC: 90 MG/DL (ref 65–140)
GLUCOSE SERPL-MCNC: 91 MG/DL (ref 65–140)
GLUCOSE SERPL-MCNC: 93 MG/DL (ref 65–140)
GLUCOSE SERPL-MCNC: 94 MG/DL (ref 65–140)
GLUCOSE SERPL-MCNC: 95 MG/DL (ref 65–140)
GLUCOSE SERPL-MCNC: 97 MG/DL (ref 65–140)
GLUCOSE SERPL-MCNC: 98 MG/DL (ref 65–140)
GLUCOSE UR STRIP-MCNC: NEGATIVE MG/DL
GRAM STN SPEC: ABNORMAL
GRAM STN SPEC: NORMAL
HBA1C MFR BLD HPLC: 7 %
HBA1C MFR BLD: 6 %
HCO3 BLDA-SCNC: 38.8 MMOL/L (ref 22–28)
HCT VFR BLD AUTO: 19.3 % (ref 34.8–46.1)
HCT VFR BLD AUTO: 22.8 % (ref 34.8–46.1)
HCT VFR BLD AUTO: 23.7 % (ref 34.8–46.1)
HCT VFR BLD AUTO: 23.7 % (ref 34.8–46.1)
HCT VFR BLD AUTO: 24 % (ref 34.8–46.1)
HCT VFR BLD AUTO: 24.2 % (ref 34.8–46.1)
HCT VFR BLD AUTO: 24.3 % (ref 34.8–46.1)
HCT VFR BLD AUTO: 24.4 % (ref 34.8–46.1)
HCT VFR BLD AUTO: 24.6 % (ref 34.8–46.1)
HCT VFR BLD AUTO: 24.9 % (ref 34.8–46.1)
HCT VFR BLD AUTO: 25.1 % (ref 34.8–46.1)
HCT VFR BLD AUTO: 25.3 % (ref 34.8–46.1)
HCT VFR BLD AUTO: 25.4 % (ref 34.8–46.1)
HCT VFR BLD AUTO: 25.4 % (ref 34.8–46.1)
HCT VFR BLD AUTO: 25.7 % (ref 34.8–46.1)
HCT VFR BLD AUTO: 25.8 % (ref 34.8–46.1)
HCT VFR BLD AUTO: 26 % (ref 34.8–46.1)
HCT VFR BLD AUTO: 26.1 % (ref 34.8–46.1)
HCT VFR BLD AUTO: 26.5 % (ref 34.8–46.1)
HCT VFR BLD AUTO: 26.7 % (ref 34.8–46.1)
HCT VFR BLD AUTO: 26.9 % (ref 34.8–46.1)
HCT VFR BLD AUTO: 26.9 % (ref 34.8–46.1)
HCT VFR BLD AUTO: 27.3 % (ref 34.8–46.1)
HCT VFR BLD AUTO: 27.4 % (ref 34.8–46.1)
HCT VFR BLD AUTO: 27.8 % (ref 34.8–46.1)
HCT VFR BLD AUTO: 28.1 % (ref 34.8–46.1)
HCT VFR BLD AUTO: 28.2 % (ref 34.8–46.1)
HCT VFR BLD AUTO: 29 % (ref 34.8–46.1)
HCT VFR BLD AUTO: 29.2 % (ref 34.8–46.1)
HCT VFR BLD AUTO: 29.7 % (ref 34.8–46.1)
HCT VFR BLD AUTO: 29.7 % (ref 34.8–46.1)
HCT VFR BLD AUTO: 31.9 % (ref 34.8–46.1)
HCT VFR BLD AUTO: 32.2 % (ref 34.8–46.1)
HCT VFR BLD AUTO: 34 % (ref 34.8–46.1)
HCT VFR BLD CALC: 28 % (ref 34.8–46.1)
HEMOCCULT STL QL: NEGATIVE
HGB BLD-MCNC: 10.4 G/DL (ref 11.5–15.4)
HGB BLD-MCNC: 5.9 G/DL (ref 11.5–15.4)
HGB BLD-MCNC: 7.1 G/DL (ref 11.5–15.4)
HGB BLD-MCNC: 7.3 G/DL (ref 11.5–15.4)
HGB BLD-MCNC: 7.4 G/DL (ref 11.5–15.4)
HGB BLD-MCNC: 7.6 G/DL (ref 11.5–15.4)
HGB BLD-MCNC: 7.6 G/DL (ref 11.5–15.4)
HGB BLD-MCNC: 7.7 G/DL (ref 11.5–15.4)
HGB BLD-MCNC: 7.8 G/DL (ref 11.5–15.4)
HGB BLD-MCNC: 7.9 G/DL (ref 11.5–15.4)
HGB BLD-MCNC: 8 G/DL (ref 11.5–15.4)
HGB BLD-MCNC: 8.1 G/DL (ref 11.5–15.4)
HGB BLD-MCNC: 8.2 G/DL (ref 11.5–15.4)
HGB BLD-MCNC: 8.3 G/DL (ref 11.5–15.4)
HGB BLD-MCNC: 8.5 G/DL (ref 11.5–15.4)
HGB BLD-MCNC: 8.8 G/DL (ref 11.5–15.4)
HGB BLD-MCNC: 8.9 G/DL (ref 11.5–15.4)
HGB BLD-MCNC: 9.2 G/DL (ref 11.5–15.4)
HGB BLD-MCNC: 9.2 G/DL (ref 11.5–15.4)
HGB BLD-MCNC: 9.8 G/DL (ref 11.5–15.4)
HGB BLDA-MCNC: 9.5 G/DL (ref 11.5–15.4)
HGB UR QL STRIP.AUTO: ABNORMAL
HISTIOCYTES NFR FLD: 74 %
IMM GRANULOCYTES # BLD AUTO: 0.03 THOUSAND/UL (ref 0–0.2)
IMM GRANULOCYTES # BLD AUTO: 0.04 THOUSAND/UL (ref 0–0.2)
IMM GRANULOCYTES # BLD AUTO: 0.05 THOUSAND/UL (ref 0–0.2)
IMM GRANULOCYTES # BLD AUTO: 0.06 THOUSAND/UL (ref 0–0.2)
IMM GRANULOCYTES # BLD AUTO: 0.06 THOUSAND/UL (ref 0–0.2)
IMM GRANULOCYTES # BLD AUTO: 0.07 THOUSAND/UL (ref 0–0.2)
IMM GRANULOCYTES # BLD AUTO: 0.07 THOUSAND/UL (ref 0–0.2)
IMM GRANULOCYTES NFR BLD AUTO: 0 % (ref 0–2)
IMM GRANULOCYTES NFR BLD AUTO: 1 % (ref 0–2)
INR PPP: 1.37 (ref 0.84–1.19)
INR PPP: 1.68 (ref 0.84–1.19)
INR PPP: 2.19 (ref 0.84–1.19)
IRON SATN MFR SERPL: 13 %
IRON SATN MFR SERPL: 16 %
IRON SERPL-MCNC: 25 UG/DL (ref 50–170)
IRON SERPL-MCNC: 28 UG/DL (ref 50–170)
KETONES UR STRIP-MCNC: NEGATIVE MG/DL
L PNEUMO1 AG UR QL IA.RAPID: NEGATIVE
LACTATE SERPL-SCNC: 1 MMOL/L (ref 0.5–2)
LDH FLD L TO P-CCNC: 78 U/L
LEUKOCYTE ESTERASE UR QL STRIP: ABNORMAL
LIPASE SERPL-CCNC: 58 U/L (ref 73–393)
LYMPHOCYTES # BLD AUTO: 1.23 THOUSANDS/ΜL (ref 0.6–4.47)
LYMPHOCYTES # BLD AUTO: 1.63 THOUSANDS/ΜL (ref 0.6–4.47)
LYMPHOCYTES # BLD AUTO: 1.63 THOUSANDS/ΜL (ref 0.6–4.47)
LYMPHOCYTES # BLD AUTO: 1.8 THOUSANDS/ΜL (ref 0.6–4.47)
LYMPHOCYTES # BLD AUTO: 1.82 THOUSANDS/ΜL (ref 0.6–4.47)
LYMPHOCYTES # BLD AUTO: 1.9 THOUSANDS/ΜL (ref 0.6–4.47)
LYMPHOCYTES # BLD AUTO: 1.95 THOUSANDS/ΜL (ref 0.6–4.47)
LYMPHOCYTES # BLD AUTO: 1.98 THOUSANDS/ΜL (ref 0.6–4.47)
LYMPHOCYTES # BLD AUTO: 1.99 THOUSANDS/ΜL (ref 0.6–4.47)
LYMPHOCYTES # BLD AUTO: 2.04 THOUSANDS/ΜL (ref 0.6–4.47)
LYMPHOCYTES # BLD AUTO: 2.08 THOUSANDS/ΜL (ref 0.6–4.47)
LYMPHOCYTES # BLD AUTO: 2.08 THOUSANDS/ΜL (ref 0.6–4.47)
LYMPHOCYTES # BLD AUTO: 2.16 THOUSANDS/ΜL (ref 0.6–4.47)
LYMPHOCYTES # BLD AUTO: 2.16 THOUSANDS/ΜL (ref 0.6–4.47)
LYMPHOCYTES # BLD AUTO: 2.32 THOUSANDS/ΜL (ref 0.6–4.47)
LYMPHOCYTES # BLD AUTO: 2.32 THOUSANDS/ΜL (ref 0.6–4.47)
LYMPHOCYTES # BLD AUTO: 2.33 THOUSANDS/ΜL (ref 0.6–4.47)
LYMPHOCYTES # BLD AUTO: 2.48 THOUSANDS/ΜL (ref 0.6–4.47)
LYMPHOCYTES # BLD AUTO: 2.68 THOUSANDS/ΜL (ref 0.6–4.47)
LYMPHOCYTES NFR BLD AUTO: 11 % (ref 14–44)
LYMPHOCYTES NFR BLD AUTO: 12 %
LYMPHOCYTES NFR BLD AUTO: 14 % (ref 14–44)
LYMPHOCYTES NFR BLD AUTO: 16 % (ref 14–44)
LYMPHOCYTES NFR BLD AUTO: 18 % (ref 14–44)
LYMPHOCYTES NFR BLD AUTO: 19 % (ref 14–44)
LYMPHOCYTES NFR BLD AUTO: 20 % (ref 14–44)
LYMPHOCYTES NFR BLD AUTO: 21 % (ref 14–44)
LYMPHOCYTES NFR BLD AUTO: 22 % (ref 14–44)
LYMPHOCYTES NFR BLD AUTO: 22 % (ref 14–44)
LYMPHOCYTES NFR BLD AUTO: 24 % (ref 14–44)
LYMPHOCYTES NFR BLD AUTO: 24 % (ref 14–44)
LYMPHOCYTES NFR BLD AUTO: 25 % (ref 14–44)
LYMPHOCYTES NFR BLD AUTO: 25 % (ref 14–44)
LYMPHOCYTES NFR BLD AUTO: 26 % (ref 14–44)
LYMPHOCYTES NFR BLD AUTO: 28 % (ref 14–44)
MAGNESIUM SERPL-MCNC: 1.7 MG/DL (ref 1.6–2.6)
MAGNESIUM SERPL-MCNC: 1.8 MG/DL (ref 1.6–2.6)
MAGNESIUM SERPL-MCNC: 2 MG/DL (ref 1.6–2.6)
MAGNESIUM SERPL-MCNC: 2.1 MG/DL (ref 1.6–2.6)
MAGNESIUM SERPL-MCNC: 2.3 MG/DL (ref 1.6–2.6)
MCH RBC QN AUTO: 26 PG (ref 26.8–34.3)
MCH RBC QN AUTO: 26.1 PG (ref 26.8–34.3)
MCH RBC QN AUTO: 26.1 PG (ref 26.8–34.3)
MCH RBC QN AUTO: 26.2 PG (ref 26.8–34.3)
MCH RBC QN AUTO: 26.3 PG (ref 26.8–34.3)
MCH RBC QN AUTO: 26.5 PG (ref 26.8–34.3)
MCH RBC QN AUTO: 26.6 PG (ref 26.8–34.3)
MCH RBC QN AUTO: 26.7 PG (ref 26.8–34.3)
MCH RBC QN AUTO: 26.8 PG (ref 26.8–34.3)
MCH RBC QN AUTO: 27.3 PG (ref 26.8–34.3)
MCH RBC QN AUTO: 27.4 PG (ref 26.8–34.3)
MCH RBC QN AUTO: 27.4 PG (ref 26.8–34.3)
MCH RBC QN AUTO: 27.5 PG (ref 26.8–34.3)
MCH RBC QN AUTO: 27.6 PG (ref 26.8–34.3)
MCH RBC QN AUTO: 27.7 PG (ref 26.8–34.3)
MCH RBC QN AUTO: 27.7 PG (ref 26.8–34.3)
MCH RBC QN AUTO: 27.8 PG (ref 26.8–34.3)
MCH RBC QN AUTO: 28 PG (ref 26.8–34.3)
MCH RBC QN AUTO: 28 PG (ref 26.8–34.3)
MCH RBC QN AUTO: 28.2 PG (ref 26.8–34.3)
MCH RBC QN AUTO: 28.2 PG (ref 26.8–34.3)
MCH RBC QN AUTO: 28.3 PG (ref 26.8–34.3)
MCH RBC QN AUTO: 28.3 PG (ref 26.8–34.3)
MCH RBC QN AUTO: 28.4 PG (ref 26.8–34.3)
MCH RBC QN AUTO: 28.4 PG (ref 26.8–34.3)
MCH RBC QN AUTO: 28.5 PG (ref 26.8–34.3)
MCH RBC QN AUTO: 28.6 PG (ref 26.8–34.3)
MCH RBC QN AUTO: 28.6 PG (ref 26.8–34.3)
MCH RBC QN AUTO: 28.8 PG (ref 26.8–34.3)
MCH RBC QN AUTO: 29.2 PG (ref 26.8–34.3)
MCHC RBC AUTO-ENTMCNC: 28.5 G/DL (ref 31.4–37.4)
MCHC RBC AUTO-ENTMCNC: 28.8 G/DL (ref 31.4–37.4)
MCHC RBC AUTO-ENTMCNC: 29 G/DL (ref 31.4–37.4)
MCHC RBC AUTO-ENTMCNC: 29.1 G/DL (ref 31.4–37.4)
MCHC RBC AUTO-ENTMCNC: 29.2 G/DL (ref 31.4–37.4)
MCHC RBC AUTO-ENTMCNC: 29.3 G/DL (ref 31.4–37.4)
MCHC RBC AUTO-ENTMCNC: 29.4 G/DL (ref 31.4–37.4)
MCHC RBC AUTO-ENTMCNC: 29.5 G/DL (ref 31.4–37.4)
MCHC RBC AUTO-ENTMCNC: 29.5 G/DL (ref 31.4–37.4)
MCHC RBC AUTO-ENTMCNC: 29.6 G/DL (ref 31.4–37.4)
MCHC RBC AUTO-ENTMCNC: 29.7 G/DL (ref 31.4–37.4)
MCHC RBC AUTO-ENTMCNC: 29.8 G/DL (ref 31.4–37.4)
MCHC RBC AUTO-ENTMCNC: 29.8 G/DL (ref 31.4–37.4)
MCHC RBC AUTO-ENTMCNC: 29.9 G/DL (ref 31.4–37.4)
MCHC RBC AUTO-ENTMCNC: 29.9 G/DL (ref 31.4–37.4)
MCHC RBC AUTO-ENTMCNC: 30 G/DL (ref 31.4–37.4)
MCHC RBC AUTO-ENTMCNC: 30 G/DL (ref 31.4–37.4)
MCHC RBC AUTO-ENTMCNC: 30.1 G/DL (ref 31.4–37.4)
MCHC RBC AUTO-ENTMCNC: 30.1 G/DL (ref 31.4–37.4)
MCHC RBC AUTO-ENTMCNC: 30.3 G/DL (ref 31.4–37.4)
MCHC RBC AUTO-ENTMCNC: 30.4 G/DL (ref 31.4–37.4)
MCHC RBC AUTO-ENTMCNC: 30.6 G/DL (ref 31.4–37.4)
MCHC RBC AUTO-ENTMCNC: 30.6 G/DL (ref 31.4–37.4)
MCHC RBC AUTO-ENTMCNC: 30.7 G/DL (ref 31.4–37.4)
MCHC RBC AUTO-ENTMCNC: 30.7 G/DL (ref 31.4–37.4)
MCHC RBC AUTO-ENTMCNC: 30.8 G/DL (ref 31.4–37.4)
MCHC RBC AUTO-ENTMCNC: 31 G/DL (ref 31.4–37.4)
MCHC RBC AUTO-ENTMCNC: 31.9 G/DL (ref 31.4–37.4)
MCV RBC AUTO: 89 FL (ref 82–98)
MCV RBC AUTO: 90 FL (ref 82–98)
MCV RBC AUTO: 90 FL (ref 82–98)
MCV RBC AUTO: 91 FL (ref 82–98)
MCV RBC AUTO: 92 FL (ref 82–98)
MCV RBC AUTO: 93 FL (ref 82–98)
MCV RBC AUTO: 94 FL (ref 82–98)
MCV RBC AUTO: 95 FL (ref 82–98)
MONO+MESO NFR FLD MANUAL: 3 %
MONOCYTES # BLD AUTO: 0.36 THOUSAND/ΜL (ref 0.17–1.22)
MONOCYTES # BLD AUTO: 0.45 THOUSAND/ΜL (ref 0.17–1.22)
MONOCYTES # BLD AUTO: 0.51 THOUSAND/ΜL (ref 0.17–1.22)
MONOCYTES # BLD AUTO: 0.54 THOUSAND/ΜL (ref 0.17–1.22)
MONOCYTES # BLD AUTO: 0.56 THOUSAND/ΜL (ref 0.17–1.22)
MONOCYTES # BLD AUTO: 0.6 THOUSAND/ΜL (ref 0.17–1.22)
MONOCYTES # BLD AUTO: 0.61 THOUSAND/ΜL (ref 0.17–1.22)
MONOCYTES # BLD AUTO: 0.63 THOUSAND/ΜL (ref 0.17–1.22)
MONOCYTES # BLD AUTO: 0.67 THOUSAND/ΜL (ref 0.17–1.22)
MONOCYTES # BLD AUTO: 0.68 THOUSAND/ΜL (ref 0.17–1.22)
MONOCYTES # BLD AUTO: 0.69 THOUSAND/ΜL (ref 0.17–1.22)
MONOCYTES # BLD AUTO: 0.7 THOUSAND/ΜL (ref 0.17–1.22)
MONOCYTES # BLD AUTO: 0.71 THOUSAND/ΜL (ref 0.17–1.22)
MONOCYTES # BLD AUTO: 0.74 THOUSAND/ΜL (ref 0.17–1.22)
MONOCYTES # BLD AUTO: 0.75 THOUSAND/ΜL (ref 0.17–1.22)
MONOCYTES # BLD AUTO: 0.75 THOUSAND/ΜL (ref 0.17–1.22)
MONOCYTES # BLD AUTO: 0.9 THOUSAND/ΜL (ref 0.17–1.22)
MONOCYTES NFR BLD AUTO: 1 %
MONOCYTES NFR BLD AUTO: 3 % (ref 4–12)
MONOCYTES NFR BLD AUTO: 5 % (ref 4–12)
MONOCYTES NFR BLD AUTO: 5 % (ref 4–12)
MONOCYTES NFR BLD AUTO: 6 % (ref 4–12)
MONOCYTES NFR BLD AUTO: 7 % (ref 4–12)
MONOCYTES NFR BLD AUTO: 8 % (ref 4–12)
MRSA NOSE QL CULT: ABNORMAL
MUCOUS THREADS UR QL AUTO: ABNORMAL
NEUTROPHILS # BLD AUTO: 10.08 THOUSANDS/ΜL (ref 1.85–7.62)
NEUTROPHILS # BLD AUTO: 5.41 THOUSANDS/ΜL (ref 1.85–7.62)
NEUTROPHILS # BLD AUTO: 5.83 THOUSANDS/ΜL (ref 1.85–7.62)
NEUTROPHILS # BLD AUTO: 5.93 THOUSANDS/ΜL (ref 1.85–7.62)
NEUTROPHILS # BLD AUTO: 5.99 THOUSANDS/ΜL (ref 1.85–7.62)
NEUTROPHILS # BLD AUTO: 6.01 THOUSANDS/ΜL (ref 1.85–7.62)
NEUTROPHILS # BLD AUTO: 6.03 THOUSANDS/ΜL (ref 1.85–7.62)
NEUTROPHILS # BLD AUTO: 6.16 THOUSANDS/ΜL (ref 1.85–7.62)
NEUTROPHILS # BLD AUTO: 6.25 THOUSANDS/ΜL (ref 1.85–7.62)
NEUTROPHILS # BLD AUTO: 6.43 THOUSANDS/ΜL (ref 1.85–7.62)
NEUTROPHILS # BLD AUTO: 6.49 THOUSANDS/ΜL (ref 1.85–7.62)
NEUTROPHILS # BLD AUTO: 6.55 THOUSANDS/ΜL (ref 1.85–7.62)
NEUTROPHILS # BLD AUTO: 6.58 THOUSANDS/ΜL (ref 1.85–7.62)
NEUTROPHILS # BLD AUTO: 6.67 THOUSANDS/ΜL (ref 1.85–7.62)
NEUTROPHILS # BLD AUTO: 7.32 THOUSANDS/ΜL (ref 1.85–7.62)
NEUTROPHILS # BLD AUTO: 7.51 THOUSANDS/ΜL (ref 1.85–7.62)
NEUTROPHILS # BLD AUTO: 8.01 THOUSANDS/ΜL (ref 1.85–7.62)
NEUTROPHILS # BLD AUTO: 8.68 THOUSANDS/ΜL (ref 1.85–7.62)
NEUTROPHILS # BLD AUTO: 9.24 THOUSANDS/ΜL (ref 1.85–7.62)
NEUTS SEG NFR BLD AUTO: 10 %
NEUTS SEG NFR BLD AUTO: 60 % (ref 43–75)
NEUTS SEG NFR BLD AUTO: 62 % (ref 43–75)
NEUTS SEG NFR BLD AUTO: 63 % (ref 43–75)
NEUTS SEG NFR BLD AUTO: 64 % (ref 43–75)
NEUTS SEG NFR BLD AUTO: 64 % (ref 43–75)
NEUTS SEG NFR BLD AUTO: 65 % (ref 43–75)
NEUTS SEG NFR BLD AUTO: 67 % (ref 43–75)
NEUTS SEG NFR BLD AUTO: 68 % (ref 43–75)
NEUTS SEG NFR BLD AUTO: 68 % (ref 43–75)
NEUTS SEG NFR BLD AUTO: 69 % (ref 43–75)
NEUTS SEG NFR BLD AUTO: 70 % (ref 43–75)
NEUTS SEG NFR BLD AUTO: 71 % (ref 43–75)
NEUTS SEG NFR BLD AUTO: 77 % (ref 43–75)
NEUTS SEG NFR BLD AUTO: 79 % (ref 43–75)
NEUTS SEG NFR BLD AUTO: 81 % (ref 43–75)
NITRITE UR QL STRIP: NEGATIVE
NITRITE UR QL STRIP: POSITIVE
NITRITE UR QL STRIP: POSITIVE
NON-SQ EPI CELLS URNS QL MICRO: ABNORMAL /HPF
NRBC BLD AUTO-RTO: 0 /100 WBCS
NT-PROBNP SERPL-MCNC: 4042 PG/ML
NT-PROBNP SERPL-MCNC: 6405 PG/ML
NT-PROBNP SERPL-MCNC: ABNORMAL PG/ML
P AXIS: 27 DEGREES
P AXIS: 55 DEGREES
P AXIS: 67 DEGREES
PCO2 BLD: 40 MMOL/L (ref 21–32)
PCO2 BLD: 52.5 MM HG (ref 36–44)
PH BLD: 7.48 [PH] (ref 7.35–7.45)
PH BODY FLUID: 7.7
PH UR STRIP.AUTO: 6 [PH]
PH UR STRIP.AUTO: 8.5 [PH]
PH UR STRIP.AUTO: >=9 [PH]
PLATELET # BLD AUTO: 154 THOUSANDS/UL (ref 149–390)
PLATELET # BLD AUTO: 157 THOUSANDS/UL (ref 149–390)
PLATELET # BLD AUTO: 169 THOUSANDS/UL (ref 149–390)
PLATELET # BLD AUTO: 172 THOUSANDS/UL (ref 149–390)
PLATELET # BLD AUTO: 185 THOUSANDS/UL (ref 149–390)
PLATELET # BLD AUTO: 185 THOUSANDS/UL (ref 149–390)
PLATELET # BLD AUTO: 189 THOUSANDS/UL (ref 149–390)
PLATELET # BLD AUTO: 196 THOUSANDS/UL (ref 149–390)
PLATELET # BLD AUTO: 204 THOUSANDS/UL (ref 149–390)
PLATELET # BLD AUTO: 208 THOUSANDS/UL (ref 149–390)
PLATELET # BLD AUTO: 217 THOUSANDS/UL (ref 149–390)
PLATELET # BLD AUTO: 217 THOUSANDS/UL (ref 149–390)
PLATELET # BLD AUTO: 219 THOUSANDS/UL (ref 149–390)
PLATELET # BLD AUTO: 225 THOUSANDS/UL (ref 149–390)
PLATELET # BLD AUTO: 228 THOUSANDS/UL (ref 149–390)
PLATELET # BLD AUTO: 231 THOUSANDS/UL (ref 149–390)
PLATELET # BLD AUTO: 236 THOUSANDS/UL (ref 149–390)
PLATELET # BLD AUTO: 240 THOUSANDS/UL (ref 149–390)
PLATELET # BLD AUTO: 243 THOUSANDS/UL (ref 149–390)
PLATELET # BLD AUTO: 250 THOUSANDS/UL (ref 149–390)
PLATELET # BLD AUTO: 256 THOUSANDS/UL (ref 149–390)
PLATELET # BLD AUTO: 259 THOUSANDS/UL (ref 149–390)
PLATELET # BLD AUTO: 260 THOUSANDS/UL (ref 149–390)
PLATELET # BLD AUTO: 269 THOUSANDS/UL (ref 149–390)
PLATELET # BLD AUTO: 270 THOUSANDS/UL (ref 149–390)
PLATELET # BLD AUTO: 271 THOUSANDS/UL (ref 149–390)
PLATELET # BLD AUTO: 272 THOUSANDS/UL (ref 149–390)
PLATELET # BLD AUTO: 273 THOUSANDS/UL (ref 149–390)
PLATELET # BLD AUTO: 277 THOUSANDS/UL (ref 149–390)
PLATELET # BLD AUTO: 289 THOUSANDS/UL (ref 149–390)
PMV BLD AUTO: 10.1 FL (ref 8.9–12.7)
PMV BLD AUTO: 10.1 FL (ref 8.9–12.7)
PMV BLD AUTO: 10.7 FL (ref 8.9–12.7)
PMV BLD AUTO: 10.8 FL (ref 8.9–12.7)
PMV BLD AUTO: 10.9 FL (ref 8.9–12.7)
PMV BLD AUTO: 11 FL (ref 8.9–12.7)
PMV BLD AUTO: 11 FL (ref 8.9–12.7)
PMV BLD AUTO: 11.2 FL (ref 8.9–12.7)
PMV BLD AUTO: 11.2 FL (ref 8.9–12.7)
PMV BLD AUTO: 11.3 FL (ref 8.9–12.7)
PMV BLD AUTO: 11.4 FL (ref 8.9–12.7)
PMV BLD AUTO: 11.5 FL (ref 8.9–12.7)
PMV BLD AUTO: 11.5 FL (ref 8.9–12.7)
PMV BLD AUTO: 11.7 FL (ref 8.9–12.7)
PMV BLD AUTO: 11.8 FL (ref 8.9–12.7)
PMV BLD AUTO: 11.8 FL (ref 8.9–12.7)
PMV BLD AUTO: 11.9 FL (ref 8.9–12.7)
PMV BLD AUTO: 12 FL (ref 8.9–12.7)
PMV BLD AUTO: 12 FL (ref 8.9–12.7)
PMV BLD AUTO: 9.3 FL (ref 8.9–12.7)
PMV BLD AUTO: 9.9 FL (ref 8.9–12.7)
PO2 BLD: 137 MM HG (ref 75–129)
POTASSIUM BLD-SCNC: 3.7 MMOL/L (ref 3.5–5.3)
POTASSIUM SERPL-SCNC: 3.6 MMOL/L (ref 3.5–5.3)
POTASSIUM SERPL-SCNC: 3.7 MMOL/L (ref 3.5–5.3)
POTASSIUM SERPL-SCNC: 3.8 MMOL/L (ref 3.5–5.3)
POTASSIUM SERPL-SCNC: 3.9 MMOL/L (ref 3.5–5.3)
POTASSIUM SERPL-SCNC: 4 MMOL/L (ref 3.5–5.3)
POTASSIUM SERPL-SCNC: 4.1 MMOL/L (ref 3.5–5.3)
POTASSIUM SERPL-SCNC: 4.2 MMOL/L (ref 3.5–5.3)
POTASSIUM SERPL-SCNC: 4.2 MMOL/L (ref 3.5–5.3)
POTASSIUM SERPL-SCNC: 4.3 MMOL/L (ref 3.5–5.3)
POTASSIUM SERPL-SCNC: 4.4 MMOL/L (ref 3.5–5.3)
POTASSIUM SERPL-SCNC: 4.5 MMOL/L (ref 3.5–5.3)
POTASSIUM SERPL-SCNC: 4.5 MMOL/L (ref 3.5–5.3)
POTASSIUM SERPL-SCNC: 4.6 MMOL/L (ref 3.5–5.3)
POTASSIUM SERPL-SCNC: 4.7 MMOL/L (ref 3.5–5.3)
POTASSIUM SERPL-SCNC: 4.7 MMOL/L (ref 3.5–5.3)
POTASSIUM SERPL-SCNC: 4.8 MMOL/L (ref 3.5–5.3)
POTASSIUM SERPL-SCNC: 4.8 MMOL/L (ref 3.5–5.3)
POTASSIUM SERPL-SCNC: 4.9 MMOL/L (ref 3.5–5.3)
POTASSIUM SERPL-SCNC: 4.9 MMOL/L (ref 3.5–5.3)
POTASSIUM SERPL-SCNC: 5 MMOL/L (ref 3.5–5.3)
POTASSIUM SERPL-SCNC: 5.1 MMOL/L (ref 3.5–5.3)
POTASSIUM SERPL-SCNC: 5.2 MMOL/L (ref 3.5–5.3)
POTASSIUM SERPL-SCNC: 5.4 MMOL/L (ref 3.5–5.3)
PR INTERVAL: 154 MS
PR INTERVAL: 158 MS
PR INTERVAL: 208 MS
PROCALCITONIN SERPL-MCNC: 0.06 NG/ML
PROCALCITONIN SERPL-MCNC: 0.08 NG/ML
PROCALCITONIN SERPL-MCNC: 0.09 NG/ML
PROCALCITONIN SERPL-MCNC: 0.25 NG/ML
PROCALCITONIN SERPL-MCNC: <0.05 NG/ML
PROT FLD-MCNC: 2.8 G/DL
PROT SERPL-MCNC: 5.6 G/DL (ref 6.4–8.2)
PROT SERPL-MCNC: 5.8 G/DL (ref 6.4–8.2)
PROT SERPL-MCNC: 6 G/DL (ref 6.4–8.2)
PROT SERPL-MCNC: 6.1 G/DL (ref 6.4–8.2)
PROT SERPL-MCNC: 6.3 G/DL (ref 6.4–8.2)
PROT SERPL-MCNC: 7.2 G/DL (ref 6.4–8.2)
PROT UR STRIP-MCNC: ABNORMAL MG/DL
PROTHROMBIN TIME: 16.9 SECONDS (ref 11.6–14.5)
PROTHROMBIN TIME: 19.7 SECONDS (ref 11.6–14.5)
PROTHROMBIN TIME: 24.3 SECONDS (ref 11.6–14.5)
QRS AXIS: 2 DEGREES
QRS AXIS: 59 DEGREES
QRS AXIS: 61 DEGREES
QRS AXIS: 85 DEGREES
QRSD INTERVAL: 70 MS
QRSD INTERVAL: 78 MS
QRSD INTERVAL: 80 MS
QRSD INTERVAL: 88 MS
QT INTERVAL: 372 MS
QT INTERVAL: 374 MS
QT INTERVAL: 412 MS
QT INTERVAL: 422 MS
QTC INTERVAL: 404 MS
QTC INTERVAL: 422 MS
QTC INTERVAL: 432 MS
QTC INTERVAL: 517 MS
RBC # BLD AUTO: 2.09 MILLION/UL (ref 3.81–5.12)
RBC # BLD AUTO: 2.5 MILLION/UL (ref 3.81–5.12)
RBC # BLD AUTO: 2.58 MILLION/UL (ref 3.81–5.12)
RBC # BLD AUTO: 2.59 MILLION/UL (ref 3.81–5.12)
RBC # BLD AUTO: 2.61 MILLION/UL (ref 3.81–5.12)
RBC # BLD AUTO: 2.64 MILLION/UL (ref 3.81–5.12)
RBC # BLD AUTO: 2.65 MILLION/UL (ref 3.81–5.12)
RBC # BLD AUTO: 2.67 MILLION/UL (ref 3.81–5.12)
RBC # BLD AUTO: 2.71 MILLION/UL (ref 3.81–5.12)
RBC # BLD AUTO: 2.77 MILLION/UL (ref 3.81–5.12)
RBC # BLD AUTO: 2.78 MILLION/UL (ref 3.81–5.12)
RBC # BLD AUTO: 2.79 MILLION/UL (ref 3.81–5.12)
RBC # BLD AUTO: 2.82 MILLION/UL (ref 3.81–5.12)
RBC # BLD AUTO: 2.83 MILLION/UL (ref 3.81–5.12)
RBC # BLD AUTO: 2.83 MILLION/UL (ref 3.81–5.12)
RBC # BLD AUTO: 2.9 MILLION/UL (ref 3.81–5.12)
RBC # BLD AUTO: 2.91 MILLION/UL (ref 3.81–5.12)
RBC # BLD AUTO: 2.97 MILLION/UL (ref 3.81–5.12)
RBC # BLD AUTO: 2.98 MILLION/UL (ref 3.81–5.12)
RBC # BLD AUTO: 2.99 MILLION/UL (ref 3.81–5.12)
RBC # BLD AUTO: 3.03 MILLION/UL (ref 3.81–5.12)
RBC # BLD AUTO: 3.13 MILLION/UL (ref 3.81–5.12)
RBC # BLD AUTO: 3.16 MILLION/UL (ref 3.81–5.12)
RBC # BLD AUTO: 3.18 MILLION/UL (ref 3.81–5.12)
RBC # BLD AUTO: 3.19 MILLION/UL (ref 3.81–5.12)
RBC # BLD AUTO: 3.27 MILLION/UL (ref 3.81–5.12)
RBC # BLD AUTO: 3.29 MILLION/UL (ref 3.81–5.12)
RBC # BLD AUTO: 3.46 MILLION/UL (ref 3.81–5.12)
RBC # BLD AUTO: 3.5 MILLION/UL (ref 3.81–5.12)
RBC # BLD AUTO: 3.74 MILLION/UL (ref 3.81–5.12)
RBC #/AREA URNS AUTO: ABNORMAL /HPF
RH BLD: POSITIVE
RSV RNA RESP QL NAA+PROBE: NEGATIVE
S PNEUM AG UR QL: NEGATIVE
SAO2 % BLD FROM PO2: 99 % (ref 60–85)
SARS-COV-2 RNA RESP QL NAA+PROBE: NEGATIVE
SITE: NORMAL
SODIUM BLD-SCNC: 137 MMOL/L (ref 136–145)
SODIUM SERPL-SCNC: 135 MMOL/L (ref 136–145)
SODIUM SERPL-SCNC: 136 MMOL/L (ref 136–145)
SODIUM SERPL-SCNC: 137 MMOL/L (ref 136–145)
SODIUM SERPL-SCNC: 138 MMOL/L (ref 136–145)
SODIUM SERPL-SCNC: 139 MMOL/L (ref 136–145)
SODIUM SERPL-SCNC: 139 MMOL/L (ref 136–145)
SODIUM SERPL-SCNC: 140 MMOL/L (ref 136–145)
SODIUM SERPL-SCNC: 141 MMOL/L (ref 136–145)
SODIUM SERPL-SCNC: 142 MMOL/L (ref 136–145)
SODIUM SERPL-SCNC: 143 MMOL/L (ref 136–145)
SODIUM SERPL-SCNC: 143 MMOL/L (ref 136–145)
SODIUM SERPL-SCNC: 145 MMOL/L (ref 136–145)
SODIUM SERPL-SCNC: 145 MMOL/L (ref 136–145)
SODIUM SERPL-SCNC: 146 MMOL/L (ref 136–145)
SODIUM SERPL-SCNC: 147 MMOL/L (ref 136–145)
SODIUM SERPL-SCNC: 148 MMOL/L (ref 136–145)
SP GR UR STRIP.AUTO: 1.01 (ref 1–1.03)
SPECIMEN EXPIRATION DATE: NORMAL
SPECIMEN SOURCE: ABNORMAL
T WAVE AXIS: 37 DEGREES
T WAVE AXIS: 52 DEGREES
T WAVE AXIS: 55 DEGREES
T WAVE AXIS: 64 DEGREES
TIBC SERPL-MCNC: 171 UG/DL (ref 250–450)
TIBC SERPL-MCNC: 193 UG/DL (ref 250–450)
TOTAL CELLS COUNTED SPEC: 100
TROPONIN I SERPL-MCNC: 0.06 NG/ML
TROPONIN I SERPL-MCNC: 0.07 NG/ML
TROPONIN I SERPL-MCNC: 0.07 NG/ML
TROPONIN I SERPL-MCNC: <0.02 NG/ML
TROPONIN I SERPL-MCNC: <0.02 NG/ML
UNIT DISPENSE STATUS: NORMAL
UNIT PRODUCT CODE: NORMAL
UNIT RH: NORMAL
UROBILINOGEN UR QL STRIP.AUTO: 0.2 E.U./DL
VANCOMYCIN TROUGH SERPL-MCNC: 9.9 UG/ML (ref 10–20)
VENTRICULAR RATE: 115 BPM
VENTRICULAR RATE: 58 BPM
VENTRICULAR RATE: 60 BPM
VENTRICULAR RATE: 81 BPM
WBC # BLD AUTO: 10.31 THOUSAND/UL (ref 4.31–10.16)
WBC # BLD AUTO: 10.51 THOUSAND/UL (ref 4.31–10.16)
WBC # BLD AUTO: 10.55 THOUSAND/UL (ref 4.31–10.16)
WBC # BLD AUTO: 10.82 THOUSAND/UL (ref 4.31–10.16)
WBC # BLD AUTO: 10.88 THOUSAND/UL (ref 4.31–10.16)
WBC # BLD AUTO: 11.25 THOUSAND/UL (ref 4.31–10.16)
WBC # BLD AUTO: 11.31 THOUSAND/UL (ref 4.31–10.16)
WBC # BLD AUTO: 11.33 THOUSAND/UL (ref 4.31–10.16)
WBC # BLD AUTO: 11.33 THOUSAND/UL (ref 4.31–10.16)
WBC # BLD AUTO: 11.39 THOUSAND/UL (ref 4.31–10.16)
WBC # BLD AUTO: 12.67 THOUSAND/UL (ref 4.31–10.16)
WBC # BLD AUTO: 7.92 THOUSAND/UL (ref 4.31–10.16)
WBC # BLD AUTO: 8.02 THOUSAND/UL (ref 4.31–10.16)
WBC # BLD AUTO: 8.38 THOUSAND/UL (ref 4.31–10.16)
WBC # BLD AUTO: 8.39 THOUSAND/UL (ref 4.31–10.16)
WBC # BLD AUTO: 8.59 THOUSAND/UL (ref 4.31–10.16)
WBC # BLD AUTO: 8.99 THOUSAND/UL (ref 4.31–10.16)
WBC # BLD AUTO: 9.11 THOUSAND/UL (ref 4.31–10.16)
WBC # BLD AUTO: 9.19 THOUSAND/UL (ref 4.31–10.16)
WBC # BLD AUTO: 9.26 THOUSAND/UL (ref 4.31–10.16)
WBC # BLD AUTO: 9.28 THOUSAND/UL (ref 4.31–10.16)
WBC # BLD AUTO: 9.29 THOUSAND/UL (ref 4.31–10.16)
WBC # BLD AUTO: 9.33 THOUSAND/UL (ref 4.31–10.16)
WBC # BLD AUTO: 9.34 THOUSAND/UL (ref 4.31–10.16)
WBC # BLD AUTO: 9.53 THOUSAND/UL (ref 4.31–10.16)
WBC # BLD AUTO: 9.58 THOUSAND/UL (ref 4.31–10.16)
WBC # BLD AUTO: 9.63 THOUSAND/UL (ref 4.31–10.16)
WBC # BLD AUTO: 9.64 THOUSAND/UL (ref 4.31–10.16)
WBC # BLD AUTO: 9.81 THOUSAND/UL (ref 4.31–10.16)
WBC # BLD AUTO: 9.95 THOUSAND/UL (ref 4.31–10.16)
WBC # FLD MANUAL: 246 /UL
WBC #/AREA URNS AUTO: ABNORMAL /HPF

## 2021-01-01 PROCEDURE — 85018 HEMOGLOBIN: CPT | Performed by: PHYSICIAN ASSISTANT

## 2021-01-01 PROCEDURE — 99222 1ST HOSP IP/OBS MODERATE 55: CPT | Performed by: INTERNAL MEDICINE

## 2021-01-01 PROCEDURE — 80053 COMPREHEN METABOLIC PANEL: CPT | Performed by: EMERGENCY MEDICINE

## 2021-01-01 PROCEDURE — 99232 SBSQ HOSP IP/OBS MODERATE 35: CPT | Performed by: INTERNAL MEDICINE

## 2021-01-01 PROCEDURE — 99213 OFFICE O/P EST LOW 20 MIN: CPT | Performed by: NURSE PRACTITIONER

## 2021-01-01 PROCEDURE — 88112 CYTOPATH CELL ENHANCE TECH: CPT | Performed by: PATHOLOGY

## 2021-01-01 PROCEDURE — 36415 COLL VENOUS BLD VENIPUNCTURE: CPT | Performed by: EMERGENCY MEDICINE

## 2021-01-01 PROCEDURE — 80048 BASIC METABOLIC PNL TOTAL CA: CPT | Performed by: PHYSICIAN ASSISTANT

## 2021-01-01 PROCEDURE — C9113 INJ PANTOPRAZOLE SODIUM, VIA: HCPCS | Performed by: INTERNAL MEDICINE

## 2021-01-01 PROCEDURE — 82948 REAGENT STRIP/BLOOD GLUCOSE: CPT

## 2021-01-01 PROCEDURE — 85025 COMPLETE CBC W/AUTO DIFF WBC: CPT | Performed by: PHYSICIAN ASSISTANT

## 2021-01-01 PROCEDURE — 99212 OFFICE O/P EST SF 10 MIN: CPT | Performed by: FAMILY MEDICINE

## 2021-01-01 PROCEDURE — U0005 INFEC AGEN DETEC AMPLI PROBE: HCPCS | Performed by: INTERNAL MEDICINE

## 2021-01-01 PROCEDURE — 94668 MNPJ CHEST WALL SBSQ: CPT

## 2021-01-01 PROCEDURE — 83880 ASSAY OF NATRIURETIC PEPTIDE: CPT | Performed by: EMERGENCY MEDICINE

## 2021-01-01 PROCEDURE — 76942 ECHO GUIDE FOR BIOPSY: CPT

## 2021-01-01 PROCEDURE — 85025 COMPLETE CBC W/AUTO DIFF WBC: CPT | Performed by: INTERNAL MEDICINE

## 2021-01-01 PROCEDURE — 85027 COMPLETE CBC AUTOMATED: CPT | Performed by: PHYSICIAN ASSISTANT

## 2021-01-01 PROCEDURE — 99232 SBSQ HOSP IP/OBS MODERATE 35: CPT | Performed by: PHYSICIAN ASSISTANT

## 2021-01-01 PROCEDURE — 84145 PROCALCITONIN (PCT): CPT | Performed by: INTERNAL MEDICINE

## 2021-01-01 PROCEDURE — 0241U HB NFCT DS VIR RESP RNA 4 TRGT: CPT | Performed by: INTERNAL MEDICINE

## 2021-01-01 PROCEDURE — 99213 OFFICE O/P EST LOW 20 MIN: CPT | Performed by: INTERNAL MEDICINE

## 2021-01-01 PROCEDURE — 87086 URINE CULTURE/COLONY COUNT: CPT | Performed by: INTERNAL MEDICINE

## 2021-01-01 PROCEDURE — 74177 CT ABD & PELVIS W/CONTRAST: CPT

## 2021-01-01 PROCEDURE — 87186 SC STD MICRODIL/AGAR DIL: CPT | Performed by: PHYSICIAN ASSISTANT

## 2021-01-01 PROCEDURE — 83735 ASSAY OF MAGNESIUM: CPT | Performed by: PHYSICIAN ASSISTANT

## 2021-01-01 PROCEDURE — 82945 GLUCOSE OTHER FLUID: CPT | Performed by: INTERNAL MEDICINE

## 2021-01-01 PROCEDURE — 11042 DBRDMT SUBQ TIS 1ST 20SQCM/<: CPT | Performed by: FAMILY MEDICINE

## 2021-01-01 PROCEDURE — 83735 ASSAY OF MAGNESIUM: CPT | Performed by: INTERNAL MEDICINE

## 2021-01-01 PROCEDURE — U0003 INFECTIOUS AGENT DETECTION BY NUCLEIC ACID (DNA OR RNA); SEVERE ACUTE RESPIRATORY SYNDROME CORONAVIRUS 2 (SARS-COV-2) (CORONAVIRUS DISEASE [COVID-19]), AMPLIFIED PROBE TECHNIQUE, MAKING USE OF HIGH THROUGHPUT TECHNOLOGIES AS DESCRIBED BY CMS-2020-01-R: HCPCS | Performed by: INTERNAL MEDICINE

## 2021-01-01 PROCEDURE — 86901 BLOOD TYPING SEROLOGIC RH(D): CPT | Performed by: EMERGENCY MEDICINE

## 2021-01-01 PROCEDURE — 80053 COMPREHEN METABOLIC PANEL: CPT | Performed by: PHYSICIAN ASSISTANT

## 2021-01-01 PROCEDURE — 99239 HOSP IP/OBS DSCHRG MGMT >30: CPT | Performed by: PHYSICIAN ASSISTANT

## 2021-01-01 PROCEDURE — 87086 URINE CULTURE/COLONY COUNT: CPT | Performed by: PHYSICIAN ASSISTANT

## 2021-01-01 PROCEDURE — 80048 BASIC METABOLIC PNL TOTAL CA: CPT | Performed by: INTERNAL MEDICINE

## 2021-01-01 PROCEDURE — 82947 ASSAY GLUCOSE BLOOD QUANT: CPT

## 2021-01-01 PROCEDURE — 93005 ELECTROCARDIOGRAM TRACING: CPT

## 2021-01-01 PROCEDURE — U0005 INFEC AGEN DETEC AMPLI PROBE: HCPCS | Performed by: EMERGENCY MEDICINE

## 2021-01-01 PROCEDURE — 92610 EVALUATE SWALLOWING FUNCTION: CPT

## 2021-01-01 PROCEDURE — 0W3P8ZZ CONTROL BLEEDING IN GASTROINTESTINAL TRACT, VIA NATURAL OR ARTIFICIAL OPENING ENDOSCOPIC: ICD-10-PCS | Performed by: INTERNAL MEDICINE

## 2021-01-01 PROCEDURE — 93306 TTE W/DOPPLER COMPLETE: CPT | Performed by: INTERNAL MEDICINE

## 2021-01-01 PROCEDURE — 83550 IRON BINDING TEST: CPT | Performed by: PHYSICIAN ASSISTANT

## 2021-01-01 PROCEDURE — 82272 OCCULT BLD FECES 1-3 TESTS: CPT

## 2021-01-01 PROCEDURE — 81001 URINALYSIS AUTO W/SCOPE: CPT | Performed by: EMERGENCY MEDICINE

## 2021-01-01 PROCEDURE — 93306 TTE W/DOPPLER COMPLETE: CPT

## 2021-01-01 PROCEDURE — 51102 DRAIN BL W/CATH INSERTION: CPT

## 2021-01-01 PROCEDURE — G1004 CDSM NDSC: HCPCS

## 2021-01-01 PROCEDURE — 99223 1ST HOSP IP/OBS HIGH 75: CPT | Performed by: PHYSICIAN ASSISTANT

## 2021-01-01 PROCEDURE — 86850 RBC ANTIBODY SCREEN: CPT | Performed by: EMERGENCY MEDICINE

## 2021-01-01 PROCEDURE — 86140 C-REACTIVE PROTEIN: CPT | Performed by: INTERNAL MEDICINE

## 2021-01-01 PROCEDURE — 87070 CULTURE OTHR SPECIMN AEROBIC: CPT | Performed by: INTERNAL MEDICINE

## 2021-01-01 PROCEDURE — 99285 EMERGENCY DEPT VISIT HI MDM: CPT

## 2021-01-01 PROCEDURE — 99214 OFFICE O/P EST MOD 30 MIN: CPT | Performed by: NURSE PRACTITIONER

## 2021-01-01 PROCEDURE — 83540 ASSAY OF IRON: CPT | Performed by: PHYSICIAN ASSISTANT

## 2021-01-01 PROCEDURE — 99213 OFFICE O/P EST LOW 20 MIN: CPT | Performed by: FAMILY MEDICINE

## 2021-01-01 PROCEDURE — 82550 ASSAY OF CK (CPK): CPT | Performed by: INTERNAL MEDICINE

## 2021-01-01 PROCEDURE — 86900 BLOOD TYPING SEROLOGIC ABO: CPT | Performed by: EMERGENCY MEDICINE

## 2021-01-01 PROCEDURE — 87040 BLOOD CULTURE FOR BACTERIA: CPT | Performed by: INTERNAL MEDICINE

## 2021-01-01 PROCEDURE — 87081 CULTURE SCREEN ONLY: CPT | Performed by: PHYSICIAN ASSISTANT

## 2021-01-01 PROCEDURE — 83986 ASSAY PH BODY FLUID NOS: CPT | Performed by: INTERNAL MEDICINE

## 2021-01-01 PROCEDURE — 30233N1 TRANSFUSION OF NONAUTOLOGOUS RED BLOOD CELLS INTO PERIPHERAL VEIN, PERCUTANEOUS APPROACH: ICD-10-PCS | Performed by: INTERNAL MEDICINE

## 2021-01-01 PROCEDURE — 86901 BLOOD TYPING SEROLOGIC RH(D): CPT | Performed by: INTERNAL MEDICINE

## 2021-01-01 PROCEDURE — 97605 NEG PRS WND THER DME<=50SQCM: CPT | Performed by: SURGERY

## 2021-01-01 PROCEDURE — 82728 ASSAY OF FERRITIN: CPT | Performed by: PHYSICIAN ASSISTANT

## 2021-01-01 PROCEDURE — 85027 COMPLETE CBC AUTOMATED: CPT | Performed by: INTERNAL MEDICINE

## 2021-01-01 PROCEDURE — 32555 ASPIRATE PLEURA W/ IMAGING: CPT

## 2021-01-01 PROCEDURE — 1123F ACP DISCUSS/DSCN MKR DOCD: CPT | Performed by: NURSE PRACTITIONER

## 2021-01-01 PROCEDURE — 89051 BODY FLUID CELL COUNT: CPT | Performed by: INTERNAL MEDICINE

## 2021-01-01 PROCEDURE — 72125 CT NECK SPINE W/O DYE: CPT

## 2021-01-01 PROCEDURE — 96365 THER/PROPH/DIAG IV INF INIT: CPT

## 2021-01-01 PROCEDURE — 82330 ASSAY OF CALCIUM: CPT

## 2021-01-01 PROCEDURE — 99239 HOSP IP/OBS DSCHRG MGMT >30: CPT | Performed by: INTERNAL MEDICINE

## 2021-01-01 PROCEDURE — 85610 PROTHROMBIN TIME: CPT | Performed by: EMERGENCY MEDICINE

## 2021-01-01 PROCEDURE — 84132 ASSAY OF SERUM POTASSIUM: CPT

## 2021-01-01 PROCEDURE — 85025 COMPLETE CBC W/AUTO DIFF WBC: CPT | Performed by: EMERGENCY MEDICINE

## 2021-01-01 PROCEDURE — 87040 BLOOD CULTURE FOR BACTERIA: CPT | Performed by: EMERGENCY MEDICINE

## 2021-01-01 PROCEDURE — 99214 OFFICE O/P EST MOD 30 MIN: CPT | Performed by: SURGERY

## 2021-01-01 PROCEDURE — 87186 SC STD MICRODIL/AGAR DIL: CPT | Performed by: INTERNAL MEDICINE

## 2021-01-01 PROCEDURE — 0T2BX0Z CHANGE DRAINAGE DEVICE IN BLADDER, EXTERNAL APPROACH: ICD-10-PCS | Performed by: INTERNAL MEDICINE

## 2021-01-01 PROCEDURE — 11045 DBRDMT SUBQ TISS EACH ADDL: CPT | Performed by: FAMILY MEDICINE

## 2021-01-01 PROCEDURE — 93880 EXTRACRANIAL BILAT STUDY: CPT

## 2021-01-01 PROCEDURE — 99223 1ST HOSP IP/OBS HIGH 75: CPT | Performed by: INTERNAL MEDICINE

## 2021-01-01 PROCEDURE — 96374 THER/PROPH/DIAG INJ IV PUSH: CPT

## 2021-01-01 PROCEDURE — 84484 ASSAY OF TROPONIN QUANT: CPT | Performed by: PHYSICIAN ASSISTANT

## 2021-01-01 PROCEDURE — 99232 SBSQ HOSP IP/OBS MODERATE 35: CPT | Performed by: NURSE PRACTITIONER

## 2021-01-01 PROCEDURE — 74176 CT ABD & PELVIS W/O CONTRAST: CPT

## 2021-01-01 PROCEDURE — 88305 TISSUE EXAM BY PATHOLOGIST: CPT | Performed by: PATHOLOGY

## 2021-01-01 PROCEDURE — 93880 EXTRACRANIAL BILAT STUDY: CPT | Performed by: SURGERY

## 2021-01-01 PROCEDURE — 86900 BLOOD TYPING SEROLOGIC ABO: CPT | Performed by: INTERNAL MEDICINE

## 2021-01-01 PROCEDURE — 84157 ASSAY OF PROTEIN OTHER: CPT | Performed by: INTERNAL MEDICINE

## 2021-01-01 PROCEDURE — 99221 1ST HOSP IP/OBS SF/LOW 40: CPT | Performed by: INTERNAL MEDICINE

## 2021-01-01 PROCEDURE — 87081 CULTURE SCREEN ONLY: CPT | Performed by: INTERNAL MEDICINE

## 2021-01-01 PROCEDURE — 94760 N-INVAS EAR/PLS OXIMETRY 1: CPT

## 2021-01-01 PROCEDURE — 17250 CHEM CAUT OF GRANLTJ TISSUE: CPT | Performed by: FAMILY MEDICINE

## 2021-01-01 PROCEDURE — 86923 COMPATIBILITY TEST ELECTRIC: CPT

## 2021-01-01 PROCEDURE — 0W993ZZ DRAINAGE OF RIGHT PLEURAL CAVITY, PERCUTANEOUS APPROACH: ICD-10-PCS | Performed by: RADIOLOGY

## 2021-01-01 PROCEDURE — 70450 CT HEAD/BRAIN W/O DYE: CPT

## 2021-01-01 PROCEDURE — U0003 INFECTIOUS AGENT DETECTION BY NUCLEIC ACID (DNA OR RNA); SEVERE ACUTE RESPIRATORY SYNDROME CORONAVIRUS 2 (SARS-COV-2) (CORONAVIRUS DISEASE [COVID-19]), AMPLIFIED PROBE TECHNIQUE, MAKING USE OF HIGH THROUGHPUT TECHNOLOGIES AS DESCRIBED BY CMS-2020-01-R: HCPCS | Performed by: PHYSICIAN ASSISTANT

## 2021-01-01 PROCEDURE — 71045 X-RAY EXAM CHEST 1 VIEW: CPT

## 2021-01-01 PROCEDURE — 84484 ASSAY OF TROPONIN QUANT: CPT | Performed by: EMERGENCY MEDICINE

## 2021-01-01 PROCEDURE — 85014 HEMATOCRIT: CPT

## 2021-01-01 PROCEDURE — 80048 BASIC METABOLIC PNL TOTAL CA: CPT | Performed by: NURSE PRACTITIONER

## 2021-01-01 PROCEDURE — 51102 DRAIN BL W/CATH INSERTION: CPT | Performed by: RADIOLOGY

## 2021-01-01 PROCEDURE — 71260 CT THORAX DX C+: CPT

## 2021-01-01 PROCEDURE — 36415 COLL VENOUS BLD VENIPUNCTURE: CPT | Performed by: PHYSICIAN ASSISTANT

## 2021-01-01 PROCEDURE — C9113 INJ PANTOPRAZOLE SODIUM, VIA: HCPCS | Performed by: EMERGENCY MEDICINE

## 2021-01-01 PROCEDURE — 87077 CULTURE AEROBIC IDENTIFY: CPT | Performed by: PHYSICIAN ASSISTANT

## 2021-01-01 PROCEDURE — 87070 CULTURE OTHR SPECIMN AEROBIC: CPT | Performed by: PHYSICIAN ASSISTANT

## 2021-01-01 PROCEDURE — U0003 INFECTIOUS AGENT DETECTION BY NUCLEIC ACID (DNA OR RNA); SEVERE ACUTE RESPIRATORY SYNDROME CORONAVIRUS 2 (SARS-COV-2) (CORONAVIRUS DISEASE [COVID-19]), AMPLIFIED PROBE TECHNIQUE, MAKING USE OF HIGH THROUGHPUT TECHNOLOGIES AS DESCRIBED BY CMS-2020-01-R: HCPCS | Performed by: EMERGENCY MEDICINE

## 2021-01-01 PROCEDURE — 99222 1ST HOSP IP/OBS MODERATE 55: CPT | Performed by: SURGERY

## 2021-01-01 PROCEDURE — 85730 THROMBOPLASTIN TIME PARTIAL: CPT | Performed by: EMERGENCY MEDICINE

## 2021-01-01 PROCEDURE — 86160 COMPLEMENT ANTIGEN: CPT | Performed by: INTERNAL MEDICINE

## 2021-01-01 PROCEDURE — 86850 RBC ANTIBODY SCREEN: CPT | Performed by: PHYSICIAN ASSISTANT

## 2021-01-01 PROCEDURE — 80202 ASSAY OF VANCOMYCIN: CPT | Performed by: PHYSICIAN ASSISTANT

## 2021-01-01 PROCEDURE — 76942 ECHO GUIDE FOR BIOPSY: CPT | Performed by: RADIOLOGY

## 2021-01-01 PROCEDURE — 99291 CRITICAL CARE FIRST HOUR: CPT | Performed by: EMERGENCY MEDICINE

## 2021-01-01 PROCEDURE — 82272 OCCULT BLD FECES 1-3 TESTS: CPT | Performed by: INTERNAL MEDICINE

## 2021-01-01 PROCEDURE — 83036 HEMOGLOBIN GLYCOSYLATED A1C: CPT | Performed by: PHYSICIAN ASSISTANT

## 2021-01-01 PROCEDURE — 87205 SMEAR GRAM STAIN: CPT | Performed by: INTERNAL MEDICINE

## 2021-01-01 PROCEDURE — 43270 EGD LESION ABLATION: CPT | Performed by: INTERNAL MEDICINE

## 2021-01-01 PROCEDURE — 36600 WITHDRAWAL OF ARTERIAL BLOOD: CPT

## 2021-01-01 PROCEDURE — 2W15X6Z COMPRESSION OF BACK USING PRESSURE DRESSING: ICD-10-PCS | Performed by: SURGERY

## 2021-01-01 PROCEDURE — U0005 INFEC AGEN DETEC AMPLI PROBE: HCPCS | Performed by: PHYSICIAN ASSISTANT

## 2021-01-01 PROCEDURE — 36430 TRANSFUSION BLD/BLD COMPNT: CPT

## 2021-01-01 PROCEDURE — 86900 BLOOD TYPING SEROLOGIC ABO: CPT | Performed by: PHYSICIAN ASSISTANT

## 2021-01-01 PROCEDURE — 99285 EMERGENCY DEPT VISIT HI MDM: CPT | Performed by: PHYSICIAN ASSISTANT

## 2021-01-01 PROCEDURE — 87205 SMEAR GRAM STAIN: CPT | Performed by: PHYSICIAN ASSISTANT

## 2021-01-01 PROCEDURE — 87449 NOS EACH ORGANISM AG IA: CPT | Performed by: PHYSICIAN ASSISTANT

## 2021-01-01 PROCEDURE — 83615 LACTATE (LD) (LDH) ENZYME: CPT | Performed by: INTERNAL MEDICINE

## 2021-01-01 PROCEDURE — P9016 RBC LEUKOCYTES REDUCED: HCPCS

## 2021-01-01 PROCEDURE — 99233 SBSQ HOSP IP/OBS HIGH 50: CPT | Performed by: INTERNAL MEDICINE

## 2021-01-01 PROCEDURE — 99220 PR INITIAL OBSERVATION CARE/DAY 70 MINUTES: CPT | Performed by: PHYSICIAN ASSISTANT

## 2021-01-01 PROCEDURE — 87077 CULTURE AEROBIC IDENTIFY: CPT | Performed by: INTERNAL MEDICINE

## 2021-01-01 PROCEDURE — 83605 ASSAY OF LACTIC ACID: CPT | Performed by: EMERGENCY MEDICINE

## 2021-01-01 PROCEDURE — 87186 SC STD MICRODIL/AGAR DIL: CPT | Performed by: EMERGENCY MEDICINE

## 2021-01-01 PROCEDURE — 99285 EMERGENCY DEPT VISIT HI MDM: CPT | Performed by: EMERGENCY MEDICINE

## 2021-01-01 PROCEDURE — 99215 OFFICE O/P EST HI 40 MIN: CPT | Performed by: FAMILY MEDICINE

## 2021-01-01 PROCEDURE — 85014 HEMATOCRIT: CPT | Performed by: PHYSICIAN ASSISTANT

## 2021-01-01 PROCEDURE — 93010 ELECTROCARDIOGRAM REPORT: CPT | Performed by: INTERNAL MEDICINE

## 2021-01-01 PROCEDURE — 84295 ASSAY OF SERUM SODIUM: CPT

## 2021-01-01 PROCEDURE — 81001 URINALYSIS AUTO W/SCOPE: CPT | Performed by: INTERNAL MEDICINE

## 2021-01-01 PROCEDURE — 83880 ASSAY OF NATRIURETIC PEPTIDE: CPT

## 2021-01-01 PROCEDURE — 82803 BLOOD GASES ANY COMBINATION: CPT

## 2021-01-01 PROCEDURE — 87086 URINE CULTURE/COLONY COUNT: CPT | Performed by: EMERGENCY MEDICINE

## 2021-01-01 PROCEDURE — 86901 BLOOD TYPING SEROLOGIC RH(D): CPT | Performed by: PHYSICIAN ASSISTANT

## 2021-01-01 PROCEDURE — 94664 DEMO&/EVAL PT USE INHALER: CPT

## 2021-01-01 PROCEDURE — 86850 RBC ANTIBODY SCREEN: CPT | Performed by: INTERNAL MEDICINE

## 2021-01-01 PROCEDURE — 84145 PROCALCITONIN (PCT): CPT | Performed by: EMERGENCY MEDICINE

## 2021-01-01 PROCEDURE — 92526 ORAL FUNCTION THERAPY: CPT

## 2021-01-01 PROCEDURE — 85730 THROMBOPLASTIN TIME PARTIAL: CPT | Performed by: PHYSICIAN ASSISTANT

## 2021-01-01 PROCEDURE — 85610 PROTHROMBIN TIME: CPT | Performed by: PHYSICIAN ASSISTANT

## 2021-01-01 PROCEDURE — 83690 ASSAY OF LIPASE: CPT | Performed by: EMERGENCY MEDICINE

## 2021-01-01 RX ORDER — SODIUM CHLORIDE, SODIUM GLUCONATE, SODIUM ACETATE, POTASSIUM CHLORIDE, MAGNESIUM CHLORIDE, SODIUM PHOSPHATE, DIBASIC, AND POTASSIUM PHOSPHATE .53; .5; .37; .037; .03; .012; .00082 G/100ML; G/100ML; G/100ML; G/100ML; G/100ML; G/100ML; G/100ML
75 INJECTION, SOLUTION INTRAVENOUS CONTINUOUS
Status: DISCONTINUED | OUTPATIENT
Start: 2021-01-01 | End: 2021-01-01

## 2021-01-01 RX ORDER — CEFTRIAXONE 1 G/50ML
1000 INJECTION, SOLUTION INTRAVENOUS ONCE
Status: COMPLETED | OUTPATIENT
Start: 2021-01-01 | End: 2021-01-01

## 2021-01-01 RX ORDER — AZITHROMYCIN 250 MG/1
250 TABLET, FILM COATED ORAL EVERY 24 HOURS
Status: DISCONTINUED | OUTPATIENT
Start: 2021-01-01 | End: 2021-01-01

## 2021-01-01 RX ORDER — GABAPENTIN 100 MG/1
100 CAPSULE ORAL 3 TIMES DAILY
Status: DISCONTINUED | OUTPATIENT
Start: 2021-01-01 | End: 2021-01-01

## 2021-01-01 RX ORDER — CEFTRIAXONE 1 G/50ML
1000 INJECTION, SOLUTION INTRAVENOUS EVERY 24 HOURS
Status: DISCONTINUED | OUTPATIENT
Start: 2021-01-01 | End: 2021-01-01 | Stop reason: HOSPADM

## 2021-01-01 RX ORDER — CEFEPIME HYDROCHLORIDE 1 G/50ML
1000 INJECTION, SOLUTION INTRAVENOUS EVERY 24 HOURS
Status: DISCONTINUED | OUTPATIENT
Start: 2021-01-01 | End: 2021-01-01 | Stop reason: HOSPADM

## 2021-01-01 RX ORDER — GABAPENTIN 100 MG/1
100 CAPSULE ORAL
Status: DISCONTINUED | OUTPATIENT
Start: 2021-01-01 | End: 2021-01-01 | Stop reason: HOSPADM

## 2021-01-01 RX ORDER — ACETAMINOPHEN 325 MG/1
650 TABLET ORAL EVERY 6 HOURS PRN
Status: DISCONTINUED | OUTPATIENT
Start: 2021-01-01 | End: 2021-01-01 | Stop reason: HOSPADM

## 2021-01-01 RX ORDER — FENTANYL CITRATE 50 UG/ML
INJECTION, SOLUTION INTRAMUSCULAR; INTRAVENOUS AS NEEDED
Status: DISCONTINUED | OUTPATIENT
Start: 2021-01-01 | End: 2021-01-01

## 2021-01-01 RX ORDER — AMLODIPINE BESYLATE 5 MG/1
5 TABLET ORAL DAILY
Status: DISCONTINUED | OUTPATIENT
Start: 2021-01-01 | End: 2021-01-01

## 2021-01-01 RX ORDER — KETAMINE HCL IN NACL, ISO-OSM 100MG/10ML
SYRINGE (ML) INJECTION AS NEEDED
Status: DISCONTINUED | OUTPATIENT
Start: 2021-01-01 | End: 2021-01-01

## 2021-01-01 RX ORDER — LIDOCAINE HYDROCHLORIDE 40 MG/ML
1 SOLUTION TOPICAL ONCE
Status: COMPLETED | OUTPATIENT
Start: 2021-01-01 | End: 2021-01-01

## 2021-01-01 RX ORDER — LIDOCAINE HYDROCHLORIDE 40 MG/ML
5 SOLUTION TOPICAL ONCE
Status: COMPLETED | OUTPATIENT
Start: 2021-01-01 | End: 2021-01-01

## 2021-01-01 RX ORDER — OXYCODONE HYDROCHLORIDE 5 MG/1
5 TABLET ORAL EVERY 4 HOURS PRN
Status: DISCONTINUED | OUTPATIENT
Start: 2021-01-01 | End: 2021-01-01 | Stop reason: HOSPADM

## 2021-01-01 RX ORDER — DEXTROSE MONOHYDRATE 25 G/50ML
12.5 INJECTION, SOLUTION INTRAVENOUS ONCE
Status: COMPLETED | OUTPATIENT
Start: 2021-01-01 | End: 2021-01-01

## 2021-01-01 RX ORDER — CEFTRIAXONE 1 G/50ML
1000 INJECTION, SOLUTION INTRAVENOUS EVERY 24 HOURS
Status: DISCONTINUED | OUTPATIENT
Start: 2021-01-01 | End: 2021-01-01

## 2021-01-01 RX ORDER — SODIUM CHLORIDE 9 MG/ML
50 INJECTION, SOLUTION INTRAVENOUS CONTINUOUS
Status: DISPENSED | OUTPATIENT
Start: 2021-01-01 | End: 2021-01-01

## 2021-01-01 RX ORDER — INSULIN GLARGINE 100 [IU]/ML
10 INJECTION, SOLUTION SUBCUTANEOUS
Qty: 10 ML | Refills: 0
Start: 2021-01-01 | End: 2021-01-01 | Stop reason: HOSPADM

## 2021-01-01 RX ORDER — PANTOPRAZOLE SODIUM 40 MG/1
40 TABLET, DELAYED RELEASE ORAL
Status: DISCONTINUED | OUTPATIENT
Start: 2021-01-01 | End: 2021-01-01 | Stop reason: HOSPADM

## 2021-01-01 RX ORDER — ALBUMIN (HUMAN) 12.5 G/50ML
25 SOLUTION INTRAVENOUS ONCE
Status: COMPLETED | OUTPATIENT
Start: 2021-01-01 | End: 2021-01-01

## 2021-01-01 RX ORDER — ALBUMIN (HUMAN) 12.5 G/50ML
25 SOLUTION INTRAVENOUS 2 TIMES DAILY
Status: DISCONTINUED | OUTPATIENT
Start: 2021-01-01 | End: 2021-01-01

## 2021-01-01 RX ORDER — LINAGLIPTIN 5 MG/1
5 TABLET, FILM COATED ORAL DAILY
COMMUNITY
End: 2021-01-01 | Stop reason: HOSPADM

## 2021-01-01 RX ORDER — PANTOPRAZOLE SODIUM 40 MG/1
40 TABLET, DELAYED RELEASE ORAL 2 TIMES DAILY
Status: DISCONTINUED | OUTPATIENT
Start: 2021-01-01 | End: 2021-01-01 | Stop reason: HOSPADM

## 2021-01-01 RX ORDER — ALBUMIN (HUMAN) 12.5 G/50ML
12.5 SOLUTION INTRAVENOUS ONCE
Status: COMPLETED | OUTPATIENT
Start: 2021-01-01 | End: 2021-01-01

## 2021-01-01 RX ORDER — AMLODIPINE BESYLATE 5 MG/1
5 TABLET ORAL DAILY
Status: DISCONTINUED | OUTPATIENT
Start: 2021-01-01 | End: 2021-01-01 | Stop reason: HOSPADM

## 2021-01-01 RX ORDER — PANTOPRAZOLE SODIUM 40 MG/1
40 TABLET, DELAYED RELEASE ORAL
Status: DISCONTINUED | OUTPATIENT
Start: 2021-01-01 | End: 2021-01-01

## 2021-01-01 RX ORDER — CEFEPIME HYDROCHLORIDE 2 G/50ML
2000 INJECTION, SOLUTION INTRAVENOUS ONCE
Status: COMPLETED | OUTPATIENT
Start: 2021-01-01 | End: 2021-01-01

## 2021-01-01 RX ORDER — MULTIVITAMIN/IRON/FOLIC ACID 18MG-0.4MG
1 TABLET ORAL DAILY
Status: DISCONTINUED | OUTPATIENT
Start: 2021-01-01 | End: 2021-01-01 | Stop reason: HOSPADM

## 2021-01-01 RX ORDER — SODIUM CHLORIDE 9 MG/ML
50 INJECTION, SOLUTION INTRAVENOUS CONTINUOUS
Status: DISCONTINUED | OUTPATIENT
Start: 2021-01-01 | End: 2021-01-01

## 2021-01-01 RX ORDER — FENTANYL CITRATE/PF 50 MCG/ML
25 SYRINGE (ML) INJECTION
Status: DISCONTINUED | OUTPATIENT
Start: 2021-01-01 | End: 2021-01-01 | Stop reason: HOSPADM

## 2021-01-01 RX ORDER — INSULIN GLARGINE 100 [IU]/ML
10 INJECTION, SOLUTION SUBCUTANEOUS
Status: DISCONTINUED | OUTPATIENT
Start: 2021-01-01 | End: 2021-01-01

## 2021-01-01 RX ORDER — FERROUS SULFATE 325(65) MG
325 TABLET ORAL
Status: DISCONTINUED | OUTPATIENT
Start: 2021-01-01 | End: 2021-01-01 | Stop reason: HOSPADM

## 2021-01-01 RX ORDER — FUROSEMIDE 10 MG/ML
40 INJECTION INTRAMUSCULAR; INTRAVENOUS ONCE
Status: COMPLETED | OUTPATIENT
Start: 2021-01-01 | End: 2021-01-01

## 2021-01-01 RX ORDER — ASPIRIN 81 MG/1
81 TABLET ORAL DAILY
Status: DISCONTINUED | OUTPATIENT
Start: 2021-01-01 | End: 2021-01-01

## 2021-01-01 RX ORDER — TORSEMIDE 20 MG/1
20 TABLET ORAL DAILY
Status: DISCONTINUED | OUTPATIENT
Start: 2021-01-01 | End: 2021-01-01 | Stop reason: HOSPADM

## 2021-01-01 RX ORDER — MIRTAZAPINE 15 MG/1
15 TABLET, FILM COATED ORAL
Status: DISCONTINUED | OUTPATIENT
Start: 2021-01-01 | End: 2021-01-01 | Stop reason: HOSPADM

## 2021-01-01 RX ORDER — FUROSEMIDE 40 MG/1
40 TABLET ORAL EVERY 12 HOURS
Status: DISCONTINUED | OUTPATIENT
Start: 2021-01-01 | End: 2021-01-01

## 2021-01-01 RX ORDER — DEXTROSE MONOHYDRATE 50 MG/ML
75 INJECTION, SOLUTION INTRAVENOUS CONTINUOUS
Status: DISCONTINUED | OUTPATIENT
Start: 2021-01-01 | End: 2021-01-01

## 2021-01-01 RX ORDER — AMLODIPINE BESYLATE 5 MG/1
5 TABLET ORAL DAILY
Qty: 30 TABLET | Refills: 0 | Status: SHIPPED | OUTPATIENT
Start: 2021-01-01

## 2021-01-01 RX ORDER — GABAPENTIN 100 MG/1
100 CAPSULE ORAL 3 TIMES DAILY
Status: DISCONTINUED | OUTPATIENT
Start: 2021-01-01 | End: 2021-01-01 | Stop reason: HOSPADM

## 2021-01-01 RX ORDER — AZITHROMYCIN 500 MG/1
500 TABLET, FILM COATED ORAL EVERY 24 HOURS
Status: COMPLETED | OUTPATIENT
Start: 2021-01-01 | End: 2021-01-01

## 2021-01-01 RX ORDER — FUROSEMIDE 10 MG/ML
40 INJECTION INTRAMUSCULAR; INTRAVENOUS 2 TIMES DAILY
Status: DISCONTINUED | OUTPATIENT
Start: 2021-01-01 | End: 2021-01-01

## 2021-01-01 RX ORDER — CEFAZOLIN SODIUM 1 G/50ML
1000 SOLUTION INTRAVENOUS ONCE
Status: COMPLETED | OUTPATIENT
Start: 2021-01-01 | End: 2021-01-01

## 2021-01-01 RX ORDER — DEXTROSE MONOHYDRATE 25 G/50ML
12.5 INJECTION, SOLUTION INTRAVENOUS ONCE
Status: DISCONTINUED | OUTPATIENT
Start: 2021-01-01 | End: 2021-01-01

## 2021-01-01 RX ORDER — TORSEMIDE 10 MG/1
10 TABLET ORAL DAILY
Status: DISCONTINUED | OUTPATIENT
Start: 2021-01-01 | End: 2021-01-01 | Stop reason: HOSPADM

## 2021-01-01 RX ORDER — INSULIN GLARGINE 100 [IU]/ML
8 INJECTION, SOLUTION SUBCUTANEOUS
Status: DISCONTINUED | OUTPATIENT
Start: 2021-01-01 | End: 2021-01-01 | Stop reason: HOSPADM

## 2021-01-01 RX ORDER — PANTOPRAZOLE SODIUM 40 MG/1
40 INJECTION, POWDER, FOR SOLUTION INTRAVENOUS EVERY 12 HOURS
Status: DISCONTINUED | OUTPATIENT
Start: 2021-01-01 | End: 2021-01-01

## 2021-01-01 RX ORDER — INSULIN GLARGINE 100 [IU]/ML
10 INJECTION, SOLUTION SUBCUTANEOUS
Status: DISCONTINUED | OUTPATIENT
Start: 2021-01-01 | End: 2021-01-01 | Stop reason: HOSPADM

## 2021-01-01 RX ORDER — ALBUMIN, HUMAN INJ 5% 5 %
12.5 SOLUTION INTRAVENOUS ONCE
Status: COMPLETED | OUTPATIENT
Start: 2021-01-01 | End: 2021-01-01

## 2021-01-01 RX ORDER — ONDANSETRON 2 MG/ML
4 INJECTION INTRAMUSCULAR; INTRAVENOUS EVERY 6 HOURS PRN
Status: DISCONTINUED | OUTPATIENT
Start: 2021-01-01 | End: 2021-01-01 | Stop reason: HOSPADM

## 2021-01-01 RX ORDER — POTASSIUM CHLORIDE 20 MEQ/1
20 TABLET, EXTENDED RELEASE ORAL DAILY
Status: DISCONTINUED | OUTPATIENT
Start: 2021-01-01 | End: 2021-01-01

## 2021-01-01 RX ORDER — ACETAMINOPHEN 325 MG/1
650 TABLET ORAL EVERY 4 HOURS PRN
COMMUNITY

## 2021-01-01 RX ORDER — LIDOCAINE HYDROCHLORIDE 10 MG/ML
INJECTION, SOLUTION EPIDURAL; INFILTRATION; INTRACAUDAL; PERINEURAL AS NEEDED
Status: DISCONTINUED | OUTPATIENT
Start: 2021-01-01 | End: 2021-01-01

## 2021-01-01 RX ORDER — ASCORBIC ACID 500 MG
500 TABLET ORAL DAILY
Status: DISCONTINUED | OUTPATIENT
Start: 2021-01-01 | End: 2021-01-01 | Stop reason: HOSPADM

## 2021-01-01 RX ORDER — DEXTROSE MONOHYDRATE 50 MG/ML
50 INJECTION, SOLUTION INTRAVENOUS CONTINUOUS
Status: DISPENSED | OUTPATIENT
Start: 2021-01-01 | End: 2021-01-01

## 2021-01-01 RX ORDER — INSULIN GLARGINE 100 [IU]/ML
14 INJECTION, SOLUTION SUBCUTANEOUS
Status: DISCONTINUED | OUTPATIENT
Start: 2021-01-01 | End: 2021-01-01

## 2021-01-01 RX ORDER — INSULIN GLARGINE 100 [IU]/ML
10 INJECTION, SOLUTION SUBCUTANEOUS
Qty: 10 ML | Refills: 0 | Status: SHIPPED | OUTPATIENT
Start: 2021-01-01

## 2021-01-01 RX ORDER — PROPOFOL 10 MG/ML
INJECTION, EMULSION INTRAVENOUS AS NEEDED
Status: DISCONTINUED | OUTPATIENT
Start: 2021-01-01 | End: 2021-01-01

## 2021-01-01 RX ORDER — PROPOFOL 10 MG/ML
INJECTION, EMULSION INTRAVENOUS CONTINUOUS PRN
Status: DISCONTINUED | OUTPATIENT
Start: 2021-01-01 | End: 2021-01-01

## 2021-01-01 RX ORDER — ATORVASTATIN CALCIUM 40 MG/1
40 TABLET, FILM COATED ORAL
Status: DISCONTINUED | OUTPATIENT
Start: 2021-01-01 | End: 2021-01-01 | Stop reason: HOSPADM

## 2021-01-01 RX ORDER — ASCORBIC ACID 500 MG
500 TABLET ORAL DAILY
Refills: 0
Start: 2021-01-01

## 2021-01-01 RX ORDER — TORSEMIDE 20 MG/1
TABLET ORAL
Qty: 30 TABLET | Refills: 0
Start: 2021-01-01 | End: 2021-01-01 | Stop reason: ALTCHOICE

## 2021-01-01 RX ORDER — METOCLOPRAMIDE HYDROCHLORIDE 5 MG/ML
10 INJECTION INTRAMUSCULAR; INTRAVENOUS ONCE AS NEEDED
Status: DISCONTINUED | OUTPATIENT
Start: 2021-01-01 | End: 2021-01-01 | Stop reason: HOSPADM

## 2021-01-01 RX ORDER — SODIUM CHLORIDE 9 MG/ML
INJECTION, SOLUTION INTRAVENOUS CONTINUOUS PRN
Status: DISCONTINUED | OUTPATIENT
Start: 2021-01-01 | End: 2021-01-01

## 2021-01-01 RX ORDER — BUMETANIDE 0.25 MG/ML
1 INJECTION, SOLUTION INTRAMUSCULAR; INTRAVENOUS 2 TIMES DAILY
Status: COMPLETED | OUTPATIENT
Start: 2021-01-01 | End: 2021-01-01

## 2021-01-01 RX ORDER — BUMETANIDE 0.25 MG/ML
1 INJECTION, SOLUTION INTRAMUSCULAR; INTRAVENOUS ONCE
Status: COMPLETED | OUTPATIENT
Start: 2021-01-01 | End: 2021-01-01

## 2021-01-01 RX ORDER — GLYCOPYRROLATE 0.2 MG/ML
INJECTION INTRAMUSCULAR; INTRAVENOUS AS NEEDED
Status: DISCONTINUED | OUTPATIENT
Start: 2021-01-01 | End: 2021-01-01

## 2021-01-01 RX ORDER — EPHEDRINE SULFATE 50 MG/ML
INJECTION INTRAVENOUS AS NEEDED
Status: DISCONTINUED | OUTPATIENT
Start: 2021-01-01 | End: 2021-01-01

## 2021-01-01 RX ORDER — PANTOPRAZOLE SODIUM 40 MG/1
40 INJECTION, POWDER, FOR SOLUTION INTRAVENOUS EVERY 12 HOURS SCHEDULED
Status: DISCONTINUED | OUTPATIENT
Start: 2021-01-01 | End: 2021-01-01 | Stop reason: HOSPADM

## 2021-01-01 RX ADMIN — Medication 125 MG: at 22:00

## 2021-01-01 RX ADMIN — GABAPENTIN 100 MG: 100 CAPSULE ORAL at 09:21

## 2021-01-01 RX ADMIN — INSULIN LISPRO 2 UNITS: 100 INJECTION, SOLUTION INTRAVENOUS; SUBCUTANEOUS at 22:57

## 2021-01-01 RX ADMIN — TORSEMIDE 20 MG: 20 TABLET ORAL at 09:35

## 2021-01-01 RX ADMIN — RIVAROXABAN 15 MG: 15 TABLET, FILM COATED ORAL at 09:49

## 2021-01-01 RX ADMIN — MUPIROCIN 1 APPLICATION: 20 OINTMENT TOPICAL at 09:14

## 2021-01-01 RX ADMIN — FERROUS SULFATE TAB 325 MG (65 MG ELEMENTAL FE) 325 MG: 325 (65 FE) TAB at 08:05

## 2021-01-01 RX ADMIN — RIVAROXABAN 15 MG: 15 TABLET, FILM COATED ORAL at 08:39

## 2021-01-01 RX ADMIN — GABAPENTIN 100 MG: 100 CAPSULE ORAL at 18:26

## 2021-01-01 RX ADMIN — PANTOPRAZOLE SODIUM 40 MG: 40 TABLET, DELAYED RELEASE ORAL at 06:13

## 2021-01-01 RX ADMIN — GABAPENTIN 100 MG: 100 CAPSULE ORAL at 17:32

## 2021-01-01 RX ADMIN — METOPROLOL TARTRATE 25 MG: 25 TABLET, FILM COATED ORAL at 21:39

## 2021-01-01 RX ADMIN — PANTOPRAZOLE SODIUM 40 MG: 40 TABLET, DELAYED RELEASE ORAL at 06:01

## 2021-01-01 RX ADMIN — BUMETANIDE 1 MG: 0.25 INJECTION, SOLUTION INTRAMUSCULAR; INTRAVENOUS at 09:18

## 2021-01-01 RX ADMIN — PANTOPRAZOLE SODIUM 40 MG: 40 TABLET, DELAYED RELEASE ORAL at 22:55

## 2021-01-01 RX ADMIN — RIVAROXABAN 15 MG: 15 TABLET, FILM COATED ORAL at 10:32

## 2021-01-01 RX ADMIN — Medication 1 TABLET: at 09:58

## 2021-01-01 RX ADMIN — MIRTAZAPINE 15 MG: 15 TABLET, FILM COATED ORAL at 22:42

## 2021-01-01 RX ADMIN — GABAPENTIN 100 MG: 100 CAPSULE ORAL at 09:58

## 2021-01-01 RX ADMIN — MAGNESIUM OXIDE TAB 400 MG (241.3 MG ELEMENTAL MG) 400 MG: 400 (241.3 MG) TAB at 08:38

## 2021-01-01 RX ADMIN — RIVAROXABAN 15 MG: 15 TABLET, FILM COATED ORAL at 08:05

## 2021-01-01 RX ADMIN — SODIUM CHLORIDE 50 ML/HR: 0.9 INJECTION, SOLUTION INTRAVENOUS at 09:38

## 2021-01-01 RX ADMIN — METOPROLOL TARTRATE 25 MG: 25 TABLET, FILM COATED ORAL at 22:18

## 2021-01-01 RX ADMIN — PANTOPRAZOLE SODIUM 40 MG: 40 TABLET, DELAYED RELEASE ORAL at 08:48

## 2021-01-01 RX ADMIN — GABAPENTIN 100 MG: 100 CAPSULE ORAL at 22:42

## 2021-01-01 RX ADMIN — TORSEMIDE 20 MG: 20 TABLET ORAL at 08:05

## 2021-01-01 RX ADMIN — MIRTAZAPINE 15 MG: 15 TABLET, FILM COATED ORAL at 21:43

## 2021-01-01 RX ADMIN — INSULIN GLARGINE 10 UNITS: 100 INJECTION, SOLUTION SUBCUTANEOUS at 22:33

## 2021-01-01 RX ADMIN — GABAPENTIN 100 MG: 100 CAPSULE ORAL at 17:19

## 2021-01-01 RX ADMIN — GABAPENTIN 100 MG: 100 CAPSULE ORAL at 09:49

## 2021-01-01 RX ADMIN — INSULIN LISPRO 2 UNITS: 100 INJECTION, SOLUTION INTRAVENOUS; SUBCUTANEOUS at 12:52

## 2021-01-01 RX ADMIN — ATORVASTATIN CALCIUM 40 MG: 40 TABLET, FILM COATED ORAL at 16:28

## 2021-01-01 RX ADMIN — FUROSEMIDE 40 MG: 10 INJECTION, SOLUTION INTRAMUSCULAR; INTRAVENOUS at 17:29

## 2021-01-01 RX ADMIN — Medication 125 MG: at 08:47

## 2021-01-01 RX ADMIN — ALBUMIN (HUMAN) 25 G: 0.25 INJECTION, SOLUTION INTRAVENOUS at 14:10

## 2021-01-01 RX ADMIN — METOPROLOL TARTRATE 25 MG: 25 TABLET, FILM COATED ORAL at 08:47

## 2021-01-01 RX ADMIN — MAGNESIUM OXIDE TAB 400 MG (241.3 MG ELEMENTAL MG) 400 MG: 400 (241.3 MG) TAB at 18:03

## 2021-01-01 RX ADMIN — Medication 12.5 MG: at 22:47

## 2021-01-01 RX ADMIN — LIDOCAINE HYDROCHLORIDE 5 ML: 40 SOLUTION TOPICAL at 10:03

## 2021-01-01 RX ADMIN — MIRTAZAPINE 15 MG: 15 TABLET, FILM COATED ORAL at 22:20

## 2021-01-01 RX ADMIN — GABAPENTIN 100 MG: 100 CAPSULE ORAL at 21:59

## 2021-01-01 RX ADMIN — METOPROLOL TARTRATE 75 MG: 50 TABLET, FILM COATED ORAL at 09:17

## 2021-01-01 RX ADMIN — BUMETANIDE 1 MG: 0.25 INJECTION, SOLUTION INTRAMUSCULAR; INTRAVENOUS at 18:27

## 2021-01-01 RX ADMIN — INSULIN LISPRO 8 UNITS: 100 INJECTION, SOLUTION INTRAVENOUS; SUBCUTANEOUS at 11:33

## 2021-01-01 RX ADMIN — Medication 1 TABLET: at 08:47

## 2021-01-01 RX ADMIN — PANTOPRAZOLE SODIUM 40 MG: 40 TABLET, DELAYED RELEASE ORAL at 22:00

## 2021-01-01 RX ADMIN — GABAPENTIN 100 MG: 100 CAPSULE ORAL at 16:15

## 2021-01-01 RX ADMIN — CEFTRIAXONE 1000 MG: 1 INJECTION, SOLUTION INTRAVENOUS at 00:23

## 2021-01-01 RX ADMIN — INSULIN LISPRO 2 UNITS: 100 INJECTION, SOLUTION INTRAVENOUS; SUBCUTANEOUS at 11:15

## 2021-01-01 RX ADMIN — FERROUS SULFATE TAB 325 MG (65 MG ELEMENTAL FE) 325 MG: 325 (65 FE) TAB at 09:27

## 2021-01-01 RX ADMIN — Medication 125 MG: at 08:42

## 2021-01-01 RX ADMIN — PANTOPRAZOLE SODIUM 40 MG: 40 TABLET, DELAYED RELEASE ORAL at 08:22

## 2021-01-01 RX ADMIN — INSULIN GLARGINE 10 UNITS: 100 INJECTION, SOLUTION SUBCUTANEOUS at 22:18

## 2021-01-01 RX ADMIN — AMLODIPINE BESYLATE 5 MG: 5 TABLET ORAL at 08:56

## 2021-01-01 RX ADMIN — LIDOCAINE HYDROCHLORIDE 1 ML: 40 SOLUTION TOPICAL at 09:42

## 2021-01-01 RX ADMIN — MAGNESIUM OXIDE TAB 400 MG (241.3 MG ELEMENTAL MG) 400 MG: 400 (241.3 MG) TAB at 17:34

## 2021-01-01 RX ADMIN — SODIUM CHLORIDE 500 ML: 0.9 INJECTION, SOLUTION INTRAVENOUS at 14:30

## 2021-01-01 RX ADMIN — AMLODIPINE BESYLATE 5 MG: 5 TABLET ORAL at 08:31

## 2021-01-01 RX ADMIN — FERROUS SULFATE TAB 325 MG (65 MG ELEMENTAL FE) 325 MG: 325 (65 FE) TAB at 10:39

## 2021-01-01 RX ADMIN — INSULIN LISPRO 2 UNITS: 100 INJECTION, SOLUTION INTRAVENOUS; SUBCUTANEOUS at 08:26

## 2021-01-01 RX ADMIN — OXYCODONE HYDROCHLORIDE AND ACETAMINOPHEN 500 MG: 500 TABLET ORAL at 09:27

## 2021-01-01 RX ADMIN — PANTOPRAZOLE SODIUM 40 MG: 40 TABLET, DELAYED RELEASE ORAL at 08:06

## 2021-01-01 RX ADMIN — INSULIN LISPRO 2 UNITS: 100 INJECTION, SOLUTION INTRAVENOUS; SUBCUTANEOUS at 17:39

## 2021-01-01 RX ADMIN — CEFTRIAXONE 1000 MG: 1 INJECTION, SOLUTION INTRAVENOUS at 01:37

## 2021-01-01 RX ADMIN — ATORVASTATIN CALCIUM 40 MG: 40 TABLET, FILM COATED ORAL at 16:23

## 2021-01-01 RX ADMIN — METOPROLOL TARTRATE 25 MG: 25 TABLET, FILM COATED ORAL at 20:25

## 2021-01-01 RX ADMIN — INSULIN LISPRO 1 UNITS: 100 INJECTION, SOLUTION INTRAVENOUS; SUBCUTANEOUS at 23:02

## 2021-01-01 RX ADMIN — PANTOPRAZOLE SODIUM 40 MG: 40 TABLET, DELAYED RELEASE ORAL at 16:53

## 2021-01-01 RX ADMIN — GABAPENTIN 100 MG: 100 CAPSULE ORAL at 16:24

## 2021-01-01 RX ADMIN — PANTOPRAZOLE SODIUM 40 MG: 40 TABLET, DELAYED RELEASE ORAL at 09:49

## 2021-01-01 RX ADMIN — Medication 125 MG: at 10:06

## 2021-01-01 RX ADMIN — BUMETANIDE 1 MG: 0.25 INJECTION, SOLUTION INTRAMUSCULAR; INTRAVENOUS at 18:04

## 2021-01-01 RX ADMIN — CEFTRIAXONE 1000 MG: 1 INJECTION, SOLUTION INTRAVENOUS at 22:34

## 2021-01-01 RX ADMIN — ASPIRIN 81 MG: 81 TABLET, COATED ORAL at 10:38

## 2021-01-01 RX ADMIN — MIRTAZAPINE 15 MG: 15 TABLET, FILM COATED ORAL at 23:06

## 2021-01-01 RX ADMIN — FERROUS SULFATE TAB 325 MG (65 MG ELEMENTAL FE) 325 MG: 325 (65 FE) TAB at 09:17

## 2021-01-01 RX ADMIN — Medication 125 MG: at 22:55

## 2021-01-01 RX ADMIN — INSULIN LISPRO 3 UNITS: 100 INJECTION, SOLUTION INTRAVENOUS; SUBCUTANEOUS at 21:52

## 2021-01-01 RX ADMIN — ATORVASTATIN CALCIUM 40 MG: 40 TABLET, FILM COATED ORAL at 15:56

## 2021-01-01 RX ADMIN — PANTOPRAZOLE SODIUM 40 MG: 40 INJECTION, POWDER, FOR SOLUTION INTRAVENOUS at 21:55

## 2021-01-01 RX ADMIN — PANTOPRAZOLE SODIUM 40 MG: 40 INJECTION, POWDER, FOR SOLUTION INTRAVENOUS at 11:54

## 2021-01-01 RX ADMIN — MUPIROCIN 1 APPLICATION: 20 OINTMENT TOPICAL at 20:52

## 2021-01-01 RX ADMIN — GABAPENTIN 100 MG: 100 CAPSULE ORAL at 08:05

## 2021-01-01 RX ADMIN — METOPROLOL TARTRATE 75 MG: 50 TABLET, FILM COATED ORAL at 08:38

## 2021-01-01 RX ADMIN — PANTOPRAZOLE SODIUM 40 MG: 40 TABLET, DELAYED RELEASE ORAL at 21:40

## 2021-01-01 RX ADMIN — INSULIN GLARGINE 8 UNITS: 100 INJECTION, SOLUTION SUBCUTANEOUS at 22:20

## 2021-01-01 RX ADMIN — MIRTAZAPINE 15 MG: 15 TABLET, FILM COATED ORAL at 22:00

## 2021-01-01 RX ADMIN — PANTOPRAZOLE SODIUM 40 MG: 40 TABLET, DELAYED RELEASE ORAL at 20:23

## 2021-01-01 RX ADMIN — FERROUS SULFATE TAB 325 MG (65 MG ELEMENTAL FE) 325 MG: 325 (65 FE) TAB at 10:33

## 2021-01-01 RX ADMIN — Medication 1 TABLET: at 08:39

## 2021-01-01 RX ADMIN — FERROUS SULFATE TAB 325 MG (65 MG ELEMENTAL FE) 325 MG: 325 (65 FE) TAB at 08:40

## 2021-01-01 RX ADMIN — MUPIROCIN 1 APPLICATION: 20 OINTMENT TOPICAL at 08:47

## 2021-01-01 RX ADMIN — GABAPENTIN 100 MG: 100 CAPSULE ORAL at 16:18

## 2021-01-01 RX ADMIN — FUROSEMIDE 40 MG: 10 INJECTION, SOLUTION INTRAVENOUS at 21:58

## 2021-01-01 RX ADMIN — VANCOMYCIN HYDROCHLORIDE 750 MG: 750 INJECTION, SOLUTION INTRAVENOUS at 02:52

## 2021-01-01 RX ADMIN — PANTOPRAZOLE SODIUM 40 MG: 40 TABLET, DELAYED RELEASE ORAL at 06:20

## 2021-01-01 RX ADMIN — VANCOMYCIN HYDROCHLORIDE 750 MG: 750 INJECTION, SOLUTION INTRAVENOUS at 18:31

## 2021-01-01 RX ADMIN — ALBUMIN (HUMAN) 12.5 G: 12.5 INJECTION, SOLUTION INTRAVENOUS at 16:05

## 2021-01-01 RX ADMIN — METOPROLOL TARTRATE 25 MG: 25 TABLET, FILM COATED ORAL at 23:06

## 2021-01-01 RX ADMIN — INSULIN GLARGINE 10 UNITS: 100 INJECTION, SOLUTION SUBCUTANEOUS at 22:47

## 2021-01-01 RX ADMIN — CEFTRIAXONE 1000 MG: 1 INJECTION, SOLUTION INTRAVENOUS at 22:01

## 2021-01-01 RX ADMIN — PROPOFOL 20 MG: 10 INJECTION, EMULSION INTRAVENOUS at 09:44

## 2021-01-01 RX ADMIN — ACETAMINOPHEN 650 MG: 325 TABLET, FILM COATED ORAL at 21:05

## 2021-01-01 RX ADMIN — PANTOPRAZOLE SODIUM 40 MG: 40 TABLET, DELAYED RELEASE ORAL at 22:18

## 2021-01-01 RX ADMIN — INSULIN GLARGINE 14 UNITS: 100 INJECTION, SOLUTION SUBCUTANEOUS at 01:42

## 2021-01-01 RX ADMIN — VANCOMYCIN HYDROCHLORIDE 1250 MG: 1 INJECTION, POWDER, LYOPHILIZED, FOR SOLUTION INTRAVENOUS at 17:32

## 2021-01-01 RX ADMIN — PANTOPRAZOLE SODIUM 40 MG: 40 TABLET, DELAYED RELEASE ORAL at 15:58

## 2021-01-01 RX ADMIN — TORSEMIDE 20 MG: 20 TABLET ORAL at 17:06

## 2021-01-01 RX ADMIN — VANCOMYCIN HYDROCHLORIDE 500 MG: 500 INJECTION, POWDER, LYOPHILIZED, FOR SOLUTION INTRAVENOUS at 03:21

## 2021-01-01 RX ADMIN — PANTOPRAZOLE SODIUM 40 MG: 40 TABLET, DELAYED RELEASE ORAL at 10:32

## 2021-01-01 RX ADMIN — GABAPENTIN 100 MG: 100 CAPSULE ORAL at 09:17

## 2021-01-01 RX ADMIN — GABAPENTIN 100 MG: 100 CAPSULE ORAL at 22:20

## 2021-01-01 RX ADMIN — METOPROLOL TARTRATE 75 MG: 50 TABLET, FILM COATED ORAL at 21:00

## 2021-01-01 RX ADMIN — FERROUS SULFATE TAB 325 MG (65 MG ELEMENTAL FE) 325 MG: 325 (65 FE) TAB at 09:14

## 2021-01-01 RX ADMIN — METOPROLOL TARTRATE 25 MG: 25 TABLET, FILM COATED ORAL at 22:00

## 2021-01-01 RX ADMIN — GABAPENTIN 100 MG: 100 CAPSULE ORAL at 22:41

## 2021-01-01 RX ADMIN — INSULIN LISPRO 1 UNITS: 100 INJECTION, SOLUTION INTRAVENOUS; SUBCUTANEOUS at 16:19

## 2021-01-01 RX ADMIN — AMLODIPINE BESYLATE 5 MG: 5 TABLET ORAL at 11:39

## 2021-01-01 RX ADMIN — PANTOPRAZOLE SODIUM 40 MG: 40 TABLET, DELAYED RELEASE ORAL at 16:23

## 2021-01-01 RX ADMIN — Medication 1 TABLET: at 09:34

## 2021-01-01 RX ADMIN — Medication 1 TABLET: at 08:05

## 2021-01-01 RX ADMIN — LIDOCAINE HYDROCHLORIDE 5 ML: 40 SOLUTION TOPICAL at 12:04

## 2021-01-01 RX ADMIN — INSULIN LISPRO 1 UNITS: 100 INJECTION, SOLUTION INTRAVENOUS; SUBCUTANEOUS at 21:45

## 2021-01-01 RX ADMIN — INSULIN LISPRO 3 UNITS: 100 INJECTION, SOLUTION INTRAVENOUS; SUBCUTANEOUS at 12:37

## 2021-01-01 RX ADMIN — INSULIN LISPRO 11 UNITS: 100 INJECTION, SOLUTION INTRAVENOUS; SUBCUTANEOUS at 16:20

## 2021-01-01 RX ADMIN — MUPIROCIN 1 APPLICATION: 20 OINTMENT TOPICAL at 12:52

## 2021-01-01 RX ADMIN — GABAPENTIN 100 MG: 100 CAPSULE ORAL at 09:14

## 2021-01-01 RX ADMIN — FERROUS SULFATE TAB 325 MG (65 MG ELEMENTAL FE) 325 MG: 325 (65 FE) TAB at 09:02

## 2021-01-01 RX ADMIN — Medication 20 MG: at 11:21

## 2021-01-01 RX ADMIN — INSULIN LISPRO 8 UNITS: 100 INJECTION, SOLUTION INTRAVENOUS; SUBCUTANEOUS at 08:55

## 2021-01-01 RX ADMIN — Medication 125 MG: at 08:46

## 2021-01-01 RX ADMIN — METOPROLOL TARTRATE 25 MG: 25 TABLET, FILM COATED ORAL at 10:32

## 2021-01-01 RX ADMIN — FERROUS SULFATE TAB 325 MG (65 MG ELEMENTAL FE) 325 MG: 325 (65 FE) TAB at 09:47

## 2021-01-01 RX ADMIN — FUROSEMIDE 40 MG: 10 INJECTION, SOLUTION INTRAMUSCULAR; INTRAVENOUS at 10:34

## 2021-01-01 RX ADMIN — PANTOPRAZOLE SODIUM 40 MG: 40 INJECTION, POWDER, FOR SOLUTION INTRAVENOUS at 22:42

## 2021-01-01 RX ADMIN — INSULIN GLARGINE 10 UNITS: 100 INJECTION, SOLUTION SUBCUTANEOUS at 21:05

## 2021-01-01 RX ADMIN — FENTANYL CITRATE 25 MCG: 50 INJECTION, SOLUTION INTRAMUSCULAR; INTRAVENOUS at 11:21

## 2021-01-01 RX ADMIN — RIVAROXABAN 15 MG: 15 TABLET, FILM COATED ORAL at 09:26

## 2021-01-01 RX ADMIN — FERROUS SULFATE TAB 325 MG (65 MG ELEMENTAL FE) 325 MG: 325 (65 FE) TAB at 08:47

## 2021-01-01 RX ADMIN — ATORVASTATIN CALCIUM 40 MG: 40 TABLET, FILM COATED ORAL at 16:53

## 2021-01-01 RX ADMIN — GABAPENTIN 100 MG: 100 CAPSULE ORAL at 15:58

## 2021-01-01 RX ADMIN — METOPROLOL TARTRATE 75 MG: 50 TABLET, FILM COATED ORAL at 20:54

## 2021-01-01 RX ADMIN — LIDOCAINE HYDROCHLORIDE 5 ML: 40 SOLUTION TOPICAL at 10:49

## 2021-01-01 RX ADMIN — METOPROLOL TARTRATE 25 MG: 25 TABLET, FILM COATED ORAL at 09:34

## 2021-01-01 RX ADMIN — GABAPENTIN 100 MG: 100 CAPSULE ORAL at 20:24

## 2021-01-01 RX ADMIN — INSULIN LISPRO 1 UNITS: 100 INJECTION, SOLUTION INTRAVENOUS; SUBCUTANEOUS at 12:51

## 2021-01-01 RX ADMIN — INSULIN LISPRO 8 UNITS: 100 INJECTION, SOLUTION INTRAVENOUS; SUBCUTANEOUS at 12:38

## 2021-01-01 RX ADMIN — SODIUM CHLORIDE, SODIUM GLUCONATE, SODIUM ACETATE, POTASSIUM CHLORIDE, MAGNESIUM CHLORIDE, SODIUM PHOSPHATE, DIBASIC, AND POTASSIUM PHOSPHATE 75 ML/HR: .53; .5; .37; .037; .03; .012; .00082 INJECTION, SOLUTION INTRAVENOUS at 04:34

## 2021-01-01 RX ADMIN — GABAPENTIN 100 MG: 100 CAPSULE ORAL at 21:54

## 2021-01-01 RX ADMIN — Medication 125 MG: at 22:22

## 2021-01-01 RX ADMIN — PANTOPRAZOLE SODIUM 40 MG: 40 TABLET, DELAYED RELEASE ORAL at 15:56

## 2021-01-01 RX ADMIN — Medication 125 MG: at 09:20

## 2021-01-01 RX ADMIN — INSULIN GLARGINE 10 UNITS: 100 INJECTION, SOLUTION SUBCUTANEOUS at 22:41

## 2021-01-01 RX ADMIN — Medication 12.5 MG: at 08:22

## 2021-01-01 RX ADMIN — INSULIN GLARGINE 10 UNITS: 100 INJECTION, SOLUTION SUBCUTANEOUS at 21:59

## 2021-01-01 RX ADMIN — INSULIN LISPRO 1 UNITS: 100 INJECTION, SOLUTION INTRAVENOUS; SUBCUTANEOUS at 11:39

## 2021-01-01 RX ADMIN — PROPOFOL 50 MG: 10 INJECTION, EMULSION INTRAVENOUS at 09:35

## 2021-01-01 RX ADMIN — Medication 125 MG: at 21:59

## 2021-01-01 RX ADMIN — METOPROLOL TARTRATE 25 MG: 25 TABLET, FILM COATED ORAL at 20:24

## 2021-01-01 RX ADMIN — VANCOMYCIN HYDROCHLORIDE 500 MG: 500 INJECTION, POWDER, LYOPHILIZED, FOR SOLUTION INTRAVENOUS at 03:01

## 2021-01-01 RX ADMIN — Medication 125 MG: at 09:30

## 2021-01-01 RX ADMIN — GABAPENTIN 100 MG: 100 CAPSULE ORAL at 20:54

## 2021-01-01 RX ADMIN — GABAPENTIN 100 MG: 100 CAPSULE ORAL at 22:23

## 2021-01-01 RX ADMIN — GABAPENTIN 100 MG: 100 CAPSULE ORAL at 21:15

## 2021-01-01 RX ADMIN — INSULIN GLARGINE 8 UNITS: 100 INJECTION, SOLUTION SUBCUTANEOUS at 22:33

## 2021-01-01 RX ADMIN — MIRTAZAPINE 15 MG: 15 TABLET, FILM COATED ORAL at 22:32

## 2021-01-01 RX ADMIN — INSULIN LISPRO 8 UNITS: 100 INJECTION, SOLUTION INTRAVENOUS; SUBCUTANEOUS at 17:38

## 2021-01-01 RX ADMIN — PROPOFOL 50 MCG/KG/MIN: 10 INJECTION, EMULSION INTRAVENOUS at 11:21

## 2021-01-01 RX ADMIN — GABAPENTIN 100 MG: 100 CAPSULE ORAL at 21:23

## 2021-01-01 RX ADMIN — ASPIRIN 81 MG: 81 TABLET, COATED ORAL at 09:58

## 2021-01-01 RX ADMIN — METOPROLOL TARTRATE 75 MG: 50 TABLET, FILM COATED ORAL at 21:23

## 2021-01-01 RX ADMIN — SODIUM CHLORIDE 500 ML: 0.9 INJECTION, SOLUTION INTRAVENOUS at 16:39

## 2021-01-01 RX ADMIN — PANTOPRAZOLE SODIUM 40 MG: 40 TABLET, DELAYED RELEASE ORAL at 08:31

## 2021-01-01 RX ADMIN — INSULIN LISPRO 1 UNITS: 100 INJECTION, SOLUTION INTRAVENOUS; SUBCUTANEOUS at 21:42

## 2021-01-01 RX ADMIN — Medication 125 MG: at 20:51

## 2021-01-01 RX ADMIN — Medication 125 MG: at 01:13

## 2021-01-01 RX ADMIN — FERROUS SULFATE TAB 325 MG (65 MG ELEMENTAL FE) 325 MG: 325 (65 FE) TAB at 08:37

## 2021-01-01 RX ADMIN — PANTOPRAZOLE SODIUM 40 MG: 40 INJECTION, POWDER, FOR SOLUTION INTRAVENOUS at 23:06

## 2021-01-01 RX ADMIN — LIDOCAINE HYDROCHLORIDE 50 MG: 10 INJECTION, SOLUTION EPIDURAL; INFILTRATION; INTRACAUDAL; PERINEURAL at 09:28

## 2021-01-01 RX ADMIN — CEFEPIME HYDROCHLORIDE 1000 MG: 1 INJECTION, SOLUTION INTRAVENOUS at 01:56

## 2021-01-01 RX ADMIN — INSULIN LISPRO 2 UNITS: 100 INJECTION, SOLUTION INTRAVENOUS; SUBCUTANEOUS at 18:03

## 2021-01-01 RX ADMIN — ATORVASTATIN CALCIUM 40 MG: 40 TABLET, FILM COATED ORAL at 17:55

## 2021-01-01 RX ADMIN — MAGNESIUM OXIDE TAB 400 MG (241.3 MG ELEMENTAL MG) 400 MG: 400 (241.3 MG) TAB at 09:17

## 2021-01-01 RX ADMIN — GABAPENTIN 100 MG: 100 CAPSULE ORAL at 01:42

## 2021-01-01 RX ADMIN — PANTOPRAZOLE SODIUM 40 MG: 40 TABLET, DELAYED RELEASE ORAL at 17:27

## 2021-01-01 RX ADMIN — INSULIN LISPRO 2 UNITS: 100 INJECTION, SOLUTION INTRAVENOUS; SUBCUTANEOUS at 17:55

## 2021-01-01 RX ADMIN — Medication 125 MG: at 22:19

## 2021-01-01 RX ADMIN — ATORVASTATIN CALCIUM 40 MG: 40 TABLET, FILM COATED ORAL at 17:34

## 2021-01-01 RX ADMIN — RIVAROXABAN 15 MG: 15 TABLET, FILM COATED ORAL at 08:38

## 2021-01-01 RX ADMIN — AZITHROMYCIN 500 MG: 500 TABLET, FILM COATED ORAL at 10:32

## 2021-01-01 RX ADMIN — GABAPENTIN 100 MG: 100 CAPSULE ORAL at 17:34

## 2021-01-01 RX ADMIN — INSULIN LISPRO 3 UNITS: 100 INJECTION, SOLUTION INTRAVENOUS; SUBCUTANEOUS at 22:33

## 2021-01-01 RX ADMIN — IOHEXOL 100 ML: 350 INJECTION, SOLUTION INTRAVENOUS at 15:29

## 2021-01-01 RX ADMIN — RIVAROXABAN 15 MG: 15 TABLET, FILM COATED ORAL at 09:58

## 2021-01-01 RX ADMIN — INSULIN LISPRO 2 UNITS: 100 INJECTION, SOLUTION INTRAVENOUS; SUBCUTANEOUS at 16:17

## 2021-01-01 RX ADMIN — GABAPENTIN 100 MG: 100 CAPSULE ORAL at 20:52

## 2021-01-01 RX ADMIN — CEFAZOLIN SODIUM 1000 MG: 1 SOLUTION INTRAVENOUS at 10:58

## 2021-01-01 RX ADMIN — MIRTAZAPINE 15 MG: 15 TABLET, FILM COATED ORAL at 21:40

## 2021-01-01 RX ADMIN — INSULIN LISPRO 8 UNITS: 100 INJECTION, SOLUTION INTRAVENOUS; SUBCUTANEOUS at 17:57

## 2021-01-01 RX ADMIN — INSULIN GLARGINE 10 UNITS: 100 INJECTION, SOLUTION SUBCUTANEOUS at 22:43

## 2021-01-01 RX ADMIN — VANCOMYCIN HYDROCHLORIDE 750 MG: 750 INJECTION, SOLUTION INTRAVENOUS at 18:55

## 2021-01-01 RX ADMIN — INSULIN GLARGINE 10 UNITS: 100 INJECTION, SOLUTION SUBCUTANEOUS at 22:57

## 2021-01-01 RX ADMIN — POTASSIUM CHLORIDE 20 MEQ: 1500 TABLET, EXTENDED RELEASE ORAL at 10:01

## 2021-01-01 RX ADMIN — METOPROLOL TARTRATE 25 MG: 25 TABLET, FILM COATED ORAL at 09:58

## 2021-01-01 RX ADMIN — INSULIN LISPRO 8 UNITS: 100 INJECTION, SOLUTION INTRAVENOUS; SUBCUTANEOUS at 18:03

## 2021-01-01 RX ADMIN — CEFEPIME HYDROCHLORIDE 1000 MG: 1 INJECTION, SOLUTION INTRAVENOUS at 02:09

## 2021-01-01 RX ADMIN — GABAPENTIN 100 MG: 100 CAPSULE ORAL at 18:59

## 2021-01-01 RX ADMIN — MUPIROCIN 1 APPLICATION: 20 OINTMENT TOPICAL at 09:59

## 2021-01-01 RX ADMIN — Medication 125 MG: at 09:35

## 2021-01-01 RX ADMIN — PANTOPRAZOLE SODIUM 40 MG: 40 TABLET, DELAYED RELEASE ORAL at 08:47

## 2021-01-01 RX ADMIN — MIRTAZAPINE 15 MG: 15 TABLET, FILM COATED ORAL at 22:23

## 2021-01-01 RX ADMIN — TORSEMIDE 20 MG: 20 TABLET ORAL at 08:39

## 2021-01-01 RX ADMIN — AMLODIPINE BESYLATE 5 MG: 5 TABLET ORAL at 09:50

## 2021-01-01 RX ADMIN — INSULIN LISPRO 1 UNITS: 100 INJECTION, SOLUTION INTRAVENOUS; SUBCUTANEOUS at 15:58

## 2021-01-01 RX ADMIN — INSULIN LISPRO 4 UNITS: 100 INJECTION, SOLUTION INTRAVENOUS; SUBCUTANEOUS at 18:59

## 2021-01-01 RX ADMIN — RIVAROXABAN 15 MG: 15 TABLET, FILM COATED ORAL at 08:47

## 2021-01-01 RX ADMIN — ALBUMIN (HUMAN) 25 G: 0.25 INJECTION, SOLUTION INTRAVENOUS at 15:56

## 2021-01-01 RX ADMIN — PANTOPRAZOLE SODIUM 40 MG: 40 TABLET, DELAYED RELEASE ORAL at 18:59

## 2021-01-01 RX ADMIN — Medication 1 TABLET: at 09:03

## 2021-01-01 RX ADMIN — FERROUS SULFATE TAB 325 MG (65 MG ELEMENTAL FE) 325 MG: 325 (65 FE) TAB at 09:20

## 2021-01-01 RX ADMIN — ATORVASTATIN CALCIUM 40 MG: 40 TABLET, FILM COATED ORAL at 17:28

## 2021-01-01 RX ADMIN — METOPROLOL TARTRATE 25 MG: 25 TABLET, FILM COATED ORAL at 22:55

## 2021-01-01 RX ADMIN — INSULIN LISPRO 8 UNITS: 100 INJECTION, SOLUTION INTRAVENOUS; SUBCUTANEOUS at 08:38

## 2021-01-01 RX ADMIN — BUMETANIDE 1 MG: 0.25 INJECTION, SOLUTION INTRAMUSCULAR; INTRAVENOUS at 17:29

## 2021-01-01 RX ADMIN — GABAPENTIN 100 MG: 100 CAPSULE ORAL at 23:06

## 2021-01-01 RX ADMIN — BUMETANIDE 1 MG: 0.25 INJECTION, SOLUTION INTRAMUSCULAR; INTRAVENOUS at 09:59

## 2021-01-01 RX ADMIN — Medication 12.5 MG: at 21:23

## 2021-01-01 RX ADMIN — INSULIN LISPRO 1 UNITS: 100 INJECTION, SOLUTION INTRAVENOUS; SUBCUTANEOUS at 23:10

## 2021-01-01 RX ADMIN — FERROUS SULFATE TAB 325 MG (65 MG ELEMENTAL FE) 325 MG: 325 (65 FE) TAB at 08:39

## 2021-01-01 RX ADMIN — INSULIN LISPRO 1 UNITS: 100 INJECTION, SOLUTION INTRAVENOUS; SUBCUTANEOUS at 22:03

## 2021-01-01 RX ADMIN — SODIUM CHLORIDE 500 ML: 0.9 INJECTION, SOLUTION INTRAVENOUS at 23:27

## 2021-01-01 RX ADMIN — ATORVASTATIN CALCIUM 40 MG: 40 TABLET, FILM COATED ORAL at 17:32

## 2021-01-01 RX ADMIN — SODIUM CHLORIDE, SODIUM GLUCONATE, SODIUM ACETATE, POTASSIUM CHLORIDE, MAGNESIUM CHLORIDE, SODIUM PHOSPHATE, DIBASIC, AND POTASSIUM PHOSPHATE 75 ML/HR: .53; .5; .37; .037; .03; .012; .00082 INJECTION, SOLUTION INTRAVENOUS at 06:49

## 2021-01-01 RX ADMIN — ASPIRIN 81 MG: 81 TABLET, COATED ORAL at 09:18

## 2021-01-01 RX ADMIN — INSULIN LISPRO 4 UNITS: 100 INJECTION, SOLUTION INTRAVENOUS; SUBCUTANEOUS at 17:56

## 2021-01-01 RX ADMIN — ATORVASTATIN CALCIUM 40 MG: 40 TABLET, FILM COATED ORAL at 16:15

## 2021-01-01 RX ADMIN — Medication 125 MG: at 21:58

## 2021-01-01 RX ADMIN — INSULIN LISPRO 3 UNITS: 100 INJECTION, SOLUTION INTRAVENOUS; SUBCUTANEOUS at 16:26

## 2021-01-01 RX ADMIN — SODIUM CHLORIDE, SODIUM GLUCONATE, SODIUM ACETATE, POTASSIUM CHLORIDE, MAGNESIUM CHLORIDE, SODIUM PHOSPHATE, DIBASIC, AND POTASSIUM PHOSPHATE 75 ML/HR: .53; .5; .37; .037; .03; .012; .00082 INJECTION, SOLUTION INTRAVENOUS at 17:33

## 2021-01-01 RX ADMIN — INSULIN GLARGINE 10 UNITS: 100 INJECTION, SOLUTION SUBCUTANEOUS at 21:03

## 2021-01-01 RX ADMIN — LIDOCAINE HYDROCHLORIDE 5 ML: 40 SOLUTION TOPICAL at 10:22

## 2021-01-01 RX ADMIN — CEFTRIAXONE 1000 MG: 1 INJECTION, SOLUTION INTRAVENOUS at 01:13

## 2021-01-01 RX ADMIN — LIDOCAINE HYDROCHLORIDE 5 ML: 40 SOLUTION TOPICAL at 10:55

## 2021-01-01 RX ADMIN — METOPROLOL TARTRATE 25 MG: 25 TABLET, FILM COATED ORAL at 22:23

## 2021-01-01 RX ADMIN — INSULIN LISPRO 8 UNITS: 100 INJECTION, SOLUTION INTRAVENOUS; SUBCUTANEOUS at 11:45

## 2021-01-01 RX ADMIN — FERROUS SULFATE TAB 325 MG (65 MG ELEMENTAL FE) 325 MG: 325 (65 FE) TAB at 08:31

## 2021-01-01 RX ADMIN — PANTOPRAZOLE SODIUM 40 MG: 40 INJECTION, POWDER, FOR SOLUTION INTRAVENOUS at 10:27

## 2021-01-01 RX ADMIN — DEXTROSE MONOHYDRATE 12.5 G: 25 INJECTION, SOLUTION INTRAVENOUS at 08:21

## 2021-01-01 RX ADMIN — INSULIN LISPRO 3 UNITS: 100 INJECTION, SOLUTION INTRAVENOUS; SUBCUTANEOUS at 22:20

## 2021-01-01 RX ADMIN — Medication 125 MG: at 21:40

## 2021-01-01 RX ADMIN — CEFEPIME HYDROCHLORIDE 1000 MG: 1 INJECTION, SOLUTION INTRAVENOUS at 01:49

## 2021-01-01 RX ADMIN — ATORVASTATIN CALCIUM 40 MG: 40 TABLET, FILM COATED ORAL at 18:02

## 2021-01-01 RX ADMIN — INSULIN LISPRO 1 UNITS: 100 INJECTION, SOLUTION INTRAVENOUS; SUBCUTANEOUS at 22:50

## 2021-01-01 RX ADMIN — INSULIN LISPRO 1 UNITS: 100 INJECTION, SOLUTION INTRAVENOUS; SUBCUTANEOUS at 10:04

## 2021-01-01 RX ADMIN — Medication 12.5 MG: at 09:14

## 2021-01-01 RX ADMIN — Medication 1 TABLET: at 12:37

## 2021-01-01 RX ADMIN — IOHEXOL 10 ML: 350 INJECTION, SOLUTION INTRAVENOUS at 13:24

## 2021-01-01 RX ADMIN — METOPROLOL TARTRATE 25 MG: 25 TABLET, FILM COATED ORAL at 21:51

## 2021-01-01 RX ADMIN — INSULIN LISPRO 2 UNITS: 100 INJECTION, SOLUTION INTRAVENOUS; SUBCUTANEOUS at 21:04

## 2021-01-01 RX ADMIN — INSULIN GLARGINE 8 UNITS: 100 INJECTION, SOLUTION SUBCUTANEOUS at 21:51

## 2021-01-01 RX ADMIN — ATORVASTATIN CALCIUM 40 MG: 40 TABLET, FILM COATED ORAL at 15:58

## 2021-01-01 RX ADMIN — METOPROLOL TARTRATE 25 MG: 25 TABLET, FILM COATED ORAL at 08:39

## 2021-01-01 RX ADMIN — GABAPENTIN 100 MG: 100 CAPSULE ORAL at 17:06

## 2021-01-01 RX ADMIN — GABAPENTIN 100 MG: 100 CAPSULE ORAL at 16:05

## 2021-01-01 RX ADMIN — METOPROLOL TARTRATE 75 MG: 50 TABLET, FILM COATED ORAL at 10:39

## 2021-01-01 RX ADMIN — MIRTAZAPINE 15 MG: 15 TABLET, FILM COATED ORAL at 21:51

## 2021-01-01 RX ADMIN — Medication 125 MG: at 08:56

## 2021-01-01 RX ADMIN — SODIUM CHLORIDE: 0.9 INJECTION, SOLUTION INTRAVENOUS at 09:28

## 2021-01-01 RX ADMIN — TORSEMIDE 10 MG: 10 TABLET ORAL at 08:37

## 2021-01-01 RX ADMIN — MUPIROCIN 1 APPLICATION: 20 OINTMENT TOPICAL at 22:47

## 2021-01-01 RX ADMIN — CEFTRIAXONE 1000 MG: 1 INJECTION, SOLUTION INTRAVENOUS at 22:56

## 2021-01-01 RX ADMIN — GABAPENTIN 100 MG: 100 CAPSULE ORAL at 22:57

## 2021-01-01 RX ADMIN — FERROUS SULFATE TAB 325 MG (65 MG ELEMENTAL FE) 325 MG: 325 (65 FE) TAB at 10:07

## 2021-01-01 RX ADMIN — RIVAROXABAN 15 MG: 15 TABLET, FILM COATED ORAL at 09:37

## 2021-01-01 RX ADMIN — GABAPENTIN 100 MG: 100 CAPSULE ORAL at 09:31

## 2021-01-01 RX ADMIN — Medication 125 MG: at 20:26

## 2021-01-01 RX ADMIN — INSULIN LISPRO 1 UNITS: 100 INJECTION, SOLUTION INTRAVENOUS; SUBCUTANEOUS at 22:48

## 2021-01-01 RX ADMIN — SODIUM CHLORIDE 80 MG: 9 INJECTION, SOLUTION INTRAVENOUS at 00:21

## 2021-01-01 RX ADMIN — FERROUS SULFATE TAB 325 MG (65 MG ELEMENTAL FE) 325 MG: 325 (65 FE) TAB at 08:22

## 2021-01-01 RX ADMIN — INSULIN LISPRO 1 UNITS: 100 INJECTION, SOLUTION INTRAVENOUS; SUBCUTANEOUS at 08:22

## 2021-01-01 RX ADMIN — METOPROLOL TARTRATE 25 MG: 25 TABLET, FILM COATED ORAL at 20:52

## 2021-01-01 RX ADMIN — MIRTAZAPINE 15 MG: 15 TABLET, FILM COATED ORAL at 21:45

## 2021-01-01 RX ADMIN — INSULIN GLARGINE 10 UNITS: 100 INJECTION, SOLUTION SUBCUTANEOUS at 21:16

## 2021-01-01 RX ADMIN — METOPROLOL TARTRATE 25 MG: 25 TABLET, FILM COATED ORAL at 22:33

## 2021-01-01 RX ADMIN — INSULIN LISPRO 1 UNITS: 100 INJECTION, SOLUTION INTRAVENOUS; SUBCUTANEOUS at 22:24

## 2021-01-01 RX ADMIN — MAGNESIUM OXIDE TAB 400 MG (241.3 MG ELEMENTAL MG) 400 MG: 400 (241.3 MG) TAB at 10:39

## 2021-01-01 RX ADMIN — INSULIN GLARGINE 14 UNITS: 100 INJECTION, SOLUTION SUBCUTANEOUS at 21:01

## 2021-01-01 RX ADMIN — ATORVASTATIN CALCIUM 40 MG: 40 TABLET, FILM COATED ORAL at 18:23

## 2021-01-01 RX ADMIN — PANTOPRAZOLE SODIUM 40 MG: 40 TABLET, DELAYED RELEASE ORAL at 22:32

## 2021-01-01 RX ADMIN — GABAPENTIN 100 MG: 100 CAPSULE ORAL at 08:37

## 2021-01-01 RX ADMIN — GABAPENTIN 100 MG: 100 CAPSULE ORAL at 21:51

## 2021-01-01 RX ADMIN — GABAPENTIN 100 MG: 100 CAPSULE ORAL at 20:51

## 2021-01-01 RX ADMIN — METOPROLOL TARTRATE 25 MG: 25 TABLET, FILM COATED ORAL at 11:54

## 2021-01-01 RX ADMIN — GABAPENTIN 100 MG: 100 CAPSULE ORAL at 08:39

## 2021-01-01 RX ADMIN — EPHEDRINE SULFATE 5 MG: 50 INJECTION, SOLUTION INTRAVENOUS at 11:42

## 2021-01-01 RX ADMIN — METOPROLOL TARTRATE 25 MG: 25 TABLET, FILM COATED ORAL at 08:56

## 2021-01-01 RX ADMIN — INSULIN GLARGINE 10 UNITS: 100 INJECTION, SOLUTION SUBCUTANEOUS at 22:00

## 2021-01-01 RX ADMIN — MIRTAZAPINE 15 MG: 15 TABLET, FILM COATED ORAL at 22:55

## 2021-01-01 RX ADMIN — DEXTROSE 75 ML/HR: 5 SOLUTION INTRAVENOUS at 18:08

## 2021-01-01 RX ADMIN — MAGNESIUM OXIDE TAB 400 MG (241.3 MG ELEMENTAL MG) 400 MG: 400 (241.3 MG) TAB at 18:26

## 2021-01-01 RX ADMIN — PANTOPRAZOLE SODIUM 40 MG: 40 TABLET, DELAYED RELEASE ORAL at 08:05

## 2021-01-01 RX ADMIN — CEFEPIME HYDROCHLORIDE 1000 MG: 1 INJECTION, SOLUTION INTRAVENOUS at 02:20

## 2021-01-01 RX ADMIN — INSULIN LISPRO 8 UNITS: 100 INJECTION, SOLUTION INTRAVENOUS; SUBCUTANEOUS at 09:30

## 2021-01-01 RX ADMIN — CEFEPIME HYDROCHLORIDE 1000 MG: 1 INJECTION, SOLUTION INTRAVENOUS at 02:44

## 2021-01-01 RX ADMIN — ATORVASTATIN CALCIUM 40 MG: 40 TABLET, FILM COATED ORAL at 16:18

## 2021-01-01 RX ADMIN — MUPIROCIN 1 APPLICATION: 20 OINTMENT TOPICAL at 22:00

## 2021-01-01 RX ADMIN — Medication 12.5 MG: at 20:51

## 2021-01-01 RX ADMIN — INSULIN GLARGINE 10 UNITS: 100 INJECTION, SOLUTION SUBCUTANEOUS at 21:18

## 2021-01-01 RX ADMIN — ACETAMINOPHEN 650 MG: 325 TABLET, FILM COATED ORAL at 08:06

## 2021-01-01 RX ADMIN — INSULIN LISPRO 8 UNITS: 100 INJECTION, SOLUTION INTRAVENOUS; SUBCUTANEOUS at 11:16

## 2021-01-01 RX ADMIN — METOPROLOL TARTRATE 75 MG: 50 TABLET, FILM COATED ORAL at 22:41

## 2021-01-01 RX ADMIN — PANTOPRAZOLE SODIUM 40 MG: 40 TABLET, DELAYED RELEASE ORAL at 17:32

## 2021-01-01 RX ADMIN — GABAPENTIN 100 MG: 100 CAPSULE ORAL at 08:31

## 2021-01-01 RX ADMIN — METOPROLOL TARTRATE 25 MG: 25 TABLET, FILM COATED ORAL at 22:20

## 2021-01-01 RX ADMIN — MIRTAZAPINE 15 MG: 15 TABLET, FILM COATED ORAL at 22:18

## 2021-01-01 RX ADMIN — FERROUS SULFATE TAB 325 MG (65 MG ELEMENTAL FE) 325 MG: 325 (65 FE) TAB at 09:36

## 2021-01-01 RX ADMIN — Medication 125 MG: at 09:14

## 2021-01-01 RX ADMIN — GABAPENTIN 100 MG: 100 CAPSULE ORAL at 15:55

## 2021-01-01 RX ADMIN — AMLODIPINE BESYLATE 5 MG: 5 TABLET ORAL at 09:31

## 2021-01-01 RX ADMIN — CEFTRIAXONE 1000 MG: 1 INJECTION, SOLUTION INTRAVENOUS at 00:47

## 2021-01-01 RX ADMIN — PANTOPRAZOLE SODIUM 40 MG: 40 TABLET, DELAYED RELEASE ORAL at 08:40

## 2021-01-01 RX ADMIN — GABAPENTIN 100 MG: 100 CAPSULE ORAL at 21:45

## 2021-01-01 RX ADMIN — INSULIN GLARGINE 10 UNITS: 100 INJECTION, SOLUTION SUBCUTANEOUS at 22:55

## 2021-01-01 RX ADMIN — RIVAROXABAN 15 MG: 15 TABLET, FILM COATED ORAL at 10:06

## 2021-01-01 RX ADMIN — PANTOPRAZOLE SODIUM 40 MG: 40 TABLET, DELAYED RELEASE ORAL at 09:38

## 2021-01-01 RX ADMIN — FUROSEMIDE 40 MG: 10 INJECTION, SOLUTION INTRAMUSCULAR; INTRAVENOUS at 09:49

## 2021-01-01 RX ADMIN — GABAPENTIN 100 MG: 100 CAPSULE ORAL at 09:34

## 2021-01-01 RX ADMIN — ALBUMIN (HUMAN) 12.5 G: 0.25 INJECTION, SOLUTION INTRAVENOUS at 06:48

## 2021-01-01 RX ADMIN — RIVAROXABAN 15 MG: 15 TABLET, FILM COATED ORAL at 09:30

## 2021-01-01 RX ADMIN — METOPROLOL TARTRATE 75 MG: 50 TABLET, FILM COATED ORAL at 09:58

## 2021-01-01 RX ADMIN — Medication 125 MG: at 21:51

## 2021-01-01 RX ADMIN — FERROUS SULFATE TAB 325 MG (65 MG ELEMENTAL FE) 325 MG: 325 (65 FE) TAB at 09:59

## 2021-01-01 RX ADMIN — METOPROLOL TARTRATE 25 MG: 25 TABLET, FILM COATED ORAL at 08:05

## 2021-01-01 RX ADMIN — ATORVASTATIN CALCIUM 40 MG: 40 TABLET, FILM COATED ORAL at 18:59

## 2021-01-01 RX ADMIN — METOPROLOL TARTRATE 25 MG: 25 TABLET, FILM COATED ORAL at 09:03

## 2021-01-01 RX ADMIN — Medication 12.5 MG: at 09:54

## 2021-01-01 RX ADMIN — INSULIN GLARGINE 10 UNITS: 100 INJECTION, SOLUTION SUBCUTANEOUS at 21:23

## 2021-01-01 RX ADMIN — ATORVASTATIN CALCIUM 40 MG: 40 TABLET, FILM COATED ORAL at 18:03

## 2021-01-01 RX ADMIN — GABAPENTIN 100 MG: 100 CAPSULE ORAL at 10:33

## 2021-01-01 RX ADMIN — RIVAROXABAN 15 MG: 15 TABLET, FILM COATED ORAL at 08:56

## 2021-01-01 RX ADMIN — LIDOCAINE HYDROCHLORIDE 100 MG: 10 INJECTION, SOLUTION EPIDURAL; INFILTRATION; INTRACAUDAL; PERINEURAL at 11:21

## 2021-01-01 RX ADMIN — PANTOPRAZOLE SODIUM 40 MG: 40 INJECTION, POWDER, FOR SOLUTION INTRAVENOUS at 09:21

## 2021-01-01 RX ADMIN — ATORVASTATIN CALCIUM 40 MG: 40 TABLET, FILM COATED ORAL at 17:27

## 2021-01-01 RX ADMIN — Medication 125 MG: at 08:38

## 2021-01-01 RX ADMIN — ATORVASTATIN CALCIUM 40 MG: 40 TABLET, FILM COATED ORAL at 17:19

## 2021-01-01 RX ADMIN — BUMETANIDE 1 MG: 0.25 INJECTION, SOLUTION INTRAMUSCULAR; INTRAVENOUS at 17:34

## 2021-01-01 RX ADMIN — GABAPENTIN 100 MG: 100 CAPSULE ORAL at 08:38

## 2021-01-01 RX ADMIN — GABAPENTIN 100 MG: 100 CAPSULE ORAL at 08:47

## 2021-01-01 RX ADMIN — FERROUS SULFATE TAB 325 MG (65 MG ELEMENTAL FE) 325 MG: 325 (65 FE) TAB at 08:04

## 2021-01-01 RX ADMIN — INSULIN LISPRO 2 UNITS: 100 INJECTION, SOLUTION INTRAVENOUS; SUBCUTANEOUS at 12:41

## 2021-01-01 RX ADMIN — GABAPENTIN 100 MG: 100 CAPSULE ORAL at 16:23

## 2021-01-01 RX ADMIN — ATORVASTATIN CALCIUM 40 MG: 40 TABLET, FILM COATED ORAL at 17:45

## 2021-01-01 RX ADMIN — PANTOPRAZOLE SODIUM 40 MG: 40 TABLET, DELAYED RELEASE ORAL at 08:56

## 2021-01-01 RX ADMIN — INSULIN LISPRO 1 UNITS: 100 INJECTION, SOLUTION INTRAVENOUS; SUBCUTANEOUS at 11:58

## 2021-01-01 RX ADMIN — Medication 125 MG: at 22:47

## 2021-01-01 RX ADMIN — METOPROLOL TARTRATE 25 MG: 25 TABLET, FILM COATED ORAL at 09:47

## 2021-01-01 RX ADMIN — DEXTROSE MONOHYDRATE 12.5 G: 25 INJECTION, SOLUTION INTRAVENOUS at 16:51

## 2021-01-01 RX ADMIN — BUMETANIDE 1 MG: 0.25 INJECTION, SOLUTION INTRAMUSCULAR; INTRAVENOUS at 10:40

## 2021-01-01 RX ADMIN — PROPOFOL 20 MG: 10 INJECTION, EMULSION INTRAVENOUS at 09:41

## 2021-01-01 RX ADMIN — INSULIN LISPRO 8 UNITS: 100 INJECTION, SOLUTION INTRAVENOUS; SUBCUTANEOUS at 12:56

## 2021-01-01 RX ADMIN — SODIUM CHLORIDE, SODIUM GLUCONATE, SODIUM ACETATE, POTASSIUM CHLORIDE, MAGNESIUM CHLORIDE, SODIUM PHOSPHATE, DIBASIC, AND POTASSIUM PHOSPHATE 75 ML/HR: .53; .5; .37; .037; .03; .012; .00082 INJECTION, SOLUTION INTRAVENOUS at 22:42

## 2021-01-01 RX ADMIN — ATORVASTATIN CALCIUM 40 MG: 40 TABLET, FILM COATED ORAL at 17:54

## 2021-01-01 RX ADMIN — GABAPENTIN 100 MG: 100 CAPSULE ORAL at 23:55

## 2021-01-01 RX ADMIN — CEFTRIAXONE 1000 MG: 1 INJECTION, SOLUTION INTRAVENOUS at 21:39

## 2021-01-01 RX ADMIN — METOPROLOL TARTRATE 25 MG: 25 TABLET, FILM COATED ORAL at 09:30

## 2021-01-01 RX ADMIN — INSULIN LISPRO 2 UNITS: 100 INJECTION, SOLUTION INTRAVENOUS; SUBCUTANEOUS at 21:26

## 2021-01-01 RX ADMIN — SODIUM CHLORIDE: 9 INJECTION, SOLUTION INTRAVENOUS at 11:08

## 2021-01-01 RX ADMIN — FERROUS SULFATE TAB 325 MG (65 MG ELEMENTAL FE) 325 MG: 325 (65 FE) TAB at 08:57

## 2021-01-01 RX ADMIN — AZITHROMYCIN MONOHYDRATE 250 MG: 250 TABLET ORAL at 09:37

## 2021-01-01 RX ADMIN — PANTOPRAZOLE SODIUM 40 MG: 40 INJECTION, POWDER, FOR SOLUTION INTRAVENOUS at 21:45

## 2021-01-01 RX ADMIN — INSULIN LISPRO 3 UNITS: 100 INJECTION, SOLUTION INTRAVENOUS; SUBCUTANEOUS at 11:15

## 2021-01-01 RX ADMIN — GABAPENTIN 100 MG: 100 CAPSULE ORAL at 15:56

## 2021-01-01 RX ADMIN — INSULIN LISPRO 8 UNITS: 100 INJECTION, SOLUTION INTRAVENOUS; SUBCUTANEOUS at 17:29

## 2021-01-01 RX ADMIN — INSULIN LISPRO 5 UNITS: 100 INJECTION, SOLUTION INTRAVENOUS; SUBCUTANEOUS at 21:03

## 2021-01-01 RX ADMIN — GABAPENTIN 100 MG: 100 CAPSULE ORAL at 16:28

## 2021-01-01 RX ADMIN — GABAPENTIN 100 MG: 100 CAPSULE ORAL at 22:33

## 2021-01-01 RX ADMIN — GABAPENTIN 100 MG: 100 CAPSULE ORAL at 08:57

## 2021-01-01 RX ADMIN — GABAPENTIN 100 MG: 100 CAPSULE ORAL at 21:00

## 2021-01-01 RX ADMIN — GABAPENTIN 100 MG: 100 CAPSULE ORAL at 17:27

## 2021-01-01 RX ADMIN — METOPROLOL TARTRATE 25 MG: 25 TABLET, FILM COATED ORAL at 21:45

## 2021-01-01 RX ADMIN — RIVAROXABAN 15 MG: 15 TABLET, FILM COATED ORAL at 09:02

## 2021-01-01 RX ADMIN — Medication 125 MG: at 08:07

## 2021-01-01 RX ADMIN — ATORVASTATIN CALCIUM 40 MG: 40 TABLET, FILM COATED ORAL at 18:26

## 2021-01-01 RX ADMIN — DEXTROSE 50 ML/HR: 5 SOLUTION INTRAVENOUS at 14:52

## 2021-01-01 RX ADMIN — GABAPENTIN 100 MG: 100 CAPSULE ORAL at 09:03

## 2021-01-01 RX ADMIN — Medication 1 TABLET: at 09:14

## 2021-01-01 RX ADMIN — IOHEXOL 100 ML: 350 INJECTION, SOLUTION INTRAVENOUS at 18:41

## 2021-01-01 RX ADMIN — CEFTRIAXONE 1000 MG: 1 INJECTION, SOLUTION INTRAVENOUS at 01:28

## 2021-01-01 RX ADMIN — Medication 125 MG: at 09:03

## 2021-01-01 RX ADMIN — INSULIN LISPRO 5 UNITS: 100 INJECTION, SOLUTION INTRAVENOUS; SUBCUTANEOUS at 17:37

## 2021-01-01 RX ADMIN — RIVAROXABAN 15 MG: 15 TABLET, FILM COATED ORAL at 09:18

## 2021-01-01 RX ADMIN — CEFTRIAXONE 1000 MG: 1 INJECTION, SOLUTION INTRAVENOUS at 15:57

## 2021-01-01 RX ADMIN — METOPROLOL TARTRATE 25 MG: 25 TABLET, FILM COATED ORAL at 09:27

## 2021-01-01 RX ADMIN — INSULIN LISPRO 8 UNITS: 100 INJECTION, SOLUTION INTRAVENOUS; SUBCUTANEOUS at 16:17

## 2021-01-01 RX ADMIN — GLYCOPYRROLATE 0.1 MG: 0.2 INJECTION, SOLUTION INTRAMUSCULAR; INTRAVENOUS at 11:19

## 2021-01-01 RX ADMIN — Medication 125 MG: at 10:32

## 2021-01-01 RX ADMIN — INSULIN LISPRO 2 UNITS: 100 INJECTION, SOLUTION INTRAVENOUS; SUBCUTANEOUS at 21:59

## 2021-01-01 RX ADMIN — PANTOPRAZOLE SODIUM 40 MG: 40 INJECTION, POWDER, FOR SOLUTION INTRAVENOUS at 21:13

## 2021-01-01 RX ADMIN — RIVAROXABAN 15 MG: 15 TABLET, FILM COATED ORAL at 08:31

## 2021-01-01 RX ADMIN — GABAPENTIN 100 MG: 100 CAPSULE ORAL at 20:25

## 2021-01-01 RX ADMIN — Medication 125 MG: at 08:31

## 2021-01-01 RX ADMIN — PANTOPRAZOLE SODIUM 40 MG: 40 INJECTION, POWDER, FOR SOLUTION INTRAVENOUS at 09:27

## 2021-01-01 RX ADMIN — INSULIN LISPRO 11 UNITS: 100 INJECTION, SOLUTION INTRAVENOUS; SUBCUTANEOUS at 09:31

## 2021-01-01 RX ADMIN — Medication 125 MG: at 22:18

## 2021-01-01 RX ADMIN — GABAPENTIN 100 MG: 100 CAPSULE ORAL at 17:54

## 2021-01-01 RX ADMIN — AZITHROMYCIN MONOHYDRATE 250 MG: 250 TABLET ORAL at 09:01

## 2021-01-01 RX ADMIN — GABAPENTIN 100 MG: 100 CAPSULE ORAL at 18:02

## 2021-01-01 RX ADMIN — INSULIN LISPRO 8 UNITS: 100 INJECTION, SOLUTION INTRAVENOUS; SUBCUTANEOUS at 13:00

## 2021-01-01 RX ADMIN — GABAPENTIN 100 MG: 100 CAPSULE ORAL at 22:18

## 2021-01-01 RX ADMIN — PANTOPRAZOLE SODIUM 40 MG: 40 TABLET, DELAYED RELEASE ORAL at 16:28

## 2021-01-01 RX ADMIN — INSULIN LISPRO 2 UNITS: 100 INJECTION, SOLUTION INTRAVENOUS; SUBCUTANEOUS at 17:36

## 2021-01-01 RX ADMIN — Medication 125 MG: at 08:24

## 2021-01-01 RX ADMIN — Medication 125 MG: at 08:05

## 2021-01-01 RX ADMIN — CEFTRIAXONE 1000 MG: 1 INJECTION, SOLUTION INTRAVENOUS at 21:59

## 2021-01-01 RX ADMIN — Medication 125 MG: at 21:23

## 2021-01-01 RX ADMIN — INSULIN GLARGINE 8 UNITS: 100 INJECTION, SOLUTION SUBCUTANEOUS at 22:24

## 2021-01-01 RX ADMIN — CEFTRIAXONE 1000 MG: 1 INJECTION, SOLUTION INTRAVENOUS at 22:06

## 2021-01-01 RX ADMIN — INSULIN LISPRO 1 UNITS: 100 INJECTION, SOLUTION INTRAVENOUS; SUBCUTANEOUS at 09:26

## 2021-01-01 RX ADMIN — GABAPENTIN 100 MG: 100 CAPSULE ORAL at 18:03

## 2021-01-01 RX ADMIN — Medication 125 MG: at 20:52

## 2021-01-01 RX ADMIN — PANTOPRAZOLE SODIUM 40 MG: 40 TABLET, DELAYED RELEASE ORAL at 17:06

## 2021-01-01 RX ADMIN — SODIUM CHLORIDE, SODIUM GLUCONATE, SODIUM ACETATE, POTASSIUM CHLORIDE, MAGNESIUM CHLORIDE, SODIUM PHOSPHATE, DIBASIC, AND POTASSIUM PHOSPHATE 75 ML/HR: .53; .5; .37; .037; .03; .012; .00082 INJECTION, SOLUTION INTRAVENOUS at 12:51

## 2021-01-01 RX ADMIN — GABAPENTIN 100 MG: 100 CAPSULE ORAL at 22:00

## 2021-01-01 RX ADMIN — INSULIN GLARGINE 10 UNITS: 100 INJECTION, SOLUTION SUBCUTANEOUS at 21:42

## 2021-01-01 RX ADMIN — GABAPENTIN 100 MG: 100 CAPSULE ORAL at 10:40

## 2021-01-01 RX ADMIN — GABAPENTIN 100 MG: 100 CAPSULE ORAL at 22:47

## 2021-01-01 RX ADMIN — Medication 125 MG: at 22:33

## 2021-01-01 RX ADMIN — INSULIN LISPRO 2 UNITS: 100 INJECTION, SOLUTION INTRAVENOUS; SUBCUTANEOUS at 21:24

## 2021-01-01 RX ADMIN — PANTOPRAZOLE SODIUM 40 MG: 40 INJECTION, POWDER, FOR SOLUTION INTRAVENOUS at 09:58

## 2021-01-01 RX ADMIN — GABAPENTIN 100 MG: 100 CAPSULE ORAL at 16:53

## 2021-01-01 RX ADMIN — CEFTRIAXONE 1000 MG: 1 INJECTION, SOLUTION INTRAVENOUS at 21:43

## 2021-01-01 RX ADMIN — GABAPENTIN 100 MG: 100 CAPSULE ORAL at 08:22

## 2021-01-01 RX ADMIN — VANCOMYCIN HYDROCHLORIDE 500 MG: 500 INJECTION, POWDER, LYOPHILIZED, FOR SOLUTION INTRAVENOUS at 02:48

## 2021-01-01 RX ADMIN — FERROUS SULFATE TAB 325 MG (65 MG ELEMENTAL FE) 325 MG: 325 (65 FE) TAB at 09:31

## 2021-01-01 RX ADMIN — INSULIN GLARGINE 10 UNITS: 100 INJECTION, SOLUTION SUBCUTANEOUS at 21:43

## 2021-01-01 RX ADMIN — INSULIN LISPRO 8 UNITS: 100 INJECTION, SOLUTION INTRAVENOUS; SUBCUTANEOUS at 17:34

## 2021-01-01 RX ADMIN — GABAPENTIN 100 MG: 100 CAPSULE ORAL at 21:40

## 2021-01-01 RX ADMIN — ATORVASTATIN CALCIUM 40 MG: 40 TABLET, FILM COATED ORAL at 17:06

## 2021-01-01 RX ADMIN — CEFEPIME HYDROCHLORIDE 2000 MG: 2 INJECTION, SOLUTION INTRAVENOUS at 01:16

## 2021-01-01 RX ADMIN — PANTOPRAZOLE SODIUM 40 MG: 40 TABLET, DELAYED RELEASE ORAL at 05:25

## 2021-01-01 RX ADMIN — PROPOFOL 50 MG: 10 INJECTION, EMULSION INTRAVENOUS at 09:38

## 2021-01-01 RX ADMIN — RIVAROXABAN 15 MG: 15 TABLET, FILM COATED ORAL at 08:42

## 2021-01-01 RX ADMIN — GABAPENTIN 100 MG: 100 CAPSULE ORAL at 11:54

## 2021-01-01 RX ADMIN — MIRTAZAPINE 15 MG: 15 TABLET, FILM COATED ORAL at 21:54

## 2021-01-01 RX ADMIN — GABAPENTIN 100 MG: 100 CAPSULE ORAL at 08:42

## 2021-01-01 RX ADMIN — PANTOPRAZOLE SODIUM 40 MG: 40 TABLET, DELAYED RELEASE ORAL at 17:19

## 2021-01-01 RX ADMIN — Medication 125 MG: at 09:47

## 2021-01-01 RX ADMIN — RIVAROXABAN 15 MG: 15 TABLET, FILM COATED ORAL at 10:39

## 2021-01-01 RX ADMIN — GABAPENTIN 100 MG: 100 CAPSULE ORAL at 08:04

## 2021-01-01 RX ADMIN — FUROSEMIDE 40 MG: 10 INJECTION, SOLUTION INTRAMUSCULAR; INTRAVENOUS at 22:00

## 2021-01-01 RX ADMIN — METOPROLOL TARTRATE 25 MG: 25 TABLET, FILM COATED ORAL at 08:31

## 2021-01-01 RX ADMIN — RIVAROXABAN 15 MG: 15 TABLET, FILM COATED ORAL at 09:17

## 2021-01-01 RX ADMIN — INSULIN LISPRO 1 UNITS: 100 INJECTION, SOLUTION INTRAVENOUS; SUBCUTANEOUS at 12:41

## 2021-01-01 RX ADMIN — PANTOPRAZOLE SODIUM 40 MG: 40 TABLET, DELAYED RELEASE ORAL at 09:30

## 2021-01-01 RX ADMIN — INSULIN LISPRO 2 UNITS: 100 INJECTION, SOLUTION INTRAVENOUS; SUBCUTANEOUS at 21:54

## 2021-01-01 RX ADMIN — PANTOPRAZOLE SODIUM 40 MG: 40 TABLET, DELAYED RELEASE ORAL at 16:18

## 2021-01-01 RX ADMIN — INSULIN LISPRO 2 UNITS: 100 INJECTION, SOLUTION INTRAVENOUS; SUBCUTANEOUS at 16:53

## 2021-01-01 RX ADMIN — AMLODIPINE BESYLATE 5 MG: 5 TABLET ORAL at 08:47

## 2021-01-18 NOTE — ASSESSMENT & PLAN NOTE
Wound is essentially unchanged but the tissue appears healthy  Debrided as below  Continue wound management with wound VAC  Pressure relief  Samuel Velazquez RN, called facility today to alert them of her condition upon arrival here and that she will need a shower upon return particularly because there was stool noted underneath her fingernails  They should also be on look out for signs or symptoms of a UTI as there was stool around her Jimenez  Upon remeasuring patient's wound today, measurements were noted to be slightly different than document  With was noted to be 4 5 cm instead of 5 cm    Wound was debrided as below  Continue wound management with wound VAC  Followup 2 weeks or call sooner with questions or concerns

## 2021-01-18 NOTE — PATIENT INSTRUCTIONS
Orders Placed This Encounter   Procedures    Wound cleansing and dressings     CONTINUE Negative Pressure Wound Therapy   Instructions:  Apply black granufoam to wound bed, Set negative pressure on continuous at 125mmhg, VAC dressing to be changed three times per week as ordered  and VAC dressing bridged to relieve disc pressure          Off-loading Instructions:     Turn every 2 hours  Avoid position directing pressure to Wound site  Limit side lying to 30 degree tilt  Limit the head of bed elevation to 30 degrees      Air mattress      No stool should be in wound  Change diapers frequently       Increase protein in the diet    May you supplements like ensure or boost     (Silver alginate and allevyn used today in wound center due to not having vac and supplies )     Standing Status:   Future     Standing Expiration Date:   1/18/2022

## 2021-01-18 NOTE — PROGRESS NOTES
Patient ID: Chitra Lawton is a 67 y o  female Date of Birth 1945     Chief Complaint  Chief Complaint   Patient presents with    Follow Up Wound Care Visit       Allergies  Patient has no known allergies  Assessment:    Pressure ulcer of sacral region, stage 4 (HCC)  Wound is essentially unchanged but the tissue appears healthy  Debrided as below  Continue wound management with wound VAC  Pressure relief  Jalyn Garrett RN, called facility today to alert them of her condition upon arrival here and that she will need a shower upon return particularly because there was stool noted underneath her fingernails  They should also be on look out for signs or symptoms of a UTI as there was stool around her Jimenez  Upon remeasuring patient's wound today, measurements were noted to be slightly different than document  With was noted to be 4 5 cm instead of 5 cm  Wound was debrided as below  Continue wound management with wound VAC  Followup 2 weeks or call sooner with questions or concerns          Diagnoses and all orders for this visit:    Pressure ulcer of sacral region, stage 4 (HCC)  -     lidocaine (XYLOCAINE) 4 % topical solution 1 mL  -     Wound cleansing and dressings; Future    Other orders  -     Debridement              Debridement   Pressure Ulcer 08/26/20 Sacrum Mid unstageable wound with exposed bone and necrotic tissue with surrounding nonblanchable erythema    Universal Protocol:  Consent: Verbal consent obtained  Consent given by: patient  Time out: Immediately prior to procedure a "time out" was called to verify the correct patient, procedure, equipment, support staff and site/side marked as required    Timeout called at: 1/18/2021 9:25 AM   Patient understanding: patient states understanding of the procedure being performed  Patient identity confirmed: verbally with patient      Performed by: physician  Debridement type: surgical  Level of debridement: subcutaneous tissue  Pain control: lidocaine 4%  Post-debridement measurements  Length (cm): 4  Width (cm): 4 5  Depth (cm): 2 8  Percent debrided: 100%  Surface Area (cm^2): 18  Area debrided (cm^2): 18  Volume (cm^3): 50 4  Tissue and other material debrided: subcutaneous tissue  Devitalized tissue debrided: exudate and slough  Instrument(s) utilized: curette  Bleeding: small  Hemostasis obtained with: pressure  Procedural pain (0-10): 0  Post-procedural pain: 0   Response to treatment: procedure was tolerated well          Plan:     Pressure Ulcer 08/26/20 Sacrum Mid unstageable wound with exposed bone and necrotic tissue with surrounding nonblanchable erythema (Active)   Wound Description Granulation tissue;Slough 01/18/21 0914   Cristal-wound Assessment Bleeding;Erythema; Excoriated 01/18/21 0914   Wound Length (cm) 4 cm 01/18/21 0914   Wound Width (cm) 5 cm 01/18/21 0914   Wound Depth (cm) 2 7 cm 01/18/21 0914   Wound Surface Area (cm^2) 20 cm^2 01/18/21 0914   Calculated Wound Volume (cm^3) 54 cm^3 01/18/21 0914   Change in Wound Size % 43 9 01/18/21 0914   Drainage Amount Moderate 01/18/21 0914   Drainage Description Sanguineous 01/18/21 0914   Dressing Status Intact 01/18/21 0914   Wound Image Images linked 01/18/21 0914       Pressure Ulcer 08/26/20 Sacrum Mid unstageable wound with exposed bone and necrotic tissue with surrounding nonblanchable erythema (Active)   Date First Assessed/Time First Assessed: 08/26/20 0800   Primary Wound Type: Pressure Injury  Pre-Existing Wound: Yes  Present on Hospital Admission: Yes  Location: Sacrum  Orientation: Mid  Wound Description (Comments): unstageable wound with exposed    Subjective:        Patient presents for followup of a stage IV sacral pressure ulcer  Wound VAC was started after last visit about 2 and half weeks ago  No increased pain or drainage  Patient arrives today covered in stool under her diaper, was also noted to be under her fingernails as well as around her urinary catheter        The following portions of the patient's history were reviewed and updated as appropriate: She  has a past medical history of Acute renal failure (ARF) ([facility] ), Acute respiratory failure with hypoxia ([facility] ), Atrial fibrillation ([facility] ), CHF (congestive heart failure) ([facility] ), Chronic kidney disease, Diabetes mellitus ([facility] ), Hyperlipidemia, Hypertension, and Stroke ([facility] )  She  reports that she has never smoked  She has never used smokeless tobacco  She reports that she does not drink alcohol or use drugs  She has No Known Allergies       Review of Systems   Constitutional: Negative for chills and fever  HENT: Negative for congestion and sneezing  Respiratory: Negative for cough  Musculoskeletal: Positive for gait problem  Skin: Positive for wound  Psychiatric/Behavioral: Negative for agitation  Objective:       Pressure Ulcer 08/26/20 Sacrum Mid unstageable wound with exposed bone and necrotic tissue with surrounding nonblanchable erythema (Active)   Wound Description Granulation tissue;Slough 01/18/21 0914   Cristal-wound Assessment Bleeding;Erythema; Excoriated 01/18/21 0914   Wound Length (cm) 4 cm 01/18/21 0914   Wound Width (cm) 5 cm 01/18/21 0914   Wound Depth (cm) 2 7 cm 01/18/21 0914   Wound Surface Area (cm^2) 20 cm^2 01/18/21 0914   Calculated Wound Volume (cm^3) 54 cm^3 01/18/21 0914   Change in Wound Size % 43 9 01/18/21 0914   Drainage Amount Moderate 01/18/21 0914   Drainage Description Sanguineous 01/18/21 0914   Dressing Status Intact 01/18/21 0914   Wound Image Images linked 01/18/21 0914       /50   Pulse 70   Temp 97 5 °F (36 4 °C)   Resp 18     Physical Exam      Wound Instructions:  Orders Placed This Encounter   Procedures    Wound cleansing and dressings     CONTINUE Negative Pressure Wound Therapy   Instructions:  Apply black granufoam to wound bed, Set negative pressure on continuous at 125mmhg, VAC dressing to be changed three times per week as ordered  and VAC dressing bridged to relieve disc pressure          Off-loading Instructions:     Turn every 2 hours  Avoid position directing pressure to Wound site  Limit side lying to 30 degree tilt  Limit the head of bed elevation to 30 degrees      Air mattress      No stool should be in wound  Change diapers frequently       Increase protein in the diet  May you supplements like ensure or boost     (Silver alginate and allevyn used today in wound center due to not having vac and supplies )     Standing Status:   Future     Standing Expiration Date:   1/18/2022    Debridement     This order was created via procedure documentation        Diagnosis ICD-10-CM Associated Orders   1   Pressure ulcer of sacral region, stage 4 ([facility])  L89 [id number] lidocaine (XYLOCAINE) 4 % topical solution 1 mL     Wound cleansing and dressings

## 2021-03-10 PROBLEM — R41.0 CONFUSION: Status: ACTIVE | Noted: 2021-01-01

## 2021-03-10 NOTE — ASSESSMENT & PLAN NOTE
Afib persistent but rate controlled on metoprolol with history of cardioembolic TIA treated with tPA after cardioversion  On xarelto  No recurrent TIA/CVA symptoms  Continue management per cardiology

## 2021-03-10 NOTE — LETTER
March 10, 2021     Amy Nesbitt DO  [address] [facility] [place] [address]    Patient: Hemalatha Blue   YOB: 1945   Date of Visit: 3/10/2021       Dear Dr Asya Jarvis:    Thank you for referring Hemalatha Blue to me for evaluation  Below are the relevant portions of my assessment and plan of care  Diagnoses and all orders for this visit:    Herpes zoster without complication  History of shingles last June with residual chronic burning and tingling of the right arm/hand      Pressure ulcer of sacral region, stage 4 (HCC)  Stage 4 sacral ulcer, following on a routine basis with wound care  Per recent notes, wound has appeared clean without new infection      Stenosis of left carotid artery  History of TIA, likely cardioembolic in etiology given timing associated with cardioversion for Afib with symptoms of aphasia and right sided weakness resolving with tPA  Neuro work-up revealed no stroke on MRI and left sided carotid stenosis was approximately 65% on CTA head/neck  Carotid duplex suggested >70% stenosis based on PSV of 237 but EDV only 79  Right ICA <50% stenosis with very mild plaque burden on CTA  No residual deficits  No recurrent symptoms since initial event last August      -We discussed the pathophysiology of carotid disease, available treatment options and indications for treatment  Suspect TIA was cardioembolic in nature  Carotid lesion is moderate  Low suspicion that it was the source of TIA  -Recommend continued optimization of medical management  Currently on ASA and statin    -Will obtain new baseline carotid duplex with continued 6 month carotid duplex surveillance and follow-up in 6 months  Advised to return sooner or go to the ED if she develops any TIA/CVA symptoms         Paroxysmal atrial fibrillation (HCC)  Afib persistent but rate controlled on metoprolol with history of cardioembolic TIA treated with tPA after cardioversion  On xarelto  No recurrent TIA/CVA symptoms   Continue management per cardiology      Confusion  Son reports intermittent episodes of confusion and episodic memory impairment  He is concerned these are residual effects from prolonged hospitalization and illness complicated by sepsis from decubitus ulcer  Patient has follow-up with neurology scheduled in the upcoming months for further evaluation      Chronic diastolic heart failure ([address] )  Wt Readings from Last 3 Encounters:   03/10/21 78 5 kg (173 lb)   09/22/20 78 5 kg (173 lb 1 6 oz)   09/20/20 86 6 kg (190 lb 14 7 oz)      CHF, follows with cardiology, has been euvolemic and stable from cardiac perspective         Type 2 diabetes mellitus with hyperglycemia (HCC)  Uncontrolled DMII on insulin  Will need repeat hgbA1c in the near future and close follow-up with PCP for better control  Likely exacerbated by ongoing sacral wound            Lab Results   Component Value Date     HGBA1C 9 9 (H) 08/26/2020            If you have questions, please do not hesitate to call me  I look forward to following Rona Pham along with you           Sincerely,        Zack Hayes MD        CC: No Recipients

## 2021-03-10 NOTE — ASSESSMENT & PLAN NOTE
Wt Readings from Last 3 Encounters:   03/10/21 78 5 kg (173 lb)   09/22/20 78 5 kg (173 lb 1 6 oz)   09/20/20 86 6 kg (190 lb 14 7 oz)     CHF, follows with cardiology, has been euvolemic and stable from cardiac perspective

## 2021-03-10 NOTE — ASSESSMENT & PLAN NOTE
Stage 4 sacral ulcer, following on a routine basis with wound care  Per recent notes, wound has appeared clean without new infection

## 2021-03-10 NOTE — PATIENT INSTRUCTIONS
Herpes zoster without complication  History of shingles last June with residual chronic burning and tingling of the right arm/hand      Pressure ulcer of sacral region, stage 4 (HCC)  Stage 4 sacral ulcer, following on a routine basis with wound care  Per recent notes, wound has appeared clean without new infection      Stenosis of left carotid artery  History of TIA, likely cardioembolic in etiology given timing associated with cardioversion for Afib with symptoms of aphasia and right sided weakness resolving with tPA  Neuro work-up revealed no stroke on MRI and left sided carotid stenosis was approximately 65% on CTA head/neck  Carotid duplex suggested >70% stenosis based on PSV of 237 but EDV only 79  Right ICA <50% stenosis with very mild plaque burden on CTA  No residual deficits  No recurrent symptoms since initial event last August      -We discussed the pathophysiology of carotid disease, available treatment options and indications for treatment  Suspect TIA was cardioembolic in nature  Carotid lesion is moderate  Low suspicion that it was the source of TIA  -Recommend continued optimization of medical management  Currently on ASA and statin    -Will obtain new baseline carotid duplex with continued 6 month carotid duplex surveillance and follow-up in 6 months  Advised to return sooner or go to the ED if she develops any TIA/CVA symptoms         Paroxysmal atrial fibrillation (HCC)  Afib persistent but rate controlled on metoprolol with history of cardioembolic TIA treated with tPA after cardioversion  On xarelto  No recurrent TIA/CVA symptoms  Continue management per cardiology      Confusion  Son reports intermittent episodes of confusion and episodic memory impairment  He is concerned these are residual effects from prolonged hospitalization and illness complicated by sepsis from decubitus ulcer   Patient has follow-up with neurology scheduled in the upcoming months for further evaluation      Chronic diastolic heart failure (HCC)  Wt Readings from Last 3 Encounters:   03/10/21 78 5 kg (173 lb)   09/22/20 78 5 kg (173 lb 1 6 oz)   09/20/20 86 6 kg (190 lb 14 7 oz)      CHF, follows with cardiology, has been euvolemic and stable from cardiac perspective         Type 2 diabetes mellitus with hyperglycemia (HCC)  Uncontrolled DMII on insulin  Will need repeat hgbA1c in the near future and close follow-up with PCP for better control   Likely exacerbated by ongoing sacral wound            Lab Results   Component Value Date     HGBA1C 9 9 (H) 08/26/2020

## 2021-03-10 NOTE — ASSESSMENT & PLAN NOTE
Uncontrolled DMII on insulin  Will need repeat hgbA1c in the near future and close follow-up with PCP for better control  Likely exacerbated by ongoing sacral wound      Lab Results   Component Value Date    HGBA1C 9 9 (H) 08/26/2020

## 2021-03-10 NOTE — ASSESSMENT & PLAN NOTE
Son reports intermittent episodes of confusion and episodic memory impairment  He is concerned these are residual effects from prolonged hospitalization and illness complicated by sepsis from decubitus ulcer  Patient has follow-up with neurology scheduled in the upcoming months for further evaluation

## 2021-03-10 NOTE — ASSESSMENT & PLAN NOTE
History of TIA, likely cardioembolic in etiology given timing associated with cardioversion for Afib with symptoms of aphasia and right sided weakness resolving with tPA  Neuro work-up revealed no stroke on MRI and left sided carotid stenosis was approximately 65% on CTA head/neck  Carotid duplex suggested >70% stenosis based on PSV of 237 but EDV only 79  Right ICA <50% stenosis with very mild plaque burden on CTA  No residual deficits  No recurrent symptoms since initial event last August     -We discussed the pathophysiology of carotid disease, available treatment options and indications for treatment  Suspect TIA was cardioembolic in nature  Carotid lesion is moderate  Low suspicion that it was the source of TIA  -Recommend continued optimization of medical management  Currently on ASA and statin    -Will obtain new baseline carotid duplex with continued 6 month carotid duplex surveillance and follow-up in 6 months  Advised to return sooner or go to the ED if she develops any TIA/CVA symptoms

## 2021-03-10 NOTE — PROGRESS NOTES
Assessment/Plan:    Herpes zoster without complication  History of shingles last June with residual chronic burning and tingling of the right arm/hand  Pressure ulcer of sacral region, stage 4 (HCC)  Stage 4 sacral ulcer, following on a routine basis with wound care  Per recent notes, wound has appeared clean without new infection  Stenosis of left carotid artery  History of TIA, likely cardioembolic in etiology given timing associated with cardioversion for Afib with symptoms of aphasia and right sided weakness resolving with tPA  Neuro work-up revealed no stroke on MRI and left sided carotid stenosis was approximately 65% on CTA head/neck  Carotid duplex suggested >70% stenosis based on PSV of 237 but EDV only 79  Right ICA <50% stenosis with very mild plaque burden on CTA  No residual deficits  No recurrent symptoms since initial event last August     -We discussed the pathophysiology of carotid disease, available treatment options and indications for treatment  Suspect TIA was cardioembolic in nature  Carotid lesion is moderate  Low suspicion that it was the source of TIA  -Recommend continued optimization of medical management  Currently on ASA and statin    -Will obtain new baseline carotid duplex with continued 6 month carotid duplex surveillance and follow-up in 6 months  Advised to return sooner or go to the ED if she develops any TIA/CVA symptoms  Paroxysmal atrial fibrillation (HCC)  Afib persistent but rate controlled on metoprolol with history of cardioembolic TIA treated with tPA after cardioversion  On xarelto  No recurrent TIA/CVA symptoms  Continue management per cardiology  Confusion  Son reports intermittent episodes of confusion and episodic memory impairment  He is concerned these are residual effects from prolonged hospitalization and illness complicated by sepsis from decubitus ulcer   Patient has follow-up with neurology scheduled in the upcoming months for further evaluation  Chronic diastolic heart failure (HCC)  Wt Readings from Last 3 Encounters:   03/10/21 78 5 kg (173 lb)   09/22/20 78 5 kg (173 lb 1 6 oz)   09/20/20 86 6 kg (190 lb 14 7 oz)     CHF, follows with cardiology, has been euvolemic and stable from cardiac perspective  Type 2 diabetes mellitus with hyperglycemia (HCC)  Uncontrolled DMII on insulin  Will need repeat hgbA1c in the near future and close follow-up with PCP for better control  Likely exacerbated by ongoing sacral wound  Lab Results   Component Value Date    HGBA1C 9 9 (H) 08/26/2020        Diagnoses and all orders for this visit:    Carotid stenosis, asymptomatic, bilateral  -     VAS carotid complete study; Future  -     VAS carotid complete study; Future    Paroxysmal atrial fibrillation (HCC)    Stenosis of left carotid artery    Pressure ulcer of sacral region, stage 4 (HCC)    Herpes zoster without complication    Confusion    Type 2 diabetes mellitus with hyperglycemia, with long-term current use of insulin (HCC)    Chronic diastolic heart failure ([facility] [address] )        I have spent 25 minutes with Patient  today in which greater than 50% of this time was spent in counseling/coordination of care regarding Intructions for management, Importance of tx compliance and Impressions  Subjective:      Patient ID: Zechariah Aldrich is a 67 y o  female  New patient presents in office for carotid stenosis  Patient presents in office today for results review of CTA Head/ neck (stroke alert) on 08/25 and CV done 08/31  Patient presents in office with pressure ulcers  Patient is currently taking ASA 81 mg, Atorvastatin, and xarelto  HPI  Ms Sarah Mariscal is a 73yo female with HTN, HLD, CHF, CKD stage 3, Afib on xarelto, chronic sacral decubitus ulcer, cardioembolic stroke, bilateral carotid artery stenosis, left > right who presents for follow-up of carotid artery stenosis   She has been in a nursing facility since her hospitalization for continued care of her sacral decubitus ulcer  She had been hospitalized and undergone cardioversion for Afib and shortly after had signs/symptoms of TIA with aphasia and right sided weakness  She received TPA with resolution of her symptoms  She has had no recurrent signs/symptoms of TIA/CVA since then  She was found to have significant left carotid artery stenosis on further work-up  She reports chronic right arm and hand burning/tingling since having shingles last June  She denies any motor/sensory deficits otherwise  She has no speech or visual disturbances  Her son states that she has had some issues with confusion and memory impairment and is worried it may be related to ongoing sepsis or residual effects from prolonged hospitalization  The following portions of the patient's history were reviewed and updated as appropriate: allergies, current medications, past family history, past medical history, past social history, past surgical history and problem list     I have reviewed and made appropriate changes to the review of systems input by the medical assistant      Vitals:    03/10/21 1353   BP: 126/74   BP Location: Left arm   Patient Position: Sitting   Cuff Size: Adult   Pulse: 87   Resp: 17   Temp: 99 4 °F (37 4 °C)   TempSrc: Tympanic   Weight: 78 5 kg (173 lb)   Height: 5' 6" (1 676 m)       Patient Active Problem List   Diagnosis    Closed 4-part fracture of proximal humerus with routine healing    Type 2 diabetes mellitus with hyperglycemia (HCC)    Hypertension    Hyperlipidemia    CKD 3    Acute blood loss as cause of postoperative anemia    Scalp hematoma    Status post reverse arthroplasty of right shoulder    Symptomatic varicose veins of both lower extremities    Chronic venous insufficiency    Closed comminuted intertrochanteric fracture of proximal femur, right, initial encounter ([facility])    Acute respiratory failure with hypoxia ([facility])    Constipation    Distal radius fracture, right    Diarrhea  Herpes zoster without complication    Low grade fever    Colon cancer screening    Need for vaccination against Streptococcus pneumoniae using pneumococcal conjugate vaccine 13    Pressure ulcer of sacral region, stage 4 (HCC)    Cellulitis of right elbow    Bilateral lower extremity edema    History of vitamin D deficiency    Stroke-like symptoms    RORY (acute kidney injury) ([address] )    Paroxysmal atrial fibrillation (HCC)    Bacteremia due to Enterococcus    Anemia    Stenosis of left carotid artery    Chronic diastolic heart failure (HCC)    C  difficile colitis    Sepsis due to Streptococcus species (HCC)    Rash    Confusion       Past Surgical History:   Procedure Laterality Date    BREAST SURGERY Left     lumpectomy benign    CATARACT EXTRACTION Bilateral 2017    CATARACT EXTRACTION W/ INTRAOCULAR LENS IMPLANT Left 12/11/2017    Procedure: EXTRACTION EXTRACAPSULAR CATARACT PHACO INTRAOCULAR LENS (IOL); Surgeon: Glo Lewis MD;  Location[facility] MAIN OR;  Service: Ophthalmology    CT OPEN RX FEMUR FX+INTRAMED RAYMOND Right 5/21/2020    Procedure: INSERTION OF SHORT TROCHANTERIC FEMORAL NAIL;  Surgeon: Stacie Ramirez MD;  [facility] Main [facility];  Service: Orthopedics    [address] HUMERAL&GLENOID COMPNT Right 9/10/2018    Procedure: RIGHT REVERSE TOTAL SHOULDER ARTHROPLASTY;  Surgeon: Stacie Ramirez MD;  [facility] Main [facility];  Service: Orthopedics    CT XCAPSL CTRC RMVL INSJ IO LENS PROSTH W/O ECP Right 10/23/2017    Procedure: EXTRACTION EXTRACAPSULAR CATARACT PHACO INTRAOCULAR LENS (IOL); Surgeon: Glo Lewis MD;  Location: [facility] MAIN OR;  Service: Ophthalmology    WOUND DEBRIDEMENT N/A 8/26/2020    Procedure: EXCISIONAL DEBRIDEMENT OF SACRAL PRESSURE WOUND, WASHOUT;;  Surgeon: Romana Letters, MD;  Location:  MAIN OR;  Service: General    WOUND DEBRIDEMENT N/A 8/28/2020    Procedure: BUTTOCKS DEBRIDEMENT WOUND AND DRESSING CHANGE ([facility] OUT);   Surgeon: Josh Andres MD;  Location: [facility] MAIN OR;  Service: General       Family History   Problem Relation Age of Onset    Diabetes Mother     Varicose Veins Mother     Stroke Father     Arthritis Brother     Diabetes Daughter     No Known Problems Brother        Social History     Socioeconomic History    Marital status: Single     Spouse name: Not on file    Number of children: 2    Years of education: Not on file    Highest education level: Not on file   Occupational History    Not on file   Social Needs    Financial resource strain: Not hard at all   [place] insecurity     Worry: Never true     Inability: Never true   [employer] needs     Medical: No     Non-medical: No   Tobacco Use    Smoking status: Never Smoker    Smokeless tobacco: Never Used   Substance and Sexual Activity    Alcohol use: Never     Frequency: Never     Drinks per session: Patient refused     Binge frequency: Never     Comment: quit     Drug use: No    Sexual activity: Not Currently   Lifestyle    Physical activity     Days per week: 3 days     Minutes per session: 20 min    Stress:  Only a little   Relationships    Social connections     Talks on phone: Twice a week     Gets together: Once a week     Attends Shinto service: Never     Active member of club or organization: No     Attends meetings of clubs or organizations: Never     Relationship status: Never     Intimate partner violence     Fear of current or ex partner: No     Emotionally abused: No     Physically abused: No     Forced sexual activity: No   Other Topics Concern    Not on file   Social History Narrative    · Most recent tobacco use screenin2019      · Do you currently or have you served in the [employer]:   No      · Were you activated, into active duty, as a member of the [employer] or as a Reservist:   No     As per [employer]        No Known Allergies      Current Outpatient Medications:     acetaminophen (TYLENOL) 325 mg suppository, Insert 325 mg into the rectum every 4 (four) hours as needed for mild pain, Disp: , Rfl:     aspirin (ECOTRIN LOW STRENGTH) 81 mg EC tablet, Take 1 tablet (81 mg total) by mouth daily, Disp: 30 tablet, Rfl: 0    atorvastatin (LIPITOR) 40 mg tablet, Take 1 tablet (40 mg total) by mouth daily with dinner, Disp: 30 tablet, Rfl: 0    bisacodyl (FLEET) 10 MG/30ML ENEM, Insert 10 mg into the rectum as needed for constipation constipation, Disp: , Rfl:     ferrous sulfate 325 (65 Fe) mg tablet, TAKE 1 TABLET BY MOUTH ONCE A DAY FOR SUPPLEMENT, Disp: , Rfl:     gabapentin (NEURONTIN) 100 mg capsule, Take 100 mg by mouth 3 (three) times a day, Disp: , Rfl:     insulin glargine (LANTUS) 100 units/mL subcutaneous injection, Inject 14 Units under the skin daily at bedtime, Disp: 10 mL, Rfl: 0    insulin lispro (HumaLOG) 100 units/mL injection, Inject 7 Units under the skin 3 (three) times a day with meals, Disp: 5 mL, Rfl: 0    levofloxacin (LEVAQUIN) 500 mg tablet, Take 500 mg by mouth every 24 hours Untill 10/11 for UTI, Disp: , Rfl:     metoprolol tartrate 75 MG TABS, Take 1 tablet (75 mg total) by mouth every 12 (twelve) hours, Disp: 60 tablet, Rfl: 0    mineral oil enema, Insert 1 enema into the rectum once As needed for constipation unrelieved by suppository, Disp: , Rfl:     Nutritional Supplements (Yevgeniy) PACK, Take by mouth, Disp: , Rfl:     oxyCODONE (OXY-IR) 5 MG capsule, Take 5 mg by mouth every 4 (four) hours as needed for moderate pain or severe pain, Disp: , Rfl:     oxyCODONE-acetaminophen (PERCOCET)  mg per tablet, Take 1 tablet by mouth as needed for moderate pain, Disp: , Rfl:     polyethylene glycol (MIRALAX) 17 g packet, Take 17 g by mouth daily, Disp: 14 each, Rfl: 0    rivaroxaban (XARELTO) 15 mg tablet, Take 1 tablet (15 mg total) by mouth daily with breakfast, Disp: 90 tablet, Rfl: 3    Specialty Vitamins Products (PROSTATE PO), Take by mouth 2 (two) times a day, Disp: , Rfl:    torsemide (DEMADEX) 20 mg tablet, Take 1 tablet (20 mg total) by mouth daily (Patient taking differently: Take 10 mg by mouth daily ), Disp: 30 tablet, Rfl: 0    VITAMIN D PO, Take 50,000 mg by mouth 2 (two) times a week , Disp: , Rfl:     Review of Systems   Constitutional: Negative  HENT: Negative  Eyes: Negative  Respiratory: Negative  Cardiovascular: Negative  Gastrointestinal: Positive for constipation  Endocrine: Negative  Genitourinary: Negative  Musculoskeletal: Negative  Skin: Positive for wound  Allergic/Immunologic: Negative  Neurological: Negative  Hematological: Negative  Psychiatric/Behavioral: Negative  I have personally reviewed the ROS entered by MA and agree as documented  Objective:      /74 (BP Location: Left arm, Patient Position: Sitting, Cuff Size: Adult)   Pulse 87   Temp 99 4 °F (37 4 °C) (Tympanic)   Resp 17   Ht 5' 6" (1 676 m)   Wt 78 5 kg (173 lb)   BMI 27 92 kg/m²          Physical Exam  Vitals signs reviewed  HENT:      Head: Normocephalic and atraumatic  Neck:      Musculoskeletal: Normal range of motion  Cardiovascular:      Rate and Rhythm: Rhythm irregular  Pulses:           Radial pulses are 1+ on the right side and 2+ on the left side  Pulmonary:      Effort: Pulmonary effort is normal    Abdominal:      Palpations: Abdomen is soft  Musculoskeletal: Normal range of motion  Skin:     General: Skin is warm and dry  Capillary Refill: Capillary refill takes less than 2 seconds  Neurological:      General: No focal deficit present  Mental Status: She is alert and oriented to person, place, and time  Psychiatric:         Mood and Affect: Mood normal          Behavior: Behavior normal          Thought Content:  Thought content normal          Judgment: Judgment normal

## 2021-03-16 NOTE — TELEPHONE ENCOUNTER
I called [facility] at [phone] and left a detail message for Ramona Hammans to contact the [place] office to schedule the follow up appointment with Dr Persaud at the [place] office  Called patient from the recall list  Please schedule when facility returns the call back

## 2021-04-05 NOTE — TELEPHONE ENCOUNTER
Spoke to Laura Parkinson (on CC as "other") says she's POA  Looking to sched consult for tremor w Shae Parkinson said she got in for a July appt w [facility]

## 2021-04-08 PROBLEM — J96.11 CHRONIC RESPIRATORY FAILURE WITH HYPOXIA (HCC): Status: ACTIVE | Noted: 2021-01-01

## 2021-04-08 PROBLEM — I50.33 ACUTE ON CHRONIC DIASTOLIC (CONGESTIVE) HEART FAILURE (HCC): Status: ACTIVE | Noted: 2021-01-01

## 2021-04-08 PROBLEM — N18.31 STAGE 3A CHRONIC KIDNEY DISEASE (HCC): Status: ACTIVE | Noted: 2018-09-10

## 2021-04-08 NOTE — ED TRIAGE NOTES
Pt presents to the ED by BLS-EMS from [facility] with c/o abnormal labs  EMS reports that the patient has a recent low hemoglobin with "Recent blood in stool" however no date to that event  Pt takes Xarelto and is on 2LPM via NC continuous  Pt without complaints at this time

## 2021-04-09 PROBLEM — R19.5 GUAIAC POSITIVE STOOLS: Status: ACTIVE | Noted: 2021-01-01

## 2021-04-09 PROBLEM — D50.9 IRON DEFICIENCY ANEMIA: Status: ACTIVE | Noted: 2020-08-28

## 2021-04-09 NOTE — ASSESSMENT & PLAN NOTE
Wt Readings from Last 3 Encounters:   04/08/21 78 5 kg (173 lb 1 oz)   03/10/21 78 5 kg (173 lb)   09/22/20 78 5 kg (173 lb 1 6 oz)     · Home regimen: torsemide 10mg 2x per week   · Last echo 08/2020: "LVEF 65%  No regional wall motion abnormalities  Grade 2 diastolic dysfunction    Mild pulmonary hypertension "  · Follows with Dr Chapis Coe, Cardiology outpatient  · Unknown dry weight - weight on discharge from hospital 09/2020 was 190 lb, on admission patient is 173 lb  · Chest x-ray:  Findings consistent with pulmonary edema on my review  · BNP at 17,688  · Troponin <0 02   · Albumin at 2 1  · Lasix 40 mg IV given in the ED - urine output 1 5 hours status post Lasix was 200 cc, will continue patient on Lasix 40 mg q 12 hours  · Strict I/Os  · Cardiac diet - 2 g sodium restriction and 1500 mL fluid restriction  · Updated echo  · Cardiology consult  · Monitor on tele until repeat echo results   · On admission - 2+ pitting edema to b/l LEs, rales b/l extending to mid-lung fields

## 2021-04-09 NOTE — ASSESSMENT & PLAN NOTE
Lab Results   Component Value Date    HGBA1C 6 0 (H) 04/08/2021       Recent Labs     04/09/21  0140 04/09/21  0728 04/09/21  1117 04/09/21  1202   POCGLU 127 89 58* 122       Blood Sugar Average: Last 72 hrs:  · (P) 99 home regimen:  Lantus 14 units HS, Humalog 11 units with lunch and dinner, and 13 units with breakfast  · Continue with lantus 14U HS and lispro 11U TID with meals, and SSI

## 2021-04-09 NOTE — PLAN OF CARE
Problem: Potential for Falls  Goal: Patient will remain free of falls  Description: INTERVENTIONS:  - Assess patient frequently for physical needs  -  Identify cognitive and physical deficits and behaviors that affect risk of falls  -  [place] fall precautions as indicated by assessment   - Educate patient/family on patient safety including physical limitations  - Instruct patient to call for assistance with activity based on assessment  - Modify environment to reduce risk of injury  - Consider OT/PT consult to assist with strengthening/mobility  Outcome: Progressing     Problem: Prexisting or High Potential for Compromised Skin Integrity  Goal: Skin integrity is maintained or improved  Description: INTERVENTIONS:  - Identify patients at risk for skin breakdown  - Assess and monitor skin integrity  - Assess and monitor nutrition and hydration status  - Monitor labs   - Assess for incontinence   - Turn and reposition patient  - Assist with mobility/ambulation  - Relieve pressure over bony prominences  - Avoid friction and shearing  - Provide appropriate hygiene as needed including keeping skin clean and dry  - Evaluate need for skin moisturizer/barrier cream  - Collaborate with interdisciplinary team   - Patient/family teaching  - Consider wound care consult   Outcome: Progressing     Problem: Nutrition/Hydration-ADULT  Goal: Nutrient/Hydration intake appropriate for improving, restoring or maintaining nutritional needs  Description: Monitor and assess patient's nutrition/hydration status for malnutrition  Collaborate with interdisciplinary team and initiate plan and interventions as ordered  Monitor patient's weight and dietary intake as ordered or per policy  Utilize nutrition screening tool and intervene as necessary  Determine patient's food preferences and provide high-protein, high-caloric foods as appropriate       INTERVENTIONS:  - Monitor oral intake, urinary output, labs, and treatment plans  - Assess nutrition and hydration status and recommend course of action  - Evaluate amount of meals eaten  - Assist patient with eating if necessary   - Allow adequate time for meals  - Recommend/ encourage appropriate diets, oral nutritional supplements, and vitamin/mineral supplements  - Order, calculate, and assess calorie counts as needed  - Recommend, monitor, and adjust tube feedings and TPN/PPN based on assessed needs  - Assess need for intravenous fluids  - Provide specific nutrition/hydration education as appropriate  - Include patient/family/caregiver in decisions related to nutrition  Outcome: Progressing     Problem: CARDIOVASCULAR - ADULT  Goal: Maintains optimal cardiac output and hemodynamic stability  Description: INTERVENTIONS:  - Monitor I/O, vital signs and rhythm  - Monitor for S/S and trends of decreased cardiac output  - Administer and titrate ordered vasoactive medications to optimize hemodynamic stability  - Assess quality of pulses, skin color and temperature  - Assess for signs of decreased coronary artery perfusion  - Instruct patient to report change in severity of symptoms  Outcome: Progressing  Goal: Absence of cardiac dysrhythmias or at baseline rhythm  Description: INTERVENTIONS:  - Continuous cardiac monitoring, vital signs, obtain 12 lead EKG if ordered  - Administer antiarrhythmic and heart rate control medications as ordered  - Monitor electrolytes and administer replacement therapy as ordered  Outcome: Progressing     Problem: RESPIRATORY - ADULT  Goal: Achieves optimal ventilation and oxygenation  Description: INTERVENTIONS:  - Assess for changes in respiratory status  - Assess for changes in mentation and behavior  - Position to facilitate oxygenation and minimize respiratory effort  - Oxygen administered by appropriate delivery if ordered  - Initiate smoking cessation education as indicated  - Encourage broncho-pulmonary hygiene including cough, deep breathe, Incentive Spirometry  - Assess the need for suctioning and aspirate as needed  - Assess and instruct to report SOB or any respiratory difficulty  - Respiratory Therapy support as indicated  Outcome: Progressing     Problem: SKIN/TISSUE INTEGRITY - ADULT  Goal: Incision(s), wounds(s) or drain site(s) healing without S/S of infection  Description: INTERVENTIONS  - Assess and document risk factors for skin impairment   - Assess and document dressing, incision, wound bed, drain sites and surrounding tissue  - Consider nutrition services referral as needed  - Oral mucous membranes remain intact  - Provide patient/ family education  Outcome: Progressing     Problem: PAIN - ADULT  Goal: Verbalizes/displays adequate comfort level or baseline comfort level  Description: Interventions:  - Encourage patient to monitor pain and request assistance  - Assess pain using appropriate pain scale  - Administer analgesics based on type and severity of pain and evaluate response  - Implement non-pharmacological measures as appropriate and evaluate response  - Consider cultural and social influences on pain and pain management  - Notify physician/advanced practitioner if interventions unsuccessful or patient reports new pain  Outcome: Progressing     Problem: INFECTION - ADULT  Goal: Absence or prevention of progression during hospitalization  Description: INTERVENTIONS:  - Assess and monitor for signs and symptoms of infection  - Monitor lab/diagnostic results  - Monitor all insertion sites, i e  indwelling lines, tubes, and drains  - Monitor endotracheal if appropriate and nasal secretions for changes in amount and color  - [place] appropriate cooling/warming therapies per order  - Administer medications as ordered  - Instruct and encourage patient and family to use good hand hygiene technique  - Identify and instruct in appropriate isolation precautions for identified infection/condition  Outcome: Progressing     Problem: SAFETY ADULT  Goal: Patient will remain free of falls  Description: INTERVENTIONS:  - Assess patient frequently for physical needs  -  Identify cognitive and physical deficits and behaviors that affect risk of falls  -  [place] fall precautions as indicated by assessment   - Educate patient/family on patient safety including physical limitations  - Instruct patient to call for assistance with activity based on assessment  - Modify environment to reduce risk of injury  - Consider OT/PT consult to assist with strengthening/mobility  Outcome: Progressing     Problem: DISCHARGE PLANNING  Goal: Discharge to home or other facility with appropriate resources  Description: INTERVENTIONS:  - Identify barriers to discharge w/patient and caregiver  - Arrange for needed discharge resources and transportation as appropriate  - Identify discharge learning needs (meds, wound care, etc )  - Arrange for interpretive services to assist at discharge as needed  - Refer to Case Management Department for coordinating discharge planning if the patient needs post-hospital services based on physician/advanced practitioner order or complex needs related to functional status, cognitive ability, or social support system  Outcome: Progressing     Problem: Knowledge Deficit  Goal: Patient/family/caregiver demonstrates understanding of disease process, treatment plan, medications, and discharge instructions  Description: Complete learning assessment and assess knowledge base    Interventions:  - Provide teaching at level of understanding  - Provide teaching via preferred learning methods  Outcome: Progressing

## 2021-04-09 NOTE — ASSESSMENT & PLAN NOTE
· Patient chronically wears 2 L supplemental oxygen via nasal cannula at [facility]  · SpO2 stable here on home oxygen requirement

## 2021-04-09 NOTE — ASSESSMENT & PLAN NOTE
· SNF staff report heme-positive stool x2 earlier today   · Stool occult negative in the ED   · Will order stool occult x3  · No colonoscopy on file   · Hold off on GI evaluation for now

## 2021-04-09 NOTE — ASSESSMENT & PLAN NOTE
· Normocytic anemia with a hemoglobin of 8 2 on admission  · Outpatient lab work sent by SNF from 04/08 showed a hemoglobin of 7 2  · Baseline appears to be between 8-9   · Transfuse for hemoglobin <7   · Patient does take daily iron supplementation - will continue  · Updated iron panel

## 2021-04-09 NOTE — DISCHARGE INSTR - OTHER ORDERS
Wound Care Plan:   1-Apply Hydraguard lotion to bilateral heels twice daily for skin protection  2-Elevate heels off of bed/chair surface to offload pressure  3-Offloading air cushion in chair if/when getting out of bed  4-Moisturize skin daily with lotion  5-Turn/reposition every 2 hours while in bed, using positioning wedges; and weight shift frequently while in chair for pressure re-distribution on skin  6-Sacrum--irrigate with normal saline, pat dry  Apply 3m no sting skin barrier film to precious-wound skin  Fluff Maxorb Ag into sacral wound loosely and covering right buttock wound  Top with Allevyn Life foam dressing  Change dressing daily and as needed with soilage  Follow-up at the [address]

## 2021-04-09 NOTE — NUTRITION
04/09/21 1428   Assessment   Timepoint Initial  (CHF diet ed consult, wounds)   Labs & Meds   Labs/Meds Review Lab values reviewed; Meds reviewed  (4/9/21 BG 89, 158, 122  meds: bumex, ferrous sulfate, lipitor, lantus, humalog)   Feeding Route   PO Independent   Adequacy of Intake   Nutrition Modality PO  (2 gm NA+, CCD2, 1800 ml Fluid restriction)   Current Meal Intake 50-75%; Inadequate   Estimated Calorie Intake 50-75%   Estimated Protein Intake  50-75%   Estimated Fluid Intake %   Nutrition Prognosis   Nutrition Concerns RN skin assessment reviewed  (stage 4 sacral pressure ulcer +2 b/l LE edema)   Comorbid Concerns   (per chart review: acute on chronic CHF, guaiac positive stools, chronic respiratory failure w/ hypoxia, Stage 3 CKD, DM2)   Nutrition Precautions & Barriers to Adequate Nutrition   (PMH:chronic respiratory failure on 2 L supplemental oxygen, chronic diastolic heart failure, iron deficiency anemia, IDDM 2, CKD stage IIIA, paroxysmal AFib on Xarelto, HTN, and pressure ulcer of sacral region stage IV)   Nutrition Considerations   (diet education: reviewed CHF, DM diet with pt  unclear how much pt is actually understanding )   PES Statement   Problem Intake   Energy Balance (1) Inadequate energy intake NI-1 2   Related to Decreased appetite   As evidenced by: Intake < estimated needs   Patient Nutrition Goals   Goal Increase kcal/PRO intake; Improve skin integrity   Goal Status initiated   Timeframe to complete goal by next f/u   Recommendations/Interventions   Malnutrition/BMI Present No   Interventions Supplement initiate;Education initiated/completed;Diet: continued as ordered   Nutrition Recommendations Other (Specify)  (per RD protocol add Ensure Max 1 x daily )   Nutrition Complexity Risk   Nutrition complexity level Moderate risk   Follow up date 04/16/21

## 2021-04-09 NOTE — ASSESSMENT & PLAN NOTE
Wt Readings from Last 3 Encounters:   04/09/21 72 kg (158 lb 11 7 oz)   03/10/21 78 5 kg (173 lb)   09/22/20 78 5 kg (173 lb 1 6 oz)     · Acute on chronic CHF, poa, a/e/b SOB, proBNP 17,688, CXR findings of moderate pulm edema with small right pleural fusion  · Unable to obtain accurate history on presence/absence of orthopnea/PND or dyspnea as patient has dementia  · Home regimen: torsemide 10mg 2x per week   · Last echo 08/2020: "LVEF 65%  No regional wall motion abnormalities  Grade 2 diastolic dysfunction    Mild pulmonary hypertension "  · Unknown dry weight - weight on discharge from hospital 09/2020 was 190 lb, on admission patient is 173 lb  · Troponin <0 02   · Albumin at 2 1  · ON IV lasix 40mg BID  · Strict I/Os  · Cardiac diet - 2 g sodium restriction and 1500 mL fluid restriction  · Get updated echo  · Cardiology consulted  · Monitor on tele until repeat echo results

## 2021-04-09 NOTE — CASE MANAGEMENT
LOS: 1 day  Bundle: No  Unplanned Readmission Score: 21 Green  30 Day Readmission:      CM met with patient at bedside  Explained the role of CM  Obtained the following information from patient  Home: [address] [place] [address]  ADL's: Dependent  DME: provided at [facility]  Ambulation: Non-ambulatory  [facility] Susan lift OOB  Bed bound    Transportation: [facility]  POA/LW/AD: Viktor Mcgee Wyatt/yes LW/yes AD  Transport at D/C: S    CM reviewed d/c planning process including the following: identifying help at home, patient preferences for d/c planning needs, , availability of treatment team to discuss questions or concerns patient and/or family may have regarding understanding medications and recognizing signs and symptoms once discharged  TC to Guille Morales at [facility] to assess baseline PTA  Patient is a susan lift OOB and dependent with ADL s  Has a sacral wound with wound care  Patient is an MA 15-day bed hold  Spoke with Blake Paul [phone] to confirm above and explain CM role  Plan is for patient to return to [facility]  CM following through discharge

## 2021-04-09 NOTE — ASSESSMENT & PLAN NOTE
Lab Results   Component Value Date    HGBA1C 9 9 (H) 08/26/2020       No results for input(s): POCGLU in the last 72 hours      Blood Sugar Average: Last 72 hrs:  ·  home regimen:  Lantus 14 units HS, Humalog 11 units with lunch and dinner, and 13 units with breakfast  · Will obtain updated hemoglobin A1c   · Will maintain patient on lantus 14U HS and humalog 11U TID with meals, and SSI

## 2021-04-09 NOTE — CONSULTS
Consultation - Cardiology Team One  Nerissa Ortiz 67 y o  female MRN: [id number]  Unit/Bed#: -01 Encounter: [id number]    Inpatient consult to Cardiology  Consult performed by: TJ Metcalf  Consult ordered by: Lucero Michel PA-C          Physician Requesting Consult: Claus Louise MD  Reason for Consult / Principal Problem: acute CHF    Assessment:    Acute on chronic diastolic HF  ·  reason for ED evaluation appears to be heme-positive stools with low hemoglobin on outpatient labs; patient offering no complaints upon arrival to ED but noted to have pulmonary edema on chest x-ray as well as elevated BNP; no hypoxia noted  · Chest xray on admit reveals mild pulmonary vascular congestion by my review; will await official radiology read  · TTE from 8/2020: EF 65% with no WMA; wall thickness mildly increased; grade 2 DD; LA moderately dilated; mild MR; mild TR, estimated peak PA pressure 45 mm Hg suggesting mild pulmonary hypertension  ·  repeat echo ordered by primary team, will await results  · proBNP 79,667; troponin <0 02  · Home med: torsemide 10 mg two times a week (Monday and Thursday)  · IV lasix 40 mg x 1 dose given in ED at 2200 last evening and now ordered as BID dosing but order is placed as PO not IV  · Patient with evidence of mild LE edema, decreased lung sounds on right and scant fine crackles B/L lower lobes; appears mildly volume overloaded  · Albumin is 2 1, will change IV diuretics to Bumex 1 mg and give 1 dose this AM and re-evaluate volume status in the AM tomorrow and likely restart torsemide  · I/O: total UO thus far is 1 4 L  · Weight this AM is 158 lbs by bed scale; dry weight is unknown; weight upon discharge 09/20/2020 was 190 lbs  ·  continue daily weights  ·  strict I/O documentation  ·  2 g sodium restricted diet with fluid restriction     chronic respiratory failure with hypoxia  · chronically wears 2 L supplemental oxygen at [facility]  · O2 sats remain stable on 2 L NC    Persistent Afib  · EKG on admit has not been loaded in to MUSE therefore I am unable to review at this time; no paper EKG on chart  · Tele reveals: NSR with rates in the 60's with occasional PAC's noted  · Xarelto 15 mg QD and metoprolol tartrate 75 mg BID    Iron deficiency anemia  ·  baseline hemoglobin 8-9  ·  outpatient lab work from [facility] revealed hemoglobin of 7 2; guaiac positive stools noted x2 at [facility] prompting ED evaluation  ·  hemoglobin this a m  is 7 4  ·  management per primary team    Essential HTN  · SBP averaging 130's  · Home meds:  metoprolol tartrate 75 mg BID and torsemide 10 mg two times a week (Monday and Thursday)    DM2  · HgbA1c 9 9  ·  management per primary team    CKD stage 3  · Baseline creat 1 2-1 3  · Creat on admit 1 13; and creat this AM is 1 21  ·  will monitor closely during IV diuresis    hyperlipidemia  · Lipitor 40 mg QD      Plan/Recommendations:  · Echo ordered by primary team, will await results  · Patient with evidence of mild LE edema, decreased lung sounds on right and scant fine crackles B/L lower lobes; appears mildly volume overloaded  · Albumin is 2 1, will change IV diuretics to Bumex 1 mg and give 1 dose this AM and re-evaluate volume status in the AM tomorrow and likely restart torsemide  · Continue remaining home medications        ________________________________________________________________________      History of Present Illness   HPI: Antonio Morse is a 67y o  year old female with PMH of hyperlipidemia, CKD stage 3, DM 2, PAF ( Xarelto), chronic respiratory failure with hypoxia, iron deficiency anemia, and chronic diastolic heart failure  She follows with cardiologist Dr Falguni Cabrera and was last seen 11/23/2020  She resides at [address] [place] [address]  And was brought to the ED secondary to abnormal outpatient lab findings of hemoglobin of 7 2 as well as positive guaiac stools x2 per nursing staff at [facility]    In the ED chest x-ray reveals mild pulmonary vascular congestion /pulmonary edema and proBNP was elevated  Secondary to these findings Cardiology is consulted for possible acute CHF  The patient denies any cardiac symptoms stating that she feels she is at her baseline state of health  She is slightly confused but is able to answer my questions regarding her review of systems  She states that she is fatigued but she notes that this is chronic for her and she does not feel it is any worse than normal   She denies any shortness of breath or chest discomfort at rest   The patient is bed-bound and does not ambulate  She denies any palpitations or orthopnea  EKG reviewed personally: EKG on admit has not been loaded in to MUSE therefore I am unable to review at this time; no paper EKG on chart          Telemetry reviewed personally: NSR with rates in the 60's with occasional PAC's noted        Review of Systems   Constitution: Positive for malaise/fatigue  Negative for chills and fever  HENT: Negative for congestion  Cardiovascular: Negative for chest pain, dyspnea on exertion, leg swelling, orthopnea and palpitations  Respiratory: Negative for cough, shortness of breath (no SOB at rest) and wheezing  Endocrine: Negative  Hematologic/Lymphatic: Negative  Skin: Negative  Musculoskeletal: Negative  Gastrointestinal: Negative for bloating, abdominal pain, nausea and vomiting  Genitourinary: Negative  Neurological: Negative for dizziness and light-headedness  Psychiatric/Behavioral: Negative  All other systems reviewed and are negative      Historical Information   Past Medical History:   Diagnosis Date    Acute renal failure (ARF) ([facility] )     Acute respiratory failure with hypoxia (HCC)     Atrial fibrillation (HCC)     CHF (congestive heart failure) (HCC)     Chronic kidney disease     Diabetes mellitus ([facility] )     type 2    Hyperlipidemia     Hypertension     Stroke [facility])      Past Surgical History:   Procedure Laterality Date    BREAST SURGERY Left     lumpectomy benign    CATARACT EXTRACTION Bilateral 2017    CATARACT EXTRACTION W/ INTRAOCULAR LENS IMPLANT Left 12/11/2017    Procedure: EXTRACTION EXTRACAPSULAR CATARACT PHACO INTRAOCULAR LENS (IOL); Surgeon: Carmella Hernandez MD;  Location: [facility] MAIN OR;  Service: Ophthalmology    MT OPEN RX FEMUR FX+INTRAMED RAYMOND Right 5/21/2020    Procedure: INSERTION OF SHORT TROCHANTERIC FEMORAL NAIL;  Surgeon: Amarjit Mccall MD;  Location[facility] Main OR;  Service: Orthopedics    [address] HUMERAL&GLENOID COMPNT Right 9/10/2018    Procedure: RIGHT REVERSE TOTAL SHOULDER ARTHROPLASTY;  Surgeon: Amarjit Mccall MD;  [facility] Main OR;  Service: Orthopedics    MT XCAPSL CTRC RMVL INSJ IO LENS PROSTH W/O ECP Right 10/23/2017    Procedure: EXTRACTION EXTRACAPSULAR CATARACT PHACO INTRAOCULAR LENS (IOL); Surgeon: Carmella Hernandez MD;  Location: [facility] MAIN OR;  Service: Ophthalmology    WOUND DEBRIDEMENT N/A 8/26/2020    Procedure: EXCISIONAL DEBRIDEMENT OF SACRAL PRESSURE WOUND, WASHOUT;;  Surgeon: Nessa March MD;  [facility] MAIN OR;  Service: General    WOUND DEBRIDEMENT N/A 8/28/2020    Procedure: BUTTOCKS DEBRIDEMENT WOUND AND DRESSING CHANGE ([address] OUT);   Surgeon: Puja Wagoner MD;  [facility] MAIN OR;  Service: General     Social History     Substance and Sexual Activity   Alcohol Use Never    Frequency: Never    Drinks per session: Patient refused    Binge frequency: Never    Comment: quit 8/17     Social History     Substance and Sexual Activity   Drug Use No     Social History     Tobacco Use   Smoking Status Never Smoker   Smokeless Tobacco Never Used     Family History:   Family History   Problem Relation Age of Onset    Diabetes Mother     Varicose Veins Mother     Stroke Father     Arthritis Brother     Diabetes Daughter     No Known Problems Brother        Meds/Allergies   current meds:   Current Facility-Administered Medications   Medication Dose Route Frequency    acetaminophen (TYLENOL) tablet 650 mg  650 mg Oral Q6H PRN    aspirin (ECOTRIN LOW STRENGTH) EC tablet 81 mg  81 mg Oral Daily    atorvastatin (LIPITOR) tablet 40 mg  40 mg Oral After Dinner    ferrous sulfate tablet 325 mg  325 mg Oral Daily With Breakfast    furosemide (LASIX) tablet 40 mg  40 mg Oral Q12H    gabapentin (NEURONTIN) capsule 100 mg  100 mg Oral TID    insulin glargine (LANTUS) subcutaneous injection 14 Units 0 14 mL  14 Units Subcutaneous HS    insulin lispro (HumaLOG) 100 units/mL subcutaneous injection 1-5 Units  1-5 Units Subcutaneous HS    insulin lispro (HumaLOG) 100 units/mL subcutaneous injection 1-6 Units  1-6 Units Subcutaneous TID AC    insulin lispro (HumaLOG) 100 units/mL subcutaneous injection 11 Units  11 Units Subcutaneous TID With Meals    metoprolol tartrate (LOPRESSOR) tablet 75 mg  75 mg Oral Q12H MARILYN    ondansetron (ZOFRAN) injection 4 mg  4 mg Intravenous Q6H PRN    oxyCODONE (ROXICODONE) IR tablet 5 mg  5 mg Oral Q4H PRN    rivaroxaban (XARELTO) tablet 15 mg  15 mg Oral Daily With Breakfast          No Known Allergies    Objective   Vitals: Blood pressure 133/56, pulse 69, temperature 98 °F (36 7 °C), temperature source Oral, resp  rate 14, height 5' 6" (1 676 m), weight 72 kg (158 lb 11 7 oz), SpO2 93 %  ,     Body mass index is 25 62 kg/m²  ,     Systolic (75PJB), MSH:326 , Min:133 , CCL:865     Diastolic (89JVI), HEB:74, Min:55, Max:67    Wt Readings from Last 3 Encounters:   04/09/21 72 kg (158 lb 11 7 oz)   03/10/21 78 5 kg (173 lb)   09/22/20 78 5 kg (173 lb 1 6 oz)      Lab Results   Component Value Date    CREATININE 1 21 04/09/2021    CREATININE 1 13 04/08/2021    CREATININE 1 41 (H) 09/20/2020             Intake/Output Summary (Last 24 hours) at 4/9/2021 0803  Last data filed at 4/9/2021 0544  Gross per 24 hour   Intake 480 ml   Output 1425 ml   Net -945 ml     Weight (last 2 days)     Date/Time   Weight 21 0542   72 (158 73)    21 1932   78 5 (173 06)            Invasive Devices     Peripheral Intravenous Line            Peripheral IV 21 Right Arm less than 1 day          Drain            Urethral Catheter less than 1 day                  Physical Exam  Vitals signs reviewed  Constitutional:       General: She is not in acute distress  HENT:      Head: Normocephalic and atraumatic  Mouth/Throat:      Mouth: Mucous membranes are moist    Neck:      Musculoskeletal: Normal range of motion and neck supple  Cardiovascular:      Rate and Rhythm: Normal rate and regular rhythm  Occasional extrasystoles are present  Heart sounds: Normal heart sounds, S1 normal and S2 normal  No murmur  Pulmonary:      Effort: Pulmonary effort is normal  No respiratory distress  Breath sounds: Examination of the right-lower field reveals decreased breath sounds  Decreased breath sounds present  Comments: Scant fine crackles B/L lower lobes  Abdominal:      General: Bowel sounds are normal  There is no distension  Palpations: Abdomen is soft  Musculoskeletal: Normal range of motion  Right lower le+ Pitting Edema present  Left lower le+ Pitting Edema present  Skin:     General: Skin is warm and dry  Neurological:      Mental Status: She is alert        Comments:  Oriented to person but not time or place   Psychiatric:         Mood and Affect: Mood normal            LABORATORY RESULTS:  Results from last 7 days   Lab Units 21  2154   TROPONIN I ng/mL <0 02     CBC with diff:   Results from last 7 days   Lab Units 21  0510 21   WBC Thousand/uL 8 38 9 26   HEMOGLOBIN g/dL 7 4* 8 2*   HEMATOCRIT % 24 6* 27 3*   MCV fL 93 94   PLATELETS Thousands/uL 228 256   MCH pg 28 0 28 3   MCHC g/dL 30 1* 30 0*   RDW % 14 7 14 7   MPV fL 11 9 12 0   NRBC AUTO /100 WBCs  --  0       CMP:  Results from last 7 days   Lab Units 21  0510 21 POTASSIUM mmol/L 4 6 5 2   CHLORIDE mmol/L 101 100   CO2 mmol/L 30 27   BUN mg/dL 112* 113*   CREATININE mg/dL 1 21 1 13   CALCIUM mg/dL 8 7 9 1   AST U/L  --  17   ALT U/L  --  25   ALK PHOS U/L  --  99   EGFR ml/min/1 73sq m 44 47       BMP:  Results from last 7 days   Lab Units 21  0510 21   POTASSIUM mmol/L 4 6 5 2   CHLORIDE mmol/L 101 100   CO2 mmol/L 30 27   BUN mg/dL 112* 113*   CREATININE mg/dL 1 21 1 13   CALCIUM mg/dL 8 7 9 1          Lab Results   Component Value Date    NTBNP 17,688 (H) 2021                   Results from last 7 days   Lab Units 21   HEMOGLOBIN A1C % 6 0*              Results from last 7 days   Lab Units 21   INR  2 19*     Lipid Profile:   No results found for: CHOL  Lab Results   Component Value Date    HDL 10 (L) 2020     Lab Results   Component Value Date    LDLCALC 23 2020     Lab Results   Component Value Date    TRIG 220 (H) 2020         Cardiac testing:   Results for orders placed during the hospital encounter of 20   Echo complete with contrast if indicated    Narrative [address]  [phone]    Transthoracic Echocardiogram  2D, M-mode, Doppler, and Color Doppler    Study date:  27-Aug-2020    Patient: Ahsan Morse  MR number: [id number]  Account number: [de-identified]  : 1945  Age: 67 years  Gender: Female  Status: Inpatient  Location: Bedside  Height: 66 in  Weight: 145 6 lb  BP: 104/ 59 mmHg    Indications: Atrial fibrillation and CVA  Diagnoses: I48 0 - Atrial fibrillation, I63 9 - Cerebral infarction, unspecified    Sonographer:  Jair Flowers RDCS  Referring Physician:  Mohit lAfredo MD  Group:  [address] [place] [address] [facility]  Cardiology Fellow:  Shaquille Velasco MD  Interpreting Physician:  Latanya Penaloza MD    SUMMARY    LEFT VENTRICLE:  Systolic function was normal  Ejection fraction was estimated to be 65 %   There were no regional wall motion abnormalities  Wall thickness was mildly increased  The changes were consistent with concentric remodeling (increased wall thickness with normal wall mass)  Features were consistent with a pseudonormal left ventricular filling pattern, with concomitant abnormal relaxation and increased filling pressure (grade 2 diastolic dysfunction)  RIGHT VENTRICLE:  The size was normal   Systolic function was normal     LEFT ATRIUM:  The atrium was mildly to moderately dilated  MITRAL VALVE:  There was mild regurgitation  TRICUSPID VALVE:  There was mild regurgitation  Estimated peak PA pressure was 45 mmHg  The findings suggest mild pulmonary hypertension  HISTORY: PRIOR HISTORY: Hypertension  RORY  Hyperlipidemia  CKD3  PROCEDURE: The procedure was performed at the bedside  This was a routine study  The transthoracic approach was used  The study included complete 2D imaging, M-mode, complete spectral Doppler, and color Doppler  The heart rate was 98 bpm,  at the start of the study  Image quality was adequate  LEFT VENTRICLE: Size was normal  Systolic function was normal  Ejection fraction was estimated to be 65 %  There were no regional wall motion abnormalities  Wall thickness was mildly increased  The changes were consistent with concentric  remodeling (increased wall thickness with normal wall mass)  DOPPLER: Features were consistent with a pseudonormal left ventricular filling pattern, with concomitant abnormal relaxation and increased filling pressure (grade 2 diastolic  dysfunction)  Left ventricular diastolic function parameters were abnormal     RIGHT VENTRICLE: The size was normal  Systolic function was normal  Wall thickness was normal     LEFT ATRIUM: The atrium was mildly to moderately dilated  RIGHT ATRIUM: Size was normal     MITRAL VALVE: There was minimal calcification, with mild chordal involvement  There was normal leaflet separation   DOPPLER: The transmitral velocity was within the normal range  There was no evidence for stenosis  There was mild  regurgitation  AORTIC VALVE: The valve was trileaflet  Leaflets exhibited normal thickness and normal cuspal separation  DOPPLER: Transaortic velocity was within the normal range  There was no evidence for stenosis  There was no significant  regurgitation  TRICUSPID VALVE: The valve structure was normal  There was normal leaflet separation  DOPPLER: The transtricuspid velocity was within the normal range  There was no evidence for stenosis  There was mild regurgitation  Estimated peak PA  pressure was 45 mmHg  The findings suggest mild pulmonary hypertension  PULMONIC VALVE: DOPPLER: The transpulmonic velocity was within the normal range  There was mild regurgitation  PERICARDIUM: There was no pericardial effusion  AORTA: The root exhibited normal size  SYSTEMIC VEINS: IVC: The inferior vena cava was normal in size and course  Respirophasic changes were normal     SYSTEM MEASUREMENT TABLES    2D  %FS: 34 49 %  Ao Diam: 2 93 cm  EDV(Teich): 98 78 ml  EF(Teich): 63 62 %  ESV(Teich): 35 94 ml  IVSd: 1 01 cm  LA Area: 26 09 cm2  LA Diam: 4 43 cm  LVEDV MOD A4C: 58 4 ml  LVEF MOD A4C: 57 72 %  LVESV MOD A4C: 24 69 ml  LVIDd: 4 63 cm  LVIDs: 3 03 cm  LVLd A4C: 6 86 cm  LVLs A4C: 5 44 cm  LVPWd: 1 05 cm  RA Area: 11 9 cm2  RVIDd: 2 79 cm  SV MOD A4C: 33 71 ml  SV(Teich): 62 84 ml    CW  TR Vmax: 3 08 m/s  TR maxP 06 mmHg    MM  TAPSE: 1 88 cm    PW  E': 0 05 m/s  E/E': 17 99  MV A Roberto: 0 34 m/s  MV Dec Medina: 6 55 m/s2  MV DecT: 130 89 ms  MV E Roberto: 0 86 m/s  MV E/A Ratio: 2 5  MV PHT: 37 96 ms  MVA By PHT: 5 8 cm2    [employer] Accredited Echocardiography Laboratory    Prepared and electronically signed by    Maria Del Rosario Schneider MD  Signed 31-Aug-2020 17:59:50       No results found for this or any previous visit  No procedure found    No results found for this or any previous visit       Imaging: I have personally reviewed pertinent reports  Vas Carotid Complete Study    Result Date: 3/29/2021  Narrative:  [facility] REPORT CLINICAL: Indications: 6 month surveillance of carotid artery disease  Patient is asymptomatic at this time  Operative History: No cardiovascular surgeries Risk Factors The patient has history of HTN, Diabetes (IDDM) and HLD  Clinical Right Pressure: (taken from chart) 126/74 mm Hg  FINDINGS:  Right        Impression  PSV  EDV (cm/s)  Direction of Flow  Ratio  Dist  ICA                 61          11                      1 48  Mid  ICA                  39          10                      0 94  Prox  ICA    1 - 49%      59          14                      1 41  Dist CCA                  47           8                            Mid CCA                   42           8                      1 02  Prox CCA                  41           8                            Ext Carotid               76          12                      1 82  Prox Vert                                 Not Identified            Subclavian                82           0                             Left         Impression  PSV  EDV (cm/s)  Direction of Flow  Ratio  Dist  ICA                 60          14                      1 33  Mid  ICA                 124          24                      2 73  Prox  ICA    50 - 69%    171          33                      3 77  Dist CCA                  40           9                            Mid CCA                   45          10                      0 75  Prox CCA                  60          11                            Ext Carotid               90           6                      1 99  Prox Vert                 31           7  Antegrade                 Subclavian               103           5                               CONCLUSION: Impression RIGHT: There is <50% stenosis noted in the internal carotid artery   Plaque is heterogenous and irregular  Vertebral artery not visualized due to patient's positioning  There is no significant subclavian artery disease  LEFT: There is 50-69% stenosis noted in the internal carotid artery  Plaque is heterogenous and irregular  Vertebral artery flow is antegrade  There is no significant subclavian artery disease  Compared to previous study on 8/31/2020, the >70% stenosis in the left ICA could not be reproduced  Technically difficult study due to patient's positioning (arrived laying on right side on an ambulance stretcher)  Internal carotid artery stenosis determination by consensus criteria from: Alok Trevino et al  Carotid Artery Stenosis: Gray-Scale and Doppler US Diagnosis - Society of Radiologists in [address] [place] [address] Drive, Radiology 2003; [phone]  SIGNATURE: Electronically Signed by: Jake Burnett MD on 2021-03-29 02:42:02 PM          Counseling / Coordination of Care  Total floor / unit time spent today 45 minutes  Greater than 50% of total time was spent with the patient and / or family counseling and / or coordination of care  A description of the counseling / coordination of care: Review of history, current assessment, development of a plan  Code Status: Level 1 - Full Code    ** Please Note: Dragon 360 Dictation voice to text software may have been used in the creation of this document   **

## 2021-04-09 NOTE — ASSESSMENT & PLAN NOTE
· Picture placed in chart   · No signs of active infection   · Offload pressure  · Wound care consult

## 2021-04-09 NOTE — ASSESSMENT & PLAN NOTE
· Patient chronically wears 2 L supplemental oxygen via nasal cannula at [facility]  · SpO2 stable here on home oxygen requirement

## 2021-04-09 NOTE — ASSESSMENT & PLAN NOTE
Lab Results   Component Value Date    EGFR 47 04/08/2021    EGFR 36 09/20/2020    EGFR 26 09/19/2020    CREATININE 1 13 04/08/2021    CREATININE 1 41 (H) 09/20/2020    CREATININE 1 84 (H) 09/19/2020   · Baseline creatinine appears to be between 1 2-1 3   · Will trend BMP daily in the setting of IV diuresis

## 2021-04-09 NOTE — SPEECH THERAPY NOTE
Request for evaluation/input received  I spoke with RN who reported that pt evidenced low blood sugar earlier day  RN provided OJ (no s/s pharyngeal dysphagia) and crackers/cookies (some difficulty chewing  Note: pt is without her dentures)  Recommend: downgrade of diet to Level 3 dysphagia, continue thin liquid  If pt continues with any difficulty chewing, please downgrade diet further

## 2021-04-09 NOTE — WOUND OSTOMY CARE
Consult Note - Wound   Alok Alegre 67 y o  female MRN: [id number]  Unit/Bed#: -01 Encounter: [id number]      History and Present Illness:  67year old female presented to the hospital with low hemoglobin and lethargy  Patient's history significant for DM, HTN, CHF, stroke, sacral ulcer  Assessment Findings:   Patient agreeable to assessment and photography  Family/POA at bedside  Patient requires assist x 1 to turn in bed for assessment  She has a trujillo catheter and is chronically incontinent of stool  Nutrition team following, supplements ordered  Patient is on low air-loss mattress  Bilateral heels are intact and entirely blanchable  1   Present on admission stage 2 pressure injury to right buttock--pale pink, partial thickness wound  2   Present on admission stage 4 pressure injury to sacrum--wound bed pale pink with deep beefy red in base and some thin yellow slough to proximal wound  No foul odor, purulence, or induration at this time  Wound bed is flat and shiny  Precious-wound is denuded with maceration  Left lateral edge deep purple  Remainder of precious-wound with blanchable erythema  Per patient's son, she had been following at the [place] [address] up until this past winter when she transitioned to in house wound care at [facility]  Patient has had a VAC dressing to the wound in the past       See flowsheet and media for wound details  Positioning wedges in use for offloading  Wound Care Plan:   1-Apply Allevyn Life foam dressing to bilateral heels for prevention  Louis with P   Peel back at least daily for skin assessment and re-apply  Change dressing every 3 days and PRN  2-Elevate heels off of bed/chair surface to offload pressure  3-Offloading air cushion in chair if/when getting out of bed    4-Moisturize skin daily with skin nourishing cream   5-Turn/reposition every 2 hours while in bed, or when medically stable, using positioning wedges; and weight shift frequently while in chair for pressure re-distribution on skin  6-Sacrum--irrigate with normal saline, pat dry  Apply 3m no sting skin barrier film to precious-wound skin  Fluff Maxorb Ag into sacral wound loosely and covering right buttock wound  Top with Allevyn Life foam dressing  Change dressing daily and as needed with soilage  Wound care team to follow  Plan of care discussed with Dr Suhail Real and reviewed with primary RN  Patient should follow-up at the wound center on discharge  Wound 04/08/21 Pressure Injury Sacrum Medial;Lower (Active)   Wound Image   04/09/21 1557   Wound Description Beefy red;Pale;Pink;Yellow 04/09/21 1557   Pressure Injury Stage 4 04/09/21 1557   Precious-wound Assessment Erythema; Maceration 04/09/21 1557   Wound Length (cm) 5 cm 04/09/21 1557   Wound Width (cm) 3 5 cm 04/09/21 1557   Wound Depth (cm) 3 2 cm 04/09/21 1557   Wound Surface Area (cm^2) 17 5 cm^2 04/09/21 1557   Wound Volume (cm^3) 56 cm^3 04/09/21 1557   Calculated Wound Volume (cm^3) 56 cm^3 04/09/21 1557   Tunneling 0 cm 04/09/21 1557   Undermining is depth extending from 5-10 oclock 04/09/21 1557   Drainage Amount Small 04/09/21 1557   Drainage Description Serosanguineous 04/09/21 1557   Non-staged Wound Description Full thickness 04/09/21 1557   Treatments Irrigation with NSS 04/09/21 1557   Dressing Calcium Alginate with Silver; Foam, Silicon (eg  Allevyn, etc) 04/09/21 1557   Wound packed? No 04/08/21 2043   Dressing Changed Changed 04/09/21 1557   Patient Tolerance Tolerated well 04/09/21 1557   Dressing Status Clean;Dry; Intact 04/09/21 1557       Wound 04/09/21 Pressure Injury Buttocks Right (Active)   Wound Description Pink;Pale 04/09/21 1557   Pressure Injury Stage 2 04/09/21 1557   Precious-wound Assessment Erythema 04/09/21 1557   Wound Length (cm) 1 3 cm 04/09/21 1557   Wound Width (cm) 1 cm 04/09/21 1557   Wound Depth (cm) 0 1 cm 04/09/21 1557   Wound Surface Area (cm^2) 1 3 cm^2 04/09/21 1557   Wound Volume (cm^3) 0 13 cm^3 04/09/21 1557   Calculated Wound Volume (cm^3) 0 13 cm^3 04/09/21 1557   Drainage Amount Scant 04/09/21 1557   Drainage Description Serous 04/09/21 1557   Non-staged Wound Description Partial thickness 04/09/21 1557   Treatments Cleansed 04/09/21 1557   Dressing Calcium Alginate with Silver; Foam, Silicon (eg  Allevyn, etc) 04/09/21 1557   Dressing Changed Changed 04/09/21 1557   Patient Tolerance Tolerated well 04/09/21 1557   Dressing Status Clean;Dry; Intact 04/09/21 2135 Donnie Horner RN, [facility]

## 2021-04-09 NOTE — ED PROVIDER NOTES
History  Chief Complaint   Patient presents with    Abnormal Lab     76-year-old female with history of chronic kidney disease, paroxysmal atrial fibrillation on aspirin and Xarelto, diabetes type 2 carotid artery stenosis, pressure ulcer of sacral region stage IV and osteomyelitis sent from [facility] due to acute anemia with hemoglobin dose of 0 2, heme-positive stool, lethargy and apparent desaturation of her pulse ox today on room air  She was put on 2 liters/minute nasal cannula  Patient is awake, pleasant but confused  She has no complaints currently and states that she did not see blood in her stool  Prior to Admission Medications   Prescriptions Last Dose Informant Patient Reported? Taking?    Nutritional Supplements (Yevgeniy) PACK  Outside Facility (Specify) Yes No   Sig: Take by mouth   Specialty Vitamins Products (PROSTATE PO)  Outside Facility (Specify) Yes No   Sig: Take by mouth 2 (two) times a day   VITAMIN D PO  Outside Facility (Specify) Yes No   Sig: Take 50,000 mg by mouth 2 (two) times a week    acetaminophen (TYLENOL) 325 mg suppository  Outside Facility (Specify) Yes No   Sig: Insert 325 mg into the rectum every 4 (four) hours as needed for mild pain   aspirin (ECOTRIN LOW STRENGTH) 81 mg EC tablet  Outside Facility (Specify) No No   Sig: Take 1 tablet (81 mg total) by mouth daily   atorvastatin (LIPITOR) 40 mg tablet  Outside Facility (Specify) No No   Sig: Take 1 tablet (40 mg total) by mouth daily with dinner   bisacodyl (FLEET) 10 MG/30ML ENEM  Outside Facility (Specify) Yes No   Sig: Insert 10 mg into the rectum as needed for constipation constipation   ferrous sulfate 325 (65 Fe) mg tablet  Outside Facility (Specify) Yes No   Sig: TAKE 1 TABLET BY MOUTH ONCE A DAY FOR SUPPLEMENT   gabapentin (NEURONTIN) 100 mg capsule  Outside Facility (Specify) Yes No   Sig: Take 100 mg by mouth 3 (three) times a day   insulin glargine (LANTUS) 100 units/mL subcutaneous injection Outside Facility (Specify) No No   Sig: Inject 14 Units under the skin daily at bedtime   insulin lispro (HumaLOG) 100 units/mL injection  Outside Facility (Specify) No No   Sig: Inject 7 Units under the skin 3 (three) times a day with meals   levofloxacin (LEVAQUIN) 500 mg tablet  Outside Facility (Specify) Yes No   Sig: Take 500 mg by mouth every 24 hours Untill 10/11 for UTI   metoprolol tartrate 75 MG TABS  Outside Facility (Specify) No No   Sig: Take 1 tablet (75 mg total) by mouth every 12 (twelve) hours   mineral oil enema  Outside Facility (Specify) Yes No   Sig: Insert 1 enema into the rectum once As needed for constipation unrelieved by suppository   oxyCODONE (OXY-IR) 5 MG capsule  Outside Facility (Specify) Yes No   Sig: Take 5 mg by mouth every 4 (four) hours as needed for moderate pain or severe pain   oxyCODONE-acetaminophen (PERCOCET)  mg per tablet  Outside Facility (Specify) Yes No   Sig: Take 1 tablet by mouth as needed for moderate pain   polyethylene glycol (MIRALAX) 17 g packet  Outside Facility (Specify) No No   Sig: Take 17 g by mouth daily   rivaroxaban (XARELTO) 15 mg tablet  Outside Facility (Specify) No No   Sig: Take 1 tablet (15 mg total) by mouth daily with breakfast   torsemide (DEMADEX) 20 mg tablet  Outside Facility (Specify) No No   Sig: Take 1 tablet (20 mg total) by mouth daily   Patient taking differently: Take 10 mg by mouth daily       Facility-Administered Medications: None       Past Medical History:   Diagnosis Date    Acute renal failure (ARF) (HCC)     Acute respiratory failure with hypoxia (HCC)     Atrial fibrillation (HCC)     CHF (congestive heart failure) (HCC)     Chronic kidney disease     Diabetes mellitus ([facility] [address] )     type 2    Hyperlipidemia     Hypertension     Stroke [facility])        Past Surgical History:   Procedure Laterality Date    BREAST SURGERY Left     lumpectomy benign    CATARACT EXTRACTION Bilateral 2017    CATARACT EXTRACTION W/ INTRAOCULAR LENS IMPLANT Left 12/11/2017    Procedure: EXTRACTION EXTRACAPSULAR CATARACT PHACO INTRAOCULAR LENS (IOL); Surgeon: Elizabeth Michelle MD;  Location: [facility] MAIN OR;  Service: Ophthalmology    CT OPEN RX FEMUR FX+INTRAMED RAYMOND Right 5/21/2020    Procedure: INSERTION OF SHORT TROCHANTERIC FEMORAL NAIL;  Surgeon: Corey Miranda MD;  [facility] Main [facility];  Service: Orthopedics    [address] HUMERAL&GLENOID COMPNT Right 9/10/2018    Procedure: RIGHT REVERSE TOTAL SHOULDER ARTHROPLASTY;  Surgeon: Corey Miranda MD;  [facility];  Service: Orthopedics    CT XCAPSL CTRC RMVL INSJ IO LENS PROSTH W/O ECP Right 10/23/2017    Procedure: EXTRACTION EXTRACAPSULAR CATARACT PHACO INTRAOCULAR LENS (IOL); Surgeon: Elizabeth Michelle MD;  Location: [facility] MAIN OR;  Service: Ophthalmology    WOUND DEBRIDEMENT N/A 8/26/2020    Procedure: EXCISIONAL DEBRIDEMENT OF SACRAL PRESSURE WOUND, WASHOUT;;  Surgeon: Vale Dimas MD;  [facility] MA[facility];  Service: General    WOUND DEBRIDEMENT N/A 8/28/2020    Procedure: BUTTOCKS DEBRIDEMENT WOUND AND DRESSING CHANGE ([address] OUT); Surgeon: Megan Carreon MD;  Location: [facility] MAIN [facility];  Service: General       Family History   Problem Relation Age of Onset    Diabetes Mother     Varicose Veins Mother     Stroke Father     Arthritis Brother     Diabetes Daughter     No Known Problems Brother      I have reviewed and agree with the history as documented      E-Cigarette/Vaping    E-Cigarette Use Never User      E-Cigarette/Vaping Substances    Nicotine No     THC No     CBD No     Flavoring No     Other No     Unknown No      Social History     Tobacco Use    Smoking status: Never Smoker    Smokeless tobacco: Never Used   Substance Use Topics    Alcohol use: Never     Frequency: Never     Drinks per session: Patient refused     Binge frequency: Never     Comment: quit 8/17    Drug use: No       Review of Systems   Unable to perform ROS: Dementia Physical Exam  Physical Exam  Vitals signs and nursing note reviewed  Constitutional:       General: She is not in acute distress  Appearance: She is well-developed  She is ill-appearing  She is not diaphoretic  HENT:      Head: Normocephalic and atraumatic  Right Ear: External ear normal       Left Ear: External ear normal       Nose: Nose normal       Mouth/Throat:      Mouth: Mucous membranes are moist       Pharynx: Oropharynx is clear  No posterior oropharyngeal erythema  Eyes:      General: No scleral icterus  Conjunctiva/sclera: Conjunctivae normal       Pupils: Pupils are equal, round, and reactive to light  Neck:      Musculoskeletal: Normal range of motion and neck supple  No muscular tenderness  Cardiovascular:      Rate and Rhythm: Normal rate and regular rhythm  Pulses: Normal pulses  Heart sounds: No murmur  Pulmonary:      Effort: Pulmonary effort is normal       Breath sounds: Rales present  Abdominal:      General: Bowel sounds are normal  There is no distension  Palpations: Abdomen is soft  There is no mass  Tenderness: There is no abdominal tenderness  There is no guarding or rebound  Genitourinary:     Rectum: Guaiac result negative  Musculoskeletal: Normal range of motion  General: No tenderness  Right lower leg: No edema  Left lower leg: No edema  Skin:     General: Skin is warm and dry  Capillary Refill: Capillary refill takes less than 2 seconds  Coloration: Skin is not pale  Findings: No rash  Comments: There is a stage IV sacral decubitus approximately 4-5 centimeter in diameter  It appears clean and noninfected currently  Neurological:      General: No focal deficit present  Mental Status: She is alert  She is disoriented  Cranial Nerves: No cranial nerve deficit  Sensory: No sensory deficit  Motor: No weakness        Coordination: Coordination normal       Deep Tendon Reflexes: Reflexes are normal and symmetric  Psychiatric:         Mood and Affect: Mood normal          Behavior: Behavior normal          Vital Signs  ED Triage Vitals [04/08/21 1932]   Temperature Pulse Respirations Blood Pressure SpO2   98 6 °F (37 °C) 59 13 144/67 96 %      Temp Source Heart Rate Source Patient Position - Orthostatic VS BP Location FiO2 (%)   Oral Monitor Lying Left arm --      Pain Score       --           Vitals:    04/08/21 1932 04/08/21 2100   BP: 144/67 136/58   Pulse: 59 59   Patient Position - Orthostatic VS: Lying          Visual Acuity      ED Medications  Medications   furosemide (LASIX) injection 40 mg (has no administration in time range)       Diagnostic Studies  Results Reviewed     Procedure Component Value Units Date/Time    Troponin I [[id number]] Collected: 04/08/21 2154    Lab Status: In process Specimen: Blood from Arm, Right Updated: 04/08/21 2156    NT-BNP PRO [[id number]] Collected: 04/08/21 2032    Lab Status:  In process Specimen: Blood from Arm, Right Updated: 04/08/21 2144    Comprehensive metabolic panel [[id number]]  (Abnormal) Collected: 04/08/21 2032    Lab Status: Final result Specimen: Blood from Arm, Right Updated: 04/08/21 2057     Sodium 135 mmol/L      Potassium 5 2 mmol/L      Chloride 100 mmol/L      CO2 27 mmol/L      ANION GAP 8 mmol/L       mg/dL      Creatinine 1 13 mg/dL      Glucose 105 mg/dL      Calcium 9 1 mg/dL      Corrected Calcium 10 6 mg/dL      AST 17 U/L      ALT 25 U/L      Alkaline Phosphatase 99 U/L      Total Protein 7 2 g/dL      Albumin 2 1 g/dL      Total Bilirubin 1 10 mg/dL      eGFR 47 ml/min/1 73sq m     Narrative:      Bhavin guidelines for Chronic Kidney Disease (CKD):     Stage 1 with normal or high GFR (GFR > 90 mL/min/1 73 square meters)    Stage 2 Mild CKD (GFR = 60-89 mL/min/1 73 square meters)    Stage 3A Moderate CKD (GFR = 45-59 mL/min/1 73 square meters)    Stage 3B Moderate CKD (GFR = 30-44 mL/min/1 73 square meters)    Stage 4 Severe CKD (GFR = 15-29 mL/min/1 73 square meters)    Stage 5 End Stage CKD (GFR <15 mL/min/1 73 square meters)  Note: GFR calculation is accurate only with a steady state creatinine    Protime-INR [[id number]]  (Abnormal) Collected: 04/08/21 2032    Lab Status: Final result Specimen: Blood from Arm, Right Updated: 04/08/21 2054     Protime 24 3 seconds      INR 2 19    APTT [[id number]]  (Abnormal) Collected: 04/08/21 2032    Lab Status: Final result Specimen: Blood from Arm, Right Updated: 04/08/21 2054     PTT 51 seconds     CBC and differential [[id number]]  (Abnormal) Collected: 04/08/21 2032    Lab Status: Final result Specimen: Blood from Arm, Right Updated: 04/08/21 2040     WBC 9 26 Thousand/uL      RBC 2 90 Million/uL      Hemoglobin 8 2 g/dL      Hematocrit 27 3 %      MCV 94 fL      MCH 28 3 pg      MCHC 30 0 g/dL      RDW 14 7 %      MPV 12 0 fL      Platelets 740 Thousands/uL      nRBC 0 /100 WBCs      Neutrophils Relative 70 %      Immat GRANS % 0 %      Lymphocytes Relative 21 %      Monocytes Relative 5 %      Eosinophils Relative 3 %      Basophils Relative 1 %      Neutrophils Absolute 6 55 Thousands/µL      Immature Grans Absolute 0 03 Thousand/uL      Lymphocytes Absolute 1 90 Thousands/µL      Monocytes Absolute 0 45 Thousand/µL      Eosinophils Absolute 0 28 Thousand/µL      Basophils Absolute 0 05 Thousands/µL                  XR chest 1 view portable   ED Interpretation by Leny Barr DO (04/08 2142)   Acute pulmonary edema                 Procedures  ECG 12 Lead Documentation Only    Date/Time: 4/8/2021 7:58 PM  Performed by: Leny Barr DO  Authorized by: Leny Barr DO     ECG reviewed by me, the ED Provider: yes    Patient location:  ED  Previous ECG:     Previous ECG:  Unavailable  Rhythm:     Rhythm: sinus bradycardia    Ectopy:     Ectopy: none    QRS:     QRS axis:  Normal    QRS intervals:  Normal  Conduction: Conduction: normal    ST segments:     ST segments:  Normal  T waves:     T waves: normal               ED Course                             SBIRT 20yo+      Most Recent Value   SBIRT (22 yo +)   In order to provide better care to our patients, we are screening all of our patients for alcohol and drug use  Would it be okay to ask you these screening questions? Unable to answer at this time Filed at: 04/08/2021 2034                    MDM  Number of Diagnoses or Management Options  Acute pulmonary edema [facility]):      Amount and/or Complexity of Data Reviewed  Clinical lab tests: ordered and reviewed  Tests in the radiology section of CPT®: ordered and reviewed  Review and summarize past medical records: yes  Discuss the patient with other providers: yes  Independent visualization of images, tracings, or specimens: yes        Disposition  Final diagnoses:   Acute pulmonary edema ([address] )     Time reflects when diagnosis was documented in both MDM as applicable and the Disposition within this note     Time User Action Codes Description Comment    4/8/2021  9:57 PM Luzmaria Delatorre [J81 [id number]] Acute pulmonary edema [facility])       ED Disposition     ED Disposition Condition Date/Time Comment    Admit Stable Thu Apr 8, 2021 10:05 PM Case was discussed with [facility] AP and the patient's admission status was agreed to be Admission Status: inpatient status to the service of Dr Ron Roberto   Follow-up Information    None         Patient's Medications   Discharge Prescriptions    No medications on file     No discharge procedures on file      PDMP Review       Value Time User    PDMP Reviewed  Yes 9/20/2020 12:14 PM Chris Pena MD          ED Provider  Electronically Signed by           Walter Karimi DO  04/08/21 5987

## 2021-04-09 NOTE — PLAN OF CARE
Problem: Potential for Falls  Goal: Patient will remain free of falls  Description: INTERVENTIONS:  - Assess patient frequently for physical needs  -  Identify cognitive and physical deficits and behaviors that affect risk of falls  -  [place] fall precautions as indicated by assessment   - Educate patient/family on patient safety including physical limitations  - Instruct patient to call for assistance with activity based on assessment  - Modify environment to reduce risk of injury  - Consider OT/PT consult to assist with strengthening/mobility  Outcome: Progressing     Problem: Prexisting or High Potential for Compromised Skin Integrity  Goal: Skin integrity is maintained or improved  Description: INTERVENTIONS:  - Identify patients at risk for skin breakdown  - Assess and monitor skin integrity  - Assess and monitor nutrition and hydration status  - Monitor labs   - Assess for incontinence   - Turn and reposition patient  - Assist with mobility/ambulation  - Relieve pressure over bony prominences  - Avoid friction and shearing  - Provide appropriate hygiene as needed including keeping skin clean and dry  - Evaluate need for skin moisturizer/barrier cream  - Collaborate with interdisciplinary team   - Patient/family teaching  - Consider wound care consult   Outcome: Progressing     Problem: Nutrition/Hydration-ADULT  Goal: Nutrient/Hydration intake appropriate for improving, restoring or maintaining nutritional needs  Description: Monitor and assess patient's nutrition/hydration status for malnutrition  Collaborate with interdisciplinary team and initiate plan and interventions as ordered  Monitor patient's weight and dietary intake as ordered or per policy  Utilize nutrition screening tool and intervene as necessary  Determine patient's food preferences and provide high-protein, high-caloric foods as appropriate       INTERVENTIONS:  - Monitor oral intake, urinary output, labs, and treatment plans  - Assess nutrition and hydration status and recommend course of action  - Evaluate amount of meals eaten  - Assist patient with eating if necessary   - Allow adequate time for meals  - Recommend/ encourage appropriate diets, oral nutritional supplements, and vitamin/mineral supplements  - Order, calculate, and assess calorie counts as needed  - Recommend, monitor, and adjust tube feedings and TPN/PPN based on assessed needs  - Assess need for intravenous fluids  - Provide specific nutrition/hydration education as appropriate  - Include patient/family/caregiver in decisions related to nutrition  Outcome: Progressing     Problem: CARDIOVASCULAR - ADULT  Goal: Maintains optimal cardiac output and hemodynamic stability  Description: INTERVENTIONS:  - Monitor I/O, vital signs and rhythm  - Monitor for S/S and trends of decreased cardiac output  - Administer and titrate ordered vasoactive medications to optimize hemodynamic stability  - Assess quality of pulses, skin color and temperature  - Assess for signs of decreased coronary artery perfusion  - Instruct patient to report change in severity of symptoms  Outcome: Progressing  Goal: Absence of cardiac dysrhythmias or at baseline rhythm  Description: INTERVENTIONS:  - Continuous cardiac monitoring, vital signs, obtain 12 lead EKG if ordered  - Administer antiarrhythmic and heart rate control medications as ordered  - Monitor electrolytes and administer replacement therapy as ordered  Outcome: Progressing     Problem: RESPIRATORY - ADULT  Goal: Achieves optimal ventilation and oxygenation  Description: INTERVENTIONS:  - Assess for changes in respiratory status  - Assess for changes in mentation and behavior  - Position to facilitate oxygenation and minimize respiratory effort  - Oxygen administered by appropriate delivery if ordered  - Initiate smoking cessation education as indicated  - Encourage broncho-pulmonary hygiene including cough, deep breathe, Incentive Spirometry  - Assess the need for suctioning and aspirate as needed  - Assess and instruct to report SOB or any respiratory difficulty  - Respiratory Therapy support as indicated  Outcome: Progressing     Problem: SKIN/TISSUE INTEGRITY - ADULT  Goal: Incision(s), wounds(s) or drain site(s) healing without S/S of infection  Description: INTERVENTIONS  - Assess and document risk factors for skin impairment   - Assess and document dressing, incision, wound bed, drain sites and surrounding tissue  - Consider nutrition services referral as needed  - Oral mucous membranes remain intact  - Provide patient/ family education  Outcome: Progressing  Goal: Skin integrity remains intact  Description: INTERVENTIONS  - Identify patients at risk for skin breakdown  - Assess and monitor skin integrity  - Assess and monitor nutrition and hydration status  - Monitor labs (i e  albumin)  - Assess for incontinence   - Turn and reposition patient  - Assist with mobility/ambulation  - Relieve pressure over bony prominences  - Avoid friction and shearing  - Provide appropriate hygiene as needed including keeping skin clean and dry  - Evaluate need for skin moisturizer/barrier cream  - Collaborate with interdisciplinary team (i e  Nutrition, Rehabilitation, etc )   - Patient/family teaching  Outcome: Progressing     Problem: PAIN - ADULT  Goal: Verbalizes/displays adequate comfort level or baseline comfort level  Description: Interventions:  - Encourage patient to monitor pain and request assistance  - Assess pain using appropriate pain scale  - Administer analgesics based on type and severity of pain and evaluate response  - Implement non-pharmacological measures as appropriate and evaluate response  - Consider cultural and social influences on pain and pain management  - Notify physician/advanced practitioner if interventions unsuccessful or patient reports new pain  Outcome: Progressing     Problem: INFECTION - ADULT  Goal: Absence or prevention of progression during hospitalization  Description: INTERVENTIONS:  - Assess and monitor for signs and symptoms of infection  - Monitor lab/diagnostic results  - Monitor all insertion sites, i e  indwelling lines, tubes, and drains  - Monitor endotracheal if appropriate and nasal secretions for changes in amount and color  - [place] appropriate cooling/warming therapies per order  - Administer medications as ordered  - Instruct and encourage patient and family to use good hand hygiene technique  - Identify and instruct in appropriate isolation precautions for identified infection/condition  Outcome: Progressing     Problem: SAFETY ADULT  Goal: Patient will remain free of falls  Description: INTERVENTIONS:  - Assess patient frequently for physical needs  -  Identify cognitive and physical deficits and behaviors that affect risk of falls    -  [place] fall precautions as indicated by assessment   - Educate patient/family on patient safety including physical limitations  - Instruct patient to call for assistance with activity based on assessment  - Modify environment to reduce risk of injury  - Consider OT/PT consult to assist with strengthening/mobility  Outcome: Progressing     Problem: DISCHARGE PLANNING  Goal: Discharge to home or other facility with appropriate resources  Description: INTERVENTIONS:  - Identify barriers to discharge w/patient and caregiver  - Arrange for needed discharge resources and transportation as appropriate  - Identify discharge learning needs (meds, wound care, etc )  - Arrange for interpretive services to assist at discharge as needed  - Refer to Case Management Department for coordinating discharge planning if the patient needs post-hospital services based on physician/advanced practitioner order or complex needs related to functional status, cognitive ability, or social support system  Outcome: Progressing     Problem: Knowledge Deficit  Goal: Patient/family/caregiver demonstrates understanding of disease process, treatment plan, medications, and discharge instructions  Description: Complete learning assessment and assess knowledge base    Interventions:  - Provide teaching at level of understanding  - Provide teaching via preferred learning methods  Outcome: Progressing     Problem: METABOLIC, FLUID AND ELECTROLYTES - ADULT  Goal: Electrolytes maintained within normal limits  Description: INTERVENTIONS:  - Monitor labs and assess patient for signs and symptoms of electrolyte imbalances  - Administer electrolyte replacement as ordered  - Monitor response to electrolyte replacements, including repeat lab results as appropriate  - Instruct patient on fluid and nutrition as appropriate  Outcome: Progressing  Goal: Fluid balance maintained  Description: INTERVENTIONS:  - Monitor labs   - Monitor I/O and WT  - Instruct patient on fluid and nutrition as appropriate  - Assess for signs & symptoms of volume excess or deficit  Outcome: Progressing  Goal: Glucose maintained within target range  Description: INTERVENTIONS:  - Monitor Blood Glucose as ordered  - Assess for signs and symptoms of hyperglycemia and hypoglycemia  - Administer ordered medications to maintain glucose within target range  - Assess nutritional intake and initiate nutrition service referral as needed  Outcome: Progressing     Problem: HEMATOLOGIC - ADULT  Goal: Maintains hematologic stability  Description: INTERVENTIONS  - Assess for signs and symptoms of bleeding or hemorrhage  - Monitor labs  - Administer supportive blood products/factors as ordered and appropriate  Outcome: Progressing     Problem: MUSCULOSKELETAL - ADULT  Goal: Maintain or return mobility to safest level of function  Description: INTERVENTIONS:  - Assess patient's ability to carry out ADLs; assess patient's baseline for ADL function and identify physical deficits which impact ability to perform ADLs (bathing, care of mouth/teeth, toileting, grooming, dressing, etc )  - Assess/evaluate cause of self-care deficits   - Assess range of motion  - Assess patient's mobility  - Assess patient's need for assistive devices and provide as appropriate  - Encourage maximum independence but intervene and supervise when necessary  - Involve family in performance of ADLs  - Assess for home care needs following discharge   - Consider OT consult to assist with ADL evaluation and planning for discharge  - Provide patient education as appropriate  Outcome: Progressing

## 2021-04-09 NOTE — ASSESSMENT & PLAN NOTE
· SNF staff report heme-positive stool x2 earlier today   · Stool occult negative in the ED   · Follow results of hemoccult stools  · No colonoscopy on file   · Hold off on GI evaluation for now

## 2021-04-09 NOTE — PROGRESS NOTES
[place]  Progress Note - Gely Reynaga 1945, 67 y o  female MRN: [id number]  Unit/Bed#: -01 Encounter: [id number]  Primary Care Provider: Yadira Medley DO   Date and time admitted to hospital: 4/8/2021  7:32 PM    * Acute on chronic diastolic (congestive) heart failure (HCC)  Assessment & Plan  Wt Readings from Last 3 Encounters:   04/09/21 72 kg (158 lb 11 7 oz)   03/10/21 78 5 kg (173 lb)   09/22/20 78 5 kg (173 lb 1 6 oz)     · Acute on chronic CHF, poa, a/e/b SOB, proBNP 17,688, CXR findings of moderate pulm edema with small right pleural fusion  · Unable to obtain accurate history on presence/absence of orthopnea/PND or dyspnea as patient has dementia  · Home regimen: torsemide 10mg 2x per week   · Last echo 08/2020: "LVEF 65%  No regional wall motion abnormalities  Grade 2 diastolic dysfunction    Mild pulmonary hypertension "  · Unknown dry weight - weight on discharge from hospital 09/2020 was 190 lb, on admission patient is 173 lb  · Troponin <0 02   · Albumin at 2 1  · ON IV lasix 40mg BID  · Strict I/Os  · Cardiac diet - 2 g sodium restriction and 1500 mL fluid restriction  · Get updated echo  · Cardiology consulted  · Monitor on tele until repeat echo results       [place] positive stools  Assessment & Plan  · SNF staff report heme-positive stool x2 earlier today   · Stool occult negative in the ED   · Follow results of hemoccult stools  · No colonoscopy on file   · Hold off on GI evaluation for now    Chronic respiratory failure with hypoxia ([address] )  Assessment & Plan  · Patient chronically wears 2 L supplemental oxygen via nasal cannula at SNF  · SpO2 stable here on home oxygen requirement    Iron deficiency anemia  Assessment & Plan  · Chronic anemia, Hb 8 2 on admission   · Baseline appears to be between 8-9   · Transfuse for hemoglobin <7   · Patient does take daily iron supplementation - will continue  · Follow iron panel results  · Trend CBC    Paroxysmal atrial fibrillation (HCC)  Assessment & Plan  · Home regimen:  Metoprolol 75 mg q 12 hours and Xarelto 15 mg daily   · Will continue home regimen    Pressure ulcer of sacral region, stage 4 (HCC)  Assessment & Plan  · Stage 4 pressure ulcer, noted on admission  · Offload pressure  · Wound care consulted    Stage 3a chronic kidney disease  Assessment & Plan  Lab Results   Component Value Date    EGFR 44 04/09/2021    EGFR 47 04/08/2021    EGFR 36 09/20/2020    CREATININE 1 21 04/09/2021    CREATININE 1 13 04/08/2021    CREATININE 1 41 (H) 09/20/2020   · Cr 1 13 on admission  · Baseline creatinine appears to be between 0 86-1 41  · Trend BMP    Hypertension  Assessment & Plan  · Home regimen:  Metoprolol 75 mg q 12 hours   · Will continue home regimen here    Type 2 diabetes mellitus with hyperglycemia [facility])  Assessment & Plan  Lab Results   Component Value Date    HGBA1C 6 0 (H) 04/08/2021       Recent Labs     04/09/21  0140 04/09/21  0728 04/09/21  1117 04/09/21  1202   POCGLU 127 89 58* 122       Blood Sugar Average: Last 72 hrs:  · (P) 99 home regimen:  Lantus 14 units HS, Humalog 11 units with lunch and dinner, and 13 units with breakfast  · Continue with lantus 14U HS and lispro 11U TID with meals, and SSI      VTE Pharmacologic Prophylaxis:   Pharmacologic: Rivaroxaban (Xarelto)  Mechanical VTE Prophylaxis in Place: Yes    Patient Centered Rounds: I have performed bedside rounds with nursing staff today  Discussions with Specialists or Other Care Team Provider: CM    Education and Discussions with Family / Patient: patient's son called    Time Spent for Care: 30 minutes  More than 50% of total time spent on counseling and coordination of care as described above      Current Length of Stay: 1 day(s)    Current Patient Status: Inpatient   Certification Statement: The patient will continue to require additional inpatient hospital stay due to as above    Discharge Plan: 2-3 days    Code Status: Level 1 - Full Code      Subjective:   Complaining of sacral pain  In the setting of stage 4 decubitus pressure ulcers    Objective:     Vitals:   Temp (24hrs), Av 3 °F (36 8 °C), Min:98 °F (36 7 °C), Max:98 6 °F (37 °C)    Temp:  [98 °F (36 7 °C)-98 6 °F (37 °C)] 98 °F (36 7 °C)  HR:  [59-69] 69  Resp:  [13-21] 14  BP: (133-144)/(55-67) 133/56  SpO2:  [91 %-96 %] 93 %  Body mass index is 25 62 kg/m²  Input and Output Summary (last 24 hours): Intake/Output Summary (Last 24 hours) at 2021 1411  Last data filed at 2021 1340  Gross per 24 hour   Intake 480 ml   Output 2100 ml   Net -1620 ml       Physical Exam:     Physical Exam  Vitals signs and nursing note reviewed  Constitutional:       General: She is not in acute distress  Appearance: She is ill-appearing  She is not toxic-appearing  Cardiovascular:      Rate and Rhythm: Normal rate and regular rhythm  Pulses: Normal pulses  Heart sounds: No murmur  Pulmonary:      Effort: Pulmonary effort is normal  No respiratory distress  Breath sounds: No stridor  Rales present  No wheezing or rhonchi  Chest:      Chest wall: No tenderness  Abdominal:      General: Bowel sounds are normal  There is no distension  Palpations: Abdomen is soft  Tenderness: There is no abdominal tenderness  There is no right CVA tenderness, left CVA tenderness or guarding  Musculoskeletal:         General: No swelling  Right lower leg: No edema  Left lower leg: No edema  Skin:     General: Skin is warm and dry  Capillary Refill: Capillary refill takes less than 2 seconds  Coloration: Skin is not jaundiced  Findings: No bruising or lesion  Neurological:      General: No focal deficit present  Mental Status: She is alert  Mental status is at baseline  Cranial Nerves: No cranial nerve deficit        Comments: +ve dementia - minimally interactive  AO x 0-1 (baseline)   Psychiatric:         Mood and Affect: Mood normal          Additional Data:     Labs:    Results from last 7 days   Lab Units 04/09/21  0510 04/08/21 2032   WBC Thousand/uL 8 38 9 26   HEMOGLOBIN g/dL 7 4* 8 2*   HEMATOCRIT % 24 6* 27 3*   PLATELETS Thousands/uL 228 256   NEUTROS PCT %  --  70   LYMPHS PCT %  --  21   MONOS PCT %  --  5   EOS PCT %  --  3     Results from last 7 days   Lab Units 04/09/21  0510 04/08/21 2032   SODIUM mmol/L 137 135*   POTASSIUM mmol/L 4 6 5 2   CHLORIDE mmol/L 101 100   CO2 mmol/L 30 27   BUN mg/dL 112* 113*   CREATININE mg/dL 1 21 1 13   ANION GAP mmol/L 6 8   CALCIUM mg/dL 8 7 9 1   ALBUMIN g/dL  --  2 1*   TOTAL BILIRUBIN mg/dL  --  1 10*   ALK PHOS U/L  --  99   ALT U/L  --  25   AST U/L  --  17   GLUCOSE RANDOM mg/dL 97 105     Results from last 7 days   Lab Units 04/08/21 2032   INR  2 19*     Results from last 7 days   Lab Units 04/09/21  1202 04/09/21  1117 04/09/21  0728 04/09/21  0140   POC GLUCOSE mg/dl 122 58* 89 127     Results from last 7 days   Lab Units 04/08/21 2032   HEMOGLOBIN A1C % 6 0*               * I Have Reviewed All Lab Data Listed Above  * Additional Pertinent Lab Tests Reviewed:  All Labs Within Last 24 Hours Reviewed    Imaging:    Imaging Reports Reviewed Today Include:   Imaging Personally Reviewed by Myself Includes:      Recent Cultures (last 7 days):           Last 24 Hours Medication List:   Current Facility-Administered Medications   Medication Dose Route Frequency Provider Last Rate    acetaminophen  650 mg Oral Q6H PRN Austin Nieto PA-C      aspirin  81 mg Oral Daily Austin Nieto PA-C      atorvastatin  40 mg Oral After Dinner Austin Nieto PA-C      ferrous sulfate  325 mg Oral Daily With Breakfast Austin Nieto PA-C      gabapentin  100 mg Oral TID Austin Nieto PA-C      insulin glargine  14 Units Subcutaneous HS Austin Nieto PA-C      insulin lispro  1-5 Units Subcutaneous HS Austin Nieto PA-C      insulin lispro  1-6 Units Subcutaneous TID AC Lashonda Marroquin PA-C      insulin lispro  11 Units Subcutaneous TID With Meals Lashonda Marroquin PA-C      metoprolol tartrate  75 mg Oral Q12H [address] Lashonda Marroquin PA-C      ondansetron  4 mg Intravenous Q6H PRN Lashonda Marroquin PA-C      oxyCODONE  5 mg Oral Q4H PRN Lashonda Marroquin PA-C      rivaroxaban  15 mg Oral Daily With Breakfast Lashonda Marroquin PA-C          Today, Patient Was Seen By: Marely Acosta MD    ** Please Note: Dictation voice to text software may have been used in the creation of this document   **

## 2021-04-09 NOTE — ASSESSMENT & PLAN NOTE
· Chronic anemia, Hb 8 2 on admission   · Baseline appears to be between 8-9   · Transfuse for hemoglobin <7   · Patient does take daily iron supplementation - will continue  · Follow iron panel results  · Trend CBC

## 2021-04-09 NOTE — ASSESSMENT & PLAN NOTE
Lab Results   Component Value Date    EGFR 44 04/09/2021    EGFR 47 04/08/2021    EGFR 36 09/20/2020    CREATININE 1 21 04/09/2021    CREATININE 1 13 04/08/2021    CREATININE 1 41 (H) 09/20/2020   · Cr 1 13 on admission  · Baseline creatinine appears to be between 0 86-1 41  · Trend BMP

## 2021-04-10 PROBLEM — Z97.8 CHRONIC INDWELLING FOLEY CATHETER: Status: ACTIVE | Noted: 2021-01-01

## 2021-04-10 PROBLEM — E88.09 HYPOALBUMINEMIA: Status: ACTIVE | Noted: 2021-01-01

## 2021-04-10 NOTE — ASSESSMENT & PLAN NOTE
· Patient chronically wears 2 L supplemental oxygen via nasal cannula at [facility]  · SpO2 stable here on home oxygen requirement

## 2021-04-10 NOTE — PLAN OF CARE
Problem: Potential for Falls  Goal: Patient will remain free of falls  Description: INTERVENTIONS:  - Assess patient frequently for physical needs  -  Identify cognitive and physical deficits and behaviors that affect risk of falls  -  [place] fall precautions as indicated by assessment   - Educate patient/family on patient safety including physical limitations  - Instruct patient to call for assistance with activity based on assessment  - Modify environment to reduce risk of injury  - Consider OT/PT consult to assist with strengthening/mobility  Outcome: Progressing     Problem: Prexisting or High Potential for Compromised Skin Integrity  Goal: Skin integrity is maintained or improved  Description: INTERVENTIONS:  - Identify patients at risk for skin breakdown  - Assess and monitor skin integrity  - Assess and monitor nutrition and hydration status  - Monitor labs   - Assess for incontinence   - Turn and reposition patient  - Assist with mobility/ambulation  - Relieve pressure over bony prominences  - Avoid friction and shearing  - Provide appropriate hygiene as needed including keeping skin clean and dry  - Evaluate need for skin moisturizer/barrier cream  - Collaborate with interdisciplinary team   - Patient/family teaching  - Consider wound care consult   Outcome: Progressing     Problem: Nutrition/Hydration-ADULT  Goal: Nutrient/Hydration intake appropriate for improving, restoring or maintaining nutritional needs  Description: Monitor and assess patient's nutrition/hydration status for malnutrition  Collaborate with interdisciplinary team and initiate plan and interventions as ordered  Monitor patient's weight and dietary intake as ordered or per policy  Utilize nutrition screening tool and intervene as necessary  Determine patient's food preferences and provide high-protein, high-caloric foods as appropriate       INTERVENTIONS:  - Monitor oral intake, urinary output, labs, and treatment plans  - Assess nutrition and hydration status and recommend course of action  - Evaluate amount of meals eaten  - Assist patient with eating if necessary   - Allow adequate time for meals  - Recommend/ encourage appropriate diets, oral nutritional supplements, and vitamin/mineral supplements  - Order, calculate, and assess calorie counts as needed  - Recommend, monitor, and adjust tube feedings and TPN/PPN based on assessed needs  - Assess need for intravenous fluids  - Provide specific nutrition/hydration education as appropriate  - Include patient/family/caregiver in decisions related to nutrition  Outcome: Progressing     Problem: CARDIOVASCULAR - ADULT  Goal: Maintains optimal cardiac output and hemodynamic stability  Description: INTERVENTIONS:  - Monitor I/O, vital signs and rhythm  - Monitor for S/S and trends of decreased cardiac output  - Administer and titrate ordered vasoactive medications to optimize hemodynamic stability  - Assess quality of pulses, skin color and temperature  - Assess for signs of decreased coronary artery perfusion  - Instruct patient to report change in severity of symptoms  Outcome: Progressing  Goal: Absence of cardiac dysrhythmias or at baseline rhythm  Description: INTERVENTIONS:  - Continuous cardiac monitoring, vital signs, obtain 12 lead EKG if ordered  - Administer antiarrhythmic and heart rate control medications as ordered  - Monitor electrolytes and administer replacement therapy as ordered  Outcome: Progressing     Problem: RESPIRATORY - ADULT  Goal: Achieves optimal ventilation and oxygenation  Description: INTERVENTIONS:  - Assess for changes in respiratory status  - Assess for changes in mentation and behavior  - Position to facilitate oxygenation and minimize respiratory effort  - Oxygen administered by appropriate delivery if ordered  - Initiate smoking cessation education as indicated  - Encourage broncho-pulmonary hygiene including cough, deep breathe, Incentive Spirometry  - Assess the need for suctioning and aspirate as needed  - Assess and instruct to report SOB or any respiratory difficulty  - Respiratory Therapy support as indicated  Outcome: Progressing     Problem: SKIN/TISSUE INTEGRITY - ADULT  Goal: Incision(s), wounds(s) or drain site(s) healing without S/S of infection  Description: INTERVENTIONS  - Assess and document risk factors for skin impairment   - Assess and document dressing, incision, wound bed, drain sites and surrounding tissue  - Consider nutrition services referral as needed  - Oral mucous membranes remain intact  - Provide patient/ family education  Outcome: Progressing  Goal: Skin integrity remains intact  Description: INTERVENTIONS  - Identify patients at risk for skin breakdown  - Assess and monitor skin integrity  - Assess and monitor nutrition and hydration status  - Monitor labs (i e  albumin)  - Assess for incontinence   - Turn and reposition patient  - Assist with mobility/ambulation  - Relieve pressure over bony prominences  - Avoid friction and shearing  - Provide appropriate hygiene as needed including keeping skin clean and dry  - Evaluate need for skin moisturizer/barrier cream  - Collaborate with interdisciplinary team (i e  Nutrition, Rehabilitation, etc )   - Patient/family teaching  Outcome: Progressing     Problem: PAIN - ADULT  Goal: Verbalizes/displays adequate comfort level or baseline comfort level  Description: Interventions:  - Encourage patient to monitor pain and request assistance  - Assess pain using appropriate pain scale  - Administer analgesics based on type and severity of pain and evaluate response  - Implement non-pharmacological measures as appropriate and evaluate response  - Consider cultural and social influences on pain and pain management  - Notify physician/advanced practitioner if interventions unsuccessful or patient reports new pain  Outcome: Progressing     Problem: INFECTION - ADULT  Goal: Absence or prevention of progression during hospitalization  Description: INTERVENTIONS:  - Assess and monitor for signs and symptoms of infection  - Monitor lab/diagnostic results  - Monitor all insertion sites, i e  indwelling lines, tubes, and drains  - Monitor endotracheal if appropriate and nasal secretions for changes in amount and color  - [place] appropriate cooling/warming therapies per order  - Administer medications as ordered  - Instruct and encourage patient and family to use good hand hygiene technique  - Identify and instruct in appropriate isolation precautions for identified infection/condition  Outcome: Progressing     Problem: SAFETY ADULT  Goal: Patient will remain free of falls  Description: INTERVENTIONS:  - Assess patient frequently for physical needs  -  Identify cognitive and physical deficits and behaviors that affect risk of falls    -  [place] fall precautions as indicated by assessment   - Educate patient/family on patient safety including physical limitations  - Instruct patient to call for assistance with activity based on assessment  - Modify environment to reduce risk of injury  - Consider OT/PT consult to assist with strengthening/mobility  Outcome: Progressing     Problem: DISCHARGE PLANNING  Goal: Discharge to home or other facility with appropriate resources  Description: INTERVENTIONS:  - Identify barriers to discharge w/patient and caregiver  - Arrange for needed discharge resources and transportation as appropriate  - Identify discharge learning needs (meds, wound care, etc )  - Arrange for interpretive services to assist at discharge as needed  - Refer to Case Management Department for coordinating discharge planning if the patient needs post-hospital services based on physician/advanced practitioner order or complex needs related to functional status, cognitive ability, or social support system  Outcome: Progressing     Problem: Knowledge Deficit  Goal: Patient/family/caregiver demonstrates understanding of disease process, treatment plan, medications, and discharge instructions  Description: Complete learning assessment and assess knowledge base    Interventions:  - Provide teaching at level of understanding  - Provide teaching via preferred learning methods  Outcome: Progressing     Problem: METABOLIC, FLUID AND ELECTROLYTES - ADULT  Goal: Electrolytes maintained within normal limits  Description: INTERVENTIONS:  - Monitor labs and assess patient for signs and symptoms of electrolyte imbalances  - Administer electrolyte replacement as ordered  - Monitor response to electrolyte replacements, including repeat lab results as appropriate  - Instruct patient on fluid and nutrition as appropriate  Outcome: Progressing  Goal: Fluid balance maintained  Description: INTERVENTIONS:  - Monitor labs   - Monitor I/O and WT  - Instruct patient on fluid and nutrition as appropriate  - Assess for signs & symptoms of volume excess or deficit  Outcome: Progressing  Goal: Glucose maintained within target range  Description: INTERVENTIONS:  - Monitor Blood Glucose as ordered  - Assess for signs and symptoms of hyperglycemia and hypoglycemia  - Administer ordered medications to maintain glucose within target range  - Assess nutritional intake and initiate nutrition service referral as needed  Outcome: Progressing     Problem: HEMATOLOGIC - ADULT  Goal: Maintains hematologic stability  Description: INTERVENTIONS  - Assess for signs and symptoms of bleeding or hemorrhage  - Monitor labs  - Administer supportive blood products/factors as ordered and appropriate  Outcome: Progressing     Problem: MUSCULOSKELETAL - ADULT  Goal: Maintain or return mobility to safest level of function  Description: INTERVENTIONS:  - Assess patient's ability to carry out ADLs; assess patient's baseline for ADL function and identify physical deficits which impact ability to perform ADLs (bathing, care of mouth/teeth, toileting, grooming, dressing, etc )  - Assess/evaluate cause of self-care deficits   - Assess range of motion  - Assess patient's mobility  - Assess patient's need for assistive devices and provide as appropriate  - Encourage maximum independence but intervene and supervise when necessary  - Involve family in performance of ADLs  - Assess for home care needs following discharge   - Consider OT consult to assist with ADL evaluation and planning for discharge  - Provide patient education as appropriate  Outcome: Progressing

## 2021-04-10 NOTE — ASSESSMENT & PLAN NOTE
· Chronic anemia, Hb 8 2 on admission   · Baseline appears to be between 8-9   · Cardiology recommend baseline Hb > 9  · Would transfuse with 1iu prbc once i've obtained consent from patient's son- Hb 7 8 today  · Trend H & H

## 2021-04-10 NOTE — PROGRESS NOTES
[place]  Progress Note - Shearon Book 1945, 67 y o  female MRN: [id number]  Unit/Bed#: -01 Encounter: [id number]  Primary Care Provider: Venkatesh Bryant DO   Date and time admitted to hospital: 4/8/2021  7:32 PM    * Acute on chronic diastolic (congestive) heart failure (HCC)  Assessment & Plan  Wt Readings from Last 3 Encounters:   04/09/21 72 kg (158 lb 11 7 oz)   03/10/21 78 5 kg (173 lb)   09/22/20 78 5 kg (173 lb 1 6 oz)     · Acute on chronic CHF, poa, a/e/b SOB, proBNP 17,688, CXR findings of moderate pulm edema with small right pleural fusion  · Unable to obtain accurate history on presence/absence of orthopnea/PND or dyspnea as patient has dementia  · Home regimen: torsemide 10mg 2x per week   · Echo 4/9//2021: "LVEF 65%   Left atrial dilation, Mild- Mod MVR, Moderate TR, mod-severe pHTN 60mmhg  · Unknown dry weight - weight on discharge from hospital 09/2020 was 190 lb, on admission patient is 173 lb  · Troponin <0 02   · Albumin at 2 1  · s/p IV lasix 40mg BID, now on IV bumex 1mg BID  · Strict I/Os, net negative 1430ccs/24hrs, net negative 2375 since admission  · Weight 158lbs today (bedscale)  · Cardiac diet - 2 g sodium restriction and 1500 mL fluid restriction  · Cardiology on board    Chronic indwelling Trujillo catheter  Assessment & Plan  Chronic trujillo, noted to be leaking  Would be changed and upsized by nursing today 4/10    Hypoalbuminemia  Assessment & Plan  Albumin 2 1  IV albumin x 1 dosed 30mins before diuretics    Guaiac positive stools  Assessment & Plan  · SNF staff report heme-positive stool x2 earlier today   · Stool occult negative in the ED       Chronic respiratory failure with hypoxia ([address] )  Assessment & Plan  · Patient chronically wears 2 L supplemental oxygen via nasal cannula at SNF  · SpO2 stable here on home oxygen requirement    Iron deficiency anemia  Assessment & Plan  · Chronic anemia, Hb 8 2 on admission   · Baseline appears to be between 8-9   · Cardiology recommend baseline Hb > 9  · Would transfuse with 1iu prbc once i've obtained consent from patient's son- Hb 7 8 today  · Trend H & H    Paroxysmal atrial fibrillation (HCC)  Assessment & Plan  · Home regimen:  Metoprolol 75 mg q 12 hours and Xarelto 15 mg daily   · Will continue home regimen    Pressure ulcer of sacral region, stage 4 (HCC)  Assessment & Plan  · Stage 4 pressure ulcer, noted on admission  · Offload pressure  · Wound care on board, nahed young    Stage 3a chronic kidney disease  Assessment & Plan  Lab Results   Component Value Date    EGFR 49 04/10/2021    EGFR 44 04/09/2021    EGFR 47 04/08/2021    CREATININE 1 10 04/10/2021    CREATININE 1 21 04/09/2021    CREATININE 1 13 04/08/2021   · Cr 1 13 on admission  · Baseline creatinine appears to be between 0 86-1 41  · Trend BMP    Hypertension  Assessment & Plan  · Home regimen:  Metoprolol 75 mg q 12 hours   · Will continue home regimen here    Type 2 diabetes mellitus with hyperglycemia [facility])  Assessment & Plan  Lab Results   Component Value Date    HGBA1C 6 0 (H) 04/08/2021       Recent Labs     04/09/21  2058 04/10/21  0715 04/10/21  0742 04/10/21  1112   POCGLU 122 50* 63* 172*       Blood Sugar Average: Last 72 hrs:  · (P) 109 home regimen:  Lantus 14 units HS, Humalog 11 units with lunch and dinner, and 13 units with breakfast  · Fasting blood glucose was 50 this am, reduce lantus to 10iu hs, continue with lispro 11U TID with meals, and SSI      VTE Pharmacologic Prophylaxis:   Pharmacologic: Rivaroxaban (Xarelto)  Mechanical VTE Prophylaxis in Place: Yes    Patient Centered Rounds: I have performed bedside rounds with nursing staff today  Discussions with Specialists or Other Care Team Provider:     Education and Discussions with Family / Patient: patient's son called, no answer  VM left  Would attempt to discuss with him at bedside later today    Time Spent for Care: 30 minutes    More than 50% of total time spent on counseling and coordination of care as described above  Current Length of Stay: 2 day(s)    Current Patient Status: Inpatient   Certification Statement: The patient will continue to require additional inpatient hospital stay due to as above    Discharge Plan: 2-3days    Code Status: Level 1 - Full Code      Subjective:   Denies chest pain, palpitations or dyspnea  C/o sacral decubitus ulcer pain    Objective:     Vitals:   Temp (24hrs), Av 6 °F (37 °C), Min:98 °F (36 7 °C), Max:99 °F (37 2 °C)    Temp:  [98 °F (36 7 °C)-99 °F (37 2 °C)] 98 3 °F (36 8 °C)  HR:  [61-70] 68  Resp:  [14-20] 14  BP: (130-156)/(56-64) 150/61  SpO2:  [95 %-96 %] 95 %  Body mass index is 25 62 kg/m²  Input and Output Summary (last 24 hours): Intake/Output Summary (Last 24 hours) at 4/10/2021 1503  Last data filed at 4/10/2021 1358  Gross per 24 hour   Intake 960 ml   Output 1475 ml   Net -515 ml       Physical Exam:     Physical Exam  Vitals signs and nursing note reviewed  Constitutional:       General: She is not in acute distress  Appearance: Normal appearance  She is not ill-appearing, toxic-appearing or diaphoretic  Cardiovascular:      Rate and Rhythm: Normal rate and regular rhythm  Pulses: Normal pulses  Heart sounds: Murmur present  Pulmonary:      Effort: Pulmonary effort is normal  No respiratory distress  Breath sounds: Normal breath sounds  No stridor  No wheezing, rhonchi or rales  Chest:      Chest wall: No tenderness  Abdominal:      General: Bowel sounds are normal  There is distension  Palpations: Abdomen is soft  Tenderness: There is no abdominal tenderness  There is no right CVA tenderness, left CVA tenderness or guarding  Musculoskeletal: Normal range of motion  General: No swelling or deformity  Right lower leg: No edema  Left lower leg: No edema  Skin:     General: Skin is warm        Capillary Refill: Capillary refill takes less than 2 seconds  Coloration: Skin is not jaundiced or pale  Findings: No bruising, erythema, lesion or rash  Neurological:      General: No focal deficit present  Mental Status: She is alert  Mental status is at baseline  Cranial Nerves: No cranial nerve deficit  Comments: +ve dementia - at baseline   Psychiatric:         Mood and Affect: Mood normal        (     Additional Data:     Labs:    Results from last 7 days   Lab Units 04/10/21  0433  04/08/21  2032   WBC Thousand/uL 11 25*   < > 9 26   HEMOGLOBIN g/dL 7 8*   < > 8 2*   HEMATOCRIT % 26 1*   < > 27 3*   PLATELETS Thousands/uL 273   < > 256   NEUTROS PCT %  --   --  70   LYMPHS PCT %  --   --  21   MONOS PCT %  --   --  5   EOS PCT %  --   --  3    < > = values in this interval not displayed  Results from last 7 days   Lab Units 04/10/21  0433  04/08/21  2032   SODIUM mmol/L 141   < > 135*   POTASSIUM mmol/L 4 3   < > 5 2   CHLORIDE mmol/L 103   < > 100   CO2 mmol/L 29   < > 27   BUN mg/dL 98*   < > 113*   CREATININE mg/dL 1 10   < > 1 13   ANION GAP mmol/L 9   < > 8   CALCIUM mg/dL 9 1   < > 9 1   ALBUMIN g/dL  --   --  2 1*   TOTAL BILIRUBIN mg/dL  --   --  1 10*   ALK PHOS U/L  --   --  99   ALT U/L  --   --  25   AST U/L  --   --  17   GLUCOSE RANDOM mg/dL 54*   < > 105    < > = values in this interval not displayed  Results from last 7 days   Lab Units 04/08/21 2032   INR  2 19*     Results from last 7 days   Lab Units 04/10/21  1112 04/10/21  0742 04/10/21  0715 04/09/21  2058 04/09/21  1615 04/09/21  1202 04/09/21  1117 04/09/21  0728 04/09/21  0140   POC GLUCOSE mg/dl 172* 63* 50* 122 178* 122 58* 89 127     Results from last 7 days   Lab Units 04/08/21 2032   HEMOGLOBIN A1C % 6 0*               * I Have Reviewed All Lab Data Listed Above  * Additional Pertinent Lab Tests Reviewed:  All Labs Within Last 24 Hours Reviewed    Imaging:    Imaging Reports Reviewed Today Include:   Imaging Personally Reviewed by Myself Includes:      Recent Cultures (last 7 days):           Last 24 Hours Medication List:   Current Facility-Administered Medications   Medication Dose Route Frequency Provider Last Rate    acetaminophen  650 mg Oral Q6H PRN Tray Ordaz PA-C      aspirin  81 mg Oral Daily Tray Ordaz PA-C      atorvastatin  40 mg Oral After Stacy SnooksBEATRIZ      bumetanide  1 mg Intravenous BID TJ Waldrop      ferrous sulfate  325 mg Oral Daily With Breakfast Tray Ordaz PA-C      gabapentin  100 mg Oral TID Tray Ordaz PA-C      insulin glargine  14 Units Subcutaneous HS Tray Ordaz PA-C      insulin lispro  1-5 Units Subcutaneous HS Tray Ordaz PA-C      insulin lispro  1-6 Units Subcutaneous TID AC Tray Ordaz PA-C      insulin lispro  11 Units Subcutaneous TID With Meals Tray Ordaz PA-C      magnesium oxide  400 mg Oral BID George Patino MD      metoprolol tartrate  75 mg Oral Q12H [facility] Tray Ordaz PA-C      ondansetron  4 mg Intravenous Q6H PRN Tray Ordaz PA-C      oxyCODONE  5 mg Oral Q4H PRN Tray Ordaz PA-C      potassium chloride  20 mEq Oral Daily TJ Waldrop      rivaroxaban  15 mg Oral Daily With Breakfast Tray Ordaz PA-C          Today, Patient Was Seen By: George Patino MD    ** Please Note: Dictation voice to text software may have been used in the creation of this document   **

## 2021-04-10 NOTE — ASSESSMENT & PLAN NOTE
Lab Results   Component Value Date    HGBA1C 6 0 (H) 04/08/2021       Recent Labs     04/09/21  2058 04/10/21  0715 04/10/21  0742 04/10/21  1112   POCGLU 122 50* 63* 172*       Blood Sugar Average: Last 72 hrs:  · (P) 109 home regimen:  Lantus 14 units HS, Humalog 11 units with lunch and dinner, and 13 units with breakfast  · Fasting blood glucose was 50 this am, reduce lantus to 10iu hs, continue with lispro 11U TID with meals, and SSI

## 2021-04-10 NOTE — ASSESSMENT & PLAN NOTE
· Stage 4 pressure ulcer, noted on admission  · Offload pressure  · Wound care on board, appreciate recs

## 2021-04-10 NOTE — ASSESSMENT & PLAN NOTE
Wt Readings from Last 3 Encounters:   04/09/21 72 kg (158 lb 11 7 oz)   03/10/21 78 5 kg (173 lb)   09/22/20 78 5 kg (173 lb 1 6 oz)     · Acute on chronic CHF, poa, a/e/b SOB, proBNP 17,688, CXR findings of moderate pulm edema with small right pleural fusion  · Unable to obtain accurate history on presence/absence of orthopnea/PND or dyspnea as patient has dementia  · Home regimen: torsemide 10mg 2x per week   · Echo 4/9//2021: "LVEF 65%   Left atrial dilation, Mild- Mod MVR, Moderate TR, mod-severe pHTN 60mmhg  · Unknown dry weight - weight on discharge from hospital 09/2020 was 190 lb, on admission patient is 173 lb  · Troponin <0 02   · Albumin at 2 1  · s/p IV lasix 40mg BID, now on IV bumex 1mg BID  · Strict I/Os, net negative 1430ccs/24hrs, net negative 2375 since admission  · Weight 158lbs today (bedscale)  · Cardiac diet - 2 g sodium restriction and 1500 mL fluid restriction  · Cardiology on board

## 2021-04-10 NOTE — ASSESSMENT & PLAN NOTE
Lab Results   Component Value Date    EGFR 49 04/10/2021    EGFR 44 04/09/2021    EGFR 47 04/08/2021    CREATININE 1 10 04/10/2021    CREATININE 1 21 04/09/2021    CREATININE 1 13 04/08/2021   · Cr 1 13 on admission  · Baseline creatinine appears to be between 0 86-1 41  · Trend BMP

## 2021-04-11 NOTE — NURSING NOTE
Speech eval was done this morning  Pt diet change from diabetic to Dysphagia 3; dental soft; thin liquids  Also, pt pills should be crushed and given with applesauce  MD notified

## 2021-04-11 NOTE — ASSESSMENT & PLAN NOTE
Lab Results   Component Value Date    EGFR 51 04/11/2021    EGFR 49 04/10/2021    EGFR 44 04/09/2021    CREATININE 1 07 04/11/2021    CREATININE 1 10 04/10/2021    CREATININE 1 21 04/09/2021   · Cr 1 13 on admission  · Baseline creatinine appears to be between 0 86-1 41  · Trend BMP

## 2021-04-11 NOTE — ASSESSMENT & PLAN NOTE
Lab Results   Component Value Date    HGBA1C 6 0 (H) 04/08/2021       Recent Labs     04/10/21  1112 04/10/21  1646 04/10/21  2059 04/11/21  0726   POCGLU 172* 108 204* 102       Blood Sugar Average: Last 72 hrs:  · (P) 116 25 home regimen:  Lantus 14 units HS, Humalog 11 units with lunch and dinner, and 13 units with breakfast  · Fasting blood glucose was 50 this am, reduce lantus to 10iu hs, discontinue with lispro 11U TID with meals, and SSI

## 2021-04-11 NOTE — ASSESSMENT & PLAN NOTE
· Patient chronically wears 2 L supplemental oxygen via nasal cannula at [facility]  · SpO2 stable here on home oxygen requirement

## 2021-04-11 NOTE — ASSESSMENT & PLAN NOTE
· Chronic anemia, Hb 8 2 on admission   · Baseline appears to be between 8-9   · Cardiology recommend baseline Hb > 9  · Hb downtrended to 7 4, s/p 1iu prbc on 4/10  · HB stable this am

## 2021-04-11 NOTE — PROGRESS NOTES
[place]  Progress Note - Phong Yajairaaris 1945, 67 y o  female MRN: [id number]  Unit/Bed#: -01 Encounter: [id number]  Primary Care Provider: Stephanie Garcia DO   Date and time admitted to hospital: 4/8/2021  7:32 PM    * Acute on chronic diastolic (congestive) heart failure (HCC)  Assessment & Plan  Wt Readings from Last 3 Encounters:   04/11/21 72 1 kg (158 lb 15 2 oz)   03/10/21 78 5 kg (173 lb)   09/22/20 78 5 kg (173 lb 1 6 oz)     · Acute on chronic CHF, poa, a/e/b SOB, proBNP 17,688, CXR findings of moderate pulm edema with small right pleural fusion  · Home regimen: torsemide 10mg 2x per week   · Echo 4/9//2021: "LVEF 65%   Left atrial dilation, Mild- Mod MVR, Moderate TR, mod-severe pHTN 60mmhg  · Unknown dry weight - weight on discharge from hospital 09/2020 was 190 lb, on admission patient is 173 lb  · Troponin <0 02   · Albumin at 2 1  · s/p IV lasix 40mg BID, now on IV bumex 1mg BID  · Strict I/Os, net negative 1440ccs/24hrs, net negative 3815mls since admission  · Weight 158lbs today (bedscale)  · Cardiac diet - 2 g sodium restriction and 1500 mL fluid restriction  · Cardiology on board    Chronic indwelling Trujillo catheter  Assessment & Plan  Chronic trujillo, noted to be leaking  Changed 4/10    Hypoalbuminemia  Assessment & Plan  Albumin 2 1  IV albumin x 1 dosed 4/10/21    Guaiac positive stools  Assessment & Plan  · SNF staff report heme-positive stool x2 earlier today   · Stool occult negative in the ED       Chronic respiratory failure with hypoxia ([address] )  Assessment & Plan  · Patient chronically wears 2 L supplemental oxygen via nasal cannula at SNF  · SpO2 stable here on home oxygen requirement    Iron deficiency anemia  Assessment & Plan  · Chronic anemia, Hb 8 2 on admission   · Baseline appears to be between 8-9   · Cardiology recommend baseline Hb > 9  · Hb downtrended to 7 4, s/p 1iu prbc on 4/10  · HB stable this am    Paroxysmal atrial fibrillation [facility])  Assessment & Plan  · Home regimen:  Metoprolol 75 mg q 12 hours and Xarelto 15 mg daily   · Will continue home regimen    Pressure ulcer of sacral region, stage 4 (HCC)  Assessment & Plan  · Stage 4 pressure ulcer, noted on admission  · Offload pressure  · Wound care on board, appreciate recs    Stage 3a chronic kidney disease  Assessment & Plan  Lab Results   Component Value Date    EGFR 51 04/11/2021    EGFR 49 04/10/2021    EGFR 44 04/09/2021    CREATININE 1 07 04/11/2021    CREATININE 1 10 04/10/2021    CREATININE 1 21 04/09/2021   · Cr 1 13 on admission  · Baseline creatinine appears to be between 0 86-1 41  · Trend BMP    Hypertension  Assessment & Plan  · Home regimen:  Metoprolol 75 mg q 12 hours   · Will continue home regimen here    Type 2 diabetes mellitus with hyperglycemia [facility])  Assessment & Plan  Lab Results   Component Value Date    HGBA1C 6 0 (H) 04/08/2021       Recent Labs     04/10/21  1112 04/10/21  1646 04/10/21  2059 04/11/21  0726   POCGLU 172* 108 204* 102       Blood Sugar Average: Last 72 hrs:  · (P) 116 25 home regimen:  Lantus 14 units HS, Humalog 11 units with lunch and dinner, and 13 units with breakfast  · Fasting blood glucose was 50 this am, reduce lantus to 10iu hs, discontinue with lispro 11U TID with meals, and SSI        VTE Pharmacologic Prophylaxis:   Pharmacologic: Rivaroxaban (Xarelto)  Mechanical VTE Prophylaxis in Place: Yes    Patient Centered Rounds: I have performed bedside rounds with nursing staff today  Discussions with Specialists or Other Care Team Provider:     Education and Discussions with Family / Patient: Patient's son    Time Spent for Care: 30 minutes  More than 50% of total time spent on counseling and coordination of care as described above      Current Length of Stay: 3 day(s)    Current Patient Status: Inpatient   Certification Statement: The patient will continue to require additional inpatient hospital stay due to as above    Discharge Plan: 1-2 days    Code Status: Level 1 - Full Code      Subjective:   No overnight event, c/o sacral decubitus ulcer pain    Objective:     Vitals:   Temp (24hrs), Av 2 °F (37 3 °C), Min:98 3 °F (36 8 °C), Max:99 9 °F (37 7 °C)    Temp:  [98 3 °F (36 8 °C)-99 9 °F (37 7 °C)] 98 3 °F (36 8 °C)  HR:  [60-75] 63  Resp:  [15-19] 16  BP: ()/(45-62) 136/50  SpO2:  [92 %-96 %] 94 %  Body mass index is 25 66 kg/m²  Input and Output Summary (last 24 hours): Intake/Output Summary (Last 24 hours) at 2021 0819  Last data filed at 2021 0640  Gross per 24 hour   Intake 810 ml   Output 2250 ml   Net -1440 ml       Physical Exam:     Physical Exam  Vitals signs and nursing note reviewed  Constitutional:       General: She is not in acute distress  Appearance: Normal appearance  She is not ill-appearing, toxic-appearing or diaphoretic  Cardiovascular:      Rate and Rhythm: Normal rate and regular rhythm  Pulses: Normal pulses  Heart sounds: No murmur  Pulmonary:      Effort: Pulmonary effort is normal  No respiratory distress  Breath sounds: Normal breath sounds  No stridor  No wheezing, rhonchi or rales  Chest:      Chest wall: No tenderness  Abdominal:      General: Bowel sounds are normal  There is no distension  Palpations: Abdomen is soft  Tenderness: There is no abdominal tenderness  There is no right CVA tenderness, left CVA tenderness or guarding  Musculoskeletal: Normal range of motion  General: No swelling or deformity  Right lower leg: No edema  Left lower leg: No edema  Skin:     General: Skin is warm and dry  Capillary Refill: Capillary refill takes less than 2 seconds  Coloration: Skin is not jaundiced  Findings: Rash (decubitus sacral ulcers) present  No bruising or lesion  Neurological:      General: No focal deficit present  Mental Status: She is alert  Mental status is at baseline  Cranial Nerves:  No cranial nerve deficit  Psychiatric:         Mood and Affect: Mood normal          Thought Content: Thought content normal        (Additional Data:     Labs:    Results from last 7 days   Lab Units 04/11/21 0604 04/08/21 2032   WBC Thousand/uL 11 39*   < > 9 26   HEMOGLOBIN g/dL 8 9*   < > 8 2*   HEMATOCRIT % 29 0*   < > 27 3*   PLATELETS Thousands/uL 277   < > 256   NEUTROS PCT %  --   --  70   LYMPHS PCT %  --   --  21   MONOS PCT %  --   --  5   EOS PCT %  --   --  3    < > = values in this interval not displayed  Results from last 7 days   Lab Units 04/11/21  0604 04/08/21 2032   SODIUM mmol/L 141   < > 135*   POTASSIUM mmol/L 4 2   < > 5 2   CHLORIDE mmol/L 101   < > 100   CO2 mmol/L 32   < > 27   BUN mg/dL 80*   < > 113*   CREATININE mg/dL 1 07   < > 1 13   ANION GAP mmol/L 8   < > 8   CALCIUM mg/dL 8 9   < > 9 1   ALBUMIN g/dL  --   --  2 1*   TOTAL BILIRUBIN mg/dL  --   --  1 10*   ALK PHOS U/L  --   --  99   ALT U/L  --   --  25   AST U/L  --   --  17   GLUCOSE RANDOM mg/dL 110   < > 105    < > = values in this interval not displayed  Results from last 7 days   Lab Units 04/08/21 2032   INR  2 19*     Results from last 7 days   Lab Units 04/11/21  0726 04/10/21  2059 04/10/21  1646 04/10/21  1112 04/10/21  0742 04/10/21  0715 04/09/21 2058 04/09/21  1615 04/09/21  1202 04/09/21  1117 04/09/21  0728 04/09/21  0140   POC GLUCOSE mg/dl 102 204* 108 172* 63* 50* 122 178* 122 58* 89 127     Results from last 7 days   Lab Units 04/08/21 2032   HEMOGLOBIN A1C % 6 0*               * I Have Reviewed All Lab Data Listed Above  * Additional Pertinent Lab Tests Reviewed:  All Labs Within Last 24 Hours Reviewed    Imaging:    Imaging Reports Reviewed Today Include:   Imaging Personally Reviewed by Myself Includes:      Recent Cultures (last 7 days):           Last 24 Hours Medication List:   Current Facility-Administered Medications   Medication Dose Route Frequency Provider Last Rate    acetaminophen 650 mg Oral Q6H PRN Kelley Suero PA-C      aspirin  81 mg Oral Daily Kelley Suero PA-C      atorvastatin  40 mg Oral After Mateo CadetBEATRIZ      bumetanide  1 mg Intravenous BID Gaby Riff, CRNP      ferrous sulfate  325 mg Oral Daily With Breakfast Kelley Suero PA-C      gabapentin  100 mg Oral TID Kelley Suero PA-C      insulin glargine  10 Units Subcutaneous HS Reno Phillips MD      insulin lispro  1-5 Units Subcutaneous HS Kelley Suero PA-C      insulin lispro  1-6 Units Subcutaneous TID AC Kelley Suero PA-C      magnesium oxide  400 mg Oral BID Reno Phillips MD      metoprolol tartrate  75 mg Oral Q12H [address] Kelley Suero PA-C      ondansetron  4 mg Intravenous Q6H PRN Kelley Suero PA-C      oxyCODONE  5 mg Oral Q4H PRN Kelley Suero PA-C      potassium chloride  20 mEq Oral Daily GabyTJ Sifuentes      rivaroxaban  15 mg Oral Daily With Breakfast Kelley Suero PA-C          Today, Patient Was Seen By: Reno Phillips MD    ** Please Note: Dictation voice to text software may have been used in the creation of this document   **

## 2021-04-11 NOTE — ASSESSMENT & PLAN NOTE
Wt Readings from Last 3 Encounters:   04/11/21 72 1 kg (158 lb 15 2 oz)   03/10/21 78 5 kg (173 lb)   09/22/20 78 5 kg (173 lb 1 6 oz)     · Acute on chronic CHF, poa, a/e/b SOB, proBNP 17,688, CXR findings of moderate pulm edema with small right pleural fusion  · Home regimen: torsemide 10mg 2x per week   · Echo 4/9//2021: "LVEF 65%   Left atrial dilation, Mild- Mod MVR, Moderate TR, mod-severe pHTN 60mmhg  · Unknown dry weight - weight on discharge from hospital 09/2020 was 190 lb, on admission patient is 173 lb  · Troponin <0 02   · Albumin at 2 1  · s/p IV lasix 40mg BID, now on IV bumex 1mg BID  · Strict I/Os, net negative 1440ccs/24hrs, net negative 3815mls since admission  · Weight 158lbs today (bedscale)  · Cardiac diet - 2 g sodium restriction and 1500 mL fluid restriction  · Cardiology on board

## 2021-04-11 NOTE — PLAN OF CARE
Problem: Potential for Falls  Goal: Patient will remain free of falls  Description: INTERVENTIONS:  - Assess patient frequently for physical needs  -  Identify cognitive and physical deficits and behaviors that affect risk of falls    -  [place] fall precautions as indicated by assessment   - Educate patient/family on patient safety including physical limitations  - Instruct patient to call for assistance with activity based on assessment  - Modify environment to reduce risk of injury  - Consider OT/PT consult to assist with strengthening/mobility  Outcome: Progressing

## 2021-04-11 NOTE — SPEECH THERAPY NOTE
Speech Language/Pathology  Speech-Language Pathology Bedside Swallow Evaluation      Patient Name: Roopa Baker    EONIV'A Date: 4/11/2021     Problem List  Principal Problem:    Acute on chronic diastolic (congestive) heart failure (HCC)  Active Problems:    Type 2 diabetes mellitus with hyperglycemia (HCC)    Hypertension    Stage 3a chronic kidney disease    Pressure ulcer of sacral region, stage 4 (HCC)    Paroxysmal atrial fibrillation (HCC)    Iron deficiency anemia    Chronic respiratory failure with hypoxia (HCC)    Guaiac positive stools    Hypoalbuminemia    Chronic indwelling Jimenez catheter      Past Medical History  Past Medical History:   Diagnosis Date    [facility]     Acute respiratory failure with hypoxia (HCC)     Atrial fibrillation ([facility]     [facility]     Chronic kidney disease     Diabetes mellitus ([facility] )     type 2    Hyperlipidemia     Hypertension     Stroke [facility])        Past Surgical History  Past Surgical History:   Procedure Laterality Date    BREAST SURGERY Left     lumpectomy benign    CATARACT EXTRACTION Bilateral 2017    CATARACT EXTRACTION W/ INTRAOCULAR LENS IMPLANT Left 12/11/2017    Procedure: EXTRACTION EXTRACAPSULAR CATARACT PHACO INTRAOCULAR LENS (IOL); Surgeon: Mak Mayes MD;  Location: [facility] MAIN OR;  Service: Ophthalmology    PA OPEN RX FEMUR FX+INTRAMED RAYMOND Right 5/21/2020    Procedure: INSERTION OF SHORT TROCHANTERIC FEMORAL NAIL;  Surgeon: Paolo Becerril MD;  [facility];  Service: Orthopedics    [address] HUMERAL&GLENOID COMPNT Right 9/10/2018    Procedure: RIGHT REVERSE TOTAL SHOULDER ARTHROPLASTY;  Surgeon: Paolo Becerril MD;  [facility];  Service: Orthopedics    PA XCAPSL CTRC RMVL INSJ IO LENS PROSTH W/O ECP Right 10/23/2017    Procedure: EXTRACTION EXTRACAPSULAR CATARACT PHACO INTRAOCULAR LENS (IOL);   Surgeon: Mak Mayes MD;  Location: [facility] MAIN OR;  Service: Ophthalmology    WOUND DEBRIDEMENT N/A 8/26/2020    Procedure: EXCISIONAL DEBRIDEMENT OF SACRAL PRESSURE WOUND, WASHOUT;;  Surgeon: Seamus Ledezma MD;  [facility] MAIN [facility];  Service: General    WOUND DEBRIDEMENT N/A 8/28/2020    Procedure: BUTTOCKS DEBRIDEMENT WOUND AND DRESSING CHANGE ([address] OUT); Surgeon: Brad Au MD;  [facility]: [facility] MAIN [facility];  Service: General       Summary   Pt presented with s/s suggestive of mild oral and suspected WFL pharyngeal dysphagia  Symptoms or concerns included decreased/prolonged mastication and reduced bolus breakdown w/ advanced solids  Swallow initiation suspected timely, hyo-laryngeal movement is palapted and judged to be WNL  No overt s/s aspiration  Pt reports she has had some difficulty w/ the regular texture material she has been receiving, agreeable to modifying diet to dental soft  Risk/s for Aspiration: Low      Recommended Diet: soft/level 3 diet and thin liquids   Recommended Form of Meds: As tolerated   Aspiration precautions and swallowing strategies: only feed when fully alert, slow rate of feeding and small bites/sips  Other Recommendations/Considerations: Pt requires some assistance w/ feeding        Current Medical Status  Pt is a 67 y o  female who presented to [address] [place] [address] with 67y o  year old female with PMH of hyperlipidemia, CKD stage 3, DM 2, PAF ( Xarelto), chronic respiratory failure with hypoxia, iron deficiency anemia, and chronic diastolic heart failure  She follows with cardiologist Dr Jessica Ragnel and was last seen 11/23/2020  She resides at [address] [place] [address]  And was brought to the ED secondary to abnormal outpatient lab findings of hemoglobin of 7 2 as well as positive guaiac stools x2 per nursing staff at [facility]  In the ED chest x-ray reveals mild pulmonary vascular congestion /pulmonary edema and proBNP was elevated  Secondary to these findings Cardiology is consulted for possible acute CHF    The patient denies any cardiac symptoms stating that she feels she is at her baseline state of health  She is slightly confused but is able to answer my questions regarding her review of systems  She states that she is fatigued but she notes that this is chronic for her and she does not feel it is any worse than normal   She denies any shortness of breath or chest discomfort at rest   The patient is bed-bound and does not ambulate  She denies any palpitations or orthopnea         Current Precautions:  Fall  Aspiration  Contact  C diff  Droplet  Seizure  Delirium    Allergies:  No known food allergies    Past medical history:  Please see H&P for details    Special Studies:  CXR 4/8/21: Moderate congestive changes with small right effusion with worsening      Social/Education/Vocational Hx:  Pt [facility] at [facility] Information   Current Risks for Dysphagia & Aspiration: Age, edentulous, respiratory status   Current Symptoms/Concerns: difficulty masticating  Current Diet: regular diet and thin liquids   Baseline Diet: Regular w/ thin liquids per RN at [facility], pills whole w/ thin liquids       Baseline Assessment   Behavior/Cognition: alert  Speech/Language Status: able to participate in conversation and able to follow commands  Patient Positioning: upright in bed  Pain Status/Interventions/Response to Interventions: Report lower back pain - notified nursing       Swallow Mechanism Exam  Facial: symmetrical  Labial: WFL  Lingual: WFL  Velum: symmetrical  Mandible: adequate ROM  Dentition: edentulous  Vocal quality:clear/adequate   Volitional Cough: mild weak   Respiratory Status: 2L O2       Consistencies Assessed and Performance   Consistencies Administered: thin liquids, puree, soft solids, hard solids and mixed consistency  Materials administered included: applesauce, diced peaches in juice, bolivar cracker, hard cookie     Oral Stage: mild  Mastication was mildly prolonged with the materials administered today  Bolus breakdown of hard solids w/ some difficulty, pt reports she has to let it melt before she can break it down to form a cohesive bolus  Bolus formation and transfer were functional with no significant oral residue noted  No overt s/s reduced oral control  Pharyngeal Stage: suspected WFL  Swallow Mechanics:  Swallowing initiation appeared prompt  Laryngeal rise was palpated and judged to be within functional limits  No coughing, throat clearing, change in vocal quality or respiratory status noted today  Esophageal Concerns: none reported    Strategies and Efficacy: -    Summary and Recommendations (see above)    Results Reviewed with: patient and MD     Treatment Recommended: Yes     Frequency of treatment: x1 as able      Patient Stated Goal: none     Dysphagia LTG  -Patient will demonstrate safe and effective oral intake (without overt s/s significant oral/pharyngeal dysphagia including s/s penetration or aspiration) for the highest appropriate diet level           Speech Therapy Prognosis   Prognosis: good    Prognosis Considerations: age and medical status

## 2021-04-12 NOTE — PROGRESS NOTES
General Cardiology   Progress Note -  Team One   Neftaly Mohan 67 y o  female MRN: [id number]    Unit/Bed#: -01 Encounter: [id number]    Assessment:    Acute on chronic diastolic HF  · TTE from 1/6/7230: EF 65% with no WMA;    Left ventricular diastolic function parameters were abnormal; LA moderately dilated; moderate MR/TR, pulmonary artery pressure was markedly increased estimated PA pressure 60 mmHg  · IV Bumex 1 mg BID ordered on Friday and continued through the weekend; will give last dose this evening and then transition to PO torsemide 10 mg QD starting tomorrow AM  · S/p albumin and 1 unit PRBC's on 4/10  · I/O: total UO thus far is 1 4 L  · Weight this AM is 160 lbs by bed scale; dry weight is unknown; weight upon discharge 09/20/2020 was 190 lbs  ·  continue daily weights  ·  strict I/O documentation  ·  2 g sodium restricted diet with fluid restriction      chronic respiratory failure with hypoxia  · chronically wears 2 L supplemental oxygen at [facility]  · O2 sats remain stable on 2 L NC     PAF   · EKG on admit is sinus rhythm   · Xarelto 15 mg QD and metoprolol tartrate 75 mg BID     Iron deficiency anemia  ·  baseline hemoglobin 8-9  · S/p 1 unit PRBC's 4/10; Hgb this AM is 9 2  ·  management per primary team     Essential HTN  · SBP averaging 120's  · Home meds:  metoprolol tartrate 75 mg BID and torsemide 10 mg two times a week (Monday and Thursday)     DM2  · HgbA1c 9 9  ·  management per primary team     CKD stage 3  · Baseline creat 1 2-1 3  · Creat on admit 1 13; and creat this AM is 1 1  ·  will monitor closely during IV diuresis     hyperlipidemia  · Lipitor 40 mg QD        Plan/Recommendations:  · IV Bumex 1 mg BID ordered on Friday and continued through the weekend; will give last dose this evening and then transition to PO torsemide 10 mg QD starting tomorrow AM  · Continue remaining meds as prescribed         __________________________________________________________________________    Subjective  She offers no acute complaints  She feels her breathing is comfortable at rest       Review of Systems   Constitution: Negative for chills, fever and malaise/fatigue  HENT: Negative for congestion  Cardiovascular: Negative for chest pain, dyspnea on exertion, leg swelling, orthopnea and palpitations  Respiratory: Negative for cough, shortness of breath (no SOB at rest) and wheezing  Endocrine: Negative  Hematologic/Lymphatic: Negative  Skin: Negative  Musculoskeletal: Negative  Gastrointestinal: Negative for bloating, abdominal pain, nausea and vomiting  Genitourinary: Negative  Neurological: Negative for dizziness and light-headedness  Psychiatric/Behavioral: Negative  All other systems reviewed and are negative  Objective:   Vitals: Blood pressure 143/62, pulse 68, temperature 98 8 °F (37 1 °C), resp  rate 17, height 5' 6" (1 676 m), weight 72 6 kg (160 lb 0 9 oz), SpO2 92 %  ,     Wt Readings from Last 3 Encounters:   04/12/21 72 6 kg (160 lb 0 9 oz)   03/10/21 78 5 kg (173 lb)   09/22/20 78 5 kg (173 lb 1 6 oz)        Lab Results   Component Value Date    CREATININE 1 11 04/12/2021    CREATININE 1 07 04/11/2021    CREATININE 1 10 04/10/2021         Body mass index is 25 83 kg/m²  ,     Systolic (02MPP), WQA:723 , Min:114 , ILW:801     Diastolic (88BVE), GHE:70, Min:52, Max:79          Intake/Output Summary (Last 24 hours) at 4/12/2021 0939  Last data filed at 4/12/2021 0531  Gross per 24 hour   Intake 440 ml   Output 1650 ml   Net -1210 ml     Weight (last 2 days)     Date/Time   Weight    04/12/21 0533   72 6 (160 05)    04/11/21 0600   72 1 (158 95)                Telemetry Review: Not on tele        Physical Exam  Vitals signs reviewed  Constitutional:       General: She is not in acute distress  HENT:      Head: Normocephalic and atraumatic        Mouth/Throat: Mouth: Mucous membranes are moist    Neck:      Musculoskeletal: Normal range of motion and neck supple  Cardiovascular:      Rate and Rhythm: Normal rate and regular rhythm  Heart sounds: Normal heart sounds, S1 normal and S2 normal  No murmur  Pulmonary:      Effort: Pulmonary effort is normal  No respiratory distress  Breath sounds: Normal breath sounds  Abdominal:      General: Bowel sounds are normal  There is no distension  Palpations: Abdomen is soft  Musculoskeletal: Normal range of motion  Right lower leg: No edema  Left lower leg: No edema  Skin:     General: Skin is warm and dry  Neurological:      Mental Status: She is alert and oriented to person, place, and time     Psychiatric:         Mood and Affect: Mood normal          LABORATORY RESULTS  Results from last 7 days   Lab Units 04/08/21  2154   TROPONIN I ng/mL <0 02     CBC with diff:   Results from last 7 days   Lab Units 04/12/21  0457 04/11/21  0604 04/10/21  0433 04/09/21  0510 04/08/21  2032   WBC Thousand/uL 10 55* 11 39* 11 25* 8 38 9 26   HEMOGLOBIN g/dL 9 2* 8 9* 7 8* 7 4* 8 2*   HEMATOCRIT % 29 7* 29 0* 26 1* 24 6* 27 3*   MCV fL 93 92 92 93 94   PLATELETS Thousands/uL 272 277 273 228 256   MCH pg 28 8 28 2 27 6 28 0 28 3   MCHC g/dL 31 0* 30 7* 29 9* 30 1* 30 0*   RDW % 14 7 14 9 14 8 14 7 14 7   MPV fL 10 9 11 5 11 2 11 9 12 0   NRBC AUTO /100 WBCs  --   --   --   --  0       CMP:  Results from last 7 days   Lab Units 04/12/21 0457 04/11/21  0604 04/10/21  0433 04/09/21  0510 04/08/21  2032   POTASSIUM mmol/L 3 9 4 2 4 3 4 6 5 2   CHLORIDE mmol/L 102 101 103 101 100   CO2 mmol/L 34* 32 29 30 27   BUN mg/dL 75* 80* 98* 112* 113*   CREATININE mg/dL 1 11 1 07 1 10 1 21 1 13   CALCIUM mg/dL 8 9 8 9 9 1 8 7 9 1   AST U/L  --   --   --   --  17   ALT U/L  --   --   --   --  25   ALK PHOS U/L  --   --   --   --  99   EGFR ml/min/1 73sq m 48 51 49 44 47       BMP:  Results from last 7 days   Lab Units 21  0457 21  0604 04/10/21  0433 21  0510 21   POTASSIUM mmol/L 3 9 4 2 4 3 4 6 5 2   CHLORIDE mmol/L 102 101 103 101 100   CO2 mmol/L 34* 32 29 30 27   BUN mg/dL 75* 80* 98* 112* 113*   CREATININE mg/dL 1 11 1 07 1 10 1 21 1 13   CALCIUM mg/dL 8 9 8 9 9 1 8 7 9 1       Lab Results   Component Value Date    NTBNP 17,688 (H) 2021             Results from last 7 days   Lab Units 21  0604   MAGNESIUM mg/dL 2 1          Results from last 7 days   Lab Units 21   HEMOGLOBIN A1C % 6 0*              Results from last 7 days   Lab Units 21   INR  2 19*       Lipid Profile:   No results found for: CHOL  Lab Results   Component Value Date    HDL 10 (L) 2020     Lab Results   Component Value Date    LDLCALC 23 2020     Lab Results   Component Value Date    TRIG 220 (H) 2020       Cardiac testing:   Results for orders placed during the hospital encounter of 21   Echo complete with contrast if indicated    Narrative [address] [place] [address]    Transthoracic Echocardiogram  2D, M-mode, Doppler, and Color Doppler    Study date:  2021    Patient: Bryant Miller  MR number: [id number]  Account number: [de-identified]  : 1945  Age: 67 years  Gender: Female  Status: Inpatient  Location: [address] [place] [address]  Height: 66 in  Weight: 158 lb  BP: 133/ 56 mmHg    Indications: Heart failure  Diagnoses: I50 9 - Heart failure, unspecified    Sonographer:  NOAH Boston RDCS RVT  Referring Physician:  Jeremias Jones DO  Group:  [facility]  Interpreting Physician:  Siobhan Selby MD    SUMMARY    LEFT VENTRICLE:  Systolic function was normal  Ejection fraction was estimated to be 65 %  There were no regional wall motion abnormalities  Left ventricular diastolic function parameters were abnormal     LEFT ATRIUM:  The atrium was moderately dilated      MITRAL VALVE:  There was marked annular calcification  There was mild to moderate regurgitation  TRICUSPID VALVE:  There was moderate regurgitation  Pulmonary artery systolic pressure was moderately to markedly increased  Estimated peak PA pressure was 60 mmHg  IVC, HEPATIC VEINS:  Respirophasic changes were blunted (less than 50% variation)  PERICARDIUM:  There was a moderate-sized left pleural effusion  Ascites was noted  HISTORY: PRIOR HISTORY: CHF, A-fib, hypertension, diabetes  PROCEDURE: The study was performed in the [address] [facility]  This was a routine study  The transthoracic approach was used  The study included complete 2D imaging, M-mode, complete spectral Doppler, and color Doppler  Images were  obtained from the parasternal, apical, subcostal, and suprasternal notch acoustic windows  Image quality was adequate  LEFT VENTRICLE: Size was normal  Systolic function was normal  Ejection fraction was estimated to be 65 %  There were no regional wall motion abnormalities  Wall thickness was normal  DOPPLER: Transmitral flow pattern: atrial fibrillation  Left ventricular diastolic function parameters were abnormal     RIGHT VENTRICLE: The size was normal  Systolic function was normal  Wall thickness was normal     LEFT ATRIUM: The atrium was moderately dilated  RIGHT ATRIUM: Size was normal     MITRAL VALVE: There was marked annular calcification  Valve structure was normal  There was normal leaflet separation  DOPPLER: The transmitral velocity was within the normal range  There was no evidence for stenosis  There was mild to  moderate regurgitation  AORTIC VALVE: The valve was trileaflet  Leaflets exhibited sclerosis  DOPPLER: Transaortic velocity was within the normal range  There was no evidence for stenosis  There was no significant regurgitation  TRICUSPID VALVE: The valve structure was normal  There was normal leaflet separation   DOPPLER: The transtricuspid velocity was within the normal range  There was no evidence for stenosis  There was moderate regurgitation  Pulmonary artery  systolic pressure was moderately to markedly increased  Estimated peak PA pressure was 60 mmHg  PULMONIC VALVE: Leaflets exhibited normal thickness, no calcification, and normal cuspal separation  DOPPLER: The transpulmonic velocity was within the normal range  There was no significant regurgitation  PERICARDIUM: There was no pericardial effusion  There was a moderate-sized left pleural effusion  Ascites was noted  The pericardium was normal in appearance  AORTA: The root exhibited normal size  SYSTEMIC VEINS: IVC: The inferior vena cava was normal in size  Respirophasic changes were blunted (less than 50% variation)  SYSTEM MEASUREMENT TABLES    2D  %FS: 23 41 %  Ao Diam: 2 57 cm  EDV(Teich): 81 72 ml  EF(Teich): 47 15 %  ESV(Teich): 43 19 ml  IVSd: 0 88 cm  LA Area: 22 59 cm2  LA Diam: 4 11 cm  LVEDV MOD A4C: 77 52 ml  LVEF MOD A4C: 59 77 %  LVESV MOD A4C: 31 19 ml  LVIDd: 4 27 cm  LVIDs: 3 27 cm  LVLd A4C: 7 01 cm  LVLs A4C: 5 8 cm  LVPWd: 0 8 cm  RA Area: 11 03 cm2  RVIDd: 3 74 cm  SV MOD A4C: 46 33 ml  SV(Teich): 38 53 ml    CW  TR Vmax: 3 56 m/s  TR maxP 8 mmHg    MM  TAPSE: 1 9 cm    PW  E' Sept: 0 05 m/s    [employer] Accredited Echocardiography Laboratory    Prepared and electronically signed by    Funmilayo Verdin MD  Signed 2021 16:51:26       No results found for this or any previous visit  No results found for this or any previous visit  No procedure found  No results found for this or any previous visit        Meds/Allergies   current meds:   Current Facility-Administered Medications   Medication Dose Route Frequency    acetaminophen (TYLENOL) tablet 650 mg  650 mg Oral Q6H PRN    atorvastatin (LIPITOR) tablet 40 mg  40 mg Oral After Dinner    bumetanide (BUMEX) injection 1 mg  1 mg Intravenous BID    ferrous sulfate tablet 325 mg  325 mg Oral Daily With Breakfast  gabapentin (NEURONTIN) capsule 100 mg  100 mg Oral TID    insulin glargine (LANTUS) subcutaneous injection 10 Units 0 1 mL  10 Units Subcutaneous HS    insulin lispro (HumaLOG) 100 units/mL subcutaneous injection 1-5 Units  1-5 Units Subcutaneous HS    insulin lispro (HumaLOG) 100 units/mL subcutaneous injection 1-6 Units  1-6 Units Subcutaneous TID AC    magnesium oxide (MAG-OX) tablet 400 mg  400 mg Oral BID    metoprolol tartrate (LOPRESSOR) tablet 75 mg  75 mg Oral Q12H [address]    ondansetron (ZOFRAN) injection 4 mg  4 mg Intravenous Q6H PRN    oxyCODONE (ROXICODONE) IR tablet 5 mg  5 mg Oral Q4H PRN    rivaroxaban (XARELTO) tablet 15 mg  15 mg Oral Daily With Breakfast     Medications Prior to Admission   Medication    acetaminophen (TYLENOL) 325 mg tablet    aspirin (ECOTRIN LOW STRENGTH) 81 mg EC tablet    atorvastatin (LIPITOR) 40 mg tablet    bisacodyl (DULCOLAX) 10 mg suppository    ferrous sulfate 325 (65 Fe) mg tablet    gabapentin (NEURONTIN) 100 mg capsule    insulin glargine (LANTUS) 100 units/mL subcutaneous injection    insulin lispro (HumaLOG) 100 units/mL injection    insulin lispro (HumaLOG) 100 units/mL injection    metoprolol tartrate 75 MG TABS    mineral oil enema    Nutritional Supplements (Yevgeniy) PACK    oxyCODONE (OXY-IR) 5 MG capsule    polyethylene glycol (MIRALAX) 17 g packet    rivaroxaban (XARELTO) 15 mg tablet    Specialty Vitamins Products (PROSTATE PO)    torsemide (DEMADEX) 20 mg tablet    VITAMIN D PO            Counseling / Coordination of Care  Total floor / unit time spent today 20 minutes  Greater than 50% of total time was spent with the patient and / or family counseling and / or coordination of care  ** Please Note: Dragon 360 Dictation voice to text software may have been used in the creation of this document   **

## 2021-04-12 NOTE — ASSESSMENT & PLAN NOTE
· Home regimen:  Metoprolol 75 mg q 12 hours   · Continue on Lopressor and Bumex  · Monitor vitals as per protocol

## 2021-04-12 NOTE — ASSESSMENT & PLAN NOTE
· Patient chronically wears 2 L supplemental oxygen via nasal cannula at [facility]  · SpO2 stable here on home oxygen requirement

## 2021-04-12 NOTE — ASSESSMENT & PLAN NOTE
Wt Readings from Last 3 Encounters:   04/12/21 72 6 kg (160 lb 0 9 oz)   03/10/21 78 5 kg (173 lb)   09/22/20 78 5 kg (173 lb 1 6 oz)     · Acute on chronic CHF, poa, a/e/b SOB, proBNP 17,688, CXR findings of moderate pulm edema with small right pleural fusion  · Home regimen: torsemide 10mg 2x per week   · Echo 4/9//2021: "LVEF 65%   Left atrial dilation, Mild- Mod MVR, Moderate TR, mod-severe pHTN 60mmhg  · Unknown dry weight - weight on discharge from hospital 09/2020 was 190 lb, on admission patient is 173 lb  · Troponin <0 02   · Albumin at 2 1  · s/p IV lasix 40mg BID, now on IV bumex 1mg BID  · Strict I/Os, net negative 1440ccs/24hrs, net negative 3815mls since admission  · Weight 158lbs today (bedscale)  · Cardiac diet - 2 g sodium restriction and 1500 mL fluid restriction  · Cardiology on board

## 2021-04-12 NOTE — PLAN OF CARE
Problem: Potential for Falls  Goal: Patient will remain free of falls  Description: INTERVENTIONS:  - Assess patient frequently for physical needs  -  Identify cognitive and physical deficits and behaviors that affect risk of falls  -  [place] fall precautions as indicated by assessment   - Educate patient/family on patient safety including physical limitations  - Instruct patient to call for assistance with activity based on assessment  - Modify environment to reduce risk of injury  - Consider OT/PT consult to assist with strengthening/mobility  Outcome: Progressing     Problem: Prexisting or High Potential for Compromised Skin Integrity  Goal: Skin integrity is maintained or improved  Description: INTERVENTIONS:  - Identify patients at risk for skin breakdown  - Assess and monitor skin integrity  - Assess and monitor nutrition and hydration status  - Monitor labs   - Assess for incontinence   - Turn and reposition patient  - Assist with mobility/ambulation  - Relieve pressure over bony prominences  - Avoid friction and shearing  - Provide appropriate hygiene as needed including keeping skin clean and dry  - Evaluate need for skin moisturizer/barrier cream  - Collaborate with interdisciplinary team   - Patient/family teaching  - Consider wound care consult   Outcome: Progressing     Problem: Nutrition/Hydration-ADULT  Goal: Nutrient/Hydration intake appropriate for improving, restoring or maintaining nutritional needs  Description: Monitor and assess patient's nutrition/hydration status for malnutrition  Collaborate with interdisciplinary team and initiate plan and interventions as ordered  Monitor patient's weight and dietary intake as ordered or per policy  Utilize nutrition screening tool and intervene as necessary  Determine patient's food preferences and provide high-protein, high-caloric foods as appropriate       INTERVENTIONS:  - Monitor oral intake, urinary output, labs, and treatment plans  - Assess nutrition and hydration status and recommend course of action  - Evaluate amount of meals eaten  - Assist patient with eating if necessary   - Allow adequate time for meals  - Recommend/ encourage appropriate diets, oral nutritional supplements, and vitamin/mineral supplements  - Order, calculate, and assess calorie counts as needed  - Recommend, monitor, and adjust tube feedings and TPN/PPN based on assessed needs  - Assess need for intravenous fluids  - Provide specific nutrition/hydration education as appropriate  - Include patient/family/caregiver in decisions related to nutrition  Outcome: Progressing     Problem: CARDIOVASCULAR - ADULT  Goal: Maintains optimal cardiac output and hemodynamic stability  Description: INTERVENTIONS:  - Monitor I/O, vital signs and rhythm  - Monitor for S/S and trends of decreased cardiac output  - Administer and titrate ordered vasoactive medications to optimize hemodynamic stability  - Assess quality of pulses, skin color and temperature  - Assess for signs of decreased coronary artery perfusion  - Instruct patient to report change in severity of symptoms  Outcome: Progressing  Goal: Absence of cardiac dysrhythmias or at baseline rhythm  Description: INTERVENTIONS:  - Continuous cardiac monitoring, vital signs, obtain 12 lead EKG if ordered  - Administer antiarrhythmic and heart rate control medications as ordered  - Monitor electrolytes and administer replacement therapy as ordered  Outcome: Progressing

## 2021-04-12 NOTE — ASSESSMENT & PLAN NOTE
Lab Results   Component Value Date    HGBA1C 6 0 (H) 04/08/2021       Recent Labs     04/11/21  1119 04/11/21  1619 04/11/21  2102 04/12/21  0731   POCGLU 198* 132 225* 77       Blood Sugar Average: Last 72 hrs:  · (P) 001 5874 home regimen:  Lantus 14 units HS, Humalog 11 units with lunch and dinner, and 13 units with breakfast  · Continue on lantus to 10iu hs   · lispro 11U TID with meals was discontinued  · Accu-Chek a c  HS with Humalog sliding scale

## 2021-04-12 NOTE — PROGRESS NOTES
[place]  Progress Note - Skip Dry 1945, 67 y o  female MRN: [id number]  Unit/Bed#: -Enrique Encounter: [id number]  Primary Care Provider: Angela Shahid DO   Date and time admitted to hospital: 4/8/2021  7:32 PM    Chronic indwelling Trujillo catheter  Assessment & Plan  Chronic trujillo, noted to be leaking  Changed 4/10    Hypoalbuminemia  Assessment & Plan  Albumin 2 1  IV albumin x 1 dosed 4/10/21    Guaiac positive stools  Assessment & Plan  · SNF staff report heme-positive stool x2 earlier today   · Stool occult negative in the ED       Chronic respiratory failure with hypoxia ([address] )  Assessment & Plan  · Patient chronically wears 2 L supplemental oxygen via nasal cannula at SNF  · SpO2 stable here on home oxygen requirement    Iron deficiency anemia  Assessment & Plan  · Chronic anemia, Hb 8 2 on admission   · Baseline appears to be between 8-9   · Cardiology recommend baseline Hb > 9  · Hb downtrended to 7 4, s/p 1iu prbc on 4/10  · HB this morning is 9 2    Paroxysmal atrial fibrillation (HCC)  Assessment & Plan  · Home regimen:  Metoprolol 75 mg q 12 hours and Xarelto 15 mg daily   · Will continue home regimen    Pressure ulcer of sacral region, stage 4 (HCC)  Assessment & Plan  · Stage 4 pressure ulcer, noted on admission  · Offload pressure  · Wound care on board, nahed young    Stage 3a chronic kidney disease  Assessment & Plan  Lab Results   Component Value Date    EGFR 48 04/12/2021    EGFR 51 04/11/2021    EGFR 49 04/10/2021    CREATININE 1 11 04/12/2021    CREATININE 1 07 04/11/2021    CREATININE 1 10 04/10/2021   · Cr 1 13 on admission  · Baseline creatinine appears to be between 0 86-1 41  · Avoid hypotension/nephrotoxins medications  · Trend BMP    Hypertension  Assessment & Plan  · Home regimen:  Metoprolol 75 mg q 12 hours   · Continue on Lopressor and Bumex  · Monitor vitals as per protocol    Type 2 diabetes mellitus with hyperglycemia (HCC)  Assessment & Plan  Lab Results   Component Value Date    HGBA1C 6 0 (H) 04/08/2021       Recent Labs     04/11/21  1119 04/11/21  1619 04/11/21  2102 04/12/21  0731   POCGLU 198* 132 225* 77       Blood Sugar Average: Last 72 hrs:  · (P) 738 1835 home regimen:  Lantus 14 units HS, Humalog 11 units with lunch and dinner, and 13 units with breakfast  · Continue on lantus to 10iu hs   · lispro 11U TID with meals was discontinued  · Accu-Chek a c  HS with Humalog sliding scale    * Acute on chronic diastolic (congestive) heart failure (HCC)  Assessment & Plan  Wt Readings from Last 3 Encounters:   04/12/21 72 6 kg (160 lb 0 9 oz)   03/10/21 78 5 kg (173 lb)   09/22/20 78 5 kg (173 lb 1 6 oz)     · Acute on chronic CHF, poa, a/e/b SOB, proBNP 17,688, CXR findings of moderate pulm edema with small right pleural fusion  · Home regimen: torsemide 10mg 2x per week   · Echo 4/9//2021: "LVEF 65%  Left atrial dilation, Mild- Mod MVR, Moderate TR, mod-severe pHTN 60mmhg  · Unknown dry weight - weight on discharge from hospital 09/2020 was 190 lb, on admission patient is 173 lb  · Troponin <0 02   · Albumin at 2 1  · s/p IV lasix 40mg BID, now on IV bumex 1mg BID  · Strict I/Os, net negative 1440ccs/24hrs, net negative 3815mls since admission  · Weight 158lbs today (bedscale)  · Cardiac diet - 2 g sodium restriction and 1500 mL fluid restriction  · Cardiology on board        Labs & Imaging: I have personally reviewed pertinent reports  VTE Pharmacologic Prophylaxis:  Xarelto  VTE Mechanical Prophylaxis: sequential compression device    Code Status:   Level 1 - Full Code    Patient Centered Rounds: I have performed bedside rounds with nursing staff today      Discussions with Specialists or Other Care Team Provider:  Cardiology    Education and Discussions with Family / Patient: Mary Morris    Current Length of Stay: 4 day(s)    Current Patient Status: Inpatient   Certification Statement: The patient will continue to require additional inpatient hospital stay due to see my assessment and plan  Subjective:   Patient is seen and examined at bedside  States she feels okay  Denies any other complaints  Afebrile  All other ROS are negative  Objective:    Vitals: Blood pressure 143/62, pulse 68, temperature 98 8 °F (37 1 °C), resp  rate 17, height 5' 6" (1 676 m), weight 72 6 kg (160 lb 0 9 oz), SpO2 92 %  ,Body mass index is 25 83 kg/m²  SPO2 RA Rest      ED to Hosp-Admission (Current) from 4/8/2021 in [address] Med Surg Unit   SpO2  92 %   SpO2 Activity  At Rest   O2 Device  Nasal cannula   O2 Flow Rate  --        I&O:     Intake/Output Summary (Last 24 hours) at 4/12/2021 0812  Last data filed at 4/12/2021 0531  Gross per 24 hour   Intake 440 ml   Output 1650 ml   Net -1210 ml       Physical Exam:    General- Alert, lying comfortably in bed  Not in any acute distress  Neck- Supple, No JVD  CVS- regular, S1 and S2 normal  Chest- Bilateral Air entry, No rhochi, crackles or wheezing present  Abdomen- soft, nontender, not distended, no guarding or rigidity, BS+  Extremities-  No pedal edema, No calf tenderness  Has sacral decubitus ulcer  CNS-   Alert, awake  No focal deficits present      Invasive Devices     Peripheral Intravenous Line            Peripheral IV 04/11/21 Left;Ventral (anterior) Forearm less than 1 day          Drain            Urethral Catheter 3 days                      Social History  reviewed  Family History   Problem Relation Age of Onset    Diabetes Mother     Varicose Veins Mother     Stroke Father     Arthritis Brother     Diabetes Daughter     No Known Problems Brother     reviewed    Meds:  Current Facility-Administered Medications   Medication Dose Route Frequency Provider Last Rate Last Admin    acetaminophen (TYLENOL) tablet 650 mg  650 mg Oral Q6H PRN Marjorie Avendano PA-C        atorvastatin (LIPITOR) tablet 40 mg  40 mg Oral After Angel Argueta PA-C   40 mg at 04/11/21 1803    bumetanide (BUMEX) injection 1 mg  1 mg Intravenous BID Sande Landau, CRNP   1 mg at 04/11/21 8458    ferrous sulfate tablet 325 mg  325 mg Oral Daily With Breakfast Cassy Luna PA-C   325 mg at 04/11/21 1039    gabapentin (NEURONTIN) capsule 100 mg  100 mg Oral TID Cassy Luna PA-C   100 mg at 04/11/21 2054    insulin glargine (LANTUS) subcutaneous injection 10 Units 0 1 mL  10 Units Subcutaneous HS Manuel Glaser MD   10 Units at 04/11/21 2103    insulin lispro (HumaLOG) 100 units/mL subcutaneous injection 1-5 Units  1-5 Units Subcutaneous HS Cassy Luna PA-C   2 Units at 04/11/21 2104    insulin lispro (HumaLOG) 100 units/mL subcutaneous injection 1-6 Units  1-6 Units Subcutaneous TID AC Cassy Luna PA-C   2 Units at 04/11/21 1252    magnesium oxide (MAG-OX) tablet 400 mg  400 mg Oral BID Manuel Glaser MD   400 mg at 04/11/21 1803    metoprolol tartrate (LOPRESSOR) tablet 75 mg  75 mg Oral Q12H [address] Cassy Luna PA-C   75 mg at 04/11/21 2054    ondansetron (ZOFRAN) injection 4 mg  4 mg Intravenous Q6H PRN Cassy Luna PA-C        oxyCODONE (ROXICODONE) IR tablet 5 mg  5 mg Oral Q4H PRN Cassy Luna PA-C        rivaroxaban (XARELTO) tablet 15 mg  15 mg Oral Daily With Breakfast Cassy Luna PA-C   15 mg at 04/11/21 1039      Medications Prior to Admission   Medication    acetaminophen (TYLENOL) 325 mg tablet    aspirin (ECOTRIN LOW STRENGTH) 81 mg EC tablet    atorvastatin (LIPITOR) 40 mg tablet    bisacodyl (DULCOLAX) 10 mg suppository    ferrous sulfate 325 (65 Fe) mg tablet    gabapentin (NEURONTIN) 100 mg capsule    insulin glargine (LANTUS) 100 units/mL subcutaneous injection    insulin lispro (HumaLOG) 100 units/mL injection    insulin lispro (HumaLOG) 100 units/mL injection    metoprolol tartrate 75 MG TABS    mineral oil enema    Nutritional Supplements (Yevgeniy) PACK    oxyCODONE (OXY-IR) 5 MG capsule    polyethylene glycol (MIRALAX) 17 g packet    rivaroxaban (XARELTO) 15 mg tablet    Specialty Vitamins Products (PROSTATE PO)    torsemide (DEMADEX) 20 mg tablet    VITAMIN D PO       Labs:  Results from last 7 days   Lab Units 04/12/21  0457 04/11/21  0604 04/10/21  0433  04/08/21  2032   WBC Thousand/uL 10 55* 11 39* 11 25*   < > 9 26   HEMOGLOBIN g/dL 9 2* 8 9* 7 8*   < > 8 2*   HEMATOCRIT % 29 7* 29 0* 26 1*   < > 27 3*   PLATELETS Thousands/uL 272 277 273   < > 256   NEUTROS PCT %  --   --   --   --  70   LYMPHS PCT %  --   --   --   --  21   MONOS PCT %  --   --   --   --  5   EOS PCT %  --   --   --   --  3    < > = values in this interval not displayed  Results from last 7 days   Lab Units 04/12/21  0457 04/11/21  0604 04/10/21  0433  04/08/21  2032   POTASSIUM mmol/L 3 9 4 2 4 3   < > 5 2   CHLORIDE mmol/L 102 101 103   < > 100   CO2 mmol/L 34* 32 29   < > 27   BUN mg/dL 75* 80* 98*   < > 113*   CREATININE mg/dL 1 11 1 07 1 10   < > 1 13   CALCIUM mg/dL 8 9 8 9 9 1   < > 9 1   ALK PHOS U/L  --   --   --   --  99   ALT U/L  --   --   --   --  25   AST U/L  --   --   --   --  17    < > = values in this interval not displayed  Lab Results   Component Value Date    TROPONINI <0 02 04/08/2021    TROPONINI <0 02 09/14/2020    TROPONINI <0 02 08/25/2020     Results from last 7 days   Lab Units 04/08/21 2032   INR  2 19*     Lab Results   Component Value Date    BLOODCX No Growth After 5 Days  09/16/2020    BLOODCX No Growth After 5 Days  09/16/2020    BLOODCX No Growth After 5 Days   09/14/2020    BLOODCX Staphylococcus coagulase negative (A) 09/14/2020    URINECX 30,000-39,000 cfu/ml Candida glabrata (A) 09/14/2020    URINECX 50,000-59,000 cfu/ml Escherichia coli (A) 08/26/2020    URINECX >100,000 cfu/ml Lactobacillus species (A) 08/26/2020    WOUNDCULT 3+ Growth of Escherichia coli (A) 08/26/2020    WOUNDCULT 3+ Growth of Proteus mirabilis (A) 08/26/2020    WOUNDCULT 3+ Growth of Enterococcus faecalis (A) 08/26/2020    WOUNDCULT 3+ Growth of  08/26/2020         Imaging:  Results for orders placed during the hospital encounter of 04/08/21   XR chest 1 view portable    Narrative CHEST     INDICATION:   GI bleed  COMPARISON:  Compared with 9/17/2020    EXAM PERFORMED/VIEWS:  XR CHEST PORTABLE      FINDINGS:    Cardiac silhouette is mildly prominent  Periventricular opacities of ectatic vessels  Poor inspiration  Prominent vascular markings with haziness in the perihilar distribution  Haziness obscuring right costophrenic angle  No pneumothorax  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  Impression Moderate congestive changes with small right effusion with worsening  Workstation performed: [id number]       No results found for this or any previous visit      Last 24 Hours Medication List:   Current Facility-Administered Medications   Medication Dose Route Frequency Provider Last Rate    acetaminophen  650 mg Oral Q6H PRN Richard Form, PA-C      atorvastatin  40 mg Oral After Doug Silence, PA-C      bumetanide  1 mg Intravenous BID Festusjennifer Jones, CRNP      ferrous sulfate  325 mg Oral Daily With Breakfast Richard Form, PA-C      gabapentin  100 mg Oral TID Richard Form, PA-C      insulin glargine  10 Units Subcutaneous HS Ruben Boyer MD      insulin lispro  1-5 Units Subcutaneous HS Richard Form, PA-C      insulin lispro  1-6 Units Subcutaneous TID AC Richard Form, PA-C      magnesium oxide  400 mg Oral BID Ruben Boyer MD      metoprolol tartrate  75 mg Oral Q12H [address] Richard Form, PA-C      ondansetron  4 mg Intravenous Q6H PRN Richard Form, PA-C      oxyCODONE  5 mg Oral Q4H PRN Richard Form, PA-C      rivaroxaban  15 mg Oral Daily With Breakfast Richard Form, PA-C          Today, Patient Was Seen By: Day Min MD    ** Please Note: Dictation voice to text software may have been used in the creation of this document   **

## 2021-04-12 NOTE — ASSESSMENT & PLAN NOTE
Lab Results   Component Value Date    EGFR 48 04/12/2021    EGFR 51 04/11/2021    EGFR 49 04/10/2021    CREATININE 1 11 04/12/2021    CREATININE 1 07 04/11/2021    CREATININE 1 10 04/10/2021   · Cr 1 13 on admission  · Baseline creatinine appears to be between 0 86-1 41  · Avoid hypotension/nephrotoxins medications  · Trend BMP

## 2021-04-12 NOTE — ASSESSMENT & PLAN NOTE
· Chronic anemia, Hb 8 2 on admission   · Baseline appears to be between 8-9   · Cardiology recommend baseline Hb > 9  · Hb downtrended to 7 4, s/p 1iu prbc on 4/10  · HB this morning is 9 2

## 2021-04-13 NOTE — ASSESSMENT & PLAN NOTE
Wt Readings from Last 3 Encounters:   04/13/21 72 1 kg (158 lb 15 2 oz)   03/10/21 78 5 kg (173 lb)   09/22/20 78 5 kg (173 lb 1 6 oz)     · Acute on chronic CHF, poa, a/e/b SOB, proBNP 17,688, CXR findings of moderate pulm edema with small right pleural fusion  · Home regimen: torsemide 10mg 2x per week   · Echo 4/9//2021: "LVEF 65%  Left atrial dilation, Mild- Mod MVR, Moderate TR, mod-severe pHTN 60mmhg  · Unknown dry weight - weight on discharge from hospital 09/2020 was 190 lb, on admission patient is 173 lb  · Troponin <0 02   · Albumin at 2 1  · Strict I/Os, net negative 1440ccs/24hrs, net negative 3815mls since admission  · Weight 158lbs today (bedscale)  · Cardiac diet - 2 g sodium restriction and 1500 mL fluid restriction  · Cardiology on board  · s/p IV lasix 40mg BID, and was switched to Bumex IV 1 mg b i d  · Cardiology follow-up noted  They recommended switching to torsemide 10 mg daily and patient is okay to discharge from their perspective  · Patient will be discharged back to [facility]    Discussed with patient and her son at length and they are okay with the plan

## 2021-04-13 NOTE — DISCHARGE INSTR - AVS FIRST PAGE
Follow-up with PCP as outpatient  Follow-up with Cardiology as outpatient  Continue wound care as per instructions and follow-up with wound care clinic/nurse  Return to ER with any worsening shortness of breath, chest pain, palpitation or any other alarming symptoms

## 2021-04-13 NOTE — PROGRESS NOTES
General Cardiology   Progress Note -  [facility]   Cole Montemayor 67 y o  female MRN: [id number]    Unit/Bed#: -01 Encounter: [id number]    Assessment:    Acute on chronic diastolic HF  · TTE from 0/3/0340: EF 65% with no WMA;    Left ventricular diastolic function parameters were abnormal; LA moderately dilated; moderate MR/TR, pulmonary artery pressure was markedly increased estimated PA pressure 60 mmHg  · IV Bumex 1 mg BID ordered during this admit with last dose last evening; now transitioned to PO torsemide 10 mg QD starting this AM, continue upon discharge  · I/O: UO not recorded during day shift yesterday; total urine output for afternoon/evening shift 500 cc  · Weight this AM is 158 lbs by bed scale down from 160 lbs by bed scale yesterday; dry weight is unknown; weight upon discharge 09/20/2020 was 190 lbs  ·  continue daily weights  ·  strict I/O documentation  ·  2 g sodium restricted diet with fluid restriction      chronic respiratory failure with hypoxia  · chronically wears 2 L supplemental oxygen at [facility]  · O2 sats remain stable on 2 L NC     PAF   · EKG on admit is sinus rhythm   · Xarelto 15 mg QD and metoprolol tartrate 75 mg BID     Iron deficiency anemia  ·  baseline hemoglobin 8-9  · S/p 1 unit PRBC's 4/10; Hgb this AM is 9 8  ·  management per primary team     Essential HTN  · SBP averaging 140's  · Home meds:  metoprolol tartrate 75 mg BID and torsemide 10 mg     DM2  · HgbA1c 9 9  ·  management per primary team     CKD stage 3  · Baseline creat 1 2-1 3  · Creat on admit 1 13; and creat this AM is 1 0 down from 1 1 yesterday  ·  will monitor closely during IV diuresis     hyperlipidemia  · Lipitor 40 mg QD        Plan/Recommendations:  · IV Bumex 1 mg BID ordered during this admit with last dose last evening; now transitioned to PO torsemide 10 mg QD starting this AM, continue upon discharge  · Continue remaining meds as prescribed  ·  our office will contact patient for cardiology follow-up      __________________________________________________________________________    Subjective  She offers no acute complaints  She feels her breathing is comfortable at rest and she is lying fairly flat in bed and appears to be breathing comfortably  Review of Systems   Constitution: Negative for chills, fever and malaise/fatigue  HENT: Negative for congestion  Cardiovascular: Negative for chest pain, dyspnea on exertion, leg swelling, orthopnea and palpitations  Respiratory: Negative for cough, shortness of breath (no SOB at rest) and wheezing  Endocrine: Negative  Hematologic/Lymphatic: Negative  Skin: Negative  Musculoskeletal: Negative  Gastrointestinal: Negative for bloating, abdominal pain, nausea and vomiting  Genitourinary: Negative  Neurological: Negative for dizziness and light-headedness  Psychiatric/Behavioral: Negative  All other systems reviewed and are negative  Objective:   Vitals: Blood pressure 142/63, pulse 63, temperature 97 9 °F (36 6 °C), resp  rate 18, height 5' 6" (1 676 m), weight 72 1 kg (158 lb 15 2 oz), SpO2 93 %  ,     Wt Readings from Last 3 Encounters:   04/13/21 72 1 kg (158 lb 15 2 oz)   03/10/21 78 5 kg (173 lb)   09/22/20 78 5 kg (173 lb 1 6 oz)        Lab Results   Component Value Date    CREATININE 1 03 04/13/2021    CREATININE 1 11 04/12/2021    CREATININE 1 07 04/11/2021         Body mass index is 25 66 kg/m²  ,     Systolic (19HKJ), BXJ:727 , Min:133 , UDL:698     Diastolic (17DFL), ODW:08, Min:57, Max:63          Intake/Output Summary (Last 24 hours) at 4/13/2021 0834  Last data filed at 4/13/2021 0518  Gross per 24 hour   Intake 240 ml   Output 500 ml   Net -260 ml     Weight (last 2 days)     Date/Time   Weight    04/13/21 0600   72 1 (158 95)    04/12/21 0533   72 6 (160 05)    04/11/21 0600   72 1 (158 95)                Telemetry Review: Not on tele        Physical Exam  Vitals signs reviewed     Constitutional: General: She is not in acute distress  HENT:      Head: Normocephalic and atraumatic  Mouth/Throat:      Mouth: Mucous membranes are moist    Neck:      Musculoskeletal: Normal range of motion and neck supple  Cardiovascular:      Rate and Rhythm: Normal rate and regular rhythm  Heart sounds: Normal heart sounds, S1 normal and S2 normal  No murmur  Pulmonary:      Effort: Pulmonary effort is normal  No respiratory distress  Breath sounds: Normal breath sounds  Abdominal:      General: Bowel sounds are normal  There is no distension  Palpations: Abdomen is soft  Musculoskeletal: Normal range of motion  Right lower leg: No edema  Left lower leg: No edema  Skin:     General: Skin is warm and dry  Neurological:      Mental Status: She is alert and oriented to person, place, and time     Psychiatric:         Mood and Affect: Mood normal          LABORATORY RESULTS  Results from last 7 days   Lab Units 04/08/21  2154   TROPONIN I ng/mL <0 02     CBC with diff:   Results from last 7 days   Lab Units 04/13/21  0513 04/12/21 0457 04/11/21  0604 04/10/21  0433 04/09/21  0510 04/08/21 2032   WBC Thousand/uL 10 88* 10 55* 11 39* 11 25* 8 38 9 26   HEMOGLOBIN g/dL 9 8* 9 2* 8 9* 7 8* 7 4* 8 2*   HEMATOCRIT % 32 2* 29 7* 29 0* 26 1* 24 6* 27 3*   MCV fL 93 93 92 92 93 94   PLATELETS Thousands/uL 270 272 277 273 228 256   MCH pg 28 3 28 8 28 2 27 6 28 0 28 3   MCHC g/dL 30 4* 31 0* 30 7* 29 9* 30 1* 30 0*   RDW % 14 5 14 7 14 9 14 8 14 7 14 7   MPV fL 10 8 10 9 11 5 11 2 11 9 12 0   NRBC AUTO /100 WBCs 0  --   --   --   --  0       CMP:  Results from last 7 days   Lab Units 04/13/21  0513 04/12/21 0457 04/11/21  0604 04/10/21  0433 04/09/21  0510 04/08/21 2032   POTASSIUM mmol/L 4 0 3 9 4 2 4 3 4 6 5 2   CHLORIDE mmol/L 101 102 101 103 101 100   CO2 mmol/L 37* 34* 32 29 30 27   BUN mg/dL 64* 75* 80* 98* 112* 113*   CREATININE mg/dL 1 03 1 11 1 07 1 10 1 21 1 13   CALCIUM mg/dL 9 1 8 9 8 9 9 1 8 7 9 1   AST U/L  --   --   --   --   --  17   ALT U/L  --   --   --   --   --  25   ALK PHOS U/L  --   --   --   --   --  99   EGFR ml/min/1 73sq m 53 48 51 49 44 47       BMP:  Results from last 7 days   Lab Units 21  0513 21  0457 21  0604 04/10/21  0433 21  0510 21   POTASSIUM mmol/L 4 0 3 9 4 2 4 3 4 6 5 2   CHLORIDE mmol/L 101 102 101 103 101 100   CO2 mmol/L 37* 34* 32 29 30 27   BUN mg/dL 64* 75* 80* 98* 112* 113*   CREATININE mg/dL 1 03 1 11 1 07 1 10 1 21 1 13   CALCIUM mg/dL 9 1 8 9 8 9 9 1 8 7 9 1       Lab Results   Component Value Date    NTBNP 17,688 (H) 2021             Results from last 7 days   Lab Units 21  0513 21  0604   MAGNESIUM mg/dL 2 0 2 1          Results from last 7 days   Lab Units 21   HEMOGLOBIN A1C % 6 0*              Results from last 7 days   Lab Units 21   INR  2 19*       Lipid Profile:   No results found for: CHOL  Lab Results   Component Value Date    HDL 10 (L) 2020     Lab Results   Component Value Date    LDLCALC 23 2020     Lab Results   Component Value Date    TRIG 220 (H) 2020       Cardiac testing:   Results for orders placed during the hospital encounter of 21   Echo complete with contrast if indicated    [address]    Transthoracic Echocardiogram  2D, M-mode, Doppler, and Color Doppler    Study date:  2021    Patient: Yvonne Bullard  MR number: [id number]  Account number: [de-identified]  : 1945  Age: 67 years  Gender: Female  Status: Inpatient  Location: [address] [place] [address]  Height: 66 in  Weight: 158 lb  BP: 133/ 56 mmHg    Indications: Heart failure      Diagnoses: I50 9 - Heart failure, unspecified    Sonographer:  NOAH Horowitz [facility] RVT  Referring Physician:  Tony Cruz DO  Group:  [id number]  Interpreting Physician:  Latoya Bo, MD    SUMMARY    LEFT VENTRICLE:  Systolic function was normal  Ejection fraction was estimated to be 65 %  There were no regional wall motion abnormalities  Left ventricular diastolic function parameters were abnormal     LEFT ATRIUM:  The atrium was moderately dilated  MITRAL VALVE:  There was marked annular calcification  There was mild to moderate regurgitation  TRICUSPID VALVE:  There was moderate regurgitation  Pulmonary artery systolic pressure was moderately to markedly increased  Estimated peak PA pressure was 60 mmHg  IVC, HEPATIC VEINS:  Respirophasic changes were blunted (less than 50% variation)  PERICARDIUM:  There was a moderate-sized left pleural effusion  Ascites was noted  HISTORY: PRIOR HISTORY: CHF, A-fib, hypertension, diabetes  PROCEDURE: The study was performed in the [facility] [address] [facility]  This was a routine study  The transthoracic approach was used  The study included complete 2D imaging, M-mode, complete spectral Doppler, and color Doppler  Images were  obtained from the parasternal, apical, subcostal, and suprasternal notch acoustic windows  Image quality was adequate  LEFT VENTRICLE: Size was normal  Systolic function was normal  Ejection fraction was estimated to be 65 %  There were no regional wall motion abnormalities  Wall thickness was normal  DOPPLER: Transmitral flow pattern: atrial fibrillation  Left ventricular diastolic function parameters were abnormal     RIGHT VENTRICLE: The size was normal  Systolic function was normal  Wall thickness was normal     LEFT ATRIUM: The atrium was moderately dilated  RIGHT ATRIUM: Size was normal     MITRAL VALVE: There was marked annular calcification  Valve structure was normal  There was normal leaflet separation  DOPPLER: The transmitral velocity was within the normal range  There was no evidence for stenosis  There was mild to  moderate regurgitation  AORTIC VALVE: The valve was trileaflet   Leaflets exhibited sclerosis  DOPPLER: Transaortic velocity was within the normal range  There was no evidence for stenosis  There was no significant regurgitation  TRICUSPID VALVE: The valve structure was normal  There was normal leaflet separation  DOPPLER: The transtricuspid velocity was within the normal range  There was no evidence for stenosis  There was moderate regurgitation  Pulmonary artery  systolic pressure was moderately to markedly increased  Estimated peak PA pressure was 60 mmHg  PULMONIC VALVE: Leaflets exhibited normal thickness, no calcification, and normal cuspal separation  DOPPLER: The transpulmonic velocity was within the normal range  There was no significant regurgitation  PERICARDIUM: There was no pericardial effusion  There was a moderate-sized left pleural effusion  Ascites was noted  The pericardium was normal in appearance  AORTA: The root exhibited normal size  SYSTEMIC VEINS: IVC: The inferior vena cava was normal in size  Respirophasic changes were blunted (less than 50% variation)  SYSTEM MEASUREMENT TABLES    2D  %FS: 23 41 %  Ao Diam: 2 57 cm  EDV(Teich): 81 72 ml  EF(Teich): 47 15 %  ESV(Teich): 43 19 ml  IVSd: 0 88 cm  LA Area: 22 59 cm2  LA Diam: 4 11 cm  LVEDV MOD A4C: 77 52 ml  LVEF MOD A4C: 59 77 %  LVESV MOD A4C: 31 19 ml  LVIDd: 4 27 cm  LVIDs: 3 27 cm  LVLd A4C: 7 01 cm  LVLs A4C: 5 8 cm  LVPWd: 0 8 cm  RA Area: 11 03 cm2  RVIDd: 3 74 cm  SV MOD A4C: 46 33 ml  SV(Teich): 38 53 ml    CW  TR Vmax: 3 56 m/s  TR maxP 8 mmHg    MM  TAPSE: 1 9 cm    PW  E' Sept: 0 05 m/s    [employer] Accredited Echocardiography Laboratory    Prepared and electronically signed by    Roman Guo MD  Signed 2021 16:51:26       No results found for this or any previous visit  No results found for this or any previous visit  No procedure found  No results found for this or any previous visit        Meds/Allergies   current meds:   Current Facility-Administered Medications   Medication Dose Route Frequency    acetaminophen (TYLENOL) tablet 650 mg  650 mg Oral Q6H PRN    atorvastatin (LIPITOR) tablet 40 mg  40 mg Oral After Dinner    ferrous sulfate tablet 325 mg  325 mg Oral Daily With Breakfast    gabapentin (NEURONTIN) capsule 100 mg  100 mg Oral TID    insulin glargine (LANTUS) subcutaneous injection 10 Units 0 1 mL  10 Units Subcutaneous HS    insulin lispro (HumaLOG) 100 units/mL subcutaneous injection 1-5 Units  1-5 Units Subcutaneous HS    insulin lispro (HumaLOG) 100 units/mL subcutaneous injection 1-6 Units  1-6 Units Subcutaneous TID AC    magnesium oxide (MAG-OX) tablet 400 mg  400 mg Oral BID    metoprolol tartrate (LOPRESSOR) tablet 75 mg  75 mg Oral Q12H MARILYN    ondansetron (ZOFRAN) injection 4 mg  4 mg Intravenous Q6H PRN    oxyCODONE (ROXICODONE) IR tablet 5 mg  5 mg Oral Q4H PRN    rivaroxaban (XARELTO) tablet 15 mg  15 mg Oral Daily With Breakfast    torsemide (DEMADEX) tablet 10 mg  10 mg Oral Daily     Medications Prior to Admission   Medication    acetaminophen (TYLENOL) 325 mg tablet    aspirin (ECOTRIN LOW STRENGTH) 81 mg EC tablet    atorvastatin (LIPITOR) 40 mg tablet    bisacodyl (DULCOLAX) 10 mg suppository    ferrous sulfate 325 (65 Fe) mg tablet    gabapentin (NEURONTIN) 100 mg capsule    insulin glargine (LANTUS) 100 units/mL subcutaneous injection    insulin lispro (HumaLOG) 100 units/mL injection    insulin lispro (HumaLOG) 100 units/mL injection    metoprolol tartrate 75 MG TABS    mineral oil enema    Nutritional Supplements (Yevgeniy) PACK    oxyCODONE (OXY-IR) 5 MG capsule    polyethylene glycol (MIRALAX) 17 g packet    rivaroxaban (XARELTO) 15 mg tablet    Specialty Vitamins Products (PROSTATE PO)    torsemide (DEMADEX) 20 mg tablet    VITAMIN D PO            Counseling / Coordination of Care  Total floor / unit time spent today 20 minutes    Greater than 50% of total time was spent with the patient and / or family counseling and / or coordination of care  ** Please Note: Dragon [employer] Dictation voice to text software may have been used in the creation of this document   **

## 2021-04-13 NOTE — ASSESSMENT & PLAN NOTE
· Patient chronically wears 2 L supplemental oxygen via nasal cannula at [facility]  · SpO2 stable here on home oxygen requirement

## 2021-04-13 NOTE — ASSESSMENT & PLAN NOTE
Lab Results   Component Value Date    EGFR 53 04/13/2021    EGFR 48 04/12/2021    EGFR 51 04/11/2021    CREATININE 1 03 04/13/2021    CREATININE 1 11 04/12/2021    CREATININE 1 07 04/11/2021   · Cr 1 13 on admission  · Baseline creatinine appears to be between 0 86-1 41  · Avoid hypotension/nephrotoxins medications  · Trend BMP

## 2021-04-13 NOTE — COVID-19 HEALTH CARE FACILITY TRANSFER FORM
[facility] to [facility] Transfer - COVID-19 Assessment             Name of Patient: Almaz Mike                : 1945          Transport Date: 21       Has the patient been laboratory tested for COVID-19? []  NO  If No,Test was not indicated per  CDC Testing Criteria   May Transfer Patient   [x] YES  If Tested Results below     COVID-19 References              SARS-CoV-2   Date/Time Value Ref Range Status   2021 08:38 AM Negative Negative Final            Question is to be completed for any patient who tests positive for COVID-19        1  [x] Yes [May Transfer] [] No [May Not Transfer]          Question is to be completed for any patient who is tested for COVID-19            2    [] Yes [May Not Transfer] [x] No [May Transfer]          Signature of Physician or Health Care Professional: Alda Isidro MD 21          Form updated as of 3/24/2020

## 2021-04-13 NOTE — DISCHARGE INSTRUCTIONS
Wound Care Plan:   1-Apply Allevyn Life foam dressing to bilateral heels for prevention   Louis with P   Peel back at least daily for skin assessment and re-apply   Change dressing every 3 days and PRN  2-Elevate heels off of bed/chair surface to offload pressure  3-Offloading air cushion in chair if/when getting out of bed  4-Moisturize skin daily with skin nourishing cream    5-Turn/reposition every 2 hours while in bed, or when medically stable, using positioning wedges; and weight shift frequently while in chair for pressure re-distribution on skin  6-Sacrum--irrigate with normal saline, pat dry   Apply 3m no sting skin barrier film to precious-wound skin   Fluff Maxorb Ag into sacral wound loosely and covering right buttock wound   Top with Allevyn Life foam dressing   Change dressing daily and as needed with soilage        Follow-up with PCP as outpatient  Follow-up with Cardiology as outpatient  Continue wound care as per instructions and follow-up with wound care clinic/nurse  Return to ER with any worsening shortness of breath, chest pain, palpitation or any other alarming symptoms

## 2021-04-13 NOTE — DISCHARGE SUMMARY
[place]  Discharge- Marlene Snare 1945, 67 y o  female MRN: [id number]  Unit/Bed#: -Enrique Encounter: [id number]  Primary Care Provider: Susan Rg DO   Date and time admitted to hospital: 4/8/2021  7:32 PM    Chronic indwelling [place] catheter  Assessment & Plan  Chronic trujlilo, noted to be leaking  Changed 4/10    Hypoalbuminemia  Assessment & Plan  Albumin 2 1  IV albumin x 1 dosed 4/10/21    Guaiac positive stools  Assessment & Plan  · SNF staff report heme-positive stool x2 earlier today   · Stool occult negative in the ED       Chronic respiratory failure with hypoxia ([address] )  Assessment & Plan  · Patient chronically wears 2 L supplemental oxygen via nasal cannula at SNF  · SpO2 stable here on home oxygen requirement    Iron deficiency anemia  Assessment & Plan  · Chronic anemia, Hb 8 2 on admission   · Baseline appears to be between 8-9   · Cardiology recommend baseline Hb > 9  · Hb downtrended to 7 4, s/p 1iu prbc on 4/10  · HB this morning is 9 8    Paroxysmal atrial fibrillation (HCC)  Assessment & Plan  · Home regimen:  Metoprolol 75 mg q 12 hours and Xarelto 15 mg daily   · Will continue home regimen    Pressure ulcer of sacral region, stage 4 (HCC)  Assessment & Plan  · Stage 4 pressure ulcer, noted on admission  · Offload pressure  · Wound care on board, nahed young    Stage 3a chronic kidney disease  Assessment & Plan  Lab Results   Component Value Date    EGFR 53 04/13/2021    EGFR 48 04/12/2021    EGFR 51 04/11/2021    CREATININE 1 03 04/13/2021    CREATININE 1 11 04/12/2021    CREATININE 1 07 04/11/2021   · Cr 1 13 on admission  · Baseline creatinine appears to be between 0 86-1 41  · Avoid hypotension/nephrotoxins medications  · Trend BMP    Hypertension  Assessment & Plan  · Home regimen:  Metoprolol 75 mg q 12 hours   · Continue on Lopressor and torsemide  · Monitor vitals as per protocol    Type 2 diabetes mellitus with hyperglycemia (HCC)  Assessment & Plan  Lab Results   Component Value Date    HGBA1C 6 0 (H) 04/08/2021       Recent Labs     04/12/21  1608 04/12/21  2158 04/13/21  0811 04/13/21  1017   POCGLU 114 135 53* 129       Blood Sugar Average: Last 72 hrs:  · (P) 127 6 home regimen:  Lantus 14 units HS, Humalog 11 units with lunch and dinner, and 13 units with breakfast  · Continue on lantus to 10iu hs   · lispro 11U TID with meals was discontinued  · Accu-Chek a c  HS with Humalog sliding scale    * Acute on chronic diastolic (congestive) heart failure (HCC)  Assessment & Plan  Wt Readings from Last 3 Encounters:   04/13/21 72 1 kg (158 lb 15 2 oz)   03/10/21 78 5 kg (173 lb)   09/22/20 78 5 kg (173 lb 1 6 oz)     · Acute on chronic CHF, poa, a/e/b SOB, proBNP 17,688, CXR findings of moderate pulm edema with small right pleural fusion  · Home regimen: torsemide 10mg 2x per week   · Echo 4/9//2021: "LVEF 65%  Left atrial dilation, Mild- Mod MVR, Moderate TR, mod-severe pHTN 60mmhg  · Unknown dry weight - weight on discharge from hospital 09/2020 was 190 lb, on admission patient is 173 lb  · Troponin <0 02   · Albumin at 2 1  · Strict I/Os, net negative 1440ccs/24hrs, net negative 3815mls since admission  · Weight 158lbs today (bedscale)  · Cardiac diet - 2 g sodium restriction and 1500 mL fluid restriction  · Cardiology on board  · s/p IV lasix 40mg BID, and was switched to Bumex IV 1 mg b i d  · Cardiology follow-up noted  They recommended switching to torsemide 10 mg daily and patient is okay to discharge from their perspective  · Patient will be discharged back to [facility]  Discussed with patient and her son at length and they are okay with the plan      Hospital Course:     Angel Eagle is a 67 y o  female patient who originally presented to the hospital on   Admission Orders (From admission, onward)     Ordered        04/08/21 2207  Inpatient Admission  Once                  due to low hemoglobin and heme-positive stool  Patient is a poor historian  Patient did not have any complaints  Hemoglobin on outpatient lab was 7 2  In ER patient hemoglobin was 8 2  Patient was guaiac negative in ER  Chest x-ray showed pulmonary edema and patient was started on IV diuresis  Patient was started on IV Lasix  Patient was seen by Cardiology and switched to Bumex  Patient was diuresed over the weekend with Bumex  Patient will also seen by wound care nurse and will continue as per their recommendation  Frequent position change and offloading  Patient has stage II pressure injury on right buttock and stage IV pressure injury on sacrum  Patient was given 1 unit of packed red cell transfusion during this admission  Hemoglobin remained stable  As per Cardiology patient can be switched to torsemide 10 mg daily and is cleared for discharge  Patient is hemodynamically stable for discharge  Patient has chronic respiratory failure and uses 2 L of supplemental oxygen at home/facility  Patient did not have any melena or active bleeding during this admission  On exam  Chest-clear to auscultation  Abdomen-soft, nontender  Heart-S1-S2 regular  Neuro-no focal deficits  Follow-up with PCP as outpatient  Follow-up with Cardiology as outpatient  Continue wound care as per instructions and follow-up with wound care clinic/nurse  Return to ER with any worsening shortness of breath, chest pain, palpitation or any other alarming symptoms  Please see above list of diagnoses and related plan for additional information  Condition at Discharge:  good      Discharge instructions/Information to patient and family:   See after visit summary for information provided to patient and family  Provisions for Follow-Up Care:  See after visit summary for information related to follow-up care and any pertinent home health orders        Disposition:     Other [place] [facility]       Discharge Statement:  I spent 45 minutes discharging the patient  This time was spent on the day of discharge  I had direct contact with the patient on the day of discharge  Greater than 50% of the total time was spent examining patient, answering all patient questions, arranging and discussing plan of care with patient as well as directly providing post-discharge instructions  Additional time then spent on discharge activities  Discharge Medications:  See after visit summary for reconciled discharge medications provided to patient and family        ** Please Note: This note has been constructed using a voice recognition system **

## 2021-04-13 NOTE — TRANSPORTATION MEDICAL NECESSITY
Section I - General Information    Name of Patient: Pito Mcgovern                 : 1945    Medicare #: [id number]  Transport Date: 21 (PCS is valid for round trips on this date and for all repetitive trips in the 60-day range as noted below )  Origin: [facility]:     [facility]  [place] [address]    Is the pt's stay covered under Medicare Part A (PPS/DRG)   [x]     Closest appropriate facility? If no, why is transport to more distant facility required? Yes  If hospice pt, is this transport related to pt's terminal illness? No       Section II - Medical Necessity Questionnaire  Ambulance transportation is medically necessary only if other means of transport are contraindicated or would be potentially harmful to the patient  To meet this requirement, the patient must either be "bed confined" or suffer from a condition such that transport by means other than ambulance is contraindicated by the patient's condition  The following questions must be answered by the medical professional signing below for this form to be valid:    1)  Describe the MEDICAL CONDITION (physical and/or mental) of this patient AT [address] [place] [address] that requires the patient to be transported in an ambulance and why transport by other means is contraindicated by the patient's condition: Chronic Respiratory Failure, oxygen dependent at 2L n/c     2) Is the patient "bed confined" as defined below? Yes  To be "be confined" the patient must satisfy all three of the following conditions: (1) unable to get up from bed without Assistance; AND (2) unable to ambulate; AND (3) unable to sit in a chair or wheelchair  3) Can this patient safely be transported by car or wheelchair van (i e , seated during transport without a medical attendant or monitoring)?    No    4) In addition to completing questions 1-3 above, please check any of the following conditions that apply*:   *Note: supporting documentation for any boxes checked must be maintained in the patient's medical records  If hosp-hosp transfer, describe services needed at 2nd facility not available at 1st facility? Requires oxygen-unable to self administer      Section III - Signature of Physician or Healthcare Professional  I certify that the above information is true and correct based on my evaluation of this patient, and represent that the patient requires transport by ambulance and that other forms of transport are contraindicated  I understand that this information will be used by the Centers for Medicare and Medicaid Services (CMS) to support the determination of medical necessity for ambulance services, and I represent that I have personal knowledge of the patient's condition at time of transport  []  If this box is checked, I also certify that the patient is physically or mentally incapable of signing the ambulance service's claim and that the institution with which I am affiliated has furnished care, services, or assistance to the patient  My signature below is made on behalf of the patient pursuant to 42 CFR §424 36(b)(4)  In accordance with 42 CFR §424 37, the specific reason(s) that the patient is physically or mentally incapable of signing the claim form is as follows: Frieda Meyers Physician* or Healthcare Professional______________________________________________________________  Signature Date 04/13/21 (For scheduled repetitive transports, this form is not valid for transports performed more than 60 days after this date)    Printed Name & Credentials of Physician or Healthcare Professional (MD, DO, RN, etc )_________Kei Mayo _______________________  *Form must be signed by patient's attending physician for scheduled, repetitive transports   For non-repetitive, unscheduled ambulance transports, if unable to obtain the signature of the attending physician, any of the following may sign (choose appropriate option below)  [] Physician Assistant []  Clinical Nurse Specialist []  Registered Nurse  []  Nurse Practitioner  [x] Discharge Planner

## 2021-04-13 NOTE — ASSESSMENT & PLAN NOTE
· Home regimen:  Metoprolol 75 mg q 12 hours   · Continue on Lopressor and torsemide  · Monitor vitals as per protocol

## 2021-04-13 NOTE — CASE MANAGEMENT
Pt stable for d/c back to [facility] today per Dr Edith Carmona  COVID test ordered  Pt requires BLS transport  [facility] able to transport pt at 1400  Medical necessity for transport completed and placed on chart   aware of return and transport time  CM updated pt son Ora Rm, he is aware of plan for d/c back to  today and in agreement  Pt nurse Vic Gimenez aware and will call report and fax to

## 2021-04-13 NOTE — ASSESSMENT & PLAN NOTE
· Chronic anemia, Hb 8 2 on admission   · Baseline appears to be between 8-9   · Cardiology recommend baseline Hb > 9  · Hb downtrended to 7 4, s/p 1iu prbc on 4/10  · HB this morning is 9 8

## 2021-04-13 NOTE — ASSESSMENT & PLAN NOTE
Lab Results   Component Value Date    HGBA1C 6 0 (H) 04/08/2021       Recent Labs     04/12/21  1608 04/12/21  2158 04/13/21  0811 04/13/21  1017   POCGLU 114 135 53* 129       Blood Sugar Average: Last 72 hrs:  · (P) 127 6 home regimen:  Lantus 14 units HS, Humalog 11 units with lunch and dinner, and 13 units with breakfast  · Continue on lantus to 10iu hs   · lispro 11U TID with meals was discontinued  · Accu-Chek a c  HS with Humalog sliding scale

## 2021-04-21 NOTE — PATIENT INSTRUCTIONS
Orders Placed This Encounter   Procedures    Wound cleansing and dressings     Sacral wound    Wash your hands with soap and water  Remove old dressing, discard into plastic bag and place in trash  Cleanse the wound with saline prior to applying a clean dressing  Do not use tissue or cotton balls  Do not scrub the wound  Pat dry using gauze  Shower no     Apply mesalt to the sacral wound    Cover with alginate dressing  Secure with abd  Change dressing daily    Position patient every two hours, avoid pressure on wound  Pressure relieving mattress on bed  Return in two weeks     Standing Status:   Future     Standing Expiration Date:   4/21/2022

## 2021-04-21 NOTE — PROGRESS NOTES
Patient ID: Amrik Barreto is a 67 y o  female Date of Birth 1945     Chief Complaint  Chief Complaint   Patient presents with    New Patient Visit     sacral wound       Allergies  Patient has no known allergies  Assessment:    Pressure ulcer of sacral region, stage 4 (HCC)  Wound debrided as below  Wound management with mesalt and alginate as the secondary dressing because the periwound appears to be weeping  Pressure relief  Adequate protein intake, 3-4 servings per day  Control DM  Followup in 2 weeks or call sooner with questions or concerns       Diagnoses and all orders for this visit:    Pressure ulcer of sacral region, stage 4 (HCC)  -     lidocaine (XYLOCAINE) 4 % topical solution 5 mL  -     Wound cleansing and dressings; Future  -     Debridement              Debridement   Wound 04/21/21 Pressure Injury Sacrum    Universal Protocol:  Consent: Verbal consent obtained  Consent given by: patient  Time out: Immediately prior to procedure a "time out" was called to verify the correct patient, procedure, equipment, support staff and site/side marked as required    Timeout called at: 4/21/2021 10:45 AM   Patient understanding: patient states understanding of the procedure being performed  Patient identity confirmed: verbally with patient      Performed by: physician  Debridement type: surgical  Level of debridement: subcutaneous tissue  Pain control: lidocaine 4%  Post-debridement measurements  Length (cm): 4  Width (cm): 5 5  Depth (cm): 4 1  Percent debrided: 100%  Surface Area (cm^2): 22  Area debrided (cm^2): 22  Volume (cm^3): 90 2  Tissue and other material debrided: subcutaneous tissue  Devitalized tissue debrided: exudate and slough  Instrument(s) utilized: curette  Bleeding: small  Hemostasis obtained with: pressure  Procedural pain (0-10): 0  Post-procedural pain: 0   Response to treatment: procedure was tolerated well          Plan:     Wound 04/21/21 Pressure Injury Sacrum (Active)   Wound Image Images linked 04/21/21 1052   Wound Description Yellow; Beefy red 04/21/21 1029   Pressure Injury Stage 4 04/21/21 1029   Cristal-wound Assessment Rash 04/21/21 1029   Wound Length (cm) 4 cm 04/21/21 1029   Wound Width (cm) 5 5 cm 04/21/21 1029   Wound Depth (cm) 4 cm 04/21/21 1029   Wound Surface Area (cm^2) 22 cm^2 04/21/21 1029   Wound Volume (cm^3) 88 cm^3 04/21/21 1029   Calculated Wound Volume (cm^3) 88 cm^3 04/21/21 1029   Undermining 2 5 04/21/21 1029   Undermining is depth extending from 7 to 4 04/21/21 1029   Drainage Amount Large 04/21/21 1029   Drainage Description Serosanguineous 04/21/21 1029       Wound 04/21/21 Pressure Injury Sacrum (Active)   Date First Assessed/Time First Assessed: 04/21/21 1028   Pre-Existing Wound: Yes  Primary Wound Type: Pressure Injury  Location: Sacrum       [REMOVED] Wound 04/08/21 Pressure Injury Sacrum Medial;Lower (Removed)   Resolved Date: 04/21/21  Date First Assessed/Time First Assessed: 04/08/21 2012   Pre-Existing Wound: Yes  Primary Wound Type: Pressure Injury  Location: Sacrum  Wound Location Orientation: Medial;Lower  Wound Description (Comments): Prexisting unstag    [REMOVED] Wound 04/09/21 Pressure Injury Buttocks Right (Removed)   Resolved Date: 04/21/21  Date First Assessed/Time First Assessed: 04/09/21 1600   Pre-Existing Wound: Yes  Primary Wound Type: Pressure Injury  Location: Buttocks  Wound Location Orientation: Right       Subjective:        Patient presents for evaluation of a stage IV sacral pressure ulcer  Patient was last seen here in January of 2021 at which point the facility started to provide wound care services to the patient instead of her coming here to the wound center  Patient was recently hospitalized from [id number] after which time patient's son decided to resume her care here at the wound management center  It is unclear exactly what dressing has been used in the wound up until this point    It is also unclear as to what specialty mattress patient has at the facility  The following portions of the patient's history were reviewed and updated as appropriate:   She  has a past medical history of Acute renal failure (ARF) ([facility] ), Acute respiratory failure with hypoxia ([facility] ), Atrial fibrillation ([facility] ), CHF (congestive heart failure) ([facility] ), Chronic kidney disease, Diabetes mellitus ([facility] ), Hyperlipidemia, Hypertension, and Stroke ([facility] )    She   Patient Active Problem List    Diagnosis Date Noted    Hypoalbuminemia 04/10/2021    Chronic indwelling Jimenez catheter 04/10/2021    Guaiac positive stools 04/09/2021    Acute on chronic diastolic (congestive) heart failure ([facility] ) 04/08/2021    Chronic respiratory failure with hypoxia ([facility] ) 04/08/2021    Confusion 03/10/2021    Rash 10/12/2020    Sepsis due to Streptococcus species ([facility] ) 09/17/2020    C  difficile colitis 09/16/2020    Chronic diastolic heart failure ([facility] ) 08/31/2020    Stenosis of left carotid artery 08/30/2020    Bacteremia due to Enterococcus 08/28/2020    Iron deficiency anemia 08/28/2020    [facility]ox[facility] fibrillation ([facility] ) 08/26/2020    Stroke-like symptoms 08/25/2020    RORY (acute kidney injury) ([facility] ) 08/25/2020    Colon cancer screening 07/20/2020    Need for vaccination against Streptococcus pneumoniae using pneumococcal conjugate vaccine 13 07/20/2020    Pressure ulcer of sacral region, stage 4 ([facility] ) 07/20/2020    Cellulitis of right elbow 07/20/2020    Bilateral lower extremity edema 07/20/2020    History of vitamin D deficiency 07/20/2020    Low grade fever 06/17/2020    Herpes zoster without complication 33/51/2901    Diarrhea 06/08/2020    Distal radius fracture, right 05/27/2020    Constipation 05/26/2020    Acute respiratory failure with hypoxia ([facility] ) 05/22/2020    Closed comminuted intertrochanteric fracture of proximal femur, right, initial encounter ([place] [address] ) 05/20/2020    Symptomatic varicose veins of both lower extremities 06/04/2019    Chronic venous insufficiency 06/04/2019    Status post reverse arthroplasty of right shoulder 09/25/2018    Scalp hematoma 09/13/2018    Acute blood loss as cause of postoperative anemia 09/12/2018    Type 2 diabetes mellitus with hyperglycemia ([facility] ) 09/10/2018    Hypertension 09/10/2018    Hyperlipidemia 09/10/2018    Stage 3a chronic kidney disease 09/10/2018    Closed 4-part fracture of proximal humerus with routine healing 09/05/2018     She  has a past surgical history that includes Breast surgery (Left); pr xcapsl ctrc rmvl insj io lens prosth w/o ecp (Right, 10/23/2017); Cataract extraction w/ intraocular lens implant (Left, 12/11/2017); Cataract extraction (Bilateral, 2017); pr say shoulder arthrplsty humeral&glenoid compnt (Right, 9/10/2018); pr open rx femur fx+intramed sergio (Right, 5/21/2020); Wound debridement (N/A, 8/26/2020); and Wound debridement (N/A, 8/28/2020)  She  reports that she has never smoked  She has never used smokeless tobacco  She reports that she does not drink alcohol or use drugs    Current Outpatient Medications   Medication Sig Dispense Refill    acetaminophen (TYLENOL) 325 mg tablet Take 650 mg by mouth every 4 (four) hours as needed for mild pain or fever      aspirin (ECOTRIN LOW STRENGTH) 81 mg EC tablet Take 1 tablet (81 mg total) by mouth daily 30 tablet 0    atorvastatin (LIPITOR) 40 mg tablet Take 1 tablet (40 mg total) by mouth daily with dinner 30 tablet 0    bisacodyl (DULCOLAX) 10 mg suppository Insert 10 mg into the rectum as needed for constipation constipation       ferrous sulfate 325 (65 Fe) mg tablet TAKE 1 TABLET BY MOUTH ONCE A DAY FOR SUPPLEMENT      gabapentin (NEURONTIN) 100 mg capsule Take 100 mg by mouth 3 (three) times a day      insulin glargine (LANTUS) 100 units/mL subcutaneous injection Inject 10 Units under the skin daily at bedtime 10 mL 0    metoprolol tartrate 75 MG TABS Take 1 tablet (75 mg total) by mouth every 12 (twelve) hours 60 tablet 0    mineral oil enema Insert 1 enema into the rectum once As needed for constipation unrelieved by suppository      Nutritional Supplements (Yevgeniy) PACK Take by mouth      oxyCODONE (OXY-IR) 5 MG capsule Take 5 mg by mouth every 4 (four) hours as needed for moderate pain or severe pain      polyethylene glycol (MIRALAX) 17 g packet Take 17 g by mouth daily 14 each 0    rivaroxaban (XARELTO) 15 mg tablet Take 1 tablet (15 mg total) by mouth daily with breakfast 90 tablet 3    Specialty Vitamins Products (PROSTATE PO) Take by mouth 2 (two) times a day      torsemide (DEMADEX) 20 mg tablet Take 1 tablet (20 mg total) by mouth daily (Patient taking differently: Take 10 mg by mouth 2 (two) times a week Monday and Thursday) 30 tablet 0    VITAMIN D PO Take 50,000 mg by mouth 2 (two) times a week        No current facility-administered medications for this visit  She has No Known Allergies       Review of Systems   Constitutional: Negative for chills and fever  HENT: Negative for congestion and sneezing  Respiratory: Negative for cough  Musculoskeletal: Positive for gait problem  Skin: Positive for wound  Psychiatric/Behavioral: Negative for agitation  Objective:       Wound 04/21/21 Pressure Injury Sacrum (Active)   Wound Image Images linked 04/21/21 1052   Wound Description Yellow; Beefy red 04/21/21 1029   Pressure Injury Stage 4 04/21/21 1029   Cristal-wound Assessment Rash 04/21/21 1029   Wound Length (cm) 4 cm 04/21/21 1029   Wound Width (cm) 5 5 cm 04/21/21 1029   Wound Depth (cm) 4 cm 04/21/21 1029   Wound Surface Area (cm^2) 22 cm^2 04/21/21 1029   Wound Volume (cm^3) 88 cm^3 04/21/21 1029   Calculated Wound Volume (cm^3) 88 cm^3 04/21/21 1029   Undermining 2 5 04/21/21 1029   Undermining is depth extending from 7 to 4 04/21/21 1029   Drainage Amount Large 04/21/21 1029   Drainage Description Serosanguineous 04/21/21 1029       /80   Pulse 72   Temp 97 7 °F (36 5 °C)   Resp 18     Physical Exam  Constitutional:       General: She is not in acute distress  Appearance: Normal appearance  She is obese  She is not ill-appearing, toxic-appearing or diaphoretic  HENT:      Head: Normocephalic and atraumatic  Right Ear: External ear normal       Left Ear: External ear normal    Eyes:      Conjunctiva/sclera: Conjunctivae normal    Neck:      Musculoskeletal: Neck supple  Pulmonary:      Effort: Pulmonary effort is normal  No respiratory distress  Skin:     Comments: See wound assessment   Neurological:      Mental Status: She is alert  Gait: Gait abnormal    Psychiatric:         Mood and Affect: Mood normal          Behavior: Behavior normal            Wound Instructions:  Orders Placed This Encounter   Procedures    Wound cleansing and dressings     Sacral wound    Wash your hands with soap and water  Remove old dressing, discard into plastic bag and place in trash  Cleanse the wound with saline prior to applying a clean dressing  Do not use tissue or cotton balls  Do not scrub the wound  Pat dry using gauze  Shower no     Apply mesalt to the sacral wound  Cover with alginate dressing  Secure with abd  Change dressing daily    Position patient every two hours, avoid pressure on wound  Pressure relieving mattress on bed  Return in two weeks     Standing Status:   Future     Standing Expiration Date:   4/21/2022    Debridement     This order was created via procedure documentation        Diagnosis ICD-10-CM Associated Orders   1   Pressure ulcer of sacral region, stage 4 ([facility])  [id number] [id number] lidocaine (XYLOCAINE) 4 % topical solution 5 mL     Wound cleansing and dressings     Debridement

## 2021-04-21 NOTE — ASSESSMENT & PLAN NOTE
Wound debrided as below  Wound management with mesalt and alginate as the secondary dressing because the periwound appears to be weeping  Pressure relief  Adequate protein intake, 3-4 servings per day  Control DM  Followup in 2 weeks or call sooner with questions or concerns

## 2021-04-28 NOTE — PROGRESS NOTES
Chart review completed  Email sent to facility requesting update on patient  This care manager assistant will continue to monitor via chart review throughout bundle episode  This CM assistant received a email from [facility] with a update on the patient  The patient is a max assist with ADLs and total for transfers, non-ambulatory  PT and OT will be completed 5/7  She has an unstageable sacral wound, 4x5x3 5, with increased serosanguinous drainage and clots  She will continue skilled care for the sacral wound with daily treatments  She will be LTC at [facility]  This care manager assistant will continue to monitor via chart review throughout bundle episode

## 2021-05-03 PROBLEM — N17.9 ACUTE KIDNEY INJURY SUPERIMPOSED ON CHRONIC KIDNEY DISEASE (HCC): Status: ACTIVE | Noted: 2021-01-01

## 2021-05-03 PROBLEM — N18.9 ACUTE KIDNEY INJURY SUPERIMPOSED ON CHRONIC KIDNEY DISEASE (HCC): Status: ACTIVE | Noted: 2021-01-01

## 2021-05-03 PROBLEM — G93.41 ACUTE METABOLIC ENCEPHALOPATHY: Status: ACTIVE | Noted: 2021-01-01

## 2021-05-03 NOTE — PROGRESS NOTES
Patient ID: Catherine Barreto is a 67 y o  female Date of Birth 1945     Chief Complaint  Chief Complaint   Patient presents with    Follow Up Wound Care Visit     sacral wound       Allergies  Patient has no known allergies  Assessment:    Pressure ulcer of sacral region, stage 4 (HCC)  Wound appears to be bleeding excessively  Significant amount of clots were noted at the base of the wound  Even after the wound is cleaned out with saline and gauze, almost immediately it fills back with blood  Wound base has exposed bone  Dressing was filled with blood when removed today  CT from September of 2020 shows questionable osteomyelitis  Patient likely has chronic osteo at this point  No debridement was performed today in order to avoid any more blood loss  I recommend immediate ER evaluation as H and H may have declined further over the last few days and patient may require transfusion  Patient appears more pale and more fatigued than usual   Followup within 1 week after ER/admission  Call sooner with questions or concerns       Diagnoses and all orders for this visit:    Pressure ulcer of sacral region, stage 4 (HCC)  -     Wound cleansing and dressings; Future              Procedures    Plan:     Wound 04/21/21 Pressure Injury Sacrum (Active)   Wound Image Images linked 05/03/21 1045       Wound 04/21/21 Pressure Injury Sacrum (Active)   Date First Assessed/Time First Assessed: 04/21/21 1028   Pre-Existing Wound: Yes  Primary Wound Type: Pressure Injury  Location: Sacrum       [REMOVED] Wound 04/08/21 Pressure Injury Sacrum Medial;Lower (Removed)   Resolved Date: 04/21/21  Date First Assessed/Time First Assessed: 04/08/21 2012   Pre-Existing Wound: Yes  Primary Wound Type: Pressure Injury  Location: Sacrum  Wound Location Orientation: Medial;Lower  Wound Description (Comments): Prexisting unstag           [REMOVED] Wound 04/09/21 Pressure Injury Buttocks Right (Removed)   Resolved Date: 04/21/21  Date First Assessed/Time First Assessed: 04/09/21 1600   Pre-Existing Wound: Yes  Primary Wound Type: Pressure Injury  Location: Buttocks  Wound Location Orientation: Right       Subjective:        Patient presents for followup of stage IV sacral pressure ulcer  Significant bleeding and clots have been noted in the wound since last week  Patient's son who is present with her today reports that the facility told him that patient has hemoglobin was slightly low 3 days ago  After speaking to the facility they report that hemoglobin was 9 9 on Friday  Have been using Mesalt packing, changing daily  The following portions of the patient's history were reviewed and updated as appropriate:   She  has a past medical history of Acute renal failure (ARF) ([facility] ), Acute respiratory failure with hypoxia ([facility] ), Atrial fibrillation ([facility] ), CHF (congestive heart failure) ([facility] ), Chronic kidney disease, Diabetes mellitus ([facility] ), Hyperlipidemia, Hypertension, and Stroke ([facility] )    She   Patient Active Problem List    Diagnosis Date Noted    Hypoalbuminemia 04/10/2021    Chronic indwelling Jimenez catheter 04/10/2021    Guaiac positive stools 04/09/2021    Acute on chronic diastolic (congestive) heart failure ([facility] ) 04/08/2021    Chronic respiratory failure with hypoxia ([facility] ) 04/08/2021    Confusion 03/10/2021    Rash 10/12/2020    Sepsis due to Streptococcus species ([facility] ) 09/17/2020    C  difficile colitis 09/16/2020    Chronic diastolic heart failure ([facility] ) 08/31/2020    Stenosis of left carotid artery 08/30/2020    Bacteremia due to Enterococcus 08/28/2020    Iron deficiency anemia 08/28/2020    Paroxysmal atrial fibrillation ([facility] ) 08/26/2020    Stroke-like symptoms 08/25/2020    RORY (acute kidney injury) ([facility] ) 08/25/2020    Colon cancer screening 07/20/2020    Need for vaccination against Streptococcus pneumoniae using pneumococcal conjugate vaccine 13 07/20/2020    Pressure ulcer of sacral region, stage 4 ([facility] ) 07/20/2020    Cellulitis of right elbow 07/20/2020    Bilateral lower extremity edema 07/20/2020    History of vitamin D deficiency 07/20/2020    Low grade fever 06/17/2020    Herpes zoster without complication 86/90/1316    Diarrhea 06/08/2020    Distal radius fracture, right 05/27/2020    Constipation 05/26/2020    [facility] 05/22/2020    Closed comminuted intertrochanteric fracture of proximal femur, right, initial encounter ([place] [address] ) 05/20/2020    Symptomatic varicose veins of both lower extremities 06/04/2019    Chronic venous insufficiency 06/04/2019    Status post reverse arthroplasty of right shoulder 09/25/2018    Scalp hematoma 09/13/2018    Acute blood loss as cause of postoperative anemia 09/12/2018    Type 2 diabetes mellitus with hyperglycemia ([facility] ) 09/10/2018    Hypertension 09/10/2018    Hyperlipidemia 09/10/2018    Stage 3a chronic kidney disease ([facility] ) 09/10/2018    Closed 4-part fracture of proximal humerus with routine healing 09/05/2018     She  reports that she has never smoked  She has never used smokeless tobacco  She reports that she does not drink alcohol or use drugs    Current Outpatient Medications   Medication Sig Dispense Refill    acetaminophen (TYLENOL) 325 mg tablet Take 650 mg by mouth every 4 (four) hours as needed for mild pain or fever      aspirin (ECOTRIN LOW STRENGTH) 81 mg EC tablet Take 1 tablet (81 mg total) by mouth daily 30 tablet 0    atorvastatin (LIPITOR) 40 mg tablet Take 1 tablet (40 mg total) by mouth daily with dinner 30 tablet 0    bisacodyl (DULCOLAX) 10 mg suppository Insert 10 mg into the rectum as needed for constipation constipation       ferrous sulfate 325 (65 Fe) mg tablet TAKE 1 TABLET BY MOUTH ONCE A DAY FOR SUPPLEMENT      gabapentin (NEURONTIN) 100 mg capsule Take 100 mg by mouth 3 (three) times a day      HumaLOG KwikPen 100 units/mL injection pen Inject under the skin       insulin glargine (LANTUS) 100 units/mL subcutaneous injection Inject 10 Units under the skin daily at bedtime 10 mL 0    metoprolol tartrate (LOPRESSOR) 25 mg tablet Take 25 mg by mouth every 12 (twelve) hours       mineral oil enema Insert 1 enema into the rectum once As needed for constipation unrelieved by suppository      mirtazapine (REMERON) 15 mg tablet Take 15 mg by mouth daily at bedtime      Nutritional Supplements (Yevgeniy) PACK Take by mouth      oxyCODONE (OXY-IR) 5 MG capsule Take 5 mg by mouth every 4 (four) hours as needed for moderate pain or severe pain      pantoprazole (PROTONIX) 40 mg tablet Take 40 mg by mouth 2 (two) times a day       polyethylene glycol (MIRALAX) 17 g packet Take 17 g by mouth daily 14 each 0    rivaroxaban (XARELTO) 15 mg tablet Take 1 tablet (15 mg total) by mouth daily with breakfast 90 tablet 3    Specialty Vitamins Products (PROSTATE PO) Take by mouth 2 (two) times a day      torsemide (DEMADEX) 20 mg tablet Take 1 tablet (20 mg total) by mouth daily 30 tablet 0    VITAMIN D PO Take 50,000 mg by mouth 2 (two) times a week        No current facility-administered medications for this visit  She has No Known Allergies       Review of Systems   Constitutional: Positive for fatigue  Negative for chills and fever  HENT: Negative for congestion and sneezing  Respiratory: Negative for cough  Musculoskeletal: Positive for gait problem  Skin: Positive for wound  Psychiatric/Behavioral: Negative for agitation  Objective:       Wound 04/21/21 Pressure Injury Sacrum (Active)   Wound Image Images linked 05/03/21 1045       There were no vitals taken for this visit  Physical Exam  Constitutional:       General: She is not in acute distress  Appearance: Normal appearance  She is ill-appearing  She is not toxic-appearing or diaphoretic  HENT:      Head: Normocephalic and atraumatic        Right Ear: External ear normal       Left Ear: External ear normal    Eyes: Conjunctiva/sclera: Conjunctivae normal    Neck:      Musculoskeletal: Neck supple  Pulmonary:      Effort: Pulmonary effort is normal  No respiratory distress  Skin:     Comments: See wound assessment  Wound base noted to have significant amount of clots  Bleeding is constant, filling base of wound almost immediately after cleaning with saline and gently removing clots with guaze  Probes to bone  Neurological:      Mental Status: She is alert  Psychiatric:         Mood and Affect: Mood normal          Behavior: Behavior normal            Wound 04/21/21 Pressure Injury Sacrum (Active)   Wound Image   05/03/21 1045   Wound Description Yellow; Beefy red 04/21/21 1029   Pressure Injury Stage 4 04/21/21 1029   Cristal-wound Assessment Rash 04/21/21 1029   Wound Length (cm) 4 cm 04/21/21 1029   Wound Width (cm) 5 5 cm 04/21/21 1029   Wound Depth (cm) 4 cm 04/21/21 1029   Wound Surface Area (cm^2) 22 cm^2 04/21/21 1029   Wound Volume (cm^3) 88 cm^3 04/21/21 1029   Calculated Wound Volume (cm^3) 88 cm^3 04/21/21 1029   Undermining 2 5 04/21/21 1029   Undermining is depth extending from 7 to 4 04/21/21 1029   Drainage Amount Large 04/21/21 1029   Drainage Description Serosanguineous 04/21/21 1029                         Wound Instructions:  Orders Placed This Encounter   Procedures    Wound cleansing and dressings     Sacral wound  Two abd pads applied over bleeding wound today  Excessive bleeding at todays visit, patient is pale very lethargic does not seem stable  [facility]  [facility]formed [facility]  Hemoglobin 9 9, HCT 30 9  [facility]  No follow-up appointment made today will follow-up when patient is discharged from hospital   Done today     Standing Status:   Future     Standing Expiration Date:   5/3/2022        Diagnosis ICD-10-CM Associated Orders   1   Pressure ulcer of sacral region, stage 4 (HCC)  [id number] [id number] Wound cleansing and dressings

## 2021-05-03 NOTE — ASSESSMENT & PLAN NOTE
Wt Readings from Last 3 Encounters:   05/03/21 64 kg (141 lb)   04/27/21 64 kg (141 lb)   04/13/21 72 1 kg (158 lb 15 2 oz)     · Patient appears mildly hypovolemic also with acute kidney injury on CKD  · Hold torsemide  · Monitor daily weights, I/O  · Low-sodium diet

## 2021-05-03 NOTE — ASSESSMENT & PLAN NOTE
Wound appears to be bleeding excessively  Significant amount of clots were noted at the base of the wound  Even after the wound is cleaned out with saline and gauze, almost immediately it fills back with blood  Wound base has exposed bone  Dressing was filled with blood when removed today  CT from September of 2020 shows questionable osteomyelitis  Patient likely has chronic osteo at this point  No debridement was performed today in order to avoid any more blood loss  I recommend immediate ER evaluation as H and H may have declined further over the last few days and patient may require transfusion  Patient appears more pale and more fatigued than usual   Followup within 1 week after ER/admission    Call sooner with questions or concerns

## 2021-05-03 NOTE — PATIENT INSTRUCTIONS
Orders Placed This Encounter   Procedures    Wound cleansing and dressings     Sacral wound  Two abd pads applied over bleeding wound today  Excessive bleeding at todays visit, patient is pale very lethargic does not seem stable  [facility]  [facility]  Hemoglobin 9 9, HCT 30 9  reported by [facility] of 4/27/2021    No follow-up appointment made today will follow-up when patient is discharged from hospital   Done today     Standing Status:   Future     Standing Expiration Date:   5/3/2022

## 2021-05-03 NOTE — H&P
[place]     H&P- Cole Montemayor 1945, 67 y o  female MRN: [id number]  Unit/Bed#: ED 07 Encounter: [id number]  Primary Care Provider: Lior Young DO   Date and time admitted to hospital: 5/3/2021 11:36 AM    * Acute metabolic encephalopathy  Assessment & Plan  · Presented to the emergency department after being evaluated by Wound Care due to concern for bleeding sacral ulcer  Patient lethargic on arrival, per family patient is more conversant at baseline  · Neurologic exam nonfocal  · Suspect multifactorial in setting of hypotension, acute kidney injury, ?   Uremia  · Also noted with leukocytosis will obtain urinalysis rule out UTI  · Continue supportive care    Acute kidney injury superimposed on chronic kidney disease [facility])  Assessment & Plan  Lab Results   Component Value Date    EGFR 32 05/03/2021    EGFR 53 04/13/2021    EGFR 48 04/12/2021    CREATININE 1 56 (H) 05/03/2021    CREATININE 1 03 04/13/2021    CREATININE 1 11 04/12/2021   · Recently baseline creatinine around 1 0-1 1  · Creatinine 1 56 at time of admission  · Suspect in setting of hypotension, poor oral intake/dehydration and torsemide  · Will hold diuretics  · Avoid nephrotoxic agents, hypotension  · Received 500 cc bolus in the ED with improvement in blood pressure SBP 90s- will give additional 500 cc bolus now  · Check urinalysis  · Monitor BMP    Pressure ulcer of sacral region, stage 4 ([facility] [address] )  Assessment & Plan  · Follows with wound care as outpatient - had been recommended to be evaluated in the ER due to bleeding at sacral ulcer site likely in setting of Xarelto  · Previously had been debrided as an outpatient on 04/21/2021  · Will consult inpatient wound care  · May require surgical consultation    Chronic respiratory failure with hypoxia [facility])  Assessment & Plan  · Patient chronically on 2 L nasal cannula  · SpO2 currently 100% on baseline oxygen requirement  · Continue O2 supplementation to maintain SpO2 > 88%    Chronic diastolic heart failure (HCC)  Assessment & Plan  Wt Readings from Last 3 Encounters:   05/03/21 64 kg (141 lb)   04/27/21 64 kg (141 lb)   04/13/21 72 1 kg (158 lb 15 2 oz)     · Patient appears mildly hypovolemic also with acute kidney injury on CKD  · Hold torsemide  · Monitor daily weights, I/O  · Low-sodium diet        Paroxysmal atrial fibrillation (HCC)  Assessment & Plan  · Currently in NSR  · Rate controlled on Lopressor - given hypotension on admission will hold for now  · Also anticoagulated on Xarelto, will hold in setting of bleeding sacral ulcer    Hypertension  Assessment & Plan  · SBP 90s at time of admission, had improved s/p IVF bolus  · Will hold antihypertensives for now and monitor    Type 2 diabetes mellitus with hyperglycemia ([facility])  Assessment & Plan  Lab Results   Component Value Date    HGBA1C 6 0 (H) 04/08/2021       No results for input(s): POCGLU in the last 72 hours  Blood Sugar Average: Last 72 hrs:  · Continue Lantus 10 units q h s   · SSI plus Accu-Chek  · Diabetic diet    VTE Prophylaxis: Pharmacologic VTE Prophylaxis contraindicated due to sacral ulcer bleeding  / sequential compression device   Code Status:  Level 1 - full code  POLST: There is no POLST form on file for this patient (pre-hospital)  Discussion with family:  Discussed with patient's son Humaira Mcfarland via phone  Anticipated Length of Stay:  Patient will be admitted on an Observation basis with an anticipated length of stay of  less than 2 midnights  Justification for Hospital Stay:  Metabolic encephalopathy, acute kidney injury    Total Time for Visit, including Counseling / Coordination of Care: 70 minutes  Greater than 50% of this total time spent on direct patient counseling and coordination of care  Chief Complaint:   "I do not know "    History of Present Illness:    Gely Reynaga is a 67 y o  female who presents with lethargy, bleeding sacral ulcer      Past medical history significant for congestive heart failure, chronic kidney disease, type 2 diabetes mellitus, stage IV sacral ulcer  Patient presented to the emergency department from wound care due to concern for bleeding from sacral ulcer  Patient appears more lethargic than baseline per family, typically conversant  Patient is unable to offer much history but denies any complaints  Denies chest pain/palpitations, shortness of breath, nausea vomiting, abdominal pain  Does not recall situation that led up to her being brought to the emergency department  Review of Systems:    Review of Systems   Unable to perform ROS: Mental status change       Past Medical and Surgical History:     Past Medical History:   Diagnosis Date    Acute renal failure (ARF) ([facility] [address] )     [facility]     Atrial fibrillation (HCC)     CHF (congestive heart failure) ([facility])     Chronic kidney disease     Diabetes mellitus ([facility] )     type 2    Hyperlipidemia     Hypertension     Stroke [facility])        Past Surgical History:   Procedure Laterality Date    BREAST SURGERY Left     lumpectomy benign    CATARACT EXTRACTION Bilateral 2017    CATARACT EXTRACTION W/ INTRAOCULAR LENS IMPLANT Left 12/11/2017    Procedure: EXTRACTION EXTRACAPSULAR CATARACT PHACO INTRAOCULAR LENS (IOL); Surgeon: Vivian Paige MD;  Location: [facility] MAIN OR;  Service: Ophthalmology    AR OPEN RX FEMUR FX+INTRAMED RAYMOND Right 5/21/2020    Procedure: INSERTION OF SHORT TROCHANTERIC FEMORAL NAIL;  Surgeon: Ana Rosa Bunn MD;  [facility] Main [facility];  Service: Orthopedics    [address] HUMERAL&GLENOID COMPNT Right 9/10/2018    Procedure: RIGHT REVERSE TOTAL SHOULDER ARTHROPLASTY;  Surgeon: Ana Rosa Bunn MD;  [facility] Main [facility];  Service: Orthopedics    AR XCAPSL CTRC RMVL INSJ IO LENS PROSTH W/O ECP Right 10/23/2017    Procedure: EXTRACTION EXTRACAPSULAR CATARACT PHACO INTRAOCULAR LENS (IOL);   Surgeon: Vivian Paige MD;  Location: [facility] MAIN OR; Service: Ophthalmology    WOUND DEBRIDEMENT N/A 8/26/2020    Procedure: EXCISIONAL DEBRIDEMENT OF SACRAL PRESSURE WOUND, WASHOUT;;  Surgeon: Romel Young MD;  [facility]: [facility] MAIN OR;  Service: General    WOUND DEBRIDEMENT N/A 8/28/2020    Procedure: BUTTOCKS DEBRIDEMENT WOUND AND DRESSING CHANGE ([facility]); Surgeon: Archana Adorno MD;  Location: [facility] MAIN OR;  Service: General       Meds/Allergies:    Prior to Admission medications    Medication Sig Start Date End Date Taking?  Authorizing Provider   acetaminophen (TYLENOL) 325 mg tablet Take 650 mg by mouth every 4 (four) hours as needed for mild pain or fever    Historical Provider, MD   aspirin (ECOTRIN LOW STRENGTH) 81 mg EC tablet Take 1 tablet (81 mg total) by mouth daily 9/11/20   Marilee Mueller MD   atorvastatin (LIPITOR) 40 mg tablet Take 1 tablet (40 mg total) by mouth daily with dinner 9/10/20   Marilee Mueller MD   bisacodyl (DULCOLAX) 10 mg suppository Insert 10 mg into the rectum as needed for constipation constipation     Historical Provider, MD   ferrous sulfate 325 (65 Fe) mg tablet TAKE 1 TABLET BY MOUTH ONCE A DAY FOR SUPPLEMENT 7/7/20   Historical Provider, MD   gabapentin (NEURONTIN) 100 mg capsule Take 100 mg by mouth 3 (three) times a day    Historical Provider, MD   HumaLOG KwikPen 100 units/mL injection pen Inject under the skin  4/5/21   Historical Provider, MD   insulin glargine (LANTUS) 100 units/mL subcutaneous injection Inject 10 Units under the skin daily at bedtime 4/13/21   Debbie Vieira MD   metoprolol tartrate (LOPRESSOR) 25 mg tablet Take 25 mg by mouth every 12 (twelve) hours  4/7/21   Historical Provider, MD   mineral oil enema Insert 1 enema into the rectum once As needed for constipation unrelieved by suppository    Historical Provider, MD   mirtazapine (REMERON) 15 mg tablet Take 15 mg by mouth daily at bedtime    Historical Provider, MD   Nutritional Supplements (Yevgeniy) PACK Take by mouth    Historical Provider, MD   oxyCODONE (OXY-IR) 5 MG capsule Take 5 mg by mouth every 4 (four) hours as needed for moderate pain or severe pain    Historical Provider, MD   pantoprazole (PROTONIX) 40 mg tablet Take 40 mg by mouth 2 (two) times a day  4/7/21   Historical Provider, MD   polyethylene glycol (MIRALAX) 17 g packet Take 17 g by mouth daily 5/27/20   Denise Causey PA-C   rivaroxaban (XARELTO) 15 mg tablet Take 1 tablet (15 mg total) by mouth daily with breakfast 11/23/20   Keenan Khan MD   Specialty Vitamins Products (PROSTATE PO) Take by mouth 2 (two) times a day    Historical Provider, MD   torsemide (DEMADEX) 20 mg tablet Take 1 tablet (20 mg total) by mouth daily 9/11/20   Denys Garcia MD   VITAMIN D PO Take 50,000 mg by mouth 2 (two) times a week     Historical Provider, MD     I have reveiwed home medications using records provided by [facility]      Allergies: No Known Allergies    Social History:     Marital Status: Single   Occupation: Retired  Patient Pre-hospital Living Situation: Lives at [facility]  Patient Pre-hospital Level of Mobility: Full assist  Patient Pre-hospital Diet Restrictions: Diabetic  Substance Use History:   Social History     Substance and Sexual Activity   Alcohol Use Never    Frequency: Never    Drinks per session: Patient refused    Binge frequency: Never    Comment: quit 8/17     Social History     Tobacco Use   Smoking Status Never Smoker   Smokeless Tobacco Never Used     Social History     Substance and Sexual Activity   Drug Use No       Family History:    Family History   Problem Relation Age of Onset    Diabetes Mother     Varicose Veins Mother     Stroke Father     Arthritis Brother     Diabetes Daughter     No Known Problems Brother        Physical Exam:     Vitals:   Blood Pressure: (!) 91/46 (05/03/21 1530)  Pulse: (!) 115 (05/03/21 1530)  Temperature: 98 5 °F (36 9 °C) (05/03/21 1138)  Temp Source: Temporal (05/03/21 1138)  Respirations: 20 (05/03/21 1530)  Height: 5' 6" (167 6 cm) (05/03/21 1138)  Weight - Scale: 64 kg (141 lb) (05/03/21 1138)  SpO2: 100 % (05/03/21 1530)    Physical Exam  Vitals signs and nursing note reviewed  Constitutional:       Appearance: Normal appearance  Interventions: Nasal cannula in place  Comments: Appears comfortable, no acute distress   HENT:      Head: Normocephalic  Eyes:      General: No scleral icterus  Extraocular Movements: Extraocular movements intact  Conjunctiva/sclera: Conjunctivae normal    Neck:      Musculoskeletal: Normal range of motion  Cardiovascular:      Rate and Rhythm: Normal rate and regular rhythm  Heart sounds: S1 normal and S2 normal    Pulmonary:      Effort: Pulmonary effort is normal       Breath sounds: Normal breath sounds  No wheezing, rhonchi or rales  Abdominal:      General: Bowel sounds are normal       Palpations: Abdomen is soft  Tenderness: There is no abdominal tenderness  There is no guarding or rebound  Musculoskeletal:         General: No swelling, tenderness or deformity  Comments: Spontaneously moves upper/lower extremities  No extremity edema   Skin:     General: Skin is warm and dry  Neurological:      Mental Status: She is lethargic  Comments: Oriented to person only  Minimally answers questions  No focal deficits appreciated however patient minimally follows commands given lethargy   Psychiatric:         Mood and Affect: Affect is flat  Speech: Speech is delayed  Behavior: Behavior is slowed  Additional Data:     Lab Results: I have personally reviewed pertinent reports        Results from last 7 days   Lab Units 05/03/21  1425 05/03/21  1201   WBC Thousand/uL  --  11 31*   HEMOGLOBIN g/dL  --  10 4*   I STAT HEMOGLOBIN g/dl 9 5*  --    HEMATOCRIT %  --  34 0*   HEMATOCRIT, ISTAT % 28*  --    PLATELETS Thousands/uL  --  231   NEUTROS PCT %  --  77*   LYMPHS PCT %  --  14   MONOS PCT %  --  6   EOS PCT %  --  2     Results from last 7 days Lab Units 05/03/21  1425 05/03/21  1201   SODIUM mmol/L  --  140   POTASSIUM mmol/L  --  3 8   CHLORIDE mmol/L  --  99*   CO2 mmol/L  --  40*   CO2, I-STAT mmol/L 40*  --    BUN mg/dL  --  108*   CREATININE mg/dL  --  1 56*   ANION GAP mmol/L  --  1*   CALCIUM mg/dL  --  9 3   GLUCOSE RANDOM mg/dL  --  262*                       Imaging: I have personally reviewed pertinent reports  No orders to display       EKG, Pathology, and Other Studies Reviewed on Admission:   · EKG:  Not immediately available for review    [facility] Records Reviewed: Yes     ** Please Note: This note has been constructed using a voice recognition system   **

## 2021-05-03 NOTE — ASSESSMENT & PLAN NOTE
· Patient chronically on 2 L nasal cannula  · SpO2 currently 100% on baseline oxygen requirement  · Continue O2 supplementation to maintain SpO2 > 88%

## 2021-05-03 NOTE — ASSESSMENT & PLAN NOTE
· Presented to the emergency department after being evaluated by Wound Care due to concern for bleeding sacral ulcer  Patient lethargic on arrival, per family patient is more conversant at baseline  · Neurologic exam nonfocal  · Suspect multifactorial in setting of hypotension, acute kidney injury, ?   Uremia  · Also noted with leukocytosis will obtain urinalysis rule out UTI  · Continue supportive care

## 2021-05-03 NOTE — ASSESSMENT & PLAN NOTE
Lab Results   Component Value Date    HGBA1C 6 0 (H) 04/08/2021       No results for input(s): POCGLU in the last 72 hours      Blood Sugar Average: Last 72 hrs:  · Continue Lantus 10 units q h s   · SSI plus Accu-Chek  · Diabetic diet

## 2021-05-03 NOTE — ASSESSMENT & PLAN NOTE
· Currently in NSR  · Rate controlled on Lopressor - given hypotension on admission will hold for now  · Also anticoagulated on Xarelto, will hold in setting of bleeding sacral ulcer

## 2021-05-03 NOTE — ASSESSMENT & PLAN NOTE
· SBP 90s at time of admission, had improved s/p IVF bolus  · Will hold antihypertensives for now and monitor

## 2021-05-03 NOTE — ED PROVIDER NOTES
History  Chief Complaint   Patient presents with    Abnormal Lab     Patient comes to the ER from wound care (reported stage 4 sacral ulcer) with a report of having a hemoglobin of 9 5      68 yo female w/ hx of IDDM, CKD, HTN, HLD, stroke, a fib and stage IV sacral decubitus wound presents to the Emergency Department from wound care for evaluation of sacral wound bleeding  Pt underwent debridement of the wound at wound care 10 days ago, on exam today was noted to have persistent bleeding  She reportedly had a Hgb of 9 9 on 4/27, and per wound care appeared pale and lethargic  Pt denies complaints at this time  She is on xarelto          Prior to Admission Medications   Prescriptions Last Dose Informant Patient Reported? Taking?    HumaLOG KwikPen 100 units/mL injection pen   Yes No   Sig: Inject under the skin    Nutritional Supplements (Yevgeniy) PACK  Outside Facility (Specify) Yes No   Sig: Take by mouth   Specialty Vitamins Products (PROSTATE PO)  Outside Facility (Specify) Yes No   Sig: Take by mouth 2 (two) times a day   VITAMIN D PO  Outside Facility (Specify) Yes No   Sig: Take 50,000 mg by mouth 2 (two) times a week    acetaminophen (TYLENOL) 325 mg tablet   Yes No   Sig: Take 650 mg by mouth every 4 (four) hours as needed for mild pain or fever   aspirin (ECOTRIN LOW STRENGTH) 81 mg EC tablet  Outside Facility (Specify) No No   Sig: Take 1 tablet (81 mg total) by mouth daily   atorvastatin (LIPITOR) 40 mg tablet  Outside Facility (Specify) No No   Sig: Take 1 tablet (40 mg total) by mouth daily with dinner   bisacodyl (DULCOLAX) 10 mg suppository  Outside Facility (Specify) Yes No   Sig: Insert 10 mg into the rectum as needed for constipation constipation    ferrous sulfate 325 (65 Fe) mg tablet  Outside Facility (Specify) Yes No   Sig: TAKE 1 TABLET BY MOUTH ONCE A DAY FOR SUPPLEMENT   gabapentin (NEURONTIN) 100 mg capsule  Outside Facility (Specify) Yes No   Sig: Take 100 mg by mouth 3 (three) times a day insulin glargine (LANTUS) 100 units/mL subcutaneous injection   No No   Sig: Inject 10 Units under the skin daily at bedtime   metoprolol tartrate (LOPRESSOR) 25 mg tablet   Yes No   Sig: Take 25 mg by mouth every 12 (twelve) hours    mineral oil enema  Outside Facility (Specify) Yes No   Sig: Insert 1 enema into the rectum once As needed for constipation unrelieved by suppository   mirtazapine (REMERON) 15 mg tablet   Yes No   Sig: Take 15 mg by mouth daily at bedtime   oxyCODONE (OXY-IR) 5 MG capsule  Outside Facility (Specify) Yes No   Sig: Take 5 mg by mouth every 4 (four) hours as needed for moderate pain or severe pain   pantoprazole (PROTONIX) 40 mg tablet   Yes No   Sig: Take 40 mg by mouth 2 (two) times a day    polyethylene glycol (MIRALAX) 17 g packet  Outside Facility (Specify) No No   Sig: Take 17 g by mouth daily   rivaroxaban (XARELTO) 15 mg tablet  Outside Facility (Specify) No No   Sig: Take 1 tablet (15 mg total) by mouth daily with breakfast   torsemide (DEMADEX) 20 mg tablet  Outside Facility (Specify) No No   Sig: Take 1 tablet (20 mg total) by mouth daily      Facility-Administered Medications: None       Past Medical History:   Diagnosis Date    Acute renal failure (ARF) (HCC)     Acute respiratory failure with hypoxia (HCC)     Atrial fibrillation (HCC)     CHF (congestive heart failure) (HCC)     Chronic kidney disease     Diabetes mellitus ([facility] [address] )     type 2    Hyperlipidemia     Hypertension     Stroke [facility])        Past Surgical History:   Procedure Laterality Date    BREAST SURGERY Left     lumpectomy benign    CATARACT EXTRACTION Bilateral 2017    CATARACT EXTRACTION W/ INTRAOCULAR LENS IMPLANT Left 12/11/2017    Procedure: EXTRACTION EXTRACAPSULAR CATARACT PHACO INTRAOCULAR LENS (IOL);   Surgeon: Yola Blank MD;  Location[facility] MAIN OR;  Service: Ophthalmology    NM OPEN RX FEMUR FX+INTRAMED RAYMOND Right 5/21/2020    Procedure: INSERTION OF SHORT TROCHANTERIC FEMORAL NAIL; Surgeon: Cara Long MD;  [facility] Main OR;  Service: Orthopedics    NJ ELENI SHOULDER ARTHRPLSTY HUMERAL&GLENOID COMPNT Right 9/10/2018    Procedure: RIGHT REVERSE TOTAL SHOULDER ARTHROPLASTY;  Surgeon: Cara Long MD;  [facility] Main OR;  Service: Orthopedics    NJ XCAPSL CTRC RMVL INSJ IO LENS PROSTH W/O ECP Right 10/23/2017    Procedure: EXTRACTION EXTRACAPSULAR CATARACT PHACO INTRAOCULAR LENS (IOL); Surgeon: Navya Mccauley MD;  Location[facility] MA[facility] OR;  Service: Ophthalmology    WOUND DEBRIDEMENT N/A 8/26/2020    Procedure: EXCISIONAL DEBRIDEMENT OF SACRAL PRESSURE WOUND, WASHOUT;;  Surgeon: Ivis Carballo MD;  [facility] MAIN OR;  Service: General    WOUND DEBRIDEMENT N/A 8/28/2020    Procedure: BUTTOCKS DEBRIDEMENT WOUND AND DRESSING CHANGE ([address] OUT); Surgeon: Desmond Carr MD;  Location: [facility] MAIN OR;  Service: General       Family History   Problem Relation Age of Onset    Diabetes Mother     Varicose Veins Mother     Stroke Father     Arthritis Brother     Diabetes Daughter     No Known Problems Brother      I have reviewed and agree with the history as documented  E-Cigarette/Vaping    E-Cigarette Use Never User      E-Cigarette/Vaping Substances    Nicotine No     THC No     CBD No     Flavoring No     Other No     Unknown No      Social History     Tobacco Use    Smoking status: Never Smoker    Smokeless tobacco: Never Used   Substance Use Topics    Alcohol use: Never     Frequency: Never     Drinks per session: Patient refused     Binge frequency: Never     Comment: quit 8/17    Drug use: No       Review of Systems   Constitutional: Negative for chills, diaphoresis and fever  Eyes: Negative for visual disturbance  Respiratory: Negative for cough and shortness of breath  Cardiovascular: Negative for chest pain and palpitations  Gastrointestinal: Negative for abdominal pain, diarrhea, nausea and vomiting     Genitourinary: Negative for dysuria, flank pain and frequency  Musculoskeletal: Negative for arthralgias and myalgias  Skin: Positive for wound  Negative for color change and rash  Allergic/Immunologic: Negative for immunocompromised state  Neurological: Negative for dizziness and light-headedness  Hematological: Bruises/bleeds easily  Psychiatric/Behavioral: Negative for confusion  The patient is not nervous/anxious  Physical Exam  Physical Exam  Vitals signs reviewed  Constitutional:       General: She is not in acute distress  Appearance: She is well-developed and underweight  She is ill-appearing (chronically)  She is not diaphoretic  HENT:      Head: Normocephalic and atraumatic  Eyes:      General: No scleral icterus  Pupils: Pupils are equal, round, and reactive to light  Neck:      Vascular: No JVD  Cardiovascular:      Rate and Rhythm: Normal rate and regular rhythm  Heart sounds: No murmur  No friction rub  No gallop  Pulmonary:      Effort: No respiratory distress  Breath sounds: No wheezing or rales  Abdominal:      General: Bowel sounds are normal  There is no distension  Palpations: Abdomen is soft  There is no mass  Tenderness: There is no abdominal tenderness  There is no guarding or rebound  Musculoskeletal:      Right lower leg: No edema  Left lower leg: No edema  Skin:     General: Skin is warm and dry  Capillary Refill: Capillary refill takes less than 2 seconds  Coloration: Skin is not pale  Comments: See clinical images  Stage IV sacral decub, mild venous bleeding   Neurological:      Mental Status: She is alert and oriented to person, place, and time     Psychiatric:         Behavior: Behavior normal              Vital Signs  ED Triage Vitals [05/03/21 1138]   Temperature Pulse Respirations Blood Pressure SpO2   98 5 °F (36 9 °C) 82 18 138/55 100 %      Temp Source Heart Rate Source Patient Position - Orthostatic VS BP Location FiO2 (%)   Temporal Monitor Lying Left arm --      Pain Score       No Pain           Vitals:    05/03/21 1138   BP: 138/55   Pulse: 82   Patient Position - Orthostatic VS: Lying         Visual Acuity      ED Medications  Medications   sodium chloride 0 9 % bolus 500 mL (has no administration in time range)       Diagnostic Studies  Results Reviewed     Procedure Component Value Units Date/Time    Blood gas, arterial [[id number]]     Lab Status: No result Specimen: Blood, Arterial from Artery     Basic metabolic panel [[id number]]  (Abnormal) Collected: 05/03/21 1201    Lab Status: Final result Specimen: Blood from Arm, Left Updated: 05/03/21 1329     Sodium 140 mmol/L      Potassium 3 8 mmol/L      Chloride 99 mmol/L      CO2 40 mmol/L      ANION GAP 1 mmol/L       mg/dL      Creatinine 1 56 mg/dL      Glucose 262 mg/dL      Calcium 9 3 mg/dL      eGFR 32 ml/min/1 73sq m     Narrative:      National Kidney Disease Foundation guidelines for Chronic Kidney Disease (CKD):     Stage 1 with normal or high GFR (GFR > 90 mL/min/1 73 square meters)    Stage 2 Mild CKD (GFR = 60-89 mL/min/1 73 square meters)    Stage 3A Moderate CKD (GFR = 45-59 mL/min/1 73 square meters)    Stage 3B Moderate CKD (GFR = 30-44 mL/min/1 73 square meters)    Stage 4 Severe CKD (GFR = 15-29 mL/min/1 73 square meters)    Stage 5 End Stage CKD (GFR <15 mL/min/1 73 square meters)  Note: GFR calculation is accurate only with a steady state creatinine    CBC and differential [[id number]]  (Abnormal) Collected: 05/03/21 1201    Lab Status: Final result Specimen: Blood from Arm, Left Updated: 05/03/21 1211     WBC 11 31 Thousand/uL      RBC 3 74 Million/uL      Hemoglobin 10 4 g/dL      Hematocrit 34 0 %      MCV 91 fL      MCH 27 8 pg      MCHC 30 6 g/dL      RDW 14 5 %      MPV 11 4 fL      Platelets 496 Thousands/uL      nRBC 0 /100 WBCs      Neutrophils Relative 77 %      Immat GRANS % 0 %      Lymphocytes Relative 14 %      Monocytes Relative 6 %      Eosinophils Relative 2 %      Basophils Relative 1 %      Neutrophils Absolute 8 68 Thousands/µL      Immature Grans Absolute 0 05 Thousand/uL      Lymphocytes Absolute 1 63 Thousands/µL      Monocytes Absolute 0 63 Thousand/µL      Eosinophils Absolute 0 25 Thousand/µL      Basophils Absolute 0 07 Thousands/µL                  No orders to display              Procedures  Procedures         ED Course  ED Course as of May 03 1417   Mon May 03, 2021   1359 Will check ABG due to encephalopathy and mildly elevated Co2 on BMP                                              MDM  Number of Diagnoses or Management Options  Acute kidney injury [facility]): new and requires workup  Acute metabolic encephalopathy: new and requires workup  Bleeding from wound: new and requires workup  Diagnosis management comments: Will admit due to uremia related encephalopathy  Wound packed with surgicel and gauze, re-assessment with scant active bleeding       Amount and/or Complexity of Data Reviewed  Clinical lab tests: ordered and reviewed  Tests in the medicine section of CPT®: ordered and reviewed  Review and summarize past medical records: yes  Discuss the patient with other providers: yes        Disposition  Final diagnoses:   Bleeding from wound   Acute metabolic encephalopathy   Acute kidney injury ([facility] [address] )     Time reflects when diagnosis was documented in both MDM as applicable and the Disposition within this note     Time User Action Codes Description Comment    5/3/2021  1:15 PM Rin Jessie  8XXA] Bleeding from wound     5/3/2021  1:43 PM Vandana  Add [G57 96] Acute metabolic encephalopathy     5/3/2021  1:43 PM Vandana  Add [N17 9] Acute kidney injury [facility])       ED Disposition     ED Disposition Condition Date/Time Comment    Admit Stable Mon May 3, 2021  1:54 PM Case was discussed with Dr Richard Reddy and the patient's admission status was agreed to be Admission Status: observation status to the service of Dr Constanza De La Garza           Follow-up Information    None         Patient's Medications   Discharge Prescriptions    No medications on file     No discharge procedures on file      PDMP Review       Value Time User    PDMP Reviewed  Yes 4/13/2021 10:26 AM Ashtyn Hope MD          ED Provider  Electronically Signed by           Mary Castano PA-C  05/03/21 3192

## 2021-05-03 NOTE — ASSESSMENT & PLAN NOTE
· Follows with wound care as outpatient - had been recommended to be evaluated in the ER due to bleeding at sacral ulcer site likely in setting of Xarelto  · Previously had been debrided as an outpatient on 04/21/2021  · Will consult inpatient wound care  · May require surgical consultation

## 2021-05-03 NOTE — ASSESSMENT & PLAN NOTE
Lab Results   Component Value Date    EGFR 32 05/03/2021    EGFR 53 04/13/2021    EGFR 48 04/12/2021    CREATININE 1 56 (H) 05/03/2021    CREATININE 1 03 04/13/2021    CREATININE 1 11 04/12/2021   · Recently baseline creatinine around 1 0-1 1  · Creatinine 1 56 at time of admission  · Suspect in setting of hypotension, poor oral intake/dehydration and torsemide  · Will hold diuretics  · Avoid nephrotoxic agents, hypotension  · Received 500 cc bolus in the ED with improvement in blood pressure SBP 90s- will give additional 500 cc bolus now  · Check urinalysis  · Monitor BMP

## 2021-05-04 PROBLEM — A41.9 SEPSIS (HCC): Status: ACTIVE | Noted: 2021-01-01

## 2021-05-04 PROBLEM — D68.9 COAGULOPATHY (HCC): Status: ACTIVE | Noted: 2021-01-01

## 2021-05-04 NOTE — WOUND OSTOMY CARE
Consult Note - Wound   Nerissa Ortiz 67 y o  female MRN: [id number]  Unit/Bed#: -01 Encounter: [id number]      History and Present Illness:  67year old female presented to the hospital with lethargy and bleeding sacral ulcer  Patient's history significant for DM, CHF, stage 4 sacral ulceration  Assessment Findings:   Patient is lethargic and sleeping through entire assessment  Dependent for turning/repositioning  Incontinent of bowel  Jimenez catheter in place  Bilateral heels intact  1   Partial thickness skin tear to right elbow  2   Present on admission stage 4 pressure injury to sacrum--patient follows at the wound center  Wound bed probes to bone, pink, shiny with scattered areas of yellow  Undermining present  Active wound bleeding has resolved  However, there is a moderate amount of serosanguinous drainage on the dressing removed  Cristal-wound with blanchable erythema  No induration, fluctuance, or foul odor appreciated  Recommend holding mesalt packing at this time--very rough, may exacerbate bleeding  See flowsheet and media for wound details  Wound Care Plan:   1-Hydraguard lotion to bilateral heels twice daily and as needed  2-Elevate heels off of bed/chair surface to offload pressure  3-Offloading air cushion in chair when out of bed  4-Moisturize skin daily with skin nourishing cream   5-Turn/reposition every 2 hours while in bed, or when medically stable, using positioning wedges; and weight shift frequently while in chair for pressure re-distribution on skin  6-P500 low air-loss mattress  7-Right elbow--cleanse with saline, pat dry  Apply Dermagran, 4x4, and wrap with mary  Change dressing every other day  8-Sacrum--irrigate with saline, pat dry  3m no sting skin barrier film to cristal-wound skin  Pack loosely with Aquacel Ag ribbon (should be available from storeroom) OR plain packing slightly moistened with hydrogel    Cover with ABD and secure with minimal tape   Change dressing daily and as needed with soilage  Wound care team to follow  Plan of care reviewed with primary RN  Patient should continue to follow at the wound center on discharge  Wound 04/21/21 Pressure Injury Sacrum (Active)   Wound Image   05/04/21 1326   Wound Description Pink 05/04/21 1326   Pressure Injury Stage 4 05/04/21 1326   Cristal-wound Assessment Erythema;Fragile 05/04/21 1326   Wound Length (cm) 5 3 cm 05/04/21 1326   Wound Width (cm) 6 5 cm 05/04/21 1326   Wound Depth (cm) 3 4 cm 05/04/21 1326   Wound Surface Area (cm^2) 34 45 cm^2 05/04/21 1326   Wound Volume (cm^3) 117 13 cm^3 05/04/21 1326   Calculated Wound Volume (cm^3) 117 13 cm^3 05/04/21 1326   Change in Wound Size % -33 1 05/04/21 1326   Undermining is depth extending from 6-10 oclock 05/04/21 1326   Drainage Amount Moderate 05/04/21 1326   Drainage Description Serosanguineous 05/04/21 1326   Non-staged Wound Description Full thickness 05/04/21 1326   Treatments Cleansed;Irrigation with NSS 05/04/21 1326   Dressing Packings;ABD 05/04/21 1326   Wound packed? Yes 05/04/21 1326   Packing- # removed 1 05/04/21 1326   Packing- # inserted 1 05/04/21 1326   Dressing Changed Changed 05/04/21 1326   Patient Tolerance Tolerated well 05/04/21 1326   Dressing Status Clean;Dry; Intact 05/04/21 1326       Wound 05/03/21 Skin Tear Abrasion(s) Elbow Right (Active)   Wound Image   05/04/21 1315   Wound Description Pink; Beefy red 05/04/21 1315   Cristal-wound Assessment Intact 05/04/21 1315   Wound Length (cm) 2 cm 05/04/21 1315   Wound Width (cm) 1 5 cm 05/04/21 1315   Wound Depth (cm) 0 1 cm 05/04/21 1315   Wound Surface Area (cm^2) 3 cm^2 05/04/21 1315   Wound Volume (cm^3) 0 3 cm^3 05/04/21 1315   Calculated Wound Volume (cm^3) 0 3 cm^3 05/04/21 1315   Drainage Amount Scant 05/04/21 1315   Drainage Description Bloody 05/04/21 1315   Non-staged Wound Description Partial thickness 05/04/21 1315   Treatments Cleansed 05/04/21 1315   Dressing Dermagran gauze;Dry dressing 05/04/21 1315   Dressing Changed Changed 05/04/21 1315   Patient Tolerance Tolerated well 05/04/21 1315   Dressing Status Clean;Dry; Intact 05/04/21 1541 Nadege Estrada BSN, RN, [facility]

## 2021-05-04 NOTE — PLAN OF CARE
Problem: Potential for Falls  Goal: Patient will remain free of falls  Description: INTERVENTIONS:  - Assess patient frequently for physical needs  -  Identify cognitive and physical deficits and behaviors that affect risk of falls    -  [place] fall precautions as indicated by assessment   - Educate patient/family on patient safety including physical limitations  - Instruct patient to call for assistance with activity based on assessment  - Modify environment to reduce risk of injury  - Consider OT/PT consult to assist with strengthening/mobility  Outcome: Progressing     Problem: Prexisting or High Potential for Compromised Skin Integrity  Goal: Skin integrity is maintained or improved  Description: INTERVENTIONS:  - Identify patients at risk for skin breakdown  - Assess and monitor skin integrity  - Assess and monitor nutrition and hydration status  - Monitor labs   - Assess for incontinence   - Turn and reposition patient  - Assist with mobility/ambulation  - Relieve pressure over bony prominences  - Avoid friction and shearing  - Provide appropriate hygiene as needed including keeping skin clean and dry  - Evaluate need for skin moisturizer/barrier cream  - Collaborate with interdisciplinary team   - Patient/family teaching  - Consider wound care consult   Outcome: Progressing     Problem: PAIN - ADULT  Goal: Verbalizes/displays adequate comfort level or baseline comfort level  Description: Interventions:  - Encourage patient to monitor pain and request assistance  - Assess pain using appropriate pain scale  - Administer analgesics based on type and severity of pain and evaluate response  - Implement non-pharmacological measures as appropriate and evaluate response  - Consider cultural and social influences on pain and pain management  - Notify physician/advanced practitioner if interventions unsuccessful or patient reports new pain  Outcome: Progressing     Problem: INFECTION - ADULT  Goal: Absence or prevention of progression during hospitalization  Description: INTERVENTIONS:  - Assess and monitor for signs and symptoms of infection  - Monitor lab/diagnostic results  - Monitor all insertion sites, i e  indwelling lines, tubes, and drains  - Monitor endotracheal if appropriate and nasal secretions for changes in amount and color  - [place] appropriate cooling/warming therapies per order  - Administer medications as ordered  - Instruct and encourage patient and family to use good hand hygiene technique  - Identify and instruct in appropriate isolation precautions for identified infection/condition  Outcome: Progressing  Goal: Absence of fever/infection during neutropenic period  Description: INTERVENTIONS:  - Monitor WBC    Outcome: Progressing     Problem: SAFETY ADULT  Goal: Maintain or return to baseline ADL function  Description: INTERVENTIONS:  -  Assess patient's ability to carry out ADLs; assess patient's baseline for ADL function and identify physical deficits which impact ability to perform ADLs (bathing, care of mouth/teeth, toileting, grooming, dressing, etc )  - Assess/evaluate cause of self-care deficits   - Assess range of motion  - Assess patient's mobility; develop plan if impaired  - Assess patient's need for assistive devices and provide as appropriate  - Encourage maximum independence but intervene and supervise when necessary  - Involve family in performance of ADLs  - Assess for home care needs following discharge   - Consider OT consult to assist with ADL evaluation and planning for discharge  - Provide patient education as appropriate  Outcome: Progressing  Goal: Maintain or return mobility status to optimal level  Description: INTERVENTIONS:  - Assess patient's baseline mobility status (ambulation, transfers, stairs, etc )    - Identify cognitive and physical deficits and behaviors that affect mobility  - Identify mobility aids required to assist with transfers and/or ambulation (gait belt, sit-to-stand, lift, walker, cane, etc )  - [place] fall precautions as indicated by assessment  - Record patient progress and toleration of activity level on Mobility SBAR; progress patient to next Phase/Stage  - Instruct patient to call for assistance with activity based on assessment  - Consider rehabilitation consult to assist with strengthening/weightbearing, etc   Outcome: Progressing     Problem: DISCHARGE PLANNING  Goal: Discharge to home or other facility with appropriate resources  Description: INTERVENTIONS:  - Identify barriers to discharge w/patient and caregiver  - Arrange for needed discharge resources and transportation as appropriate  - Identify discharge learning needs (meds, wound care, etc )  - Arrange for interpretive services to assist at discharge as needed  - Refer to Case Management Department for coordinating discharge planning if the patient needs post-hospital services based on physician/advanced practitioner order or complex needs related to functional status, cognitive ability, or social support system  Outcome: Progressing     Problem: Knowledge Deficit  Goal: Patient/family/caregiver demonstrates understanding of disease process, treatment plan, medications, and discharge instructions  Description: Complete learning assessment and assess knowledge base    Interventions:  - Provide teaching at level of understanding  - Provide teaching via preferred learning methods  Outcome: Progressing

## 2021-05-04 NOTE — CASE MANAGEMENT
LOS 0  PATIENT IS A DOCUMENTED BUNDLE FOR CHF  PATIENT IS A 30-DAY READMISSION (4/8/21-4/13/21)  PATIENT'S UNPLANNED READMISSION RISK SCORE IS 25 AND GREEN  CM attempted to meet with Pt and she was sound asleep  Call to son Fran Jones, [phone]); introduced self and role of CM  Explained bundle payment; no questions  Pt is a long-term care patient at [facility] ([place] 15-day bed hold)  Pt is completely Dependent for ADLs, non-ambulatory, requires yfn lift OOB, bedbound  Son, Fran Jones, is POA  Pt has LW and AD  Pt will require BLS for transport at [facility]  CM reviewed with son the d/c planning process including the following: identifying help at home, patient preference for d/c planning needs, availability of treatment team to discuss questions or concerns patient and/or family may have regarding understanding medications and recognizing signs and symptoms once discharged  CM also encouraged patient to follow up with all recommended appointments after discharge  Patient advised of importance for patient and family to participate in managing patients medical well being  Referral to [facility] in [facility]; Pt will return there  Pt will require BLS at time of DC  CM dept will continue to follow

## 2021-05-04 NOTE — OCCUPATIONAL THERAPY NOTE
Occupational Therapy Screen Note     Patient Name: Hattie Lester  Today's Date: 5/4/2021  Problem List  Principal Problem:    Acute metabolic encephalopathy  Active Problems:    Type 2 diabetes mellitus with hyperglycemia (HCC)    Pressure ulcer of sacral region, stage 4 (HCC)    Paroxysmal atrial fibrillation (HCC)    Chronic diastolic heart failure (HCC)    Chronic respiratory failure with hypoxia (HCC)    Acute kidney injury superimposed on chronic kidney disease ([address] )    Sepsis ([address] )    Past Medical History  Past Medical History:   Diagnosis Date    Acute renal failure (ARF) ([address] )     Acute respiratory failure with hypoxia (HCC)     Atrial fibrillation ([address] )     CHF (congestive heart failure) (HCC)     Chronic kidney disease     Diabetes mellitus ([facility] )     type 2    Hyperlipidemia     Hypertension     Stroke [facility])      Past Surgical History  Past Surgical History:   Procedure Laterality Date    BREAST SURGERY Left     lumpectomy benign    CATARACT EXTRACTION Bilateral 2017    CATARACT EXTRACTION W/ INTRAOCULAR LENS IMPLANT Left 12/11/2017    Procedure: EXTRACTION EXTRACAPSULAR CATARACT PHACO INTRAOCULAR LENS (IOL); Surgeon: Lisa Serrano MD;  [place] [facility] [place];  Service: Ophthalmology    DE OPEN RX FEMUR FX+INTRAMED RAYMOND Right 5/21/2020    Procedure: INSERTION OF SHORT TROCHANTERIC FEMORAL NAIL;  Surgeon: Li Dias MD;  [facility]: [facility] Main OR;  Service: Orthopedics    [address] HUMERAL&GLENOID COMPNT Right 9/10/2018    Procedure: RIGHT REVERSE TOTAL SHOULDER ARTHROPLASTY;  Surgeon: Li Dias MD;  [facility]: [facility] Main OR;  Service: Orthopedics    DE XCAPSL CTRC RMVL INSJ IO LENS PROSTH W/O ECP Right 10/23/2017    Procedure: EXTRACTION EXTRACAPSULAR CATARACT PHACO INTRAOCULAR LENS (IOL);   Surgeon: Lisa Serrano MD;  [place] [facility] [place];  Service: Ophthalmology    WOUND DEBRIDEMENT N/A 8/26/2020    Procedure: EXCISIONAL DEBRIDEMENT OF SACRAL PRESSURE WOUND, WASHOUT;;  Surgeon: Seamus Ledezma MD;  Location: [facility] MAIN OR;  Service: General    WOUND DEBRIDEMENT N/A 8/28/2020    Procedure: BUTTOCKS DEBRIDEMENT WOUND AND DRESSING CHANGE ([facility] OUT); Surgeon: Brad Au MD;  Location: [facility] MAIN OR;  Service: General         05/04/21 1508   OT Last Visit   OT Visit Date 05/04/21   Note Type   Note type Screen   Assessment   Assessment OT orders received  Chart reviewed  Pt [facility] from [facility]  At baseline, pt is bedbound, requires yfn lift for transfers, and full assist for ADLs  Pt with no acute inpt OT needs at this time  Will D/C OT orders  Please reconsult if any acute needs arise            Lilibeth Nur, OTR/L

## 2021-05-04 NOTE — QUICK NOTE
Patient positive for UA  Started on ceftriaxone  Patient with recent C diff infection  Due to high risk start PO vancomycin as prophylaxis

## 2021-05-04 NOTE — ASSESSMENT & PLAN NOTE
Wt Readings from Last 3 Encounters:   05/04/21 69 kg (152 lb 1 9 oz)   04/27/21 64 kg (141 lb)   04/13/21 72 1 kg (158 lb 15 2 oz)     · Patient appears mildly hypovolemic also with RORY on CKD  · Hold torsemide  · Monitor daily weights, I/O  · Low-sodium diet

## 2021-05-04 NOTE — ASSESSMENT & PLAN NOTE
· POA, e/b tachycardia, leukocytosis, tachypnea, RORY, AMS   · SBP 70s-90s at time of admission, had improved s/p IVF bolus   · F/u blood cultures   · UA shows pyuria and bacteriuria, in the setting of hypotension will treat for acute cystitis with IV Rocephin and follow-up culture results  · Continue C diff prophylaxis with PO vancomycin

## 2021-05-04 NOTE — DISCHARGE INSTR - OTHER ORDERS
Wound Care Plan:   1-Apply Allevyn Life foam dressing to bilateral heels for prevention  Louis with P   Peel back at least daily for skin assessment and re-apply  Change dressing every 3 days and PRN  2-Elevate heels off of bed/chair surface to offload pressure  3-Offloading air cushion in chair when out of bed  4-Moisturize skin daily with skin nourishing cream   5-Turn/reposition every 2 hours while in bed, or when medically stable, using positioning wedges; and weight shift frequently while in chair for pressure re-distribution on skin  6-Low air-loss mattress  7-Sacrum--irrigate with saline, pat dry  3m no sting skin barrier film to precious-wound skin  Pack wound loosely with mesalt ribbon--tape end of packing to precious-wound skin  Cover with Maxorb and ABD  Change dressing daily and as needed with soilage      Follow-up at the [address]

## 2021-05-04 NOTE — ASSESSMENT & PLAN NOTE
Lab Results   Component Value Date    HGBA1C 6 0 (H) 04/08/2021     Recent Labs     05/03/21  2252 05/04/21  0804   POCGLU 286* 211*     Blood Sugar Average: Last 72 hrs:  · (P) 248 5   · Continue Lantus 10 units q h s   · SSI plus Accu-Chek  · Diabetic diet

## 2021-05-04 NOTE — ASSESSMENT & PLAN NOTE
Lab Results   Component Value Date    EGFR 38 05/04/2021    EGFR 32 05/03/2021    EGFR 53 04/13/2021    CREATININE 1 36 (H) 05/04/2021    CREATININE 1 56 (H) 05/03/2021    CREATININE 1 03 04/13/2021   · RORY on CKD POA   · Recently baseline creatinine around 1 0-1 1  · Creatinine 1 56 at time of admission  · Suspect in setting of hypotension, poor oral intake/dehydration and torsemide   · Avoid nephrotoxic agents, hypotension  · Received IVF with improvement, continue hold diuretics   · Monitor BMP

## 2021-05-04 NOTE — ASSESSMENT & PLAN NOTE
· Follows with wound care as outpatient - had been recommended to be evaluated in the ER due to bleeding at sacral ulcer site likely in setting of Xarelto  · Previously had been debrided as an outpatient on 04/21/2021  · Will consult inpatient wound care  · May require surgical consultation  · Xarelto remains on hold   · See clinical media

## 2021-05-04 NOTE — PHYSICAL THERAPY NOTE
Physical Therapy Screen    Patient Name: Lizette Alvarado    FQBBC'T Date: 5/4/2021     Problem List  Principal Problem:    Acute metabolic encephalopathy  Active Problems:    Type 2 diabetes mellitus with hyperglycemia (HCC)    Pressure ulcer of sacral region, stage 4 (HCC)    Paroxysmal atrial fibrillation (HCC)    Chronic diastolic heart failure (HCC)    Chronic respiratory failure with hypoxia (HCC)    Acute kidney injury superimposed on chronic kidney disease ([address] )    Sepsis ([facility] )       Past Medical History  Past Medical History:   Diagnosis Date    Acute renal failure (ARF) ([address] )     Acute respiratory failure with hypoxia (HCC)     Atrial fibrillation ([facility] )     CHF (congestive heart failure) (HCC)     Chronic kidney disease     Diabetes mellitus ([facility] [address] )     type 2    Hyperlipidemia     Hypertension     Stroke [facility])         Past Surgical History  Past Surgical History:   Procedure Laterality Date    BREAST SURGERY Left     lumpectomy benign    CATARACT EXTRACTION Bilateral 2017    CATARACT EXTRACTION W/ INTRAOCULAR LENS IMPLANT Left 12/11/2017    Procedure: EXTRACTION EXTRACAPSULAR CATARACT PHACO INTRAOCULAR LENS (IOL); Surgeon: Huma King MD;  Location: [facility] MAIN OR;  Service: Ophthalmology    ND OPEN RX FEMUR FX+INTRAMED RAYMOND Right 5/21/2020    Procedure: INSERTION OF SHORT TROCHANTERIC FEMORAL NAIL;  Surgeon: Jd Munguia MD;  [facility] Main OR;  Service: Orthopedics    [address] HUMERAL&GLENOID COMPNT Right 9/10/2018    Procedure: RIGHT REVERSE TOTAL SHOULDER ARTHROPLASTY;  Surgeon: Jd Munguia MD;  [facility] Main [facility];  Service: Orthopedics    ND XCAPSL CTRC RMVL INSJ IO LENS PROSTH W/O ECP Right 10/23/2017    Procedure: EXTRACTION EXTRACAPSULAR CATARACT PHACO INTRAOCULAR LENS (IOL);   Surgeon: Huma King MD;  Location: [facility] MAIN OR;  Service: Ophthalmology    WOUND DEBRIDEMENT N/A 8/26/2020    Procedure: EXCISIONAL DEBRIDEMENT OF SACRAL PRESSURE WOUND, WASHOUT;;  Surgeon: Darinel Lucio MD;  [facility]: [facility] MAIN OR;  Service: General    WOUND DEBRIDEMENT N/A 8/28/2020    Procedure: BUTTOCKS DEBRIDEMENT WOUND AND DRESSING CHANGE ([address] OUT); Surgeon: Devyn Cortes MD;  Location: [facility] MAIN OR;  Service: General       Chart review completed  Per prior CM notes, patient is a yfn lift, is non ambulatory, and is dependent for all care  Not appropriate for inpatient P T  Will discharge orders  Jakub Brizuela, PT

## 2021-05-04 NOTE — ASSESSMENT & PLAN NOTE
· Presented to the emergency department after being evaluated by Wound Care due to concern for bleeding sacral ulcer     Patient lethargic on arrival, per family patient is more conversant at baseline  · Neurologic exam nonfocal  · Suspect multifactorial in setting of hypotension, acute kidney injury, cystitis  · Continue supportive care

## 2021-05-04 NOTE — ASSESSMENT & PLAN NOTE
ABLA and Coagulopathy POA likely due to Xarelto a/e/b anemia POA with Hg 10 4 >>7 8 treated with holding Xarelto and serial lab monitoring  Likely due to bleeding sacral ulcer

## 2021-05-04 NOTE — PROGRESS NOTES
[place]  Progress Note - Mainor Jenkins 1945, 67 y o  female MRN: [id number]  Unit/Bed#: -01 Encounter: [id number]  Primary Care Provider: Bryn Farley DO   Date and time admitted to hospital: 5/3/2021 11:36 AM    * Acute metabolic encephalopathy  Assessment & Plan  · Presented to the emergency department after being evaluated by Wound Care due to concern for bleeding sacral ulcer  · Patient lethargic on arrival, per family patient is more conversant at baseline  · Neurologic exam nonfocal  · Suspect multifactorial in setting of hypotension, acute kidney injury, cystitis  · Continue supportive care    Acute kidney injury superimposed on chronic kidney disease [facility])  Assessment & Plan  Lab Results   Component Value Date    EGFR 38 05/04/2021    EGFR 32 05/03/2021    EGFR 53 04/13/2021    CREATININE 1 36 (H) 05/04/2021    CREATININE 1 56 (H) 05/03/2021    CREATININE 1 03 04/13/2021   · RORY on CKD POA   · Recently baseline creatinine around 1 0-1 1  · Creatinine 1 56 at time of admission  · Suspect in setting of hypotension, poor oral intake/dehydration and torsemide   · Avoid nephrotoxic agents, hypotension  · Received IVF with improvement, continue hold diuretics   · Monitor BMP    Pressure ulcer of sacral region, stage 4 ([facility] [address] )  Assessment & Plan  · Follows with wound care as outpatient - had been recommended to be evaluated in the ER due to bleeding at sacral ulcer site likely in setting of Xarelto  · Previously had been debrided as an outpatient on 04/21/2021  · Will consult inpatient wound care  · May require surgical consultation  · Xarelto remains on hold   · See clinical media     Paroxysmal atrial fibrillation ([address] )  Assessment & Plan  · Presented in NSR  · Rate controlled on Lopressor - given hypotension on admission this was held  · Now with afib RVR  Will resume at lower dose, 12 5mg BID with hold parameters  Resume home dose when BP improve     · Also anticoagulated on Xarelto, on hold in setting of bleeding sacral ulcer  Sepsis ([address] )  Assessment & Plan  · POA, e/b tachycardia, leukocytosis, tachypnea, RORY, AMS   · SBP 70s-90s at time of admission, had improved s/p IVF bolus   · F/u blood cultures   · UA shows pyuria and bacteriuria, in the setting of hypotension will treat for acute cystitis with IV Rocephin and follow-up culture results  · Continue C diff prophylaxis with PO vancomycin    Chronic respiratory failure with hypoxia (HCC)  Assessment & Plan  · Patient chronically on 2 L nasal cannula  · SpO2 currently 100% on baseline oxygen requirement  · Continue O2 supplementation to maintain SpO2 > 88%    Chronic diastolic heart failure (HCC)  Assessment & Plan  Wt Readings from Last 3 Encounters:   05/04/21 69 kg (152 lb 1 9 oz)   04/27/21 64 kg (141 lb)   04/13/21 72 1 kg (158 lb 15 2 oz)     · Patient appears mildly hypovolemic also with RORY on CKD  · Hold torsemide  · Monitor daily weights, I/O  · Low-sodium diet    Type 2 diabetes mellitus with hyperglycemia [facility])  Assessment & Plan  Lab Results   Component Value Date    HGBA1C 6 0 (H) 04/08/2021     Recent Labs     05/03/21  2252 05/04/21  0804   POCGLU 286* 211*     Blood Sugar Average: Last 72 hrs:  · (P) 248 5   · Continue Lantus 10 units q h s   · SSI plus Accu-Chek  · Diabetic diet    ABLA and Coagulopathy POA likely due to Xarelto a/e/b anemia POA with Hg 10 4 >>7 8 treated with holding Xarelto and serial lab monitoring  Likely due to bleeding sacral ulcer  VTE Pharmacologic Prophylaxis:   Pharmacologic: Rivaroxaban (Xarelto) - on hold   Mechanical VTE Prophylaxis in Place: Yes    Patient Centered Rounds: I have performed bedside rounds with nursing staff today  Discussions with Specialists or Other Care Team Provider: cm     Education and Discussions with Family / Patient: patient, will call family     Time Spent for Care: 30 minutes    More than 50% of total time spent on counseling and coordination of care as described above  Current Length of Stay: 0 day(s)    Current Patient Status: Inpatient   Certification Statement: The patient will continue to require additional inpatient hospital stay due to pending improvement in mental status, IV abx for UTI     Discharge Plan: pending clinical course     Code Status: Level 1 - Full Code      Subjective:   Denies cp or sob  W/o abdominal pain  Feels tired  Had banana and OJ  Objective:     Vitals:   Temp (24hrs), Av °F (36 7 °C), Min:97 5 °F (36 4 °C), Max:98 5 °F (36 9 °C)    Temp:  [97 5 °F (36 4 °C)-98 5 °F (36 9 °C)] 98 2 °F (36 8 °C)  HR:  [] 97  Resp:  [16-20] 16  BP: ()/(32-73) 113/73  SpO2:  [95 %-100 %] 97 %  Body mass index is 24 55 kg/m²  Input and Output Summary (last 24 hours): Intake/Output Summary (Last 24 hours) at 2021 1037  Last data filed at 2021 4705  Gross per 24 hour   Intake 905 ml   Output 400 ml   Net 505 ml       Physical Exam:     Physical Exam  Constitutional:       Appearance: She is ill-appearing  HENT:      Head: Normocephalic and atraumatic  Mouth/Throat:      Mouth: Mucous membranes are moist    Eyes:      Extraocular Movements: Extraocular movements intact  Neck:      Musculoskeletal: Normal range of motion and neck supple  Cardiovascular:      Rate and Rhythm: Tachycardia present  Rhythm irregular  Pulmonary:      Effort: Pulmonary effort is normal       Breath sounds: Normal breath sounds  Abdominal:      General: Abdomen is flat  Palpations: Abdomen is soft  Genitourinary:     Comments: Cloudy yellow urine in trujillo  Neurological:      Mental Status: She is alert  Comments: Answers questions slowly but appropriately  Oriented to self and location not time or situation  Speech Is clear      Psychiatric:         Mood and Affect: Mood normal          Behavior: Behavior normal          Additional Data:     Labs:    Results from last 7 days   Lab Units 05/04/21  0507   WBC Thousand/uL 10 51*   HEMOGLOBIN g/dL 7 8*   HEMATOCRIT % 25 4*   PLATELETS Thousands/uL 185   NEUTROS PCT % 68   LYMPHS PCT % 20   MONOS PCT % 7   EOS PCT % 3     Results from last 7 days   Lab Units 05/04/21  0507   SODIUM mmol/L 143   POTASSIUM mmol/L 3 6   CHLORIDE mmol/L 103   CO2 mmol/L 32   BUN mg/dL 102*   CREATININE mg/dL 1 36*   ANION GAP mmol/L 8   CALCIUM mg/dL 8 7   GLUCOSE RANDOM mg/dL 194*         Results from last 7 days   Lab Units 05/04/21  0804 05/03/21  2252   POC GLUCOSE mg/dl 211* 286*                   * I Have Reviewed All Lab Data Listed Above  * Additional Pertinent Lab Tests Reviewed:  [address] Admission Reviewed    Imaging:    Imaging Reports Reviewed Today Include: all  Imaging Personally Reviewed by Myself Includes:  none    Recent Cultures (last 7 days):           Last 24 Hours Medication List:   Current Facility-Administered Medications   Medication Dose Route Frequency Provider Last Rate    acetaminophen  650 mg Oral Q6H PRN Susan Luis Antonio, PA-C      atorvastatin  40 mg Oral Daily With [employer] M BEATRIZ [place]      cefTRIAXone  1,000 mg Intravenous Q24H Coty Willson PA-C 1,000 mg (05/04/21 0113)    ferrous sulfate  325 mg Oral Daily With Breakfast Susan Salmon PAMARISOL      gabapentin  100 mg Oral TID Susan Salmon, PA-C      insulin glargine  10 Units Subcutaneous HS Gl[place] Jean Baptiste Susan, PA-C      insulin lispro  1-6 Units Subcutaneous TID AC Gl[place] Jean Baptiste Susan, PA-C      insulin lispro  1-6 Units Subcutaneous HS [place] Jean Baptiste Susan, PA-C      metoprolol tartrate  12 5 mg Oral Q12H [address] Marthaaamir Paul PA-C      multi-electrolyte  75 mL/hr Intravenous Continuous Susan Rumwinifred, PA-C 75 mL/hr (05/04/21 0649)    pantoprazole  40 mg Oral BID AC Susan Luis Antonio, PA-C      vancomycin  125 mg Oral Q12H [address] Coty Willson PA-C          Today, Patient Was Seen By: Radha Marte PA-C    ** Please Note: Dictation voice to text software may have been used in the creation of this document   **

## 2021-05-04 NOTE — ASSESSMENT & PLAN NOTE
· Presented in NSR  · Rate controlled on Lopressor - given hypotension on admission this was held  · Now with afib RVR  Will resume at lower dose, 12 5mg BID with hold parameters  Resume home dose when BP improve     · Also anticoagulated on Xarelto, on hold in setting of bleeding sacral ulcer

## 2021-05-05 NOTE — PROGRESS NOTES
[place]  Progress Note - Dwaynetim Bill 1945, 67 y o  female MRN: [id number]  Unit/Bed#: -01 Encounter: [id number]  Primary Care Provider: Stephanie Garcia DO   Date and time admitted to hospital: 5/3/2021 11:36 AM    Pressure ulcer of sacral region, stage 4 [facility])  Assessment & Plan  · Follows with wound care as outpatient - had been recommended to be evaluated in the ER due to bleeding at sacral ulcer site likely in setting of Xarelto  · Previously had been debrided as an outpatient on 04/21/2021  · Consultation paced for General surgery  · Xarelto on hold currently   · See clinical media for images    * Acute metabolic encephalopathy  Assessment & Plan  · Presented to the emergency department after being evaluated by Wound Care due to concern for bleeding sacral ulcer  Patient lethargic on arrival, per family patient is more conversant at baseline  · Neurologic exam nonfocal  · Suspect multifactorial in setting of hypotension, acute kidney injury, cystitis  · Continue supportive care    Coagulopathy [facility])  Assessment & Plan  Xarelto on hold    Acute kidney injury superimposed on chronic kidney disease [facility])  Assessment & Plan  Lab Results   Component Value Date    EGFR 45 05/05/2021    EGFR 38 05/04/2021    EGFR 32 05/03/2021    CREATININE 1 18 05/05/2021    CREATININE 1 36 (H) 05/04/2021    CREATININE 1 56 (H) 05/03/2021   · RORY on CKD POA   · Recently baseline creatinine around 1 0-1 1  · Creatinine 1 56 at time of admission  · Suspect in setting of hypotension, poor oral intake/dehydration and torsemide   · Avoid nephrotoxic agents, hypotension  · Received IVF with improvement  · Continue to hold diuretics for now  · Check BMP a m  ABLA (acute blood loss anemia)  Assessment & Plan  ABLA and Coagulopathy POA likely due to Xarelto a/e/b anemia POA with Hg 10 4 >>7 8 treated with holding Xarelto and serial lab monitoring  Likely due to bleeding sacral ulcer       Chronic diastolic heart failure (HCC)  Assessment & Plan  Wt Readings from Last 3 Encounters:   05/05/21 70 kg (154 lb 5 2 oz)   04/27/21 64 kg (141 lb)   04/13/21 72 1 kg (158 lb 15 2 oz)     · Patient appears mildly hypovolemic also with RORY on CKD  · Hold torsemide  · Monitor daily weights, I/O  · Low-sodium diet    Sepsis (HCC)  Assessment & Plan  · POA, e/b tachycardia, leukocytosis, tachypnea, RORY, AMS   · SBP 70s-90s at time of admission, had improved s/p IVF bolus   · UA shows pyuria and bacteriuria, in the setting of hypotension will treat for acute cystitis with IV Rocephin and follow-up culture results  · Urine cultures and blood cultures currently pending  · Continue C diff prophylaxis with PO vancomycin    Type 2 diabetes mellitus with hyperglycemia [facility])  Assessment & Plan  Lab Results   Component Value Date    HGBA1C 6 0 (H) 04/08/2021     Recent Labs     05/04/21  1556 05/04/21  2048 05/05/21  0718 05/05/21  1055   POCGLU 152* 313* 137 143*     Blood Sugar Average: Last 72 hrs:  · (P) 214 [id number]   · Continue Lantus 10 units q h s   · SSI plus Accu-Chek  · Diabetic diet    Chronic respiratory failure with hypoxia (HCC)  Assessment & Plan  · Patient chronically on 2 L nasal cannula  · SpO2 currently 100% on baseline oxygen requirement  · Continue O2 supplementation to maintain SpO2 > 88%    Paroxysmal atrial fibrillation (HCC)  Assessment & Plan  · Presented in NSR  · Rate controlled on Lopressor - given hypotension on admission this was held  · Now with afib RVR  Will resume at lower dose, 12 5mg BID with hold parameters  Resume home dose when BP improve  · Also anticoagulated on Xarelto, on hold in setting of bleeding sacral ulcer    VTE Pharmacologic Prophylaxis:   Pharmacologic: Pharmacologic VTE Prophylaxis contraindicated due to Bleeding sacral ulcer  Mechanical VTE Prophylaxis in Place: Yes    Patient Centered Rounds: I have performed bedside rounds with nursing staff today      Discussions with Specialists or Other Care Team Provider:     Education and Discussions with Family / Patient:  Left message for her son    Time Spent for Care: 30 minutes  More than 50% of total time spent on counseling and coordination of care as described above  Current Length of Stay: 1 day(s)    Current Patient Status: Inpatient   Certification Statement: The patient will continue to require additional inpatient hospital stay due to Sacral ulcer/UTI    Discharge Plan: To be decided based on clinical course    Code Status: Level 1 - Full Code      Subjective:     Patient resting in bed  Offers no specific complaints  She denies any pain  Objective:     Vitals:   Temp (24hrs), Av 2 °F (36 8 °C), Min:97 5 °F (36 4 °C), Max:99 °F (37 2 °C)    Temp:  [97 5 °F (36 4 °C)-99 °F (37 2 °C)] 99 °F (37 2 °C)  HR:  [] 95  Resp:  [16-18] 16  BP: ()/(54-74) 103/60  SpO2:  [97 %-100 %] 99 %  Body mass index is 24 91 kg/m²  Input and Output Summary (last 24 hours): Intake/Output Summary (Last 24 hours) at 2021 1320  Last data filed at 2021 1259  Gross per 24 hour   Intake 1800 ml   Output 850 ml   Net 950 ml       Physical Exam:     Physical Exam    Gen -Patient comfortable at rest  Neck- Supple  No thyromegaly or lymphadenopathy  Lungs-Clear bilaterally without any wheeze or rales   Heart S1-S2, regular rate and rhythm, no murmurs  Abdomen-soft nontender, no organomegaly   Bowel sounds present  Extremities-no cyanosi,  clubbing or edema  Skin- no rash  Neuro-awake and alert oriented x2       Additional Data:     Labs:    Results from last 7 days   Lab Units 21  0453 21  0507   WBC Thousand/uL 10 82* 10 51*   HEMOGLOBIN g/dL 8 3* 7 8*   HEMATOCRIT % 28 1* 25 4*   PLATELETS Thousands/uL 196 185   NEUTROS PCT %  --  68   LYMPHS PCT %  --  20   MONOS PCT %  --  7   EOS PCT %  --  3     Results from last 7 days   Lab Units 21  0453   SODIUM mmol/L 142   POTASSIUM mmol/L 3 7   CHLORIDE mmol/L 104   CO2 mmol/L 34*   BUN mg/dL 82*   CREATININE mg/dL 1 18   ANION GAP mmol/L 4   CALCIUM mg/dL 8 6   GLUCOSE RANDOM mg/dL 119         Results from last 7 days   Lab Units 05/05/21  1055 05/05/21  0718 05/04/21  2048 05/04/21  1556 05/04/21  1043 05/04/21  0804 05/03/21  2252   POC GLUCOSE mg/dl 143* 137 313* 152* 262* 211* 286*                   * I Have Reviewed All Lab Data Listed Above  * Additional Pertinent Lab Tests Reviewed:  All Labs Within Last 24 Hours Reviewed    Imaging:    Imaging Reports Reviewed Today Include:   Imaging Personally Reviewed by Myself Includes:      Recent Cultures (last 7 days):     Results from last 7 days   Lab Units 05/03/21  2303   URINE CULTURE  >100,000 cfu/ml        Last 24 Hours Medication List:   Current Facility-Administered Medications   Medication Dose Route Frequency Provider Last Rate    acetaminophen  650 mg Oral Q6H PRN Celia Hull PA-C      atorvastatin  40 mg Oral Daily With [employer]Z      cefTRIAXone  1,000 mg Intravenous Q24H Winston Benjamin PA-C Stopped (05/05/21 0101)    ferrous sulfate  325 mg Oral Daily With Breakfast Celia Hull PA-C      gabapentin  100 mg Oral TID Celia Hull PA-C      insulin glargine  10 Units Subcutaneous HS TAD Patel-HERBIE      insulin lispro  1-6 Units Subcutaneous TID AC Juanis Davis PA-C      insulin lispro  1-6 Units Subcutaneous HS Shailesh Davis PA-C      metoprolol tartrate  12 5 mg Oral Q12H [facility] Anna Paul PA-C      multi-electrolyte  75 mL/hr Intravenous Continuous Celia Hull PA-C 75 mL/hr (05/05/21 1251)    mupirocin   Nasal Q12H [facility] Annia Esquivel MD      pantoprazole  40 mg Oral BID AC Shailesh Hill PA-C      vancomycin  125 mg Oral Q12H [facility] Winston Benjamin PA-C          Today, Patient Was Seen By: Grazyna Doe MD    ** Please Note: Dictation voice to text software may have been used in the creation of this document   **

## 2021-05-05 NOTE — ASSESSMENT & PLAN NOTE
· Follows with wound care as outpatient - had been recommended to be evaluated in the ER due to bleeding at sacral ulcer site likely in setting of Xarelto  · Previously had been debrided as an outpatient on 04/21/2021  · Consultation paced for General surgery  · Xarelto on hold currently   · See clinical media for images

## 2021-05-05 NOTE — CASE MANAGEMENT
LOS 1 DAY  Patient's son/POA (Vanita Morrow, [phone]), asked to speak to CM  Emma Wright is interested in finding another SNF for his mom closer to where he lives  CM provided son a list of SNFs close to [phone]  Son requested referrals sent to: [facility], [address]; referrals sent via [place]  CM informed son that  Pt might have to return to [facility], and he work on moving her from there, if facilities do not have a bed for her when she is medically clear for DC; son verbalized understanding  CM dept will continue to follow to DC

## 2021-05-05 NOTE — CONSULTS
Consultation - General Surgery   Pito Mcgovern 67 y o  female MRN: [id number]  Unit/Bed#: -01 Encounter: [id number]               Assessment/Plan   Sacral decubitus ulcer, stage 4  - Looks like this started last year after she fell  She had debridement in Fall of 2020 at [facility], wound vac and then treated with abx  As per nursing, son mentioned this was improving and has now worsened   -Surgery was consulted for bleeding ulcer, however there is no active bleeding on my exam  Pt's xarelto is being held as well  The wound bed has pink tissue without any necrosis    -No new imaging to eval for osteo  -Pt currently on Vanco( for C diff) and rocephin for UTI  -continue to monitor wound   -continue wound and dressing changes as ordered, replaced at bedside today  -monitor for stool contamination   -Wound Care is following the patient and has a wound care plan in place  As of right now, no need for surgical intervention  We will sign off, please call if further questions       Altered MS  -Lethargic on admission  Conversing fine today, but has confusion about timeline      Sepsis POA  -UA shows pyuria, bacteriuria  On IV rocephin     Anticoagulation  -apparently patient on Xarelto at nursing facility  -currently on hold dye to bleeding noted at sacral ulcer site  -monitor for bleeding     Multiple medical co-morbidities including PAF, DM2, CHF, CK3, HTN, HLD  -continue monitoring and CC management         History of Present Illness    Admit Date:  5/3/2021  Hospital Day:  1 day  Primary Service:  Hospitalist  Attending Provider:  Gabriella Watson MD    Hx and PE limited by: Pt's mental status  HPI: Pito Mcgovern is a 67y o  year old female with pmhx significant for CHF, CKD, DMType2, Hip fracture and sacral ulcer stage 4 came to the ER on 5/3 for bleeding from sacral ulcer  Apparently the patient was lethargic and found to have UTI as well   It looks like the patient had a sacral debridement done for the same back in Aug 2020 and had a wound vac placed  As per the son, who spoke to the nurses, this was healing fine but it reopened few months ago  Pt was referred and was being seen at wound clinic  They debrided this on 4/21  This admission, wound care is seeing the patient as well  They have recommended that wound care plan for the patient  There was a CT from sept of last year that showed osteo and pt was treated with IV abx  No new imaging this admission  Patient does state she has pain in her buttocks, but does not answer to any other questions appropriately  She reports she does not walk much but does not know why  Inpatient consult to Acute Care Surgery  Consult performed by: Sukumar Barrios PA-C  Consult ordered by: Vielka Joe MD          Review of Systems  ROS unable to be obtained 2/2 pt's confusion  Chart reviewed  Historical Information   Past Medical History:   Diagnosis Date    [facility] failure (ARF) ([facility] [address] )     Acute respiratory failure with hypoxia (HCC)     Atrial fibrillation (HCC)     CHF (congestive heart failure) (HCC)     Chronic kidney disease     Diabetes mellitus ([facility] [address] )     type 2    Hyperlipidemia     Hypertension     Stroke [facility])      Past Surgical History:   Procedure Laterality Date    BREAST SURGERY Left     lumpectomy benign    CATARACT EXTRACTION Bilateral 2017    CATARACT EXTRACTION W/ INTRAOCULAR LENS IMPLANT Left 12/11/2017    Procedure: EXTRACTION EXTRACAPSULAR CATARACT PHACO INTRAOCULAR LENS (IOL);   Surgeon: Blaze Harris MD;  Location: [facility] MAIN OR;  Service: Ophthalmology    OR OPEN RX FEMUR FX+INTRAMED RAYMOND Right 5/21/2020    Procedure: INSERTION OF SHORT TROCHANTERIC FEMORAL NAIL;  Surgeon: Angelica Armstrong MD;  [facility] Main [facility];  Service: Orthopedics    OR ELENI SHOULDER [address] [place] [address] HUMERAL&GLENOID COMPNT Right 9/10/2018    Procedure: RIGHT REVERSE TOTAL SHOULDER ARTHROPLASTY;  Surgeon: Angelica Armstrong MD;  [facility];  Service: Orthopedics    IL XCAPSL CTRC RMVL INSJ IO LENS PROSTH W/O ECP Right 10/23/2017    Procedure: EXTRACTION EXTRACAPSULAR CATARACT PHACO INTRAOCULAR LENS (IOL); Surgeon: July Davidson MD;  Location: [facility] MAIN OR;  Service: Ophthalmology    WOUND DEBRIDEMENT N/A 8/26/2020    Procedure: EXCISIONAL DEBRIDEMENT OF SACRAL PRESSURE WOUND, WASHOUT;;  Surgeon: Blaze Upton MD;  [facility];  Service: General    WOUND DEBRIDEMENT N/A 8/28/2020    Procedure: BUTTOCKS DEBRIDEMENT WOUND AND DRESSING CHANGE ([address] OUT);   Surgeon: Tk Zavala MD;  [facility] MA[facility];  Service: General     Social History   Social History     Substance and Sexual Activity   Alcohol Use Never    Frequency: Never    Drinks per session: Patient refused    Binge frequency: Never    Comment: quit 8/17     Social History     Substance and Sexual Activity   Drug Use No     E-Cigarette/Vaping    E-Cigarette Use Never User      E-Cigarette/Vaping Substances    Nicotine No     THC No     CBD No     Flavoring No     Other No     Unknown No      Social History     Tobacco Use   Smoking Status Never Smoker   Smokeless Tobacco Never Used     Family History:   Family History   Problem Relation Age of Onset    Diabetes Mother     Varicose Veins Mother     Stroke Father     Arthritis Brother     Diabetes Daughter     No Known Problems Brother        Meds/Allergies   current meds:   Current Facility-Administered Medications   Medication Dose Route Frequency    acetaminophen (TYLENOL) tablet 650 mg  650 mg Oral Q6H PRN    atorvastatin (LIPITOR) tablet 40 mg  40 mg Oral Daily With Dinner    cefTRIAXone (ROCEPHIN) IVPB (premix in dextrose) 1,000 mg 50 mL  1,000 mg Intravenous Q24H    ferrous sulfate tablet 325 mg  325 mg Oral Daily With Breakfast    gabapentin (NEURONTIN) capsule 100 mg  100 mg Oral TID    insulin glargine (LANTUS) subcutaneous injection 10 Units 0 1 mL  10 Units Subcutaneous HS    insulin lispro (HumaLOG) 100 units/mL subcutaneous injection 1-6 Units  1-6 Units Subcutaneous TID AC    insulin lispro (HumaLOG) 100 units/mL subcutaneous injection 1-6 Units  1-6 Units Subcutaneous HS    metoprolol tartrate (LOPRESSOR) partial tablet 12 5 mg  12 5 mg Oral Q12H [facility]    multi-electrolyte (PLASMALYTE-A/ISOLYTE-S PH 7 4) IV solution  75 mL/hr Intravenous Continuous    mupirocin (BACTROBAN) 2 % nasal ointment   Nasal Q12H [facility]    pantoprazole (PROTONIX) EC tablet 40 mg  40 mg Oral BID AC    vancomycin (VANCOCIN) oral solution 125 mg  125 mg Oral Q12H [facility]         No Known Allergies    Objective   Temp:  [97 5 °F (36 4 °C)-99 °F (37 2 °C)] 99 °F (37 2 °C)  HR:  [] 95  Resp:  [16-18] 16  BP: ()/(54-74) 103/60    Intake/Output Summary (Last 24 hours) at 5/5/2021 1322  Last data filed at 5/5/2021 1259  Gross per 24 hour   Intake 1800 ml   Output 850 ml   Net 950 ml       Physical Exam  Constitutional:       Appearance: Normal appearance  NAD  HENT:      Head: Normocephalic  Eyes:      General: No scleral icterus  Extraocular Movements: Extraocular movements intact  Neck:      Musculoskeletal: Normal range of motion  Cardiovascular:      Rate and Rhythm: Normal rate and regular rhythm  Heart sounds: S1 normal and S2 normal    Pulmonary:      Effort: Pulmonary effort is normal       Breath sounds: Normal breath sounds  No wheezing, rhonchi or rales  Abdominal:      General: Bowel sounds are normal       Palpations: Abdomen is soft  Tenderness: There is no abdominal tenderness  There is no guarding or rebound  Musculoskeletal:         General: No swelling, tenderness or deformity  R elbow with wound as well, packed in dressing  Skin:     General: Skin is warm and dry  No necrotic tissue  Bone palpated at the bed of the wound, pink granoulous tissue with area of yellow  No acite bleeding or purulent drainage  Erythema around the wound  Neurological:      Comments: Oriented to person only    Minimally answers questions  She is able to lift her legs off the bed for brief few seconds and she is able to lift arms off the bed as well  Lab Results:   CBC:   Lab Results   Component Value Date    WBC 10 82 (H) 05/05/2021    HGB 8 3 (L) 05/05/2021    HCT 28 1 (L) 05/05/2021    MCV 93 05/05/2021     05/05/2021    MCH 27 4 05/05/2021    MCHC 29 5 (L) 05/05/2021    RDW 14 9 05/05/2021    MPV 10 9 05/05/2021   , CMP:   Lab Results   Component Value Date    SODIUM 142 05/05/2021    K 3 7 05/05/2021     05/05/2021    CO2 34 (H) 05/05/2021    BUN 82 (H) 05/05/2021    CREATININE 1 18 05/05/2021    CALCIUM 8 6 05/05/2021    EGFR 45 05/05/2021   , BMP:  Lab Results   Component Value Date    SODIUM 142 05/05/2021    K 3 7 05/05/2021     05/05/2021    CO2 34 (H) 05/05/2021    BUN 82 (H) 05/05/2021    CREATININE 1 18 05/05/2021    GLUC 119 05/05/2021    CALCIUM 8 6 05/05/2021    AGAP 4 05/05/2021    EGFR 45 05/05/2021   , PT/PTT:No results found for: PT, PTT      Counseling / Coordination of Care  Total floor / unit time spent today 15 minutes  Greater than 50% of total time was spent with the patient and / or family counseling and / or coordination of care   A description of the counseling / coordination of care: 20mins

## 2021-05-05 NOTE — ASSESSMENT & PLAN NOTE
Wt Readings from Last 3 Encounters:   05/05/21 70 kg (154 lb 5 2 oz)   04/27/21 64 kg (141 lb)   04/13/21 72 1 kg (158 lb 15 2 oz)     · Patient appears mildly hypovolemic also with RORY on CKD  · Hold torsemide  · Monitor daily weights, I/O  · Low-sodium diet

## 2021-05-05 NOTE — ASSESSMENT & PLAN NOTE
Lab Results   Component Value Date    HGBA1C 6 0 (H) 04/08/2021     Recent Labs     05/04/21  1556 05/04/21  2048 05/05/21  0718 05/05/21  1055   POCGLU 152* 313* 137 143*     Blood Sugar Average: Last 72 hrs:  · (P) 214 [id number]   · Continue Lantus 10 units q h s   · SSI plus Accu-Chek  · Diabetic diet

## 2021-05-05 NOTE — PROGRESS NOTES
Chart review complete the patient was admitted on 5/3/21 to [facility] for bleeding from her wound  This care manager assistant will continue to monitor via chart review throughout bundle episode

## 2021-05-05 NOTE — PLAN OF CARE
Problem: Potential for Falls  Goal: Patient will remain free of falls  Description: INTERVENTIONS:  - Assess patient frequently for physical needs  -  Identify cognitive and physical deficits and behaviors that affect risk of falls    -  [place] fall precautions as indicated by assessment   - Educate patient/family on patient safety including physical limitations  - Instruct patient to call for assistance with activity based on assessment  - Modify environment to reduce risk of injury  - Consider OT/PT consult to assist with strengthening/mobility  Outcome: Progressing     Problem: Prexisting or High Potential for Compromised Skin Integrity  Goal: Skin integrity is maintained or improved  Description: INTERVENTIONS:  - Identify patients at risk for skin breakdown  - Assess and monitor skin integrity  - Assess and monitor nutrition and hydration status  - Monitor labs   - Assess for incontinence   - Turn and reposition patient  - Assist with mobility/ambulation  - Relieve pressure over bony prominences  - Avoid friction and shearing  - Provide appropriate hygiene as needed including keeping skin clean and dry  - Evaluate need for skin moisturizer/barrier cream  - Collaborate with interdisciplinary team   - Patient/family teaching  - Consider wound care consult   Outcome: Progressing     Problem: PAIN - ADULT  Goal: Verbalizes/displays adequate comfort level or baseline comfort level  Description: Interventions:  - Encourage patient to monitor pain and request assistance  - Assess pain using appropriate pain scale  - Administer analgesics based on type and severity of pain and evaluate response  - Implement non-pharmacological measures as appropriate and evaluate response  - Consider cultural and social influences on pain and pain management  - Notify physician/advanced practitioner if interventions unsuccessful or patient reports new pain  Outcome: Progressing     Problem: INFECTION - ADULT  Goal: Absence or prevention of progression during hospitalization  Description: INTERVENTIONS:  - Assess and monitor for signs and symptoms of infection  - Monitor lab/diagnostic results  - Monitor all insertion sites, i e  indwelling lines, tubes, and drains  - Monitor endotracheal if appropriate and nasal secretions for changes in amount and color  - [place] appropriate cooling/warming therapies per order  - Administer medications as ordered  - Instruct and encourage patient and family to use good hand hygiene technique  - Identify and instruct in appropriate isolation precautions for identified infection/condition  Outcome: Progressing  Goal: Absence of fever/infection during neutropenic period  Description: INTERVENTIONS:  - Monitor WBC    Outcome: Progressing     Problem: SAFETY ADULT  Goal: Maintain or return to baseline ADL function  Description: INTERVENTIONS:  -  Assess patient's ability to carry out ADLs; assess patient's baseline for ADL function and identify physical deficits which impact ability to perform ADLs (bathing, care of mouth/teeth, toileting, grooming, dressing, etc )  - Assess/evaluate cause of self-care deficits   - Assess range of motion  - Assess patient's mobility; develop plan if impaired  - Assess patient's need for assistive devices and provide as appropriate  - Encourage maximum independence but intervene and supervise when necessary  - Involve family in performance of ADLs  - Assess for home care needs following discharge   - Consider OT consult to assist with ADL evaluation and planning for discharge  - Provide patient education as appropriate  Outcome: Progressing  Goal: Maintain or return mobility status to optimal level  Description: INTERVENTIONS:  - Assess patient's baseline mobility status (ambulation, transfers, stairs, etc )    - Identify cognitive and physical deficits and behaviors that affect mobility  - Identify mobility aids required to assist with transfers and/or ambulation (gait belt, sit-to-stand, lift, walker, cane, etc )  - [place] fall precautions as indicated by assessment  - Record patient progress and toleration of activity level on Mobility SBAR; progress patient to next Phase/Stage  - Instruct patient to call for assistance with activity based on assessment  - Consider rehabilitation consult to assist with strengthening/weightbearing, etc   Outcome: Progressing     Problem: DISCHARGE PLANNING  Goal: Discharge to home or other facility with appropriate resources  Description: INTERVENTIONS:  - Identify barriers to discharge w/patient and caregiver  - Arrange for needed discharge resources and transportation as appropriate  - Identify discharge learning needs (meds, wound care, etc )  - Arrange for interpretive services to assist at discharge as needed  - Refer to Case Management Department for coordinating discharge planning if the patient needs post-hospital services based on physician/advanced practitioner order or complex needs related to functional status, cognitive ability, or social support system  Outcome: Progressing     Problem: Knowledge Deficit  Goal: Patient/family/caregiver demonstrates understanding of disease process, treatment plan, medications, and discharge instructions  Description: Complete learning assessment and assess knowledge base  Interventions:  - Provide teaching at level of understanding  - Provide teaching via preferred learning methods  Outcome: Progressing     Problem: Nutrition/Hydration-ADULT  Goal: Nutrient/Hydration intake appropriate for improving, restoring or maintaining nutritional needs  Description: Monitor and assess patient's nutrition/hydration status for malnutrition  Collaborate with interdisciplinary team and initiate plan and interventions as ordered  Monitor patient's weight and dietary intake as ordered or per policy  Utilize nutrition screening tool and intervene as necessary   Determine patient's food preferences and provide high-protein, high-caloric foods as appropriate       INTERVENTIONS:  - Monitor oral intake, urinary output, labs, and treatment plans  - Assess nutrition and hydration status and recommend course of action  - Evaluate amount of meals eaten  - Assist patient with eating if necessary   - Allow adequate time for meals  - Recommend/ encourage appropriate diets, oral nutritional supplements, and vitamin/mineral supplements  - Order, calculate, and assess calorie counts as needed  - Recommend, monitor, and adjust tube feedings and TPN/PPN based on assessed needs  - Assess need for intravenous fluids  - Provide specific nutrition/hydration education as appropriate  - Include patient/family/caregiver in decisions related to nutrition  Outcome: Progressing

## 2021-05-05 NOTE — ASSESSMENT & PLAN NOTE
· POA, e/b tachycardia, leukocytosis, tachypnea, RORY, AMS   · SBP 70s-90s at time of admission, had improved s/p IVF bolus   · UA shows pyuria and bacteriuria, in the setting of hypotension will treat for acute cystitis with IV Rocephin and follow-up culture results  · Urine cultures and blood cultures currently pending  · Continue C diff prophylaxis with PO vancomycin

## 2021-05-06 NOTE — PROGRESS NOTES
Progress Note - Jes Tyson 67 y o  female MRN: [id number]    Unit/Bed#: -01 Encounter: [id number]      Assessment:  Principal Problem:    Acute metabolic encephalopathy  Active Problems:    ABLA (acute blood loss anemia)    Pressure ulcer of sacral region, stage 4 (HCC)    Chronic respiratory failure with hypoxia (HCC)    Acute kidney injury superimposed on chronic kidney disease (HCC)    Sepsis (HCC)    Type 2 diabetes mellitus with hyperglycemia (HCC)    Paroxysmal atrial fibrillation (HCC)    Chronic diastolic heart failure (HCC)    Coagulopathy (HCC)  Resolved Problems:    * No resolved hospital problems  *        Plan:  · Acute metabolic encephalopathy-this is now resolved  · Sepsis-patient has improved-day 3 Of empiric antibiotic therapy IV Rocephin-culture shows mixed growth-will consider switching to p o  Cephalexin at time of discharge  · Diabetes mellitus type 2 with hyperglycemia-gradual improvement fasting blood sugar down to 137 this morning  · Prior history of C difficile-patient on p o  Vanco for prophylaxis  · Chronic respiratory failure with hypoxia-stable on her usual 2 L nasal cannula with sats now at 96%  · Paroxysmal atrial fibrillation-remains in normal sinus rhythm  · Sacral wound-patient seen by surgery team does not feel that any surgical debridement is needed at present  No recurrent bleeding  · Acute blood loss anemia-hemoglobin 7 7 today will repeat in the a m -place patient on iron supplement and multivitamin    Subjective:   Patient without complaints of back or abdominal pain  Does have pain in the area of her sacral decubitus  She denies any worsening shortness of breath or lightheadedness  ROS  Comprehensive system review negative other than noted above    Objective:     Vitals: Blood pressure 133/90, pulse 84, temperature 98 4 °F (36 9 °C), resp  rate 20, height 5' 6" (1 676 m), weight 70 kg (154 lb 5 2 oz), SpO2 100 %  ,Body mass index is 24 91 kg/m²    Current Facility-Administered Medications   Medication Dose Route Frequency Provider Last Rate Last Admin    acetaminophen (TYLENOL) tablet 650 mg  650 mg Oral Q6H PRN Kristal Carlos PA-C   650 mg at 05/04/21 3966    atorvastatin (LIPITOR) tablet 40 mg  40 mg Oral Daily With LAUREN Matthews PA-C   40 mg at 05/05/21 1628    cefTRIAXone (ROCEPHIN) IVPB (premix in dextrose) 1,000 mg 50 mL  1,000 mg Intravenous Q24H Miguel Gray PA-C 100 mL/hr at 05/06/21 0047 1,000 mg at 05/06/21 0047    ferrous sulfate tablet 325 mg  325 mg Oral Daily With Breakfast Kristal Carlos PA-C   325 mg at 05/06/21 5817    gabapentin (NEURONTIN) capsule 100 mg  100 mg Oral TID Kristal Carlos PA-C   100 mg at 05/06/21 8071    insulin glargine (LANTUS) subcutaneous injection 10 Units 0 1 mL  10 Units Subcutaneous HS Kristal Carlos PA-C   10 Units at 05/05/21 2123    insulin lispro (HumaLOG) 100 units/mL subcutaneous injection 1-6 Units  1-6 Units Subcutaneous TID [facility] Kristal Carlos PA-C   1 Units at 05/06/21 7136    insulin lispro (HumaLOG) 100 units/mL subcutaneous injection 1-6 Units  1-6 Units Subcutaneous HS Kristal Carlos PA-C   2 Units at 05/05/21 2126    metoprolol tartrate (LOPRESSOR) partial tablet 12 5 mg  12 5 mg Oral Q12H [address] Brandon Paul PA-C   12 5 mg at 05/06/21 3659    multi-electrolyte (PLASMALYTE-A/ISOLYTE-S PH 7 4) IV solution  75 mL/hr Intravenous Continuous Kristal Carlos PA-C 75 mL/hr at 05/06/21 0434 75 mL/hr at 05/06/21 0434    mupirocin (BACTROBAN) 2 % nasal ointment   Nasal Q12H [address] Annia Esquivel MD   1 application at 53/96/99 1252    pantoprazole (PROTONIX) EC tablet 40 mg  40 mg Oral BID AC [place] Gigi Hill PA-C   40 mg at 05/06/21 8867    vancomycin (VANCOCIN) oral solution 125 mg  125 mg Oral Q12H [address] Jie Will PA-C   125 mg at 05/06/21 7460     Medications Prior to Admission   Medication    acetaminophen (TYLENOL) 325 mg tablet    aspirin (ECOTRIN LOW STRENGTH) 81 mg EC tablet    atorvastatin (LIPITOR) 40 mg tablet    bisacodyl (DULCOLAX) 10 mg suppository    ferrous sulfate 325 (65 Fe) mg tablet    gabapentin (NEURONTIN) 100 mg capsule    HumaLOG KwikPen 100 units/mL injection pen    insulin glargine (LANTUS) 100 units/mL subcutaneous injection    metoprolol tartrate (LOPRESSOR) 25 mg tablet    mineral oil enema    mirtazapine (REMERON) 15 mg tablet    Nutritional Supplements (Yevgeniy) PACK    oxyCODONE (OXY-IR) 5 MG capsule    pantoprazole (PROTONIX) 40 mg tablet    polyethylene glycol (MIRALAX) 17 g packet    rivaroxaban (XARELTO) 15 mg tablet    Specialty Vitamins Products (PROSTATE PO)    torsemide (DEMADEX) 20 mg tablet    VITAMIN D PO         Intake/Output Summary (Last 24 hours) at 5/6/2021 1020  Last data filed at 5/6/2021 0919  Gross per 24 hour   Intake 780 ml   Output 1275 ml   Net -495 ml       Physical Exam:  General appearance: alert and cooperative  Neck: no adenopathy, supple, symmetrical, trachea midline and thyroid not enlarged, symmetric, no tenderness/mass/nodules  Lungs:  Generally decreased breath sound with some minimal scattered rhonchi at bases  Heart: regular rate and rhythm, S1, S2 normal, no murmur, click, rub or gallop  Abdomen: soft, non-tender; bowel sounds normal; no masses,  no organomegaly  Extremities:  No calf tenderness or edema  Skin sacral dressing in  Neurologic:  No tremors  More alert and conversant       Lab, Imaging and other studies: I have personally reviewed pertinent reports            Results from last 7 days   Lab Units 05/06/21  0439 05/05/21  0453 05/04/21  0507  05/03/21  1201   WBC Thousand/uL 11 33* 10 82* 10 51*  --  11 31*   HEMOGLOBIN g/dL 7 7* 8 3* 7 8*  --  10 4*   I STAT HEMOGLOBIN   --   --   --    < >  --    HEMATOCRIT % 25 8* 28 1* 25 4*  --  34 0*   HEMATOCRIT, ISTAT   --   --   --    < >  --    PLATELETS Thousands/uL 204 196 185  --  231   NEUTROS PCT % 71  --  68  --  77*   LYMPHS PCT % 19  --  20  --  14   MONOS PCT % 6  --  7  --  6   EOS PCT % 3  --  3  --  2    < > = values in this interval not displayed  Results from last 7 days   Lab Units 05/06/21  0439 05/05/21  0453 05/04/21  0507 05/03/21  1425   POTASSIUM mmol/L 4 3 3 7 3 6  --    CHLORIDE mmol/L 107 104 103  --    CO2 mmol/L 30 34* 32  --    CO2, I-STAT mmol/L  --   --   --  40*   BUN mg/dL 52* 82* 102*  --    CREATININE mg/dL 0 92 1 18 1 36*  --    CALCIUM mg/dL 8 9 8 6 8 7  --    GLUCOSE, ISTAT mg/dl  --   --   --  258*     Lab Results   Component Value Date    TROPONINI <0 02 04/08/2021    TROPONINI <0 02 09/14/2020    TROPONINI <0 02 08/25/2020         Lab Results   Component Value Date    BLOODCX No Growth After 5 Days  09/16/2020    BLOODCX No Growth After 5 Days  09/16/2020    BLOODCX No Growth After 5 Days  09/14/2020    BLOODCX Staphylococcus coagulase negative (A) 09/14/2020    URINECX >100,000 cfu/ml  05/03/2021    URINECX 30,000-39,000 cfu/ml Candida glabrata (A) 09/14/2020    URINECX 50,000-59,000 cfu/ml Escherichia coli (A) 08/26/2020    URINECX >100,000 cfu/ml Lactobacillus species (A) 08/26/2020    WOUNDCULT 3+ Growth of Escherichia coli (A) 08/26/2020    WOUNDCULT 3+ Growth of Proteus mirabilis (A) 08/26/2020    WOUNDCULT 3+ Growth of Enterococcus faecalis (A) 08/26/2020    WOUNDCULT 3+ Growth of  08/26/2020       Imaging:  Results for orders placed during the hospital encounter of 04/08/21   XR chest 1 view portable    Narrative CHEST     INDICATION:   GI bleed  COMPARISON:  Compared with 9/17/2020    EXAM PERFORMED/VIEWS:  XR CHEST PORTABLE      FINDINGS:    Cardiac silhouette is mildly prominent  Periventricular opacities of ectatic vessels  Poor inspiration  Prominent vascular markings with haziness in the perihilar distribution  Haziness obscuring right costophrenic angle  No pneumothorax  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

## 2021-05-06 NOTE — PLAN OF CARE
Problem: Potential for Falls  Goal: Patient will remain free of falls  Description: INTERVENTIONS:  - Assess patient frequently for physical needs  -  Identify cognitive and physical deficits and behaviors that affect risk of falls    -  [place] fall precautions as indicated by assessment   - Educate patient/family on patient safety including physical limitations  - Instruct patient to call for assistance with activity based on assessment  - Modify environment to reduce risk of injury  - Consider OT/PT consult to assist with strengthening/mobility  Outcome: Progressing     Problem: Prexisting or High Potential for Compromised Skin Integrity  Goal: Skin integrity is maintained or improved  Description: INTERVENTIONS:  - Identify patients at risk for skin breakdown  - Assess and monitor skin integrity  - Assess and monitor nutrition and hydration status  - Monitor labs   - Assess for incontinence   - Turn and reposition patient  - Assist with mobility/ambulation  - Relieve pressure over bony prominences  - Avoid friction and shearing  - Provide appropriate hygiene as needed including keeping skin clean and dry  - Evaluate need for skin moisturizer/barrier cream  - Collaborate with interdisciplinary team   - Patient/family teaching  - Consider wound care consult   Outcome: Progressing     Problem: PAIN - ADULT  Goal: Verbalizes/displays adequate comfort level or baseline comfort level  Description: Interventions:  - Encourage patient to monitor pain and request assistance  - Assess pain using appropriate pain scale  - Administer analgesics based on type and severity of pain and evaluate response  - Implement non-pharmacological measures as appropriate and evaluate response  - Consider cultural and social influences on pain and pain management  - Notify physician/advanced practitioner if interventions unsuccessful or patient reports new pain  Outcome: Progressing     Problem: INFECTION - ADULT  Goal: Absence or prevention of progression during hospitalization  Description: INTERVENTIONS:  - Assess and monitor for signs and symptoms of infection  - Monitor lab/diagnostic results  - Monitor all insertion sites, i e  indwelling lines, tubes, and drains  - Monitor endotracheal if appropriate and nasal secretions for changes in amount and color  - [place] appropriate cooling/warming therapies per order  - Administer medications as ordered  - Instruct and encourage patient and family to use good hand hygiene technique  - Identify and instruct in appropriate isolation precautions for identified infection/condition  Outcome: Progressing  Goal: Absence of fever/infection during neutropenic period  Description: INTERVENTIONS:  - Monitor WBC    Outcome: Progressing     Problem: SAFETY ADULT  Goal: Maintain or return to baseline ADL function  Description: INTERVENTIONS:  -  Assess patient's ability to carry out ADLs; assess patient's baseline for ADL function and identify physical deficits which impact ability to perform ADLs (bathing, care of mouth/teeth, toileting, grooming, dressing, etc )  - Assess/evaluate cause of self-care deficits   - Assess range of motion  - Assess patient's mobility; develop plan if impaired  - Assess patient's need for assistive devices and provide as appropriate  - Encourage maximum independence but intervene and supervise when necessary  - Involve family in performance of ADLs  - Assess for home care needs following discharge   - Consider OT consult to assist with ADL evaluation and planning for discharge  - Provide patient education as appropriate  Outcome: Progressing  Goal: Maintain or return mobility status to optimal level  Description: INTERVENTIONS:  - Assess patient's baseline mobility status (ambulation, transfers, stairs, etc )    - Identify cognitive and physical deficits and behaviors that affect mobility  - Identify mobility aids required to assist with transfers and/or ambulation (gait belt, sit-to-stand, lift, walker, cane, etc )  - [place] fall precautions as indicated by assessment  - Record patient progress and toleration of activity level on Mobility SBAR; progress patient to next Phase/Stage  - Instruct patient to call for assistance with activity based on assessment  - Consider rehabilitation consult to assist with strengthening/weightbearing, etc   Outcome: Progressing     Problem: DISCHARGE PLANNING  Goal: Discharge to home or other facility with appropriate resources  Description: INTERVENTIONS:  - Identify barriers to discharge w/patient and caregiver  - Arrange for needed discharge resources and transportation as appropriate  - Identify discharge learning needs (meds, wound care, etc )  - Arrange for interpretive services to assist at discharge as needed  - Refer to Case Management Department for coordinating discharge planning if the patient needs post-hospital services based on physician/advanced practitioner order or complex needs related to functional status, cognitive ability, or social support system  Outcome: Progressing     Problem: Knowledge Deficit  Goal: Patient/family/caregiver demonstrates understanding of disease process, treatment plan, medications, and discharge instructions  Description: Complete learning assessment and assess knowledge base  Interventions:  - Provide teaching at level of understanding  - Provide teaching via preferred learning methods  Outcome: Progressing     Problem: Nutrition/Hydration-ADULT  Goal: Nutrient/Hydration intake appropriate for improving, restoring or maintaining nutritional needs  Description: Monitor and assess patient's nutrition/hydration status for malnutrition  Collaborate with interdisciplinary team and initiate plan and interventions as ordered  Monitor patient's weight and dietary intake as ordered or per policy  Utilize nutrition screening tool and intervene as necessary   Determine patient's food preferences and provide high-protein, high-caloric foods as appropriate       INTERVENTIONS:  - Monitor oral intake, urinary output, labs, and treatment plans  - Assess nutrition and hydration status and recommend course of action  - Evaluate amount of meals eaten  - Assist patient with eating if necessary   - Allow adequate time for meals  - Recommend/ encourage appropriate diets, oral nutritional supplements, and vitamin/mineral supplements  - Order, calculate, and assess calorie counts as needed  - Recommend, monitor, and adjust tube feedings and TPN/PPN based on assessed needs  - Assess need for intravenous fluids  - Provide specific nutrition/hydration education as appropriate  - Include patient/family/caregiver in decisions related to nutrition  Outcome: Progressing

## 2021-05-07 PROBLEM — N18.9 ACUTE KIDNEY INJURY SUPERIMPOSED ON CHRONIC KIDNEY DISEASE (HCC): Status: RESOLVED | Noted: 2021-01-01 | Resolved: 2021-01-01

## 2021-05-07 PROBLEM — N17.9 ACUTE KIDNEY INJURY SUPERIMPOSED ON CHRONIC KIDNEY DISEASE (HCC): Status: RESOLVED | Noted: 2021-01-01 | Resolved: 2021-01-01

## 2021-05-07 NOTE — ASSESSMENT & PLAN NOTE
· Follows with wound care as outpatient - had been recommended to be evaluated in the ER due to bleeding at sacral ulcer site likely in setting of Xarelto  · Previously had been debrided as an outpatient on 04/21/2021  · No intervention planned per General surgery  · See clinical media for images

## 2021-05-07 NOTE — ASSESSMENT & PLAN NOTE
Lab Results   Component Value Date    EGFR 63 05/07/2021    EGFR 61 05/06/2021    EGFR 45 05/05/2021    CREATININE 0 89 05/07/2021    CREATININE 0 92 05/06/2021    CREATININE 1 18 05/05/2021   · RORY on CKD POA   · Recently baseline creatinine around 1 0-1 1  · Creatinine 1 56 at time of admission  · Suspect in setting of hypotension, poor oral intake/dehydration and torsemide   · Avoid nephrotoxic agents, hypotension  · Received IVF with improvement  · Continue to hold diuretics for now  · Check BMP a m

## 2021-05-07 NOTE — ASSESSMENT & PLAN NOTE
· Presented to the emergency department after being evaluated by Wound Care due to concern for bleeding sacral ulcer  Patient lethargic on arrival, per family patient is more conversant at baseline  · Now appearing back to baseline    · Neurologic exam nonfocal  · Suspect multifactorial in setting of hypotension, acute kidney injury, cystitis  · Continue supportive care

## 2021-05-07 NOTE — ASSESSMENT & PLAN NOTE
· ABLA and Coagulopathy POA likely due to Xarelto a/e/b anemia POA with Hg 10 4 >>7 8 treated with holding Xarelto and serial lab monitoring  Likely due to bleeding sacral ulcer  · Hemoglobin stabilized, no recurrence of bleeding  Restarting Xarelto tomorrow

## 2021-05-07 NOTE — PLAN OF CARE
Problem: Potential for Falls  Goal: Patient will remain free of falls  Description: INTERVENTIONS:  - Assess patient frequently for physical needs  -  Identify cognitive and physical deficits and behaviors that affect risk of falls    -  [place] fall precautions as indicated by assessment   - Educate patient/family on patient safety including physical limitations  - Instruct patient to call for assistance with activity based on assessment  - Modify environment to reduce risk of injury  - Consider OT/PT consult to assist with strengthening/mobility  Outcome: Progressing     Problem: Prexisting or High Potential for Compromised Skin Integrity  Goal: Skin integrity is maintained or improved  Description: INTERVENTIONS:  - Identify patients at risk for skin breakdown  - Assess and monitor skin integrity  - Assess and monitor nutrition and hydration status  - Monitor labs   - Assess for incontinence   - Turn and reposition patient  - Assist with mobility/ambulation  - Relieve pressure over bony prominences  - Avoid friction and shearing  - Provide appropriate hygiene as needed including keeping skin clean and dry  - Evaluate need for skin moisturizer/barrier cream  - Collaborate with interdisciplinary team   - Patient/family teaching  - Consider wound care consult   Outcome: Progressing     Problem: PAIN - ADULT  Goal: Verbalizes/displays adequate comfort level or baseline comfort level  Description: Interventions:  - Encourage patient to monitor pain and request assistance  - Assess pain using appropriate pain scale  - Administer analgesics based on type and severity of pain and evaluate response  - Implement non-pharmacological measures as appropriate and evaluate response  - Consider cultural and social influences on pain and pain management  - Notify physician/advanced practitioner if interventions unsuccessful or patient reports new pain  Outcome: Progressing     Problem: INFECTION - ADULT  Goal: Absence or prevention of progression during hospitalization  Description: INTERVENTIONS:  - Assess and monitor for signs and symptoms of infection  - Monitor lab/diagnostic results  - Monitor all insertion sites, i e  indwelling lines, tubes, and drains  - Monitor endotracheal if appropriate and nasal secretions for changes in amount and color  - [place] appropriate cooling/warming therapies per order  - Administer medications as ordered  - Instruct and encourage patient and family to use good hand hygiene technique  - Identify and instruct in appropriate isolation precautions for identified infection/condition  Outcome: Progressing  Goal: Absence of fever/infection during neutropenic period  Description: INTERVENTIONS:  - Monitor WBC    Outcome: Progressing     Problem: SAFETY ADULT  Goal: Maintain or return to baseline ADL function  Description: INTERVENTIONS:  -  Assess patient's ability to carry out ADLs; assess patient's baseline for ADL function and identify physical deficits which impact ability to perform ADLs (bathing, care of mouth/teeth, toileting, grooming, dressing, etc )  - Assess/evaluate cause of self-care deficits   - Assess range of motion  - Assess patient's mobility; develop plan if impaired  - Assess patient's need for assistive devices and provide as appropriate  - Encourage maximum independence but intervene and supervise when necessary  - Involve family in performance of ADLs  - Assess for home care needs following discharge   - Consider OT consult to assist with ADL evaluation and planning for discharge  - Provide patient education as appropriate  Outcome: Progressing  Goal: Maintain or return mobility status to optimal level  Description: INTERVENTIONS:  - Assess patient's baseline mobility status (ambulation, transfers, stairs, etc )    - Identify cognitive and physical deficits and behaviors that affect mobility  - Identify mobility aids required to assist with transfers and/or ambulation (gait belt, sit-to-stand, lift, walker, cane, etc )  - [place] fall precautions as indicated by assessment  - Record patient progress and toleration of activity level on Mobility SBAR; progress patient to next Phase/Stage  - Instruct patient to call for assistance with activity based on assessment  - Consider rehabilitation consult to assist with strengthening/weightbearing, etc   Outcome: Progressing     Problem: DISCHARGE PLANNING  Goal: Discharge to home or other facility with appropriate resources  Description: INTERVENTIONS:  - Identify barriers to discharge w/patient and caregiver  - Arrange for needed discharge resources and transportation as appropriate  - Identify discharge learning needs (meds, wound care, etc )  - Arrange for interpretive services to assist at discharge as needed  - Refer to Case Management Department for coordinating discharge planning if the patient needs post-hospital services based on physician/advanced practitioner order or complex needs related to functional status, cognitive ability, or social support system  Outcome: Progressing     Problem: Knowledge Deficit  Goal: Patient/family/caregiver demonstrates understanding of disease process, treatment plan, medications, and discharge instructions  Description: Complete learning assessment and assess knowledge base  Interventions:  - Provide teaching at level of understanding  - Provide teaching via preferred learning methods  Outcome: Progressing     Problem: Nutrition/Hydration-ADULT  Goal: Nutrient/Hydration intake appropriate for improving, restoring or maintaining nutritional needs  Description: Monitor and assess patient's nutrition/hydration status for malnutrition  Collaborate with interdisciplinary team and initiate plan and interventions as ordered  Monitor patient's weight and dietary intake as ordered or per policy  Utilize nutrition screening tool and intervene as necessary   Determine patient's food preferences and provide high-protein, high-caloric foods as appropriate       INTERVENTIONS:  - Monitor oral intake, urinary output, labs, and treatment plans  - Assess nutrition and hydration status and recommend course of action  - Evaluate amount of meals eaten  - Assist patient with eating if necessary   - Allow adequate time for meals  - Recommend/ encourage appropriate diets, oral nutritional supplements, and vitamin/mineral supplements  - Order, calculate, and assess calorie counts as needed  - Recommend, monitor, and adjust tube feedings and TPN/PPN based on assessed needs  - Assess need for intravenous fluids  - Provide specific nutrition/hydration education as appropriate  - Include patient/family/caregiver in decisions related to nutrition  Outcome: Progressing

## 2021-05-07 NOTE — ASSESSMENT & PLAN NOTE
· POA, e/b tachycardia, leukocytosis, tachypnea, RORY, AMS   · SBP 70s-90s at time of admission, had improved s/p IVF bolus   · UA shows pyuria and bacteriuria, in the setting of hypertension she was treated for acute cystitis  · Urine cultures growing mixed contaminants  · No blood cultures drawn upon admission    · Continue C diff prophylaxis with PO vancomycin

## 2021-05-07 NOTE — ASSESSMENT & PLAN NOTE
Lab Results   Component Value Date    HGBA1C 6 0 (H) 04/08/2021     Recent Labs     05/06/21  1703 05/06/21  2123 05/07/21  0705 05/07/21  0735   POCGLU 285* 185* 105 83     Blood Sugar Average: Last 72 hrs:  · (P) 195   · Continue Lantus 10 units q h s   · SSI plus Accu-Chek  · Diabetic diet

## 2021-05-07 NOTE — PROGRESS NOTES
[place]  Progress Note - Romi Maffucci 1945, 67 y o  female MRN: [id number]  Unit/Bed#: -01 Encounter: [id number]  Primary Care Provider: Luis Flores DO   Date and time admitted to hospital: 5/3/2021 11:36 AM    * Acute metabolic encephalopathy  Assessment & Plan  · Presented to the emergency department after being evaluated by Wound Care due to concern for bleeding sacral ulcer  Patient lethargic on arrival, per family patient is more conversant at baseline  · Now appearing back to baseline  · Neurologic exam nonfocal  · Suspect multifactorial in setting of hypotension, acute kidney injury, cystitis  · Continue supportive care    ABLA (acute blood loss anemia)  Assessment & Plan  · ABLA and Coagulopathy POA likely due to Xarelto a/e/b anemia POA with Hg 10 4 >>7 8 treated with holding Xarelto and serial lab monitoring  Likely due to bleeding sacral ulcer  · Hemoglobin stabilized, no recurrence of bleeding  Restarting Xarelto tomorrow  Paroxysmal atrial fibrillation (HCC)  Assessment & Plan  · Rate controlled on Lopressor - given hypotension on admission this was held  · With afib RVR  This initially was resumed at lower dose of 12 5 mg b i d     Will prescribe home doses starting tonight  · Also anticoagulated on Xarelto, resume tomorrow    Coagulopathy [facility])  Assessment & Plan  · Resume Xarelto tomorrow  Sepsis ([facility] [address] )  Assessment & Plan  · POA, e/b tachycardia, leukocytosis, tachypnea, RORY, AMS   · SBP 70s-90s at time of admission, had improved s/p IVF bolus   · UA shows pyuria and bacteriuria, in the setting of hypertension she was treated for acute cystitis  · Urine cultures growing mixed contaminants  · No blood cultures drawn upon admission    · Continue C diff prophylaxis with PO vancomycin    Chronic respiratory failure with hypoxia (HCC)  Assessment & Plan  · Patient chronically on 2 L nasal cannula  · SpO2 currently 100% on baseline oxygen requirement  · Continue O2 supplementation to maintain SpO2 > 88%    Chronic diastolic heart failure (HCC)  Assessment & Plan  Wt Readings from Last 3 Encounters:   05/07/21 62 8 kg (138 lb 7 2 oz)   04/27/21 64 kg (141 lb)   04/13/21 72 1 kg (158 lb 15 2 oz)     · Patient appears mildly hypovolemic also with RORY on CKD  · Hold torsemide  · Monitor daily weights, I/O  · Low-sodium diet    Pressure ulcer of sacral region, stage 4 (HCC)  Assessment & Plan  · Follows with wound care as outpatient - had been recommended to be evaluated in the ER due to bleeding at sacral ulcer site likely in setting of Xarelto  · Previously had been debrided as an outpatient on 04/21/2021  · No intervention planned per General surgery  · See clinical media for images    Type 2 diabetes mellitus with hyperglycemia [facility])  Assessment & Plan  Lab Results   Component Value Date    HGBA1C 6 0 (H) 04/08/2021     Recent Labs     05/06/21  1703 05/06/21  2123 05/07/21  0705 05/07/21  0735   POCGLU 285* 185* 105 83     Blood Sugar Average: Last 72 hrs:  · (P) 195   · Continue Lantus 10 units q h s   · SSI plus Accu-Chek  · Diabetic diet    Acute kidney injury superimposed on chronic kidney disease (HCC)-resolved as of 5/7/2021  Assessment & Plan  Lab Results   Component Value Date    EGFR 63 05/07/2021    EGFR 61 05/06/2021    EGFR 45 05/05/2021    CREATININE 0 89 05/07/2021    CREATININE 0 92 05/06/2021    CREATININE 1 18 05/05/2021   · RORY on CKD POA   · Recently baseline creatinine around 1 0-1 1  · Creatinine 1 56 at time of admission  · Suspect in setting of hypotension, poor oral intake/dehydration and torsemide   · Avoid nephrotoxic agents, hypotension  · Received IVF with improvement  · Continue to hold diuretics for now  · Check BMP a m      VTE Pharmacologic Prophylaxis:   Pharmacologic: Rivaroxaban (Xarelto)  Mechanical VTE Prophylaxis in Place: Yes    Patient Centered Rounds: I have performed bedside rounds with nursing staff today     Discussions with Specialists or Other Care Team Provider: CM, nursing    Education and Discussions with Family / Patient: patient, declined family update    Time Spent for Care: 30 minutes  More than 50% of total time spent on counseling and coordination of care as described above  Current Length of Stay: 3 day(s)    Current Patient Status: Inpatient   Certification Statement: The patient will continue to require additional inpatient hospital stay due to anemia, restarting Xarelto  A fib RVR, IV abx    Discharge Plan: likely 48 hours if hemoglobin is stable  Code Status: Level 1 - Full Code      Subjective:   Patient examined at bedside reporting that she is tired  No events per nursing  No recurrence of bleeding of the sacral wound per nursing  Denies any chest pain, palpitations  Objective:     Vitals:   Temp (24hrs), Av 2 °F (36 8 °C), Min:97 9 °F (36 6 °C), Max:98 8 °F (37 1 °C)    Temp:  [97 9 °F (36 6 °C)-98 8 °F (37 1 °C)] 98 °F (36 7 °C)  HR:  [75-97] 76  Resp:  [18] 18  BP: (126-151)/(59-88) 126/59  SpO2:  [97 %-100 %] 100 %  Body mass index is 22 35 kg/m²  Input and Output Summary (last 24 hours): Intake/Output Summary (Last 24 hours) at 2021 1217  Last data filed at 2021 0941  Gross per 24 hour   Intake 1050 ml   Output 450 ml   Net 600 ml       Physical Exam:     Physical Exam  Vitals signs and nursing note reviewed  Constitutional:       General: She is not in acute distress  Appearance: Normal appearance  HENT:      Head: Normocephalic  Mouth/Throat:      Mouth: Mucous membranes are moist    Eyes:      General: No scleral icterus  Pupils: Pupils are equal, round, and reactive to light  Cardiovascular:      Rate and Rhythm: Normal rate  Rhythm irregularly irregular  Heart sounds: No murmur  Pulmonary:      Effort: Pulmonary effort is normal  No respiratory distress  Breath sounds: Normal breath sounds   No wheezing, rhonchi or rales    Abdominal:      General: Bowel sounds are normal  There is no distension  Palpations: Abdomen is soft  Tenderness: There is no abdominal tenderness  Musculoskeletal:         General: No swelling  Right lower leg: No edema  Left lower leg: No edema  Skin:     Capillary Refill: Capillary refill takes less than 2 seconds  Neurological:      General: No focal deficit present  Mental Status: She is alert and oriented to person, place, and time  Mental status is at baseline  Additional Data:     Labs:    Results from last 7 days   Lab Units 05/07/21  0622 05/06/21  0439   WBC Thousand/uL 10 31* 11 33*   HEMOGLOBIN g/dL 7 6* 7 7*   HEMATOCRIT % 25 7* 25 8*   PLATELETS Thousands/uL 189 204   NEUTROS PCT %  --  71   LYMPHS PCT %  --  19   MONOS PCT %  --  6   EOS PCT %  --  3     Results from last 7 days   Lab Units 05/07/21  0622   SODIUM mmol/L 146*   POTASSIUM mmol/L 4 1   CHLORIDE mmol/L 109*   CO2 mmol/L 35*   BUN mg/dL 36*   CREATININE mg/dL 0 89   ANION GAP mmol/L 2*   CALCIUM mg/dL 8 7   GLUCOSE RANDOM mg/dL 97         Results from last 7 days   Lab Units 05/07/21  0735 05/07/21  0705 05/06/21  2123 05/06/21  1703 05/06/21  1138 05/06/21  0716 05/05/21  2123 05/05/21  1555 05/05/21  1055 05/05/21  0718 05/04/21  2048 05/04/21  1556   POC GLUCOSE mg/dl 83 105 185* 285* 248* 151* 209* 246* 143* 137 313* 152*                   * I Have Reviewed All Lab Data Listed Above  * Additional Pertinent Lab Tests Reviewed:  All Labs Within Last 24 Hours Reviewed    Imaging:    Imaging Reports Reviewed Today Include: none  Imaging Personally Reviewed by Myself Includes:  none    Recent Cultures (last 7 days):     Results from last 7 days   Lab Units 05/03/21  2303   URINE CULTURE  >100,000 cfu/ml        Last 24 Hours Medication List:   Current Facility-Administered Medications   Medication Dose Route Frequency Provider Last Rate    acetaminophen  650 mg Oral Q6H PRN Jose Contras BEATRIZ [place]      atorvastatin  40 mg Oral Daily With LAUREN Matthews PA-C      cefTRIAXone  1,000 mg Intravenous Q24H Jesus Gonzales PA-C 1,000 mg (05/07/21 0128)    ferrous sulfate  325 mg Oral Daily With Breakfast Manas Davis PA-C      gabapentin  100 mg Oral TID Giancarlo Chamberlain PA-C      insulin glargine  10 Units Subcutaneous HS Manas Davis PA-C      insulin lispro  1-6 Units Subcutaneous TID [facility] Manas Davis PA-C      insulin lispro  1-6 Units Subcutaneous HS Manas Davis PA-C      metoprolol tartrate  25 mg Oral Q12H [address] Enoc Spencer PA-C      multivitamin-minerals  1 tablet Oral Daily [place] DO Sae      mupirocin   Nasal Q12H [address] Annia Esquivel MD      pantoprazole  40 mg Oral BID AC Manas Davis PA-C      [START ON 5/8/2021] rivaroxaban  15 mg Oral Daily With Breakfast Enoc Spencer PA-C      vancomycin  125 mg Oral Q12H [address] Jesus Gonzales PA-C          Today, Patient Was Seen By: Ryan Ahn PA-C    ** Please Note: Dictation voice to text software may have been used in the creation of this document   **

## 2021-05-07 NOTE — ASSESSMENT & PLAN NOTE
· Rate controlled on Lopressor - given hypotension on admission this was held  · With afib RVR  This initially was resumed at lower dose of 12 5 mg b i d     Will prescribe home doses starting tonight    · Also anticoagulated on Xarelto, resume tomorrow

## 2021-05-07 NOTE — PLAN OF CARE
Problem: Potential for Falls  Goal: Patient will remain free of falls  Description: INTERVENTIONS:  - Assess patient frequently for physical needs  -  Identify cognitive and physical deficits and behaviors that affect risk of falls    -  [place] fall precautions as indicated by assessment   - Educate patient/family on patient safety including physical limitations  - Instruct patient to call for assistance with activity based on assessment  - Modify environment to reduce risk of injury  - Consider OT/PT consult to assist with strengthening/mobility  5/7/2021 1105 by Blaze Lawson RN  Outcome: Progressing  5/7/2021 0937 by Blaze Lawson RN  Outcome: Progressing     Problem: Prexisting or High Potential for Compromised Skin Integrity  Goal: Skin integrity is maintained or improved  Description: INTERVENTIONS:  - Identify patients at risk for skin breakdown  - Assess and monitor skin integrity  - Assess and monitor nutrition and hydration status  - Monitor labs   - Assess for incontinence   - Turn and reposition patient  - Assist with mobility/ambulation  - Relieve pressure over bony prominences  - Avoid friction and shearing  - Provide appropriate hygiene as needed including keeping skin clean and dry  - Evaluate need for skin moisturizer/barrier cream  - Collaborate with interdisciplinary team   - Patient/family teaching  - Consider wound care consult   5/7/2021 1105 by Blaze Lawson RN  Outcome: Progressing  5/7/2021 0937 by Blaze Lawson RN  Outcome: Progressing     Problem: PAIN - ADULT  Goal: Verbalizes/displays adequate comfort level or baseline comfort level  Description: Interventions:  - Encourage patient to monitor pain and request assistance  - Assess pain using appropriate pain scale  - Administer analgesics based on type and severity of pain and evaluate response  - Implement non-pharmacological measures as appropriate and evaluate response  - Consider cultural and social influences on pain and pain management  - Notify physician/advanced practitioner if interventions unsuccessful or patient reports new pain  5/7/2021 1105 by Skye Maddox RN  Outcome: Progressing  5/7/2021 0937 by Skye Maddox RN  Outcome: Progressing     Problem: INFECTION - ADULT  Goal: Absence or prevention of progression during hospitalization  Description: INTERVENTIONS:  - Assess and monitor for signs and symptoms of infection  - Monitor lab/diagnostic results  - Monitor all insertion sites, i e  indwelling lines, tubes, and drains  - Monitor endotracheal if appropriate and nasal secretions for changes in amount and color  - [place] appropriate cooling/warming therapies per order  - Administer medications as ordered  - Instruct and encourage patient and family to use good hand hygiene technique  - Identify and instruct in appropriate isolation precautions for identified infection/condition  5/7/2021 1105 by Skye Maddox RN  Outcome: Progressing  5/7/2021 0937 by Skye Maddox RN  Outcome: Progressing  Goal: Absence of fever/infection during neutropenic period  Description: INTERVENTIONS:  - Monitor WBC    5/7/2021 1105 by Skye Maddox RN  Outcome: Progressing  5/7/2021 0937 by Skye Maddox RN  Outcome: Progressing     Problem: SAFETY ADULT  Goal: Maintain or return to baseline ADL function  Description: INTERVENTIONS:  -  Assess patient's ability to carry out ADLs; assess patient's baseline for ADL function and identify physical deficits which impact ability to perform ADLs (bathing, care of mouth/teeth, toileting, grooming, dressing, etc )  - Assess/evaluate cause of self-care deficits   - Assess range of motion  - Assess patient's mobility; develop plan if impaired  - Assess patient's need for assistive devices and provide as appropriate  - Encourage maximum independence but intervene and supervise when necessary  - Involve family in performance of ADLs  - Assess for home care needs following discharge   - Consider OT consult to assist with ADL evaluation and planning for discharge  - Provide patient education as appropriate  5/7/2021 1105 by Skye Maddox RN  Outcome: Progressing  5/7/2021 0937 by Skye Maddox RN  Outcome: Progressing  Goal: Maintain or return mobility status to optimal level  Description: INTERVENTIONS:  - Assess patient's baseline mobility status (ambulation, transfers, stairs, etc )    - Identify cognitive and physical deficits and behaviors that affect mobility  - Identify mobility aids required to assist with transfers and/or ambulation (gait belt, sit-to-stand, lift, walker, cane, etc )  - [place] fall precautions as indicated by assessment  - Record patient progress and toleration of activity level on Mobility SBAR; progress patient to next Phase/Stage  - Instruct patient to call for assistance with activity based on assessment  - Consider rehabilitation consult to assist with strengthening/weightbearing, etc   5/7/2021 1105 by Skye Maddox RN  Outcome: Progressing  5/7/2021 0937 by Skye Maddox RN  Outcome: Progressing     Problem: DISCHARGE PLANNING  Goal: Discharge to home or other facility with appropriate resources  Description: INTERVENTIONS:  - Identify barriers to discharge w/patient and caregiver  - Arrange for needed discharge resources and transportation as appropriate  - Identify discharge learning needs (meds, wound care, etc )  - Arrange for interpretive services to assist at discharge as needed  - Refer to Case Management Department for coordinating discharge planning if the patient needs post-hospital services based on physician/advanced practitioner order or complex needs related to functional status, cognitive ability, or social support system  5/7/2021 1105 by Skye Maddox RN  Outcome: Progressing  5/7/2021 0937 by Skye Maddox RN  Outcome: Progressing     Problem: Knowledge Deficit  Goal: Patient/family/caregiver demonstrates understanding of disease process, treatment plan, medications, and discharge instructions  Description: Complete learning assessment and assess knowledge base  Interventions:  - Provide teaching at level of understanding  - Provide teaching via preferred learning methods  5/7/2021 1105 by Kavon Aguirre RN  Outcome: Progressing  5/7/2021 0937 by Kavon Aguirre RN  Outcome: Progressing     Problem: Nutrition/Hydration-ADULT  Goal: Nutrient/Hydration intake appropriate for improving, restoring or maintaining nutritional needs  Description: Monitor and assess patient's nutrition/hydration status for malnutrition  Collaborate with interdisciplinary team and initiate plan and interventions as ordered  Monitor patient's weight and dietary intake as ordered or per policy  Utilize nutrition screening tool and intervene as necessary  Determine patient's food preferences and provide high-protein, high-caloric foods as appropriate       INTERVENTIONS:  - Monitor oral intake, urinary output, labs, and treatment plans  - Assess nutrition and hydration status and recommend course of action  - Evaluate amount of meals eaten  - Assist patient with eating if necessary   - Allow adequate time for meals  - Recommend/ encourage appropriate diets, oral nutritional supplements, and vitamin/mineral supplements  - Order, calculate, and assess calorie counts as needed  - Recommend, monitor, and adjust tube feedings and TPN/PPN based on assessed needs  - Assess need for intravenous fluids  - Provide specific nutrition/hydration education as appropriate  - Include patient/family/caregiver in decisions related to nutrition  5/7/2021 1105 by Kavon Aguirre RN  Outcome: Progressing  5/7/2021 [address] by Kavon Aguirre RN  Outcome: Progressing

## 2021-05-07 NOTE — CASE MANAGEMENT
S/p patient rounding-pt is not medically cleared for d/c but may be in next 48h  [facility] willing to [facility] patient for [facility]; however they may not have a bed available over the weekend  If patient is medically cleared and no bed at [facility] she will return to  and son will need to  persue [facility] for placement  S/w son, Alem Chitra and he is agreeable to same    Pt will need BLS transport

## 2021-05-07 NOTE — ASSESSMENT & PLAN NOTE
Wt Readings from Last 3 Encounters:   05/07/21 62 8 kg (138 lb 7 2 oz)   04/27/21 64 kg (141 lb)   04/13/21 72 1 kg (158 lb 15 2 oz)     · Patient appears mildly hypovolemic also with RORY on CKD  · Hold torsemide  · Monitor daily weights, I/O  · Low-sodium diet

## 2021-05-08 NOTE — ASSESSMENT & PLAN NOTE
· POA, e/b tachycardia, leukocytosis, tachypnea, RORY, AMS   · SBP 70s-90s at time of admission, had improved s/p IVF bolus   · UA shows pyuria and bacteriuria, in the setting of hypertension she was treated for acute cystitis  · Urine cultures growing mixed contaminants  · No blood cultures drawn upon admission    · Continue C diff prophylaxis with PO vancomycin  · Discontinue Ceftriaxone

## 2021-05-08 NOTE — ASSESSMENT & PLAN NOTE
Wt Readings from Last 3 Encounters:   05/08/21 62 6 kg (138 lb 0 1 oz)   04/27/21 64 kg (141 lb)   04/13/21 72 1 kg (158 lb 15 2 oz)     · Patient appears mildly hypovolemic also with RORY on CKD (resolved)  · Hold torsemide  · Monitor daily weights, I/O  · Low-sodium diet

## 2021-05-08 NOTE — ASSESSMENT & PLAN NOTE
· ABLA and Coagulopathy POA likely due to Xarelto a/e/b anemia POA with Hg 10 4 >>7 8 treated with holding Xarelto and serial lab monitoring  Likely due to bleeding sacral ulcer  · Hemoglobin stabilized, no recurrence of bleeding  Restarting Xarelto today

## 2021-05-08 NOTE — ASSESSMENT & PLAN NOTE
Lab Results   Component Value Date    HGBA1C 6 0 (H) 04/08/2021     Recent Labs     05/07/21  1305 05/07/21  1641 05/07/21 2036 05/08/21  0752   POCGLU 144* 129 220* 93     Blood Sugar Average: Last 72 hrs:  · (P) 169 [id number]   · Continue Lantus 10 units q h s   · SSI plus Accu-Chek  · Diabetic diet

## 2021-05-08 NOTE — ASSESSMENT & PLAN NOTE
· Rate controlled on Lopressor - given hypotension on admission this was held  · With afib RVR  This initially was resumed at lower dose of 12 5 mg b i d   Increased back to home doses 5/7    · Also anticoagulated on Xarelto, resume today

## 2021-05-08 NOTE — PROGRESS NOTES
[place]  Progress Note - Skip Dry 1945, 67 y o  female MRN: [id number]  Unit/Bed#: -01 Encounter: [id number]  Primary Care Provider: Angela Shahid DO   Date and time admitted to hospital: 5/3/2021 11:36 AM    * Acute metabolic encephalopathy  Assessment & Plan  · Presented to the emergency department after being evaluated by Wound Care due to concern for bleeding sacral ulcer  Patient lethargic on arrival, per family patient is more conversant at baseline  · Now appearing back to baseline  · Neurologic exam nonfocal  · Suspect multifactorial in setting of hypotension, acute kidney injury, cystitis  · Continue supportive care    ABLA (acute blood loss anemia)  Assessment & Plan  · ABLA and Coagulopathy POA likely due to Xarelto a/e/b anemia POA with Hg 10 4 >>7 8 treated with holding Xarelto and serial lab monitoring  Likely due to bleeding sacral ulcer  · Hemoglobin stabilized, no recurrence of bleeding  Restarting Xarelto today  Pressure ulcer of sacral region, stage 4 ([address] )  Assessment & Plan  · Follows with wound care as outpatient - had been recommended to be evaluated in the ER due to bleeding at sacral ulcer site likely in setting of Xarelto  · Previously had been debrided as an outpatient on 04/21/2021  · No intervention planned per General surgery  · See clinical media for images    Type 2 diabetes mellitus with hyperglycemia [facility])  Assessment & Plan  Lab Results   Component Value Date    HGBA1C 6 0 (H) 04/08/2021     Recent Labs     05/07/21  1305 05/07/21  1641 05/07/21  2036 05/08/21  0752   POCGLU 144* 129 220* 93     Blood Sugar Average: Last 72 hrs:  · (P) 169 [id number]   · Continue Lantus 10 units q h s   · SSI plus Accu-Chek  · Diabetic diet    Paroxysmal atrial fibrillation (HCC)  Assessment & Plan  · Rate controlled on Lopressor - given hypotension on admission this was held  · With afib RVR    This initially was resumed at lower dose of 12 5 mg b i d   Increased back to home doses 5/7  · Also anticoagulated on Xarelto, resume today    Chronic diastolic heart failure (HCC)  Assessment & Plan  Wt Readings from Last 3 Encounters:   05/08/21 62 6 kg (138 lb 0 1 oz)   04/27/21 64 kg (141 lb)   04/13/21 72 1 kg (158 lb 15 2 oz)     · Patient appears mildly hypovolemic also with RORY on CKD (resolved)  · Hold torsemide  · Monitor daily weights, I/O  · Low-sodium diet    Chronic respiratory failure with hypoxia (HCC)  Assessment & Plan  · Patient chronically on 2 L nasal cannula  · SpO2 currently 100% on baseline oxygen requirement  · Continue O2 supplementation to maintain SpO2 > 88%    Sepsis (HCC)  Assessment & Plan  · POA, e/b tachycardia, leukocytosis, tachypnea, RORY, AMS   · SBP 70s-90s at time of admission, had improved s/p IVF bolus   · UA shows pyuria and bacteriuria, in the setting of hypertension she was treated for acute cystitis  · Urine cultures growing mixed contaminants  · No blood cultures drawn upon admission  · Continue C diff prophylaxis with PO vancomycin  · Discontinue Ceftriaxone    Coagulopathy (HCC)  Assessment & Plan  · Resume Xarelto today  VTE Pharmacologic Prophylaxis: VTE Score: 3 High Risk (Score >/= 5) - Pharmacological DVT Prophylaxis Ordered: rivaroxaban (Xarelto)  Sequential Compression Devices Ordered  Patient Centered Rounds: I performed bedside rounds with nursing staff today  Discussions with Specialists or Other Care Team Provider:     Education and Discussions with Family / Patient: Attempted to update  (son) via phone  Left voicemail  Time Spent for Care: 30 minutes  More than 50% of total time spent on counseling and coordination of care as described above      Current Length of Stay: 4 day(s)  Current Patient Status: Inpatient   Certification Statement: The patient will continue to require additional inpatient hospital stay due to see above  Discharge Plan: Anticipate discharge in 24-48 hrs to rehab facility  [facility] will likely not have a bed until Monday, and needs BLS transport which is also likely not available until Monday  Code Status: Level 1 - Full Code    Subjective:   Offers no complaints  Nursing states no further bleeding from sacral ulcer  Objective:     Vitals:   Temp (24hrs), Av °F (36 7 °C), Min:97 6 °F (36 4 °C), Max:98 2 °F (36 8 °C)    Temp:  [97 6 °F (36 4 °C)-98 2 °F (36 8 °C)] 98 1 °F (36 7 °C)  HR:  [] 76  Resp:  [18-20] 20  BP: (122-141)/(53-71) 141/71  SpO2:  [93 %-99 %] 97 %  Body mass index is 22 28 kg/m²  Input and Output Summary (last 24 hours): Intake/Output Summary (Last 24 hours) at 2021 1139  Last data filed at 2021 0601  Gross per 24 hour   Intake 240 ml   Output 1000 ml   Net -760 ml       Physical Exam:   Physical Exam  Constitutional:       Appearance: Normal appearance  Cardiovascular:      Rate and Rhythm: Normal rate and regular rhythm  Heart sounds: No murmur  Pulmonary:      Effort: Pulmonary effort is normal       Breath sounds: Normal breath sounds  Abdominal:      General: Bowel sounds are normal  There is no distension  Palpations: Abdomen is soft  Tenderness: There is no abdominal tenderness  Skin:     General: Skin is warm and dry  Comments: Sacral ulcer   Neurological:      General: No focal deficit present  Mental Status: She is alert and oriented to person, place, and time     Psychiatric:         Mood and Affect: Mood normal          Additional Data:     Labs:  Results from last 7 days   Lab Units 21  0622 21  0439   WBC Thousand/uL 10 31* 11 33*   HEMOGLOBIN g/dL 7 6* 7 7*   HEMATOCRIT % 25 7* 25 8*   PLATELETS Thousands/uL 189 204   NEUTROS PCT %  --  71   LYMPHS PCT %  --  19   MONOS PCT %  --  6   EOS PCT %  --  3     Results from last 7 days   Lab Units 21  0526   SODIUM mmol/L 146*   POTASSIUM mmol/L 5 0   CHLORIDE mmol/L 108   CO2 mmol/L 34*   BUN mg/dL 29*   CREATININE mg/dL 0 85   ANION GAP mmol/L 4   CALCIUM mg/dL 8 6   GLUCOSE RANDOM mg/dL 85         Results from last 7 days   Lab Units 05/08/21  1123 05/08/21  0752 05/07/21  2036 05/07/21  1641 05/07/21  1305 05/07/21  0735 05/07/21  0705 05/06/21  2123 05/06/21  1703 05/06/21  1138 05/06/21  0716 05/05/21 2123   POC GLUCOSE mg/dl 134 93 220* 129 144* 83 105 185* 285* 248* 151* 209*               Lines/Drains:  Invasive Devices     Peripheral Intravenous Line            Peripheral IV 05/04/21 Right;Ventral (anterior) Forearm 3 days          Drain            Urethral Catheter Non-latex; Double-lumen 16 Fr  1 day              Urinary Catheter:  Goal for removal: N/A - Chronic [place]               Imaging: No pertinent imaging reviewed      Recent Cultures (last 7 days):   Results from last 7 days   Lab Units 05/03/21  2303   URINE CULTURE  >100,000 cfu/ml        Last 24 Hours Medication List:   Current Facility-Administered Medications   Medication Dose Route Frequency Provider Last Rate    acetaminophen  650 mg Oral Q6H PRN Shitalbrinda Paul PA-C      atorvastatin  40 mg Oral Daily With [employer] RUTH ANN Hill PA-C      ferrous sulfate  325 mg Oral Daily With Breakfast Charis Paul PA-C      gabapentin  100 mg Oral TID Shitalbrinda Paul PA-C      insulin glargine  10 Units Subcutaneous HS Raysa Schtiaraer Susan, PA-C      insulin lispro  1-6 Units Subcutaneous TID [facility] Raysa Schwerner Davis PA-C      insulin lispro  1-6 Units Subcutaneous HS Raysa Schlaleah Davis PA-C      metoprolol tartrate  25 mg Oral Q12H [address] Jule Koyanagi, PA-C      multivitamin-minerals  1 tablet Oral Daily Theo Zhang DO      mupirocin   Nasal Q12H [address] Annia Esquivel MD      pantoprazole  40 mg Oral BID AC Gloris Schlatter [place], PA-C      rivaroxaban  15 mg Oral Daily With Breakfast Gabby Koyanagi, PA-C      vancomycin  125 mg Oral Q12H [address] Magen Barrett PA-C          Today, Patient Was Seen By: Triny Conner PA-C    **Please Note: This note may have been constructed using a voice recognition system  **

## 2021-05-08 NOTE — PLAN OF CARE
Problem: Potential for Falls  Goal: Patient will remain free of falls  Description: INTERVENTIONS:  - Assess patient frequently for physical needs  -  Identify cognitive and physical deficits and behaviors that affect risk of falls    -  [place] fall precautions as indicated by assessment   - Educate patient/family on patient safety including physical limitations  - Instruct patient to call for assistance with activity based on assessment  - Modify environment to reduce risk of injury  - Consider OT/PT consult to assist with strengthening/mobility  Outcome: Progressing     Problem: Prexisting or High Potential for Compromised Skin Integrity  Goal: Skin integrity is maintained or improved  Description: INTERVENTIONS:  - Identify patients at risk for skin breakdown  - Assess and monitor skin integrity  - Assess and monitor nutrition and hydration status  - Monitor labs   - Assess for incontinence   - Turn and reposition patient  - Assist with mobility/ambulation  - Relieve pressure over bony prominences  - Avoid friction and shearing  - Provide appropriate hygiene as needed including keeping skin clean and dry  - Evaluate need for skin moisturizer/barrier cream  - Collaborate with interdisciplinary team   - Patient/family teaching  - Consider wound care consult   Outcome: Progressing     Problem: PAIN - ADULT  Goal: Verbalizes/displays adequate comfort level or baseline comfort level  Description: Interventions:  - Encourage patient to monitor pain and request assistance  - Assess pain using appropriate pain scale  - Administer analgesics based on type and severity of pain and evaluate response  - Implement non-pharmacological measures as appropriate and evaluate response  - Consider cultural and social influences on pain and pain management  - Notify physician/advanced practitioner if interventions unsuccessful or patient reports new pain  Outcome: Progressing     Problem: INFECTION - ADULT  Goal: Absence or prevention of progression during hospitalization  Description: INTERVENTIONS:  - Assess and monitor for signs and symptoms of infection  - Monitor lab/diagnostic results  - Monitor all insertion sites, i e  indwelling lines, tubes, and drains  - Monitor endotracheal if appropriate and nasal secretions for changes in amount and color  - [place] appropriate cooling/warming therapies per order  - Administer medications as ordered  - Instruct and encourage patient and family to use good hand hygiene technique  - Identify and instruct in appropriate isolation precautions for identified infection/condition  Outcome: Progressing  Goal: Absence of fever/infection during neutropenic period  Description: INTERVENTIONS:  - Monitor WBC    Outcome: Progressing     Problem: SAFETY ADULT  Goal: Maintain or return to baseline ADL function  Description: INTERVENTIONS:  -  Assess patient's ability to carry out ADLs; assess patient's baseline for ADL function and identify physical deficits which impact ability to perform ADLs (bathing, care of mouth/teeth, toileting, grooming, dressing, etc )  - Assess/evaluate cause of self-care deficits   - Assess range of motion  - Assess patient's mobility; develop plan if impaired  - Assess patient's need for assistive devices and provide as appropriate  - Encourage maximum independence but intervene and supervise when necessary  - Involve family in performance of ADLs  - Assess for home care needs following discharge   - Consider OT consult to assist with ADL evaluation and planning for discharge  - Provide patient education as appropriate  Outcome: Progressing  Goal: Maintain or return mobility status to optimal level  Description: INTERVENTIONS:  - Assess patient's baseline mobility status (ambulation, transfers, stairs, etc )    - Identify cognitive and physical deficits and behaviors that affect mobility  - Identify mobility aids required to assist with transfers and/or ambulation (gait belt, sit-to-stand, lift, walker, cane, etc )  - [place] fall precautions as indicated by assessment  - Record patient progress and toleration of activity level on Mobility SBAR; progress patient to next Phase/Stage  - Instruct patient to call for assistance with activity based on assessment  - Consider rehabilitation consult to assist with strengthening/weightbearing, etc   Outcome: Progressing     Problem: DISCHARGE PLANNING  Goal: Discharge to home or other facility with appropriate resources  Description: INTERVENTIONS:  - Identify barriers to discharge w/patient and caregiver  - Arrange for needed discharge resources and transportation as appropriate  - Identify discharge learning needs (meds, wound care, etc )  - Arrange for interpretive services to assist at discharge as needed  - Refer to Case Management Department for coordinating discharge planning if the patient needs post-hospital services based on physician/advanced practitioner order or complex needs related to functional status, cognitive ability, or social support system  Outcome: Progressing     Problem: Knowledge Deficit  Goal: Patient/family/caregiver demonstrates understanding of disease process, treatment plan, medications, and discharge instructions  Description: Complete learning assessment and assess knowledge base  Interventions:  - Provide teaching at level of understanding  - Provide teaching via preferred learning methods  Outcome: Progressing     Problem: Nutrition/Hydration-ADULT  Goal: Nutrient/Hydration intake appropriate for improving, restoring or maintaining nutritional needs  Description: Monitor and assess patient's nutrition/hydration status for malnutrition  Collaborate with interdisciplinary team and initiate plan and interventions as ordered  Monitor patient's weight and dietary intake as ordered or per policy  Utilize nutrition screening tool and intervene as necessary   Determine patient's food preferences and provide high-protein, high-caloric foods as appropriate       INTERVENTIONS:  - Monitor oral intake, urinary output, labs, and treatment plans  - Assess nutrition and hydration status and recommend course of action  - Evaluate amount of meals eaten  - Assist patient with eating if necessary   - Allow adequate time for meals  - Recommend/ encourage appropriate diets, oral nutritional supplements, and vitamin/mineral supplements  - Order, calculate, and assess calorie counts as needed  - Recommend, monitor, and adjust tube feedings and TPN/PPN based on assessed needs  - Assess need for intravenous fluids  - Provide specific nutrition/hydration education as appropriate  - Include patient/family/caregiver in decisions related to nutrition  Outcome: Progressing

## 2021-05-09 PROBLEM — A41.9 SEPSIS (HCC): Status: RESOLVED | Noted: 2021-01-01 | Resolved: 2021-01-01

## 2021-05-09 NOTE — ASSESSMENT & PLAN NOTE
· Presented to the emergency department after being evaluated by Wound Care due to concern for bleeding sacral ulcer   Patient lethargic on arrival, per family patient is more conversant at baseline-patient lethargic again this morning with low blood sugar  ·  Neurologic exam nonfocal  · Suspect multifactorial in setting of hypotension, acute kidney injury, cystitis  · Continue supportive care

## 2021-05-09 NOTE — ASSESSMENT & PLAN NOTE
· Patient chronically on 2 L nasal cannula-no signs of decompensation  · SpO2 currently 100% on baseline oxygen requirement  · Continue O2 supplementation to maintain SpO2 > 88%

## 2021-05-09 NOTE — ASSESSMENT & PLAN NOTE
· Rate controlled on Lopressor - given hypotension on admission this was held  ·   This initially was resumed at lower dose of 12 5 mg b i d   Increased back to home doses 5/7    Due to recurrent atrial fibrillation with rapid ventricular response  ·  Continue on increase beta-blocker 25 Q 12 and on Xarelto,

## 2021-05-09 NOTE — ASSESSMENT & PLAN NOTE
· Follows with wound care as outpatient - had been recommended to be evaluated in the ER due to bleeding at sacral ulcer site likely in setting of Xarelto  · Previously had been debrided as an outpatient on 04/21/2021  · No intervention planned per General surgery  · See clinical media for images  · Continue on local care

## 2021-05-09 NOTE — ASSESSMENT & PLAN NOTE
Lab Results   Component Value Date    HGBA1C 6 0 (H) 04/08/2021     Recent Labs     05/08/21  1123 05/08/21  1611 05/08/21 2047 05/09/21  0734   POCGLU 134 128 148* 64*     Blood Sugar Average: Last 72 hrs:  · (P) 151 [id number]   · Will decrease Lantus to 8 units at HS  · SSI plus Accu-Chek  · Diabetic diet-= will add concern  at bedtime

## 2021-05-09 NOTE — ASSESSMENT & PLAN NOTE
Wt Readings from Last 3 Encounters:   05/09/21 62 kg (136 lb 11 oz)   04/27/21 64 kg (141 lb)   04/13/21 72 1 kg (158 lb 15 2 oz)     · Patient appears mildly hypovolemic also with RORY on CKD (resolved)  · Hld torsemide-will resume torsemide today  · Monitor daily weights, I/O  · Low-sodium diet

## 2021-05-09 NOTE — PROGRESS NOTES
[place]  Progress Note - Brittney Hortno 1945, 67 y o  female MRN: [id number]  Unit/Bed#: -Enrique Encounter: [id number]  Primary Care Provider: Ronny Delgadillo DO   Date and time admitted to hospital: 5/3/2021 11:36 AM    Chronic respiratory failure with hypoxia ([address] )  Assessment & Plan  · Patient chronically on 2 L nasal cannula-no signs of decompensation  · SpO2 currently 100% on baseline oxygen requirement  · Continue O2 supplementation to maintain SpO2 > 88%    Pressure ulcer of sacral region, stage 4 (HCC)  Assessment & Plan  · Follows with wound care as outpatient - had been recommended to be evaluated in the ER due to bleeding at sacral ulcer site likely in setting of Xarelto  · Previously had been debrided as an outpatient on 04/21/2021  · No intervention planned per General surgery  · See clinical media for images  · Continue on local care    ABLA (acute blood loss anemia)  Assessment & Plan  · ABLA and Coagulopathy POA likely due to Xarelto a/e/b   · anemia POA with Hg 10 4 >>7 8 >7 4-will repeat in the a m  · treated with holding Xarelto and serial lab monitoring  Likely due to bleeding sacral ulcer  [place] to have been restarted yesterday a m  · Hemoglobin stabilized, no recurrence of bleeding  * Acute metabolic encephalopathy  Assessment & Plan  · Presented to the emergency department after being evaluated by Wound Care due to concern for bleeding sacral ulcer   Patient lethargic on arrival, per family patient is more conversant at baseline-patient lethargic again this morning with low blood sugar  ·  Neurologic exam nonfocal  · Suspect multifactorial in setting of hypotension, acute kidney injury, cystitis  · Continue supportive care    Coagulopathy [facility])  Assessment & Plan  ·  No signs of bleeding or excess bruising    Chronic diastolic heart failure (HCC)  Assessment & Plan  Wt Readings from Last 3 Encounters:   05/09/21 62 kg (136 lb 11 oz)   04/27/21 64 kg (141 lb)   21 72 1 kg (158 lb 15 2 oz)     · Patient appears mildly hypovolemic also with RORY on CKD (resolved)  · Hld torsemide-will resume torsemide today  · Monitor daily weights, I/O  · Low-sodium diet    Paroxysmal atrial fibrillation (HCC)  Assessment & Plan  · Rate controlled on Lopressor - given hypotension on admission this was held  ·   This initially was resumed at lower dose of 12 5 mg b i d   Increased back to home doses   Due to recurrent atrial fibrillation with rapid ventricular response  ·  Continue on increase beta-blocker 25 Q 12 and on Xarelto,      Type 2 diabetes mellitus with hyperglycemia [facility])  Assessment & Plan  Lab Results   Component Value Date    HGBA1C 6 0 (H) 2021     Recent Labs     21  1123 21  1611 21  2047 21  0734   POCGLU 134 128 148* 64*     Blood Sugar Average: Last 72 hrs:  · (P) 151 [id number]   · Will decrease Lantus to 8 units at HS  · SSI plus Accu-Chek  · Diabetic diet-= will add concern  at bedtime        VTE Prophylaxis: in place    Patient Centered Rounds: I rounded with patient's nurse    Current Length of Stay: 5 day(s)    Current Patient Status: Inpatient    Certification Statement: Pt requires additional inpatient hospital stay due to: see assessment and plan        Subjective:     Patient had low blood sugar 1st thing this morning was mildly lethargic-now improving-patient denies any chest pain or shortness of breath    All other ROS are negative    Objective:     Vitals:   Temp (24hrs), Av 1 °F (37 3 °C), Min:97 9 °F (36 6 °C), Max:100 9 °F (38 3 °C)    Temp:  [97 9 °F (36 6 °C)-100 9 °F (38 3 °C)] 98 4 °F (36 9 °C)  HR:  [73-90] 73  Resp:  [12-20] 14  BP: (126-164)/(59-69) 139/64  SpO2:  [90 %-100 %] 100 %  Body mass index is 22 06 kg/m²  Input and Output Summary (last 24 hours):        Intake/Output Summary (Last 24 hours) at 2021 1151  Last data filed at 2021 0905  Gross per 24 hour   Intake 510 ml   Output 350 ml   Net 160 ml       Physical Exam:     Physical Exam  Vitals signs and nursing note reviewed  Constitutional:       General: She is not in acute distress  Comments: Appears fatigued   Eyes:      General: No scleral icterus  Right eye: No discharge  Left eye: No discharge  Pupils: Pupils are equal, round, and reactive to light  Cardiovascular:      Rate and Rhythm: Rhythm irregular  Heart sounds: No gallop  Comments: Irregular regular with controlled rate  Pulmonary:      Breath sounds: No wheezing or rales  Abdominal:      General: There is no distension  Tenderness: There is no abdominal tenderness  Neurological:      Sensory: No sensory deficit  Psychiatric:         Mood and Affect: Mood normal              I personally reviewed labs and imaging reports for today  Last 24 Hours Medication List:   Current Facility-Administered Medications   Medication Dose Route Frequency Provider Last Rate    acetaminophen  650 mg Oral Q6H PRN Michael Rizvi PA-C      atorvastatin  40 mg Oral Daily With [place] BEATRIZ Johnson      ferrous sulfate  325 mg Oral Daily With Breakfast Michael Rizvi PA-C      gabapentin  100 mg Oral TID Michael Rizvi PA-C      insulin glargine  8 Units Subcutaneous HS Henna Fly, DO      insulin lispro  1-6 Units Subcutaneous TID AC Juanis Davis PA-C      insulin lispro  1-6 Units Subcutaneous HS [place] [employer] BEATRIZ Davis      metoprolol tartrate  25 mg Oral Q12H [address] Shanell Martell PA-C      multivitamin-minerals  1 tablet Oral Daily Mateuszqujose enrique Devi, DO      mupirocin   Nasal Q12H [address] Annia Esquivel MD      pantoprazole  40 mg Oral BID AC [place] Alissa Davis PA-C      rivaroxaban  15 mg Oral Daily With Breakfast Shanell Martell PA-C      vancomycin  125 mg Oral Q12H [address] Michelle Sierra PA-C        Plan is for admission to [facility] in 24-48 hours if her labs remain stable    I spoke with son Deirdre Serna via phone and updated him on her status as well as hopeful plans to have her discharged if labs are styable and bed is available    Today, Patient Was Seen By: Anai Flood DO    ** Please Note: Dictation voice to text software may have been used in the creation of this document   **

## 2021-05-10 NOTE — ASSESSMENT & PLAN NOTE
· ABLA and Coagulopathy POA likely due to Xarelto   · anemia POA with Hg 10 4 >>7 8 >7 4-> 7 4  · Hemoglobin stabilized, no recurrence of bleeding

## 2021-05-10 NOTE — PROGRESS NOTES
[place]  Progress Note - Skip Dry 1945, 67 y o  female MRN: [id number]  Unit/Bed#: -Enrique Encounter: [id number]  Primary Care Provider: Angela Shahid DO   Date and time admitted to hospital: 5/3/2021 11:36 AM    Fever  Assessment & Plan  T-max of 101 3°, unknown cause  Suspected source is sacral decubitus ulcer  All other vital signs reviewed, heart rate ranging between , oxygen saturation at baseline  Patient denies cough, abdominal pain, diarrhea    No leukocytosis or leukopenia  Wound culture, blood cultures, procalcitonin, CRP monitor  If spikes another fever or becomes hemodynamically unstable, will start on broad-spectrum antibiotics    Coagulopathy (HCC)  Assessment & Plan  ·  No signs of bleeding or excess bruising    Chronic respiratory failure with hypoxia (HCC)  Assessment & Plan  · Patient chronically on 2 L nasal cannula-no signs of decompensation  · SpO2 currently 100% on baseline oxygen requirement  · Continue O2 supplementation to maintain SpO2 > 88%    Chronic diastolic heart failure (HCC)  Assessment & Plan  Wt Readings from Last 3 Encounters:   05/10/21 62 5 kg (137 lb 12 6 oz)   04/27/21 64 kg (141 lb)   04/13/21 72 1 kg (158 lb 15 2 oz)     · Resume home torsemide, continue metoprolol  · Monitor daily weights, I/O  · Low-sodium diet    Paroxysmal atrial fibrillation (HCC)  Assessment & Plan  · Continue rate control with metoprolol 25 mg b i d , anticoagulation with Xarelto 15 mg daily      Pressure ulcer of sacral region, stage 4 (HCC)  Assessment & Plan  · Follows with wound care as outpatient - had been recommended to be evaluated in the ER due to bleeding at sacral ulcer site likely in setting of Xarelto  · Previously had been debrided as an outpatient on 04/21/2021  · No intervention planned per General surgery  · See clinical media for images  · Continue on local care    ABLA (acute blood loss anemia)  Assessment & Plan  · ABLA and Coagulopathy POA likely due to Xarelto   · anemia POA with Hg 10 4 >>7 8 >7 4-> 7 4  · Hemoglobin stabilized, no recurrence of bleeding  Type 2 diabetes mellitus with hyperglycemia [facility])  Assessment & Plan  Lab Results   Component Value Date    HGBA1C 6 0 (H) 04/08/2021     Recent Labs     05/09/21  1624 05/09/21  2116 05/10/21  0745 05/10/21  1145   POCGLU 204* 230* 93 105     Blood Sugar Average: Last 72 hrs:  · (P) 140 4   · Continue Lantus 8 units at HS  · SSI plus Accu-Chek  · Diabetic diet    * Acute metabolic encephalopathy  Assessment & Plan  · Toxic Metabolic encephalopathy present on admission, likely due to acute kidney injury, treated with intravenous fluids and resolved  · Also received IV antibiotics for suspected acute cystitis, urine cultures with mixed contaminants, antibiotics discontinued  · Patient remains alert and oriented, clear while conversational      Sepsis (HCC)-resolved as of 5/9/2021  Assessment & Plan  · POA, e/b tachycardia, leukocytosis, tachypnea, RORY, AMS   · SBP 70s-90s at time of admission, had improved s/p IVF bolus   · UA shows pyuria and bacteriuria, in the setting of hypertension she was treated for acute cystitis  · Urine cultures growing mixed contaminants  · No blood cultures drawn upon admission    · Continue C diff prophylaxis with PO vancomycin  · Discontinue Ceftriaxone    Acute kidney injury superimposed on chronic kidney disease (HCC)-resolved as of 5/7/2021  Assessment & Plan  Lab Results   Component Value Date    EGFR 54 05/10/2021    EGFR 61 05/09/2021    EGFR 67 05/08/2021    CREATININE 1 02 05/10/2021    CREATININE 0 92 05/09/2021    CREATININE 0 85 05/08/2021   · RORY on CKD POA   · Recently baseline creatinine around 1 0-1 1  · Creatinine 1 56 at time of admission  · Suspect in setting of hypotension, poor oral intake/dehydration and torsemide   · Avoid nephrotoxic agents, hypotension  · Received IVF with improvement  · Continue to hold diuretics for now  · Check BMP a m  VTE Pharmacologic Prophylaxis:   Pharmacologic: Rivaroxaban (Xarelto)  Mechanical VTE Prophylaxis in Place: Yes    Patient Centered Rounds: I have performed bedside rounds with nursing staff today  Discussions with Specialists or Other Care Team Provider:     Education and Discussions with Family / Patient:  Patient, son called updated on plan of care  Time Spent for Care: 30 minutes  More than 50% of total time spent on counseling and coordination of care as described above  Current Length of Stay: 6 day(s)    Current Patient Status: Inpatient   Certification Statement: The patient will continue to require additional inpatient hospital stay due to High-grade fever    Discharge Plan:  24-48 hours    Code Status: Level 1 - Full Code      Subjective:   Seen this morning  No overnight events however was noted to have a T-max of 101 3°  Patient appears to be comfortable without complaints of cough, abdominal pain or diarrhea  Objective:     Vitals:   Temp (24hrs), Av 6 °F (37 6 °C), Min:97 8 °F (36 6 °C), Max:101 3 °F (38 5 °C)    Temp:  [97 8 °F (36 6 °C)-101 3 °F (38 5 °C)] 97 8 °F (36 6 °C)  HR:  [] 70  Resp:  [17-18] 17  BP: (144-159)/(64-83) 154/64  SpO2:  [95 %-98 %] 98 %  Body mass index is 22 24 kg/m²  Input and Output Summary (last 24 hours): Intake/Output Summary (Last 24 hours) at 5/10/2021 1335  Last data filed at 5/10/2021 0714  Gross per 24 hour   Intake 120 ml   Output 850 ml   Net -730 ml       Physical Exam:     Physical Exam  Vitals signs reviewed  Constitutional:       General: She is not in acute distress  Appearance: Normal appearance  She is not ill-appearing, toxic-appearing or diaphoretic  HENT:      Head: Normocephalic  Eyes:      Extraocular Movements: Extraocular movements intact  Pupils: Pupils are equal, round, and reactive to light  Neck:      Musculoskeletal: Normal range of motion and neck supple   No neck rigidity or muscular tenderness  Vascular: No carotid bruit  Cardiovascular:      Rate and Rhythm: Normal rate and regular rhythm  Pulses: Normal pulses  Heart sounds: Normal heart sounds  No murmur  No friction rub  Pulmonary:      Effort: Pulmonary effort is normal       Breath sounds: Normal breath sounds  No stridor  No wheezing, rhonchi or rales  Chest:      Chest wall: No tenderness  Abdominal:      General: Abdomen is flat  Bowel sounds are normal  There is no distension  Palpations: Abdomen is soft  Tenderness: There is no abdominal tenderness  There is no guarding or rebound  Musculoskeletal: Normal range of motion  General: No swelling or tenderness  Right lower leg: No edema  Left lower leg: No edema  Comments: Noted wound dressing on right elbow, wound appears clean, nontender nonerythematous  Sacral decubitus ulcer with serosanguineous drainage  Picture available in chart  Skin:     General: Skin is warm and dry  Coloration: Skin is not jaundiced  Neurological:      General: No focal deficit present  Mental Status: She is alert and oriented to person, place, and time  Cranial Nerves: No cranial nerve deficit  Sensory: No sensory deficit  Additional Data:     Labs:    Results from last 7 days   Lab Units 05/10/21  0531  05/06/21  0439   WBC Thousand/uL 8 39   < > 11 33*   HEMOGLOBIN g/dL 7 4*   < > 7 7*   HEMATOCRIT % 25 3*   < > 25 8*   PLATELETS Thousands/uL 169   < > 204   NEUTROS PCT %  --   --  71   LYMPHS PCT %  --   --  19   MONOS PCT %  --   --  6   EOS PCT %  --   --  3    < > = values in this interval not displayed       Results from last 7 days   Lab Units 05/10/21  0531   SODIUM mmol/L 142   POTASSIUM mmol/L 4 9   CHLORIDE mmol/L 105   CO2 mmol/L 34*   BUN mg/dL 22   CREATININE mg/dL 1 02   ANION GAP mmol/L 3*   CALCIUM mg/dL 8 5   GLUCOSE RANDOM mg/dL 98         Results from last 7 days   Lab Units 05/10/21  1145 05/10/21  0745 05/09/21  2116 05/09/21  1624 05/09/21  1146 05/09/21  0734 05/08/21  2047 05/08/21  1611 05/08/21  1123 05/08/21  0752 05/07/21  2036 05/07/21  1641   POC GLUCOSE mg/dl 105 93 230* 204* 226* 64* 148* 128 134 93 220* 129                   * I Have Reviewed All Lab Data Listed Above  * Additional Pertinent Lab Tests Reviewed: All Labs Within Last 24 Hours Reviewed    Imaging:    Imaging Reports Reviewed Today Include:   Imaging Personally Reviewed by Myself Includes:      Recent Cultures (last 7 days):     Results from last 7 days   Lab Units 05/03/21  2303   URINE CULTURE  >100,000 cfu/ml        Last 24 Hours Medication List:   Current Facility-Administered Medications   Medication Dose Route Frequency Provider Last Rate    acetaminophen  650 mg Oral Q6H PRN Severino Cheung PA-C      atorvastatin  40 mg Oral Daily With [employer] RUTH ANN Hill PA-C      ferrous sulfate  325 mg Oral Daily With Breakfast Severino Cheung PA-C      gabapentin  100 mg Oral TID Severino Cheung PA-C      insulin glargine  8 Units Subcutaneous HS Henna Sanabria,       insulin lispro  1-6 Units Subcutaneous TID AC Juanis Davis PA-C      insulin lispro  1-6 Units Subcutaneous HS Devan Davis PA-C      metoprolol tartrate  25 mg Oral Q12H [facility] Rin Melchor PA-C      mirtazapine  15 mg Oral HS Lien Martin MD      multivitamin-minerals  1 tablet Oral Daily [place], DO      pantoprazole  40 mg Oral BID AC Devan Davis PA-C      rivaroxaban  15 mg Oral Daily With Breakfast Rin Melchor PA-C      torsemide  20 mg Oral Daily Lien Martin MD      vancomycin  125 mg Oral Q12H [facility] Mik Pineda PA-C          Today, Patient Was Seen By: Lien Martin MD    ** Please Note: Dictation voice to text software may have been used in the creation of this document   **

## 2021-05-10 NOTE — ASSESSMENT & PLAN NOTE
Lab Results   Component Value Date    EGFR 54 05/10/2021    EGFR 61 05/09/2021    EGFR 67 05/08/2021    CREATININE 1 02 05/10/2021    CREATININE 0 92 05/09/2021    CREATININE 0 85 05/08/2021   · RORY on CKD POA   · Recently baseline creatinine around 1 0-1 1  · Creatinine 1 56 at time of admission  · Suspect in setting of hypotension, poor oral intake/dehydration and torsemide   · Avoid nephrotoxic agents, hypotension  · Received IVF with improvement  · Continue to hold diuretics for now  · Check BMP a m

## 2021-05-10 NOTE — ASSESSMENT & PLAN NOTE
· Toxic Metabolic encephalopathy present on admission, likely due to acute kidney injury, treated with intravenous fluids and resolved  · Also received IV antibiotics for suspected acute cystitis, urine cultures with mixed contaminants, antibiotics discontinued    · Patient remains alert and oriented, clear while conversational

## 2021-05-10 NOTE — ASSESSMENT & PLAN NOTE
Wt Readings from Last 3 Encounters:   05/10/21 62 5 kg (137 lb 12 6 oz)   04/27/21 64 kg (141 lb)   04/13/21 72 1 kg (158 lb 15 2 oz)     · Resume home torsemide, continue metoprolol  · Monitor daily weights, I/O  · Low-sodium diet

## 2021-05-10 NOTE — PLAN OF CARE
Problem: Potential for Falls  Goal: Patient will remain free of falls  Description: INTERVENTIONS:  - Assess patient frequently for physical needs  -  Identify cognitive and physical deficits and behaviors that affect risk of falls    -  [place] fall precautions as indicated by assessment   - Educate patient/family on patient safety including physical limitations  - Instruct patient to call for assistance with activity based on assessment  - Modify environment to reduce risk of injury  - Consider OT/PT consult to assist with strengthening/mobility  Outcome: Progressing     Problem: Prexisting or High Potential for Compromised Skin Integrity  Goal: Skin integrity is maintained or improved  Description: INTERVENTIONS:  - Identify patients at risk for skin breakdown  - Assess and monitor skin integrity  - Assess and monitor nutrition and hydration status  - Monitor labs   - Assess for incontinence   - Turn and reposition patient  - Assist with mobility/ambulation  - Relieve pressure over bony prominences  - Avoid friction and shearing  - Provide appropriate hygiene as needed including keeping skin clean and dry  - Evaluate need for skin moisturizer/barrier cream  - Collaborate with interdisciplinary team   - Patient/family teaching  - Consider wound care consult   Outcome: Progressing     Problem: PAIN - ADULT  Goal: Verbalizes/displays adequate comfort level or baseline comfort level  Description: Interventions:  - Encourage patient to monitor pain and request assistance  - Assess pain using appropriate pain scale  - Administer analgesics based on type and severity of pain and evaluate response  - Implement non-pharmacological measures as appropriate and evaluate response  - Consider cultural and social influences on pain and pain management  - Notify physician/advanced practitioner if interventions unsuccessful or patient reports new pain  Outcome: Progressing     Problem: INFECTION - ADULT  Goal: Absence or prevention of progression during hospitalization  Description: INTERVENTIONS:  - Assess and monitor for signs and symptoms of infection  - Monitor lab/diagnostic results  - Monitor all insertion sites, i e  indwelling lines, tubes, and drains  - Monitor endotracheal if appropriate and nasal secretions for changes in amount and color  - [place] appropriate cooling/warming therapies per order  - Administer medications as ordered  - Instruct and encourage patient and family to use good hand hygiene technique  - Identify and instruct in appropriate isolation precautions for identified infection/condition  Outcome: Progressing  Goal: Absence of fever/infection during neutropenic period  Description: INTERVENTIONS:  - Monitor WBC    Outcome: Progressing     Problem: SAFETY ADULT  Goal: Maintain or return to baseline ADL function  Description: INTERVENTIONS:  -  Assess patient's ability to carry out ADLs; assess patient's baseline for ADL function and identify physical deficits which impact ability to perform ADLs (bathing, care of mouth/teeth, toileting, grooming, dressing, etc )  - Assess/evaluate cause of self-care deficits   - Assess range of motion  - Assess patient's mobility; develop plan if impaired  - Assess patient's need for assistive devices and provide as appropriate  - Encourage maximum independence but intervene and supervise when necessary  - Involve family in performance of ADLs  - Assess for home care needs following discharge   - Consider OT consult to assist with ADL evaluation and planning for discharge  - Provide patient education as appropriate  Outcome: Progressing  Goal: Maintain or return mobility status to optimal level  Description: INTERVENTIONS:  - Assess patient's baseline mobility status (ambulation, transfers, stairs, etc )    - Identify cognitive and physical deficits and behaviors that affect mobility  - Identify mobility aids required to assist with transfers and/or ambulation (gait belt, sit-to-stand, lift, walker, cane, etc )  - [place] fall precautions as indicated by assessment  - Record patient progress and toleration of activity level on Mobility SBAR; progress patient to next Phase/Stage  - Instruct patient to call for assistance with activity based on assessment  - Consider rehabilitation consult to assist with strengthening/weightbearing, etc   Outcome: Progressing     Problem: DISCHARGE PLANNING  Goal: Discharge to home or other facility with appropriate resources  Description: INTERVENTIONS:  - Identify barriers to discharge w/patient and caregiver  - Arrange for needed discharge resources and transportation as appropriate  - Identify discharge learning needs (meds, wound care, etc )  - Arrange for interpretive services to assist at discharge as needed  - Refer to Case Management Department for coordinating discharge planning if the patient needs post-hospital services based on physician/advanced practitioner order or complex needs related to functional status, cognitive ability, or social support system  Outcome: Progressing     Problem: Knowledge Deficit  Goal: Patient/family/caregiver demonstrates understanding of disease process, treatment plan, medications, and discharge instructions  Description: Complete learning assessment and assess knowledge base  Interventions:  - Provide teaching at level of understanding  - Provide teaching via preferred learning methods  Outcome: Progressing     Problem: Nutrition/Hydration-ADULT  Goal: Nutrient/Hydration intake appropriate for improving, restoring or maintaining nutritional needs  Description: Monitor and assess patient's nutrition/hydration status for malnutrition  Collaborate with interdisciplinary team and initiate plan and interventions as ordered  Monitor patient's weight and dietary intake as ordered or per policy  Utilize nutrition screening tool and intervene as necessary   Determine patient's food preferences and provide high-protein, high-caloric foods as appropriate       INTERVENTIONS:  - Monitor oral intake, urinary output, labs, and treatment plans  - Assess nutrition and hydration status and recommend course of action  - Evaluate amount of meals eaten  - Assist patient with eating if necessary   - Allow adequate time for meals  - Recommend/ encourage appropriate diets, oral nutritional supplements, and vitamin/mineral supplements  - Order, calculate, and assess calorie counts as needed  - Recommend, monitor, and adjust tube feedings and TPN/PPN based on assessed needs  - Assess need for intravenous fluids  - Provide specific nutrition/hydration education as appropriate  - Include patient/family/caregiver in decisions related to nutrition  Outcome: Progressing

## 2021-05-10 NOTE — ASSESSMENT & PLAN NOTE
T-max of 101 3°, unknown cause  Suspected source is sacral decubitus ulcer  All other vital signs reviewed, heart rate ranging between , oxygen saturation at baseline  Patient denies cough, abdominal pain, diarrhea    No leukocytosis or leukopenia  Wound culture, blood cultures, procalcitonin, CRP monitor  If spikes another fever or becomes hemodynamically unstable, will start on broad-spectrum antibiotics

## 2021-05-10 NOTE — CASE MANAGEMENT
LOS: 6  Continuing to follow patient  As per CM note of 5/7/21, family wants patient to go to [address] [place] [address] at discharge  Placed call to admission at [address] [place] [address] at  [phone] to confirm bed availability    Wait call back

## 2021-05-10 NOTE — ASSESSMENT & PLAN NOTE
Lab Results   Component Value Date    HGBA1C 6 0 (H) 04/08/2021     Recent Labs     05/09/21  1624 05/09/21  2116 05/10/21  0745 05/10/21  1145   POCGLU 204* 230* 93 105     Blood Sugar Average: Last 72 hrs:  · (P) 140 4   · Continue Lantus 8 units at HS  · SSI plus Accu-Chek  · Diabetic diet

## 2021-05-11 NOTE — ASSESSMENT & PLAN NOTE
· Follows with wound care as outpatient - had been recommended to be evaluated in the ER due to bleeding at sacral ulcer site likely in setting of Xarelto  · Previously had been debrided as an outpatient on 04/21/2021  · Seen earlier by General surgery with no intervention however given fever on 05/09/2021, elevated CRP, wound cultures with GPC, concern for infection therefore will re-consult acute surgery    · Initiate antibiotic therapy with IV vancomycin and ceftriaxone

## 2021-05-11 NOTE — CASE MANAGEMENT
LOS: 7  At rounds discharge for Wednesday was discussed  Patient will be on po abx  Placed another call to [facility] of Admission s at [facility] at [phone] to confirm they have a bed for patient  Wait call back

## 2021-05-11 NOTE — ASSESSMENT & PLAN NOTE
T-max of 101 3° on 05/09/2021, unknown cause  No fever since  Suspected source is sacral decubitus ulcer  All other vital signs reviewed, heart rate ranging between , oxygen saturation at baseline  Patient denies cough, abdominal pain, diarrhea    No leukocytosis or leukopenia  Wound culture which GPC, blood cultures pending, procalcitonin within normal limits, CRP greater than 90  Initiate antibiotic coverage with vancomycin and ceftriaxone  Will image with CT

## 2021-05-11 NOTE — ASSESSMENT & PLAN NOTE
Lab Results   Component Value Date    EGFR 51 05/11/2021    EGFR 54 05/10/2021    EGFR 61 05/09/2021    CREATININE 1 06 05/11/2021    CREATININE 1 02 05/10/2021    CREATININE 0 92 05/09/2021   · RORY on CKD POA   · Recently baseline creatinine around 1 0-1 1  · Creatinine 1 56 at time of admission  · Suspect in setting of hypotension, poor oral intake/dehydration and torsemide   · Avoid nephrotoxic agents, hypotension  · Torsemide resumed on 05/10/2021  · Check BMP a m

## 2021-05-11 NOTE — ASSESSMENT & PLAN NOTE
Wt Readings from Last 3 Encounters:   05/11/21 54 3 kg (119 lb 11 4 oz)   04/27/21 64 kg (141 lb)   04/13/21 72 1 kg (158 lb 15 2 oz)     · Resume home torsemide, continue metoprolol  · Monitor daily weights, I/O  · Low-sodium diet

## 2021-05-11 NOTE — ASSESSMENT & PLAN NOTE
Lab Results   Component Value Date    HGBA1C 6 0 (H) 04/08/2021     Recent Labs     05/10/21  1710 05/10/21  2124 05/11/21  0742 05/11/21  1120   POCGLU 93 156* 69 148*     Blood Sugar Average: Last 72 hrs:  · (P) 135 [id number]   · Continue Lantus 8 units at HS  · SSI plus Accu-Chek  · Diabetic diet

## 2021-05-11 NOTE — PROGRESS NOTES
[place]  Progress Note - Jes Tyson 1945, 67 y o  female MRN: [id number]  Unit/Bed#: -Enrique Encounter: [id number]  Primary Care Provider: Sully Rodriguez DO   Date and time admitted to hospital: 5/3/2021 11:36 AM    Fever  Assessment & Plan  T-max of 101 3° on 05/09/2021, unknown cause  No fever since  Suspected source is sacral decubitus ulcer  All other vital signs reviewed, heart rate ranging between , oxygen saturation at baseline  Patient denies cough, abdominal pain, diarrhea    No leukocytosis or leukopenia  Wound culture which GPC, blood cultures pending, procalcitonin within normal limits, CRP greater than 90  Initiate antibiotic coverage with vancomycin and ceftriaxone      Coagulopathy (HCC)  Assessment & Plan  ·  No signs of bleeding or excess bruising    Chronic respiratory failure with hypoxia ([facility])  Assessment & Plan  · Patient chronically on 2 L nasal cannula-no signs of decompensation  · SpO2 currently 100% on baseline oxygen requirement  · Continue O2 supplementation to maintain SpO2 > 88%    Chronic diastolic heart failure (HCC)  Assessment & Plan  Wt Readings from Last 3 Encounters:   05/11/21 54 3 kg (119 lb 11 4 oz)   04/27/21 64 kg (141 lb)   04/13/21 72 1 kg (158 lb 15 2 oz)     · Resume home torsemide, continue metoprolol  · Monitor daily weights, I/O  · Low-sodium diet    Paroxysmal atrial fibrillation ([facility])  Assessment & Plan  · Continue rate control with metoprolol 25 mg b i d , anticoagulation with Xarelto 15 mg daily      Pressure ulcer of sacral region, stage 4 ([facility])  Assessment & Plan  · Follows with wound care as outpatient - had been recommended to be evaluated in the ER due to bleeding at sacral ulcer site likely in setting of Xarelto  · Previously had been debrided as an outpatient on 04/21/2021  · Seen earlier by General surgery with no intervention however given fever on 05/09/2021, elevated CRP, wound cultures with GPC, concern for infection therefore will re-consult acute surgery  · Initiate antibiotic therapy with IV vancomycin and ceftriaxone  · Surgery do not think wound is infected  Wound culture likely to be contaminant      ABLA (acute blood loss anemia)  Assessment & Plan  · ABLA and Coagulopathy POA likely due to Xarelto   · anemia POA with Hg 10 4 >>7 8 >7 4-> 7 1  · Hemoglobin stabilized, no recurrence of bleeding  · Transfusion threshold is hemoglobin less than 7    Type 2 diabetes mellitus with hyperglycemia [facility])  Assessment & Plan  Lab Results   Component Value Date    HGBA1C 6 0 (H) 04/08/2021     Recent Labs     05/10/21  1710 05/10/21  2124 05/11/21  0742 05/11/21  1120   POCGLU 93 156* 69 148*     Blood Sugar Average: Last 72 hrs:  · (P) 135 [id number]   · Continue Lantus 8 units at HS  · SSI plus Accu-Chek  · Diabetic diet    * Acute metabolic encephalopathy  Assessment & Plan  · Toxic Metabolic encephalopathy present on admission, likely due to acute kidney injury, treated with intravenous fluids and resolved  · Also received IV antibiotics for suspected acute cystitis, urine cultures with mixed contaminants, antibiotics discontinued  · Patient remains alert and oriented, clear while conversational      Sepsis (HCC)-resolved as of 5/9/2021  Assessment & Plan  · POA, e/b tachycardia, leukocytosis, tachypnea, RORY, AMS   · SBP 70s-90s at time of admission, had improved s/p IVF bolus   · UA shows pyuria and bacteriuria, in the setting of hypertension she was treated for acute cystitis  · Urine cultures growing mixed contaminants  · No blood cultures drawn upon admission    · Continue C diff prophylaxis with PO vancomycin  · Discontinue Ceftriaxone    Acute kidney injury superimposed on chronic kidney disease (HCC)-resolved as of 5/7/2021  Assessment & Plan  Lab Results   Component Value Date    EGFR 51 05/11/2021    EGFR 54 05/10/2021    EGFR 61 05/09/2021    CREATININE 1 06 05/11/2021    CREATININE 1 02 05/10/2021    CREATININE 0 92 2021   · RORY on CKD POA   · Recently baseline creatinine around 1 0-1 1  · Creatinine 1 56 at time of admission  · Suspect in setting of hypotension, poor oral intake/dehydration and torsemide   · Avoid nephrotoxic agents, hypotension  · Torsemide resumed on 05/10/2021  · Check BMP a m  VTE Pharmacologic Prophylaxis:   Pharmacologic: Rivaroxaban (Xarelto)  Mechanical VTE Prophylaxis in Place: Yes    Patient Centered Rounds: I have performed bedside rounds with nursing staff today  Discussions with Specialists or Other Care Team Provider:     Education and Discussions with Family / Patient:  Patient, plan discussed with son    Time Spent for Care: 30 minutes  More than 50% of total time spent on counseling and coordination of care as described above  Current Length of Stay: 7 day(s)    Current Patient Status: Inpatient   Certification Statement: The patient will continue to require additional inpatient hospital stay due to Evaluation of fever    Discharge Plan: Within the next 24-48 hours    Code Status: Level 1 - Full Code      Subjective:   Seen  No new complaints to offer  Appears calm, not in discomfort    Objective:     Vitals:   Temp (24hrs), Av °F (36 7 °C), Min:97 7 °F (36 5 °C), Max:98 2 °F (36 8 °C)    Temp:  [97 7 °F (36 5 °C)-98 2 °F (36 8 °C)] 98 2 °F (36 8 °C)  HR:  [] 100  Resp:  [16-18] 18  BP: (142-157)/(68-72) 142/68  SpO2:  [97 %-100 %] 100 %  Body mass index is 19 32 kg/m²  Input and Output Summary (last 24 hours): Intake/Output Summary (Last 24 hours) at 2021 1352  Last data filed at 2021 1300  Gross per 24 hour   Intake 1140 ml   Output 1300 ml   Net -160 ml       Physical Exam:     Physical Exam  Vitals signs reviewed  Constitutional:       General: She is not in acute distress  Appearance: Normal appearance  She is not ill-appearing, toxic-appearing or diaphoretic  HENT:      Head: Normocephalic     Eyes: Extraocular Movements: Extraocular movements intact  Pupils: Pupils are equal, round, and reactive to light  Neck:      Musculoskeletal: Normal range of motion and neck supple  No neck rigidity or muscular tenderness  Vascular: No carotid bruit  Cardiovascular:      Rate and Rhythm: Normal rate and regular rhythm  Pulses: Normal pulses  Heart sounds: Normal heart sounds  No murmur  No friction rub  Pulmonary:      Effort: Pulmonary effort is normal       Breath sounds: Normal breath sounds  No stridor  No wheezing, rhonchi or rales  Chest:      Chest wall: No tenderness  Abdominal:      General: Abdomen is flat  Bowel sounds are normal  There is no distension  Palpations: Abdomen is soft  Tenderness: There is no abdominal tenderness  There is no guarding or rebound  Musculoskeletal: Normal range of motion  General: No swelling or tenderness  Right lower leg: No edema  Left lower leg: No edema  Skin:     General: Skin is warm and dry  Coloration: Skin is not jaundiced  Neurological:      General: No focal deficit present  Mental Status: She is alert and oriented to person, place, and time  Cranial Nerves: No cranial nerve deficit  Sensory: No sensory deficit             Additional Data:     Labs:    Results from last 7 days   Lab Units 05/11/21  0455   WBC Thousand/uL 9 11   HEMOGLOBIN g/dL 7 1*   HEMATOCRIT % 24 0*   PLATELETS Thousands/uL 172   NEUTROS PCT % 71   LYMPHS PCT % 18   MONOS PCT % 6   EOS PCT % 4     Results from last 7 days   Lab Units 05/11/21  0455   SODIUM mmol/L 141   POTASSIUM mmol/L 4 7   CHLORIDE mmol/L 104   CO2 mmol/L 31   BUN mg/dL 20   CREATININE mg/dL 1 06   ANION GAP mmol/L 6   CALCIUM mg/dL 8 6   GLUCOSE RANDOM mg/dL 75         Results from last 7 days   Lab Units 05/11/21  1120 05/11/21  0742 05/10/21  2124 05/10/21  1710 05/10/21  1145 05/10/21  0745 05/09/21  2116 05/09/21  1624 05/09/21  1146 05/09/21  0734 05/08/21  2047 05/08/21  1611   POC GLUCOSE mg/dl 148* 69 156* 93 105 93 230* 204* 226* 64* 148* 128         Results from last 7 days   Lab Units 05/10/21  1221   PROCALCITONIN ng/ml 0 06           * I Have Reviewed All Lab Data Listed Above  * Additional Pertinent Lab Tests Reviewed: All Labs Within Last 24 Hours Reviewed    Imaging:    Imaging Reports Reviewed Today Include:   Imaging Personally Reviewed by Myself Includes:      Recent Cultures (last 7 days):     Results from last 7 days   Lab Units 05/10/21  1313 05/10/21  1225 05/10/21  1221   BLOOD CULTURE   --  Received in Microbiology Lab  Culture in Progress  Received in Microbiology Lab  Culture in Progress     GRAM STAIN RESULT  1+ Gram positive cocci in pairs*  No polys seen*  --   --        Last 24 Hours Medication List:   Current Facility-Administered Medications   Medication Dose Route Frequency Provider Last Rate    acetaminophen  650 mg Oral Q6H PRN Adele Delisa, PA-C      atorvastatin  40 mg Oral Daily With [place] 68, PA-C      cefTRIAXone  1,000 mg Intravenous Q24H Kimberlee Beatty MD      ferrous sulfate  325 mg Oral Daily With Breakfast Lois Davis, PA-C      gabapentin  100 mg Oral TID Adele Delisa PA-C      insulin glargine  8 Units Subcutaneous HS Henna Fly, DO      insulin lispro  1-6 Units Subcutaneous TID AC Juanis Davis, PA-C      insulin lispro  1-6 Units Subcutaneous HS Lois Davis, PA-C      metoprolol tartrate  25 mg Oral Q12H [facility] Nadine Argueta PA-C      mirtazapine  15 mg Oral HS Kimberlee Beatty MD      multivitamin-minerals  1 tablet Oral Daily [place], DO      pantoprazole  40 mg Oral BID AC Lois Davis, PA-C      rivaroxaban  15 mg Oral Daily With Breakfast Nadine Argueta PA-C      torsemide  20 mg Oral Daily Kimberlee Beatty MD      vancomycin  15 mg/kg Intravenous Q12H Kimberlee Beatty MD      vancomycin  125 mg Oral Q[address] [place], [place] [id number]          Today, Patient Was Seen By: Aixa Ceja MD    ** Please Note: Dictation voice to text software may have been used in the creation of this document   **

## 2021-05-11 NOTE — ASSESSMENT & PLAN NOTE
· ABLA and Coagulopathy POA likely due to Xarelto   · anemia POA with Hg 10 4 >>7 8 >7 4-> 7 1  · Hemoglobin stabilized, no recurrence of bleeding     · Transfusion threshold is hemoglobin less than 7

## 2021-05-11 NOTE — PLAN OF CARE
Problem: Potential for Falls  Goal: Patient will remain free of falls  Description: INTERVENTIONS:  - Assess patient frequently for physical needs  -  Identify cognitive and physical deficits and behaviors that affect risk of falls    -  [place] fall precautions as indicated by assessment   - Educate patient/family on patient safety including physical limitations  - Instruct patient to call for assistance with activity based on assessment  - Modify environment to reduce risk of injury  - Consider OT/PT consult to assist with strengthening/mobility  Outcome: Progressing     Problem: Prexisting or High Potential for Compromised Skin Integrity  Goal: Skin integrity is maintained or improved  Description: INTERVENTIONS:  - Identify patients at risk for skin breakdown  - Assess and monitor skin integrity  - Assess and monitor nutrition and hydration status  - Monitor labs   - Assess for incontinence   - Turn and reposition patient  - Assist with mobility/ambulation  - Relieve pressure over bony prominences  - Avoid friction and shearing  - Provide appropriate hygiene as needed including keeping skin clean and dry  - Evaluate need for skin moisturizer/barrier cream  - Collaborate with interdisciplinary team   - Patient/family teaching  - Consider wound care consult   Outcome: Progressing     Problem: PAIN - ADULT  Goal: Verbalizes/displays adequate comfort level or baseline comfort level  Description: Interventions:  - Encourage patient to monitor pain and request assistance  - Assess pain using appropriate pain scale  - Administer analgesics based on type and severity of pain and evaluate response  - Implement non-pharmacological measures as appropriate and evaluate response  - Consider cultural and social influences on pain and pain management  - Notify physician/advanced practitioner if interventions unsuccessful or patient reports new pain  Outcome: Progressing     Problem: INFECTION - ADULT  Goal: Absence or prevention of progression during hospitalization  Description: INTERVENTIONS:  - Assess and monitor for signs and symptoms of infection  - Monitor lab/diagnostic results  - Monitor all insertion sites, i e  indwelling lines, tubes, and drains  - Monitor endotracheal if appropriate and nasal secretions for changes in amount and color  - [place] appropriate cooling/warming therapies per order  - Administer medications as ordered  - Instruct and encourage patient and family to use good hand hygiene technique  - Identify and instruct in appropriate isolation precautions for identified infection/condition  Outcome: Progressing  Goal: Absence of fever/infection during neutropenic period  Description: INTERVENTIONS:  - Monitor WBC    Outcome: Progressing     Problem: SAFETY ADULT  Goal: Maintain or return to baseline ADL function  Description: INTERVENTIONS:  -  Assess patient's ability to carry out ADLs; assess patient's baseline for ADL function and identify physical deficits which impact ability to perform ADLs (bathing, care of mouth/teeth, toileting, grooming, dressing, etc )  - Assess/evaluate cause of self-care deficits   - Assess range of motion  - Assess patient's mobility; develop plan if impaired  - Assess patient's need for assistive devices and provide as appropriate  - Encourage maximum independence but intervene and supervise when necessary  - Involve family in performance of ADLs  - Assess for home care needs following discharge   - Consider OT consult to assist with ADL evaluation and planning for discharge  - Provide patient education as appropriate  Outcome: Progressing  Goal: Maintain or return mobility status to optimal level  Description: INTERVENTIONS:  - Assess patient's baseline mobility status (ambulation, transfers, stairs, etc )    - Identify cognitive and physical deficits and behaviors that affect mobility  - Identify mobility aids required to assist with transfers and/or ambulation (gait belt, sit-to-stand, lift, walker, cane, etc )  - [place] fall precautions as indicated by assessment  - Record patient progress and toleration of activity level on Mobility SBAR; progress patient to next Phase/Stage  - Instruct patient to call for assistance with activity based on assessment  - Consider rehabilitation consult to assist with strengthening/weightbearing, etc   Outcome: Progressing     Problem: DISCHARGE PLANNING  Goal: Discharge to home or other facility with appropriate resources  Description: INTERVENTIONS:  - Identify barriers to discharge w/patient and caregiver  - Arrange for needed discharge resources and transportation as appropriate  - Identify discharge learning needs (meds, wound care, etc )  - Arrange for interpretive services to assist at discharge as needed  - Refer to Case Management Department for coordinating discharge planning if the patient needs post-hospital services based on physician/advanced practitioner order or complex needs related to functional status, cognitive ability, or social support system  Outcome: Progressing     Problem: Knowledge Deficit  Goal: Patient/family/caregiver demonstrates understanding of disease process, treatment plan, medications, and discharge instructions  Description: Complete learning assessment and assess knowledge base  Interventions:  - Provide teaching at level of understanding  - Provide teaching via preferred learning methods  Outcome: Progressing     Problem: Nutrition/Hydration-ADULT  Goal: Nutrient/Hydration intake appropriate for improving, restoring or maintaining nutritional needs  Description: Monitor and assess patient's nutrition/hydration status for malnutrition  Collaborate with interdisciplinary team and initiate plan and interventions as ordered  Monitor patient's weight and dietary intake as ordered or per policy  Utilize nutrition screening tool and intervene as necessary   Determine patient's food preferences and provide high-protein, high-caloric foods as appropriate       INTERVENTIONS:  - Monitor oral intake, urinary output, labs, and treatment plans  - Assess nutrition and hydration status and recommend course of action  - Evaluate amount of meals eaten  - Assist patient with eating if necessary   - Allow adequate time for meals  - Recommend/ encourage appropriate diets, oral nutritional supplements, and vitamin/mineral supplements  - Order, calculate, and assess calorie counts as needed  - Recommend, monitor, and adjust tube feedings and TPN/PPN based on assessed needs  - Assess need for intravenous fluids  - Provide specific nutrition/hydration education as appropriate  - Include patient/family/caregiver in decisions related to nutrition  Outcome: Progressing

## 2021-05-11 NOTE — PROGRESS NOTES
Vancomycin Assessment    Gely Reynaga is a 67 y o  female who is currently receiving vancomycin   for  Bone/Joint infection   Relevant clinical data and objective history reviewed:  Creatinine   Date Value Ref Range Status   05/11/2021 1 06 0 60 - 1 30 mg/dL Final     Comment:     Standardized to IDMS reference method   05/10/2021 1 02 0 60 - 1 30 mg/dL Final     Comment:     Standardized to IDMS reference method   05/09/2021 0 92 0 60 - 1 30 mg/dL Final     Comment:     Standardized to IDMS reference method     Vancomycin Rm   Date Value Ref Range Status   08/28/2020 23 5 ug/mL Final     /68 (BP Location: Left arm)   Pulse 100   Temp 98 2 °F (36 8 °C) (Oral)   Resp 18   Ht 5' 6" (1 676 m)   Wt 70 8 kg (156 lb 1 3 oz)   SpO2 100%   BMI 25 19 kg/m²   I/O last 3 completed shifts: In: 600 [P O :600]  Out: 2150 [Urine:2150]  Lab Results   Component Value Date/Time    BUN 20 05/11/2021 04:55 AM    WBC 9 11 05/11/2021 04:55 AM    HGB 7 1 (L) 05/11/2021 04:55 AM    HCT 24 0 (L) 05/11/2021 04:55 AM    MCV 93 05/11/2021 04:55 AM     05/11/2021 04:55 AM     Temp Readings from Last 3 Encounters:   05/11/21 98 2 °F (36 8 °C) (Oral)   04/21/21 97 7 °F (36 5 °C)   04/13/21 97 9 °F (36 6 °C)     Vancomycin Days of Therapy: 1    Assessment/Plan  The patient is currently on vancomycin utilizing scheduled dosing based on adjusted body weight (due to obesity)  Baseline risks associated with therapy include: pre-existing renal impairment and concomitant nephrotoxic medications  The patient is currently receiving   and is clinically appropriate and dose will be continued  Pharmacy will also follow closely for s/sx of nephrotoxicity, infusion reactions, and appropriateness of therapy  BMP and CBC will be ordered per protocol  Plan for trough as patient approaches steady state, prior to the 4th  dose at approximately at 15:30 on 05/14/21    Due to infection severity, will target a trough of 15-20 (appropriate for most indications)   Pharmacy will continue to follow the patients culture results and clinical progress daily      Kierra Conner, Pharmacist

## 2021-05-12 NOTE — ASSESSMENT & PLAN NOTE
· ABLA and Coagulopathy POA likely due to Xarelto   · anemia POA with Hg 10 4 >>7 8 >7 4-> 7 1 > 7 1  · Hemoglobin stabilized, no recurrence of bleeding     · However given hemoglobin of 7 1, will transfuse 1 unit of blood

## 2021-05-12 NOTE — QUICK NOTE
Initially consulted during this hospitalization for sacral wound  Asked to re-evaluate for possible wound infection  Wound evaluated at bedside today  See picture in media  Some erythematous and may serrated skin related to saturated dressing  No significant induration or underlying fluctuance  Otherwise wound is clean without signs of   active infection  Wound culture likely related to  skin contaminant or colonization  Continue local wound care  Patient is being followed by the wound care service  No plans for surgical intervention  Would change outer dressing more frequently when saturated to prevent skin breakdown surrounding wound site      Morena Remy PA-C

## 2021-05-12 NOTE — ASSESSMENT & PLAN NOTE
Wt Readings from Last 3 Encounters:   05/12/21 57 6 kg (126 lb 15 8 oz)   04/27/21 64 kg (141 lb)   04/13/21 72 1 kg (158 lb 15 2 oz)     · Resume home torsemide, continue metoprolol  · Monitor daily weights, I/O  · Low-sodium diet

## 2021-05-12 NOTE — ASSESSMENT & PLAN NOTE
Lab Results   Component Value Date    HGBA1C 6 0 (H) 04/08/2021     Recent Labs     05/11/21  1642 05/11/21  2155 05/12/21  0808 05/12/21  1109   POCGLU 225* 244* 103 153*     Blood Sugar Average: Last 72 hrs:  · (P) 150 [id number]   · Continue Lantus 8 units at HS  · SSI plus Accu-Chek  · Diabetic diet

## 2021-05-12 NOTE — QUICK NOTE
Son called  Given update  Informed antibiotics have been discontinued  Inform she most likely be discharged tomorrow if afebrile next 24 hours  Informed of low hemoglobin and need for blood transfusion, he consents to blood transfusion

## 2021-05-12 NOTE — PROGRESS NOTES
Vancomycin IV Pharmacy-to-Dose Consultation    Skip Shagufta is a 67 y o  female who is currently receiving Vancomycin IV with management by the Pharmacy Consult service  Assessment/Plan:  The patient was reviewed  Renal function is stable and no signs or symptoms of nephrotoxicity and/or infusion reactions were documented in the chart  Based on todays assessment, continue current vancomycin (day # 2) dosing of  1250 mg Q24H, with a plan for trough to be drawn at 15:30 on 05/14/21  We will continue to follow the patients culture results and clinical progress daily      Saeid Mathew, Pharmacist

## 2021-05-12 NOTE — ASSESSMENT & PLAN NOTE
· Follows with wound care as outpatient - had been recommended to be evaluated in the ER due to bleeding at sacral ulcer site likely in setting of Xarelto  · Previously had been debrided as an outpatient on 04/21/2021  · Seen earlier by General surgery with no intervention however given fever on 05/09/2021, elevated CRP  · wound cultures with GPC, Pseudomonas aeruginosa, Enterococcus faecalis  · Re-evaluated by General surgery on 05/11/2021, do not feel wound is infected  · Seen by Infectious Disease on 05/12/2021, recommend holding off further antibiotics treatment, if afebrile then the next 24 hours, stable for discharge  Overall patient would benefit from multi disciplinary management including wound and plastic surgery    Discontinue IV antibiotics

## 2021-05-12 NOTE — PROGRESS NOTES
[place]  Progress Note - Cesar Arriaza 1945, 67 y o  female MRN: [id number]  Unit/Bed#: -Enrique Encounter: [id number]  Primary Care Provider: Karla Price DO   Date and time admitted to hospital: 5/3/2021 11:36 AM    Pressure ulcer of sacral region, stage 4 ([address] )  Assessment & Plan  · Follows with wound care as outpatient - had been recommended to be evaluated in the ER due to bleeding at sacral ulcer site likely in setting of Xarelto  · Previously had been debrided as an outpatient on 04/21/2021  · Seen earlier by General surgery with no intervention however given fever on 05/09/2021, elevated CRP  · wound cultures with GPC, Pseudomonas aeruginosa, Enterococcus faecalis  · Re-evaluated by General surgery on 05/11/2021, do not feel wound is infected  · Seen by Infectious Disease on 05/12/2021, recommend holding off further antibiotics treatment, if afebrile then the next 24 hours, stable for discharge  Overall patient would benefit from multi disciplinary management including wound and plastic surgery  Discontinue IV antibiotics    Fever  Assessment & Plan  T-max of 101 3° on 05/09/2021, unknown cause  Has remained afebrile in last 48 hours  Suspected source is sacral decubitus ulcer  Wound cultures as above  Hold off additional imaging and antibiotics  Stable for discharge if afebrile within the next 24 hours    ABLA (acute blood loss anemia)  Assessment & Plan  · ABLA and Coagulopathy POA likely due to Xarelto   · anemia POA with Hg 10 4 >>7 8 >7 4-> 7 1 > 7 1  · Hemoglobin stabilized, no recurrence of bleeding     · However given hemoglobin of 7 1, will transfuse 1 unit of blood      Coagulopathy (HCC)  Assessment & Plan  ·  No signs of bleeding or excess bruising    Chronic respiratory failure with hypoxia (HCC)  Assessment & Plan  · Patient chronically on 2 L nasal cannula-no signs of decompensation  · SpO2 currently 100% on baseline oxygen requirement  · Continue O2 supplementation to maintain SpO2 > 88%    Chronic diastolic heart failure (HCC)  Assessment & Plan  Wt Readings from Last 3 Encounters:   05/12/21 57 6 kg (126 lb 15 8 oz)   04/27/21 64 kg (141 lb)   04/13/21 72 1 kg (158 lb 15 2 oz)     · Resume home torsemide, continue metoprolol  · Monitor daily weights, I/O  · Low-sodium diet    Paroxysmal atrial fibrillation (HCC)  Assessment & Plan  · Continue rate control with metoprolol 25 mg b i d , anticoagulation with Xarelto 15 mg daily      Type 2 diabetes mellitus with hyperglycemia [facility])  Assessment & Plan  Lab Results   Component Value Date    HGBA1C 6 0 (H) 04/08/2021     Recent Labs     05/11/21  1642 05/11/21  2155 05/12/21  0808 05/12/21  1109   POCGLU 225* 244* 103 153*     Blood Sugar Average: Last 72 hrs:  · (P) 150 [id number]   · Continue Lantus 8 units at HS  · SSI plus Accu-Chek  · Diabetic diet    * Acute metabolic encephalopathy  Assessment & Plan  · Toxic Metabolic encephalopathy present on admission, likely due to acute kidney injury, treated with intravenous fluids and resolved  · Also received IV antibiotics for suspected acute cystitis, urine cultures with mixed contaminants, antibiotics discontinued  · Patient remains alert and oriented, clear while conversational      Sepsis (HCC)-resolved as of 5/9/2021  Assessment & Plan  · POA, e/b tachycardia, leukocytosis, tachypnea, RORY, AMS   · SBP 70s-90s at time of admission, had improved s/p IVF bolus   · UA shows pyuria and bacteriuria, in the setting of hypertension she was treated for acute cystitis  · Urine cultures growing mixed contaminants  · No blood cultures drawn upon admission    · Continue C diff prophylaxis with PO vancomycin  · Discontinue Ceftriaxone    Acute kidney injury superimposed on chronic kidney disease (HCC)-resolved as of 5/7/2021  Assessment & Plan  Lab Results   Component Value Date    EGFR 45 05/12/2021    EGFR 51 05/11/2021    EGFR 54 05/10/2021    CREATININE 1 18 2021    CREATININE 1 06 2021    CREATININE 1 02 05/10/2021   · RORY on CKD POA   · Recently baseline creatinine around 1 0-1 1  · Creatinine 1 56 at time of admission  · Suspect in setting of hypotension, poor oral intake/dehydration and torsemide   · Avoid nephrotoxic agents, hypotension  · Torsemide resumed on 05/10/2021  · Trend BMP       VTE Pharmacologic Prophylaxis:   Pharmacologic: Rivaroxaban (Xarelto)  Mechanical VTE Prophylaxis in Place: Yes    Patient Centered Rounds: I have performed bedside rounds with nursing staff today  Discussions with Specialists or Other Care Team Provider:  Infectious disease    Education and Discussions with Family / Patient:  Patient,    Time Spent for Care: 30 minutes  More than 50% of total time spent on counseling and coordination of care as described above  Current Length of Stay: 8 day(s)    Current Patient Status: Inpatient   Certification Statement: The patient will continue to require additional inpatient hospital stay due to Sacral decubitus ulcer, possibly infected    Discharge Plan:  Likely tomorrow    Code Status: Level 1 - Full Code      Subjective:   Seen  Appears stable, not in any form of discomfort  Has no new complaints to offer    Objective:     Vitals:   Temp (24hrs), Av 2 °F (36 8 °C), Min:98 1 °F (36 7 °C), Max:98 4 °F (36 9 °C)    Temp:  [98 1 °F (36 7 °C)-98 4 °F (36 9 °C)] 98 1 °F (36 7 °C)  HR:  [77-82] 82  Resp:  [16-18] 18  BP: (130-179)/(58-79) 179/79  SpO2:  [97 %-100 %] 98 %  Body mass index is 20 5 kg/m²  Input and Output Summary (last 24 hours): Intake/Output Summary (Last 24 hours) at 2021 1736  Last data filed at 2021 1201  Gross per 24 hour   Intake 200 ml   Output 550 ml   Net -350 ml       Physical Exam:     Physical Exam  Vitals signs reviewed  Constitutional:       General: She is not in acute distress  Appearance: Normal appearance   She is not ill-appearing, toxic-appearing or diaphoretic  HENT:      Head: Normocephalic  Eyes:      Extraocular Movements: Extraocular movements intact  Pupils: Pupils are equal, round, and reactive to light  Neck:      Musculoskeletal: Normal range of motion and neck supple  No neck rigidity or muscular tenderness  Vascular: No carotid bruit  Cardiovascular:      Rate and Rhythm: Normal rate and regular rhythm  Pulses: Normal pulses  Heart sounds: Normal heart sounds  No murmur  No friction rub  Pulmonary:      Effort: Pulmonary effort is normal       Breath sounds: Normal breath sounds  No stridor  No wheezing, rhonchi or rales  Chest:      Chest wall: No tenderness  Abdominal:      General: Abdomen is flat  Bowel sounds are normal  There is no distension  Palpations: Abdomen is soft  Tenderness: There is no abdominal tenderness  There is no guarding or rebound  Musculoskeletal: Normal range of motion  General: No swelling or tenderness  Right lower leg: No edema  Left lower leg: No edema  Skin:     General: Skin is warm and dry  Coloration: Skin is not jaundiced  Neurological:      General: No focal deficit present  Mental Status: She is alert  Cranial Nerves: No cranial nerve deficit  Sensory: No sensory deficit             Additional Data:     Labs:    Results from last 7 days   Lab Units 05/12/21  0329   WBC Thousand/uL 9 81   HEMOGLOBIN g/dL 7 1*   HEMATOCRIT % 24 2*   PLATELETS Thousands/uL 157   NEUTROS PCT % 67   LYMPHS PCT % 20   MONOS PCT % 8   EOS PCT % 4     Results from last 7 days   Lab Units 05/12/21  0329   SODIUM mmol/L 141   POTASSIUM mmol/L 4 8   CHLORIDE mmol/L 104   CO2 mmol/L 35*   BUN mg/dL 23   CREATININE mg/dL 1 18   ANION GAP mmol/L 2*   CALCIUM mg/dL 7 8*   GLUCOSE RANDOM mg/dL 129         Results from last 7 days   Lab Units 05/12/21  1109 05/12/21  0808 05/11/21  2155 05/11/21  1642 05/11/21  1120 05/11/21  0742 05/10/21  2124 05/10/21  1710 05/10/21  1145 05/10/21  0745 05/09/21  2116 05/09/21  1624   POC GLUCOSE mg/dl 153* 103 244* 225* 148* 69 156* 93 105 93 230* 204*         Results from last 7 days   Lab Units 05/11/21  0455 05/10/21  1221   PROCALCITONIN ng/ml <0 05 0 06           * I Have Reviewed All Lab Data Listed Above  * Additional Pertinent Lab Tests Reviewed: All Labs Within Last 24 Hours Reviewed    Imaging:    Imaging Reports Reviewed Today Include:   Imaging Personally Reviewed by Myself Includes:      Recent Cultures (last 7 days):     Results from last 7 days   Lab Units 05/10/21  1313 05/10/21  1225 05/10/21  1221   BLOOD CULTURE   --  No Growth at 48 hrs  No Growth at 48 hrs     GRAM STAIN RESULT  1+ Gram positive cocci in pairs*  No polys seen*  --   --    WOUND CULTURE  3+ Growth of Pseudomonas aeruginosa MDR*  2+ Growth of Enterococcus faecalis*  --   --        Last 24 Hours Medication List:   Current Facility-Administered Medications   Medication Dose Route Frequency Provider Last Rate    acetaminophen  650 mg Oral Q6H PRN Susan Rumpf, BEATRIZ      atorvastatin  40 mg Oral Daily With [address], BEATRIZ      ferrous sulfate  325 mg Oral Daily With Breakfast Susan RumyumikofBEATRIZ      gabapentin  100 mg Oral TID Susan Salmon PA-C      insulin glargine  8 Units Subcutaneous HS eHnna Sanabria, DO      insulin lispro  1-6 Units Subcutaneous TID AC Juanis Davis PA-C      insulin lispro  1-6 Units Subcutaneous HS Jhon Davis PA-C      metoprolol tartrate  25 mg Oral Q12H [facility] Clinic[facility]ar Shah PA-C      mirtazapine  15 mg Oral HS Tamar Doshi MD      multivitamin-minerals  1 tablet Oral Daily Shawnee NabeelDO nabeel      pantoprazole  40 mg Oral BID AC Jhon Davis PA-C      rivaroxaban  15 mg Oral Daily With Breakfast Mardene ServeBEATRIZ      torsemide  20 mg Oral Daily Tamar Doshi MD      vancomycin  125 mg Oral Q12H [facility] Coty Willson PA-C          Today, Patient Was Seen By: Car Grayson MD    ** Please Note: Dictation voice to text software may have been used in the creation of this document   **

## 2021-05-12 NOTE — PROGRESS NOTES
Patient admitted to [facility] due to increasing bleeding from her wound  The facility has no update on the patient

## 2021-05-12 NOTE — WOUND OSTOMY CARE
Progress Note - Wound   Everlene Bolds 67 y o  female MRN: [id number]  Unit/Bed#: -Enrique Encounter: [id number]        Assessment:   Patient seen for weekly wound care follow-up  She is frequently incontinent of stool  Jimenez catheter in place  Requires assist x 2 to turn in bed  Bilateral heels intact, right heel with oval slow to charlie area--preventative foam dressing applied  1   Partial thickness skin tear to right elbow--RESOLVED  2   Present on admission stage 4 pressure injury to sacrum--patient follows at the wound center  Wound bed probes to bone, pink/beefy red, shiny with yellow slough  Undermining present  There has been no further wound bleeding  Cristal-wound with blanchable erythema  No induration, fluctuance, or foul odor appreciated  See flowsheet and media for wound details  Wound Care Plan:   1-Apply Allevyn Life foam dressing to bilateral heels for prevention  Louis with P   Peel back at least daily for skin assessment and re-apply  Change dressing every 3 days and PRN  2-Elevate heels off of bed/chair surface to offload pressure  3-Offloading air cushion in chair when out of bed  4-Moisturize skin daily with skin nourishing cream   5-Turn/reposition every 2 hours while in bed, or when medically stable, using positioning wedges; and weight shift frequently while in chair for pressure re-distribution on skin  6-P500 low air-loss mattress  7-Sacrum--irrigate with saline, pat dry  3m no sting skin barrier film to cristal-wound skin  Pack wound loosely with mesalt ribbon--tape end of packing to cristal-wound skin  Cover with Maxorb and ABD  Change dressing daily and as needed with soilage      Wound care team to follow  Plan of care reviewed with primary RN      Patient should continue to follow at the wound center on discharge  Wound 04/21/21 Pressure Injury Sacrum (Active)   Wound Image   05/12/21 1613   Wound Description Beefy red;Pink;Slough; Yellow 05/12/21 1613   Pressure Injury Stage 4 05/12/21 1613   Cristal-wound Assessment Erythema;Fragile; Maceration 05/12/21 1613   Wound Length (cm) 5 5 cm 05/12/21 1613   Wound Width (cm) 5 5 cm 05/12/21 1613   Wound Depth (cm) 3 8 cm 05/12/21 1613   Wound Surface Area (cm^2) 30 25 cm^2 05/12/21 1613   Wound Volume (cm^3) 114 95 cm^3 05/12/21 1613   Calculated Wound Volume (cm^3) 114 95 cm^3 05/12/21 1613   Change in Wound Size % -30 63 05/12/21 1613   Tunneling 0 cm 05/12/21 1613   Undermining is depth extending from 6-1 o'clock 05/12/21 1613   Drainage Amount Moderate 05/12/21 1613   Drainage Description Serosanguineous 05/12/21 1613   Non-staged Wound Description Full thickness 05/12/21 1613   Treatments Irrigation with NSS 05/12/21 1613   Dressing Mesalt;Calcium Alginate;ABD 05/12/21 1613   Wound packed? Yes 05/12/21 1613   Packing- # removed 3 05/12/21 1613   Packing- # inserted 1 05/12/21 1613   Dressing Changed Changed 05/12/21 1613   Patient Tolerance Tolerated well 05/12/21 1613   Dressing Status Clean;Dry; Intact 05/12/21 [id number] Deann ZAMORAN, RN, [facility]

## 2021-05-12 NOTE — PLAN OF CARE
Problem: Potential for Falls  Goal: Patient will remain free of falls  Description: INTERVENTIONS:  - Assess patient frequently for physical needs  -  Identify cognitive and physical deficits and behaviors that affect risk of falls    -  [place] fall precautions as indicated by assessment   - Educate patient/family on patient safety including physical limitations  - Instruct patient to call for assistance with activity based on assessment  - Modify environment to reduce risk of injury  - Consider OT/PT consult to assist with strengthening/mobility  Outcome: Progressing

## 2021-05-12 NOTE — ASSESSMENT & PLAN NOTE
T-max of 101 3° on 05/09/2021, unknown cause  Has remained afebrile in last 48 hours    Suspected source is sacral decubitus ulcer  Wound cultures as above  Hold off additional imaging and antibiotics  Stable for discharge if afebrile within the next 24 hours

## 2021-05-12 NOTE — CASE MANAGEMENT
LOS: 8  At round discharge for Thursday was discussed if patient is fever free for 24 hrs off abx she can return to [facility]  Referrals were made for alternate SNF and no beds were available since patient is a MA bed hold at [facility]  Notified patient's son, Ruba Feeling at [phone] and he is agreeable to patinet returning to [facility]  Notified Konstantin Mcmahon of [facility] of tentative discharge Thursday

## 2021-05-12 NOTE — ASSESSMENT & PLAN NOTE
Lab Results   Component Value Date    EGFR 45 05/12/2021    EGFR 51 05/11/2021    EGFR 54 05/10/2021    CREATININE 1 18 05/12/2021    CREATININE 1 06 05/11/2021    CREATININE 1 02 05/10/2021   · RORY on CKD POA   · Recently baseline creatinine around 1 0-1 1  · Creatinine 1 56 at time of admission  · Suspect in setting of hypotension, poor oral intake/dehydration and torsemide   · Avoid nephrotoxic agents, hypotension  · Torsemide resumed on 05/10/2021  · Trend BMP

## 2021-05-12 NOTE — CONSULTS
Consultation - Infectious Disease   Dori Rosenbaum 67 y o  female MRN: [id number]  Unit/Bed#: -01 Encounter: [id number]      Assessment/Plan   1  Fever/Stage 4 decubiti/h/o chronic osteomyelitis of sacrum/chronic trujillo/h/o cdiff    1  Fever: possible source of recurrent temp yesterday are multiple including sacrum and chronic trujillo catheter, she has no pulmonary symptoms, or ssti, other than the sacrum  The sacrum has a lot of drainage, but otherwise is relatively clean  Wound cultures were done though not sure how helpful they will be  Urine culture not sent, blood cultures are negative  -  Would d/c abx, only one fever, no leukocytosis, blood cultures are negative, monitor at least 24 hours after abx stopped    - complete 10 day course of po vanco 125 mg bid from last dose of Roecphin    - Patient needs a multidisciplinary approach to treatment for this decub/chronic osteo, would CT scan to see how it has progressed since November  Awaiting call back from wound care doctor  D/W primary    D/W wound care, they have seen her intermittently getting care at facility as well  She will follow up with them and she will arrange, f/u with general/plastic surgery  Will help with IV abx after debridement complete  History of Present Illness   Physician Requesting Consult: Jas Hennessy MD  Reason for Consult / Principal Problem:  Fever    HPI: Dori Rosenbaum is a 67y o  year old female with H/O hypoxic respiratory failure on home O2, , congestive heart failure, diabetes, AFIB, chronic osteomyelitis of sacrum with large decubitus, chronic Trujillo catheter  Patient seen by ID in September for an episode of sepsis from the chronic decubiti/osteo, recommended a multidisciplinary approach to treatment  diverting colostomy, bone debridement, plastics for flap  Patient sent over from Wound Care for bleeding sacral wound on 05/03    Patient found to have a positive UA she was started on Rocephin as well as p o  Vancomycin given a recent C diff infection  The urine culture was negative and the Rocephin was stopped  She continued on the p o  vanco   She was doing well and ready for discharge with plans to follow-up with wound care, when she had a fever yesterday  Blood cultures were drawn patient started on Vanco and Rocephin  She has been afebrile since  This a m  She is feeling well she is anxious to leave  She has no headaches chest pains abdominal pains nausea vomiting or diarrhea  Inpatient consult to Infectious Diseases  Consult performed by: Heri Gonsales MD  Consult ordered by: Dipika Alex MD          ROS: 12 systems reviewed, remainder is neg  Historical Information   Past Medical History:   Diagnosis Date    [facility] failure [facility]ARF) [facility] [address] )     Acute respiratory failure with hypoxia (HCC)     Atrial fibrillation (HCC)     CHF (congestive heart failure) (HCC)     Chronic kidney disease     Diabetes mellitus ([facility] [address] )     type 2    Hyperlipidemia     Hypertension     Stroke [facility])      Past Surgical History:   Procedure Laterality Date    BREAST SURGERY Left     lumpectomy benign    CATARACT EXTRACTION Bilateral 2017    CATARACT EXTRACTION W/ INTRAOCULAR LENS IMPLANT Left 12/11/2017    Procedure: EXTRACTION EXTRACAPSULAR CATARACT PHACO INTRAOCULAR LENS (IOL); Surgeon: Janay Lares MD;  Location: [facility] MAIN OR;  Service: Ophthalmology    [place] OPEN RX FEMUR FX+INTRAMED RAYMOND Right 5/21/2020    Procedure: INSERTION OF SHORT TROCHANTERIC FEMORAL NAIL;  Surgeon: Jolene Galicia MD;  [facility] Main [facility];  Service: Orthopedics    [address] HUMERAL&GLENOID COMPNT Right 9/10/2018    Procedure: RIGHT REVERSE TOTAL SHOULDER ARTHROPLASTY;  Surgeon: Jolene Galicia MD;  [facility];  Service: Orthopedics    [place] XCAPSL CTRC RMVL INSJ IO LENS PROSTH W/O ECP Right 10/23/2017    Procedure: EXTRACTION EXTRACAPSULAR CATARACT PHACO INTRAOCULAR LENS (IOL);   Surgeon: Mary Conti Carol Lancaster MD;  [facility] MA[facility];  Service: Ophthalmology    WOUND DEBRIDEMENT N/A 8/26/2020    Procedure: EXCISIONAL DEBRIDEMENT OF SACRAL PRESSURE WOUND, WASHOUT;;  Surgeon: Pk Melchor MD;  [facility] MAIN OR;  Service: General    WOUND DEBRIDEMENT N/A 8/28/2020    Procedure: BUTTOCKS DEBRIDEMENT WOUND AND DRESSING CHANGE ([address] OUT);   Surgeon: Vicki Weiss MD;  [facility] MAIN OR;  Service: General     Social History   Social History     Substance and Sexual Activity   Alcohol Use Never    Frequency: Never    Drinks per session: Patient refused    Binge frequency: Never    Comment: quit 8/17     Social History     Substance and Sexual Activity   Drug Use No     Social History     Tobacco Use   Smoking Status Never Smoker   Smokeless Tobacco Never Used     Family History   Problem Relation Age of Onset    Diabetes Mother     Varicose Veins Mother     Stroke Father     Arthritis Brother     Diabetes Daughter     No Known Problems Brother        Meds/Allergies   MEDS: reviewed      Current Facility-Administered Medications:     acetaminophen (TYLENOL) tablet 650 mg, 650 mg, Oral, Q6H PRN, Charis Paul PA-C, 650 mg at 05/09/21 2105    atorvastatin (LIPITOR) tablet 40 mg, 40 mg, Oral, Daily With Dinner, Charis Paul PA-C, 40 mg at 05/11/21 1653    cefTRIAXone (ROCEPHIN) IVPB (premix in dextrose) 1,000 mg 50 mL, 1,000 mg, Intravenous, Q24H, Diego Wells MD, Last Rate: 100 mL/hr at 05/11/21 1557, 1,000 mg at 05/11/21 1557    ferrous sulfate tablet 325 mg, 325 mg, Oral, Daily With Breakfast, Charis Paul PA-C, 325 mg at 05/12/21 5760    gabapentin (NEURONTIN) capsule 100 mg, 100 mg, Oral, TID, Charis Paul PA-C, 100 mg at 05/12/21 0934    insulin glargine (LANTUS) subcutaneous injection 8 Units 0 08 mL, 8 Units, Subcutaneous, HS, Henna Sanabria, DO, 8 Units at 05/11/21 2220    insulin lispro (HumaLOG) 100 units/mL subcutaneous injection 1-6 Units, 1-6 Units, Subcutaneous, TID AC, 2 Units at 05/11/21 1653 **AND** Fingerstick Glucose (POCT), , , TID AC, Celia Hull PA-C    insulin lispro (HumaLOG) 100 units/mL subcutaneous injection 1-6 Units, 1-6 Units, Subcutaneous, HS, Celia Hull PA-C, 3 Units at 05/11/21 2220    metoprolol tartrate (LOPRESSOR) tablet 25 mg, 25 mg, Oral, Q12H [address], Romana Smith PA-C, 25 mg at 05/12/21 0934    mirtazapine (REMERON) tablet 15 mg, 15 mg, Oral, HS, Ledy Adhikari MD, 15 mg at 05/11/21 2220    multivitamin-minerals (CENTRUM ADULTS) tablet 1 tablet, 1 tablet, Oral, Daily, Henna Fly, DO, 1 tablet at 05/12/21 0934    pantoprazole (PROTONIX) EC tablet 40 mg, 40 mg, Oral, BID ACShailesh PA-C, 40 mg at 05/12/21 4342    rivaroxaban (XARELTO) tablet 15 mg, 15 mg, Oral, Daily With Breakfast, Romana Smith PA-C, 15 mg at 05/12/21 0937    torsemide (DEMADEX) tablet 20 mg, 20 mg, Oral, Daily, Ledy Adhikari MD, 20 mg at 05/12/21 0935    vancomycin (VANCOCIN) 1,250 mg in sodium chloride 0 9 % 250 mL IVPB, 20 mg/kg (Adjusted), Intravenous, Q24H, Ledy Adhikari MD, Last Rate: 166 7 mL/hr at 05/11/21 1732, 1,250 mg at 05/11/21 1732    vancomycin (VANCOCIN) oral solution 125 mg, 125 mg, Oral, Q12H [address], Jie Will PA-C, 125 mg at 05/12/21 0935    No Known Allergies      Intake/Output Summary (Last 24 hours) at 5/12/2021 1013  Last data filed at 5/12/2021 0330  Gross per 24 hour   Intake 420 ml   Output 550 ml   Net -130 ml       PE:  WD, WN, WF in NAD  VSS, Tmax: afebrile  HEENT: anicteric, eomi  NECK: supple, no adenopathy  CARDIAC: rrr s1s2, no m/r/g  LUNGS: decreased bilaterally  ABDOMEN: soft nt/nd + BS  EXTREMITIES: no edema  SKIN: large sacral decub, surrounding erythema no warmth or cellulitis, appears chronic, clear drainage, pic reviewed  NEURO: no lateralization noted  Psych: nl affect  : chronic [id number]    Invasive Devices:   Peripheral IV 05/11/21 Left Hand (Active)   Site Assessment Clean;Dry; Intact 05/12/21 0200   Dressing Type Transparent 05/12/21 0200   Line Status Flushed;Saline locked 05/12/21 0200   Dressing Status Clean;Dry; Intact 05/12/21 0200       Urethral Catheter Non-latex; Double-lumen 16 Fr  (Active)   Reasons to continue Urinary Catheter  Chronic urinary catheter 05/11/21 2219   Goal for Removal N/A- chronic trujillo 05/11/21 2219   Site Assessment Clean;Skin intact 05/11/21 2219   Collection Container Standard drainage bag 05/11/21 2219   Securement Method Securing device (Describe) 05/11/21 2219   Output (mL) 550 mL 05/12/21 0330           Lab Results:   No results displayed because visit has over 200 results  Imaging Studies: I have personally reviewed pertinent reports  EKG, Pathology, and Other Studies: I have personally reviewed pertinent reports  Culture  Lab Results   Component Value Date    BLOODCX No Growth at 24 hrs  05/10/2021    BLOODCX No Growth at 24 hrs  05/10/2021    BLOODCX No Growth After 5 Days  09/16/2020    BLOODCX No Growth After 5 Days  09/16/2020    BLOODCX No Growth After 5 Days  09/14/2020    BLOODCX Staphylococcus coagulase negative (A) 09/14/2020    BLOODCX No Growth After 5 Days  08/28/2020    BLOODCX No Growth After 5 Days   08/28/2020    BLOODCX Enterococcus faecalis (A) 08/26/2020    BLOODCX Streptococcus constellatus (A) 08/26/2020    BLOODCX Streptococcus constellatus (A) 08/26/2020    BLOODCX Staphylococcus coagulase negative (A) 08/26/2020    BLOODCX Bacteroides fragilis (A) 08/26/2020     Lab Results   Component Value Date    WOUNDCULT 3+ Growth of Pseudomonas aeruginosa MDR (A) 05/10/2021    WOUNDCULT 2+ Growth of Enterococcus species (A) 05/10/2021    WOUNDCULT 3+ Growth of Escherichia coli (A) 08/26/2020    WOUNDCULT 3+ Growth of Proteus mirabilis (A) 08/26/2020    WOUNDCULT 3+ Growth of Enterococcus faecalis (A) 08/26/2020    WOUNDCULT 3+ Growth of  08/26/2020    WOUNDCULT 2+ Growth of Escherichia coli (A) 08/26/2020 WOUNDCULT 2+ Growth of Proteus mirabilis (A) 08/26/2020    WOUNDCULT 3+ Growth of Enterococcus species (A) 08/26/2020    WOUNDCULT 3+ Growth of  08/26/2020     Lab Results   Component Value Date    URINECX >100,000 cfu/ml  05/03/2021    URINECX 30,000-39,000 cfu/ml Candida glabrata (A) 09/14/2020    URINECX 50,000-59,000 cfu/ml Escherichia coli (A) 08/26/2020    URINECX >100,000 cfu/ml Lactobacillus species (A) 08/26/2020    URINECX 20,000-29,000 cfu/ml  09/05/2018     No results found for: SPUTUMCULTUR    Principal Problem:    Acute metabolic encephalopathy  Active Problems:    Type 2 diabetes mellitus with hyperglycemia (HCC)    ABLA (acute blood loss anemia)    Fever    Pressure ulcer of sacral region, stage 4 (HCC)    Paroxysmal atrial fibrillation (HCC)    Chronic diastolic heart failure (HCC)    Chronic respiratory failure with hypoxia (HCC)    Coagulopathy ([address] )

## 2021-05-13 PROBLEM — G93.41 ACUTE METABOLIC ENCEPHALOPATHY: Status: RESOLVED | Noted: 2021-01-01 | Resolved: 2021-01-01

## 2021-05-13 PROBLEM — R50.9 FEVER: Status: RESOLVED | Noted: 2020-06-17 | Resolved: 2021-01-01

## 2021-05-13 PROBLEM — D68.9 COAGULOPATHY (HCC): Status: RESOLVED | Noted: 2021-01-01 | Resolved: 2021-01-01

## 2021-05-13 NOTE — DISCHARGE SUMMARY
[place]  Discharge- Lizeth Benavides 1945, 67 y o  female MRN: [id number]  Unit/Bed#: -01 Encounter: [id number]  Primary Care Provider: Chaparrita Paul,    Date and time admitted to hospital: 5/3/2021 11:36 AM    Pressure ulcer of sacral region, stage 4 [facility])  Assessment & Plan  · Follows with wound care as outpatient - had been recommended to be evaluated in the ER due to bleeding at sacral ulcer site likely in setting of Xarelto  · Previously had been debrided as an outpatient on 04/21/2021  · Seen earlier by General surgery with no intervention however given fever on 05/09/2021, elevated CRP  · wound cultures with GPC, Pseudomonas aeruginosa, Enterococcus faecalis  · Re-evaluated by General surgery on 05/11/2021, do not feel wound is infected  Seen by Infectious Disease on 05/12/2021, recommend holding off further antibiotics treatment,   She has remained stable and afebrile within the last 24 hours  Stable for discharge  Overall patient would benefit from multi disciplinary management including wound and plastic surgery  Needs to follow-up with wound care    Fever-resolved as of 5/13/2021  Assessment & Plan  T-max of 101 3° on 05/09/2021, unknown cause  Has remained afebrile in last 72 hours  Suspected source is sacral decubitus ulcer  Wound cultures as above however no need for antibiotics as per Infectious Disease        ABLA (acute blood loss anemia)  Assessment & Plan  · ABLA and Coagulopathy POA likely due to Xarelto   · anemia POA with Hg 10 4 >>7 8 >7 4-> 7 1 > 7 1, status post blood transfusion of 05/12/2021, hemoglobin now 8 3    Chronic respiratory failure with hypoxia (HCC)  Assessment & Plan  · Patient chronically on 2 L nasal cannula-no signs of decompensation  · SpO2 currently 100% on baseline oxygen requirement  · Continue O2 supplementation to maintain SpO2 > 88%    Chronic diastolic heart failure (HCC)  Assessment & Plan  Wt Readings from Last 3 Encounters:   05/13/21 57 kg (125 lb 10 6 oz)   04/27/21 64 kg (141 lb)   04/13/21 72 1 kg (158 lb 15 2 oz)     · Discharge on home torsemide, metoprolol      Paroxysmal atrial fibrillation (HCC)  Assessment & Plan  · Continue rate control with metoprolol 25 mg b i d , anticoagulation with Xarelto 15 mg daily      Type 2 diabetes mellitus with hyperglycemia [facility])  Assessment & Plan  Lab Results   Component Value Date    HGBA1C 6 0 (H) 04/08/2021     Recent Labs     05/12/21  2123 05/13/21  0740 05/13/21  0803 05/13/21  1111   POCGLU 267* 137 142* 183*     Blood Sugar Average: Last 72 hrs:  · (P) 161 5   · Discharge on home insulin regimen    Coagulopathy (HCC)-resolved as of 5/13/2021  Assessment & Plan  ·  No signs of bleeding or excess bruising    Sepsis (HCC)-resolved as of 5/9/2021  Assessment & Plan  · POA, e/b tachycardia, leukocytosis, tachypnea, RORY, AMS   · SBP 70s-90s at time of admission, had improved s/p IVF bolus   · UA shows pyuria and bacteriuria, in the setting of hypertension she was treated for acute cystitis  · Urine cultures growing mixed contaminants  · No blood cultures drawn upon admission  · Continue C diff prophylaxis with PO vancomycin  · Discontinue Ceftriaxone    * Acute metabolic encephalopathy-resolved as of 5/13/2021  Assessment & Plan  · Toxic Metabolic encephalopathy present on admission, likely due to acute kidney injury, treated with intravenous fluids and resolved  · Also received IV antibiotics for suspected acute cystitis, urine cultures with mixed contaminants, antibiotics discontinued    · Patient remains alert and oriented, clear while conversational          Discharging Physician / Practitioner: Ximena Telles MD  PCP: Dayne Cavazos DO  Admission Date:   Admission Orders (From admission, onward)     Ordered        05/04/21 1020  Inpatient Admission  Once         05/03/21 1412  Place in Observation  Once                   Discharge Date: 05/13/21    Resolved Problems  Date Reviewed: 5/9/2021          Resolved    Fever 5/13/2021     Resolved by  Pedro Yates MD    * (Principal) Acute metabolic encephalopathy 2/06/7861     Resolved by  Pedro Yates MD    Acute kidney injury superimposed on chronic kidney disease ([facility] [address] ) 5/7/2021     Resolved by  Shabnam Almanzar PA-C    Sepsis ([facility] [address] ) 5/9/2021     Resolved by  Mason Monsivais DO    Coagulopathy ([facility] ) 5/13/2021     Resolved by  Pedro Yates MD          Consultations During Hospital Stay:  · Infectious Disease, surgery, wound care    Procedures Performed:   ·     Significant Findings / Test Results:   ·     Incidental Findings:   ·      Test Results Pending at Discharge (will require follow up):   ·      Outpatient Tests Requested:  ·     Complications:      Reason for Admission:  Acute change in mental status, lethargy    Hospital Course:     Amrik Barreto is a 67 y o  female patient who originally presented to the hospital on 5/3/2021 due to acute change in mental status and lethargy  Initially thought to be due to sepsis however culture data was unrevealing and antibiotics were discontinued  Found to have bleeding from her stage IV decubital ulcer with acute blood loss anemia, attended to by Wound Care, surgery and required 1 unit of blood  Hemoglobin is now 8 3  Labs on presentation and shows showed acute kidney injury which was the most likely cause of change in mental status, given IV fluids while holding torsemide has since resolved  Torsemide has been resume  Had a solitary high-grade temperature of 102°, suspicion for infection from sacral decubitus ulcer, wound cultures sent growing Enterococcus faecalis, Pseudomonas aeruginosa and Gram-positive cocci in pairs  Patient was evaluated by Infectious Disease who feel this is a contaminant  She was monitored off antibiotics and has remained afebrile within the last 72 hours    As patient was on IV antibiotics, the decision was made to prophylactically treat with p o  Vancomycin to prevent C diff, this will continue on to 05/20/2021  Recommendations are as follows:  -she needs to be followed up by wound care  -she needs a multidisciplinary approach for her wound to heal including wound care, surgery and possibly Plastic surgery   -she should be followed up by her primary care physician    Please see above list of diagnoses and related plan for additional information  Condition at Discharge: stable     Discharge Day Visit / Exam:     Subjective:  Seen  Has no new complaints to offer  Appears comfortable  Vitals: Blood Pressure: 146/67 (05/13/21 0752)  Pulse: 71 (05/13/21 0752)  Temperature: 98 3 °F (36 8 °C) (05/13/21 0752)  Temp Source: Oral (05/13/21 0752)  Respirations: 18 (05/13/21 0752)  Height: 5' 6" (167 6 cm) (05/04/21 1844)  Weight - Scale: 57 kg (125 lb 10 6 oz) (05/13/21 0600)  SpO2: 100 % (05/13/21 0752)  Exam:   Physical Exam  Vitals signs reviewed  Constitutional:       General: She is not in acute distress  Appearance: Normal appearance  She is not ill-appearing, toxic-appearing or diaphoretic  HENT:      Head: Normocephalic  Eyes:      Extraocular Movements: Extraocular movements intact  Pupils: Pupils are equal, round, and reactive to light  Neck:      Musculoskeletal: Normal range of motion and neck supple  No neck rigidity or muscular tenderness  Vascular: No carotid bruit  Cardiovascular:      Rate and Rhythm: Normal rate and regular rhythm  Pulses: Normal pulses  Heart sounds: Normal heart sounds  No murmur  No friction rub  Pulmonary:      Effort: Pulmonary effort is normal       Breath sounds: Normal breath sounds  No stridor  No wheezing, rhonchi or rales  Chest:      Chest wall: No tenderness  Abdominal:      General: Abdomen is flat  Bowel sounds are normal  There is no distension  Palpations: Abdomen is soft  Tenderness: There is no abdominal tenderness   There is no guarding or rebound  Musculoskeletal: Normal range of motion  General: No swelling or tenderness  Right lower leg: No edema  Left lower leg: No edema  Skin:     General: Skin is warm and dry  Coloration: Skin is not jaundiced  Comments: Stage IV decubitus ulcer in clean dressing  Neurological:      General: No focal deficit present  Mental Status: She is alert and oriented to person, place, and time  Cranial Nerves: No cranial nerve deficit  Sensory: No sensory deficit  Discussion with Family:  Son informed of discharge back to [facility]  Discharge instructions/Information to patient and family:   See after visit summary for information provided to patient and family  Provisions for Follow-Up Care:  See after visit summary for information related to follow-up care and any pertinent home health orders  Disposition:     Other [facility] to [facility] SNF:   · Not Applicable to this Patient - Not Applicable to this Patient    Planned Readmission:  No     Discharge Statement:  I spent 65 minutes discharging the patient  This time was spent on the day of discharge  I had direct contact with the patient on the day of discharge  Greater than 50% of the total time was spent examining patient, answering all patient questions, arranging and discussing plan of care with patient as well as directly providing post-discharge instructions  Additional time then spent on discharge activities  Discharge Medications:  See after visit summary for reconciled discharge medications provided to patient and family        ** Please Note: This note has been constructed using a voice recognition system **

## 2021-05-13 NOTE — ASSESSMENT & PLAN NOTE
· ABLA and Coagulopathy POA likely due to Xarelto   · anemia POA with Hg 10 4 >>7 8 >7 4-> 7 1 > 7 1, status post blood transfusion of 05/12/2021, hemoglobin now 8 3

## 2021-05-13 NOTE — PLAN OF CARE
Problem: Potential for Falls  Goal: Patient will remain free of falls  Description: INTERVENTIONS:  - Assess patient frequently for physical needs  -  Identify cognitive and physical deficits and behaviors that affect risk of falls    -  [place] fall precautions as indicated by assessment   - Educate patient/family on patient safety including physical limitations  - Instruct patient to call for assistance with activity based on assessment  - Modify environment to reduce risk of injury  - Consider OT/PT consult to assist with strengthening/mobility  Outcome: Progressing     Problem: Prexisting or High Potential for Compromised Skin Integrity  Goal: Skin integrity is maintained or improved  Description: INTERVENTIONS:  - Identify patients at risk for skin breakdown  - Assess and monitor skin integrity  - Assess and monitor nutrition and hydration status  - Monitor labs   - Assess for incontinence   - Turn and reposition patient  - Assist with mobility/ambulation  - Relieve pressure over bony prominences  - Avoid friction and shearing  - Provide appropriate hygiene as needed including keeping skin clean and dry  - Evaluate need for skin moisturizer/barrier cream  - Collaborate with interdisciplinary team   - Patient/family teaching  - Consider wound care consult   Outcome: Progressing     Problem: PAIN - ADULT  Goal: Verbalizes/displays adequate comfort level or baseline comfort level  Description: Interventions:  - Encourage patient to monitor pain and request assistance  - Assess pain using appropriate pain scale  - Administer analgesics based on type and severity of pain and evaluate response  - Implement non-pharmacological measures as appropriate and evaluate response  - Consider cultural and social influences on pain and pain management  - Notify physician/advanced practitioner if interventions unsuccessful or patient reports new pain  Outcome: Progressing     Problem: INFECTION - ADULT  Goal: Absence or prevention of progression during hospitalization  Description: INTERVENTIONS:  - Assess and monitor for signs and symptoms of infection  - Monitor lab/diagnostic results  - Monitor all insertion sites, i e  indwelling lines, tubes, and drains  - Monitor endotracheal if appropriate and nasal secretions for changes in amount and color  - [place] appropriate cooling/warming therapies per order  - Administer medications as ordered  - Instruct and encourage patient and family to use good hand hygiene technique  - Identify and instruct in appropriate isolation precautions for identified infection/condition  Outcome: Progressing  Goal: Absence of fever/infection during neutropenic period  Description: INTERVENTIONS:  - Monitor WBC    Outcome: Progressing     Problem: SAFETY ADULT  Goal: Maintain or return to baseline ADL function  Description: INTERVENTIONS:  -  Assess patient's ability to carry out ADLs; assess patient's baseline for ADL function and identify physical deficits which impact ability to perform ADLs (bathing, care of mouth/teeth, toileting, grooming, dressing, etc )  - Assess/evaluate cause of self-care deficits   - Assess range of motion  - Assess patient's mobility; develop plan if impaired  - Assess patient's need for assistive devices and provide as appropriate  - Encourage maximum independence but intervene and supervise when necessary  - Involve family in performance of ADLs  - Assess for home care needs following discharge   - Consider OT consult to assist with ADL evaluation and planning for discharge  - Provide patient education as appropriate  Outcome: Progressing  Goal: Maintain or return mobility status to optimal level  Description: INTERVENTIONS:  - Assess patient's baseline mobility status (ambulation, transfers, stairs, etc )    - Identify cognitive and physical deficits and behaviors that affect mobility  - Identify mobility aids required to assist with transfers and/or ambulation (gait belt, sit-to-stand, lift, walker, cane, etc )  - [place] fall precautions as indicated by assessment  - Record patient progress and toleration of activity level on Mobility SBAR; progress patient to next Phase/Stage  - Instruct patient to call for assistance with activity based on assessment  - Consider rehabilitation consult to assist with strengthening/weightbearing, etc   Outcome: Progressing     Problem: DISCHARGE PLANNING  Goal: Discharge to home or other facility with appropriate resources  Description: INTERVENTIONS:  - Identify barriers to discharge w/patient and caregiver  - Arrange for needed discharge resources and transportation as appropriate  - Identify discharge learning needs (meds, wound care, etc )  - Arrange for interpretive services to assist at discharge as needed  - Refer to Case Management Department for coordinating discharge planning if the patient needs post-hospital services based on physician/advanced practitioner order or complex needs related to functional status, cognitive ability, or social support system  Outcome: Progressing     Problem: Knowledge Deficit  Goal: Patient/family/caregiver demonstrates understanding of disease process, treatment plan, medications, and discharge instructions  Description: Complete learning assessment and assess knowledge base  Interventions:  - Provide teaching at level of understanding  - Provide teaching via preferred learning methods  Outcome: Progressing     Problem: Nutrition/Hydration-ADULT  Goal: Nutrient/Hydration intake appropriate for improving, restoring or maintaining nutritional needs  Description: Monitor and assess patient's nutrition/hydration status for malnutrition  Collaborate with interdisciplinary team and initiate plan and interventions as ordered  Monitor patient's weight and dietary intake as ordered or per policy  Utilize nutrition screening tool and intervene as necessary   Determine patient's food preferences and provide high-protein, high-caloric foods as appropriate       INTERVENTIONS:  - Monitor oral intake, urinary output, labs, and treatment plans  - Assess nutrition and hydration status and recommend course of action  - Evaluate amount of meals eaten  - Assist patient with eating if necessary   - Allow adequate time for meals  - Recommend/ encourage appropriate diets, oral nutritional supplements, and vitamin/mineral supplements  - Order, calculate, and assess calorie counts as needed  - Recommend, monitor, and adjust tube feedings and TPN/PPN based on assessed needs  - Assess need for intravenous fluids  - Provide specific nutrition/hydration education as appropriate  - Include patient/family/caregiver in decisions related to nutrition  Outcome: Progressing

## 2021-05-13 NOTE — DISCHARGE INSTR - AVS FIRST PAGE
Dear Brittney Horton,     It was our pleasure to care for you here at [facility], [address] [place] [address]  It is our hope that we were always able to exceed the expected standards for your care during your stay  You were hospitalized due to acute change in mental status due to dehydration  Your sacral wound was also found to be bleeding  This was attended to by General surgery, you were given a unit of blood for this  You wounds were tended to by wound care  You are being discharged in stable condition  You were cared for on the medicine floor by Pam Durant MD with the [facility] who covers for your primary care physician (PCP), Ronny Delgadillo DO, while you were hospitalized  If you have any questions or concerns related to this hospitalization, you may contact us at [phone]  For follow up as well as any medication refills, we recommend that you follow up with your primary care physician  A registered nurse will reach out to you by phone within a few days after your discharge to answer any additional questions that you may have after going home  However, at this time we provide for you here, the most important instructions / recommendations at discharge:     · Notable Medication Adjustments -   · Oral Vancomycin 125 mg twice a day until 05/20/2021  · Testing Required after Discharge -   ·   · Important follow up information -   · Primary care physician within 1 week of discharge  ·   · Other Instructions -   ·   · Please review this entire after visit summary as additional general instructions including medication list, appointments, activity, diet, any pertinent wound care, and other additional recommendations from your care team that may be provided for you        Sincerely,     Pam Durant MD

## 2021-05-13 NOTE — ASSESSMENT & PLAN NOTE
· Follows with wound care as outpatient - had been recommended to be evaluated in the ER due to bleeding at sacral ulcer site likely in setting of Xarelto  · Previously had been debrided as an outpatient on 04/21/2021  · Seen earlier by General surgery with no intervention however given fever on 05/09/2021, elevated CRP  · wound cultures with GPC, Pseudomonas aeruginosa, Enterococcus faecalis  · Re-evaluated by General surgery on 05/11/2021, do not feel wound is infected  Seen by Infectious Disease on 05/12/2021, recommend holding off further antibiotics treatment,   She has remained stable and afebrile within the last 24 hours  Stable for discharge  Overall patient would benefit from multi disciplinary management including wound and plastic surgery    Needs to follow-up with wound care

## 2021-05-13 NOTE — PLAN OF CARE
Problem: Potential for Falls  Goal: Patient will remain free of falls  Description: INTERVENTIONS:  - Assess patient frequently for physical needs  -  Identify cognitive and physical deficits and behaviors that affect risk of falls    -  [place] fall precautions as indicated by assessment   - Educate patient/family on patient safety including physical limitations  - Instruct patient to call for assistance with activity based on assessment  - Modify environment to reduce risk of injury  - Consider OT/PT consult to assist with strengthening/mobility  Outcome: Progressing

## 2021-05-13 NOTE — ASSESSMENT & PLAN NOTE
Wt Readings from Last 3 Encounters:   05/13/21 57 kg (125 lb 10 6 oz)   04/27/21 64 kg (141 lb)   04/13/21 72 1 kg (158 lb 15 2 oz)     · Discharge on home torsemide, metoprolol

## 2021-05-13 NOTE — CASE MANAGEMENT
Patient is medically cleared for discharge  Bed is available at [facility]  [facility] at  [phone]  Called and notified patients son Martín Kelly of discharge  Called and notified Kori Hendricks at [facility] of discharge time

## 2021-05-13 NOTE — DISCHARGE INSTRUCTIONS
Acute Kidney Injury   WHAT YOU NEED TO KNOW:   Acute kidney injury (RORY) is also called acute kidney failure, or acute renal failure  RORY happens when your kidneys suddenly stop working correctly  Normally, the kidneys remove fluid, chemicals, and waste from your blood  These wastes are turned into urine by your kidneys  RORY usually happens over hours or days  When you have RORY, your kidneys do not remove the waste, chemicals, or extra fluid from your body  A normal amount of urine is not produced  RORY is usually temporary, it can take days to months to recover  RORY can also become a chronic kidney condition  DISCHARGE INSTRUCTIONS:   Call 911 if:   · You have sudden chest pain or trouble breathing  Seek care immediately if:   · Your symptoms get worse  Contact your healthcare provider if:   · Your symptoms return  · Your blood sugar or blood pressure level is not within the range your healthcare provider recommends  · You have questions or concerns about your condition or care  Nutrition:  Your healthcare provider may tell you to eat food low in sodium (salt), potassium, phosphorus, or protein  A dietitian can help you plan your meals  Drink liquids as directed: Your healthcare provider may recommend that you drink a certain amount of liquids  This will help your kidneys work better and decrease your risk for dehydration  Ask how much liquid to drink each day and which liquids are best for you  Prevent acute kidney injury:   · Manage other health conditions  such as diabetes, high blood pressure, or heart disease  These conditions increase your risk for acute kidney injury  Take your medicines for these conditions as directed  Also, monitor your blood sugar and blood pressure levels as directed  Contact your healthcare provider if your levels are not in the range he or she says it should be  · Talk to your healthcare provider before you take over-the-counter-medicine    NSAIDs, stomach medicine, or laxatives may harm your kidneys and increase your risk for acute kidney injury  If it is okay to take the medicine, follow the directions on the package  Do not take more than directed  · Tell healthcare providers you have had acute kidney injury  before you get contrast liquid for an x-ray or CT scan  Your healthcare provider may give you medicine to prevent kidney problems caused by the liquid  Follow up with your healthcare provider as directed: You will need to return for more tests to make sure your kidneys are working properly  You may also be referred to a kidney specialist  Write down your questions so you remember to ask them during your visits  © Copyright [address] Information is for End User's use only and may not be sold, redistributed or otherwise used for commercial purposes  All illustrations and images included in CareNotes® are the copyrighted property of [employer]  or [address] [place], [place] [address]addison patrick   The above information is an  only  It is not intended as medical advice for individual conditions or treatments  Talk to your doctor, nurse or pharmacist before following any medical regimen to see if it is safe and effective for you  Per wound care  Resume mesalt packing in 48 hours   Pt will continue with wound care office visits as previously scheduled

## 2021-05-13 NOTE — PLAN OF CARE
Problem: Potential for Falls  Goal: Patient will remain free of falls  Description: INTERVENTIONS:  - Assess patient frequently for physical needs  -  Identify cognitive and physical deficits and behaviors that affect risk of falls    -  [place] fall precautions as indicated by assessment   - Educate patient/family on patient safety including physical limitations  - Instruct patient to call for assistance with activity based on assessment  - Modify environment to reduce risk of injury  - Consider OT/PT consult to assist with strengthening/mobility  5/13/2021 1453 by Jaclyn Wasserman RN  Outcome: Adequate for Discharge  5/13/2021 0719 by Jaclyn Wasserman RN  Outcome: Progressing

## 2021-05-13 NOTE — ASSESSMENT & PLAN NOTE
Lab Results   Component Value Date    HGBA1C 6 0 (H) 04/08/2021     Recent Labs     05/12/21  2123 05/13/21  0740 05/13/21  0803 05/13/21  1111   POCGLU 267* 137 142* 183*     Blood Sugar Average: Last 72 hrs:  · (P) 161 5   · Discharge on home insulin regimen

## 2021-05-13 NOTE — TRANSPORTATION MEDICAL NECESSITY
Section I - General Information    Name of Patient: Doug Zuniga                 : 1945    Medicare #: [id number]  Transport Date: 21 (PCS is valid for round trips on this date and for all repetitive trips in the 60-day range as noted below )  Origin: [facility]: [facility]  Is the pt's stay covered under Medicare Part A (PPS/DRG)   []     Closest appropriate facility? If no, why is transport to more distant facility required? YES  If hospice pt, is this transport related to pt's terminal illness? N/A       Section II - Medical Necessity Questionnaire  Ambulance transportation is medically necessary only if other means of transport are contraindicated or would be potentially harmful to the patient  To meet this requirement, the patient must either be "bed confined" or suffer from a condition such that transport by means other than ambulance is contraindicated by the patient's condition  The following questions must be answered by the medical professional signing below for this form to be valid:    1)  Describe the MEDICAL CONDITION (physical and/or mental) of this patient AT [address] [place] [address] that requires the patient to be transported in an ambulance and why transport by other means is contraindicated by the patient's condition:  Patient is bedbound, yfn lift, on continuous O2 @ 2l nc, fall risk    2) Is the patient "bed confined" as defined below? YES  To be "be confined" the patient must satisfy all three of the following conditions: (1) unable to get up from bed without Assistance; AND (2) unable to ambulate; AND (3) unable to sit in a chair or wheelchair  3) Can this patient safely be transported by car or wheelchair van (i e , seated during transport without a medical attendant or monitoring)?    NO    4) In addition to completing questions 1-3 above, please check any of the following conditions that apply*:   *Note: supporting documentation for any boxes checked must be maintained in the patient's medical records  If hosp-hosp transfer, describe services needed at 2nd facility not available at 1st facility? Unable to tolerate seated position for time needed to transport   Other(specify) bed bound/yfn lift      Section III - Signature of Physician or Healthcare Professional  I certify that the above information is true and correct based on my evaluation of this patient, and represent that the patient requires transport by ambulance and that other forms of transport are contraindicated  I understand that this information will be used by the Centers for Medicare and Medicaid Services (CMS) to support the determination of medical necessity for ambulance services, and I represent that I have personal knowledge of the patient's condition at time of transport  []  If this box is checked, I also certify that the patient is physically or mentally incapable of signing the ambulance service's claim and that the institution with which I am affiliated has furnished care, services, or assistance to the patient  My signature below is made on behalf of the patient pursuant to 42 CFR §424 36(b)(4)  In accordance with 42 CFR §424 37, the specific reason(s) that the patient is physically or mentally incapable of signing the claim form is as follows:       Signature of Physician* or Healthcare Professional______________________________________________________________  Signature Date 05/13/21 (For scheduled repetitive transports, this form is not valid for transports performed more than 60 days after this date)    Printed Name & Credentials of Physician or Healthcare Professional (MD, DO, RN, etc )________________________________  *Form must be signed by patient's attending physician for scheduled, repetitive transports   For non-repetitive, unscheduled ambulance transports, if unable to obtain the signature of the attending physician, any of the following may sign (choose appropriate option below)  [] Physician Assistant []  Clinical Nurse Specialist [x]  Registered Nurse  []  Nurse Practitioner  [x] Discharge Planner

## 2021-05-13 NOTE — ASSESSMENT & PLAN NOTE
T-max of 101 3° on 05/09/2021, unknown cause  Has remained afebrile in last 72 hours  Suspected source is sacral decubitus ulcer  Wound cultures as above however no need for antibiotics as per Infectious Disease

## 2021-05-14 NOTE — PROGRESS NOTES
This Cm Assistant received an ADT alert indicating the patient was discharged on 5/13/21 to [facility]  This care manager assistant will continue to monitor via chart review throughout bundle episode

## 2021-05-18 NOTE — PROGRESS NOTES
Chart review completed  Email sent to facility requesting update on patient  This care manager assistant will continue to monitor via chart review throughout bundle episode  This CM Assistant received a email from [facility] with a update on the patient  The patient is currently receiving OT, PT and SLP  She is dependent for all care  She has a unstageable pressure ulcer on her sacrum due to [facility] and eschar 5x6x0  3  she will transition to LTC  No LCD at this time   Hetal Vázquez

## 2021-05-26 NOTE — PATIENT INSTRUCTIONS
No added salt diet  2 liter fluid restriction  Check daily weights  If you gained 3 pounds in one day, 5 pounds in one week, or experience worsening shortness of breath or increasing lower leg swelling  Please call the heart failure office at [phone]    Please bring a  list of your current medications and daily weights to the office visit

## 2021-05-26 NOTE — PROGRESS NOTES
Cardiology Follow Up    Tawana Lam  1945  [id number]  [facility]  [address] [address] [phone]    1  Chronic diastolic heart failure (HCC)     2  Paroxysmal atrial fibrillation (HCC)     3  Stage 3b chronic kidney disease ([address] )     4  Acute respiratory failure with hypoxia [facility])         Interval History:   Ms Tawana Lam was admitted to [place] [address] on 4/08 - 4/13/21 with acute on chronic diastolic heart failure  She was sent to the emergency room due to outpatient abnormal lab studies  On admission hemoglobin 7 2  Guaiac stools positive  At the nursing facility  Admission NT proBNP 17 688, chest x-ray showed moderate pulmonary edema with right pleural effusion  TTE LVEF 65%, left atrium dilated, mild to moderate mitral valve regurgitation, moderate TR, moderate to severe pulmonary hypertension peak PA pressure 60 mmHg  She was diuresed with IV Lasix 40 mg b i d  and switch to Bumex 1 mg IV b i d   Cardiology recommending changing diuretic to torsemide at discharge  She was discharged back to [facility]  Discharged weight 158 pounds  Ms Tawana Lam was admitted to [facility] on 5/03 - 5/13/21 with acute metabolic encephalopathy  [place] was   Sent to the emergency room with acute mental status change and lethargy  She was bleeding from her stage IV decubitus ulcer with acute blood loss anemia  Cultures were unrevealing  Wound Care  As well as surgery consulted  She required 1 unit of PRBC  Hemoglobin at discharge 8 3  On presentation lab study showed Yun creat 1 56, most likely cause and change in mental status  IV fluids were given and torsemide was held  ID was consulted, culture growth was felt to be a contaminant      Decision was made to 4 foci completely treat with IV me on biotic vancomycin to prevent C Diff, continue to May 20, 2021  Recommended following up with Wound Care multi disciplinary approach for wound healing wound care and possible referral to Plastic surgery  FU With primary team   Discharge weight 125 pounds  Discharge lab sodium 140 potassium 4 3 BUN 25 creatinine 1 0 GFR 55,  Hemoglobin 8 3 hematocrit 26    Ms Catherine Barreto presents to our office for a recent hospitalization follow up visit  She is accompanied by her son  Aleksey Haddad is  Lying on a stretcher on her right side  She is using oxygen at 2 L nasal cannula  Alex Severino denies shortness of breath chest pain palpitations lightheadedness or dizziness  I have not received weights or blood lab studies from the facility  Alex Severino informs me her weight today was 137 lb  She admits to a good appetite and is eating and drinking well  She is not able to get out of bed due to her wound she is not receiving physical therapy according to her son due to wound        HPI:  Paroxysmal atrial fibrillation on Xarelto 15mg daily   Chronic HFpEF LVEF 65%   chronic hypoxic respiratory failure using oxygen 2 L nasal cannula  ICA stenosis Left >70%, Right <50%  DM2 HgbA1C 9 9 on 8/26/20 improved to 6 0 on 4/08/21  Hypertension  Hyperlipidemia  CKD III  Iron deficiency Anemia  5/2020 fall sustaining closed proximal right femur fracture  Hip fracture sp right hip IM nailing on  5/2020  Pressure ulcer stage 4   Indwelling trujillo catheter   Patient Active Problem List   Diagnosis    Closed 4-part fracture of proximal humerus with routine healing    Type 2 diabetes mellitus with hyperglycemia (HCC)    Hypertension    Hyperlipidemia    Stage 3a chronic kidney disease ([facility] )    ABLA (acute blood loss anemia)    Scalp hematoma    Status post reverse arthroplasty of right shoulder    Symptomatic varicose veins of both lower extremities    Chronic venous insufficiency    Closed comminuted intertrochanteric fracture of proximal femur, right, initial encounter ([facility] )    Acute respiratory failure with hypoxia (HCC)    Constipation    Distal radius fracture, right    Diarrhea    Herpes zoster without complication    Colon cancer screening    Need for vaccination against Streptococcus pneumoniae using pneumococcal conjugate vaccine 13    Pressure ulcer of sacral region, stage 4 (HCC)    Cellulitis of right elbow    Bilateral lower extremity edema    History of vitamin D deficiency    Stroke-like symptoms    RORY (acute kidney injury) (HCC)    Paroxysmal atrial fibrillation (HCC)    Bacteremia due to Enterococcus    Iron deficiency anemia    Stenosis of left carotid artery    Chronic diastolic heart failure (HCC)    C  difficile colitis    Sepsis due to Streptococcus species (HCC)    Rash    Confusion    Acute on chronic diastolic (congestive) heart failure (HCC)    Chronic respiratory failure with hypoxia (HCC)    Guaiac positive stools    Hypoalbuminemia    Chronic indwelling Jimenez catheter     Past Medical History:   Diagnosis Date    Acute renal failure (ARF) (HCC)     Acute respiratory failure with hypoxia (HCC)     Atrial fibrillation (HCC)     CHF (congestive heart failure) (HCC)     Chronic kidney disease     Diabetes mellitus ([facility] )     type 2    Hyperlipidemia     Hypertension     Stroke ([facility] )      Social History     Socioeconomic History    Marital status: Single     Spouse name: Not on file    Number of children: 2    Years of education: Not on file    Highest education level: Not on file   Occupational History    Not on file   Social Needs    Financial resource strain: Not hard at all   [employer] insecurity     Worry: Never true     Inability: Never true   [place] needs     Medical: No     Non-medical: No   Tobacco Use    Smoking status: Never Smoker    Smokeless tobacco: Never Used   Substance and Sexual Activity    Alcohol use: Never     Frequency: Never     Drinks per session: Patient refused     Binge frequency: Never Comment: quit     Drug use: No    Sexual activity: Not Currently   Lifestyle    Physical activity     Days per week: 3 days     Minutes per session: 20 min    Stress: Only a little   Relationships    Social connections     Talks on phone: Twice a week     Gets together: Once a week     Attends Taoist service: Never     Active member of club or organization: No     Attends meetings of clubs or organizations: Never     Relationship status: Never     Intimate partner violence     Fear of current or ex partner: No     Emotionally abused: No     Physically abused: No     Forced sexual activity: No   Other Topics Concern    Not on file   Social History Narrative    · Most recent tobacco use screenin2019      · Do you currently or have you served in the [employer]:   No      · Were you activated, into active duty, as a member of the [employer] or as a Reservist:   No     As per Sandhya Zuñiga       Family History   Problem Relation Age of Onset    Diabetes Mother     Varicose Veins Mother     Stroke Father     Arthritis Brother     Diabetes Daughter     No Known Problems Brother      Past Surgical History:   Procedure Laterality Date    BREAST SURGERY Left     lumpectomy benign    CATARACT EXTRACTION Bilateral 2017    CATARACT EXTRACTION W/ INTRAOCULAR LENS IMPLANT Left 2017    Procedure: EXTRACTION EXTRACAPSULAR CATARACT PHACO INTRAOCULAR LENS (IOL);   Surgeon: Dinesh Suazo MD;  Location: [facility] MAIN OR;  Service: Ophthalmology    CA OPEN RX FEMUR FX+INTRAMED RAYMOND Right 2020    Procedure: INSERTION OF SHORT TROCHANTERIC FEMORAL NAIL;  Surgeon: Caroline Moses MD;  [facility];  Service: Orthopedics    [address] HUMERAL&GLENOID COMPNT Right 9/10/2018    Procedure: RIGHT REVERSE TOTAL SHOULDER ARTHROPLASTY;  Surgeon: Caroline Moses MD;  [facility];  Service: Orthopedics    CA XCAPSL CTRC RMVL INSJ IO LENS PROSTH W/O ECP Right 10/23/2017    Procedure: EXTRACTION EXTRACAPSULAR CATARACT PHACO INTRAOCULAR LENS (IOL); Surgeon: Lisa Serrano MD;  Location: [facility] MAIN OR;  Service: Ophthalmology    WOUND DEBRIDEMENT N/A 8/26/2020    Procedure: EXCISIONAL DEBRIDEMENT OF SACRAL PRESSURE WOUND, WASHOUT;;  Surgeon: Jaime Bhatt MD;  [facility] MAIN [facility];  Service: General    WOUND DEBRIDEMENT N/A 8/28/2020    Procedure: BUTTOCKS DEBRIDEMENT WOUND AND DRESSING CHANGE ([address]);   Surgeon: Rosalie Marina MD;  [facility] MAIN [facility];  Service: General       Current Outpatient Medications:     acetaminophen (TYLENOL) 325 mg tablet, Take 650 mg by mouth every 4 (four) hours as needed for mild pain or fever, Disp: , Rfl:     aspirin (ECOTRIN LOW STRENGTH) 81 mg EC tablet, Take 1 tablet (81 mg total) by mouth daily, Disp: 30 tablet, Rfl: 0    atorvastatin (LIPITOR) 40 mg tablet, Take 1 tablet (40 mg total) by mouth daily with dinner, Disp: 30 tablet, Rfl: 0    bisacodyl (DULCOLAX) 10 mg suppository, Insert 10 mg into the rectum as needed for constipation constipation , Disp: , Rfl:     ferrous sulfate 325 (65 Fe) mg tablet, TAKE 1 TABLET BY MOUTH ONCE A DAY FOR SUPPLEMENT, Disp: , Rfl:     gabapentin (NEURONTIN) 100 mg capsule, Take 100 mg by mouth 3 (three) times a day, Disp: , Rfl:     HumaLOG KwikPen 100 units/mL injection pen, Inject under the skin , Disp: , Rfl:     insulin glargine (LANTUS) 100 units/mL subcutaneous injection, Inject 10 Units under the skin daily at bedtime, Disp: 10 mL, Rfl: 0    metoprolol tartrate (LOPRESSOR) 25 mg tablet, Take 25 mg by mouth every 12 (twelve) hours , Disp: , Rfl:     mineral oil enema, Insert 1 enema into the rectum once As needed for constipation unrelieved by suppository, Disp: , Rfl:     mirtazapine (REMERON) 15 mg tablet, Take 15 mg by mouth daily at bedtime, Disp: , Rfl:     Nutritional Supplements (Yevgeniy) PACK, Take by mouth, Disp: , Rfl:     pantoprazole (PROTONIX) 40 mg tablet, Take 40 mg by mouth 2 (two) times a day , Disp: , Rfl:     polyethylene glycol (MIRALAX) 17 g packet, Take 17 g by mouth daily, Disp: 14 each, Rfl: 0    rivaroxaban (XARELTO) 15 mg tablet, Take 1 tablet (15 mg total) by mouth daily with breakfast, Disp: 90 tablet, Rfl: 3    Specialty Vitamins Products (PROSTATE PO), Take by mouth 2 (two) times a day, Disp: , Rfl:     torsemide (DEMADEX) 20 mg tablet, Take 1 tablet (20 mg total) by mouth daily, Disp: 30 tablet, Rfl: 0    vancomycin (VANCOCIN) 50mg/mL SOLN, Take 2 5 mL (125 mg total) by mouth every 12 (twelve) hours, Disp: 40 mL, Rfl: 0    VITAMIN D PO, Take 50,000 mg by mouth 2 (two) times a week Tuesday and Friday, Disp: , Rfl:   No Known Allergies    Labs:  No results displayed because visit has over 200 results  No results displayed because visit has over 200 results  Imaging: No results found  Review of Systems:  Review of Systems   Constitutional: Positive for fatigue  Lying on her right side on a stretcher   Musculoskeletal: Positive for arthralgias and myalgias  Skin: Positive for wound  Sacral   All other systems reviewed and are negative  Physical Exam:  Physical Exam  Vitals signs reviewed  Constitutional:       Comments: Elderly female lying on a stretcher   HENT:      Head: Normocephalic  Neck:      Musculoskeletal: Normal range of motion  Cardiovascular:      Rate and Rhythm: Normal rate and regular rhythm  Pulses: Normal pulses  Heart sounds: Murmur present  Pulmonary:      Effort: Pulmonary effort is normal       Breath sounds: Normal breath sounds  Abdominal:      General: Bowel sounds are normal       Palpations: Abdomen is soft  Musculoskeletal:      Right lower leg: No edema  Left lower leg: No edema  Skin:     General: Skin is warm and dry  Capillary Refill: Capillary refill takes less than 2 seconds  Neurological:      General: No focal deficit present        Mental Status: She is alert and oriented to person, place, and time  Psychiatric:         Mood and Affect: Mood normal          Behavior: Behavior normal          Discussion/Summary:  1  Paroxysmal atrial fibrillation on Xarelto 15mg daily for stroke prevention and   Metoprolol tartrate 25 mg q 12 hours  2  Chronic HFpEF LVEF 65% NYHA class II- III stage C- On PE eu volemic and compensated, HR and BP controlled,  Weight today 137 lb  Continue metoprolol tartrate 25 mg q 12 hours, torsemide 20 mg daily,  Ferrous sulfate 325 mg daily  3  Chronic hypoxic respiratory failure using oxygen 2 L nasal cannula  4  CKD IIIb Baseline creatinine 1 2-1 3- facility will fax lab results to our office, follow up with nephrology in the near future

## 2021-06-04 PROBLEM — R80.8 OTHER PROTEINURIA: Status: ACTIVE | Noted: 2021-01-01

## 2021-06-04 PROBLEM — R77.8 LOW SERUM COMPLEMENT C3: Status: ACTIVE | Noted: 2021-01-01

## 2021-06-04 NOTE — PROGRESS NOTES
NEPHROLOGY OUTPATIENT PROGRESS NOTE   Mainor Jenkins 67 y o  female MRN: [id number]  DATE: 6/4/2021  Reason for visit:   Chief Complaint   Patient presents with    Chronic Kidney Disease    Follow-up        Patient Instructions   1  Chronic kidney disease stage 3a in setting of   Hypertensive nephrosclerosis plus or minus diabetic disease plus or minus physiologic aging of the kidneys   -Baseline serum creatinine appears to be near 1(1-1 1), last serum creatinine 1 0 as of May 13, 2021   -stay well-hydrated  -avoid nonsteroidals(ibuprofen, aleve, advil, motrin, celebrex, naproxen, toradol)  - monitor BMP   - okay to follow-up annually  -as C3 low in past, repeat complement levels this month     2  Hypertension -blood pressure acceptable  -continue torsemide 20mg daily  - avoid high salt/ sodium diet   -avoid caffeine    3  DM2, well controlled - last A1C 6 as of 4/8/21, on humalog, lantus      4  Chronic CHF: continue torsemide 20mg daily, monitor daily weight, continue 1 5L/day fluid restriction    5  Hx vitamin D deficiency - monitor vitamin D level annually  On 08507e twice daily ergocalciferol     6  Proteinuria - UpCr 2 4g in sept 2020 at which time C3 low, repeat complements and UpCr now  Will pursue further work up if proteinuria still elevated  UA with microscopy 1+ as of 5/3/12 with hematuria as well  Urine eos 1%, repeat this as well now  RTC in 1 year  Aicha Pepe was seen today for chronic kidney disease and follow-up  Diagnoses and all orders for this visit:    Stage 3a chronic kidney disease ([facility] )  -     C3 complement; Future  -     C4 complement; Future  -     Complement, total; Future  -     ASO Screen w/ reflex to Titer; Future  -     Urinalysis with microscopic; Future  -     Protein / creatinine ratio, urine; Future  -     Basic metabolic panel; Future  -     Urinalysis with microscopic; Future  -     Protein / creatinine ratio, urine; Future  -     Urine Eosinophils;  Future    Essential hypertension    Bilateral lower extremity edema    History of vitamin D deficiency    Chronic indwelling Jimenez catheter  -     Ambulatory referral to Urology; Future    Type 2 diabetes mellitus with hyperglycemia (HCC)    Other proteinuria  -     C3 complement; Future  -     C4 complement; Future  -     Complement, total; Future  -     ASO Screen w/ reflex to Titer; Future  -     Urinalysis with microscopic; Future  -     Protein / creatinine ratio, urine; Future    Low serum complement C3  -     C3 complement; Future  -     C4 complement; Future  -     Complement, total; Future  -     ASO Screen w/ reflex to Titer; Future        Assessment/Plan:  1  Chronic kidney disease stage 3a in setting of   Hypertensive nephrosclerosis plus or minus diabetic disease plus or minus physiologic aging of the kidneys   -Baseline serum creatinine appears to be near 1(1-1 1), last serum creatinine 1 0 as of May 13, 2021   -stay well-hydrated  -avoid nonsteroidals(ibuprofen, aleve, advil, motrin, celebrex, naproxen, toradol)  - monitor BMP   - okay to follow-up annually  -as C3 low in past, repeat complement levels this month     2  Hypertension -blood pressure acceptable  -continue torsemide 20mg daily  - avoid high salt/ sodium diet   -avoid caffeine    3  DM2, well controlled - last A1C 6 as of 4/8/21, on humalog, lantus      4  Chronic CHF: continue torsemide 20mg daily, monitor daily weight, continue 1 5L/day fluid restriction    5  Hx vitamin D deficiency - monitor vitamin D level annually  On 59166v twice daily ergocalciferol     6  Proteinuria - UpCr 2 4g in sept 2020 at which time C3 low, repeat complements and UpCr now  Will pursue further work up if proteinuria still elevated  UA with microscopy 1+ as of 5/3/12 with hematuria as well  Urine eos 1%, repeat this as well now  SUBJECTIVE / INTERVAL HISTORY:  67 y o  female presents in follow up of CKD       Roopa Baker denies any recent illness/hospitalizations/medication changes since last office visit  Denies NSAID use  HTN - BP has been well controlled  Dm2 - unknown how her BS have been    Review of Systems   Constitutional: Positive for fatigue  Negative for chills and fever  HENT: Negative for sore throat and trouble swallowing  Eyes: Negative for visual disturbance  Respiratory: Negative for cough and shortness of breath  Cardiovascular: Negative for chest pain and leg swelling  Gastrointestinal: Negative for diarrhea, nausea and vomiting  Endocrine: Negative for polyuria  Genitourinary: Negative for difficulty urinating, dysuria and hematuria  Musculoskeletal: Negative for back pain and neck pain  Lower back hurts as she has a bed sore   Skin: Negative for rash  Neurological: Negative for dizziness, light-headedness and numbness  Hematological: Negative for adenopathy  Does not bruise/bleed easily  Psychiatric/Behavioral: The patient is not nervous/anxious  Depressed       OBJECTIVE:  /60   Pulse 70  There is no height or weight on file to calculate BMI  Patient in [facility]  Physical exam:  Physical Exam  Vitals signs and nursing note reviewed  Constitutional:       General: She is not in acute distress  Appearance: She is well-developed  She is ill-appearing  She is not diaphoretic  Comments: In stretcher   HENT:      Head: Normocephalic and atraumatic  Nose: Nose normal       Mouth/Throat:      Mouth: Mucous membranes are dry  Pharynx: No oropharyngeal exudate  Eyes:      General: No scleral icterus  Right eye: No discharge  Left eye: No discharge  Neck:      Musculoskeletal: Normal range of motion and neck supple  Thyroid: No thyromegaly  Cardiovascular:      Rate and Rhythm: Normal rate and regular rhythm  Heart sounds: No murmur  Pulmonary:      Effort: Pulmonary effort is normal  No respiratory distress  Breath sounds: No wheezing        Comments: Decreased BS b/l, on 2L O2 via NC  Abdominal:      General: Bowel sounds are normal  There is no distension  Palpations: Abdomen is soft  Musculoskeletal:         General: No swelling  Skin:     General: Skin is warm and dry  Coloration: Skin is pale  Findings: No rash  Neurological:      Mental Status: She is alert  Motor: No abnormal muscle tone        Comments: Awake, alert   Psychiatric:      Comments: Flat affect         Medications:    Current Outpatient Medications:     acetaminophen (TYLENOL) 325 mg tablet, Take 650 mg by mouth every 4 (four) hours as needed for mild pain or fever, Disp: , Rfl:     aspirin (ECOTRIN LOW STRENGTH) 81 mg EC tablet, Take 1 tablet (81 mg total) by mouth daily, Disp: 30 tablet, Rfl: 0    atorvastatin (LIPITOR) 40 mg tablet, Take 1 tablet (40 mg total) by mouth daily with dinner, Disp: 30 tablet, Rfl: 0    bisacodyl (DULCOLAX) 10 mg suppository, Insert 10 mg into the rectum as needed for constipation constipation , Disp: , Rfl:     ferrous sulfate 325 (65 Fe) mg tablet, TAKE 1 TABLET BY MOUTH ONCE A DAY FOR SUPPLEMENT, Disp: , Rfl:     gabapentin (NEURONTIN) 100 mg capsule, Take 100 mg by mouth 3 (three) times a day, Disp: , Rfl:     HumaLOG KwikPen 100 units/mL injection pen, Inject under the skin , Disp: , Rfl:     insulin glargine (LANTUS) 100 units/mL subcutaneous injection, Inject 10 Units under the skin daily at bedtime, Disp: 10 mL, Rfl: 0    metoprolol tartrate (LOPRESSOR) 25 mg tablet, Take 25 mg by mouth every 12 (twelve) hours , Disp: , Rfl:     mineral oil enema, Insert 1 enema into the rectum once As needed for constipation unrelieved by suppository, Disp: , Rfl:     mirtazapine (REMERON) 15 mg tablet, Take 15 mg by mouth daily at bedtime, Disp: , Rfl:     Nutritional Supplements (Yevgeniy) PACK, Take by mouth, Disp: , Rfl:     pantoprazole (PROTONIX) 40 mg tablet, Take 40 mg by mouth 2 (two) times a day , Disp: , Rfl:     polyethylene glycol (MIRALAX) 17 g packet, Take 17 g by mouth daily, Disp: 14 each, Rfl: 0    rivaroxaban (XARELTO) 15 mg tablet, Take 1 tablet (15 mg total) by mouth daily with breakfast, Disp: 90 tablet, Rfl: 3    Specialty Vitamins Products (PROSTATE PO), Take by mouth 2 (two) times a day, Disp: , Rfl:     torsemide (DEMADEX) 20 mg tablet, Take 1 tablet (20 mg total) by mouth daily, Disp: 30 tablet, Rfl: 0    vancomycin (VANCOCIN) 50mg/mL SOLN, Take 2 5 mL (125 mg total) by mouth every 12 (twelve) hours, Disp: 40 mL, Rfl: 0    VITAMIN D PO, Take 50,000 mg by mouth 2 (two) times a week Tuesday and Friday, Disp: , Rfl:     Allergies: Allergies as of 06/04/2021    (No Known Allergies)       The following portions of the patient's history were reviewed and updated as appropriate: past family history, past surgical history and problem list     Laboratory Results:  Lab Results   Component Value Date    SODIUM 140 05/13/2021    K 4 3 05/13/2021     05/13/2021    CO2 33 (H) 05/13/2021    BUN 25 05/13/2021    CREATININE 1 00 05/13/2021    GLUC 158 (H) 05/13/2021    CALCIUM 7 9 (L) 05/13/2021        Lab Results   Component Value Date    CALCIUM 7 9 (L) 05/13/2021    PHOS 3 5 09/16/2020       Portions of the record may have been created with voice recognition software   Occasional wrong word or "sound a like" substitutions may have occurred due to the inherent limitations of voice recognition software   Read the chart carefully and recognize, using context, where substitutions have occurred

## 2021-06-04 NOTE — PATIENT INSTRUCTIONS
1  Chronic kidney disease stage 3a in setting of   Hypertensive nephrosclerosis plus or minus diabetic disease plus or minus physiologic aging of the kidneys   -Baseline serum creatinine appears to be near 1(1-1 1), last serum creatinine 1 0 as of May 13, 2021   -stay well-hydrated  -avoid nonsteroidals(ibuprofen, aleve, advil, motrin, celebrex, naproxen, toradol)  - monitor BMP   - okay to follow-up annually  -as C3 low in past, repeat complement levels this month     2  Hypertension -blood pressure acceptable  -continue torsemide 20mg daily  - avoid high salt/ sodium diet   -avoid caffeine    3  DM2, well controlled - last A1C 6 as of 4/8/21, on humalog, lantus      4  Chronic CHF: continue torsemide 20mg daily, monitor daily weight, continue 1 5L/day fluid restriction    5  Hx vitamin D deficiency - monitor vitamin D level annually  On [id number] twice daily ergocalciferol     6  Proteinuria - UpCr 2 4g in sept 2020 at which time C3 low, repeat complements and UpCr now  Will pursue further work up if proteinuria still elevated  UA with microscopy 1+ as of 5/3/12 with hematuria as well  Urine eos 1%, repeat this as well now  RTC in 1 year

## 2021-06-07 NOTE — PATIENT INSTRUCTIONS
Orders Placed This Encounter   Procedures    Wound off loading     Position patient every two hours, avoid pressure on wound  Pressure relieving mattress on bed     Standing Status:   Future     Standing Expiration Date:   6/7/2022    Wound cleansing and dressings     Sacral wound     Wash your hands with soap and water  Remove old dressing, discard into plastic bag and place in trash  Cleanse the wound with saline prior to applying a clean dressing  Do not use tissue or cotton balls  Do not scrub the wound  Pat dry using gauze  Shower no      Apply Zinc Oxide to surrounding skin  Lightly pack AMD packing into tunnel (at 2 o'clock) and wound  Cover with ABD pad  Secure with Med-Fix tape  Change dressing daily and as needed if soiled or lose integrity         Return in two weeks            Get X-ray as soon as possible  Monitor blood pressures  102/42 and 98/40 during visit  Standing Status:   Future     Standing Expiration Date:   6/7/2022    XR sacrum and coccyx     Exposed bone in nonhealing sacral ulcer, r/o osteo     Standing Status:   Future     Standing Expiration Date:   6/7/2025     Scheduling Instructions:      Bring along any outside films relating to this procedure

## 2021-06-07 NOTE — ASSESSMENT & PLAN NOTE
Wound base appears healthier today but there is a tunnel at 2:00 oclock that extends almost 5 cm and hits bone  Wound debrided as below  X-ray ordered today  Change wound management to AMD packing  Pressure relief  Control DM  Adequate protein intake, 3-4 servings per day  Followup in 2 weeks or call sooner with questions or concerns

## 2021-06-07 NOTE — PROGRESS NOTES
Patient ID: Marcela Beard is a 67 y o  female Date of Birth 1945     Chief Complaint  Chief Complaint   Patient presents with    Follow Up Wound Care Visit     Sacral wound  Allergies  Patient has no known allergies  Assessment:    Pressure ulcer of sacral region, stage 4 (HCC)  Wound base appears healthier today but there is a tunnel at 2:00 oclock that extends almost 5 cm and hits bone  Wound debrided as below  X-ray ordered today  Change wound management to AMD packing  Pressure relief  Control DM  Adequate protein intake, 3-4 servings per day  Followup in 2 weeks or call sooner with questions or concerns      Type 2 diabetes mellitus with hyperglycemia (HCC)    Lab Results   Component Value Date    HGBA1C 6 0 (H) 04/08/2021        Diagnoses and all orders for this visit:    Pressure ulcer of sacral region, stage 4 (HCC)  -     lidocaine (XYLOCAINE) 4 % topical solution 5 mL  -     Cancel: Wound cleansing and dressings; Future  -     Cancel: Wound off loading; Future  -     Cancel: Wound cleansing and dressings; Future  -     Wound off loading; Future  -     XR sacrum and coccyx; Future  -     Wound cleansing and dressings; Future  -     Debridement              Debridement   Wound 04/21/21 Pressure Injury Sacrum    Universal Protocol:  Consent: Verbal consent obtained  Consent given by: patient  Time out: Immediately prior to procedure a "time out" was called to verify the correct patient, procedure, equipment, support staff and site/side marked as required    Timeout called at: 6/7/2021 11:30 AM   Patient understanding: patient states understanding of the procedure being performed  Patient identity confirmed: verbally with patient      Performed by: physician  Debridement type: surgical  Level of debridement: subcutaneous tissue  Pain control: lidocaine 4%  Post-debridement measurements  Length (cm): 4  Width (cm): 5  Depth (cm): 2 3  Percent debrided: 100%  Surface Area (cm^2): 20  Area debrided (cm^2): 20  Volume (cm^3): 46  Tissue and other material debrided: subcutaneous tissue  Devitalized tissue debrided: exudate and slough  Instrument(s) utilized: curette  Bleeding: small  Hemostasis obtained with: pressure  Procedural pain (0-10): 0  Post-procedural pain: 0   Response to treatment: procedure was tolerated well          Plan:     Wound 04/21/21 Pressure Injury Sacrum (Active)   Wound Image Images linked 06/07/21 1207   Wound Description Yellow; Beefy red 06/07/21 1136   Pressure Injury Stage 4 06/07/21 1136   Cristal-wound Assessment Erythema 06/07/21 1136   Wound Length (cm) 4 cm 06/07/21 1136   Wound Width (cm) 5 cm 06/07/21 1136   Wound Depth (cm) 2 2 cm 06/07/21 1136   Wound Surface Area (cm^2) 20 cm^2 06/07/21 1136   Wound Volume (cm^3) 44 cm^3 06/07/21 1136   Calculated Wound Volume (cm^3) 44 cm^3 06/07/21 1136   Change in Wound Size % 50 06/07/21 1136   Tunneling 5 cm 06/07/21 1136   Tunneling in depth located at 2 06/07/21 1136   Drainage Amount Large 06/07/21 1136   Drainage Description Serosanguineous 06/07/21 1136   Non-staged Wound Description Full thickness 06/07/21 1136   Treatments Cleansed 06/07/21 1136       Wound 04/21/21 Pressure Injury Sacrum (Active)   Date First Assessed/Time First Assessed: 04/21/21 1028   Pre-Existing Wound: Yes  Primary Wound Type: Pressure Injury  Location: Sacrum       [REMOVED] Wound 04/08/21 Pressure Injury Sacrum Medial;Lower (Removed)   Resolved Date: 04/21/21  Date First Assessed/Time First Assessed: 04/08/21 2012   Pre-Existing Wound: Yes  Primary Wound Type: Pressure Injury  Location: Sacrum  Wound Location Orientation: Medial;Lower  Wound Description (Comments): Prexisting unstag           [REMOVED] Wound 04/09/21 Pressure Injury Buttocks Right (Removed)   Resolved Date: 04/21/21  Date First Assessed/Time First Assessed: 04/09/21 1600   Pre-Existing Wound: Yes  Primary Wound Type: Pressure Injury  Location: Buttocks  Wound Location Orientation: Right [REMOVED] Wound 05/03/21 Skin Tear Abrasion(s) Elbow Right (Removed)   Resolved Date/Resolved Time: 05/12/21 1714  Date First Assessed/Time First Assessed: 05/03/21 2000   Pre-Existing Wound: Yes  Primary Wound Type: Skin Tear  Traumatic Wound Type: Abrasion(s)  Location: Elbow  Wound Location Orientation: Right  Wound O  Subjective:        Patient presents for followup of a stage IV sacral pressure ulcer  Patient was hospitalized from 05/03/2021 until 05/13/2021 when she was sent to the ER after her last visit here due to excessive bleeding and weakness  Patient was evaluated by ID and general surgery during hospitalization  No OR debridement was needed during hospitalization but consideration of evaluation with plastic surgery as an outpatient was discussed with the patient and her son  Me salt has been used in the wound  Patient son who is present with her today is unsure if the facility has her on any type of specialty cushion and they are keeping her off of her back while in bed  The following portions of the patient's history were reviewed and updated as appropriate: She  has a past medical history of Acute renal failure (ARF) ([address] ), Acute respiratory failure with hypoxia ([address] ), Atrial fibrillation ([address] ), CHF (congestive heart failure) ([address] ), Chronic kidney disease, Diabetes mellitus ([address] ), Hyperlipidemia, Hypertension, and Stroke ([address] )    She   Patient Active Problem List    Diagnosis Date Noted    Other proteinuria 06/04/2021    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    Hypoalbuminemia 04/10/2021    Chronic indwelling Jimenez catheter 04/10/2021    Guaiac positive stools 04/09/2021    Acute on chronic diastolic (congestive) heart failure ([address] ) 04/08/2021    Chronic respiratory failure with hypoxia ([address] ) 04/08/2021    Confusion 03/10/2021    Rash 10/12/2020    Sepsis due to Streptococcus species ([address] ) 09/17/2020    C  difficile colitis 09/16/2020    Chronic diastolic heart failure ([facility] ) 08/31/2020    Stenosis of left carotid artery 08/30/2020    Bacteremia due to Enterococcus 08/28/2020    Iron deficiency anemia 08/28/2020    Paroxysmal atrial fibrillation ([facility] ) 08/26/2020    Stroke-like symptoms 08/25/2020    RORY (acute kidney injury) ([place] [address] ) 08/25/2020    Colon cancer screening 07/20/2020    Need for vaccination against Streptococcus pneumoniae using pneumococcal conjugate vaccine 13 07/20/2020    Pressure ulcer of sacral region, stage 4 ([place] [address] ) 07/20/2020    Cellulitis of right elbow 07/20/2020    Bilateral lower extremity edema 07/20/2020    History of vitamin D deficiency 07/20/2020    Herpes zoster without complication 60/75/9195    Diarrhea 06/08/2020    Distal radius fracture, right 05/27/2020    Constipation 05/26/2020    Acute respiratory failure with hypoxia ([place] [address] ) 05/22/2020    Closed comminuted intertrochanteric fracture of proximal femur, right, initial encounter ([place] [address] ) 05/20/2020    Symptomatic varicose veins of both lower extremities 06/04/2019    Chronic venous insufficiency 06/04/2019    Status post reverse arthroplasty of right shoulder 09/25/2018    Scalp hematoma 09/13/2018    ABLA (acute blood loss anemia) 09/12/2018    Type 2 diabetes mellitus with hyperglycemia ([place] [address] ) 09/10/2018    Hypertension 09/10/2018    Hyperlipidemia 09/10/2018    Stage 3a chronic kidney disease ([place] [address] ) 09/10/2018    Closed 4-part fracture of proximal humerus with routine healing 09/05/2018     She  reports that she has never smoked  She has never used smokeless tobacco  She reports that she does not drink alcohol or use drugs    Current Outpatient Medications   Medication Sig Dispense Refill    acetaminophen (TYLENOL) 325 mg tablet Take 650 mg by mouth every 4 (four) hours as needed for mild pain or fever      aspirin (ECOTRIN LOW STRENGTH) 81 mg EC tablet Take 1 tablet (81 mg total) by mouth daily 30 tablet 0    atorvastatin (LIPITOR) 40 mg tablet Take 1 tablet (40 mg total) by mouth daily with dinner 30 tablet 0    bisacodyl (DULCOLAX) 10 mg suppository Insert 10 mg into the rectum as needed for constipation constipation       ferrous sulfate 325 (65 Fe) mg tablet TAKE 1 TABLET BY MOUTH ONCE A DAY FOR SUPPLEMENT      gabapentin (NEURONTIN) 100 mg capsule Take 100 mg by mouth 3 (three) times a day      HumaLOG KwikPen 100 units/mL injection pen Inject under the skin       insulin glargine (LANTUS) 100 units/mL subcutaneous injection Inject 10 Units under the skin daily at bedtime 10 mL 0    metoprolol tartrate (LOPRESSOR) 25 mg tablet Take 25 mg by mouth every 12 (twelve) hours       mineral oil enema Insert 1 enema into the rectum once As needed for constipation unrelieved by suppository      mirtazapine (REMERON) 15 mg tablet Take 15 mg by mouth daily at bedtime      Nutritional Supplements (Yevgeniy) PACK Take by mouth      pantoprazole (PROTONIX) 40 mg tablet Take 40 mg by mouth 2 (two) times a day       polyethylene glycol (MIRALAX) 17 g packet Take 17 g by mouth daily 14 each 0    rivaroxaban (XARELTO) 15 mg tablet Take 1 tablet (15 mg total) by mouth daily with breakfast 90 tablet 3    Specialty Vitamins Products (PROSTATE PO) Take by mouth 2 (two) times a day      torsemide (DEMADEX) 20 mg tablet Take 1 tablet (20 mg total) by mouth daily 30 tablet 0    vancomycin (VANCOCIN) 50mg/mL SOLN Take 2 5 mL (125 mg total) by mouth every 12 (twelve) hours 40 mL 0    VITAMIN D PO Take 50,000 mg by mouth 2 (two) times a week Tuesday and Friday       No current facility-administered medications for this visit  She has No Known Allergies       Review of Systems   Constitutional: Negative for chills and fever  HENT: Negative for congestion and sneezing  Respiratory: Negative for cough  Musculoskeletal: Positive for gait problem  Skin: Positive for wound  Psychiatric/Behavioral: Negative for agitation           Objective:       Wound 04/21/21 Pressure Injury Sacrum (Active)   Wound Image Images linked 06/07/21 1207   Wound Description Yellow; Beefy red 06/07/21 1136   Pressure Injury Stage 4 06/07/21 1136   Cristal-wound Assessment Erythema 06/07/21 1136   Wound Length (cm) 4 cm 06/07/21 1136   Wound Width (cm) 5 cm 06/07/21 1136   Wound Depth (cm) 2 2 cm 06/07/21 1136   Wound Surface Area (cm^2) 20 cm^2 06/07/21 1136   Wound Volume (cm^3) 44 cm^3 06/07/21 1136   Calculated Wound Volume (cm^3) 44 cm^3 06/07/21 1136   Change in Wound Size % 50 06/07/21 1136   Tunneling 5 cm 06/07/21 1136   Tunneling in depth located at 2 06/07/21 1136   Drainage Amount Large 06/07/21 1136   Drainage Description Serosanguineous 06/07/21 1136   Non-staged Wound Description Full thickness 06/07/21 1136   Treatments Cleansed 06/07/21 1136       BP (!) 102/42 Comment: Recheck BP 98/40  Pulse 80   Temp (!) 97 2 °F (36 2 °C)   Resp 16     Physical Exam  Constitutional:       General: She is not in acute distress  Appearance: Normal appearance  She is not ill-appearing, toxic-appearing or diaphoretic  HENT:      Head: Normocephalic and atraumatic  Right Ear: External ear normal       Left Ear: External ear normal    Eyes:      Conjunctiva/sclera: Conjunctivae normal    Neck:      Musculoskeletal: Neck supple  Pulmonary:      Effort: Pulmonary effort is normal  No respiratory distress  Skin:     Comments: See wound assessment   Neurological:      Mental Status: She is alert  Gait: Gait abnormal    Psychiatric:         Mood and Affect: Mood normal          Behavior: Behavior normal            Wound 04/21/21 Pressure Injury Sacrum (Active)   Wound Image   06/07/21 1207   Wound Description Yellow; Beefy red 06/07/21 1136   Pressure Injury Stage 4 06/07/21 1136   Cristal-wound Assessment Erythema 06/07/21 1136   Wound Length (cm) 4 cm 06/07/21 1136   Wound Width (cm) 5 cm 06/07/21 1136   Wound Depth (cm) 2 2 cm 06/07/21 1136   Wound Surface Area (cm^2) 20 cm^2 06/07/21 1136   Wound Volume (cm^3) 44 cm^3 06/07/21 1136   Calculated Wound Volume (cm^3) 44 cm^3 06/07/21 1136   Change in Wound Size % 50 06/07/21 1136   Tunneling 5 cm 06/07/21 1136   Tunneling in depth located at 2 06/07/21 1136   Undermining 2 5 04/21/21 1029   Undermining is depth extending from 7 to 4 04/21/21 1029   Drainage Amount Large 06/07/21 1136   Drainage Description Serosanguineous 06/07/21 1136   Non-staged Wound Description Full thickness 06/07/21 1136   Treatments Cleansed 06/07/21 1136                         Wound Instructions:  Orders Placed This Encounter   Procedures    Wound off loading     Position patient every two hours, avoid pressure on wound  Pressure relieving mattress on bed     Standing Status:   Future     Standing Expiration Date:   6/7/2022    Wound cleansing and dressings     Sacral wound     Wash your hands with soap and water  Remove old dressing, discard into plastic bag and place in trash  Cleanse the wound with saline prior to applying a clean dressing  Do not use tissue or cotton balls  Do not scrub the wound  Pat dry using gauze  Shower no      Apply Zinc Oxide to surrounding skin  Lightly pack AMD packing into tunnel (at 2 o'clock) and wound  Cover with ABD pad  Secure with Med-Fix tape  Change dressing daily and as needed if soiled or lose integrity         Return in two weeks            Get X-ray as soon as possible  Monitor blood pressures  102/42 and 98/40 during visit  Standing Status:   Future     Standing Expiration Date:   6/7/2022    Debridement     This order was created via procedure documentation    XR sacrum and coccyx     Exposed bone in nonhealing sacral ulcer, r/o osteo     Standing Status:   Future     Standing Expiration Date:   6/7/2025     Scheduling Instructions:      Bring along any outside films relating to this procedure  Diagnosis ICD-10-CM Associated Orders   1   Pressure ulcer of sacral region, stage 4 (HCC)  [id number] [id number] lidocaine (XYLOCAINE) 4 % topical solution 5 mL     Wound off loading     XR sacrum and coccyx     Wound cleansing and dressings     Debridement

## 2021-06-08 NOTE — TELEPHONE ENCOUNTER
New patient consult as soon as possible for Chronic indwelling Jimenez catheter  This is hospital follow up being referred by [facility]   Violeta from [facility] can be reached at [phone] ext [phone]

## 2021-06-08 NOTE — PROGRESS NOTES
Chart review completed  Email sent to facility requesting update on patient  This care manager assistant will continue to monitor via chart review throughout bundle episode  This CM assistant received an email from [facility] with an update on the patient  The patient is being skilled for her sacral wound  She receives daily treatments  She is no longer receiving PT, OT, Or speech  This care manager assistant will continue to monitor via chart review throughout bundle episode

## 2021-06-16 NOTE — PROGRESS NOTES
06/16/21    Bang Parry   1945   [id number]     Assessment  1 Chronic trujillo    Discussion/Plan  1 Chronic trujillo   Exchange catheter every 4-6 weeks at nursing home   Hydrate with water 48 oz daily   PT/OT order   Discussed plan of care options at length with patient and her son  We discussed placement of SPT for long-term management of catheter  Patient is agreeable to SPT  Consult placed to IR for SPT  All questions answered at this time  Emotional support provided  Son will call with issues, concerns, or questions  Subjective  HPI   Bang Parry is a 67year old female who presents in consultation for management of chronic indwelling urinary catheter  She is accompanied by her son who is her POA  She comes from [facility]  Her son is visibly upset and very frustrated with the care his mother has received at [facility]  She has a stage 4 sacral wound with tunneling which is being treated at the wound center  She is bed-bound  Her son is unsure why she is here today  She is ultimately being referred to us for management of chronic indwelling trujillo catheter by Nephrology  Urine is cloudy yellow in trujillo catheter tubing and bag  It is unclear how long patient has had indwelling catheter  Patient denies fever, chills, dysuria, pain  She is positioned on her left side on the stretcher to off-load pressure from her sacrum  Her son is concerned that his mother is showing signs of dementia  She is a poor historian  She is incontinent at baseline and managed on diuretics  She has been hospitalized several times for sepsis     PMH: HTN, DM2, CKD3a, CHF, vitamin D deficiency, proteinuria, afib on Xarelto, falls, right femur fracture with right hip IM nailing 5/2020, JOLEEN, sepsis, chronic constipation,     Review of Systems - History obtained from chart review and the patient  General ROS: negative  Psychological ROS: negative  Ophthalmic ROS: negative  ENT ROS: negative  Allergy and Immunology ROS: negative  Hematological and Lymphatic ROS: negative  Endocrine ROS: negative  Breast ROS: negative  Respiratory ROS: no cough, shortness of breath, or wheezing  Cardiovascular ROS: no chest pain or dyspnea on exertion  Gastrointestinal ROS: no abdominal pain, change in bowel habits, or black or bloody stools  Genito-Urinary ROS: no dysuria, trouble voiding, or hematuria  Musculoskeletal ROS: negative  Neurological ROS: negative  Dermatological ROS: positive: wound      Objective  Physical Exam  Vitals and nursing note reviewed  Constitutional:       General: She is sleeping  She is not in acute distress  Appearance: Normal appearance  She is well-groomed  She is ill-appearing  She is not toxic-appearing or diaphoretic  HENT:      Head: Normocephalic and atraumatic  Pulmonary:      Effort: Pulmonary effort is normal  No respiratory distress  Musculoskeletal:         General: Normal range of motion  Cervical back: Normal range of motion  Skin:     General: Skin is warm and dry  Coloration: Skin is pale  Neurological:      General: No focal deficit present  Mental Status: She is oriented to person, place, and time and easily aroused  Mental status is at baseline  Psychiatric:         Mood and Affect: Mood normal  Affect is flat  Speech: Speech normal          Behavior: Behavior normal  Behavior is cooperative  Thought Content: Thought content normal          Cognition and Memory: Cognition is impaired  Memory is impaired           Judgment: Judgment normal                Paulie Fox

## 2021-06-21 NOTE — PROGRESS NOTES
Patient ID: Marcela Beard is a 67 y o  female Date of Birth 1945     Chief Complaint  Chief Complaint   Patient presents with    Follow Up Wound Care Visit     sacral wound       Allergies  Patient has no known allergies  Assessment:    Pressure ulcer of sacral region, stage 4 (HCC)  Wound is minimally changed in size but the periwound appears edematous and weeping  The tissue base of the wound is extremely friable  No debridement was performed today but patient required multiple silver nitrate sticks to cauterize bleeding areas  The x-ray was reviewed with the patient and her son today  Results were inconclusive and noted to have questionable subcutaneous gas  Followup CT was ordered today  Pressure relief  Adequate protein intake  Control DM  Consider referral to see ID and/or plastic surgery  Followup here in the wound management center in 3 weeks or call sooner with questions or concerns       Diagnoses and all orders for this visit:    Pressure ulcer of sacral region, stage 4 (HCC)  -     lidocaine (XYLOCAINE) 4 % topical solution 5 mL  -     Wound cleansing and dressings; Future  -     Wound off loading; Future  -     CT abdomen pelvis w wo contrast; Future  -     Chemical Caut Of A Wound                    Chemical Caut Of A Wound     Date/Time 6/21/2021 10:15 AM     Performed by  Kat Lindo DO     Authorized by Kat Lindo DO       Associated Wounds:   Wound 04/21/21 Pressure Injury Sacrum     Universal Protocol   Consent: Verbal consent obtained    Consent given by: patient  Timeout called at: 6/21/2021 10:15 AM   Patient understanding: patient states understanding of the procedure being performed  Patient identity confirmed: verbally with patient        Local anesthesia used: yes     Anesthesia   Local anesthesia used: yes  Local Anesthetic: topical anesthetic     Procedure Details   Procedure Notes: 2 sticks of silver nitrate used  Patient Transportation: confirmed  Patient tolerance: patient tolerated the procedure well with no immediate complications             Plan:     Wound 04/21/21 Pressure Injury Sacrum (Active)   Wound Image Images linked 06/21/21 1008   Wound Description Granulation tissue;Bleeding;Slough 06/21/21 1008   Pressure Injury Stage 4 06/21/21 1008   Cristal-wound Assessment Erythema; Excoriated 06/21/21 1008   Wound Length (cm) 4 5 cm 06/21/21 1008   Wound Width (cm) 5 cm 06/21/21 1008   Wound Depth (cm) 2 3 cm 06/21/21 1008   Wound Surface Area (cm^2) 22 5 cm^2 06/21/21 1008   Wound Volume (cm^3) 51 75 cm^3 06/21/21 1008   Calculated Wound Volume (cm^3) 51 75 cm^3 06/21/21 1008   Change in Wound Size % 41 19 06/21/21 1008   Undermining 5 06/21/21 1008   Undermining is depth extending from 12 to 4 06/21/21 1008   Drainage Amount Large 06/21/21 1008   Drainage Description Bloody; Serosanguineous 06/21/21 1008   Non-staged Wound Description Full thickness 06/21/21 1008   Dressing Status Intact 06/21/21 1008       Wound 04/21/21 Pressure Injury Sacrum (Active)   Date First Assessed/Time First Assessed: 04/21/21 1028   Pre-Existing Wound: Yes  Primary Wound Type: Pressure Injury  Location: Sacrum       [REMOVED] Wound 04/08/21 Pressure Injury Sacrum Medial;Lower (Removed)   Resolved Date: 04/21/21  Date First Assessed/Time First Assessed: 04/08/21 2012   Pre-Existing Wound: Yes  Primary Wound Type: Pressure Injury  Location: Sacrum  Wound Location Orientation: Medial;Lower  Wound Description (Comments): Prexisting unstag           [REMOVED] Wound 04/09/21 Pressure Injury Buttocks Right (Removed)   Resolved Date: 04/21/21  Date First Assessed/Time First Assessed: 04/09/21 1600   Pre-Existing Wound: Yes  Primary Wound Type: Pressure Injury  Location: Buttocks  Wound Location Orientation: Right       [REMOVED] Wound 05/03/21 Skin Tear Abrasion(s) Elbow Right (Removed)   Resolved Date/Resolved Time: 05/12/21 1714  Date First Assessed/Time First Assessed: 05/03/21 2000 Pre-Existing Wound: Yes  Primary Wound Type: Skin Tear  Traumatic Wound Type: Abrasion(s)  Location: Elbow  Wound Location Orientation: Right  Wound O  Subjective:        Patient presents for followup of stage IV pressure ulcer of the sacrum  No increased pain or drainage  Has been using AMD packing  An x-ray was ordered last visit 2 weeks ago due the presence of exposed bone      The following portions of the patient's history were reviewed and updated as appropriate: She  has a past medical history of Acute renal failure (ARF) ([facility] [address] ), Acute respiratory failure with hypoxia ([facility] [address] ), Atrial fibrillation ([facility] [address] ), CHF (congestive heart failure) ([facility] [address] ), Chronic kidney disease, Diabetes mellitus ([facility] ), Hyperlipidemia, Hypertension, and Stroke ([facility] )  She  reports that she has never smoked  She has never used smokeless tobacco  She reports that she does not drink alcohol and does not use drugs  She has No Known Allergies       Review of Systems   Constitutional: Negative for chills and fever  HENT: Negative for congestion and sneezing  Respiratory: Negative for cough  Musculoskeletal: Positive for gait problem  Skin: Positive for wound  Psychiatric/Behavioral: Negative for agitation  Objective:       Wound 04/21/21 Pressure Injury Sacrum (Active)   Wound Image Images linked 06/21/21 1008   Wound Description Granulation tissue;Bleeding;Slough 06/21/21 1008   Pressure Injury Stage 4 06/21/21 1008   Cristal-wound Assessment Erythema; Excoriated 06/21/21 1008   Wound Length (cm) 4 5 cm 06/21/21 1008   Wound Width (cm) 5 cm 06/21/21 1008   Wound Depth (cm) 2 3 cm 06/21/21 1008   Wound Surface Area (cm^2) 22 5 cm^2 06/21/21 1008   Wound Volume (cm^3) 51 75 cm^3 06/21/21 1008   Calculated Wound Volume (cm^3) 51 75 cm^3 06/21/21 1008   Change in Wound Size % 41 19 06/21/21 1008   Undermining 5 06/21/21 1008   Undermining is depth extending from 12 to 4 06/21/21 1008   Drainage Amount Large 06/21/21 1008 Drainage Description Bloody; Serosanguineous 06/21/21 1008   Non-staged Wound Description Full thickness 06/21/21 1008   Dressing Status Intact 06/21/21 1008       /56   Pulse 88   Temp 97 5 °F (36 4 °C)   Resp 16     Physical Exam  Constitutional:       General: She is not in acute distress  Appearance: Normal appearance  She is not ill-appearing, toxic-appearing or diaphoretic  HENT:      Head: Normocephalic and atraumatic  Right Ear: External ear normal       Left Ear: External ear normal    Eyes:      Conjunctiva/sclera: Conjunctivae normal    Pulmonary:      Effort: Pulmonary effort is normal  No respiratory distress  Musculoskeletal:      Cervical back: Neck supple  Skin:     Comments: See wound assessment   Neurological:      Mental Status: She is alert  Gait: Gait abnormal    Psychiatric:         Mood and Affect: Mood normal          Behavior: Behavior normal            Wound Instructions:  Orders Placed This Encounter   Procedures    Wound cleansing and dressings     Sacral wound     Wash your hands with soap and water  Remove old dressing, discard into plastic bag and place in trash  Cleanse the wound with saline prior to applying a clean dressing  Do not use tissue or cotton balls  Do not scrub the wound  Pat dry using gauze  Shower no      Apply Zinc Oxide to surrounding skin  Lightly pack AMD packing into tunnel (at 2 o'clock) and wound  Cover with Alginate and ABD pad  Secure with Med-Fix tape     Change dressing daily and as needed if soiled or lose integrity       Schedule CT Sacrum R/O Osteomyelitis     Return in three weeks     Standing Status:   Future     Standing Expiration Date:   6/21/2022    Wound off loading     Position patient every two hours, avoid pressure on wound  Pressure relieving mattress on bed     Standing Status:   Future     Standing Expiration Date:   6/21/2022    Chemical Caut Of A Wound     This order was created via procedure documentation    CT abdomen pelvis w wo contrast     Standing Status:   Future     Standing Expiration Date:   6/21/2025     Scheduling Instructions: For procedures with both oral (PO) and IV contrast:            The patient will need to drink barium for this test  Barium needs to be picked up in the registration area at least one day prior to your study  For out of network (non-[facility]) orders please bring your prescription when picking up oral contrast  For AM Appointments: Drink one bottle of barium before bed time the evening before your scheduled test   Drink 1/2 of the second bottle one hour prior to your test  Please bring other 1/2 bottle with you to drink at the time of your study  For PM Appointments: Drink one bottle of barium before 9:00am on the day of your test  Drink 1/2 of the second bottle one hour prior to your test  Please bring other 1/2 bottle with you to drink at the time of your study  Nothing to eat 3 hours prior to your test  In addition to the barium, clear liquids are also permitted up until the time of the scan  Clear liquids includes water, black coffee or tea, apple juice or clear broth  If possible wear clothing without any metal in the abdomen area  Sweat suit,       sports bra or bra without underwire may eliminate the need to change  Please bring your insurance cards, a form of photo ID and a list of your medications with you  Arrive 15 minutes prior to your appointment time in order to register  On the day of your test, please bring any prior CT or MRI studies of this area with you that were not performed at a [facility] facility  For procedures with ONLY IV contrast:            Nothing to eat 3 hours prior to your test  Clear liquids are permitted up until the time of the scan  Clear liquids includes water, black coffee or tea, apple juice or clear broth  If possible wear clothing without any metal in the abdomen area   Sweat suit, sports bra or bra without underwire may eliminate the need to change  Please bring your insurance cards, a form of photo ID and a list of your medications with you  Arrive 15 minutes prior to your appointment time in order to register  On the day of your test, please bring any prior CT or MRI studies of this area with you that were not performed at a [facility]  To schedule this appointment, please contact Central Scheduling at [phone]  Order Specific Question:   What is the patient's sedation requirement? Answer:   Unknown     Order Specific Question:   Contrast information:     Answer:   IV     Order Specific Question:   Did the patient ever have a reaction to x-ray dye? If yes, please verify the type of allergy and order the contrast allergy prep  Answer:   Unknown     Order Specific Question:   Release to patient through Mychart     Answer:   Immediate     Order Specific Question:   Reason for Exam (FREE TEXT)     Answer:   Nonhealing sacral pressure ulcer  r/o osteo, Xray questions subQ gas        Diagnosis ICD-10-CM Associated Orders   1   Pressure ulcer of sacral region, stage 4 ([facility])  L89 [id number] lidocaine (XYLOCAINE) 4 % topical solution 5 mL     Wound cleansing and dressings     Wound off loading     CT abdomen pelvis w wo contrast     Chemical Caut Of A Wound

## 2021-06-21 NOTE — PATIENT INSTRUCTIONS
Orders Placed This Encounter   Procedures    Wound cleansing and dressings     Sacral wound     Wash your hands with soap and water  Remove old dressing, discard into plastic bag and place in trash  Cleanse the wound with saline prior to applying a clean dressing  Do not use tissue or cotton balls  Do not scrub the wound  Pat dry using gauze  Shower no      Apply Zinc Oxide to surrounding skin  Lightly pack AMD packing into tunnel (at 2 o'clock) and wound  Cover with Alginate and ABD pad  Secure with Med-Fix tape     Change dressing daily and as needed if soiled or lose integrity       Schedule CT Sacrum R/O Osteomyelitis     Return in three weeks     Standing Status:   Future     Standing Expiration Date:   6/21/2022    Wound off loading     Position patient every two hours, avoid pressure on wound  Pressure relieving mattress on bed     Standing Status:   Future     Standing Expiration Date:   6/21/2022

## 2021-06-21 NOTE — PATIENT INSTRUCTIONS
Maintain a 2 gram daily sodium diet and 1500 ml daily fluid restriction  Check daily weights  If you gain 3 pounds in one day, 5 pounds in one week, or experience worsening shortness of breath or increasing lower leg swelling  Please call the heart failure office at [phone]    Please bring a  list of your current medications and daily weights to the office visit

## 2021-06-21 NOTE — PROGRESS NOTES
Cardiology Follow Up    Jack Cotto  1945  [id number]  [facility] [place]  [address] [address] [phone]    1  Paroxysmal atrial fibrillation (HCC)     2  Chronic diastolic congestive heart failure (HCC)  torsemide (DEMADEX) 20 mg tablet   3  Bilateral carotid artery stenosis  torsemide (DEMADEX) 20 mg tablet   4  Type 2 diabetes mellitus with hyperglycemia, with long-term current use of insulin (HCC)     5  Essential hypertension  torsemide (DEMADEX) 20 mg tablet   6  Hyperlipidemia, unspecified hyperlipidemia type          Interval History:   Ms Jack Cotto was admitted to [place] [address] on 4/08 - 4/13/21 with acute on chronic diastolic heart failure  She was sent to the emergency room due to outpatient abnormal lab studies  On admission hemoglobin 7 2  Guaiac stools positive  At the nursing facility  Admission NT proBNP 17 688, chest x-ray showed moderate pulmonary edema with right pleural effusion  TTE LVEF 65%, left atrium dilated, mild to moderate mitral valve regurgitation, moderate TR, moderate to severe pulmonary hypertension peak PA pressure 60 mmHg  She was diuresed with IV Lasix 40 mg b i d  and switch to Bumex 1 mg IV b i d   Cardiology recommending changing diuretic to torsemide at discharge  She was discharged back to [facility]  Discharged weight 158 pounds        Ms Jack Cotto was admitted to [facility] on 5/03 - 5/13/21 with acute metabolic encephalopathy  Vicky Sonny was   Sent to the emergency room with acute mental status change and lethargy  She was bleeding from her stage IV decubitus ulcer with acute blood loss anemia  Cultures were unrevealing  Wound Care  As well as surgery consulted  She required 1 unit of PRBC  Hemoglobin at discharge 8 3    On presentation lab study showed Yun creat 1 56, most likely cause and change in mental status  IV fluids were given and torsemide was held  ID was consulted, culture growth was felt to be a contaminant  Decision was made to 4 foci completely treat with IV me on biotic vancomycin to prevent C Diff, continue to May 20, 2021  Recommended following up with Wound Care multi disciplinary approach for wound healing wound care and possible referral to Plastic surgery  FU With primary team   Discharge weight 125 pounds  Discharge lab sodium 140 potassium 4 3 BUN 25 creatinine 1 0 GFR 55,  Hemoglobin 8 3 hematocrit 26     On 5/26/21 Ms Roopa Baker was seen in our office for a recent hospitalization follow up visit  She was accompanied by her son  Zi Joseph was  Lying on a stretcher on her right side  She is using oxygen at 2 L nasal cannula  Zi Joseph denied shortness of breath chest pain palpitations lightheadedness or dizziness  I have not received weights or blood lab studies from the facility  Zi Joseph informs me her weight today was 137 lb  She admits to a good appetite and is eating and drinking well  She is not able to get out of bed due to her wound she is not receiving physical therapy according to her son due to wound  No changes were made in her medical regimen  Ms Roopa Baker presents to our office for a follow up visit  She is accompanied by her son  According to paper work from [employer] experienced ORRY and Torsemide was stopped  She was treated with IVF  Zi Joseph continues to experience intermittent bleeding from her wound  She is off oxygen            HPI:  Resides at [facility]   Paroxysmal atrial fibrillation on Xarelto 15mg daily   Chronic HFpEF LVEF 65%  Chronic hypoxic respiratory failure using oxygen 2 L nasal cannular resoved     ICA stenosis Left >70%, Right <50%  DM2 HgbA1C 6 on 4/08/21  Hypertension  Hyperlipidemia  CKD III baseline craet 1- 1 11  Iron deficiency Anemia  5/2020 fall sustaining closed proximal right femur fracture  Hip fracture sp right hip IM nailing on  5/2020  Pressure ulcer stage 4   Indwelling trujillo catheter   Patient Active Problem List   Diagnosis    Closed 4-part fracture of proximal humerus with routine healing    Type 2 diabetes mellitus with hyperglycemia ([place] [address] )    Hypertension    Hyperlipidemia    Stage 3a chronic kidney disease (HCC)    ABLA (acute blood loss anemia)    Scalp hematoma    Status post reverse arthroplasty of right shoulder    Symptomatic varicose veins of both lower extremities    Chronic venous insufficiency    Closed comminuted intertrochanteric fracture of proximal femur, right, initial encounter ([place] [address] )    Acute respiratory failure with hypoxia (HCC)    Constipation    Distal radius fracture, right    Diarrhea    Herpes zoster without complication    Colon cancer screening    Need for vaccination against Streptococcus pneumoniae using pneumococcal conjugate vaccine 13    Pressure ulcer of sacral region, stage 4 (HCC)    Cellulitis of right elbow    Bilateral lower extremity edema    History of vitamin D deficiency    Stroke-like symptoms    RORY (acute kidney injury) ([place] [address] )    Paroxysmal atrial fibrillation (HCC)    Bacteremia due to Enterococcus    Iron deficiency anemia    Stenosis of left carotid artery    Chronic diastolic heart failure (HCC)    C  difficile colitis    Sepsis due to Streptococcus species ([facility])    Rash    Confusion    Acute on chronic diastolic (congestive) heart failure (HCC)    Chronic respiratory failure with hypoxia (HCC)    Guaiac positive stools    Hypoalbuminemia    Chronic indwelling Trujillo catheter    Other proteinuria    Low serum complement C3     Past Medical History:   Diagnosis Date    Acute renal failure (ARF) (HCC)     Acute respiratory failure with hypoxia (HCC)     Atrial fibrillation (HCC)     CHF (congestive heart failure) ([facility])     Chronic kidney disease     Diabetes mellitus ([place] [address] )     type 2    Hyperlipidemia     Hypertension     Stroke [facility])      Social History     Socioeconomic History    Marital status: Single     Spouse name: Not on file    Number of children: 2    Years of education: Not on file    Highest education level: Not on file   Occupational History    Not on file   Tobacco Use    Smoking status: Never Smoker    Smokeless tobacco: Never Used   Vaping Use    Vaping Use: Never used   Substance and Sexual Activity    Alcohol use: Never     Comment: quit     Drug use: No    Sexual activity: Not Currently   Other Topics Concern    Not on file   Social History Narrative    · Most recent tobacco use screenin2019      · Do you currently or have you served in [employer]:   No      · Were you activated, into active duty, as a member of the [employer] or as a Reservist:   No     As per [employer]     Financial Resource Strain: Low Risk     Difficulty of Paying Living Expenses: Not hard at all   Food Insecurity: No Food Insecurity    Worried About [address] in the Last Year: Never true    [address] in the Last Year: Never true   Transportation Needs: No Transportation Needs    Lack of Transportation (Medical): No    Lack of Transportation (Non-Medical): No   Physical Activity: Insufficiently Active    Days of Exercise per Week: 3 days    Minutes of Exercise per Session: 20 min   Stress: No Stress Concern Present    Feeling of Stress : Only a little   Social Connections: Socially Isolated    Frequency of Communication with Friends and Family: Twice a week    Frequency of Social Gatherings with Friends and Family: Once a week    Attends Jehovah's witness Services: Never    Active Member of Clubs or Organizations: No    Attends Club or Organization Meetings: Never    Marital Status: Never    Intimate Partner Violence: Not At Risk    Fear of Current or Ex-Partner: No    Emotionally Abused: No    Physically Abused: No    Sexually Abused:  No Family History   Problem Relation Age of Onset    Diabetes Mother     Varicose Veins Mother     Stroke Father     Arthritis Brother     Diabetes Daughter     No Known Problems Brother      Past Surgical History:   Procedure Laterality Date    BREAST SURGERY Left     lumpectomy benign    CATARACT EXTRACTION Bilateral 2017    CATARACT EXTRACTION W/ INTRAOCULAR LENS IMPLANT Left 12/11/2017    Procedure: EXTRACTION EXTRACAPSULAR CATARACT PHACO INTRAOCULAR LENS (IOL); Surgeon: Genevieve Rodriguez MD;  Location: [facility] MAIN OR;  Service: Ophthalmology    CT OPEN RX FEMUR FX+INTRAMED RAYMOND Right 5/21/2020    Procedure: INSERTION OF SHORT TROCHANTERIC FEMORAL NAIL;  Surgeon: Amy Rodriguez MD;  [facility] Main [facility];  Service: Orthopedics    [address] HUMERAL&GLENOID COMPNT Right 9/10/2018    Procedure: RIGHT REVERSE TOTAL SHOULDER ARTHROPLASTY;  Surgeon: Amy Rodriguez MD;  [facility] Main [facility];  Service: Orthopedics    CT XCAPSL CTRC RMVL INSJ IO LENS PROSTH W/O ECP Right 10/23/2017    Procedure: EXTRACTION EXTRACAPSULAR CATARACT PHACO INTRAOCULAR LENS (IOL); Surgeon: Genevieve Rodriguez MD;  Location: [facility] MAIN OR;  Service: Ophthalmology    WOUND DEBRIDEMENT N/A 8/26/2020    Procedure: EXCISIONAL DEBRIDEMENT OF SACRAL PRESSURE WOUND, WASHOUT;;  Surgeon: Xiang Viveros MD;  [facility] MAIN [facility];  Service: General    WOUND DEBRIDEMENT N/A 8/28/2020    Procedure: BUTTOCKS DEBRIDEMENT WOUND AND DRESSING CHANGE ([address] OUT);   Surgeon: Saloni Burger MD;  [facility] MAIN [facility];  Service: General       Current Outpatient Medications:     acetaminophen (TYLENOL) 325 mg tablet, Take 650 mg by mouth every 4 (four) hours as needed for mild pain or fever, Disp: , Rfl:     aspirin (ECOTRIN LOW STRENGTH) 81 mg EC tablet, Take 1 tablet (81 mg total) by mouth daily, Disp: 30 tablet, Rfl: 0    atorvastatin (LIPITOR) 40 mg tablet, Take 1 tablet (40 mg total) by mouth daily with dinner, Disp: 30 tablet, Rfl: 0   bisacodyl (DULCOLAX) 10 mg suppository, Insert 10 mg into the rectum as needed for constipation constipation , Disp: , Rfl:     ferrous sulfate 325 (65 Fe) mg tablet, TAKE 1 TABLET BY MOUTH ONCE A DAY FOR SUPPLEMENT, Disp: , Rfl:     gabapentin (NEURONTIN) 100 mg capsule, Take 100 mg by mouth 3 (three) times a day, Disp: , Rfl:     HumaLOG KwikPen 100 units/mL injection pen, Inject under the skin , Disp: , Rfl:     insulin glargine (LANTUS) 100 units/mL subcutaneous injection, Inject 10 Units under the skin daily at bedtime, Disp: 10 mL, Rfl: 0    metoprolol tartrate (LOPRESSOR) 25 mg tablet, Take 25 mg by mouth every 12 (twelve) hours , Disp: , Rfl:     mineral oil enema, Insert 1 enema into the rectum once As needed for constipation unrelieved by suppository, Disp: , Rfl:     mirtazapine (REMERON) 15 mg tablet, Take 15 mg by mouth daily at bedtime, Disp: , Rfl:     Nutritional Supplements (Yevgeniy) PACK, Take by mouth, Disp: , Rfl:     pantoprazole (PROTONIX) 40 mg tablet, Take 40 mg by mouth 2 (two) times a day , Disp: , Rfl:     polyethylene glycol (MIRALAX) 17 g packet, Take 17 g by mouth daily, Disp: 14 each, Rfl: 0    rivaroxaban (XARELTO) 15 mg tablet, Take 1 tablet (15 mg total) by mouth daily with breakfast, Disp: 90 tablet, Rfl: 3    Specialty Vitamins Products (PROSTATE PO), Take by mouth 2 (two) times a day, Disp: , Rfl:     torsemide (DEMADEX) 20 mg tablet, Take 1 tablet (20 mg total) by mouth daily, Disp: 30 tablet, Rfl: 0    vancomycin (VANCOCIN) 50mg/mL SOLN, Take 2 5 mL (125 mg total) by mouth every 12 (twelve) hours, Disp: 40 mL, Rfl: 0    VITAMIN D PO, Take 50,000 mg by mouth 2 (two) times a week Tuesday and Friday, Disp: , Rfl:   No current facility-administered medications for this visit  No Known Allergies    Labs:  No results displayed because visit has over 200 results  Imaging: No results found      Review of Systems:  Review of Systems   Constitutional:        Pain from sacral wound    Musculoskeletal: Positive for arthralgias and myalgias  Skin: Positive for wound  Sacral wound    All other systems reviewed and are negative  Physical Exam:  Physical Exam  Vitals reviewed  Constitutional:       Appearance: Normal appearance  HENT:      Head: Normocephalic  Eyes:      Pupils: Pupils are equal, round, and reactive to light  Cardiovascular:      Rate and Rhythm: Normal rate and regular rhythm  Pulses: Normal pulses  Heart sounds: Normal heart sounds  Pulmonary:      Effort: Pulmonary effort is normal       Breath sounds: Normal breath sounds  Abdominal:      General: Bowel sounds are normal       Palpations: Abdomen is soft  Musculoskeletal:         General: Normal range of motion  Cervical back: Normal range of motion  Right lower leg: No edema  Left lower leg: No edema  Skin:     General: Skin is warm and dry  Capillary Refill: Capillary refill takes less than 2 seconds  Neurological:      General: No focal deficit present  Mental Status: She is alert and oriented to person, place, and time  Psychiatric:         Mood and Affect: Mood normal          Behavior: Behavior normal          Discussion/Summary:  1  Paroxysmal atrial fibrillation on Xarelto 15mg daily  for stroke prevention,  Continue metoprolol tartrate  25 mg q 12 hours  2  Chronic HFpEF LVEF 65% NYHA class 3 stage C continue on metoprolol tartrate 25 mg q 12 hours,  No added salt diet torsemide 20 mg daily p r n  worsening shortness of breath   3  ICA stenosis Left >70%, Right <50%- continue on ASA 81mg daily,  Lipitor 40 mg daily  4  DM2 HgbA1C 6 on 4/08/21 on Lantus 10 units daily at bedtime Tradjenta 5 mg daily  5  Hypertension controlled on metoprolol tartrate 25 mg q 12 hours  6  Hyperlipidemia continue Lipitor 40 mg daily  7  CKD III baseline craet 1- 1 11  8  Chronic hypoxic respiratory failure using oxygen-  resolved   9   Iron deficiency Anemia continue  Ferrous sulfate 325 mg daily  10   Sacral wound stage IV  followed by the wound center

## 2021-06-22 NOTE — PROGRESS NOTES
Chart review completed  Email sent to facility requesting update on patient  This care manager assistant will continue to monitor via chart review throughout bundle episode  This CM Assistant received an update on the patient  The patient has a stage IV pressure Ulcer with moderate serosanguinous and exposed bone  Treatment as follows    cleanse sacral wound with nss pat dry  lightly pack with AMD packing into tunnel  (at 2 o'clock) and wound  cover with  alginate and ABD pad  secure with medfix  tape  apply zinc oxide to surrounding  skin  once a day and prn  every day shift for sacral wound    This care manager assistant will continue to monitor via chart review throughout bundle episode

## 2021-06-23 NOTE — ASSESSMENT & PLAN NOTE
Wound is minimally changed in size but the periwound appears edematous and weeping  The tissue base of the wound is extremely friable  No debridement was performed today but patient required multiple silver nitrate sticks to cauterize bleeding areas  The x-ray was reviewed with the patient and her son today  Results were inconclusive and noted to have questionable subcutaneous gas  Followup CT was ordered today  Pressure relief  Adequate protein intake  Control DM  Consider referral to see ID and/or plastic surgery    Followup here in the wound management center in 3 weeks or call sooner with questions or concerns

## 2021-06-29 NOTE — PROGRESS NOTES
Chart review completed  Email sent to facility requesting update on patient  This care manager assistant will continue to monitor via chart review throughout bundle episode  This CM assistant received an email with an update on the patient  The patient is receiving skilled services for her stage 4 sacral wound  This care manager assistant will continue to monitor via chart review throughout bundle episode

## 2021-06-29 NOTE — PROGRESS NOTES
sCr 1 58 with BUN > 100 as of BMP 6/28/21  B/l sCr 1  On torsemide 20mg daily as an outpatient  Recommend repeat BMP in 2 days and holding diuretics  Have messaged staff to determine if patient's BP low and/or patient not eating or drinking - in this case, the patient should have ER evaluation for IVF now

## 2021-07-01 NOTE — TELEPHONE ENCOUNTER
----- Message from Nancie Seals sent at 6/29/2021  1:43 PM EDT -----  Please contact Violeta at [facility] at [address]   ----- Message -----  From: Brendatim DO Angel  Sent: 6/29/2021   1:19 PM EDT  To: Nancie Seals    This patient's sCr as of 6/28 is up to 1 58 with baseline near 1  She is on torsemide 20mg daily per my recent note and BUN is now > 100  She is likely volume depleted  They should hold diuretics and recheck BMP in 2 days  Her labs clearly suggest volume depletion  If her labs do not improve on repeat testing, hospital admission would be most appropriate  Ensure the current dose of torsemide is 20mg daily  Ensure she is eating/drinking  If she is not eating/drinking and/BP low, she should go to the ER for IVF now and not wait for repeat BMP  Also, why are these labs from a lab in [facility]? Where is she residing and does she have a physician caring for her?   Sincerely,  Shira Buck, DO

## 2021-07-01 NOTE — TELEPHONE ENCOUNTER
Violeta returned phone call and transferred to charge nurse  Spoke with charge nurse and she has relayed the following:     Patient's torsemide had been d/c'd by their NP on 6/16  Patient seen cardiology on 6/21 and they had put patient back on torsemide 20 mg as PRN  PRN dose has not been used  Fluid restriction also d/c'd  Patient's food intake report from the last 3 days has averaged 50-75% meal completion  Nurse unable to find fluid intake but stated CNA's have reported patient drinks about 3 8oz cups per shift  BP reading today in the /64  Noted on 6/22 patient's lowest bp reported at 98/56  Hydration also given to patient on 6/28 (2lt saline)  Nurse has stated patient had BMP completed today  I did ask nurse to please fax results to our office once resulted  Made nurse aware I relay information to Dr Antelmo Monte as unaware torsemide had been d/c'd  Also made nurse aware if patient is not eating/drinking and bps run low patient is to go to ER for IVF       If calling this evening, nurse in charge is Maria T Holloway at:  [phone]

## 2021-07-01 NOTE — PROGRESS NOTES
Patient no longer on torsemide  Has been discontinued by facility  awaiting repeat BMP s/p IVF on 6/28  BMP drawn today, 7/1

## 2021-07-01 NOTE — TELEPHONE ENCOUNTER
Attempted to reach out to [facility] on 6/28 and again today  Unable to get in touch  Left voicemail asking for call back to discuss recommendations/results

## 2021-07-02 NOTE — TELEPHONE ENCOUNTER
----- Message from Zay Lacy DO sent at 7/2/2021  3:41 PM EDT -----  Patient should go to ER  Labs suggest possible volume depletion despite recent IVF outpatient  SCr up to 1 76 from 1 58    K 5 1  Thanks   ----- Message -----  From: Ronny Tapia  Sent: 7/2/2021  10:54 AM EDT  To:  Zay Lacy DO

## 2021-07-02 NOTE — TELEPHONE ENCOUNTER
Joshua Kelley called the office back  After discussion with NP at facility, NP will be ordering renal ultrasound STAT along with 1/2 normal saline x 2lt, clysis 80 ccs and hour and repeat BMP next week

## 2021-07-02 NOTE — TELEPHONE ENCOUNTER
Spoke with charge nurse at facility  Made aware lab results have been reviewed  Creatinine elevated from 1 58 to 1 76    K 5 1  Patient is to report to ER for possible volume depletion and further evaluation  Charge nurse will be sending patient out to ER at [facility]

## 2021-07-05 PROBLEM — N18.31 ACUTE RENAL FAILURE SUPERIMPOSED ON STAGE 3A CHRONIC KIDNEY DISEASE (HCC): Status: ACTIVE | Noted: 2021-01-01

## 2021-07-05 PROBLEM — M86.9 OSTEOMYELITIS (HCC): Status: ACTIVE | Noted: 2021-01-01

## 2021-07-05 PROBLEM — N17.9 ACUTE RENAL FAILURE SUPERIMPOSED ON STAGE 3A CHRONIC KIDNEY DISEASE (HCC): Status: ACTIVE | Noted: 2021-01-01

## 2021-07-05 PROBLEM — D64.9 ANEMIA: Status: ACTIVE | Noted: 2021-01-01

## 2021-07-05 PROBLEM — R82.71 BACTERIURIA: Status: ACTIVE | Noted: 2021-01-01

## 2021-07-05 NOTE — H&P (VIEW-ONLY)
Consultation - [facility]     Isreal Coleman 67 y o  female MRN: [id number]  Unit/Bed#: -01 Encounter: [id number]    Consults    ASSESSMENT   1  Anemia--multifactorial   Appears to have chronic anemia with hemoglobins in the 7 g range baseline by records but presented with black melanotic appearing stool and hemoglobin of 5 8--a hemoglobin after 1 unit 7 1  -no vomiting and hematemesis or red blood per rectum  -examination is stool reveals a greenish black somewhat solid stool consistent with iron ingestion-some melanotic component  -differential could include peptic ulcer disease, arterial venous malformations, gastritis or GERD  -patient without history of peptic ulcer disease  Son reports that she probably had a colonoscopy over 10 years ago and may have had polyps    2  Chronic oral anticoagulation on Xarelto for history of AFib and CHF  -  3  Sacral decubitus-with osteomyelitis-and large wound does following wound center  -consideration for diverting colostomy for wound care    4 Senile dementia Alzheimer's type    5  Acute kidney injury-on top of chronic kidney disease renal follow-up probable element of dehydration    Plan   1  Protonix 40 mg IV q 12  2  Monitor H&H transfuse for any hemoglobin dropped below 7  3  Hold Xarelto  4  Possible EGD July 7, 2021 after washout of Xarelto  -discussed with the patient's son Lizabeth Calhoun             Chief Complaint   Patient presents with    Abnormal Lab     nursing facility reports abnormal WBC, Hgb, and potassium  Physician Requesting Consult: Denis Hood MD    HPI  Isreal Coleman is a 67y o  year old female is referred into the ED with abnormal laboratory studies and perhaps some confusion  Patient offers no specific GI complaints   She reports that perhaps she may have occasional diarrhea  Presently she denies any abdominal pain nausea vomiting stool is described as melanotic in the ED    He has reviewed the patient's son and patient has no history of major GI issues including GERD or peptic ulcer disease  She may have had a colonoscopy in the remote past and had polyps  This was well over 10 years ago  In reviewing records patient has had somewhat low hemoglobin over the last several months in the 7-8 g range  Admission hemoglobin was 5 8 and she received 1 unit of packed red blood cells    Historical Information   Past Medical History:   Diagnosis Date    Acute [facility] failure [facility]F) ([facility] )     Acute respiratory failure with hypoxia (HCC)     Anemia     Atrial fibrillation (HCC)     CHF (congestive heart failure) (HCC)     Chronic kidney disease     Diabetes mellitus ([place] [address] )     type 2    Hyperlipidemia     Hypertension     Stroke [facility])      Past Surgical History:   Procedure Laterality Date    BREAST SURGERY Left     lumpectomy benign    CATARACT EXTRACTION Bilateral 2017    CATARACT EXTRACTION W/ INTRAOCULAR LENS IMPLANT Left 12/11/2017    Procedure: EXTRACTION EXTRACAPSULAR CATARACT PHACO INTRAOCULAR LENS (IOL); Surgeon: Mali Lynch MD;  Location: [facility] MAIN OR;  Service: Ophthalmology    [place] OPEN RX FEMUR FX+INTRAMED RAYMOND Right 5/21/2020    Procedure: INSERTION OF SHORT TROCHANTERIC FEMORAL NAIL;  Surgeon: Alexy Weston MD;  Location[facility] Main OR;  Service: Orthopedics    [address] HUMERAL&GLENOID COMPNT Right 9/10/2018    Procedure: RIGHT REVERSE TOTAL SHOULDER ARTHROPLASTY;  Surgeon: Alexy Weston MD;  Location: [facility] Main OR;  Service: Orthopedics    [place] XCAPSL CTRC RMVL INSJ IO LENS PROSTH W/O ECP Right 10/23/2017    Procedure: EXTRACTION EXTRACAPSULAR CATARACT PHACO INTRAOCULAR LENS (IOL);   Surgeon: Mali Lynch MD;  Location: [facility] MAIN OR;  Service: Ophthalmology    WOUND DEBRIDEMENT N/A 8/26/2020    Procedure: EXCISIONAL DEBRIDEMENT OF SACRAL PRESSURE WOUND, WASHOUT;;  Surgeon: Helen Amos MD;  Location:  MAIN OR;  Service: General    WOUND DEBRIDEMENT N/A 8/28/2020    Procedure: BUTTOCKS DEBRIDEMENT WOUND AND DRESSING CHANGE [facility]);   Surgeon: Chadd Crawford MD;  Location: [facility] MAIN OR;  Service: General     Social History   Social History     Substance and Sexual Activity   Alcohol Use Never    Comment: quit 8/17     Social History     Substance and Sexual Activity   Drug Use No     Social History     Tobacco Use   Smoking Status Never Smoker   Smokeless Tobacco Never Used     Family History   Problem Relation Age of Onset    Diabetes Mother     Varicose Veins Mother     Stroke Father     Arthritis Brother     Diabetes Daughter     No Known Problems Brother        Meds/Allergies     Current Facility-Administered Medications   Medication Dose Route Frequency    acetaminophen (TYLENOL) tablet 650 mg  650 mg Oral Q6H PRN    atorvastatin (LIPITOR) tablet 40 mg  40 mg Oral Daily With Dinner    [START ON 7/6/2021] cefepime (MAXIPIME) IVPB (premix in dextrose) 1,000 mg 50 mL  1,000 mg Intravenous Q24H    gabapentin (NEURONTIN) capsule 100 mg  100 mg Oral TID    insulin glargine (LANTUS) subcutaneous injection 10 Units 0 1 mL  10 Units Subcutaneous HS    insulin lispro (HumaLOG) 100 units/mL subcutaneous injection 1-5 Units  1-5 Units Subcutaneous TID AC    insulin lispro (HumaLOG) 100 units/mL subcutaneous injection 1-5 Units  1-5 Units Subcutaneous HS    metoprolol tartrate (LOPRESSOR) tablet 25 mg  25 mg Oral Q12H [address]    mirtazapine (REMERON) tablet 15 mg  15 mg Oral HS    pantoprazole (PROTONIX) injection 40 mg  40 mg Intravenous Q12H [address]    sodium chloride 0 9 % infusion  50 mL/hr Intravenous Continuous    [START ON 7/6/2021] vancomycin (VANCOCIN) 500 mg in sodium chloride 0 9% 100 mL IVPB  10 mg/kg Intravenous Q24H     Medications Prior to Admission   Medication    acetaminophen (TYLENOL) 325 mg tablet    aspirin (ECOTRIN LOW STRENGTH) 81 mg EC tablet    atorvastatin (LIPITOR) 40 mg tablet    ferrous sulfate 325 (65 Fe) mg tablet    gabapentin (NEURONTIN) 100 mg capsule    HumaLOG KwikPen 100 units/mL injection pen    insulin glargine (LANTUS) 100 units/mL subcutaneous injection    insulin lispro (HumaLOG) 100 units/mL injection    linaGLIPtin (Tradjenta) 5 MG TABS    metoprolol tartrate (LOPRESSOR) 25 mg tablet    mirtazapine (REMERON) 15 mg tablet    Nutritional Supplements (Yevgeniy) PACK    pantoprazole (PROTONIX) 40 mg tablet    rivaroxaban (XARELTO) 15 mg tablet    VITAMIN D PO    bisacodyl (DULCOLAX) 10 mg suppository    mineral oil enema    polyethylene glycol (MIRALAX) 17 g packet    Specialty Vitamins Products (PROSTATE PO)    vancomycin (VANCOCIN) 50mg/mL SOLN       No Known Allergies    PHYSICAL EXAM    Blood pressure 98/68, pulse 89, temperature 98 6 °F (37 °C), resp  rate 19, height 5' 6" (1 676 m), weight 72 5 kg (159 lb 13 3 oz), SpO2 99 %  Body mass index is 25 8 kg/m²  General Appearance: NAD, cooperative, alert--somewhat confused  Eyes: Anicteric conjunctiva past  ENT:  Normocephalic, atraumatic, membranes are dry    Neck:  Supple, symmetrical, trachea midline  Resp:  Clear to auscultation bilaterally; no rales, rhonchi or wheezing; respirations unlabored   CV:  S1 S2, Regular rate and rhythm; no murmur, rub, or gallop  GI:  Soft, non-tender, non-distended; normal bowel sounds; no masses, no organomegaly   Rectal:  Suspected perianal area pasty black stool  Musculoskeletal: No cyanosis, clubbing or edema     Skin:  No jaundice, rashes, or lesions --sacral area bandaged  Heme/Lymph: No palpable cervical lymphadenopathy  Psych: Normal affect, good eye contact  Neuro: No gross deficits, somewhat confused    Lab Results   Component Value Date    GLUCOSE 258 (H) 05/03/2021    CALCIUM 9 1 07/05/2021    K 4 9 07/05/2021    CO2 25 07/05/2021     07/05/2021     (H) 07/05/2021    CREATININE 1 98 (H) 07/05/2021     Lab Results   Component Value Date    WBC 12 67 (H) 07/04/2021    HGB 7 1 (L) 07/05/2021    HCT 23 7 (L) 07/05/2021    MCV 92 07/04/2021     07/04/2021     Lab Results   Component Value Date    ALT 13 07/05/2021    AST 13 07/05/2021    ALKPHOS 85 07/05/2021     No results found for: AMYLASE  Lab Results   Component Value Date    LIPASE 58 (L) 07/04/2021     Lab Results   Component Value Date    IRON 28 (L) 07/04/2021    TIBC 171 (L) 07/04/2021    FERRITIN 263 07/04/2021     Lab Results   Component Value Date    INR 1 37 (H) 07/05/2021       Imaging Studies: I have personally reviewed pertinent reports  EKG, Pathology, and Other Studies: I have personally reviewed pertinent reports        REVIEW OF SYSTEMS:  Allergies and medications reviewed  GI please see above in HPI  Musculoskeletal patient complains of leg pain  Note patient cannot give too much of a further review of systems secondary to confusion

## 2021-07-05 NOTE — ASSESSMENT & PLAN NOTE
Lab Results   Component Value Date    HGBA1C 7 0 06/15/2021       No results for input(s): POCGLU in the last 72 hours      Blood Sugar Average: Last 72 hrs:  ·  home regimen:  Lantus 14 units HS and and NovoLog 9 units t i d   · Place on SSI and Lantus 10 units HS as patient is clear liquid diet

## 2021-07-05 NOTE — ASSESSMENT & PLAN NOTE
Lab Results   Component Value Date    EGFR 21 07/04/2021    EGFR 28 07/01/2021    EGFR 55 05/13/2021    CREATININE 2 25 (H) 07/04/2021    CREATININE 1 76 07/01/2021    CREATININE 1 00 05/13/2021   · Cr at 2 25 on admission s/p 2L of 1/2 NS over the last 2 days at [facility] from 1 76 on 7/1   · CT without hydro  · Unclear etiology ?hypovolemia in setting of anemia   · Nephrology consult   · Will increase metoprolol hold parameters

## 2021-07-05 NOTE — CONSULTS
Consultation - Infectious Disease   Aria Pham 67 y o  female MRN: [id number]  Unit/Bed#: -01 Encounter: [id number]      Assessment/Plan     1  Chronic osteomyelitis:  No real change since last admission, other than progression which would not be unexpected without definitive rx, and h/o recurrent stool contaminiation  There is no role for long-term IV antibiotics in setting of exposed bone       In the future under the care of a multidiciplinary team, patient would require a colostomy, large debridement followed by IV abx and flap  Not clear she is a candidate for this  No antibiotic needed for this indication at this time  ? Hospice  For now would await blood cultures if negative, I would d/c abx  History of Present Illness   Physician Requesting Consult: Sai Buitrago MD  Reason for Consult / Principal Problem: sacral decub    HPI: Aria Pham is a 67y o  year old female with H/O CRI, AFIB, HTN, Sacral decub, dementia, chronic trujillo, cdiff  Patient sent to hospital from [facility] [place] for anemia  Patient's large sacral decub with known osteomyelitis was noted by admitting team a superficial culture was taken and patient started on IV vanco and cefepime  She had no fever  WBC 12,000 no L shift  She had a + UA which is to be expected given her chronic trujillo  She is unable to provide any history, but in review of the chart it appears that the patient has been going wound care and receiving local debridements  Last hospitalization in May, she was noted to have osteo of her sacrum  Surgery did not plan on debriding  She was to follow-up with outpt wound care  Cultures at that time were also superficial and significant for enterococcus and pseudomonas (R to cefepime)  Patient has no fevers, no chills, no n/v/d  Nursing notes, she is incontinent of stool and wound is often covered with stool       Inpatient consult to Infectious Diseases  Consult performed by: Luis Christensen MD  Consult ordered by: Ping Turner PA-C          ROS: 12 systems reviewed, remainder is neg  Historical Information   Past Medical History:   Diagnosis Date    [facility] )     Acute respiratory failure with hypoxia (HCC)     Anemia     Atrial fibrillation (HCC)     CHF (congestive heart failure) (HCC)     Chronic kidney disease     Diabetes mellitus ([facility] )     type 2    Hyperlipidemia     Hypertension     Stroke [facility])      Past Surgical History:   Procedure Laterality Date    BREAST SURGERY Left     lumpectomy benign    CATARACT EXTRACTION Bilateral 2017    CATARACT EXTRACTION W/ INTRAOCULAR LENS IMPLANT Left 12/11/2017    Procedure: EXTRACTION EXTRACAPSULAR CATARACT PHACO INTRAOCULAR LENS (IOL); Surgeon: Patsy Carpio MD;  Location: [facility] MAIN OR;  Service: Ophthalmology    [place] OPEN RX FEMUR FX+INTRAMED RAYMOND Right 5/21/2020    Procedure: INSERTION OF SHORT TROCHANTERIC FEMORAL NAIL;  Surgeon: Shannan Brody MD;  [facility];  Service: Orthopedics    [address] HUMERAL&GLENOID COMPNT Right 9/10/2018    Procedure: RIGHT REVERSE TOTAL SHOULDER ARTHROPLASTY;  Surgeon: Shannan Brody MD;  [facility];  Service: Orthopedics    [place] XCAPSL CTRC RMVL INSJ IO LENS PROSTH W/O ECP Right 10/23/2017    Procedure: EXTRACTION EXTRACAPSULAR CATARACT PHACO INTRAOCULAR LENS (IOL); Surgeon: Patsy Carpio MD;  Location: [facility] MAIN OR;  Service: Ophthalmology    WOUND DEBRIDEMENT N/A 8/26/2020    Procedure: EXCISIONAL DEBRIDEMENT OF SACRAL PRESSURE WOUND, WASHOUT;;  Surgeon: Kika Jo MD;  [facility] MAIN OR;  Service: General    WOUND DEBRIDEMENT N/A 8/28/2020    Procedure: BUTTOCKS DEBRIDEMENT WOUND AND DRESSING CHANGE ([address]);   Surgeon: Watson Ocampo MD;  [facility] MAIN [facility];  Service: General     Social History   Social History     Substance and Sexual Activity   Alcohol Use Never    Comment: quit 8/17     Social History     Substance and Sexual Activity   Drug Use No     Social History     Tobacco Use   Smoking Status Never Smoker   Smokeless Tobacco Never Used     Family History   Problem Relation Age of Onset    Diabetes Mother     Varicose Veins Mother     Stroke Father     Arthritis Brother     Diabetes Daughter     No Known Problems Brother        Meds/Allergies   MEDS: reviewed      Current Facility-Administered Medications:     acetaminophen (TYLENOL) tablet 650 mg, 650 mg, Oral, Q6H PRN, Caty Steiner PA-C    atorvastatin (LIPITOR) tablet 40 mg, 40 mg, Oral, Daily With Hao Akins PA-C    [START ON 7/6/2021] cefepime (MAXIPIME) IVPB (premix in dextrose) 1,000 mg 50 mL, 1,000 mg, Intravenous, Q24H, Caty Steiner PA-C    gabapentin (NEURONTIN) capsule 100 mg, 100 mg, Oral, TID, Caty Steiner PA-C, 100 mg at 07/05/21 2533    insulin glargine (LANTUS) subcutaneous injection 10 Units 0 1 mL, 10 Units, Subcutaneous, HS, Caty Steiner PA-C    insulin lispro (HumaLOG) 100 units/mL subcutaneous injection 1-5 Units, 1-5 Units, Subcutaneous, TID AC **AND** Fingerstick Glucose (POCT), , , TID AC, Caty Steiner PA-C    insulin lispro (HumaLOG) 100 units/mL subcutaneous injection 1-5 Units, 1-5 Units, Subcutaneous, HS, Caty Steiner PA-C    metoprolol tartrate (LOPRESSOR) tablet 25 mg, 25 mg, Oral, Q12H [address], Caty Steiner PA-C    mirtazapine (REMERON) tablet 15 mg, 15 mg, Oral, HS, Caty Steiner PA-C    pantoprazole (PROTONIX) injection 40 mg, 40 mg, Intravenous, Q12H, Caty Steiner PA-C    sodium chloride 0 9 % infusion, 50 mL/hr, Intravenous, Continuous, Nacho Bateman MD, Last Rate: 50 mL/hr at 07/05/21 0938, 50 mL/hr at 07/05/21 0938    [START ON 7/6/2021] vancomycin (VANCOCIN) 500 mg in sodium chloride 0 9% 100 mL IVPB, 10 mg/kg, Intravenous, Q24H, Caty Steiner PA-C    No Known Allergies      Intake/Output Summary (Last 24 hours) at 7/5/2021 1052  Last data filed at 7/5/2021 0501  Gross per 24 hour   Intake 800 ml   Output 400 ml   Net 400 ml       PE:   GEN: nad  VSS, Tmax: 98 1  HEENT: anicteric, oral mucosa is dry  NECK: supple no adenopathy  CARDIAC: rrr s1s2  LUNGS:  Decreased   ABDOMEN: soft, nt/nd + BS  EXTREMITIES: no edema  SKIN: no rash, wound about 10 cm in circumference, edges are macerated, wound bed is clean but appears to tunnel   NEURO: moves all 4 extremities, follows commands  PSYCH: nl affect  : + chronic trujillo  JOINTS: full ROM without erythema or edema  Invasive Devices:   Peripheral IV 07/04/21 Left Forearm (Active)   Site Assessment WDL; Clean;Dry; Intact 07/05/21 0845   Dressing Type Transparent 07/05/21 0845   Line Status Flushed 07/05/21 0845   Dressing Status Intact 07/05/21 0845       Peripheral IV Right Wrist (Active)   Site Assessment WDL; Clean;Dry; Intact 07/05/21 0845   Dressing Type Transparent 07/05/21 0845   Line Status Flushed 07/05/21 0845   Dressing Status Intact 07/05/21 0845       Peripheral IV 07/05/21 Dorsal (posterior); Left Hand (Active)   Site Assessment WDL; Clean;Dry; Intact 07/05/21 0845   Dressing Type Transparent 07/05/21 0845   Line Status Flushed 07/05/21 0845   Dressing Status Intact 07/05/21 0845       Urethral Catheter 16 Fr   (Active)   Reasons to continue Urinary Catheter  Stage III/IV sacral ulcers or open perineal wounds in incontinent patients 07/05/21 0315   Site Assessment Clean;Skin intact 07/05/21 0315   Trujillo Care Done 07/05/21 0315   Collection Container Standard drainage bag 07/05/21 0315   Output (mL) 400 mL 07/05/21 0501           Lab Results:   Admission on 07/04/2021   Component Date Value    WBC 07/04/2021 12 67*    RBC 07/04/2021 2 09*    Hemoglobin 07/04/2021 5 9*    Hematocrit 07/04/2021 19 3*    MCV 07/04/2021 92     MCH 07/04/2021 28 2     MCHC 07/04/2021 30 6*    RDW 07/04/2021 14 8     MPV 07/04/2021 11 8     Platelets 48/20/2218 260     nRBC 07/04/2021 0     Neutrophils Relative 07/04/2021 79*    Immat GRANS % 07/04/2021 1     Lymphocytes Relative 07/04/2021 16     Monocytes Relative 07/04/2021 3*    Eosinophils Relative 07/04/2021 1     Basophils Relative 07/04/2021 0     Neutrophils Absolute 07/04/2021 10 08*    Immature Grans Absolute 07/04/2021 0 06     Lymphocytes Absolute 07/04/2021 1 98     Monocytes Absolute 07/04/2021 0 36     Eosinophils Absolute 07/04/2021 0 15     Basophils Absolute 07/04/2021 0 04     Sodium 07/04/2021 139     Potassium 07/04/2021 5 4*    Chloride 07/04/2021 106     CO2 07/04/2021 25     ANION GAP 07/04/2021 8     BUN 07/04/2021 109*    Creatinine 07/04/2021 2 25*    Glucose 07/04/2021 144*    Calcium 07/04/2021 8 9     Corrected Calcium 07/04/2021 10 7*    AST 07/04/2021 24     ALT 07/04/2021 12     Alkaline Phosphatase 07/04/2021 94     Total Protein 07/04/2021 6 1*    Albumin 07/04/2021 1 8*    Total Bilirubin 07/04/2021 0 40     eGFR 07/04/2021 21     LACTIC ACID 07/04/2021 1 0     Procalcitonin 07/04/2021 0 25     Protime 07/04/2021 19 7*    INR 07/04/2021 1 68*    PTT 07/04/2021 52*    Blood Culture 07/04/2021 Received in Microbiology Lab  Culture in Progress   Blood Culture 07/04/2021 Received in Microbiology Lab  Culture in Progress       Color, UA 07/04/2021 Yellow     Clarity, UA 07/04/2021 Cloudy     Specific Gravity, UA 07/04/2021 1 010     pH, UA 07/04/2021 >=9 0*    Leukocytes, UA 07/04/2021 Large*    Nitrite, UA 07/04/2021 Negative     Protein, UA 07/04/2021 Trace*    Glucose, UA 07/04/2021 Negative     Ketones, UA 07/04/2021 Negative     Urobilinogen, UA 07/04/2021 0 2     Bilirubin, UA 07/04/2021 Negative     Blood, UA 07/04/2021 Small*    NT-proBNP 07/04/2021 6,405*    Lipase 07/04/2021 58*    ABO Grouping 07/04/2021 B     Rh Factor 07/04/2021 Positive     Antibody Screen 07/04/2021 Negative     Specimen Expiration Date 07/04/2021 [id number]     SARS-CoV-2 07/04/2021 Negative     Troponin I 07/04/2021 0 07*    RBC, UA 07/04/2021 4-10*    WBC, UA 07/04/2021 10-20*    Epithelial Cells 07/04/2021 None Seen     Bacteria, UA 07/04/2021 Innumerable*    Ca Oxalate Ann, UA 07/04/2021 Occasional*    Unit Product Code 07/05/2021 [id number]     Unit Number 07/05/2021 [id number]     Unit ABO 07/05/2021 B     Unit RH 07/05/2021 POS     Crossmatch 07/05/2021 Compatible     Unit Dispense Status 07/05/2021 Crossmatched     Unit Product Code 07/05/2021 [id number]     Unit Number 07/05/2021 [id number]     Unit ABO 07/05/2021 B     Unit RH 07/05/2021 POS     Crossmatch 07/05/2021 Compatible     Unit Dispense Status 07/05/2021 Presumed Trans     Hemoglobin 07/05/2021 7 6*    Hematocrit 07/05/2021 24 3*    Sodium 07/05/2021 140     Potassium 07/05/2021 4 9     Chloride 07/05/2021 108     CO2 07/05/2021 25     ANION GAP 07/05/2021 7     BUN 07/05/2021 102*    Creatinine 07/05/2021 1 98*    Glucose 07/05/2021 83     Calcium 07/05/2021 9 1     Corrected Calcium 07/05/2021 10 8*    AST 07/05/2021 13     ALT 07/05/2021 13     Alkaline Phosphatase 07/05/2021 85     Total Protein 07/05/2021 6 3*    Albumin 07/05/2021 1 9*    Total Bilirubin 07/05/2021 0 60     eGFR 07/05/2021 24     Protime 07/05/2021 16 9*    INR 07/05/2021 1 37*    PTT 07/05/2021 47*    Troponin I 07/05/2021 0 06*    Troponin I 07/05/2021 0 07*    POC Glucose 07/05/2021 70      Imaging Studies: I have personally reviewed pertinent films in PACS  EKG, Pathology, and Other Studies: I have personally reviewed pertinent reports  Culture  Lab Results   Component Value Date    BLOODCX Received in Microbiology Lab  Culture in Progress  07/04/2021    BLOODCX Received in Microbiology Lab  Culture in Progress  07/04/2021    BLOODCX No Growth After 5 Days  05/10/2021    BLOODCX No Growth After 5 Days  05/10/2021    BLOODCX No Growth After 5 Days  09/16/2020    BLOODCX No Growth After 5 Days   09/16/2020    BLOODCX No Growth After 5 Days  09/14/2020    BLOODCX Staphylococcus coagulase negative (A) 09/14/2020    BLOODCX No Growth After 5 Days  08/28/2020    BLOODCX No Growth After 5 Days   08/28/2020    BLOODCX Enterococcus faecalis (A) 08/26/2020    BLOODCX Streptococcus constellatus (A) 08/26/2020    BLOODCX Streptococcus constellatus (A) 08/26/2020    BLOODCX Staphylococcus coagulase negative (A) 08/26/2020    BLOODCX Bacteroides fragilis (A) 08/26/2020     Lab Results   Component Value Date    WOUNDCULT 3+ Growth of Pseudomonas aeruginosa MDR (A) 05/10/2021    WOUNDCULT 2+ Growth of Enterococcus faecalis (A) 05/10/2021    WOUNDCULT 2+ Growth of Diphtheroids 05/10/2021    WOUNDCULT 3+ Growth of Escherichia coli (A) 08/26/2020    WOUNDCULT 3+ Growth of Proteus mirabilis (A) 08/26/2020    WOUNDCULT 3+ Growth of Enterococcus faecalis (A) 08/26/2020    WOUNDCULT 3+ Growth of  08/26/2020    WOUNDCULT 2+ Growth of Escherichia coli (A) 08/26/2020    WOUNDCULT 2+ Growth of Proteus mirabilis (A) 08/26/2020    WOUNDCULT 3+ Growth of Enterococcus species (A) 08/26/2020    WOUNDCULT 3+ Growth of  08/26/2020     Lab Results   Component Value Date    URINECX >100,000 cfu/ml  05/03/2021    URINECX 30,000-39,000 cfu/ml Candida glabrata (A) 09/14/2020    URINECX 50,000-59,000 cfu/ml Escherichia coli (A) 08/26/2020    URINECX >100,000 cfu/ml Lactobacillus species (A) 08/26/2020    URINECX 20,000-29,000 cfu/ml  09/05/2018     No results found for: SPUTUMCULTUR    Principal Problem:    Osteomyelitis ([address] )  Active Problems:    Type 2 diabetes mellitus with hyperglycemia (HCC)    Hypertension    Pressure ulcer of sacral region, stage 4 (HCC)    Paroxysmal atrial fibrillation (HCC)    Chronic respiratory failure with hypoxia (HCC)    Acute renal failure superimposed on stage 3a chronic kidney disease (HCC)    Anemia    Bacteriuria

## 2021-07-05 NOTE — ASSESSMENT & PLAN NOTE
· Stage IV sacral wound with surrounding erythema and induration   · CT A/P: "Large soft tissue ulcer overlying the coccyx with evidence of osteomyelitis involving the coccygeal bones   These findings have worsened when comparing to 9/14/2020 "  · Continue vanc and cefepime  · ID consult   · Surgical consult for possible debridement of surrounding tissue

## 2021-07-05 NOTE — ED PROVIDER NOTES
History  Chief Complaint   Patient presents with    Abnormal Lab     nursing facility reports abnormal WBC, Hgb, and potassium  67year old female brought by EMS from [facility] for evaluation of abnormal labs  Patient states she has pain everywhere  She denies nausea, vomiting, cough or congestion  She seems confused and is disoriented to time which is baseline per nursing home  This morning, staff found her to have a left gaze preference  Oxygen saturation 88% this morning  She was placed on supplemental oxygen with improvement to 99%  Poor appetite and decreased oral intake for the past 2 days  2 L 1/2 NS for elevated BUN/creatinine started 2 days ago and finished yesterday  Labs performed this afternoon  History provided by:  Patient  History limited by:  Dementia  Evaluation of Abnormal Diagnostic Test  Patient referred by:  Nursing home  Result type: chemistry and hematology    Chemistry:     Potassium:  High (5 8)  Hematology:     Hemoglobin:  Low (5 6)    Leukocytes:  High (14 68)      Prior to Admission Medications   Prescriptions Last Dose Informant Patient Reported? Taking?    HumaLOG KwikPen 100 units/mL injection pen  Outside Facility (Specify) Yes No   Sig: Inject under the skin    Nutritional Supplements (Yevgeniy) [facility] (Specify) Yes No   Sig: Take by mouth   Specialty Vitamins Products (PROSTATE PO)  Outside Facility (Specify) Yes No   Sig: Take by mouth 2 (two) times a day   Patient not taking: Reported on 6/21/2021   VITAMIN D PO  Outside Facility (Specify) Yes No   Sig: Take 50,000 mg by mouth 2 (two) times a week Tuesday and Friday   acetaminophen (TYLENOL) 325 mg tablet  Outside Facility (Specify) Yes No   Sig: Take 650 mg by mouth every 4 (four) hours as needed for mild pain or fever   aspirin (ECOTRIN LOW STRENGTH) 81 mg EC tablet  Outside Facility (Specify) No No   Sig: Take 1 tablet (81 mg total) by mouth daily   atorvastatin (LIPITOR) 40 mg tablet  Outside Facility (Specify) No No   Sig: Take 1 tablet (40 mg total) by mouth daily with dinner   bisacodyl (DULCOLAX) 10 mg suppository  Outside Facility (Specify) Yes No   Sig: Insert 10 mg into the rectum as needed for constipation constipation    ferrous sulfate 325 (65 Fe) mg tablet  Outside Facility (Specify) Yes No   Sig: TAKE 1 TABLET BY MOUTH ONCE A DAY FOR SUPPLEMENT   gabapentin (NEURONTIN) 100 mg capsule  Outside Facility (Specify) Yes No   Sig: Take 100 mg by mouth 3 (three) times a day   insulin glargine (LANTUS) 100 units/mL subcutaneous injection  Outside Facility (Specify) No No   Sig: Inject 10 Units under the skin daily at bedtime   linaGLIPtin (Tradjenta) 5 MG TABS   Yes No   Sig: Take 5 mg by mouth daily   metoprolol tartrate (LOPRESSOR) 25 mg tablet  Outside Facility (Specify) Yes No   Sig: Take 25 mg by mouth every 12 (twelve) hours    mineral oil enema  Outside Facility (Specify) Yes No   Sig: Insert 1 enema into the rectum once As needed for constipation unrelieved by suppository   Patient not taking: Reported on 6/21/2021   mirtazapine (REMERON) 15 mg tablet  Outside Facility (Specify) Yes No   Sig: Take 15 mg by mouth daily at bedtime   pantoprazole (PROTONIX) 40 mg tablet  Outside Facility (Specify) Yes No   Sig: Take 40 mg by mouth 2 (two) times a day    polyethylene glycol (MIRALAX) 17 g packet  Outside Facility (Specify) No No   Sig: Take 17 g by mouth daily   rivaroxaban (XARELTO) 15 mg tablet  Outside Facility (Specify) No No   Sig: Take 1 tablet (15 mg total) by mouth daily with breakfast   vancomycin (VANCOCIN) 50mg/mL SOLN  Outside Facility (Specify) No No   Sig: Take 2 5 mL (125 mg total) by mouth every 12 (twelve) hours   Patient not taking: Reported on 6/21/2021      Facility-Administered Medications: None       Past Medical History:   Diagnosis Date    Acute renal failure (ARF) (HCC)     Acute respiratory failure with hypoxia (HCC)     Anemia     Atrial fibrillation (HCC)     CHF (congestive heart failure) (HCC)     Chronic kidney disease     Diabetes mellitus ([address] )     type 2    Hyperlipidemia     Hypertension     Stroke [facility])        Past Surgical History:   Procedure Laterality Date    BREAST SURGERY Left     lumpectomy benign    CATARACT EXTRACTION Bilateral 2017    CATARACT EXTRACTION W/ INTRAOCULAR LENS IMPLANT Left 12/11/2017    Procedure: EXTRACTION EXTRACAPSULAR CATARACT PHACO INTRAOCULAR LENS (IOL); Surgeon: Conchita Norris MD;  Location: [facility] MAIN OR;  Service: Ophthalmology    NH OPEN RX FEMUR FX+INTRAMED RAYMOND Right 5/21/2020    Procedure: INSERTION OF SHORT TROCHANTERIC FEMORAL NAIL;  Surgeon: Renato Lizama MD;  Location[facility] Main OR;  Service: Orthopedics    [address] HUMERAL&GLENOID COMPNT Right 9/10/2018    Procedure: RIGHT REVERSE TOTAL SHOULDER ARTHROPLASTY;  Surgeon: Renato Lizama MD;  Location[facility] Main [facility];  Service: Orthopedics    NH XCAPSL CTRC RMVL INSJ IO LENS PROSTH W/O ECP Right 10/23/2017    Procedure: EXTRACTION EXTRACAPSULAR CATARACT PHACO INTRAOCULAR LENS (IOL); Surgeon: Conchita Norris MD;  Location: [facility] MAIN OR;  Service: Ophthalmology    WOUND DEBRIDEMENT N/A 8/26/2020    Procedure: EXCISIONAL DEBRIDEMENT OF SACRAL PRESSURE WOUND, WASHOUT;;  Surgeon: Christina Love MD;  Location: [facility] MAIN OR;  Service: General    WOUND DEBRIDEMENT N/A 8/28/2020    Procedure: BUTTOCKS DEBRIDEMENT WOUND AND DRESSING CHANGE ([address] OUT); Surgeon: Sally Asif MD;  Location: [facility] MAIN OR;  Service: General       Family History   Problem Relation Age of Onset    Diabetes Mother     Varicose Veins Mother     Stroke Father     Arthritis Brother     Diabetes Daughter     No Known Problems Brother      I have reviewed and agree with the history as documented      E-Cigarette/Vaping    E-Cigarette Use Never User      E-Cigarette/Vaping Substances    Nicotine No     THC No     CBD No     Flavoring No     Other No     Unknown No      Social History Tobacco Use    Smoking status: Never Smoker    Smokeless tobacco: Never Used   Vaping Use    Vaping Use: Never used   Substance Use Topics    Alcohol use: Never     Comment: quit 8/17    Drug use: No       Review of Systems   Unable to perform ROS: Dementia   Constitutional: Positive for appetite change  Physical Exam  Physical Exam  Vitals and nursing note reviewed  Constitutional:       General: She is not in acute distress  Appearance: She is well-developed  She is not toxic-appearing or diaphoretic  HENT:      Head: Normocephalic and atraumatic  Right Ear: External ear normal       Left Ear: External ear normal       Nose: Nose normal    Eyes:      General: No scleral icterus  Extraocular Movements: Extraocular movements intact  Cardiovascular:      Rate and Rhythm: Normal rate and regular rhythm  Pulses: Normal pulses  Heart sounds: Normal heart sounds  Pulmonary:      Effort: Pulmonary effort is normal  No respiratory distress  Breath sounds: Normal breath sounds  Abdominal:      General: There is no distension  Palpations: Abdomen is soft  Tenderness: There is no abdominal tenderness  Genitourinary:     Rectum: Guaiac result positive (trace positive, melanotic in appearance)  No anal fissure or external hemorrhoid  Musculoskeletal:         General: No deformity  Normal range of motion  Skin:     General: Skin is warm and dry  Findings: No rash  Comments: Stage IV sacral decub with surrounding induration and erythema   Neurological:      General: No focal deficit present  Mental Status: She is alert  She is disoriented  Cranial Nerves: Cranial nerves are intact  Sensory: Sensation is intact     Psychiatric:         Mood and Affect: Mood normal              Vital Signs  ED Triage Vitals   Temperature Pulse Respirations Blood Pressure SpO2   07/04/21 2152 07/04/21 2152 07/04/21 2152 07/04/21 2230 07/04/21 2152   (!) 97 3 °F (36 3 °C) 86 18 118/53 97 %      Temp Source Heart Rate Source Patient Position - Orthostatic VS BP Location FiO2 (%)   07/04/21 2152 07/04/21 2152 07/04/21 2230 07/04/21 2230 --   Temporal Monitor Lying Right arm       Pain Score       07/04/21 2152       2           Vitals:    07/04/21 2347 07/05/21 0010 07/05/21 0015 07/05/21 0045   BP: (!) 98/48 112/55 112/57 118/56   Pulse: 79 74 75 75   Patient Position - Orthostatic VS:   Lying Lying         Visual Acuity      ED Medications  Medications   cefepime (MAXIPIME) IVPB (premix in dextrose) 2,000 mg 50 mL (has no administration in time range)   vancomycin (VANCOCIN) IVPB (premix in dextrose) 750 mg 150 mL (has no administration in time range)   pantoprazole (PROTONIX) 80 mg in sodium chloride 0 9 % 100 mL IVPB (80 mg Intravenous New Bag 7/5/21 0021)       Diagnostic Studies  Results Reviewed     Procedure Component Value Units Date/Time    Novel Coronavirus (Covid-19),PCR [facility] - 2 Hour Stat [[id number]]  (Normal) Collected: 07/04/21 2216    Lab Status: Final result Specimen: Nares from Nasopharyngeal Swab Updated: 07/04/21 2320     SARS-CoV-2 Negative    Narrative: The specimen collection materials, transport medium, and/or testing methodology utilized in the production of these test results have been proven to be reliable in a limited validation with an abbreviated program under the Emergency Utilization Authorization provided by the FDA  Testing reported as "Presumptive positive" will be confirmed with secondary testing to ensure result accuracy  Clinical caution and judgement should be used with the interpretation of these results with consideration of the clinical impression and other laboratory testing  Testing reported as "Positive" or "Negative" has been proven to be accurate according to standard laboratory validation requirements  All testing is performed with control materials showing appropriate reactivity at standard intervals        Urine Microscopic [[id number]]  (Abnormal) Collected: 07/04/21 2222    Lab Status: Final result Specimen: Urine, Indwelling Jimenez Catheter Updated: 07/04/21 2308     RBC, UA 4-10 /hpf      WBC, UA 10-20 /hpf      Epithelial Cells None Seen /hpf      Bacteria, UA Innumerable /hpf      Ca Oxalate Ann, UA Occasional /hpf     Urine culture [[id number]] Collected: 07/04/21 2222    Lab Status:  In process Specimen: Urine, Indwelling Jimenez Catheter Updated: 07/04/21 2308    Comprehensive metabolic panel [[id number]]  (Abnormal) Collected: 07/04/21 2216    Lab Status: Final result Specimen: Blood from Arm, Left Updated: 07/04/21 2251     Sodium 139 mmol/L      Potassium 5 4 mmol/L      Chloride 106 mmol/L      CO2 25 mmol/L      ANION GAP 8 mmol/L       mg/dL      Creatinine 2 25 mg/dL      Glucose 144 mg/dL      Calcium 8 9 mg/dL      Corrected Calcium 10 7 mg/dL      AST 24 U/L      ALT 12 U/L      Alkaline Phosphatase 94 U/L      Total Protein 6 1 g/dL      Albumin 1 8 g/dL      Total Bilirubin 0 40 mg/dL      eGFR 21 ml/min/1 73sq m     Narrative:      National Kidney Disease Foundation guidelines for Chronic Kidney Disease (CKD):     Stage 1 with normal or high GFR (GFR > 90 mL/min/1 73 square meters)    Stage 2 Mild CKD (GFR = 60-89 mL/min/1 73 square meters)    Stage 3A Moderate CKD (GFR = 45-59 mL/min/1 73 square meters)    Stage 3B Moderate CKD (GFR = 30-44 mL/min/1 73 square meters)    Stage 4 Severe CKD (GFR = 15-29 mL/min/1 73 square meters)    Stage 5 End Stage CKD (GFR <15 mL/min/1 73 square meters)  Note: GFR calculation is accurate only with a steady state creatinine    NT-BNP PRO [[id number]]  (Abnormal) Collected: 07/04/21 2216    Lab Status: Final result Specimen: Blood from Arm, Left Updated: 07/04/21 2251     NT-proBNP 6,405 pg/mL     Lipase [[id number]]  (Abnormal) Collected: 07/04/21 2216    Lab Status: Final result Specimen: Blood from Arm, Left Updated: 07/04/21 2251     Lipase 58 u/L     Lactic acid [[id number]]  (Normal) Collected: 07/04/21 2216    Lab Status: Final result Specimen: Blood from Arm, Left Updated: 07/04/21 2246     LACTIC ACID 1 0 mmol/L     Narrative:      Result may be elevated if tourniquet was used during collection      Troponin I [[id number]]  (Abnormal) Collected: 07/04/21 2216    Lab Status: Final result Specimen: Blood from Arm, Left Updated: 07/04/21 2246     Troponin I 0 07 ng/mL     CBC and differential [[id number]]  (Abnormal) Collected: 07/04/21 2216    Lab Status: Final result Specimen: Blood from Arm, Left Updated: 07/04/21 2245     WBC 12 67 Thousand/uL      RBC 2 09 Million/uL      Hemoglobin 5 9 g/dL      Hematocrit 19 3 %      MCV 92 fL      MCH 28 2 pg      MCHC 30 6 g/dL      RDW 14 8 %      MPV 11 8 fL      Platelets 413 Thousands/uL      nRBC 0 /100 WBCs      Neutrophils Relative 79 %      Immat GRANS % 1 %      Lymphocytes Relative 16 %      Monocytes Relative 3 %      Eosinophils Relative 1 %      Basophils Relative 0 %      Neutrophils Absolute 10 08 Thousands/µL      Immature Grans Absolute 0 06 Thousand/uL      Lymphocytes Absolute 1 98 Thousands/µL      Monocytes Absolute 0 36 Thousand/µL      Eosinophils Absolute 0 15 Thousand/µL      Basophils Absolute 0 04 Thousands/µL     Protime-INR [[id number]]  (Abnormal) Collected: 07/04/21 2216    Lab Status: Final result Specimen: Blood from Arm, Left Updated: 07/04/21 2242     Protime 19 7 seconds      INR 1 68    APTT [[id number]]  (Abnormal) Collected: 07/04/21 2216    Lab Status: Final result Specimen: Blood from Arm, Left Updated: 07/04/21 2242     PTT 52 seconds     UA w Reflex to Microscopic w Reflex to Culture [[id number]]  (Abnormal) Collected: 07/04/21 2222    Lab Status: Final result Specimen: Urine, Indwelling Jimenez Catheter Updated: 07/04/21 2235     Color, UA Yellow     Clarity, UA Cloudy     Specific [place], UA 1 010     pH, UA >=9 0     Leukocytes, UA Large     Nitrite, UA Negative     Protein, UA Trace mg/dl      Glucose, UA Negative mg/dl      Ketones, UA Negative mg/dl      Urobilinogen, UA 0 2 E U /dl      Bilirubin, UA Negative     Blood, UA Small    Blood culture #2 [[id number]] Collected: 07/04/21 0221    Lab Status: In process Specimen: Blood from Arm, Left Updated: 07/04/21 2223    Blood culture #1 [[id number]] Collected: 07/04/21 2216    Lab Status: In process Specimen: Blood from Arm, Left Updated: 07/04/21 2223    Procalcitonin with AM Reflex [[id number]] Collected: 07/04/21 2216    Lab Status: In process Specimen: Blood from Arm, Left Updated: 07/04/21 2223                 CT head without contrast   Final Result by Lenin Krishnan MD (07/04 2322)      No acute intracranial abnormality  Stable microangiopathic changes within the brain                    Workstation performed: [id number]         XR chest portable   ED Interpretation by Martin Noguera MD (07/04 2246)   No acute pulmonary pathology      CT abdomen pelvis wo contrast    (Results Pending)              Procedures  ECG 12 Lead Documentation Only    Date/Time: 7/4/2021 11:14 PM  Performed by: Martin Noguera MD  Authorized by: Martin Noguera MD     Indications / Diagnosis:  Reported hyperkalemia at facility  ECG reviewed by me, the ED Provider: yes    Patient location:  ED  Previous ECG:     Previous ECG:  Compared to current    Comparison ECG info:  4/9/21 normal ekg    Similarity:  No change  Quality:     Tracing quality:  Limited by artifact  Rate:     ECG rate:  115    ECG rate assessment: tachycardic    Rhythm:     Rhythm: sinus tachycardia    Ectopy:     Ectopy: none    QRS:     QRS axis:  Normal    QRS intervals:  Normal  Conduction:     Conduction: normal    ST segments:     ST segments:  Normal  T waves:     T waves: normal      CriticalCare Time  Performed by: Martin Noguera MD  Authorized by: Martin Noguera MD     Critical care provider statement:     Critical care time (minutes):  37    Critical care time was exclusive of: Separately billable procedures and treating other patients and teaching time    Critical care was necessary to treat or prevent imminent or life-threatening deterioration of the following conditions:  Sepsis and shock    Critical care was time spent personally by me on the following activities:  Blood draw for specimens, obtaining history from patient or surrogate, development of treatment plan with patient or surrogate, examination of patient, evaluation of patient's response to treatment, ordering and review of radiographic studies, ordering and review of laboratory studies, ordering and performing treatments and interventions and re-evaluation of patient's condition             ED Course  ED Course as of Jul 05 0059   Lloyd Carol Jul 04, 2021 2215 Attempted to contact patient's son  No answer  Message left  [address], patient's cousin, arrived to the ED and updated  She has documentation listing her as first alternate POA and states patient's son is not currently available  Jewel Cast states that the patient has had blood transfusions in the past and does not have any [employer] obligations that would prevent transfusion  2304 Slightly hemolyzed   Potassium(!): 5 4   2305 1 76 three days ago   Creatinine(!): 2 25   2305 Troponin I(!): 0 07   2312 WBC(!): 12 67                         Initial Sepsis Screening     Row Name 07/05/21 0024                Is the patient's history suggestive of a new or worsening infection? (!) Yes (Proceed)  -EE        Suspected source of infection  soft tissue;urinary tract infection  -EE        Are two or more of the following signs & symptoms of infection both present and new to the patient?   No  -EE        Indicate SIRS criteria  Leukocytosis (WBC > 29645 IJL)  -EE        If the answer is yes to both questions, suspicion of sepsis is present  --        If severe sepsis is present AND tissue hypoperfusion perists in the hour after fluid resuscitation or lactate > 4, the patient meets criteria for SEPTIC SHOCK  --        Are any of the following organ dysfunction criteria present within 6 hours of suspected infection and SIRS criteria that are NOT considered to be chronic conditions? --        Organ dysfunction  --        Date of presentation of severe sepsis  --        Time of presentation of severe sepsis  --        Tissue hypoperfusion persists in the hour after crystalloid fluid administration, evidenced, by either:  --        Was hypotension present within one hour of the conclusion of crystalloid fluid administration?  --        Date of presentation of septic shock  --        Time of presentation of septic shock  --          User Key  (r) = Recorded By, (t) = Taken By, (c) = Cosigned By    [address] Name Provider Type     Praveen Lees MD Physician          SBIRT 20yo+      Most Recent Value   SBIRT (25 yo +)   In order to provide better care to our patients, we are screening all of our patients for alcohol and drug use  Would it be okay to ask you these screening questions? Yes Filed at: 07/04/2021 2235   Initial Alcohol Screen: US AUDIT-C    1  How often do you have a drink containing alcohol?  0 Filed at: 07/04/2021 2235   2  How many drinks containing alcohol do you have on a typical day you are drinking? 0 Filed at: 07/04/2021 2235   3a  Male UNDER 65: How often do you have five or more drinks on one occasion? 0 Filed at: 07/04/2021 2235   3b  FEMALE Any Age, or MALE 65+: How often do you have 4 or more drinks on one occassion? 0 Filed at: 07/04/2021 2235   Audit-C Score  0 Filed at: 07/04/2021 2235   EMEKA: How many times in the past year have you    Used an illegal drug or used a prescription medication for non-medical reasons?   Never Filed at: 07/04/2021 2235                    MDM  Number of Diagnoses or Management Options  RORY (acute kidney injury) [facility]): new and requires workup  Elevated troponin: new and requires workup  GI bleeding: new and requires workup  Sacral decubitus ulcer, stage IV ([facility] [address] ): new and requires workup  Severe anemia: new and requires workup  UTI (urinary tract infection): new and requires workup  Diagnosis management comments: 67year old female sent from nursing home for evaluation of abnormal labs  Patient has severe anemia for which she was transfused 1 unit PRBCs in the ED  Melanotic stool on exam which was trace positive on guaiac  80 mg IV protonix given  UTI vs colonization  Likely sacral decub as source of leukocytosis  Cefepime and vanc given in the ED  CT abd/pelvis ordered to determine extent of wound infection  Patient admitted for further evaluation and management         Amount and/or Complexity of Data Reviewed  Clinical lab tests: ordered and reviewed  Tests in the radiology section of CPT®: ordered  Obtain history from someone other than the patient: yes  Independent visualization of images, tracings, or specimens: yes    Patient Progress  Patient progress: stable      Disposition  Final diagnoses:   GI bleeding   Severe anemia   Elevated troponin   UTI (urinary tract infection)   RORY (acute kidney injury) ([facility] )   Sacral decubitus ulcer, stage IV ([facility] )     Time reflects when diagnosis was documented in both MDM as applicable and the Disposition within this note     Time User Action Codes Description Comment    7/4/2021 11:54 PM Alissa Mcdaniel Add [K92 2] GI bleeding     7/4/2021 11:54 PM Alissa Mcdaniel Add [D64 9] Severe anemia     7/4/2021 11:55 PM Alissa Mcdaniel Add [R77 8] Elevated troponin     7/4/2021 11:55 PM Codi Villalobos Add [N39 0] UTI (urinary tract infection)     7/4/2021 11:56 PM Codi Villalobos Add [N17 9] [facility] )     7/5/2021 12:25 AM Alissa Mcdaniel Add [F41 565] Sacral decubitus ulcer, stage IV [facility])       ED Disposition     ED Disposition Condition Date/Time Comment    Admit Stable Mon Jul 5, 2021 12:58 AM Case was discussed with YON and the patient's admission status was agreed to be Admission Status: inpatient status to the service of Dr Indy Mera   Follow-up Information    None         Patient's Medications   Discharge Prescriptions    No medications on file     No discharge procedures on file      PDMP Review       Value Time User    PDMP Reviewed  Yes 5/13/2021 12:03 PM Pedro Hayes MD          ED Provider  Electronically Signed by           Carla Bay MD  07/05/21 5180       Carla Bay MD  07/05/21 4382

## 2021-07-05 NOTE — ASSESSMENT & PLAN NOTE
· Home regimen:  Metoprolol 25 mg q 12 hours  · Anticoagulated with Eliquis, will hold in setting of GI bleed and severe anemia

## 2021-07-05 NOTE — H&P
[place]  H&P- Dorathy Tyrell 1945, 67 y o  female MRN: [id number]  Unit/Bed#: -01 Encounter: [id number]  Primary Care Provider: Manuel Solis DO   Date and time admitted to hospital: 7/4/2021  9:50 PM    * Osteomyelitis ([address] )  Assessment & Plan  · Stage IV sacral wound with surrounding erythema and induration   · CT A/P: "Large soft tissue ulcer overlying the coccyx with evidence of osteomyelitis involving the coccygeal bones   These findings have worsened when comparing to 9/14/2020 "  · Continue vanc and cefepime  · ID consult   · Surgical consult for possible debridement of surrounding tissue    Bacteriuria  Assessment & Plan  · Pt with chronic trujillo catheter   · UA with 10-20 WBCs and innumerable bacteria  · Unclear if colonized  · U/C, pending   · Continue treatment with vanc and cefepime for osteomyelitis of coccyx that will cover UTI also     Anemia  Assessment & Plan  · Hgb at 5 9 on admission   · Received 1U PRBCs in ED  · Recheck 2 hours post transfusion ends   · Suspect GI source as pt has melena on admission   · Iron panel   · Hold Xarelto and ASA   · GI consult   · Continue protonix   · Diet clear liquid diet  · H/H q6h     Acute renal failure superimposed on stage 3a chronic kidney disease [facility])  Assessment & Plan  Lab Results   Component Value Date    EGFR 21 07/04/2021    EGFR 28 07/01/2021    EGFR 55 05/13/2021    CREATININE 2 25 (H) 07/04/2021    CREATININE 1 76 07/01/2021    CREATININE 1 00 05/13/2021   · Cr at 2 25 on admission s/p 2L of 1/2 NS over the last 2 days at [facility] from 1 76 on 7/1   · CT without hydro  · Unclear etiology ?hypovolemia in setting of anemia   · Nephrology consult   · Will increase metoprolol hold parameters    Chronic respiratory failure with hypoxia (HCC)  Assessment & Plan  · Chronically on 2 L supplemental oxygen  · No new oxygen requirements    Paroxysmal atrial fibrillation (HCC)  Assessment & Plan  · Home regimen:  Metoprolol 25 mg q 12 hours  · Anticoagulated with Eliquis, will hold in setting of GI bleed and severe anemia    Type 2 diabetes mellitus with hyperglycemia (HCC)  Assessment & Plan  Lab Results   Component Value Date    HGBA1C 7 0 06/15/2021       No results for input(s): POCGLU in the last 72 hours  Blood Sugar Average: Last 72 hrs:  ·  home regimen:  Lantus 14 units HS and and NovoLog 9 units t i d   · Place on SSI and Lantus 10 units HS as patient is clear liquid diet    Hypertension  Assessment & Plan  · Home regimen:  Metoprolol 25 mg q 12 hours  · Continue with increase hold parameters in setting of RORY    Pressure ulcer of sacral region, stage 4 (HCC)  Assessment & Plan  · Complicated by osteomyelitis coccyx, see above    VTE Pharmacologic Prophylaxis: VTE Score: 5 NO VTE prophylaxis in setting severe anemia and GI bleed  Code Status: Level 1 - Full Code   Discussion with family: family not called on admission  Anticipated Length of Stay: Patient will be admitted on an inpatient basis with an anticipated length of stay of greater than 2 midnights secondary to anemia, GI bleed, osteomyelitis   Total Time for Visit, including Counseling / Coordination of Care: 60 minutes Greater than 50% of this total time spent on direct patient counseling and coordination of care  Chief Complaint: low hemoglobin on outpatient labs    History of Present Illness:  Hx limited secondary to dementia     Isreal Coleman is a 67 y o  female with a PMH of CKD stage 3a, Afib, chronic respiratory failure with hypoxia, HTN, pressure ulcer of sacral region, and Afib who presents with low Hgb on outpatient labs obtained at [facility] Hgb 5 6  Pt states she has pain all over on admission       Review of Systems:  Review of Systems   Unable to perform ROS: Dementia       Past Medical and Surgical History:   Past Medical History:   Diagnosis Date    [facility] [address] [facility]     Acute respiratory failure with hypoxia (HCC)     Anemia  Atrial fibrillation (HCC)     CHF (congestive heart failure) (HCC)     Chronic kidney disease     Diabetes mellitus ([facility] [address] )     type 2    Hyperlipidemia     Hypertension     Stroke [facility])        Past Surgical History:   Procedure Laterality Date    BREAST SURGERY Left     lumpectomy benign    CATARACT EXTRACTION Bilateral 2017    CATARACT EXTRACTION W/ INTRAOCULAR LENS IMPLANT Left 12/11/2017    Procedure: EXTRACTION EXTRACAPSULAR CATARACT PHACO INTRAOCULAR LENS (IOL); Surgeon: Madhu Guadalupe MD;  Location: [facility] MAIN OR;  Service: Ophthalmology    [place] OPEN RX FEMUR FX+INTRAMED RAYMOND Right 5/21/2020    Procedure: INSERTION OF SHORT TROCHANTERIC FEMORAL NAIL;  Surgeon: Amy Quintana MD;  [facility] Main OR;  Service: Orthopedics    [address] HUMERAL&GLENOID COMPNT Right 9/10/2018    Procedure: RIGHT REVERSE TOTAL SHOULDER ARTHROPLASTY;  Surgeon: Amy Quintana MD;  [facility] Main [facility];  Service: Orthopedics    KS XCAPSL CTRC RMVL INSJ IO LENS PROSTH W/O ECP Right 10/23/2017    Procedure: EXTRACTION EXTRACAPSULAR CATARACT PHACO INTRAOCULAR LENS (IOL); Surgeon: Madhu Guadalupe MD;  Location: [facility] MAIN OR;  Service: Ophthalmology    WOUND DEBRIDEMENT N/A 8/26/2020    Procedure: EXCISIONAL DEBRIDEMENT OF SACRAL PRESSURE WOUND, WASHOUT;;  Surgeon: Ledy Lama MD;  [facility] MAIN OR;  Service: General    WOUND DEBRIDEMENT N/A 8/28/2020    Procedure: BUTTOCKS DEBRIDEMENT WOUND AND DRESSING CHANGE ([address]); Surgeon: Don Vaughn MD;  Location: [facility] MAIN OR;  Service: General       Meds/Allergies:  Prior to Admission medications    Medication Sig Start Date End Date Taking?  Authorizing Provider   acetaminophen (TYLENOL) 325 mg tablet Take 650 mg by mouth every 4 (four) hours as needed for mild pain or fever   Yes Historical Provider, MD   aspirin (ECOTRIN LOW STRENGTH) 81 mg EC tablet Take 1 tablet (81 mg total) by mouth daily 9/11/20  Yes Glory Pierce MD   atorvastatin (LIPITOR) 40 mg tablet Take 1 tablet (40 mg total) by mouth daily with dinner 9/10/20  Yes Maurizio Hernandez MD   ferrous sulfate 325 (65 Fe) mg tablet TAKE 1 TABLET BY MOUTH ONCE A DAY FOR SUPPLEMENT 7/7/20  Yes Historical Provider, MD   gabapentin (NEURONTIN) 100 mg capsule Take 100 mg by mouth 3 (three) times a day   Yes Historical Provider, MD   HumaLOG KwikPen 100 units/mL injection pen Inject under the skin  4/5/21  Yes Historical Provider, MD   insulin glargine (LANTUS) 100 units/mL subcutaneous injection Inject 10 Units under the skin daily at bedtime  Patient taking differently: Inject 14 Units under the skin daily at bedtime  4/13/21  Yes Claudia Salazar MD   insulin lispro (HumaLOG) 100 units/mL injection Inject 9 Units under the skin 3 (three) times a day with meals   Yes Historical Provider, MD   linaGLIPtin (Tradjenta) 5 MG TABS Take 5 mg by mouth daily   Yes Historical Provider, MD   metoprolol tartrate (LOPRESSOR) 25 mg tablet Take 25 mg by mouth every 12 (twelve) hours  4/7/21  Yes Historical Provider, MD   mirtazapine (REMERON) 15 mg tablet Take 15 mg by mouth daily at bedtime   Yes Historical Provider, MD   Nutritional Supplements (Yevgeniy) PACK Take by mouth   Yes Historical Provider, MD   pantoprazole (PROTONIX) 40 mg tablet Take 40 mg by mouth 2 (two) times a day  4/7/21  Yes Historical Provider, MD   rivaroxaban (XARELTO) 15 mg tablet Take 1 tablet (15 mg total) by mouth daily with breakfast 11/23/20  Yes Ronak Andre MD   VITAMIN D PO Take 50,000 mg by mouth 2 (two) times a week Tuesday and Friday   Yes Historical Provider, MD   bisacodyl (DULCOLAX) 10 mg suppository Insert 10 mg into the rectum as needed for constipation constipation     Historical Provider, MD   mineral oil enema Insert 1 enema into the rectum once As needed for constipation unrelieved by suppository  Patient not taking: Reported on 6/21/2021    Historical Provider, MD   polyethylene glycol (MIRALAX) 17 g packet Take 17 g by mouth daily 5/27/20   Sujey Lopez PA-C   Specialty Vitamins Products (PROSTATE PO) Take by mouth 2 (two) times a day  Patient not taking: Reported on 6/21/2021    Historical Provider, MD   vancomycin (VANCOCIN) 50mg/mL SOLN Take 2 5 mL (125 mg total) by mouth every 12 (twelve) hours 5/13/21   Ayesha Harkins MD     I have reveiwed home medications using records provided by [facility]  Allergies: No Known Allergies    Social History:  Marital Status: Single   Occupation: retired   Patient Pre-hospital Living Situation: [facility]: [facility]  Patient Pre-hospital Level of Mobility: non-ambulatory/bed bound  Patient Pre-hospital Diet Restrictions: none   Substance Use History:   Social History     Substance and Sexual Activity   Alcohol Use Never    Comment: quit 8/17     Social History     Tobacco Use   Smoking Status Never Smoker   Smokeless Tobacco Never Used     Social History     Substance and Sexual Activity   Drug Use No       Family History:  Family History   Problem Relation Age of Onset    Diabetes Mother     Varicose Veins Mother     Stroke Father     Arthritis Brother     Diabetes Daughter     No Known Problems Brother        Physical Exam:     Vitals:   Blood Pressure: 125/61 (07/05/21 0516)  Pulse: 80 (07/05/21 0516)  Temperature: 97 6 °F (36 4 °C) (07/05/21 0240)  Temp Source: Temporal (07/05/21 0142)  Respirations: 18 (07/05/21 0240)  SpO2: 98 % (07/05/21 0516)    Physical Exam  Vitals and nursing note reviewed  Constitutional:       Appearance: She is ill-appearing (chronic)  HENT:      Head: Normocephalic  Mouth/Throat:      Mouth: Mucous membranes are moist    Eyes:      Conjunctiva/sclera: Conjunctivae normal       Comments: Tracks people around the room without issue   Cardiovascular:      Rate and Rhythm: Normal rate and regular rhythm  Pulses: Normal pulses  Pulmonary:      Breath sounds: Normal breath sounds  No wheezing, rhonchi or rales        Comments: Poor inspiratory effort  Abdominal:      General: Abdomen is flat  Palpations: Abdomen is soft  Tenderness: There is no abdominal tenderness  Musculoskeletal:      Right lower leg: No edema  Left lower leg: No edema  Comments: Stage IV sacral wound with surround erythema and induration  Neurological:      Mental Status: She is alert  Comments: Oriented to self   Psychiatric:      Comments: Flat affect  Additional Data:     Lab Results:  Results from last 7 days   Lab Units 07/04/21  2216   WBC Thousand/uL 12 67*   HEMOGLOBIN g/dL 5 9*   HEMATOCRIT % 19 3*   PLATELETS Thousands/uL 260   NEUTROS PCT % 79*   LYMPHS PCT % 16   MONOS PCT % 3*   EOS PCT % 1     Results from last 7 days   Lab Units 07/04/21  2216   SODIUM mmol/L 139   POTASSIUM mmol/L 5 4*   CHLORIDE mmol/L 106   CO2 mmol/L 25   BUN mg/dL 109*   CREATININE mg/dL 2 25*   ANION GAP mmol/L 8   CALCIUM mg/dL 8 9   ALBUMIN g/dL 1 8*   TOTAL BILIRUBIN mg/dL 0 40   ALK PHOS U/L 94   ALT U/L 12   AST U/L 24   GLUCOSE RANDOM mg/dL 144*     Results from last 7 days   Lab Units 07/04/21  2216   INR  1 68*             Results from last 7 days   Lab Units 07/04/21  2216   LACTIC ACID mmol/L 1 0       Imaging: Reviewed radiology reports from this admission including: abdominal/pelvic CT  CT abdomen pelvis wo contrast   Final Result by Eri Sarmiento MD (07/05 0139)      Large soft tissue ulcer overlying the coccyx with evidence of osteomyelitis involving the coccygeal bones  These findings have worsened when comparing to 9/14/2020  Workstation performed: [id number]         CT head without contrast   Final Result by Eri Sarmiento MD (07/04 7512)      No acute intracranial abnormality  Stable microangiopathic changes within the brain                    Workstation performed: [id number]         XR chest portable   ED Interpretation by Mitchell Perez MD (07/04 2696)   No acute pulmonary pathology          EKG and Other Studies Reviewed on Admission:   · EKG: Significant baseline artifact  No acute ischemia  ** Please Note: This note has been constructed using a voice recognition system   **

## 2021-07-05 NOTE — PROGRESS NOTES
Vancomycin Assessment    Marky Isaac is a 67 y o  female who is currently receiving vancomycin 500mg every 24 hours for skin-soft tissue infection, other bone and joint infections   Relevant clinical data and objective history reviewed:  Creatinine   Date Value Ref Range Status   07/04/2021 2 25 (H) 0 60 - 1 30 mg/dL Final     Comment:     Standardized to IDMS reference method   05/13/2021 1 00 0 60 - 1 30 mg/dL Final     Comment:     Standardized to IDMS reference method   05/12/2021 1 18 0 60 - 1 30 mg/dL Final     Comment:     Standardized to IDMS reference method     CREATININE   Date Value Ref Range Status   07/01/2021 1 76  Final     Vancomycin Rm   Date Value Ref Range Status   08/28/2020 23 5 ug/mL Final     /61   Pulse 81   Temp 97 6 °F (36 4 °C)   Resp 18   SpO2 95%   No intake/output data recorded  Lab Results   Component Value Date/Time     (H) 07/04/2021 10:16 PM    WBC 12 67 (H) 07/04/2021 10:16 PM    HGB 5 9 (LL) 07/04/2021 10:16 PM    HCT 19 3 (L) 07/04/2021 10:16 PM    MCV 92 07/04/2021 10:16 PM     07/04/2021 10:16 PM     Temp Readings from Last 3 Encounters:   07/05/21 97 6 °F (36 4 °C)   06/21/21 97 5 °F (36 4 °C)   06/07/21 (!) 97 2 °F (36 2 °C)     Vancomycin Days of Therapy: 1    Assessment/Plan  The patient is currently on vancomycin utilizing scheduled dosing based on actual body weight  Baseline risks associated with therapy include: pre-existing renal impairment  The patient is currently receiving 500mg every 24 hours and is clinically appropriate and dose will be continued  Pharmacy will also follow closely for s/sx of nephrotoxicity, infusion reactions, and appropriateness of therapy  BMP and CBC will be ordered per protocol  Plan for trough as patient approaches steady state, prior to the 4th  dose at approximately 0n 7/8/21 at 02 30  Due to infection severity, will target a trough of 15-20 (appropriate for most indications)     Pharmacy will continue to follow the patients culture results and clinical progress daily      Tong Johnson, Pharmacist

## 2021-07-05 NOTE — CONSULTS
Consultation - [id number]     Heather Wolff 67 y o  female MRN: [id number]  Unit/Bed#: -01 Encounter: [id number]    Consults    ASSESSMENT   1  Anemia--multifactorial   Appears to have chronic anemia with hemoglobins in the 7 g range baseline by records but presented with black melanotic appearing stool and hemoglobin of 5 8--a hemoglobin after 1 unit 7 1  -no vomiting and hematemesis or red blood per rectum  -examination is stool reveals a greenish black somewhat solid stool consistent with iron ingestion-some melanotic component  -differential could include peptic ulcer disease, arterial venous malformations, gastritis or GERD  -patient without history of peptic ulcer disease  Son reports that she probably had a colonoscopy over 10 years ago and may have had polyps    2  Chronic oral anticoagulation on Xarelto for history of AFib and CHF  -  3  Sacral decubitus-with osteomyelitis-and large wound does following wound center  -consideration for diverting colostomy for wound care    4 Senile dementia Alzheimer's type    5  Acute kidney injury-on top of chronic kidney disease renal follow-up probable element of dehydration    Plan   1  Protonix 40 mg IV q 12  2  Monitor H&H transfuse for any hemoglobin dropped below 7  3  Hold Xarelto  4  Possible EGD July 7, 2021 after washout of Xarelto  -discussed with the patient's son Rickie Sharp             Chief Complaint   Patient presents with    Abnormal Lab     nursing facility reports abnormal WBC, Hgb, and potassium  Physician Requesting Consult: Thompson Jean MD    HPI  Heather Wolff is a 67y o  year old female is referred into the ED with abnormal laboratory studies and perhaps some confusion  Patient offers no specific GI complaints   She reports that perhaps she may have occasional diarrhea  Presently she denies any abdominal pain nausea vomiting stool is described as melanotic in the ED    He has reviewed the patient's son and patient has no history of major GI issues including GERD or peptic ulcer disease  She may have had a colonoscopy in the remote past and had polyps  This was well over 10 years ago  In reviewing records patient has had somewhat low hemoglobin over the last several months in the 7-8 g range  Admission hemoglobin was 5 8 and she received 1 unit of packed red blood cells    Historical Information   Past Medical History:   Diagnosis Date    [facility] )     Acute respiratory failure with hypoxia (HCC)     Anemia     Atrial fibrillation (HCC)     CHF (congestive heart failure) (HCC)     Chronic kidney disease     Diabetes mellitus ([place] [id number] )     type 2    Hyperlipidemia     Hypertension     Stroke [facility])      Past Surgical History:   Procedure Laterality Date    BREAST SURGERY Left     lumpectomy benign    CATARACT EXTRACTION Bilateral 2017    CATARACT EXTRACTION W/ INTRAOCULAR LENS IMPLANT Left 12/11/2017    Procedure: EXTRACTION EXTRACAPSULAR CATARACT PHACO INTRAOCULAR LENS (IOL); Surgeon: Mac Morris MD;  Location: [facility] MAIN OR;  Service: Ophthalmology    [place] OPEN RX FEMUR FX+INTRAMED RAYMOND Right 5/21/2020    Procedure: INSERTION OF SHORT TROCHANTERIC FEMORAL NAIL;  Surgeon: Donaldo Beauchamp MD;  [facility] Main OR;  Service: Orthopedics    [address] HUMERAL&GLENOID COMPNT Right 9/10/2018    Procedure: RIGHT REVERSE TOTAL SHOULDER ARTHROPLASTY;  Surgeon: Donaldo Beauchamp MD;  [facility] Main OR;  Service: Orthopedics    [place] XCAPSL CTRC RMVL INSJ IO LENS PROSTH W/O ECP Right 10/23/2017    Procedure: EXTRACTION EXTRACAPSULAR CATARACT PHACO INTRAOCULAR LENS (IOL);   Surgeon: Mac Morris MD;  Location: [facility] MAIN OR;  Service: Ophthalmology    WOUND DEBRIDEMENT N/A 8/26/2020    Procedure: EXCISIONAL DEBRIDEMENT OF SACRAL PRESSURE WOUND, WASHOUT;;  Surgeon: Mello Scales MD;  Location:  MAIN OR;  Service: General    WOUND DEBRIDEMENT N/A 8/28/2020    Procedure: BUTTOCKS DEBRIDEMENT WOUND AND DRESSING CHANGE [facility]);   Surgeon: Watson Ocampo MD;  [facility] MAIN OR;  Service: General     Social History   Social History     Substance and Sexual Activity   Alcohol Use Never    Comment: quit 8/17     Social History     Substance and Sexual Activity   Drug Use No     Social History     Tobacco Use   Smoking Status Never Smoker   Smokeless Tobacco Never Used     Family History   Problem Relation Age of Onset    Diabetes Mother     Varicose Veins Mother     Stroke Father     Arthritis Brother     Diabetes Daughter     No Known Problems Brother        Meds/Allergies     Current Facility-Administered Medications   Medication Dose Route Frequency    acetaminophen (TYLENOL) tablet 650 mg  650 mg Oral Q6H PRN    atorvastatin (LIPITOR) tablet 40 mg  40 mg Oral Daily With Dinner    [START ON 7/6/2021] cefepime (MAXIPIME) IVPB (premix in dextrose) 1,000 mg 50 mL  1,000 mg Intravenous Q24H    gabapentin (NEURONTIN) capsule 100 mg  100 mg Oral TID    insulin glargine (LANTUS) subcutaneous injection 10 Units 0 1 mL  10 Units Subcutaneous HS    insulin lispro (HumaLOG) 100 units/mL subcutaneous injection 1-5 Units  1-5 Units Subcutaneous TID AC    insulin lispro (HumaLOG) 100 units/mL subcutaneous injection 1-5 Units  1-5 Units Subcutaneous HS    metoprolol tartrate (LOPRESSOR) tablet 25 mg  25 mg Oral Q12H [address]    mirtazapine (REMERON) tablet 15 mg  15 mg Oral HS    pantoprazole (PROTONIX) injection 40 mg  40 mg Intravenous Q12H [address]    sodium chloride 0 9 % infusion  50 mL/hr Intravenous Continuous    [START ON 7/6/2021] vancomycin (VANCOCIN) 500 mg in sodium chloride 0 9% 100 mL IVPB  10 mg/kg Intravenous Q24H     Medications Prior to Admission   Medication    acetaminophen (TYLENOL) 325 mg tablet    aspirin (ECOTRIN LOW STRENGTH) 81 mg EC tablet    atorvastatin (LIPITOR) 40 mg tablet    ferrous sulfate 325 (65 Fe) mg tablet    gabapentin (NEURONTIN) 100 mg capsule    HumaLOG KwikPen 100 units/mL injection pen    insulin glargine (LANTUS) 100 units/mL subcutaneous injection    insulin lispro (HumaLOG) 100 units/mL injection    linaGLIPtin (Tradjenta) 5 MG TABS    metoprolol tartrate (LOPRESSOR) 25 mg tablet    mirtazapine (REMERON) 15 mg tablet    Nutritional Supplements (Yevgeniy) PACK    pantoprazole (PROTONIX) 40 mg tablet    rivaroxaban (XARELTO) 15 mg tablet    VITAMIN D PO    bisacodyl (DULCOLAX) 10 mg suppository    mineral oil enema    polyethylene glycol (MIRALAX) 17 g packet    Specialty Vitamins Products (PROSTATE PO)    vancomycin (VANCOCIN) 50mg/mL SOLN       No Known Allergies    PHYSICAL EXAM    Blood pressure 98/68, pulse 89, temperature 98 6 °F (37 °C), resp  rate 19, height 5' 6" (1 676 m), weight 72 5 kg (159 lb 13 3 oz), SpO2 99 %  Body mass index is 25 8 kg/m²  General Appearance: NAD, cooperative, alert--somewhat confused  Eyes: Anicteric conjunctiva past  ENT:  Normocephalic, atraumatic, membranes are dry    Neck:  Supple, symmetrical, trachea midline  Resp:  Clear to auscultation bilaterally; no rales, rhonchi or wheezing; respirations unlabored   CV:  S1 S2, Regular rate and rhythm; no murmur, rub, or gallop  GI:  Soft, non-tender, non-distended; normal bowel sounds; no masses, no organomegaly   Rectal:  Suspected perianal area pasty black stool  Musculoskeletal: No cyanosis, clubbing or edema     Skin:  No jaundice, rashes, or lesions --sacral area bandaged  Heme/Lymph: No palpable cervical lymphadenopathy  Psych: Normal affect, good eye contact  Neuro: No gross deficits, somewhat confused    Lab Results   Component Value Date    GLUCOSE 258 (H) 05/03/2021    CALCIUM 9 1 07/05/2021    K 4 9 07/05/2021    CO2 25 07/05/2021     07/05/2021     (H) 07/05/2021    CREATININE 1 98 (H) 07/05/2021     Lab Results   Component Value Date    WBC 12 67 (H) 07/04/2021    HGB 7 1 (L) 07/05/2021    HCT 23 7 (L) 07/05/2021    MCV 92 07/04/2021     07/04/2021     Lab Results   Component Value Date    ALT 13 07/05/2021    AST 13 07/05/2021    ALKPHOS 85 07/05/2021     No results found for: AMYLASE  Lab Results   Component Value Date    LIPASE 58 (L) 07/04/2021     Lab Results   Component Value Date    IRON 28 (L) 07/04/2021    TIBC 171 (L) 07/04/2021    FERRITIN 263 07/04/2021     Lab Results   Component Value Date    INR 1 37 (H) 07/05/2021       Imaging Studies: I have personally reviewed pertinent reports  EKG, Pathology, and Other Studies: I have personally reviewed pertinent reports        REVIEW OF SYSTEMS:  Allergies and medications reviewed  GI please see above in HPI  Musculoskeletal patient complains of leg pain  Note patient cannot give too much of a further review of systems secondary to confusion

## 2021-07-05 NOTE — CONSULTS
[address] 67 y o  female MRN: [id number]  Unit/Bed#: -Enrique Encounter: [id number]    ASSESSMENT and PLAN:      Vivian Serrano is a 67 y o  female with past medical history of CKD, AFib, hypoxia, hypertension, AFib, sacral ulcers, dementia, who was admitted to [facility] on July 4th after presenting with anemia  A renal consultation is requested for assistance in the management of CKD  1) RORY On CKD III    - outpatient Nephrologist Dr Meme Saleh  - baseline creat 1-1 1 mg/dL  - patient had prior low C3  -creatinine rising at nursing home to 1 8 mg/dL on July 1st an on admission is 2 3 mg/dL  -received 2 L of half-normal saline initially (I do not see this documented but it is written in the initial H&P)  -urinalysis with pH greater than 9, large leukocyte, 10-20 WBC, innumerable bacteria  -CT scan without hydronephrosis  -etiology possibly volume depletion/ATN/acute anemia/hypoperfusion in setting of hypotension  -creatinine improving to 1 98 on July 5th  Plan  -dose vancomycin by level per pharmacy team  -avoid nephrotoxic agents and hypotension  -consider repeating complement levels as prior C3 was slightly low in September of 2020  -follow final urine and blood cultures  -sacral wound/osteomyelitis issues per primary team  -start normal saline 50 cc/hour  -BMP in a m   -follow-up final x-ray read-on personal read there is mild appearing congestion but slightly improved from prior  Given hypotension, clinically volume depleted, will cautiously volume expand for 500 more cc today  Give diuretic if needed  2) electrolyte-improving potassium after volume expansion    3) acid/base-stable    4) anemia    -hemoglobin initially 5 9    Received 1 unit packed red blood cell   -patient melena on admission  -anticoagulation being held    5) history of CHF-currently hypovolemic    6) there is concern for osteomyelitis    -CT scan with concern for osteomyelitis that is worsening  -antibiotics per primary team  -further plans per primary, surgery, ID team  -large soft tissue ulcer noted on CT scan overlying the coccyx with osteomyelitis    7) chronic Jimenez-per primary team    -follow-up urine culture    8) hypertension-blood pressure is improving  Initially was hypotensive  HISTORY OF PRESENT ILLNESS:  Requesting Physician: Bette Chang MD  Reason for Consult: RORY on CKD    Taryn Joshua is a 67 y o  female with past medical history of CKD, AFib, hypoxia, hypertension, AFib, sacral ulcers, dementia, who was admitted to [facility] on July 4th after presenting with anemia  A renal consultation is requested today for assistance in the management of CKD  Patient is awake but not communicating  History is obtained from the chart  Is limited  PAST MEDICAL HISTORY:  Past Medical History:   Diagnosis Date    Acute renal failure (ARF) ([facility] [address] )     Acute respiratory failure with hypoxia (HCC)     Anemia     Atrial fibrillation (HCC)     CHF (congestive heart failure) (HCC)     Chronic kidney disease     Diabetes mellitus ([facility] [address] )     type 2    Hyperlipidemia     Hypertension     Stroke [facility])        PAST SURGICAL HISTORY:  Past Surgical History:   Procedure Laterality Date    BREAST SURGERY Left     lumpectomy benign    CATARACT EXTRACTION Bilateral 2017    CATARACT EXTRACTION W/ INTRAOCULAR LENS IMPLANT Left 12/11/2017    Procedure: EXTRACTION EXTRACAPSULAR CATARACT PHACO INTRAOCULAR LENS (IOL);   Surgeon: Yvonne Mckeon MD;  Location: [facility] MAIN OR;  Service: Ophthalmology    MT OPEN RX FEMUR FX+INTRAMED RAYMOND Right 5/21/2020    Procedure: INSERTION OF SHORT TROCHANTERIC FEMORAL NAIL;  Surgeon: Maria Elena Gr MD;  Location[facility] Main OR;  Service: Orthopedics    MT ELENI IDOWBXIT [id number] HUMERAL&GLENOID COMPNT Right 9/10/2018    Procedure: RIGHT REVERSE TOTAL SHOULDER ARTHROPLASTY;  Surgeon: Maria Elena Gr MD;  Location[facility] Main OR;  Service: Orthopedics    Vicente Mcburney [facility] RMVL INSJ IO LENS PROSTH W/O ECP Right 10/23/2017    Procedure: EXTRACTION EXTRACAPSULAR CATARACT PHACO INTRAOCULAR LENS (IOL); Surgeon: Lupe Coleman MD;  Location: [facility] MAIN OR;  Service: Ophthalmology    WOUND DEBRIDEMENT N/A 8/26/2020    Procedure: EXCISIONAL DEBRIDEMENT OF SACRAL PRESSURE WOUND, WASHOUT;;  Surgeon: Leila Yang MD;  Location: [facility] MAIN OR;  Service: General    WOUND DEBRIDEMENT N/A 8/28/2020    Procedure: BUTTOCKS DEBRIDEMENT WOUND AND DRESSING CHANGE ([address] OUT);   Surgeon: Kenia Espinoza MD;  Location: [facility] MAIN OR;  Service: General       ALLERGIES:  No Known Allergies    SOCIAL HISTORY:  Social History     Substance and Sexual Activity   Alcohol Use Never    Comment: quit 8/17     Social History     Substance and Sexual Activity   Drug Use No     Social History     Tobacco Use   Smoking Status Never Smoker   Smokeless Tobacco Never Used       FAMILY HISTORY:  Family History   Problem Relation Age of Onset    Diabetes Mother     Varicose Veins Mother     Stroke Father     Arthritis Brother     Diabetes Daughter     No Known Problems Brother        MEDICATIONS:    Current Facility-Administered Medications:     acetaminophen (TYLENOL) tablet 650 mg, 650 mg, Oral, Q6H PRN, Vita Saini PA-C    atorvastatin (LIPITOR) tablet 40 mg, 40 mg, Oral, Daily With Cayla West PA-C    [START ON 7/6/2021] cefepime (MAXIPIME) IVPB (premix in dextrose) 1,000 mg 50 mL, 1,000 mg, Intravenous, Q24H, Vita Saini PA-C    gabapentin (NEURONTIN) capsule 100 mg, 100 mg, Oral, TID, Vita Saini PA-C, 100 mg at 07/05/21 9201    insulin glargine (LANTUS) subcutaneous injection 10 Units 0 1 mL, 10 Units, Subcutaneous, HS, Vita Saini PA-C    insulin lispro (HumaLOG) 100 units/mL subcutaneous injection 1-5 Units, 1-5 Units, Subcutaneous, TID AC **AND** Fingerstick Glucose (POCT), , , TID AC, Vita Saini PA-C    insulin lispro (HumaLOG) 100 units/mL subcutaneous injection 1-5 Units, 1-5 Units, Subcutaneous, HS, Diamond Carreno PA-C    metoprolol tartrate (LOPRESSOR) tablet 25 mg, 25 mg, Oral, Q12H MARILYN, Diamond Carreno PA-C    mirtazapine (REMERON) tablet 15 mg, 15 mg, Oral, HS, Diamond Carreno PA-C    pantoprazole (PROTONIX) injection 40 mg, 40 mg, Intravenous, Q12H, Diamond Carreno PA-C    [START ON 7/6/2021] vancomycin (VANCOCIN) 500 mg in sodium chloride 0 9% 100 mL IVPB, 10 mg/kg, Intravenous, Q24H, Diamond Carreno PA-C    REVIEW OF SYSTEMS:    All the systems were reviewed and were negative except as documented on the HPI  PHYSICAL EXAM:  Current Weight: Weight - Scale: 72 5 kg (159 lb 13 3 oz)  First Weight: Weight - Scale: 72 5 kg (159 lb 13 3 oz)  Vitals:    07/05/21 0516 07/05/21 0649 07/05/21 0651 07/05/21 0840   BP: 125/61  124/74 125/70   BP Location:       Pulse: 80   81   Resp:   19    Temp:   98 1 °F (36 7 °C)    TempSrc:       SpO2: 98%   98%   Weight:  72 5 kg (159 lb 13 3 oz)     Height:           Intake/Output Summary (Last 24 hours) at 7/5/2021 0851  Last data filed at 7/5/2021 0501  Gross per 24 hour   Intake 800 ml   Output 400 ml   Net 400 ml     Physical Exam  General: NAD  Skin: no rash  Eyes: anicteric sclera  ENT:  Dry mucous membrane  Neck: supple  Chest: CTA b/l, no ronchii, no wheeze, no rubs, no rales  CVS: s1s2, no murmur, no gallop, no rub  Abdomen: soft, nontender, nl sounds  Extremities: no significant edema LE b/l  :  Positive trujillo  Neuro: AAOX to  Psych: normal affect      Invasive Devices:   Urethral Catheter 16 Fr   (Active)   Reasons to continue Urinary Catheter  Stage III/IV sacral ulcers or open perineal wounds in incontinent patients 07/05/21 0315   Site Assessment Clean;Skin intact 07/05/21 0315   Trujillo Care Done 07/05/21 0315   Collection Container Standard drainage bag 07/05/21 0315   Output (mL) 400 mL 07/05/21 0502     Lab Results:   Results from last 7 days   Lab Units 07/05/21  0551 07/04/21 2216 07/01/21  0000   WBC Thousand/uL  --  12 67*  --    HEMOGLOBIN g/dL 7 6* 5 9*  --    HEMATOCRIT % 24 3* 19 3*  --    PLATELETS Thousands/uL  --  260  --    POTASSIUM mmol/L 4 9 5 4* 5 11   CHLORIDE mmol/L 108 106 104   CO2 mmol/L 25 25 28   BUN mg/dL 102* 109* 100   CREATININE mg/dL 1 98* 2 25* 1 76   CALCIUM mg/dL 9 1 8 9 9 0   ALK PHOS U/L 85 94  --    ALT U/L 13 12  --    AST U/L 13 24  --

## 2021-07-05 NOTE — ASSESSMENT & PLAN NOTE
· Hgb at 5 9 on admission   · Received 1U PRBCs in ED  · Recheck 2 hours post transfusion ends   · Suspect GI source as pt has melena on admission   · Iron panel   · Hold Xarelto and ASA   · GI consult   · Continue protonix   · Diet clear liquid diet  · H/H q6h

## 2021-07-05 NOTE — PLAN OF CARE
Problem: Potential for Falls  Goal: Patient will remain free of falls  Description: INTERVENTIONS:  - Educate patient/family on patient safety including physical limitations  - Instruct patient to call for assistance with activity   - Consult OT/PT to assist with strengthening/mobility   - Keep Call bell within reach  - Keep bed low and locked with side rails adjusted as appropriate  - Keep care items and personal belongings within reach  - Initiate and maintain comfort rounds  - Make Fall Risk Sign visible to staff  - Offer Toileting every 2 Hours, in advance of need  - Initiate/Maintain bed alarm  - Obtain necessary fall risk management equipment: socks  Problem: MOBILITY - ADULT  Goal: Maintain or return to baseline ADL function  Description: INTERVENTIONS:  -  Assess patient's ability to carry out ADLs; assess patient's baseline for ADL function and identify physical deficits which impact ability to perform ADLs (bathing, care of mouth/teeth, toileting, grooming, dressing, etc )  - Assess/evaluate cause of self-care deficits   - Assess range of motion  - Assess patient's mobility; develop plan if impaired  - Assess patient's need for assistive devices and provide as appropriate  - Encourage maximum independence but intervene and supervise when necessary  - Involve family in performance of ADLs  - Assess for home care needs following discharge   - Consider OT consult to assist with ADL evaluation and planning for discharge  - Provide patient education as appropriate  Outcome: Progressing  Goal: Maintains/Returns to pre admission functional level  Description: INTERVENTIONS:  - Perform BMAT or MOVE assessment daily    - Set and communicate daily mobility goal to care team and patient/family/caregiver  - Collaborate with rehabilitation services on mobility goals if consulted  - Perform Range of Motion 3 times a day  - Reposition patient every 2 hours    - Dangle patient 2 times a day  - Stand patient 2 times a day  - Ambulate patient 2 times a day  - Out of bed to chair 2 times a day   - Out of bed for meals 2  Problem: Prexisting or High Potential for Compromised Skin Integrity  Goal: Skin integrity is maintained or improved  Description: INTERVENTIONS:  - Identify patients at risk for skin breakdown  - Assess and monitor skin integrity  - Assess and monitor nutrition and hydration status  - Monitor labs   - Assess for incontinence   - Turn and reposition patient  - Assist with mobility/ambulation  - Relieve pressure over bony prominences  - Avoid friction and shearing  - Provide appropriate hygiene as needed including keeping skin clean and dry  - Evaluate need for skin moisturizer/barrier cream  - Collaborate with interdisciplinary team   - Patient/family teaching  - Consider wound care consult   Outcome: Progressing     Problem: Nutrition/Hydration-ADULT  Goal: Nutrient/Hydration intake appropriate for improving, restoring or maintaining nutritional needs  Description: Monitor and assess patient's nutrition/hydration status for malnutrition  Collaborate with interdisciplinary team and initiate plan and interventions as ordered  Monitor patient's weight and dietary intake as ordered or per policy  Utilize nutrition screening tool and intervene as necessary  Determine patient's food preferences and provide high-protein, high-caloric foods as appropriate       INTERVENTIONS:  - Monitor oral intake, urinary output, labs, and treatment plans  - Assess nutrition and hydration status and recommend course of action  - Evaluate amount of meals eaten  - Assist patient with eating if necessary   - Allow adequate time for meals  - Recommend/ encourage appropriate diets, oral nutritional supplements, and vitamin/mineral supplements  - Order, calculate, and assess calorie counts as needed  - Recommend, monitor, and adjust tube feedings and TPN/PPN based on assessed needs  - Assess need for intravenous fluids  - Provide specific nutrition/hydration education as appropriate  - Include patient/family/caregiver in decisions related to nutrition  Outcome: Progressing    times a day  - Out of bed for toileting  - Record patient progress and toleration of activity level   Outcome: Progressing     - Apply yellow socks and bracelet for high fall risk patients  - Consider moving patient to room near nurses station  Outcome: Progressing

## 2021-07-05 NOTE — ASSESSMENT & PLAN NOTE
· Pt with chronic trujillo catheter   · UA with 10-20 WBCs and innumerable bacteria  · Unclear if colonized  · U/C, pending   · Continue treatment with vanc and cefepime for osteomyelitis of coccyx that will cover UTI also

## 2021-07-05 NOTE — ASSESSMENT & PLAN NOTE
· Home regimen:  Metoprolol 25 mg q 12 hours  · Continue with increase hold parameters in setting of RORY

## 2021-07-06 NOTE — ASSESSMENT & PLAN NOTE
· Pt with chronic trujillo catheter   · UA with 10-20 WBCs and innumerable bacteria  · Unclear if colonized  · Follow-up cultures  · Continue treatment with vanc and cefepime for osteomyelitis of coccyx that will cover UTI also

## 2021-07-06 NOTE — ASSESSMENT & PLAN NOTE
Lab Results   Component Value Date    HGBA1C 7 0 06/15/2021       Recent Labs     07/05/21  2246 07/06/21  0737 07/06/21  0828 07/06/21  1051   POCGLU 83 52* 111 73       Blood Sugar Average: Last 72 hrs:  · (P) 76 625 home regimen:  Lantus 14 units HS and and NovoLog 9 units t i d   · Patient noticed to be hypoglycemic this more  · Lantus insulin is held  · Received half amp of D50  · Accu-Chek a c  HS with Humalog sliding scale

## 2021-07-06 NOTE — ASSESSMENT & PLAN NOTE
Lab Results   Component Value Date    EGFR 31 07/06/2021    EGFR 24 07/05/2021    EGFR 21 07/04/2021    CREATININE 1 59 (H) 07/06/2021    CREATININE 1 98 (H) 07/05/2021    CREATININE 2 25 (H) 07/04/2021   · Cr at 2 25 on admission s/p 2L of 1/2 NS over the last 2 days at [facility] from 1 76 on 7/1   · CT without hydro  · On IV fluids  · Nephrology on board  · Will increase metoprolol hold parameters  · Creatinine this morning is 1 59  · Avoid hypotension/nephrotoxins medications

## 2021-07-06 NOTE — PROGRESS NOTES
This CM Assistant received an ADT alert indicating the patient was admitted 7/4/21 to [facility] with a GI Bleed  This care manager assistant will continue to monitor via chart review throughout bundle episode

## 2021-07-06 NOTE — NURSING NOTE
Nurse went in to assess patient, she became agitated and said she had to pee  Patient has trujillo catheter, upon assessment catheter was not functioning as patient was actively voiding  Patient became agitated in the process and pulled out both IV's in her L hand  New 16 fr catheter was placed with insertion danni  Patient is resting comfortably in bed

## 2021-07-06 NOTE — PROGRESS NOTES
Progress note - Gastroenterology   Juice Ibrahim 67 y o  female MRN: [id number]  Unit/Bed#: -01 Encounter: [id number]    ASSESSMENT and PLAN    1  Anemia--multifactorial   Appears to have chronic anemia with hemoglobins in the 7 g range baseline by records but presented with black melanotic appearing stool and hemoglobin of 5 8  Hemoglobin stable 7-8 range after transfusion  Stool heme-negative  Son reports that she probably had a colonoscopy over 10 years ago and may have had polyps  - follow H&H  - on IV pantoprazole  - EGD tomorrow  - discussed with son Adenike Velazquez) he will be available tomorrow to give consent  He does report that if he does not  leave messages will call you right back since he sometimes can not answer his phone right at work     2  Chronic oral anticoagulation on Xarelto for history of AFib and CHF  -Xarelto on hold  3  Sacral decubitus-with osteomyelitis-and large wound does following wound center  -consideration for diverting colostomy for wound care     4 Senile dementia Alzheimer's type     5  Acute kidney injury-on top of chronic kidney disease renal follow-up probable element of dehydration    Chief Complaint   Patient presents with    Abnormal Lab     nursing facility reports abnormal WBC, Hgb, and potassium         SUBJECTIVE/HPI   No further GI bleeding    /68   Pulse 89   Temp 97 5 °F (36 4 °C)   Resp 16   Ht 5' 6" (1 676 m)   Wt 72 5 kg (159 lb 13 3 oz)   SpO2 94%   BMI 25 80 kg/m²     PHYSICALEXAM  General appearance:  Minimally interactive  Eyes: PERLLA, EOMI, no icterus   Head: Normocephalic, without obvious abnormality, atraumatic  Lungs: clear to auscultation bilaterally  Heart: regular rate and rhythm, S1, S2 normal, no murmur, click, rub or gallop  Abdomen: soft, non-tender; bowel sounds normal; no masses,  no organomegaly  Extremities: extremities normal, atraumatic, no cyanosis or edema  Neurologic:  Open eyes to verbal command    Lab Results   Component Value Date    GLUCOSE 258 (H) 05/03/2021    CALCIUM 8 9 07/06/2021    K 4 3 07/06/2021    CO2 23 07/06/2021     (H) 07/06/2021    BUN 76 (H) 07/06/2021    CREATININE 1 59 (H) 07/06/2021     Lab Results   Component Value Date    WBC 7 92 07/06/2021    HGB 7 3 (L) 07/06/2021    HCT 23 7 (L) 07/06/2021    MCV 95 07/06/2021     07/06/2021     Lab Results   Component Value Date    ALT 13 07/05/2021    AST 13 07/05/2021    ALKPHOS 85 07/05/2021       Lab Results   Component Value Date    LIPASE 58 (L) 07/04/2021     Lab Results   Component Value Date    IRON 28 (L) 07/04/2021    TIBC 171 (L) 07/04/2021    FERRITIN 263 07/04/2021     Lab Results   Component Value Date    INR 1 37 (H) 07/05/2021

## 2021-07-06 NOTE — ASSESSMENT & PLAN NOTE
· Home regimen:  Metoprolol 25 mg q 12 hours  · Anticoagulated with Eliquis, will hold in setting of GI bleed and severe anemia  · Blood thinner on hold

## 2021-07-06 NOTE — PROGRESS NOTES
NEPHROLOGY PROGRESS NOTE   Dana Santillan 67 y o  female MRN: [id number]  Unit/Bed#: -Enrique Encounter: [id number]      ASSESSMENT & PLAN    67 with CKD, AFib, hypoxia, hypertension, atrial fibrillation sacral ulcers dementia admitted with anemia an osteomyelitis complicated by an acute kidney injury    1  RORY (POA) on CKD stage III  o Follows with Dr Roselia Lee  o Baseline creatinine 1-1 1  o Will repeat complement level  o Received 2 L of half-normal saline and 500 cc of normal saline  o Her creatinine peaked at 2 25 and is now back down to 1 59  o Sodium is slightly elevated at 147  o She is not tolerating any fluid, or p o  Intake  o Will keep her on D5W at 50 cc an hour-if acutely short of breath give 40 mg of IV Lasix    2  Electrolytes/Acid Base  o Hypernatremia-sodium is 147-restart D5W at 50 cc an hour for a total of 500 cc    3  Blood Pressure-her blood pressures are acceptable  o Did have some previously low blood pressures  o Will monitor    4  Anemia of CKD  o Monitor H&H in the setting of infection, off anticoagulation GI following for potential endoscopy    5  CKD-BMD  o Her continue to monitor    6   Clinical Course/CV Risk Reduction/Health maintenance/communication  o Bacteriuria-following up on urine culture currently on vancomycin and cefepime  o Anemia hemoglobin was 5 9 did received packed red blood cells GI follow-up being as well  o Chronic respiratory failure-now off oxygen  o Atrial fibrillation-anticoagulation is on hold  o Pressure ulcer-concern for osteomyelitis on antibiotics    DISCUSSION    Will initiate D5W at 50 cc an hour if hypotensive can switch to D5 half normal saline    If short of breath give 40 mg of IV Lasix and discontinue fluid      SUBJECTIVE:    Patient was seen today  Opens her eyes and nods her head  No  acute complaints    OBJECTIVE:  Current Weight: Weight - Scale: 72 5 kg (159 lb 13 3 oz)  @  Vitals:    07/05/21 2248 07/06/21 0147 07/06/21 0740 07/06/21 0926   BP: 91/50 137/62 146/68    BP Location:       Pulse: 91 88 89    Resp: 20 16 16    Temp: 97 6 °F (36 4 °C)  97 5 °F (36 4 °C)    TempSrc:       SpO2: 93% 98% 94% 94%   Weight:       Height:           Intake/Output Summary (Last 24 hours) at 7/6/2021 1422  Last data filed at 7/6/2021 1101  Gross per 24 hour   Intake 0 ml   Output 1615 ml   Net -1615 ml     Weight (last 2 days)     Date/Time   Weight    07/05/21 0649   72 5 (159 83)              General: conscious, cooperative, in no acute distress  Eyes: conjunctivae pink, anicteric sclerae  ENT: lips and mucous membranes moist  Neck: supple, no JVD  Chest: no respiratory distress, no accessory muscle use, normal respiratory effort  CVS: normal heart rate, no friction rub  Abdomen: soft, non-tender, non-distended, normoactive bowel sounds  Extremities: no edema of both legs  Skin: no rash  Neuro: awake, alert, oriented      Medications:    Current Facility-Administered Medications:     acetaminophen (TYLENOL) tablet 650 mg, 650 mg, Oral, Q6H PRN, Judi Gusman PA-C    atorvastatin (LIPITOR) tablet 40 mg, 40 mg, Oral, Daily With Dinner, Judi Gusman PA-C    cefepime (MAXIPIME) IVPB (premix in dextrose) 1,000 mg 50 mL, 1,000 mg, Intravenous, Q24H, Judi Gusman PA-C, Last Rate: 100 mL/hr at 07/06/21 0149, 1,000 mg at 07/06/21 0149    gabapentin (NEURONTIN) capsule 100 mg, 100 mg, Oral, TID, Judi Gusman PA-C, 100 mg at 07/05/21 2242    insulin lispro (HumaLOG) 100 units/mL subcutaneous injection 1-5 Units, 1-5 Units, Subcutaneous, TID AC **AND** Fingerstick Glucose (POCT), , , TID AC, Judi Gusman PA-C    insulin lispro (HumaLOG) 100 units/mL subcutaneous injection 1-5 Units, 1-5 Units, Subcutaneous, HS, Judi Gusman PA-C    metoprolol tartrate (LOPRESSOR) tablet 25 mg, 25 mg, Oral, Q12H MARILYN, Judi Gusman PA-C    mirtazapine (REMERON) tablet 15 mg, 15 mg, Oral, HS, Judi Gusman PA-C, 15 mg at 07/05/21 2776    pantoprazole (PROTONIX) injection 40 mg, 40 mg, Intravenous, Q12H [address], Jamey Mccollum MD, 40 mg at 07/06/21 0921    vancomycin (VANCOCIN) 500 mg in sodium chloride 0 9% 100 mL IVPB, 10 mg/kg, Intravenous, Q24H, Anisa Solis PA-C, Last Rate: 100 mL/hr at 07/06/21 0301, 500 mg at 07/06/21 0301    Invasive Devices:   Urethral Catheter Double-lumen 16 Fr  (Active)     Lab Results:   Results from last 7 days   Lab Units 07/06/21  0525 07/05/21  2356 07/05/21  1818 07/05/21  1242 07/05/21  0558 07/04/21  2222 07/04/21  2216 07/04/21  0221 07/01/21  0000   WBC Thousand/uL 7 92  --   --   --   --   --  12 67*  --   --    HEMOGLOBIN g/dL 7 3* 7 8* 7 1* 7 1* 7 6*  --  5 9*  --   --    HEMATOCRIT % 23 7* 25 4* 22 8* 23 7* 24 3*  --  19 3*  --   --    PLATELETS Thousands/uL 259  --   --   --   --   --  260  --   --    POTASSIUM mmol/L 4 3  --   --   --  4 9  --  5 4*  --  5 11   CHLORIDE mmol/L 114*  --   --   --  108  --  106  --  104   CO2 mmol/L 23  --   --   --  25  --  25  --  28   BUN mg/dL 76*  --   --   --  102*  --  109*  --  100   CREATININE mg/dL 1 59*  --   --   --  1 98*  --  2 25*  --  1 76   CALCIUM mg/dL 8 9  --   --   --  9 1  --  8 9  --  9 0   MAGNESIUM mg/dL 2 3  --   --   --   --   --   --   --   --    ALK PHOS U/L  --   --   --   --  85  --  94  --   --    ALT U/L  --   --   --   --  13  --  12  --   --    AST U/L  --   --   --   --  13  --  24  --   --    BLOOD CULTURE   --   --   --   --   --   --  No Growth at 24 hrs  No Growth at 24 hrs  --    LEUKOCYTES UA   --   --   --   --   --  Large*  --   --   --    BLOOD UA   --   --   --   --   --  Small*  --   --   --          Portions of the record may have been created with voice recognition software  Occasional wrong word or "sound a like" substitutions may have occurred due to the inherent limitations of voice recognition software  Read the chart carefully and recognize, using context, where substitutions have occurred  If you have any questions, please contact the dictating provider

## 2021-07-06 NOTE — PROGRESS NOTES
[place]  Progress Note - Marky Isaac 1945, 67 y o  female MRN: [id number]  Unit/Bed#: -01 Encounter: [id number]  Primary Care Provider: Cassandra Burgos DO   Date and time admitted to hospital: 7/4/2021  9:50 PM    Bacteriuria  Assessment & Plan  · Pt with chronic trujillo catheter   · UA with 10-20 WBCs and innumerable bacteria  · Unclear if colonized  · Follow-up cultures  · Continue treatment with vanc and cefepime for osteomyelitis of coccyx that will cover UTI also     Anemia  Assessment & Plan  · Hgb at 5 9 on admission   · Received 1U PRBCs in ED  · Hemoglobin this morning is 7 point  · Suspect GI source as pt has melena on admission   · Iron panel and stool for occult blood  · Hold Xarelto and ASA   · GI consult patient  · Continue protonix   · Diet clear liquid diet  · Trend H&H and transfuse if needed  · Possible endoscopy in a m      Acute renal failure superimposed on stage 3a chronic kidney disease [facility])  Assessment & Plan  Lab Results   Component Value Date    EGFR 31 07/06/2021    EGFR 24 07/05/2021    EGFR 21 07/04/2021    CREATININE 1 59 (H) 07/06/2021    CREATININE 1 98 (H) 07/05/2021    CREATININE 2 25 (H) 07/04/2021   · Cr at 2 25 on admission s/p 2L of 1/2 NS over the last 2 days at [facility] from 1 76 on 7/1   · CT without hydro  · On IV fluids  · Nephrology on board  · Will increase metoprolol hold parameters  · Creatinine this morning is 1 59  · Avoid hypotension/nephrotoxins medications    Chronic respiratory failure with hypoxia (HCC)  Assessment & Plan  · Chronically on 2 L supplemental oxygen  · No new oxygen requirements    Paroxysmal atrial fibrillation (HCC)  Assessment & Plan  · Home regimen:  Metoprolol 25 mg q 12 hours  · Anticoagulated with Eliquis, will hold in setting of GI bleed and severe anemia  · Blood thinner on hold    Pressure ulcer of sacral region, stage 4 ([facility] [address] )  Assessment & Plan  · Complicated by osteomyelitis coccyx, see above    Hypertension  Assessment & Plan  · Home regimen:  Metoprolol 25 mg q 12 hours  · Continue with increase hold parameters in setting of RORY    Type 2 diabetes mellitus with hyperglycemia [facility])  Assessment & Plan  Lab Results   Component Value Date    HGBA1C 7 0 06/15/2021       Recent Labs     07/05/21  2246 07/06/21  0737 07/06/21  0828 07/06/21  1051   POCGLU 83 52* 111 73       Blood Sugar Average: Last 72 hrs:  · (P) 76 625 home regimen:  Lantus 14 units HS and and NovoLog 9 units t i d   · Patient noticed to be hypoglycemic this more  · Lantus insulin is held  · Received half amp of D50  · Accu-Chek a c  HS with Humalog sliding scale    * Osteomyelitis (HCC)  Assessment & Plan  · Stage IV sacral wound with surrounding erythema and induration   · CT A/P: "Large soft tissue ulcer overlying the coccyx with evidence of osteomyelitis involving the coccygeal bones  These findings have worsened when comparing to 9/14/2020 "  · Patient likely has osteomyelitis of coccyx  · Continue vanc and cefepime  · ID consult appreciated  · Surgical consult appreciated  · Follow-up culture results  · Discussed with patient's son Juris Letters then brought palliative care discussion and son wants to pursue aggressive treatment modalities if needed      Labs & Imaging: I have personally reviewed pertinent reports  VTE Pharmacologic Prophylaxis: Reason for no pharmacologic prophylaxis Anemia  VTE Mechanical Prophylaxis: sequential compression device    Code Status:   Level 1 - Full Code    Patient Centered Rounds: I have performed bedside rounds with nursing staff today  Discussions with Specialists or Other Care Team Provider: GI    Education and Discussions with Family / Patient: Carolina Hammer    Current Length of Stay: 1 day(s)    Current Patient Status: Inpatient   Certification Statement: The patient will continue to require additional inpatient hospital stay due to see my assessment and plan       Subjective:   Patient is seen and examined at bedside  Has dementia  Not a good historian  Afebrile  All other ROS are negative  Objective:    Vitals: Blood pressure 146/68, pulse 89, temperature 97 5 °F (36 4 °C), resp  rate 16, height 5' 6" (1 676 m), weight 72 5 kg (159 lb 13 3 oz), SpO2 94 %  ,Body mass index is 25 8 kg/m²  SPO2 RA Rest      ED to Hosp-Admission (Current) from 7/4/2021 in [address] Med Surg Unit   SpO2  94 %   SpO2 Activity  At Rest   O2 Device  None (Room air)   O2 Flow Rate  --        I&O:     Intake/Output Summary (Last 24 hours) at 7/6/2021 1209  Last data filed at 7/6/2021 1101  Gross per 24 hour   Intake --   Output 2215 ml   Net -2215 ml       Physical Exam:    General- Alert, lying comfortably in bed  Not in any acute distress  Neck- Supple, No JVD  CVS- regular, S1 and S2 normal  Chest- Bilateral Air entry, No rhochi, crackles or wheezing present  Abdomen- soft, nontender, not distended, no guarding or rigidity, BS+  Extremities- sacral wounds  CNS-  has dementia  Opens eyes to verbal commands      Invasive Devices     Peripheral Intravenous Line            Peripheral IV Right Wrist -- days          Drain            Urethral Catheter Double-lumen 16 Fr  <1 day                      Social History  reviewed  Family History   Problem Relation Age of Onset    Diabetes Mother     Varicose Veins Mother     Stroke Father     Arthritis Brother     Diabetes Daughter     No Known Problems Brother     reviewed    Meds:  Current Facility-Administered Medications   Medication Dose Route Frequency Provider Last Rate Last Admin    acetaminophen (TYLENOL) tablet 650 mg  650 mg Oral Q6H PRN Radha Mc PA-C        atorvastatin (LIPITOR) tablet 40 mg  40 mg Oral Daily With [employer]        cefepime (MAXIPIME) IVPB (premix in dextrose) 1,000 mg 50 mL  1,000 mg Intravenous Q24H Radha Mc PA-C 100 mL/hr at 07/06/21 0149 1,000 mg at 07/06/21 0149    gabapentin (NEURONTIN) capsule 100 mg  100 mg Oral TID Judi Gusman PA-C   100 mg at 07/05/21 2242    insulin lispro (HumaLOG) 100 units/mL subcutaneous injection 1-5 Units  1-5 Units Subcutaneous TID  Judi Gusman PA-C        insulin lispro (HumaLOG) 100 units/mL subcutaneous injection 1-5 Units  1-5 Units Subcutaneous  Judi BEATRIZ Gusman        metoprolol tartrate (LOPRESSOR) tablet 25 mg  25 mg Oral Q12H [facility] Hometim Gusman PA-C        mirtazapine (REMERON) tablet 15 mg  15 mg Oral  Judi BEATRIZ Gusman   15 mg at 07/05/21 2242    pantoprazole (PROTONIX) injection 40 mg  40 mg Intravenous Q12H [facility] Bird Hancock MD   40 mg at 07/06/21 0468    vancomycin (VANCOCIN) 500 mg in sodium chloride 0 9% 100 mL IVPB  10 mg/kg Intravenous Q24H Judi Gusman PA-C 100 mL/hr at 07/06/21 0301 500 mg at 07/06/21 0301      Medications Prior to Admission   Medication    acetaminophen (TYLENOL) 325 mg tablet    aspirin (ECOTRIN LOW STRENGTH) 81 mg EC tablet    atorvastatin (LIPITOR) 40 mg tablet    ferrous sulfate 325 (65 Fe) mg tablet    gabapentin (NEURONTIN) 100 mg capsule    HumaLOG KwikPen 100 units/mL injection pen    insulin glargine (LANTUS) 100 units/mL subcutaneous injection    insulin lispro (HumaLOG) 100 units/mL injection    linaGLIPtin (Tradjenta) 5 MG TABS    metoprolol tartrate (LOPRESSOR) 25 mg tablet    mirtazapine (REMERON) 15 mg tablet    Nutritional Supplements (Yevgeniy) PACK    pantoprazole (PROTONIX) 40 mg tablet    rivaroxaban (XARELTO) 15 mg tablet    VITAMIN D PO    bisacodyl (DULCOLAX) 10 mg suppository    mineral oil enema    polyethylene glycol (MIRALAX) 17 g packet    Specialty Vitamins Products (PROSTATE PO)    vancomycin (VANCOCIN) 50mg/mL SOLN       Labs:  Results from last 7 days   Lab Units 07/06/21  0525 07/05/21  2356 07/05/21  1818 07/04/21  2216   WBC Thousand/uL 7 92  --   --  12 67*   HEMOGLOBIN g/dL 7 3* 7 8* 7 1* 5 9*   HEMATOCRIT % 23 7* 25 4* 22 8* 19 3*   PLATELETS Thousands/uL 259  --   --  260   NEUTROS PCT %  --   --   --  79*   LYMPHS PCT %  --   --   --  16   MONOS PCT %  --   --   --  3*   EOS PCT %  --   --   --  1     Results from last 7 days   Lab Units 07/06/21  0525 07/05/21  0558 07/04/21  2216   POTASSIUM mmol/L 4 3 4 9 5 4*   CHLORIDE mmol/L 114* 108 106   CO2 mmol/L 23 25 25   BUN mg/dL 76* 102* 109*   CREATININE mg/dL 1 59* 1 98* 2 25*   CALCIUM mg/dL 8 9 9 1 8 9   ALK PHOS U/L  --  85 94   ALT U/L  --  13 12   AST U/L  --  13 24     Lab Results   Component Value Date    TROPONINI 0 07 (H) 07/05/2021    TROPONINI 0 06 (H) 07/05/2021    TROPONINI 0 07 (H) 07/04/2021     Results from last 7 days   Lab Units 07/05/21  0558 07/04/21  2216   INR  1 37* 1 68*     Lab Results   Component Value Date    BLOODCX No Growth at 24 hrs  07/04/2021    BLOODCX No Growth at 24 hrs  07/04/2021    BLOODCX No Growth After 5 Days  05/10/2021    URINECX 70,000-79,000 cfu/ml Proteus species (A) 07/04/2021    URINECX >100,000 cfu/ml  05/03/2021    URINECX 30,000-39,000 cfu/ml Candida glabrata (A) 09/14/2020    WOUNDCULT 3+ Growth of Pseudomonas aeruginosa MDR (A) 05/10/2021    WOUNDCULT 2+ Growth of Enterococcus faecalis (A) 05/10/2021    WOUNDCULT 2+ Growth of Diphtheroids 05/10/2021         Imaging:  Results for orders placed during the hospital encounter of 07/04/21    XR chest portable    Narrative  CHEST    INDICATION:   sepsis, chest pain  COMPARISON: Chest radiograph from 4/8/2021, chest CT from 8/26/2020, abdomen CT from 7/5/2021  EXAM PERFORMED/VIEWS:  XR CHEST PORTABLE  FINDINGS:    Cardiomediastinal silhouette normal     Mild pulmonary venous congestion  Tubular opacity over the right midlung likely due to the right pleural effusion in the fissure  No pneumothorax  Osseous structures normal for age  Right shoulder arthroplasty  Impression  Mild pulmonary venous congestion with small right effusion            Workstation performed: [id number]    No results found for this or any previous visit  Last 24 Hours Medication List:   Current Facility-Administered Medications   Medication Dose Route Frequency Provider Last Rate    acetaminophen  650 mg Oral Q6H PRN Asad Vickers PA-C      atorvastatin  40 mg Oral Daily With Laina Roman PA-C      cefepime  1,000 mg Intravenous Q24H Asad Vickers PA-C 1,000 mg (07/06/21 0149)    gabapentin  100 mg Oral TID Asad Vickers PA-C      insulin lispro  1-5 Units Subcutaneous TID AC Asad Vickers PA-C      insulin lispro  1-5 Units Subcutaneous HS Asad Vickers PA-C      metoprolol tartrate  25 mg Oral Q12H [facility] Asad Vickers PA-C      mirtazapine  15 mg Oral HS Asad Vickers PA-C      pantoprazole  40 mg Intravenous Q12H [facility] Jennifer Vidal MD      vancomycin  10 mg/kg Intravenous Q24H Asad Vickers PA-C 500 mg (07/06/21 0301)        Today, Patient Was Seen By: Sarah Perez MD    ** Please Note: Dictation voice to text software may have been used in the creation of this document   **

## 2021-07-06 NOTE — ASSESSMENT & PLAN NOTE
· Stage IV sacral wound with surrounding erythema and induration   · CT A/P: "Large soft tissue ulcer overlying the coccyx with evidence of osteomyelitis involving the coccygeal bones   These findings have worsened when comparing to 9/14/2020 "  · Patient likely has osteomyelitis of coccyx  · Continue vanc and cefepime  · ID consult appreciated  · Surgical consult appreciated  · Follow-up culture results  · Discussed with patient's son Sven Allen then brought palliative care discussion and son wants to pursue aggressive treatment modalities if needed

## 2021-07-07 NOTE — ANESTHESIA POSTPROCEDURE EVALUATION
Post-Op Assessment Note    CV Status:  Stable  Pain Score: 0    Pain management: adequate     Mental Status:  Awake   Hydration Status:  Euvolemic   PONV Controlled:  None   Airway Patency:  Patent      Post Op Vitals Reviewed: Yes      Staff: Anesthesiologist, CRNA   Comments: report given to RN; VSS; 2l/ min NC        No complications documented      BP      Temp      Pulse     Resp      SpO2

## 2021-07-07 NOTE — WOUND OSTOMY CARE
Consult Note - Wound   Reginold Caroline 67 y o  female MRN: [id number]  Unit/Bed#: -01 Encounter: [id number]      Patient triaged with primary nurse  Patient off the floor getting an EGD  Patient has a stage 4 pressure injury and it is to be managed by surgery  Wound care will sign off

## 2021-07-07 NOTE — CASE MANAGEMENT
LOS: 2 days  Bundle: CHF  Unplanned Readmission Score: 27 Green  30 Day Readmission: No     CM met with patient and son Ken Miller at bedside  Explained the role of CM  Obtained the following information from patient  Home: LTR at [address] [place] [address] With: SNF  ADL's: Dependent  DME: Provided at SNF  Ambulation: Non-ambulatory bed-bound yfn lift OOB  POA/LW/AD: Viktor Miller Wyatt/Yes/Yes   Transport at D/C: BLS      Son asking if mother can go to a different SNF  Discussed she is a LTR at [facility] will  make referrals in [address] to other SNF's discussed he may need to work with SNF after she returns there  Son asked about OO  Referrals sent to all [address] [place] [address]  CM reviewed d/c planning process including the following: identifying help at home, patient preferences for d/c planning needs, availability of treatment team to discuss questions or concerns patient and/or family may have regarding understanding medications and recognizing signs and symptoms once discharged  CM following through discharge

## 2021-07-07 NOTE — PROGRESS NOTES
Vancomycin IV Pharmacy-to-Dose Consultation    Micaela Khan is a 67 y o  female who is currently receiving Vancomycin IV with management by the Pharmacy Consult service  Assessment/Plan:  The patient was reviewed  Renal function is improving and no signs or symptoms of nephrotoxicity and/or infusion reactions were documented in the chart  Based on todays assessment, continue current vancomycin (day # 3) dosing of 500mg IV Q24H, with a plan for trough to be drawn at 0230 on 7/8/21  We will continue to follow the patients culture results and clinical progress daily      Idris Beck, Pharmacist

## 2021-07-07 NOTE — ASSESSMENT & PLAN NOTE
Wt Readings from Last 3 Encounters:   07/05/21 72 5 kg (159 lb 13 3 oz)   05/13/21 57 kg (125 lb 10 6 oz)   04/27/21 64 kg (141 lb)     Patient has history of chronic diastolic heart failure  Echocardiogram showed ejection fraction of 65%   Daily weight and I&Os  Demadex on hold

## 2021-07-07 NOTE — QUICK NOTE
QUICK NOTE - Deterioration Index  Gretta Duke 67 y o  female MRN: [id number]  Unit/Bed#: -01 Encounter: [id number]    Date Paged: 21  Time Paged:   Room #: 331  Arrival Time: 2300  Deterioration index score at time of page: 61 5 %  Spoke with RN from primary team  Need to escalate level of care: no     PROBLEMS resulting in high DI score: Factors Contributing to Score   Factor Value   39% Ruddy coma scale 9   20% Age 67   15% Neurological exam Lethargic   14% Sodium 147 mmol/L   5% Hematocrit 23 7 %   2% Potassium 4 3 mmol/L   2% Systolic 056            · Pt with Dementia, MS waxing and waning with responding  RN reports pt wouldn't speak to her during exam and shook her head no when asked if she can talk  · During my exam, pt GCS 14 (E4, V4, M6): oriented to self only, following commands with all extremities  PLAN:     Can stay where she is at  No change in plan of care    Please contact critical care via [employer] with any questions or concerns       Vitals:   Vitals:    21 0147 21 0740 21 0926 21 1510   BP: 137/62 146/68  130/52   BP Location:       Pulse: 88 89  87   Resp: 16 16  16   Temp:  97 5 °F (36 4 °C)  97 5 °F (36 4 °C)   TempSrc:       SpO2: 98% 94% 94% 96%   Weight:       Height:           Respiratory:  SpO2: SpO2: 96 %, SpO2 Activity: SpO2 Activity: At Rest, SpO2 Device: O2 Device: None (Room air)  Nasal Cannula O2 Flow Rate (L/min): 2 L/min    Temperature: Temp (24hrs), Av 5 °F (36 4 °C), Min:97 5 °F (36 4 °C), Max:97 5 °F (36 4 °C)  Current: Temperature: 97 5 °F (36 4 °C)    Labs:   Results from last 7 days   Lab Units 21  0525 21  2356 21  1818 21  2216   WBC Thousand/uL 7 92  --   --  12 67*   HEMOGLOBIN g/dL 7 3* 7 8* 7 1* 5 9*   HEMATOCRIT % 23 7* 25 4* 22 8* 19 3*   PLATELETS Thousands/uL 259  --   --  260   NEUTROS PCT %  --   --   --  79*   MONOS PCT %  --   --   --  3*     Results from last 7 days   Lab Units 07/06/21  0525 07/05/21  0558 07/04/21  2216   SODIUM mmol/L 147* 140 139   POTASSIUM mmol/L 4 3 4 9 5 4*   CHLORIDE mmol/L 114* 108 106   CO2 mmol/L 23 25 25   BUN mg/dL 76* 102* 109*   CREATININE mg/dL 1 59* 1 98* 2 25*   CALCIUM mg/dL 8 9 9 1 8 9   ALK PHOS U/L  --  85 94   ALT U/L  --  13 12   AST U/L  --  13 24     Results from last 7 days   Lab Units 07/06/21  0525   MAGNESIUM mg/dL 2 3     Results from last 7 days   Lab Units 07/04/21  2216   LACTIC ACID mmol/L 1 0     Results from last 7 days   Lab Units 07/05/21  0817 07/05/21  0558 07/04/21  2216   TROPONIN I ng/mL 0 07* 0 06* 0 07*     Results from last 7 days   Lab Units 07/06/21  0525 07/04/21  2216   PROCALCITONIN ng/ml 0 09 0 25       Code Status: Level 1 - Full Code

## 2021-07-07 NOTE — PROCEDURES
Pt seen and examined  Appears baseline  Does not talk much or express any pain at this time    The wound was cleaned  It is 5*4*3cm, deep with pink granoulous tissue  No drianage, no tunnelling  Surrounding skin appears fragile, thin and there is some peeling  It was cleaned and prepped  The granulofoam was applied and a tail was created as well so that the patient is not lying on it  Seal check was performed  She tolerated the procedure well  Cont with changes every 3 days       Tiarra Mendez PA-C

## 2021-07-07 NOTE — PROGRESS NOTES
NEPHROLOGY PROGRESS NOTE   Sebastien Jhony 67 y o  female MRN: [id number]  Unit/Bed#: -Enrique Encounter: [id number]      ASSESSMENT & PLAN    67 with CKD, AFib, hypoxia, hypertension, atrial fibrillation sacral ulcers dementia admitted with anemia an osteomyelitis complicated by an acute kidney injury     1  RORY (POA) on CKD stage III  ? Follows with Dr Antelmo Monte  ? Baseline creatinine 1-1 1  ? Complement levels are now normal  ? Creatinine now improved to baseline     2  Electrolytes/Acid Base  ? Hypernatremia-sodium continues to trend up will increase D5W to 75 cc an hour and monitor closely is tolerating some p o  Intake     3  Blood Pressure-her blood pressures are acceptable  ? Did have some previously low blood pressures  ? Will monitor     4  Anemia of CKD  ? Monitor H&H in the setting of infection, off anticoagulation status post endoscopy small AVM     5  CKD-BMD  ? Her continue to monitor     6  Clinical Course/CV Risk Reduction/Health maintenance/communication  ? Bacteriuria-following up on urine culture currently on vancomycin and cefepime  ? Anemia hemoglobin was 5 9 did received packed red blood cells GI follow-up being as well  ? Chronic respiratory failure-now off oxygen  ? Atrial fibrillation-anticoagulation is on hold  ? Pressure ulcer-concern for osteomyelitis on antibiotics  ? Encephalopathy and dementia-decrease gabapentin to 100 q h s   Given encephalopathy      SUBJECTIVE:    Patient was seen and examined at bedside    OBJECTIVE:  Current Weight: Weight - Scale: 72 5 kg (159 lb 13 3 oz)  @  Vitals:    07/07/21 0812 07/07/21 1005 07/07/21 1033 07/07/21 1528   BP: (!) 190/77 165/72 167/74 (!) 155/124   Pulse: 103 (!) 112 92 90   Resp: 18 16 16 16   Temp: 97 7 °F (36 5 °C)   98 5 °F (36 9 °C)   TempSrc: Oral      SpO2: 95% (!) 88% 97% 97%   Weight:       Height:           Intake/Output Summary (Last 24 hours) at 7/7/2021 1539  Last data filed at 7/7/2021 1124  Gross per 24 hour   Intake 1642 17 ml Output 800 ml   Net 842 17 ml     Weight (last 2 days)     Date/Time   Weight    07/05/21 0649   72 5 (159 83)              General: conscious, cooperative, in no acute distress  Eyes: conjunctivae pink, anicteric sclerae  ENT: lips and mucous membranes moist  Neck: supple, no JVD  Chest: no respiratory distress, no accessory muscle use, normal respiratory effort  CVS: normal heart rate, no friction rub  Abdomen: soft, non-tender, non-distended, normoactive bowel sounds  Extremities: no edema of both legs  Skin: no rash  Neuro:  Dementia somewhat more alert      Medications:    Current Facility-Administered Medications:     acetaminophen (TYLENOL) tablet 650 mg, 650 mg, Oral, Q6H PRN, Gracie Tello PA-C    atorvastatin (LIPITOR) tablet 40 mg, 40 mg, Oral, Daily With Dinner, Gracie Tello PA-C    cefepime (MAXIPIME) IVPB (premix in dextrose) 1,000 mg 50 mL, 1,000 mg, Intravenous, Q24H, Gracie Tello PA-C, Stopped at 07/07/21 0247    dextrose 5 % infusion, 50 mL/hr, Intravenous, Continuous, Jacquelyn Burger PA-C, Stopped at 07/07/21 1823    gabapentin (NEURONTIN) capsule 100 mg, 100 mg, Oral, TID, Gracie Tello PA-C, 100 mg at 07/07/21 1154    insulin lispro (HumaLOG) 100 units/mL subcutaneous injection 1-5 Units, 1-5 Units, Subcutaneous, TID AC **AND** Fingerstick Glucose (POCT), , , TID AC, Gracie Tello PA-C    insulin lispro (HumaLOG) 100 units/mL subcutaneous injection 1-5 Units, 1-5 Units, Subcutaneous, HS, Gracie Tello PA-C    metoprolol tartrate (LOPRESSOR) tablet 25 mg, 25 mg, Oral, Q12H [address], Gracie Tello PA-C, 25 mg at 07/07/21 1154    mirtazapine (REMERON) tablet 15 mg, 15 mg, Oral, HS, Gracie Tello PA-C, 15 mg at 07/05/21 2242    pantoprazole (PROTONIX) injection 40 mg, 40 mg, Intravenous, Q12H [address], Altadriana Santana MD, 40 mg at 07/07/21 1154    vancomycin (VANCOCIN) 500 mg in sodium chloride 0 9% 100 mL IVPB, 10 mg/kg, Intravenous, Q24H, Gracie Tello, PA-C, Last Rate: 100 mL/hr at 07/07/21 0248, 500 mg at 07/07/21 0248    Invasive Devices:   Urethral Catheter Double-lumen 16 Fr  (Active)   Site Assessment Skin intact 07/06/21 2201   Jimenez Care Done 07/07/21 0900   Collection Container Standard drainage bag 07/06/21 2201   Securement Method Leg strap 07/06/21 2201   Output (mL) 175 mL 07/07/21 0500     Lab Results:   Results from last 7 days   Lab Units 07/07/21  0501 07/06/21  0525 07/05/21  2356 07/05/21  1818 07/05/21  1242 07/05/21  0558 07/04/21  2222 07/04/21  2216 07/04/21  0221 07/01/21  0000   WBC Thousand/uL 8 02 7 92  --   --   --   --   --  12 67*  --   --    HEMOGLOBIN g/dL 7 4* 7 3* 7 8* 7 1* 7 1* 7 6*  --  5 9*  --   --    HEMATOCRIT % 24 4* 23 7* 25 4* 22 8* 23 7* 24 3*  --  19 3*  --   --    PLATELETS Thousands/uL 271 259  --   --   --   --   --  260  --   --    POTASSIUM mmol/L 4 1 4 3  --   --   --  4 9  --  5 4*  --  5 11   CHLORIDE mmol/L 114* 114*  --   --   --  108  --  106  --  104   CO2 mmol/L 24 23  --   --   --  25  --  25  --  28   BUN mg/dL 53* 76*  --   --   --  102*  --  109*  --  100   CREATININE mg/dL 1 19 1 59*  --   --   --  1 98*  --  2 25*  --  1 76   CALCIUM mg/dL 9 0 8 9  --   --   --  9 1  --  8 9  --  9 0   MAGNESIUM mg/dL  --  2 3  --   --   --   --   --   --   --   --    ALK PHOS U/L  --   --   --   --   --  85  --  94  --   --    ALT U/L  --   --   --   --   --  13  --  12  --   --    AST U/L  --   --   --   --   --  13  --  24  --   --    BLOOD CULTURE   --   --   --   --   --   --   --  No Growth at 48 hrs  No Growth at 48 hrs  --    LEUKOCYTES UA   --   --   --   --   --   --  Large*  --   --   --    BLOOD UA   --   --   --   --   --   --  Small*  --   --   --          Portions of the record may have been created with voice recognition software  Occasional wrong word or "sound a like" substitutions may have occurred due to the inherent limitations of voice recognition software   Read the chart carefully and recognize, using context, where substitutions have occurred  If you have any questions, please contact the dictating provider

## 2021-07-07 NOTE — ASSESSMENT & PLAN NOTE
Lab Results   Component Value Date    HGBA1C 7 0 06/15/2021       Recent Labs     07/06/21  1722 07/06/21  2119 07/07/21  0830 07/07/21  1056   POCGLU 122 94 102 97       Blood Sugar Average: Last 72 hrs:  · (P) 82 [id number] home regimen:  Lantus 14 units HS and and NovoLog 9 units t i d   · Lantus insulin is on hold as patient was hypoglycemic yesterday  · Accu-Chek a c  HS with Humalog sliding scale

## 2021-07-07 NOTE — PROGRESS NOTES
[place]  Progress Note - Lani Anis 1945, 67 y o  female MRN: [id number]  Unit/Bed#: -01 Encounter: [id number]  Primary Care Provider: Alex Proctor DO   Date and time admitted to hospital: 7/4/2021  9:50 PM    Bacteriuria  Assessment & Plan  · Pt with chronic trujillo catheter   · UA with 10-20 WBCs and innumerable bacteria  · Unclear if colonized  · Follow-up cultures  · Continue treatment with vanc and cefepime for osteomyelitis of coccyx that will cover UTI also     Anemia  Assessment & Plan  · Hgb at 5 9 on admission   · Received 1U PRBCs in ED  · Hemoglobin this morning is 7 4  · Suspect GI source as pt has melena on admission   · Iron panel and stool for occult blood  · Hold Xarelto and ASA   · GI follow-up noted  · Patient underwent EGD which showed-EGD noticed a small nonbleeding AVM in the stomach which was cauterized  No blood in the upper GI tract or stigmata of bleeding  · Diet clear liquid diet  · Trend H&H and transfuse if needed    Acute renal failure superimposed on stage 3a chronic kidney disease [facility])  Assessment & Plan  Lab Results   Component Value Date    EGFR 44 07/07/2021    EGFR 31 07/06/2021    EGFR 24 07/05/2021    CREATININE 1 19 07/07/2021    CREATININE 1 59 (H) 07/06/2021    CREATININE 1 98 (H) 07/05/2021   · Cr at 2 25 on admission s/p 2L of 1/2 NS over the last 2 days at [facility] from 1 76 on 7/1   · CT without hydro  · On IV fluids  · Nephrology on board  · Creatinine this morning is 1 19  · Avoid hypotension/nephrotoxins medications  · Monitor renal function in a m      Chronic respiratory failure with hypoxia (HCC)  Assessment & Plan  · Chronically on 2 L supplemental oxygen  · No new oxygen requirements    Chronic diastolic heart failure (HCC)  Assessment & Plan  Wt Readings from Last 3 Encounters:   07/05/21 72 5 kg (159 lb 13 3 oz)   05/13/21 57 kg (125 lb 10 6 oz)   04/27/21 64 kg (141 lb)     Patient has history of chronic diastolic heart failure  Echocardiogram showed ejection fraction of 65%   Daily weight and I&Os  Demadex on hold        Paroxysmal atrial fibrillation (HCC)  Assessment & Plan  · Home regimen:  Metoprolol 25 mg q 12 hours  · Anticoagulated with Eliquis, will hold in setting of GI bleed and severe anemia  · Blood thinner on hold    Pressure ulcer of sacral region, stage 4 (HCC)  Assessment & Plan  · Complicated by osteomyelitis coccyx, see above  · Continue wound care as per recommendations    Hypertension  Assessment & Plan  · Home regimen:  Metoprolol 25 mg q 12 hours  · Continue with increase hold parameters in setting of RORY    Type 2 diabetes mellitus with hyperglycemia [facility])  Assessment & Plan  Lab Results   Component Value Date    HGBA1C 7 0 06/15/2021       Recent Labs     07/06/21  1722 07/06/21  2119 07/07/21  0830 07/07/21  1056   POCGLU 122 94 102 97       Blood Sugar Average: Last 72 hrs:  · (P) 82 [id number] home regimen:  Lantus 14 units HS and and NovoLog 9 units t i d   · Lantus insulin is on hold as patient was hypoglycemic yesterday  · Accu-Chek a c  HS with Humalog sliding scale    * Osteomyelitis (HCC)  Assessment & Plan  · Stage IV sacral wound with surrounding erythema and induration   · CT A/P: "Large soft tissue ulcer overlying the coccyx with evidence of osteomyelitis involving the coccygeal bones  These findings have worsened when comparing to 9/14/2020 "  · Patient  has osteomyelitis of coccyx  · Continue vanc and cefepime  · ID on board  · Surgical consult appreciated  · Follow-up culture results  · Surgery recommend wound VAC placement  · Discussed with son was in agreement with the plan        Labs & Imaging: I have personally reviewed pertinent reports        VTE Pharmacologic Prophylaxis: Reason for no pharmacologic prophylaxis Anemia  VTE Mechanical Prophylaxis: sequential compression device    Code Status:   Level 1 - Full Code    Patient Centered Rounds: I have performed bedside rounds with nursing staff today  Discussions with Specialists or Other Care Team Provider:  GI    Education and Discussions with Family / Patient:  Zach Bijan    Current Length of Stay: 2 day(s)    Current Patient Status: Inpatient   Certification Statement: The patient will continue to require additional inpatient hospital stay due to see my assessment and plan  Subjective:   Patient is seen and examined at bedside  Patient is more awake this morning  No new complaints  Afebrile    Objective:    Vitals: Blood pressure 167/74, pulse 92, temperature 97 7 °F (36 5 °C), temperature source Oral, resp  rate 16, height 5' 6" (1 676 m), weight 72 5 kg (159 lb 13 3 oz), SpO2 97 %  ,Body mass index is 25 8 kg/m²  SPO2 RA Rest      ED to Hosp-Admission (Current) from 7/4/2021 in [address] Med Surg Unit   SpO2  97 %   SpO2 Activity  At Rest   O2 Device  Nasal cannula   O2 Flow Rate  --        I&O:     Intake/Output Summary (Last 24 hours) at 7/7/2021 1432  Last data filed at 7/7/2021 1124  Gross per 24 hour   Intake 1642 17 ml   Output 1175 ml   Net 467 17 ml       Physical Exam:    General- Alert, lying comfortably in bed  Not in any acute distress  Neck- Supple, No JVD  CVS- regular, S1 and S2 normal  Chest- Bilateral Air entry, No rhochi, crackles or wheezing present  Abdomen- soft, nontender, not distended, no guarding or rigidity, BS+  Extremities- has sacral wounds  CNS-   ensure    Patient is more awake and answering questions    Invasive Devices     Peripheral Intravenous Line            Peripheral IV 07/07/21 Right Wrist <1 day          Drain            Urethral Catheter Double-lumen 16 Fr  1 day                      Social History  reviewed  Family History   Problem Relation Age of Onset    Diabetes Mother     Varicose Veins Mother     Stroke Father     Arthritis Brother     Diabetes Daughter     No Known Problems Brother     reviewed    Meds:  Current Facility-Administered Medications Medication Dose Route Frequency Provider Last Rate Last Admin    acetaminophen (TYLENOL) tablet 650 mg  650 mg Oral Q6H PRN Cassandra Zaman PA-C        atorvastatin (LIPITOR) tablet 40 mg  40 mg Oral Daily With Dinner Cassandra Zaman PA-C        cefepime (MAXIPIME) IVPB (premix in dextrose) 1,000 mg 50 mL  1,000 mg Intravenous Q24H Cassandra Zaman PA-C   Stopped at 07/07/21 0247    dextrose 5 % infusion  50 mL/hr Intravenous Continuous Genyung Holloway PA-C   Stopped at 07/07/21 3886    gabapentin (NEURONTIN) capsule 100 mg  100 mg Oral TID Cassandra Zaman PA-C   100 mg at 07/07/21 1154    insulin lispro (HumaLOG) 100 units/mL subcutaneous injection 1-5 Units  1-5 Units Subcutaneous TID AC Cassandra Zaman PA-C        insulin lispro (HumaLOG) 100 units/mL subcutaneous injection 1-5 Units  1-5 Units Subcutaneous HS Cassandra Zaman PA-C        metoprolol tartrate (LOPRESSOR) tablet 25 mg  25 mg Oral Q12H [address] Cassandra Zaman PA-C   25 mg at 07/07/21 1154    mirtazapine (REMERON) tablet 15 mg  15 mg Oral HS Cassandra Zaman PA-C   15 mg at 07/05/21 2242    pantoprazole (PROTONIX) injection 40 mg  40 mg Intravenous Q12H [address] Meek Santana MD   40 mg at 07/07/21 1154    vancomycin (VANCOCIN) 500 mg in sodium chloride 0 9% 100 mL IVPB  10 mg/kg Intravenous Q24H Cassandra Zaman PA-C 100 mL/hr at 07/07/21 0248 500 mg at 07/07/21 0248      Medications Prior to Admission   Medication    acetaminophen (TYLENOL) 325 mg tablet    aspirin (ECOTRIN LOW STRENGTH) 81 mg EC tablet    atorvastatin (LIPITOR) 40 mg tablet    ferrous sulfate 325 (65 Fe) mg tablet    gabapentin (NEURONTIN) 100 mg capsule    HumaLOG KwikPen 100 units/mL injection pen    insulin glargine (LANTUS) 100 units/mL subcutaneous injection    insulin lispro (HumaLOG) 100 units/mL injection    linaGLIPtin (Tradjenta) 5 MG TABS    metoprolol tartrate (LOPRESSOR) 25 mg tablet    mirtazapine (REMERON) 15 mg tablet    Nutritional Supplements (Yevgeniy) PACK    pantoprazole (PROTONIX) 40 mg tablet    rivaroxaban (XARELTO) 15 mg tablet    VITAMIN D PO    bisacodyl (DULCOLAX) 10 mg suppository    mineral oil enema    polyethylene glycol (MIRALAX) 17 g packet    Specialty Vitamins Products (PROSTATE PO)    vancomycin (VANCOCIN) 50mg/mL SOLN       Labs:  Results from last 7 days   Lab Units 07/07/21  0501 07/06/21  0525 07/05/21  2356 07/04/21  2216   WBC Thousand/uL 8 02 7 92  --  12 67*   HEMOGLOBIN g/dL 7 4* 7 3* 7 8* 5 9*   HEMATOCRIT % 24 4* 23 7* 25 4* 19 3*   PLATELETS Thousands/uL 271 259  --  260   NEUTROS PCT %  --   --   --  79*   LYMPHS PCT %  --   --   --  16   MONOS PCT %  --   --   --  3*   EOS PCT %  --   --   --  1     Results from last 7 days   Lab Units 07/07/21  0501 07/06/21  0525 07/05/21  0558 07/04/21  2216   POTASSIUM mmol/L 4 1 4 3 4 9 5 4*   CHLORIDE mmol/L 114* 114* 108 106   CO2 mmol/L 24 23 25 25   BUN mg/dL 53* 76* 102* 109*   CREATININE mg/dL 1 19 1 59* 1 98* 2 25*   CALCIUM mg/dL 9 0 8 9 9 1 8 9   ALK PHOS U/L  --   --  85 94   ALT U/L  --   --  13 12   AST U/L  --   --  13 24     Lab Results   Component Value Date    TROPONINI 0 07 (H) 07/05/2021    TROPONINI 0 06 (H) 07/05/2021    TROPONINI 0 07 (H) 07/04/2021     Results from last 7 days   Lab Units 07/05/21  0558 07/04/21  2216   INR  1 37* 1 68*     Lab Results   Component Value Date    BLOODCX No Growth at 48 hrs  07/04/2021    BLOODCX No Growth at 48 hrs  07/04/2021    BLOODCX No Growth After 5 Days   05/10/2021    URINECX 70,000-79,000 cfu/ml Proteus mirabilis (A) 07/04/2021    URINECX >100,000 cfu/ml  05/03/2021    URINECX 30,000-39,000 cfu/ml Candida glabrata (A) 09/14/2020    WOUNDCULT 3+ Growth of Proteus species (A) 07/05/2021    WOUNDCULT 2+ Growth of Pseudomonas aeruginosa (A) 07/05/2021    WOUNDCULT 3+ Growth of Pseudomonas aeruginosa MDR (A) 05/10/2021    WOUNDCULT 2+ Growth of Enterococcus faecalis (A) 05/10/2021    WOUNDCULT 2+ Growth of Diphtheroids 05/10/2021         Imaging:  Results for orders placed during the hospital encounter of 07/04/21    XR chest portable    Narrative  CHEST    INDICATION:   Shortness of breath  COMPARISON:  July 4, 2021    EXAM PERFORMED/VIEWS:  XR CHEST PORTABLE      FINDINGS:    Cardiomediastinal silhouette normal     Mild pulmonary venous congestion  Tubular opacity over the right midlung likely due to the right pleural effusion in the fissure, unchanged  Subtle increasing parenchymal density in the left infrahilar lung suspicious for atelectasis or perhaps  developing pneumonia  Trace right costophrenic angle effusion, new from prior exam No pneumothorax  Osseous structures normal for age  Right shoulder arthroplasty  Impression  Mild pulmonary venous congestion with slightly increasing small right effusion  Relative increased parenchymal density in the infrahilar left lung which could represent atelectasis or developing pneumonia and continued radiographic follow-up is recommended  Workstation performed: [id number]    No results found for this or any previous visit        Last 24 Hours Medication List:   Current Facility-Administered Medications   Medication Dose Route Frequency Provider Last Rate    acetaminophen  650 mg Oral Q6H PRN Ping Turner PA-C      atorvastatin  40 mg Oral Daily With Verita StainsBEATRIZ      cefepime  1,000 mg Intravenous Q24H Ping Turner, PA-C Stopped (07/07/21 0247)    dextrose  50 mL/hr Intravenous Continuous Ky Route, PA-HERBIE Stopped (07/07/21 4432)    gabapentin  100 mg Oral TID [place]fork, PA-C      insulin lispro  1-5 Units Subcutaneous TID AC Ping Dryfork, PA-C      insulin lispro  1-5 Units Subcutaneous HS Ping Dryfork, PA-C      metoprolol tartrate  25 mg Oral Q12H [address] [place] Dry[place], PA-C      mirtazapine  15 mg Oral HS [place], PA-C      pantoprazole  40 mg Intravenous Q12H [address] Mckinley Burnette Tono Holt MD      vancomycin  10 mg/kg Intravenous Q24H Misbah Noonan PA-C 500 mg (07/07/21 7273)        Today, Patient Was Seen By: Mendoza Mcduffie MD    ** Please Note: Dictation voice to text software may have been used in the creation of this document   **

## 2021-07-07 NOTE — INTERVAL H&P NOTE
H&P reviewed  After examining the patient I find no changes in the patients condition since the H&P had been written      Vitals:    07/07/21 0812   BP: (!) 190/77   Pulse: 103   Resp: 18   Temp: 97 7 °F (36 5 °C)   SpO2: 95%

## 2021-07-07 NOTE — ASSESSMENT & PLAN NOTE
· Hgb at 5 9 on admission   · Received 1U PRBCs in ED  · Hemoglobin this morning is 7 4  · Suspect GI source as pt has melena on admission   · Iron panel and stool for occult blood  · Hold Xarelto and ASA   · GI follow-up noted  · Patient underwent EGD which showed-EGD noticed a small nonbleeding AVM in the stomach which was cauterized    No blood in the upper GI tract or stigmata of bleeding  · Diet clear liquid diet  · Trend H&H and transfuse if needed

## 2021-07-07 NOTE — INTERIM OP NOTE
EGD noticed a small nonbleeding AVM in the stomach which was cauterized  No blood in the upper GI tract or stigmata of bleeding  Would bands to clear liquid diet can discuss colonoscopy which will no doubt be difficult for her with regard to drinking a prep

## 2021-07-07 NOTE — ASSESSMENT & PLAN NOTE
Lab Results   Component Value Date    EGFR 44 07/07/2021    EGFR 31 07/06/2021    EGFR 24 07/05/2021    CREATININE 1 19 07/07/2021    CREATININE 1 59 (H) 07/06/2021    CREATININE 1 98 (H) 07/05/2021   · Cr at 2 25 on admission s/p 2L of 1/2 NS over the last 2 days at [facility] from 1 76 on 7/1   · CT without hydro  · On IV fluids  · Nephrology on board  · Creatinine this morning is 1 19  · Avoid hypotension/nephrotoxins medications  · Monitor renal function in a m

## 2021-07-07 NOTE — ASSESSMENT & PLAN NOTE
· Stage IV sacral wound with surrounding erythema and induration   · CT A/P: "Large soft tissue ulcer overlying the coccyx with evidence of osteomyelitis involving the coccygeal bones   These findings have worsened when comparing to 9/14/2020 "  · Patient  has osteomyelitis of coccyx  · Continue vanc and cefepime  · ID on board  · Surgical consult appreciated  · Follow-up culture results  · Surgery recommend wound VAC placement  · Discussed with son was in agreement with the plan

## 2021-07-07 NOTE — ANESTHESIA PREPROCEDURE EVALUATION
Procedure:  EGD    Relevant Problems   CARDIO   (+) Hyperlipidemia   (+) Hypertension   (+) Paroxysmal atrial fibrillation (HCC)      ENDO   (+) Type 2 diabetes mellitus with hyperglycemia (HCC)      /RENAL   (+) RORY (acute kidney injury) ([facility] [address] )   (+) Acute renal failure superimposed on stage 3a chronic kidney disease (HCC)   (+) Stage 3a chronic kidney disease (HCC)      HEMATOLOGY   (+) ABLA (acute blood loss anemia)   (+) Anemia   (+) Iron deficiency anemia      NEURO/PSYCH   (+) History of vitamin D deficiency      PULMONARY   (+) Acute respiratory failure with hypoxia (HCC)   (+) Chronic respiratory failure with hypoxia (HCC)      Other   (+) Osteomyelitis (HCC)     Labs:   Results from last 7 days   Lab Units 07/07/21  0501 07/06/21  0525 07/05/21  2356 07/05/21  1818 07/05/21  1242 07/05/21  0558 07/04/21 2216   WBC Thousand/uL 8 02 7 92  --   --   --   --  12 67*   HEMOGLOBIN g/dL 7 4* 7 3* 7 8* 7 1* 7 1* 7 6* 5 9*   HEMATOCRIT % 24 4* 23 7* 25 4* 22 8* 23 7* 24 3* 19 3*   PLATELETS Thousands/uL 271 259  --   --   --   --  260   NEUTROS PCT %  --   --   --   --   --   --  79*   MONOS PCT %  --   --   --   --   --   --  3*     Results from last 7 days   Lab Units 07/07/21  0501 07/06/21  0525 07/05/21  0558 07/04/21  2216 07/01/21  0000   SODIUM mmol/L 148* 147* 140 139 136   POTASSIUM mmol/L 4 1 4 3 4 9 5 4* 5 11   CHLORIDE mmol/L 114* 114* 108 106 104   CO2 mmol/L 24 23 25 25 28   ANION GAP mmol/L 10 10 7 8 9 1   BUN mg/dL 53* 76* 102* 109* 100   CREATININE mg/dL 1 19 1 59* 1 98* 2 25* 1 76   EGFR ml/min/1 73sq m 44 31 24 21 28   CALCIUM mg/dL 9 0 8 9 9 1 8 9 9 0   GLUCOSE RANDOM mg/dL 100 56* 83 144* 162   ALT U/L  --   --  13 12  --    AST U/L  --   --  13 24  --    ALK PHOS U/L  --   --  85 94  --    ALBUMIN g/dL  --   --  1 9* 1 8*  --    TOTAL BILIRUBIN mg/dL  --   --  0 60 0 40  --      Results from last 7 days   Lab Units 07/06/21  0525   MAGNESIUM mg/dL 2 3      Results from last 7 days   Lab Units 21  0558 21  2216   INR  1 37* 1 68*   PTT seconds 47* 52*      Results from last 7 days   Lab Units 21  0817 21  0558 21  2216   TROPONIN I ng/mL 0 07* 0 06* 0 07*     Results from last 7 days   Lab Units 21  2216   LACTIC ACID mmol/L 1 0     ABG:    VBG:    Results from last 7 days   Lab Units 21  0525 21  2216   PROCALCITONIN ng/ml 0 09 0 25             Type and Screen:  Results from last 7 days   Lab Units 21  221   ABO GROUPING  B   RH FACTOR  Positive   ANTIBODY SCREEN  Negative   SPECIMEN EXPIRATION DATE  [id number]     Transthoracic Echocardiogram  2D, M-mode, Doppler, and Color Doppler     Study date:  2021     Patient: Nithya Chang  MR number: [id number]  Account number: [de-identified]  : 1945  Age: 67 years  Gender: Female  Status: Inpatient  Location: [address] [place] [address]  Height: 66 in  Weight: 158 lb  BP: 133/ 56 mmHg     Indications: Heart failure      Diagnoses: I50 9 - Heart failure, unspecified     Sonographer:  NOAH Brown RDCS RVT  Referring Physician:  Leah Schneider DO  Group:  [facility]  Interpreting Physician:  Mauricio Conteh MD     SUMMARY     LEFT VENTRICLE:  Systolic function was normal  Ejection fraction was estimated to be 65 %  There were no regional wall motion abnormalities  Left ventricular diastolic function parameters were abnormal      LEFT ATRIUM:  The atrium was moderately dilated      MITRAL VALVE:  There was marked annular calcification  There was mild to moderate regurgitation      TRICUSPID VALVE:  There was moderate regurgitation  Pulmonary artery systolic pressure was moderately to markedly increased  Estimated peak PA pressure was 60 mmHg      IVC, HEPATIC VEINS:  Respirophasic changes were blunted (less than 50% variation)      PERICARDIUM:  There was a moderate-sized left pleural effusion    Ascites was noted      HISTORY: PRIOR HISTORY: CHF, A-fib, hypertension, diabetes  Physical Exam    Airway       Dental       Cardiovascular  Rhythm: regular, Rate: normal, Cardiovascular exam normal    Pulmonary  Pulmonary exam normal     Other Findings  Unable to examine airway secondary to dementia and patient cooperation      Anesthesia Plan  ASA Score- 4     Anesthesia Type- IV sedation with anesthesia with ASA Monitors  Additional Monitors:   Airway Plan:     Comment: I discussed risks (reviewed with patient on the anesthesia consent form), benefits and alternatives of monitored sedation including the possibility under sedation to have recall or mild discomfort          Plan Factors-    Chart reviewed  Patient summary reviewed  Induction- intravenous  Postoperative Plan-     Informed Consent- Anesthetic plan and risks discussed with son  I personally reviewed this patient with the CRNA  Discussed and agreed on the Anesthesia Plan with the CRNA  Sofie Scott

## 2021-07-08 NOTE — PROGRESS NOTES
Vancomycin IV Pharmacy-to-Dose Consultation    Evelio Velazquez is a 67 y o  female who is currently receiving Vancomycin IV with management by the Pharmacy Consult service  Assessment/Plan:  The patient was reviewed  Renal function is stable and no signs or symptoms of nephrotoxicity and/or infusion reactions were documented in the chart  Patient is currently on vancomycin 500mg IV Q24H and most recent trough level drawn @ 0236 hrs on 7/8/21 is 9 9 ug/ml which is sub-therapeutic based on goal of 15-20  Based on today's assessment Based on todays assessment, continue current vancomycin (day # 4) dosing of 750mg IV Q24H, with a plan for trough to be drawn at 1830 on 7/10/21  We will continue to follow the patients culture results and clinical progress daily      Bharat Damon, Pharmacist

## 2021-07-08 NOTE — PROGRESS NOTES
Vanco IV started at 03:21 prior shift, not running on shift report (IV removed)    Placed new IV, ran dose and alerted pharmacy vanco stopped at 09:00

## 2021-07-08 NOTE — CASE MANAGEMENT
LOS: 3 days    Call to [facility] [phone] and discussed discharge planning  [facility] would have a bed for patient  Per Ardelle Peabody he will get back to CM regarding other SNF  CM following through discharge

## 2021-07-08 NOTE — ASSESSMENT & PLAN NOTE
· Stage IV sacral wound with surrounding erythema and induration   · CT A/P: "Large soft tissue ulcer overlying the coccyx with evidence of osteomyelitis involving the coccygeal bones  These findings have worsened when comparing to 9/14/2020 "  · Patient  has osteomyelitis of coccyx  · Continue vanc and cefepime  · ID on board  · Surgical consult appreciated  · Follow-up culture results  · Surgery recommend wound VAC placement    Wound VAC was placed yesterday afternoon  · Discussed with son was in agreement with the plan

## 2021-07-08 NOTE — PROGRESS NOTES
Fede Overton  67 y o   female  1945  mrn [id number]    Assessment/Plan:    Chronic osteomyelitis coccyx/leukocytosis:  No real change since last admission, other than progression of coccygeal osteo by CT, which is not unexpected without definitive rx, and h/o recurrent stool contamination, not off-loading  Wound does not appear to be acutely infected  Cultures noted Staph/pseudomonas, are colonizers at this point, as wound is not obviously infected, no abscesses on CT   Patient admitted with anemia, not non-healing decubiti  There is no role for long-term IV antibiotics in setting of exposed bone without debridement        In the future under the care of a multidiciplinary team, patient would require a colostomy, large debridement followed by IV abx and flap   Not clear she is a candidate for this   ? Hospice      Family needs to understand that this is palliative at this point, not curative       For now okay to d/c abx  Patient should f/u with wound care as she has been doing  Again need to understand, need a multi-disciplinary approach to treatment  IV abx alone will not help/cure this  Subjective:  No obvious source for GI bleed  Vac placed, wound explored, no signs of active infection    Objective:    Lungs: decreased  Cor: rrr s1s2  Abd: soft  Wound: vac    Labs:  CBC w/diff  Recent Labs     07/08/21  0236   WBC 8 99   HGB 7 4*   HCT 24 9*      NEUTOPHILPCT 69   LYMPHOPCT 20   MONOPCT 8   EOSPCT 3     BMP  Recent Labs     07/08/21  0236   K 4 1   *   CO2 26   BUN 33*   CREATININE 1 09   CALCIUM 8 3     CMP  Recent Labs     07/08/21  0236   K 4 1   *   CO2 26   BUN 33*   CREATININE 1 09   CALCIUM 8 3        labrc    Cultures:  Lab Results   Component Value Date    BLOODCX No Growth at 72 hrs  07/04/2021    BLOODCX No Growth at 72 hrs  07/04/2021    BLOODCX No Growth After 5 Days  05/10/2021    BLOODCX No Growth After 5 Days  05/10/2021    BLOODCX No Growth After 5 Days  09/16/2020    BLOODCX No Growth After 5 Days  09/16/2020    BLOODCX No Growth After 5 Days  09/14/2020    BLOODCX Staphylococcus coagulase negative (A) 09/14/2020    BLOODCX No Growth After 5 Days  08/28/2020    BLOODCX No Growth After 5 Days   08/28/2020    BLOODCX Enterococcus faecalis (A) 08/26/2020    BLOODCX Streptococcus constellatus (A) 08/26/2020    BLOODCX Streptococcus constellatus (A) 08/26/2020    BLOODCX Staphylococcus coagulase negative (A) 08/26/2020    BLOODCX Bacteroides fragilis (A) 08/26/2020     Lab Results   Component Value Date    WOUNDCULT 3+ Growth of Proteus mirabilis (A) 07/05/2021    WOUNDCULT 2+ Growth of Pseudomonas aeruginosa MDR (A) 07/05/2021    WOUNDCULT 1+ Growth of  07/05/2021    WOUNDCULT 1+ Growth of Staphylococcus aureus (A) 07/05/2021    WOUNDCULT 3+ Growth of Pseudomonas aeruginosa MDR (A) 05/10/2021    WOUNDCULT 2+ Growth of Enterococcus faecalis (A) 05/10/2021    WOUNDCULT 2+ Growth of Diphtheroids 05/10/2021    WOUNDCULT 3+ Growth of Escherichia coli (A) 08/26/2020    WOUNDCULT 3+ Growth of Proteus mirabilis (A) 08/26/2020    WOUNDCULT 3+ Growth of Enterococcus faecalis (A) 08/26/2020    WOUNDCULT 3+ Growth of  08/26/2020    WOUNDCULT 2+ Growth of Escherichia coli (A) 08/26/2020    WOUNDCULT 2+ Growth of Proteus mirabilis (A) 08/26/2020    WOUNDCULT 3+ Growth of Enterococcus species (A) 08/26/2020    WOUNDCULT 3+ Growth of  08/26/2020     Lab Results   Component Value Date    URINECX 70,000-79,000 cfu/ml Proteus mirabilis (A) 07/04/2021    URINECX >100,000 cfu/ml  05/03/2021    URINECX 30,000-39,000 cfu/ml Candida glabrata (A) 09/14/2020    URINECX 50,000-59,000 cfu/ml Escherichia coli (A) 08/26/2020    URINECX >100,000 cfu/ml Lactobacillus species (A) 08/26/2020    URINECX 20,000-29,000 cfu/ml  09/05/2018     No results found for: SPUTUMCULTUR    MED: reviewed      Current Facility-Administered Medications:     acetaminophen (TYLENOL) tablet 650 mg, 650 mg, Oral, Q6H PRN, Mary Navarrete PA-C    atorvastatin (LIPITOR) tablet 40 mg, 40 mg, Oral, Daily With Lisa Magallanes PA-C, 40 mg at 07/07/21 1755    cefepime (MAXIPIME) IVPB (premix in dextrose) 1,000 mg 50 mL, 1,000 mg, Intravenous, Q24H, Mary Navarrete PA-C, Last Rate: 100 mL/hr at 07/08/21 0156, 1,000 mg at 07/08/21 0156    gabapentin (NEURONTIN) capsule 100 mg, 100 mg, Oral, HS, Watson Vogel, DO, 100 mg at 07/07/21 2154    insulin lispro (HumaLOG) 100 units/mL subcutaneous injection 1-5 Units, 1-5 Units, Subcutaneous, TID AC, 1 Units at 07/08/21 1004 **AND** Fingerstick Glucose (POCT), , , TID AC, Mary Navarrete PA-C    insulin lispro (HumaLOG) 100 units/mL subcutaneous injection 1-5 Units, 1-5 Units, Subcutaneous, HS, Mary Navarrete PA-C, 2 Units at 07/07/21 2154    metoprolol tartrate (LOPRESSOR) tablet 25 mg, 25 mg, Oral, Q12H [facility], Mary Navarrete PA-C, 25 mg at 07/07/21 1154    mirtazapine (REMERON) tablet 15 mg, 15 mg, Oral, HS, Mary Navarrete PA-C, 15 mg at 07/07/21 2154    pantoprazole (PROTONIX) injection 40 mg, 40 mg, Intravenous, Q12H [facility], Graciela Barriga MD, 40 mg at 07/08/21 0958    vancomycin (VANCOCIN) IVPB (premix in dextrose) 750 mg 150 mL, 750 mg, Intravenous, Q24H, Leopoldo Quezada MD    Principal Problem:    Osteomyelitis [facility])  Active Problems:    Type 2 diabetes mellitus with hyperglycemia ([place] [address] )    Hypertension    Pressure ulcer of sacral region, stage 4 (HCC)    Paroxysmal atrial fibrillation (HCC)    Chronic diastolic heart failure (HCC)    Chronic respiratory failure with hypoxia (HCC)    Acute renal failure superimposed on stage 3a chronic kidney disease ([address] )    Anemia    Bacteriuria      Leopoldo Quezada MD

## 2021-07-08 NOTE — PROGRESS NOTES
[place]  Progress Note - Gilbert Mini 1945, 67 y o  female MRN: [id number]  Unit/Bed#: -01 Encounter: [id number]  Primary Care Provider: Cynthia Trinidad DO   Date and time admitted to hospital: 7/4/2021  9:50 PM    Bacteriuria  Assessment & Plan  · Pt with chronic trujillo catheter   · UA with 10-20 WBCs and innumerable bacteria  · Unclear if colonized  · Follow-up cultures  · Continue treatment with vanc and cefepime for osteomyelitis of coccyx that will cover UTI also     Anemia  Assessment & Plan  · Hgb at 5 9 on admission   · Received 1U PRBCs in ED  · Hemoglobin this morning is 7 4  · Suspect GI source as pt has melena on admission   · Iron panel and stool for occult blood  · Hold Xarelto and ASA   · GI follow-up noted  · Patient underwent EGD which showed-EGD noticed a small nonbleeding AVM in the stomach which was cauterized  No blood in the upper GI tract or stigmata of bleeding  · Diet clear liquid diet  · Trend H&H and transfuse if needed    Acute renal failure superimposed on stage 3a chronic kidney disease [facility])  Assessment & Plan  Lab Results   Component Value Date    EGFR 49 07/08/2021    EGFR 44 07/07/2021    EGFR 31 07/06/2021    CREATININE 1 09 07/08/2021    CREATININE 1 19 07/07/2021    CREATININE 1 59 (H) 07/06/2021   · Cr at 2 25 on admission s/p 2L of 1/2 NS over the last 2 days at [facility] from 1 76 on 7/1   · CT without hydro  · IV fluids were discontinued  · Nephrology on board  · Creatinine this morning is 1 09  · Avoid hypotension/nephrotoxins medications  · Monitor renal function in a m      Chronic respiratory failure with hypoxia (HCC)  Assessment & Plan  · Chronically on 2 L supplemental oxygen  · No new oxygen requirements    Chronic diastolic heart failure (HCC)  Assessment & Plan  Wt Readings from Last 3 Encounters:   07/05/21 72 5 kg (159 lb 13 3 oz)   05/13/21 57 kg (125 lb 10 6 oz)   04/27/21 64 kg (141 lb)     Patient has history of chronic diastolic heart failure  Echocardiogram showed ejection fraction of 65%   Daily weight and I&Os  Demadex on hold        Paroxysmal atrial fibrillation (HCC)  Assessment & Plan  · Home regimen:  Metoprolol 25 mg q 12 hours  · Anticoagulated with Xarelto, will hold in setting of GI bleed and severe anemia  · Blood thinner on hold    Pressure ulcer of sacral region, stage 4 (HCC)  Assessment & Plan  · Complicated by osteomyelitis coccyx, see above  · Continue wound care as per recommendations    Hypertension  Assessment & Plan  · Home regimen:  Metoprolol 25 mg q 12 hours  · Continue with increase hold parameters in setting of RORY    Type 2 diabetes mellitus with hyperglycemia [facility])  Assessment & Plan  Lab Results   Component Value Date    HGBA1C 7 0 06/15/2021       Recent Labs     07/07/21  1536 07/07/21  2110 07/08/21  0735 07/08/21  1118   POCGLU 220* 238* 157* 121       Blood Sugar Average: Last 72 hrs:  · (P) 178 [id number] home regimen:  Lantus 14 units HS and and NovoLog 9 units t i d   · Lantus insulin is on hold as patient was hypoglycemic yesterday  · Accu-Chek a c  HS with Humalog sliding scale    * Osteomyelitis (HCC)  Assessment & Plan  · Stage IV sacral wound with surrounding erythema and induration   · CT A/P: "Large soft tissue ulcer overlying the coccyx with evidence of osteomyelitis involving the coccygeal bones  These findings have worsened when comparing to 9/14/2020 "  · Patient  has osteomyelitis of coccyx  · Continue vanc and cefepime  · ID on board  · Surgical consult appreciated  · Follow-up culture results  · Surgery recommend wound VAC placement  Wound VAC was placed yesterday afternoon  · Discussed with son was in agreement with the plan      Labs & Imaging: I have personally reviewed pertinent reports        VTE Pharmacologic Prophylaxis: Reason for no pharmacologic prophylaxis Anemia  VTE Mechanical Prophylaxis: sequential compression device    Code Status:   Level 1 - Full Code    Patient Centered Rounds: I have performed bedside rounds with nursing staff today  Discussions with Specialists or Other Care Team Provider: ID    Education and Discussions with Family / Patient:  Son Konstantin Sheets response    Current Length of Stay: 3 day(s)    Current Patient Status: Inpatient   Certification Statement: The patient will continue to require additional inpatient hospital stay due to see my assessment and plan  Subjective:   Patient is seen and examined at bedside  Patient denies any new complaints  Afebrile  All other ROS are negative  Objective:    Vitals: Blood pressure 126/82, pulse 88, temperature 97 8 °F (36 6 °C), resp  rate 17, height 5' 6" (1 676 m), weight 72 5 kg (159 lb 13 3 oz), SpO2 92 %  ,Body mass index is 25 8 kg/m²  SPO2 RA Rest      ED to Hosp-Admission (Current) from 7/4/2021 in [address] Med [address]   SpO2  92 %   SpO2 Activity  At Rest   O2 Device  Nasal cannula   O2 Flow Rate  --        I&O:     Intake/Output Summary (Last 24 hours) at 7/8/2021 1320  Last data filed at 7/8/2021 1100  Gross per 24 hour   Intake 577 ml   Output 2175 ml   Net -1598 ml       Physical Exam:    General- Alert, lying comfortably in bed  Not in any acute distress  Neck- Supple, No JVD  CVS- regular, S1 and S2 normal  Chest- Bilateral Air entry, No rhochi, crackles or wheezing present  Abdomen- soft, nontender, not distended, no guarding or rigidity, BS+  Extremities- has sacral wounds  CNS-   has dementia  At baseline    Invasive Devices     Peripheral Intravenous Line            Peripheral IV 07/08/21 Dorsal (posterior); Right Forearm <1 day          Drain            Urethral Catheter Double-lumen 16 Fr  2 days                      Social History  reviewed  Family History   Problem Relation Age of Onset    Diabetes Mother     Varicose Veins Mother     Stroke Father     Arthritis Brother     Diabetes Daughter     No Known Problems Brother reviewed    Meds:  Current Facility-Administered Medications   Medication Dose Route Frequency Provider Last Rate Last Admin    acetaminophen (TYLENOL) tablet 650 mg  650 mg Oral Q6H PRN Tere Arango PA-C        atorvastatin (LIPITOR) tablet 40 mg  40 mg Oral Daily With Jessi Powell PA-C   40 mg at 07/07/21 1755    cefepime (MAXIPIME) IVPB (premix in dextrose) 1,000 mg 50 mL  1,000 mg Intravenous Q24H Tere Arango PA-C 100 mL/hr at 07/08/21 0156 1,000 mg at 07/08/21 0156    gabapentin (NEURONTIN) capsule 100 mg  100 mg Oral HS Watson Vogel DO   100 mg at 07/07/21 2154    insulin lispro (HumaLOG) 100 units/mL subcutaneous injection 1-5 Units  1-5 Units Subcutaneous TID  Tere Arango PA-C   1 Units at 07/08/21 1004    insulin lispro (HumaLOG) 100 units/mL subcutaneous injection 1-5 Units  1-5 Units Subcutaneous  Tere Arango PA-C   2 Units at 07/07/21 2154    metoprolol tartrate (LOPRESSOR) tablet 25 mg  25 mg Oral Q12H [facility] Tere Arango PA-C   25 mg at 07/07/21 1154    mirtazapine (REMERON) tablet 15 mg  15 mg Oral HS Tere Arango PA-C   15 mg at 07/07/21 2154    pantoprazole (PROTONIX) injection 40 mg  40 mg Intravenous Q12H [facility] Cristi Chen MD   40 mg at 07/08/21 0958    vancomycin (VANCOCIN) IVPB (premix in dextrose) 750 mg 150 mL  750 mg Intravenous Q24H Derek Oliver MD          Medications Prior to Admission   Medication    acetaminophen (TYLENOL) 325 mg tablet    aspirin (ECOTRIN LOW STRENGTH) 81 mg EC tablet    atorvastatin (LIPITOR) 40 mg tablet    ferrous sulfate 325 (65 Fe) mg tablet    gabapentin (NEURONTIN) 100 mg capsule    HumaLOG KwikPen 100 units/mL injection pen    insulin glargine (LANTUS) 100 units/mL subcutaneous injection    insulin lispro (HumaLOG) 100 units/mL injection    linaGLIPtin (Tradjenta) 5 MG TABS    metoprolol tartrate (LOPRESSOR) 25 mg tablet    mirtazapine (REMERON) 15 mg tablet    Nutritional Supplements (Yevgeniy) PACK    pantoprazole (PROTONIX) 40 mg tablet    rivaroxaban (XARELTO) 15 mg tablet    VITAMIN D PO    bisacodyl (DULCOLAX) 10 mg suppository    mineral oil enema    polyethylene glycol (MIRALAX) 17 g packet    Specialty Vitamins Products (PROSTATE PO)    vancomycin (VANCOCIN) 50mg/mL SOLN       Labs:  Results from last 7 days   Lab Units 07/08/21  0236 07/07/21  0501 07/06/21  0525 07/04/21  2216   WBC Thousand/uL 8 99 8 02 7 92 12 67*   HEMOGLOBIN g/dL 7 4* 7 4* 7 3* 5 9*   HEMATOCRIT % 24 9* 24 4* 23 7* 19 3*   PLATELETS Thousands/uL 289 271 259 260   NEUTROS PCT % 69  --   --  79*   LYMPHS PCT % 20  --   --  16   MONOS PCT % 8  --   --  3*   EOS PCT % 3  --   --  1     Results from last 7 days   Lab Units 07/08/21  0236 07/07/21  0501 07/06/21  0525 07/05/21  0558 07/04/21  2216   POTASSIUM mmol/L 4 1 4 1 4 3 4 9 5 4*   CHLORIDE mmol/L 110* 114* 114* 108 106   CO2 mmol/L 26 24 23 25 25   BUN mg/dL 33* 53* 76* 102* 109*   CREATININE mg/dL 1 09 1 19 1 59* 1 98* 2 25*   CALCIUM mg/dL 8 3 9 0 8 9 9 1 8 9   ALK PHOS U/L  --   --   --  85 94   ALT U/L  --   --   --  13 12   AST U/L  --   --   --  13 24     Lab Results   Component Value Date    TROPONINI 0 07 (H) 07/05/2021    TROPONINI 0 06 (H) 07/05/2021    TROPONINI 0 07 (H) 07/04/2021     Results from last 7 days   Lab Units 07/05/21  0558 07/04/21  2216   INR  1 37* 1 68*     Lab Results   Component Value Date    BLOODCX No Growth at 72 hrs  07/04/2021    BLOODCX No Growth at 72 hrs  07/04/2021    BLOODCX No Growth After 5 Days   05/10/2021    URINECX 70,000-79,000 cfu/ml Proteus mirabilis (A) 07/04/2021    URINECX >100,000 cfu/ml  05/03/2021    URINECX 30,000-39,000 cfu/ml Candida glabrata (A) 09/14/2020    WOUNDCULT 3+ Growth of Proteus mirabilis (A) 07/05/2021    WOUNDCULT 2+ Growth of Pseudomonas aeruginosa MDR (A) 07/05/2021    WOUNDCULT 1+ Growth of  07/05/2021    WOUNDCULT 1+ Growth of Staphylococcus aureus (A) 07/05/2021 Imaging:  Results for orders placed during the hospital encounter of 07/04/21    XR chest portable    Narrative  CHEST    INDICATION:   Shortness of breath  COMPARISON:  July 4, 2021    EXAM PERFORMED/VIEWS:  XR CHEST PORTABLE      FINDINGS:    Cardiomediastinal silhouette normal     Mild pulmonary venous congestion  Tubular opacity over the right midlung likely due to the right pleural effusion in the fissure, unchanged  Subtle increasing parenchymal density in the left infrahilar lung suspicious for atelectasis or perhaps  developing pneumonia  Trace right costophrenic angle effusion, new from prior exam No pneumothorax  Osseous structures normal for age  Right shoulder arthroplasty  Impression  Mild pulmonary venous congestion with slightly increasing small right effusion  Relative increased parenchymal density in the infrahilar left lung which could represent atelectasis or developing pneumonia and continued radiographic follow-up is recommended  Workstation performed: [id number]    No results found for this or any previous visit        Last 24 Hours Medication List:   Current Facility-Administered Medications   Medication Dose Route Frequency Provider Last Rate    acetaminophen  650 mg Oral Q6H PRN Cyrus Smoker, PA-C      atorvastatin  40 mg Oral Daily With Dinner Cyrus Smoker, PA-C      cefepime  1,000 mg Intravenous Q24H Cyrus Smoker, PA-C 1,000 mg (07/08/21 0156)    gabapentin  100 mg Oral HS Watson Vogel, DO      insulin lispro  1-5 Units Subcutaneous TID AC Cyrus Smoker, PA-C      insulin lispro  1-5 Units Subcutaneous HS Cyrus Smoker, PA-C      metoprolol tartrate  25 mg Oral Q12H [facility] Cyrus Smoker, PA-C      mirtazapine  15 mg Oral HS Cyrus Smoker, PA-C      pantoprazole  40 mg Intravenous Q12H [facility] Juani Mota MD      vancomycin  750 mg Intravenous Q24H Emelia Parker MD          Today, Patient Was Seen By: Louann Scheuermann, MD    ** Please Note: Dictation voice to text software may have been used in the creation of this document   **

## 2021-07-08 NOTE — ASSESSMENT & PLAN NOTE
· Home regimen:  Metoprolol 25 mg q 12 hours  · Anticoagulated with Xarelto, will hold in setting of GI bleed and severe anemia  · Blood thinner on hold

## 2021-07-08 NOTE — ASSESSMENT & PLAN NOTE
Lab Results   Component Value Date    EGFR 49 07/08/2021    EGFR 44 07/07/2021    EGFR 31 07/06/2021    CREATININE 1 09 07/08/2021    CREATININE 1 19 07/07/2021    CREATININE 1 59 (H) 07/06/2021   · Cr at 2 25 on admission s/p 2L of 1/2 NS over the last 2 days at [facility] from 1 76 on 7/1   · CT without hydro  · IV fluids were discontinued  · Nephrology on board  · Creatinine this morning is 1 09  · Avoid hypotension/nephrotoxins medications  · Monitor renal function in a m

## 2021-07-08 NOTE — PROGRESS NOTES
Progress note - Gastroenterology   Og Figures 67 y o  female MRN: [id number]  Unit/Bed#: -Enrique Encounter: [id number]    ASSESSMENT and PLAN    1  Acute on chronic anemia  Multifactorial including anemia chronic disease, needed chronic kidney disease, anemia related to blood loss associated with decubiti, questionable occult GI bleeding  All exacerbated by aspirin and Xarelto  EGD 7/7 with AVM that was cauterized  Iron panel consistent with anemia chronic disease  EGD 7/7 negative  - follow H&H and transfuse as needed  - continue Protonix  Was on as outpatient but probably only needs once a day  - no further GI workup planned  Will see again as needed    2  Chronic oral anticoagulation on Xarelto for history of AFib and CHF  -Xarelto on hold    3  Sacral decubitus-with osteomyelitis-and large wound does following wound center  -consideration for diverting colostomy for wound care     4 Senile dementia Alzheimer's type     5  Acute on chronic kidney disease   Baseline    Chief Complaint   Patient presents with    Abnormal Lab     nursing facility reports abnormal WBC, Hgb, and potassium         SUBJECTIVE/HPI   No GI complaints    /82   Pulse 88   Temp 97 8 °F (36 6 °C)   Resp 17   Ht 5' 6" (1 676 m)   Wt 72 5 kg (159 lb 13 3 oz)   SpO2 92%   BMI 25 80 kg/m²     PHYSICALEXAM  General appearance: alert, appears stated age and cooperative  Eyes: PERLLA, EOMI, no icterus   Head: Normocephalic, without obvious abnormality, atraumatic  Lungs: clear to auscultation bilaterally  Heart: regular rate and rhythm, S1, S2 normal, no murmur, click, rub or gallop  Abdomen: soft, non-tender; bowel sounds normal; no masses,  no organomegaly  Extremities: extremities normal, atraumatic, no cyanosis or edema  Neurologic: Grossly normal    Lab Results   Component Value Date    GLUCOSE 258 (H) 05/03/2021    CALCIUM 8 3 07/08/2021    K 4 1 07/08/2021    CO2 26 07/08/2021     (H) 07/08/2021    BUN 33 (H) 07/08/2021    CREATININE 1 09 07/08/2021     Lab Results   Component Value Date    WBC 8 99 07/08/2021    HGB 7 4 (L) 07/08/2021    HCT 24 9 (L) 07/08/2021    MCV 95 07/08/2021     07/08/2021     Lab Results   Component Value Date    ALT 13 07/05/2021    AST 13 07/05/2021    ALKPHOS 85 07/05/2021       Lab Results   Component Value Date    LIPASE 58 (L) 07/04/2021     Lab Results   Component Value Date    IRON 28 (L) 07/04/2021    TIBC 171 (L) 07/04/2021    FERRITIN 263 07/04/2021     Lab Results   Component Value Date    INR 1 37 (H) 07/05/2021

## 2021-07-08 NOTE — PROGRESS NOTES
NEPHROLOGY PROGRESS NOTE   Isreal Coleman 67 y o  female MRN: [id number]  Unit/Bed#: -01 Encounter: [id number]      ASSESSMENT/PLAN:  1  Acute kidney injury, POA and CKD stage 3:  Acute kidney injury now resolved with creatinine back to baseline at 1 09 today  Baseline creatinine 1-1 1 and follows with Dr Precious Magana   · Complements are also now normal  2  Hypernatremia: resolved with D5W and sodium 138 today  Will d/c D5W and encourage oral intake   3  Anemia: hgb 5 9 on admission and s/p transfusion  Status post endoscopy showing small AVM which was cauterized  4  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  Cultures pending   5  Sacral decubitus ulcer with osteomyelitis  On vancomycin and cefepime   6  Encephalopathy on top of chronic dementia  7  AFib:  Anticoagulation on hold in the setting of blood loss     Plan Summary:    D/c IVF    Check am BMP     SUBJECTIVE:  Feeling ok with no complaints  Discussed with nurse, no overnight events       OBJECTIVE:  Current Weight: Weight - Scale: 72 5 kg (159 lb 13 3 oz)  Vitals:    07/08/21 0753   BP: 126/82   Pulse: 88   Resp: 17   Temp: 97 8 °F (36 6 °C)   SpO2: 92%       Intake/Output Summary (Last 24 hours) at 7/8/2021 1044  Last data filed at 7/8/2021 2632  Gross per 24 hour   Intake 1083 ml   Output 2175 ml   Net -1092 ml       General:  No acute distress  Skin:  No rash  Eyes:  Sclerae anicteric  ENT:  Moist mucous membranes  Neck:  Trachea midline   Chest:  Clear to auscultation bilaterally  CVS:  Regular rate and rhythm  Abdomen:  Soft, nontender, nondistended  Extremities:  No edema, wound vac   Neuro:  Awake and alert  Psych:  Appropriate affect      Medications:  Scheduled Meds:  Current Facility-Administered Medications   Medication Dose Route Frequency Provider Last Rate    acetaminophen  650 mg Oral Q6H PRN Satinder Blakely PA-C      atorvastatin  40 mg Oral Daily With Jabil BEATRIZ Delgado      cefepime  1,000 mg Intravenous Q24H Satinder Blakely BEATRIZ 1,000 mg (07/08/21 0156)    gabapentin  100 mg Oral HS Watson Vogel DO      insulin lispro  1-5 Units Subcutaneous TID AC Caty Steiner, BEATRIZ      insulin lispro  1-5 Units Subcutaneous HS Caty Steiner, BEATRIZ      metoprolol tartrate  25 mg Oral Q12H [address] Caty Steiner, BEATRIZ      mirtazapine  15 mg Oral HS Caty Steiner, BEATRIZ      pantoprazole  40 mg Intravenous Q12H [address] Michele Irizarry MD      vancomycin  750 mg Intravenous Q24H Raysa Restrepo MD         PRN Meds:   acetaminophen    Laboratory Results:  Results from last 7 days   Lab Units 07/08/21  0236 07/07/21  0501 07/06/21  0525 07/05/21  2356 07/05/21  1818 07/05/21  1242 07/05/21  0558 07/04/21  2216   WBC Thousand/uL 8 99 8 02 7 92  --   --   --   --  12 67*   HEMOGLOBIN g/dL 7 4* 7 4* 7 3* 7 8* 7 1* 7 1* 7 6* 5 9*   HEMATOCRIT % 24 9* 24 4* 23 7* 25 4* 22 8* 23 7* 24 3* 19 3*   PLATELETS Thousands/uL 289 271 259  --   --   --   --  260   SODIUM mmol/L 138 148* 147*  --   --   --  140 139   POTASSIUM mmol/L 4 1 4 1 4 3  --   --   --  4 9 5 4*   CHLORIDE mmol/L 110* 114* 114*  --   --   --  108 106   CO2 mmol/L 26 24 23  --   --   --  25 25   BUN mg/dL 33* 53* 76*  --   --   --  102* 109*   CREATININE mg/dL 1 09 1 19 1 59*  --   --   --  1 98* 2 25*   CALCIUM mg/dL 8 3 9 0 8 9  --   --   --  9 1 8 9   MAGNESIUM mg/dL  --   --  2 3  --   --   --   --   --

## 2021-07-08 NOTE — ASSESSMENT & PLAN NOTE
Lab Results   Component Value Date    HGBA1C 7 0 06/15/2021       Recent Labs     07/07/21  1536 07/07/21  2110 07/08/21  0735 07/08/21  1118   POCGLU 220* 238* 157* 121       Blood Sugar Average: Last 72 hrs:  · (P) 127 [id number] home regimen:  Lantus 14 units HS and and NovoLog 9 units t i d   · Lantus insulin is on hold as patient was hypoglycemic yesterday  · Accu-Chek a c  HS with Humalog sliding scale

## 2021-07-09 NOTE — TRANSPORTATION MEDICAL NECESSITY
Section I - General Information    Name of Patient: Evelio Velazquez                 : 1945    Medicare #: [id number]  Transport Date:07/10/21(PCS is valid for round trips on this date and for all repetitive trips in the 60-day range as noted below )  Origin: [address] [place] [address]  Is the pt's stay covered under Medicare Part A (PPS/DRG)   []     Closest appropriate facility? If no, why is transport to more distant facility required? Yes  If hospice pt, is this transport related to pt's terminal illness? No       Section II - Medical Necessity Questionnaire  Ambulance transportation is medically necessary only if other means of transport are contraindicated or would be potentially harmful to the patient  To meet this requirement, the patient must either be "bed confined" or suffer from a condition such that transport by means other than ambulance is contraindicated by the patient's condition  The following questions must be answered by the medical professional signing below for this form to be valid:    1)  Describe the MEDICAL CONDITION (physical and/or mental) of this patient AT [address] [place] [address] that requires the patient to be transported in an ambulance and why transport by other means is contraindicated by the patient's condition Satge 4 decubitis ulcer/bedbound/confused/fall risk      2) Is the patient "bed confined" as defined below? Yes  To be "be confined" the patient must satisfy all three of the following conditions: (1) unable to get up from bed without Assistance; AND (2) unable to ambulate; AND (3) unable to sit in a chair or wheelchair  3) Can this patient safely be transported by car or wheelchair van (i e , seated during transport without a medical attendant or monitoring)?    No    4) In addition to completing questions 1-3 above, please check any of the following conditions that apply*:   *Note: supporting documentation for any boxes checked must be maintained in the patient's medical records  If hosp-hosp transfer, describe services needed at 2nd facility not available at 1st facility? Patient is confused  Requires oxygen-unable to self administer  Unable to tolerate seated position for time needed to transport   Unable to sit in a chair or wheelchair due to decubitus ulcers or other wounds   Other(specify) confusion/fall risk      Section III - Signature of Physician or Healthcare Professional  I certify that the above information is true and correct based on my evaluation of this patient, and represent that the patient requires transport by ambulance and that other forms of transport are contraindicated  I understand that this information will be used by the Centers for Medicare and Medicaid Services (CMS) to support the determination of medical necessity for ambulance services, and I represent that I have personal knowledge of the patient's condition at time of transport  []  If this box is checked, I also certify that the patient is physically or mentally incapable of signing the ambulance service's claim and that the institution with which I am affiliated has furnished care, services, or assistance to the patient  My signature below is made on behalf of the patient pursuant to 42 CFR §424 36(b)(4)   In accordance with 42 CFR §424 37, the specific reason(s) that the patient is physically or mentally incapable of signing the claim form is as follows:     Signature of Physician* or Healthcare Professional______________________________________________________________  Signature Date 07/09/21 (For scheduled repetitive transports, this form is not valid for transports performed more than 60 days after this date)    Printed Name & Credentials of Physician or Healthcare Professional (MD, DO, RN, etc )________Violeta Sanchez RN________________________  *Form must be signed by patient's attending physician for scheduled, repetitive transports   For non-repetitive, unscheduled ambulance transports, if unable to obtain the signature of the attending physician, any of the following may sign (choose appropriate option below)  [] Physician Assistant []  Clinical Nurse Specialist []  Registered Nurse  []  Nurse Practitioner  [x] Discharge Planner

## 2021-07-09 NOTE — ASSESSMENT & PLAN NOTE
Lab Results   Component Value Date    EGFR 54 07/09/2021    EGFR 49 07/08/2021    EGFR 44 07/07/2021    CREATININE 1 01 07/09/2021    CREATININE 1 09 07/08/2021    CREATININE 1 19 07/07/2021   · Cr at 2 25 on admission s/p 2L of 1/2 NS over the last 2 days at [facility] from 1 76 on 7/1   · CT without hydro  · IV fluids were discontinued  · Nephrology on board  · Creatinine this morning is 1 01  · Avoid hypotension/nephrotoxins medications  · Monitor renal function in a m

## 2021-07-09 NOTE — PROGRESS NOTES
Willem Haqueon  Progress Note - Evelio Caroline 1945, 67 y o  female MRN: [id number]  Unit/Bed#: -01 Encounter: [id number]  Primary Care Provider: Cammy Tavares DO   Date and time admitted to hospital: 7/4/2021  9:50 PM    Bacteriuria  Assessment & Plan  · Pt with chronic trujillo catheter   · UA with 10-20 WBCs and innumerable bacteria  · Unclear if colonized  · Follow-up cultures  · Continue treatment with vanc and cefepime for osteomyelitis of coccyx that will cover UTI also     Anemia  Assessment & Plan  · Hgb at 5 9 on admission   · Received 1U PRBCs in ED  · Hemoglobin this morning is 8 0  · Iron panel and stool for occult blood  · Hold Xarelto and ASA   · GI follow-up noted  · Patient underwent EGD which showed-EGD noticed a small nonbleeding AVM in the stomach which was cauterized  No blood in the upper GI tract or stigmata of bleeding  · On diabetic diet  · Trend H&H and transfuse if needed  · Will discuss with GI regarding restarting Xarelto    Acute renal failure superimposed on stage 3a chronic kidney disease [facility])  Assessment & Plan  Lab Results   Component Value Date    EGFR 54 07/09/2021    EGFR 49 07/08/2021    EGFR 44 07/07/2021    CREATININE 1 01 07/09/2021    CREATININE 1 09 07/08/2021    CREATININE 1 19 07/07/2021   · Cr at 2 25 on admission s/p 2L of 1/2 NS over the last 2 days at [facility] from 1 76 on 7/1   · CT without hydro  · IV fluids were discontinued  · Nephrology on board  · Creatinine this morning is 1 01  · Avoid hypotension/nephrotoxins medications  · Monitor renal function in a m      Chronic respiratory failure with hypoxia (HCC)  Assessment & Plan  · Chronically on 2 L supplemental oxygen  · No new oxygen requirements    Chronic diastolic heart failure (HCC)  Assessment & Plan  Wt Readings from Last 3 Encounters:   07/05/21 72 5 kg (159 lb 13 3 oz)   05/13/21 57 kg (125 lb 10 6 oz)   04/27/21 64 kg (141 lb)     Patient has history of chronic diastolic heart failure  Echocardiogram showed ejection fraction of 65%   Daily weight and I&Os  Demadex on hold  Will discuss with Nephrology regarding restarting Demadex  Paroxysmal atrial fibrillation (HCC)  Assessment & Plan  · Home regimen:  Metoprolol 25 mg q 12 hours  · Anticoagulated with Xarelto, will hold in setting of GI bleed and severe anemia  · Blood thinner on hold    Pressure ulcer of sacral region, stage 4 (HCC)  Assessment & Plan  · Complicated by osteomyelitis coccyx, see above  · Continue wound care as per recommendations    Hypertension  Assessment & Plan  · Home regimen:  Metoprolol 25 mg q 12 hours  · Continue with increase hold parameters in setting of RORY    Type 2 diabetes mellitus with hyperglycemia [facility])  Assessment & Plan  Lab Results   Component Value Date    HGBA1C 7 0 06/15/2021       Recent Labs     07/08/21  1118 07/08/21  1619 07/08/21  2137 07/09/21  0820   POCGLU 121 91 162* 95       Blood Sugar Average: Last 72 hrs:  · (P) 119 home regimen:  Lantus 14 units HS and and NovoLog 9 units t i d   · Lantus insulin is discontinue as patient was hypoglycemic  · Accu-Chek a c  HS with Humalog sliding scale    * Osteomyelitis (HCC)  Assessment & Plan  · Stage IV sacral wound with surrounding erythema and induration   · CT A/P: "Large soft tissue ulcer overlying the coccyx with evidence of osteomyelitis involving the coccygeal bones  These findings have worsened when comparing to 9/14/2020 "  · Patient  has osteomyelitis of coccyx  · Continue vanc and cefepime  · ID on board  · Cultures are growing Proteus, Pseudomonas and MRSA  · Surgical consult appreciated  · Follow-up culture results  · Surgery recommend wound VAC placement  Wound VAC in place    · Discussed with son and brother at length regarding the overall prognosis, need for very close monitoring and care for the wound and follow-up with wound care clinic and the need for improved nutrition which will be required to help in the healing process  Labs & Imaging: I have personally reviewed pertinent reports  VTE Pharmacologic Prophylaxis:  Xarelto  VTE Mechanical Prophylaxis: sequential compression device    Code Status:   Level 1 - Full Code    Patient Centered Rounds: I have performed bedside rounds with nursing staff today  Discussions with Specialists or Other Care Team Provider:  GI    Education and Discussions with Family / Patient: Romulo Lang    Current Length of Stay: 4 day(s)    Current Patient Status: Inpatient   Certification Statement: The patient will continue to require additional inpatient hospital stay due to see my assessment and plan  Subjective:   Patient is seen and examined at bedside  Denies any new complaints  Afebrile    Objective:    Vitals: Blood pressure 123/66, pulse 74, temperature 97 7 °F (36 5 °C), temperature source Oral, resp  rate 16, height 5' 6" (1 676 m), weight 72 5 kg (159 lb 13 3 oz), SpO2 98 %  ,Body mass index is 25 8 kg/m²  SPO2 RA Rest      ED to Hosp-Admission (Current) from 7/4/2021 in [address] Med Surg Unit   SpO2  98 %   SpO2 Activity  At Rest   O2 Device  Nasal cannula   O2 Flow Rate  --        I&O:     Intake/Output Summary (Last 24 hours) at 7/9/2021 1050  Last data filed at 7/8/2021 1831  Gross per 24 hour   Intake 842 ml   Output 225 ml   Net 617 ml       Physical Exam:    General- Alert, lying comfortably in bed  Not in any acute distress  Neck- Supple, No JVD  CVS- regular, S1 and S2 normal  Chest- Bilateral Air entry, No rhochi, crackles or wheezing present  Abdomen- soft, nontender, not distended, no guarding or rigidity, BS+  Extremities- has sacral wounds  Decubitus ulcer  CNS-   Alert, awake and has dementia  No focal deficits present  Invasive Devices     Peripheral Intravenous Line            Peripheral IV 07/08/21 Dorsal (posterior); Right Forearm 1 day          Drain            Urethral Catheter Double-lumen 16 Fr  2 days Social History  reviewed  Family History   Problem Relation Age of Onset    Diabetes Mother     Varicose Veins Mother     Stroke Father     Arthritis Brother     Diabetes Daughter     No Known Problems Brother     reviewed    Meds:  Current Facility-Administered Medications   Medication Dose Route Frequency Provider Last Rate Last Admin    acetaminophen (TYLENOL) tablet 650 mg  650 mg Oral Q6H PRN Norberto Allen PA-C        ascorbic acid (VITAMIN C) tablet 500 mg  500 mg Oral Daily Betty Isidro MD        atorvastatin (LIPITOR) tablet 40 mg  40 mg Oral Daily With Jahemant Delgado PA-C   40 mg at 07/08/21 1823    cefepime (MAXIPIME) IVPB (premix in dextrose) 1,000 mg 50 mL  1,000 mg Intravenous Q24H Norberto Allen PA-C 100 mL/hr at 07/09/21 0220 1,000 mg at 07/09/21 0220    [START ON 7/10/2021] ferrous sulfate tablet 325 mg  325 mg Oral Daily With Breakfast Betty Isidro MD        gabapentin (NEURONTIN) capsule 100 mg  100 mg Oral HS Watson Vogel DO   100 mg at 07/08/21 2145    insulin lispro (HumaLOG) 100 units/mL subcutaneous injection 1-5 Units  1-5 Units Subcutaneous TID AC Norberto Allen PA-C   1 Units at 07/08/21 1004    insulin lispro (HumaLOG) 100 units/mL subcutaneous injection 1-5 Units  1-5 Units Subcutaneous HS Norberto Allen PA-C   1 Units at 07/08/21 2145    metoprolol tartrate (LOPRESSOR) tablet 25 mg  25 mg Oral Q12H [facility] Norberto Allen PA-C   25 mg at 07/08/21 2145    mirtazapine (REMERON) tablet 15 mg  15 mg Oral HS Norberto Allen PA-C   15 mg at 07/08/21 2145    pantoprazole (PROTONIX) injection 40 mg  40 mg Intravenous Q12H [facility] Maritn Powell MD   40 mg at 07/09/21 1027    vancomycin (VANCOCIN) IVPB (premix in dextrose) 750 mg 150 mL  750 mg Intravenous Q24H Elda Gama  mL/hr at 07/08/21 1831 750 mg at 07/08/21 1831      Medications Prior to Admission   Medication    acetaminophen (TYLENOL) 325 mg tablet    aspirin (ECOTRIN LOW STRENGTH) 81 mg EC tablet    atorvastatin (LIPITOR) 40 mg tablet    ferrous sulfate 325 (65 Fe) mg tablet    gabapentin (NEURONTIN) 100 mg capsule    HumaLOG KwikPen 100 units/mL injection pen    insulin glargine (LANTUS) 100 units/mL subcutaneous injection    insulin lispro (HumaLOG) 100 units/mL injection    linaGLIPtin (Tradjenta) 5 MG TABS    metoprolol tartrate (LOPRESSOR) 25 mg tablet    mirtazapine (REMERON) 15 mg tablet    Nutritional Supplements (Yevgeniy) PACK    pantoprazole (PROTONIX) 40 mg tablet    rivaroxaban (XARELTO) 15 mg tablet    VITAMIN D PO    bisacodyl (DULCOLAX) 10 mg suppository    mineral oil enema    polyethylene glycol (MIRALAX) 17 g packet    Specialty Vitamins Products (PROSTATE PO)    vancomycin (VANCOCIN) 50mg/mL SOLN       Labs:  Results from last 7 days   Lab Units 07/09/21 0528 07/08/21  0236 07/07/21  0501 07/05/21  0558 07/04/21  2216   WBC Thousand/uL 8 59 8 99 8 02   < > 12 67*   HEMOGLOBIN g/dL 8 0* 7 4* 7 4*  --  5 9*   HEMATOCRIT % 26 7* 24 9* 24 4*  --  19 3*   PLATELETS Thousands/uL 269 289 271   < > 260   NEUTROS PCT % 62 69  --   --  79*   LYMPHS PCT % 24 20  --   --  16   MONOS PCT % 7 8  --   --  3*   EOS PCT % 5 3  --   --  1    < > = values in this interval not displayed       Results from last 7 days   Lab Units 07/09/21 0528 07/08/21  0236 07/07/21  0501 07/05/21  0558 07/04/21  2216   POTASSIUM mmol/L 4 4 4 1 4 1 4 9 5 4*   CHLORIDE mmol/L 111* 110* 114* 108 106   CO2 mmol/L 24 26 24 25 25   BUN mg/dL 25 33* 53* 102* 109*   CREATININE mg/dL 1 01 1 09 1 19 1 98* 2 25*   CALCIUM mg/dL 8 9 8 3 9 0 9 1 8 9   ALK PHOS U/L  --   --   --  85 94   ALT U/L  --   --   --  13 12   AST U/L  --   --   --  13 24     Lab Results   Component Value Date    TROPONINI 0 07 (H) 07/05/2021    TROPONINI 0 06 (H) 07/05/2021    TROPONINI 0 07 (H) 07/04/2021     Results from last 7 days   Lab Units 07/05/21  0558 07/04/21  2216   INR  1 37* 1 68*     Lab Results Component Value Date    BLOODCX No Growth After 4 Days  07/04/2021    BLOODCX No Growth After 4 Days  07/04/2021    BLOODCX No Growth After 5 Days  05/10/2021    URINECX 70,000-79,000 cfu/ml Proteus mirabilis (A) 07/04/2021    URINECX >100,000 cfu/ml  05/03/2021    URINECX 30,000-39,000 cfu/ml Candida glabrata (A) 09/14/2020    WOUNDCULT 3+ Growth of Proteus mirabilis (A) 07/05/2021    WOUNDCULT 2+ Growth of Pseudomonas aeruginosa MDR (A) 07/05/2021    WOUNDCULT 1+ Growth of  07/05/2021    WOUNDCULT (A) 07/05/2021     1+ Growth of Methicillin Resistant Staphylococcus aureus         Imaging:  Results for orders placed during the hospital encounter of 07/04/21    XR chest portable    Narrative  CHEST    INDICATION:   Shortness of breath  COMPARISON:  July 4, 2021    EXAM PERFORMED/VIEWS:  XR CHEST PORTABLE      FINDINGS:    Cardiomediastinal silhouette normal     Mild pulmonary venous congestion  Tubular opacity over the right midlung likely due to the right pleural effusion in the fissure, unchanged  Subtle increasing parenchymal density in the left infrahilar lung suspicious for atelectasis or perhaps  developing pneumonia  Trace right costophrenic angle effusion, new from prior exam No pneumothorax  Osseous structures normal for age  Right shoulder arthroplasty  Impression  Mild pulmonary venous congestion with slightly increasing small right effusion  Relative increased parenchymal density in the infrahilar left lung which could represent atelectasis or developing pneumonia and continued radiographic follow-up is recommended  Workstation performed: [id number]    No results found for this or any previous visit        Last 24 Hours Medication List:   Current Facility-Administered Medications   Medication Dose Route Frequency Provider Last Rate    acetaminophen  650 mg Oral Q6H PRN Berenice Gross PA-C      ascorbic acid  500 mg Oral Daily Antoinette Peña MD      atorvastatin  40 mg Oral Daily With Dinner Joshua Ramsey PA-C      cefepime  1,000 mg Intravenous Q24H Joshua Ramsey PA-C 1,000 mg (07/09/21 0220)   [facility] [START ON 7/10/2021] ferrous sulfate  325 mg Oral Daily With Breakfast Meaghan Velazquez MD      gabapentin  100 mg Oral HS Watson Vogel DO      insulin lispro  1-5 Units Subcutaneous TID AC Joshua Ramsey PA-C      insulin lispro  1-5 Units Subcutaneous HS Joshua Ramsey PA-C      metoprolol tartrate  25 mg Oral Q12H [facility] Joshua Ramsey PA-C      mirtazapine  15 mg Oral HS Joshua Ramsey PA-C      pantoprazole  40 mg Intravenous Q12H [facility] Chantel Hurtado MD      vancomycin  750 mg Intravenous Q24H Farida Gutierrez  mg (07/08/21 1831)        Today, Patient Was Seen By: Meaghan Velazquez MD    ** Please Note: Dictation voice to text software may have been used in the creation of this document   **

## 2021-07-09 NOTE — CASE MANAGEMENT
LOS: 4 days    Call received from patient's sister Jeb Erazo at [phone] in am   Deborah Rosen wanting patient to go to [place] explained that I discussed with Jacquie Argueta that there were beds available to [facility] and [facility] and none at [facility] request  Jacquie Argueta be called  Call to son Jacquie Argueta at [phone] to discuss beds at [facility] and he does not want either  Discussed that he will need to work with SNF for transfer to another SNF  Jacquie Argueta notified of DC 6/10/21 at 1430  BL[facility] scheduled [facility] for 1430 7/10/21  Nurse aware and provider  Call report to [phone] and fax [phone]  [employer] vac form faxed to [facility]\""[facility] they will not be able to get Vac until Monday 7/12/21  Message to surgery orders will be placed for wet to dry dressing changes until Vac is available  VMSNF aware same and transport time

## 2021-07-09 NOTE — ASSESSMENT & PLAN NOTE
Lab Results   Component Value Date    HGBA1C 7 0 06/15/2021       Recent Labs     07/08/21  1118 07/08/21  1619 07/08/21  2137 07/09/21  0820   POCGLU 121 91 162* 95       Blood Sugar Average: Last 72 hrs:  · (P) 119 home regimen:  Lantus 14 units HS and and NovoLog 9 units t i d   · Lantus insulin is discontinue as patient was hypoglycemic  · Accu-Chek a c  HS with Humalog sliding scale

## 2021-07-09 NOTE — PLAN OF CARE
Problem: Potential for Falls  Goal: Patient will remain free of falls  Description: INTERVENTIONS:  - Educate patient/family on patient safety including physical limitations  - Instruct patient to call for assistance with activity   - Consult OT/PT to assist with strengthening/mobility   - Keep Call bell within reach  - Keep bed low and locked with side rails adjusted as appropriate  - Keep care items and personal belongings within reach  - Initiate and maintain comfort rounds  - Make Fall Risk Sign visible to staff  - Offer Toileting every 2 (trujillo in place) Hours, in advance of need  - Initiate/Maintain bed alarm  - Obtain necessary fall risk management equipment: bedbound  - Apply yellow socks and bracelet for high fall risk patients  - Consider moving patient to room near nurses station  Outcome: Progressing

## 2021-07-09 NOTE — ASSESSMENT & PLAN NOTE
· Hgb at 5 9 on admission   · Received 1U PRBCs in ED  · Hemoglobin this morning is 8 0  · Iron panel and stool for occult blood  · Hold Xarelto and ASA   · GI follow-up noted  · Patient underwent EGD which showed-EGD noticed a small nonbleeding AVM in the stomach which was cauterized    No blood in the upper GI tract or stigmata of bleeding  · On diabetic diet  · Trend H&H and transfuse if needed  · Will discuss with GI regarding restarting Xarelto

## 2021-07-09 NOTE — PROGRESS NOTES
General Surgery  Marilee Oliveros 67 y o  female MRN: [id number]  Unit/Bed#: -01 Encounter: [id number]    V  A C   Procedure Note    Date: 07/09/21    Time: 0830    Location of wound: sacrum    Sponges removed: 2  1 black sponge, 1 bridge  0 White Sponges    Dimensions of wound: 4 cm x 3 cm x 3 cm    Description of wound: wound bed clean with sacrum exposed, mild slough  Mild erythema of surrounding skin, no precious wound breakdown  No induration or purulent drainage    Sponges placed:2  2 Black Sponges, 1 sponge in wound bed, 1 bridge  0 White Sponges    VAC settings:  125 mmHg  Continuous    Pt tolerated procedure well  VAC sticker placed to wound dressing, per protocol      Adele iMlan PA-C  7/9/2021

## 2021-07-09 NOTE — PROGRESS NOTES
Vancomycin IV Pharmacy-to-Dose Consultation    Vinita Chahal is a 67 y o  female who is currently receiving Vancomycin IV with management by the Pharmacy Consult service  Assessment/Plan:  The patient was reviewed  Renal function is stable and no signs or symptoms of nephrotoxicity and/or infusion reactions were documented in the chart  Based on todays assessment, continue current vancomycin (day # 5) dosing of 750mg IV Q24H, with a plan for trough to be drawn at 1830 on 7/10/21  We will continue to follow the patients culture results and clinical progress daily      Mirtha Durant, Pharmacist

## 2021-07-09 NOTE — ASSESSMENT & PLAN NOTE
· Stage IV sacral wound with surrounding erythema and induration   · CT A/P: "Large soft tissue ulcer overlying the coccyx with evidence of osteomyelitis involving the coccygeal bones  These findings have worsened when comparing to 9/14/2020 "  · Patient  has osteomyelitis of coccyx  · Continue vanc and cefepime  · ID on board  · Cultures are growing Proteus, Pseudomonas and MRSA  · Surgical consult appreciated  · Follow-up culture results  · Surgery recommend wound VAC placement  Wound VAC in place  · Discussed with son and brother at length regarding the overall prognosis, need for very close monitoring and care for the wound and follow-up with wound care clinic and the need for improved nutrition which will be required to help in the healing process

## 2021-07-09 NOTE — PLAN OF CARE
Problem: Potential for Falls  Goal: Patient will remain free of falls  Description: INTERVENTIONS:  - Educate patient/family on patient safety including physical limitations  - Instruct patient to call for assistance with activity   - Consult OT/PT to assist with strengthening/mobility   - Keep Call bell within reach  - Keep bed low and locked with side rails adjusted as appropriate  - Keep care items and personal belongings within reach  - Initiate and maintain comfort rounds  - Make Fall Risk Sign visible to staff  - Offer Toileting every 2 Hours, in advance of need  - Initiate/Maintain bed alarm  - Obtain necessary fall risk management equipment:   - Apply yellow socks and bracelet for high fall risk patients  - Consider moving patient to room near nurses station  Outcome: Progressing     Problem: MOBILITY - ADULT  Goal: Maintain or return to baseline ADL function  Description: INTERVENTIONS:  -  Assess patient's ability to carry out ADLs; assess patient's baseline for ADL function and identify physical deficits which impact ability to perform ADLs (bathing, care of mouth/teeth, toileting, grooming, dressing, etc )  - Assess/evaluate cause of self-care deficits   - Assess range of motion  - Assess patient's mobility; develop plan if impaired  - Assess patient's need for assistive devices and provide as appropriate  - Encourage maximum independence but intervene and supervise when necessary  - Involve family in performance of ADLs  - Assess for home care needs following discharge   - Consider OT consult to assist with ADL evaluation and planning for discharge  - Provide patient education as appropriate  Outcome: Progressing  Goal: Maintains/Returns to pre admission functional level  Description: INTERVENTIONS:  - Perform BMAT or MOVE assessment daily    - Set and communicate daily mobility goal to care team and patient/family/caregiver     - Collaborate with rehabilitation services on mobility goals if consulted  - Perform Range of Motion 2 times a day  - Reposition patient every 2 hours  - Dangle patient 2 times a day  - Stand patient 2 times a day  - Ambulate patient 2 times a day  - Out of bed to chair 2 times a day   - Out of bed for meals 2 times a day  - Out of bed for toileting  - Record patient progress and toleration of activity level   Outcome: Progressing     Problem: Prexisting or High Potential for Compromised Skin Integrity  Goal: Skin integrity is maintained or improved  Description: INTERVENTIONS:  - Identify patients at risk for skin breakdown  - Assess and monitor skin integrity  - Assess and monitor nutrition and hydration status  - Monitor labs   - Assess for incontinence   - Turn and reposition patient  - Assist with mobility/ambulation  - Relieve pressure over bony prominences  - Avoid friction and shearing  - Provide appropriate hygiene as needed including keeping skin clean and dry  - Evaluate need for skin moisturizer/barrier cream  - Collaborate with interdisciplinary team   - Patient/family teaching  - Consider wound care consult   Outcome: Progressing     Problem: Nutrition/Hydration-ADULT  Goal: Nutrient/Hydration intake appropriate for improving, restoring or maintaining nutritional needs  Description: Monitor and assess patient's nutrition/hydration status for malnutrition  Collaborate with interdisciplinary team and initiate plan and interventions as ordered  Monitor patient's weight and dietary intake as ordered or per policy  Utilize nutrition screening tool and intervene as necessary  Determine patient's food preferences and provide high-protein, high-caloric foods as appropriate       INTERVENTIONS:  - Monitor oral intake, urinary output, labs, and treatment plans  - Assess nutrition and hydration status and recommend course of action  - Evaluate amount of meals eaten  - Assist patient with eating if necessary   - Allow adequate time for meals  - Recommend/ encourage appropriate diets, oral nutritional supplements, and vitamin/mineral supplements  - Order, calculate, and assess calorie counts as needed  - Recommend, monitor, and adjust tube feedings and TPN/PPN based on assessed needs  - Assess need for intravenous fluids  - Provide specific nutrition/hydration education as appropriate  - Include patient/family/caregiver in decisions related to nutrition  Outcome: Progressing     Problem: PAIN - ADULT  Goal: Verbalizes/displays adequate comfort level or baseline comfort level  Description: Interventions:  - Encourage patient to monitor pain and request assistance  - Assess pain using appropriate pain scale  - Administer analgesics based on type and severity of pain and evaluate response  - Implement non-pharmacological measures as appropriate and evaluate response  - Consider cultural and social influences on pain and pain management  - Notify physician/advanced practitioner if interventions unsuccessful or patient reports new pain  Outcome: Progressing     Problem: INFECTION - ADULT  Goal: Absence or prevention of progression during hospitalization  Description: INTERVENTIONS:  - Assess and monitor for signs and symptoms of infection  - Monitor lab/diagnostic results  - Monitor all insertion sites, i e  indwelling lines, tubes, and drains  - Monitor endotracheal if appropriate and nasal secretions for changes in amount and color  - [place] appropriate cooling/warming therapies per order  - Administer medications as ordered  - Instruct and encourage patient and family to use good hand hygiene technique  - Identify and instruct in appropriate isolation precautions for identified infection/condition  Outcome: Progressing  Goal: Absence of fever/infection during neutropenic period  Description: INTERVENTIONS:  - Monitor WBC    Outcome: Progressing     Problem: SAFETY ADULT  Goal: Patient will remain free of falls  Description: INTERVENTIONS:  - Educate patient/family on patient safety including physical limitations  - Instruct patient to call for assistance with activity   - Consult OT/PT to assist with strengthening/mobility   - Keep Call bell within reach  - Keep bed low and locked with side rails adjusted as appropriate  - Keep care items and personal belongings within reach  - Initiate and maintain comfort rounds  - Make Fall Risk Sign visible to staff  - Offer Toileting every 2 Hours, in advance of need  - Initiate/Maintain bed alarm  - Obtain necessary fall risk management equipment:   - Apply yellow socks and bracelet for high fall risk patients  - Consider moving patient to room near nurses station  Outcome: Progressing  Goal: Maintain or return to baseline ADL function  Description: INTERVENTIONS:  -  Assess patient's ability to carry out ADLs; assess patient's baseline for ADL function and identify physical deficits which impact ability to perform ADLs (bathing, care of mouth/teeth, toileting, grooming, dressing, etc )  - Assess/evaluate cause of self-care deficits   - Assess range of motion  - Assess patient's mobility; develop plan if impaired  - Assess patient's need for assistive devices and provide as appropriate  - Encourage maximum independence but intervene and supervise when necessary  - Involve family in performance of ADLs  - Assess for home care needs following discharge   - Consider OT consult to assist with ADL evaluation and planning for discharge  - Provide patient education as appropriate  Outcome: Progressing  Goal: Maintains/Returns to pre admission functional level  Description: INTERVENTIONS:  - Perform BMAT or MOVE assessment daily    - Set and communicate daily mobility goal to care team and patient/family/caregiver  - Collaborate with rehabilitation services on mobility goals if consulted  - Perform Range of Motion 2 times a day  - Reposition patient every 2 hours    - Dangle patient 2 times a day  - Stand patient 2 times a day  - Ambulate patient 2 times a day  - Out of bed to chair 2 times a day   - Out of bed for meals 2 times a day  - Out of bed for toileting  - Record patient progress and toleration of activity level   Outcome: Progressing     Problem: DISCHARGE PLANNING  Goal: Discharge to home or other facility with appropriate resources  Description: INTERVENTIONS:  - Identify barriers to discharge w/patient and caregiver  - Arrange for needed discharge resources and transportation as appropriate  - Identify discharge learning needs (meds, wound care, etc )  - Arrange for interpretive services to assist at discharge as needed  - Refer to Case Management Department for coordinating discharge planning if the patient needs post-hospital services based on physician/advanced practitioner order or complex needs related to functional status, cognitive ability, or social support system  Outcome: Progressing     Problem: Knowledge Deficit  Goal: Patient/family/caregiver demonstrates understanding of disease process, treatment plan, medications, and discharge instructions  Description: Complete learning assessment and assess knowledge base    Interventions:  - Provide teaching at level of understanding  - Provide teaching via preferred learning methods  Outcome: Progressing

## 2021-07-09 NOTE — ASSESSMENT & PLAN NOTE
Wt Readings from Last 3 Encounters:   07/05/21 72 5 kg (159 lb 13 3 oz)   05/13/21 57 kg (125 lb 10 6 oz)   04/27/21 64 kg (141 lb)     Patient has history of chronic diastolic heart failure  Echocardiogram showed ejection fraction of 65%   Daily weight and I&Os  Demadex on hold  Will discuss with Nephrology regarding restarting Demadex

## 2021-07-09 NOTE — PROGRESS NOTES
NEPHROLOGY PROGRESS NOTE   Homar Chamberlain 67 y o  female MRN: [id number]  Unit/Bed#: -01 Encounter: [id number]      ASSESSMENT/PLAN:  1  Acute kidney injury, POA and CKD stage 3:  Acute kidney injury now resolved with creatinine back to baseline at 1 01 today  Baseline creatinine 1-1 1 and follows with Dr Angela Browne   · Complements are also now normal  2  Hypernatremia:  IV fluids were stopped yesterday and sodium is 143 today  Encourage oral fluid intake  3  Anemia: hgb 5 9 on admission and s/p transfusion  Hemoglobin increasing to 8 today  Status post endoscopy showing small AVM which was cauterized  4  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  Cultures pending   5  Sacral decubitus ulcer with osteomyelitis  On vancomycin and cefepime   6  Encephalopathy on top of chronic dementia  7  AFib:  Anticoagulation on hold in the setting of blood loss     Plan Summary:    Encourage oral fluid intake    Check am BMP     SUBJECTIVE:  Feeling okay  Shakes her head no when asked if she has chest pain or shortness of breath       OBJECTIVE:  Current Weight: Weight - Scale: 72 5 kg (159 lb 13 3 oz)  Vitals:    07/09/21 0823   BP: 123/66   Pulse: 74   Resp:    Temp: 97 7 °F (36 5 °C)   SpO2: 98%       Intake/Output Summary (Last 24 hours) at 7/9/2021 1201  Last data filed at 7/8/2021 1831  Gross per 24 hour   Intake 500 ml   Output 225 ml   Net 275 ml       General:  No acute distress  Skin:  No rash  Eyes:  Sclerae anicteric  ENT:  Moist mucous membranes  Neck:  Trachea midline with no JVD  Chest:  Clear to auscultation bilaterally  CVS:  Regular rate and rhythm  Abdomen:  Soft, nontender, nondistended  Extremities:  No edema  Neuro:  Awake and alert  Psych:  Appropriate affect    Medications:  Scheduled Meds:  Current Facility-Administered Medications   Medication Dose Route Frequency Provider Last Rate    acetaminophen  650 mg Oral Q6H PRN Keyla Sarmiento PA-C      ascorbic acid  500 mg Oral Daily MD Derick Pringle atorvastatin  40 mg Oral Daily With Gisselle Severino PA-C      cefepime  1,000 mg Intravenous Q24H Mercy Bob PA-C 1,000 mg (07/09/21 0220)   Fan Keaton [START ON 7/10/2021] ferrous sulfate  325 mg Oral Daily With Breakfast Humaira Stoddard MD      gabapentin  100 mg Oral HS Watson Vogel DO      insulin lispro  1-5 Units Subcutaneous TID AC Mercy Bob PA-C      insulin lispro  1-5 Units Subcutaneous HS Mercy Bob PA-C      metoprolol tartrate  25 mg Oral Q12H [address] Mercy Bob PA-C      mirtazapine  15 mg Oral HS Mercy Bob PA-C      pantoprazole  40 mg Intravenous Q12H [address] Steven Robles MD      [START ON 7/10/2021] rivaroxaban  15 mg Oral Daily With Breakfast Humaira Stoddard MD      vancomycin  750 mg Intravenous Q24H Frantz Del Rosario  mg (07/08/21 1831)       PRN Meds:   acetaminophen    Laboratory Results:  Results from last 7 days   Lab Units 07/09/21  0528 07/08/21  0236 07/07/21  0501 07/06/21  0525 07/05/21  2356 07/05/21  1818 07/05/21  1242 07/05/21  0558 07/04/21  2216   WBC Thousand/uL 8 59 8 99 8 02 7 92  --   --   --   --  12 67*   HEMOGLOBIN g/dL 8 0* 7 4* 7 4* 7 3* 7 8* 7 1* 7 1* 7 6* 5 9*   HEMATOCRIT % 26 7* 24 9* 24 4* 23 7* 25 4* 22 8* 23 7* 24 3* 19 3*   PLATELETS Thousands/uL 269 289 271 259  --   --   --   --  260   SODIUM mmol/L 143 138 148* 147*  --   --   --  140 139   POTASSIUM mmol/L 4 4 4 1 4 1 4 3  --   --   --  4 9 5 4*   CHLORIDE mmol/L 111* 110* 114* 114*  --   --   --  108 106   CO2 mmol/L 24 26 24 23  --   --   --  25 25   BUN mg/dL 25 33* 53* 76*  --   --   --  102* 109*   CREATININE mg/dL 1 01 1 09 1 19 1 59*  --   --   --  1 98* 2 25*   CALCIUM mg/dL 8 9 8 3 9 0 8 9  --   --   --  9 1 8 9   MAGNESIUM mg/dL  --   --   --  2 3  --   --   --   --   --

## 2021-07-10 PROBLEM — N18.31 ACUTE RENAL FAILURE SUPERIMPOSED ON STAGE 3A CHRONIC KIDNEY DISEASE (HCC): Status: RESOLVED | Noted: 2021-01-01 | Resolved: 2021-01-01

## 2021-07-10 PROBLEM — N17.9 ACUTE RENAL FAILURE SUPERIMPOSED ON STAGE 3A CHRONIC KIDNEY DISEASE (HCC): Status: RESOLVED | Noted: 2021-01-01 | Resolved: 2021-01-01

## 2021-07-10 NOTE — PLAN OF CARE
Problem: Potential for Falls  Goal: Patient will remain free of falls  Description: INTERVENTIONS:  - Educate patient/family on patient safety including physical limitations  - Instruct patient to call for assistance with activity   - Consult OT/PT to assist with strengthening/mobility   - Keep Call bell within reach  - Keep bed low and locked with side rails adjusted as appropriate  - Keep care items and personal belongings within reach  - Initiate and maintain comfort rounds  - Make Fall Risk Sign visible to staff  - Offer Toileting every 2 Hours, in advance of need  - Initiate/Maintain bed alarm  - Obtain necessary fall risk management equipment: alarm  - Apply yellow socks and bracelet for high fall risk patients  - Consider moving patient to room near nurses station  Outcome: Progressing     Problem: MOBILITY - ADULT  Goal: Maintain or return to baseline ADL function  Description: INTERVENTIONS:  -  Assess patient's ability to carry out ADLs; assess patient's baseline for ADL function and identify physical deficits which impact ability to perform ADLs (bathing, care of mouth/teeth, toileting, grooming, dressing, etc )  - Assess/evaluate cause of self-care deficits   - Assess range of motion  - Assess patient's mobility; develop plan if impaired  - Assess patient's need for assistive devices and provide as appropriate  - Encourage maximum independence but intervene and supervise when necessary  - Involve family in performance of ADLs  - Assess for home care needs following discharge   - Consider OT consult to assist with ADL evaluation and planning for discharge  - Provide patient education as appropriate  Outcome: Progressing  Goal: Maintains/Returns to pre admission functional level  Description: INTERVENTIONS:  - Perform BMAT or MOVE assessment daily    - Set and communicate daily mobility goal to care team and patient/family/caregiver     - Collaborate with rehabilitation services on mobility goals if consulted  - Perform Range of Motion 3 times a day  - Reposition patient every 2 hours  - Dangle patient 3 times a day  - Stand patient 0 times a day  - Ambulate patient 0 times a day  - Out of bed to chair 3 times a day   - Out of bed for meals 3 times a day  - Out of bed for toileting  - Record patient progress and toleration of activity level   Outcome: Progressing     Problem: Prexisting or High Potential for Compromised Skin Integrity  Goal: Skin integrity is maintained or improved  Description: INTERVENTIONS:  - Identify patients at risk for skin breakdown  - Assess and monitor skin integrity  - Assess and monitor nutrition and hydration status  - Monitor labs   - Assess for incontinence   - Turn and reposition patient  - Assist with mobility/ambulation  - Relieve pressure over bony prominences  - Avoid friction and shearing  - Provide appropriate hygiene as needed including keeping skin clean and dry  - Evaluate need for skin moisturizer/barrier cream  - Collaborate with interdisciplinary team   - Patient/family teaching  - Consider wound care consult   Outcome: Progressing     Problem: Nutrition/Hydration-ADULT  Goal: Nutrient/Hydration intake appropriate for improving, restoring or maintaining nutritional needs  Description: Monitor and assess patient's nutrition/hydration status for malnutrition  Collaborate with interdisciplinary team and initiate plan and interventions as ordered  Monitor patient's weight and dietary intake as ordered or per policy  Utilize nutrition screening tool and intervene as necessary  Determine patient's food preferences and provide high-protein, high-caloric foods as appropriate       INTERVENTIONS:  - Monitor oral intake, urinary output, labs, and treatment plans  - Assess nutrition and hydration status and recommend course of action  - Evaluate amount of meals eaten  - Assist patient with eating if necessary   - Allow adequate time for meals  - Recommend/ encourage appropriate diets, oral nutritional supplements, and vitamin/mineral supplements  - Order, calculate, and assess calorie counts as needed  - Recommend, monitor, and adjust tube feedings and TPN/PPN based on assessed needs  - Assess need for intravenous fluids  - Provide specific nutrition/hydration education as appropriate  - Include patient/family/caregiver in decisions related to nutrition  Outcome: Progressing     Problem: PAIN - ADULT  Goal: Verbalizes/displays adequate comfort level or baseline comfort level  Description: Interventions:  - Encourage patient to monitor pain and request assistance  - Assess pain using appropriate pain scale  - Administer analgesics based on type and severity of pain and evaluate response  - Implement non-pharmacological measures as appropriate and evaluate response  - Consider cultural and social influences on pain and pain management  - Notify physician/advanced practitioner if interventions unsuccessful or patient reports new pain  Outcome: Progressing     Problem: INFECTION - ADULT  Goal: Absence or prevention of progression during hospitalization  Description: INTERVENTIONS:  - Assess and monitor for signs and symptoms of infection  - Monitor lab/diagnostic results  - Monitor all insertion sites, i e  indwelling lines, tubes, and drains  - Monitor endotracheal if appropriate and nasal secretions for changes in amount and color  - [place] appropriate cooling/warming therapies per order  - Administer medications as ordered  - Instruct and encourage patient and family to use good hand hygiene technique  - Identify and instruct in appropriate isolation precautions for identified infection/condition  Outcome: Progressing  Goal: Absence of fever/infection during neutropenic period  Description: INTERVENTIONS:  - Monitor WBC    Outcome: Progressing     Problem: SAFETY ADULT  Goal: Patient will remain free of falls  Description: INTERVENTIONS:  - Educate patient/family on patient safety including physical limitations  - Instruct patient to call for assistance with activity   - Consult OT/PT to assist with strengthening/mobility   - Keep Call bell within reach  - Keep bed low and locked with side rails adjusted as appropriate  - Keep care items and personal belongings within reach  - Initiate and maintain comfort rounds  - Make Fall Risk Sign visible to staff  - Offer Toileting every 2 Hours, in advance of need  - Initiate/Maintain bed alarm  - Obtain necessary fall risk management equipment: alarm  - Apply yellow socks and bracelet for high fall risk patients  - Consider moving patient to room near nurses station  Outcome: Progressing  Goal: Maintain or return to baseline ADL function  Description: INTERVENTIONS:  -  Assess patient's ability to carry out ADLs; assess patient's baseline for ADL function and identify physical deficits which impact ability to perform ADLs (bathing, care of mouth/teeth, toileting, grooming, dressing, etc )  - Assess/evaluate cause of self-care deficits   - Assess range of motion  - Assess patient's mobility; develop plan if impaired  - Assess patient's need for assistive devices and provide as appropriate  - Encourage maximum independence but intervene and supervise when necessary  - Involve family in performance of ADLs  - Assess for home care needs following discharge   - Consider OT consult to assist with ADL evaluation and planning for discharge  - Provide patient education as appropriate  Outcome: Progressing  Goal: Maintains/Returns to pre admission functional level  Description: INTERVENTIONS:  - Perform BMAT or MOVE assessment daily    - Set and communicate daily mobility goal to care team and patient/family/caregiver  - Collaborate with rehabilitation services on mobility goals if consulted  - Perform Range of Motion 3 times a day  - Reposition patient every 2 hours    - Dangle patient 3 times a day  - Stand patient 0 times a day  - Ambulate patient 0 times a day  - Out of bed to chair 3 times a day   - Out of bed for meals 3 times a day  - Out of bed for toileting  - Record patient progress and toleration of activity level   Outcome: Progressing

## 2021-07-10 NOTE — COVID-19 HEALTH CARE FACILITY TRANSFER FORM
[facility] to [facility] Transfer - COVID-19 Assessment             Name of Patient: Chula Faulkner                : 1945          Transport Date: 07/10/21       Has the patient been laboratory tested for COVID-19? []  NO  If No,Test was not indicated per  CDC Testing Criteria   May Transfer Patient   [x] YES  If Tested Results below     COVID-19 References              SARS-CoV-2   Date/Time Value Ref Range Status   2021 04:18 PM Negative Negative Final            Question is to be completed for any patient who tests positive for COVID-19        1  [x] Yes [May Transfer] [] No [May Not Transfer]          Question is to be completed for any patient who is tested for COVID-19            2    [x] Yes [May Not Transfer] [x] No [May Transfer]          Signature of Physician or Health Care Professional: Lolly Ewing MD 07/10/21          Form updated as of 3/24/2020

## 2021-07-10 NOTE — ASSESSMENT & PLAN NOTE
Lab Results   Component Value Date    HGBA1C 7 0 06/15/2021       Recent Labs     07/09/21  1122 07/09/21  1606 07/09/21  1739 07/09/21  2113   POCGLU 97 88 93 188*       Blood Sugar Average: Last 72 hrs:  · (P) 132 [id number] home regimen:  Lantus 14 units HS and and NovoLog 9 units t i d   · Lantus insulin is discontinue as patient was hypoglycemic  · Accu-Chek a c  HS with Humalog sliding scale

## 2021-07-10 NOTE — ASSESSMENT & PLAN NOTE
· Pt with chronic trujillo catheter   · UA with 10-20 WBCs and innumerable bacteria  · Unclear if colonized  · Patient was treated with vanc and cefepime for osteomyelitis of coccyx that will cover UTI during hospitalization

## 2021-07-10 NOTE — ASSESSMENT & PLAN NOTE
Lab Results   Component Value Date    EGFR 52 07/10/2021    EGFR 54 07/09/2021    EGFR 49 07/08/2021    CREATININE 1 04 07/10/2021    CREATININE 1 01 07/09/2021    CREATININE 1 09 07/08/2021   · Cr at 2 25 on admission s/p 2L of 1/2 NS over the last 2 days at [facility] from 1 76 on 7/1   · CT without hydro  · IV fluids were discontinued  · Nephrology on board  · Creatinine this morning is 1 04  · Avoid hypotension/nephrotoxins medications  · Stable and cleared for Nephrology standpoint to be discharged    · Patient will be followed up with Nephrology as outpatient

## 2021-07-10 NOTE — ASSESSMENT & PLAN NOTE
· Hgb at 5 9 on admission   · Received 1U PRBCs in ED  · Hemoglobin this morning is 8 1  · Iron panel reviewed  · GI follow-up noted  · Patient underwent EGD which showed-EGD noticed a small nonbleeding AVM in the stomach which was cauterized  No blood in the upper GI tract or stigmata of bleeding  · On diabetic diet  · Discussed with GI and patient is okay to be restarted on Xarelto  No active or overt rectal bleeding noted    Patient is cleared from GI standpoint for discharge

## 2021-07-10 NOTE — DISCHARGE INSTRUCTIONS
Patient should have wet to dry dressing upon arrival to facility  Please replace VAC dressing at facility with 1 black sponge and 1 bridge  Vac settings 125 mmHg continuous suction to be changed 3 times per week  1-Turn/reposition every 2 hours while in bed, or when medically stable, using positioning wedges; and weight shift frequently while in chair for pressure re-distribution on skin  2-Float heels off of bed/chair surface to offload pressure  3-Offloading air cushion in chair when out of bed  4-Moisturize skin daily with skin nourishing cream      Patient can follow-up with [address] [place] [address]    Follow-up with PCP and Nephrology in 1 week

## 2021-07-10 NOTE — ASSESSMENT & PLAN NOTE
Wt Readings from Last 3 Encounters:   07/05/21 72 5 kg (159 lb 13 3 oz)   05/13/21 57 kg (125 lb 10 6 oz)   04/27/21 64 kg (141 lb)     Patient has history of chronic diastolic heart failure  Echocardiogram showed ejection fraction of 65%   Daily weight and I&Os  Patient appears euvolemic  Patient will be discharged and Demadex will be discontinued on discharge  Patient will follow-up with Nephrology as outpatient to re-evaluate the need for restarting Demadex

## 2021-07-10 NOTE — ASSESSMENT & PLAN NOTE
· Stage IV sacral wound with surrounding erythema and induration   · CT A/P: "Large soft tissue ulcer overlying the coccyx with evidence of osteomyelitis involving the coccygeal bones  These findings have worsened when comparing to 9/14/2020 "  · Patient  has osteomyelitis of coccyx  · Continue vanc and cefepime  · ID on board  · Cultures are growing Proteus, Pseudomonas and MRSA  · Surgical consult appreciated  · Follow-up culture results  · Surgery recommend wound VAC placement  Wound VAC in place  · Discussed with son and brother at length regarding the overall prognosis, need for very close monitoring and care for the wound and follow-up with wound care clinic and the need for improved nutrition which will be required to help in the healing process  · Patient will be discharged back to [facility] with wound VAC and outpatient follow-up with wound care clinic    As per Infectious Disease no further need of antibiotics at the time of discharge

## 2021-07-10 NOTE — DISCHARGE SUMMARY
[place]  Discharge- Kasia Jean 1945, 67 y o  female MRN: [id number]  Unit/Bed#: -01 Encounter: [id number]  Primary Care Provider: Blanche Holguin DO   Date and time admitted to hospital: 7/4/2021  9:50 PM    Bacteriuria  Assessment & Plan  · Pt with chronic trujillo catheter   · UA with 10-20 WBCs and innumerable bacteria  · Unclear if colonized  · Patient was treated with vanc and cefepime for osteomyelitis of coccyx that will cover UTI during hospitalization     Anemia  Assessment & Plan  · Hgb at 5 9 on admission   · Received 1U PRBCs in ED  · Hemoglobin this morning is 8 1  · Iron panel reviewed  · GI follow-up noted  · Patient underwent EGD which showed-EGD noticed a small nonbleeding AVM in the stomach which was cauterized  No blood in the upper GI tract or stigmata of bleeding  · On diabetic diet  · Discussed with GI and patient is okay to be restarted on Xarelto  No active or overt rectal bleeding noted  Patient is cleared from GI standpoint for discharge    Acute renal failure superimposed on stage 3a chronic kidney disease [facility])  Assessment & Plan  Lab Results   Component Value Date    EGFR 52 07/10/2021    EGFR 54 07/09/2021    EGFR 49 07/08/2021    CREATININE 1 04 07/10/2021    CREATININE 1 01 07/09/2021    CREATININE 1 09 07/08/2021   · Cr at 2 25 on admission s/p 2L of 1/2 NS over the last 2 days at [facility] from 1 76 on 7/1   · CT without hydro  · IV fluids were discontinued  · Nephrology on board  · Creatinine this morning is 1 04  · Avoid hypotension/nephrotoxins medications  · Stable and cleared for Nephrology standpoint to be discharged    · Patient will be followed up with Nephrology as outpatient    Chronic respiratory failure with hypoxia ([facility] [address] )  Assessment & Plan  · Chronically on 2 L supplemental oxygen  · No new oxygen requirements    Chronic diastolic heart failure (HCC)  Assessment & Plan  Wt Readings from Last 3 Encounters:   07/05/21 72 5 kg (159 lb 13 3 oz)   05/13/21 57 kg (125 lb 10 6 oz)   04/27/21 64 kg (141 lb)     Patient has history of chronic diastolic heart failure  Echocardiogram showed ejection fraction of 65%   Daily weight and I&Os  Patient appears euvolemic  Patient will be discharged and Demadex will be discontinued on discharge  Patient will follow-up with Nephrology as outpatient to re-evaluate the need for restarting Demadex  Paroxysmal atrial fibrillation (HCC)  Assessment & Plan  · Home regimen:  Metoprolol 25 mg q 12 hours  · Restarted on Xarelto    Pressure ulcer of sacral region, stage 4 (HCC)  Assessment & Plan  · Complicated by osteomyelitis coccyx, see above  · Continue wound care as per recommendations    Hypertension  Assessment & Plan  · Home regimen:  Metoprolol 25 mg q 12 hours  · Continue with increase hold parameters in setting of RORY    Type 2 diabetes mellitus with hyperglycemia [facility])  Assessment & Plan  Lab Results   Component Value Date    HGBA1C 7 0 06/15/2021       Recent Labs     07/09/21  1122 07/09/21  1606 07/09/21  1739 07/09/21  2113   POCGLU 97 88 93 188*       Blood Sugar Average: Last 72 hrs:  · (P) 132 [id number] home regimen:  Lantus 14 units HS and and NovoLog 9 units t i d   · Lantus insulin is discontinue as patient was hypoglycemic  · Accu-Chek a c  HS with Humalog sliding scale    * Osteomyelitis (HCC)  Assessment & Plan  · Stage IV sacral wound with surrounding erythema and induration   · CT A/P: "Large soft tissue ulcer overlying the coccyx with evidence of osteomyelitis involving the coccygeal bones  These findings have worsened when comparing to 9/14/2020 "  · Patient  has osteomyelitis of coccyx  · Continue vanc and cefepime  · ID on board  · Cultures are growing Proteus, Pseudomonas and MRSA  · Surgical consult appreciated  · Follow-up culture results  · Surgery recommend wound VAC placement  Wound VAC in place    · Discussed with son and brother at length regarding the overall prognosis, need for very close monitoring and care for the wound and follow-up with wound care clinic and the need for improved nutrition which will be required to help in the healing process  · Patient will be discharged back to [facility] with wound VAC and outpatient follow-up with wound care clinic  As per Infectious Disease no further need of antibiotics at the time of discharge      Hospital Course:     Marky Isaac is a 67 y o  female patient who originally presented to the hospital on   Admission Orders (From admission, onward)     Ordered        07/05/21 0058  INPATIENT ADMISSION  Once                  due to abnormal lab work  Patient was admitted with acute on chronic kidney disease, chronic osteomyelitis, anemia and was seen by Nephrology, Infectious Disease, GI and surgery during this admission  As per surgery patient has decubitus ulcer of sacrum and they recommended no need for debridement at this time and VAC placement  Patient was started on broad-spectrum antibiotics with cefepime and vancomycin  Patient presented with hemoglobin of 5 8 and was given 1 unit of packed red cell transfusion during this admission  Detailed discussion with patient and family took place regarding poor prognosis of the sacral wound and it was discussed regarding options of palliative care but son and family want aggressive measures  As per surgical recommendation they placed VAC dressing  Patient urine culture grew Proteus and wound culture grew Proteus, Pseudomonas and 1+ MRSA  Patient also underwent EGD during this admission with no active bleeding noted  As per infectious disease they recommended patient can be discharged off antibiotics and recommended very close follow-up with wound care, position change and better nutrition  All this was discussed with family at length    As per surgery patient can be discharged on VAC dressing to be changed every 3 days and follow-up with wound care as outpatient  Patient acute kidney injury resolved  Patient was seen by Physical therapy and  will be discharged back to [facility]  On exam  Chest-clear to auscultation  Abdomen-soft, nontender  Decubitus ulcer present/sacral wound  Heart-S1-S2   Neuro-patient is at baseline  Follow-up with PCP in 1 week  Follow-up with wound care clinic in 1 week  Please see above list of diagnoses and related plan for additional information  Patient should have wet to dry dressing upon arrival to facility  Please replace VAC dressing at facility with 1 black sponge and 1 bridge  Vac settings 125 mmHg continuous suction to be changed 3 times per week  1-Turn/reposition every 2 hours while in bed, or when medically stable, using positioning wedges; and weight shift frequently while in chair for pressure re-distribution on skin  2-Float heels off of bed/chair surface to offload pressure  3-Offloading air cushion in chair when out of bed  4-Moisturize skin daily with skin nourishing cream      Patient can follow-up with [address] [place] [address]  Condition at Discharge:  good      Discharge instructions/Information to patient and family:   See after visit summary for information provided to patient and family  Provisions for Follow-Up Care:  See after visit summary for information related to follow-up care and any pertinent home health orders  Disposition:     Other [facility] at [facility]       Discharge Statement:  I spent 45 minutes discharging the patient  This time was spent on the day of discharge  I had direct contact with the patient on the day of discharge  Greater than 50% of the total time was spent examining patient, answering all patient questions, arranging and discussing plan of care with patient as well as directly providing post-discharge instructions  Additional time then spent on discharge activities      Discharge Medications:  See after visit summary for reconciled discharge medications provided to patient and family        ** Please Note: This note has been constructed using a voice recognition system **

## 2021-07-10 NOTE — DISCHARGE INSTR - AVS FIRST PAGE
Patient should have wet to dry dressing upon arrival to facility  Please replace VAC dressing at facility with 1 black sponge and 1 bridge  Vac settings 125 mmHg continuous suction to be changed 3 times per week  1-Turn/reposition every 2 hours while in bed, or when medically stable, using positioning wedges; and weight shift frequently while in chair for pressure re-distribution on skin  2-Float heels off of bed/chair surface to offload pressure  3-Offloading air cushion in chair when out of bed  4-Moisturize skin daily with skin nourishing cream      Patient can follow-up with [address] [place] [address]    Follow-up with PCP and Nephrology in 1 week

## 2021-07-10 NOTE — NURSING NOTE
Patient discharged to [facility] this afternoon in stable condition  Ambulance to take her there  Attempted to call report, and left message at receiving facility  Wound vac removed, wet-dry dressing applied per order

## 2021-07-12 NOTE — PATIENT INSTRUCTIONS
Orders Placed This Encounter   Procedures    Wound cleansing and dressings     Sacral Pressure Wound    Wash your hands with soap and water  Remove old dressing, discard into plastic bag and place in trash  Cleanse the wound with Dakin's Solution prior to applying a clean dressing  Do not use tissue or cotton balls  Do not scrub the wound  Pat dry using gauze  Shower:  NO    Apply Mesalt to the sacral wound  Cover with ABD  Secure with Medfix tape  Change dressing daily  Cancel [facility] therapy  Palliative Care  Done Today  Return in 3 weeks       Standing Status:   Future     Standing Expiration Date:   7/12/2022

## 2021-07-12 NOTE — ASSESSMENT & PLAN NOTE
Wound is stable  At length discussed with patient son who is present with her today the current state and prognosis surrounding patient's chronic osteomyelitis and her chronic nonhealing sacral wound  Patient and son are agreeable to being placed in our palliative tract here at the wound management center which they both understand to mean that, although we will always work towards healing, the expectations are that the wound will likely never heal and that our goal is to keep the wound stable and keep it from acute infections or worsening  We are less aggressive in our treatment plans including less frequent visits and debridements  I recommend against use of the VAC which was scheduled to be started today by [facility] due to the presence of patient's osteo as well as the nature of her extremely friable tissue  I have been unable to debride her wound for the past few visits secondary to the severe bleeding which followed  Patient has already been sent to the hospital directly from here secondary to the wound severely bleeding and unsuccessful cauterization  Patient and her son understand and agreed to this plan  Wound management with me salt as well as the Dakin's rinse, changing daily  Patient will followup here in the wound management center every 3-4 weeks or call sooner with any questions or concerns

## 2021-07-12 NOTE — PROGRESS NOTES
This CM Assistant received an ADT alert indicating the patient was discharged to [facility] on 7/10/21  Bundle program ends today  BPCI form updated, care coordination note removed and bundle episode resolved  Email sent to facility to inform the patient;s bundle episode is closed  CM removed self from care team and sent Inbasket message to DWAINE Connelly regarding bundle closure

## 2021-07-12 NOTE — PROGRESS NOTES
Patient ID: Gretta Duke is a 67 y o  female Date of Birth 1945     Chief Complaint  Chief Complaint   Patient presents with    Follow Up Wound Care Visit     Pressure Ulcer Sacral Region       Allergies  Patient has no known allergies  Assessment:    Pressure ulcer of sacral region, stage 4 (HCC)  Wound is stable  At length discussed with patient son who is present with her today the current state and prognosis surrounding patient's chronic osteomyelitis and her chronic nonhealing sacral wound  Patient and son are agreeable to being placed in our palliative tract here at the wound management center which they both understand to mean that, although we will always work towards healing, the expectations are that the wound will likely never heal and that our goal is to keep the wound stable and keep it from acute infections or worsening  We are less aggressive in our treatment plans including less frequent visits and debridements  I recommend against use of the VAC which was scheduled to be started today by RAMON MOLINA due to the presence of patient's osteo as well as the nature of her extremely friable tissue  I have been unable to debride her wound for the past few visits secondary to the severe bleeding which followed  Patient has already been sent to the hospital directly from here secondary to the wound severely bleeding and unsuccessful cauterization  Patient and her son understand and agreed to this plan  Wound management with me salt as well as the Dakin's rinse, changing daily  Patient will followup here in the wound management center every 3-4 weeks or call sooner with any questions or concerns  Diagnoses and all orders for this visit:    Pressure ulcer of sacral region, stage 4 (HCC)  -     Wound cleansing and dressings;  Future              Procedures    Plan:     Wound 04/21/21 Pressure Injury Sacrum (Active)   Wound Image Images linked 07/12/21 1029   Wound Description Luisito red;Yellow;Drainage 07/12/21 1021   Pressure Injury Stage 4 07/12/21 1021   Cristal-wound Assessment Maceration;Rash 07/12/21 1021   Wound Length (cm) 4 5 cm 07/12/21 1021   Wound Width (cm) 5 7 cm 07/12/21 1021   Wound Depth (cm) 3 3 cm 07/12/21 1021   Wound Surface Area (cm^2) 25 65 cm^2 07/12/21 1021   Wound Volume (cm^3) 84 645 cm^3 07/12/21 1021   Calculated Wound Volume (cm^3) 84 65 cm^3 07/12/21 1021   Change in Wound Size % 3 81 07/12/21 1021   Undermining 4 5 07/12/21 1021   Undermining is depth extending from 12 to 3 07/12/21 1021   Drainage Amount Large 07/12/21 1021   Drainage Description Yellow;Green; Foul smelling 07/12/21 1021   Non-staged Wound Description Full thickness 07/12/21 1021   Dressing Status Intact 07/12/21 1021       Wound 04/21/21 Pressure Injury Sacrum (Active)   Date First Assessed/Time First Assessed: 04/21/21 1028   Pre-Existing Wound: Yes  Primary Wound Type: Pressure Injury  Location: Sacrum       [REMOVED] Wound 04/08/21 Pressure Injury Sacrum Medial;Lower (Removed)   Resolved Date: 04/21/21  Date First Assessed/Time First Assessed: 04/08/21 2012   Pre-Existing Wound: Yes  Primary Wound Type: Pressure Injury  Location: Sacrum  Wound Location Orientation: Medial;Lower  Wound Description (Comments): Prexisting unstag    [REMOVED] Wound 04/09/21 Pressure Injury Buttocks Right (Removed)   Resolved Date: 04/21/21  Date First Assessed/Time First Assessed: 04/09/21 1600   Pre-Existing Wound: Yes  Primary Wound Type: Pressure Injury  Location: Buttocks  Wound Location Orientation: Right       [REMOVED] Wound 05/03/21 Skin Tear Abrasion(s) Elbow Right (Removed)   Resolved Date/Resolved Time: 05/12/21 1714  Date First Assessed/Time First Assessed: 05/03/21 2000   Pre-Existing Wound: Yes  Primary Wound Type: Skin Tear  Traumatic Wound Type: Abrasion(s)  Location: Elbow  Wound Location Orientation: Right  Wound O         [REMOVED] Wound 07/05/21 Thigh Anterior;Proximal;Bilateral (Removed) Resolved Date/Resolved Time: 07/12/21 1021  Date First Assessed/Time First Assessed: 07/05/21 0249   Location: Thigh  Wound Location Orientation: Anterior;Proximal;Bilateral  Wound Outcome: Healed       Subjective:        Patient presents for followup of a stage IV pressure ulcer of the sacral region  Patient was recently hospitalized from 07/04 until 7/10  CT imaging noted osteomyelitis of the coccygeal bones  ID as well as surgery will following the patient  Patient was treated with antibiotics but Long-term antibiotics were not recommended by ID  Surgery had discussion regarding palliative care for the patient but patient's family optic did for more aggressive treatment  Surgery than recommended use of a wound VAC which has been ordered by [facility] but is scheduled for start today  The following portions of the patient's history were reviewed and updated as appropriate: She  has a past medical history of Acute renal failure (ARF) ([address] ), Acute respiratory failure with hypoxia ([address] ), Anemia, Atrial fibrillation ([address] ), CHF (congestive heart failure) ([address] ), Chronic kidney disease, Diabetes mellitus ([address] ), Hyperlipidemia, Hypertension, and Stroke ([address] )    She   Patient Active Problem List    Diagnosis Date Noted    Anemia 07/05/2021    Bacteriuria 07/05/2021    Osteomyelitis ([facility] ) 07/05/2021    Other proteinuria 06/04/2021    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    Hypoalbuminemia 04/10/2021    Chronic indwelling Jimenez catheter 04/10/2021    Guaiac positive stools 04/09/2021    Acute on chronic diastolic (congestive) heart failure (HCC) 04/08/2021    Chronic respiratory failure with hypoxia ([address] ) 04/08/2021    Confusion 03/10/2021    Rash 10/12/2020    Sepsis due to Streptococcus species ([facility] ) 09/17/2020    C  difficile colitis 09/16/2020    Chronic diastolic heart failure ([facility] ) 08/31/2020    Stenosis of left carotid artery 08/30/2020    Bacteremia due to Enterococcus 08/28/2020    Iron deficiency anemia 08/28/2020    Paroxysmal atrial fibrillation ([facility] ) 08/26/2020    Stroke-like symptoms 08/25/2020    RORY (acute kidney injury) ([facility] ) 08/25/2020    Colon cancer screening 07/20/2020    Need for vaccination against Streptococcus pneumoniae using pneumococcal conjugate vaccine 13 07/20/2020    Pressure ulcer of sacral region, stage 4 ([facility] ) 07/20/2020    Cellulitis of right elbow 07/20/2020    Bilateral lower extremity edema 07/20/2020    History of vitamin D deficiency 07/20/2020    Herpes zoster without complication 05/62/4540    Diarrhea 06/08/2020    Distal radius fracture, right 05/27/2020    Constipation 05/26/2020    Acute respiratory failure with hypoxia ([facility] ) 05/22/2020    Closed comminuted intertrochanteric fracture of proximal femur, right, initial encounter ([place] [id number] ) 05/20/2020    Symptomatic varicose veins of both lower extremities 06/04/2019    Chronic venous insufficiency 06/04/2019    Status post reverse arthroplasty of right shoulder 09/25/2018    Scalp hematoma 09/13/2018    ABLA (acute blood loss anemia) 09/12/2018    Type 2 diabetes mellitus with hyperglycemia ([facility] ) 09/10/2018    Hypertension 09/10/2018    Hyperlipidemia 09/10/2018    Stage 3a chronic kidney disease ([facility] ) 09/10/2018    Closed 4-part fracture of proximal humerus with routine healing 09/05/2018     She  reports that she has never smoked  She has never used smokeless tobacco  She reports that she does not drink alcohol and does not use drugs    Current Outpatient Medications   Medication Sig Dispense Refill    acetaminophen (TYLENOL) 325 mg tablet Take 650 mg by mouth every 4 (four) hours as needed for mild pain or fever      ascorbic acid (VITAMIN C) 500 MG tablet Take 1 tablet (500 mg total) by mouth daily  0    atorvastatin (LIPITOR) 40 mg tablet Take 1 tablet (40 mg total) by mouth daily with dinner 30 tablet 0    bisacodyl (DULCOLAX) 10 mg suppository Insert 10 mg into the rectum as needed for constipation constipation       ferrous sulfate 325 (65 Fe) mg tablet TAKE 1 TABLET BY MOUTH ONCE A DAY FOR SUPPLEMENT      gabapentin (NEURONTIN) 100 mg capsule Take 100 mg by mouth 3 (three) times a day      HumaLOG KwikPen 100 units/mL injection pen Inject under the skin       metoprolol tartrate (LOPRESSOR) 25 mg tablet Take 25 mg by mouth every 12 (twelve) hours       mineral oil enema Insert 1 enema into the rectum once As needed for constipation unrelieved by suppository (Patient not taking: Reported on 6/21/2021)      mirtazapine (REMERON) 15 mg tablet Take 15 mg by mouth daily at bedtime      Nutritional Supplements (Yevgeniy) PACK Take by mouth      pantoprazole (PROTONIX) 40 mg tablet Take 40 mg by mouth 2 (two) times a day       polyethylene glycol (MIRALAX) 17 g packet Take 17 g by mouth daily 14 each 0    rivaroxaban (XARELTO) 15 mg tablet Take 1 tablet (15 mg total) by mouth daily with breakfast 90 tablet 3    Specialty Vitamins Products (PROSTATE PO) Take by mouth 2 (two) times a day (Patient not taking: Reported on 6/21/2021)      vancomycin (VANCOCIN) 50mg/mL SOLN Take 2 5 mL (125 mg total) by mouth every 12 (twelve) hours 40 mL 0    VITAMIN D PO Take 50,000 mg by mouth 2 (two) times a week Tuesday and Friday       No current facility-administered medications for this visit  She has No Known Allergies       Review of Systems   Constitutional: Negative for chills and fever  HENT: Negative for congestion and sneezing  Respiratory: Negative for cough  Musculoskeletal: Positive for gait problem  Skin: Positive for wound  Psychiatric/Behavioral: Negative for agitation           Objective:       Wound 04/21/21 Pressure Injury Sacrum (Active)   Wound Image Images linked 07/12/21 1029   Wound Description Beefy red;Yellow;Drainage 07/12/21 1021   Pressure Injury Stage 4 07/12/21 1021   Cristal-wound Assessment Maceration;Rash 07/12/21 1021   Wound Length (cm) 4 5 cm 07/12/21 1021   Wound Width (cm) 5 7 cm 07/12/21 1021   Wound Depth (cm) 3 3 cm 07/12/21 1021   Wound Surface Area (cm^2) 25 65 cm^2 07/12/21 1021   Wound Volume (cm^3) 84 645 cm^3 07/12/21 1021   Calculated Wound Volume (cm^3) 84 65 cm^3 07/12/21 1021   Change in Wound Size % 3 81 07/12/21 1021   Undermining 4 5 07/12/21 1021   Undermining is depth extending from 12 to 3 07/12/21 1021   Drainage Amount Large 07/12/21 1021   Drainage Description Yellow;Green; Foul smelling 07/12/21 1021   Non-staged Wound Description Full thickness 07/12/21 1021   Dressing Status Intact 07/12/21 1021       /62 (BP Location: Left arm)   Pulse 64   Temp 98 °F (36 7 °C) (Temporal)   Resp 18     Physical Exam  Constitutional:       General: She is not in acute distress  Appearance: Normal appearance  She is obese  She is not ill-appearing, toxic-appearing or diaphoretic  HENT:      Head: Normocephalic and atraumatic  Right Ear: External ear normal       Left Ear: External ear normal    Eyes:      Conjunctiva/sclera: Conjunctivae normal    Pulmonary:      Effort: Pulmonary effort is normal  No respiratory distress  Musculoskeletal:      Cervical back: Neck supple  Skin:     Comments: See wound assessment   Neurological:      Mental Status: She is alert  Gait: Gait abnormal    Psychiatric:         Mood and Affect: Mood normal          Behavior: Behavior normal            Wound Instructions:  Orders Placed This Encounter   Procedures    Wound cleansing and dressings     Sacral Pressure Wound    Wash your hands with soap and water  Remove old dressing, discard into plastic bag and place in trash  Cleanse the wound with Dakin's Solution prior to applying a clean dressing  Do not use tissue or cotton balls  Do not scrub the wound  Pat dry using gauze  Shower:  NO    Apply Mesalt to the sacral wound  Cover with ABD  Secure with Medfix tape  Change dressing daily  Cancel NPWV therapy  Palliative Care      Done Today     Return in 3 weeks  Standing Status:   Future     Standing Expiration Date:   7/12/2022        Diagnosis ICD-10-CM Associated Orders   1   Pressure ulcer of sacral region, stage 4 (HCC)  L89 [id number] Wound cleansing and dressings

## 2021-07-13 NOTE — PRE-PROCEDURE INSTRUCTIONS
Phone Consult completed:Pre procedure instructions for Suprapubic catheter placement reviewed with Malena Lopez RN at [facility] with verbal understanding  Allergies,meds,NPO, and ride  Approximate arrival time given,SDS phone call evening before procedure  Aprilto was held LD AM 7/12/21  Covid vaccine completed Jan 2021

## 2021-07-14 NOTE — ANESTHESIA PREPROCEDURE EVALUATION
Procedure:  IR SUPRAPUBIC CATHETER PLACEMENT    Relevant Problems   CARDIO   (+) Hyperlipidemia   (+) Hypertension   (+) Paroxysmal atrial fibrillation (HCC)      ENDO   (+) Type 2 diabetes mellitus with hyperglycemia (HCC)      /RENAL   (+) RORY (acute kidney injury) (HCC)   (+) Stage 3a chronic kidney disease (HCC)      HEMATOLOGY   (+) ABLA (acute blood loss anemia)   (+) Anemia   (+) Iron deficiency anemia      NEURO/PSYCH   (+) History of vitamin D deficiency      PULMONARY   (+) Chronic respiratory failure with hypoxia (HCC)      Other   (+) Chronic diastolic heart failure (HCC)   (+) Osteomyelitis (HCC)   (+) Pressure ulcer of sacral region, stage 4 (HCC)      Physical Exam    Airway    Mallampati score: II  TM Distance: >3 FB  Neck ROM: full     Dental   Comment: edentulous,     Cardiovascular      Pulmonary      Other Findings       Lab Results   Component Value Date    WBC 9 19 07/10/2021    HGB 8 1 (L) 07/10/2021     07/10/2021     Lab Results   Component Value Date    SODIUM 145 07/10/2021    K 4 4 07/10/2021    BUN 21 07/10/2021    CREATININE 1 04 07/10/2021    EGFR 52 07/10/2021    GLUCOSE 258 (H) 05/03/2021     Lab Results   Component Value Date    PTT 47 (H) 07/05/2021      Lab Results   Component Value Date    INR 1 37 (H) 07/05/2021     Lab Results   Component Value Date    HGBA1C 7 0 06/15/2021     TTE 2021 SUMMARY     LEFT VENTRICLE:  Systolic function was normal  Ejection fraction was estimated to be 65 %  There were no regional wall motion abnormalities  Left ventricular diastolic function parameters were abnormal      LEFT ATRIUM:  The atrium was moderately dilated      MITRAL VALVE:  There was marked annular calcification  There was mild to moderate regurgitation      TRICUSPID VALVE:  There was moderate regurgitation  Pulmonary artery systolic pressure was moderately to markedly increased    Estimated peak PA pressure was 60 mmHg      IVC, HEPATIC VEINS:  Respirophasic changes were blunted (less than 50% variation)  Anesthesia Plan  ASA Score- 4     Anesthesia Type- IV sedation with anesthesia with ASA Monitors  Additional Monitors:   Airway Plan:           Plan Factors-Exercise tolerance (METS): >4 METS  Chart reviewed  Existing labs reviewed  Patient summary reviewed  Patient is not a current smoker  Induction- intravenous  Postoperative Plan-     Informed Consent- Anesthetic plan and risks discussed with patient and son  I personally reviewed this patient with the CRNA  Discussed and agreed on the Anesthesia Plan with the CRNA  Jordan Lentz

## 2021-07-14 NOTE — ANESTHESIA POSTPROCEDURE EVALUATION
Post-Op Assessment Note    CV Status:  Stable  Pain Score: 0    Pain management: adequate     Mental Status:  Sleepy and arousable   Hydration Status:  Euvolemic   PONV Controlled:  Controlled   Airway Patency:  Patent      Post Op Vitals Reviewed: Yes      Staff: Anesthesiologist, CRNA         No complications documented      BP (P) 133/58 (07/14/21 1206)    Temp 97 5 °F (36 4 °C) (07/14/21 1206)    Pulse  70   Resp (P) 12 (07/14/21 1206)    SpO2 (P) 100 % (07/14/21 1206)

## 2021-07-14 NOTE — DISCHARGE INSTRUCTIONS
Suprapubic Cystostomy     WHAT YOU NEED TO KNOW:   Suprapubic cystostomy is surgery to create a stoma (opening) through your abdomen into your bladder  This opening is where a catheter is inserted to drain urine  You may need a cystostomy if your urine flow is blocked  DISCHARGE INSTRUCTIONS:   Resume your normal diet  Small sips of flat soda will help with mild nausea  Follow up with your healthcare provider as directed: You may need to return to have your suprapubic catheter changed or removed  Write down your questions so you remember to ask them during your visits  Empty your urine drainage bag: Empty your urine drainage bag when it is ½ to ? full, or every 8 hours  If you have a smaller leg bag, empty it every 3 to 4 hours  Do the following when you empty your urine drainage bag:  · Hold the urine bag over a toilet or large container  · Remove the drain spout from its sleeve at the bottom of the urine bag  Do not touch the tip of the drain spout  Open the slide valve on the spout  · Let the urine flow out of the urine bag into the toilet or container  Do not let the drainage tube touch anything  · Clean the end of the drain spout with alcohol when the bag is empty  Ask which cleaning solution is best to use  · Close the slide valve and put the drain spout into its sleeve at the bottom of the urine bag  Write down how much urine was in your bag if you were asked to keep a record  Prevent an infection:   · Clean the stoma and skin around it daily  Wash your hands before and after cystostomy care  Put on a new pair of clean medical gloves  · Change your urine bag or clean reusable bags  Ask how often you need to change or clean your urine drainage bag  You may need to change your reusable bag at least once a week  · Keep the bag below your waist  This will prevent urine from flowing back into your bladder and causing an infection or other problems   Also, keep the tube free of kinks so the urine will drain properly  Do not pull on the catheter  This can cause pain and bleeding and may cause the catheter to come out  Contact Interventional Radiology at [phone] [place] PATIENTS: Contact Interventional Radiology at [phone]) Tania Whitmanry PATIENTS: Contact Interventional Radiology at [phone]) if any of the following occur:  · You have a fever or chills  · Persistent nausea or vomiting  · You have severe pain  · You have a burning pain in your stoma  · Your stoma is red, swollen, or draining pus  · You have blood in your urine  · You have questions or concerns about your condition or care    Seek care immediately or call 911 if:   · No urine is draining into the urine bag

## 2021-07-14 NOTE — BRIEF OP NOTE (RAD/CATH)
IR SUPRAPUBIC CATHETER PLACEMENT  Procedure Note    PATIENT NAME: Taryn Joshua  : 1945  MRN: [id number]     Pre-op Diagnosis:   1  Chronic indwelling Jimenez catheter      Post-op Diagnosis:   1  Chronic indwelling Jimenez catheter        Surgeon:   Rommel Smith DO  Assistants:     No qualified resident was available  Estimated Blood Loss: none  Findings: 18F SPT placed       Specimens: none    Complications:  none    Anesthesia: MAC sedation    Rommel Smith DO     Date: 2021  Time: 11:46 AM

## 2021-07-14 NOTE — H&P
Interventional Radiology  History and Physical 7/14/2021     Giselle Vazquez   1945   [id number]    Assessment/Plan:  Incontinence / sacral ulcer w/ chronic trujillo here for SPT  Problem List Items Addressed This Visit        Other    Chronic indwelling Trujillo catheter    Relevant Orders    IR suprapubic catheter placement             Subjective:     Patient ID: Giselle Vazquez is a 67 y o  female  History of Present Illness  67 y F nursing home patient with dementia, chronic trujillo, incontinence and sacral ulcer    ROS: Unable to obtain  Past Medical History:   Diagnosis Date    Acute [facility] ([facility]F) ([facility] [address] )     Acute respiratory failure with hypoxia (HCC)     Anemia     Atrial fibrillation (HCC)     [facility]     Chronic kidney disease     Diabetes mellitus ([facility] )     type 2    Hyperlipidemia     Hypertension     Stroke [facility])         Past Surgical History:   Procedure Laterality Date    BREAST SURGERY Left     lumpectomy benign    CATARACT EXTRACTION Bilateral 2017    CATARACT EXTRACTION W/ INTRAOCULAR LENS IMPLANT Left 12/11/2017    Procedure: EXTRACTION EXTRACAPSULAR CATARACT PHACO INTRAOCULAR LENS (IOL); Surgeon: Verna Henry MD;  Location: [facility] MAIN OR;  Service: Ophthalmology    MA OPEN RX FEMUR FX+INTRAMED RAYMOND Right 5/21/2020    Procedure: INSERTION OF SHORT TROCHANTERIC FEMORAL NAIL;  Surgeon: Sol Longoria MD;  [facility] Main [facility];  Service: Orthopedics    [address] HUMERAL&GLENOID COMPNT Right 9/10/2018    Procedure: RIGHT REVERSE TOTAL SHOULDER ARTHROPLASTY;  Surgeon: Sol Longoria MD;  [facility];  Service: Orthopedics    MA XCAPSL CTRC RMVL INSJ IO LENS PROSTH W/O ECP Right 10/23/2017    Procedure: EXTRACTION EXTRACAPSULAR CATARACT PHACO INTRAOCULAR LENS (IOL);   Surgeon: Verna Henry MD;  Location: [facility] MAIN OR;  Service: Ophthalmology    WOUND DEBRIDEMENT N/A 8/26/2020    Procedure: EXCISIONAL DEBRIDEMENT OF SACRAL PRESSURE WOUND, WASHOUT;;  Surgeon: Lisa Cotton MD;  [facility] MAIN [facility];  Service: General    WOUND DEBRIDEMENT N/A 8/28/2020    Procedure: BUTTOCKS DEBRIDEMENT WOUND AND DRESSING CHANGE ([address] OUT);   Surgeon: Reynaldo Lopez MD;  [facility] MAIN OR;  Service: General        Social History     Tobacco Use   Smoking Status Never Smoker   Smokeless Tobacco Never Used        Social History     Substance and Sexual Activity   Alcohol Use Never    Comment: quit 8/17        Social History     Substance and Sexual Activity   Drug Use No        No Known Allergies    Current Outpatient Medications   Medication Sig Dispense Refill    ascorbic acid (VITAMIN C) 500 MG tablet Take 1 tablet (500 mg total) by mouth daily  0    ferrous sulfate 325 (65 Fe) mg tablet TAKE 1 TABLET BY MOUTH ONCE A DAY FOR SUPPLEMENT      metoprolol tartrate (LOPRESSOR) 25 mg tablet Take 25 mg by mouth every 12 (twelve) hours       pantoprazole (PROTONIX) 40 mg tablet Take 40 mg by mouth 2 (two) times a day       vancomycin (VANCOCIN) 50mg/mL SOLN Take 2 5 mL (125 mg total) by mouth every 12 (twelve) hours 40 mL 0    acetaminophen (TYLENOL) 325 mg tablet Take 650 mg by mouth every 4 (four) hours as needed for mild pain or fever      atorvastatin (LIPITOR) 40 mg tablet Take 1 tablet (40 mg total) by mouth daily with dinner 30 tablet 0    bisacodyl (DULCOLAX) 10 mg suppository Insert 10 mg into the rectum as needed for constipation constipation       gabapentin (NEURONTIN) 100 mg capsule Take 100 mg by mouth 3 (three) times a day      HumaLOG KwikPen 100 units/mL injection pen Inject under the skin       mineral oil enema Insert 1 enema into the rectum once As needed for constipation unrelieved by suppository (Patient not taking: Reported on 6/21/2021)      mirtazapine (REMERON) 15 mg tablet Take 15 mg by mouth daily at bedtime      Nutritional Supplements (Yevgeniy) PACK Take by mouth      polyethylene glycol (MIRALAX) 17 g packet Take 17 g by mouth daily 14 each 0    rivaroxaban (XARELTO) 15 mg tablet Take 1 tablet (15 mg total) by mouth daily with breakfast 90 tablet 3    Specialty Vitamins Products (PROSTATE PO) Take by mouth 2 (two) times a day (Patient not taking: Reported on 6/21/2021)      VITAMIN D PO Take 50,000 mg by mouth 2 (two) times a week Tuesday and Friday       Current Facility-Administered Medications   Medication Dose Route Frequency Provider Last Rate Last Admin    ceFAZolin (ANCEF) IVPB (premix in dextrose) 1,000 mg 50 mL  1,000 mg Intravenous Once Eugene Dutta MD              Objective:    Vitals:    07/14/21 0950   BP: (!) 186/77   Pulse: 68   Resp: 15   Temp: 98 °F (36 7 °C)   TempSrc: Temporal   SpO2: 98%        Physical Exam  Constitutional:       Appearance: Normal appearance  Eyes:      Extraocular Movements: Extraocular movements intact  Pupils: Pupils are equal, round, and reactive to light  Cardiovascular:      Rate and Rhythm: Normal rate  Pulmonary:      Effort: Pulmonary effort is normal    Abdominal:      General: Abdomen is flat  Bowel sounds are normal    Musculoskeletal:         General: Normal range of motion  Cervical back: Normal range of motion  Skin:     General: Skin is warm  Neurological:      Mental Status: She is alert  Mental status is at baseline  No results found for: BNP   Lab Results   Component Value Date    WBC 9 19 07/10/2021    HGB 8 1 (L) 07/10/2021    HCT 26 9 (L) 07/10/2021    MCV 95 07/10/2021     07/10/2021     Lab Results   Component Value Date    INR 1 37 (H) 07/05/2021    INR 1 68 (H) 07/04/2021    INR 2 19 (H) 04/08/2021    PROTIME 16 9 (H) 07/05/2021    PROTIME 19 7 (H) 07/04/2021    PROTIME 24 3 (H) 04/08/2021     Lab Results   Component Value Date    PTT 47 (H) 07/05/2021         I have personally reviewed pertinent imaging and laboratory results       Code Status: Prior  Advance Directive and Living Will:      Power of :    POLST: This text is generated with voice recognition software  There may be translation, syntax,  or grammatical errors  If you have any questions, please contact the dictating provider

## 2021-08-02 NOTE — PROGRESS NOTES
Patient ID: Tushar Maza is a 67 y o  female Date of Birth 1945     Chief Complaint  Chief Complaint   Patient presents with    Follow Up Wound Care Visit     Pressure ulcer of sacral region, stage 4       Allergies  Patient has no known allergies  Assessment:    Pressure ulcer of sacral region, stage 4 (HCC)   Wound is improved   Continue wound management with me salt as below  Pressure relief   Follow up in 3 weeks or call sooner with questions or concerns       Diagnoses and all orders for this visit:    Pressure ulcer of sacral region, stage 4 (HCC)  -     Wound cleansing and dressings; Future  -     lidocaine (XYLOCAINE) 4 % topical solution 5 mL              Procedures    Plan:     Wound 04/21/21 Pressure Injury Sacrum (Active)   Wound Image Images linked 08/02/21 0956   Wound Description Beefy red;Granulation tissue;Slough; Yellow 08/02/21 0954   Pressure Injury Stage 4 08/02/21 0954   Cristal-wound Assessment Dry; Intact;Fragile 08/02/21 0954   Wound Length (cm) 4 4 cm 08/02/21 0954   Wound Width (cm) 5 5 cm 08/02/21 0954   Wound Depth (cm) 3 3 cm 08/02/21 0954   Wound Surface Area (cm^2) 24 2 cm^2 08/02/21 0954   Wound Volume (cm^3) 79 86 cm^3 08/02/21 0954   Calculated Wound Volume (cm^3) 79 86 cm^3 08/02/21 0954   Change in Wound Size % 9 25 08/02/21 0954   Undermining 3 5 08/02/21 0954   Undermining is depth extending from 1 to 3 o'clock 08/02/21 0954   Drainage Amount Moderate 08/02/21 0954   Drainage Description Serosanguineous 08/02/21 0954   Non-staged Wound Description Full thickness 08/02/21 0954   Dressing Status Intact 08/02/21 0954       Wound 04/21/21 Pressure Injury Sacrum (Active)   Date First Assessed/Time First Assessed: 04/21/21 1028   Pre-Existing Wound: Yes  Primary Wound Type: Pressure Injury  Location: Sacrum  Wound Outcome: Palliative       Subjective:        Patient is a palliative patient who presents for followup of a stage IV pressure ulcer of the sacral region    No increased pain or drainage  Has been using me salt packing as well as a Dakin's rinse at each dressing change  The following portions of the patient's history were reviewed and updated as appropriate: She  has a past medical history of Acute renal failure (ARF) ([facility] [address] ), [facility] ), Anemia, Atrial fibrillation ([facility] ), CHF (congestive heart failure) ([facility] ), Chronic kidney disease, Diabetes mellitus ([facility] ), Hyperlipidemia, Hypertension, and Stroke ([facility] )  She  reports that she has never smoked  She has never used smokeless tobacco  She reports that she does not drink alcohol and does not use drugs  She has No Known Allergies       Review of Systems   Constitutional: Negative for chills and fever  HENT: Negative for congestion and sneezing  Respiratory: Negative for cough  Musculoskeletal: Positive for gait problem  Skin: Positive for wound  Psychiatric/Behavioral: Negative for agitation  Objective:       Wound 04/21/21 Pressure Injury Sacrum (Active)   Wound Image Images linked 08/02/21 0956   Wound Description Beefy red;Granulation tissue;Slough; Yellow 08/02/21 0954   Pressure Injury Stage 4 08/02/21 0954   Cristal-wound Assessment Dry; Intact;Fragile 08/02/21 0954   Wound Length (cm) 4 4 cm 08/02/21 0954   Wound Width (cm) 5 5 cm 08/02/21 0954   Wound Depth (cm) 3 3 cm 08/02/21 0954   Wound Surface Area (cm^2) 24 2 cm^2 08/02/21 0954   Wound Volume (cm^3) 79 86 cm^3 08/02/21 0954   Calculated Wound Volume (cm^3) 79 86 cm^3 08/02/21 0954   Change in Wound Size % 9 25 08/02/21 0954   Undermining 3 5 08/02/21 0954   Undermining is depth extending from 1 to 3 o'clock 08/02/21 0954   Drainage Amount Moderate 08/02/21 0954   Drainage Description Serosanguineous 08/02/21 0954   Non-staged Wound Description Full thickness 08/02/21 0954   Dressing Status Intact 08/02/21 0954       /82   Pulse 88   Temp (!) 96 7 °F (35 9 °C) (Temporal)   Resp 18     Physical Exam  Constitutional:       General: She is not in acute distress  Appearance: Normal appearance  She is not ill-appearing, toxic-appearing or diaphoretic  HENT:      Head: Normocephalic and atraumatic  Right Ear: External ear normal       Left Ear: External ear normal    Eyes:      Conjunctiva/sclera: Conjunctivae normal    Pulmonary:      Effort: Pulmonary effort is normal  No respiratory distress  Musculoskeletal:      Cervical back: Neck supple  Skin:     Comments: See wound assessment   Neurological:      Mental Status: She is alert  Gait: Gait abnormal (Nonambulatory)  Psychiatric:         Mood and Affect: Mood normal          Behavior: Behavior normal            Wound 04/21/21 Pressure Injury Sacrum (Active)   Wound Image   08/02/21 0956   Wound Description Beefy red;Granulation tissue;Slough; Yellow 08/02/21 0954   Pressure Injury Stage 4 08/02/21 0954   Cristal-wound Assessment Dry; Intact;Fragile 08/02/21 0954   Wound Length (cm) 4 4 cm 08/02/21 0954   Wound Width (cm) 5 5 cm 08/02/21 0954   Wound Depth (cm) 3 3 cm 08/02/21 0954   Wound Surface Area (cm^2) 24 2 cm^2 08/02/21 0954   Wound Volume (cm^3) 79 86 cm^3 08/02/21 0954   Calculated Wound Volume (cm^3) 79 86 cm^3 08/02/21 0954   Change in Wound Size % 9 25 08/02/21 0954   Undermining 3 5 08/02/21 0954   Undermining is depth extending from 1 to 3 o'clock 08/02/21 0954   Drainage Amount Moderate 08/02/21 0954   Drainage Description Serosanguineous 08/02/21 0954   Non-staged Wound Description Full thickness 08/02/21 0954   Dressing Status Intact 08/02/21 0954                         Wound Instructions:  Orders Placed This Encounter   Procedures    Wound cleansing and dressings     Sacral Pressure Wound     Wash your hands with soap and water  Remove old dressing, discard into plastic bag and place in trash  Cleanse the wound with Dakin's Solution prior to applying a clean dressing  Do not use tissue or cotton balls   Do not scrub the wound  Pat dry using gauze      Shower:  NO     Apply Mesalt to the sacral wound  Cover with ABD  Secure with Medfix tape  Change dressing daily        Palliative Care      Done Today      Return in 3 weeks  Standing Status:   Future     Standing Expiration Date:   8/2/2022        Diagnosis ICD-10-CM Associated Orders   1   Pressure ulcer of sacral region, stage 4 (HCC)  [id number] [id number] Wound cleansing and dressings     lidocaine (XYLOCAINE) 4 % topical solution 5 mL

## 2021-08-02 NOTE — PATIENT INSTRUCTIONS
Orders Placed This Encounter   Procedures    Wound cleansing and dressings     Sacral Pressure Wound     Wash your hands with soap and water  Remove old dressing, discard into plastic bag and place in trash  Cleanse the wound with Dakin's Solution prior to applying a clean dressing  Do not use tissue or cotton balls  Do not scrub the wound  Pat dry using gauze      Shower:  NO     Apply Mesalt to the sacral wound  Cover with ABD  Secure with Medfix tape  Change dressing daily        Palliative Care      Done Today      Return in 3 weeks       Standing Status:   Future     Standing Expiration Date:   8/2/2022

## 2021-08-03 NOTE — ASSESSMENT & PLAN NOTE
Wound is improved   Continue wound management with me salt as below  Pressure relief   Follow up in 3 weeks or call sooner with questions or concerns

## 2021-08-21 NOTE — ASSESSMENT & PLAN NOTE
· ABLA and Coagulopathy POA likely due to Xarelto a/e/b   · anemia POA with Hg 10 4 >>7 8 >7 4-will repeat in the a m  · treated with holding Xarelto and serial lab monitoring  Likely due to bleeding sacral ulcer  Surround to have been restarted yesterday a m  · Hemoglobin stabilized, no recurrence of bleeding  21-Aug-2021 16:08

## 2021-08-23 NOTE — PATIENT INSTRUCTIONS
Orders Placed This Encounter   Procedures    Wound cleansing and dressings     sacrlum    Wash your hands with soap and water  Remove old dressing, discard into plastic bag and place in trash  Cleanse the wound  With Dakins rinse  prior to applying a clean dressing  Do not use tissue or cotton balls  Do not scrub the wound  Pat dry using gauze  Shower no   Apply mesalt to the sacrum wound    Cover with abd  Secure with medfix tape  Change dressing daily    Done today     Standing Status:   Future     Standing Expiration Date:   8/23/2022

## 2021-08-23 NOTE — PROGRESS NOTES
Patient ID: Vinita Chahal is a 67 y o  female Date of Birth 1945     Chief Complaint  Chief Complaint   Patient presents with    Follow Up Wound Care Visit     dressing intact to sacrum    son accompany patient       Allergies  Patient has no known allergies  Assessment:    Pressure ulcer of sacral region, stage 4 (HCC)   Wound is improved  Continue wound management with me salt, see wound orders below  Pressure relief   Adequate protein intake  Followup in 3-4 weeks or call sooner with questions or concerns       Diagnoses and all orders for this visit:    Pressure ulcer of sacral region, stage 4 (HCC)  -     Wound cleansing and dressings; Future              Procedures    Plan:     Wound 04/21/21 Pressure Injury Sacrum (Active)   Wound Image Images linked 08/23/21 1044   Wound Description Granulation tissue;Slough 08/23/21 1044   Cristal-wound Assessment Intact 08/23/21 1044   Wound Length (cm) 3 cm 08/23/21 1044   Wound Width (cm) 3 5 cm 08/23/21 1044   Wound Depth (cm) 1 8 cm 08/23/21 1044   Wound Surface Area (cm^2) 10 5 cm^2 08/23/21 1044   Wound Volume (cm^3) 18 9 cm^3 08/23/21 1044   Calculated Wound Volume (cm^3) 18 9 cm^3 08/23/21 1044   Change in Wound Size % 78 52 08/23/21 1044   Tunneling 1 8 cm 08/23/21 1044   Tunneling in depth located at 3 08/23/21 1044   Undermining 1 3 08/23/21 1044   Undermining is depth extending from 1-3 08/23/21 1044   Drainage Amount Moderate 08/23/21 1044   Drainage Description Sanguineous 08/23/21 1044   Non-staged Wound Description Full thickness 08/23/21 1044   Dressing Status Intact 08/23/21 1044       Wound 04/21/21 Pressure Injury Sacrum (Active)   Date First Assessed/Time First Assessed: 04/21/21 1028   Pre-Existing Wound: Yes  Primary Wound Type: Pressure Injury  Location: Sacrum  Wound Outcome: Palliative       Subjective:        Patient presents for followup of a palliative stage IV sacral pressure ulcer  No increased pain or drainage    Has been using me salt on the wound      The following portions of the patient's history were reviewed and updated as appropriate: She  has a past medical history of Acute renal failure (ARF) ([facility] ), Acute respiratory failure with hypoxia ([facility] ), Anemia, Atrial fibrillation ([facility] ), CHF (congestive heart failure) ([facility] ), Chronic kidney disease, Diabetes mellitus ([facility] ), Hyperlipidemia, Hypertension, and Stroke ([facility] )    She   Patient Active Problem List    Diagnosis Date Noted    Anemia 07/05/2021    Bacteriuria 07/05/2021    Osteomyelitis ([facility] ) 07/05/2021    Other proteinuria 06/04/2021    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    Hypoalbuminemia 04/10/2021    Chronic indwelling Jimenez catheter 04/10/2021    Guaiac positive stools 04/09/2021    Acute on chronic diastolic (congestive) heart failure (HCC) 04/08/2021    Chronic respiratory failure with hypoxia (HCC) 04/08/2021    Confusion 03/10/2021    Rash 10/12/2020    Sepsis due to Streptococcus species ([facility] ) 09/17/2020    C  difficile colitis 09/16/2020    Chronic diastolic heart failure ([facility] ) 08/31/2020    Stenosis of left carotid artery 08/30/2020    Bacteremia due to Enterococcus 08/28/2020    Iron deficiency anemia 08/28/2020    Paroxysmal atrial fibrillation ([facility] ) 08/26/2020    Stroke-like symptoms 08/25/2020    RORY (acute kidney injury) ([facility] ) 08/25/2020    Colon cancer screening 07/20/2020    Need for vaccination against Streptococcus pneumoniae using pneumococcal conjugate vaccine 13 07/20/2020    Pressure ulcer of sacral region, stage 4 ([facility] ) 07/20/2020    Cellulitis of right elbow 07/20/2020    Bilateral lower extremity edema 07/20/2020    History of vitamin D deficiency 07/20/2020    Herpes zoster without complication 76/72/7673    Diarrhea 06/08/2020    Distal radius fracture, right 05/27/2020    Constipation 05/26/2020    Acute respiratory failure with hypoxia ([facility] ) 05/22/2020    Closed comminuted intertrochanteric fracture of proximal femur, right, initial encounter ([facility] ) 05/20/2020    Symptomatic varicose veins of both lower extremities 06/04/2019    Chronic venous insufficiency 06/04/2019    Status post reverse arthroplasty of right shoulder 09/25/2018    Scalp hematoma 09/13/2018    ABLA (acute blood loss anemia) 09/12/2018    Type 2 diabetes mellitus with hyperglycemia ([facility] ) 09/10/2018    Hypertension 09/10/2018    Hyperlipidemia 09/10/2018    Stage 3a chronic kidney disease ([place] [id number] ) 09/10/2018    Closed 4-part fracture of proximal humerus with routine healing 09/05/2018     She  reports that she has never smoked  She has never used smokeless tobacco  She reports that she does not drink alcohol and does not use drugs    Current Outpatient Medications   Medication Sig Dispense Refill    acetaminophen (TYLENOL) 325 mg tablet Take 650 mg by mouth every 4 (four) hours as needed for mild pain or fever      ascorbic acid (VITAMIN C) 500 MG tablet Take 1 tablet (500 mg total) by mouth daily  0    atorvastatin (LIPITOR) 40 mg tablet Take 1 tablet (40 mg total) by mouth daily with dinner 30 tablet 0    bisacodyl (DULCOLAX) 10 mg suppository Insert 10 mg into the rectum as needed for constipation constipation       ferrous sulfate 325 (65 Fe) mg tablet TAKE 1 TABLET BY MOUTH ONCE A DAY FOR SUPPLEMENT      gabapentin (NEURONTIN) 100 mg capsule Take 100 mg by mouth 3 (three) times a day      HumaLOG KwikPen 100 units/mL injection pen Inject under the skin       metoprolol tartrate (LOPRESSOR) 25 mg tablet Take 25 mg by mouth every 12 (twelve) hours       mineral oil enema Insert 1 enema into the rectum once As needed for constipation unrelieved by suppository (Patient not taking: Reported on 6/21/2021)      mirtazapine (REMERON) 15 mg tablet Take 15 mg by mouth daily at bedtime      Nutritional Supplements (Yevgeniy) PACK Take by mouth      pantoprazole (PROTONIX) 40 mg tablet Take 40 mg by mouth 2 (two) times a day       polyethylene glycol (MIRALAX) 17 g packet Take 17 g by mouth daily 14 each 0    rivaroxaban (XARELTO) 15 mg tablet Take 1 tablet (15 mg total) by mouth daily with breakfast 90 tablet 3    Specialty Vitamins Products (PROSTATE PO) Take by mouth 2 (two) times a day (Patient not taking: Reported on 6/21/2021)      vancomycin (VANCOCIN) 50mg/mL SOLN Take 2 5 mL (125 mg total) by mouth every 12 (twelve) hours 40 mL 0    VITAMIN D PO Take 50,000 mg by mouth 2 (two) times a week Tuesday and Friday       No current facility-administered medications for this visit  She has No Known Allergies       Review of Systems   Constitutional: Negative for chills and fever  HENT: Negative for congestion and sneezing  Respiratory: Negative for cough  Musculoskeletal: Positive for gait problem  Skin: Positive for wound  Psychiatric/Behavioral: Negative for agitation  Objective:       Wound 04/21/21 Pressure Injury Sacrum (Active)   Wound Image Images linked 08/23/21 1044   Wound Description Granulation tissue;Slough 08/23/21 1044   Cristal-wound Assessment Intact 08/23/21 1044   Wound Length (cm) 3 cm 08/23/21 1044   Wound Width (cm) 3 5 cm 08/23/21 1044   Wound Depth (cm) 1 8 cm 08/23/21 1044   Wound Surface Area (cm^2) 10 5 cm^2 08/23/21 1044   Wound Volume (cm^3) 18 9 cm^3 08/23/21 1044   Calculated Wound Volume (cm^3) 18 9 cm^3 08/23/21 1044   Change in Wound Size % 78 52 08/23/21 1044   Tunneling 1 8 cm 08/23/21 1044   Tunneling in depth located at 3 08/23/21 1044   Undermining 1 3 08/23/21 1044   Undermining is depth extending from 1-3 08/23/21 1044   Drainage Amount Moderate 08/23/21 1044   Drainage Description Sanguineous 08/23/21 1044   Non-staged Wound Description Full thickness 08/23/21 1044   Dressing Status Intact 08/23/21 1044       /60   Pulse 76   Temp (!) 96 6 °F (35 9 °C)   Resp 18     Physical Exam  Constitutional:       General: She is not in acute distress  Appearance: Normal appearance  She is obese   She is not ill-appearing, toxic-appearing or diaphoretic  HENT:      Head: Normocephalic and atraumatic  Right Ear: External ear normal       Left Ear: External ear normal    Eyes:      Conjunctiva/sclera: Conjunctivae normal    Pulmonary:      Effort: Pulmonary effort is normal  No respiratory distress  Musculoskeletal:      Cervical back: Neck supple  Skin:     Comments: See wound assessment   Neurological:      Mental Status: She is alert  Gait: Gait abnormal    Psychiatric:         Mood and Affect: Mood normal          Behavior: Behavior normal            Wound Instructions:  Orders Placed This Encounter   Procedures    Wound cleansing and dressings     sacrlum    Wash your hands with soap and water  Remove old dressing, discard into plastic bag and place in trash  Cleanse the wound  With Dakins rinse  prior to applying a clean dressing  Do not use tissue or cotton balls  Do not scrub the wound  Pat dry using gauze  Shower no   Apply mesalt to the sacrum wound  Cover with abd  Secure with medfix tape  Change dressing daily    Done today     Standing Status:   Future     Standing Expiration Date:   8/23/2022        Diagnosis ICD-10-CM Associated Orders   1   Pressure ulcer of sacral region, stage 4 ([facility])  [id number] [id number] Wound cleansing and dressings

## 2021-08-27 NOTE — ASSESSMENT & PLAN NOTE
Wound is improved  Continue wound management with me salt, see wound orders below  Pressure relief   Adequate protein intake  Followup in 3-4 weeks or call sooner with questions or concerns

## 2021-09-01 NOTE — ED PROVIDER NOTES
Emergency Department Trauma Note  Juice Ibrahim 67 y o  female MRN: [id number]  Unit/Bed#: ED 12/ED 12 Encounter: [id number]      Trauma Alert: Trauma Acuity: Trauma Evaluation  Model of Arrival: Mode of Arrival: BLS via Trauma Squad Name and Number: upper saucon  Trauma Team: Current Providers  Attending Provider: Amanda Cast MD  Attending Provider: Ayesha Harkins MD  Registered Nurse: Frantz Bay RN  Advanced Practitioner: Cecilia Argueta PA-C  Consultants: None      History of Present Illness     Chief Complaint:   Chief Complaint   Patient presents with    Fall     fall w/ head strike on thinners     HPI:  Juice Ibrahim is a 67 y o  female who presents with unwitnessed fall  Mechanism:Details of Incident: fall from standing Injury Date: 09/01/21 Injury Time: [address] Injury Occurence Location - [address] [place] [address] Way: [place]    67year old female brought by EMS from [facility] for evaluation after unwitnessed fall  Patient is disoriented to time  She states she tripped on carpet and fell in her kitchen, striking her head on the concrete floor  Patient takes Xarelto for history of Afib  Mild confusion at baseline and normally disoriented to time per nursing home staff; however, they felt that her confusion had worsened since the fall  According to nursing home staff, patient fell at approximately 5:30 pm fell out of bed  Patient is nonambuatory at baseline  She has chronic diarrhea  No fevers  No noted cough or congestion         History provided by:  Patient and nursing home  History limited by:  Dementia  Fall  Mechanism of injury: fall    Incident location:  assisted  Time since incident:  1 hour  Arrived directly from scene: yes    Fall:     Fall occurred:  From a bed    Height of fall:  2-3 ft    Impact surface:  Carpet    Point of impact:  Head  Suspicion of alcohol use: no    Suspicion of drug use: no    Tetanus status:  Up to date  Prior to arrival data:     Patient ambulatory at scene: no (nonambulatory at baseline)      Blood loss:  None    Responsiveness at scene:  Alert    Orientation at scene:  Person and place    Medications administered:  None    Immobilization:  None  Risk factors: anticoagulation therapy      Review of Systems   Unable to perform ROS: Dementia   Constitutional: Negative for fever  Respiratory: Negative for cough  Gastrointestinal: Positive for diarrhea  Historical Information     Immunizations:   Immunization History   Administered Date(s) Administered    INFLUENZA 10/21/2014    Pneumococcal Conjugate 13-Valent 07/20/2020    SARS-CoV-2 / COVID-19 mRNA IM (Pfizer-BioNTech) 01/08/2021    Tdap 07/20/2020       Past Medical History:   Diagnosis Date    [facility]F) [facility] [address] )     Acute respiratory failure with hypoxia (HCC)     Anemia     Atrial fibrillation (HCC)     [facility]     Chronic kidney disease     Diabetes mellitus ([facility] )     type 2    Hyperlipidemia     Hypertension     Stroke ([place] [address] )        Family History   Problem Relation Age of Onset    Diabetes Mother     Varicose Veins Mother     Stroke Father     Arthritis Brother     Diabetes Daughter     No Known Problems Brother      Past Surgical History:   Procedure Laterality Date    BREAST SURGERY Left     lumpectomy benign    CATARACT EXTRACTION Bilateral 2017    CATARACT EXTRACTION W/ INTRAOCULAR LENS IMPLANT Left 12/11/2017    Procedure: EXTRACTION EXTRACAPSULAR CATARACT PHACO INTRAOCULAR LENS (IOL);   Surgeon: Omar Hutchison MD;  [facility] MAIN OR;  Service: Ophthalmology    IR SUPRAPUBIC CATHETER PLACEMENT  7/14/2021    VA OPEN RX FEMUR FX+INTRAMED RAYMOND Right 5/21/2020    Procedure: INSERTION OF SHORT TROCHANTERIC FEMORAL NAIL;  Surgeon: Aline Woodall MD;  [facility];  Service: Orthopedics    [address] HUMERAL&GLENOID COMPNT Right 9/10/2018    Procedure: RIGHT REVERSE TOTAL SHOULDER ARTHROPLASTY;  Surgeon: Greg Lomax Savanna Brito MD;  [facility];  Service: Orthopedics    SD XCAPSL CTRC RMVL INSJ IO LENS PROSTH W/O ECP Right 10/23/2017    Procedure: EXTRACTION EXTRACAPSULAR CATARACT PHACO INTRAOCULAR LENS (IOL); Surgeon: Ellen Correa MD;  Location: [facility] MAIN OR;  Service: Ophthalmology    WOUND DEBRIDEMENT N/A 8/26/2020    Procedure: EXCISIONAL DEBRIDEMENT OF SACRAL PRESSURE WOUND, WASHOUT;;  Surgeon: Marlene Thorne MD;  [facility] MAIN OR;  Service: General    WOUND DEBRIDEMENT N/A 8/28/2020    Procedure: BUTTOCKS DEBRIDEMENT WOUND AND DRESSING CHANGE ([address] OUT); Surgeon: Nakita Cai MD;  [facility] MAIN OR;  Service: General     Social History     Tobacco Use    Smoking status: Never Smoker    Smokeless tobacco: Never Used   Vaping Use    Vaping Use: Never used   Substance Use Topics    Alcohol use: Never     Comment: quit 8/17    Drug use: No     E-Cigarette/Vaping    E-Cigarette Use Never User      E-Cigarette/Vaping Substances    Nicotine No     THC No     CBD No     Flavoring No     Other No     Unknown No        Family History: non-contributory    Meds/Allergies   Prior to Admission Medications   Prescriptions Last Dose Informant Patient Reported? Taking?    HumaLOG KwikPen 100 units/mL injection pen  Outside Facility (Specify) Yes No   Sig: Inject under the skin    Nutritional Supplements (Yevgenyi) [address] (Specify) Yes No   Sig: Take by mouth   Specialty Vitamins Products (PROSTATE PO)  Outside Facility (Specify) Yes No   Sig: Take by mouth 2 (two) times a day   Patient not taking: Reported on 6/21/2021   VITAMIN D PO  Outside Facility (Specify) Yes No   Sig: Take 50,000 mg by mouth 2 (two) times a week Tuesday and Friday   acetaminophen (TYLENOL) 325 mg tablet  Outside Facility (Specify) Yes No   Sig: Take 650 mg by mouth every 4 (four) hours as needed for mild pain or fever   ascorbic acid (VITAMIN C) 500 MG tablet   No No   Sig: Take 1 tablet (500 mg total) by mouth daily atorvastatin (LIPITOR) 40 mg tablet  Outside Facility (Specify) No No   Sig: Take 1 tablet (40 mg total) by mouth daily with dinner   bisacodyl (DULCOLAX) 10 mg suppository  Outside Facility (Specify) Yes No   Sig: Insert 10 mg into the rectum as needed for constipation constipation    ferrous sulfate 325 (65 Fe) mg tablet  Outside Facility (Specify) Yes No   Sig: TAKE 1 TABLET BY MOUTH ONCE A DAY FOR SUPPLEMENT   gabapentin (NEURONTIN) 100 mg capsule  Outside Facility (Specify) Yes No   Sig: Take 100 mg by mouth 3 (three) times a day   metoprolol tartrate (LOPRESSOR) 25 mg tablet  Outside Facility (Specify) Yes No   Sig: Take 25 mg by mouth every 12 (twelve) hours    mineral oil enema  Outside Facility (Specify) Yes No   Sig: Insert 1 enema into the rectum once As needed for constipation unrelieved by suppository   Patient not taking: Reported on 6/21/2021   mirtazapine (REMERON) 15 mg tablet  Outside Facility (Specify) Yes No   Sig: Take 15 mg by mouth daily at bedtime   pantoprazole (PROTONIX) 40 mg tablet  Outside Facility (Specify) Yes No   Sig: Take 40 mg by mouth 2 (two) times a day    polyethylene glycol (MIRALAX) 17 g packet  Outside Facility (Specify) No No   Sig: Take 17 g by mouth daily   rivaroxaban (XARELTO) 15 mg tablet  Outside Facility (Specify) No No   Sig: Take 1 tablet (15 mg total) by mouth daily with breakfast   vancomycin (VANCOCIN) 50mg/mL SOLN  Outside Facility (Specify) No No   Sig: Take 2 5 mL (125 mg total) by mouth every 12 (twelve) hours      Facility-Administered Medications: None       No Known Allergies    PHYSICAL EXAM    PE limited by: none    Objective   Vitals:   First set: Temperature: 98 °F (36 7 °C) (09/01/21 1826)  Pulse: 86 (09/01/21 1826)  Respirations: 18 (09/01/21 1826)  Blood Pressure: (!) 188/99 (09/01/21 1826)  SpO2: (P) 100 % (09/01/21 1826)    Primary Survey:   (A) Airway: intact  (B) Breathing: bilateral breath sounds  (C) Circulation: Pulses:   normal  (D) Disabliity:  GCS Total:  14, Eye Opening:   Spontaneous = 4, Motor Response: Obeys commands = 6 and Verbal Response:  Confused = 4  (E) Expose:  Completed    Secondary Survey: (Click on Physical Exam tab above)  Physical Exam  Vitals and nursing note reviewed  Constitutional:       General: She is not in acute distress  Appearance: She is well-developed  She is not toxic-appearing or diaphoretic  HENT:      Head: Normocephalic and atraumatic  Right Ear: External ear normal       Left Ear: External ear normal       Nose: Nose normal    Eyes:      General: No scleral icterus  Extraocular Movements: Extraocular movements intact  Pupils: Pupils are equal, round, and reactive to light  Cardiovascular:      Rate and Rhythm: Normal rate and regular rhythm  Heart sounds: Normal heart sounds  Pulmonary:      Effort: Pulmonary effort is normal  No respiratory distress  Breath sounds: Normal breath sounds  Chest:      Chest wall: No tenderness or crepitus  Abdominal:      General: There is no distension  Palpations: Abdomen is soft  Tenderness: There is no abdominal tenderness  Genitourinary:     Rectum: Guaiac result negative  Musculoskeletal:         General: No deformity  Normal range of motion  Comments: No midline C/T/L spine tenderness  No step offs or deformities  Skin:     General: Skin is warm and dry  Findings: No rash  Comments: Stage 1 sacral decubitus ulcer   Neurological:      General: No focal deficit present  Mental Status: She is alert  She is disoriented  Comments: Moving all 4 extremities with 4/5 strength throughout   Psychiatric:         Mood and Affect: Mood normal          Cervical spine cleared by clinical criteria?  No (imaging required)      Invasive Devices     Peripheral Intravenous Line            Peripheral IV 09/01/21 Left Antecubital <1 day          Drain            Suprapubic Catheter  18 Fr  49 days Lab Results:   Results Reviewed     Procedure Component Value Units Date/Time    Novel Coronavirus (Covid-19),PCR [facility] - 2 Hour Stat [[id number]]  (Normal) Collected: 09/01/21 2002    Lab Status: Final result Specimen: Nares from Nose Updated: 09/01/21 2057     SARS-CoV-2 Negative    Narrative: The specimen collection materials, transport medium, and/or testing methodology utilized in the production of these test results have been proven to be reliable in a limited validation with an abbreviated program under the Emergency Utilization Authorization provided by the FDA  Testing reported as "Presumptive positive" will be confirmed with secondary testing to ensure result accuracy  Clinical caution and judgement should be used with the interpretation of these results with consideration of the clinical impression and other laboratory testing  Testing reported as "Positive" or "Negative" has been proven to be accurate according to standard laboratory validation requirements  All testing is performed with control materials showing appropriate reactivity at standard intervals        Troponin I [[id number]]  (Normal) Collected: 09/01/21 2002    Lab Status: Final result Specimen: Blood from Arm, Left Updated: 09/01/21 2028     Troponin I <0 02 ng/mL     NT-BNP PRO [[id number]]  (Abnormal) Collected: 09/01/21 1907    Lab Status: Final result Specimen: Blood from Arm, Left Updated: 09/01/21 2022     NT-proBNP 4,042 pg/mL     Comprehensive metabolic panel [[id number]]  (Abnormal) Collected: 09/01/21 1907    Lab Status: Final result Specimen: Blood from Arm, Left Updated: 09/01/21 2001     Sodium 140 mmol/L      Potassium 4 4 mmol/L      Chloride 104 mmol/L      CO2 31 mmol/L      ANION GAP 5 mmol/L      BUN 46 mg/dL      Creatinine 0 95 mg/dL      Glucose 164 mg/dL      Calcium 8 2 mg/dL      Corrected Calcium 9 9 mg/dL      AST 11 U/L      ALT 12 U/L      Alkaline Phosphatase 99 U/L      Total Protein 5 8 g/dL Albumin 1 9 g/dL      Total Bilirubin 0 40 mg/dL      eGFR 58 ml/min/1 73sq m     Narrative:      [place] guidelines for Chronic Kidney Disease (CKD):     Stage 1 with normal or high GFR (GFR > 90 mL/min/1 73 square meters)    Stage 2 Mild CKD (GFR = 60-89 mL/min/1 73 square meters)    Stage 3A Moderate CKD (GFR = 45-59 mL/min/1 73 square meters)    Stage 3B Moderate CKD (GFR = 30-44 mL/min/1 73 square meters)    Stage 4 Severe CKD (GFR = 15-29 mL/min/1 73 square meters)    Stage 5 End Stage CKD (GFR <15 mL/min/1 73 square meters)  Note: GFR calculation is accurate only with a steady state creatinine    CBC and differential [[id number]]  (Abnormal) Collected: 09/01/21 1907    Lab Status: Final result Specimen: Blood from Arm, Left Updated: 09/01/21 1914     WBC 11 33 Thousand/uL      RBC 3 29 Million/uL      Hemoglobin 8 8 g/dL      Hematocrit 29 7 %      MCV 90 fL      MCH 26 7 pg      MCHC 29 6 g/dL      RDW 14 1 %      MPV 11 0 fL      Platelets 768 Thousands/uL      nRBC 0 /100 WBCs      Neutrophils Relative 81 %      Immat GRANS % 0 %      Lymphocytes Relative 11 %      Monocytes Relative 5 %      Eosinophils Relative 2 %      Basophils Relative 1 %      Neutrophils Absolute 9 24 Thousands/µL      Immature Grans Absolute 0 03 Thousand/uL      Lymphocytes Absolute 1 23 Thousands/µL      Monocytes Absolute 0 56 Thousand/µL      Eosinophils Absolute 0 21 Thousand/µL      Basophils Absolute 0 06 Thousands/µL                  Imaging Studies:   Direct to CT: Yes  TRAUMA - CT head wo contrast   Final Result by Joie Gutierrez MD (09/01 1939)      No acute intracranial abnormality  Stable chronic microangiopathic changes  Workstation performed: [id number]         TRAUMA - CT spine cervical wo contrast   Final Result by Joie Gutierrez MD (09/01 1939)      No cervical spine fracture or traumatic malalignment                     Workstation performed: [id number] TRAUMA - CT chest abdomen pelvis w contrast   Final Result by Debra Saini MD (09/01 1939)      No acute intrathoracic or intra-abdominal injury  Large right and moderate left left pleural effusions have increased from previous CT  Right lung atelectasis, volume loss and groundglass opacity  Milder left basilar atelectasis  The study was marked in [facility] for immediate notification  Workstation performed: [id number]         XR Trauma chest portable   ED Interpretation by Tre Torres MD (09/01 2974)   Right upper lung opacity with right pleural effusion            Procedures  ECG 12 Lead Documentation Only    Date/Time: 9/1/2021 10:01 PM  Performed by: Tre Torres MD  Authorized by: Tre Torres MD     Indications / Diagnosis:  Fall  ECG reviewed by me, the ED Provider: yes    Patient location:  ED  Previous ECG:     Previous ECG:  Compared to current    Comparison ECG info:  7/4/21 normal sinus rhythm with PAcs, limited by artifact    Similarity:  No change  Interpretation:     Interpretation: normal    Quality:     Tracing quality:  Limited by artifact  Rate:     ECG rate:  81    ECG rate assessment: normal    Rhythm:     Rhythm: sinus rhythm    Ectopy:     Ectopy: none    QRS:     QRS axis:  Normal    QRS intervals:  Normal  Conduction:     Conduction: normal    ST segments:     ST segments:  Normal  T waves:     T waves: normal               ED Course  ED Course as of Sep 01 2132   Wed Sep 01, 2021   1915 8 1 one month ago   Hemoglobin(!): 8 8   2009 Patient's son, Kandis Telles, updated by phone  He states she had previously been on lasix for CHF, but believes they recently stopped it                MDM  Number of Diagnoses or Management Options  CHF (congestive heart failure) ([facility] [address] ): new and requires workup  Contusion of forehead, initial encounter: new and requires workup  Fall from bed, initial encounter: new and requires workup  Pleural effusion: new and requires workup  Pulmonary infiltrate in right lung on CXR: new and requires workup  Diagnosis management comments: 67year old female presents after unwitnessed fall  Contusion to forehead  Imaging negative for any additional traumatic pathology; however, right pleural effusion and infiltrate noted  Patient not currently on diuretics and last echo 5 months ago showed EF 65%  40 mg IV lasix given  No current oxygen requirement  Patient admitted for further evaluation and management  Amount and/or Complexity of Data Reviewed  Clinical lab tests: ordered and reviewed  Tests in the radiology section of CPT®: ordered and reviewed  Obtain history from someone other than the patient: yes  Independent visualization of images, tracings, or specimens: yes            Disposition  [facility] Transfer: No  Final diagnoses:   Pleural effusion   Pulmonary infiltrate in right lung on CXR   CHF (congestive heart failure) ([facility] [address] )   Fall from bed, initial encounter   Contusion of forehead, initial encounter     Time reflects when diagnosis was documented in both MDM as applicable and the Disposition within this note     Time User Action Codes Description Comment    9/1/2021  8:35 PM Escarcega, Severa Nickel J Add [J90] Pleural effusion     9/1/2021  8:35 PM Vallie Deck Add [R91 8] Pulmonary infiltrate in right lung on CXR     9/1/2021  8:36 PM Vallie Deck Add [I50 9] CHF (congestive heart failure) ([facility] [address] )     9/1/2021  8:36 PM Vallie Deck Add [W06  XXXA] Fall from bed, initial encounter     9/1/2021  8:36 PM Payton Villalobos Add [S00 83XA] Contusion of forehead, initial encounter       ED Disposition     ED Disposition Condition Date/Time Comment    Admit Stable Wed Sep 1, 2021  9:27 PM Case was discussed with YON and the patient's admission status was agreed to be Admission Status: inpatient status to the service of Dr Harlan Andrews           Follow-up Information    None       Patient's Medications   Discharge Prescriptions    No medications on file     No discharge procedures on file      PDMP Review       Value Time User    PDMP Reviewed  Yes 7/10/2021  8:00 AM Josue Singleton MD          ED Provider  Electronically Signed by         Augie Stallings MD  09/01/21 8807       Augie Stallings MD  09/01/21 0978
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
## 2021-09-02 PROBLEM — R93.89 ABNORMAL CT OF THE CHEST: Status: ACTIVE | Noted: 2021-01-01

## 2021-09-02 PROBLEM — J18.9 PNEUMONIA: Status: ACTIVE | Noted: 2021-01-01

## 2021-09-02 PROBLEM — D72.829 LEUKOCYTOSIS: Status: ACTIVE | Noted: 2021-01-01

## 2021-09-02 NOTE — ASSESSMENT & PLAN NOTE
· Nonsevere CAP with DRIP score of 4  · CT chest: "Consolidation in the right upper lobe more anteriorly may be due to atelectasis or infiltrate   There is groundglass attenuation in the right upper and right lower lobes  "  · COVID 19 negative   · Will order procalcitonin   · Sputum culture  · Urine strep and legionella  · Continue ceftriaxone

## 2021-09-02 NOTE — ASSESSMENT & PLAN NOTE
· WBC at 11 33K on admission   · Suspect secondary to CAP seen on CT chest   · Continue ceftriaxone  · Trend CBC

## 2021-09-02 NOTE — H&P
[place]  H&P- Fede Staggers 1945, 67 y o  female MRN: [id number]  Unit/Bed#: ED 12 Encounter: [id number]  Primary Care Provider: Adonis Collet, DO   Date and time admitted to hospital: 9/1/2021  6:29 PM    Acute on chronic diastolic (congestive) heart failure [facility])  Assessment & Plan  Wt Readings from Last 3 Encounters:   09/01/21 72 kg (158 lb 11 7 oz)   07/05/21 72 5 kg (159 lb 13 3 oz)   05/13/21 57 kg (125 lb 10 6 oz)     · CT chest with increased b/l pleural effusions (large right and moderate left)   · Pt denies dyspnea (though unreliable historian)  · BNP 4042 (lower than prior HF exacerbation admissions)   · Last echo 04/2021: "LVEF 65&  LV diastolic parameters were abnormal "   · Previously on demadex, which was d/c on 7/16 after discharge from hospital as pt euvolvemic - nephrology to determine restart date per d/c summary   · On admission, pt with lungs CTA b/l and no peripheral edema   · Lasix 40 IV given in the ED - trend UOP at 6 hours at 1300cc therefore dose lasix 40 IV BID   · Strict I/Os  · Daily standing weights   · Cardiac diet with fluid and sodium restriction   · Cardiology consult   · Consideration for thoracentesis if effusions not improve s/p diuresis     Pneumonia  Assessment & Plan  · Nonsevere CAP with DRIP score of 4  · CT chest: "Consolidation in the right upper lobe more anteriorly may be due to atelectasis or infiltrate   There is groundglass attenuation in the right upper and right lower lobes  "  · COVID 19 negative   · Will order procalcitonin   · Sputum culture  · Urine strep and legionella  · Continue ceftriaxone     Fall  Assessment & Plan  · Pt reportedly fell out of bed at facility at 1730   · Trauma work-up unrevealing except for right frontal scalp hematoma   · CTH and CT-Cspine for acute traumatic injury     Leukocytosis  Assessment & Plan  · WBC at 11 33K on admission   · Suspect secondary to CAP seen on CT chest   · Continue ceftriaxone  · Trend CBC    Anemia  Assessment & Plan  · Hgb at 8 8  · July admission pt had EGD that showed small AVM without active bleeding that was cauterized  · Hgb range 8-9   · No active signs of bleeding   · Trend H/H   · Transfuse for Hgb <7 0     Chronic respiratory failure with hypoxia (HCC)  Assessment & Plan  · On 2L supplemental oxygen via NC at baseline  · No inc in requirement     Hypoalbuminemia  Assessment & Plan  · Albumin at 1 9   · Suspect in the setting of malnutrition   · Will give albumin 25% x1 dose to aid with diuresis     Paroxysmal atrial fibrillation (HCC)  Assessment & Plan  · Home regimen:  Metoprolol 25 mg q 12 hours  · Anticoagulated with xarelto  · Continue    Type 2 diabetes mellitus with hyperglycemia ([facility] [address] )  Assessment & Plan  Lab Results   Component Value Date    HGBA1C 7 0 06/15/2021       No results for input(s): POCGLU in the last 72 hours  Blood Sugar Average: Last 72 hrs:  ·  home regimen:  Lantus 8 units HS and Humalog 8 units t i d    · Continue with home regimen with the addition of SSI    Stage 3a chronic kidney disease [facility])  Assessment & Plan  Lab Results   Component Value Date    EGFR 58 09/01/2021    EGFR 52 07/10/2021    EGFR 54 07/09/2021    CREATININE 0 95 09/01/2021    CREATININE 1 04 07/10/2021    CREATININE 1 01 07/09/2021   · Baseline hemoglobin 0 8 1 4   · Creatinine on admission 0 95   · Trend BMP in the setting of IV diuresis    Hypertension  Assessment & Plan  · Home regimen: Metoprolol 25 mg q 12 hours   · Continue    Pressure ulcer of sacral region, stage 4 (HCC)  Assessment & Plan  · Offload pressure as able     VTE Pharmacologic Prophylaxis: VTE Score: 4 Moderate Risk (Score 3-4) - Pharmacological DVT Prophylaxis Ordered: rivaroxaban (Xarelto)  Code Status: Level 1 - Full Code   Discussion with family: pt would like son called later in the day        Anticipated Length of Stay: Patient will be admitted on an inpatient basis with an anticipated length of stay of greater than 2 midnights secondary to acute on chronic HF, PNA  Total Time for Visit, including Counseling / Coordination of Care: 60 minutes Greater than 50% of this total time spent on direct patient counseling and coordination of care  Chief Complaint: fell PTA    History of Present Illness:  Marilee Oliveros is a 67 y o  female with a PMH of chronic diastolic HF, IDDM2, CKD stage 3, HTN, and Afib who presents with s/p fall out of bed at long term care facility with head strike at 5:30 p m   No LOC  patient denies any dyspnea, fever, chills, productive cough, or pain anywhere  History is limited secondary to dementia    Review of Systems:  Review of Systems   Unable to perform ROS: Dementia   Respiratory: Negative for cough and shortness of breath  Gastrointestinal: Negative for abdominal pain  Genitourinary: Negative for dysuria  Past Medical and Surgical History:   Past Medical History:   Diagnosis Date    [facility] failure [facility]F) ([facility] [address] )     Acute respiratory failure with hypoxia (HCC)     Anemia     Atrial fibrillation (HCC)     [facility]cong[facility]     Chronic kidney disease     Diabetes mellitus ([facility] )     type 2    Hyperlipidemia     Hypertension     Stroke [facility])        Past Surgical History:   Procedure Laterality Date    BREAST SURGERY Left     lumpectomy benign    CATARACT EXTRACTION Bilateral 2017    CATARACT EXTRACTION W/ INTRAOCULAR LENS IMPLANT Left 12/11/2017    Procedure: EXTRACTION EXTRACAPSULAR CATARACT PHACO INTRAOCULAR LENS (IOL);   Surgeon: Wilson Cloud MD;  Location: [facility] MAIN OR;  Service: Ophthalmology    IR SUPRAPUBIC CATHETER PLACEMENT  7/14/2021    ND OPEN RX FEMUR FX+INTRAMED RAYMOND Right 5/21/2020    Procedure: INSERTION OF SHORT TROCHANTERIC FEMORAL NAIL;  Surgeon: Huyen Simeon MD;  [facility] Main [facility];  Service: Orthopedics    ND ELENI SHOULDER ARTHRPLSTY HUMERAL&GLENOID COMPNT Right 9/10/2018    Procedure: RIGHT REVERSE TOTAL SHOULDER ARTHROPLASTY;  Surgeon: Prabha Ulloa MD;  [facility] Main [facility];  Service: Orthopedics    MT XCAPSL CTRC RMVL INSJ IO LENS PROSTH W/O ECP Right 10/23/2017    Procedure: EXTRACTION EXTRACAPSULAR CATARACT PHACO INTRAOCULAR LENS (IOL); Surgeon: Eitan Hylton MD;  Location: [facility] MAIN OR;  Service: Ophthalmology    WOUND DEBRIDEMENT N/A 8/26/2020    Procedure: EXCISIONAL DEBRIDEMENT OF SACRAL PRESSURE WOUND, WASHOUT;;  Surgeon: Livia Thomas MD;  [facility] MAIN OR;  Service: General    WOUND DEBRIDEMENT N/A 8/28/2020    Procedure: BUTTOCKS DEBRIDEMENT WOUND AND DRESSING CHANGE ([address] OUT); Surgeon: Vaishali Morris MD;  Location: [facility] MAIN OR;  Service: General       Meds/Allergies:  Prior to Admission medications    Medication Sig Start Date End Date Taking?  Authorizing Provider   ascorbic acid (VITAMIN C) 500 MG tablet Take 1 tablet (500 mg total) by mouth daily 7/10/21  Yes Rain Meade MD   atorvastatin (LIPITOR) 40 mg tablet Take 1 tablet (40 mg total) by mouth daily with dinner 9/10/20  Yes Tamar Mendez MD   ferrous sulfate 325 (65 Fe) mg tablet Take 325 mg by mouth daily with breakfast  7/7/20  Yes Historical Provider, MD   gabapentin (NEURONTIN) 100 mg capsule Take 100 mg by mouth 3 (three) times a day   Yes Historical Provider, MD   metoprolol tartrate (LOPRESSOR) 25 mg tablet Take 25 mg by mouth every 12 (twelve) hours  4/7/21  Yes Historical Provider, MD   mirtazapine (REMERON) 15 mg tablet Take 15 mg by mouth daily at bedtime   Yes Historical Provider, MD   Nutritional Supplements (Yevgeniy) PACK Take by mouth   Yes Historical Provider, MD   pantoprazole (PROTONIX) 40 mg tablet Take 40 mg by mouth 2 (two) times a day  4/7/21  Yes Historical Provider, MD   rivaroxaban (XARELTO) 15 mg tablet Take 1 tablet (15 mg total) by mouth daily with breakfast 11/23/20  Yes Arleth Hobbs MD   vancomycin (VANCOCIN) 50mg/mL SOLN Take 2 5 mL (125 mg total) by mouth every 12 (twelve) hours 5/13/21 Yes Dorota Ivan MD   VITAMIN D PO Take 50,000 mg by mouth 2 (two) times a week Tuesday and Friday   Yes Historical Provider, MD   acetaminophen (TYLENOL) 325 mg tablet Take 650 mg by mouth every 4 (four) hours as needed for mild pain or fever    Historical Provider, MD   bisacodyl (DULCOLAX) 10 mg suppository Insert 10 mg into the rectum as needed for constipation constipation     Historical Provider, MD   HumaLOG KwikPen 100 units/mL injection pen Inject under the skin  4/5/21   Historical Provider, MD   mineral oil enema Insert 1 enema into the rectum once As needed for constipation unrelieved by suppository  Patient not taking: Reported on 6/21/2021    Historical Provider, MD   polyethylene glycol (MIRALAX) 17 g packet Take 17 g by mouth daily 5/27/20   Pamela Schuster PA-C   Specialty Vitamins Products (PROSTATE PO) Take by mouth 2 (two) times a day  Patient not taking: Reported on 6/21/2021    Historical Provider, MD     I have reveiwed home medications using records provided by [facility]      Allergies: No Known Allergies    Social History:  Marital Status: Single   Occupation: retired   Patient Pre-hospital [facility] Situation: [facility]  Patient Pre-hospital Level of Mobility: manual wheelchair  Patient Pre-hospital Diet Restrictions: mechanical soft   Substance Use History:   Social History     Substance and Sexual Activity   Alcohol Use Never    Comment: quit 8/17     Social History     Tobacco Use   Smoking Status Never Smoker   Smokeless Tobacco Never Used     Social History     Substance and Sexual Activity   Drug Use No       Family History:  Family History   Problem Relation Age of Onset    Diabetes Mother     Varicose Veins Mother     Stroke Father     Arthritis Brother     Diabetes Daughter     No Known Problems Brother        Physical Exam:     Vitals:   Blood Pressure: 151/65 (09/02/21 0106)  Pulse: 69 (09/02/21 0106)  Temperature: 98 °F (36 7 °C) (09/01/21 1826)  Temp Source: Temporal (09/01/21 1826)  Respirations: 14 (09/02/21 0106)  Weight - Scale: 72 kg (158 lb 11 7 oz) (09/01/21 1826)  SpO2: 100 % (09/02/21 0106)    Physical Exam  Vitals and nursing note reviewed  Constitutional:       Appearance: Normal appearance  HENT:      Head:      Comments: Ecchymosis over the right temple     Nose: Nose normal       Mouth/Throat:      Mouth: Mucous membranes are moist    Eyes:      Extraocular Movements: Extraocular movements intact  Conjunctiva/sclera: Conjunctivae normal    Cardiovascular:      Rate and Rhythm: Normal rate and regular rhythm  Pulses: Normal pulses  Pulmonary:      Effort: Pulmonary effort is normal  No respiratory distress  Breath sounds: Normal breath sounds  No wheezing, rhonchi or rales  Abdominal:      General: Abdomen is flat  Palpations: Abdomen is soft  Musculoskeletal:         General: Normal range of motion  Cervical back: Normal range of motion  Right lower leg: No edema  Left lower leg: No edema  Skin:     General: Skin is warm and dry  Coloration: Skin is not pale  Comments: Pressure ulcer of sacral region   Neurological:      Mental Status: She is alert  Comments:  Only oriented to self   Psychiatric:         Mood and Affect: Mood normal           Additional Data:     Lab Results:  Results from last 7 days   Lab Units 09/01/21  1907   WBC Thousand/uL 11 33*   HEMOGLOBIN g/dL 8 8*   HEMATOCRIT % 29 7*   PLATELETS Thousands/uL 236   NEUTROS PCT % 81*   LYMPHS PCT % 11*   MONOS PCT % 5   EOS PCT % 2     Results from last 7 days   Lab Units 09/01/21  1907   SODIUM mmol/L 140   POTASSIUM mmol/L 4 4   CHLORIDE mmol/L 104   CO2 mmol/L 31   BUN mg/dL 46*   CREATININE mg/dL 0 95   ANION GAP mmol/L 5   CALCIUM mg/dL 8 2*   ALBUMIN g/dL 1 9*   TOTAL BILIRUBIN mg/dL 0 40   ALK PHOS U/L 99   ALT U/L 12   AST U/L 11   GLUCOSE RANDOM mg/dL 164*                       Imaging: Reviewed radiology reports from this admission including: chest xray, chest CT scan, abdominal/pelvic CT, CT head and CT c-spine  TRAUMA - CT head wo contrast   Final Result by Tejas Stout MD (09/01 1939)      No acute intracranial abnormality  Stable chronic microangiopathic changes  Workstation performed: [id number]         TRAUMA - CT spine cervical wo contrast   Final Result by Tejas Stout MD (09/01 1939)      No cervical spine fracture or traumatic malalignment  Workstation performed: [id number]         TRAUMA - CT chest abdomen pelvis w contrast   Final Result by Tejas Stout MD (09/01 1939)      No acute intrathoracic or intra-abdominal injury  Large right and moderate left left pleural effusions have increased from previous CT  Right lung atelectasis, volume loss and groundglass opacity  Milder left basilar atelectasis  The study was marked in [facility] for immediate notification  Workstation performed: [id number]         XR Trauma chest portable   ED Interpretation by Ruddy Lopez MD (09/01 2593)   Right upper lung opacity with right pleural effusion      Final Result by Anna Christopher MD (09/02 0105)         1  Right upper and middle lobe pneumonia  2   Findings suggestive of mild congestive heart failure  Workstation performed: [id number]             EKG and Other Studies Reviewed on Admission:   · EKG: EKG with very poor baseline  No acute ischemia from what can be visualized  Repeat EKG for AM       ** Please Note: This note has been constructed using a voice recognition system   **

## 2021-09-02 NOTE — ASSESSMENT & PLAN NOTE
· Pt reportedly fell out of bed at facility at 1730   · Trauma work-up unrevealing except for right frontal scalp hematoma   · CTH and CT-Cspine for acute traumatic injury

## 2021-09-02 NOTE — PLAN OF CARE
Problem: Prexisting or High Potential for Compromised Skin Integrity  Goal: Skin integrity is maintained or improved  Description: INTERVENTIONS:  - Identify patients at risk for skin breakdown  - Assess and monitor skin integrity  - Assess and monitor nutrition and hydration status  - Monitor labs   - Assess for incontinence   - Turn and reposition patient  - Assist with mobility/ambulation  - Relieve pressure over bony prominences  - Avoid friction and shearing  - Provide appropriate hygiene as needed including keeping skin clean and dry  - Evaluate need for skin moisturizer/barrier cream  - Collaborate with interdisciplinary team   - Patient/family teaching  - Consider wound care consult   Outcome: Progressing     Problem: PAIN - ADULT  Goal: Verbalizes/displays adequate comfort level or baseline comfort level  Description: Interventions:  - Encourage patient to monitor pain and request assistance  - Assess pain using appropriate pain scale  - Administer analgesics based on type and severity of pain and evaluate response  - Implement non-pharmacological measures as appropriate and evaluate response  - Consider cultural and social influences on pain and pain management  - Notify physician/advanced practitioner if interventions unsuccessful or patient reports new pain  Outcome: Progressing

## 2021-09-02 NOTE — ASSESSMENT & PLAN NOTE
Lab Results   Component Value Date    HGBA1C 7 0 06/15/2021       No results for input(s): POCGLU in the last 72 hours      Blood Sugar Average: Last 72 hrs:  ·  home regimen:  Lantus 8 units HS and Humalog 8 units t i d    · Continue with home regimen with the addition of SSI

## 2021-09-02 NOTE — ASSESSMENT & PLAN NOTE
Lab Results   Component Value Date    EGFR 58 09/01/2021    EGFR 52 07/10/2021    EGFR 54 07/09/2021    CREATININE 0 95 09/01/2021    CREATININE 1 04 07/10/2021    CREATININE 1 01 07/09/2021   · Baseline hemoglobin 0 8 1 4   · Creatinine on admission 0 95   · Trend BMP in the setting of IV diuresis

## 2021-09-02 NOTE — CONSULTS
Consultation - Cardiology Team One  Marilee Oliveros 67 y o  female MRN: [id number]  Unit/Bed#: -01 SDU Encounter: [id number]    Inpatient consult to Cardiology  Consult performed by: TJ Haile  Consult ordered by: Mary Navarrete PA-C      Physician Requesting Consult: Devyn Bhatti MD  Reason for Consult / Principal Problem: CHF    Assessment/ Plan    1  Acute hypoxic respiratory failure- in setting of pneumonia, pleural effusion, and mild volume overload  100% on 2LNC, wean off as able  2  Acute on chronic diastolic CHF  NT proBNP 0150 (from 6405 in July), CXR showed mild vascular congestion  Home diuretic: Torsemide 20 mg daily prn  Responding well to Lasix 40 mg IV BID- continue through today  Can likely transition to torsemide 20 mg daily tomorrow as she is not significantly overloaded on exam  Also needs better BP control  Follow I/Os, daily weights, am BMP  3  Pneumonia- COVID negative, on IV abx per primary team    4  Right pleural effusion- large right pleural effusion on CT chest  May benefit from IR thoracentesis but currently not dyspneic and 100% on 2LNC  Also has small to moderate left pleural effusion  5  PAF- maintaining NSR on metoprolol tartrate 25 mg BID  Continue [address] with Xarelto  6  HTN- poorly controlled, average /77  On IV lasix as noted above as well as metoprolol tartrate  Has baseline CKD, will add amlodipine 5 mg daily  7  HLD- on statin therapy    8  CKD 3- renal function stable, Cr 1 05  Monitor with IV diuresis, BMP in am     9  Type 2 DM- per primary team     History of Present Illness   HPI: Marilee Oliveros is a 67y o  year old female with chronic diastolic CHF, PAF, bilateral carotid stenosis, HTN, HLD, CKD 3, and type 2 DM  She follows with cardiologist Dr Leo Herman and was most recently seen in June by Andreina Ramos in June 2021  She presented to the ED after an unwitnessed fall at [facility] where she resides   She did strike her head  Trauma imaging negative for injury but CT did demonstrate right upper and middle lobe pneumonia as well as large right pleural effusion and mild vascular congestion  She is being treated with IV antibiotics and Lasix 40 mg IV BID with good response thus far  Cardiology consulted for further evaluation and management of acute CHF exacerbation  At time of my exam she is resting comfortably in bed on 2LNC with O2 sat 100%  She states she was walking to get a cup of coffee when she fell without any symptoms of chest pain, dyspnea, or dizziness/lightheadedness  She does have dementia and is a limited historian telling me that she is currently in the nursing home but also that she lives in an apartment with her daughter  EKG reviewed personally: NSR    Telemetry reviewed personally: NSR    Review of Systems   Constitutional: Negative for chills, diaphoresis, malaise/fatigue and weight gain  Cardiovascular: Negative for chest pain, dyspnea on exertion, leg swelling, orthopnea, palpitations and syncope  Respiratory: Negative for cough, shortness of breath, sleep disturbances due to breathing and sputum production  Gastrointestinal: Negative for bloating and nausea  Neurological: Negative for dizziness, light-headedness and weakness  Psychiatric/Behavioral: Negative for altered mental status  All other systems reviewed and are negative      Historical Information   Past Medical History:   Diagnosis Date    [facility] failure (ARF) ([facility] )     Acute respiratory failure with hypoxia (HCC)     Anemia     Atrial fibrillation (HCC)     CHF (congestive heart failure) (HCC)     Chronic kidney disease     Diabetes mellitus ([facility] )     type 2    Hyperlipidemia     Hypertension     Stroke [facility])      Past Surgical History:   Procedure Laterality Date    BREAST SURGERY Left     lumpectomy benign    CATARACT EXTRACTION Bilateral 2017    CATARACT EXTRACTION W/ INTRAOCULAR LENS IMPLANT Left 12/11/2017 Procedure: EXTRACTION EXTRACAPSULAR CATARACT PHACO INTRAOCULAR LENS (IOL); Surgeon: Jamal Mcdaniel MD;  Location: [facility] MAIN OR;  Service: Ophthalmology    IR SUPRAPUBIC CATHETER PLACEMENT  7/14/2021    WA OPEN RX FEMUR FX+INTRAMED RAYMOND Right 5/21/2020    Procedure: INSERTION OF SHORT TROCHANTERIC FEMORAL NAIL;  Surgeon: J Carlos Barrios MD;  [facility] Main OR;  Service: Orthopedics    WA ELENI SHOULDER [address] HUMERAL&GLENOID COMPNT Right 9/10/2018    Procedure: RIGHT REVERSE TOTAL SHOULDER ARTHROPLASTY;  Surgeon: J Carlos Barrios MD;  Location: [facility] Main OR;  Service: Orthopedics    WA XCAPSL CTRC RMVL INSJ IO LENS PROSTH W/O ECP Right 10/23/2017    Procedure: EXTRACTION EXTRACAPSULAR CATARACT PHACO INTRAOCULAR LENS (IOL); Surgeon: Jamal Mcdaniel MD;  Location: [facility] MAIN OR;  Service: Ophthalmology    WOUND DEBRIDEMENT N/A 8/26/2020    Procedure: EXCISIONAL DEBRIDEMENT OF SACRAL PRESSURE WOUND, WASHOUT;;  Surgeon: Benjamin Banegas MD;  [facility] MAIN OR;  Service: General    WOUND DEBRIDEMENT N/A 8/28/2020    Procedure: BUTTOCKS DEBRIDEMENT WOUND AND DRESSING CHANGE ([address] OUT);   Surgeon: Priscilla Cai MD;  Location: [facility] MAIN OR;  Service: General     Social History     Substance and Sexual Activity   Alcohol Use Never    Comment: quit 8/17     Social History     Substance and Sexual Activity   Drug Use No     Social History     Tobacco Use   Smoking Status Never Smoker   Smokeless Tobacco Never Used     Family History:   Family History   Problem Relation Age of Onset    Diabetes Mother     Varicose Veins Mother     Stroke Father     Arthritis Brother     Diabetes Daughter     No Known Problems Brother        Meds/Allergies   all current active meds have been reviewed and current meds:   Current Facility-Administered Medications   Medication Dose Route Frequency    atorvastatin (LIPITOR) tablet 40 mg  40 mg Oral Daily With Dinner    cefTRIAXone (ROCEPHIN) IVPB (premix in dextrose) 1,000 mg 50 mL 1,000 mg Intravenous Q24H    ferrous sulfate tablet 325 mg  325 mg Oral Daily With Breakfast    furosemide (LASIX) injection 40 mg  40 mg Intravenous BID    gabapentin (NEURONTIN) capsule 100 mg  100 mg Oral TID    insulin glargine (LANTUS) subcutaneous injection 10 Units 0 1 mL  10 Units Subcutaneous HS    insulin lispro (HumaLOG) 100 units/mL subcutaneous injection 1-5 Units  1-5 Units Subcutaneous HS    insulin lispro (HumaLOG) 100 units/mL subcutaneous injection 1-6 Units  1-6 Units Subcutaneous TID AC    insulin lispro (HumaLOG) 100 units/mL subcutaneous injection 8 Units  8 Units Subcutaneous TID With Meals    metoprolol tartrate (LOPRESSOR) tablet 25 mg  25 mg Oral Q12H MARILYN    mirtazapine (REMERON) tablet 15 mg  15 mg Oral HS    pantoprazole (PROTONIX) EC tablet 40 mg  40 mg Oral BID    rivaroxaban (XARELTO) tablet 15 mg  15 mg Oral Daily With Breakfast    vancomycin (VANCOCIN) oral solution 125 mg  125 mg Oral Q12H [address]          No Known Allergies    Objective   Vitals: Blood pressure (!) 172/75, pulse 71, temperature 97 6 °F (36 4 °C), temperature source Oral, resp  rate 14, weight 66 9 kg (147 lb 7 8 oz), SpO2 100 %  , Body mass index is 23 81 kg/m²  ,     Systolic (81NTW), PID:829 , Min:151 , EMN:003     Diastolic (52AKC), FGE:96, Min:65, Max:99        Intake/Output Summary (Last 24 hours) at 9/2/2021 0832  Last data filed at 9/2/2021 0701  Gross per 24 hour   Intake 100 ml   Output 1600 ml   Net -1500 ml     Wt Readings from Last 3 Encounters:   09/02/21 66 9 kg (147 lb 7 8 oz)   07/05/21 72 5 kg (159 lb 13 3 oz)   05/13/21 57 kg (125 lb 10 6 oz)     Invasive Devices     Peripheral Intravenous Line            Peripheral IV 09/01/21 Left Antecubital <1 day          Drain            Suprapubic Catheter  18 Fr  49 days                Physical Exam  Vitals reviewed  Constitutional:       General: She is not in acute distress       Comments: Elderly, chronically ill appearing female laying comfortably in bed   Neck:      Vascular: No hepatojugular reflux or JVD  Cardiovascular:      Rate and Rhythm: Normal rate and regular rhythm  Heart sounds: Normal heart sounds  No murmur heard  No friction rub  No gallop  Pulmonary:      Effort: Pulmonary effort is normal  No respiratory distress  Breath sounds: No rales  Comments: Decreased at right base but otherwise clear  100% on 2LNC  Abdominal:      General: Bowel sounds are normal  There is no distension  Palpations: Abdomen is soft  Tenderness: There is no abdominal tenderness  Genitourinary:     Comments: Suprapubic catheter draining light yellow urine  Musculoskeletal:         General: No tenderness  Normal range of motion  Cervical back: Neck supple  Right lower leg: No edema  Left lower leg: No edema  Skin:     General: Skin is warm and dry  Capillary Refill: Capillary refill takes 2 to 3 seconds  Findings: No erythema  Neurological:      Mental Status: She is alert        Comments: Oriented to self   Psychiatric:         Mood and Affect: Mood normal      LABORATORY RESULTS:  Results from last 7 days   Lab Units 09/01/21 2002   TROPONIN I ng/mL <0 02     CBC with diff:   Results from last 7 days   Lab Units 09/02/21  0648 09/01/21  1907   WBC Thousand/uL 9 28 11 33*   HEMOGLOBIN g/dL 9 2* 8 8*   HEMATOCRIT % 31 9* 29 7*   MCV fL 91 90   PLATELETS Thousands/uL 217 236   MCH pg 26 3* 26 7*   MCHC g/dL 28 8* 29 6*   RDW % 14 2 14 1   MPV fL 10 7 11 0   NRBC AUTO /100 WBCs  --  0       CMP:  Results from last 7 days   Lab Units 09/02/21  0648 09/01/21  1907   POTASSIUM mmol/L 4 2 4 4   CHLORIDE mmol/L 105 104   CO2 mmol/L 30 31   BUN mg/dL 45* 46*   CREATININE mg/dL 1 05 0 95   CALCIUM mg/dL 8 6 8 2*   AST U/L 10 11   ALT U/L 13 12   ALK PHOS U/L 101 99   EGFR ml/min/1 73sq m 51 58       BMP:  Results from last 7 days   Lab Units 09/02/21  0648 09/01/21  1907   POTASSIUM mmol/L 4 2 4 4 CHLORIDE mmol/L 105 104   CO2 mmol/L 30 31   BUN mg/dL 45* 46*   CREATININE mg/dL 1 05 0 95   CALCIUM mg/dL 8 6 8 2*       Lab Results   Component Value Date    CREATININE 1 05 2021    CREATININE 0 95 2021    CREATININE 1 04 07/10/2021       Lab Results   Component Value Date    NTBNP 4,042 (H) 2021    NTBNP 6,405 (H) 2021    NTBNP 96,234 (H) 2021                                Lipid Profile:   No results found for: CHOL  Lab Results   Component Value Date    HDL 10 (L) 2020     Lab Results   Component Value Date    LDLCALC 23 2020     Lab Results   Component Value Date    TRIG 220 (H) 2020       Cardiac testing:   Results for orders placed during the hospital encounter of 21    Echo complete with contrast if indicated    Narrative  [facility] [place] [address]    Transthoracic Echocardiogram  2D, M-mode, Doppler, and Color Doppler    Study date:  2021    Patient: Lianne Galvez  MR number: [id number]  Account number: [de-identified]  : 1945  Age: 67 years  Gender: Female  Status: Inpatient  Location: [address] [place] [address]  Height: 66 in  Weight: 158 lb  BP: 133/ 56 mmHg    Indications: Heart failure  Diagnoses: I50 9 - Heart failure, unspecified    Sonographer:  NOAH Haile [facility]  Referring Physician:  Candido Rosa DO  Group:  [facility]  Interpreting Physician:  Darius Ott MD    SUMMARY    LEFT VENTRICLE:  Systolic function was normal  Ejection fraction was estimated to be 65 %  There were no regional wall motion abnormalities  Left ventricular diastolic function parameters were abnormal     LEFT ATRIUM:  The atrium was moderately dilated  MITRAL VALVE:  There was marked annular calcification  There was mild to moderate regurgitation  TRICUSPID VALVE:  There was moderate regurgitation    Pulmonary artery systolic pressure was moderately to markedly increased  Estimated peak PA pressure was 60 mmHg  IVC, HEPATIC VEINS:  Respirophasic changes were blunted (less than 50% variation)  PERICARDIUM:  There was a moderate-sized left pleural effusion  Ascites was noted  HISTORY: PRIOR HISTORY: CHF, A-fib, hypertension, diabetes  PROCEDURE: The study was performed in the [address] [facility]  This was a routine study  The transthoracic approach was used  The study included complete 2D imaging, M-mode, complete spectral Doppler, and color Doppler  Images were  obtained from the parasternal, apical, subcostal, and suprasternal notch acoustic windows  Image quality was adequate  LEFT VENTRICLE: Size was normal  Systolic function was normal  Ejection fraction was estimated to be 65 %  There were no regional wall motion abnormalities  Wall thickness was normal  DOPPLER: Transmitral flow pattern: atrial fibrillation  Left ventricular diastolic function parameters were abnormal     RIGHT VENTRICLE: The size was normal  Systolic function was normal  Wall thickness was normal     LEFT ATRIUM: The atrium was moderately dilated  RIGHT ATRIUM: Size was normal     MITRAL VALVE: There was marked annular calcification  Valve structure was normal  There was normal leaflet separation  DOPPLER: The transmitral velocity was within the normal range  There was no evidence for stenosis  There was mild to  moderate regurgitation  AORTIC VALVE: The valve was trileaflet  Leaflets exhibited sclerosis  DOPPLER: Transaortic velocity was within the normal range  There was no evidence for stenosis  There was no significant regurgitation  TRICUSPID VALVE: The valve structure was normal  There was normal leaflet separation  DOPPLER: The transtricuspid velocity was within the normal range  There was no evidence for stenosis  There was moderate regurgitation  Pulmonary artery  systolic pressure was moderately to markedly increased   Estimated peak PA pressure was 60 mmHg     PULMONIC VALVE: Leaflets exhibited normal thickness, no calcification, and normal cuspal separation  DOPPLER: The transpulmonic velocity was within the normal range  There was no significant regurgitation  PERICARDIUM: There was no pericardial effusion  There was a moderate-sized left pleural effusion  Ascites was noted  The pericardium was normal in appearance  AORTA: The root exhibited normal size  SYSTEMIC VEINS: IVC: The inferior vena cava was normal in size  Respirophasic changes were blunted (less than 50% variation)  SYSTEM MEASUREMENT TABLES    2D  %FS: 23 41 %  Ao Diam: 2 57 cm  EDV(Teich): 81 72 ml  EF(Teich): 47 15 %  ESV(Teich): 43 19 ml  IVSd: 0 88 cm  LA Area: 22 59 cm2  LA Diam: 4 11 cm  LVEDV MOD A4C: 77 52 ml  LVEF MOD A4C: 59 77 %  LVESV MOD A4C: 31 19 ml  LVIDd: 4 27 cm  LVIDs: 3 27 cm  LVLd A4C: 7 01 cm  LVLs A4C: 5 8 cm  LVPWd: 0 8 cm  RA Area: 11 03 cm2  RVIDd: 3 74 cm  SV MOD A4C: 46 33 ml  SV(Teich): 38 53 ml    CW  TR Vmax: 3 56 m/s  TR maxP 8 mmHg    MM  TAPSE: 1 9 cm    PW  E' Sept: 0 05 m/s    [employer] Accredited Echocardiography Laboratory    Prepared and electronically signed by    Natan Willis MD  Signed 2021 16:51:26    Imaging: I have personally reviewed pertinent reports  and I have personally reviewed pertinent films in PACS  XR Trauma chest portable    Result Date: 2021  Narrative: CHEST INDICATION: Chest pain and trauma  COMPARISON:  None EXAM PERFORMED/VIEWS:  XR CHEST PORTABLE FINDINGS: Stable cardiomegaly  Elevation of the right hilum  Small right pleural effusion  Airspace consolidation in the right upper lobe and subsegmental atelectasis  Airspace opacities obscuring the right heart order consistent with middle lobe airspace disease  Pulmonary vascular congestion and mild interstitial edema  Right shoulder arthroplasty  No visualized fracture  Impression: 1  Right upper and middle lobe pneumonia   2   Findings suggestive of mild congestive heart failure  Workstation performed: [id number]     TRAUMA - CT head wo contrast    Result Date: 9/1/2021  Narrative: CT BRAIN - WITHOUT CONTRAST INDICATION:   TRAUMA  COMPARISON:  CT dated 7/4/2021  TECHNIQUE:  CT examination of the brain was performed  In addition to axial images, sagittal and coronal 2D reformatted images were created and submitted for interpretation  Radiation dose length product (DLP) for this visit:  809 mGy-cm   This examination, like all CT scans performed in the [facility], was performed utilizing techniques to minimize radiation dose exposure, including the use of iterative reconstruction and automated exposure control  IMAGE QUALITY:  Diagnostic  FINDINGS: PARENCHYMA: Decreased attenuation is noted in periventricular and subcortical white matter demonstrating an appearance that is statistically most likely to represent mild to moderate microangiopathic change  No CT signs of acute infarction  No intracranial mass, mass effect or midline shift  No acute parenchymal hemorrhage  VENTRICLES AND EXTRA-AXIAL SPACES:  Ventricles and extra-axial CSF spaces are prominent commensurate with the degree of volume loss  No hydrocephalus  VISUALIZED ORBITS AND PARANASAL SINUSES:  Bilateral lens replacement  CALVARIUM AND EXTRACRANIAL SOFT TISSUES:  Right frontal scalp soft tissue swelling  Calvarium intact  Stable bilateral partial mastoid opacification  Impression: No acute intracranial abnormality  Stable chronic microangiopathic changes  Workstation performed: [id number]     TRAUMA - CT spine cervical wo contrast    Result Date: 9/1/2021  Narrative: CT CERVICAL SPINE - WITHOUT CONTRAST INDICATION:   TRAUMA  COMPARISON:  CT dated 8/25/2020 TECHNIQUE:  CT examination of the cervical spine was performed without intravenous contrast   Contiguous axial images were obtained  Sagittal and coronal reconstructions were performed    Radiation dose length product (DLP) for this visit:  291 mGy-cm   This examination, like all CT scans performed in the 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 again seen  DEGENERATIVE CHANGES:  Multilevel facet arthropathy  Disc space narrowing and small disc aspect complexes from C4-5 and C6-7 that mildly indents the thecal sac without canal stenosis  PREVERTEBRAL AND PARASPINAL SOFT TISSUES:  Unremarkable  THORACIC INLET:  Please refer to the concurrent chest, abdomen, and pelvic CT report for description of the thoracic inlet findings  Impression: No cervical spine fracture or traumatic malalignment  Workstation performed: [id number]     TRAUMA - CT chest abdomen pelvis w contrast    Result Date: 9/1/2021  Narrative: CT CHEST, ABDOMEN AND PELVIS WITH IV CONTRAST INDICATION:   TRAUMA, unwitnessed fall, abnormal portable chest  COMPARISON:  CT abdomen dated 7/5/2021 and chest CT dated 8/26/2020  TECHNIQUE: CT examination of the chest, abdomen and pelvis was performed  Axial, sagittal, and coronal 2D reformatted images were created from the source data and submitted for interpretation  Radiation dose length product (DLP) for this visit:  644 mGy-cm   This examination, like all CT scans performed in the [facility], was performed utilizing techniques to minimize radiation dose exposure, including the use of iterative reconstruction and automated exposure control  IV Contrast:  100 mL of iohexol (OMNIPAQUE) Enteric Contrast: Enteric contrast was administered  FINDINGS: CHEST LUNGS: Large right and moderate left pleural effusions have increased in size   Right effusion extends into the major fissure  There is atelectasis and volume loss in the right lung, most pronounced in the right lower lobe  Consolidation in the right upper lobe more anteriorly may be due to atelectasis or infiltrate  There is groundglass attenuation in the right upper and right lower lobes  Milder left basilar compressive atelectasis  Central bronchi are patent  PLEURA: Pleural effusions above  No pneumothorax  HEART/GREAT VESSELS:  Unremarkable for patient's age  MEDIASTINUM AND TYLOR:  Unremarkable  CHEST WALL AND LOWER NECK:   Unremarkable  ABDOMEN LIVER/BILIARY TREE:  Unremarkable  GALLBLADDER:  No calcified gallstones  No pericholecystic inflammatory change  SPLEEN:  Unremarkable  PANCREAS:  Atrophy, otherwise unremarkable  ADRENAL GLANDS:  Unremarkable  KIDNEYS/URETERS:  Unremarkable  No hydronephrosis  STOMACH AND BOWEL:  Unremarkable  APPENDIX:  No findings to suggest appendicitis  ABDOMINOPELVIC CAVITY:  No ascites  No pneumoperitoneum  No lymphadenopathy  VESSELS:  Atherosclerotic changes are present  No evidence of aneurysm  PELVIS REPRODUCTIVE ORGANS:  Unremarkable for patient's age  URINARY BLADDER:  Suprapubic catheter in place  Bladder decompressed  ABDOMINAL WALL/INGUINAL REGIONS:  Sacral decubitus ulcer again seen  Previously seen gas extending into the soft tissues is no longer present  OSSEOUS STRUCTURES:  Right shoulder arthroplasty  Right hip with osteoarthritis and adjacent dystrophic ossification  Scoliosis and spinal degenerative changes  No acute osseous abnormality  Impression: No acute intrathoracic or intra-abdominal injury  Large right and moderate left left pleural effusions have increased from previous CT  Right lung atelectasis, volume loss and groundglass opacity  Milder left basilar atelectasis  The study was marked in [facility] for immediate notification  Workstation performed: [id number]     Thank you for allowing us to participate in this patient's care   This pt will follow up with Dr Susan Luna once discharged  Counseling / Coordination of Care  Total floor / unit time spent today 45 minutes  Greater than 50% of total time was spent with the patient and / or family counseling and / or coordination of care  A description of the counseling / coordination of care: Review of history, current assessment, development of a plan  Code Status: Level 1 - Full Code    ** Please Note: Dragon 360 Dictation voice to text software may have been used in the creation of this document   **

## 2021-09-02 NOTE — ASSESSMENT & PLAN NOTE
· Hgb at 8 8  · July admission pt had EGD that showed small AVM without active bleeding that was cauterized  · Hgb range 8-9   · No active signs of bleeding   · Trend H/H   · Transfuse for Hgb <7 0

## 2021-09-02 NOTE — ASSESSMENT & PLAN NOTE
· Albumin at 1 9   · Suspect in the setting of malnutrition   · Will give albumin 25% x1 dose to aid with diuresis

## 2021-09-02 NOTE — ASSESSMENT & PLAN NOTE
Wt Readings from Last 3 Encounters:   09/01/21 72 kg (158 lb 11 7 oz)   07/05/21 72 5 kg (159 lb 13 3 oz)   05/13/21 57 kg (125 lb 10 6 oz)     · CT chest with increased b/l pleural effusions (large right and moderate left)   · Pt denies dyspnea (though unreliable historian)  · BNP 4042 (lower than prior HF exacerbation admissions)   · Last echo 04/2021: "LVEF 65&   LV diastolic parameters were abnormal "   · Previously on demadex, which was d/c on 7/16 after discharge from hospital as pt euvolvemic - nephrology to determine restart date per d/c summary   · On admission, pt with lungs CTA b/l and no peripheral edema   · Lasix 40 IV given in the ED - trend UOP at 6 hours at 1300cc therefore dose lasix 40 IV BID   · Strict I/Os  · Daily standing weights   · Cardiac diet with fluid and sodium restriction   · Cardiology consult   · Consideration for thoracentesis if effusions not improve s/p diuresis

## 2021-09-02 NOTE — SPEECH THERAPY NOTE
Speech Language/Pathology  Speech/Language Pathology  Assessment    Patient Name: Nigel Nobles  UWPKY'D Date: 9/2/2021     Problem List  Principal Problem:    Acute on chronic diastolic (congestive) heart failure ([facility] )  Active Problems:    Type 2 diabetes mellitus with hyperglycemia (HCC)    Hypertension    Stage 3a chronic kidney disease (HCC)    Pressure ulcer of sacral region, stage 4 (HCC)    Paroxysmal atrial fibrillation (HCC)    Chronic respiratory failure with hypoxia (HCC)    Hypoalbuminemia    Anemia    Leukocytosis    Fall    Pneumonia    Past Medical History  Past Medical History:   Diagnosis Date    Acute renal failure (ARF) ([facility] )     Acute respiratory failure with hypoxia (HCC)     Anemia     Atrial fibrillation (HCC)     CHF (congestive heart failure) (HCC)     Chronic kidney disease     Diabetes mellitus ([facility] )     type 2    Hyperlipidemia     Hypertension     Stroke [facility])      Past Surgical History  Past Surgical History:   Procedure Laterality Date    BREAST SURGERY Left     lumpectomy benign    CATARACT EXTRACTION Bilateral 2017    CATARACT EXTRACTION W/ INTRAOCULAR LENS IMPLANT Left 12/11/2017    Procedure: EXTRACTION EXTRACAPSULAR CATARACT PHACO INTRAOCULAR LENS (IOL); Surgeon: Aron Cordova MD;  Location: [facility] MAIN OR;  Service: Ophthalmology    IR SUPRAPUBIC CATHETER PLACEMENT  7/14/2021    ND OPEN RX FEMUR FX+INTRAMED RAYMOND Right 5/21/2020    Procedure: INSERTION OF SHORT TROCHANTERIC FEMORAL NAIL;  Surgeon: Megan Melendez MD;  [facility] Main [facility];  Service: Orthopedics    ND [address] HUMERAL&GLENOID COMPNT Right 9/10/2018    Procedure: RIGHT REVERSE TOTAL SHOULDER ARTHROPLASTY;  Surgeon: Megan Melendez MD;  [facility];  Service: Orthopedics    ND XCAPSL CTRC RMVL INSJ IO LENS PROSTH W/O ECP Right 10/23/2017    Procedure: EXTRACTION EXTRACAPSULAR CATARACT PHACO INTRAOCULAR LENS (IOL);   Surgeon: Aron Cordova MD;  Location: [facility] MAIN OR;  Service: Ophthalmology    WOUND DEBRIDEMENT N/A 8/26/2020    Procedure: EXCISIONAL DEBRIDEMENT OF SACRAL PRESSURE WOUND, WASHOUT;;  Surgeon: Wade Wiseman MD;  [facility] MAIN OR;  Service: General    WOUND DEBRIDEMENT N/A 8/28/2020    Procedure: BUTTOCKS DEBRIDEMENT WOUND AND DRESSING CHANGE ([address] OUT); Surgeon: Chadd Crawford MD;  Location: [facility] MAIN OR;  Service: General          Bedside Swallow Evaluation:    Summary:  Pt presents w/ mild oral dysphagia, suspect wfl pharyngeal swallow skill  Mastication and oral manipulation mild prolonged/labored w/ advanced textures  Transfers appropriate, no significant oral residue  Swallow initiation suspected timely  No overt s/s aspiration  Pt w/ hx of dysphagia, baseline dysphagia 2 w/ thin liquids, which continues to be safest and least restrictive at this time  Recommendations:  Diet: Mechanical soft   Liquid: Thin   Meds: Whole w/ liquid as tolerated  Supervision: Intermittent  Positioning:Upright  Strategies: Pt to take PO/Meds only when fully alert and upright  Oral care: 3-4x daily   Aspiration precautions  Reflux precautions    Therapy Prognosis: Fair   Prognosis considerations: Age, current medical, cognitive statues   Frequency: Brief     Goal(s):  Pt will tolerate least restrictive diet w/out s/s aspiration or oral/pharyngeal difficulties  Patient's goal: none stated     Consider consult w/:  -    HPI:  Luis Alberto Ghosh is a 67 y o  female who presents with unwitnessed fall  Mechanism:Details of Incident: fall from standing Injury Date: 09/01/21 Injury Time: [address] Injury Occurence Location - [address] [place] [address] Way: [place]     67year old female brought by EMS from [facility] for evaluation after unwitnessed fall  Patient is disoriented to time  She states she tripped on carpet and fell in her kitchen, striking her head on the concrete floor  Patient takes Xarelto for history of Afib   Mild confusion at baseline and normally disoriented to time per nursing home staff; however, they felt that her confusion had worsened since the fall  According to nursing home staff, patient fell at approximately 5:30 pm fell out of bed  Patient is nonambuatory at baseline  She has chronic diarrhea  No fevers  No noted cough or congestion       CXR 9/1: 1  Right upper and middle lobe pneumonia  2   Findings suggestive of mild congestive heart failure  CT head 9/1: No acute intracranial abnormality  Stable chronic microangiopathic changes  CT spine cervical 9/1: No cervical spine fracture or traumatic malalignment  CT chest abdomen pelvis 9/1:    No acute intrathoracic or intra-abdominal injury  Large right and moderate left left pleural effusions have increased from previous CT  Right lung atelectasis, volume loss and groundglass opacity  Milder left basilar atelectasis  Reason for consult:  R/o aspiration  Determine safest and least restrictive diet  respiratory compromise  h/o dysphagia     Precautions:  Contact    Current diet:  Dysphagia 2 w/ thin liquids     Premorbid diet[de-identified]  Dysphagia 2 w/ thin     Previous VBS:  No    O2 requirement:  2L O2    Voice/Speech:  Clear/adequate    Social:  [facility]     Follows commands: Yes                         Cognitive Status:  Awake, alert, confused    Oral [facility] exam:  Dentition: edentulous   Labial strength and ROM: wfl   Lingual strength and ROM: wfl   Mandibular strength and ROM: wfl   Velum: symmetrical   Secretion management: wfl   Volitional cough: strong   Volitional swallow: +   Oral care     Items administered:  Puree, soft solid, hard solid, thin liquids  Liquids were taken by straw/cup       Oral stage:  Lip closure: wfl   Mastication: lingual-palatal mashing   Bolus formation: min-mild prolonged   Bolus control: wfl   Transfer: wfl   Oral residue: no   Pocketing: no     Pharyngeal stage:  Swallow promptness: suspect timely   Laryngeal rise: wfl to palpation   Wet voice: no   Throat clear: no   Cough: no   Secondary swallows: no   Audible swallows: no   No overt s/s aspiration    Esophageal stage:  No s/s reported    Aspiration precautions posted    Results d/w:  Pt, nursing, physician

## 2021-09-02 NOTE — CASE MANAGEMENT
LOS: 1 day  PATIENT IS A MEDICARE BUNDLE PATIENT  SHE IS NOT A 30 DAY READMISSION  UNPLANNED READMISSION RISK SCORE IS 28 - YELLOW  Met with patient at bedside  Called and spoke with son Florecita Ferraro  Explained role of care management  Patient is a LTC resident of [facility] since 9/20/20  She is dependent for care and bedbound  Susan lift for oob  Her son Dawn Field has POA  She has POLST  Her PCP is Elly Eng  Her medications and meals are provided by [facility]  Her  is son/POA Dawn Field - [phone]  Plan is for her to return to [facility] at discharge  She will need BLS transport  Will follow and arrange transport when medically cleared for discharge

## 2021-09-03 NOTE — PROGRESS NOTES
Cardiology Progress Note - Fede Overton 67 y o  female MRN: [id number]    Unit/Bed#: -01 Encounter: [id number]    Hospital Problems:  Principal Problem:    Acute on chronic diastolic (congestive) heart failure (HCC)  Active Problems:    Type 2 diabetes mellitus with hyperglycemia (HCC)    Hypertension    Stage 3a chronic kidney disease (HCC)    Pressure ulcer of sacral region, stage 4 (HCC)    Paroxysmal atrial fibrillation (HCC)    Chronic respiratory failure with hypoxia (HCC)    Hypoalbuminemia    Anemia    Leukocytosis    Fall    Pneumonia      Assessment & Plan    Consider palliative care evaluation    1  Acute hypoxic respiratory failure-   Due to pneumonia, pleural effusions, did seem mildly hypervolemic on admission   Antibiotics for pneumonia per primary team  She has developed RORY with diuresis, will hold Lasix for now  He does not seem that she will tolerate enough diuresis to make any meaningful impact on her pleural effusions  If appropriate and within goals of care after discussion with son and possibly palliative Care Team, can consider thoracentesis     2  Chronic diastolic CHF  She was given some doses of IV diuresis and now has likely prerenal RORY  Holding diuretics  Continue cardiac medications including metoprolol and amlodipine  Once a CT resolves can return to home diuretic regimen of torsemide 20 mg daily as needed for shortness of breath     3  Pneumonia- COVID negative, on IV abx per primary team     4  Right pleural effusion- large right pleural effusion on CT chest  May benefit from IR thoracentesis but currently not dyspneic and 100% on 2LNC  Also has small to moderate left pleural effusion      5  PAF- maintaining NSR on metoprolol tartrate 25 mg BID  Continue [address] with Xarelto      6  HTN- continue metoprolol and amlodipine  Reasonable goal for her would be under 140/90     7  HLD- on statin therapy     8  CKD 3-  current with RORY, holding diuretics     9   Type 2 DM- per primary team             Subjective:   Patient seen and examined  No significant events overnight  She has developed RORY  She has no complaints  Denies SOB  Lying supine  Denies orthopnea or chest discomfort  Objective:     Vitals: Blood pressure 133/59, pulse 60, temperature (!) 97 4 °F (36 3 °C), resp  rate 20, weight 69 9 kg (154 lb 1 6 oz), SpO2 99 %  , Body mass index is 24 87 kg/m² ,   Orthostatic Blood Pressures      Most Recent Value   Blood Pressure  133/59 filed at 09/03/2021 0076   Patient Position - Orthostatic VS  Lying filed at 09/02/2021 0106            Intake/Output Summary (Last 24 hours) at 9/3/2021 1147  Last data filed at 9/3/2021 0518  Gross per 24 hour   Intake 600 ml   Output 1175 ml   Net -575 ml           Physical Exam:    General: Heather Wolff is ill and elderly female, in no distress, lying supine  HEENT: moist mucous membranes, ecchymoses of face  Neck:  No JVD, supple, trachea midline  Cardiovascular: RRR, 2/6 SM  Pulmonary: normal respiratory effort, decreased RLL breath sounds  Abdomen: soft and nondistended  Extremities: No lower extremity edema  Warm and well perfused extremities    Neuro: no focal motor deficits, AAOx1, to self only  Psych: Normal mood and affect, cooperative      Medications:      Current Facility-Administered Medications:     amLODIPine (NORVASC) tablet 5 mg, 5 mg, Oral, Daily, Ayesha Patel PA-C, 5 mg at 09/03/21 0950    atorvastatin (LIPITOR) tablet 40 mg, 40 mg, Oral, Daily With Dinner, Hannah Cintron PA-C, 40 mg at 09/02/21 1615    azithromycin (ZITHROMAX) tablet 250 mg, 250 mg, Oral, Q24H, Ayesha Cintron PA-C    cefTRIAXone (ROCEPHIN) IVPB (premix in dextrose) 1,000 mg 50 mL, 1,000 mg, Intravenous, Q24H, Ayesha Patel PA-C, Last Rate: 100 mL/hr at 09/02/21 2234, 1,000 mg at 09/02/21 2234    ferrous sulfate tablet 325 mg, 325 mg, Oral, Daily With Breakfast, [place] BEATRIZ TRIPATHI, 325 mg at 09/03/21 0947    gabapentin (NEURONTIN) capsule 100 mg, 100 mg, Oral, TID, Ayesha Patel PA-C, 100 mg at 09/03/21 0949    insulin glargine (LANTUS) subcutaneous injection 10 Units 0 1 mL, 10 Units, Subcutaneous, HS, Ayesha Patel PA-C, 10 Units at 09/02/21 2233    insulin lispro (HumaLOG) 100 units/mL subcutaneous injection 1-5 Units, 1-5 Units, Subcutaneous, HS, Ayesha Patel PA-C, 1 Units at 09/02/21 2250    insulin lispro (HumaLOG) 100 units/mL subcutaneous injection 1-6 Units, 1-6 Units, Subcutaneous, TID AC, 2 Units at 09/02/21 1617 **AND** Fingerstick Glucose (POCT), , , TID AC, Ayesha Patel PA-C    insulin lispro (HumaLOG) 100 units/mL subcutaneous injection 8 Units, 8 Units, Subcutaneous, TID With Meals, [facility], BEATRIZ, 8 Units at 09/02/21 1617    metoprolol tartrate (LOPRESSOR) tablet 25 mg, 25 mg, Oral, Q12H [address], Ayesha Patel PA-C, 25 mg at 09/03/21 0947    mirtazapine (REMERON) tablet 15 mg, 15 mg, Oral, HS, Ayesha Patel PA-C, 15 mg at 09/02/21 2232    pantoprazole (PROTONIX) EC tablet 40 mg, 40 mg, Oral, BID, Ayesha Patel PA-C, 40 mg at 09/03/21 7671    rivaroxaban (XARELTO) tablet 15 mg, 15 mg, Oral, Daily With Breakfast, Ayesha Patel PA-C, 15 mg at 09/03/21 0949    vancomycin (VANCOCIN) oral solution 125 mg, 125 mg, Oral, Q12H [address], Ayesha Patel PA-C, 125 mg at 09/03/21 0947     Labs & Results:    Results from last 7 days   Lab Units 09/01/21 2002   TROPONIN I ng/mL <0 02     Results from last 7 days   Lab Units 09/03/21  0433 09/02/21  0648 09/01/21  1907   WBC Thousand/uL 9 63 9 28 11 33*   HEMOGLOBIN g/dL 7 8* 9 2* 8 8*   HEMATOCRIT % 27 4* 31 9* 29 7*   PLATELETS Thousands/uL 225 217 236         Results from last 7 days   Lab Units 09/03/21  0433 09/02/21  0648 09/01/21  1907   POTASSIUM mmol/L 4 8 4 2 4 4   CHLORIDE mmol/L 104 105 104   CO2 mmol/L 32 30 31   BUN mg/dL 55* 45* 46*   CREATININE mg/dL 1 60* 1 05 0 95   CALCIUM mg/dL 8 3 8 6 8 2*   ALK PHOS U/L 84 101 99   ALT U/L 9* 13 12   AST U/L 13 10 11 Results from last 7 days   Lab Units 09/03/21  0433   MAGNESIUM mg/dL 1 7       EKG was not performed today  This note was completed in part utilizing [employer] fluency direct voice recognition software  Grammatical errors, random word insertion, spelling mistakes, occasional wrong word or "sound-alike" substitutions and incomplete sentences may be an occasional consequence of the system secondary to software limitations, ambient noise and hardware issues  At the time of dictation, efforts were made to edit, clarify and /or correct errors  Please read the chart carefully and recognize, using context, where substitutions have occurred  If you have any questions or concerns about the context, text or information contained within the body of this dictation, please contact myself, the provider, for further clarification

## 2021-09-03 NOTE — ASSESSMENT & PLAN NOTE
Wt Readings from Last 3 Encounters:   09/03/21 69 9 kg (154 lb 1 6 oz)   07/05/21 72 5 kg (159 lb 13 3 oz)   05/13/21 57 kg (125 lb 10 6 oz)     · CT chest with increased b/l pleural effusions (large right and moderate left)   · Pt denies dyspnea (though unreliable historian)  · BNP 4042 (lower than prior HF exacerbation admissions)   · Last echo 04/2021: "LVEF 65&  LV diastolic parameters were abnormal "   · Previously on demadex, which was d/c on 7/16 after discharge from hospital as pt euvolvemic - nephrology to determine restart date per d/c summary   · On admission, pt with lungs CTA b/l and no peripheral edema   · Patient was started on diuresis with Lasix 40 mg IV twice daily  · Creatinine this morning went up to 1 60  · Cardiology follow-up noted  · Diuretics were discontinued  · Strict I/Os And daily weights  · Cardiac diet with fluid and sodium restriction   · Discussed with patient's son who is the POA at length and he wants everything to be done at this time    Discussed about thoracentesis and he wants to go ahead with thoracentesis for his mother  · IR consult for thoracentesis placed

## 2021-09-03 NOTE — DISCHARGE INSTR - OTHER ORDERS
Skin care plans:  1-Hydraguard to bilateral heels BID and PRN  2-Elevate heels to offload pressure  3-Ehob cushion in chair when out of bed  4-Moisturize skin daily with skin nourishing cream   5-Turn/reposition q2h or when medically stable for pressure re-distribution on skin  6-Cleanse sacral wound with saline and gauze  Pack wound with mesalt and secure loose end to intact skin with tape  Cover with ABD  Change daily and PRN  7-Cleanse under breasts and groin with soap and water   Dust lightly with nystatin powder BID

## 2021-09-03 NOTE — ASSESSMENT & PLAN NOTE
· WBC at 11 33K on admission   · Suspect secondary to CAP seen on CT chest   · Continue ceftriaxone  · See above

## 2021-09-03 NOTE — WOUND OSTOMY CARE
Consult Note - Wound   Dorathy Tyrell 67 y o  female MRN: [id number]  Unit/Bed#: -Enrique Encounter: [id number]      History and Present Illness:  Patient is seen for skin assessment  Patient examined in bed with primary nurse present  Patient is a moderate assist to turn in bed  Patient is incontinent of bowel and has a suprapubic catheter in place  Patient follows in the outpatient wound center and should do so at discharge  Assessment Findings:   1  POA stage 4 pressure injury on sacrum              See flowsheets for details      Skin care plans:  1-Hydraguard to bilateral heels BID and PRN  2-Elevate heels to offload pressure  3-Ehob cushion in chair when out of bed  4-Moisturize skin daily with skin nourishing cream   5-Turn/reposition q2h or when medically stable for pressure re-distribution on skin  6-Cleanse sacral wound with saline and gauze  Pack wound with mesalt and secure loose end to intact skin with tape  Cover with ABD  Change daily and PRN  7-Cleanse under breasts and groin with soap and water   Dust lightly with nystatin powder BID  Call or tigertext with any questions  Wound Care will continue to follow    Wound 04/21/21 Pressure Injury Sacrum (Active)   Wound Image   09/03/21 1006   Wound Description Drainage;Fragile; Non-blanchable erythema;Probes to bone;Pink;Slough; Tan 09/03/21 1006   Pressure Injury Stage 4 09/03/21 1006   Cristal-wound Assessment Clean;Dry;Fragile;Pink;Scaly 09/03/21 1006   Wound Length (cm) 4 cm 09/03/21 1006   Wound Width (cm) 4 cm 09/03/21 1006   Wound Depth (cm) 2 3 cm 09/03/21 1006   Wound Surface Area (cm^2) 16 cm^2 09/03/21 1006   Wound Volume (cm^3) 36 8 cm^3 09/03/21 1006   Calculated Wound Volume (cm^3) 36 8 cm^3 09/03/21 1006   Change in Wound Size % 58 18 09/03/21 1006   Tunneling 2 cm 09/03/21 1006   Tunneling in depth located at 2 09/03/21 1006   Drainage Amount Large 09/03/21 1006   Drainage Description Serosanguineous 09/03/21 1006   Treatments Cleansed 09/03/21 1006   Dressing Mesalt;ABD 09/03/21 1006   Wound packed? Yes 09/03/21 1006   Packing- # removed 0 09/03/21 1006   Packing- # inserted 1 09/03/21 1006   Dressing Changed Changed 09/03/21 1006   Patient Tolerance Tolerated well 09/02/21 0604   Dressing Status Clean;Dry; Intact 09/03/21 0747

## 2021-09-03 NOTE — ASSESSMENT & PLAN NOTE
Lab Results   Component Value Date    HGBA1C 7 0 06/15/2021       Recent Labs     09/02/21  1614 09/02/21  2104 09/03/21  0955 09/03/21  1149   POCGLU 195* 179* 88 116       Blood Sugar Average: Last 72 hrs:  · (P) 018 [id number]   · home regimen:  Lantus 8 units HS and Humalog 8 units t i d    · Continue with home regimen with the addition of SSI

## 2021-09-03 NOTE — ASSESSMENT & PLAN NOTE
· Nonsevere CAP with DRIP score of 4  · CT chest: "Consolidation in the right upper lobe more anteriorly may be due to atelectasis or infiltrate   There is groundglass attenuation in the right upper and right lower lobes  "  · COVID 19 negative   · Sputum culture  · Continue ceftriaxone and Zithromax  · On 2 L of supplemental oxygen  · Wean oxygen as tolerated

## 2021-09-03 NOTE — PLAN OF CARE
Problem: MOBILITY - ADULT  Goal: Maintain or return to baseline ADL function  Description: INTERVENTIONS:  -  Assess patient's ability to carry out ADLs; assess patient's baseline for ADL function and identify physical deficits which impact ability to perform ADLs (bathing, care of mouth/teeth, toileting, grooming, dressing, etc )  - Assess/evaluate cause of self-care deficits   - Assess range of motion  - Assess patient's mobility; develop plan if impaired  - Assess patient's need for assistive devices and provide as appropriate  - Encourage maximum independence but intervene and supervise when necessary  - Involve family in performance of ADLs  - Assess for home care needs following discharge   - Consider OT consult to assist with ADL evaluation and planning for discharge  - Provide patient education as appropriate  Outcome: Progressing  Goal: Maintains/Returns to pre admission functional level  Description: INTERVENTIONS:  - Perform BMAT or MOVE assessment daily    - Set and communicate daily mobility goal to care team and patient/family/caregiver  - Collaborate with rehabilitation services on mobility goals if consulted  - Perform Range of Motion 3 times a day  - Reposition patient every 3 hours    - Dangle patient 3 times a day  - Stand patient 3 times a day  - Ambulate patient 3 times a day  - Out of bed to chair 3 times a day   - Out of bed for meals 3 times a day  - Out of bed for toileting  - Record patient progress and toleration of activity level   Outcome: Progressing     Problem: Potential for Falls  Goal: Patient will remain free of falls  Description: INTERVENTIONS:  - Educate patient/family on patient safety including physical limitations  - Instruct patient to call for assistance with activity   - Consult OT/PT to assist with strengthening/mobility   - Keep Call bell within reach  - Keep bed low and locked with side rails adjusted as appropriate  - Keep care items and personal belongings within reach  - Initiate and maintain comfort rounds  - Make Fall Risk Sign visible to staff  - Offer Toileting every 2 Hours, in advance of need  - Initiate/Maintain alarm  - Obtain necessary fall risk management equipment  - Apply yellow socks and bracelet for high fall risk patients  - Consider moving patient to room near nurses station  Outcome: Progressing     Problem: Prexisting or High Potential for Compromised Skin Integrity  Goal: Skin integrity is maintained or improved  Description: INTERVENTIONS:  - Identify patients at risk for skin breakdown  - Assess and monitor skin integrity  - Assess and monitor nutrition and hydration status  - Monitor labs   - Assess for incontinence   - Turn and reposition patient  - Assist with mobility/ambulation  - Relieve pressure over bony prominences  - Avoid friction and shearing  - Provide appropriate hygiene as needed including keeping skin clean and dry  - Evaluate need for skin moisturizer/barrier cream  - Collaborate with interdisciplinary team   - Patient/family teaching  - Consider wound care consult   Outcome: Progressing     Problem: PAIN - ADULT  Goal: Verbalizes/displays adequate comfort level or baseline comfort level  Description: Interventions:  - Encourage patient to monitor pain and request assistance  - Assess pain using appropriate pain scale  - Administer analgesics based on type and severity of pain and evaluate response  - Implement non-pharmacological measures as appropriate and evaluate response  - Consider cultural and social influences on pain and pain management  - Notify physician/advanced practitioner if interventions unsuccessful or patient reports new pain  Outcome: Progressing     Problem: DISCHARGE PLANNING  Goal: Discharge to home or other facility with appropriate resources  Description: INTERVENTIONS:  - Identify barriers to discharge w/patient and caregiver  - Arrange for needed discharge resources and transportation as appropriate  - Identify discharge learning needs (meds, wound care, etc )  - Arrange for interpretive services to assist at discharge as needed  - Refer to Case Management Department for coordinating discharge planning if the patient needs post-hospital services based on physician/advanced practitioner order or complex needs related to functional status, cognitive ability, or social support system  Outcome: Progressing     Problem: Knowledge Deficit  Goal: Patient/family/caregiver demonstrates understanding of disease process, treatment plan, medications, and discharge instructions  Description: Complete learning assessment and assess knowledge base  Interventions:  - Provide teaching at level of understanding  - Provide teaching via preferred learning methods  Outcome: Progressing     Problem: Nutrition/Hydration-ADULT  Goal: Nutrient/Hydration intake appropriate for improving, restoring or maintaining nutritional needs  Description: Monitor and assess patient's nutrition/hydration status for malnutrition  Collaborate with interdisciplinary team and initiate plan and interventions as ordered  Monitor patient's weight and dietary intake as ordered or per policy  Utilize nutrition screening tool and intervene as necessary  Determine patient's food preferences and provide high-protein, high-caloric foods as appropriate       INTERVENTIONS:  - Monitor oral intake, urinary output, labs, and treatment plans  - Assess nutrition and hydration status and recommend course of action  - Evaluate amount of meals eaten  - Assist patient with eating if necessary   - Allow adequate time for meals  - Recommend/ encourage appropriate diets, oral nutritional supplements, and vitamin/mineral supplements  - Order, calculate, and assess calorie counts as needed  - Recommend, monitor, and adjust tube feedings and TPN/PPN based on assessed needs  - Assess need for intravenous fluids  - Provide specific nutrition/hydration education as appropriate  - Include patient/family/caregiver in decisions related to nutrition  Outcome: Progressing

## 2021-09-03 NOTE — PROGRESS NOTES
[place]  Progress Note - Taryn Josiane 1945, 67 y o  female MRN: [id number]  Unit/Bed#: -Enrique Encounter: [id number]  Primary Care Provider: Blu Alvraes DO   Date and time admitted to hospital: 9/1/2021  6:29 PM    * Acute on chronic diastolic (congestive) heart failure (HCC)  Assessment & Plan  Wt Readings from Last 3 Encounters:   09/03/21 69 9 kg (154 lb 1 6 oz)   07/05/21 72 5 kg (159 lb 13 3 oz)   05/13/21 57 kg (125 lb 10 6 oz)     · CT chest with increased b/l pleural effusions (large right and moderate left)   · Pt denies dyspnea (though unreliable historian)  · BNP 4042 (lower than prior HF exacerbation admissions)   · Last echo 04/2021: "LVEF 65&  LV diastolic parameters were abnormal "   · Previously on demadex, which was d/c on 7/16 after discharge from hospital as pt euvolvemic - nephrology to determine restart date per d/c summary   · On admission, pt with lungs CTA b/l and no peripheral edema   · Patient was started on diuresis with Lasix 40 mg IV twice daily  · Creatinine this morning went up to 1 60  · Cardiology follow-up noted  · Diuretics were discontinued  · Strict I/Os And daily weights  · Cardiac diet with fluid and sodium restriction   · Discussed with patient's son who is the POA at length and he wants everything to be done at this time  Discussed about thoracentesis and he wants to go ahead with thoracentesis for his mother  · IR consult for thoracentesis placed    Pneumonia  Assessment & Plan  · Nonsevere CAP with DRIP score of 4  · CT chest: "Consolidation in the right upper lobe more anteriorly may be due to atelectasis or infiltrate   There is groundglass attenuation in the right upper and right lower lobes  "  · COVID 19 negative   · Sputum culture  · Continue ceftriaxone and Zithromax  · On 2 L of supplemental oxygen  · Wean oxygen as tolerated    Fall  Assessment & Plan  · Pt reportedly fell out of bed at facility at 1730   · Trauma work-up unrevealing except for right frontal scalp hematoma   · CTH and CT-Cspine for acute traumatic injury     Leukocytosis  Assessment & Plan  · WBC at 11 33K on admission   · Suspect secondary to CAP seen on CT chest   · Continue ceftriaxone  · See above    Anemia  Assessment & Plan  · Hgb at 8 8  · July admission pt had EGD that showed small AVM without active bleeding that was cauterized  · Hgb range 8-9   · No active signs of bleeding   · Trend H/H   · Transfuse for Hgb <7 0     Hypoalbuminemia  Assessment & Plan  · Albumin at 1 9   · Suspect in the setting of malnutrition   · Will give albumin 25% x1 dose to aid with diuresis     Chronic respiratory failure with hypoxia (HCC)  Assessment & Plan  · On 2L supplemental oxygen via NC at baseline  · No inc in requirement     Paroxysmal atrial fibrillation (HCC)  Assessment & Plan  · Home regimen:  Metoprolol 25 mg q 12 hours  · Anticoagulated with xarelto  · Continue    Pressure ulcer of sacral region, stage 4 (HCC)  Assessment & Plan  · Offload pressure as able   · Wound care consult  · Continue wound care per recommendations    Stage 3a chronic kidney disease [facility])  Assessment & Plan  Lab Results   Component Value Date    EGFR 31 09/03/2021    EGFR 51 09/02/2021    EGFR 58 09/01/2021    CREATININE 1 60 (H) 09/03/2021    CREATININE 1 05 09/02/2021    CREATININE 0 95 09/01/2021   · Baseline hemoglobin 0 8 1 4   · Creatinine on admission 0 95   · Patient creatinine this morning is 1 60  · Diuretics were held  · Avoid hypotension/nephrotoxins medication  · Trend BMP     Hypertension  Assessment & Plan  · Home regimen: Metoprolol 25 mg q 12 hours   · Monitor vitals as per protocol    Type 2 diabetes mellitus with hyperglycemia [facility])  Assessment & Plan  Lab Results   Component Value Date    HGBA1C 7 0 06/15/2021       Recent Labs     09/02/21  1614 09/02/21  2104 09/03/21  0955 09/03/21  1149   POCGLU 195* 179* 88 116       Blood Sugar Average: Last 72 hrs:  · (P) 067 [id number]   · home regimen:  Lantus 8 units HS and Humalog 8 units t i d    · Continue with home regimen with the addition of SSI        Labs & Imaging: I have personally reviewed pertinent reports  VTE Pharmacologic Prophylaxis:  Xarelto  VTE Mechanical Prophylaxis: sequential compression device    Code Status:   Level 1 - Full Code    Patient Centered Rounds: I have performed bedside rounds with nursing staff today  Discussions with Specialists or Other Care Team Provider:  Cardiology    Education and Discussions with Family / Patient:  Courtney Otero on phone    Current Length of Stay: 2 day(s)    Current Patient Status: Inpatient   Certification Statement: The patient will continue to require additional inpatient hospital stay due to see my assessment and plan  Subjective:   Patient is seen and examined at bedside  Patient has dementia  Denies any complaints  Afebrile  All other ROS are negative  Objective:    Vitals: Blood pressure 133/59, pulse 60, temperature (!) 97 4 °F (36 3 °C), resp  rate 20, weight 69 9 kg (154 lb 1 6 oz), SpO2 99 %  ,Body mass index is 24 87 kg/m²  SPO2 RA Rest      ED to Hosp-Admission (Current) from 9/1/2021 in [address] Med [facility]urg [address]   SpO2  99 %   SpO2 Activity  At Rest   O2 Device  Nasal cannula   O2 Flow Rate  --        I&O:     Intake/Output Summary (Last 24 hours) at 9/3/2021 1437  Last data filed at 9/3/2021 0700  Gross per 24 hour   Intake 580 ml   Output 675 ml   Net -95 ml       Physical Exam:    General- lying comfortably in bed  Not in any acute distress  Neck- Supple, No JVD  CVS- regular, S1 and S2 normal  Chest- Bilateral Air entry, decreased at bases with some rales present  Abdomen- soft, nontender, not distended, no guarding or rigidity, BS+  Extremities-  No pedal edema, No calf tenderness  CNS-   Alert, awake and orientedx1    Moving all 4 extremities    Invasive Devices     Peripheral Intravenous Line            Peripheral IV 09/01/21 Left Antecubital 1 day          Drain            Suprapubic Catheter  18 Fr  51 days                      Social History  reviewed  Family History   Problem Relation Age of Onset    Diabetes Mother     Varicose Veins Mother     Stroke Father     Arthritis Brother     Diabetes Daughter     No Known Problems Brother     reviewed    Meds:  Current Facility-Administered Medications   Medication Dose Route Frequency Provider Last Rate Last Admin    amLODIPine (NORVASC) tablet 5 mg  5 mg Oral Daily [facility], BEATRIZ   5 mg at 09/03/21 0950    atorvastatin (LIPITOR) tablet 40 mg  40 mg Oral Daily With [employer]   40 mg at 09/02/21 1615    azithromycin (ZITHROMAX) tablet 250 mg  250 mg Oral Q24H Ayesha Cintron PA-C        cefTRIAXone (ROCEPHIN) IVPB (premix in dextrose) 1,000 mg 50 mL  1,000 mg Intravenous Q24H Ayesha Patel PA-C 100 mL/hr at 09/02/21 2234 1,000 mg at 09/02/21 2234    ferrous sulfate tablet 325 mg  325 mg [facility]   325 mg at 09/03/21 0947    gabapentin (NEURONTIN) capsule 100 mg  100 mg Oral TID [facility], PA-C   100 mg at 09/03/21 0949    insulin glargine (LANTUS) subcutaneous injection 10 Units 0 1 mL  10 Units Subcutaneous [facility]   10 Units at 09/02/21 2233    insulin lispro (HumaLOG) 100 units/mL subcutaneous injection 1-5 Units  1-5 Units Subcutaneous [facility] BEATRIZ Patel   1 Units at 09/02/21 2250    insulin lispro (HumaLOG) 100 units/mL subcutaneous injection 1-6 Units  1-6 Units Subcutaneous [facility]ID [facility]   2 Units at 09/02/21 1617    insulin lispro (HumaLOG) 100 units/mL subcutaneous injection 8 Units  8 Units Subcutaneous TID [facility]   8 Units at 09/02/21 1617    metoprolol tartrate (LOPRESSOR) tablet 25 mg  25 mg Oral Q12H [address] Ayesha Patel PA-C   25 mg at 09/03/21 0947    mirtazapine (REMERON) tablet 15 mg  15 mg Oral [facility], PA-C   15 mg at 09/02/21 2232    pantoprazole (PROTONIX) EC tablet 40 mg  40 mg Oral BID [facility], PA-C   40 mg at 09/03/21 1632    rivaroxaban (XARELTO) tablet 15 mg  15 mg Oral Daily With [facility], PA-C   15 mg at 09/03/21 1908    vancomycin (VANCOCIN) oral solution 125 mg  125 mg Oral Q12H [facility] Ayesha Cintron, PA-C   125 mg at 09/03/21 1701      Medications Prior to Admission   Medication    ascorbic acid (VITAMIN C) 500 MG tablet    atorvastatin (LIPITOR) 40 mg tablet    ferrous sulfate 325 (65 Fe) mg tablet    gabapentin (NEURONTIN) 100 mg capsule    insulin lispro (HumaLOG) 100 units/mL injection    metoprolol tartrate (LOPRESSOR) 25 mg tablet    mirtazapine (REMERON) 15 mg tablet    Nutritional Supplements (Yevgeniy) PACK    pantoprazole (PROTONIX) 40 mg tablet    rivaroxaban (XARELTO) 15 mg tablet    vancomycin (VANCOCIN) 50mg/mL SOLN    VITAMIN D PO    acetaminophen (TYLENOL) 325 mg tablet    bisacodyl (DULCOLAX) 10 mg suppository    HumaLOG KwikPen 100 units/mL injection pen    mineral oil enema    polyethylene glycol (MIRALAX) 17 g packet    Specialty Vitamins Products (PROSTATE PO)       Labs:  Results from last 7 days   Lab Units 09/03/21  0433 09/02/21  0648 09/01/21  1907   WBC Thousand/uL 9 63 9 28 11 33*   HEMOGLOBIN g/dL 7 8* 9 2* 8 8*   HEMATOCRIT % 27 4* 31 9* 29 7*   PLATELETS Thousands/uL 225 217 236   NEUTROS PCT % 60  --  81*   LYMPHS PCT % 28  --  11*   MONOS PCT % 8  --  5   EOS PCT % 3  --  2     Results from last 7 days   Lab Units 09/03/21  0433 09/02/21  0648 09/01/21  1907   POTASSIUM mmol/L 4 8 4 2 4 4   CHLORIDE mmol/L 104 105 104   CO2 mmol/L 32 30 31   BUN mg/dL 55* 45* 46*   CREATININE mg/dL 1 60* 1 05 0 95   CALCIUM mg/dL 8 3 8 6 8 2*   ALK PHOS U/L 84 101 99   ALT U/L 9* 13 12   AST U/L 13 10 11     Lab Results   Component Value Date    TROPONINI <0 02 09/01/2021    TROPONINI 0 07 (H) 07/05/2021    TROPONINI 0 06 (H) 07/05/2021         Lab Results   Component Value Date    BLOODCX No Growth After 5 Days  07/04/2021    BLOODCX No Growth After 5 Days  07/04/2021    BLOODCX No Growth After 5 Days  05/10/2021    URINECX 70,000-79,000 cfu/ml Proteus mirabilis (A) 07/04/2021    URINECX >100,000 cfu/ml  05/03/2021    URINECX 30,000-39,000 cfu/ml Candida glabrata (A) 09/14/2020    WOUNDCULT 3+ Growth of Proteus mirabilis (A) 07/05/2021    WOUNDCULT 2+ Growth of Pseudomonas aeruginosa MDR (A) 07/05/2021    WOUNDCULT 1+ Growth of  07/05/2021    WOUNDCULT (A) 07/05/2021     1+ Growth of Methicillin Resistant Staphylococcus aureus         Imaging:  Results for orders placed during the hospital encounter of 09/01/21    XR Trauma chest portable    Narrative  CHEST    INDICATION: Chest pain and trauma  COMPARISON:  None    EXAM PERFORMED/VIEWS:  XR CHEST PORTABLE      FINDINGS:    Stable cardiomegaly  Elevation of the right hilum  Small right pleural effusion  Airspace consolidation in the right upper lobe and subsegmental atelectasis  Airspace opacities obscuring the right heart order consistent with middle lobe airspace disease  Pulmonary vascular congestion and mild interstitial edema  Right shoulder arthroplasty  No visualized fracture  Impression  1  Right upper and middle lobe pneumonia  2   Findings suggestive of mild congestive heart failure  Workstation performed: [id number]    No results found for this or any previous visit        Last 24 Hours Medication List:   Current Facility-Administered Medications   Medication Dose Route Frequency Provider Last Rate    amLODIPine  5 mg Oral Daily [place], [place]      atorvastatin  40 mg Oral Daily With [place], [place]      azithromycin  250 mg Oral Q24H [place], [place]      cefTRIAXone  1,000 mg Intravenous Q24H BEATRIZ MCDUFFIE 1,000 mg (09/02/21 2234)    ferrous sulfate  325 mg Oral Daily With Breakfast BEATRIZ MCDUFFIE      gabapentin  100 mg Oral TID [place], [place]      insulin glargine  10 Units Subcutaneous HS Ayesha Cintron PA-C      insulin lispro  1-5 Units Subcutaneous HS Ayesha Cintron PA-C      insulin lispro  1-6 Units Subcutaneous TID AC Ayesha Cintron PA-C      insulin lispro  8 Units Subcutaneous TID With Meals [place], [place]      metoprolol tartrate  25 mg Oral Q12H [address] Ayesha Cintron PA-C      mirtazapine  15 mg Oral HS Ayesha Cintron PA-C      pantoprazole  40 mg Oral BID [facility]      rivaroxaban  15 mg Oral Daily With Breakfast [place], [place]      vancomycin  125 mg Oral Q12H One Darius Rangel PA-C          Today, Patient Was Seen By: Coby Gallardo MD    ** Please Note: Dictation voice to text software may have been used in the creation of this document   **

## 2021-09-03 NOTE — ASSESSMENT & PLAN NOTE
Lab Results   Component Value Date    EGFR 31 09/03/2021    EGFR 51 09/02/2021    EGFR 58 09/01/2021    CREATININE 1 60 (H) 09/03/2021    CREATININE 1 05 09/02/2021    CREATININE 0 95 09/01/2021   · Baseline hemoglobin 0 8 1 4   · Creatinine on admission 0 95   · Patient creatinine this morning is 1 60  · Diuretics were held  · Avoid hypotension/nephrotoxins medication  · Trend BMP

## 2021-09-04 NOTE — ASSESSMENT & PLAN NOTE
Patient was called to inform script sent.   · Hgb at 7 8  · July admission pt had EGD that showed small AVM without active bleeding that was cauterized  · Hgb range 8-9   · No active signs of bleeding   · Trend H/H  · Transfuse for Hgb <7 0

## 2021-09-04 NOTE — ASSESSMENT & PLAN NOTE
Lab Results   Component Value Date    EGFR 19 09/04/2021    EGFR 31 09/03/2021    EGFR 51 09/02/2021    CREATININE 2 40 (H) 09/04/2021    CREATININE 1 60 (H) 09/03/2021    CREATININE 1 05 09/02/2021   · Baseline hemoglobin 0 8- 1 4   · Creatinine on admission 0 95   · Diuretics were held  · Avoid hypotension/nephrotoxins medication  · Trend BMP   · See above

## 2021-09-04 NOTE — PLAN OF CARE
Problem: MOBILITY - ADULT  Goal: Maintain or return to baseline ADL function  Description: INTERVENTIONS:  -  Assess patient's ability to carry out ADLs; assess patient's baseline for ADL function and identify physical deficits which impact ability to perform ADLs (bathing, care of mouth/teeth, toileting, grooming, dressing, etc )  - Assess/evaluate cause of self-care deficits   - Assess range of motion  - Assess patient's mobility; develop plan if impaired  - Assess patient's need for assistive devices and provide as appropriate  - Encourage maximum independence but intervene and supervise when necessary  - Involve family in performance of ADLs  - Assess for home care needs following discharge   - Consider OT consult to assist with ADL evaluation and planning for discharge  - Provide patient education as appropriate  Outcome: Progressing  Goal: Maintains/Returns to pre admission functional level  Description: INTERVENTIONS:  - Perform BMAT or MOVE assessment daily    - Set and communicate daily mobility goal to care team and patient/family/caregiver  - Collaborate with rehabilitation services on mobility goals if consulted  - Perform Range of Motion 3 times a day  - Reposition patient every 2 hours    - Dangle patient 3 times a day  - Stand patient 0 times a day  - Ambulate patient 0 times a day  - Out of bed to chair 3 times a day   - Out of bed for meals 3 times a day  - Out of bed for toileting  - Record patient progress and toleration of activity level   Outcome: Progressing     Problem: Potential for Falls  Goal: Patient will remain free of falls  Description: INTERVENTIONS:  - Educate patient/family on patient safety including physical limitations  - Instruct patient to call for assistance with activity   - Consult OT/PT to assist with strengthening/mobility   - Keep Call bell within reach  - Keep bed low and locked with side rails adjusted as appropriate  - Keep care items and personal belongings within reach  - Initiate and maintain comfort rounds  - Make Fall Risk Sign visible to staff  - Offer Toileting every 2 Hours, in advance of need  - Initiate/Maintain be alarm  - Obtain necessary fall risk management equipment: alarm  - Apply yellow socks and bracelet for high fall risk patients  - Consider moving patient to room near nurses station  Outcome: Progressing     Problem: Prexisting or High Potential for Compromised Skin Integrity  Goal: Skin integrity is maintained or improved  Description: INTERVENTIONS:  - Identify patients at risk for skin breakdown  - Assess and monitor skin integrity  - Assess and monitor nutrition and hydration status  - Monitor labs   - Assess for incontinence   - Turn and reposition patient  - Assist with mobility/ambulation  - Relieve pressure over bony prominences  - Avoid friction and shearing  - Provide appropriate hygiene as needed including keeping skin clean and dry  - Evaluate need for skin moisturizer/barrier cream  - Collaborate with interdisciplinary team   - Patient/family teaching  - Consider wound care consult   Outcome: Progressing     Problem: PAIN - ADULT  Goal: Verbalizes/displays adequate comfort level or baseline comfort level  Description: Interventions:  - Encourage patient to monitor pain and request assistance  - Assess pain using appropriate pain scale  - Administer analgesics based on type and severity of pain and evaluate response  - Implement non-pharmacological measures as appropriate and evaluate response  - Consider cultural and social influences on pain and pain management  - Notify physician/advanced practitioner if interventions unsuccessful or patient reports new pain  Outcome: Progressing     Problem: DISCHARGE PLANNING  Goal: Discharge to home or other facility with appropriate resources  Description: INTERVENTIONS:  - Identify barriers to discharge w/patient and caregiver  - Arrange for needed discharge resources and transportation as appropriate  - Identify discharge learning needs (meds, wound care, etc )  - Arrange for interpretive services to assist at discharge as needed  - Refer to Case Management Department for coordinating discharge planning if the patient needs post-hospital services based on physician/advanced practitioner order or complex needs related to functional status, cognitive ability, or social support system  Outcome: Progressing     Problem: Knowledge Deficit  Goal: Patient/family/caregiver demonstrates understanding of disease process, treatment plan, medications, and discharge instructions  Description: Complete learning assessment and assess knowledge base  Interventions:  - Provide teaching at level of understanding  - Provide teaching via preferred learning methods  Outcome: Progressing     Problem: Nutrition/Hydration-ADULT  Goal: Nutrient/Hydration intake appropriate for improving, restoring or maintaining nutritional needs  Description: Monitor and assess patient's nutrition/hydration status for malnutrition  Collaborate with interdisciplinary team and initiate plan and interventions as ordered  Monitor patient's weight and dietary intake as ordered or per policy  Utilize nutrition screening tool and intervene as necessary  Determine patient's food preferences and provide high-protein, high-caloric foods as appropriate       INTERVENTIONS:  - Monitor oral intake, urinary output, labs, and treatment plans  - Assess nutrition and hydration status and recommend course of action  - Evaluate amount of meals eaten  - Assist patient with eating if necessary   - Allow adequate time for meals  - Recommend/ encourage appropriate diets, oral nutritional supplements, and vitamin/mineral supplements  - Order, calculate, and assess calorie counts as needed  - Recommend, monitor, and adjust tube feedings and TPN/PPN based on assessed needs  - Assess need for intravenous fluids  - Provide specific nutrition/hydration education as appropriate  - Include patient/family/caregiver in decisions related to nutrition  Outcome: Progressing

## 2021-09-04 NOTE — ASSESSMENT & PLAN NOTE
Lab Results   Component Value Date    EGFR 19 09/04/2021    EGFR 31 09/03/2021    EGFR 51 09/02/2021    CREATININE 2 40 (H) 09/04/2021    CREATININE 1 60 (H) 09/03/2021    CREATININE 1 05 09/02/2021   · Baseline hemoglobin 0 8 1 4   · Creatinine on admission 0 95   · Patient creatinine this morning is 2 40  · Diuretics were held  · Avoid hypotension/nephrotoxins medications  · Trend BMP   · Nephrology consult  · Urinary retention protocol with bladder scan and PVR

## 2021-09-04 NOTE — ASSESSMENT & PLAN NOTE
· WBC at 11 33K on admission   · Suspect secondary to CAP seen on CT chest   · Continue ceftriaxone  · Resolved

## 2021-09-04 NOTE — CONSULTS
Consultation - Nephrology   Jamari Camarena 67 y o  female MRN: [id number]  Unit/Bed#: -01 Encounter: [id number]      [address]    1  RORY   o Creatinine increased to 2 4 today, 1 6 yesterday, 1 05 on September 2nd and 0 95 on September for  o Has a chronic suprapubic catheter  o Was getting diuresed per Cardiology because of the large right and moderate left pleural effusion that had increased in size  o Fortunately creatinine did increase diuretics are on hold  o Check a urinalysis  o Strict intakes and outputs  o Daily weights  o Check a CPK  o Albumin 25 g x1  o Baseline creatinine is 0 9 with an estimated GFR 50-55 mL/minutes    2  Electrolytes/Acid Base  o Electrolytes are currently acceptable  o No anion gap    3  Blood Pressure  o Hypertension-on amlodipine and metoprolol place hold parameters on the amlodipine for systolic of 994    4  Anemia of CKD  o Anemia in the setting of a fall and chronic kidney disease    5  CKD-BMD  o Can monitor parameters as an outpatient    6   Clinical Course/CV Risk Reduction/Health maintenance/communication  o Status post fall  o Large pleural effusion  o History of diastolic heart failure  o Antibiotics for concern of a pneumonia    HISTORY OF PRESENT ILLNESS:  Requesting Physician: Claudia Salazar MD  Reason for Consult:  Acute kidney injury    Jamari Camarena is a 67y o  year old female who was admitted for fall    History of diastolic congestive heart failure, type 2 diabetes mellitus, stage III hyper content, hypertension, chronic kidney disease, atrial fibrillation presented after she fell and hit her head denies any loss of consciousness  Has a history of dementia  Has a history of a suprapubic catheter  Currently is resting comfortably  She denies any pain  No shortness of breath    PAST MEDICAL HISTORY:  Past Medical History:   Diagnosis Date    Acute renal failure (ARF) ([address] )     Acute respiratory failure with hypoxia (HCC)     Anemia     Atrial fibrillation [facility]     CHF (congestive heart failure) (HCC)     Chronic kidney disease     Diabetes mellitus ([facility] )     type 2    Hyperlipidemia     Hypertension     Stroke [facility])        PAST SURGICAL HISTORY:  Past Surgical History:   Procedure Laterality Date    BREAST SURGERY Left     lumpectomy benign    CATARACT EXTRACTION Bilateral 2017    CATARACT EXTRACTION W/ INTRAOCULAR LENS IMPLANT Left 12/11/2017    Procedure: EXTRACTION EXTRACAPSULAR CATARACT PHACO INTRAOCULAR LENS (IOL); Surgeon: Bebe Pack MD;  Location: [facility] MAIN OR;  Service: Ophthalmology    IR SUPRAPUBIC CATHETER PLACEMENT  7/14/2021    [place] OPEN RX FEMUR FX+INTRAMED RAYMOND Right 5/21/2020    Procedure: INSERTION OF SHORT TROCHANTERIC FEMORAL NAIL;  Surgeon: April Knapp MD;  [facility];  Service: Orthopedics    [place] [address] HUMERAL&GLENOID COMPNT Right 9/10/2018    Procedure: RIGHT REVERSE TOTAL SHOULDER ARTHROPLASTY;  Surgeon: April Knapp MD;  [facility];  Service: Orthopedics    [place] XCAPSL CTRC RMVL INSJ IO LENS PROSTH W/O ECP Right 10/23/2017    Procedure: EXTRACTION EXTRACAPSULAR CATARACT PHACO INTRAOCULAR LENS (IOL); Surgeon: Bebe Pack MD;  Location: [facility] MAIN OR;  Service: Ophthalmology    WOUND DEBRIDEMENT N/A 8/26/2020    Procedure: EXCISIONAL DEBRIDEMENT OF SACRAL PRESSURE WOUND, WASHOUT;;  Surgeon: Neto Pulliam MD;  [facility] MA[facility];  Service: General    WOUND DEBRIDEMENT N/A 8/28/2020    Procedure: BUTTOCKS DEBRIDEMENT WOUND AND DRESSING CHANGE ([address] OUT);   Surgeon: Radha Gilmore MD;  [facility] MA[facility];  Service: General       ALLERGIES:  No Known Allergies    SOCIAL HISTORY:  Social History     Substance and Sexual Activity   Alcohol Use Never    Comment: quit 8/17     Social History     Substance and Sexual Activity   Drug Use No     Social History     Tobacco Use   Smoking Status Never Smoker   Smokeless Tobacco Never Used       FAMILY HISTORY:  Family History Problem Relation Age of Onset    Diabetes Mother     Varicose Veins Mother     Stroke Father     Arthritis Brother     Diabetes Daughter     No Known Problems Brother        MEDICATIONS:    Current Facility-Administered Medications:     [START ON 9/5/2021] amLODIPine (NORVASC) tablet 5 mg, 5 mg, Oral, Daily, Watson Jaspreet,     atorvastatin (LIPITOR) tablet 40 mg, 40 mg, Oral, Daily With Dinner, [facility] SHAN Cintron PA-C, 40 mg at 09/03/21 1556    azithromycin (ZITHROMAX) tablet 250 mg, 250 mg, Oral, Q24H, Ayesha Patel PA-C, 250 mg at 09/04/21 2524    cefTRIAXone (ROCEPHIN) IVPB (premix in dextrose) 1,000 mg 50 mL, 1,000 mg, Intravenous, Q24H, Ayesha Patel PA-C, Last Rate: 100 mL/hr at 09/03/21 2139, 1,000 mg at 09/03/21 2139    ferrous sulfate tablet 325 mg, 325 mg, Oral, Daily With Breakfast, Ayesha Patel PA-C, 325 mg at 09/04/21 0931    gabapentin (NEURONTIN) capsule 100 mg, 100 mg, Oral, TID, [facility], PA-C, 100 mg at 09/04/21 0931    insulin glargine (LANTUS) subcutaneous injection 10 Units 0 1 mL, 10 Units, Subcutaneous, HS, Ayesha Patel PA-C, 10 Units at 09/03/21 2142    insulin lispro (HumaLOG) 100 units/mL subcutaneous injection 1-5 Units, 1-5 Units, Subcutaneous, HS, Ayesha Patel PA-C, 1 Units at 09/03/21 2142    insulin lispro (HumaLOG) 100 units/mL subcutaneous injection 1-6 Units, 1-6 Units, Subcutaneous, TID AC, 5 Units at 09/03/21 1737 **AND** Fingerstick Glucose (POCT), , , TID AC, Ayesha Patel PA-C    insulin lispro (HumaLOG) 100 units/mL subcutaneous injection 8 Units, 8 Units, Subcutaneous, TID With Meals, [employer], PA-C, 8 Units at 09/04/21 1238    metoprolol tartrate (LOPRESSOR) tablet 25 mg, 25 mg, Oral, Q12H [address], Ayesha Patel PA-C, 25 mg at 09/04/21 0930    mirtazapine (REMERON) tablet 15 mg, 15 mg, Oral, HS, Ayesha Patel PA-C, 15 mg at 09/03/21 2140    pantoprazole (PROTONIX) EC tablet 40 mg, 40 mg, Oral, BID, Ayesha Patel PA-C, 40 mg at 09/04/21 0930    rivaroxaban (XARELTO) tablet 15 mg, 15 mg, Oral, Daily With Breakfast, Ayesha Patel PA-C, 15 mg at 09/04/21 0930    vancomycin (VANCOCIN) oral solution 125 mg, 125 mg, Oral, Q12H [address], Ayesha Patel PA-C, 125 mg at 09/04/21 0930    REVIEW OF SYSTEMS:  All the systems were reviewed and were negative except as documented on the HPI        PHYSICAL EXAM:  Current Weight: Weight - Scale: 69 3 kg (152 lb 12 5 oz)  First Weight: Weight - Scale: 72 kg (158 lb 11 7 oz)  Vitals:    09/04/21 0538 09/04/21 0739 09/04/21 1014 09/04/21 1441   BP:  127/57  126/57   Pulse:  68  64   Resp:  18  18   Temp:  97 7 °F (36 5 °C)  97 7 °F (36 5 °C)   TempSrc:       SpO2:  99%  97%   Weight: 69 3 kg (152 lb 12 5 oz)      Height:   5' 6" (1 676 m)        Intake/Output Summary (Last 24 hours) at 9/4/2021 1456  Last data filed at 9/4/2021 0900  Gross per 24 hour   Intake 480 ml   Output 1275 ml   Net -795 ml       General: conscious, cooperative, in no acute distress  Eyes:  Ecchymosis around the right eye  ENT: lips and mucous membranes moist  Neck: supple, no JVD  Chest:  Decreased breath sounds bilaterally  CVS:  Normal rate with no pericardial friction rub  Abdomen: soft, non-tender, non-distended, normoactive bowel sounds  Extremities: no edema of both legs  Skin: no rash  Neuro: awake, alert, oriented  Psych:  Pleasant affect      Invasive Devices:      Lab Results:   Results from last 7 days   Lab Units 09/04/21  0518 09/03/21  0433 09/02/21  0648 09/01/21  1907   WBC Thousand/uL 9 58 9 63 9 28 11 33*   HEMOGLOBIN g/dL 7 9* 7 8* 9 2* 8 8*   HEMATOCRIT % 26 5* 27 4* 31 9* 29 7*   PLATELETS Thousands/uL 208 225 217 236   POTASSIUM mmol/L 4 6 4 8 4 2 4 4   CHLORIDE mmol/L 103 104 105 104   CO2 mmol/L 30 32 30 31   BUN mg/dL 65* 55* 45* 46*   CREATININE mg/dL 2 40* 1 60* 1 05 0 95   CALCIUM mg/dL 8 2* 8 3 8 6 8 2*   MAGNESIUM mg/dL  --  1 7  --   --    ALK PHOS U/L  --  84 101 99   ALT U/L  --  9* 13 12   AST U/L  -- 13 10 11         Portions of the record may have been created with voice recognition software  Occasional wrong word or "sound a like" substitutions may have occurred due to the inherent limitations of voice recognition software  Read the chart carefully and recognize, using context, where substitutions have occurred  If you have any questions, please contact the dictating provider

## 2021-09-04 NOTE — ASSESSMENT & PLAN NOTE
Wt Readings from Last 3 Encounters:   09/04/21 69 3 kg (152 lb 12 5 oz)   07/05/21 72 5 kg (159 lb 13 3 oz)   05/13/21 57 kg (125 lb 10 6 oz)     · CT chest with increased b/l pleural effusions (large right and moderate left)   · Pt denies dyspnea (though unreliable historian)  · BNP 4042 (lower than prior HF exacerbation admissions)   · Last echo 04/2021: "LVEF 65&  LV diastolic parameters were abnormal "   · Previously on demadex, which was d/c on 7/16 after discharge from hospital as pt euvolvemic - nephrology to determine restart date per d/c summary   · On admission, pt with lungs CTA b/l and no peripheral edema   · Patient was started on diuresis with Lasix 40 mg IV twice daily  · Creatinine this morning went up to 1 60  · Cardiology follow-up noted  · Diuretics were discontinued  · Strict I/Os And daily weights  · Cardiac diet with fluid and sodium restriction   · Discussed with patient's son who is the POA at length and he wants everything to be done at this time  Discussed about thoracentesis and he wants to go ahead with thoracentesis for his mother  · IR consult for thoracentesis placed  · IR spoke to patient and she refused thoracentesis and stated she wanted to think it over the weekend  Spoke to son Issa Hammond and he feels that his mom cannot make a decision and IR should talk to him to get consent

## 2021-09-04 NOTE — ASSESSMENT & PLAN NOTE
· Nonsevere CAP with DRIP score of 4  · CT chest: "Consolidation in the right upper lobe more anteriorly may be due to atelectasis or infiltrate   There is groundglass attenuation in the right upper and right lower lobes  "  · COVID 19 negative   · Sputum culture is pending  · Continue ceftriaxone and Zithromax  · On 2 L of supplemental oxygen  · Wean oxygen as tolerated

## 2021-09-04 NOTE — PROGRESS NOTES
[place]  Progress Note - Giselle Vazquez 1945, 67 y o  female MRN: [id number]  Unit/Bed#: -01 Encounter: [id number]  Primary Care Provider: Sahra Barnes DO   Date and time admitted to hospital: 9/1/2021  6:29 PM    * Acute on chronic diastolic (congestive) heart failure (HCC)  Assessment & Plan  Wt Readings from Last 3 Encounters:   09/04/21 69 3 kg (152 lb 12 5 oz)   07/05/21 72 5 kg (159 lb 13 3 oz)   05/13/21 57 kg (125 lb 10 6 oz)     · CT chest with increased b/l pleural effusions (large right and moderate left)   · Pt denies dyspnea (though unreliable historian)  · BNP 4042 (lower than prior HF exacerbation admissions)   · Last echo 04/2021: "LVEF 65&  LV diastolic parameters were abnormal "   · Previously on demadex, which was d/c on 7/16 after discharge from hospital as pt euvolvemic - nephrology to determine restart date per d/c summary   · On admission, pt with lungs CTA b/l and no peripheral edema   · Patient was started on diuresis with Lasix 40 mg IV twice daily  · Creatinine this morning went up to 1 60  · Cardiology follow-up noted  · Diuretics were discontinued  · Strict I/Os And daily weights  · Cardiac diet with fluid and sodium restriction   · Discussed with patient's son who is the POA at length and he wants everything to be done at this time  Discussed about thoracentesis and he wants to go ahead with thoracentesis for his mother  · IR consult for thoracentesis placed  · IR spoke to patient and she refused thoracentesis and stated she wanted to think it over the weekend  Spoke to son Milton Holcomb and he feels that his mom cannot make a decision and IR should talk to him to get consent  Pneumonia  Assessment & Plan  · Nonsevere CAP with DRIP score of 4  · CT chest: "Consolidation in the right upper lobe more anteriorly may be due to atelectasis or infiltrate   There is groundglass attenuation in the right upper and right lower lobes  "  · COVID 19 negative · Sputum culture is pending  · Continue ceftriaxone and Zithromax  · On 2 L of supplemental oxygen  · Wean oxygen as tolerated    Fall  Assessment & Plan  · Pt reportedly fell out of bed at facility at 1730   · Trauma work-up unrevealing except for right frontal scalp hematoma   · CTH and CT-Cspine for acute traumatic injury     Leukocytosis  Assessment & Plan  · WBC at 11 33K on admission   · Suspect secondary to CAP seen on CT chest   · Continue ceftriaxone  · Resolved    Anemia  Assessment & Plan  · Hgb at 7 8  · July admission pt had EGD that showed small AVM without active bleeding that was cauterized  · Hgb range 8-9   · No active signs of bleeding   · Trend H/H  · Transfuse for Hgb <7 0     Acute kidney injury superimposed on chronic kidney disease [facility])  Assessment & Plan  Lab Results   Component Value Date    EGFR 19 09/04/2021    EGFR 31 09/03/2021    EGFR 51 09/02/2021    CREATININE 2 40 (H) 09/04/2021    CREATININE 1 60 (H) 09/03/2021    CREATININE 1 05 09/02/2021   · Baseline hemoglobin 0 8 1 4   · Creatinine on admission 0 95   · Patient creatinine this morning is 2 40  · Diuretics were held  · Avoid hypotension/nephrotoxins medications  · Trend BMP   · Nephrology consult  · Urinary retention protocol with bladder scan and PVR      Chronic respiratory failure with hypoxia (HCC)  Assessment & Plan  · On 2L supplemental oxygen via NC at baseline  · No increase in requirements    Paroxysmal atrial fibrillation (HCC)  Assessment & Plan  · Home regimen:  Metoprolol 25 mg q 12 hours  · Anticoagulated with xarelto  · Continue    Pressure ulcer of sacral region, stage 4 (HCC)  Assessment & Plan  · Offload pressure as able   · Wound care consult  · Continue wound care per recommendations    Stage 3a chronic kidney disease [facility])  Assessment & Plan  Lab Results   Component Value Date    EGFR 19 09/04/2021    EGFR 31 09/03/2021    EGFR 51 09/02/2021    CREATININE 2 40 (H) 09/04/2021    CREATININE 1 60 (H) 09/03/2021    CREATININE 1 05 09/02/2021   · Baseline hemoglobin 0 8- 1 4   · Creatinine on admission 0 95   · Diuretics were held  · Avoid hypotension/nephrotoxins medication  · Trend BMP   · See above    Hypertension  Assessment & Plan  · Home regimen: Metoprolol 25 mg q 12 hours   · Monitor vitals as per protocol    Type 2 diabetes mellitus with hyperglycemia [facility])  Assessment & Plan  Lab Results   Component Value Date    HGBA1C 7 0 06/15/2021       Recent Labs     09/03/21  1149 09/03/21  1611 09/03/21  2126 09/04/21  0737   POCGLU 116 314* 219* 83       Blood Sugar Average: Last 72 hrs:  · (P) 372 [id number]   · home regimen:  Lantus 10 units HS and Humalog 8 units t i d    · Continue with home regimen with the addition of SSI      Labs & Imaging: I have personally reviewed pertinent reports  VTE Pharmacologic Prophylaxis:  Xarelto  VTE Mechanical Prophylaxis: sequential compression device    Code Status:   Level 1 - Full Code    Patient Centered Rounds: I have performed bedside rounds with nursing staff today  Discussions with Specialists or Other Care Team Provider:  Cardiology    Education and Discussions with Family / Patient: Natalio Lentz    Current Length of Stay: 3 day(s)    Current Patient Status: Inpatient   Certification Statement: The patient will continue to require additional inpatient hospital stay due to see my assessment and plan  Subjective:   Patient is seen and examined at bedside  Denies any new complaints  Has shortness of breath  Afebrile  Discussed with son and he feels patient has dementia  All other ROS are negative  Objective:    Vitals: Blood pressure 127/57, pulse 68, temperature 97 7 °F (36 5 °C), resp  rate 18, height 5' 6" (1 676 m), weight 69 3 kg (152 lb 12 5 oz), SpO2 99 %  ,Body mass index is 24 66 kg/m²    SPO2 RA Rest      ED to Hosp-Admission (Current) from 9/1/2021 in [address] Med Surg [address]   SpO2  99 %   SpO2 Activity  At Rest   O2 Device  Nasal cannula   O2 Flow Rate  --        I&O:     Intake/Output Summary (Last 24 hours) at 9/4/2021 1020  Last data filed at 9/4/2021 0900  Gross per 24 hour   Intake 480 ml   Output 1275 ml   Net -795 ml       Physical Exam:    General- Alert, lying comfortably in bed  Not in any acute distress  Neck- Supple, No JVD  CVS- regular, S1 and S2 normal  Chest- Bilateral Air entry, No rhochi, crackles or wheezing present  Abdomen- soft, nontender, not distended, no guarding or rigidity, BS+  Extremities-  No pedal edema, No calf tenderness  CNS-   Alert, awake and orientedx1  No focal deficits present      Invasive Devices     Peripheral Intravenous Line            Peripheral IV 09/01/21 Left Antecubital 2 days          Drain            Suprapubic Catheter  18 Fr  51 days                      Social History  reviewed  Family History   Problem Relation Age of Onset    Diabetes Mother     Varicose Veins Mother     Stroke Father     Arthritis Brother     Diabetes Daughter     No Known Problems Brother     reviewed    Meds:  Current Facility-Administered Medications   Medication Dose Route Frequency Provider Last Rate Last Admin    amLODIPine (NORVASC) tablet 5 mg  5 mg Oral Daily [facility], PA-C   5 mg at 09/04/21 0931    atorvastatin (LIPITOR) tablet 40 mg  40 mg Oral Daily With [facility], PA-C   40 mg at 09/03/21 1556    azithromycin (ZITHROMAX) tablet 250 mg  250 mg Oral Q24H Ayesha Patel PA-C   250 mg at 09/04/21 5456    cefTRIAXone (ROCEPHIN) IVPB (premix in dextrose) 1,000 mg 50 mL  1,000 mg Intravenous Q24H [facility], PA-C 100 mL/hr at 09/03/21 2139 1,000 mg at 09/03/21 2139    ferrous sulfate tablet 325 mg  325 mg Oral Daily With [facility], PA-C   325 mg at 09/04/21 0931    gabapentin (NEURONTIN) capsule 100 mg  100 mg Oral TID [facility], PA-C   100 mg at 09/04/21 0931    insulin glargine (LANTUS) subcutaneous injection 10 Units 0 1 mL  10 Units Subcutaneous HS [facility], BEATRIZ   10 Units at 09/03/21 2142    insulin lispro (HumaLOG) 100 units/mL subcutaneous injection 1-5 Units  1-5 Units Subcutaneous HS [facility], BEATRIZ   1 Units at 09/03/21 2142    insulin lispro (HumaLOG) 100 units/mL subcutaneous injection 1-6 Units  1-6 Units Subcutaneous TID [facility], BEATRIZ   5 Units at 09/03/21 1737    insulin lispro (HumaLOG) 100 units/mL subcutaneous injection 8 Units  8 Units Subcutaneous TID With Meals [facility], BEATRIZ   8 Units at 09/04/21 0930    metoprolol tartrate (LOPRESSOR) tablet 25 mg  25 mg Oral Q12H [address] [facility]   25 mg at 09/04/21 0930    mirtazapine (REMERON) tablet 15 mg  [facility] Oral [facility]   15 mg at 09/03/21 2140    pantoprazole (PROTONIX) EC tablet 40 mg  40 mg Oral BID [facility], BEATRIZ   40 mg at 09/04/21 0930    rivaroxaban (XARELTO) tablet 15 mg  15 mg Oral Daily With [facility], BEATRIZ   15 mg at 09/04/21 0930    vancomycin (VANCOCIN) oral solution 125 mg  125 mg Oral Q12H [address] [facility], [facility]   125 mg at 09/04/21 0930      Medications Prior to Admission   Medication    ascorbic acid (VITAMIN C) 500 MG tablet    atorvastatin (LIPITOR) 40 mg tablet    ferrous sulfate 325 (65 Fe) mg tablet    gabapentin (NEURONTIN) 100 mg capsule    insulin lispro (HumaLOG) 100 units/mL injection    metoprolol tartrate (LOPRESSOR) 25 mg tablet    mirtazapine (REMERON) 15 mg tablet    Nutritional Supplements (Yevgeniy) PACK    pantoprazole (PROTONIX) 40 mg tablet    rivaroxaban (XARELTO) 15 mg tablet    vancomycin (VANCOCIN) 50mg/mL SOLN    VITAMIN D PO    acetaminophen (TYLENOL) 325 mg tablet    bisacodyl (DULCOLAX) 10 mg suppository    HumaLOG KwikPen 100 units/mL injection pen    mineral oil enema    polyethylene glycol (MIRALAX) 17 g packet    Specialty Vitamins Products (PROSTATE PO)       Labs:  Results from last 7 days   Lab Units 09/04/21  0518 09/03/21  1638 09/02/21  0648 09/01/21  1907   WBC Thousand/uL 9 58 9 63 9 28 11 33*   HEMOGLOBIN g/dL 7 9* 7 8* 9 2* 8 8*   HEMATOCRIT % 26 5* 27 4* 31 9* 29 7*   PLATELETS Thousands/uL 208 225 217 236   NEUTROS PCT % 63 60  --  81*   LYMPHS PCT % 26 28  --  11*   MONOS PCT % 7 8  --  5   EOS PCT % 3 3  --  2     Results from last 7 days   Lab Units 09/04/21  0518 09/03/21  0433 09/02/21  0648 09/01/21  1907   POTASSIUM mmol/L 4 6 4 8 4 2 4 4   CHLORIDE mmol/L 103 104 105 104   CO2 mmol/L 30 32 30 31   BUN mg/dL 65* 55* 45* 46*   CREATININE mg/dL 2 40* 1 60* 1 05 0 95   CALCIUM mg/dL 8 2* 8 3 8 6 8 2*   ALK PHOS U/L  --  84 101 99   ALT U/L  --  9* 13 12   AST U/L  --  13 10 11     Lab Results   Component Value Date    TROPONINI <0 02 09/01/2021    TROPONINI 0 07 (H) 07/05/2021    TROPONINI 0 06 (H) 07/05/2021         Lab Results   Component Value Date    BLOODCX No Growth After 5 Days  07/04/2021    BLOODCX No Growth After 5 Days  07/04/2021    BLOODCX No Growth After 5 Days  05/10/2021    URINECX 70,000-79,000 cfu/ml Proteus mirabilis (A) 07/04/2021    URINECX >100,000 cfu/ml  05/03/2021    URINECX 30,000-39,000 cfu/ml Candida glabrata (A) 09/14/2020    WOUNDCULT 3+ Growth of Proteus mirabilis (A) 07/05/2021    WOUNDCULT 2+ Growth of Pseudomonas aeruginosa MDR (A) 07/05/2021    WOUNDCULT 1+ Growth of  07/05/2021    WOUNDCULT (A) 07/05/2021     1+ Growth of Methicillin Resistant Staphylococcus aureus         Imaging:  Results for orders placed during the hospital encounter of 09/01/21    XR Trauma chest portable    Narrative  CHEST    INDICATION: Chest pain and trauma  COMPARISON:  None    EXAM PERFORMED/VIEWS:  XR CHEST PORTABLE      FINDINGS:    Stable cardiomegaly  Elevation of the right hilum  Small right pleural effusion  Airspace consolidation in the right upper lobe and subsegmental atelectasis  Airspace opacities obscuring the right heart order consistent with middle lobe airspace disease    Pulmonary vascular congestion and mild interstitial edema  Right shoulder arthroplasty  No visualized fracture  Impression  1  Right upper and middle lobe pneumonia  2   Findings suggestive of mild congestive heart failure  Workstation performed: [id number]    No results found for this or any previous visit  Last 24 Hours Medication List:   Current Facility-Administered Medications   Medication Dose Route Frequency Provider Last Rate    amLODIPine  5 mg Oral Daily [place], [place]      atorvastatin  40 mg Oral Daily With [employer] [place], [place]      azithromycin  250 mg Oral Q24H [place], [place]      cefTRIAXone  1,000 mg Intravenous Q24H TAD MCDUFFIE-C 1,000 mg (09/03/21 2139)    ferrous sulfate  325 mg Oral Daily With Breakfast [place], [place]      gabapentin  100 mg Oral TID MARIOMSBEATRIZ ALBRIGHT      insulin glargine  10 Units Subcutaneous HS Ayesha Cintron PA-C      insulin lispro  1-5 Units Subcutaneous HS TAD Alvarez-HERBIE      insulin lispro  1-6 Units Subcutaneous TID AC Aeysha Cintron PA-C      insulin lispro  8 Units Subcutaneous TID With Meals [place], [place]      metoprolol tartrate  25 mg Oral Q12H [facility] Ayesha Cintron PA-C      mirtazapine  15 mg Oral HS Ayesha Cintron PA-C      pantoprazole  40 mg Oral BID BEATRIZ MCDUFFIE      rivaroxaban  15 mg Oral Daily With Breakfast BEATRIZ MCDUFFIE      vancomycin  125 mg Oral Q12H One Darius Rangel PA-C          Today, Patient Was Seen By: Nancy Bautista MD    ** Please Note: Dictation voice to text software may have been used in the creation of this document   **

## 2021-09-04 NOTE — ASSESSMENT & PLAN NOTE
Lab Results   Component Value Date    HGBA1C 7 0 06/15/2021       Recent Labs     09/03/21  1149 09/03/21  1611 09/03/21  2126 09/04/21  0737   POCGLU 116 314* 219* 83       Blood Sugar Average: Last 72 hrs:  · (P) 249 [id number]   · home regimen:  Lantus 10 units HS and Humalog 8 units t i d    · Continue with home regimen with the addition of SSI

## 2021-09-05 NOTE — PLAN OF CARE
Problem: MOBILITY - ADULT  Goal: Maintain or return to baseline ADL function  Description: INTERVENTIONS:  -  Assess patient's ability to carry out ADLs; assess patient's baseline for ADL function and identify physical deficits which impact ability to perform ADLs (bathing, care of mouth/teeth, toileting, grooming, dressing, etc )  - Assess/evaluate cause of self-care deficits   - Assess range of motion  - Assess patient's mobility; develop plan if impaired  - Assess patient's need for assistive devices and provide as appropriate  - Encourage maximum independence but intervene and supervise when necessary  - Involve family in performance of ADLs  - Assess for home care needs following discharge   - Consider OT consult to assist with ADL evaluation and planning for discharge  - Provide patient education as appropriate  Outcome: Progressing  Goal: Maintains/Returns to pre admission functional level  Description: INTERVENTIONS:  - Perform BMAT or MOVE assessment daily    - Set and communicate daily mobility goal to care team and patient/family/caregiver  - Collaborate with rehabilitation services on mobility goals if consulted  - Perform Range of Motion 3 times a day  - Reposition patient every 2 hours    - Dangle patient 3 times a day  - Stand patient 0 times a day  - Ambulate patient 0 times a day  - Out of bed to chair 3 times a day   - Out of bed for meals 3 times a day  - Out of bed for toileting  - Record patient progress and toleration of activity level   Outcome: Progressing     Problem: Potential for Falls  Goal: Patient will remain free of falls  Description: INTERVENTIONS:  - Educate patient/family on patient safety including physical limitations  - Instruct patient to call for assistance with activity   - Consult OT/PT to assist with strengthening/mobility   - Keep Call bell within reach  - Keep bed low and locked with side rails adjusted as appropriate  - Keep care items and personal belongings within reach  - Initiate and maintain comfort rounds  - Make Fall Risk Sign visible to staff  - Offer Toileting every 2 Hours, in advance of need  - Initiate/Maintain bed alarm  - Obtain necessary fall risk management equipment: alarm  - Apply yellow socks and bracelet for high fall risk patients  - Consider moving patient to room near nurses station  Outcome: Progressing     Problem: Prexisting or High Potential for Compromised Skin Integrity  Goal: Skin integrity is maintained or improved  Description: INTERVENTIONS:  - Identify patients at risk for skin breakdown  - Assess and monitor skin integrity  - Assess and monitor nutrition and hydration status  - Monitor labs   - Assess for incontinence   - Turn and reposition patient  - Assist with mobility/ambulation  - Relieve pressure over bony prominences  - Avoid friction and shearing  - Provide appropriate hygiene as needed including keeping skin clean and dry  - Evaluate need for skin moisturizer/barrier cream  - Collaborate with interdisciplinary team   - Patient/family teaching  - Consider wound care consult   Outcome: Progressing     Problem: PAIN - ADULT  Goal: Verbalizes/displays adequate comfort level or baseline comfort level  Description: Interventions:  - Encourage patient to monitor pain and request assistance  - Assess pain using appropriate pain scale  - Administer analgesics based on type and severity of pain and evaluate response  - Implement non-pharmacological measures as appropriate and evaluate response  - Consider cultural and social influences on pain and pain management  - Notify physician/advanced practitioner if interventions unsuccessful or patient reports new pain  Outcome: Progressing     Problem: DISCHARGE PLANNING  Goal: Discharge to home or other facility with appropriate resources  Description: INTERVENTIONS:  - Identify barriers to discharge w/patient and caregiver  - Arrange for needed discharge resources and transportation as appropriate  - Identify discharge learning needs (meds, wound care, etc )  - Arrange for interpretive services to assist at discharge as needed  - Refer to Case Management Department for coordinating discharge planning if the patient needs post-hospital services based on physician/advanced practitioner order or complex needs related to functional status, cognitive ability, or social support system  Outcome: Progressing

## 2021-09-05 NOTE — PROGRESS NOTES
NEPHROLOGY PROGRESS NOTE   Sebastien Scarce 67 y o  female MRN: [id number]  Unit/Bed#: -01 Encounter: [id number]  Reason for Consult:  Acute kidney injury    Plan:  -RORY, suspect component of WILIAN  Creatinine unfortunately continues to rise  Will repeat albumin today  CPK level low  Will collect UA  -hypertension, continue amlodipine and metoprolol   -acute on chronic diastolic CHF, diuretics currently on hold  May give as needed  Weight is trending downward   -bilateral pleural effusions, IR consulted for thoracentesis  -pneumonia, continues on antibiotics per primary care team   -anemia, continues on oral iron  Will transfuse as needed  ASSESSMENT/PLAN:  Acute kidney injury on CKD III  Hospital-acquired  Suspected secondary to over diuresis vs WILIAN  -presents with creatinine of 0 95  -baseline creatinine 0 8-1 4   -creatinine unfortunately continues to rise  -received contrast on 09/01   -diuretics continue to to be held  May give as needed  -UA:  Will collect  -CPK level low  -received albumin 25 g x 1  Repeat today    -history of chronic suprapubic catheter  Consider changing if creatinine continues to worsen  -continue to avoid nephrotoxins, hypotension, IV contrast   -strict I/O  Hypertension:  Blood pressure remains stable and acceptable  -currently on amlodipine and metoprolol   -avoid hypotension or high fluctuations in blood pressure   -recommend holding parameters antihypertensive for systolic blood pressure less than 130  Acute on chronic diastolic CHF:  With EF of 65%  -previously being diuresed with IV Lasix 40 mg b i d     Currently on hold due to elevation in creatinine   -continue daily weights, fluid restriction, I/O  Weight is trending downward   -cardiology team is following  Bilateral pleural effusions:  Right greater than left   -likely will need thoracentesis, IR team consulted      Pneumonia:  -continues on antibiotics per primary care team and supplemental oxygen to maintain sats greater than 90%  Anemia:  -had previous EGD in July which showed small AVM  -continues on oral iron  -right frontal scalp hematoma secondary to fall   -will continue to monitor and trend hemoglobin  Transfuse for hemoglobin less than 7 0  Other:  Fall, right frontal scalp hematoma, atrial fibrillation, DM  Disposition:  Requiring additional stay due to medical needs  SUBJECTIVE:  The patient is resting in her bed  She appears to be comfortable      OBJECTIVE:  Current Weight: Weight - Scale: 67 5 kg (148 lb 13 oz)  Vitals:    09/04/21 1441 09/04/21 2345 09/05/21 0521 09/05/21 0715   BP: 126/57 134/60  135/59   Pulse: 64 71  68   Resp: 18   16   Temp: 97 7 °F (36 5 °C) 97 7 °F (36 5 °C)  97 7 °F (36 5 °C)   TempSrc:       SpO2: 97% 94%  95%   Weight:   67 5 kg (148 lb 13 oz)    Height:           Intake/Output Summary (Last 24 hours) at 9/5/2021 1238  Last data filed at 9/5/2021 6705  Gross per 24 hour   Intake 520 ml   Output 550 ml   Net -30 ml     General: NAD  Skin: warm, dry, intact, no rash  HEENT: Moist mucous membranes, sclera anicteric, normocephalic, atraumatic  Neck: No apparent JVD appreciated  Chest: lung sounds clear B/L, on RA   CVS:Regular rate and rhythm, no murmer   Abdomen: Soft, round, non-tender, +BS  Extremities: No B/L LE edema present  Neuro: alert and oriented  Psych: appropriate mood and affect     Medications:    Current Facility-Administered Medications:     amLODIPine (NORVASC) tablet 5 mg, 5 mg, Oral, Daily, Watson Vogel, DO, 5 mg at 09/05/21 0856    atorvastatin (LIPITOR) tablet 40 mg, 40 mg, Oral, Daily With Dinner, [facility] SHAN Cintron PA-C, 40 mg at 09/04/21 1556    cefTRIAXone (ROCEPHIN) IVPB (premix in dextrose) 1,000 mg 50 mL, 1,000 mg, Intravenous, Q24H, Ayesha Patel PA-C, Last Rate: 100 mL/hr at 09/04/21 2206, 1,000 mg at 09/04/21 2206    ferrous sulfate tablet 325 mg, 325 mg, Oral, Daily With Breakfast, Ayesha Patel PA-C, 325 mg at 09/05/21 0857    gabapentin (NEURONTIN) capsule 100 mg, 100 mg, Oral, TID, Ayesha Patel PA-C, 100 mg at 09/05/21 0857    insulin glargine (LANTUS) subcutaneous injection 10 Units 0 1 mL, 10 Units, Subcutaneous, HS, Ayesha Patel PA-C, 10 Units at 09/04/21 2218    insulin lispro (HumaLOG) 100 units/mL subcutaneous injection 1-5 Units, 1-5 Units, Subcutaneous, HS, Ayesha Patel PA-C, 1 Units at 09/03/21 2142    insulin lispro (HumaLOG) 100 units/mL subcutaneous injection 1-6 Units, 1-6 Units, Subcutaneous, TID AC, 5 Units at 09/03/21 1737 **AND** Fingerstick Glucose (POCT), , , TID AC, Ayesha Patel PA-C    insulin lispro (HumaLOG) 100 units/mL subcutaneous injection 8 Units, 8 Units, Subcutaneous, TID With Meals, BEATRIZ MCDUFFIE, 8 Units at 09/05/21 0855    metoprolol tartrate (LOPRESSOR) tablet 25 mg, 25 mg, Oral, Q12H [address], JASIEL FloresC, 25 mg at 09/05/21 0856    mirtazapine (REMERON) tablet 15 mg, 15 mg, Oral, HS, Ayesha Patel PA-C, 15 mg at 09/04/21 2218    pantoprazole (PROTONIX) EC tablet 40 mg, 40 mg, Oral, BID, Ayesha Patel PA-C, 40 mg at 09/05/21 0856    rivaroxaban (XARELTO) tablet 15 mg, 15 mg, Oral, Daily With Breakfast, JASIEL FloresC, 15 mg at 09/05/21 0856    vancomycin (VANCOCIN) oral solution 125 mg, 125 mg, Oral, Q12H [address], TAD Flores-C, 125 mg at 09/05/21 1211    Laboratory Results:  Results from last 7 days   Lab Units 09/05/21  0517 09/04/21  0518 09/03/21  0433 09/02/21  0648 09/01/21  1907   WBC Thousand/uL 9 34 9 58 9 63 9 28 11 33*   HEMOGLOBIN g/dL 8 3* 7 9* 7 8* 9 2* 8 8*   HEMATOCRIT % 28 2* 26 5* 27 4* 31 9* 29 7*   PLATELETS Thousands/uL 219 208 225 217 236   SODIUM mmol/L 136 140 142 142 140   POTASSIUM mmol/L 4 5 4 6 4 8 4 2 4 4   CHLORIDE mmol/L 102 103 104 105 104   CO2 mmol/L 27 30 32 30 31   BUN mg/dL 70* 65* 55* 45* 46*   CREATININE mg/dL 2 81* 2 40* 1 60* 1 05 0 95   CALCIUM mg/dL 8 2* 8 2* 8 3 8 6 8 2*   MAGNESIUM mg/dL 1 8  -- 1 7  --   --    ALK PHOS U/L  --   --  84 101 99   ALT U/L  --   --  9* 13 12   AST U/L  --   --  13 10 11

## 2021-09-05 NOTE — PROGRESS NOTES
[place]  Progress Note - Homar Chamberlain 1945, 67 y o  female MRN: [id number]  Unit/Bed#: -01 Encounter: [id number]  Primary Care Provider: Emmie Gunderson,    Date and time admitted to hospital: 9/1/2021  6:29 PM    * Acute on chronic diastolic (congestive) heart failure (HCC)  Assessment & Plan  Wt Readings from Last 3 Encounters:   09/05/21 67 5 kg (148 lb 13 oz)   07/05/21 72 5 kg (159 lb 13 3 oz)   05/13/21 57 kg (125 lb 10 6 oz)     · CT chest with increased b/l pleural effusions (large right and moderate left)   · Pt denies dyspnea (though unreliable historian)  · BNP 4042 (lower than prior HF exacerbation admissions)   · Last echo 04/2021: "LVEF 65 %  LV diastolic parameters were abnormal "   · Previously on demadex, which was d/c on 7/16 after discharge from hospital as pt euvolvemic - nephrology to determine restart date per d/c summary   · On admission, pt with lungs CTA b/l and no peripheral edema   · Patient was started on diuresis with Lasix 40 mg IV twice daily  · Creatinine this morning went up to 2 81  · Diuretics were discontinued  · Strict I/Os And daily weights  · Cardiac diet with fluid and sodium restriction   · Cardiology follow-up  · Discussed with patient's son who is the POA at length and he wants everything to be done at this time  Discussed about thoracentesis and he wants to go ahead with thoracentesis for his mother  · IR consult for thoracentesis placed  · IR spoke to patient and she refused thoracentesis and stated she wanted to think it over the weekend  Spoke to son Minerva Hernandez and he feels that his mom cannot make a decision and IR should talk to him to get consent  Pneumonia  Assessment & Plan  · Nonsevere CAP with DRIP score of 4  · CT chest: "Consolidation in the right upper lobe more anteriorly may be due to atelectasis or infiltrate   There is groundglass attenuation in the right upper and right lower lobes  "  · COVID 19 negative   · Sputum culture is pending  · Continue ceftriaxone and Zithromax  · On 2 L of supplemental oxygen  · Wean oxygen as tolerated    Fall  Assessment & Plan  · Pt reportedly fell out of bed at facility at 1730   · Trauma work-up unrevealing except for right frontal scalp hematoma   · CTH and CT-Cspine for acute traumatic injury     Leukocytosis  Assessment & Plan  · WBC at 11 33K on admission   · Suspect secondary to CAP seen on CT chest   · Continue ceftriaxone  · Resolved    Anemia  Assessment & Plan  · Hgb at 8 3  · July admission pt had EGD that showed small AVM without active bleeding that was cauterized  · Hgb range 8-9   · No active signs of bleeding   · Trend H/H  · Transfuse for Hgb <7 0     Acute kidney injury superimposed on chronic kidney disease [facility])  Assessment & Plan  Lab Results   Component Value Date    EGFR 16 09/05/2021    EGFR 19 09/04/2021    EGFR 31 09/03/2021    CREATININE 2 81 (H) 09/05/2021    CREATININE 2 40 (H) 09/04/2021    CREATININE 1 60 (H) 09/03/2021   · Baseline hemoglobin 0 8 1 4   · Creatinine on admission 0 95   · Patient creatinine this morning is 2 81  · Diuretics were held  · Avoid hypotension/nephrotoxins medications  · Trend BMP   · Nephrology consult appreciated  · Check UA  · Patient was given albumin 25 g x 1 on September 4,2021  · Urinary retention protocol with bladder scan and PVR    Chronic respiratory failure with hypoxia (HCC)  Assessment & Plan  · On 2L supplemental oxygen via NC at baseline  · No increase in requirements    Paroxysmal atrial fibrillation (HCC)  Assessment & Plan  · Home regimen:  Metoprolol 25 mg q 12 hours  · Anticoagulated with xarelto  · Continue    Pressure ulcer of sacral region, stage 4 (HCC)  Assessment & Plan  · Offload pressure as able   · Wound care consult  · Continue wound care per recommendations    Stage 3a chronic kidney disease [facility])  Assessment & Plan  Lab Results   Component Value Date    EGFR 16 09/05/2021    EGFR 19 09/04/2021    EGFR 31 09/03/2021    CREATININE 2 81 (H) 09/05/2021    CREATININE 2 40 (H) 09/04/2021    CREATININE 1 60 (H) 09/03/2021   · Baseline hemoglobin 0 8- 1 4   · Creatinine on admission 0 95   · Creatinine this morning is worse from 2 40- 2 81  · Diuretics were held  · Avoid hypotension/nephrotoxins medication  · Trend BMP   · See above    Hypertension  Assessment & Plan  · Home regimen: Metoprolol 25 mg q 12 hours   · Monitor vitals as per protocol    Type 2 diabetes mellitus with hyperglycemia [facility])  Assessment & Plan  Lab Results   Component Value Date    HGBA1C 7 0 06/15/2021       Recent Labs     09/04/21  1036 09/04/21  1600 09/04/21  2217 09/05/21  0720   POCGLU 120 141* 102 81       Blood Sugar Average: Last 72 hrs:  · (P) 872 [id number]   · home regimen:  Lantus 10 units HS and Humalog 8 units t i d    · Continue with home regimen with the addition of SSI      Labs & Imaging: I have personally reviewed pertinent reports  VTE Pharmacologic Prophylaxis:  Xarelto  VTE Mechanical Prophylaxis: sequential compression device    Code Status:   Level 1 - Full Code    Patient Centered Rounds: I have performed bedside rounds with nursing staff today  Discussions with Specialists or Other Care Team Provider:  Cardiology    Education and Discussions with Family / Patient:  Suzie Mitchell    Current Length of Stay: 4 day(s)    Current Patient Status: Inpatient   Certification Statement: The patient will continue to require additional inpatient hospital stay due to see my assessment and plan  Subjective:   Patient is seen and examined at bedside  Denies any new complaints  Patient is confused which is at baseline per son  Afebrile  All other ROS are negative  Objective:    Vitals: Blood pressure 135/59, pulse 68, temperature 97 7 °F (36 5 °C), resp  rate 16, height 5' 6" (1 676 m), weight 67 5 kg (148 lb 13 oz), SpO2 95 %  ,Body mass index is 24 02 kg/m²    SPO2 RA Rest      ED to Hosp-Admission (Current) from 9/1/2021 in [address] Med Surg Unit   SpO2  95 %   SpO2 Activity  At Rest   O2 Device  None (Room air)   O2 Flow Rate  --        I&O:     Intake/Output Summary (Last 24 hours) at 9/5/2021 1058  Last data filed at 9/5/2021 0521  Gross per 24 hour   Intake 520 ml   Output 550 ml   Net -30 ml       Physical Exam:    General- Alert, lying comfortably in bed  Not in any acute distress  Neck- Supple, No JVD  CVS- regular, S1 and S2 normal  Chest- Bilateral Air entry, decreased at bases with some rales  Abdomen- soft, nontender, not distended, no guarding or rigidity, BS+  Extremities-  No pedal edema, No calf tenderness  CNS-   Alert, awake and orientedx1  No focal deficits present  Invasive Devices     Peripheral Intravenous Line            Peripheral IV 09/04/21 Dorsal (posterior); Right Hand <1 day          Drain            Suprapubic Catheter  18 Fr   52 days                      Social History  reviewed  Family History   Problem Relation Age of Onset    Diabetes Mother     Varicose Veins Mother     Stroke Father     Arthritis Brother     Diabetes Daughter     No Known Problems Brother     reviewed    Meds:  Current Facility-Administered Medications   Medication Dose Route Frequency Provider Last Rate Last Admin    amLODIPine (NORVASC) tablet 5 mg  5 mg Oral Daily Watson Vogel DO   5 mg at 09/05/21 0856    atorvastatin (LIPITOR) tablet 40 mg  40 mg Oral Daily With [employer], PA-C   40 mg at 09/04/21 1556    azithromycin (ZITHROMAX) tablet 250 mg  250 mg Oral Q24H TAD Flores-C   250 mg at 09/05/21 0901    cefTRIAXone (ROCEPHIN) IVPB (premix in dextrose) 1,000 mg 50 mL  1,000 mg Intravenous Q24H [facility], PA-C 100 mL/hr at 09/04/21 2206 1,000 mg at 09/04/21 2206    ferrous sulfate tablet 325 mg  325 mg Oral Daily With [facility]   325 mg at 09/05/21 0857    gabapentin (NEURONTIN) capsule 100 mg  100 mg Oral TID [facility], PA-C   100 mg at 09/05/21 0857    insulin glargine (LANTUS) subcutaneous injection 10 Units 0 1 mL  10 Units Subcutaneous [facility], PA-C   10 Units at 09/04/21 2218    insulin lispro (HumaLOG) 100 units/mL subcutaneous injection 1-5 Units  1-5 Units Subcutaneous HS [facility], PA-C   1 Units at 09/03/21 2142    insulin lispro (HumaLOG) 100 units/mL subcutaneous injection 1-6 Units  1-6 Units Subcutaneous TID [facility], PA-C   5 Units at 09/03/21 1737    insulin lispro (HumaLOG) 100 units/mL subcutaneous injection 8 Units  8 Units Subcutaneous TID With Meals [facility], PA-C   8 Units at 09/05/21 0855    metoprolol tartrate (LOPRESSOR) tablet 25 mg  25 mg Oral Q12H [address] [facility] Abdulaziz, PA-C   25 mg at 09/05/21 0856    mirtazapine (REMERON) tablet 15 mg  15 mg Oral [facility], PA-C   15 mg at 09/04/21 2218    pantoprazole (PROTONIX) EC tablet 40 mg  40 mg Oral BID [facility], PA-C   40 mg at 09/05/21 0856    rivaroxaban (XARELTO) tablet 15 mg  15 mg Oral Daily With [facility], PAMaggieC   15 mg at 09/05/21 0856    vancomycin (VANCOCIN) oral solution 125 mg  125 mg Oral Q12H [address] [facility], PA-C   125 mg at 09/05/21 8001      Medications Prior to Admission   Medication    ascorbic acid (VITAMIN C) 500 MG tablet    atorvastatin (LIPITOR) 40 mg tablet    ferrous sulfate 325 (65 Fe) mg tablet    gabapentin (NEURONTIN) 100 mg capsule    insulin lispro (HumaLOG) 100 units/mL injection    metoprolol tartrate (LOPRESSOR) 25 mg tablet    mirtazapine (REMERON) 15 mg tablet    Nutritional Supplements (Yevgeniy) PACK    pantoprazole (PROTONIX) 40 mg tablet    rivaroxaban (XARELTO) 15 mg tablet    vancomycin (VANCOCIN) 50mg/mL SOLN    VITAMIN D PO    acetaminophen (TYLENOL) 325 mg tablet    bisacodyl (DULCOLAX) 10 mg suppository    HumaLOG KwikPen 100 units/mL injection pen    mineral oil enema    polyethylene glycol (MIRALAX) 17 g packet    Specialty Vitamins Products (PROSTATE PO)       Labs:  Results from last 7 days   Lab Units 09/05/21 0517 09/04/21  0518 09/03/21  0433   WBC Thousand/uL 9 34 9 58 9 63   HEMOGLOBIN g/dL 8 3* 7 9* 7 8*   HEMATOCRIT % 28 2* 26 5* 27 4*   PLATELETS Thousands/uL 219 208 225   NEUTROS PCT % 64 63 60   LYMPHS PCT % 25 26 28   MONOS PCT % 6 7 8   EOS PCT % 4 3 3     Results from last 7 days   Lab Units 09/05/21  0517 09/04/21  0518 09/03/21  0433 09/02/21  0648 09/01/21  1907   POTASSIUM mmol/L 4 5 4 6 4 8 4 2 4 4   CHLORIDE mmol/L 102 103 104 105 104   CO2 mmol/L 27 30 32 30 31   BUN mg/dL 70* 65* 55* 45* 46*   CREATININE mg/dL 2 81* 2 40* 1 60* 1 05 0 95   CALCIUM mg/dL 8 2* 8 2* 8 3 8 6 8 2*   ALK PHOS U/L  --   --  84 101 99   ALT U/L  --   --  9* 13 12   AST U/L  --   --  13 10 11     Lab Results   Component Value Date    TROPONINI <0 02 09/01/2021    TROPONINI 0 07 (H) 07/05/2021    TROPONINI 0 06 (H) 07/05/2021    CKTOTAL 18 (L) 09/04/2021         Lab Results   Component Value Date    BLOODCX No Growth After 5 Days  07/04/2021    BLOODCX No Growth After 5 Days  07/04/2021    BLOODCX No Growth After 5 Days  05/10/2021    URINECX 70,000-79,000 cfu/ml Proteus mirabilis (A) 07/04/2021    URINECX >100,000 cfu/ml  05/03/2021    URINECX 30,000-39,000 cfu/ml Candida glabrata (A) 09/14/2020    WOUNDCULT 3+ Growth of Proteus mirabilis (A) 07/05/2021    WOUNDCULT 2+ Growth of Pseudomonas aeruginosa MDR (A) 07/05/2021    WOUNDCULT 1+ Growth of  07/05/2021    WOUNDCULT (A) 07/05/2021     1+ Growth of Methicillin Resistant Staphylococcus aureus         Imaging:  Results for orders placed during the hospital encounter of 09/01/21    XR Trauma chest portable    Narrative  CHEST    INDICATION: Chest pain and trauma  COMPARISON:  None    EXAM PERFORMED/VIEWS:  XR CHEST PORTABLE      FINDINGS:    Stable cardiomegaly  Elevation of the right hilum  Small right pleural effusion      Airspace consolidation in the right upper lobe and subsegmental atelectasis  Airspace opacities obscuring the right heart order consistent with middle lobe airspace disease  Pulmonary vascular congestion and mild interstitial edema  Right shoulder arthroplasty  No visualized fracture  Impression  1  Right upper and middle lobe pneumonia  2   Findings suggestive of mild congestive heart failure  Workstation performed: [id number]    No results found for this or any previous visit  Last 24 Hours Medication List:   Current Facility-Administered Medications   Medication Dose Route Frequency Provider Last Rate    amLODIPine  5 mg Oral Daily Marda Necessary, DO      atorvastatin  40 mg Oral Daily With [facility]osticsBEATRIZ      azithromycin  250 mg Oral Q24H [facility]      cefTRIAXone  1,000 mg Intravenous Q24H MARIOMSØ, PA-C 1,000 mg (09/04/21 2206)    ferrous sulfate  325 mg Oral Daily With Breakfast BEATRIZ MCDUFFIE      gabapentin  100 mg Oral TID BEATRIZ MCDUFFIE      insulin glargine  10 Units Subcutaneous HS Ayesha Cintron PA-C      insulin lispro  1-5 Units Subcutaneous HS Ayesha Cintron PA-C      insulin lispro  1-6 Units Subcutaneous TID AC Ayesha Cintron PA-C      insulin lispro  8 Units Subcutaneous TID With Meals [facility]      metoprolol tartrate  25 mg Oral Q12H [address] Ayesha BEATRIZ Cintron      mirtazapine  15 mg Oral HS Ayesha Cintron PA-C      pantoprazole  40 mg Oral BID [facility]      rivaroxaban  15 mg Oral Daily With Breakfast [facility]      vancomycin  125 mg Oral Q12H One Darius Rangel PA-C          Today, Patient Was Seen By: Omar Ahn MD    ** Please Note: Dictation voice to text software may have been used in the creation of this document   **

## 2021-09-05 NOTE — ASSESSMENT & PLAN NOTE
Lab Results   Component Value Date    HGBA1C 7 0 06/15/2021       Recent Labs     09/04/21  1036 09/04/21  1600 09/04/21  2217 09/05/21  0720   POCGLU 120 141* 102 81       Blood Sugar Average: Last 72 hrs:  · (P) 987 [id number]   · home regimen:  Lantus 10 units HS and Humalog 8 units t i d    · Continue with home regimen with the addition of SSI

## 2021-09-05 NOTE — ASSESSMENT & PLAN NOTE
Lab Results   Component Value Date    EGFR 16 09/05/2021    EGFR 19 09/04/2021    EGFR 31 09/03/2021    CREATININE 2 81 (H) 09/05/2021    CREATININE 2 40 (H) 09/04/2021    CREATININE 1 60 (H) 09/03/2021   · Baseline hemoglobin 0 8- 1 4   · Creatinine on admission 0 95   · Creatinine this morning is worse from 2 40- 2 81  · Diuretics were held  · Avoid hypotension/nephrotoxins medication  · Trend BMP   · See above

## 2021-09-05 NOTE — ASSESSMENT & PLAN NOTE
Lab Results   Component Value Date    EGFR 16 09/05/2021    EGFR 19 09/04/2021    EGFR 31 09/03/2021    CREATININE 2 81 (H) 09/05/2021    CREATININE 2 40 (H) 09/04/2021    CREATININE 1 60 (H) 09/03/2021   · Baseline hemoglobin 0 8 1 4   · Creatinine on admission 0 95   · Patient creatinine this morning is 2 81  · Diuretics were held  · Avoid hypotension/nephrotoxins medications  · Trend BMP   · Nephrology consult appreciated  · Check UA  · Patient was given albumin 25 g x 1 on September 4,2021  · Urinary retention protocol with bladder scan and PVR

## 2021-09-05 NOTE — ASSESSMENT & PLAN NOTE
Wt Readings from Last 3 Encounters:   09/05/21 67 5 kg (148 lb 13 oz)   07/05/21 72 5 kg (159 lb 13 3 oz)   05/13/21 57 kg (125 lb 10 6 oz)     · CT chest with increased b/l pleural effusions (large right and moderate left)   · Pt denies dyspnea (though unreliable historian)  · BNP 4042 (lower than prior HF exacerbation admissions)   · Last echo 04/2021: "LVEF 65 %  LV diastolic parameters were abnormal "   · Previously on demadex, which was d/c on 7/16 after discharge from hospital as pt euvolvemic - nephrology to determine restart date per d/c summary   · On admission, pt with lungs CTA b/l and no peripheral edema   · Patient was started on diuresis with Lasix 40 mg IV twice daily  · Creatinine this morning went up to 2 81  · Diuretics were discontinued  · Strict I/Os And daily weights  · Cardiac diet with fluid and sodium restriction   · Cardiology follow-up  · Discussed with patient's son who is the POA at length and he wants everything to be done at this time  Discussed about thoracentesis and he wants to go ahead with thoracentesis for his mother  · IR consult for thoracentesis placed  · IR spoke to patient and she refused thoracentesis and stated she wanted to think it over the weekend  Spoke to son Sven Allen and he feels that his mom cannot make a decision and IR should talk to him to get consent

## 2021-09-05 NOTE — ASSESSMENT & PLAN NOTE
· Hgb at 8 3  · July admission pt had EGD that showed small AVM without active bleeding that was cauterized  · Hgb range 8-9   · No active signs of bleeding   · Trend H/H  · Transfuse for Hgb <7 0

## 2021-09-06 PROBLEM — D72.829 LEUKOCYTOSIS: Status: RESOLVED | Noted: 2021-01-01 | Resolved: 2021-01-01

## 2021-09-06 NOTE — ASSESSMENT & PLAN NOTE
· Pt reportedly fell out of bed at facility at 17:30   · Trauma work-up unrevealing except for right frontal scalp hematoma   · CTH and CT-C spine negative for acute traumatic injury

## 2021-09-06 NOTE — ASSESSMENT & PLAN NOTE
Wt Readings from Last 3 Encounters:   09/06/21 67 9 kg (149 lb 11 1 oz)   07/05/21 72 5 kg (159 lb 13 3 oz)   05/13/21 57 kg (125 lb 10 6 oz)     · CT chest with increased b/l pleural effusions (large right and moderate left)   · Pt denies dyspnea (though unreliable historian)  · BNP 4042 (lower than prior HF exacerbation admissions)   · Last echo 04/2021: "LVEF 65 %  LV diastolic parameters were abnormal "   · Previously on demadex, which was d/c on 7/16 after discharge from hospital as pt euvolvemic - nephrology to determine restart date per d/c summary   · On admission, pt with lungs CTA b/l and no peripheral edema   · Patient was started on diuresis with Lasix 40 mg IV twice daily  · Creatinine this morning went up to 2 81  · Diuretics were discontinued  · Strict I/Os And daily weights  · Cardiac diet with fluid and sodium restriction   · Cardiology follow-up  · Prior provider discussed with patient's son who is the POA at length and he wants everything to be done at this time  Discussed about thoracentesis and he wants to go ahead with thoracentesis for his mother  · IR consult for thoracentesis placed  · IR spoke to patient and she refused thoracentesis and stated she wanted to think it over the weekend  Spoke to son Ramo Mata and he feels that his mom cannot make a decision and IR should talk to him to get consent  · Discussed with patient again 9/6 and she says she will hopefully make a decision by the end of the day   Son is POA and is agreeable, he will sign consent for thoracentesis

## 2021-09-06 NOTE — ASSESSMENT & PLAN NOTE
Lab Results   Component Value Date    EGFR 15 09/06/2021    EGFR 16 09/05/2021    EGFR 19 09/04/2021    CREATININE 2 93 (H) 09/06/2021    CREATININE 2 81 (H) 09/05/2021    CREATININE 2 40 (H) 09/04/2021   · Baseline hemoglobin 0 8 1 4   · Creatinine on admission 0 95   · Cr continues to climb  · Diuretics were held  · Avoid hypotension/nephrotoxins medications  · Trend BMP   · Nephrology consult appreciated  · UA suggests UTI, nitrite positive, on Rocephin since 9/2  · Patient was given albumin 25 g x 1 on September 4,2021  · Urinary retention protocol with bladder scan and PVR  · Nephrology following, suspected RORY secondary to diuresis and contrast

## 2021-09-06 NOTE — PROGRESS NOTES
NEPHROLOGY PROGRESS NOTE   Evelio Velazquez 67 y o  female MRN: [id number]  Unit/Bed#: -01 Encounter: [id number]      ASSESSMENT/PLAN:  1  RORY: possibly related to diuretics vs WILIAN  Creatinine was 0 95 on admission and s/p CT with contrast on 9/1  Creatinine then started to rise on 09/03 and continues to worsen up to 2 9 today but appears it is starting to plateau  · UA:  Small blood, +1 protein, positive nitrites, moderate leukocytes  4 to 10 WBCs  · CT:  No hydronephrosis  · Status post albumin  · Diuretics currently on hold  2  CKD III: baseline creatinine 0 8-1 4   3  Acute on chronic diastolic CHF:  Previously on Lasix 40 mg IV b i d   4  Bilateral pleural effusions:  IR was consulted for thoracentesis patient is unsure if she wants to pursue this  5  Anemia:  Prior EGD showed small AVM  Currently on oral iron  Hemoglobin today stable at 8 3  6  Hypertension:  Blood pressure acceptable  7  Chronic suprapubic catheter     Plan Summary:    Hopefully starting to plateau--check am BMP     SUBJECTIVE:  No current complaints but is very tired currently       OBJECTIVE:  Current Weight: Weight - Scale: 67 9 kg (149 lb 11 1 oz)  Vitals:    09/06/21 0724   BP: 136/61   Pulse: 70   Resp: 18   Temp: 98 °F (36 7 °C)   SpO2: 96%       Intake/Output Summary (Last 24 hours) at 9/6/2021 1611  Last data filed at 9/6/2021 1401  Gross per 24 hour   Intake --   Output 1500 ml   Net -1500 ml       General:  No acute distress  Skin:  No rash  Eyes:  Sclerae anicteric  ENT:  Moist mucous membranes  Neck:  Trachea midline   Chest:  Decreased breath sounds at bases   CVS:  Regular rate and rhythm  Abdomen:  Soft, nontender, nondistended  Extremities:  No edema  Neuro: lethargic   Psych:  Appropriate affect    Medications:  Scheduled Meds:  Current Facility-Administered Medications   Medication Dose Route Frequency Provider Last Rate    amLODIPine  5 mg Oral Daily Watson Vogel DO      atorvastatin  40 mg Oral Daily With [place]      cefTRIAXone  1,000 mg Intravenous Q24H [facility] 1,[facility] mg (09/05/21 2143)    ferrous sulfate  325 mg Oral Daily With Breakfast [place], [place]      gabapentin  100 mg Oral TID [facility] PAMaggie      insulin glargine  10 Units Subcutaneous HS Ayesha Cintron, BEATRIZ      insulin lispro  1-5 Units Subcutaneous HS Ayesha Cintron PA-C      insulin lispro  1-6 Units Subcutaneous TID AC Ayesha Cintron PA-C      insulin lispro  8 Units Subcutaneous TID With Meals [place]      metoprolol tartrate  25 mg Oral Q12H [address] [facility] P[facility]      mirtazapine  15 mg Oral HS [facility]      pantoprazole  40 mg Oral BID [facility] PAMaggie      rivaroxaban  15 mg Oral Daily With Breakfast [place], [place]      vancomycin  125 mg Oral Q12H [address] [place], [place]         Laboratory Results:  Results from last 7 days   Lab Units 09/06/21  0549 09/06/21  0548 09/05/21  0517 09/04/21  0518 09/03/21  0433 09/02/21  0648 09/01/21  1907   WBC Thousand/uL 9 33  --  9 34 9 58 9 63 9 28 11 33*   HEMOGLOBIN g/dL 8 3*  --  8 3* 7 9* 7 8* 9 2* 8 8*   HEMATOCRIT % 27 8*  --  28 2* 26 5* 27 4* 31 9* 29 7*   PLATELETS Thousands/uL 217  --  219 208 225 217 236   SODIUM mmol/L  --  139 136 140 142 142 140   POTASSIUM mmol/L  --  4 6 4 5 4 6 4 8 4 2 4 4   CHLORIDE mmol/L  --  102 102 103 104 105 104   CO2 mmol/L  --  30 27 30 32 30 31   BUN mg/dL  --  74* 70* 65* 55* 45* 46*   CREATININE mg/dL  --  2 93* 2 81* 2 40* 1 60* 1 05 0 95   CALCIUM mg/dL  --  8 4 8 2* 8 2* 8 3 8 6 8 2*   MAGNESIUM mg/dL  --   --  1 8  --  1 7  --   --

## 2021-09-06 NOTE — ASSESSMENT & PLAN NOTE
Lab Results   Component Value Date    HGBA1C 7 0 06/15/2021       Recent Labs     09/05/21  1529 09/05/21 2051 09/06/21  0723 09/06/21  1112   POCGLU 117 145* 86 194*       Blood Sugar Average: Last 72 hrs:  · (P) 135 [id number]   · home regimen:  Lantus 10 units HS and Humalog 8 units t i d    · Continue with home regimen with the addition of SSI

## 2021-09-06 NOTE — ASSESSMENT & PLAN NOTE
· Nonsevere CAP with DRIP score of 4  · CT chest: "Consolidation in the right upper lobe more anteriorly may be due to atelectasis or infiltrate   There is groundglass attenuation in the right upper and right lower lobes  "  · COVID 19 negative   · Sputum culture is pending  · Continue ceftriaxone and Zithromax  · On 2 L of supplemental oxygen at baseline- currently at baseline  · Wean oxygen as tolerated

## 2021-09-06 NOTE — PROGRESS NOTES
[place]  Progress Note - Cammie Portillo 1945, 67 y o  female MRN: [id number]  Unit/Bed#: -Enrique Encounter: [id number]  Primary Care Provider: Nanda Steven DO   Date and time admitted to hospital: 9/1/2021  6:29 PM    * Acute on chronic diastolic (congestive) heart failure (HCC)  Assessment & Plan  Wt Readings from Last 3 Encounters:   09/06/21 67 9 kg (149 lb 11 1 oz)   07/05/21 72 5 kg (159 lb 13 3 oz)   05/13/21 57 kg (125 lb 10 6 oz)     · CT chest with increased b/l pleural effusions (large right and moderate left)   · Pt denies dyspnea (though unreliable historian)  · BNP 4042 (lower than prior HF exacerbation admissions)   · Last echo 04/2021: "LVEF 65 %  LV diastolic parameters were abnormal "   · Previously on demadex, which was d/c on 7/16 after discharge from hospital as pt euvolvemic - nephrology to determine restart date per d/c summary   · On admission, pt with lungs CTA b/l and no peripheral edema   · Patient was started on diuresis with Lasix 40 mg IV twice daily  · Creatinine this morning went up to 2 81  · Diuretics were discontinued  · Strict I/Os And daily weights  · Cardiac diet with fluid and sodium restriction   · Cardiology follow-up  · Prior provider discussed with patient's son who is the POA at length and he wants everything to be done at this time  Discussed about thoracentesis and he wants to go ahead with thoracentesis for his mother  · IR consult for thoracentesis placed  · IR spoke to patient and she refused thoracentesis and stated she wanted to think it over the weekend  Spoke to son Ramo Mata and he feels that his mom cannot make a decision and IR should talk to him to get consent  · Discussed with patient again 9/6 and she says she will hopefully make a decision by the end of the day   Son is POA and is agreeable, he will sign consent for thoracentesis    Pneumonia  Assessment & Plan  · Nonsevere CAP with DRIP score of 4  · CT chest: "Consolidation in the right upper lobe more anteriorly may be due to atelectasis or infiltrate   There is groundglass attenuation in the right upper and right lower lobes  "  · COVID 19 negative   · Sputum culture is pending  · Continue ceftriaxone and Zithromax  · On 2 L of supplemental oxygen at baseline- currently at baseline  · Wean oxygen as tolerated    Fall  Assessment & Plan  · Pt reportedly fell out of bed at facility at 17:30   · Trauma work-up unrevealing except for right frontal scalp hematoma   · CTH and CT-C spine negative for acute traumatic injury     Anemia  Assessment & Plan  · Hgb at 8 3  · July admission pt had EGD that showed small AVM without active bleeding that was cauterized  · Hgb range 8-9   · No active signs of bleeding   · Trend H/H  · Transfuse for Hgb <7 0     Acute kidney injury superimposed on chronic kidney disease [facility])  Assessment & Plan  Lab Results   Component Value Date    EGFR 15 09/06/2021    EGFR 16 09/05/2021    EGFR 19 09/04/2021    CREATININE 2 93 (H) 09/06/2021    CREATININE 2 81 (H) 09/05/2021    CREATININE 2 40 (H) 09/04/2021   · Baseline hemoglobin 0 8 1 4   · Creatinine on admission 0 95   · Cr continues to climb  · Diuretics were held  · Avoid hypotension/nephrotoxins medications  · Trend BMP   · Nephrology consult appreciated  · UA suggests UTI, nitrite positive, on Rocephin since 9/2  · Patient was given albumin 25 g x 1 on September 4,2021  · Urinary retention protocol with bladder scan and PVR  · Nephrology following, suspected RORY secondary to diuresis and contrast    Chronic respiratory failure with hypoxia (HCC)  Assessment & Plan  · On 2L supplemental oxygen via [place] at baseline  · No increase in requirements    Paroxysmal atrial fibrillation (HCC)  Assessment & Plan  · Home regimen:  Metoprolol 25 mg q 12 hours  · Anticoagulated with xarelto  · Continue    Pressure ulcer of sacral region, stage 4 (HCC)  Assessment & Plan  · Offload pressure as able   · Wound care consult  · Continue wound care per recommendations    Stage 3a chronic kidney disease [facility])  Assessment & Plan  Lab Results   Component Value Date    EGFR 15 09/06/2021    EGFR 16 09/05/2021    EGFR 19 09/04/2021    CREATININE 2 93 (H) 09/06/2021    CREATININE 2 81 (H) 09/05/2021    CREATININE 2 40 (H) 09/04/2021   · Baseline hemoglobin 0 8- 1 4   · Creatinine on admission 0 95   · Creatinine continuing to rise  · Diuretics were held  · Avoid hypotension/nephrotoxins medication  · Trend BMP   · See above    Hypertension  Assessment & Plan  · Home regimen: Metoprolol 25 mg q 12 hours   · Monitor vitals as per protocol    Type 2 diabetes mellitus with hyperglycemia [facility])  Assessment & Plan  Lab Results   Component Value Date    HGBA1C 7 0 06/15/2021       Recent Labs     09/05/21  1529 09/05/21 2051 09/06/21  0723 09/06/21  1112   POCGLU 117 145* 86 194*       Blood Sugar Average: Last 72 hrs:  · (P) 135 [id number]   · home regimen:  Lantus 10 units HS and Humalog 8 units t i d    · Continue with home regimen with the addition of SSI    Leukocytosis-resolved as of 9/6/2021  Assessment & Plan  · WBC at 11 33K on admission   · Suspect secondary to CAP seen on CT chest   · Continue ceftriaxone  · Resolved      VTE Pharmacologic Prophylaxis: VTE Score: 4 Moderate Risk (Score 3-4) - Pharmacological DVT Prophylaxis Ordered: rivaroxaban (Xarelto)  Patient Centered Rounds: I performed bedside rounds with nursing staff today  Discussions with Specialists or Other Care Team Provider:  Appreciate input from most recent nephrology notes and interventional radiology note  Per these notes, patient is to consider possibility of thoracentesis over the weekend  Education and Discussions with Family / Patient: Updated  (son) via phone  Time Spent for Care: 30 minutes  More than 50% of total time spent on counseling and coordination of care as described above      Current Length of Stay: 5 day(s)  Current Patient Status: Inpatient   Certification Statement: The patient will continue to require additional inpatient hospital stay due to RORY, pending thoracentesis  Discharge Plan: Anticipate discharge in 48-72 hrs to prior assisted or independent living facility  Code Status: Level 1 - Full Code    Subjective:   Patient denies shortness of breath, pain, difficulty eating, drinking or using the bathroom  She is still unsure if she would like to have thoracentesis  She says she does not need any more information, she does not need to talk to IR  She will hopefully be able to make a decision by the end of the day but she said "I just wanted make sure all be all right "    Objective:     Vitals:   Temp (24hrs), Av 1 °F (36 7 °C), Min:98 °F (36 7 °C), Max:98 2 °F (36 8 °C)    Temp:  [98 °F (36 7 °C)-98 2 °F (36 8 °C)] 98 °F (36 7 °C)  HR:  [70-75] 70  Resp:  [18-20] 18  BP: (121-137)/(55-61) 136/61  SpO2:  [93 %-96 %] 96 %  Body mass index is 24 16 kg/m²  Input and Output Summary (last 24 hours): Intake/Output Summary (Last 24 hours) at 2021 1549  Last data filed at 2021 1401  Gross per 24 hour   Intake --   Output 1500 ml   Net -1500 ml       Physical Exam:   Physical Exam  Vitals and nursing note reviewed  Constitutional:       General: She is not in acute distress  Appearance: Normal appearance  She is well-developed  HENT:      Head: Normocephalic and atraumatic  Eyes:      General: No scleral icterus  Conjunctiva/sclera: Conjunctivae normal    Cardiovascular:      Rate and Rhythm: Normal rate and regular rhythm  Heart sounds: No murmur heard  Pulmonary:      Effort: Pulmonary effort is normal       Breath sounds: No wheezing, rhonchi or rales  Abdominal:      General: There is no distension  Palpations: Abdomen is soft  Skin:     General: Skin is warm and dry  Neurological:      General: No focal deficit present  Mental Status: She is alert     Psychiatric: Mood and Affect: Mood normal        Additional Data:     Labs:  Results from last 7 days   Lab Units 09/06/21  0549   WBC Thousand/uL 9 33   HEMOGLOBIN g/dL 8 3*   HEMATOCRIT % 27 8*   PLATELETS Thousands/uL 217   NEUTROS PCT % 64   LYMPHS PCT % 25   MONOS PCT % 7   EOS PCT % 3     Results from last 7 days   Lab Units 09/06/21  0548 09/03/21  0433   SODIUM mmol/L 139 142   POTASSIUM mmol/L 4 6 4 8   CHLORIDE mmol/L 102 104   CO2 mmol/L 30 32   BUN mg/dL 74* 55*   CREATININE mg/dL 2 93* 1 60*   ANION GAP mmol/L 7 6   CALCIUM mg/dL 8 4 8 3   ALBUMIN g/dL  --  2 0*   TOTAL BILIRUBIN mg/dL  --  0 30   ALK PHOS U/L  --  84   ALT U/L  --  9*   AST U/L  --  13   GLUCOSE RANDOM mg/dL 78 83         Results from last 7 days   Lab Units 09/06/21  1112 09/06/21  0723 09/05/21  2051 09/05/21  1529 09/05/21  1054 09/05/21  0720 09/04/21  2217 09/04/21  1600 09/04/21  1036 09/04/21  0737 09/03/21  2126 09/03/21  1611   POC GLUCOSE mg/dl 194* 86 145* 117 90 81 102 141* 120 83 219* 314*         Results from last 7 days   Lab Units 09/06/21  0548 09/05/21  0517 09/03/21  0433 09/02/21  1317   PROCALCITONIN ng/ml <0 05 0 08 <0 05 <0 05       Lines/Drains:  Invasive Devices     Peripheral Intravenous Line            Peripheral IV 09/04/21 Dorsal (posterior); Right Hand 2 days          Drain            Suprapubic Catheter  18 Fr   54 days                      Imaging: Reviewed radiology reports from this admission including: abdominal/pelvic CT    Recent Cultures (last 7 days):   Results from last 7 days   Lab Units 09/02/21  0828   LEGIONELLA URINARY ANTIGEN  Negative       Last 24 Hours Medication List:   Current Facility-Administered Medications   Medication Dose Route Frequency Provider Last Rate    amLODIPine  5 mg Oral Daily Ilsa Chavira DO      atorvastatin  40 mg Oral Daily With [facility]      cefTRIAXone  1,000 mg Intravenous Q24H BEATRIZ MCDUFFIE 1,000 mg (09/05/21 2143)    ferrous sulfate  325 mg Oral Daily With Breakfast [place], [place]      gabapentin  100 mg Oral TID BEATRIZ MCDUFFIE      insulin glargine  10 Units Subcutaneous HS Ayesha Cintron PA-C      insulin lispro  1-5 Units Subcutaneous HS Ayesha Cintron PA-C      insulin lispro  1-6 Units Subcutaneous TID AC Ayesha Cintron PA-C      insulin lispro  8 Units Subcutaneous TID With Meals [place], [place]      metoprolol tartrate  25 mg Oral Q12H [facility] Ayesha Cintron PA-C      mirtazapine  15 mg Oral HS Ayesha Cintron PA-C      pantoprazole  40 mg Oral BID MARIOMSØBEATRIZ      rivaroxaban  15 mg Oral Daily With Breakfast [place], [place]      vancomycin  125 mg Oral Q12H One Darius Rangel PA-C          Today, Patient Was Seen By: Светлана Horn PA-C    **Please Note: This note may have been constructed using a voice recognition system  **

## 2021-09-07 NOTE — BRIEF OP NOTE (RAD/CATH)
INTERVENTIONAL RADIOLOGY PROCEDURE NOTE    Date: 9/7/2021    Procedure: Procedure name not found  Preoperative diagnosis:   1  Pleural effusion    2  Pulmonary infiltrate in right lung on CXR    3  CHF (congestive heart failure) ([facility] [address] )    4  Fall from bed, initial encounter    5  Contusion of forehead, initial encounter    6  Acute kidney injury superimposed on chronic kidney disease ([facility] [address] )         Postoperative diagnosis: Same  Surgeon: Marcus Rosenberg MD     Assistant: None  No qualified resident was available  Blood loss: None    Specimens: Yes     Findings:   (1) Recovery of 650 ml clear yellow fluid right pleural space  (2) Scant left pleural fluid, insufficient for safe thoracentesis at this time  Complications: None immediate      Anesthesia: local

## 2021-09-07 NOTE — ASSESSMENT & PLAN NOTE
Lab Results   Component Value Date    EGFR 18 09/07/2021    EGFR 15 09/06/2021    EGFR 16 09/05/2021    CREATININE 2 55 (H) 09/07/2021    CREATININE 2 93 (H) 09/06/2021    CREATININE 2 81 (H) 09/05/2021   · Baseline hemoglobin 0 8 1 4   · Creatinine on admission 0 95   · Diuretics are on hold- can possibly resume 9/8 per nephrology if Cr continues to improve  · Avoid hypotension/nephrotoxins medications  · Trend BMP   · Nephrology consult appreciated  · UA suggests UTI, nitrite positive, on Rocephin since 9/2  · Patient was given albumin 25 g x 1 on September 4,2021  · Urinary retention protocol with bladder scan and PVR  · Nephrology following, suspected RORY secondary to diuresis and contrast, Cr trending down as of 9/7

## 2021-09-07 NOTE — CASE MANAGEMENT
LOS: 6 days    [facility]vet requesting a copy of patient's POA paperwork  Message to SAUK PRAIRIE [facility] [place] obtained  POA form sent to media  CM following through discharge

## 2021-09-07 NOTE — PROGRESS NOTES
[place]  Progress Note - Gardenia Young 1945, 67 y o  female MRN: [id number]  Unit/Bed#: -01 Encounter: [id number]  Primary Care Provider: Tiara Rdz DO   Date and time admitted to hospital: 9/1/2021  6:29 PM    * Acute on chronic diastolic (congestive) heart failure (HCC)  Assessment & Plan  Wt Readings from Last 3 Encounters:   09/07/21 67 6 kg (149 lb 0 5 oz)   07/05/21 72 5 kg (159 lb 13 3 oz)   05/13/21 57 kg (125 lb 10 6 oz)     · CT chest with increased b/l pleural effusions (large right and moderate left)   · Pt denies dyspnea (though unreliable historian)  · BNP 4042 (lower than prior HF exacerbation admissions)   · Last echo 04/2021: "LVEF 65 %  LV diastolic parameters were abnormal "   · Previously on demadex, which was d/c on 7/16 after discharge from hospital as pt euvolvemic - nephrology to determine restart date per d/c summary   · On admission, pt with lungs CTA b/l and no peripheral edema   · Patient was started on diuresis with Lasix 40 mg IV twice daily  · Creatinine this morning went up to 2 81  · Diuretics were discontinued  · Strict I/Os And daily weights  · Cardiac diet with fluid and sodium restriction   · Cardiology follow-up  · Prior provider discussed with patient's son who is the POA at length and he wants everything to be done at this time  Discussed about thoracentesis and he wants to go ahead with thoracentesis for his mother  · IR consult for thoracentesis placed  · IR spoke to patient and she refused thoracentesis and stated she wanted to think it over the weekend  Spoke to son Dionna Russell and he feels that his mom cannot make a decision and IR should talk to him to get consent  · Continuous discussion with patient who is now agreeable as long as her son is agreeable  Son has POA paperwork    Case management working on obtaining [facility]  · Nonsevere CAP with DRIP score of 4  · CT chest: "Consolidation in the right upper lobe more anteriorly may be due to atelectasis or infiltrate   There is groundglass attenuation in the right upper and right lower lobes  "  · COVID 19 negative   · Sputum culture is pending  · Continue ceftriaxone and Zithromax  · On 2 L of supplemental oxygen at baseline- currently at baseline  · Wean oxygen as tolerated    Fall  Assessment & Plan  · Pt reportedly fell out of bed at facility at 17:30   · Trauma work-up unrevealing except for right frontal scalp hematoma   · CTH and CT-C spine negative for acute traumatic injury     Anemia  Assessment & Plan  · Hgb at 8 3  · July admission pt had EGD that showed small AVM without active bleeding that was cauterized  · Hgb range 8-9   · No active signs of bleeding   · Trend H/H  · Transfuse for Hgb <7 0     Acute kidney injury superimposed on chronic kidney disease [facility])  Assessment & Plan  Lab Results   Component Value Date    EGFR 18 09/07/2021    EGFR 15 09/06/2021    EGFR 16 09/05/2021    CREATININE 2 55 (H) 09/07/2021    CREATININE 2 93 (H) 09/06/2021    CREATININE 2 81 (H) 09/05/2021   · Baseline hemoglobin 0 8 1 4   · Creatinine on admission 0 95   · Diuretics are on hold- can possibly resume 9/8 per nephrology if Cr continues to improve  · Avoid hypotension/nephrotoxins medications  · Trend BMP   · Nephrology consult appreciated  · UA suggests UTI, nitrite positive, on Rocephin since 9/2  · Patient was given albumin 25 g x 1 on September 4,2021  · Urinary retention protocol with bladder scan and PVR  · Nephrology following, suspected RORY secondary to diuresis and contrast, Cr trending down as of 9/7    Chronic respiratory failure with hypoxia (HCC)  Assessment & Plan  · On 2L supplemental oxygen via NC at baseline  · No increase in requirements    Paroxysmal atrial fibrillation (HCC)  Assessment & Plan  · Home regimen:  Metoprolol 25 mg q 12 hours  · Anticoagulated with xarelto  · Continue    Pressure ulcer of sacral region, stage 4 (HCC)  Assessment & Plan  · Offload pressure as able   · Wound care consult  · Continue wound care per recommendations    Stage 3a chronic kidney disease [facility])  Assessment & Plan  Lab Results   Component Value Date    EGFR 18 09/07/2021    EGFR 15 09/06/2021    EGFR 16 09/05/2021    CREATININE 2 55 (H) 09/07/2021    CREATININE 2 93 (H) 09/06/2021    CREATININE 2 81 (H) 09/05/2021   · Baseline hemoglobin 0 8- 1 4   · Creatinine on admission 0 95   · Creatinine continuing to rise  · Diuretics were held  · Avoid hypotension/nephrotoxins medication  · Trend BMP   · See above    Hypertension  Assessment & Plan  · Home regimen: Metoprolol 25 mg q 12 hours   · Monitor vitals as per protocol    Type 2 diabetes mellitus with hyperglycemia [facility])  Assessment & Plan  Lab Results   Component Value Date    HGBA1C 7 0 06/15/2021       Recent Labs     09/06/21  2051 09/07/21  0733 09/07/21  1131 09/07/21  1148   POCGLU 173* 70 144* 145*       Blood Sugar Average: Last 72 hrs:  · (P) 448 [id number]   · home regimen:  Lantus 10 units HS and Humalog 8 units t i d    · Continue with home regimen with the addition of SSI      VTE Pharmacologic Prophylaxis: VTE Score: 4 Moderate Risk (Score 3-4) - Pharmacological DVT Prophylaxis Ordered: rivaroxaban (Xarelto)  Patient Centered Rounds: I performed bedside rounds with nursing staff today  Discussions with Specialists or Other Care Team Provider:  Discussed with Cardiology, Nephrology and case management  Hopefully can transition back to diuretics tomorrow  Anticipate thoracentesis prior to discharge  Education and Discussions with Family / Patient: Updated  (son) via phone  Time Spent for Care: 30 minutes  More than 50% of total time spent on counseling and coordination of care as described above      Current Length of Stay: 6 day(s)  Current Patient Status: Inpatient   Certification Statement: The patient will continue to require additional inpatient hospital stay due to pending thoracentesis and further improvement in Cr  Discharge Plan: Anticipate discharge in 24-48 hrs to prior assisted or independent living facility  Pending PT/OT evaluation, hopefully can return to [facility] with Rehab services  Code Status: Level 1 - Full Code    Subjective:   Patient reports he feels well, again unsure about whether not she wants a thoracentesis but eventually decides if her son is in agreement, she will agree to this  Objective:     Vitals:   Temp (24hrs), Av 4 °F (36 9 °C), Min:97 9 °F (36 6 °C), Max:98 9 °F (37 2 °C)    Temp:  [97 9 °F (36 6 °C)-98 9 °F (37 2 °C)] 97 9 °F (36 6 °C)  HR:  [66-75] 75  Resp:  [16-20] 20  BP: (104-155)/(48-71) 155/59  SpO2:  [93 %-96 %] 93 %  Body mass index is 24 05 kg/m²  Input and Output Summary (last 24 hours): Intake/Output Summary (Last 24 hours) at 2021 1243  Last data filed at 2021 1101  Gross per 24 hour   Intake --   Output 1900 ml   Net -1900 ml       Physical Exam:   Physical Exam  Vitals and nursing note reviewed  Constitutional:       General: She is not in acute distress  Appearance: Normal appearance  She is well-developed  HENT:      Head: Normocephalic  Contusion present  Eyes:      General: No scleral icterus  Conjunctiva/sclera: Conjunctivae normal    Cardiovascular:      Rate and Rhythm: Normal rate and regular rhythm  Heart sounds: No murmur heard  Pulmonary:      Effort: Pulmonary effort is normal       Breath sounds: Decreased air movement present  Decreased breath sounds present  No wheezing, rhonchi or rales  Abdominal:      General: There is no distension  Palpations: Abdomen is soft  Skin:     General: Skin is warm and dry  Neurological:      General: No focal deficit present  Mental Status: She is alert     Psychiatric:         Mood and Affect: Mood normal        Additional Data:     Labs:  Results from last 7 days   Lab Units 21  2036 WBC Thousand/uL 9 64   HEMOGLOBIN g/dL 8 5*   HEMATOCRIT % 29 2*   PLATELETS Thousands/uL 250   NEUTROS PCT % 67   LYMPHS PCT % 22   MONOS PCT % 8   EOS PCT % 2     Results from last 7 days   Lab Units 09/07/21  0419 09/03/21  0433   SODIUM mmol/L 138 142   POTASSIUM mmol/L 4 7 4 8   CHLORIDE mmol/L 102 104   CO2 mmol/L 27 32   BUN mg/dL 73* 55*   CREATININE mg/dL 2 55* 1 60*   ANION GAP mmol/L 9 6   CALCIUM mg/dL 8 7 8 3   ALBUMIN g/dL  --  2 0*   TOTAL BILIRUBIN mg/dL  --  0 30   ALK PHOS U/L  --  84   ALT U/L  --  9*   AST U/L  --  13   GLUCOSE RANDOM mg/dL 68 83         Results from last 7 days   Lab Units 09/07/21  1148 09/07/21  1131 09/07/21  0733 09/06/21  2051 09/06/21  1611 09/06/21  1112 09/06/21  0723 09/05/21  2051 09/05/21  1529 09/05/21  1054 09/05/21  0720 09/04/21  2217   POC GLUCOSE mg/dl 145* 144* 70 173* 279* 194* 86 145* 117 90 81 102         Results from last 7 days   Lab Units 09/06/21  0548 09/05/21  0517 09/03/21  0433 09/02/21  1317   PROCALCITONIN ng/ml <0 05 0 08 <0 05 <0 05       Lines/Drains:  Invasive Devices     Peripheral Intravenous Line            Peripheral IV 09/04/21 Dorsal (posterior); Right Hand 3 days          Drain            Suprapubic Catheter  18 Fr   55 days                      Imaging: Reviewed radiology reports from this admission including: abdominal/pelvic CT    Recent Cultures (last 7 days):   Results from last 7 days   Lab Units 09/02/21  0828   LEGIONELLA URINARY ANTIGEN  Negative       Last 24 Hours Medication List:   Current Facility-Administered Medications   Medication Dose Route Frequency Provider Last Rate    amLODIPine  5 mg Oral Daily Watson Vogel DO      atorvastatin  40 mg Oral Daily With [employer] PA-C      cefTRIAXone  1,000 mg Intravenous Q24H Media Mediate, PA-C 1,000 mg (09/06/21 2256)    ferrous sulfate  325 mg Oral Daily With Breakfast Media Mediate PA-C      gabapentin  100 mg Oral TID Media Mediate, PA-C      insulin glargine  10 Units Subcutaneous HS Ayesha Cintron PA-C      insulin lispro  1-5 Units Subcutaneous HS Ayesha Cintron PA-C      insulin lispro  1-6 Units Subcutaneous TID AC Ayesha Cintron PA-C      insulin lispro  8 Units Subcutaneous TID With Meals [place], [place]      metoprolol tartrate  25 mg Oral Q12H [address] Ayesha Cintron PA-C      mirtazapine  15 mg Oral HS Ayesha Cintron PA-C      pantoprazole  [facility] BID [facility]      rivaroxaban  15 mg Oral Daily With Breakfast [place], [place]      vancomycin  125 mg Oral Q12H [place], [place]          Today, Patient Was Seen By: Jonathan Espinal PA-C    **Please Note: This note may have been constructed using a voice recognition system  **

## 2021-09-07 NOTE — PLAN OF CARE
Problem: MOBILITY - ADULT  Goal: Maintain or return to baseline ADL function  Description: INTERVENTIONS:  -  Assess patient's ability to carry out ADLs; assess patient's baseline for ADL function and identify physical deficits which impact ability to perform ADLs (bathing, care of mouth/teeth, toileting, grooming, dressing, etc )  - Assess/evaluate cause of self-care deficits   - Assess range of motion  - Assess patient's mobility; develop plan if impaired  - Assess patient's need for assistive devices and provide as appropriate  - Encourage maximum independence but intervene and supervise when necessary  - Involve family in performance of ADLs  - Assess for home care needs following discharge   - Consider OT consult to assist with ADL evaluation and planning for discharge  - Provide patient education as appropriate  Outcome: Progressing  Goal: Maintains/Returns to pre admission functional level  Description: INTERVENTIONS:  - Perform BMAT or MOVE assessment daily    - Set and communicate daily mobility goal to care team and patient/family/caregiver  - Collaborate with rehabilitation services on mobility goals if consulted  - Perform Range of Motion 2 times a day  - Reposition patient every 2 hours    - Dangle patient 2 times a day  - Stand patient 2 times a day  - Ambulate patient 2 times a day  - Out of bed to chair 2 times a day   - Out of bed for meals 2 times a day  - Out of bed for toileting  - Record patient progress and toleration of activity level   Outcome: Progressing     Problem: Potential for Falls  Goal: Patient will remain free of falls  Description: INTERVENTIONS:  - Educate patient/family on patient safety including physical limitations  - Instruct patient to call for assistance with activity   - Consult OT/PT to assist with strengthening/mobility   - Keep Call bell within reach  - Keep bed low and locked with side rails adjusted as appropriate  - Keep care items and personal belongings within reach  - Initiate and maintain comfort rounds  - Make Fall Risk Sign visible to staff  - Offer Toileting every 2 Hours, in advance of need  - Initiate/Maintain bed alarm  - Obtain necessary fall risk management equipment:   - Apply yellow socks and bracelet for high fall risk patients  - Consider moving patient to room near nurses station  Outcome: Progressing     Problem: Prexisting or High Potential for Compromised Skin Integrity  Goal: Skin integrity is maintained or improved  Description: INTERVENTIONS:  - Identify patients at risk for skin breakdown  - Assess and monitor skin integrity  - Assess and monitor nutrition and hydration status  - Monitor labs   - Assess for incontinence   - Turn and reposition patient  - Assist with mobility/ambulation  - Relieve pressure over bony prominences  - Avoid friction and shearing  - Provide appropriate hygiene as needed including keeping skin clean and dry  - Evaluate need for skin moisturizer/barrier cream  - Collaborate with interdisciplinary team   - Patient/family teaching  - Consider wound care consult   Outcome: Progressing     Problem: PAIN - ADULT  Goal: Verbalizes/displays adequate comfort level or baseline comfort level  Description: Interventions:  - Encourage patient to monitor pain and request assistance  - Assess pain using appropriate pain scale  - Administer analgesics based on type and severity of pain and evaluate response  - Implement non-pharmacological measures as appropriate and evaluate response  - Consider cultural and social influences on pain and pain management  - Notify physician/advanced practitioner if interventions unsuccessful or patient reports new pain  Outcome: Progressing     Problem: DISCHARGE PLANNING  Goal: Discharge to home or other facility with appropriate resources  Description: INTERVENTIONS:  - Identify barriers to discharge w/patient and caregiver  - Arrange for needed discharge resources and transportation as appropriate  - Identify discharge learning needs (meds, wound care, etc )  - Arrange for interpretive services to assist at discharge as needed  - Refer to Case Management Department for coordinating discharge planning if the patient needs post-hospital services based on physician/advanced practitioner order or complex needs related to functional status, cognitive ability, or social support system  Outcome: Progressing     Problem: Knowledge Deficit  Goal: Patient/family/caregiver demonstrates understanding of disease process, treatment plan, medications, and discharge instructions  Description: Complete learning assessment and assess knowledge base  Interventions:  - Provide teaching at level of understanding  - Provide teaching via preferred learning methods  Outcome: Progressing     Problem: Nutrition/Hydration-ADULT  Goal: Nutrient/Hydration intake appropriate for improving, restoring or maintaining nutritional needs  Description: Monitor and assess patient's nutrition/hydration status for malnutrition  Collaborate with interdisciplinary team and initiate plan and interventions as ordered  Monitor patient's weight and dietary intake as ordered or per policy  Utilize nutrition screening tool and intervene as necessary  Determine patient's food preferences and provide high-protein, high-caloric foods as appropriate       INTERVENTIONS:  - Monitor oral intake, urinary output, labs, and treatment plans  - Assess nutrition and hydration status and recommend course of action  - Evaluate amount of meals eaten  - Assist patient with eating if necessary   - Allow adequate time for meals  - Recommend/ encourage appropriate diets, oral nutritional supplements, and vitamin/mineral supplements  - Order, calculate, and assess calorie counts as needed  - Recommend, monitor, and adjust tube feedings and TPN/PPN based on assessed needs  - Assess need for intravenous fluids  - Provide specific nutrition/hydration education as appropriate  - Include patient/family/caregiver in decisions related to nutrition  Outcome: Progressing

## 2021-09-07 NOTE — DISCHARGE INSTRUCTIONS
Thoracentesis   WHAT YOU NEED TO KNOW:   A thoracentesis is a procedure to remove extra fluid or air from between your lungs and your inner chest wall  Air or fluid buildup may make it hard for you to breathe  A thoracentesis allows your lungs to expand fully so you can breathe more easily  DISCHARGE INSTRUCTIONS:   Medicines:   · Pain medicine: You may be given a prescription medicine to decrease pain  Do not wait until the pain is severe before you take your medicine  · Antibiotics: This medicine helps fight or prevent an infection  · Take your medicine as directed  Call your healthcare provider if you think your medicine is not helping or if you have side effects  Tell him if you are allergic to any medicine  Keep a list of the medicines, vitamins, and herbs you take  Include the amounts, and when and why you take them  Bring the list or the pill bottles to follow-up visits  Carry your medicine list with you in case of an emergency  Follow up with your healthcare provider as directed:  Write down your questions so you remember to ask them during your visits  Rest:  Rest when you feel it is needed  Slowly start to do more each day  Return to your daily activities as directed  Do not smoke: If you smoke, it is never too late to quit  Ask for information about how to stop smoking if you need help  Contact your healthcare provider if:   · You have a fever  · Your puncture site is red, warm, swollen, or draining pus  · You have questions or concerns about your procedure, medicine, or care  · If you have any questions regarding, call the IR department @ [phone]  Seek care immediately or call 911 if:   · Blood soaks through your bandage  · There is blood in your spit

## 2021-09-07 NOTE — ASSESSMENT & PLAN NOTE
Wt Readings from Last 3 Encounters:   09/07/21 67 6 kg (149 lb 0 5 oz)   07/05/21 72 5 kg (159 lb 13 3 oz)   05/13/21 57 kg (125 lb 10 6 oz)     · CT chest with increased b/l pleural effusions (large right and moderate left)   · Pt denies dyspnea (though unreliable historian)  · BNP 4042 (lower than prior HF exacerbation admissions)   · Last echo 04/2021: "LVEF 65 %  LV diastolic parameters were abnormal "   · Previously on demadex, which was d/c on 7/16 after discharge from hospital as pt euvolvemic - nephrology to determine restart date per d/c summary   · On admission, pt with lungs CTA b/l and no peripheral edema   · Patient was started on diuresis with Lasix 40 mg IV twice daily  · Creatinine this morning went up to 2 81  · Diuretics were discontinued  · Strict I/Os And daily weights  · Cardiac diet with fluid and sodium restriction   · Cardiology follow-up  · Prior provider discussed with patient's son who is the POA at length and he wants everything to be done at this time  Discussed about thoracentesis and he wants to go ahead with thoracentesis for his mother  · IR consult for thoracentesis placed  · IR spoke to patient and she refused thoracentesis and stated she wanted to think it over the weekend  Spoke to son Dionna Russell and he feels that his mom cannot make a decision and IR should talk to him to get consent  · Continuous discussion with patient who is now agreeable as long as her son is agreeable  Son has POA paperwork    Case management working on obtaining this from [facility]

## 2021-09-07 NOTE — PROGRESS NOTES
Progress Note - Nephrology   Giselle Vazquez 67 y o  female MRN: [id number]  Unit/Bed#: -01 Encounter: [id number]    ASSESSMENT and PLAN:  Acute kidney injury:  Etiology: Suspect prerenal in the setting of diuretics versus WILIAN with contrast exposure on 09/01/2021  Assessment:   After review of medical records through [address] [place] [address] it appears that the patient has a baseline Creatinine of 0 8-1 4 likely volume dependent   Patient was admitted with a creatinine of 0 95 on 09/01/2021   Patient's creatinine today is at finally decreasing of 2 55 <2 93 >2 81>2 40   Acid base and lytes stable    Status post albumin   Diuretics currently on hold-patient on room air without distress lying flat   Weight stable at 67 6 kg-I&O not well documented   Clinically, patient is not uremic and there is no acute indication for renal replacement therapy (dialysis)  Workup:   Urinalysis with micro reports:  Small blood, plus one protein, positive nitrates, moderate leukocytosis, 4-10 WBCs   CT scan of abdomen and pelvis reported:  Did not reveal hydronephrosis  Plan:   Avoid nephrotoxins, adjust meds to appropriate GFR   Optimize hemodynamic status to avoid delay in renal recovery   With improving creatinine will continue to hold diuretics   Patient examining somewhat volume deplete   Continue to hold Lasix at this time   Previously on torsemide 20 mg daily per Dr Brennen Lyons office note 06/04/2021   Plan thoracentesis per primary team   Continue trend I/O, lab values volume status    Chronic kidney disease IIIA:    Etiology:  Suspect secondary to diabetic kidney disease, hypertensive nephrosclerosis, arterial nephrosclerosis and age-related nephron loss along with prior AK I insults  Assessment and Plan:   After review of medical records through [facility] and Care everywhere it appears that the patient has a baseline Creatinine of 0 8-1 4 likely volume dependent   Patient follows with Dr Brennen Lyons  With next office appointment-blood work given to evaluate proteinuria   Will need follow-up on discharge    Blood pressure/Hypertension:  Assessment and Plan:   Current blood pressure fairly stable   Home medications include:  Lopressor 25 mg q 12 hours, torsemide 20 mg daily,   Current medications include:  Amlodipine 5 mg daily with hold parameters for SBP less than 130, Lopressor 25 mg q 12 hours   Maximize hemodynamics to maintain MAP >65   Avoid hypotension or fluctuations in blood pressure   Will continue to trend    H&H/anemia: Likely of CKD  Assessment and Plan:   Current hemoglobin 8 5 appears stable   Of note: previous EGD revealed small AVM-currently on oral iron supplementation   Per primary team   Transfuse if hemoglobin less than 7 0     Electrolytes/acid-base:  Assessment and Plan:   Within normal limits   Will continue trend in treat as needed    Other medical Issues:  Bilateral pleural effusions:  IR consulted for thoracentesis  · Per primary team  · Plan tentative thoracentesis one son alyssa  · Will continue trend  ·   Acute on chronic diastolic congestive heart failure:  Cardiology following  · Previously treated with IV Lasix 40 mg b i d   · Diuretics currently on hold in the setting of acute kidney injury  · Previously on torsemide 20 mg p o  As outpatient  · Will continue to trend  · Echocardiogram 04/09/2021:  Reveals EF 65%, left ventricular diastolic function or abnormal, PA pressures 60 mmHg, there is moderate size left pleural effusion  · Examining fairly euvolemic lying flat on room air    Chronic suprapubic catheter:  Per primary team    Discussed with primary team and Reported plan for thoracentesis today and awaiting son to sign consent    SUBJECTIVE:  Patient seen and examined at bedside  Patient denies chest pain or shortness of breath       OBJECTIVE:  Current Weight: Weight - Scale: 67 6 kg (149 lb 0 5 oz)  Vitals:    09/06/21 1612 09/06/21 2053 09/07/21 6290 09/07/21 0736   BP: 104/71 (!) 118/48  155/59   Pulse: 66 70  75   Resp: 16   20   Temp:  98 9 °F (37 2 °C)  97 9 °F (36 6 °C)   TempSrc:       SpO2: 94% 96%  93%   Weight:   67 6 kg (149 lb 0 5 oz)    Height:           Intake/Output Summary (Last 24 hours) at 9/7/2021 0855  Last data filed at 9/7/2021 0420  Gross per 24 hour   Intake --   Output 1550 ml   Net -1550 ml     General:  No acute distress, cooperative, chronically ill-appearing  Skin:  Warm and dry without rash  ENMT:  Mucous membranes moist, sclera anicteric    Resolving ecchymoses right orbital area  Neck:  Supple without JVD  Respiratory:  Essentially clear on auscultation without crackles, rhonchi, wheezes, decreased bases  Cardiac:  Regular rate and rhythm without rub  GI:  Soft, nontender, no distention, active bowel sounds  :  Suprapubic catheter in place  Extremities:  No significant edema noted bilaterally  Neuro:  Alert oriented and awake  Psych:  Appropriate affect    Medications:    Current Facility-Administered Medications:     amLODIPine (NORVASC) tablet 5 mg, 5 mg, Oral, Daily, Watson Vogel, DO, 5 mg at 09/07/21 0847    atorvastatin (LIPITOR) tablet 40 mg, 40 mg, Oral, Daily With Dinner, [facility] Cintron [facility]-C, 40 mg at 09/06/21 1754    cefTRIAXone (ROCEPHIN) IVPB (premix in dextrose) 1,000 mg 50 mL, 1,000 mg, Intravenous, Q24H, Ayesha Patel PA-C, Last Rate: 100 mL/hr at 09/06/21 2256, 1,000 mg at 09/06/21 2256    ferrous sulfate tablet 325 mg, 325 mg, Oral, Daily With Breakfast, Ayesha Patel PA-C, 325 mg at 09/07/21 0847    gabapentin (NEURONTIN) capsule 100 mg, 100 mg, Oral, TID, Ayesha Patel PA-C, 100 mg at 09/07/21 0847    insulin glargine (LANTUS) subcutaneous injection 10 Units 0 1 mL, 10 Units, Subcutaneous, HS, Ayesha Patel PA-C, 10 Units at 09/06/21 2259    insulin lispro (HumaLOG) 100 units/mL subcutaneous injection 1-5 Units, 1-5 Units, Subcutaneous, Kate DEE PA-C, 1 Units at 09/06/21 0579   insulin lispro (HumaLOG) 100 units/mL subcutaneous injection 1-6 Units, 1-6 Units, Subcutaneous, TID AC, 4 Units at 09/06/21 1756 **AND** Fingerstick Glucose (POCT), , , TID AC, Ayesha Patel PA-C    insulin lispro (HumaLOG) 100 units/mL subcutaneous injection 8 Units, 8 Units, Subcutaneous, TID With Meals, [facility], 8 Units at 09/06/21 1757    metoprolol tartrate (LOPRESSOR) tablet 25 mg, 25 mg, Oral, Q12H [facility], [facility] [place] [facility], 25 mg at 09/07/21 0847    mirtazapine (REMERON) tablet 15 mg, 15 mg, Oral, HS, [facility] [place], [place], 15 mg at 09/06/21 2255    pantoprazole (PROTONIX) EC tablet 40 mg, 40 mg, Oral, BID, [facility] [place], 40 mg at 09/07/21 0847    rivaroxaban (XARELTO) tablet 15 mg, 15 mg, Oral, Daily With Breakfast, [facility], [place]-, 15 mg at 09/07/21 0847    vancomycin (VANCOCIN) oral solution 125 mg, 125 mg, Oral, Q12H [facility], [facility], 125 mg at 09/07/21 0846    Laboratory Results:  Results from last 7 days   Lab Units 09/07/21  0419 09/06/21  0549 09/06/21  0548 09/05/21  0517 09/04/21  0518 09/03/21  0433 09/02/21  0648 09/01/21  1907   WBC Thousand/uL 9 64 9 33  --  9 34 9 58 9 63 9 28 11 33*   HEMOGLOBIN g/dL 8 5* 8 3*  --  8 3* 7 9* 7 8* 9 2* 8 8*   HEMATOCRIT % 29 2* 27 8*  --  28 2* 26 5* 27 4* 31 9* 29 7*   PLATELETS Thousands/uL 250 217  --  219 208 225 217 236   SODIUM mmol/L 138  --  139 136 140 142 142 140   POTASSIUM mmol/L 4 7  --  4 6 4 5 4 6 4 8 4 2 4 4   CHLORIDE mmol/L 102  --  102 102 103 104 105 104   CO2 mmol/L 27  --  30 27 30 32 30 31   BUN mg/dL 73*  --  74* 70* 65* 55* 45* 46*   CREATININE mg/dL 2 55*  --  2 93* 2 81* 2 40* 1 60* 1 05 0 95   CALCIUM mg/dL 8 7  --  8 4 8 2* 8 2* 8 3 8 6 8 2*   MAGNESIUM mg/dL  --   --   --  1 8  --  1 7  --   --

## 2021-09-07 NOTE — ASSESSMENT & PLAN NOTE
Lab Results   Component Value Date    EGFR 18 09/07/2021    EGFR 15 09/06/2021    EGFR 16 09/05/2021    CREATININE 2 55 (H) 09/07/2021    CREATININE 2 93 (H) 09/06/2021    CREATININE 2 81 (H) 09/05/2021   · Baseline hemoglobin 0 8- 1 4   · Creatinine on admission 0 95   · Creatinine continuing to rise  · Diuretics were held  · Avoid hypotension/nephrotoxins medication  · Trend BMP   · See above

## 2021-09-07 NOTE — ASSESSMENT & PLAN NOTE
Lab Results   Component Value Date    HGBA1C 7 0 06/15/2021       Recent Labs     09/06/21 2051 09/07/21  0733 09/07/21  1131 09/07/21  1148   POCGLU 173* 70 144* 145*       Blood Sugar Average: Last 72 hrs:  · (P) 947 [id number]   · home regimen:  Lantus 10 units HS and Humalog 8 units t i d    · Continue with home regimen with the addition of SSI

## 2021-09-07 NOTE — SPEECH THERAPY NOTE
Speech Language/Pathology    Speech/Language Pathology Progress Note    Patient Name: Aria Pham  Today's Date: 9/7/2021     Problem List  Principal Problem:    Acute on chronic diastolic (congestive) heart failure ([facility] [address] )  Active Problems:    Type 2 diabetes mellitus with hyperglycemia (HCC)    Hypertension    Stage 3a chronic kidney disease (HCC)    Pressure ulcer of sacral region, stage 4 (HCC)    Paroxysmal atrial fibrillation (HCC)    Chronic respiratory failure with hypoxia (HCC)    Acute kidney injury superimposed on chronic kidney disease ([facility] [address] )    Anemia    Fall    Pneumonia       Past Medical History  Past Medical History:   Diagnosis Date    Acute renal failure (ARF) ([facility] [address] )     Acute respiratory failure with hypoxia (HCC)     Anemia     Atrial fibrillation (HCC)     CHF (congestive heart failure) (HCC)     Chronic kidney disease     Diabetes mellitus ([facility] )     type 2    Hyperlipidemia     Hypertension     Leukocytosis 9/2/2021    Stroke [facility])         Past Surgical History  Past Surgical History:   Procedure Laterality Date    BREAST SURGERY Left     lumpectomy benign    CATARACT EXTRACTION Bilateral 2017    CATARACT EXTRACTION W/ INTRAOCULAR LENS IMPLANT Left 12/11/2017    Procedure: EXTRACTION EXTRACAPSULAR CATARACT PHACO INTRAOCULAR LENS (IOL);   Surgeon: Alexis De La Torre MD;  Location: [facility] MAIN OR;  Service: Ophthalmology    IR SUPRAPUBIC CATHETER PLACEMENT  7/14/2021    IA OPEN RX FEMUR FX+INTRAMED RAYMOND Right 5/21/2020    Procedure: INSERTION OF SHORT TROCHANTERIC FEMORAL NAIL;  Surgeon: Jaylan Montes De Oca MD;  [facility] Main OR;  Service: Orthopedics    IA ELENI GQPVRFHR [address] HUMERAL&GLENOID COMPNT Right 9/10/2018    Procedure: RIGHT REVERSE TOTAL SHOULDER ARTHROPLASTY;  Surgeon: Jaylan Montes De Oca MD;  [facility] Main [facility];  Service: Orthopedics    IA XCAPSL CTRC RMVL INSJ IO LENS PROSTH W/O ECP Right 10/23/2017    Procedure: EXTRACTION EXTRACAPSULAR CATARACT PHACO INTRAOCULAR LENS (IOL); Surgeon: Ellen Correa MD;  Location: [facility] MAIN OR;  Service: Ophthalmology    WOUND DEBRIDEMENT N/A 8/26/2020    Procedure: EXCISIONAL DEBRIDEMENT OF SACRAL PRESSURE WOUND, WASHOUT;;  Surgeon: Marlene Thorne MD;  [facility] MAIN OR;  Service: General    WOUND DEBRIDEMENT N/A 8/28/2020    Procedure: BUTTOCKS DEBRIDEMENT WOUND AND DRESSING CHANGE ([address] OUT); Surgeon: Nakita Cai MD;  Location: [facility] MAIN OR;  Service: General         Subjective:  Pt seen for dysphagia tx  Pt sitting upright in bed for lunch, cooperative, fairly flat affect  Objective:  Pt was fed lunch, including chopped meatloaf with gravy, mashed potatoes with gravy, and chocolate cake  She drank 4 oz soda by straw, plus several sips of water by straw  Mastication, bolus formation and transfer was fairly prompt  Observed hyolaryngeal elevation, which appeared complete  There were no overt s/s of aspiration during the entire meal  Discussed current diet with pt; she expressed that she is satisfied with [facility] soft diet, because it's easier for her to chew  Discussed results of today's tx and plan to d/c from [facility] with RN  Assessment:  Good toleration of mec soft diet and thin liquids, with no overt s/s of aspiration  Pt is edentulous, and this is her baseline diet  She is satisfied and not interested in advancing diet  No further dysphagia tx recommended  Plan/Recommendations:  Continue current diet of dysphagia 2 and thin liquids   Discharge pt from [facility]

## 2021-09-08 NOTE — ASSESSMENT & PLAN NOTE
· Home regimen: Metoprolol 25 mg q 12 hours   · Norvasc added this admission due to elevated pressures  · Monitor vitals as per protocol

## 2021-09-08 NOTE — ASSESSMENT & PLAN NOTE
Lab Results   Component Value Date    EGFR 19 09/08/2021    EGFR 18 09/07/2021    EGFR 15 09/06/2021    CREATININE 2 36 (H) 09/08/2021    CREATININE 2 55 (H) 09/07/2021    CREATININE 2 93 (H) 09/06/2021   · Baseline hemoglobin 0 8 1 4   · Creatinine on admission 0 95   · Diuretics are on hold- can possibly resume 9/8 per nephrology if Cr continues to improve  · Avoid hypotension/nephrotoxins medications  · Trend BMP   · Nephrology consult appreciated  · UA suggests UTI, nitrite positive, on Rocephin since 9/2  · Patient was given albumin 25 g x 1 on September 4,2021  · Urinary retention protocol with bladder scan and PVR  · Nephrology following, suspected RORY secondary to diuresis and contrast, Cr trending down as of 9/7

## 2021-09-08 NOTE — PLAN OF CARE
Problem: MOBILITY - ADULT  Goal: Maintain or return to baseline ADL function  Description: INTERVENTIONS:  -  Assess patient's ability to carry out ADLs; assess patient's baseline for ADL function and identify physical deficits which impact ability to perform ADLs (bathing, care of mouth/teeth, toileting, grooming, dressing, etc )  - Assess/evaluate cause of self-care deficits   - Assess range of motion  - Assess patient's mobility; develop plan if impaired  - Assess patient's need for assistive devices and provide as appropriate  - Encourage maximum independence but intervene and supervise when necessary  - Involve family in performance of ADLs  - Assess for home care needs following discharge   - Consider OT consult to assist with ADL evaluation and planning for discharge  - Provide patient education as appropriate  Outcome: Progressing  Goal: Maintains/Returns to pre admission functional level  Description: INTERVENTIONS:  - Perform BMAT or MOVE assessment daily    - Set and communicate daily mobility goal to care team and patient/family/caregiver  - Collaborate with rehabilitation services on mobility goals if consulted  - Perform Range of Motion times a day  - Reposition patient every  hours    - Dangle patient times a day  - Stand patient times a day  - Ambulate patient  times a day  - Out of bed to chair  times a day   - Out of bed for meals times a day  - Out of bed for toileting  - Record patient progress and toleration of activity level   Outcome: Progressing     Problem: Potential for Falls  Goal: Patient will remain free of falls  Description: INTERVENTIONS:  - Educate patient/family on patient safety including physical limitations  - Instruct patient to call for assistance with activity   - Consult OT/PT to assist with strengthening/mobility   - Keep Call bell within reach  - Keep bed low and locked with side rails adjusted as appropriate  - Keep care items and personal belongings within reach  - Initiate and maintain comfort rounds  - Make Fall Risk Sign visible to staff  - Offer Toileting every  Hours, in advance of need  - Initiate/Maintain alarm  - Obtain necessary fall risk management equipment:   - Apply yellow socks and bracelet for high fall risk patients  - Consider moving patient to room near nurses station  Outcome: Progressing

## 2021-09-08 NOTE — ASSESSMENT & PLAN NOTE
· Nonsevere CAP with DRIP score of 4  · CT chest: "Consolidation in the right upper lobe more anteriorly may be due to atelectasis or infiltrate   There is groundglass attenuation in the right upper and right lower lobes  "  · COVID 19 negative   · Completed 7 days of ceftriaxone  · On 2 L of supplemental oxygen at baseline- currently at baseline

## 2021-09-08 NOTE — PROGRESS NOTES
NEPHROLOGY PROGRESS NOTE   Lavone Councilman 67 y o  female MRN: [id number]  Unit/Bed#: -01 Encounter: [id number]  Reason for Consult: RORY on CKD III    PLAN:     ASSESSMENT/PLAN:  Acute kidney injury on CKD III  Hospital-acquired  Suspected secondary to over diuresis vs WILIAN  -presents with creatinine of 0 95  -baseline creatinine 0 8-1 4   -creatinine plateaued at 2 9 and is now improving   -received contrast on 09/01   -diuretics continue to to be held  May give as needed  -UA:   small blood, positive nitrates, +1 protein, 4-10 WBCs  -bladder scan negative  -CPK level low   -history of chronic suprapubic catheter  Consider changing if creatinine continues to worsen  -continue to avoid nephrotoxins, hypotension, IV contrast   -strict I/O   -will send message office to arrange follow-up with Dr Tammie Baca      Hypertension:  Blood pressure remains soft  -currently on amlodipine 5 mg daily and metoprolol 25 mg every 12 hours   -avoid hypotension or high fluctuations in blood pressure   -recommend holding parameters antihypertensive for systolic blood pressure less than 130       Acute on chronic diastolic CHF:  With EF of 65%  -previously being diuresed with IV Lasix 40 mg b i d     Currently on hold due to elevation in creatinine   -continue daily weights, fluid restriction, I/O  Weight is trending downward   -cardiology team is following      Bilateral pleural effusions:  Right greater than left  -status post thoracentesis      Pneumonia:  -continues on antibiotics per primary care team and supplemental oxygen to maintain sats greater than 90%     Anemia:  -had previous EGD in July which showed small AVM  -continues on oral iron  -right frontal scalp hematoma secondary to fall   -will continue to monitor and trend hemoglobin  Transfuse for hemoglobin less than 7 0      Other:  Fall, right frontal scalp hematoma, atrial fibrillation, DM      Disposition:  Okay to discharge from [facility] later today     SUBJECTIVE:  The patient is resting in her bed  She appears to be comfortable  She ate all of her breakfast   She denies chest pain or shortness of breath      OBJECTIVE:  Current Weight: Weight - Scale: 67 2 kg (148 lb 2 4 oz)  Vitals:    09/08/21 0649 09/08/21 0755 09/08/21 0841 09/08/21 0841   BP:  (!) 107/48 107/71 107/71   Pulse:  76 (!) 2 81   Resp:  17     Temp:  98 5 °F (36 9 °C)     TempSrc:       SpO2:  95%  96%   Weight: 67 2 kg (148 lb 2 4 oz)      Height:           Intake/Output Summary (Last 24 hours) at 9/8/2021 1025  Last data filed at 9/8/2021 0858  Gross per 24 hour   Intake 480 ml   Output 1580 ml   Net -1100 ml     General: NAD  Skin: warm, dry, intact, no rash, ecchymotic areas  HEENT: Moist mucous membranes, sclera anicteric, normocephalic, atraumatic  Neck: No apparent JVD appreciated  Chest: lung sounds clear B/L, on RA   CVS:Regular rate and rhythm, no murmer   Abdomen: Soft, round, non-tender, +BS, suprapubic catheter  Extremities: No B/L LE edema present  Neuro: alert and oriented, sleeping  Psych: appropriate mood and affect     Medications:    Current Facility-Administered Medications:     amLODIPine (NORVASC) tablet 5 mg, 5 mg, Oral, Daily, Watson Vogel, DO, 5 mg at 09/07/21 0847    atorvastatin (LIPITOR) tablet 40 mg, 40 mg, Oral, Daily With Dinner, [facility] SHAN Cintron PA-C, 40 mg at 09/07/21 1802    cefTRIAXone (ROCEPHIN) IVPB (premix in dextrose) 1,000 mg 50 mL, 1,000 mg, Intravenous, Q24H, Ayesha Patel PA-C, Last Rate: 100 mL/hr at 09/07/21 2201, 1,000 mg at 09/07/21 2201    ferrous sulfate tablet 325 mg, 325 mg, Oral, Daily With Breakfast, Ayesha Patel PA-C, 325 mg at 09/08/21 0840    gabapentin (NEURONTIN) capsule 100 mg, 100 mg, Oral, TID, Ayesha Patel PA-C, 100 mg at 09/08/21 0842    insulin glargine (LANTUS) subcutaneous injection 10 Units 0 1 mL, 10 Units, Subcutaneous, HS, Ayesha Patel PA-C, 10 Units at 09/07/21 2200    insulin lispro (HumaLOG) 100 units/mL subcutaneous injection 1-5 Units, 1-5 Units, Subcutaneous, HS, Ayesha Patel PA-C, 1 Units at 09/07/21 2203    insulin lispro (HumaLOG) 100 units/mL subcutaneous injection 1-6 Units, 1-6 Units, Subcutaneous, TID AC, 2 Units at 09/07/21 1803 **AND** Fingerstick Glucose (POCT), , , TID AC, Ayesha Patel PA-C    insulin lispro (HumaLOG) 100 units/mL subcutaneous injection 8 Units, 8 Units, Subcutaneous, TID With Meals, ROSALINDANATALEE, BEATRIZ, 8 Units at 09/08/21 7043    metoprolol tartrate (LOPRESSOR) tablet 25 mg, 25 mg, Oral, Q12H [address], Ayesha Patel PA-C, 25 mg at 09/07/21 0847    mirtazapine (REMERON) tablet 15 mg, 15 mg, Oral, HS, Ayesha Patel PA-C, 15 mg at 09/07/21 2200    pantoprazole (PROTONIX) EC tablet 40 mg, 40 mg, Oral, BID, Ayesha Patel PA-C, 40 mg at 09/08/21 0840    rivaroxaban (XARELTO) tablet 15 mg, 15 mg, Oral, Daily With Breakfast, Ayesha Patel PA-C, 15 mg at 09/08/21 8832    vancomycin (VANCOCIN) oral solution 125 mg, 125 mg, Oral, Q12H [address], Ayesha Patel PA-C, 125 mg at 09/08/21 3420    Laboratory Results:  Results from last 7 days   Lab Units 09/08/21  0546 09/07/21  0419 09/06/21  0549 09/06/21  0548 09/05/21  0517 09/03/21  0433 09/02/21  0648 09/01/21  1907   WBC Thousand/uL 9 53 9 64 9 33  --  9 34 9 63 9 28 11 33*   HEMOGLOBIN g/dL 7 8* 8 5* 8 3*  --  8 3* 7 8* 9 2* 8 8*   HEMATOCRIT % 26 9* 29 2* 27 8*  --  28 2* 27 4* 31 9* 29 7*   PLATELETS Thousands/uL 243 250 217  --  219 225 217 236   SODIUM mmol/L 138 138  --  139 136 142 142 140   POTASSIUM mmol/L 5 1 4 7  --  4 6 4 5 4 8 4 2 4 4   CHLORIDE mmol/L 105 102  --  102 102 104 105 104   CO2 mmol/L 26 27  --  30 27 32 30 31   BUN mg/dL 67* 73*  --  74* 70* 55* 45* 46*   CREATININE mg/dL 2 36* 2 55*  --  2 93* 2 81* 1 60* 1 05 0 95   CALCIUM mg/dL 7 9* 8 7  --  8 4 8 2* 8 3 8 6 8 2*   MAGNESIUM mg/dL  --   --   --   --  1 8 1 7  --   --    ALK PHOS U/L  --   --   --   --   --  84 101 99   ALT U/L  --   --   -- --   --  9* 13 12   AST U/L  --   --   --   --   --  13 10 11

## 2021-09-08 NOTE — ASSESSMENT & PLAN NOTE
Lab Results   Component Value Date    HGBA1C 7 0 06/15/2021       Recent Labs     09/07/21  1624 09/07/21  2135 09/08/21  0753 09/08/21  1048   POCGLU 227* 209* 103 105       Blood Sugar Average: Last 72 hrs:  · (P) 826 [id number]   · Home regimen:  Lantus 10 units HS and Humalog 8 units t i d    · Continue with home regimen with the addition of SSI

## 2021-09-08 NOTE — TRANSPORTATION MEDICAL NECESSITY
Section I - General Information    Name of Patient: Kasandra Carrel                 : 1945    Medicare #: [id number]  Transport Date: 21 (PCS is valid for round trips on this date and for all repetitive trips in the 60-day range as noted below )  [facility]  Is the pt's stay covered under Medicare Part A (PPS/DRG)   []     Closest appropriate facility? If no, why is transport to more distant facility required? Yes  If hospice pt, is this transport related to pt's terminal illness? NA       Section II - Medical Necessity Questionnaire  Ambulance transportation is medically necessary only if other means of transport are contraindicated or would be potentially harmful to the patient  To meet this requirement, the patient must either be "bed confined" or suffer from a condition such that transport by means other than ambulance is contraindicated by the patient's condition  The following questions must be answered by the medical professional signing below for this form to be valid:    1)  Describe the MEDICAL CONDITION (physical and/or mental) of this patient AT [address] [place] [address] that requires the patient to be transported in an ambulance and why transport by other means is contraindicated by the patient's condition:Fall/Bedbound/Stage 4 pressure ulcer on sacrum/confused      2) Is the patient "bed confined" as defined below? Yes  To be "be confined" the patient must satisfy all three of the following conditions: (1) unable to get up from bed without Assistance; AND (2) unable to ambulate; AND (3) unable to sit in a chair or wheelchair  3) Can this patient safely be transported by car or wheelchair van (i e , seated during transport without a medical attendant or monitoring)?    No    4) In addition to completing questions 1-3 above, please check any of the following conditions that apply*:   *Note: supporting documentation for any boxes checked must be maintained in the patient's medical records  If hosp-hosp transfer, describe services needed at 2nd facility not available at 1st facility? Patient is confused  Unable to sit in a chair or wheelchair due to decubitus ulcers or other wounds   Other(specify) bedbound/fall risk      Section III - Signature of Physician or Healthcare Professional  I certify that the above information is true and correct based on my evaluation of this patient, and represent that the patient requires transport by ambulance and that other forms of transport are contraindicated  I understand that this information will be used by the Centers for Medicare and Medicaid Services (CMS) to support the determination of medical necessity for ambulance services, and I represent that I have personal knowledge of the patient's condition at time of transport  []  If this box is checked, I also certify that the patient is physically or mentally incapable of signing the ambulance service's claim and that the institution with which I am affiliated has furnished care, services, or assistance to the patient  My signature below is made on behalf of the patient pursuant to 42 CFR §424 36(b)(4)  In accordance with 42 CFR §424 37, the specific reason(s) that the patient is physically or mentally incapable of signing the claim form is as follows:     Signature of Physician* or Healthcare Professional______________________________________________________________  Signature Date 09/08/21 (For scheduled repetitive transports, this form is not valid for transports performed more than 60 days after this date)    Printed Name & Credentials of Physician or Healthcare Professional (MD, DO, RN, etc )______Violeta Layton RN__________________________  *Form must be signed by patient's attending physician for scheduled, repetitive transports   For non-repetitive, unscheduled ambulance transports, if unable to obtain the signature of the attending physician, any of the following may sign (choose appropriate option below)  [] Physician Assistant []  Clinical Nurse Specialist []  Registered Nurse  []  Nurse Practitioner  [x] Discharge Planner

## 2021-09-08 NOTE — DISCHARGE SUMMARY
[facility]  Discharge- [place] 1945, 67 y o  female MRN: [id number]  Unit/Bed#: -01 Encounter: [id number]  Primary Care Provider: Myrtie Sandifer, DO   Date and time admitted to hospital: 9/1/2021  6:29 PM    * Acute on chronic diastolic (congestive) heart failure (HCC)  Assessment & Plan  Wt Readings from Last 3 Encounters:   09/08/21 67 2 kg (148 lb 2 4 oz)   07/05/21 72 5 kg (159 lb 13 3 oz)   05/13/21 57 kg (125 lb 10 6 oz)     · CT chest with increased b/l pleural effusions (large right and moderate left)   · Pt denies dyspnea (though unreliable historian)  · BNP 4042 (lower than prior HF exacerbation admissions)   · Last echo 04/2021: "LVEF 65 %  LV diastolic parameters were abnormal "   · Previously on demadex, which was d/c on 7/16 after discharge from hospital as pt euvolvemic - nephrology to determine restart date per d/c summary   · On admission, pt with lungs CTA b/l and no peripheral edema   · Patient was started on diuresis with Lasix 40 mg IV twice daily  · Creatinine this morning went up to 2 81  · Diuretics were discontinued  · Strict I/Os And daily weights  · Cardiac diet with fluid and sodium restriction   · Cardiology follow-up  · IR performed thoracentesis 9/7     Pneumonia  Assessment & Plan  · Nonsevere CAP with DRIP score of 4  · CT chest: "Consolidation in the right upper lobe more anteriorly may be due to atelectasis or infiltrate   There is groundglass attenuation in the right upper and right lower lobes  "  · COVID 19 negative   · Completed 7 days of ceftriaxone  · On 2 L of supplemental oxygen at baseline- currently at baseline    [address]  · Pt reportedly fell out of bed at facility at 17:30   · Trauma work-up unrevealing except for right frontal scalp hematoma   · CTH and CT-C spine negative for acute traumatic injury     Anemia  Assessment & Plan  · Hgb at 8 3  · July admission pt had EGD that showed small AVM without active bleeding that was cauterized  · Hgb range 8-9   · No active signs of bleeding   · Trend H/H  · Transfuse for Hgb <7 0     Acute kidney injury superimposed on chronic kidney disease [facility])  Assessment & Plan  Lab Results   Component Value Date    EGFR 19 09/08/2021    EGFR 18 09/07/2021    EGFR 15 09/06/2021    CREATININE 2 36 (H) 09/08/2021    CREATININE 2 55 (H) 09/07/2021    CREATININE 2 93 (H) 09/06/2021   · Baseline hemoglobin 0 8 1 4   · Creatinine on admission 0 95   · Diuretics are on hold- can possibly resume 9/8 per nephrology if Cr continues to improve  · Avoid hypotension/nephrotoxins medications  · Trend BMP   · Nephrology consult appreciated  · UA suggests UTI, nitrite positive, on Rocephin since 9/2  · Patient was given albumin 25 g x 1 on September 4,2021  · Urinary retention protocol with bladder scan and PVR  · Nephrology following, suspected RORY secondary to diuresis and contrast, Cr trending down as of 9/7    Chronic respiratory failure with hypoxia (HCC)  Assessment & Plan  · On 2L supplemental oxygen via NC at baseline  · No increase in requirements    Paroxysmal atrial fibrillation (HCC)  Assessment & Plan  · Home regimen:  Metoprolol 25 mg q 12 hours  · Anticoagulated with xarelto  · Continue    Pressure ulcer of sacral region, stage 4 (HCC)  Assessment & Plan  · Offload pressure as able   · Wound care consult  · Continue wound care per recommendations    Stage 3a chronic kidney disease [facility])  Assessment & Plan  Lab Results   Component Value Date    EGFR 19 09/08/2021    EGFR 18 09/07/2021    EGFR 15 09/06/2021    CREATININE 2 36 (H) 09/08/2021    CREATININE 2 55 (H) 09/07/2021    CREATININE 2 93 (H) 09/06/2021   · Baseline hemoglobin 0 8- 1 4   · Creatinine on admission 0 95   · Creatinine peaked at 2 93, and has trended down the past two days  · Diuretics were held  · Avoid hypotension/nephrotoxins medication  · Trend BMP   · See above    Hypertension  Assessment & Plan  · Home regimen: Metoprolol 25 mg q 12 hours   · Norvasc added this admission due to elevated pressures  · Monitor vitals as per protocol    Type 2 diabetes mellitus with hyperglycemia [facility])  Assessment & Plan  Lab Results   Component Value Date    HGBA1C 7 0 06/15/2021       Recent Labs     09/07/21  1624 09/07/21  2135 09/08/21  0753 09/08/21  1048   POCGLU 227* 209* 103 105       Blood Sugar Average: Last 72 hrs:  · (P) 144 [id number]   · Home regimen:  Lantus 10 units HS and Humalog 8 units t i d    · Continue with home regimen with the addition of SSI    Medical Problems     Resolved Problems  Date Reviewed: 9/8/2021        Resolved    Leukocytosis 9/6/2021     Resolved by  Humberto Marshall PA-C              Discharging Physician / Practitioner: Dio Ho PA-C  PCP: Omer Hurst, DO  Admission Date:   Admission Orders (From admission, onward)     Ordered        09/01/21 2128  INPATIENT ADMISSION  Once                   Discharge Date: 09/08/21    Consultations During Hospital Stay:  · Neurology  · Cardiology    Procedures Performed:   · Thoracentesis 9/7:  1  Technically successful image-guided right-sided thoracentesis as described  2  Scant left pleural fluid insufficient for safe thoracentesis at this time  Significant Findings / Test Results:   · CXR 9/1:  Right upper and middle lobe pneumonia  Findings suggestive of mild congestive heart failure  · CT head 9/1:  No acute intracranial abnormality  Stable chronic microangiopathic changes  · CT cervical spine 9/1:  No cervical spine fracture or traumatic malalignment  CT chest abdomen pelvis 9/1: No acute intrathoracic or intra-abdominal injury  · Large right and moderate left left pleural effusions have increased from previous CT  Right lung atelectasis, volume loss and groundglass opacity  Milder left basilar atelectasis  Incidental Findings:   · Pneumonia, see above     Test Results Pending at Discharge (will require follow up):    · None Outpatient Tests Requested:  · Repeat BMP 3 days    Complications:  None    Reason for Admission: Fall, [address] Course:   Nigel Nobles is a 67 y o  female patient with past medical history of chronic diastolic heart failure, diabetes mellitus type 2, CKD stage 3, hypertension, atrial fibrillation on Xarelto who originally presented to the hospital on 9/1/2021 due to fall out of bed long-term care facility with head strike  No loss of consciousness  No traumatic injuries noted on CT in emergency department  Patient denied any other symptoms  Imaging in emergency department did reveal increased bilateral pleural effusions and consolidation of right upper lobe  Patient was started on IV antibiotics with Rocephin and azithromycin for treatment of pneumonia  She was also given IV diuresis  Patient grew from respiratory standpoint though her renal function began to disliked elevated creatinine  Nephrology was consulted given RORY  Diuretics were held  Suspect there was some component of contrast induced RORY  Given improvement in renal function, okay to resume diuretics on day of discharge and repeat BMP outpatient  At baseline, patient is primarily bed bound with clear left, okay for return to long-term care facility  Please see above list of diagnoses and related plan for additional information  Condition at Discharge: stable    Discharge Day Visit / Exam:   Subjective:  Discussed discharge plan of has improved for the past 2 days patient says I do not know about that, I like it here " Discussed that she is not sick enough to remain in the hospital and can follow up outpatient    Vitals: Blood Pressure: 107/71 (09/08/21 0841)  Pulse: 81 (09/08/21 0841)  Temperature: 98 5 °F (36 9 °C) (09/08/21 0755)  Temp Source: Axillary (09/07/21 1618)  Respirations: 17 (09/08/21 0755)  Height: 5' 6" (167 6 cm) (09/04/21 1014)  Weight - Scale: 67 2 kg (148 lb 2 4 oz) (09/08/21 0649)  SpO2: 96 % (09/08/21 0164)  Exam:   Physical Exam  Vitals and nursing note reviewed  Constitutional:       General: She is not in acute distress  Appearance: Normal appearance  She is well-developed  Interventions: Nasal cannula in place  HENT:      Head: Normocephalic  Contusion present  Eyes:      General: No scleral icterus  Conjunctiva/sclera: Conjunctivae normal    Cardiovascular:      Rate and Rhythm: Normal rate and regular rhythm  Heart sounds: No murmur heard  Pulmonary:      Effort: Pulmonary effort is normal       Breath sounds: No wheezing, rhonchi or rales  Abdominal:      General: There is no distension  Palpations: Abdomen is soft  Skin:     General: Skin is warm and dry  Neurological:      General: No focal deficit present  Mental Status: She is alert  Psychiatric:         Mood and Affect: Mood normal          Discussion with Family: Attempted to update  (son) via phone  Left voicemail  Discharge instructions/Information to patient and family:   See after visit summary for information provided to patient and family  Provisions for Follow-Up Care:  See after visit summary for information related to follow-up care and any pertinent home health orders  Disposition:   [facility] Readmission: None     Discharge Statement:  I spent 65 minutes discharging the patient  This time was spent on the day of discharge  I had direct contact with the patient on the day of discharge  Greater than 50% of the total time was spent examining patient, answering all patient questions, arranging and discussing plan of care with patient as well as directly providing post-discharge instructions  Additional time then spent on discharge activities  Discharge Medications:  See after visit summary for reconciled discharge medications provided to patient and/or family        **Please Note: This note may have been constructed using a voice recognition system**

## 2021-09-08 NOTE — CASE MANAGEMENT
Spoke with Becky Samuel  p/u scheduled for 2:00 pm on 9/8/21  transport from [facility] to [facility]

## 2021-09-08 NOTE — CASE MANAGEMENT
[employer] transport canceled due to insurance    Spoke with Aida MESSINA   P/u scheduled for 2:00 pm  Transport from [facility] to [facility]

## 2021-09-08 NOTE — CASE MANAGEMENT
LOS: 7 days    Patient is medically cleared for discharge  Notification to [facility] via [address]  COVID test ordered  Fax [phone]  Call report to [phone]  Transport scheduled for 1400 update to nurse and RAMON MOLINA same  Call to carlo Allen to update discharge plan

## 2021-09-08 NOTE — ASSESSMENT & PLAN NOTE
Lab Results   Component Value Date    EGFR 19 09/08/2021    EGFR 18 09/07/2021    EGFR 15 09/06/2021    CREATININE 2 36 (H) 09/08/2021    CREATININE 2 55 (H) 09/07/2021    CREATININE 2 93 (H) 09/06/2021   · Baseline hemoglobin 0 8- 1 4   · Creatinine on admission 0 95   · Creatinine peaked at 2 93, and has trended down the past two days  · Diuretics were held  · Avoid hypotension/nephrotoxins medication  · Trend BMP   · See above

## 2021-09-08 NOTE — ASSESSMENT & PLAN NOTE
Wt Readings from Last 3 Encounters:   09/08/21 67 2 kg (148 lb 2 4 oz)   07/05/21 72 5 kg (159 lb 13 3 oz)   05/13/21 57 kg (125 lb 10 6 oz)     · CT chest with increased b/l pleural effusions (large right and moderate left)   · Pt denies dyspnea (though unreliable historian)  · BNP 4042 (lower than prior HF exacerbation admissions)   · Last echo 04/2021: "LVEF 65 %   LV diastolic parameters were abnormal "   · Previously on demadex, which was d/c on 7/16 after discharge from hospital as pt euvolvemic - nephrology to determine restart date per d/c summary   · On admission, pt with lungs CTA b/l and no peripheral edema   · Patient was started on diuresis with Lasix 40 mg IV twice daily  · Creatinine this morning went up to 2 81  · Diuretics were discontinued  · Strict I/Os And daily weights  · Cardiac diet with fluid and sodium restriction   · Cardiology follow-up  · IR performed thoracentesis 9/7

## 2021-09-13 NOTE — TELEPHONE ENCOUNTER
----- Message from Andreina Prakash sent at 9/8/2021 11:17 AM EDT -----  Please call patient to arrange acute kidney injury hospital follow-up with Dr Antelmo Monte with repeat BMP prior to appointment

## 2021-09-15 NOTE — PROGRESS NOTES
In basket message received of patient handoff  Chart reviewed  [facility]'s [facility]  He reports she is on oxygen since discharge due to SAO2 dropping to "under 90"  She is bedbound and complete care which is patient's baseline  I am following in [facility] for any change in disposition

## 2021-09-15 NOTE — ASSESSMENT & PLAN NOTE
Wound is stable, base of the wound looks healthy  No clinical signs of infection  No debridement today  Continue me salt, see wound orders below  Pressure relief  Adequate protein intake  Follow up in 3 weeks or call sooner with questions or concerns

## 2021-09-15 NOTE — PROGRESS NOTES
Patient ID: Giselle Vazquez is a 67 y o  female Date of Birth 1945     Chief Complaint  Chief Complaint   Patient presents with    Follow Up Wound Care Visit     sacral wound       Allergies  Patient has no known allergies  Assessment:    Pressure ulcer of sacral region, stage 4 (HCC)  Wound is stable, base of the wound looks healthy  No clinical signs of infection  No debridement today  Continue me salt, see wound orders below  Pressure relief  Adequate protein intake  Follow up in 3 weeks or call sooner with questions or concerns       Diagnoses and all orders for this visit:    Pressure ulcer of sacral region, stage 4 (HCC)  -     Wound cleansing and dressings;  Future  -     lidocaine (XYLOCAINE) 4 % topical solution 5 mL              Procedures    Plan:     Wound 04/21/21 Pressure Injury Sacrum (Active)   Wound Image Images linked 09/15/21 1021   Wound Description Granulation tissue;Slough 09/15/21 1021   Pressure Injury Stage 4 09/15/21 1021   Wound Length (cm) 4 cm 09/15/21 1021   Wound Width (cm) 3 5 cm 09/15/21 1021   Wound Depth (cm) 2 cm 09/15/21 1021   Wound Surface Area (cm^2) 14 cm^2 09/15/21 1021   Wound Volume (cm^3) 28 cm^3 09/15/21 1021   Calculated Wound Volume (cm^3) 28 cm^3 09/15/21 1021   Change in Wound Size % 68 18 09/15/21 1021   Undermining 2 3 09/15/21 1021   Undermining is depth extending from 1-3 09/15/21 1021   Drainage Amount Moderate 09/15/21 1021   Drainage Description Serosanguineous 09/15/21 1021   Non-staged Wound Description Full thickness 09/15/21 1021       Wound 04/21/21 Pressure Injury Sacrum (Active)   Date First Assessed/Time First Assessed: 04/21/21 1028   Pre-Existing Wound: Yes  Primary Wound Type: Pressure Injury  Location: Sacrum  Wound Outcome: Palliative       [REMOVED] Wound 09/02/21 Other (comment) Abrasion(s) Back Medial;Superior (Removed)   Resolved Date/Resolved Time: 09/03/21 1008  Date First Assessed/Time First Assessed: 09/02/21 0606   Pre-Existing Wound: Yes  Primary Wound Type: (c) Other (comment)  Traumatic Wound Type: Abrasion(s)  Location: Back  Wound Location Orientation: Media    Subjective:        Patient presents for followup of a palliative stage IV sacral pressure ulcer  No increased pain or drainage  Has been using mesalt on the wound      The following portions of the patient's history were reviewed and updated as appropriate: She  has a past medical history of Acute renal failure (ARF) ([facility] [address] ), Acute respiratory failure with hypoxia ([facility] [address] ), Anemia, Atrial fibrillation ([facility] [address] ), CHF (congestive heart failure) ([facility] [address] ), Chronic kidney disease, Diabetes mellitus ([facility] [address] ), Hyperlipidemia, Hypertension, Leukocytosis (9/2/2021), and [facility]  She  reports that she has never smoked  She has never used smokeless tobacco  She reports that she does not drink alcohol and does not use drugs  She has No Known Allergies       Review of Systems   Constitutional: Negative for chills and fever  HENT: Negative for congestion and sneezing  Respiratory: Negative for cough  Musculoskeletal: Positive for gait problem  Skin: Positive for wound  Psychiatric/Behavioral: Negative for agitation           Objective:       Wound 04/21/21 Pressure Injury Sacrum (Active)   Wound Image Images linked 09/15/21 1021   Wound Description Granulation tissue;Slough 09/15/21 1021   Pressure Injury Stage 4 09/15/21 1021   Wound Length (cm) 4 cm 09/15/21 1021   Wound Width (cm) 3 5 cm 09/15/21 1021   Wound Depth (cm) 2 cm 09/15/21 1021   Wound Surface Area (cm^2) 14 cm^2 09/15/21 1021   Wound Volume (cm^3) 28 cm^3 09/15/21 1021   Calculated Wound Volume (cm^3) 28 cm^3 09/15/21 1021   Change in Wound Size % 68 18 09/15/21 1021   Undermining 2 3 09/15/21 1021   Undermining is depth extending from 1-3 09/15/21 1021   Drainage Amount Moderate 09/15/21 1021   Drainage Description Serosanguineous 09/15/21 1021   Non-staged Wound Description Full thickness 09/15/21 1021       /52 Pulse 72   Temp (!) 96 9 °F (36 1 °C) (Temporal)   Resp 18     Physical Exam  Constitutional:       General: She is not in acute distress  Appearance: Normal appearance  She is not ill-appearing, toxic-appearing or diaphoretic  HENT:      Head: Normocephalic and atraumatic  Right Ear: External ear normal       Left Ear: External ear normal    Eyes:      Conjunctiva/sclera: Conjunctivae normal    Pulmonary:      Effort: Pulmonary effort is normal  No respiratory distress  Musculoskeletal:      Cervical back: Neck supple  Skin:     Comments: See wound assessment   Neurological:      Mental Status: She is alert  Gait: Gait abnormal (Nonambulatory)  Psychiatric:         Mood and Affect: Mood normal          Behavior: Behavior normal            Wound 04/21/21 Pressure Injury Sacrum (Active)   Wound Image   09/15/21 1021   Wound Description Granulation tissue;Slough 09/15/21 1021   Pressure Injury Stage 4 09/15/21 1021   Cristal-wound Assessment Intact 08/23/21 1044   Wound Length (cm) 4 cm 09/15/21 1021   Wound Width (cm) 3 5 cm 09/15/21 1021   Wound Depth (cm) 2 cm 09/15/21 1021   Wound Surface Area (cm^2) 14 cm^2 09/15/21 1021   Wound Volume (cm^3) 28 cm^3 09/15/21 1021   Calculated Wound Volume (cm^3) 28 cm^3 09/15/21 1021   Change in Wound Size % 68 18 09/15/21 1021   Tunneling 1 8 cm 08/23/21 1044   Tunneling in depth located at 3 08/23/21 1044   Undermining 2 3 09/15/21 1021   Undermining is depth extending from 1-3 09/15/21 1021   Drainage Amount Moderate 09/15/21 1021   Drainage Description Serosanguineous 09/15/21 1021   Non-staged Wound Description Full thickness 09/15/21 1021   Dressing Status Intact 08/23/21 1044                         Wound Instructions:  Orders Placed This Encounter   Procedures    Wound cleansing and dressings     Wound cleansing and dressings       sacrum     Wash your hands with soap and water  Remove old dressing, discard into plastic bag and place in trash  Cleanse the wound with Dakins rinse every other day alternating with normal saline prior to applying a clean dressing  Do not use tissue or cotton balls  Do not scrub the wound  Pat dry using gauze  Shower no   Apply mesalt to the sacrum wound  Cover with ABD  Secure with medfix tape  Change dressing daily     Done today     Standing Status:   Future     Standing Expiration Date:   9/15/2022        Diagnosis ICD-10-CM Associated Orders   1   Pressure ulcer of sacral region, stage 4 (HCC)  [id number] [id number] Wound cleansing and dressings     lidocaine (XYLOCAINE) 4 % topical solution 5 mL

## 2021-09-15 NOTE — PATIENT INSTRUCTIONS
Orders Placed This Encounter   Procedures    Wound cleansing and dressings     Wound cleansing and dressings       sacrum     Wash your hands with soap and water  Remove old dressing, discard into plastic bag and place in trash  Cleanse the wound with Dakins rinse every other day alternating with normal saline prior to applying a clean dressing  Do not use tissue or cotton balls  Do not scrub the wound  Pat dry using gauze  Shower no   Apply mesalt to the sacrum wound    Cover with ABD  Secure with medfix tape  Change dressing daily     Done today     Standing Status:   Future     Standing Expiration Date:   9/15/2022

## 2021-09-15 NOTE — PROGRESS NOTES
Chart review completed  Email sent to facility requesting update on patient  This care manager assistant will continue to monitor via chart review throughout bundle episode  This CM Assistant received an update on the patient  The patient returned to [facility] non skilled she is a LTC resident at the facility  I have removed myself off of the care team, added the CM to the care team who will follow the patient through the bundle episode, sent the care manager a inbasket notifying them of the bundle episode, updated the BPCI form, and updated the care coordination note

## 2021-09-27 NOTE — PROGRESS NOTES
OFFICE FOLLOW UP - Nephrology   Ying Enamorado 67 y o  female MRN: [id number]       ASSESSMENT and PLAN:  Diagnoses and all orders for this visit:    Stage 3a chronic kidney disease ([facility] [address] )  -     Basic metabolic panel; Future  -     CBC; Future  -     Urinalysis with microscopic; Future  -     Protein / creatinine ratio, urine; Future  -     PTH, intact; Future  -     Phosphorus; Future    Chronic respiratory failure with hypoxia (HCC)    Essential hypertension    Paroxysmal atrial fibrillation (HCC)    Chronic diastolic heart failure (HCC)    Bilateral lower extremity edema    History of vitamin D deficiency    Other proteinuria    Anemia, unspecified type    Type 2 diabetes mellitus with hyperglycemia, with long-term current use of insulin (HCC)        CKD stage IIIA: Etiology of chronic disease suspected secondary to   Baseline creatinine 0 8-1 4  [facility] with [facility]     Recent acute kidney injury with peak creatinine of 2 9   -most recent labs showed creatinine of 1 56   -creatinine in the mid 2's early on in September  -CT scan negative for hydro  -recommend avoiding nephrotoxins including ibuprofen, motrin , and advil    -encourage oral hydration     -received contrast 09/01    -Hx of chronic suprapubic catheter    -check labs including BMP, CBC, UA, UPCR, phos, PTH prior to next appointment  Hypertension: Blood pressure currently 106/45  Initial 131/79 at  at [facility]  BP repeated today and 113/76    -Current medications:  Metoprolol 25 mg every 12 hours, Norvasc 5 mg po daily  -medication changes: recommend decreasing amlodipine if remains low  -recommend low salt diet    -please check blood pressure daily and keep record  Volume status:  History of diastolic CHF  Most recent echo with EF of 65%   -check weight daily  Call office for 2-3 lb weight gain in 1 day or 5 lbs in one week   Also, for increased LE edema or shortness of breathe    -current diuretic: none    -dry weight: hospital discharge weight 148 lbs    -cardiologist: Jenn Morse  Bilateral pleural effusions: S/P previous thoracentesis 09/07  Remains on supplemental oxygen  Anemia: Hx of AVM's  Continues on oral iron  Trending Hgb  Diabetes:  -most recent A1c 7 0  Continues on SC insulin  Other: Sacral wound, atrial fibrillation, hyperlipidemia, stroke, carotid stenosis      Patient will follow up in 3-4 months with Dr Antelmo Monte  Age related screening: N/A  Your primary caregiver may do yearly screening for colorectal cancer  It is recommended in all men and women over 48years old  You may have screening earlier if you have colon disease or a family history of colorectal cancer  HPI: Sebastien Booker is a 67 y o  female who is here for hospital follow-up for acute kidney injury  The patient was hospitalized from 09/01 to 9/8 secondary to acute on chronic diastolic CHF  The patient was diuresed with IV diuretics with unfortunate rising creatinine  She underwent thoracentesis on 09/07 for bilateral pleural effusions  Her COVID testing was negative  She was treated with 7 days of ceftriaxone for pneumonia  Unfortunately she suffered a fall with a right frontal scalp hematoma  The patient presents today via stretcher from [facility]  She states she is feeling well  She denies any chest discomfort or increased shortness of breath  She is currently on supplemental oxygen  She denies any nausea, vomiting, diarrhea  She has chronic Jimenez catheter  She is not exhibiting any lower extremity edema  She is currently not taking diuretics  Her blood pressure was initially low but once rechecked it was stable and acceptable  ROS:   A complete review of systems was done  Pertinent positives and negatives as noted in the HPI, otherwise the review of systems is negative  Allergies: Patient has no known allergies      Medications:   Current Outpatient Medications:     acetaminophen (TYLENOL) 325 mg tablet, Take 650 mg by mouth every 4 (four) hours as needed for mild pain or fever, Disp: , Rfl:     amLODIPine (NORVASC) 5 mg tablet, Take 1 tablet (5 mg total) by mouth daily, Disp: 30 tablet, Rfl: 0    ascorbic acid (VITAMIN C) 500 MG tablet, Take 1 tablet (500 mg total) by mouth daily, Disp: , Rfl: 0    atorvastatin (LIPITOR) 40 mg tablet, Take 1 tablet (40 mg total) by mouth daily with dinner, Disp: 30 tablet, Rfl: 0    BD AutoShield Duo 30G X 5 MM MISC, , Disp: , Rfl:     bisacodyl (DULCOLAX) 10 mg suppository, Insert 10 mg into the rectum as needed for constipation constipation , Disp: , Rfl:     ferrous sulfate 325 (65 Fe) mg tablet, Take 325 mg by mouth daily with breakfast , Disp: , Rfl:     gabapentin (NEURONTIN) 100 mg capsule, Take 100 mg by mouth 3 (three) times a day, Disp: , Rfl:     HumaLOG KwikPen 100 units/mL injection pen, Inject under the skin , Disp: , Rfl:     insulin glargine (LANTUS) 100 units/mL subcutaneous injection, Inject 10 Units under the skin daily at bedtime, Disp: 10 mL, Rfl: 0    insulin lispro (HumaLOG) 100 units/mL injection, Inject 8 Units under the skin 3 (three) times a day with meals, Disp: , Rfl:     metoprolol tartrate (LOPRESSOR) 25 mg tablet, Take 25 mg by mouth every 12 (twelve) hours , Disp: , Rfl:     mirtazapine (REMERON) 15 mg tablet, Take 15 mg by mouth daily at bedtime, Disp: , Rfl:     Nutritional Supplements (Yevgeniy) PACK, Take by mouth, Disp: , Rfl:     pantoprazole (PROTONIX) 40 mg tablet, Take 40 mg by mouth 2 (two) times a day , Disp: , Rfl:     polyethylene glycol (MIRALAX) 17 g packet, Take 17 g by mouth daily, Disp: 14 each, Rfl: 0    rivaroxaban (XARELTO) 15 mg tablet, Take 1 tablet (15 mg total) by mouth daily with breakfast, Disp: 90 tablet, Rfl: 3    vancomycin (VANCOCIN) 50mg/mL SOLN, Take 2 5 mL (125 mg total) by mouth every 12 (twelve) hours, Disp: 40 mL, Rfl: 0    VITAMIN D PO, Take 50,000 mg by mouth 2 (two) times a week Tuesday and Friday, Disp: , Rfl:     Past Medical History:   Diagnosis Date    Acute renal failure (ARF) ([facility] [address] )     Acute respiratory failure with hypoxia (HCC)     Anemia     Atrial fibrillation (HCC)     CHF (congestive heart failure) (HCC)     Chronic kidney disease     Diabetes mellitus ([facility] [address] )     type 2    Hyperlipidemia     Hypertension     Leukocytosis 9/2/2021    Stroke [facility])      Past Surgical History:   Procedure Laterality Date    BREAST SURGERY Left     lumpectomy benign    CATARACT EXTRACTION Bilateral 2017    CATARACT EXTRACTION W/ INTRAOCULAR LENS IMPLANT Left 12/11/2017    Procedure: EXTRACTION EXTRACAPSULAR CATARACT PHACO INTRAOCULAR LENS (IOL); Surgeon: Alexis De La Torre MD;  Location: [facility] MAIN OR;  Service: Ophthalmology    IR SUPRAPUBIC CATHETER PLACEMENT  7/14/2021    IR THORACENTESIS  9/7/2021    MN OPEN RX FEMUR FX+INTRAMED RAYMOND Right 5/21/2020    Procedure: INSERTION OF SHORT TROCHANTERIC FEMORAL NAIL;  Surgeon: Jaylan Montes De Oca MD;  [facility] Main OR;  Service: Orthopedics    MN [facility] SHOULDER [address] [place] [address] Drive HUMERAL&GLENOID COMPNT Right 9/10/2018    Procedure: RIGHT REVERSE TOTAL SHOULDER ARTHROPLASTY;  Surgeon: Jaylan Montes De Oca MD;  [facility] Main [facility];  Service: Orthopedics    MN XCAPSL CTRC RMVL INSJ IO LENS PROSTH W/O ECP Right 10/23/2017    Procedure: EXTRACTION EXTRACAPSULAR CATARACT PHACO INTRAOCULAR LENS (IOL); Surgeon: Alexis De La Torre MD;  Location: [facility] MAIN OR;  Service: Ophthalmology    WOUND DEBRIDEMENT N/A 8/26/2020    Procedure: EXCISIONAL DEBRIDEMENT OF SACRAL PRESSURE WOUND, WASHOUT;;  Surgeon: Ulisses Mcclain MD;  Location[facility] MAIN OR;  Service: General    WOUND DEBRIDEMENT N/A 8/28/2020    Procedure: BUTTOCKS DEBRIDEMENT WOUND AND DRESSING CHANGE ([address] OUT);   Surgeon: Amrit Vasquez MD;  Location: [facility] MAIN OR;  Service: General     Family History   Problem Relation Age of Onset    Diabetes Mother     Varicose Veins Mother     Stroke Father     Arthritis Brother    Sonia Mcghee Diabetes Daughter     No Known Problems Brother       reports that she has never smoked  She has never used smokeless tobacco  She reports that she does not drink alcohol and does not use drugs  Physical Exam:   Vitals:    09/28/21 1434   BP: (!) 106/45   BP Location: Left arm   Patient Position: Supine   Cuff Size: Standard   Pulse: 65   Resp: 12     There is no height or weight on file to calculate BMI  General: no acute distress   Eyes: conjunctivae pink, anicteric sclerae  ENT: mucous membranes moist  Neck: supple, no JVD  Chest: clear to auscultation bilaterally with no wheezes, rale or rhochi, on O2  CVS: regular rate and rhythm   Abdomen: soft, non-tender, non-distended  Extremities: no lower extremity edema   Skin: no rash  Neuro: awake and alert       Lab Results:  Results for orders placed or performed during the hospital encounter of 09/01/21   Novel Coronavirus (Covid-19),PCR [facility] - 2 Hour Stat    Specimen: Nose; Nares   Result Value Ref Range    SARS-CoV-2 Negative Negative   MRSA culture    Specimen: Nose; Nares   Result Value Ref Range    MRSA Culture Only (A)      Methicillin Resistant Staphylococcus aureus isolated    MRSA Culture Only       This patient requires contact isolation precautions per [place] law  Contact precautions are not required in [place] for nasal surveillance cultures  Legionella antigen, Urine    Specimen: Urine, Other   Result Value Ref Range    Legionella Urinary Antigen Negative Negative   Strep Pneumoniae, Urine    Specimen: Urine, Other   Result Value Ref Range    Strep pneumoniae antigen, urine Negative Negative   Body fluid culture (Pleural Fluid Culture) and Gram stain    Specimen: Pleural, Right;  Body Fluid   Result Value Ref Range    Body Fluid Culture, Sterile No growth     Gram Stain Result No Polys or Bacteria seen    Urine culture    Specimen: Urine, Suprapubic catheter   Result Value Ref Range    Urine Culture (A)      >100,000 cfu/ml - Strain 1 - Pseudomonas aeruginosa    Urine Culture (A)      50,000-59,000 cfu/ml - Strain 2 - Pseudomonas aeruginosa MDR       Susceptibility    Pseudomonas aeruginosa - ALFRED     ZID Performed Yes       Aztreonam ($$$)  16 Intermediate ug/ml     Cefepime ($) <=2 00 Susceptible ug/ml     Ceftazidime ($$) 2 Susceptible ug/ml     Ciprofloxacin ($)  <=1 00 Susceptible ug/ml     Gentamicin ($$) 4 Susceptible ug/ml     Imipenem 1 Susceptible ug/ml     Levofloxacin ($) 0 50 Susceptible ug/ml     Meropenem ($$) <=1 00 Susceptible ug/ml     Piperacillin + Tazobactam ($$$) <=4 Susceptible ug/ml     Tobramycin ($) 2 Susceptible ug/ml    Pseudomonas aeruginosa MDR - ALFRED     ZID Performed Yes       Aztreonam ($$$)  16 Intermediate ug/ml     Cefepime ($) >16 00 Resistant ug/ml     Ceftazidime ($$) 16 Intermediate ug/ml     Ciprofloxacin ($)  <=1 00 Susceptible ug/ml     Gentamicin ($$) 4 Susceptible ug/ml     Imipenem <=1 Susceptible ug/ml     Levofloxacin ($) 0 50 Susceptible ug/ml     Meropenem ($$) <=1 00 Susceptible ug/ml     Piperacillin + Tazobactam ($$$) 64 Susceptible ug/ml     Tobramycin ($) 2 Susceptible ug/ml   LD (LDH), Body Fluid   Result Value Ref Range    LD, Fluid 78 U/L   Total Protein, body fluid   Result Value Ref Range    Protein, Fluid 2 8 g/dL   NOVEL CORONAVIRUS (COVID-19), PCR [facility]    Specimen: Nose; Nares   Result Value Ref Range    SARS-CoV-2 Negative Negative   CBC and differential   Result Value Ref Range    WBC 11 33 (H) 4 31 - 10 16 Thousand/uL    RBC 3 29 (L) 3 81 - 5 12 Million/uL    Hemoglobin 8 8 (L) 11 5 - 15 4 g/dL    Hematocrit 29 7 (L) 34 8 - 46 1 %    MCV 90 82 - 98 fL    MCH 26 7 (L) 26 8 - 34 3 pg    MCHC 29 6 (L) 31 4 - 37 4 g/dL    RDW 14 1 11 6 - 15 1 %    MPV 11 0 8 9 - 12 7 fL    Platelets 590 793 - 049 Thousands/uL    nRBC 0 /100 WBCs    Neutrophils Relative 81 (H) 43 - 75 %    Immat GRANS % 0 0 - 2 %    Lymphocytes Relative 11 (L) 14 - 44 %    Monocytes Relative 5 4 - 12 %    Eosinophils Relative 2 0 - 6 %    Basophils Relative 1 0 - 1 %    Neutrophils Absolute 9 24 (H) 1 85 - 7 62 Thousands/µL    Immature Grans Absolute 0 03 0 00 - 0 20 Thousand/uL    Lymphocytes Absolute 1 23 0 60 - 4 47 Thousands/µL    Monocytes Absolute 0 56 0 17 - 1 22 Thousand/µL    Eosinophils Absolute 0 21 0 00 - 0 61 Thousand/µL    Basophils Absolute 0 06 0 00 - 0 10 Thousands/µL   Comprehensive metabolic panel   Result Value Ref Range    Sodium 140 136 - 145 mmol/L    Potassium 4 4 3 5 - 5 3 mmol/L    Chloride 104 100 - 108 mmol/L    CO2 31 21 - 32 mmol/L    ANION GAP 5 4 - 13 mmol/L    BUN 46 (H) 5 - 25 mg/dL    Creatinine 0 95 0 60 - 1 30 mg/dL    Glucose 164 (H) 65 - 140 mg/dL    Calcium 8 2 (L) 8 3 - 10 1 mg/dL    Corrected Calcium 9 9 8 3 - 10 1 mg/dL    AST 11 5 - 45 U/L    ALT 12 12 - 78 U/L    Alkaline Phosphatase 99 46 - 116 U/L    Total Protein 5 8 (L) 6 4 - 8 2 g/dL    Albumin 1 9 (L) 3 5 - 5 0 g/dL    Total Bilirubin 0 40 0 20 - 1 00 mg/dL    eGFR 58 ml/min/1 73sq m   Troponin I   Result Value Ref Range    Troponin I <0 02 <=0 04 ng/mL   NT-BNP PRO   Result Value Ref Range    NT-proBNP 4,042 (H) <450 pg/mL   Comprehensive metabolic panel   Result Value Ref Range    Sodium 142 136 - 145 mmol/L    Potassium 4 2 3 5 - 5 3 mmol/L    Chloride 105 100 - 108 mmol/L    CO2 30 21 - 32 mmol/L    ANION GAP 7 4 - 13 mmol/L    BUN 45 (H) 5 - 25 mg/dL    Creatinine 1 05 0 60 - 1 30 mg/dL    Glucose 110 65 - 140 mg/dL    Calcium 8 6 8 3 - 10 1 mg/dL    Corrected Calcium 10 2 (H) 8 3 - 10 1 mg/dL    AST 10 5 - 45 U/L    ALT 13 12 - 78 U/L    Alkaline Phosphatase 101 46 - 116 U/L    Total Protein 6 0 (L) 6 4 - 8 2 g/dL    Albumin 2 0 (L) 3 5 - 5 0 g/dL    Total Bilirubin 0 40 0 20 - 1 00 mg/dL    eGFR 51 ml/min/1 73sq m   CBC (With Platelets)   Result Value Ref Range    WBC 9 28 4 31 - 10 16 Thousand/uL    RBC 3 50 (L) 3 81 - 5 12 Million/uL    Hemoglobin 9 2 (L) 11 5 - 15 4 g/dL    Hematocrit 31 9 (L) 34 8 - 46 1 %    MCV 91 82 - 98 fL    MCH 26 3 (L) 26 8 - 34 3 pg    MCHC 28 8 (L) 31 4 - 37 4 g/dL    RDW 14 2 11 6 - 15 1 %    Platelets 592 785 - 573 Thousands/uL    MPV 10 7 8 9 - 12 7 fL   Procalcitonin   Result Value Ref Range    Procalcitonin <0 05 <=0 25 ng/ml   Procalcitonin Reflex   Result Value Ref Range    Procalcitonin <0 05 <=0 25 ng/ml   CBC and differential   Result Value Ref Range    WBC 9 63 4 31 - 10 16 Thousand/uL    RBC 2 98 (L) 3 81 - 5 12 Million/uL    Hemoglobin 7 8 (L) 11 5 - 15 4 g/dL    Hematocrit 27 4 (L) 34 8 - 46 1 %    MCV 92 82 - 98 fL    MCH 26 2 (L) 26 8 - 34 3 pg    MCHC 28 5 (L) 31 4 - 37 4 g/dL    RDW 14 4 11 6 - 15 1 %    MPV 11 2 8 9 - 12 7 fL    Platelets 747 569 - 884 Thousands/uL    Neutrophils Relative 60 43 - 75 %    Immat GRANS % 0 0 - 2 %    Lymphocytes Relative 28 14 - 44 %    Monocytes Relative 8 4 - 12 %    Eosinophils Relative 3 0 - 6 %    Basophils Relative 1 0 - 1 %    Neutrophils Absolute 5 83 1 85 - 7 62 Thousands/µL    Immature Grans Absolute 0 03 0 00 - 0 20 Thousand/uL    Lymphocytes Absolute 2 68 0 60 - 4 47 Thousands/µL    Monocytes Absolute 0 75 0 17 - 1 22 Thousand/µL    Eosinophils Absolute 0 29 0 00 - 0 61 Thousand/µL    Basophils Absolute 0 05 0 00 - 0 10 Thousands/µL   Comprehensive metabolic panel   Result Value Ref Range    Sodium 142 136 - 145 mmol/L    Potassium 4 8 3 5 - 5 3 mmol/L    Chloride 104 100 - 108 mmol/L    CO2 32 21 - 32 mmol/L    ANION GAP 6 4 - 13 mmol/L    BUN 55 (H) 5 - 25 mg/dL    Creatinine 1 60 (H) 0 60 - 1 30 mg/dL    Glucose 83 65 - 140 mg/dL    Calcium 8 3 8 3 - 10 1 mg/dL    Corrected Calcium 9 9 8 3 - 10 1 mg/dL    AST 13 5 - 45 U/L    ALT 9 (L) 12 - 78 U/L    Alkaline Phosphatase 84 46 - 116 U/L    Total Protein 5 6 (L) 6 4 - 8 2 g/dL    Albumin 2 0 (L) 3 5 - 5 0 g/dL    Total Bilirubin 0 30 0 20 - 1 00 mg/dL    eGFR 31 ml/min/1 73sq m   Magnesium   Result Value Ref Range    Magnesium 1 7 1 6 - 2 6 mg/dL   Body fluid white cell count with differential   Result Value Ref Range    Site Pleural Fluid, RIGHT     Color, Fluid Straw Clear, Colorless,Yellow    Clarity, Fluid Clear Clear    WBC, Fluid 246 /ul   CBC and differential   Result Value Ref Range    WBC 9 58 4 31 - 10 16 Thousand/uL    RBC 2 97 (L) 3 81 - 5 12 Million/uL    Hemoglobin 7 9 (L) 11 5 - 15 4 g/dL    Hematocrit 26 5 (L) 34 8 - 46 1 %    MCV 89 82 - 98 fL    MCH 26 6 (L) 26 8 - 34 3 pg    MCHC 29 8 (L) 31 4 - 37 4 g/dL    RDW 14 0 11 6 - 15 1 %    MPV 10 7 8 9 - 12 7 fL    Platelets 152 723 - 225 Thousands/uL    nRBC 0 /100 WBCs    Neutrophils Relative 63 43 - 75 %    Immat GRANS % 0 0 - 2 %    Lymphocytes Relative 26 14 - 44 %    Monocytes Relative 7 4 - 12 %    Eosinophils Relative 3 0 - 6 %    Basophils Relative 1 0 - 1 %    Neutrophils Absolute 5 99 1 85 - 7 62 Thousands/µL    Immature Grans Absolute 0 03 0 00 - 0 20 Thousand/uL    Lymphocytes Absolute 2 48 0 60 - 4 47 Thousands/µL    Monocytes Absolute 0 71 0 17 - 1 22 Thousand/µL    Eosinophils Absolute 0 31 0 00 - 0 61 Thousand/µL    Basophils Absolute 0 06 0 00 - 0 10 Thousands/µL   Basic metabolic panel   Result Value Ref Range    Sodium 140 136 - 145 mmol/L    Potassium 4 6 3 5 - 5 3 mmol/L    Chloride 103 100 - 108 mmol/L    CO2 30 21 - 32 mmol/L    ANION GAP 7 4 - 13 mmol/L    BUN 65 (H) 5 - 25 mg/dL    Creatinine 2 40 (H) 0 60 - 1 30 mg/dL    Glucose 79 65 - 140 mg/dL    Calcium 8 2 (L) 8 3 - 10 1 mg/dL    eGFR 19 ml/min/1 73sq m   Urinalysis with microscopic   Result Value Ref Range    Clarity, UA Clear     Color, UA Yellow     Specific Grant, UA 1 010 1 003 - 1 030    pH, UA 6 0 4 5, 5 0, 5 5, 6 0, 6 5, 7 0, 7 5, 8 0    Glucose, UA Negative Negative mg/dl    Ketones, UA Negative Negative mg/dl    Blood, UA Small (A) Negative    Protein, UA 30 (1+) (A) Negative mg/dl    Nitrite, UA Positive (A) Negative    Bilirubin, UA Negative Negative    Urobilinogen, UA 0 2 0 2, 1 0 E U /dl E U /dl    Leukocytes, UA Moderate (A) Negative    WBC, UA 4-10 (A) None Seen, 2-4, 5-60 /hpf    RBC, UA None Seen None Seen, 2-4 /hpf    Bacteria, UA Occasional None Seen, Occasional /hpf    Epithelial Cells Occasional None Seen, Occasional /hpf    MUCUS THREADS Occasional (A) None Seen   CK (with reflex to MB)   Result Value Ref Range    Total CK 18 (L) 26 - 192 U/L   Procalcitonin   Result Value Ref Range    Procalcitonin 0 08 <=0 25 ng/ml   CBC and differential   Result Value Ref Range    WBC 9 34 4 31 - 10 16 Thousand/uL    RBC 3 18 (L) 3 81 - 5 12 Million/uL    Hemoglobin 8 3 (L) 11 5 - 15 4 g/dL    Hematocrit 28 2 (L) 34 8 - 46 1 %    MCV 89 82 - 98 fL    MCH 26 1 (L) 26 8 - 34 3 pg    MCHC 29 4 (L) 31 4 - 37 4 g/dL    RDW 13 9 11 6 - 15 1 %    MPV 11 4 8 9 - 12 7 fL    Platelets 319 554 - 493 Thousands/uL    nRBC 0 /100 WBCs    Neutrophils Relative 64 43 - 75 %    Immat GRANS % 0 0 - 2 %    Lymphocytes Relative 25 14 - 44 %    Monocytes Relative 6 4 - 12 %    Eosinophils Relative 4 0 - 6 %    Basophils Relative 1 0 - 1 %    Neutrophils Absolute 6 01 1 85 - 7 62 Thousands/µL    Immature Grans Absolute 0 03 0 00 - 0 20 Thousand/uL    Lymphocytes Absolute 2 32 0 60 - 4 47 Thousands/µL    Monocytes Absolute 0 60 0 17 - 1 22 Thousand/µL    Eosinophils Absolute 0 33 0 00 - 0 61 Thousand/µL    Basophils Absolute 0 05 0 00 - 0 10 Thousands/µL   Basic metabolic panel   Result Value Ref Range    Sodium 136 136 - 145 mmol/L    Potassium 4 5 3 5 - 5 3 mmol/L    Chloride 102 100 - 108 mmol/L    CO2 27 21 - 32 mmol/L    ANION GAP 7 4 - 13 mmol/L    BUN 70 (H) 5 - 25 mg/dL    Creatinine 2 81 (H) 0 60 - 1 30 mg/dL    Glucose 85 65 - 140 mg/dL    Calcium 8 2 (L) 8 3 - 10 1 mg/dL    eGFR 16 ml/min/1 73sq m   Magnesium   Result Value Ref Range    Magnesium 1 8 1 6 - 2 6 mg/dL   Procalcitonin Reflex   Result Value Ref Range    Procalcitonin <0 05 <=0 25 ng/ml   CBC and differential   Result Value Ref Range    WBC 9 33 4 31 - 10 16 Thousand/uL    RBC 3 13 (L) 3 81 - 5 12 Million/uL    Hemoglobin 8 3 (L) 11 5 - 15 4 g/dL    Hematocrit 27 8 (L) 34 8 - 46 1 %    MCV 89 82 - 98 fL    MCH 26 5 (L) 26 8 - 34 3 pg    MCHC 29 9 (L) 31 4 - 37 4 g/dL    RDW 13 9 11 6 - 15 1 %    MPV 10 9 8 9 - 12 7 fL    Platelets 526 407 - 661 Thousands/uL    nRBC 0 /100 WBCs    Neutrophils Relative 64 43 - 75 %    Immat GRANS % 0 0 - 2 %    Lymphocytes Relative 25 14 - 44 %    Monocytes Relative 7 4 - 12 %    Eosinophils Relative 3 0 - 6 %    Basophils Relative 1 0 - 1 %    Neutrophils Absolute 5 93 1 85 - 7 62 Thousands/µL    Immature Grans Absolute 0 04 0 00 - 0 20 Thousand/uL    Lymphocytes Absolute 2 33 0 60 - 4 47 Thousands/µL    Monocytes Absolute 0 68 0 17 - 1 22 Thousand/µL    Eosinophils Absolute 0 29 0 00 - 0 61 Thousand/µL    Basophils Absolute 0 06 0 00 - 0 10 Thousands/µL   Basic metabolic panel   Result Value Ref Range    Sodium 139 136 - 145 mmol/L    Potassium 4 6 3 5 - 5 3 mmol/L    Chloride 102 100 - 108 mmol/L    CO2 30 21 - 32 mmol/L    ANION GAP 7 4 - 13 mmol/L    BUN 74 (H) 5 - 25 mg/dL    Creatinine 2 93 (H) 0 60 - 1 30 mg/dL    Glucose 78 65 - 140 mg/dL    Calcium 8 4 8 3 - 10 1 mg/dL    eGFR 15 ml/min/1 73sq m   Basic metabolic panel   Result Value Ref Range    Sodium 138 136 - 145 mmol/L    Potassium 4 7 3 5 - 5 3 mmol/L    Chloride 102 100 - 108 mmol/L    CO2 27 21 - 32 mmol/L    ANION GAP 9 4 - 13 mmol/L    BUN 73 (H) 5 - 25 mg/dL    Creatinine 2 55 (H) 0 60 - 1 30 mg/dL    Glucose 68 65 - 140 mg/dL    Calcium 8 7 8 3 - 10 1 mg/dL    eGFR 18 ml/min/1 73sq m   CBC and differential   Result Value Ref Range    WBC 9 64 4 31 - 10 16 Thousand/uL    RBC 3 27 (L) 3 81 - 5 12 Million/uL    Hemoglobin 8 5 (L) 11 5 - 15 4 g/dL    Hematocrit 29 2 (L) 34 8 - 46 1 %    MCV 89 82 - 98 fL    MCH 26 0 (L) 26 8 - 34 3 pg    MCHC 29 1 (L) 31 4 - 37 4 g/dL    RDW 13 8 11 6 - 15 1 %    MPV 11 3 8 9 - 12 7 fL    Platelets 820 017 - 186 Thousands/uL    nRBC 0 /100 WBCs    Neutrophils Relative 67 43 - 75 %    Immat GRANS % 0 0 - 2 %    Lymphocytes Relative 22 14 - 44 %    Monocytes Relative 8 4 - 12 %    Eosinophils Relative 2 0 - 6 %    Basophils Relative 1 0 - 1 %    Neutrophils Absolute 6 43 1 85 - 7 62 Thousands/µL    Immature Grans Absolute 0 04 0 00 - 0 20 Thousand/uL    Lymphocytes Absolute 2 16 0 60 - 4 47 Thousands/µL    Monocytes Absolute 0 75 0 17 - 1 22 Thousand/µL    Eosinophils Absolute 0 21 0 00 - 0 61 Thousand/µL    Basophils Absolute 0 05 0 00 - 0 10 Thousands/µL   Glucose, body fluid   Result Value Ref Range    Glucose, Fluid 135 mg/dL   pH, body fluid   Result Value Ref Range    PH BODY FLUID 7 7    Body Fluid Diff   Result Value Ref Range    Total Counted 100     Neutrophils % (Fluid) 10 %    Lymphs % (Fluid) 12 %    Mesothelial % (Fluid) 3 %    Histiocyte % (Fluid) 74 %    Monocytes % (Fluid) 1 %   CBC and differential   Result Value Ref Range    WBC 9 53 4 31 - 10 16 Thousand/uL    RBC 2 99 (L) 3 81 - 5 12 Million/uL    Hemoglobin 7 8 (L) 11 5 - 15 4 g/dL    Hematocrit 26 9 (L) 34 8 - 46 1 %    MCV 90 82 - 98 fL    MCH 26 1 (L) 26 8 - 34 3 pg    MCHC 29 0 (L) 31 4 - 37 4 g/dL    RDW 13 9 11 6 - 15 1 %    MPV 11 5 8 9 - 12 7 fL    Platelets 246 070 - 117 Thousands/uL    nRBC 0 /100 WBCs    Neutrophils Relative 67 43 - 75 %    Immat GRANS % 0 0 - 2 %    Lymphocytes Relative 24 14 - 44 %    Monocytes Relative 7 4 - 12 %    Eosinophils Relative 2 0 - 6 %    Basophils Relative 0 0 - 1 %    Neutrophils Absolute 6 25 1 85 - 7 62 Thousands/µL    Immature Grans Absolute 0 04 0 00 - 0 20 Thousand/uL    Lymphocytes Absolute 2 32 0 60 - 4 47 Thousands/µL    Monocytes Absolute 0 70 0 17 - 1 22 Thousand/µL    Eosinophils Absolute 0 18 0 00 - 0 61 Thousand/µL    Basophils Absolute 0 04 0 00 - 0 10 Thousands/µL   Basic metabolic panel   Result Value Ref Range    Sodium 138 136 - 145 mmol/L    Potassium 5 1 3 5 - 5 3 mmol/L    Chloride 105 100 - 108 mmol/L    CO2 26 21 - 32 mmol/L    ANION GAP 7 4 - 13 mmol/L    BUN 67 (H) 5 - 25 mg/dL    Creatinine 2 36 (H) 0 60 - 1 30 mg/dL    Glucose 113 65 - 140 mg/dL    Calcium 7 9 (L) 8 3 - 10 1 mg/dL    eGFR 19 ml/min/1 73sq m   ECG 12 lead   Result Value Ref Range    Ventricular Rate 81 BPM    Atrial Rate 500 BPM    NJ Interval  ms    QRSD Interval 70 ms    QT Interval 372 ms    QTC Interval 432 ms    P Axis  degrees    QRS Axis 85 degrees    T Wave Axis 37 degrees   Non-gynecologic cytology   Result Value Ref Range    Case Report       Non-gynecologic Cytology                          Case: [id number]                                  Authorizing Provider:  Kirti Ocampo MD    Collected:           09/07/2021 1450              Ordering Location:     [address]     Received:            09/07/2021 [facility]                                                         Pathologist:           Yfn Judge MD                                                        Specimens:   A) - Pleural, Right                                                                                 B) - Pleural, Right, CELL BLOCK                                                            Final Diagnosis       A B  Pleural, Right,  (ThinPrep and cell block preparations):  Negative for malignancy  Benign mesothelial cells, histiocytes and lymphocytes  Satisfactory for evaluation  Gross Description          A  80 mL of yellow, cloudy fluid, received in CytoLyt        B  Moderate cell button, white         Additional Information       [employer]'s FDA approved ,  and ThinPrep Imaging Duo System are utilized with strict adherence to the 's instruction manual to prepare gynecologic and non-gynecologic cytology specimens for the production of ThinPrep slides as well as for gynecologic ThinPrep imaging  These processes have been validated by our laboratory and/or by the       These tests were developed and their performance characteristics determined by [facility] or [address] [place] [address]  They may not be cleared or approved by the [place]  Food and Drug Administration  The FDA has determined that such clearance or approval is not necessary  These tests are used for clinical purposes  They should not be regarded as investigational or for research  This laboratory has been approved by [place] [id number], designated as a high-complexity laboratory and is qualified to perform these tests    Interpretation performed at [facility], [address] [place] [address]       Fingerstick Glucose (POCT)   Result Value Ref Range    POC Glucose 146 (H) 65 - 140 mg/dl   Fingerstick Glucose (POCT)   Result Value Ref Range    POC Glucose 115 65 - 140 mg/dl   Fingerstick Glucose (POCT)   Result Value Ref Range    POC Glucose 195 (H) 65 - 140 mg/dl   Fingerstick Glucose (POCT)   Result Value Ref Range    POC Glucose 179 (H) 65 - 140 mg/dl   Fingerstick Glucose (POCT)   Result Value Ref Range    POC Glucose 88 65 - 140 mg/dl   Fingerstick Glucose (POCT)   Result Value Ref Range    POC Glucose 116 65 - 140 mg/dl   Fingerstick Glucose (POCT)   Result Value Ref Range    POC Glucose 314 (H) 65 - 140 mg/dl   Fingerstick Glucose (POCT)   Result Value Ref Range    POC Glucose 219 (H) 65 - 140 mg/dl   Fingerstick Glucose (POCT)   Result Value Ref Range    POC Glucose 83 65 - 140 mg/dl   Fingerstick Glucose (POCT)   Result Value Ref Range    POC Glucose 120 65 - 140 mg/dl   Fingerstick Glucose (POCT)   Result Value Ref Range    POC Glucose 141 (H) 65 - 140 mg/dl   Fingerstick Glucose (POCT)   Result Value Ref Range    POC Glucose 102 65 - 140 mg/dl   Fingerstick Glucose (POCT)   Result Value Ref Range    POC Glucose 81 65 - 140 mg/dl   Fingerstick Glucose (POCT)   Result Value Ref Range    POC Glucose 90 65 - 140 mg/dl   Fingerstick Glucose (POCT)   Result Value Ref Range    POC Glucose 117 65 - 140 mg/dl Fingerstick Glucose (POCT)   Result Value Ref Range    POC Glucose 145 (H) 65 - 140 mg/dl   Fingerstick Glucose (POCT)   Result Value Ref Range    POC Glucose 86 65 - 140 mg/dl   Fingerstick Glucose (POCT)   Result Value Ref Range    POC Glucose 194 (H) 65 - 140 mg/dl   Fingerstick Glucose (POCT)   Result Value Ref Range    POC Glucose 279 (H) 65 - 140 mg/dl   Fingerstick Glucose (POCT)   Result Value Ref Range    POC Glucose 173 (H) 65 - 140 mg/dl   Fingerstick Glucose (POCT)   Result Value Ref Range    POC Glucose 70 65 - 140 mg/dl   Fingerstick Glucose (POCT)   Result Value Ref Range    POC Glucose 144 (H) 65 - 140 mg/dl   Fingerstick Glucose (POCT)   Result Value Ref Range    POC Glucose 145 (H) 65 - 140 mg/dl   Fingerstick Glucose (POCT)   Result Value Ref Range    POC Glucose 227 (H) 65 - 140 mg/dl   Fingerstick Glucose (POCT)   Result Value Ref Range    POC Glucose 209 (H) 65 - 140 mg/dl   Fingerstick Glucose (POCT)   Result Value Ref Range    POC Glucose 103 65 - 140 mg/dl   Fingerstick Glucose (POCT)   Result Value Ref Range    POC Glucose 105 65 - 140 mg/dl             Invalid input(s): ALBUMIN      Portions of the record may have been created with voice recognition software  Occasional wrong word or "sound a like" substitutions may have occurred due to the inherent limitations of voice recognition software  Read the chart carefully and recognize, using context, where substitutions have occurred  If you have any questions, please contact the dictating provider

## 2021-09-28 NOTE — PATIENT INSTRUCTIONS
We are seeing you here today in the office for follow-up on your kidneys  Your most recent kidney number was stable at 1 5  Please continue to drink plenty of fluids  Please continue to avoid any NSAIDs including Motrin, ibuprofen, Advil, and Aleve  Your blood pressure today was slightly low  This improved with rechecking it  We will not make any changes to her blood pressure medications at this time  Please call the office if he noticed weight gain 2-3 lb in 1 day or 5 lb in 1 week  Also please call the office if he noticed increased shortness of breath or swelling in the legs  We will see in the office in 3-4 months with Dr Antelmo Monte with repeat blood work prior to this appointment

## 2021-10-15 ENCOUNTER — PATIENT OUTREACH (OUTPATIENT)
Dept: CASE MANAGEMENT | Facility: OTHER | Age: 76
End: 2021-10-15

## 2021-11-15 ENCOUNTER — PATIENT OUTREACH (OUTPATIENT)
Dept: CASE MANAGEMENT | Facility: OTHER | Age: 76
End: 2021-11-15

## 2021-12-07 ENCOUNTER — EPISODE CHANGES (OUTPATIENT)
Dept: CASE MANAGEMENT | Facility: OTHER | Age: 76
End: 2021-12-07

## 2022-01-20 NOTE — H&P
Willem Shrestha  H&P- Catherine Barreto 1945, 67 y o  female MRN: [id number]  Unit/Bed#: -Enrique Encounter: [id number]  Primary Care Provider: Pearl Saldaña DO   Date and time admitted to hospital: 4/8/2021  7:32 PM    * Acute on chronic diastolic (congestive) heart failure [facility])  Assessment & Plan  Wt Readings from Last 3 Encounters:   04/08/21 78 5 kg (173 lb 1 oz)   03/10/21 78 5 kg (173 lb)   09/22/20 78 5 kg (173 lb 1 6 oz)     · Home regimen: torsemide 10mg 2x per week   · Last echo 08/2020: "LVEF 65%  No regional wall motion abnormalities  Grade 2 diastolic dysfunction    Mild pulmonary hypertension "  · Follows with Dr Esteban Castillo, Cardiology outpatient  · Unknown dry weight - weight on discharge from hospital 09/2020 was 190 lb, on admission patient is 173 lb  · Chest x-ray:  Findings consistent with pulmonary edema on my review  · BNP at 17,688  · Troponin <0 02   · Albumin at 2 1  · Lasix 40 mg IV given in the ED - urine output 1 5 hours status post Lasix was 200 cc, will continue patient on Lasix 40 mg q 12 hours  · Strict I/Os  · Cardiac diet - 2 g sodium restriction and 1500 mL fluid restriction  · Updated echo  · Cardiology consult  · Monitor on tele until repeat echo results   · On admission - 2+ pitting edema to b/l LEs, rales b/l extending to mid-lung fields     Guaiac positive stools  Assessment & Plan  · SNF staff report heme-positive stool x2 earlier today   · Stool occult negative in the ED   · Will order stool occult x3  · No colonoscopy on file   · Hold off on GI evaluation for now    Chronic respiratory failure with hypoxia ([facility] [address] )  Assessment & Plan  · Patient chronically wears 2 L supplemental oxygen via nasal cannula at [facility]  · SpO2 stable here on home oxygen requirement    Iron deficiency anemia  Assessment & Plan  · Normocytic anemia with a hemoglobin of 8 2 on admission  · Outpatient lab work sent by [facility] from 04/08 showed a hemoglobin of 7 2  · Baseline appears to be between 8-9   · Transfuse for hemoglobin <7   · Patient does take daily iron supplementation - will continue  · Updated iron panel     Paroxysmal atrial fibrillation (HCC)  Assessment & Plan  · Home regimen:  Metoprolol 75 mg q 12 hours and Xarelto 15 mg daily   · Will continue home regimen    Type 2 diabetes mellitus with hyperglycemia (HCC)  Assessment & Plan  Lab Results   Component Value Date    HGBA1C 9 9 (H) 08/26/2020       No results for input(s): POCGLU in the last 72 hours  Blood Sugar Average: Last 72 hrs:  ·  home regimen:  Lantus 14 units HS, Humalog 11 units with lunch and dinner, and 13 units with breakfast  · Will obtain updated hemoglobin A1c   · Will maintain patient on lantus 14U HS and humalog 11U TID with meals, and SSI    Stage 3a chronic kidney disease  Assessment & Plan  Lab Results   Component Value Date    EGFR 47 04/08/2021    EGFR 36 09/20/2020    EGFR 26 09/19/2020    CREATININE 1 13 04/08/2021    CREATININE 1 41 (H) 09/20/2020    CREATININE 1 84 (H) 09/19/2020   · Baseline creatinine appears to be between 1 2-1 3   · Will trend BMP daily in the setting of IV diuresis    Hypertension  Assessment & Plan  · Home regimen:  Metoprolol 75 mg q 12 hours   · Will continue home regimen here    Pressure ulcer of sacral region, stage 4 (HCC)  Assessment & Plan  · Picture placed in chart   · No signs of active infection   · Offload pressure  · Wound care consult     VTE Prophylaxis: Rivaroxaban (Xarelto)  / reason for no mechanical VTE prophylaxis Held off on SCDs to monitor peripheral edema - once edema improves recommend reordering   Code Status:  Level 1 full code per her SNF records  Patient is too confused to answer this question herself, and I was unable to get a hold of her son  POLST: POLST form is not discussed and not completed at this time  Discussion with family:  I called the patient's son, Eyad Hector, however there was no answer  Voicemail was not left      Anticipated Length of Stay: Patient will be admitted on an Inpatient basis with an anticipated length of stay of  > 2 midnights  Justification for Hospital Stay:  IV diuresis in the setting of acute heart failure    Total Time for Visit, including Counseling / Coordination of Care: 1 hour  Greater than 50% of this total time spent on direct patient counseling and coordination of care  Chief Complaint:   Patient has no complaints  Nursing home staff sent her in due to low hemoglobin on outpatient labs and reports of a heme-positive stool  History of Present Illness:    Mary Morley is a 67 y o  female with past medical history of chronic respiratory failure on 2 L supplemental oxygen, chronic diastolic heart failure, iron deficiency anemia, IDDM 2, CKD stage IIIA, paroxysmal AFib on Xarelto, HTN, and pressure ulcer of sacral region stage IV who presents from [facility] for evaluation heme-positive stool x2 and low hemoglobin on outpatient labs  Patient offers no complaints  Hemoglobin on outpatient labs was 7 2  Hemoglobin on arrival to the ED was 8 2, which is within the patient's baseline of 8-9  Patient was guaiac negative in the ED  chest x-ray obtained in the ED showed pulmonary edema and elevated BNP  Patient started on IV diuresis  Will obtain serial stool occults      Review of Systems:    Review of Systems   Unable to perform ROS: Dementia       Past Medical and Surgical History:     Past Medical History:   Diagnosis Date    Acute renal failure (ARF) ([facility] [address] )     Acute respiratory failure with hypoxia ([facility] [address] )     Atrial fibrillation (HCC)     CHF (congestive heart failure) (HCC)     Chronic kidney disease     Diabetes mellitus ([facility] [address] )     type 2    Hyperlipidemia     Hypertension     Stroke [facility])        Past Surgical History:   Procedure Laterality Date    BREAST SURGERY Left     lumpectomy benign    CATARACT EXTRACTION Bilateral 2017    CATARACT EXTRACTION W/ INTRAOCULAR LENS IMPLANT Left 12/11/2017 Procedure: EXTRACTION EXTRACAPSULAR CATARACT PHACO INTRAOCULAR LENS (IOL); Surgeon: Jorge Scott MD;  Location: [facility] MAIN OR;  Service: Ophthalmology    SC OPEN RX FEMUR FX+INTRAMED RAYMOND Right 5/21/2020    Procedure: INSERTION OF SHORT TROCHANTERIC FEMORAL NAIL;  Surgeon: Apolinar Escobar MD;  [facility];  Service: Orthopedics    [address] HUMERAL&GLENOID COMPNT Right 9/10/2018    Procedure: RIGHT REVERSE TOTAL SHOULDER ARTHROPLASTY;  Surgeon: Apolinar Escobar MD;  [facility];  Service: Orthopedics    SC XCAPSL CTRC RMVL INSJ IO LENS PROSTH W/O ECP Right 10/23/2017    Procedure: EXTRACTION EXTRACAPSULAR CATARACT PHACO INTRAOCULAR LENS (IOL); Surgeon: Jorge Scott MD;  Location: [facility] MAIN OR;  Service: Ophthalmology    WOUND DEBRIDEMENT N/A 8/26/2020    Procedure: EXCISIONAL DEBRIDEMENT OF SACRAL PRESSURE WOUND, WASHOUT;;  Surgeon: Paolo Crane MD;  [facility] MAIN [facility];  Service: General    WOUND DEBRIDEMENT N/A 8/28/2020    Procedure: BUTTOCKS DEBRIDEMENT WOUND AND DRESSING CHANGE ([address] OUT); Surgeon: Elsa Jean MD;  Location: [facility] MAIN OR;  Service: General       Meds/Allergies:    Prior to Admission medications    Medication Sig Start Date End Date Taking?  Authorizing Provider   acetaminophen (TYLENOL) 325 mg tablet Take 650 mg by mouth every 4 (four) hours as needed for mild pain or fever   Yes Historical Provider, MD   aspirin (ECOTRIN LOW STRENGTH) 81 mg EC tablet Take 1 tablet (81 mg total) by mouth daily 9/11/20  Yes Brenda Ambrocio MD   atorvastatin (LIPITOR) 40 mg tablet Take 1 tablet (40 mg total) by mouth daily with dinner 9/10/20  Yes Brenda Ambrocio MD   bisacodyl (DULCOLAX) 10 mg suppository Insert 10 mg into the rectum as needed for constipation constipation    Yes Historical Provider, MD   ferrous sulfate 325 (65 Fe) mg tablet TAKE 1 TABLET BY MOUTH ONCE A DAY FOR SUPPLEMENT 7/7/20  Yes Historical Provider, MD   gabapentin (NEURONTIN) 100 mg capsule Take 100 mg by mouth 3 (three) times a day   Yes Historical Provider, MD   insulin glargine (LANTUS) 100 units/mL subcutaneous injection Inject 14 Units under the skin daily at bedtime 9/10/20  Yes Krista Christian MD   insulin lispro (HumaLOG) 100 units/mL injection Inject 7 Units under the skin 3 (three) times a day with meals  Patient taking differently: Inject 11 Units under the skin 2 (two) times a day with meals Lunch and dinner 9/10/20  Yes Krista Christian MD   insulin lispro (HumaLOG) 100 units/mL injection Inject 13 Units under the skin daily with breakfast    Yes Historical Provider, MD   metoprolol tartrate 75 MG TABS Take 1 tablet (75 mg total) by mouth every 12 (twelve) hours 9/10/20  Yes Krista Christian MD   mineral oil enema Insert 1 enema into the rectum once As needed for constipation unrelieved by suppository   Yes Historical Provider, MD   Nutritional Supplements (Yevgeniy) PACK Take by mouth   Yes Historical Provider, MD   oxyCODONE (OXY-IR) 5 MG capsule Take 5 mg by mouth every 4 (four) hours as needed for moderate pain or severe pain   Yes Historical Provider, MD   polyethylene glycol (MIRALAX) 17 g packet Take 17 g by mouth daily 5/27/20  Yes Denise Causey PA-C   rivaroxaban (XARELTO) 15 mg tablet Take 1 tablet (15 mg total) by mouth daily with breakfast 11/23/20  Yes Tatum Candelario MD   Specialty Vitamins Products (PROSTATE PO) Take by mouth 2 (two) times a day   Yes Historical Provider, MD   torsemide (DEMADEX) 20 mg tablet Take 1 tablet (20 mg total) by mouth daily  Patient taking differently: Take 10 mg by mouth 2 (two) times a week Monday and Thursday 9/11/20  Yes Krista Christian MD   VITAMIN D PO Take 50,000 mg by mouth 2 (two) times a week    Yes Historical Provider, MD   levofloxacin (LEVAQUIN) 500 mg tablet Take 500 mg by mouth every 24 hours Untill 10/11 for UTI  4/9/21  Historical Provider, MD   oxyCODONE-acetaminophen (PERCOCET)  mg per tablet Take 1 tablet by mouth as needed for moderate pain 4/9/21  Historical Provider, MD VALLES have reveiwed home medications using records provided by [facility]  Allergies: No Known Allergies    Social History:     Marital Status: Single   Occupation: retired   Patient Pre-hospital Living Situation:  Lives at [facility] [place]  Patient Pre-hospital Level of Mobility:  Patient states that she ambulates independently  Patient Pre-hospital Diet Restrictions:  Cardiac and carbohydrate diet  Substance Use History:   Social History     Substance and Sexual Activity   Alcohol Use Never    Frequency: Never    Drinks per session: Patient refused    Binge frequency: Never    Comment: quit 8/17     Social History     Tobacco Use   Smoking Status Never Smoker   Smokeless Tobacco Never Used     Social History     Substance and Sexual Activity   Drug Use No       Family History:    Family History   Problem Relation Age of Onset    Diabetes Mother     Varicose Veins Mother     Stroke Father     Arthritis Brother     Diabetes Daughter     No Known Problems Brother        Physical Exam:     Vitals:   Blood Pressure: 135/55 (04/08/21 2302)  Pulse: 60 (04/08/21 2200)  Temperature: 98 2 °F (36 8 °C) (04/08/21 2302)  Temp Source: Oral (04/08/21 1932)  Respirations: 14 (04/08/21 2200)  Height: 5' 6" (167 6 cm) (04/08/21 1932)  Weight - Scale: 78 5 kg (173 lb 1 oz) (04/08/21 1932)  SpO2: 91 % (04/08/21 2200)    Physical Exam  Vitals signs and nursing note reviewed  Constitutional:       Appearance: Normal appearance  HENT:      Head: Normocephalic  Nose: Nose normal       Mouth/Throat:      Pharynx: Oropharynx is clear  Eyes:      Conjunctiva/sclera: Conjunctivae normal    Neck:      Musculoskeletal: Normal range of motion  Cardiovascular:      Rate and Rhythm: Normal rate  Rhythm irregular  Pulses: Normal pulses  Heart sounds: Murmur present  Pulmonary:      Effort: Pulmonary effort is normal  No respiratory distress        Comments: Rales in bilateral lung field extending to the mid lung fields  Upper lung fields are clear  Abdominal:      General: Abdomen is flat  Palpations: Abdomen is soft  Tenderness: There is no abdominal tenderness  Musculoskeletal: Normal range of motion  Comments: 2+ pitting edema in bilateral lower extremities  Edema evaluation is limited as SCDs were in place prior to evaluation  Skin:     General: Skin is warm and dry  Coloration: Skin is not pale  Comments: Stage IV pressure ulcer of sacrum - no signs of active infection    Neurological:      Mental Status: She is alert  Comments: Oriented to place  Not oriented to month, year, or president  Psychiatric:         Mood and Affect: Mood normal          Thought Content: Thought content normal        Additional Data:     Lab Results: I have personally reviewed pertinent reports  Results from last 7 days   Lab Units 04/08/21 2032   WBC Thousand/uL 9 26   HEMOGLOBIN g/dL 8 2*   HEMATOCRIT % 27 3*   PLATELETS Thousands/uL 256   NEUTROS PCT % 70   LYMPHS PCT % 21   MONOS PCT % 5   EOS PCT % 3     Results from last 7 days   Lab Units 04/08/21 2032   SODIUM mmol/L 135*   POTASSIUM mmol/L 5 2   CHLORIDE mmol/L 100   CO2 mmol/L 27   BUN mg/dL 113*   CREATININE mg/dL 1 13   ANION GAP mmol/L 8   CALCIUM mg/dL 9 1   ALBUMIN g/dL 2 1*   TOTAL BILIRUBIN mg/dL 1 10*   ALK PHOS U/L 99   ALT U/L 25   AST U/L 17   GLUCOSE RANDOM mg/dL 105     Results from last 7 days   Lab Units 04/08/21 2032   INR  2 19*                   Imaging: I have personally reviewed pertinent films in PACS    XR chest 1 view portable   ED Interpretation by Kelly Cabrera DO (04/08 2142)   Acute pulmonary edema          EKG, Pathology, and Other Studies Reviewed on Admission:   · EKG: not obtained on admission - will order for AM    [employer] Records Reviewed: Yes     ** Please Note: This note has been constructed using a voice recognition system   ** no

## 2022-02-07 NOTE — ASSESSMENT & PLAN NOTE
Mr Lorenzo returns to clinic today.  He is approximately 6 weeks status post right distal biceps tendon repair.  He is doing well.  He has regained full motion.  He has no other complaints.    Physical exam:  Examination the right elbow reveals the incision is well healed.  There is no edema.  Palpation produces no tenderness.  Range of motion is 0-150 degrees with full pronation and supination.  The distal biceps tendon appears to remain intact    Assessment:  Status post right distal biceps tendon repair    Plan:    1. He can decrease or discontinue therapy as he has reached full range of motion but we are not ready to start strengthening    2. Will continue limited weight-bearing with the right arm and hand    3. He can wean out of the hinged elbow brace    4. Will follow up in 6 weeks.  At that point may consider reinstituting therapy for a gentle strengthening program.       · Hgb at 5 9 on admission   · Received 1U PRBCs in ED  · Hemoglobin this morning is 7 point  · Suspect GI source as pt has melena on admission   · Iron panel and stool for occult blood  · Hold Xarelto and ASA   · GI consult patient  · Continue protonix   · Diet clear liquid diet  · Trend H&H and transfuse if needed  · Possible endoscopy in a m

## 2022-04-22 ENCOUNTER — TELEPHONE (OUTPATIENT)
Dept: NEPHROLOGY | Facility: CLINIC | Age: 77
End: 2022-04-22

## 2023-04-20 NOTE — PLAN OF CARE
Problem: Potential for Falls  Goal: Patient will remain free of falls  Description: INTERVENTIONS:  - Educate patient/family on patient safety including physical limitations  - Instruct patient to call for assistance with activity   - Consult OT/PT to assist with strengthening/mobility   - Keep Call bell within reach  - Keep bed low and locked with side rails adjusted as appropriate  - Keep care items and personal belongings within reach  - Initiate and maintain comfort rounds  - Make Fall Risk Sign visible to staff  - Offer Toileting every 2 Hours, in advance of need  - Initiate/Maintain falalarm  - Obtain necessary fall risk management equipment:  - Apply yellow socks and bracelet for high fall risk patients  - Consider moving patient to room near nurses station  Outcome: Progressing     Problem: MOBILITY - ADULT  Goal: Maintain or return to baseline ADL function  Description: INTERVENTIONS:  -  Assess patient's ability to carry out ADLs; assess patient's baseline for ADL function and identify physical deficits which impact ability to perform ADLs (bathing, care of mouth/teeth, toileting, grooming, dressing, etc )  - Assess/evaluate cause of self-care deficits   - Assess range of motion  - Assess patient's mobility; develop plan if impaired  - Assess patient's need for assistive devices and provide as appropriate  - Encourage maximum independence but intervene and supervise when necessary  - Involve family in performance of ADLs  - Assess for home care needs following discharge   - Consider OT consult to assist with ADL evaluation and planning for discharge  - Provide patient education as appropriate  Outcome: Progressing  Goal: Maintains/Returns to pre admission functional level  Description: INTERVENTIONS:  - Perform BMAT or MOVE assessment daily    - Set and communicate daily mobility goal to care team and patient/family/caregiver     - Collaborate with rehabilitation services on mobility goals if consulted  - Perform Range of Motion 2 times a day  - Reposition patient every 2 hours  - Dangle patient 2 times a day  - Stand patient 2 times a day  - Ambulate patient 2 times a day  - Out of bed to chair 2 times a day   - Out of bed for meals 2 times a day  - Out of bed for toileting  - Record patient progress and toleration of activity level   Outcome: Progressing     Problem: Prexisting or High Potential for Compromised Skin Integrity  Goal: Skin integrity is maintained or improved  Description: INTERVENTIONS:  - Identify patients at risk for skin breakdown  - Assess and monitor skin integrity  - Assess and monitor nutrition and hydration status  - Monitor labs   - Assess for incontinence   - Turn and reposition patient  - Assist with mobility/ambulation  - Relieve pressure over bony prominences  - Avoid friction and shearing  - Provide appropriate hygiene as needed including keeping skin clean and dry  - Evaluate need for skin moisturizer/barrier cream  - Collaborate with interdisciplinary team   - Patient/family teaching  - Consider wound care consult   Outcome: Progressing     Problem: Nutrition/Hydration-ADULT  Goal: Nutrient/Hydration intake appropriate for improving, restoring or maintaining nutritional needs  Description: Monitor and assess patient's nutrition/hydration status for malnutrition  Collaborate with interdisciplinary team and initiate plan and interventions as ordered  Monitor patient's weight and dietary intake as ordered or per policy  Utilize nutrition screening tool and intervene as necessary  Determine patient's food preferences and provide high-protein, high-caloric foods as appropriate       INTERVENTIONS:  - Monitor oral intake, urinary output, labs, and treatment plans  - Assess nutrition and hydration status and recommend course of action  - Evaluate amount of meals eaten  - Assist patient with eating if necessary   - Allow adequate time for meals  - Recommend/ encourage appropriate diets, oral nutritional supplements, and vitamin/mineral supplements  - Order, calculate, and assess calorie counts as needed  - Recommend, monitor, and adjust tube feedings and TPN/PPN based on assessed needs  - Assess need for intravenous fluids  - Provide specific nutrition/hydration education as appropriate  - Include patient/family/caregiver in decisions related to nutrition  Outcome: Progressing     Problem: Potential for Falls  Goal: Patient will remain free of falls  Description: INTERVENTIONS:  - Educate patient/family on patient safety including physical limitations  - Instruct patient to call for assistance with activity   - Consult OT/PT to assist with strengthening/mobility   - Keep Call bell within reach  - Keep bed low and locked with side rails adjusted as appropriate  - Keep care items and personal belongings within reach  - Initiate and maintain comfort rounds  - Make Fall Risk Sign visible to staff  - Offer Toileting every 2Hours, in advance of need  - Initiate/Maintain fall alarm  - Obtain necessary fall risk management equipment:  - Apply yellow socks and bracelet for high fall risk patients  - Consider moving patient to room near nurses station  Outcome: Progressing     Problem: MOBILITY - ADULT  Goal: Maintain or return to baseline ADL function  Description: INTERVENTIONS:  -  Assess patient's ability to carry out ADLs; assess patient's baseline for ADL function and identify physical deficits which impact ability to perform ADLs (bathing, care of mouth/teeth, toileting, grooming, dressing, etc )  - Assess/evaluate cause of self-care deficits   - Assess range of motion  - Assess patient's mobility; develop plan if impaired  - Assess patient's need for assistive devices and provide as appropriate  - Encourage maximum independence but intervene and supervise when necessary  - Involve family in performance of ADLs  - Assess for home care needs following discharge   - Consider OT consult to assist with ADL evaluation and planning for discharge  - Provide patient education as appropriate  Outcome: Progressing     Problem: MOBILITY - ADULT  Goal: Maintains/Returns to pre admission functional level  Description: INTERVENTIONS:  - Perform BMAT or MOVE assessment daily    - Set and communicate daily mobility goal to care team and patient/family/caregiver  - Collaborate with rehabilitation services on mobility goals if consulted  - Perform Range of Motion 2 times a day  - Reposition patient every 2 hours  - Dangle patient 2 times a day  - Stand patient 2 times a day  - Ambulate patient 2 times a day  - Out of bed to chair 2 times a day   - Out of bed for meals 2 times a day  - Out of bed for toileting  - Record patient progress and toleration of activity level   Outcome: Progressing     Problem: Prexisting or High Potential for Compromised Skin Integrity  Goal: Skin integrity is maintained or improved  Description: INTERVENTIONS:  - Identify patients at risk for skin breakdown  - Assess and monitor skin integrity  - Assess and monitor nutrition and hydration status  - Monitor labs   - Assess for incontinence   - Turn and reposition patient  - Assist with mobility/ambulation  - Relieve pressure over bony prominences  - Avoid friction and shearing  - Provide appropriate hygiene as needed including keeping skin clean and dry  - Evaluate need for skin moisturizer/barrier cream  - Collaborate with interdisciplinary team   - Patient/family teaching  - Consider wound care consult   Outcome: Progressing     Problem: Nutrition/Hydration-ADULT  Goal: Nutrient/Hydration intake appropriate for improving, restoring or maintaining nutritional needs  Description: Monitor and assess patient's nutrition/hydration status for malnutrition  Collaborate with interdisciplinary team and initiate plan and interventions as ordered  Monitor patient's weight and dietary intake as ordered or per policy   Utilize nutrition screening tool and intervene as necessary  Determine patient's food preferences and provide high-protein, high-caloric foods as appropriate       INTERVENTIONS:  - Monitor oral intake, urinary output, labs, and treatment plans  - Assess nutrition and hydration status and recommend course of action  - Evaluate amount of meals eaten  - Assist patient with eating if necessary   - Allow adequate time for meals  - Recommend/ encourage appropriate diets, oral nutritional supplements, and vitamin/mineral supplements  - Order, calculate, and assess calorie counts as needed  - Recommend, monitor, and adjust tube feedings and TPN/PPN based on assessed needs  - Assess need for intravenous fluids  - Provide specific nutrition/hydration education as appropriate  - Include patient/family/caregiver in decisions related to nutrition  Outcome: Progressing come for his injection. I explained the importance of treatment for this cancer with complete androgen blockade. We will proceed with Eligard treatment today. Repeated PSA showed very good response. PSA from today still pending  Discussed possible adding [place]. Patient is quite reluctant. Actually he declined. Patient had problem with compliance and transportation. Bone scan was reviewed and explained to patient. Patient's questions were answered to the best of his satisfaction and he verbalized full understanding and agreement. [facility]                              This note is created with the assistance of a speech recognition program.  While intending to generate a document that actually reflects the content of the visit, the document can still have some errors including those of syntax and sound a like substitutions which may escape proof reading. It such instances, actual meaning can be extrapolated by contextual diversion.

## 2023-05-22 NOTE — CONSULTS
Consultation - General Surgery  Stephany Garcia 67 y o  female MRN: [id number]  Unit/Bed#: -01 Encounter: [id number]    Reason for Consult: chronic sacral wound, worsening osteo of coccyx, evaluate for diverting colostomy    Assessment/Plan:  67year old female PMH CKD, A fib, HTN, dementia, chronic sacral ulcer with osteomyelitis  -no indication for immediate surgical intervention  -wound is far enough from the anus that should not need surgical fecal diversion, but rather recommend frequent cleaning, good local wound care and consider a wound VAC          HPI:    Stephany Garcia is a 67 y o  female  PMH CKD, A fib, HTN, chronic sacral ulcer presented to the ED this AM after being found to have a Hgb of 5 6 on outpatient labs ordered by SNF  Surgery consult requested to evaluate chronic sacral ulcer, worsening osteo seen on CT and evaluate the need for colostomy for fecal diversion  At this time, pt offers no complaints and does not appear uncomfortable  Review of Systems:  Unable to obtain due to hx of dementia  Historical Information   Past Medical History:   Diagnosis Date    [facility] failure (ARF) ([facility] [address] )     Acute respiratory failure with hypoxia (HCC)     Anemia     Atrial fibrillation (HCC)     CHF (congestive heart failure) (HCC)     Chronic kidney disease     Diabetes mellitus ([facility] [address] )     type 2    Hyperlipidemia     Hypertension     Stroke [facility])      Past Surgical History:   Procedure Laterality Date    BREAST SURGERY Left     lumpectomy benign    CATARACT EXTRACTION Bilateral 2017    CATARACT EXTRACTION W/ INTRAOCULAR LENS IMPLANT Left 12/11/2017    Procedure: EXTRACTION EXTRACAPSULAR CATARACT PHACO INTRAOCULAR LENS (IOL);   Surgeon: Lupe Coleman MD;  Location: [facility] MAIN OR;  Service: Ophthalmology    MT OPEN RX FEMUR FX+INTRAMED RAYMOND Right 5/21/2020    Procedure: INSERTION OF SHORT TROCHANTERIC FEMORAL NAIL;  Surgeon: Dick Ortiz MD;  [facility] Main [facility];  Service: Orthopedics    GA ELENI SHOULDER ARTHRPLSTY HUMERAL&GLENOID COMPNT Right 9/10/2018    Procedure: RIGHT REVERSE TOTAL SHOULDER ARTHROPLASTY;  Surgeon: Sol Longoria MD;  [facility] Main [facility];  Service: Orthopedics    GA XCAPSL CTRC RMVL INSJ IO LENS PROSTH W/O ECP Right 10/23/2017    Procedure: EXTRACTION EXTRACAPSULAR CATARACT PHACO INTRAOCULAR LENS (IOL); Surgeon: Verna Henry MD;  [facility] MA[facility] OR;  Service: Ophthalmology    WOUND DEBRIDEMENT N/A 8/26/2020    Procedure: EXCISIONAL DEBRIDEMENT OF SACRAL PRESSURE WOUND, WASHOUT;;  Surgeon: Yun Ruiz MD;  [facility] MAIN [facility];  Service: General    WOUND DEBRIDEMENT N/A 8/28/2020    Procedure: BUTTOCKS DEBRIDEMENT WOUND AND DRESSING CHANGE ([address] OUT);   Surgeon: Fiona Esteves MD;  Location: [facility] MAIN [facility];  Service: General     Social History   Social History     Substance and Sexual Activity   Alcohol Use Never    Comment: quit 8/17     Social History     Substance and Sexual Activity   Drug Use No     Social History     Tobacco Use   Smoking Status Never Smoker   Smokeless Tobacco Never Used     Family History   Problem Relation Age of Onset    Diabetes Mother     Varicose Veins Mother     Stroke Father     Arthritis Brother     Diabetes Daughter     No Known Problems Brother        Medications:  Current Facility-Administered Medications   Medication Dose Route Frequency    acetaminophen (TYLENOL) tablet 650 mg  650 mg Oral Q6H PRN    atorvastatin (LIPITOR) tablet 40 mg  40 mg Oral Daily With Dinner    [START ON 7/6/2021] cefepime (MAXIPIME) IVPB (premix in dextrose) 1,000 mg 50 mL  1,000 mg Intravenous Q24H    gabapentin (NEURONTIN) capsule 100 mg  100 mg Oral TID    insulin glargine (LANTUS) subcutaneous injection 10 Units 0 1 mL  10 Units Subcutaneous HS    insulin lispro (HumaLOG) 100 units/mL subcutaneous injection 1-5 Units  1-5 Units Subcutaneous TID AC    insulin lispro (HumaLOG) 100 units/mL subcutaneous injection 1-5 Units  1-5 Units Subcutaneous HS    metoprolol tartrate (LOPRESSOR) tablet 25 mg  25 mg Oral Q12H [address]    mirtazapine (REMERON) tablet 15 mg  15 mg Oral HS    pantoprazole (PROTONIX) EC tablet 40 mg  40 mg Oral BID AC    sodium chloride 0 9 % infusion  50 mL/hr Intravenous Continuous    [START ON 7/6/2021] vancomycin (VANCOCIN) 500 mg in sodium chloride 0 9% 100 mL IVPB  10 mg/kg Intravenous Q24H       No Known Allergies    Physical Examination:  Vitals:   Vitals:    07/05/21 0649 07/05/21 0651 07/05/21 0840 07/05/21 1456   BP:  124/74 125/70 98/68   BP Location:       Pulse:   81 89   Resp:  19  19   Temp:  98 1 °F (36 7 °C)  98 6 °F (37 °C)   TempSrc:       SpO2:   98% 99%   Weight: 72 5 kg (159 lb 13 3 oz)      Height:         Temp  Min: 96 8 °F (36 °C)  Max: 98 6 °F (37 °C)  IBW (Ideal Body Weight): 59 3 kg      General appearance: No acute distress  Lungs: Clear bilaterally  Abdomen: Soft, nontender  Extremities: Trace edema  Sacral wound appears clean and dry with granulation tissue present, no purulence  There is no surrounding erythema or necrosis  Diagnostic Data:  Lab: I have personally reviewed pertinent lab results  Results from last 7 days   Lab Units 07/05/21  1242 07/04/21  2216   WBC Thousand/uL  --  12 67*   HEMOGLOBIN g/dL 7 1* 5 9*   HEMATOCRIT % 23 7* 19 3*   PLATELETS Thousands/uL  --  260     Results from last 7 days   Lab Units 07/05/21  0558   POTASSIUM mmol/L 4 9   CHLORIDE mmol/L 108   CO2 mmol/L 25   BUN mg/dL 102*   CREATININE mg/dL 1 98*   CALCIUM mg/dL 9 1   ALK PHOS U/L 85   ALT U/L 13   AST U/L 13       Imaging: I have personally reviewed the pertinent imaging studies on the PACS system  Procedure: CT abdomen pelvis wo contrast    Result Date: 7/5/2021  Narrative: CT ABDOMEN AND PELVIS WITHOUT IV CONTRAST INDICATION:   stage 4 sacral decub, sepsis, gi bleed  COMPARISON:  None   TECHNIQUE:  CT examination of the abdomen and pelvis was performed without intravenous contrast   Axial, sagittal, and coronal 2D reformatted images were created from the source data and submitted for interpretation  Radiation dose length product (DLP) for this visit:  620 88 mGy-cm   This examination, like all CT scans performed in the [facility], was performed utilizing techniques to minimize radiation dose exposure, including the use of iterative  reconstruction and automated exposure control  Enteric contrast was administered  FINDINGS: ABDOMEN LOWER CHEST:  Moderate right pleural effusion  Small left pleural effusion LIVER/BILIARY TREE:  Unremarkable  GALLBLADDER:  There are gallstone(s) within the gallbladder, without pericholecystic inflammatory changes  SPLEEN:  Unremarkable  PANCREAS:  Unremarkable  ADRENAL GLANDS:  Unremarkable  KIDNEYS/URETERS:  Unremarkable  No hydronephrosis  STOMACH AND BOWEL:  Unremarkable  APPENDIX:  No findings to suggest appendicitis  ABDOMINOPELVIC CAVITY:  Presacral inflammatory change  Madeline Folk No pneumoperitoneum  No lymphadenopathy  VESSELS:  Atherosclerotic changes are present  No evidence of aneurysm  PELVIS REPRODUCTIVE ORGANS:  Unremarkable for patient's age  URINARY BLADDER:  Unremarkable  ABDOMINAL WALL/INGUINAL REGIONS:  Unremarkable  OSSEOUS STRUCTURES:  Extensive soft tissue inflammatory change and ulceration overlying the coccyx  Sclerosis and destructive change of the coccygeal bones  Status post ORIF of a proximal femur     Impression: Large soft tissue ulcer overlying the coccyx with evidence of osteomyelitis involving the coccygeal bones  These findings have worsened when comparing to 9/14/2020  Workstation performed: [id number]     Procedure: CT head without contrast    Result Date: 7/4/2021  Narrative: CT BRAIN - WITHOUT CONTRAST INDICATION:   left gaze preference at nursing home at 10 am  COMPARISON:  9/14/2020  TECHNIQUE:  CT examination of the brain was performed    In addition to axial images, sagittal and coronal 2D reformatted images were created and submitted for interpretation  Radiation dose length product (DLP) for this visit:  782 mGy-cm   This examination, like all CT scans performed in the [facility], was performed utilizing techniques to minimize radiation dose exposure, including the use of iterative reconstruction and automated exposure control  IMAGE QUALITY:  Diagnostic  FINDINGS: PARENCHYMA: Decreased attenuation is noted in periventricular and subcortical white matter demonstrating an appearance that is statistically most likely to represent moderate microangiopathic change; this appearance is similar when compared to most recent prior examination  Chronic lacunar infarction(s) are noted in basal ganglia, unchanged from prior exam  No CT signs of acute infarction  No intracranial mass, mass effect or midline shift  No acute parenchymal hemorrhage  VENTRICLES AND EXTRA-AXIAL SPACES:  Ventricles and extra-axial CSF spaces are prominent commensurate with the degree of volume loss  No hydrocephalus  No acute extra-axial hemorrhage  VISUALIZED ORBITS AND PARANASAL SINUSES:  Unremarkable  CALVARIUM AND EXTRACRANIAL SOFT TISSUES:  Normal      Impression: No acute intracranial abnormality  Stable microangiopathic changes within the brain  Workstation performed: [id number]       Active medications: The patients active medications were reviewed and modified as appropriate        Code Status: Level 1 - Full Code      Counseling / Coordination of Care  Total floor / unit time spent today 20 minutes  Greater than 50% of total time was spent with the patient and / or family counseling and / or coordination of care         Beto De La Torre PA-C  7/5/2021 Detail Level: Zone General Sunscreen Counseling: A broad spectrum sunscreen with a SPF of 30 or higher. Sun protective clothing can be used in lieu of sunscreen but must be worn the entire time you are exposed to the sun's rays.

## 2023-07-30 NOTE — ASSESSMENT & PLAN NOTE
8/30 IV Bumex & IV lasix given as pt overloaded from resuscitation and PRBC with excellent UOP  Now switched to PO torsemide 20 mg (was on Lasix 20 mg at home) Opt out

## 2023-12-08 NOTE — ASSESSMENT & PLAN NOTE
CC:   Chief Complaint   Patient presents with    Eye Problem     Having eye and throat problems. Eye is mucousy and voice is very hoarse. Painful to swallow - Entered by patient, had virtual visit today with orders paced for strep, covid/flu/rsv testing and prescription for eye drops, patient states she would like to be evaluated \"doesn't trust the virtual visit\", onset Wed/Thur of left eye redness and drainage and pain, children also being treated for eye infections, raspy voice, occasional cough, denies fevers, occasional chills         HPI:  [place] is a 26 year old year old female who presents with a 2 day history of the above listed symptoms and h/o rhinorrhea, congestion, cough, sorethroat, headaches, difficulty sleep, and poor appetite. Possible sick contacts; family has similar symptoms. There has either been no use or no improvement from the use of OTC medications.  Patient is already beginning treatment for conjunctivitis.    ROS:   Pertinent positives as noted in HPI.    Past medical history, surgical history, social history and medications/allergies were reviewed.    PHYSICAL EXAMINATION:    Vital Signs:      Vitals:    12/08/23 1004   BP: 113/68   Pulse: 72   Resp: (!) 12   Temp: 96.7 °F (35.9 °C)   TempSrc: Temporal   SpO2: 98%   Weight: 57 kg (125 lb 10.6 oz)   Height: 5' (1.524 m)       General:  Well developed, well-nourished female in no acute distress.  HEENT: Normocephalic, extraocular movements are intact, pupils are equally reactive to light and accommodation. Left eye has swollen conjunctiva and purulent discharge noted.  Tympanic membranes are intact with a pearly gray color and good cone of light, there is mild erythema and bulging noted, external canals are patent with no excessive cerumen and no erythema or inflammation noted. Oropharynx is erythematous and moist without exudates or lesions. Nasal passages are patent with mild erythema and edema noted.  Chest: Clear to auscultation all  POA  Wound care and piper surg appreciated  Patient reports ulcer developed status post recent hip fracture  8/26 excisional debridement of sacral pressure wound and washout   8/28 Debridement and washout of sacral decubitus ulcer  C/w trujillo  ID recs divesrsion colostomy to help w/ healing    D/w surgery - colostomy is not indicated as wound is distant from rectum and has not been but getting contaminated by stool  Wound VAC placed on 9/1 fields.  No wheezing crackles or crepitus.  No accessory muscle use.  Cardiovascular:  Regular rate & rhythm, no murmur or gallop.   2+ radial pulses bilaterally with less than 2 second capillary refill.  Neurologic:  Alert, verbal, appropriate.  Oriented x 3.  Speech fluent.  No apraxia or ataxia observed.       A/P:  1. Viral URI with cough    2. Acute pharyngitis, unspecified etiology      New Prescriptions    No medications on file        Orders Placed This Encounter    COVID/Flu/RSV panel    Streptococcus group A PCR       -symptomatic control with over-the-counter remedies as needed.    -Further recommendations pending results of lab work.    -Tylenol may be used for fever as needed.  -Symptoms should resolve in 3 to 5 days. If symptoms persist longer than 7 days or if patient has worsening cough, respiratory distress or if the fever is persistent, etc to call or return    Benji Troy,

## 2024-03-14 NOTE — ASSESSMENT & PLAN NOTE
0 Lab Results   Component Value Date    EGFR 15 09/06/2021    EGFR 16 09/05/2021    EGFR 19 09/04/2021    CREATININE 2 93 (H) 09/06/2021    CREATININE 2 81 (H) 09/05/2021    CREATININE 2 40 (H) 09/04/2021   · Baseline hemoglobin 0 8- 1 4   · Creatinine on admission 0 95   · Creatinine continuing to rise  · Diuretics were held  · Avoid hypotension/nephrotoxins medication  · Trend BMP   · See above

## 2024-11-08 NOTE — PROGRESS NOTES
Vancomycin IV Pharmacy-to-Dose Consultation    Jes Tyson is a 67 y o  female who was receiving Vancomycin IV with management by the Pharmacy Consult service  Assessment/Plan:  The patient was reviewed  Patient's Vancomycin was discontinued  Vancomycin trough and Pharmacy consult will also be discontinued and Pharmacy will sign off on the patient      Anahi Lozano, Pharmacist (4) no impairment

## (undated) DEVICE — GLOVE SRG BIOGEL ORTHOPEDIC 7.5

## (undated) DEVICE — THE MONARCH® "D" CARTRIDGE IS A SINGLE-USE POLYPROPYLENE CARTRIDGE FOR POSTERIOR CHAMBER IOL DELIVERY: Brand: MONARCH® III

## (undated) DEVICE — CURITY NON-ADHERENT STRIPS: Brand: CURITY

## (undated) DEVICE — SUT VICRYL 2-0 CT-1 27 IN J259H

## (undated) DEVICE — 3000CC GUARDIAN II: Brand: GUARDIAN

## (undated) DEVICE — ARTHROSCOPY FLOOR MAT

## (undated) DEVICE — LIGHT HANDLE COVER SLEEVE DISP BLUE STELLAR

## (undated) DEVICE — SPINNING CEMENT MIXING BOWL

## (undated) DEVICE — STERILE BETHLEHEM ORIF HIP PK: Brand: CARDINAL HEALTH

## (undated) DEVICE — CLEARCUT® SLIT KNIFE INTREPID MICRO-COAXIAL SYSTEM 2.4 SB: Brand: CLEARCUT®; INTREPID

## (undated) DEVICE — CHLORAPREP HI-LITE 26ML ORANGE

## (undated) DEVICE — B-H IRRIGATING CAN 19GA FLAT ANGLED 8MM: Brand: OPHTHALMIC CANNULA

## (undated) DEVICE — SPONGE STICK WITH PVP-I: Brand: KENDALL

## (undated) DEVICE — ABDOMINAL PAD: Brand: DERMACEA

## (undated) DEVICE — EYE PACK CUSTOM -FINNEGAN

## (undated) DEVICE — GLOVE INDICATOR PI UNDERGLOVE SZ 8.5 BLUE

## (undated) DEVICE — PAD GROUNDING ADULT

## (undated) DEVICE — SUT FIBERWIRE #2 1/2 CIRCLE T-5 38IN AR-7200

## (undated) DEVICE — CAPIT RVRS TM TOTAL SHOULDER

## (undated) DEVICE — BETHLEHEM UNIVERSAL MINOR GEN: Brand: CARDINAL HEALTH

## (undated) DEVICE — SUT VICRYL 0 CT-1 27 IN J260H

## (undated) DEVICE — INTENDED FOR TISSUE SEPARATION, AND OTHER PROCEDURES THAT REQUIRE A SHARP SURGICAL BLADE TO PUNCTURE OR CUT.: Brand: BARD-PARKER SAFETY BLADES SIZE 10, STERILE

## (undated) DEVICE — SHOULDER STABILIZATION KIT,                                    DISPOSABLE 12 PER BOX

## (undated) DEVICE — HOOD: Brand: FLYTE, SURGICOOL

## (undated) DEVICE — 45° KELMAN®, 0.9 MM TURBOSONICS® MINI-FLARED ABS® TIP: Brand: ALCON, KELMAN, TURBOSONICS, MINI-FLARED ABS

## (undated) DEVICE — 2108 SERIES SAGITTAL BLADE (18.6 X 0.64 X 61.1MM)

## (undated) DEVICE — BULB SYRINGE,IRRIGATION WITH PROTECTIVE CAP: Brand: DOVER

## (undated) DEVICE — AIR INJECT CANNULA 27GA: Brand: OPHTHALMIC CANNULA

## (undated) DEVICE — GLOVE SRG BIOGEL 8.5

## (undated) DEVICE — HEAVY DUTY TABLE COVER: Brand: CONVERTORS

## (undated) DEVICE — PROXIMATE SKIN STAPLERS (35 WIDE) CONTAINS 35 STAINLESS STEEL STAPLES (FIXED HEAD): Brand: PROXIMATE

## (undated) DEVICE — 30° KELMAN®, 0.9 MM TURBOSONICS® ABS® MICROTIP™ TIP: Brand: ALCON, TURBOSONICS, ABS, MICROTIP

## (undated) DEVICE — BASIC ULTRASOUND: Brand: ALCON, INFINITI

## (undated) DEVICE — POSITIONER BEACH CHAIR FOAM KIT

## (undated) DEVICE — DRESSING MEPILEX BORDER 4 X 8 IN

## (undated) DEVICE — THE SIMPULSE SOLO SYSTEM WITH ULTREX RETRACTABLE SPLASH SHIELD TIP: Brand: SIMPULSE SOLO

## (undated) DEVICE — DRESSING EXUDERM SATIN HYDROCOLLOID 4 X 4 IN

## (undated) DEVICE — ACE WRAP 6 IN UNSTERILE

## (undated) DEVICE — BETHLEHEM UNIVERSAL OUTPATIENT: Brand: CARDINAL HEALTH

## (undated) DEVICE — PLUMEPEN PRO 10FT

## (undated) DEVICE — 0.9MM MICROSMOOTH NULTRA INFUSION SLEEVE KIT: Brand: INFINIT, MICROSMOOTH, ALCON

## (undated) DEVICE — DRESSING MEPILEX AG BORDER 4 X 4 IN

## (undated) DEVICE — BETHLEHEM UNIV MAJOR ORTHO,KIT: Brand: CARDINAL HEALTH

## (undated) DEVICE — GLOVE SRG BIOGEL 7.5

## (undated) DEVICE — IMPERVIOUS STOCKINETTE: Brand: DEROYAL

## (undated) DEVICE — TIBURON SPLIT SHEET: Brand: CONVERTORS

## (undated) DEVICE — SPONGE LAP 18 X 18 IN

## (undated) DEVICE — 2000CC GUARDIAN II: Brand: GUARDIAN

## (undated) DEVICE — 3.2MM GUIDE WIRE 400MM

## (undated) DEVICE — 6617 IOBAN II PATIENT ISOLATION DRAPE 5/BX,4BX/CS: Brand: STERI-DRAPE™ IOBAN™ 2

## (undated) DEVICE — 60 ML SYRINGE,REGULAR TIP: Brand: MONOJECT

## (undated) DEVICE — 4.0MM THREE-FLUTED DRILL BIT QC/260MM/65MM CALIBRATION

## (undated) DEVICE — SCD SEQUENTIAL COMPRESSION COMFORT SLEEVE MEDIUM KNEE LENGTH: Brand: KENDALL SCD

## (undated) DEVICE — INTENDED FOR TISSUE SEPARATION, AND OTHER PROCEDURES THAT REQUIRE A SHARP SURGICAL BLADE TO PUNCTURE OR CUT.: Brand: BARD-PARKER SAFETY BLADES SIZE 15, STERILE

## (undated) DEVICE — DRESSING MEPILEX AG BORDER 4 X 8 IN